# Patient Record
Sex: MALE | Race: BLACK OR AFRICAN AMERICAN | NOT HISPANIC OR LATINO | Employment: OTHER | URBAN - METROPOLITAN AREA
[De-identification: names, ages, dates, MRNs, and addresses within clinical notes are randomized per-mention and may not be internally consistent; named-entity substitution may affect disease eponyms.]

---

## 2017-01-03 ENCOUNTER — HOSPITAL ENCOUNTER (OUTPATIENT)
Dept: PHYSICAL THERAPY | Facility: HOSPITAL | Age: 62
Setting detail: THERAPIES SERIES
Discharge: HOME | End: 2017-01-03

## 2017-01-03 DIAGNOSIS — M25.511 RIGHT SHOULDER PAIN, UNSPECIFIED CHRONICITY: ICD-10-CM

## 2017-01-03 DIAGNOSIS — I10 ESSENTIAL HYPERTENSION: ICD-10-CM

## 2017-01-03 DIAGNOSIS — Z78.9 DIFFICULTY WITH ACTIVITIES OF DAILY LIVING: Primary | ICD-10-CM

## 2017-01-03 PROCEDURE — 97110 THERAPEUTIC EXERCISES: CPT | Performed by: PHYSICAL THERAPIST

## 2017-01-03 PROCEDURE — 97140 MANUAL THERAPY 1/> REGIONS: CPT | Performed by: PHYSICAL THERAPIST

## 2017-01-03 RX ORDER — CARVEDILOL 25 MG/1
TABLET ORAL
Qty: 180 TABLET | Refills: 0 | Status: SHIPPED | OUTPATIENT
Start: 2017-01-03 | End: 2017-06-06 | Stop reason: SDUPTHER

## 2017-01-03 NOTE — PROGRESS NOTES
Outpatient Physical Therapy Ortho Treatment Note  [facility]     Patient Name: Isaias Monaco  : 1955  MRN: [id number]  Today's Date: 1/3/2017      Visit Date: 2017    Visit Dx:    ICD-10-CM ICD-9-CM   1. Difficulty with activities of daily living R53.81 799.3   2. Right shoulder pain, unspecified chronicity M25.511 719.41       Patient Active Problem List   Diagnosis   • Microalbuminuria   • Vitamin D deficiency   • Angioedema   • Chronic coronary artery disease   • Anxiety   • Male erectile disorder   • Gastroesophageal reflux disease   • Essential hypertension   • Hyperlipidemia   • Type 2 diabetes mellitus   • Adiposity   • Routine health maintenance   • Uncontrolled type 2 diabetes mellitus   • Low testosterone   • Hypogonadism in male        Past Medical History   Diagnosis Date   • Adenomatous colon polyp      Colonoscopy last done    • Johnson's esophagus    • Cardiac disease    • Diabetes mellitus    • Hypertension    • Joint pain      or swelling   • Male erectile disorder    • Stiffness in joint    • Type 2 diabetes mellitus    • Vitamin D deficiency         Past Surgical History   Procedure Laterality Date   • Coronary angioplasty with stent placement       cardiac stents x3 occasions   • Shoulder surgery Right 2016             PT Assessment/Plan       17 1302 16 1321       PT Assessment    Assessment Comments Thony is progressing with his PREs and pain is remaining very low.    -MP Thony tolerated treatment well and is progressing with PREs without increasing pain.  -MP     PT Plan    PT Plan Comments Continue progressing physical therapy to maximize AROM, strength and function with his R UE.  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  -MP       User Key  (r) = Recorded By, (t) = Taken By, (c) = Cosigned By    Initials Name Provider Type    ASIF Bobby, PT Physical Therapist                Exercises        "01/03/17 1300          Subjective Comments    Subjective Comments \" I can feel my shoulder getting stronger. \"  -MP      Subjective Pain    Able to rate subjective pain? yes  -MP      Pre-Treatment Pain Level 1  -MP      Post-Treatment Pain Level 0  -MP      Exercise 1    Exercise Name 1 Refer to land flow sheet  -MP      Exercise 2    Exercise Name 2 Progressed standing lat pulldown 27.5 lbs. 10 x 3, standing rows 27.5 lbs. 10 x 3 and overhead press to 3 lbs. 10 x 3.  -MP        User Key  (r) = Recorded By, (t) = Taken By, (c) = Cosigned By    Initials Name Provider Type    ASIF Bobby, PT Physical Therapist             Manual Rx (last 36 hours)      Manual Treatments       01/03/17 1300          Manual Rx 1    Manual Rx 1 Location R UE  -MP      Manual Rx 1 Type Oscillations   -MP      Manual Rx 1 Duration 10 minutes  -MP      Manual Rx 2    Manual Rx 2 Location R glenohumeral joint  -MP      Manual Rx 2 Type Anterior, inferior and posterior glides  -MP      Manual Rx 2 Grade Grades 3-4  -MP      Manual Rx 2 Duration 5x3 each  -MP        User Key  (r) = Recorded By, (t) = Taken By, (c) = Cosigned By    Initials Name Provider Type    ASIF Bobby PT Physical Therapist                PT OP Goals       01/03/17 1300 12/29/16 1300 12/27/16 1200    PT Short Term Goals    STG Date to Achieve 11/04/16  -MP 11/04/16  -MP 11/04/16  -MP    STG 1 Sidelying passive R shoulder IR is instructed and performed without problem and is added to his HEP.  -MP Sidelying passive R shoulder IR is instructed and performed without problem and is added to his HEP.  -MP Sidelying passive R shoulder IR is instructed and performed without problem and is added to his HEP.  -MP    STG 1 Progress Met  -MP Met  -MP Met  -MP    STG 2 Thony is instructed in correct pendulum exercise technique and they are added to his HEP.  -MP Thony is instructed in correct pendulum exercise technique and they are added to his HEP.  -MP Thony is instructed " in correct pendulum exercise technique and they are added to his HEP.  -MP    STG 2 Progress Met  -MP Met  -MP Met  -MP    STG 3 Scapular stabilization exercises are begun.  -MP Scapular stabilization exercises are begun.  -MP Scapular stabilization exercises are begun.  -MP    STG 3 Progress Met  -MP Met  -MP Met  -MP    STG 4 SPADI score improves to a 40% functional disability.  -MP SPADI score improves to a 40% functional disability.  -MP SPADI score improves to a 40% functional disability.  -MP    STG 4 Progress Met  -MP Met  -MP Met  -MP    Long Term Goals    LTG Date to Achieve 01/15/17  -MP 01/15/17  -MP 01/15/17  -MP    LTG 1 Thony has zero pain with normal ADLs with the R UE.  -MP Thony has zero pain with normal ADLs with the R UE.  -MP Thony has zero pain with normal ADLs with the R UE.  -MP    LTG 1 Progress Ongoing  -MP Ongoing  -MP Ongoing  -MP    LTG 2 Functional AROM of the R shoulder equals the L.  -MP Functional AROM of the R shoulder equals the L.  -MP Functional AROM of the R shoulder equals the L.  -MP    LTG 2 Progress Ongoing  -MP Ongoing  -MP Ongoing  -MP    LTG 3 Strength R shoulder, cardinal motions, equals 4+/5.  -MP Strength R shoulder, cardinal motions, equals 4+/5.  -MP Strength R shoulder, cardinal motions, equals 4+/5.  -MP    LTG 3 Progress Ongoing  -MP Ongoing  -MP Ongoing  -MP    LTG 4 SPADI score displays less than 20% functional disability.  -MP SPADI score displays less than 20% functional disability.  -MP SPADI score displays less than 20% functional disability.  -MP    LTG 4 Progress Ongoing  -MP Ongoing  -MP Ongoing  -MP    LTG 5 Thony is independent with a complete HEP and self care education.  -MP Thony is independent with a complete HEP and self care education.  -MP Thony is independent with a complete HEP and self care education.  -MP    LTG 5 Progress Ongoing  -MP Ongoing  -MP Ongoing  -MP      12/23/16 1400          PT Short Term Goals    STG Date to Achieve 11/04/16   -MP      STG 1 Sidelying passive R shoulder IR is instructed and performed without problem and is added to his HEP.  -MP      STG 1 Progress Met  -MP      STG 2 Thony is instructed in correct pendulum exercise technique and they are added to his HEP.  -MP      STG 2 Progress Met  -MP      STG 3 Scapular stabilization exercises are begun.  -MP      STG 3 Progress Met  -MP      STG 4 SPADI score improves to a 40% functional disability.  -MP      STG 4 Progress Met  -MP      Long Term Goals    LTG Date to Achieve 01/15/17  -MP      LTG 1 Thony has zero pain with normal ADLs with the R UE.  -MP      LTG 1 Progress Ongoing  -MP      LTG 2 Functional AROM of the R shoulder equals the L.  -MP      LTG 2 Progress Ongoing  -MP      LTG 3 Strength R shoulder, cardinal motions, equals 4+/5.  -MP      LTG 3 Progress Ongoing  -MP      LTG 4 SPADI score displays less than 20% functional disability.  -MP      LTG 4 Progress Ongoing  -MP      LTG 5 Thony is independent with a complete HEP and self care education.  -MP      LTG 5 Progress Ongoing  -MP        User Key  (r) = Recorded By, (t) = Taken By, (c) = Cosigned By    Initials Name Provider Type    ASIF Bobby PT Physical Therapist                Therapy Education       01/03/17 1302    Therapy Education    Given HEP  -MP    Program Reinforced  -MP    How Provided Verbal  -MP    Provided to Patient  -MP    Level of Understanding Verbalized  -MP      12/29/16 1321    Therapy Education    Given HEP  -MP    Program Reinforced  -MP    How Provided Verbal  -MP    Provided to Patient  -MP    Level of Understanding Verbalized  -MP      User Key  (r) = Recorded By, (t) = Taken By, (c) = Cosigned By    Initials Name Provider Type    ASIF Bobby PT Physical Therapist          Time Calculation:   Start Time: 1145  Stop Time: 1240  Time Calculation (min): 55 min    Therapy Charges for Today     Code Description Service Date Service Provider Modifiers Qty    63381503476  PT THER  PROC EA 15 MIN 1/3/2017 Gwyn Bobby, PT GP 3    19214103693 HC PT MANUAL THERAPY EA 15 MIN 1/3/2017 Gwyn Bobby, PT GP 1          Gwyn Bobby, PT  1/3/2017

## 2017-01-06 ENCOUNTER — HOSPITAL ENCOUNTER (OUTPATIENT)
Dept: PHYSICAL THERAPY | Facility: HOSPITAL | Age: 62
Setting detail: THERAPIES SERIES
Discharge: HOME | End: 2017-01-06

## 2017-01-06 DIAGNOSIS — Z78.9 DIFFICULTY WITH ACTIVITIES OF DAILY LIVING: Primary | ICD-10-CM

## 2017-01-06 DIAGNOSIS — M25.511 RIGHT SHOULDER PAIN, UNSPECIFIED CHRONICITY: ICD-10-CM

## 2017-01-06 PROCEDURE — 97110 THERAPEUTIC EXERCISES: CPT | Performed by: PHYSICAL THERAPIST

## 2017-01-06 PROCEDURE — 97140 MANUAL THERAPY 1/> REGIONS: CPT | Performed by: PHYSICAL THERAPIST

## 2017-01-06 NOTE — PROGRESS NOTES
Outpatient Physical Therapy Ortho Treatment Note  [facility]     Patient Name: Isaias Monaco  : 1955  MRN: [id number]  Today's Date: 2017      Visit Date: 2017    Visit Dx:    ICD-10-CM ICD-9-CM   1. Difficulty with activities of daily living R53.81 799.3   2. Right shoulder pain, unspecified chronicity M25.511 719.41       Patient Active Problem List   Diagnosis   • Microalbuminuria   • Vitamin D deficiency   • Angioedema   • Chronic coronary artery disease   • Anxiety   • Male erectile disorder   • Gastroesophageal reflux disease   • Essential hypertension   • Hyperlipidemia   • Type 2 diabetes mellitus   • Adiposity   • Routine health maintenance   • Uncontrolled type 2 diabetes mellitus   • Low testosterone   • Hypogonadism in male        Past Medical History   Diagnosis Date   • Adenomatous colon polyp      Colonoscopy last done    • Johnson's esophagus    • Cardiac disease    • Diabetes mellitus    • Hypertension    • Joint pain      or swelling   • Male erectile disorder    • Stiffness in joint    • Type 2 diabetes mellitus    • Vitamin D deficiency         Past Surgical History   Procedure Laterality Date   • Coronary angioplasty with stent placement       cardiac stents x3 occasions   • Shoulder surgery Right 2016                   PT Assessment/Plan       17 1316 17 1302       PT Assessment    Assessment Comments Thony tolerated treatment well.    -MP Thony is progressing with his PREs and pain is remaining very low.    -MP     PT Plan    PT Plan Comments Progress PREs next visit and reassess next visit.  -MP Continue progressing physical therapy to maximize AROM, strength and function with his R UE.  -MP       User Key  (r) = Recorded By, (t) = Taken By, (c) = Cosigned By    Initials Name Provider Type    ASIF Bobby, PT Physical Therapist                Exercises       17 1300          Subjective Pain    Able to rate subjective pain? yes  -MP       Pre-Treatment Pain Level 0  -MP      Post-Treatment Pain Level 0  -MP      Exercise 1    Exercise Name 1 Refer to land flow sheet  -MP      Exercise 2    Exercise Name 2 Progressed shrugs to 20 lbs. 10 x 3 B and overhead press 4 lbs. x 10, 3 lbs. 10 x 2 B  -MP        User Key  (r) = Recorded By, (t) = Taken By, (c) = Cosigned By    Initials Name Provider Type    ASIF Bobby, PT Physical Therapist               Manual Rx (last 36 hours)      Manual Treatments       01/06/17 1300          Manual Rx 1    Manual Rx 1 Location R UE  -MP      Manual Rx 1 Type Oscillations   -MP      Manual Rx 1 Duration 10 minutes  -MP      Manual Rx 2    Manual Rx 2 Location R glenohumeral joint  -MP      Manual Rx 2 Type Anterior, inferior and posterior glides  -MP      Manual Rx 2 Grade Grades 3-4  -MP      Manual Rx 2 Duration 5x3 each  -MP        User Key  (r) = Recorded By, (t) = Taken By, (c) = Cosigned By    Initials Name Provider Type    ASIF Bobby PT Physical Therapist                PT OP Goals       01/06/17 1300 01/03/17 1300 12/29/16 1300    PT Short Term Goals    STG Date to Achieve 11/04/16  -MP 11/04/16  -MP 11/04/16  -MP    STG 1 Sidelying passive R shoulder IR is instructed and performed without problem and is added to his HEP.  -MP Sidelying passive R shoulder IR is instructed and performed without problem and is added to his HEP.  -MP Sidelying passive R shoulder IR is instructed and performed without problem and is added to his HEP.  -MP    STG 1 Progress Met  -MP Met  -MP Met  -MP    STG 2 Thony is instructed in correct pendulum exercise technique and they are added to his HEP.  -MP Thony is instructed in correct pendulum exercise technique and they are added to his HEP.  -MP Thony is instructed in correct pendulum exercise technique and they are added to his HEP.  -MP    STG 2 Progress Met  -MP Met  -MP Met  -MP    STG 3 Scapular stabilization exercises are begun.  -MP Scapular stabilization exercises are  begun.  -MP Scapular stabilization exercises are begun.  -MP    STG 3 Progress Met  -MP Met  -MP Met  -MP    STG 4 SPADI score improves to a 40% functional disability.  -MP SPADI score improves to a 40% functional disability.  -MP SPADI score improves to a 40% functional disability.  -MP    STG 4 Progress Met  -MP Met  -MP Met  -MP    Long Term Goals    LTG Date to Achieve 01/15/17  -MP 01/15/17  -MP 01/15/17  -MP    LTG 1 Thony has zero pain with normal ADLs with the R UE.  -MP Thony has zero pain with normal ADLs with the R UE.  -MP Thony has zero pain with normal ADLs with the R UE.  -MP    LTG 1 Progress Ongoing  -MP Ongoing  -MP Ongoing  -MP    LTG 2 Functional AROM of the R shoulder equals the L.  -MP Functional AROM of the R shoulder equals the L.  -MP Functional AROM of the R shoulder equals the L.  -MP    LTG 2 Progress Ongoing  -MP Ongoing  -MP Ongoing  -MP    LTG 3 Strength R shoulder, cardinal motions, equals 4+/5.  -MP Strength R shoulder, cardinal motions, equals 4+/5.  -MP Strength R shoulder, cardinal motions, equals 4+/5.  -MP    LTG 3 Progress Ongoing  -MP Ongoing  -MP Ongoing  -MP    LTG 4 SPADI score displays less than 20% functional disability.  -MP SPADI score displays less than 20% functional disability.  -MP SPADI score displays less than 20% functional disability.  -MP    LTG 4 Progress Ongoing  -MP Ongoing  -MP Ongoing  -MP    LTG 5 Thony is independent with a complete HEP and self care education.  -MP Thony is independent with a complete HEP and self care education.  -MP Thony is independent with a complete HEP and self care education.  -MP    LTG 5 Progress Ongoing  -MP Ongoing  -MP Ongoing  -MP      12/27/16 1200          PT Short Term Goals    STG Date to Achieve 11/04/16  -MP      STG 1 Sidelying passive R shoulder IR is instructed and performed without problem and is added to his HEP.  -MP      STG 1 Progress Met  -MP      STG 2 Thony is instructed in correct pendulum exercise  technique and they are added to his HEP.  -MP      STG 2 Progress Met  -MP      STG 3 Scapular stabilization exercises are begun.  -MP      STG 3 Progress Met  -MP      STG 4 SPADI score improves to a 40% functional disability.  -MP      STG 4 Progress Met  -MP      Long Term Goals    LTG Date to Achieve 01/15/17  -MP      LTG 1 Thony has zero pain with normal ADLs with the R UE.  -MP      LTG 1 Progress Ongoing  -MP      LTG 2 Functional AROM of the R shoulder equals the L.  -MP      LTG 2 Progress Ongoing  -MP      LTG 3 Strength R shoulder, cardinal motions, equals 4+/5.  -MP      LTG 3 Progress Ongoing  -MP      LTG 4 SPADI score displays less than 20% functional disability.  -MP      LTG 4 Progress Ongoing  -MP      LTG 5 Thony is independent with a complete HEP and self care education.  -MP      LTG 5 Progress Ongoing  -MP        User Key  (r) = Recorded By, (t) = Taken By, (c) = Cosigned By    Initials Name Provider Type    ASIF Bobby PT Physical Therapist                Therapy Education       01/06/17 1315    Therapy Education    Given HEP  -MP    Program Reinforced  -MP    How Provided Verbal  -MP    Provided to Patient  -MP    Level of Understanding Other (comment)  -MP      01/03/17 1302    Therapy Education    Given HEP  -MP    Program Reinforced  -MP    How Provided Verbal  -MP    Provided to Patient  -MP    Level of Understanding Verbalized  -MP      User Key  (r) = Recorded By, (t) = Taken By, (c) = Cosigned By    Initials Name Provider Type    ASIF Bobby PT Physical Therapist          Time Calculation:   Start Time: 1230  Stop Time: 1315  Time Calculation (min): 45 min    Therapy Charges for Today     Code Description Service Date Service Provider Modifiers Qty    73305559416 HC PT MANUAL THERAPY EA 15 MIN 1/6/2017 Gwyn Bobby PT GP 1    16540001233 HC PT THER PROC EA 15 MIN 1/6/2017 Gwyn Bobby PT GP 2          Gwyn Bobby PT  1/6/2017

## 2017-01-11 ENCOUNTER — HOSPITAL ENCOUNTER (OUTPATIENT)
Dept: PHYSICAL THERAPY | Facility: HOSPITAL | Age: 62
Setting detail: THERAPIES SERIES
Discharge: HOME | End: 2017-01-11

## 2017-01-11 DIAGNOSIS — M25.511 RIGHT SHOULDER PAIN, UNSPECIFIED CHRONICITY: ICD-10-CM

## 2017-01-11 DIAGNOSIS — Z78.9 DIFFICULTY WITH ACTIVITIES OF DAILY LIVING: Primary | ICD-10-CM

## 2017-01-11 PROCEDURE — 97110 THERAPEUTIC EXERCISES: CPT | Performed by: PHYSICAL THERAPIST

## 2017-01-11 PROCEDURE — 97140 MANUAL THERAPY 1/> REGIONS: CPT | Performed by: PHYSICAL THERAPIST

## 2017-01-11 NOTE — PROGRESS NOTES
Outpatient Physical Therapy Ortho Treatment Note  [facility]     Patient Name: Isaias Monaco  : 1955  MRN: [id number]  Today's Date: 2017      Visit Date: 2017    Visit Dx:    ICD-10-CM ICD-9-CM   1. Difficulty with activities of daily living R53.81 799.3   2. Right shoulder pain, unspecified chronicity M25.511 719.41       Patient Active Problem List   Diagnosis   • Microalbuminuria   • Vitamin D deficiency   • Angioedema   • Chronic coronary artery disease   • Anxiety   • Male erectile disorder   • Gastroesophageal reflux disease   • Essential hypertension   • Hyperlipidemia   • Type 2 diabetes mellitus   • Adiposity   • Routine health maintenance   • Uncontrolled type 2 diabetes mellitus   • Low testosterone   • Hypogonadism in male        Past Medical History   Diagnosis Date   • Adenomatous colon polyp      Colonoscopy last done    • Johnson's esophagus    • Cardiac disease    • Diabetes mellitus    • Hypertension    • Joint pain      or swelling   • Male erectile disorder    • Stiffness in joint    • Type 2 diabetes mellitus    • Vitamin D deficiency         Past Surgical History   Procedure Laterality Date   • Coronary angioplasty with stent placement       cardiac stents x3 occasions   • Shoulder surgery Right 2016             PT Ortho       17 1300    Subjective Comments    Subjective Comments Thony stated that he is never awakened by R shoulder pain anymore.  -MP    Subjective Pain    Able to rate subjective pain? yes  -MP    Pre-Treatment Pain Level 0  -MP    Post-Treatment Pain Level 0  -MP      User Key  (r) = Recorded By, (t) = Taken By, (c) = Cosigned By    Initials Name Provider Type    ASIF Bobby, PT Physical Therapist                PT Assessment/Plan       17 1321 17 1316       PT Assessment    Assessment Comments Thony progress resistance with multiple PREs today without problem.    -MP Thony tolerated treatment well.    -MP     PT Plan     PT Plan Comments Continue with current treatment and reassess next visit.  -MP Progress PREs next visit and reassess next visit.  -MP       User Key  (r) = Recorded By, (t) = Taken By, (c) = Cosigned By    Initials Name Provider Type    ASIF Bobby PT Physical Therapist                Exercises       01/11/17 1300          Subjective Comments    Subjective Comments Thony stated that he is never awakened by R shoulder pain anymore.  -MP      Subjective Pain    Able to rate subjective pain? yes  -MP      Pre-Treatment Pain Level 0  -MP      Post-Treatment Pain Level 0  -MP      Exercise 1    Exercise Name 1 Refer to land flow sheet  -MP      Exercise 2    Exercise Name 2 Progressed therapeutic exercises with overhead shoulder press 5 lbs. 5 x 3, standing lat pulls 30 lbs. 10 x 3, standing row 30 lbs. 10 x 3, sitting ER with humerus at 75 degrees of elevation 5 lbs. 10 x 3, sidelying ER 5 lbs. 10 x 3 and standing scapular protraction 10 lbs. 10 x 3.  -MP        User Key  (r) = Recorded By, (t) = Taken By, (c) = Cosigned By    Initials Name Provider Type    ASIF Bobby PT Physical Therapist               Manual Rx (last 36 hours)      Manual Treatments       01/11/17 1300          Manual Rx 1    Manual Rx 1 Location R UE  -MP      Manual Rx 1 Type Oscillations   -MP      Manual Rx 1 Duration 10 minutes  -MP      Manual Rx 2    Manual Rx 2 Location R glenohumeral joint  -MP      Manual Rx 2 Type Anterior, inferior and posterior glides  -MP      Manual Rx 2 Grade Grades 3-4  -MP      Manual Rx 2 Duration 5x3 each  -MP        User Key  (r) = Recorded By, (t) = Taken By, (c) = Cosigned By    Initials Name Provider Type    ASIF Bobby PT Physical Therapist                PT OP Goals       01/11/17 1300 01/06/17 1300 01/03/17 1300    PT Short Term Goals    STG Date to Achieve 11/04/16  -MP 11/04/16  -MP 11/04/16  -MP    STG 1 Sidelying passive R shoulder IR is instructed and performed without problem and is  added to his HEP.  -MP Sidelying passive R shoulder IR is instructed and performed without problem and is added to his HEP.  -MP Sidelying passive R shoulder IR is instructed and performed without problem and is added to his HEP.  -MP    STG 1 Progress Met  -MP Met  -MP Met  -MP    STG 2 Thony is instructed in correct pendulum exercise technique and they are added to his HEP.  -MP Thony is instructed in correct pendulum exercise technique and they are added to his HEP.  -MP Thony is instructed in correct pendulum exercise technique and they are added to his HEP.  -MP    STG 2 Progress Met  -MP Met  -MP Met  -MP    STG 3 Scapular stabilization exercises are begun.  -MP Scapular stabilization exercises are begun.  -MP Scapular stabilization exercises are begun.  -MP    STG 3 Progress Met  -MP Met  -MP Met  -MP    STG 4 SPADI score improves to a 40% functional disability.  -MP SPADI score improves to a 40% functional disability.  -MP SPADI score improves to a 40% functional disability.  -MP    STG 4 Progress Met  -MP Met  -MP Met  -MP    Long Term Goals    LTG Date to Achieve 01/15/17  -MP 01/15/17  -MP 01/15/17  -MP    LTG 1 Thony has zero pain with normal ADLs with the R UE.  -MP Thony has zero pain with normal ADLs with the R UE.  -MP Thony has zero pain with normal ADLs with the R UE.  -MP    LTG 1 Progress Ongoing  -MP Ongoing  -MP Ongoing  -MP    LTG 2 Functional AROM of the R shoulder equals the L.  -MP Functional AROM of the R shoulder equals the L.  -MP Functional AROM of the R shoulder equals the L.  -MP    LTG 2 Progress Ongoing  -MP Ongoing  -MP Ongoing  -MP    LTG 3 Strength R shoulder, cardinal motions, equals 4+/5.  -MP Strength R shoulder, cardinal motions, equals 4+/5.  -MP Strength R shoulder, cardinal motions, equals 4+/5.  -MP    LTG 3 Progress Ongoing  -MP Ongoing  -MP Ongoing  -MP    LTG 4 SPADI score displays less than 20% functional disability.  -MP SPADI score displays less than 20% functional  disability.  -MP SPADI score displays less than 20% functional disability.  -MP    LTG 4 Progress Ongoing  -MP Ongoing  -MP Ongoing  -MP    LTG 5 Thony is independent with a complete HEP and self care education.  -MP Thony is independent with a complete HEP and self care education.  -MP Thony is independent with a complete HEP and self care education.  -MP    LTG 5 Progress Ongoing  -MP Ongoing  -MP Ongoing  -MP      12/29/16 1300          PT Short Term Goals    STG Date to Achieve 11/04/16  -MP      STG 1 Sidelying passive R shoulder IR is instructed and performed without problem and is added to his HEP.  -MP      STG 1 Progress Met  -MP      STG 2 Thony is instructed in correct pendulum exercise technique and they are added to his HEP.  -MP      STG 2 Progress Met  -MP      STG 3 Scapular stabilization exercises are begun.  -MP      STG 3 Progress Met  -MP      STG 4 SPADI score improves to a 40% functional disability.  -MP      STG 4 Progress Met  -MP      Long Term Goals    LTG Date to Achieve 01/15/17  -MP      LTG 1 Thony has zero pain with normal ADLs with the R UE.  -MP      LTG 1 Progress Ongoing  -MP      LTG 2 Functional AROM of the R shoulder equals the L.  -MP      LTG 2 Progress Ongoing  -MP      LTG 3 Strength R shoulder, cardinal motions, equals 4+/5.  -MP      LTG 3 Progress Ongoing  -MP      LTG 4 SPADI score displays less than 20% functional disability.  -MP      LTG 4 Progress Ongoing  -MP      LTG 5 Thony is independent with a complete HEP and self care education.  -MP      LTG 5 Progress Ongoing  -MP        User Key  (r) = Recorded By, (t) = Taken By, (c) = Cosigned By    Initials Name Provider Type    ASIF Bobby, PT Physical Therapist                Therapy Education       01/11/17 1321    Therapy Education    Given HEP  -MP    Program Reinforced  -MP    How Provided Verbal  -MP    Provided to Patient  -MP    Level of Understanding Verbalized  -MP      01/06/17 1315    Therapy Education     Given HEP  -MP    Program Reinforced  -MP    How Provided Verbal  -MP    Provided to Patient  -MP    Level of Understanding Other (comment)  -MP      User Key  (r) = Recorded By, (t) = Taken By, (c) = Cosigned By    Initials Name Provider Type    MP Gwyn Bobby PT Physical Therapist        Time Calculation:   Start Time: 1155  Stop Time: 1250  Time Calculation (min): 55 min    Therapy Charges for Today     Code Description Service Date Service Provider Modifiers Qty    88079041675 HC PT THER PROC EA 15 MIN 1/11/2017 Gwyn Bobby PT GP 3    81993590696 HC PT MANUAL THERAPY EA 15 MIN 1/11/2017 Gwyn Bobby PT GP 1          Gwyn Bobby PT  1/11/2017

## 2017-01-13 ENCOUNTER — HOSPITAL ENCOUNTER (OUTPATIENT)
Dept: PHYSICAL THERAPY | Facility: HOSPITAL | Age: 62
Setting detail: THERAPIES SERIES
Discharge: HOME | End: 2017-01-13

## 2017-01-13 DIAGNOSIS — M25.511 RIGHT SHOULDER PAIN, UNSPECIFIED CHRONICITY: ICD-10-CM

## 2017-01-13 DIAGNOSIS — Z78.9 DIFFICULTY WITH ACTIVITIES OF DAILY LIVING: Primary | ICD-10-CM

## 2017-01-13 PROCEDURE — 97110 THERAPEUTIC EXERCISES: CPT | Performed by: PHYSICAL THERAPIST

## 2017-01-13 PROCEDURE — 97140 MANUAL THERAPY 1/> REGIONS: CPT | Performed by: PHYSICAL THERAPIST

## 2017-01-13 NOTE — PROGRESS NOTES
Outpatient Physical Therapy Ortho Re-Assessment/Treatment  [facility]     Patient Name: Isaias Monaco  : 1955  MRN: [id number]  Today's Date: 2017      Visit Date: 2017    Patient Active Problem List   Diagnosis   • Microalbuminuria   • Vitamin D deficiency   • Angioedema   • Chronic coronary artery disease   • Anxiety   • Male erectile disorder   • Gastroesophageal reflux disease   • Essential hypertension   • Hyperlipidemia   • Type 2 diabetes mellitus   • Adiposity   • Routine health maintenance   • Uncontrolled type 2 diabetes mellitus   • Low testosterone   • Hypogonadism in male        Past Medical History   Diagnosis Date   • Adenomatous colon polyp      Colonoscopy last done    • Johnson's esophagus    • Cardiac disease    • Diabetes mellitus    • Hypertension    • Joint pain      or swelling   • Male erectile disorder    • Stiffness in joint    • Type 2 diabetes mellitus    • Vitamin D deficiency         Past Surgical History   Procedure Laterality Date   • Coronary angioplasty with stent placement       cardiac stents x3 occasions   • Shoulder surgery Right 2016       Visit Dx:     ICD-10-CM ICD-9-CM   1. Difficulty with activities of daily living R53.81 799.3   2. Right shoulder pain, unspecified chronicity M25.511 719.41             PT Ortho       17 1300    ROM (Range of Motion)    General ROM Detail R Shoulder Functional AROM: Elevation in scapular plane 164 degrees, reach behind neck T2 spinous process and reach behind back T 10 spinous process.  PROM R shoulder supine, ER 87degrees with capsular end feel, flexion 178 degrees normal end feel, abduction 167 degrees capsular end feel, IR 80 degrees normal end feel and Extension 87 degrees with normal end feel  -MP    MMT (Manual Muscle Testing)    General MMT Assessment Detail R shoulder flexion 4+/5, abduction 4 to 4+/5, ER 4 to 4+/5, IR 5/5 and Extension 5/5.  -MP      17 1300    Subjective Comments     Subjective Comments Thony stated that he is never awakened by R shoulder pain anymore.  -MP    Subjective Pain    Able to rate subjective pain? yes  -MP    Pre-Treatment Pain Level 0  -MP    Post-Treatment Pain Level 0  -MP      User Key  (r) = Recorded By, (t) = Taken By, (c) = Cosigned By    Initials Name Provider Type    ASIF Bobby, PT Physical Therapist                Therapy Education       01/13/17 1409    Therapy Education    Given HEP  -MP    Program Reinforced  -MP    How Provided Verbal  -MP    Provided to Patient  -MP    Level of Understanding Verbalized  -MP      01/11/17 1321    Therapy Education    Given HEP  -MP    Program Reinforced  -MP    How Provided Verbal  -MP    Provided to Patient  -MP    Level of Understanding Verbalized  -MP      User Key  (r) = Recorded By, (t) = Taken By, (c) = Cosigned By    Initials Name Provider Type    ASIF Bobby, PT Physical Therapist                PT OP Goals       01/13/17 1400 01/11/17 1300 01/06/17 1300    PT Short Term Goals    STG Date to Achieve 11/04/16  -MP 11/04/16  -MP 11/04/16  -MP    STG 1 Sidelying passive R shoulder IR is instructed and performed without problem and is added to his HEP.  -MP Sidelying passive R shoulder IR is instructed and performed without problem and is added to his HEP.  -MP Sidelying passive R shoulder IR is instructed and performed without problem and is added to his HEP.  -MP    STG 1 Progress Met  -MP Met  -MP Met  -MP    STG 2 Thony is instructed in correct pendulum exercise technique and they are added to his HEP.  -MP Thony is instructed in correct pendulum exercise technique and they are added to his HEP.  -MP Thony is instructed in correct pendulum exercise technique and they are added to his HEP.  -MP    STG 2 Progress Met  -MP Met  -MP Met  -MP    STG 3 Scapular stabilization exercises are begun.  -MP Scapular stabilization exercises are begun.  -MP Scapular stabilization exercises are begun.  -MP    STG 3  Progress Met  -MP Met  -MP Met  -MP    STG 4 SPADI score improves to a 40% functional disability.  -MP SPADI score improves to a 40% functional disability.  -MP SPADI score improves to a 40% functional disability.  -MP    STG 4 Progress Met  -MP Met  -MP Met  -MP    Long Term Goals    LTG Date to Achieve 02/10/17  -MP 01/15/17  -MP 01/15/17  -MP    LTG 1 Thony has zero pain with normal ADLs with the R UE.  -MP Thony has zero pain with normal ADLs with the R UE.  -MP Thony has zero pain with normal ADLs with the R UE.  -MP    LTG 1 Progress Ongoing  -MP Ongoing  -MP Ongoing  -MP    LTG 2 Functional AROM of the R shoulder equals the L.  -MP Functional AROM of the R shoulder equals the L.  -MP Functional AROM of the R shoulder equals the L.  -MP    LTG 2 Progress Ongoing  -MP Ongoing  -MP Ongoing  -MP    LTG 3 Strength R shoulder, cardinal motions, equals 4+/5.  -MP Strength R shoulder, cardinal motions, equals 4+/5.  -MP Strength R shoulder, cardinal motions, equals 4+/5.  -MP    LTG 3 Progress Ongoing  -MP Ongoing  -MP Ongoing  -MP    LTG 4 SPADI score displays less than 20% functional disability.  -MP SPADI score displays less than 20% functional disability.  -MP SPADI score displays less than 20% functional disability.  -MP    LTG 4 Progress Ongoing  -MP Ongoing  -MP Ongoing  -MP    LTG 5 Thony is independent with a complete HEP and self care education.  -MP Thony is independent with a complete HEP and self care education.  -MP Thony is independent with a complete HEP and self care education.  -MP    LTG 5 Progress Ongoing  -MP Ongoing  -MP Ongoing  -MP      01/03/17 1300          PT Short Term Goals    STG Date to Achieve 11/04/16  -MP      STG 1 Sidelying passive R shoulder IR is instructed and performed without problem and is added to his HEP.  -MP      STG 1 Progress Met  -MP      STG 2 Thony is instructed in correct pendulum exercise technique and they are added to his HEP.  -MP      STG 2 Progress Met  -MP       STG 3 Scapular stabilization exercises are begun.  -MP      STG 3 Progress Met  -MP      STG 4 SPADI score improves to a 40% functional disability.  -MP      STG 4 Progress Met  -MP      Long Term Goals    LTG Date to Achieve 01/15/17  -MP      LTG 1 Thony has zero pain with normal ADLs with the R UE.  -MP      LTG 1 Progress Ongoing  -MP      LTG 2 Functional AROM of the R shoulder equals the L.  -MP      LTG 2 Progress Ongoing  -MP      LTG 3 Strength R shoulder, cardinal motions, equals 4+/5.  -MP      LTG 3 Progress Ongoing  -MP      LTG 4 SPADI score displays less than 20% functional disability.  -MP      LTG 4 Progress Ongoing  -MP      LTG 5 Thony is independent with a complete HEP and self care education.  -MP      LTG 5 Progress Ongoing  -MP        User Key  (r) = Recorded By, (t) = Taken By, (c) = Cosigned By    Initials Name Provider Type    MP Gwyn Bobby, PT Physical Therapist                PT Assessment/Plan       01/13/17 1410 01/11/17 1321       PT Assessment    Functional Limitations Decreased safety during functional activities;Limitations in functional capacity and performance;Limitations in community activities;Other (comment)   Limitations with work activities  -MP      Impairments Pain;Muscle strength;Impaired flexibility;Range of motion;Endurance  -MP      Assessment Comments Thony noticed improved strength with R shoulder flexion, extension and internal rotation.  End feels for R shoulder PROM are improved but ER and abduction continue to have a capsular end feel.    -MP Thony progress resistance with multiple PREs today without problem.    -MP     Rehab Potential Good  -MP      Patient/caregiver participated in establishment of treatment plan and goals Yes  -MP      Patient would benefit from skilled therapy intervention Yes  -MP      PT Plan    PT Frequency 2x/week  -MP      Predicted Duration of Therapy Intervention (days/wks) 4 weeks  -MP      Planned CPT's? PT EVAL: 27051;PT  RE-EVAL: 91020;PT THER PROC EA 15 MIN: 52457;PT MANUAL THERAPY EA 15 MIN: 85323;PT SELF CARE/HOME MGMT/TRAIN EA 15: 06085;PT ELECTRICAL STIM UNATTEND: ;PT ULTRASOUND EA 15 MIN: 09680  -MP      PT Plan Comments Thaddeus is going on vacation for one week and will resume physical therapy when he returns.  -MP Continue with current treatment and reassess next visit.  -MP       User Key  (r) = Recorded By, (t) = Taken By, (c) = Cosigned By    Initials Name Provider Type    ASIF Bobby PT Physical Therapist                  Exercises       01/13/17 1400          Subjective Comments    Subjective Comments Thony reports that he feels that he is slowly getting better.  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    -MP      Subjective Pain    Able to rate subjective pain? yes  -MP      Pre-Treatment Pain Level 0  -MP      Post-Treatment Pain Level 0  -MP      Exercise 1    Exercise Name 1 Refer to land flow sheet.  -MP        User Key  (r) = Recorded By, (t) = Taken By, (c) = Cosigned By    Initials Name Provider Type    ASIF Bobby PT Physical Therapist           Manual Rx (last 36 hours)      Manual Treatments       01/13/17 1300          Manual Rx 1    Manual Rx 1 Location R UE  -MP      Manual Rx 1 Type Oscillations   -MP      Manual Rx 1 Duration 10 minutes  -MP      Manual Rx 2    Manual Rx 2 Location R glenohumeral joint  -MP      Manual Rx 2 Type Anterior, inferior and posterior glides  -MP      Manual Rx 2 Grade Grades 3-4  -MP      Manual Rx 2 Duration 5x3 each  -MP        User Key  (r) = Recorded By, (t) = Taken By, (c) = Cosigned By    Initials Name Provider Type    ASIF Bobby PT Physical Therapist           Time Calculation:   Start Time: 1230  Stop Time: 1315  Time Calculation (min): 45 min     Therapy Charges for Today      Code Description Service Date Service Provider Modifiers Qty    72431938812 HC PT MANUAL THERAPY EA 15 MIN 1/13/2017 Gwyn Bobby, PT GP 1    59222948871 HC PT THER PROC EA 15 MIN 1/13/2017 Gwyn Bobby PT GP 2          Gwyn Bobby, PT  1/13/2017

## 2017-01-24 ENCOUNTER — HOSPITAL ENCOUNTER (OUTPATIENT)
Dept: PHYSICAL THERAPY | Facility: HOSPITAL | Age: 62
Setting detail: THERAPIES SERIES
Discharge: HOME | End: 2017-01-24

## 2017-01-24 DIAGNOSIS — M25.511 RIGHT SHOULDER PAIN, UNSPECIFIED CHRONICITY: ICD-10-CM

## 2017-01-24 DIAGNOSIS — Z78.9 DIFFICULTY WITH ACTIVITIES OF DAILY LIVING: Primary | ICD-10-CM

## 2017-01-24 PROCEDURE — 97140 MANUAL THERAPY 1/> REGIONS: CPT | Performed by: PHYSICAL THERAPIST

## 2017-01-24 PROCEDURE — 97110 THERAPEUTIC EXERCISES: CPT | Performed by: PHYSICAL THERAPIST

## 2017-01-24 NOTE — PROGRESS NOTES
Outpatient Physical Therapy Ortho Treatment Note  [facility]     Patient Name: Isaias Monaco  : 1955  MRN: [id number]  Today's Date: 2017      Visit Date: 2017    Visit Dx:    ICD-10-CM ICD-9-CM   1. Difficulty with activities of daily living R53.81 799.3   2. Right shoulder pain, unspecified chronicity M25.511 719.41       Patient Active Problem List   Diagnosis   • Microalbuminuria   • Vitamin D deficiency   • Angioedema   • Chronic coronary artery disease   • Anxiety   • Male erectile disorder   • Gastroesophageal reflux disease   • Essential hypertension   • Hyperlipidemia   • Type 2 diabetes mellitus   • Adiposity   • Routine health maintenance   • Uncontrolled type 2 diabetes mellitus   • Low testosterone   • Hypogonadism in male        Past Medical History   Diagnosis Date   • Adenomatous colon polyp      Colonoscopy last done    • Johnson's esophagus    • Cardiac disease    • Diabetes mellitus    • Hypertension    • Joint pain      or swelling   • Male erectile disorder    • Stiffness in joint    • Type 2 diabetes mellitus    • Vitamin D deficiency         Past Surgical History   Procedure Laterality Date   • Coronary angioplasty with stent placement       cardiac stents x3 occasions   • Shoulder surgery Right 2016             PT Assessment/Plan       17 1721          PT Assessment    Assessment Comments Thony tolerated treatment well.  He tolerated his previous resistive forces well despite being out for 11 days.  -MP      PT Plan    PT Plan Comments Progress shoulder shrugs resistance and add a third set of 10 repetitions of overhead press on next visit.  -MP        User Key  (r) = Recorded By, (t) = Taken By, (c) = Cosigned By    Initials Name Provider Type    ASIF Bobby, PT Physical Therapist                Exercises       17 1700          Subjective Comments    Subjective Comments Thony reports that he is beginning to not notice/protect the R shoulder as  much.  -MP      Subjective Pain    Able to rate subjective pain? yes  -MP      Pre-Treatment Pain Level 0  -MP      Exercise 1    Exercise Name 1 Refer to land flow sheet  -MP        User Key  (r) = Recorded By, (t) = Taken By, (c) = Cosigned By    Initials Name Provider Type    ASIF Bobby, PT Physical Therapist             Manual Rx (last 36 hours)      Manual Treatments       01/24/17 1700          Manual Rx 1    Manual Rx 1 Location R UE  -MP      Manual Rx 1 Type Oscillations   -MP      Manual Rx 1 Duration 10 minutes  -MP      Manual Rx 2    Manual Rx 2 Location R glenohumeral joint  -MP      Manual Rx 2 Type Anterior, inferior and posterior glides  -MP      Manual Rx 2 Grade Grades 3-4  -MP      Manual Rx 2 Duration 5x3 each  -MP        User Key  (r) = Recorded By, (t) = Taken By, (c) = Cosigned By    Initials Name Provider Type    ASIF Bobby, PT Physical Therapist                PT OP Goals       01/24/17 1700 01/13/17 1400 01/11/17 1300    PT Short Term Goals    STG Date to Achieve 11/04/16  -MP 11/04/16  -MP 11/04/16  -MP    STG 1 Sidelying passive R shoulder IR is instructed and performed without problem and is added to his HEP.  -MP Sidelying passive R shoulder IR is instructed and performed without problem and is added to his HEP.  -MP Sidelying passive R shoulder IR is instructed and performed without problem and is added to his HEP.  -MP    STG 1 Progress Met  -MP Met  -MP Met  -MP    STG 2 Thony is instructed in correct pendulum exercise technique and they are added to his HEP.  -MP Thony is instructed in correct pendulum exercise technique and they are added to his HEP.  -MP Thony is instructed in correct pendulum exercise technique and they are added to his HEP.  -MP    STG 2 Progress Met  -MP Met  -MP Met  -MP    STG 3 Scapular stabilization exercises are begun.  -MP Scapular stabilization exercises are begun.  -MP Scapular stabilization exercises are begun.  -MP    STG 3 Progress Met   -MP Met  -MP Met  -MP    STG 4 SPADI score improves to a 40% functional disability.  -MP SPADI score improves to a 40% functional disability.  -MP SPADI score improves to a 40% functional disability.  -MP    STG 4 Progress Met  -MP Met  -MP Met  -MP    Long Term Goals    LTG Date to Achieve 02/10/17  -MP 02/10/17  -MP 01/15/17  -MP    LTG 1 Thony has zero pain with normal ADLs with the R UE.  -MP Thony has zero pain with normal ADLs with the R UE.  -MP Thony has zero pain with normal ADLs with the R UE.  -MP    LTG 1 Progress Ongoing  -MP Ongoing  -MP Ongoing  -MP    LTG 2 Functional AROM of the R shoulder equals the L.  -MP Functional AROM of the R shoulder equals the L.  -MP Functional AROM of the R shoulder equals the L.  -MP    LTG 2 Progress Ongoing  -MP Ongoing  -MP Ongoing  -MP    LTG 3 Strength R shoulder, cardinal motions, equals 4+/5.  -MP Strength R shoulder, cardinal motions, equals 4+/5.  -MP Strength R shoulder, cardinal motions, equals 4+/5.  -MP    LTG 3 Progress Ongoing  -MP Ongoing  -MP Ongoing  -MP    LTG 4 SPADI score displays less than 20% functional disability.  -MP SPADI score displays less than 20% functional disability.  -MP SPADI score displays less than 20% functional disability.  -MP    LTG 4 Progress Ongoing  -MP Ongoing  -MP Ongoing  -MP    LTG 5 Thony is independent with a complete HEP and self care education.  -MP Thony is independent with a complete HEP and self care education.  -MP Thony is independent with a complete HEP and self care education.  -MP    LTG 5 Progress Ongoing  -MP Ongoing  -MP Ongoing  -MP      User Key  (r) = Recorded By, (t) = Taken By, (c) = Cosigned By    Initials Name Provider Type    ASIF Bobby PT Physical Therapist                Therapy Education       01/24/17 1720    Therapy Education    Given HEP  -MP    Program Reinforced  -MP    How Provided Verbal  -MP    Provided to Patient  -MP    Level of Understanding Verbalized  -MP      User Key  (r) =  Recorded By, (t) = Taken By, (c) = Cosigned By    Initials Name Provider Type    MP Gwyn Bobby PT Physical Therapist        Time Calculation:   Start Time: 1630  Stop Time: 1715  Time Calculation (min): 45 min    Therapy Charges for Today     Code Description Service Date Service Provider Modifiers Qty    30267210063 HC PT THER PROC EA 15 MIN 1/24/2017 Gwyn Bobby PT GP 2    41608446906 HC PT MANUAL THERAPY EA 15 MIN 1/24/2017 Gwyn Bobby PT GP 1          Gwyn Bobby PT  1/24/2017

## 2017-01-25 ENCOUNTER — OFFICE VISIT (OUTPATIENT)
Dept: CARDIOLOGY | Facility: CLINIC | Age: 62
End: 2017-01-25

## 2017-01-25 VITALS
WEIGHT: 266 LBS | DIASTOLIC BLOOD PRESSURE: 72 MMHG | SYSTOLIC BLOOD PRESSURE: 126 MMHG | HEIGHT: 77 IN | HEART RATE: 88 BPM | BODY MASS INDEX: 31.41 KG/M2

## 2017-01-25 DIAGNOSIS — I25.10 CORONARY ARTERY DISEASE INVOLVING NATIVE CORONARY ARTERY OF NATIVE HEART WITHOUT ANGINA PECTORIS: Primary | ICD-10-CM

## 2017-01-25 PROCEDURE — 93000 ELECTROCARDIOGRAM COMPLETE: CPT | Performed by: INTERNAL MEDICINE

## 2017-01-25 PROCEDURE — 99213 OFFICE O/P EST LOW 20 MIN: CPT | Performed by: INTERNAL MEDICINE

## 2017-01-26 ENCOUNTER — HOSPITAL ENCOUNTER (OUTPATIENT)
Dept: PHYSICAL THERAPY | Facility: HOSPITAL | Age: 62
Setting detail: THERAPIES SERIES
Discharge: HOME | End: 2017-01-26

## 2017-01-26 DIAGNOSIS — M25.511 ACUTE PAIN OF RIGHT SHOULDER: ICD-10-CM

## 2017-01-26 DIAGNOSIS — M25.511 RIGHT SHOULDER PAIN, UNSPECIFIED CHRONICITY: ICD-10-CM

## 2017-01-26 DIAGNOSIS — Z78.9 DIFFICULTY WITH ACTIVITIES OF DAILY LIVING: Primary | ICD-10-CM

## 2017-01-26 PROCEDURE — 97140 MANUAL THERAPY 1/> REGIONS: CPT | Performed by: PHYSICAL THERAPIST

## 2017-01-26 PROCEDURE — 97110 THERAPEUTIC EXERCISES: CPT | Performed by: PHYSICAL THERAPIST

## 2017-01-26 NOTE — PROGRESS NOTES
Outpatient Physical Therapy Ortho Treatment Note  [facility]     Patient Name: Isaias Monaco  : 1955  MRN: [id number]  Today's Date: 2017      Visit Date: 2017    Visit Dx:    ICD-10-CM ICD-9-CM   1. Difficulty with activities of daily living R53.81 799.3   2. Right shoulder pain, unspecified chronicity M25.511 719.41   3. Acute pain of right shoulder M25.511 719.41       Patient Active Problem List   Diagnosis   • Microalbuminuria   • Vitamin D deficiency   • Angioedema   • Chronic coronary artery disease   • Anxiety   • Male erectile disorder   • Gastroesophageal reflux disease   • Essential hypertension   • Hyperlipidemia   • Type 2 diabetes mellitus   • Adiposity   • Routine health maintenance   • Uncontrolled type 2 diabetes mellitus   • Low testosterone   • Hypogonadism in male        Past Medical History   Diagnosis Date   • Adenomatous colon polyp      Colonoscopy last done    • Johnson's esophagus    • Cardiac disease    • Diabetes mellitus    • Hypertension    • Joint pain      or swelling   • Male erectile disorder    • Stiffness in joint    • Type 2 diabetes mellitus    • Vitamin D deficiency         Past Surgical History   Procedure Laterality Date   • Coronary angioplasty with stent placement       cardiac stents x3 occasions   • Shoulder surgery Right 2016                             PT Assessment/Plan       17 1506 17 1721       PT Assessment    Assessment Comments Thony continues to demonstrate good form with his exercise program. His R shoulder AROM is grossly WNL and he has no c/o pain at end range. His R shoulder joint feels slightly hypomobilie during Sup-Inf Mobs but does not appear to limit his movements.  -MEI Thony tolerated treatment well.  He tolerated his previous resistive forces well despite being out for 11 days.  -MP     PT Plan    PT Plan Comments Continue to progress with resistance level for shoulder exercises  -KH Progress shoulder  shrugs resistance and add a third set of 10 repetitions of overhead press on next visit.  -       User Key  (r) = Recorded By, (t) = Taken By, (c) = Cosigned By    Initials Name Provider Type    MEI Mandel, LEIDY Physical Therapist    ASIF Bobby PT Physical Therapist                    Exercises       01/26/17 1500          Subjective Comments    Subjective Comments I feel like I'm getting pretty close to being ready to D/C  -KH      Subjective Pain    Able to rate subjective pain? yes  -KH      Pre-Treatment Pain Level 0  -KH      Post-Treatment Pain Level 0  -KH      Exercise 1    Exercise Name 1 Refer to land flow sheet  -        User Key  (r) = Recorded By, (t) = Taken By, (c) = Cosigned By    Initials Name Provider Type    MEI Mandel, LEIDY Physical Therapist                        Manual Rx (last 36 hours)      Manual Treatments       01/26/17 1500          Manual Rx 1    Manual Rx 1 Location R UE  -      Manual Rx 1 Type Oscillations   -KH      Manual Rx 1 Duration 10 minutes  -      Manual Rx 2    Manual Rx 2 Location R glenohumeral joint  -      Manual Rx 2 Type Anterior, inferior and posterior glides  -      Manual Rx 2 Grade Grades 3-4  -      Manual Rx 2 Duration 5x3 each  -        User Key  (r) = Recorded By, (t) = Taken By, (c) = Cosigned By    Initials Name Provider Type    MEI Mandel, LEIDY Physical Therapist                PT OP Goals       01/24/17 1700 01/13/17 1400       PT Short Term Goals    STG Date to Achieve 11/04/16  -MP 11/04/16  -MP     STG 1 Sidelying passive R shoulder IR is instructed and performed without problem and is added to his HEP.  -MP Sidelying passive R shoulder IR is instructed and performed without problem and is added to his HEP.  -MP     STG 1 Progress Met  -MP Met  -MP     STG 2 Thony is instructed in correct pendulum exercise technique and they are added to his HEP.  -MP Thony is instructed in correct pendulum exercise technique and they are  added to his HEP.  -MP     STG 2 Progress Met  -MP Met  -MP     STG 3 Scapular stabilization exercises are begun.  -MP Scapular stabilization exercises are begun.  -MP     STG 3 Progress Met  -MP Met  -MP     STG 4 SPADI score improves to a 40% functional disability.  -MP SPADI score improves to a 40% functional disability.  -MP     STG 4 Progress Met  -MP Met  -MP     Long Term Goals    LTG Date to Achieve 02/10/17  -MP 02/10/17  -MP     LTG 1 Thony has zero pain with normal ADLs with the R UE.  -MP Thony has zero pain with normal ADLs with the R UE.  -MP     LTG 1 Progress Ongoing  -MP Ongoing  -MP     LTG 2 Functional AROM of the R shoulder equals the L.  -MP Functional AROM of the R shoulder equals the L.  -MP     LTG 2 Progress Ongoing  -MP Ongoing  -MP     LTG 3 Strength R shoulder, cardinal motions, equals 4+/5.  -MP Strength R shoulder, cardinal motions, equals 4+/5.  -MP     LTG 3 Progress Ongoing  -MP Ongoing  -MP     LTG 4 SPADI score displays less than 20% functional disability.  -MP SPADI score displays less than 20% functional disability.  -MP     LTG 4 Progress Ongoing  -MP Ongoing  -MP     LTG 5 Thony is independent with a complete HEP and self care education.  -MP Thony is independent with a complete HEP and self care education.  -MP     LTG 5 Progress Ongoing  -MP Ongoing  -MP       User Key  (r) = Recorded By, (t) = Taken By, (c) = Cosigned By    Initials Name Provider Type    ASIF Bobby PT Physical Therapist                Therapy Education       01/24/17 1720    Therapy Education    Given HEP  -MP    Program Reinforced  -MP    How Provided Verbal  -MP    Provided to Patient  -MP    Level of Understanding Verbalized  -MP      User Key  (r) = Recorded By, (t) = Taken By, (c) = Cosigned By    Initials Name Provider Type    ASIF Bobby PT Physical Therapist                Time Calculation:   Start Time: 1430  Stop Time: 1515  Time Calculation (min): 45 min    Therapy Charges for Today      Code Description Service Date Service Provider Modifiers Qty    05457432320  PT THER PROC EA 15 MIN 1/26/2017 Elba Mandel, PT GP 2    [id number]  PT MANUAL THERAPY EA 15 MIN 1/26/2017 Elba Mandel, PT GP 1                    Elba Mandel, PT  1/26/2017

## 2017-01-27 ENCOUNTER — OFFICE VISIT (OUTPATIENT)
Dept: ORTHOPEDIC SURGERY | Facility: CLINIC | Age: 62
End: 2017-01-27

## 2017-01-27 VITALS — WEIGHT: 258 LBS | BODY MASS INDEX: 30.46 KG/M2 | HEIGHT: 77 IN

## 2017-01-27 DIAGNOSIS — Z09 SURGERY FOLLOW-UP: Primary | ICD-10-CM

## 2017-01-27 PROCEDURE — 99212 OFFICE O/P EST SF 10 MIN: CPT | Performed by: ORTHOPAEDIC SURGERY

## 2017-01-27 NOTE — MR AVS SNAPSHOT
"                        Isaias Monaco   1/27/2017 9:45 AM   Office Visit    Dept Phone:  [phone]   Encounter #:  [id number]    Provider:  Jorge Luis Kent MD   Department:  [facility]                Your Full Care Plan              Your Updated Medication List          This list is accurate as of: 1/27/17  9:34 AM.  Always use your most recent med list.                amLODIPine 10 MG tablet   Commonly known as:  NORVASC   TAKE ONE TABLET BY MOUTH DAILY       aspirin 81 MG EC tablet       carvedilol 25 MG tablet   Commonly known as:  COREG   TAKE ONE TABLET BY MOUTH TWO TIMES A DAY WITH MEALS       clopidogrel 75 MG tablet   Commonly known as:  PLAVIX   TAKE ONE TABLET BY MOUTH DAILY       dorzolamide-timolol 22.3-6.8 MG/ML ophthalmic solution   Commonly known as:  COSOPT       DRISDOL 05027 UNITS capsule   Generic drug:  ergocalciferol       escitalopram 20 MG tablet   Commonly known as:  LEXAPRO   TAKE ONE TABLET BY MOUTH DAILY       Insulin Glargine 300 UNIT/ML solution pen-injector   Commonly known as:  TOUJEO SOLOSTAR   Inject 60 Units under the skin Every Morning.       JARDIANCE 25 MG tablet   Generic drug:  Empagliflozin   Take 1 tablet by mouth daily.       [employer] BLOOD GLUCOSE kit       [employer] PREMIUM GLUCOSE TEST test strip   Generic drug:  glucose blood   TEST FOUR TIMES A DAY       latanoprost 0.005 % ophthalmic solution   Commonly known as:  XALATAN       metFORMIN 500 MG tablet   Commonly known as:  GLUCOPHAGE       omeprazole 20 MG capsule   Commonly known as:  priLOSEC   TAKE TWO CAPSULES BY MOUTH EVERY MORNING BEFORE BREAKFAST       Pen Needles 5/16\" 31G X 8 MM misc   USE AS DIRECTED ONCE DAILY WITH TOUJEO       pioglitazone 45 MG tablet   Commonly known as:  ACTOS   TAKE ONE TABLET BY MOUTH DAILY       rosuvastatin 40 MG tablet   Commonly known as:  CRESTOR   TAKE ONE TABLET BY MOUTH DAILY       SITagliptin 100 MG tablet   Commonly " known as:  JANUVIA   Take 1 tablet by mouth Daily.       tadalafil 10 MG tablet   Commonly known as:  CIALIS               You Were Diagnosed With        Codes Comments    Surgery follow-up    -  Primary ICD-10-CM: Z09  ICD-9-CM: V67.00       Instructions     None    Patient Instructions History      Upcoming Appointments     Visit Type Date Time Department    FOLLOW UP 1/27/2017  9:45 AM [facility]    TREATMENT 1/31/2017  1:45 PM [facility]    TREATMENT 2/2/2017  1:45 PM [facility]    TREATMENT 2/7/2017 11:45 AM [facility]    OFFICE VISIT 2/9/2017  1:40 PM [facility]    TREATMENT 2/10/2017  2:45 PM [facility]    TREATMENT 2/14/2017 11:45 AM [facility]    TREATMENT 2/17/2017 12:30 PM [facility]    FOLLOW UP 3/10/2017  1:40 PM [facility]    OFFICE VISIT 6/19/2017  9:40 AM [facility]      [employer] Signup     Our records indicate that you have an active [employer] account.    You can view your After Visit Summary by going to [employer] and logging in with your [employer] username and password.  If you don't have a [employer] username and password but a parent or guardian has access to your record, the parent or guardian should login with their own [employer] username and password and access your record to view the After Visit Summary.    If you have questions, you can email [email or web address] or call [phone] to talk to our [employer] staff.  Remember, Hub[employer] is NOT to be used for urgent needs.  For medical emergencies, dial 911.               Other Info from Your Visit           Your Appointments     Jan 27, 2017  9:45 AM EST   Follow Up with Jorge Luis Kent MD   [facility] [place] [facility] (--)    [address] [place] [address]   [phone]           Arrive 15 minutes prior to appointment.            Jan 31, 2017  1:45 PM EST   Therapy Treatment with Elba Mandel PT  "  [facility] ([place])    [address] [id number] [phone]            Feb 02, 2017  1:45 PM EST   Therapy Treatment with Elba Mandel, PT   [facility] ([place])    [address] [address]   [phone]            Feb 07, 2017 11:45 AM EST   Therapy Treatment with Gwyn Bobby, PT   [facility] ([facility]    [address] [address]   [phone]            Feb 09, 2017  1:40 PM EST   Office Visit with MICHELLE Pichardo   [facility] (--)    [address] [place] [id number] [phone]           Arrive 15 minutes prior to appointment.              Allergies     Ace Inhibitors        Reason for Visit     Right Shoulder - Follow-up           Vital Signs     Height Weight Body Mass Index Smoking Status          77\" (195.6 cm) 258 lb (117 kg) 30.59 kg/m2 Never Smoker        Problems and Diagnoses Noted     Encounter for examination following surgery    -  Primary        "

## 2017-01-27 NOTE — LETTER
January 27, 2017     Patient: Isaias Monaco   YOB: 1955   Date of Visit: 1/27/2017       To Whom It May Concern:    It is my medical opinion that Isaias Monaco may return to light duty immediately with the following restrictions: no overhead activity, lifting greater than 10 lbs., pushing or pulling with right arm.           Sincerely,        Jorge Luis Kent MD    CC: No Recipients

## 2017-01-27 NOTE — PROGRESS NOTES
Mr. Monaco comes in today for follow-up of the right shoulder.  He is now 4 months out from surgery.  He says that his pain is resolved.  He still has some weakness but his motion has pretty much returned to normal.    His incisions are healed.  Motion is nearly symmetric to the contralateral side.  He does still lack a few levels of internal rotation.  No pain with shoulder motion.  He actually has very good strength with resistive testing of his rotator cuff and minimal discomfort.    Assessment is 4 months status post right rotator cuff repair    Plan was discussed with the patient.  He looks great.  He still has some work to do with strengthening but he is steadily getting better.  I want to see him back in 6 weeks for what I expect to be a final recheck.  I have encouraged him to continue working on his therapy in the interim.    Jorge Luis Kent MD  01/27/2017

## 2017-01-31 ENCOUNTER — HOSPITAL ENCOUNTER (OUTPATIENT)
Dept: PHYSICAL THERAPY | Facility: HOSPITAL | Age: 62
Setting detail: THERAPIES SERIES
Discharge: HOME | End: 2017-01-31

## 2017-01-31 DIAGNOSIS — M25.511 RIGHT SHOULDER PAIN, UNSPECIFIED CHRONICITY: ICD-10-CM

## 2017-01-31 DIAGNOSIS — Z78.9 DIFFICULTY WITH ACTIVITIES OF DAILY LIVING: Primary | ICD-10-CM

## 2017-01-31 DIAGNOSIS — M25.511 ACUTE PAIN OF RIGHT SHOULDER: ICD-10-CM

## 2017-01-31 PROCEDURE — 97140 MANUAL THERAPY 1/> REGIONS: CPT | Performed by: PHYSICAL THERAPIST

## 2017-01-31 PROCEDURE — 97110 THERAPEUTIC EXERCISES: CPT | Performed by: PHYSICAL THERAPIST

## 2017-02-01 RX ORDER — CLOPIDOGREL BISULFATE 75 MG/1
TABLET ORAL
Qty: 90 TABLET | Refills: 1 | Status: SHIPPED | OUTPATIENT
Start: 2017-02-01 | End: 2017-06-09

## 2017-02-02 ENCOUNTER — APPOINTMENT (OUTPATIENT)
Dept: PHYSICAL THERAPY | Facility: HOSPITAL | Age: 62
End: 2017-02-02

## 2017-02-07 ENCOUNTER — HOSPITAL ENCOUNTER (OUTPATIENT)
Dept: PHYSICAL THERAPY | Facility: HOSPITAL | Age: 62
Setting detail: THERAPIES SERIES
Discharge: HOME | End: 2017-02-07

## 2017-02-07 DIAGNOSIS — Z78.9 DIFFICULTY WITH ACTIVITIES OF DAILY LIVING: Primary | ICD-10-CM

## 2017-02-07 DIAGNOSIS — M25.511 RIGHT SHOULDER PAIN, UNSPECIFIED CHRONICITY: ICD-10-CM

## 2017-02-07 PROCEDURE — 97110 THERAPEUTIC EXERCISES: CPT | Performed by: PHYSICAL THERAPIST

## 2017-02-07 NOTE — PROGRESS NOTES
Outpatient Physical Therapy Ortho Re-Assessment/Treatment  [facility]     Patient Name: Isaias Monaco  : 1955  MRN: [id number]  Today's Date: 2017      Visit Date: 2017    Patient Active Problem List   Diagnosis   • Microalbuminuria   • Vitamin D deficiency   • Angioedema   • Chronic coronary artery disease   • Anxiety   • Male erectile disorder   • Gastroesophageal reflux disease   • Essential hypertension   • Hyperlipidemia   • Type 2 diabetes mellitus   • Adiposity   • Routine health maintenance   • Uncontrolled type 2 diabetes mellitus   • Low testosterone   • Hypogonadism in male        Past Medical History   Diagnosis Date   • Adenomatous colon polyp      Colonoscopy last done    • Johnson's esophagus    • Cardiac disease    • Diabetes mellitus    • Hypertension    • Joint pain      or swelling   • Male erectile disorder    • Stiffness in joint    • Type 2 diabetes mellitus    • Vitamin D deficiency         Past Surgical History   Procedure Laterality Date   • Coronary angioplasty with stent placement       cardiac stents x3 occasions   • Shoulder surgery Right 2016       Visit Dx:     ICD-10-CM ICD-9-CM   1. Difficulty with activities of daily living R53.81 799.3   2. Right shoulder pain, unspecified chronicity M25.511 719.41                 PT Ortho       17 1200    ROM (Range of Motion)    General ROM Detail Functional AROM R shoulder, elevation in scapular plane 164 degrees, reach behind neck T1 spinous process and reach behind the back T11 spinous process.  PROM, supine, flexion 178 degrees with normal end feel, abduction 165 degrees with capsular end feel, ER 92 degree with empty end feel, IR 78 degrees with normal end feel and Extension 85 degrees with normal end feel  -MP    MMT (Manual Muscle Testing)    General MMT Assessment Detail Flexion 4-/5, Abduction 4-/5, ER 4/5, IR 5/5 and Extension 4+/5  -MP      User Key  (r) = Recorded By, (t) = Taken By, (c) =  Cosigned By    Initials Name Provider Type    ASIF Bobby PT Physical Therapist                Therapy Education       02/07/17 1302    Therapy Education    Given HEP  -MP    Program Reinforced  -MP    How Provided Verbal  -MP    Provided to Patient  -MP    Level of Understanding Verbalized  -MP      User Key  (r) = Recorded By, (t) = Taken By, (c) = Cosigned By    Initials Name Provider Type    ASIF Bobby PT Physical Therapist                PT OP Goals       02/07/17 1300 01/31/17 1500       PT Short Term Goals    STG Date to Achieve 11/04/16  -MP 11/04/16  -     STG 1 Sidelying passive R shoulder IR is instructed and performed without problem and is added to his HEP.  -MP Sidelying passive R shoulder IR is instructed and performed without problem and is added to his HEP.  -     STG 1 Progress Met  -MP Met  -     STG 2 Thony is instructed in correct pendulum exercise technique and they are added to his HEP.  -MP Thony is instructed in correct pendulum exercise technique and they are added to his HEP.  -     STG 2 Progress Met  -MP Met  -     STG 3 Scapular stabilization exercises are begun.  -MP Scapular stabilization exercises are begun.  -     STG 3 Progress Met  -MP Met  -     STG 4 SPADI score improves to a 40% functional disability.  -MP SPADI score improves to a 40% functional disability.  -     STG 4 Progress Met  -MP Met  -KH     Long Term Goals    LTG Date to Achieve 03/09/17  -MP 02/10/17  -     LTG 1 Thony has zero pain with normal ADLs with the R UE.  -MP Thony has zero pain with normal ADLs with the R UE.  -     LTG 1 Progress Ongoing  -MP Ongoing  -     LTG 1 Progress Comments  occassional pain  -     LTG 2 Functional AROM of the R shoulder equals the L.  -MP Functional AROM of the R shoulder equals the L.  -     LTG 2 Progress Ongoing  -MP Ongoing  -     LTG 3 Strength R shoulder, cardinal motions, equals 4+/5.  -MP Strength R shoulder, cardinal motions, equals  4+/5.  -KH     LTG 3 Progress Ongoing  -MP Ongoing  -     LTG 4 SPADI score displays less than 20% functional disability.  -MP SPADI score displays less than 20% functional disability.  -KH     LTG 4 Progress Met  -MP Ongoing  -     LTG 5 Thony is independent with a complete HEP and self care education.  -MP Thony is independent with a complete HEP and self care education.  -KH     LTG 5 Progress Ongoing  -MP Ongoing  -     LTG 6 SPADI score is below an 8% disability  -MP      LTG 6 Progress New  -MP        User Key  (r) = Recorded By, (t) = Taken By, (c) = Cosigned By    Initials Name Provider Type     Elba Mandel, PT Physical Therapist    ASIF Bobby, PT Physical Therapist                PT Assessment/Plan       02/07/17 1306 01/31/17 1514       PT Assessment    Functional Limitations Decreased safety during functional activities;Limitations in community activities;Limitations in functional capacity and performance;Performance in leisure activities  -MP      Impairments Pain;Posture;Range of motion;Muscle strength  -MP      Assessment Comments Thony continues to improve and 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  -MP Thony's shoulder is still a little tight with ER and flexion AROM and he requires vc's to correctly perform his stretches in order to maximize the range. Added towel stretch behind the back to further increase IR today   -     Please refer to paper survey for additional self-reported information Yes  -MP      Rehab Potential Good  -MP      Patient/caregiver participated in establishment of treatment plan and goals Yes  -MP      Patient would benefit from skilled therapy intervention Yes  -MP      PT Plan    PT Frequency 2x/week  -MP      Predicted Duration of Therapy Intervention (days/wks) 4 weeks  -MP      Planned CPT's? PT EVAL: 25096;PT RE-EVAL: 06880;PT MANUAL THERAPY EA 15 MIN: 04739;PT THER PROC EA 15 MIN: 32674;PT ELECTRICAL  STIM UNATTEND: ;PT ULTRASOUND EA 15 MIN: 02768;PT SELF CARE/HOME MGMT/TRAIN EA 15: 46231  -MP      PT Plan Comments Continue to progress therapeutic exercises and manual therapy to meet all goals.  -MP Continue to progress resistance for shoulder exercises and educate patient on importance of performing stretches 2x/day  -       User Key  (r) = Recorded By, (t) = Taken By, (c) = Cosigned By    Initials Name Provider Type    MEI Mandel, PT Physical Therapist    ASIF Bobby PT Physical Therapist                Exercises       02/07/17 1200          Subjective Comments    Subjective Comments Thony noticed a slight bit of stiffness just before treatment today.  -MP      Subjective Pain    Able to rate subjective pain? yes  -MP      Pre-Treatment Pain Level 1  -MP      Post-Treatment Pain Level 0  -MP      Exercise 1    Exercise Name 1 Refer to land flow sheet  -MP      Exercise 2    Exercise Name 2 Progressed therapeutic exercises, lat pulls 32.5 lbs. 10 x 3, standing rows 32.5 lbs. 10 x 3 and shrugs 25 lbs. 10 x 3.    -MP        User Key  (r) = Recorded By, (t) = Taken By, (c) = Cosigned By    Initials Name Provider Type    ASIF Bobby PT Physical Therapist                     Outcome Measures       02/07/17 1300          Other Outcome Measure Tool Used    Other Outcome Measure Tool Comments SPADI 16/130 or a 12% disability  -      Functional Assessment    Outcome Measure Options Other Outcome Measure  -MP        User Key  (r) = Recorded By, (t) = Taken By, (c) = Cosigned By    Initials Name Provider Type    ASIF Bobby PT Physical Therapist        Time Calculation:   Start Time: 1145  Stop Time: 1240  Time Calculation (min): 55 min     Therapy Charges for Today     Code Description Service Date Service Provider Modifiers Qty    [id number]  PT THER PROC EA 15 MIN 2/7/2017 Gwyn Bobby, PT GP 4          PT G-Codes  Outcome Measure Options: Other Outcome Measure         Gwyn Bobby  PT  2/7/2017

## 2017-02-09 ENCOUNTER — OFFICE VISIT (OUTPATIENT)
Dept: ENDOCRINOLOGY | Age: 62
End: 2017-02-09

## 2017-02-09 VITALS
DIASTOLIC BLOOD PRESSURE: 72 MMHG | HEIGHT: 77 IN | SYSTOLIC BLOOD PRESSURE: 130 MMHG | BODY MASS INDEX: 31.46 KG/M2 | WEIGHT: 266.4 LBS

## 2017-02-09 DIAGNOSIS — N52.9 MALE ERECTILE DISORDER: ICD-10-CM

## 2017-02-09 DIAGNOSIS — I10 BENIGN ESSENTIAL HYPERTENSION: ICD-10-CM

## 2017-02-09 DIAGNOSIS — R80.9 MICROALBUMINURIA: ICD-10-CM

## 2017-02-09 DIAGNOSIS — E29.1 HYPOGONADISM IN MALE: ICD-10-CM

## 2017-02-09 DIAGNOSIS — R79.89 LOW TESTOSTERONE: ICD-10-CM

## 2017-02-09 DIAGNOSIS — E78.5 HYPERLIPIDEMIA, UNSPECIFIED HYPERLIPIDEMIA TYPE: ICD-10-CM

## 2017-02-09 DIAGNOSIS — IMO0001 UNCONTROLLED DIABETES MELLITUS TYPE 2 WITHOUT COMPLICATIONS, UNSPECIFIED LONG TERM INSULIN USE STATUS: Primary | ICD-10-CM

## 2017-02-09 PROCEDURE — 99214 OFFICE O/P EST MOD 30 MIN: CPT | Performed by: NURSE PRACTITIONER

## 2017-02-09 RX ORDER — TESTOSTERONE 16.2 MG/G
GEL TRANSDERMAL
Qty: 150 G | Refills: 0
Start: 2017-02-09 | End: 2017-06-09

## 2017-02-09 NOTE — PROGRESS NOTES
"Subjective   Isaias Monaco is a 61 y.o. male is here today for follow-up.  Chief Complaint   Patient presents with   • Diabetes     no recent labs, did not bring meter, testing BG 1 time daily   • Hypertension   • Hyperlipidemia   • Vitamin D Deficiency     Visit Vitals   • /72   • Ht 77\" (195.6 cm)   • Wt 266 lb 6.4 oz (121 kg)   • BMI 31.59 kg/m2       Current Outpatient Prescriptions:   •  amLODIPine (NORVASC) 10 MG tablet, TAKE ONE TABLET BY MOUTH DAILY, Disp: 90 tablet, Rfl: 0  •  aspirin 81 MG EC tablet, Take 81 mg by mouth Daily., Disp: , Rfl:   •  Blood Glucose Monitoring Suppl ([employer] BLOOD GLUCOSE) kit, , Disp: , Rfl:   •  carvedilol (COREG) 25 MG tablet, TAKE ONE TABLET BY MOUTH TWO TIMES A DAY WITH MEALS, Disp: 180 tablet, Rfl: 0  •  clopidogrel (PLAVIX) 75 MG tablet, TAKE ONE TABLET BY MOUTH DAILY, Disp: 90 tablet, Rfl: 1  •  dorzolamide-timolol (COSOPT) 22.3-6.8 MG/ML ophthalmic solution, Apply  to eye 2 (two) times a day., Disp: , Rfl:   •  ergocalciferol (DRISDOL) 84531 UNITS capsule, Take 1 po q week, Disp: 12 capsule, Rfl: 1  •  escitalopram (LEXAPRO) 20 MG tablet, TAKE ONE TABLET BY MOUTH DAILY, Disp: 45 tablet, Rfl: 1  •  Insulin Glargine (TOUJEO SOLOSTAR) 300 UNIT/ML solution pen-injector, Inject 60 Units under the skin Every Morning., Disp: 9 mL, Rfl: 3  •  Insulin Pen Needle (PEN NEEDLES 5/16\") 31G X 8 MM misc, USE AS DIRECTED ONCE DAILY WITH TOUJEO, Disp: 100 each, Rfl: 2  •  JARDIANCE 25 MG tablet, Take 1 tablet by mouth daily., Disp: 30 tablet, Rfl: 5  •  [employer] PREMIUM GLUCOSE TEST test strip, TEST FOUR TIMES A DAY, Disp: 100 each, Rfl: 3  •  latanoprost (XALATAN) 0.005 % ophthalmic solution, Apply  to eye daily., Disp: , Rfl:   •  metFORMIN (GLUCOPHAGE) 500 MG tablet, Take 1 tablet by mouth 2 (Two) Times a Day With Meals., Disp: 120 tablet, Rfl: 0  •  omeprazole (PriLOSEC) 20 MG capsule, TAKE TWO CAPSULES BY MOUTH EVERY MORNING BEFORE BREAKFAST, Disp: 180 capsule, Rfl: 0  •  " pioglitazone (ACTOS) 45 MG tablet, TAKE ONE TABLET BY MOUTH DAILY, Disp: 90 tablet, Rfl: 2  •  rosuvastatin (CRESTOR) 40 MG tablet, TAKE ONE TABLET BY MOUTH DAILY, Disp: 90 tablet, Rfl: 0  •  SITagliptin (JANUVIA) 100 MG tablet, Take 1 tablet by mouth Daily., Disp: 30 tablet, Rfl: 3  •  tadalafil (CIALIS) 10 MG tablet, Take 10 mg by mouth daily as needed, Disp: , Rfl:   Family History   Problem Relation Age of Onset   • Lupus Sister    • Heart disease Other    • Hypertension Other      Social History   Substance Use Topics   • Smoking status: Never Smoker   • Smokeless tobacco: None   • Alcohol use Yes      Comment: Occasionally     Allergies   Allergen Reactions   • Ace Inhibitors          History of Present Illness  Encounter Diagnoses   Name Primary?   • Uncontrolled diabetes mellitus type 2 without complications, unspecified long term insulin use status Yes   • Low testosterone    • Hypogonadism in male    • Microalbuminuria    • Male erectile disorder    • Benign essential hypertension    • Hyperlipidemia, unspecified hyperlipidemia type      This is a 61-year-old male patient here today for the above-mentioned problems.  He has not had any recent labs done.  He has a history of hypogonadism however he has never treated it.  He states he has talked to his cardiologist due to the fact that he has cardiovascular history.  He states his cardiologist told him it was fine to treat his low testosterone.  We discussed his treatment options at today's visit.  A Smith was reviewed.  He states he is taking his medications as prescribed.  He is symptomatic with hypogonadism.  He denies needing any refills on his prescriptions.  His weight is fairly stable as is his blood pressure.  I discussed with him the different treatment options for low testosterone.  He is going to try the gels.  He was provided a coupon at today's visit  The following portions of the patient's history were reviewed and updated as appropriate:  allergies, current medications, past family history, past medical history, past social history, past surgical history and problem list.    Review of Systems   Constitutional: Negative for chills.   HENT: Negative for facial swelling.    Eyes: Negative for photophobia.   Respiratory: Negative for shortness of breath.    Cardiovascular: Negative for leg swelling.   Gastrointestinal: Negative for anal bleeding.   Endocrine: Negative for polyuria.   Genitourinary: Negative for hematuria.   Musculoskeletal: Negative for neck pain.   Skin: Negative for color change.   Allergic/Immunologic: Negative for food allergies.   Neurological: Negative for syncope.   Hematological: Negative for adenopathy.   Psychiatric/Behavioral: Negative for dysphoric mood.       Objective   Physical Exam   Constitutional: He is oriented to person, place, and time. He appears well-developed and well-nourished. No distress.   HENT:   Head: Normocephalic and atraumatic.   Right Ear: External ear normal.   Left Ear: External ear normal.   Nose: Nose normal.   Eyes: Pupils are equal, round, and reactive to light. Right eye exhibits no discharge. Left eye exhibits no discharge.   Neck: Normal range of motion. Neck supple. Carotid bruit is not present. No tracheal deviation, no edema and no erythema present. No thyromegaly present.   Cardiovascular: Normal rate, regular rhythm, normal heart sounds and intact distal pulses.  Exam reveals no gallop and no friction rub.    No murmur heard.  Pulmonary/Chest: Effort normal and breath sounds normal. No respiratory distress. He has no wheezes. He has no rales.   Abdominal: Soft. Bowel sounds are normal. He exhibits no distension. There is no tenderness.   Musculoskeletal: Normal range of motion. He exhibits no edema or deformity.   R arm- rotator cuff surgery on 9/21- sling   Lymphadenopathy:     He has no cervical adenopathy.   Neurological: He is alert and oriented to person, place, and time. Coordination  normal.   Skin: Skin is warm and dry. No rash noted. He is not diaphoretic. No erythema. No pallor.   Psychiatric: He has a normal mood and affect. His behavior is normal. Judgment and thought content normal.   Nursing note and vitals reviewed.        Assessment/Plan   Isaias was seen today for diabetes, hypertension, hyperlipidemia and vitamin d deficiency.    Diagnoses and all orders for this visit:    Uncontrolled diabetes mellitus type 2 without complications, unspecified long term insulin use status    Low testosterone    Hypogonadism in male    Microalbuminuria    Male erectile disorder    Benign essential hypertension    Hyperlipidemia, unspecified hyperlipidemia type          In Summary, patient was seen and examined.  He will continue all his current medications as prescribed.  I have added AndroGel 2 pumps to each shoulder once daily as directed.  Smith was reviewed and prescription was signed by Dr. Jovel.  He will follow-up his next visit in 4 months with Dr. Jovel or myself with labs prior.  I've encouraged him to contact the office should he have any questions or concerns.  He was instructed in potential side effects of testosterone therapy.  I've also instructed him to use caution with transference to limited children.

## 2017-02-10 ENCOUNTER — HOSPITAL ENCOUNTER (OUTPATIENT)
Dept: PHYSICAL THERAPY | Facility: HOSPITAL | Age: 62
Setting detail: THERAPIES SERIES
Discharge: HOME | End: 2017-02-10

## 2017-02-10 DIAGNOSIS — M25.511 RIGHT SHOULDER PAIN, UNSPECIFIED CHRONICITY: ICD-10-CM

## 2017-02-10 DIAGNOSIS — Z78.9 DIFFICULTY WITH ACTIVITIES OF DAILY LIVING: Primary | ICD-10-CM

## 2017-02-10 PROCEDURE — 97110 THERAPEUTIC EXERCISES: CPT | Performed by: PHYSICAL THERAPIST

## 2017-02-10 PROCEDURE — 97140 MANUAL THERAPY 1/> REGIONS: CPT | Performed by: PHYSICAL THERAPIST

## 2017-02-10 NOTE — PROGRESS NOTES
Outpatient Physical Therapy Ortho Treatment Note  [facility]     Patient Name: Isaias Monaco  : 1955  MRN: [id number]  Today's Date: 2/10/2017      Visit Date: 02/10/2017    Visit Dx:    ICD-10-CM ICD-9-CM   1. Difficulty with activities of daily living R53.81 799.3   2. Right shoulder pain, unspecified chronicity M25.511 719.41       Patient Active Problem List   Diagnosis   • Microalbuminuria   • Vitamin D deficiency   • Angioedema   • Chronic coronary artery disease   • Anxiety   • Male erectile disorder   • Gastroesophageal reflux disease   • Benign essential hypertension   • Hyperlipidemia   • Diabetes mellitus   • Adiposity   • Routine health maintenance   • Uncontrolled type 2 diabetes mellitus   • Low testosterone   • Hypogonadism in male   • Presence of coronary angioplasty implant and graft        Past Medical History   Diagnosis Date   • Adenomatous colon polyp      Colonoscopy last done    • Johnson's esophagus    • Cardiac disease    • Diabetes mellitus    • Hypertension    • Joint pain      or swelling   • Male erectile disorder    • Stiffness in joint    • Type 2 diabetes mellitus    • Vitamin D deficiency         Past Surgical History   Procedure Laterality Date   • Coronary angioplasty with stent placement       cardiac stents x3 occasions   • Shoulder surgery Right 2016                 PT Assessment/Plan       02/10/17 1601 17 1306       PT Assessment    Functional Limitations  Decreased safety during functional activities;Limitations in community activities;Limitations in functional capacity and performance;Performance in leisure activities  -MP     Impairments  Pain;Posture;Range of motion;Muscle strength  -MP     Assessment Comments Thony tolerated treatment well.  He progressed his PREs today without problem.  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  -MP     Please refer to paper  survey for additional self-reported information  Yes  -MP     Rehab Potential  Good  -MP     Patient/caregiver participated in establishment of treatment plan and goals  Yes  -MP     Patient would benefit from skilled therapy intervention  Yes  -MP     PT Plan    PT Frequency  2x/week  -MP     Predicted Duration of Therapy Intervention (days/wks)  4 weeks  -MP     Planned CPT's?  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  -MP Continue to progress therapeutic exercises and manual therapy to meet all goals.  -MP       User Key  (r) = Recorded By, (t) = Taken By, (c) = Cosigned By    Initials Name Provider Type    MP Gwyn Bobby, PT Physical Therapist                    Exercises       02/10/17 1500          Subjective Comments    Subjective Comments Thony reports that he noticed no problems with the overhead press increase of 1 lb.   -MP      Subjective Pain    Able to rate subjective pain? yes  -MP      Pre-Treatment Pain Level 1  -MP      Post-Treatment Pain Level 0  -MP      Exercise 1    Exercise Name 1 Refer to land flow sheet  -MP      Exercise 2    Exercise Name 2 Progressed overhead press to 6 lbs. 10 x 3 B  -MP        User Key  (r) = Recorded By, (t) = Taken By, (c) = Cosigned By    Initials Name Provider Type    ASIF Bobby, PT Physical Therapist               Manual Rx (last 36 hours)      Manual Treatments       02/10/17 1500          Manual Rx 1    Manual Rx 1 Location R UE  -MP      Manual Rx 1 Type Oscillations   -MP      Manual Rx 1 Duration 10 minutes  -MP      Manual Rx 2    Manual Rx 2 Location R glenohumeral joint  -MP      Manual Rx 2 Type Anterior, inferior and posterior glides  -MP      Manual Rx 2 Grade Grades  3-4  -MP      Manual Rx 2 Duration 5x3 each  -MP        User Key  (r) = Recorded By, (t) = Taken By, (c) = Cosigned By    Initials Name Provider Type    ASIF Bobby, PT Physical Therapist                PT OP Goals       02/10/17 1600 02/07/17 1300 01/31/17 1500    PT Short Term Goals    STG Date to Achieve 11/04/16  -MP 11/04/16  -MP 11/04/16  -    STG 1 Sidelying passive R shoulder IR is instructed and performed without problem and is added to his HEP.  -MP Sidelying passive R shoulder IR is instructed and performed without problem and is added to his HEP.  -MP Sidelying passive R shoulder IR is instructed and performed without problem and is added to his HEP.  -    STG 1 Progress Met  -MP Met  -MP Met  -    STG 2 Thony is instructed in correct pendulum exercise technique and they are added to his HEP.  -MP Thony is instructed in correct pendulum exercise technique and they are added to his HEP.  -MP Thony is instructed in correct pendulum exercise technique and they are added to his HEP.  -    STG 2 Progress Met  -MP Met  -MP Met  -    STG 3 Scapular stabilization exercises are begun.  -MP Scapular stabilization exercises are begun.  -MP Scapular stabilization exercises are begun.  -    STG 3 Progress Met  -MP Met  -MP Met  -    STG 4 SPADI score improves to a 40% functional disability.  -MP SPADI score improves to a 40% functional disability.  -MP SPADI score improves to a 40% functional disability.  -    STG 4 Progress Met  -MP Met  -MP Met  -KH    Long Term Goals    LTG Date to Achieve 03/09/17  -MP 03/09/17  -MP 02/10/17  -    LTG 1 Thony has zero pain with normal ADLs with the R UE.  -MP Thony has zero pain with normal ADLs with the R UE.  -MP Thony has zero pain with normal ADLs with the R UE.  -    LTG 1 Progress Ongoing  -MP Ongoing  -MP Ongoing  -    LTG 1 Progress Comments   occassional pain  -    LTG 2 Functional AROM of the R shoulder equals the L.  -MP Functional AROM of  the R shoulder equals the L.  -MP Functional AROM of the R shoulder equals the L.  -    LTG 2 Progress Ongoing  -MP Ongoing  -MP Ongoing  -    LTG 3 Strength R shoulder, cardinal motions, equals 4+/5.  -MP Strength R shoulder, cardinal motions, equals 4+/5.  -MP Strength R shoulder, cardinal motions, equals 4+/5.  -    LTG 3 Progress Ongoing  -MP Ongoing  -MP Ongoing  -    LTG 4 SPADI score displays less than 20% functional disability.  -MP SPADI score displays less than 20% functional disability.  -MP SPADI score displays less than 20% functional disability.  -    LTG 4 Progress Met  -MP Met  -MP Ongoing  -    LTG 5 Thony is independent with a complete HEP and self care education.  -MP Thony is independent with a complete HEP and self care education.  -MP Thony is independent with a complete HEP and self care education.  -    LTG 5 Progress Ongoing  -MP Ongoing  -MP Ongoing  -    LTG 6 SPADI score is below an 8% disability  -MP SPADI score is below an 8% disability  -MP     LTG 6 Progress New  -MP New  -MP       User Key  (r) = Recorded By, (t) = Taken By, (c) = Cosigned By    Initials Name Provider Type    MEI Mandel, PT Physical Therapist    ASIF Bobby PT Physical Therapist                Therapy Education       02/10/17 1601    Therapy Education    Given HEP  -MP    Program Reinforced  -MP    How Provided Verbal  -MP    Provided to Patient  -MP    Level of Understanding Verbalized  -MP      02/07/17 1302    Therapy Education    Given HEP  -MP    Program Reinforced  -MP    How Provided Verbal  -MP    Provided to Patient  -MP    Level of Understanding Verbalized  -MP      User Key  (r) = Recorded By, (t) = Taken By, (c) = Cosigned By    Initials Name Provider Type    ASIF Bobby PT Physical Therapist        Time Calculation:   Start Time: 1445  Stop Time: 1530  Time Calculation (min): 45 min    Therapy Charges for Today     Code Description Service Date Service Provider Modifiers Qty     [id number]  PT THER PROC EA 15 MIN 2/10/2017 Gwyn Bobby, PT GP 2    [id number] HC PT MANUAL THERAPY EA 15 MIN 2/10/2017 Gwyn Bobby, PT GP 1          Gwyn Bobby, PT  2/10/2017

## 2017-02-11 LAB
25(OH)D3+25(OH)D2 SERPL-MCNC: 32.8 NG/ML (ref 30–100)
ALBUMIN SERPL-MCNC: 4.8 G/DL (ref 3.5–5.2)
ALBUMIN/GLOB SERPL: 1.3 G/DL
ALP SERPL-CCNC: 83 U/L (ref 39–117)
ALT SERPL-CCNC: 27 U/L (ref 1–41)
AST SERPL-CCNC: 38 U/L (ref 1–40)
BILIRUB SERPL-MCNC: 0.4 MG/DL (ref 0.1–1.2)
BUN SERPL-MCNC: 18 MG/DL (ref 8–23)
BUN/CREAT SERPL: 15.7 (ref 7–25)
CALCIUM SERPL-MCNC: 10.3 MG/DL (ref 8.6–10.5)
CHLORIDE SERPL-SCNC: 97 MMOL/L (ref 98–107)
CHOLEST SERPL-MCNC: 211 MG/DL (ref 0–200)
CO2 SERPL-SCNC: 27.2 MMOL/L (ref 22–29)
CONV COMMENT: ABNORMAL
CREAT SERPL-MCNC: 1.15 MG/DL (ref 0.76–1.27)
GLOBULIN SER CALC-MCNC: 3.8 GM/DL
GLUCOSE SERPL-MCNC: 82 MG/DL (ref 65–99)
HCT VFR BLD AUTO: 48.5 % (ref 40.4–52.2)
HDLC SERPL-MCNC: 65 MG/DL (ref 40–60)
HGB BLD-MCNC: 15.6 G/DL (ref 13.7–17.6)
LDLC SERPL CALC-MCNC: 122 MG/DL (ref 0–100)
MICROALBUMIN UR-MCNC: 582.2 UG/ML
POTASSIUM SERPL-SCNC: 4.8 MMOL/L (ref 3.5–5.2)
PROT SERPL-MCNC: 8.6 G/DL (ref 6–8.5)
SHBG SERPL-SCNC: 26.3 NMOL/L (ref 19.3–76.4)
SODIUM SERPL-SCNC: 138 MMOL/L (ref 136–145)
TESTOST FREE SERPL-MCNC: 4.2 PG/ML (ref 6.6–18.1)
TESTOST SERPL-MCNC: 228 NG/DL (ref 348–1197)
TRIGL SERPL-MCNC: 119 MG/DL (ref 0–150)
TSH SERPL DL<=0.005 MIU/L-ACNC: 1.39 MIU/ML (ref 0.27–4.2)
VLDLC SERPL CALC-MCNC: 23.8 MG/DL (ref 5–40)

## 2017-02-13 ENCOUNTER — TELEPHONE (OUTPATIENT)
Dept: ENDOCRINOLOGY | Age: 62
End: 2017-02-13

## 2017-02-14 ENCOUNTER — HOSPITAL ENCOUNTER (OUTPATIENT)
Dept: PHYSICAL THERAPY | Facility: HOSPITAL | Age: 62
Setting detail: THERAPIES SERIES
Discharge: HOME | End: 2017-02-14

## 2017-02-14 DIAGNOSIS — Z78.9 DIFFICULTY WITH ACTIVITIES OF DAILY LIVING: Primary | ICD-10-CM

## 2017-02-14 DIAGNOSIS — M25.511 RIGHT SHOULDER PAIN, UNSPECIFIED CHRONICITY: ICD-10-CM

## 2017-02-14 PROCEDURE — 97110 THERAPEUTIC EXERCISES: CPT | Performed by: PHYSICAL THERAPIST

## 2017-02-14 NOTE — PROGRESS NOTES
Outpatient Physical Therapy Ortho Treatment Note  [facility]     Patient Name: Isaias Monaco  : 1955  MRN: [id number]  Today's Date: 2017      Visit Date: 2017    Visit Dx:    ICD-10-CM ICD-9-CM   1. Difficulty with activities of daily living R53.81 799.3   2. Right shoulder pain, unspecified chronicity M25.511 719.41       Patient Active Problem List   Diagnosis   • Microalbuminuria   • Vitamin D deficiency   • Angioedema   • Chronic coronary artery disease   • Anxiety   • Male erectile disorder   • Gastroesophageal reflux disease   • Benign essential hypertension   • Hyperlipidemia   • Diabetes mellitus   • Adiposity   • Routine health maintenance   • Uncontrolled type 2 diabetes mellitus   • Low testosterone   • Hypogonadism in male   • Presence of coronary angioplasty implant and graft        Past Medical History   Diagnosis Date   • Adenomatous colon polyp      Colonoscopy last done    • Johnson's esophagus    • Cardiac disease    • Diabetes mellitus    • Hypertension    • Joint pain      or swelling   • Male erectile disorder    • Stiffness in joint    • Type 2 diabetes mellitus    • Vitamin D deficiency         Past Surgical History   Procedure Laterality Date   • Coronary angioplasty with stent placement       cardiac stents x3 occasions   • Shoulder surgery Right 2016             PT Assessment/Plan       17 1244 02/10/17 1601 17 1306    PT Assessment    Functional Limitations   Decreased safety during functional activities;Limitations in community activities;Limitations in functional capacity and performance;Performance in leisure activities  -MP    Impairments   Pain;Posture;Range of motion;Muscle strength  -MP    Assessment Comments Noticeable loss of terminal shoulder extension in prone.  The R UE was not raised as high and was much more difficult than the L.  ANNE Bhandari tolerated treatment well.  He progressed his PREs today without problem.  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  -MP    Please refer to paper survey for additional self-reported information   Yes  -MP    Rehab Potential   Good  -MP    Patient/caregiver participated in establishment of treatment plan and goals   Yes  -MP    Patient would benefit from skilled therapy intervention   Yes  -MP    PT Plan    PT Frequency   2x/week  -MP    Predicted Duration of Therapy Intervention (days/wks)   4 weeks  -MP    Planned CPT's?   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  -MP Continue to progress therapeutic exercises and manual therapy to meet all goals.  -MP      User Key  (r) = Recorded By, (t) = Taken By, (c) = Cosigned By    Initials Name Provider Type    ASIF Bobby PT Physical Therapist                    Exercises       02/14/17 1200          Subjective Comments    Subjective Comments Thony reported noticing greater difficulty raising the R arm up while lying prone, arms extended in superman position.  -MP      Subjective Pain    Able to rate subjective pain? yes  -MP      Pre-Treatment Pain Level 0  -MP      Post-Treatment Pain Level 0  -MP      Exercise 1    Exercise Name 1 Refer to land flow sheet  -MP      Exercise 2    Exercise Name 2 Progressed therapeutic exercises with sitting ER PRE, 6 lbs. 10 x 3 and prone superman arm raise 10 x 2 B.  -MP        User Key  (r) = Recorded By, (t) = Taken By, (c) = Cosigned By    Initials Name Provider Type    ASIF Bobby PT Physical Therapist             Manual Rx (last 36 hours)       Manual Treatments       02/14/17 1100          Manual Rx 1    Manual Rx 1 Location R UE  -MP      Manual Rx 1 Type Oscillations   -MP      Manual Rx 1 Duration 3-4 minutes  -MP      Manual Rx 2    Manual Rx 2 Location R glenohumeral joint  -MP      Manual Rx 2 Type Anterior, inferior and posterior glides  -MP      Manual Rx 2 Grade Grades 3-4  -MP      Manual Rx 2 Duration 5x3 each  -MP        User Key  (r) = Recorded By, (t) = Taken By, (c) = Cosigned By    Initials Name Provider Type    ASIF Bobby, PT Physical Therapist                PT OP Goals       02/14/17 1200 02/10/17 1600 02/07/17 1300    PT Short Term Goals    STG Date to Achieve 11/04/16  -MP 11/04/16  -MP 11/04/16  -MP    STG 1 Sidelying passive R shoulder IR is instructed and performed without problem and is added to his HEP.  -MP Sidelying passive R shoulder IR is instructed and performed without problem and is added to his HEP.  -MP Sidelying passive R shoulder IR is instructed and performed without problem and is added to his HEP.  -MP    STG 1 Progress Met  -MP Met  -MP Met  -MP    STG 2 Thony is instructed in correct pendulum exercise technique and they are added to his HEP.  -MP Thony is instructed in correct pendulum exercise technique and they are added to his HEP.  -MP Thony is instructed in correct pendulum exercise technique and they are added to his HEP.  -MP    STG 2 Progress Met  -MP Met  -MP Met  -MP    STG 3 Scapular stabilization exercises are begun.  -MP Scapular stabilization exercises are begun.  -MP Scapular stabilization exercises are begun.  -MP    STG 3 Progress Met  -MP Met  -MP Met  -MP    STG 4 SPADI score improves to a 40% functional disability.  -MP SPADI score improves to a 40% functional disability.  -MP SPADI score improves to a 40% functional disability.  -MP    STG 4 Progress Met  -MP Met  -MP Met  -MP    Long Term Goals    LTG Date to Achieve 03/09/17  -MP 03/09/17  -MP 03/09/17  -MP    LTG 1 Thony has zero  pain with normal ADLs with the R UE.  -MP Thony has zero pain with normal ADLs with the R UE.  -MP Thony has zero pain with normal ADLs with the R UE.  -MP    LTG 1 Progress Ongoing  -MP Ongoing  -MP Ongoing  -MP    LTG 2 Functional AROM of the R shoulder equals the L.  -MP Functional AROM of the R shoulder equals the L.  -MP Functional AROM of the R shoulder equals the L.  -MP    LTG 2 Progress Ongoing  -MP Ongoing  -MP Ongoing  -MP    LTG 3 Strength R shoulder, cardinal motions, equals 4+/5.  -MP Strength R shoulder, cardinal motions, equals 4+/5.  -MP Strength R shoulder, cardinal motions, equals 4+/5.  -MP    LTG 3 Progress Ongoing  -MP Ongoing  -MP Ongoing  -MP    LTG 4 SPADI score displays less than 20% functional disability.  -MP SPADI score displays less than 20% functional disability.  -MP SPADI score displays less than 20% functional disability.  -MP    LTG 4 Progress Met  -MP Met  -MP Met  -MP    LTG 5 Thony is independent with a complete HEP and self care education.  -MP Thony is independent with a complete HEP and self care education.  -MP Thony is independent with a complete HEP and self care education.  -MP    LTG 5 Progress Ongoing  -MP Ongoing  -MP Ongoing  -MP    LTG 6 SPADI score is below an 8% disability  -MP SPADI score is below an 8% disability  -MP SPADI score is below an 8% disability  -MP    LTG 6 Progress New  -MP New  -MP New  -MP      User Key  (r) = Recorded By, (t) = Taken By, (c) = Cosigned By    Initials Name Provider Type    ASIF Bobby PT Physical Therapist                Therapy Education       02/14/17 1244    Therapy Education    Given HEP  -MP    Program Reinforced  -MP    How Provided Verbal  -MP    Provided to Patient  -MP    Level of Understanding Verbalized  -MP      02/14/17 1229    Therapy Education    Given HEP  -MP    Program Reinforced  -MP    How Provided Verbal  -MP    Provided to Patient  -MP    Level of Understanding Verbalized  -MP      02/10/17 1601     Therapy Education    Given HEP  -MP    Program Reinforced  -MP    How Provided Verbal  -MP    Provided to Patient  -MP    Level of Understanding Verbalized  -MP      02/07/17 1302    Therapy Education    Given HEP  -MP    Program Reinforced  -MP    How Provided Verbal  -MP    Provided to Patient  -MP    Level of Understanding Verbalized  -MP      User Key  (r) = Recorded By, (t) = Taken By, (c) = Cosigned By    Initials Name Provider Type    MP Gwyn Bobby, PT Physical Therapist          Time Calculation:   Start Time: 1145  Stop Time: 1235  Time Calculation (min): 50 min    Therapy Charges for Today     Code Description Service Date Service Provider Modifiers Qty    86235673952 HC PT THER PROC EA 15 MIN 2/14/2017 Gwyn oBbby PT GP 3          Gwyn Bobby PT  2/14/2017

## 2017-02-15 ENCOUNTER — TELEPHONE (OUTPATIENT)
Dept: ENDOCRINOLOGY | Age: 62
End: 2017-02-15

## 2017-02-15 LAB
Lab: NORMAL
Lab: NORMAL

## 2017-02-16 LAB
HBA1C MFR BLD: 6.5 % (ref 4.8–5.6)
WRITTEN AUTHORIZATION: NORMAL

## 2017-02-17 ENCOUNTER — APPOINTMENT (OUTPATIENT)
Dept: PHYSICAL THERAPY | Facility: HOSPITAL | Age: 62
End: 2017-02-17

## 2017-02-19 DIAGNOSIS — E11.65 TYPE 2 DIABETES MELLITUS WITH HYPERGLYCEMIA (HCC): ICD-10-CM

## 2017-02-19 DIAGNOSIS — E66.9 ADIPOSITY: ICD-10-CM

## 2017-02-19 DIAGNOSIS — E55.9 VITAMIN D DEFICIENCY: ICD-10-CM

## 2017-02-19 DIAGNOSIS — I10 ESSENTIAL HYPERTENSION: ICD-10-CM

## 2017-02-19 DIAGNOSIS — R80.9 MICROALBUMINURIA: ICD-10-CM

## 2017-02-19 DIAGNOSIS — N52.9 MALE ERECTILE DISORDER: ICD-10-CM

## 2017-02-19 DIAGNOSIS — E78.5 HYPERLIPIDEMIA: ICD-10-CM

## 2017-02-19 NOTE — PROGRESS NOTES
Subjective:     Encounter Date:01/25/2017      Patient ID: Isaias Monaco is a 61 y.o. male.    Chief Complaint: CAD    History of Present Illness     Dear Dr. Angel,      I had the pleasure of seeing the patient in cardiac followup today.   As you well know, he is a [place] 61-year-old man with a history of coronary artery disease and an ischemic cardiomyopathy.  He had a chronic total occlusion of his right coronary, which we ended up treating with  intervention.  After his procedure, he reported feeling significantly better with improvement in his angina.       He comes in for his yearly followup.  Since I have last seen him, he reports doing great.  He is exercising and feels better than before. He has lost 25 pounds.          Review of Systems   All other systems reviewed and are negative.        ECG 12 Lead  Date/Time: 2/19/2017 11:44 AM  Performed by: ELVIN SEBASTIAN  Authorized by: ELVIN SEBASTIAN   Comparison: compared with previous ECG   Similar to previous ECG  Rhythm: sinus rhythm  BPM: 88  QRS axis: left  Other findings: early transition               Objective:     Physical Exam   Constitutional: He is oriented to person, place, and time. He appears well-developed and well-nourished.   HENT:   Head: Normocephalic and atraumatic.   Neck: Normal range of motion. Neck supple.   Cardiovascular: Normal rate, regular rhythm and normal heart sounds.    Pulmonary/Chest: Effort normal and breath sounds normal.   Abdominal: Soft. Bowel sounds are normal.   Musculoskeletal: Normal range of motion.   Neurological: He is alert and oriented to person, place, and time.   Skin: Skin is warm and dry.   Psychiatric: He has a normal mood and affect. His behavior is normal. Thought content normal.   Vitals reviewed.      Lab Review:       Assessment:          Diagnosis Plan   1. Coronary artery disease involving native coronary artery of native heart without angina pectoris            Plan:        It was a pleasure to see  the patient in cardiac follow-up today.  He is doing well from the cardiac standpoint without any complaints of angina.  I am pleased that his physical fitness has improved and that he is doing better than ever before.  I have recommended no changes to his medical regimen.  He will see me again in one year or sooner if symptoms warrant.      Coronary Artery Disease  Assessment  • The patient has no angina    Plan  • Lifestyle modifications discussed include adhering to a heart healthy diet, avoidance of tobacco products, maintenance of a healthy weight, regular exercise and regular monitoring of cholesterol and blood pressure    Subjective - Objective  • There has been a previous stent procedure using DAVONTE  • Current antiplatelet therapy includes aspirin 81 mg and clopidogrel 75 mg

## 2017-02-20 RX ORDER — ROSUVASTATIN CALCIUM 40 MG/1
TABLET, COATED ORAL
Qty: 90 TABLET | Refills: 1 | Status: SHIPPED | OUTPATIENT
Start: 2017-02-20 | End: 2017-06-09

## 2017-03-09 DIAGNOSIS — F41.9 ANXIETY: ICD-10-CM

## 2017-03-09 RX ORDER — ESCITALOPRAM OXALATE 20 MG/1
TABLET ORAL
Qty: 45 TABLET | Refills: 2 | Status: SHIPPED | OUTPATIENT
Start: 2017-03-09 | End: 2017-06-09

## 2017-03-10 ENCOUNTER — OFFICE VISIT (OUTPATIENT)
Dept: ORTHOPEDIC SURGERY | Facility: CLINIC | Age: 62
End: 2017-03-10

## 2017-03-10 DIAGNOSIS — Z09 SURGERY FOLLOW-UP: Primary | ICD-10-CM

## 2017-03-10 PROCEDURE — 99212 OFFICE O/P EST SF 10 MIN: CPT | Performed by: ORTHOPAEDIC SURGERY

## 2017-03-12 NOTE — PROGRESS NOTES
"Isaias Monaco : 1955 MRN: [id number] DATE: 3/12/2017      CC: 6 months s/p right shoulder rotator cuff repair     HPI: Pt. returns to clinic today for follow up.  Reports that pain is resolved.  Has some occasional soreness after activity only.  Therapy progressing well.  Denies any concerns or issues.    There were no vitals filed for this visit.      Current Outpatient Prescriptions:   •  amLODIPine (NORVASC) 10 MG tablet, TAKE ONE TABLET BY MOUTH DAILY, Disp: 90 tablet, Rfl: 0  •  ANDROGEL PUMP 20.25 MG/ACT (1.62%) gel, 2 pumps each shoulder daily, Disp: 150 g, Rfl: 0  •  aspirin 81 MG EC tablet, Take 81 mg by mouth Daily., Disp: , Rfl:   •  Blood Glucose Monitoring Suppl (Purplle BLOOD GLUCOSE) kit, , Disp: , Rfl:   •  carvedilol (COREG) 25 MG tablet, TAKE ONE TABLET BY MOUTH TWO TIMES A DAY WITH MEALS, Disp: 180 tablet, Rfl: 0  •  clopidogrel (PLAVIX) 75 MG tablet, TAKE ONE TABLET BY MOUTH DAILY, Disp: 90 tablet, Rfl: 1  •  dorzolamide-timolol (COSOPT) 22.3-6.8 MG/ML ophthalmic solution, Apply  to eye 2 (two) times a day., Disp: , Rfl:   •  ergocalciferol (DRISDOL) 56823 UNITS capsule, Take 1 po q week, Disp: 12 capsule, Rfl: 1  •  escitalopram (LEXAPRO) 20 MG tablet, TAKE ONE TABLET BY MOUTH DAILY, Disp: 45 tablet, Rfl: 2  •  Insulin Glargine (TOUJEO SOLOSTAR) 300 UNIT/ML solution pen-injector, Inject 60 Units under the skin Every Morning., Disp: 9 mL, Rfl: 3  •  Insulin Pen Needle (PEN NEEDLES \") 31G X 8 MM misc, USE AS DIRECTED ONCE DAILY WITH TOUJEO, Disp: 100 each, Rfl: 2  •  JARDIANCE 25 MG tablet, Take 1 tablet by mouth daily., Disp: 30 tablet, Rfl: 5  •  [employer] PREMIUM GLUCOSE TEST test strip, TEST FOUR TIMES A DAY, Disp: 100 each, Rfl: 3  •  latanoprost (XALATAN) 0.005 % ophthalmic solution, Apply  to eye daily., Disp: , Rfl:   •  metFORMIN (GLUCOPHAGE) 500 MG tablet, Take 1 tablet by mouth 2 (Two) Times a Day With Meals., Disp: 120 tablet, Rfl: 0  •  omeprazole (PriLOSEC) 20 MG capsule, " TAKE TWO CAPSULES BY MOUTH EVERY MORNING BEFORE BREAKFAST, Disp: 180 capsule, Rfl: 0  •  pioglitazone (ACTOS) 45 MG tablet, TAKE ONE TABLET BY MOUTH DAILY, Disp: 90 tablet, Rfl: 2  •  rosuvastatin (CRESTOR) 40 MG tablet, TAKE ONE TABLET BY MOUTH DAILY, Disp: 90 tablet, Rfl: 1  •  SITagliptin (JANUVIA) 100 MG tablet, Take 1 tablet by mouth Daily., Disp: 30 tablet, Rfl: 3  •  tadalafil (CIALIS) 10 MG tablet, Take 10 mg by mouth daily as needed, Disp: , Rfl:     Past Medical History   Diagnosis Date   • Adenomatous colon polyp      Colonoscopy last done 2015   • Johnson's esophagus    • Cardiac disease    • Diabetes mellitus    • Hypertension    • Joint pain      or swelling   • Male erectile disorder    • Stiffness in joint    • Type 2 diabetes mellitus    • Vitamin D deficiency        Past Surgical History   Procedure Laterality Date   • Coronary angioplasty with stent placement       cardiac stents x3 occasions   • Shoulder surgery Right 2016       Family History   Problem Relation Age of Onset   • Lupus Sister    • Heart disease Other    • Hypertension Other        Social History     Social History   • Marital status:      Spouse name: N/A   • Number of children: N/A   • Years of education: N/A     Occupational History   • RN      Social History Main Topics   • Smoking status: Never Smoker   • Smokeless tobacco: Not on file   • Alcohol use Yes      Comment: Occasionally   • Drug use: Not on file   • Sexual activity: Not on file     Other Topics Concern   • Not on file     Social History Narrative    , 1 son, 2 stepsons     Exam:    General:  Awake and alert.  No acute distress.    Extremities:  Wounds are healed.  Arm and forearm soft.  Motion is full. Good strength with resistive testing of rotator cuff and no pain.  Negative Neer and Nunes maneuver.  Good motor and sensory function distally.  Palpable pulses with good cap refill.      Imaging   none    Impression:  6 months s/p right shoulder  rotator cuff repair     Plan:    1.  Patient is released to unrestricted activity.  2.  I explained that the patient can expect continued improvement in any residual soreness or strength deficits for up to 1 year.  3.  Follow up as needed.

## 2017-03-13 RX ORDER — ERGOCALCIFEROL 1.25 MG/1
CAPSULE ORAL
Qty: 12 CAPSULE | Refills: 0 | OUTPATIENT
Start: 2017-03-13

## 2017-03-13 RX ORDER — ERGOCALCIFEROL 1.25 MG/1
CAPSULE ORAL
Qty: 12 CAPSULE | Refills: 0 | Status: SHIPPED | OUTPATIENT
Start: 2017-03-13 | End: 2017-06-19 | Stop reason: SDUPTHER

## 2017-03-16 ENCOUNTER — DOCUMENTATION (OUTPATIENT)
Dept: PHYSICAL THERAPY | Facility: HOSPITAL | Age: 62
End: 2017-03-16

## 2017-03-16 NOTE — THERAPY DISCHARGE NOTE
Outpatient Physical Therapy Ortho Discharge Summary       Patient Name: Isaias Monaco  : 1955  MRN: [id number]  Today's Date: 3/16/2017      Visit Date: 2017    Visit Dx:  No diagnosis found.    Patient Active Problem List   Diagnosis   • Microalbuminuria   • Vitamin D deficiency   • Angioedema   • Chronic coronary artery disease   • Anxiety   • Male erectile disorder   • Gastroesophageal reflux disease   • Benign essential hypertension   • Hyperlipidemia   • Diabetes mellitus   • Adiposity   • Routine health maintenance   • Uncontrolled type 2 diabetes mellitus   • Low testosterone   • Hypogonadism in male   • Presence of coronary angioplasty implant and graft        Past Medical History   Diagnosis Date   • Adenomatous colon polyp      Colonoscopy last done    • Johnson's esophagus    • Cardiac disease    • Diabetes mellitus    • Hypertension    • Joint pain      or swelling   • Male erectile disorder    • Stiffness in joint    • Type 2 diabetes mellitus    • Vitamin D deficiency         Past Surgical History   Procedure Laterality Date   • Coronary angioplasty with stent placement       cardiac stents x3 occasions   • Shoulder surgery Right 2016                                                        PT OP Goals       17 0800       PT Short Term Goals    STG Date to Achieve 16  -MP     STG 1 Sidelying passive R shoulder IR is instructed and performed without problem and is added to his HEP.  -MP     STG 1 Progress Met  -MP     STG 2 Thony is instructed in correct pendulum exercise technique and they are added to his HEP.  -MP     STG 2 Progress Met  -MP     STG 3 Scapular stabilization exercises are begun.  -MP     STG 3 Progress Met  -MP     STG 4 SPADI score improves to a 40% functional disability.  -MP     STG 4 Progress Met  -MP     Long Term Goals    LTG Date to Achieve 17  -MP     LTG 1 Thony has zero pain with normal ADLs with the R UE.  -MP     LTG 1 Progress Not  Met  -MP     LTG 2 Functional AROM of the R shoulder equals the L.  -MP     LTG 2 Progress Not Met  -MP     LTG 3 Strength R shoulder, cardinal motions, equals 4+/5.  -MP     LTG 3 Progress Not Met  -MP     LTG 4 SPADI score displays less than 20% functional disability.  -MP     LTG 4 Progress Met  -MP     LTG 5 Thony is independent with a complete HEP and self care education.  -MP     LTG 5 Progress Met  -MP     LTG 6 SPADI score is below an 8% disability  -MP     LTG 6 Progress Not Met  -MP       User Key  (r) = Recorded By, (t) = Taken By, (c) = Cosigned By    Initials Name Provider Type    ASIF Bobby, PT Physical Therapist                    Time Calculation:                  OP PT Discharge Summary  Date of Discharge: 03/16/17  Reason for Discharge: Independent  Outcomes Achieved: Patient able to partially acheive established goals  Discharge Destination: Home with home program  Discharge Instructions: Thony achieved many goals and is independent with self care.        Gwyn Bobby, PT  3/16/2017

## 2017-03-27 RX ORDER — EMPAGLIFLOZIN 25 MG/1
TABLET, FILM COATED ORAL
Qty: 30 TABLET | Refills: 1 | Status: SHIPPED | OUTPATIENT
Start: 2017-03-27 | End: 2017-06-09

## 2017-03-28 DIAGNOSIS — E11.9 CONTROLLED TYPE 2 DIABETES MELLITUS WITHOUT COMPLICATION, WITH LONG-TERM CURRENT USE OF INSULIN (HCC): ICD-10-CM

## 2017-03-28 DIAGNOSIS — Z79.4 CONTROLLED TYPE 2 DIABETES MELLITUS WITHOUT COMPLICATION, WITH LONG-TERM CURRENT USE OF INSULIN (HCC): ICD-10-CM

## 2017-04-27 DIAGNOSIS — E55.9 VITAMIN D DEFICIENCY: ICD-10-CM

## 2017-04-27 DIAGNOSIS — R80.9 MICROALBUMINURIA: ICD-10-CM

## 2017-04-27 DIAGNOSIS — I10 ESSENTIAL HYPERTENSION: ICD-10-CM

## 2017-04-27 DIAGNOSIS — E11.65 TYPE 2 DIABETES MELLITUS WITH HYPERGLYCEMIA (HCC): ICD-10-CM

## 2017-04-27 DIAGNOSIS — E66.9 ADIPOSITY: ICD-10-CM

## 2017-04-27 DIAGNOSIS — N52.9 MALE ERECTILE DISORDER: ICD-10-CM

## 2017-04-27 DIAGNOSIS — E78.5 HYPERLIPIDEMIA: ICD-10-CM

## 2017-04-27 RX ORDER — AMLODIPINE BESYLATE 10 MG/1
TABLET ORAL
Qty: 90 TABLET | Refills: 0 | Status: SHIPPED | OUTPATIENT
Start: 2017-04-27 | End: 2017-06-09

## 2017-06-06 ENCOUNTER — TELEPHONE (OUTPATIENT)
Dept: CARDIOLOGY | Facility: CLINIC | Age: 62
End: 2017-06-06

## 2017-06-06 DIAGNOSIS — I10 ESSENTIAL HYPERTENSION: ICD-10-CM

## 2017-06-06 RX ORDER — CARVEDILOL 25 MG/1
25 TABLET ORAL 2 TIMES DAILY WITH MEALS
Qty: 180 TABLET | Refills: 0 | Status: SHIPPED | OUTPATIENT
Start: 2017-06-06 | End: 2017-12-29 | Stop reason: SDUPTHER

## 2017-06-06 NOTE — TELEPHONE ENCOUNTER
Per verbal from Dr. Khan, patient is clear for surgery and should hold ASA and Plavix 5 days prior.    S/C form faxed to Dr. Boyd Coyne's office at [phone].

## 2017-06-06 NOTE — TELEPHONE ENCOUNTER
Patient is scheduled for knee replacement on 6/15 with Dr. Coyne and is needing clearance.      He is currently taking ASA 81mg and Plavix 75mg.    Please advise.  Thanks!

## 2017-06-09 ENCOUNTER — HOSPITAL ENCOUNTER (OUTPATIENT)
Dept: GENERAL RADIOLOGY | Facility: HOSPITAL | Age: 62
Discharge: HOME | End: 2017-06-09
Admitting: ORTHOPAEDIC SURGERY

## 2017-06-09 ENCOUNTER — APPOINTMENT (OUTPATIENT)
Dept: PREADMISSION TESTING | Facility: HOSPITAL | Age: 62
End: 2017-06-09

## 2017-06-09 ENCOUNTER — HOSPITAL ENCOUNTER (OUTPATIENT)
Dept: GENERAL RADIOLOGY | Facility: HOSPITAL | Age: 62
Discharge: HOME | End: 2017-06-09

## 2017-06-09 VITALS
HEIGHT: 77 IN | RESPIRATION RATE: 20 BRPM | DIASTOLIC BLOOD PRESSURE: 83 MMHG | SYSTOLIC BLOOD PRESSURE: 126 MMHG | OXYGEN SATURATION: 97 % | WEIGHT: 268 LBS | TEMPERATURE: 98.8 F | BODY MASS INDEX: 31.64 KG/M2 | HEART RATE: 86 BPM

## 2017-06-09 LAB
ALBUMIN SERPL-MCNC: 4.3 G/DL (ref 3.5–5.2)
ALBUMIN/GLOB SERPL: 1 G/DL
ALP SERPL-CCNC: 79 U/L (ref 39–117)
ALT SERPL W P-5'-P-CCNC: 22 U/L (ref 1–41)
ANION GAP SERPL CALCULATED.3IONS-SCNC: 14.2 MMOL/L
APTT PPP: 23.4 SECONDS (ref 22.7–35.4)
AST SERPL-CCNC: 34 U/L (ref 1–40)
BACTERIA UR QL AUTO: NORMAL /HPF
BASOPHILS # BLD AUTO: 0.04 10*3/MM3 (ref 0–0.2)
BASOPHILS NFR BLD AUTO: 0.5 % (ref 0–1.5)
BILIRUB SERPL-MCNC: 0.4 MG/DL (ref 0.1–1.2)
BILIRUB UR QL STRIP: NEGATIVE
BUN BLD-MCNC: 17 MG/DL (ref 8–23)
BUN/CREAT SERPL: 18.3 (ref 7–25)
CALCIUM SPEC-SCNC: 9.3 MG/DL (ref 8.6–10.5)
CHLORIDE SERPL-SCNC: 97 MMOL/L (ref 98–107)
CLARITY UR: CLEAR
CO2 SERPL-SCNC: 21.8 MMOL/L (ref 22–29)
COLOR UR: YELLOW
CREAT BLD-MCNC: 0.93 MG/DL (ref 0.76–1.27)
DEPRECATED RDW RBC AUTO: 52.6 FL (ref 37–54)
EOSINOPHIL # BLD AUTO: 0.16 10*3/MM3 (ref 0–0.7)
EOSINOPHIL NFR BLD AUTO: 1.9 % (ref 0.3–6.2)
ERYTHROCYTE [DISTWIDTH] IN BLOOD BY AUTOMATED COUNT: 18.2 % (ref 11.5–14.5)
GFR SERPL CREATININE-BSD FRML MDRD: 100 ML/MIN/1.73
GLOBULIN UR ELPH-MCNC: 4.1 GM/DL
GLUCOSE BLD-MCNC: 100 MG/DL (ref 65–99)
GLUCOSE UR STRIP-MCNC: ABNORMAL MG/DL
HCT VFR BLD AUTO: 43.5 % (ref 40.4–52.2)
HGB BLD-MCNC: 14.4 G/DL (ref 13.7–17.6)
HGB UR QL STRIP.AUTO: NEGATIVE
HYALINE CASTS UR QL AUTO: NORMAL /LPF
IMM GRANULOCYTES # BLD: 0.02 10*3/MM3 (ref 0–0.03)
IMM GRANULOCYTES NFR BLD: 0.2 % (ref 0–0.5)
INR PPP: 0.95 (ref 0.9–1.1)
KETONES UR QL STRIP: NEGATIVE
LEUKOCYTE ESTERASE UR QL STRIP.AUTO: NEGATIVE
LYMPHOCYTES # BLD AUTO: 2.41 10*3/MM3 (ref 0.9–4.8)
LYMPHOCYTES NFR BLD AUTO: 28.4 % (ref 19.6–45.3)
MCH RBC QN AUTO: 26.3 PG (ref 27–32.7)
MCHC RBC AUTO-ENTMCNC: 33.1 G/DL (ref 32.6–36.4)
MCV RBC AUTO: 79.4 FL (ref 79.8–96.2)
MONOCYTES # BLD AUTO: 0.79 10*3/MM3 (ref 0.2–1.2)
MONOCYTES NFR BLD AUTO: 9.3 % (ref 5–12)
NEUTROPHILS # BLD AUTO: 5.08 10*3/MM3 (ref 1.9–8.1)
NEUTROPHILS NFR BLD AUTO: 59.7 % (ref 42.7–76)
NITRITE UR QL STRIP: NEGATIVE
NRBC BLD MANUAL-RTO: 0 /100 WBC (ref 0–0)
PH UR STRIP.AUTO: 6 [PH] (ref 5–8)
PLATELET # BLD AUTO: 195 10*3/MM3 (ref 140–500)
PMV BLD AUTO: 11.4 FL (ref 6–12)
POTASSIUM BLD-SCNC: 4 MMOL/L (ref 3.5–5.2)
PROT SERPL-MCNC: 8.4 G/DL (ref 6–8.5)
PROT UR QL STRIP: ABNORMAL
PROTHROMBIN TIME: 12.3 SECONDS (ref 11.7–14.2)
RBC # BLD AUTO: 5.48 10*6/MM3 (ref 4.6–6)
RBC # UR: NORMAL /HPF
REF LAB TEST METHOD: NORMAL
SODIUM BLD-SCNC: 133 MMOL/L (ref 136–145)
SP GR UR STRIP: 1.02 (ref 1–1.03)
SQUAMOUS #/AREA URNS HPF: NORMAL /HPF
UROBILINOGEN UR QL STRIP: ABNORMAL
WBC NRBC COR # BLD: 8.5 10*3/MM3 (ref 4.5–10.7)
WBC UR QL AUTO: NORMAL /HPF

## 2017-06-09 PROCEDURE — 85730 THROMBOPLASTIN TIME PARTIAL: CPT | Performed by: ORTHOPAEDIC SURGERY

## 2017-06-09 PROCEDURE — 81001 URINALYSIS AUTO W/SCOPE: CPT | Performed by: ORTHOPAEDIC SURGERY

## 2017-06-09 PROCEDURE — 71020 HC CHEST PA AND LATERAL: CPT

## 2017-06-09 PROCEDURE — 85025 COMPLETE CBC W/AUTO DIFF WBC: CPT | Performed by: ORTHOPAEDIC SURGERY

## 2017-06-09 PROCEDURE — 85610 PROTHROMBIN TIME: CPT | Performed by: ORTHOPAEDIC SURGERY

## 2017-06-09 PROCEDURE — 73560 X-RAY EXAM OF KNEE 1 OR 2: CPT

## 2017-06-09 PROCEDURE — 36415 COLL VENOUS BLD VENIPUNCTURE: CPT

## 2017-06-09 PROCEDURE — 80053 COMPREHEN METABOLIC PANEL: CPT | Performed by: ORTHOPAEDIC SURGERY

## 2017-06-09 RX ORDER — ESCITALOPRAM OXALATE 10 MG/1
20 TABLET ORAL EVERY MORNING
COMMUNITY
End: 2017-12-29 | Stop reason: DRUGHIGH

## 2017-06-09 RX ORDER — PIOGLITAZONEHYDROCHLORIDE 45 MG/1
45 TABLET ORAL EVERY MORNING
COMMUNITY
End: 2017-06-20 | Stop reason: SDUPTHER

## 2017-06-09 RX ORDER — ROSUVASTATIN CALCIUM 40 MG/1
40 TABLET, COATED ORAL NIGHTLY
COMMUNITY
End: 2017-10-25 | Stop reason: SDUPTHER

## 2017-06-09 RX ORDER — OMEPRAZOLE 40 MG/1
40 CAPSULE, DELAYED RELEASE ORAL EVERY MORNING
COMMUNITY
End: 2017-08-24 | Stop reason: ALTCHOICE

## 2017-06-09 RX ORDER — AMLODIPINE BESYLATE 5 MG/1
5 TABLET ORAL EVERY MORNING
COMMUNITY
End: 2017-12-29 | Stop reason: DRUGHIGH

## 2017-06-09 RX ORDER — CLOPIDOGREL BISULFATE 75 MG/1
75 TABLET ORAL EVERY MORNING
Status: ON HOLD | COMMUNITY
End: 2017-06-22

## 2017-06-09 NOTE — DISCHARGE INSTRUCTIONS
Take the following medications the morning of surgery with a small sip of water:  Omeprazole, coreg, amlodipine        General Instructions:  • Do not eat solid food after midnight the night before surgery.  • You may drink clear liquids day of surgery but must stop at least one hour before your hospital arrival time.  • It is beneficial for you to have a clear drink that contains carbohydrates the day of surgery.  We suggest a 20 ounce bottle of Gatorade or Powerade for non-diabetic patients or a 20 ounce bottle of G2 or Powerade Zero for diabetic patients. (Pediatric patients, are not advised to drink a 20 ounce carbohydrate drink)    Clear liquids are liquids you can see through.  Nothing red in color.     Plain water                               Sports drinks  Sodas                                   Gelatin (Jell-O)  Fruit juices without pulp such as white grape juice and apple juice  Popsicles that contain no fruit or yogurt  Tea or coffee (no cream or milk added)  Gatorade / Powerade  G2 / Powerade Zero    • Infants may have breast milk up to four hours before surgery.  • Infants drinking formula may drink formula up to six hours before surgery.   • Patients who avoid smoking, chewing tobacco and alcohol for 4 weeks prior to surgery have a reduced risk of post-operative complications.  Quit smoking as many days before surgery as you can.  • Do not smoke, use chewing tobacco or drink alcohol the day of surgery.   • If applicable bring your C-PAP/ BI-PAP machine.  • Bring any papers given to you in the doctor’s office.  • Wear clean comfortable clothes and socks.  • Do not wear contact lenses or make-up.  Bring a case for your glasses.   • Bring crutches or walker if applicable.  • Leave all other valuables and jewelry at home.  • The Pre-Admission Testing nurse will instruct you to bring medications if unable to obtain an accurate list in Pre-Admission Testing.        If you were given a blood bank ID arm band  remember to bring it with you the day of surgery.    Preventing a Surgical Site Infection:  • For 2 to 3 days before surgery, avoid shaving with a razor because the razor can irritate skin and make it easier to develop an infection.  • The night prior to surgery sleep in a clean bed with clean clothing.  Do not allow pets to sleep with you.  • Shower on the morning of surgery using a fresh bar of anti-bacterial soap (such as Dial) and clean washcloth.  Dry with a clean towel and dress in clean clothing.  • Ask your surgeon if you will be receiving antibiotics prior to surgery.  • Make sure you, your family, and all healthcare providers clean their hands with soap and water or an alcohol based hand  before caring for you or your wound.    Day of surgery:  Upon arrival, a Pre-op nurse and Anesthesiologist will review your health history, obtain vital signs, and answer questions you may have.  The only belongings needed at this time will be your home medications and if applicable your C-PAP/BI-PAP machine.  If you are staying overnight your family can leave the rest of your belongings in the car and bring them to your room later.  A Pre-op nurse will start an IV and you may receive medication in preparation for surgery, including something to help you relax.  Your family will be able to see you in the Pre-op area.  While you are in surgery your family should notify the waiting room  if they leave the waiting room area and provide a contact phone number.    Please be aware that surgery does come with discomfort.  We want to make every effort to control your discomfort so please discuss any uncontrolled symptoms with your nurse.   Your doctor will most likely have prescribed pain medications.      If you are going home after surgery you will receive individualized written care instructions before being discharged.  A responsible adult must drive you to and from the hospital on the day of your surgery  and stay with you for 24 hours.    If you are staying overnight following surgery, you will be transported to your hospital room following the recovery period.  [facility] has all private rooms.    If you have any questions please call Pre-Admission Testing at [phone].  Deductibles and co-payments are collected on the day of service. Please be prepared to pay the required co-pay, deductible or deposit on the day of service as defined by your plan.    2% CHLORAHEXIDINE GLUCONATE* CLOTH  Preparing or “prepping” skin before surgery can reduce the risk of infection at the surgical site. To make the process easier, [facility] has chosen disposable cloths moistened with a rinse-free, 2% Chlorhexidine Gluconate (CHG) antiseptic solution. The steps below outline the prepping process and should be carefully followed.        Use the prep cloth on the area that is circled in the diagram             Directions Night before Surgery  1) Shower using a fresh bar of anti-bacterial soap (such as Dial) and clean washcloth.  Use a clean towel to completely dry your skin.  2) Do not use any lotions, oils or creams on your skin.  3) Open the package and remove 1 cloth, wipe your skin for 30 seconds in a circular motion.  Allow to dry for 3 minutes.  4) Repeat #3 with second cloth.  5) Do not touch your eyes, ears, or mouth with the prep cloth.  6) Allow the wet prep solution to air dry.  7) Discard the prep cloth and wash your hands with soap and water.   8) Dress in clean bed clothes and sleep on fresh clean bed sheets.   9) You may experience some temporary itching after the prep.    Directions Day of Surgery  1) Repeat steps 1,2,3,4,5,6,7, and 9.   2) Dress in clean clothes before coming to the hospital.    BACTROBAN NASAL OINTMENT  There are many germs normally in your nose. Bactroban is an ointment that will help reduce these germs. Please follow these instructions for Bactroban use:    ____Two days  before surgery in the evening Date________    ____The day before surgery in the morning  Date________    ____The day before surgery in the evening              Date________    ____The day of surgery in the morning    Date________    **Squirt ½ package of Bactroban Ointment onto a cotton applicator and apply to inside of 1st nostril.  Squirt the remaining Bactroban and apply to the inside of the other nostril.    PERIDEX- ORAL:  Use only if your surgeon has ordered  Use the night before and morning of surgery - Swish, gargle, and spit - do not swallow.

## 2017-06-12 DIAGNOSIS — IMO0001 UNCONTROLLED DIABETES MELLITUS TYPE 2 WITHOUT COMPLICATIONS, UNSPECIFIED LONG TERM INSULIN USE STATUS: Primary | ICD-10-CM

## 2017-06-15 ENCOUNTER — ANESTHESIA (OUTPATIENT)
Dept: PERIOP | Facility: HOSPITAL | Age: 62
End: 2017-06-15

## 2017-06-15 ENCOUNTER — ANESTHESIA EVENT (OUTPATIENT)
Dept: PERIOP | Facility: HOSPITAL | Age: 62
End: 2017-06-15

## 2017-06-15 ENCOUNTER — APPOINTMENT (OUTPATIENT)
Dept: GENERAL RADIOLOGY | Facility: HOSPITAL | Age: 62
End: 2017-06-15

## 2017-06-15 ENCOUNTER — HOSPITAL ENCOUNTER (INPATIENT)
Facility: HOSPITAL | Age: 62
LOS: 2 days | Discharge: HOME | End: 2017-06-17
Attending: ORTHOPAEDIC SURGERY | Admitting: ORTHOPAEDIC SURGERY

## 2017-06-15 DIAGNOSIS — R26.89 IMPAIRED GAIT AND MOBILITY: Primary | ICD-10-CM

## 2017-06-15 PROBLEM — M17.9 OSTEOARTHRITIS, KNEE: Status: ACTIVE | Noted: 2017-06-15

## 2017-06-15 LAB
GLUCOSE BLDC GLUCOMTR-MCNC: 100 MG/DL (ref 70–130)
GLUCOSE BLDC GLUCOMTR-MCNC: 111 MG/DL (ref 70–130)
GLUCOSE BLDC GLUCOMTR-MCNC: 118 MG/DL (ref 70–130)
GLUCOSE BLDC GLUCOMTR-MCNC: 174 MG/DL (ref 70–130)

## 2017-06-15 PROCEDURE — 25010000002 VANCOMYCIN PER 500 MG: Performed by: ORTHOPAEDIC SURGERY

## 2017-06-15 PROCEDURE — 94799 UNLISTED PULMONARY SVC/PX: CPT

## 2017-06-15 PROCEDURE — 25010000002 PROPOFOL 10 MG/ML EMULSION: Performed by: NURSE ANESTHETIST, CERTIFIED REGISTERED

## 2017-06-15 PROCEDURE — 25010000002 MIDAZOLAM PER 1 MG: Performed by: ANESTHESIOLOGY

## 2017-06-15 PROCEDURE — 0SRC0J9 REPLACEMENT OF RIGHT KNEE JOINT WITH SYNTHETIC SUBSTITUTE, CEMENTED, OPEN APPROACH: ICD-10-PCS | Performed by: ORTHOPAEDIC SURGERY

## 2017-06-15 PROCEDURE — 25010000002 FENTANYL CITRATE (PF) 100 MCG/2ML SOLUTION: Performed by: NURSE ANESTHETIST, CERTIFIED REGISTERED

## 2017-06-15 PROCEDURE — 25010000002 HYDROMORPHONE PER 4 MG

## 2017-06-15 PROCEDURE — 25010000002 ONDANSETRON PER 1 MG: Performed by: ORTHOPAEDIC SURGERY

## 2017-06-15 PROCEDURE — 25010000002 FENTANYL CITRATE (PF) 100 MCG/2ML SOLUTION

## 2017-06-15 PROCEDURE — C1713 ANCHOR/SCREW BN/BN,TIS/BN: HCPCS | Performed by: ORTHOPAEDIC SURGERY

## 2017-06-15 PROCEDURE — 25010000003 CEFAZOLIN IN DEXTROSE 2-4 GM/100ML-% SOLUTION: Performed by: ORTHOPAEDIC SURGERY

## 2017-06-15 PROCEDURE — C1776 JOINT DEVICE (IMPLANTABLE): HCPCS | Performed by: ORTHOPAEDIC SURGERY

## 2017-06-15 PROCEDURE — 73560 X-RAY EXAM OF KNEE 1 OR 2: CPT

## 2017-06-15 PROCEDURE — 25010000002 KETOROLAC TROMETHAMINE PER 15 MG: Performed by: ORTHOPAEDIC SURGERY

## 2017-06-15 PROCEDURE — 25010000002 ONDANSETRON PER 1 MG: Performed by: NURSE ANESTHETIST, CERTIFIED REGISTERED

## 2017-06-15 PROCEDURE — 25010000002 HYDROMORPHONE PER 4 MG: Performed by: NURSE ANESTHETIST, CERTIFIED REGISTERED

## 2017-06-15 PROCEDURE — 25010000003 CEFAZOLIN IN DEXTROSE 2-4 GM/100ML-% SOLUTION: Performed by: NURSE ANESTHETIST, CERTIFIED REGISTERED

## 2017-06-15 PROCEDURE — 25010000002 KETOROLAC TROMETHAMINE PER 15 MG: Performed by: NURSE ANESTHETIST, CERTIFIED REGISTERED

## 2017-06-15 PROCEDURE — 82962 GLUCOSE BLOOD TEST: CPT

## 2017-06-15 PROCEDURE — 63710000001 INSULIN ASPART PER 5 UNITS: Performed by: HOSPITALIST

## 2017-06-15 PROCEDURE — 63710000001 INSULIN ASPART PER 5 UNITS: Performed by: ORTHOPAEDIC SURGERY

## 2017-06-15 DEVICE — CMT BONE PALACOS 120001: Type: IMPLANTABLE DEVICE | Site: KNEE | Status: FUNCTIONAL

## 2017-06-15 DEVICE — TOTL KN FM CEM VE ZIMMER: Type: IMPLANTABLE DEVICE | Site: KNEE | Status: FUNCTIONAL

## 2017-06-15 DEVICE — STEM TIB/KN PERSONA CMT 5D SZG RT: Type: IMPLANTABLE DEVICE | Site: KNEE | Status: FUNCTIONAL

## 2017-06-15 DEVICE — EXT STEM FEM/KN PERSONA TPR 14XPLS30MM: Type: IMPLANTABLE DEVICE | Site: KNEE | Status: FUNCTIONAL

## 2017-06-15 DEVICE — IMPLANTABLE DEVICE: Type: IMPLANTABLE DEVICE | Site: KNEE | Status: FUNCTIONAL

## 2017-06-15 RX ORDER — EPHEDRINE SULFATE 50 MG/ML
INJECTION, SOLUTION INTRAVENOUS AS NEEDED
Status: DISCONTINUED | OUTPATIENT
Start: 2017-06-15 | End: 2017-06-15 | Stop reason: SURG

## 2017-06-15 RX ORDER — EPHEDRINE SULFATE 50 MG/ML
5 INJECTION, SOLUTION INTRAVENOUS ONCE AS NEEDED
Status: DISCONTINUED | OUTPATIENT
Start: 2017-06-15 | End: 2017-06-15 | Stop reason: HOSPADM

## 2017-06-15 RX ORDER — FENTANYL CITRATE 50 UG/ML
INJECTION, SOLUTION INTRAMUSCULAR; INTRAVENOUS AS NEEDED
Status: DISCONTINUED | OUTPATIENT
Start: 2017-06-15 | End: 2017-06-15 | Stop reason: SURG

## 2017-06-15 RX ORDER — NICOTINE POLACRILEX 4 MG
15 LOZENGE BUCCAL
Status: DISCONTINUED | OUTPATIENT
Start: 2017-06-15 | End: 2017-06-15 | Stop reason: SDUPTHER

## 2017-06-15 RX ORDER — ONDANSETRON 2 MG/ML
4 INJECTION INTRAMUSCULAR; INTRAVENOUS ONCE AS NEEDED
Status: DISCONTINUED | OUTPATIENT
Start: 2017-06-15 | End: 2017-06-15 | Stop reason: HOSPADM

## 2017-06-15 RX ORDER — ONDANSETRON 4 MG/1
4 TABLET, FILM COATED ORAL EVERY 6 HOURS PRN
Status: DISCONTINUED | OUTPATIENT
Start: 2017-06-15 | End: 2017-06-17 | Stop reason: HOSPADM

## 2017-06-15 RX ORDER — NALOXONE HCL 0.4 MG/ML
0.1 VIAL (ML) INJECTION
Status: DISCONTINUED | OUTPATIENT
Start: 2017-06-15 | End: 2017-06-17 | Stop reason: HOSPADM

## 2017-06-15 RX ORDER — CEFAZOLIN SODIUM 2 G/100ML
2 INJECTION, SOLUTION INTRAVENOUS EVERY 8 HOURS
Status: COMPLETED | OUTPATIENT
Start: 2017-06-15 | End: 2017-06-16

## 2017-06-15 RX ORDER — LIDOCAINE HYDROCHLORIDE 20 MG/ML
INJECTION, SOLUTION INFILTRATION; PERINEURAL AS NEEDED
Status: DISCONTINUED | OUTPATIENT
Start: 2017-06-15 | End: 2017-06-15 | Stop reason: SURG

## 2017-06-15 RX ORDER — FERROUS SULFATE 325(65) MG
325 TABLET ORAL
Status: DISCONTINUED | OUTPATIENT
Start: 2017-06-16 | End: 2017-06-17 | Stop reason: HOSPADM

## 2017-06-15 RX ORDER — SODIUM CHLORIDE, SODIUM LACTATE, POTASSIUM CHLORIDE, CALCIUM CHLORIDE 600; 310; 30; 20 MG/100ML; MG/100ML; MG/100ML; MG/100ML
9 INJECTION, SOLUTION INTRAVENOUS CONTINUOUS
Status: DISCONTINUED | OUTPATIENT
Start: 2017-06-15 | End: 2017-06-16

## 2017-06-15 RX ORDER — KETOROLAC TROMETHAMINE 30 MG/ML
30 INJECTION, SOLUTION INTRAMUSCULAR; INTRAVENOUS EVERY 6 HOURS
Status: DISCONTINUED | OUTPATIENT
Start: 2017-06-15 | End: 2017-06-16

## 2017-06-15 RX ORDER — SODIUM CHLORIDE 0.9 % (FLUSH) 0.9 %
1-10 SYRINGE (ML) INJECTION AS NEEDED
Status: DISCONTINUED | OUTPATIENT
Start: 2017-06-15 | End: 2017-06-15 | Stop reason: HOSPADM

## 2017-06-15 RX ORDER — DIPHENHYDRAMINE HCL 25 MG
25 CAPSULE ORAL EVERY 6 HOURS PRN
Status: DISCONTINUED | OUTPATIENT
Start: 2017-06-15 | End: 2017-06-17 | Stop reason: HOSPADM

## 2017-06-15 RX ORDER — PROMETHAZINE HYDROCHLORIDE 25 MG/ML
12.5 INJECTION, SOLUTION INTRAMUSCULAR; INTRAVENOUS ONCE AS NEEDED
Status: DISCONTINUED | OUTPATIENT
Start: 2017-06-15 | End: 2017-06-15 | Stop reason: HOSPADM

## 2017-06-15 RX ORDER — KETOROLAC TROMETHAMINE 30 MG/ML
INJECTION, SOLUTION INTRAMUSCULAR; INTRAVENOUS
Status: DISPENSED
Start: 2017-06-15 | End: 2017-06-16

## 2017-06-15 RX ORDER — OXYCODONE HYDROCHLORIDE AND ACETAMINOPHEN 5; 325 MG/1; MG/1
2 TABLET ORAL EVERY 4 HOURS PRN
Status: DISCONTINUED | OUTPATIENT
Start: 2017-06-15 | End: 2017-06-17 | Stop reason: HOSPADM

## 2017-06-15 RX ORDER — FAMOTIDINE 10 MG/ML
20 INJECTION, SOLUTION INTRAVENOUS ONCE
Status: COMPLETED | OUTPATIENT
Start: 2017-06-15 | End: 2017-06-15

## 2017-06-15 RX ORDER — DOCUSATE SODIUM 100 MG/1
100 CAPSULE, LIQUID FILLED ORAL 2 TIMES DAILY PRN
Status: DISCONTINUED | OUTPATIENT
Start: 2017-06-15 | End: 2017-06-17 | Stop reason: HOSPADM

## 2017-06-15 RX ORDER — HYDRALAZINE HYDROCHLORIDE 20 MG/ML
5 INJECTION INTRAMUSCULAR; INTRAVENOUS
Status: DISCONTINUED | OUTPATIENT
Start: 2017-06-15 | End: 2017-06-15 | Stop reason: HOSPADM

## 2017-06-15 RX ORDER — LATANOPROST 50 UG/ML
1 SOLUTION/ DROPS OPHTHALMIC NIGHTLY
Status: DISCONTINUED | OUTPATIENT
Start: 2017-06-15 | End: 2017-06-17 | Stop reason: HOSPADM

## 2017-06-15 RX ORDER — FLUMAZENIL 0.1 MG/ML
0.2 INJECTION INTRAVENOUS AS NEEDED
Status: DISCONTINUED | OUTPATIENT
Start: 2017-06-15 | End: 2017-06-15 | Stop reason: HOSPADM

## 2017-06-15 RX ORDER — PROMETHAZINE HYDROCHLORIDE 25 MG/1
25 SUPPOSITORY RECTAL ONCE AS NEEDED
Status: DISCONTINUED | OUTPATIENT
Start: 2017-06-15 | End: 2017-06-15 | Stop reason: HOSPADM

## 2017-06-15 RX ORDER — CARVEDILOL 25 MG/1
25 TABLET ORAL 2 TIMES DAILY WITH MEALS
Status: DISCONTINUED | OUTPATIENT
Start: 2017-06-15 | End: 2017-06-17 | Stop reason: HOSPADM

## 2017-06-15 RX ORDER — ACETAMINOPHEN 500 MG
1000 TABLET ORAL ONCE
Status: COMPLETED | OUTPATIENT
Start: 2017-06-15 | End: 2017-06-15

## 2017-06-15 RX ORDER — PROMETHAZINE HYDROCHLORIDE 25 MG/1
25 TABLET ORAL ONCE AS NEEDED
Status: DISCONTINUED | OUTPATIENT
Start: 2017-06-15 | End: 2017-06-15 | Stop reason: HOSPADM

## 2017-06-15 RX ORDER — OXYCODONE AND ACETAMINOPHEN 7.5; 325 MG/1; MG/1
1 TABLET ORAL ONCE AS NEEDED
Status: DISCONTINUED | OUTPATIENT
Start: 2017-06-15 | End: 2017-06-15 | Stop reason: HOSPADM

## 2017-06-15 RX ORDER — AMLODIPINE BESYLATE 5 MG/1
5 TABLET ORAL EVERY MORNING
Status: DISCONTINUED | OUTPATIENT
Start: 2017-06-16 | End: 2017-06-17 | Stop reason: HOSPADM

## 2017-06-15 RX ORDER — HYDROCODONE BITARTRATE AND ACETAMINOPHEN 7.5; 325 MG/1; MG/1
1 TABLET ORAL ONCE AS NEEDED
Status: DISCONTINUED | OUTPATIENT
Start: 2017-06-15 | End: 2017-06-15 | Stop reason: HOSPADM

## 2017-06-15 RX ORDER — MAGNESIUM HYDROXIDE 1200 MG/15ML
LIQUID ORAL AS NEEDED
Status: DISCONTINUED | OUTPATIENT
Start: 2017-06-15 | End: 2017-06-15 | Stop reason: HOSPADM

## 2017-06-15 RX ORDER — TRANEXAMIC ACID 100 MG/ML
INJECTION, SOLUTION INTRAVENOUS AS NEEDED
Status: DISCONTINUED | OUTPATIENT
Start: 2017-06-15 | End: 2017-06-15 | Stop reason: SURG

## 2017-06-15 RX ORDER — DEXTROSE MONOHYDRATE 25 G/50ML
25 INJECTION, SOLUTION INTRAVENOUS
Status: DISCONTINUED | OUTPATIENT
Start: 2017-06-15 | End: 2017-06-15 | Stop reason: SDUPTHER

## 2017-06-15 RX ORDER — LABETALOL HYDROCHLORIDE 5 MG/ML
5 INJECTION, SOLUTION INTRAVENOUS
Status: DISCONTINUED | OUTPATIENT
Start: 2017-06-15 | End: 2017-06-15 | Stop reason: HOSPADM

## 2017-06-15 RX ORDER — PROPOFOL 10 MG/ML
VIAL (ML) INTRAVENOUS AS NEEDED
Status: DISCONTINUED | OUTPATIENT
Start: 2017-06-15 | End: 2017-06-15 | Stop reason: SURG

## 2017-06-15 RX ORDER — ESCITALOPRAM OXALATE 10 MG/1
10 TABLET ORAL EVERY MORNING
Status: DISCONTINUED | OUTPATIENT
Start: 2017-06-16 | End: 2017-06-17 | Stop reason: HOSPADM

## 2017-06-15 RX ORDER — FENTANYL CITRATE 50 UG/ML
INJECTION, SOLUTION INTRAMUSCULAR; INTRAVENOUS
Status: COMPLETED
Start: 2017-06-15 | End: 2017-06-15

## 2017-06-15 RX ORDER — LATANOPROST 50 UG/ML
1 SOLUTION/ DROPS OPHTHALMIC EVERY MORNING
Status: DISCONTINUED | OUTPATIENT
Start: 2017-06-16 | End: 2017-06-15

## 2017-06-15 RX ORDER — DORZOLAMIDE HYDROCHLORIDE AND TIMOLOL MALEATE 20; 5 MG/ML; MG/ML
1 SOLUTION/ DROPS OPHTHALMIC 2 TIMES DAILY
Status: DISCONTINUED | OUTPATIENT
Start: 2017-06-15 | End: 2017-06-17 | Stop reason: HOSPADM

## 2017-06-15 RX ORDER — SODIUM CHLORIDE, SODIUM LACTATE, POTASSIUM CHLORIDE, CALCIUM CHLORIDE 600; 310; 30; 20 MG/100ML; MG/100ML; MG/100ML; MG/100ML
100 INJECTION, SOLUTION INTRAVENOUS CONTINUOUS
Status: DISCONTINUED | OUTPATIENT
Start: 2017-06-15 | End: 2017-06-16

## 2017-06-15 RX ORDER — ROSUVASTATIN CALCIUM 40 MG/1
40 TABLET, COATED ORAL NIGHTLY
Status: DISCONTINUED | OUTPATIENT
Start: 2017-06-15 | End: 2017-06-17 | Stop reason: HOSPADM

## 2017-06-15 RX ORDER — PROMETHAZINE HYDROCHLORIDE 25 MG/1
12.5 TABLET ORAL ONCE AS NEEDED
Status: DISCONTINUED | OUTPATIENT
Start: 2017-06-15 | End: 2017-06-15 | Stop reason: HOSPADM

## 2017-06-15 RX ORDER — FENTANYL CITRATE 50 UG/ML
50 INJECTION, SOLUTION INTRAMUSCULAR; INTRAVENOUS
Status: DISCONTINUED | OUTPATIENT
Start: 2017-06-15 | End: 2017-06-15 | Stop reason: HOSPADM

## 2017-06-15 RX ORDER — HYDROMORPHONE HCL 110MG/55ML
PATIENT CONTROLLED ANALGESIA SYRINGE INTRAVENOUS AS NEEDED
Status: DISCONTINUED | OUTPATIENT
Start: 2017-06-15 | End: 2017-06-15 | Stop reason: SURG

## 2017-06-15 RX ORDER — ONDANSETRON 2 MG/ML
4 INJECTION INTRAMUSCULAR; INTRAVENOUS EVERY 6 HOURS PRN
Status: DISCONTINUED | OUTPATIENT
Start: 2017-06-15 | End: 2017-06-17 | Stop reason: HOSPADM

## 2017-06-15 RX ORDER — ACETAMINOPHEN 325 MG/1
650 TABLET ORAL EVERY 4 HOURS PRN
Status: DISCONTINUED | OUTPATIENT
Start: 2017-06-15 | End: 2017-06-17 | Stop reason: HOSPADM

## 2017-06-15 RX ORDER — CEFAZOLIN SODIUM 2 G/100ML
INJECTION, SOLUTION INTRAVENOUS AS NEEDED
Status: DISCONTINUED | OUTPATIENT
Start: 2017-06-15 | End: 2017-06-15 | Stop reason: SURG

## 2017-06-15 RX ORDER — ACETAMINOPHEN 325 MG/1
325 TABLET ORAL EVERY 4 HOURS PRN
Status: DISCONTINUED | OUTPATIENT
Start: 2017-06-15 | End: 2017-06-17 | Stop reason: HOSPADM

## 2017-06-15 RX ORDER — ONDANSETRON 4 MG/1
4 TABLET, ORALLY DISINTEGRATING ORAL EVERY 6 HOURS PRN
Status: DISCONTINUED | OUTPATIENT
Start: 2017-06-15 | End: 2017-06-17 | Stop reason: HOSPADM

## 2017-06-15 RX ORDER — MIDAZOLAM HYDROCHLORIDE 1 MG/ML
2 INJECTION INTRAMUSCULAR; INTRAVENOUS
Status: DISCONTINUED | OUTPATIENT
Start: 2017-06-15 | End: 2017-06-15 | Stop reason: HOSPADM

## 2017-06-15 RX ORDER — NALOXONE HCL 0.4 MG/ML
0.2 VIAL (ML) INJECTION AS NEEDED
Status: DISCONTINUED | OUTPATIENT
Start: 2017-06-15 | End: 2017-06-15 | Stop reason: HOSPADM

## 2017-06-15 RX ORDER — ONDANSETRON 2 MG/ML
INJECTION INTRAMUSCULAR; INTRAVENOUS AS NEEDED
Status: DISCONTINUED | OUTPATIENT
Start: 2017-06-15 | End: 2017-06-15 | Stop reason: SURG

## 2017-06-15 RX ORDER — HYDROMORPHONE HYDROCHLORIDE 1 MG/ML
0.5 INJECTION, SOLUTION INTRAMUSCULAR; INTRAVENOUS; SUBCUTANEOUS
Status: DISCONTINUED | OUTPATIENT
Start: 2017-06-15 | End: 2017-06-15 | Stop reason: HOSPADM

## 2017-06-15 RX ORDER — BISACODYL 10 MG
10 SUPPOSITORY, RECTAL RECTAL DAILY PRN
Status: DISCONTINUED | OUTPATIENT
Start: 2017-06-15 | End: 2017-06-17 | Stop reason: HOSPADM

## 2017-06-15 RX ORDER — DIPHENHYDRAMINE HYDROCHLORIDE 50 MG/ML
12.5 INJECTION INTRAMUSCULAR; INTRAVENOUS
Status: DISCONTINUED | OUTPATIENT
Start: 2017-06-15 | End: 2017-06-15 | Stop reason: HOSPADM

## 2017-06-15 RX ORDER — MIDAZOLAM HYDROCHLORIDE 1 MG/ML
1 INJECTION INTRAMUSCULAR; INTRAVENOUS
Status: DISCONTINUED | OUTPATIENT
Start: 2017-06-15 | End: 2017-06-15 | Stop reason: HOSPADM

## 2017-06-15 RX ORDER — PANTOPRAZOLE SODIUM 40 MG/1
40 TABLET, DELAYED RELEASE ORAL EVERY MORNING
Status: DISCONTINUED | OUTPATIENT
Start: 2017-06-16 | End: 2017-06-17 | Stop reason: HOSPADM

## 2017-06-15 RX ORDER — BISACODYL 5 MG/1
10 TABLET, DELAYED RELEASE ORAL DAILY PRN
Status: DISCONTINUED | OUTPATIENT
Start: 2017-06-15 | End: 2017-06-17 | Stop reason: HOSPADM

## 2017-06-15 RX ORDER — SODIUM CHLORIDE 0.9 % (FLUSH) 0.9 %
1-10 SYRINGE (ML) INJECTION AS NEEDED
Status: DISCONTINUED | OUTPATIENT
Start: 2017-06-15 | End: 2017-06-17 | Stop reason: HOSPADM

## 2017-06-15 RX ORDER — ASPIRIN 325 MG
325 TABLET, DELAYED RELEASE (ENTERIC COATED) ORAL DAILY
Status: DISCONTINUED | OUTPATIENT
Start: 2017-06-15 | End: 2017-06-17 | Stop reason: HOSPADM

## 2017-06-15 RX ORDER — OXYCODONE HYDROCHLORIDE AND ACETAMINOPHEN 5; 325 MG/1; MG/1
1 TABLET ORAL EVERY 4 HOURS PRN
Status: DISCONTINUED | OUTPATIENT
Start: 2017-06-15 | End: 2017-06-17 | Stop reason: HOSPADM

## 2017-06-15 RX ORDER — KETOROLAC TROMETHAMINE 30 MG/ML
INJECTION, SOLUTION INTRAMUSCULAR; INTRAVENOUS AS NEEDED
Status: DISCONTINUED | OUTPATIENT
Start: 2017-06-15 | End: 2017-06-15 | Stop reason: SURG

## 2017-06-15 RX ADMIN — FENTANYL CITRATE 50 MCG: 50 INJECTION, SOLUTION INTRAMUSCULAR; INTRAVENOUS at 14:29

## 2017-06-15 RX ADMIN — VANCOMYCIN HYDROCHLORIDE 2000 MG: 1 INJECTION, POWDER, LYOPHILIZED, FOR SOLUTION INTRAVENOUS at 11:41

## 2017-06-15 RX ADMIN — INSULIN ASPART 2 UNITS: 100 INJECTION, SOLUTION INTRAVENOUS; SUBCUTANEOUS at 22:25

## 2017-06-15 RX ADMIN — ONDANSETRON 4 MG: 2 INJECTION INTRAMUSCULAR; INTRAVENOUS at 18:50

## 2017-06-15 RX ADMIN — HYDROMORPHONE HYDROCHLORIDE 0.5 MG: 2 INJECTION, SOLUTION INTRAMUSCULAR; INTRAVENOUS; SUBCUTANEOUS at 14:22

## 2017-06-15 RX ADMIN — ONDANSETRON 4 MG: 2 INJECTION INTRAMUSCULAR; INTRAVENOUS at 13:59

## 2017-06-15 RX ADMIN — LINAGLIPTIN 5 MG: 5 TABLET, FILM COATED ORAL at 18:51

## 2017-06-15 RX ADMIN — PROPOFOL 200 MG: 10 INJECTION, EMULSION INTRAVENOUS at 12:31

## 2017-06-15 RX ADMIN — MIDAZOLAM 2 MG: 1 INJECTION INTRAMUSCULAR; INTRAVENOUS at 11:43

## 2017-06-15 RX ADMIN — CEFAZOLIN SODIUM 2 G: 2 INJECTION, SOLUTION INTRAVENOUS at 13:59

## 2017-06-15 RX ADMIN — ASPIRIN 325 MG: 325 TABLET, DELAYED RELEASE ORAL at 18:51

## 2017-06-15 RX ADMIN — HYDROMORPHONE HYDROCHLORIDE 0.5 MG: 2 INJECTION, SOLUTION INTRAMUSCULAR; INTRAVENOUS; SUBCUTANEOUS at 14:08

## 2017-06-15 RX ADMIN — HYDROMORPHONE HYDROCHLORIDE 0.5 MG: 1 INJECTION, SOLUTION INTRAMUSCULAR; INTRAVENOUS; SUBCUTANEOUS at 14:41

## 2017-06-15 RX ADMIN — CEFAZOLIN SODIUM 2 G: 2 INJECTION, SOLUTION INTRAVENOUS at 22:16

## 2017-06-15 RX ADMIN — HYDROMORPHONE HYDROCHLORIDE 0.5 MG: 2 INJECTION, SOLUTION INTRAMUSCULAR; INTRAVENOUS; SUBCUTANEOUS at 14:23

## 2017-06-15 RX ADMIN — FENTANYL CITRATE 100 MCG: 50 INJECTION, SOLUTION INTRAMUSCULAR; INTRAVENOUS at 12:31

## 2017-06-15 RX ADMIN — LIDOCAINE HYDROCHLORIDE 80 MG: 20 INJECTION, SOLUTION INFILTRATION; PERINEURAL at 12:31

## 2017-06-15 RX ADMIN — TRANEXAMIC ACID 1000 MG: 100 INJECTION, SOLUTION INTRAVENOUS at 13:44

## 2017-06-15 RX ADMIN — FENTANYL CITRATE 50 MCG: 50 INJECTION, SOLUTION INTRAMUSCULAR; INTRAVENOUS at 12:59

## 2017-06-15 RX ADMIN — DORZOLAMIDE HYDROCHLORIDE AND TIMOLOL MALEATE 1 DROP: 20; 5 SOLUTION/ DROPS OPHTHALMIC at 18:51

## 2017-06-15 RX ADMIN — ROSUVASTATIN CALCIUM 40 MG: 40 TABLET, FILM COATED ORAL at 22:16

## 2017-06-15 RX ADMIN — SODIUM CHLORIDE, POTASSIUM CHLORIDE, SODIUM LACTATE AND CALCIUM CHLORIDE 100 ML/HR: 600; 310; 30; 20 INJECTION, SOLUTION INTRAVENOUS at 18:51

## 2017-06-15 RX ADMIN — FAMOTIDINE 20 MG: 10 INJECTION, SOLUTION INTRAVENOUS at 11:43

## 2017-06-15 RX ADMIN — KETOROLAC TROMETHAMINE 30 MG: 30 INJECTION, SOLUTION INTRAMUSCULAR; INTRAVENOUS at 14:07

## 2017-06-15 RX ADMIN — FENTANYL CITRATE 50 MCG: 50 INJECTION INTRAMUSCULAR; INTRAVENOUS at 14:29

## 2017-06-15 RX ADMIN — KETOROLAC TROMETHAMINE 30 MG: 30 INJECTION, SOLUTION INTRAMUSCULAR at 20:24

## 2017-06-15 RX ADMIN — FENTANYL CITRATE 50 MCG: 50 INJECTION, SOLUTION INTRAMUSCULAR; INTRAVENOUS at 13:05

## 2017-06-15 RX ADMIN — SODIUM CHLORIDE, POTASSIUM CHLORIDE, SODIUM LACTATE AND CALCIUM CHLORIDE 9 ML/HR: 600; 310; 30; 20 INJECTION, SOLUTION INTRAVENOUS at 11:32

## 2017-06-15 RX ADMIN — SODIUM CHLORIDE, POTASSIUM CHLORIDE, SODIUM LACTATE AND CALCIUM CHLORIDE: 600; 310; 30; 20 INJECTION, SOLUTION INTRAVENOUS at 13:48

## 2017-06-15 RX ADMIN — LATANOPROST 1 DROP: 50 SOLUTION/ DROPS OPHTHALMIC at 22:17

## 2017-06-15 RX ADMIN — HYDROMORPHONE HYDROCHLORIDE 0.5 MG: 2 INJECTION, SOLUTION INTRAMUSCULAR; INTRAVENOUS; SUBCUTANEOUS at 14:11

## 2017-06-15 RX ADMIN — FENTANYL CITRATE 50 MCG: 50 INJECTION, SOLUTION INTRAMUSCULAR; INTRAVENOUS at 12:51

## 2017-06-15 RX ADMIN — EPHEDRINE SULFATE 10 MG: 50 INJECTION INTRAMUSCULAR; INTRAVENOUS; SUBCUTANEOUS at 12:42

## 2017-06-15 RX ADMIN — CARVEDILOL 25 MG: 25 TABLET, FILM COATED ORAL at 18:51

## 2017-06-15 RX ADMIN — ACETAMINOPHEN 1000 MG: 500 TABLET ORAL at 11:32

## 2017-06-15 RX ADMIN — INSULIN ASPART 2 UNITS: 100 INJECTION, SOLUTION INTRAVENOUS; SUBCUTANEOUS at 22:21

## 2017-06-15 NOTE — ANESTHESIA PROCEDURE NOTES
Airway  Urgency: elective    Airway not difficult    General Information and Staff    Patient location during procedure: OR  Anesthesiologist: WYATT BREEN  CRNA: ILDEFONSO HERNÁNDEZ    Indications and Patient Condition  Indications for airway management: airway protection    Preoxygenated: yes  MILS not maintained throughout  Mask difficulty assessment: 0 - not attempted    Final Airway Details  Final airway type: supraglottic airway      Successful airway: classic  Size 5    Number of attempts at approach: 1    Additional Comments  Inflated to seal.

## 2017-06-15 NOTE — PLAN OF CARE
Problem: Patient Care Overview (Adult)  Goal: Plan of Care Review  Outcome: Ongoing (interventions implemented as appropriate)    06/15/17 0335   Coping/Psychosocial Response Interventions   Plan Of Care Reviewed With patient   Patient Care Overview   Progress progress toward functional goals as expected   Outcome Evaluation   Outcome Summary/Follow up Plan Pt s/p right TKA. Denies pain, vitals stable. Will continue to monitor glucose and administer sliding scale insulin per protocol.       Goal: Adult Individualization and Mutuality  Outcome: Ongoing (interventions implemented as appropriate)  Goal: Discharge Needs Assessment  Outcome: Ongoing (interventions implemented as appropriate)    Problem: Perioperative Period (Adult)  Goal: Signs and Symptoms of Listed Potential Problems Will be Absent or Manageable (Perioperative Period)  Outcome: Ongoing (interventions implemented as appropriate)    Problem: Fall Risk (Adult)  Goal: Identify Related Risk Factors and Signs and Symptoms  Outcome: Ongoing (interventions implemented as appropriate)  Goal: Absence of Falls  Outcome: Ongoing (interventions implemented as appropriate)    Problem: Knee Replacement, Total (Adult)  Goal: Signs and Symptoms of Listed Potential Problems Will be Absent or Manageable (Knee Replacement, Total)  Outcome: Ongoing (interventions implemented as appropriate)

## 2017-06-15 NOTE — ANESTHESIA POSTPROCEDURE EVALUATION
Patient: Isaias Monaco    Procedure Summary     Date Anesthesia Start Anesthesia Stop Room / Location    06/15/17 1227 [id number]  [facility] /  [facility]       Procedure Diagnosis Surgeon Provider    RT TOTAL KNEE ARTHROPLASTY (Right Knee) No diagnosis on file. MD Patrick Oropeza MD          Anesthesia Type: general  Last vitals  /92 (06/15/17 1520)    Temp      Pulse 80 (06/15/17 1520)   Resp 16 (06/15/17 1520)    SpO2 97 % (06/15/17 1520)      Post Anesthesia Care and Evaluation    Patient location during evaluation: PACU  Patient participation: complete - patient participated  Level of consciousness: awake and alert  Pain management: adequate  Airway patency: patent  Anesthetic complications: No anesthetic complications    Cardiovascular status: acceptable  Respiratory status: acceptable  Hydration status: acceptable

## 2017-06-15 NOTE — OP NOTE
DATE OF PROCEDURE:   06/15/2017    PREOPERATIVE DIAGNOSIS:  End-stage primary osteoarthritis right knee.     POSTOPERATIVE DIAGNOSIS:  End-stage primary osteoarthritis right knee.     ANESTHESIA:  General, supplemented by a periarticular Exparel/bupivacaine injection.     SURGEON:  Boyd Coyne MD    ASSISTANT:  Bebeto Hernandez MD; note that as part of the surgical procedure, I utilized service of an assistant surgeon, specifically Dr. Bebeto Hernandez. Assistant surgeon participated in crucial portion of the operation. Use of the assistant surgeon greatly reduced overall operative time, thus significantly reducing overall morbidity for the patient.     PROCEDURE PERFORMED:  Right total knee replacement.    IMPLANTS:  Sandra Persona system utilized. I used a 38 patella, size 12 femur, size G tibia with a 14 x 30 mm stem extension, and a 10 mm medial congruent polyethylene tibial insert.     TOURNIQUET TIME:  Was 56 minutes.     MEDICATIONS:  Note that we gave 1 g IV tranexamic acid when the cement was mixed.     ESTIMATED BLOOD LOSS:  Minimal.     COMPLICATIONS:  None.     QUALITY MEASURES:  I have documented the patient's current medications including dosage, frequency, and route of administration in medical record today. Conservative alternatives were discussed with the patient as part of the decision-making process prior to the procedure. The patient was evaluated immediately preoperatively for cardiovascular and thromboembolic risk factors. This patient does have coronary artery disease with stents. Prophylactic IV antibiotics were administered before the tourniquet went up.     DESCRIPTION OF PROCEDURE:  After prep and drape, right knee was approached via midline longitudinal anterior incision. Medial parapatellar arthrotomy was extended to the vastus medialis obliquus which was split in line with the fibers near the medial border, in keeping with minimally invasive technique. Patella was osteotomized proper  depth for a Sandra Persona 38 mm implant. Pep holes are created. Patella was subluxed and protected. Intramedullary instrumentation was used on each side of the joint; careful and thorough canal content irrigation. Cut the femur 10 mm depth, 5° valgus controlling rotation. It was sized to a Sandra Persona 12 implant. Anterior, posterior, and chamfer cuts are made. Tibia is cut 10 mm depth, 5° of posterior slope. Meniscectomies are completed. Trial reduction is performed. Rotation is marked and keel slot was created.     Bony surface was thoroughly cleaned and dried. I injected the posterior capsule and medial lateral gutter with Exparel solution containing 20 mL Exparel, 25 mL 0.25% bupivacaine with epinephrine, 20 mL saline. Care was taken to protect popliteal neurovascular structures and the peroneal nerve. I cemented the Sandra Persona size G tibial baseplate, size 12 femur, and a 38 patella. Note that on the tibia, we used a 14 x 30 mm stem extension for enhanced fixation in this large patient with elevated body mass index. Trial reduction revealed a 10 mm medial congruent polyethylene insert best balanced stability and range of motion, and is locked in the tibial tray.     Tourniquet was released; hemostasis obtained. Wound was closed in layers with #1 Vicryl on the capsule, 2-0 Vicryl in subcutaneous tissue, and staples in the skin over a 1/8-inch drain. Note that I injected my capsule with my Exparel solution during closure.        Boyd Coyne M.D.  DULCE MARIA:ms  D:   06/15/2017 14:17:59  T:   06/15/2017 16:58:24  Job ID:   [id number]  Document ID:   [id number]  cc:

## 2017-06-15 NOTE — PLAN OF CARE
Problem: Patient Care Overview (Adult)  Goal: Plan of Care Review  Outcome: Ongoing (interventions implemented as appropriate)    06/15/17 1149   Coping/Psychosocial Response Interventions   Plan Of Care Reviewed With patient   Patient Care Overview   Progress no change       Goal: Adult Individualization and Mutuality  Outcome: Ongoing (interventions implemented as appropriate)    06/15/17 1149   Individualization   Patient Specific Preferences PT GOES BY KISHAN       Goal: Discharge Needs Assessment  Outcome: Ongoing (interventions implemented as appropriate)    06/15/17 1149   Self-Care   Equipment Currently Used at Home glucometer         Problem: Perioperative Period (Adult)  Goal: Signs and Symptoms of Listed Potential Problems Will be Absent or Manageable (Perioperative Period)  Outcome: Ongoing (interventions implemented as appropriate)    06/15/17 1149   Perioperative Period   Problems Assessed (Perioperative Period) pain;infection   Problems Present (Perioperative Period) pain

## 2017-06-15 NOTE — ANESTHESIA PREPROCEDURE EVALUATION
Anesthesia Evaluation     Patient summary reviewed and Nursing notes reviewed   no history of anesthetic complications:  NPO Solid Status: > 8 hours  NPO Liquid Status: > 8 hours     Airway   Mallampati: III  TM distance: >3 FB  Neck ROM: full  no difficulty expected  Dental - normal exam     Pulmonary - negative pulmonary ROS and normal exam   Cardiovascular - normal exam  Exercise tolerance: good (4-7 METS)    ECG reviewed  Rhythm: regular  Rate: normal    (+) hypertension well controlled, CAD, cardiac stents more than 12 months ago hyperlipidemia    ROS comment: Known CAD, has a total of 6 stents the last 2 placed in 2015.  Denies any current issues w/ CP/angina/SOB.    Neuro/Psych  (+) psychiatric history Anxiety,    GI/Hepatic/Renal/Endo    (+) obesity,  GERD well controlled, diabetes mellitus type 2 well controlled,     Musculoskeletal     Abdominal  - normal exam   Substance History - negative use     OB/GYN          Other   (+) arthritis                                   Anesthesia Plan    ASA 3     general     intravenous induction   Anesthetic plan and risks discussed with patient and spouse/significant other.    Plan discussed with CRNA and attending.

## 2017-06-16 PROBLEM — N17.9 AKI (ACUTE KIDNEY INJURY): Status: ACTIVE | Noted: 2017-06-16

## 2017-06-16 LAB
ANION GAP SERPL CALCULATED.3IONS-SCNC: 13.4 MMOL/L
BUN BLD-MCNC: 18 MG/DL (ref 8–23)
BUN/CREAT SERPL: 11.8 (ref 7–25)
CALCIUM SPEC-SCNC: 8.3 MG/DL (ref 8.6–10.5)
CHLORIDE SERPL-SCNC: 97 MMOL/L (ref 98–107)
CO2 SERPL-SCNC: 23.6 MMOL/L (ref 22–29)
CREAT BLD-MCNC: 1.52 MG/DL (ref 0.76–1.27)
GFR SERPL CREATININE-BSD FRML MDRD: 57 ML/MIN/1.73
GLUCOSE BLD-MCNC: 89 MG/DL (ref 65–99)
GLUCOSE BLDC GLUCOMTR-MCNC: 108 MG/DL (ref 70–130)
GLUCOSE BLDC GLUCOMTR-MCNC: 118 MG/DL (ref 70–130)
GLUCOSE BLDC GLUCOMTR-MCNC: 154 MG/DL (ref 70–130)
GLUCOSE BLDC GLUCOMTR-MCNC: 182 MG/DL (ref 70–130)
HBA1C MFR BLD: 5.93 % (ref 4.8–5.6)
HCT VFR BLD AUTO: 35.8 % (ref 40.4–52.2)
HGB BLD-MCNC: 11.5 G/DL (ref 13.7–17.6)
POTASSIUM BLD-SCNC: 3.9 MMOL/L (ref 3.5–5.2)
SODIUM BLD-SCNC: 134 MMOL/L (ref 136–145)

## 2017-06-16 PROCEDURE — 97110 THERAPEUTIC EXERCISES: CPT

## 2017-06-16 PROCEDURE — 63710000001 INSULIN ASPART PER 5 UNITS: Performed by: ORTHOPAEDIC SURGERY

## 2017-06-16 PROCEDURE — 85014 HEMATOCRIT: CPT | Performed by: ORTHOPAEDIC SURGERY

## 2017-06-16 PROCEDURE — 63710000001 DIPHENHYDRAMINE PER 50 MG: Performed by: ORTHOPAEDIC SURGERY

## 2017-06-16 PROCEDURE — 85018 HEMOGLOBIN: CPT | Performed by: ORTHOPAEDIC SURGERY

## 2017-06-16 PROCEDURE — 97150 GROUP THERAPEUTIC PROCEDURES: CPT

## 2017-06-16 PROCEDURE — 25010000003 CEFAZOLIN IN DEXTROSE 2-4 GM/100ML-% SOLUTION: Performed by: ORTHOPAEDIC SURGERY

## 2017-06-16 PROCEDURE — 25010000002 FONDAPARINUX PER 0.5 MG: Performed by: ORTHOPAEDIC SURGERY

## 2017-06-16 PROCEDURE — 83036 HEMOGLOBIN GLYCOSYLATED A1C: CPT | Performed by: HOSPITALIST

## 2017-06-16 PROCEDURE — 80048 BASIC METABOLIC PNL TOTAL CA: CPT | Performed by: ORTHOPAEDIC SURGERY

## 2017-06-16 PROCEDURE — 82962 GLUCOSE BLOOD TEST: CPT

## 2017-06-16 PROCEDURE — 97161 PT EVAL LOW COMPLEX 20 MIN: CPT

## 2017-06-16 PROCEDURE — 25010000002 KETOROLAC TROMETHAMINE PER 15 MG: Performed by: ORTHOPAEDIC SURGERY

## 2017-06-16 RX ORDER — FONDAPARINUX SODIUM 2.5 MG/.5ML
2.5 INJECTION SUBCUTANEOUS ONCE
Status: COMPLETED | OUTPATIENT
Start: 2017-06-16 | End: 2017-06-16

## 2017-06-16 RX ORDER — SODIUM CHLORIDE 9 MG/ML
100 INJECTION, SOLUTION INTRAVENOUS CONTINUOUS
Status: ACTIVE | OUTPATIENT
Start: 2017-06-16 | End: 2017-06-16

## 2017-06-16 RX ADMIN — INSULIN ASPART 2 UNITS: 100 INJECTION, SOLUTION INTRAVENOUS; SUBCUTANEOUS at 21:02

## 2017-06-16 RX ADMIN — CARVEDILOL 25 MG: 25 TABLET, FILM COATED ORAL at 08:34

## 2017-06-16 RX ADMIN — OXYCODONE HYDROCHLORIDE AND ACETAMINOPHEN 2 TABLET: 5; 325 TABLET ORAL at 20:47

## 2017-06-16 RX ADMIN — ROSUVASTATIN CALCIUM 40 MG: 40 TABLET, FILM COATED ORAL at 20:48

## 2017-06-16 RX ADMIN — LINAGLIPTIN 5 MG: 5 TABLET, FILM COATED ORAL at 08:34

## 2017-06-16 RX ADMIN — DIPHENHYDRAMINE HYDROCHLORIDE 25 MG: 25 CAPSULE ORAL at 20:48

## 2017-06-16 RX ADMIN — ESCITALOPRAM 10 MG: 10 TABLET, FILM COATED ORAL at 08:34

## 2017-06-16 RX ADMIN — LATANOPROST 1 DROP: 50 SOLUTION/ DROPS OPHTHALMIC at 20:49

## 2017-06-16 RX ADMIN — DORZOLAMIDE HYDROCHLORIDE AND TIMOLOL MALEATE 1 DROP: 20; 5 SOLUTION/ DROPS OPHTHALMIC at 08:37

## 2017-06-16 RX ADMIN — CARVEDILOL 25 MG: 25 TABLET, FILM COATED ORAL at 17:52

## 2017-06-16 RX ADMIN — DORZOLAMIDE HYDROCHLORIDE AND TIMOLOL MALEATE 1 DROP: 20; 5 SOLUTION/ DROPS OPHTHALMIC at 17:52

## 2017-06-16 RX ADMIN — FONDAPARINUX SODIUM 2.5 MG: 2.5 INJECTION, SOLUTION SUBCUTANEOUS at 08:36

## 2017-06-16 RX ADMIN — AMLODIPINE BESYLATE 5 MG: 5 TABLET ORAL at 08:34

## 2017-06-16 RX ADMIN — DIPHENHYDRAMINE HYDROCHLORIDE 25 MG: 25 CAPSULE ORAL at 11:15

## 2017-06-16 RX ADMIN — ASPIRIN 325 MG: 325 TABLET, DELAYED RELEASE ORAL at 08:34

## 2017-06-16 RX ADMIN — OXYCODONE HYDROCHLORIDE AND ACETAMINOPHEN 2 TABLET: 5; 325 TABLET ORAL at 08:35

## 2017-06-16 RX ADMIN — PANTOPRAZOLE SODIUM 40 MG: 40 TABLET, DELAYED RELEASE ORAL at 06:38

## 2017-06-16 RX ADMIN — FERROUS SULFATE TAB 325 MG (65 MG ELEMENTAL FE) 325 MG: 325 (65 FE) TAB at 08:34

## 2017-06-16 RX ADMIN — SODIUM CHLORIDE 100 ML/HR: 9 INJECTION, SOLUTION INTRAVENOUS at 11:15

## 2017-06-16 RX ADMIN — KETOROLAC TROMETHAMINE 30 MG: 30 INJECTION, SOLUTION INTRAMUSCULAR at 02:05

## 2017-06-16 RX ADMIN — CEFAZOLIN SODIUM 2 G: 2 INJECTION, SOLUTION INTRAVENOUS at 06:38

## 2017-06-16 NOTE — PROGRESS NOTES
"/82 (BP Location: Right arm, Patient Position: Sitting)  Pulse 90  Temp (!) 100.6 °F (38.1 °C) (Oral)   Resp 16  Ht 77\" (195.6 cm)  Wt 268 lb (122 kg)  SpO2 93%  BMI 31.78 kg/m2    Lab Results (last 24 hours)     Procedure Component Value Units Date/Time    POC Glucose Fingerstick [[id number]]  (Normal) Collected:  06/15/17 1001    Specimen:  Blood Updated:  06/15/17 1005     Glucose 100 mg/dL     Narrative:       Meter: [id number] : [id number] Michelle Cisneros    POC Glucose Fingerstick [[id number]]  (Normal) Collected:  06/15/17 1531    Specimen:  Blood Updated:  06/15/17 1534     Glucose 118 mg/dL     Narrative:       Meter: [id number] : [id number]    POC Glucose Fingerstick [[id number]]  (Normal) Collected:  06/15/17 1651    Specimen:  Blood Updated:  06/15/17 1710     Glucose 111 mg/dL     Narrative:       Meter: [id number] : [id number] Samy Aquino    POC Glucose Fingerstick [[id number]]  (Abnormal) Collected:  06/15/17 2217    Specimen:  Blood Updated:  06/15/17 2219     Glucose 174 (H) mg/dL     Narrative:       Meter: [id number] : [id number] Miller MONTOYA    Hemoglobin & Hematocrit, Blood [[id number]]  (Abnormal) Collected:  06/16/17 0518    Specimen:  Blood Updated:  06/16/17 0603     Hemoglobin 11.5 (L) g/dL      Hematocrit 35.8 (L) %     Hemoglobin A1c [[id number]]  (Abnormal) Collected:  06/16/17 0518    Specimen:  Blood Updated:  06/16/17 0606     Hemoglobin A1C 5.93 (H) %     Narrative:       Hemoglobin A1C Ranges:    Increased Risk for Diabetes  5.7% to 6.4%  Diabetes                     >= 6.5%  Diabetic Goal                < 7.0%    POC Glucose Fingerstick [[id number]]  (Normal) Collected:  06/16/17 0615    Specimen:  Blood Updated:  06/16/17 0617     Glucose 108 mg/dL     Narrative:       Meter: [id number] : [id number] Miller MONTOYA    Basic Metabolic Panel [[id number]]  (Abnormal) Collected:  06/16/17 0518    Specimen:  Blood Updated:  06/16/17 0656     " Glucose 89 mg/dL      BUN 18 mg/dL      Creatinine 1.52 (H) mg/dL      Sodium 134 (L) mmol/L      Potassium 3.9 mmol/L      Chloride 97 (L) mmol/L      CO2 23.6 mmol/L      Calcium 8.3 (L) mg/dL      eGFR  African Amer 57 (L) mL/min/1.73      BUN/Creatinine Ratio 11.8     Anion Gap 13.4 mmol/L     Narrative:       GFR Normal >60  Chronic Kidney Disease <60  Kidney Failure <15          Imaging Results (last 24 hours)     Procedure Component Value Units Date/Time    XR Knee 1 or 2 View Right [[id number]] Collected:  06/15/17 1506     Updated:  06/15/17 1510    Narrative:       XR KNEE 1 OR 2 VW RIGHT-     POSTOP PORTABLE 2 VIEWS RIGHT KNEE     CLINICAL INFORMATION: Post arthroplasty     FINDINGS: Prosthesis is satisfactory in position. A complicating process  is not identified.     This report was finalized on 6/15/2017 3:07 PM by Dr. Benito Loera MD.             Patient Care Team:  Bebeto Angel MD as PCP - General  Bebeto Angel MD as PCP - Family Medicine  Jorge Luis Kent MD as Consulting Physician (Orthopedic Surgery)    SUBJECTIVE    PHYSICAL EXAM     Principal Problem:    Osteoarthritis, knee  Active Problems:    Vitamin D deficiency    Chronic coronary artery disease    Benign essential hypertension    Hyperlipidemia    Diabetes mellitus  renal disease    PLAN / DISPOSITION:  Arixtra one time dose  HH discharge in am   Recheck renal function in am  Boyd Coyne MD  06/16/17  8:02 AM

## 2017-06-16 NOTE — THERAPY EVALUATION
Acute Care - Physical Therapy Initial Evaluation  [facility]     Patient Name: Isaias Monaco  : 1955  MRN: [id number]  Today's Date: 2017   Onset of Illness/Injury or Date of Surgery Date: 06/15/17  Date of Referral to PT: 17  Referring Physician: Doretha      Admit Date: 6/15/2017     Visit Dx:    ICD-10-CM ICD-9-CM   1. Impaired gait and mobility R26.89 781.2     Patient Active Problem List   Diagnosis   • Microalbuminuria   • Vitamin D deficiency   • Angioedema   • Chronic coronary artery disease   • Anxiety   • Male erectile disorder   • Gastroesophageal reflux disease   • Benign essential hypertension   • Hyperlipidemia   • Diabetes mellitus   • Adiposity   • Routine health maintenance   • Uncontrolled type 2 diabetes mellitus   • Low testosterone   • Hypogonadism in male   • Presence of coronary angioplasty implant and graft   • Osteoarthritis, knee     Past Medical History:   Diagnosis Date   • Adenomatous colon polyp     Colonoscopy last done    • Arthritis    • Cardiac disease    • Diabetes mellitus    • GERD (gastroesophageal reflux disease)    • Glaucoma    • High cholesterol    • Hypertension    • Joint pain     or swelling   • Male erectile disorder    • Stiffness in joint    • Type 2 diabetes mellitus    • Vitamin D deficiency      Past Surgical History:   Procedure Laterality Date   • CORONARY ANGIOPLASTY WITH STENT PLACEMENT      cardiac stents x3 occasions   • SHOULDER SURGERY Right 2016          PT ASSESSMENT (last 72 hours)      PT Evaluation       17 0834 17 0400    Rehab Evaluation    Document Type evaluation  -     Subjective Information agree to therapy  -LH     Patient Effort, Rehab Treatment good  -LH     Symptoms Noted During/After Treatment none  -LH     General Information    Patient Profile Review yes  -LH     Onset of Illness/Injury or Date of Surgery Date 06/15/17  -     Referring Physician Doretha  -     General Observations reclined in chair  no acute distress  -     Pertinent History Of Current Problem POD 1 TKR R  -LH     Precautions/Limitations fall precautions  -     Prior Level of Function independent:  -     Benefits Reviewed patient:  -     Clinical Impression    Date of Referral to PT 06/16/17  -     Criteria for Skilled Therapeutic Interventions Met yes  -     Pathology/Pathophysiology Noted (Describe Specifically for Each System) musculoskeletal  -     Impairments Found (describe specific impairments) gait, locomotion, and balance;ROM;joint integrity and mobility  -     Functional Limitations in Following Categories (Describe Specific Limitations) self-care;home management  -     Rehab Potential good, to achieve stated therapy goals  -     Pain Assessment    Pain Assessment No/denies pain  -     Cognitive Assessment/Intervention    Current Cognitive/Communication Assessment functional  -     Orientation Status oriented x 4  -     ROM (Range of Motion)    General ROM no range of motion deficits identified  -     General ROM Detail except R knee  -     MMT (Manual Muscle Testing)    General MMT Assessment no strength deficits identified  -     Muscle Tone Assessment    Muscle Tone Assessment  RLE  -TW    RLE Muscle Tone Assessment  mildly decreased tone  -TW    Bed Mobility, Assessment/Treatment    Bed Mobility, Comment in yordan  -     Transfer Assessment/Treatment    Transfers, Sit-Stand [place] contact guard assist  -     Transfers, Stand-Sit [place] contact guard assist  -     Transfers, Sit-Stand-Sit, Assist Device rolling walker  -     Gait Assessment/Treatment    Gait, [place] Level contact guard assist  -     Gait, Assistive Device rolling walker  -     Gait, Distance (Feet) 100  -     Therapy Exercises    Exercise Protocols total knee  -     Total Knee Exercises right:;10 reps;completed protocol  -     Positioning and Restraints    Pre-Treatment Position sitting in  chair/recliner  -LH     Post Treatment Position chair  -LH     In Chair reclined;call light within reach;encouraged to call for assist  -       06/16/17 0000 06/15/17 2024    Muscle Tone Assessment    Muscle Tone Assessment RLE  -TW RLE  -TW    RLE Muscle Tone Assessment mildly decreased tone  -TW mildly decreased tone  -TW      06/15/17 1149 06/15/17 1114    General Information    Equipment Currently Used at Home glucometer  -TG glucometer  -TG    Living Environment    Lives With  spouse  -TG    Living Arrangements  condominium  -TG    Home Accessibility  no concerns  -TG    Stair Railings at Home  none  -TG    Type of Financial/Environmental Concern  none  -TG    Transportation Available  car  -TG      User Key  (r) = Recorded By, (t) = Taken By, (c) = Cosigned By    Initials Name Provider Type     Eliana Massey, PT Physical Therapist    TG Polo Noguera, RN Registered Nurse    GARFIELD Schneider RN Registered Nurse          Physical Therapy Education     Title: PT OT SLP Therapies (Active)     Topic: Physical Therapy (Active)     Point: Mobility training (Active)    Learning Progress Summary    Learner Readiness Method Response Comment Documented by Status   Patient Acceptance E NR   06/16/17 0843 Active               Point: Home exercise program (Active)    Learning Progress Summary    Learner Readiness Method Response Comment Documented by Status   Patient Acceptance E NR   06/16/17 0843 Active                      User Key     Initials Effective Dates Name Provider Type Discipline     02/07/17 -  Eliana Massey, PT Physical Therapist PT                PT Recommendation and Plan  Anticipated Equipment Needs At Discharge: bedside commode, walker  Anticipated Discharge Disposition: home with home health  Planned Therapy Interventions: balance training, bed mobility training, gait training, home exercise program, strengthening, stair training, ROM (Range of Motion), stretching, transfer training  PT  Frequency: 2 times/day  Plan of Care Review  Plan Of Care Reviewed With: patient  Outcome Summary/Follow up Plan: pt presents w,generalized weakness post op R TKR. good tolerance to ambulation in hallway this AM w/PT. may benefit from skilled PT to addres functional deficits prior to returning home.           IP PT Goals       06/16/17 0844          Gait Training PT LTG    Gait Training Goal PT LTG, Date Established 06/16/17  -      Gait Training Goal PT LTG, Time to Achieve 3 days  -LH      Gait Training Goal PT LTG, [place] Level supervision required  -LH      Gait Training Goal PT LTG, Assist Device walker, rolling  -LH      Gait Training Goal PT LTG, Distance to Achieve 150  -LH      Stair Training PT LTG    Stair Training Goal PT LTG, Date Established 06/16/17  -      Stair Training Goal PT LTG, Time to Achieve 3 days  -LH      Stair Training Goal PT LTG, Number of Steps 4  -LH      Stair Training Goal PT LTG, [place] Level contact guard assist  -      Range of Motion PT LTG    Range of Motion Goal PT LTG, Date Established 06/16/17  -      Range of Motion Goal PT LTG, Time to Achieve 3 days  -LH      Range fo Motion Goal PT LTG, Joint R knee  -LH      Range of Motion Goal PT LTG, AROM Measure 5-90  -LH        User Key  (r) = Recorded By, (t) = Taken By, (c) = Cosigned By    Initials Name Provider Type     Eliana Massey, PT Physical Therapist                Outcome Measures       06/16/17 0800          How much help from another person do you currently need...    Turning from your back to your side while in flat bed without using bedrails? 3  -LH      Moving from lying on back to sitting on the side of a flat bed without bedrails? 3  -LH      Moving to and from a bed to a chair (including a wheelchair)? 3  -LH      Standing up from a chair using your arms (e.g., wheelchair, bedside chair)? 3  -LH      Climbing 3-5 steps with a railing? 3  -LH      To walk in hospital room? 3  -LH      AM-PAC 6  Clicks Score 18  -      Functional Assessment    Outcome Measure Options AM-PAC 6 Clicks Basic Mobility (PT)  -        User Key  (r) = Recorded By, (t) = Taken By, (c) = Cosigned By    Initials Name Provider Type     Eliana Massey PT Physical Therapist           Time Calculation:         PT Charges       06/16/17 0846          Time Calculation    Start Time 0830  -      Stop Time 0845  -      Time Calculation (min) 15 min  -      PT Received On 06/16/17  -      PT - Next Appointment 06/16/17  -      PT Goal Re-Cert Due Date 06/18/17  -        User Key  (r) = Recorded By, (t) = Taken By, (c) = Cosigned By    Initials Name Provider Type     Eliana Massey PT Physical Therapist          Therapy Charges for Today     Code Description Service Date Service Provider Modifiers Qty    [id number] HC PT EVAL LOW COMPLEXITY 2 6/16/2017 Eliana Massey, PT GP 1          PT G-Codes  Outcome Measure Options: AM-PAC 6 Clicks Basic Mobility (PT)      Eliana Massey PT  6/16/2017

## 2017-06-16 NOTE — PROGRESS NOTES
Name: Isaias Monaco ADMIT: 6/15/2017   : 1955  PCP: Bebeto Angel MD    MRN: [id number] LOS: 1 days   AGE/SEX: 62 y.o. male  ROOM: [facility]   Subjective        Subjective:  Symptoms:  No shortness of breath, chest pain, weakness or anorexia.    Diet:  Adequate intake.  No nausea or vomiting.    Activity level: Impaired due to pain.    Pain:  He complains of pain that is moderate.  He reports pain is improving.  Pain is partially controlled and requiring pain medication.          Renal function worse today, normal baseline  Objective   Vital Signs  Temp:  [97 °F (36.1 °C)-100.6 °F (38.1 °C)] 100.6 °F (38.1 °C)  Heart Rate:  [77-90] 90  Resp:  [14-16] 16  BP: (102-139)/(62-98) 124/82  SpO2:  [93 %-100 %] 93 %  on  Flow (L/min):  [2-4] 2;   O2 Device: room air  Body mass index is 31.78 kg/(m^2).    Physical Exam   Constitutional: He is oriented to person, place, and time. He appears well-developed and well-nourished.   Cardiovascular: Normal rate, regular rhythm and normal heart sounds.    No murmur heard.  Pulmonary/Chest: Effort normal and breath sounds normal. No respiratory distress. He has no wheezes. He has no rales.   Abdominal: Soft. He exhibits no distension. There is no tenderness.   Musculoskeletal: He exhibits tenderness (right knee).   Right knee with brace     Neurological: He is alert and oriented to person, place, and time.   Skin: Skin is warm and dry.       Results Review:       I reviewed the patient's new clinical results.    Results from last 7 days  Lab Units 17  0518 17  1544   WBC 10*3/mm3  --  8.50   HEMOGLOBIN g/dL 11.5* 14.4   PLATELETS 10*3/mm3  --  195     Results from last 7 days  Lab Units 17  0518 17  1544   SODIUM mmol/L 134* 133*   POTASSIUM mmol/L 3.9 4.0   CHLORIDE mmol/L 97* 97*   TOTAL CO2 mmol/L 23.6 21.8*   BUN mg/dL 18 17   CREATININE mg/dL 1.52* 0.93   GLUCOSE mg/dL 89 100*   Estimated Creatinine Clearance: 72.7 mL/min (by C-G formula  based on Cr of 1.52).  Results from last 7 days  Lab Units 06/16/17  0518 06/09/17  1544   CALCIUM mg/dL 8.3* 9.3   ALBUMIN g/dL  --  4.30         amLODIPine 5 mg Oral QAM   aspirin 325 mg Oral Daily   carvedilol 25 mg Oral BID With Meals   dorzolamide-timolol 1 drop Both Eyes BID   Empagliflozin 25 mg Oral Daily   escitalopram 10 mg Oral QAM   ferrous sulfate 325 mg Oral Daily With Breakfast   insulin aspart 0-9 Units Subcutaneous 4x Daily With Meals & Nightly   ketorolac 30 mg Intravenous Q6H   latanoprost 1 drop Both Eyes Nightly   linagliptin 5 mg Oral Daily   pantoprazole 40 mg Oral QAM   rosuvastatin 40 mg Oral Nightly       lactated ringers 9 mL/hr Last Rate: 9 mL/hr (06/15/17 1132)   lactated ringers 100 mL/hr Last Rate: 100 mL/hr (06/15/17 1851)   Diet Regular      Assessment/Plan   Assessment:     Active Hospital Problems (** Indicates Principal Problem)    Diagnosis Date Noted   • **Osteoarthritis, knee [M17.9] 06/15/2017   • Vitamin D deficiency [E55.9] 02/21/2016   • Hyperlipidemia [E78.5] 02/21/2016   • Diabetes mellitus [E11.9] 02/21/2016   • Chronic coronary artery disease [I25.10] 02/21/2016   • Benign essential hypertension [I10] 02/21/2016      Resolved Hospital Problems    Diagnosis Date Noted Date Resolved   No resolved problems to display.       Plan:     MARC: will stop toradol (2 doses given), give IV hydration, recheck in am.  Suspect he's a little dry and should come back down with hydration and stopping toradol.  Avoid nephrotoxic agents.    Hypertension: Continue with Norvasc and Coreg     Diabetes mellitus type 2:holding metformin and Actos and Toujeo but continue Jardance and Januvia with correctional dose insulin as needed.  BS controlled and A1c shows good control     Glaucoma: Continue current drops     Hyperlipidemia and coronary artery disease: Continue with Coreg and Crestor     GERD: PPI       Hyponatremia: ImprovedRecheck labs in a.m.      Disposition  TBD.      Mark Tejeda  MD Paresh  [facility]  06/16/17  10:46 AM

## 2017-06-16 NOTE — CONSULTS
[facility]  [phone]    CONSULT NOTE    INTERNAL MEDICINE   [facility]       Patient Identification:  Name: Isaias Monaco  Age: 62 y.o.  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  Earlier today he underwent a right total knee arthroplasty.  We were asked to evaluate the status of his chronic medical problems.  He has diabetes mellitus type 2 with a complications of coronary artery disease, proteinuria, hypertension, gastroesophageal reflux disease, hyperlipidemia and on his preop labs hyponatremia.  He is followed by endocrinology and is currently on 5 diabetic medications of Toujeo, Jardance, metformin, Januvia and Actos.  His most recent hemoglobin A1c was 6.1.  He denies any neuropathy, nephropathy, retinopathy.    There has been coronary artery disease requiring multiple stents.  His symptoms in the past were shortness of breath.  He does not have any documented history of myocardial infarction.  He denies any symptoms of palpitations, arrhythmia, valvular disease, or claudication.  He is unaware of any blockages to his carotid arteries or peripheral arteries or any known abdominal aneurysms.        Past Medical History:  Past Medical History:   Diagnosis Date   • Adenomatous colon polyp     Colonoscopy last done    • Arthritis    • Cardiac disease    • Diabetes mellitus    • GERD (gastroesophageal reflux disease)    • Glaucoma    • High cholesterol    • Hypertension    • Joint pain     or swelling   • Male erectile disorder    • Stiffness in joint    •  "Type 2 diabetes mellitus    • Vitamin D deficiency      Past Surgical History:  Past Surgical History:   Procedure Laterality Date   • CORONARY ANGIOPLASTY WITH STENT PLACEMENT      cardiac stents x3 occasions   • SHOULDER SURGERY Right 2016        Allergies:  Allergies   Allergen Reactions   • Ace Inhibitors    • Lisinopril Angioedema     Social History:   Social History   Substance Use Topics   • Smoking status: Never Smoker   • Smokeless tobacco: Never Used   • Alcohol use No      Comment: Occasionally      Family History:  Family History   Problem Relation Age of Onset   • Lupus Sister    • Heart disease Other    • Hypertension Other    • Malig Hyperthermia Neg Hx           Review of Systems:  CONSTITUTIONAL: Denies fever or weight loss or gain  H EENT: Denies visual or auditory changes, cataracts.  There is glaucoma with good control of his pressure  ENDOCRINE: Denies symptoms suggestive of thyroid disease but has low testosterone and vitamin D deficiency which is treated  PULMONARY: Denies cough, dyspnea, wheezing.  Denies asthma, COPD, recurrent infections  GI: Denies dysphasia, change in bowel pattern, abdominal pain, stricture symptoms, fatty food intolerance.  There is GERD without stricture symptoms  : Denies dysuria, frequency, urgency, hematuria.  Denies recurrent UTIs, prostatitis, nephrolithiasis, incontinence  NEURO: Denies paresthesias, muscle weakness, ataxia.  Denies seizures, TIA, CVA, chronic headaches  PSYCH: Denies depression, bipolar disorder, anxiety, panic attacks, chemical dependency  HEM/ONC: Denies anemia, bleeding or bone marrow disease, or cancer    Vitals:   /90 (BP Location: Right arm, Patient Position: Lying)  Pulse 80  Temp 97 °F (36.1 °C) (Oral)   Resp 14  Ht 77\" (195.6 cm)  Wt 268 lb (122 kg)  SpO2 95%  BMI 31.78 kg/m2    Exam:  GENERAL: The patient is a black male who appears his stated age. He appears to be a good historian  HEENT: GURPREET, pupils are 4 mm in " size, EOMI, oral mucosa and pharynx is normal  NECK: Thyroid is not enlarged, no JVD, no carotid bruits, no sternocleidomastoid hypertrophy  CHEST: Normal shape and expansion, no retractions or tenderness  LUNGS: Clear  HEART:Regular rate and rhythm, no murmur, gallops, or extrasystoles, PMI is normal  ABDOMEN: Soft, nondistended, and nontender with recent bowel sounds.  No masses or organomegaly, no unusual abdominal pulse, no abdominal or femoral bruits  EXTREMITIES: Right knee with ice, good range of motion of left hip and knee, no cyanosis or edema.  No skin breakdown on either foot  NEUROLOGIC: He is alert and  oriented ×3, muscle strength in upper and lower extremities is equal in all areas, RECTAL/SCROTAL: Not performed    Data Review:    Results from last 7 days  Lab Units 06/09/17  1544   WBC 10*3/mm3 8.50   HEMOGLOBIN g/dL 14.4   HEMATOCRIT % 43.5   PLATELETS 10*3/mm3 195       Results from last 7 days  Lab Units 06/09/17  1544   SODIUM mmol/L 133*   POTASSIUM mmol/L 4.0   CHLORIDE mmol/L 97*   TOTAL CO2 mmol/L 21.8*   BUN mg/dL 17   CREATININE mg/dL 0.93   GLUCOSE mg/dL 100*   CALCIUM mg/dL 9.3       Results from last 7 days  Lab Units 06/09/17  1544   ALK PHOS U/L 79   BILIRUBIN mg/dL 0.4   ALT (SGPT) U/L 22   AST (SGOT) U/L 34       Results from last 7 days  Lab Units 06/09/17  1544   INR  0.95   APTT seconds 23.4         Assessment:  Active Hospital Problems (** Indicates Principal Problem)    Diagnosis Date Noted   • **Osteoarthritis, knee [M17.9] 06/15/2017   • Vitamin D deficiency [E55.9] 02/21/2016   • Hyperlipidemia [E78.5] 02/21/2016   • Diabetes mellitus [E11.9] 02/21/2016   • Chronic coronary artery disease [I25.10] 02/21/2016   • Benign essential hypertension [I10] 02/21/2016      Resolved Hospital Problems    Diagnosis Date Noted Date Resolved   No resolved problems to display.       Plan:  Hypertension: Continue with Norvasc and Coreg    Diabetes mellitus type 2:hold metformin and Actos and  Toujeo but continue Jardance and Januvia with correctional dose insulin as needed    Glaucoma: Continue current drops    Hyperlipidemia and coronary artery disease: Continue with Coreg and Crestor    GERD: PPI not ordered initially we'll restart    Hyponatremia: Recheck labs in a.m.    Despite the patient having multiple medical issues he appears stable postoperatively.  We will follow status of his blood sugars and reintroduce other medications as he improves.    Emerald Cagle MD   6/15/2017  10:09 PM

## 2017-06-16 NOTE — PROGRESS NOTES
Discharge Planning Assessment  [facility]     Patient Name: Isaias Monaco  MRN: [id number]  Today's Date: 6/16/2017    Admit Date: 6/15/2017          Discharge Needs Assessment       06/16/17 1539    Living Environment    Living Arrangements condominium    Home Accessibility no concerns    Living Environment    Quality Of Family Relationships supportive    Able to Return to Prior Living Arrangements yes    Discharge Needs Assessment    Concerns To Be Addressed no discharge needs identified;denies needs/concerns at this time    Anticipated Changes Related to Illness inability to care for self    Equipment Currently Used at Home glucometer    Equipment Needed After Discharge walker, rolling;commode    Discharge Planning Comments Confirmed facesheet and pharmacy info with pt.             Discharge Plan       06/16/17 1541    Case Management/Social Work Plan    Plan Plans are home with [facility] to follow.  CCP will cont to monitor progress to assist with d.c     Additional Comments Spoke with pt @ bedside.  Pt states he lives with wife in [facility] and was IDAL's.  Plans are home.  Pt will use [facility].  Pt states PT has already ordered a walker and BSC. Pt would like info on recliner lift chair and shower chair.  Call to Karen went to bedside to speak with wife and pt.  CCP will cont to follow and assist as needed with dc planning.         Discharge Placement     Facility/Agency Request Status Selected? Address Phone Number Fax Number    Psychiatric Accepted    [address] [address] [phone]                Demographic Summary     None            Functional Status     None            Psychosocial     None            Abuse/Neglect     None            Legal     None            Substance Abuse     None            Patient Forms     None          Gloria Rae, RN

## 2017-06-16 NOTE — THERAPY TREATMENT NOTE
Acute Care - Physical Therapy Treatment Note  [facility]     Patient Name: Isaias Monaco  : 1955  MRN: [id number]  Today's Date: 2017  Onset of Illness/Injury or Date of Surgery Date: 06/15/17  Date of Referral to PT: 17  Referring Physician: Doretha    Admit Date: 6/15/2017    Visit Dx:    ICD-10-CM ICD-9-CM   1. Impaired gait and mobility R26.89 781.2     Patient Active Problem List   Diagnosis   • Microalbuminuria   • Vitamin D deficiency   • Angioedema   • Chronic coronary artery disease   • Anxiety   • Male erectile disorder   • Gastroesophageal reflux disease   • Benign essential hypertension   • Hyperlipidemia   • Diabetes mellitus   • Adiposity   • Routine health maintenance   • Uncontrolled type 2 diabetes mellitus   • Low testosterone   • Hypogonadism in male   • Presence of coronary angioplasty implant and graft   • Osteoarthritis, knee   • MARC (acute kidney injury)               Adult Rehabilitation Note       17 1328          Rehab Assessment/Intervention    Discipline physical therapy assistant  -      Document Type therapy note (daily note)  -      Subjective Information agree to therapy;complains of;fatigue;swelling  -      Precautions/Limitations fall precautions  -      Recorded by [] Kathe Suazo PTA      Pain Assessment    Pain Assessment 0-10  -      Pain Score 2  -      Pain Type Surgical pain  -      Pain Location Knee  -      Pain Orientation Right  -      Recorded by [] Kathe Suazo PTA      ROM (Range of Motion)    General ROM Detail -  -      Recorded by [] Kathe Suazo PTA      Bed Mobility, Assessment/Treatment    Bed Mobility, Comment in chair  -      Recorded by [] Kathe Suazo PTA      Transfer Assessment/Treatment    Transfers, Sit-Stand [place] contact guard assist;verbal cues required  -      Transfers, Stand-Sit [place] stand by assist;verbal cues required  -      Transfers,  Sit-Stand-Sit, Assist Device rolling walker  -EVERARDO      Recorded by [EVERARDO] Kathe Suazo PTA      Gait Assessment/Treatment    Gait, [place] Level contact guard assist;stand by assist  -      Gait, Assistive Device rolling walker  -      Gait, Distance (Feet) 120  -JM      Recorded by [EVERARDO] Kathe Suazo PTA      Therapy Exercises    Exercise Protocols total knee  -EVERARDO      Total Knee Exercises right:;15 reps;completed protocol  -      Recorded by [EVERARDO] Kathe Suazo PTA      Positioning and Restraints    Pre-Treatment Position sitting in chair/recliner  -      In Chair reclined;call light within reach;encouraged to call for assist  -      Recorded by [EVERARDO] Kathe Suazo PTA        User Key  (r) = Recorded By, (t) = Taken By, (c) = Cosigned By    Initials Name Effective Dates    EVERARDO Suazo PTA 02/18/16 -                 IP PT Goals       06/16/17 0844          Gait Training PT LTG    Gait Training Goal PT LTG, Date Established 06/16/17  -      Gait Training Goal PT LTG, Time to Achieve 3 days  -      Gait Training Goal PT LTG, [place] Level supervision required  -      Gait Training Goal PT LTG, Assist Device walker, rolling  -      Gait Training Goal PT LTG, Distance to Achieve 150  -LH      Stair Training PT LTG    Stair Training Goal PT LTG, Date Established 06/16/17  -      Stair Training Goal PT LTG, Time to Achieve 3 days  -LH      Stair Training Goal PT LTG, Number of Steps 4  -LH      Stair Training Goal PT LTG, [place] Level contact guard assist  -      Range of Motion PT LTG    Range of Motion Goal PT LTG, Date Established 06/16/17  -LH      Range of Motion Goal PT LTG, Time to Achieve 3 days  -LH      Range fo Motion Goal PT LTG, Joint R knee  -      Range of Motion Goal PT LTG, AROM Measure 5-90  -LH        User Key  (r) = Recorded By, (t) = Taken By, (c) = Cosigned By    Initials Name Provider Type     Eliana Massey, PT Physical Therapist           Physical Therapy Education     Title: PT OT SLP Therapies (Active)     Topic: Physical Therapy (Active)     Point: Mobility training (Active)    Learning Progress Summary    Learner Readiness Method Response Comment Documented by Status   Patient Acceptance E NR   06/16/17 0843 Active               Point: Home exercise program (Active)    Learning Progress Summary    Learner Readiness Method Response Comment Documented by Status   Patient Acceptance E NR   06/16/17 0843 Active                      User Key     Initials Effective Dates Name Provider Type [facility] 02/07/17 -  Eliana Massey PT Physical Therapist PT                    PT Recommendation and Plan  Anticipated Equipment Needs At Discharge: bedside commode, walker  Anticipated Discharge Disposition: home with home health  Planned Therapy Interventions: balance training, bed mobility training, gait training, home exercise program, strengthening, stair training, ROM (Range of Motion), stretching, transfer training  PT Frequency: 2 times/day             Outcome Measures       06/16/17 0800          How much help from another person do you currently need...    Turning from your back to your side while in flat bed without using bedrails? 3  -LH      Moving from lying on back to sitting on the side of a flat bed without bedrails? 3  -LH      Moving to and from a bed to a chair (including a wheelchair)? 3  -LH      Standing up from a chair using your arms (e.g., wheelchair, bedside chair)? 3  -LH      Climbing 3-5 steps with a railing? 3  -LH      To walk in hospital room? 3  -LH      AM-PAC 6 Clicks Score 18  -      Functional Assessment    Outcome Measure Options AM-PAC 6 Clicks Basic Mobility (PT)  -        User Key  (r) = Recorded By, (t) = Taken By, (c) = Cosigned By    Initials Name Provider Type     Eliana Massey PT Physical Therapist           Time Calculation:         PT Charges       06/16/17 1426 06/16/17 0846       Time  Calculation    Start Time 1325  - 0830  [facility]     Stop Time 1415  - 0845  [facility]     Time Calculation (min) 50 min  - 15 min  [facility]     PT Received On 06/16/17  - 06/16/17  -     PT - Next Appointment 06/17/17  - 06/16/17  -     PT Goal Re-Cert Due Date  06/18/17  -       User Key  (r) = Recorded By, (t) = Taken By, (c) = Cosigned By    Initials Name Provider Type     Eliana Massey, PT Physical Therapist    EVERARDO Suazo PTA Physical Therapy Assistant          Therapy Charges for Today     Code Description Service Date Service Provider Modifiers Qty    04329557255 HC PT THER PROC EA 15 MIN 6/16/2017 Kathe Suazo PTA GP 1    04182161733 HC PT THER PROC GROUP 6/16/2017 Kathe Suazo PTA GP 1          PT G-Codes  Outcome Measure Options: AM-PAC 6 Clicks Basic Mobility (PT)    Kathe Suazo PTA  6/16/2017

## 2017-06-16 NOTE — PLAN OF CARE
Problem: Patient Care Overview (Adult)  Goal: Plan of Care Review  Outcome: Ongoing (interventions implemented as appropriate)    06/16/17 0600   Coping/Psychosocial Response Interventions   Plan Of Care Reviewed With patient   Patient Care Overview   Progress improving   Outcome Evaluation   Outcome Summary/Follow up Plan This pm shift pain is controlled with scheduled Toradol. Pt up to chair with minimal assist. Low grade temp this am. Pt encouraged to use ISB more frequently. NVI. PT BID daily. Glucose monitored and sub-q insulin administered per sliding scale.       Goal: Adult Individualization and Mutuality  Outcome: Ongoing (interventions implemented as appropriate)    Problem: Fall Risk (Adult)  Goal: Identify Related Risk Factors and Signs and Symptoms  Outcome: Ongoing (interventions implemented as appropriate)  Goal: Absence of Falls  Outcome: Outcome(s) achieved Date Met:  06/16/17    Problem: Knee Replacement, Total (Adult)  Goal: Signs and Symptoms of Listed Potential Problems Will be Absent or Manageable (Knee Replacement, Total)  Outcome: Ongoing (interventions implemented as appropriate)

## 2017-06-16 NOTE — PLAN OF CARE
Problem: Patient Care Overview (Adult)  Goal: Plan of Care Review    06/16/17 0844   Coping/Psychosocial Response Interventions   Plan Of Care Reviewed With patient   Outcome Evaluation   Outcome Summary/Follow up Plan pt presents w,generalized weakness post op R TKR. good tolerance to ambulation in hallway this AM w/PT. may benefit from skilled PT to addres functional deficits prior to returning home.          Problem: Inpatient Physical Therapy  Goal: Gait Training Goal LTG- PT    06/16/17 0844   Gait Training PT LTG   Gait Training Goal PT LTG, Date Established 06/16/17   Gait Training Goal PT LTG, Time to Achieve 3 days   Gait Training Goal PT LTG, [place] Level supervision required   Gait Training Goal PT LTG, Assist Device walker, rolling   Gait Training Goal PT LTG, Distance to Achieve 150       Goal: Stair Training Goal LTG- PT    06/16/17 0844   Stair Training PT LTG   Stair Training Goal PT LTG, Date Established 06/16/17   Stair Training Goal PT LTG, Time to Achieve 3 days   Stair Training Goal PT LTG, Number of Steps 4   Stair Training Goal PT LTG, [place] Level contact guard assist       Goal: Range of Motion Goal LTG- PT    06/16/17 0844   Range of Motion PT LTG   Range of Motion Goal PT LTG, Date Established 06/16/17   Range of Motion Goal PT LTG, Time to Achieve 3 days   Range fo Motion Goal PT LTG, Joint R knee   Range of Motion Goal PT LTG, AROM Measure 5-90

## 2017-06-17 VITALS
HEIGHT: 77 IN | WEIGHT: 268 LBS | OXYGEN SATURATION: 95 % | DIASTOLIC BLOOD PRESSURE: 76 MMHG | BODY MASS INDEX: 31.64 KG/M2 | TEMPERATURE: 99.3 F | HEART RATE: 95 BPM | SYSTOLIC BLOOD PRESSURE: 127 MMHG | RESPIRATION RATE: 18 BRPM

## 2017-06-17 LAB
ANION GAP SERPL CALCULATED.3IONS-SCNC: 13.6 MMOL/L
BUN BLD-MCNC: 15 MG/DL (ref 8–23)
BUN/CREAT SERPL: 10.6 (ref 7–25)
CALCIUM SPEC-SCNC: 7.7 MG/DL (ref 8.6–10.5)
CHLORIDE SERPL-SCNC: 103 MMOL/L (ref 98–107)
CO2 SERPL-SCNC: 21.4 MMOL/L (ref 22–29)
CREAT BLD-MCNC: 1.41 MG/DL (ref 0.76–1.27)
DEPRECATED RDW RBC AUTO: 52.9 FL (ref 37–54)
ERYTHROCYTE [DISTWIDTH] IN BLOOD BY AUTOMATED COUNT: 18 % (ref 11.5–14.5)
GFR SERPL CREATININE-BSD FRML MDRD: 62 ML/MIN/1.73
GLUCOSE BLD-MCNC: 105 MG/DL (ref 65–99)
GLUCOSE BLDC GLUCOMTR-MCNC: 113 MG/DL (ref 70–130)
HCT VFR BLD AUTO: 32.2 % (ref 40.4–52.2)
HGB BLD-MCNC: 10.6 G/DL (ref 13.7–17.6)
MCH RBC QN AUTO: 26.4 PG (ref 27–32.7)
MCHC RBC AUTO-ENTMCNC: 32.9 G/DL (ref 32.6–36.4)
MCV RBC AUTO: 80.3 FL (ref 79.8–96.2)
PLATELET # BLD AUTO: 157 10*3/MM3 (ref 140–500)
PMV BLD AUTO: 11.3 FL (ref 6–12)
POTASSIUM BLD-SCNC: 3.7 MMOL/L (ref 3.5–5.2)
RBC # BLD AUTO: 4.01 10*6/MM3 (ref 4.6–6)
SODIUM BLD-SCNC: 138 MMOL/L (ref 136–145)
WBC NRBC COR # BLD: 14.99 10*3/MM3 (ref 4.5–10.7)

## 2017-06-17 PROCEDURE — 85027 COMPLETE CBC AUTOMATED: CPT | Performed by: ORTHOPAEDIC SURGERY

## 2017-06-17 PROCEDURE — 80048 BASIC METABOLIC PNL TOTAL CA: CPT | Performed by: ORTHOPAEDIC SURGERY

## 2017-06-17 PROCEDURE — 97110 THERAPEUTIC EXERCISES: CPT

## 2017-06-17 PROCEDURE — 82962 GLUCOSE BLOOD TEST: CPT

## 2017-06-17 PROCEDURE — 63710000001 DIPHENHYDRAMINE PER 50 MG: Performed by: ORTHOPAEDIC SURGERY

## 2017-06-17 PROCEDURE — 97150 GROUP THERAPEUTIC PROCEDURES: CPT

## 2017-06-17 RX ORDER — ERGOCALCIFEROL 1.25 MG/1
CAPSULE ORAL
Qty: 12 CAPSULE | Refills: 0 | Status: CANCELLED | OUTPATIENT
Start: 2017-06-17

## 2017-06-17 RX ORDER — OXYCODONE HYDROCHLORIDE AND ACETAMINOPHEN 5; 325 MG/1; MG/1
1-2 TABLET ORAL EVERY 4 HOURS PRN
Qty: 100 TABLET | Refills: 0 | Status: SHIPPED | OUTPATIENT
Start: 2017-06-17 | End: 2017-06-25

## 2017-06-17 RX ADMIN — AMLODIPINE BESYLATE 5 MG: 5 TABLET ORAL at 06:56

## 2017-06-17 RX ADMIN — FERROUS SULFATE TAB 325 MG (65 MG ELEMENTAL FE) 325 MG: 325 (65 FE) TAB at 08:07

## 2017-06-17 RX ADMIN — OXYCODONE HYDROCHLORIDE AND ACETAMINOPHEN 2 TABLET: 5; 325 TABLET ORAL at 08:08

## 2017-06-17 RX ADMIN — ACETAMINOPHEN 650 MG: 325 TABLET ORAL at 01:03

## 2017-06-17 RX ADMIN — CARVEDILOL 25 MG: 25 TABLET, FILM COATED ORAL at 08:07

## 2017-06-17 RX ADMIN — DIPHENHYDRAMINE HYDROCHLORIDE 25 MG: 25 CAPSULE ORAL at 08:08

## 2017-06-17 RX ADMIN — PANTOPRAZOLE SODIUM 40 MG: 40 TABLET, DELAYED RELEASE ORAL at 06:58

## 2017-06-17 RX ADMIN — DORZOLAMIDE HYDROCHLORIDE AND TIMOLOL MALEATE 1 DROP: 20; 5 SOLUTION/ DROPS OPHTHALMIC at 08:09

## 2017-06-17 RX ADMIN — ESCITALOPRAM 10 MG: 10 TABLET, FILM COATED ORAL at 06:58

## 2017-06-17 RX ADMIN — ASPIRIN 325 MG: 325 TABLET, DELAYED RELEASE ORAL at 08:08

## 2017-06-17 RX ADMIN — LINAGLIPTIN 5 MG: 5 TABLET, FILM COATED ORAL at 08:07

## 2017-06-17 NOTE — PLAN OF CARE
Problem: Fall Risk (Adult)  Goal: Identify Related Risk Factors and Signs and Symptoms  Outcome: Outcome(s) achieved Date Met:  06/17/17    Problem: Knee Replacement, Total (Adult)  Goal: Signs and Symptoms of Listed Potential Problems Will be Absent or Manageable (Knee Replacement, Total)  Outcome: Outcome(s) achieved Date Met:  06/17/17

## 2017-06-17 NOTE — PROGRESS NOTES
"/76 (BP Location: Left arm, Patient Position: Lying)  Pulse 95  Temp 99.3 °F (37.4 °C) (Oral)   Resp 18  Ht 77\" (195.6 cm)  Wt 268 lb (122 kg)  SpO2 95%  BMI 31.78 kg/m2    Lab Results (last 24 hours)     Procedure Component Value Units Date/Time    POC Glucose Fingerstick [[id number]]  (Abnormal) Collected:  06/16/17 1117    Specimen:  Blood Updated:  06/16/17 1121     Glucose 154 (H) mg/dL     Narrative:       Meter: [id number] : [id number] Miradia Kenia    POC Glucose Fingerstick [[id number]]  (Normal) Collected:  06/16/17 1651    Specimen:  Blood Updated:  06/16/17 1652     Glucose 118 mg/dL     Narrative:       Meter: [id number] : [id number] azEnecsysvili Kenia    POC Glucose Fingerstick [[id number]]  (Abnormal) Collected:  06/16/17 2037    Specimen:  Blood Updated:  06/16/17 2048     Glucose 182 (H) mg/dL     Narrative:       Meter: [id number] : [id number] Garland Arreola    CBC (No Diff) [[id number]]  (Abnormal) Collected:  06/17/17 0413    Specimen:  Blood Updated:  06/17/17 0459     WBC 14.99 (H) 10*3/mm3      RBC 4.01 (L) 10*6/mm3      Hemoglobin 10.6 (L) g/dL      Hematocrit 32.2 (L) %      MCV 80.3 fL      MCH 26.4 (L) pg      MCHC 32.9 g/dL      RDW 18.0 (H) %      RDW-SD 52.9 fl      MPV 11.3 fL      Platelets 157 10*3/mm3     Basic Metabolic Panel [[id number]]  (Abnormal) Collected:  06/17/17 0413    Specimen:  Blood Updated:  06/17/17 0522     Glucose 105 (H) mg/dL      BUN 15 mg/dL      Creatinine 1.41 (H) mg/dL      Sodium 138 mmol/L      Potassium 3.7 mmol/L      Chloride 103 mmol/L      CO2 21.4 (L) mmol/L      Calcium 7.7 (L) mg/dL      eGFR   Amer 62 mL/min/1.73      BUN/Creatinine Ratio 10.6     Anion Gap 13.6 mmol/L     Narrative:       GFR Normal >60  Chronic Kidney Disease <60  Kidney Failure <15    POC Glucose Fingerstick [[id number]]  (Normal) Collected:  06/17/17 0514    Specimen:  Blood Updated:  06/17/17 0525     Glucose 113 mg/dL     Narrative:       Meter: " [id number] : [id number] Garland Arreola          Imaging Results (last 24 hours)     ** No results found for the last 24 hours. **          Patient Care Team:  Bebeto Angel MD as PCP - General  Bebeto Angel MD as PCP - Family Medicine  Jorge Luis Kent MD as Consulting Physician (Orthopedic Surgery)    SUBJECTIVE  Doing well    No pulmonary or urinary symptoms  PHYSICAL EXAM   Wound fine  NV intact  Principal Problem:    Osteoarthritis, knee  Active Problems:    Vitamin D deficiency    Chronic coronary artery disease    Benign essential hypertension    Hyperlipidemia    Diabetes mellitus    MARC (acute kidney injury)      PLAN / DISPOSITION:   discharge today  Home temp monitoing  Boyd Coyne MD  06/17/17  8:07 AM

## 2017-06-17 NOTE — THERAPY DISCHARGE NOTE
Acute Care - Physical Therapy Treatment Note/Discharge  [facility]     Patient Name: Isaias Monaco  : 1955  MRN: [id number]  Today's Date: 2017  Onset of Illness/Injury or Date of Surgery Date: 06/15/17  Date of Referral to PT: 17  Referring Physician: Doretha    Admit Date: 6/15/2017    Visit Dx:    ICD-10-CM ICD-9-CM   1. Impaired gait and mobility R26.89 781.2     Patient Active Problem List   Diagnosis   • Microalbuminuria   • Vitamin D deficiency   • Angioedema   • Chronic coronary artery disease   • Anxiety   • Male erectile disorder   • Gastroesophageal reflux disease   • Benign essential hypertension   • Hyperlipidemia   • Diabetes mellitus   • Adiposity   • Routine health maintenance   • Uncontrolled type 2 diabetes mellitus   • Low testosterone   • Hypogonadism in male   • Presence of coronary angioplasty implant and graft   • Osteoarthritis, knee   • MARC (acute kidney injury)       Physical Therapy Education     Title: PT OT SLP Therapies (Resolved)     Topic: Physical Therapy (Resolved)     Point: Mobility training (Resolved)    Learning Progress Summary    Learner Readiness Method Response Comment Documented by Status   Patient LISET Mancuso D, H DU JM 17 1134 Done    Acceptance E NR   17 0843 Active               Point: Home exercise program (Resolved)    Learning Progress Summary    Learner Readiness Method Response Comment Documented by Status   Patient LISET Mancuso D, H DU JM 17 1134 Done    Acceptance E NR   17 0843 Active               Point: Body mechanics (Resolved)    Learning Progress Summary    Learner Readiness Method Response Comment Documented by Status   Patient LISET Mancuso D, H DU JM 17 1134 Done               Point: Precautions (Resolved)    Learning Progress Summary    Learner Readiness Method Response Comment Documented by Status   Patient LISET Mancuso D,SHARRI ESPARZA 17 1134 Done                      User Key     Initials Effective  Dates Name Provider Type Discipline     02/07/17 -  Eliana Massey, PT Physical Therapist PT     02/18/16 -  Kathe Suazo PTA Physical Therapy Assistant PT                    IP PT Goals       06/17/17 1134 06/16/17 0844       Gait Training PT LTG    Gait Training Goal PT LTG, Date Established  06/16/17  -     Gait Training Goal PT LTG, Time to Achieve  3 days  -     Gait Training Goal PT LTG, [place] Level  supervision required  -     Gait Training Goal PT LTG, Assist Device  walker, rolling  -     Gait Training Goal PT LTG, Distance to Achieve  150  -LH     Gait Training Goal PT LTG, Outcome goal met  -      Stair Training PT LTG    Stair Training Goal PT LTG, Date Established  06/16/17  -     Stair Training Goal PT LTG, Time to Achieve  3 days  -     Stair Training Goal PT LTG, Number of Steps  4  -     Stair Training Goal PT LTG, [place] Level  contact guard assist  -     Stair Training Goal PT LTG, Outcome goal met  -      Range of Motion PT LTG    Range of Motion Goal PT LTG, Date Established  06/16/17  -     Range of Motion Goal PT LTG, Time to Achieve  3 days  -LH     Range fo Motion Goal PT LTG, Joint  R knee  -     Range of Motion Goal PT LTG, AROM Measure  5-90  -LH     Range of Motion Goal PT LTG, Outcome goal partially met   -10-95  -JM      Reason Goal Not Met (ROM) PT LTG discharged from facility  -        User Key  (r) = Recorded By, (t) = Taken By, (c) = Cosigned By    Initials Name Provider Type     Eliana Massey, PT Physical Therapist     Kathe Suazo PTA Physical Therapy Assistant              Adult Rehabilitation Note       06/17/17 1129 06/16/17 1328       Rehab Assessment/Intervention    Discipline physical therapy assistant  - physical therapy assistant  -     Document Type discharge summary;therapy note (daily note)  -EVERARDO therapy note (daily note)  -EVERARDO     Subjective Information agree to therapy;complains of;fatigue  -EVERARDO agree to  "therapy;complains of;fatigue;swelling  -     Precautions/Limitations fall precautions  - fall precautions  -     Recorded by [] Kathe Suazo PTA [] Kathe Suazo PTA     Pain Assessment    Pain Assessment No/denies pain  - 0-10  -     Pain Score  2  -     Pain Type  Surgical pain  -     Pain Location  Knee  -     Pain Orientation  Right  -     Recorded by [] Kathe Suazo PTA [] Kathe Suazo PTA     ROM (Range of Motion)    General ROM Detail -10-95  -JM -14-92  -JM     Recorded by [JM] Kathe Suazo PTA [] Kathe Suazo PTA     Bed Mobility, Assessment/Treatment    Bed Mob, Supine to Sit, [place] conditional independence  -      Bed Mobility, Comment  in chair  -     Recorded by [] Kathe Suazo PTA [] Kathe Suazo PTA     Transfer Assessment/Treatment    Transfers, Sit-Stand [place] contact guard assist;verbal cues required   from low chair and pt is 6'5\"  - contact guard assist;verbal cues required  -     Transfers, Stand-Sit [place] stand by assist;verbal cues required  - stand by assist;verbal cues required  -     Transfers, Sit-Stand-Sit, Assist Device rolling walker  - rolling walker  -     Recorded by [] Kathe Suazo PTA [] Kathe Suazo PTA     Gait Assessment/Treatment    Gait, [place] Level supervision required;verbal cues required  - contact guard assist;stand by assist  -     Gait, Assistive Device rolling walker  - rolling walker  -     Gait, Distance (Feet) 200  - 120  -JM     Recorded by [] Kathe Suazo PTA [JM] Kathe Suazo PTA     Stairs Assessment/Treatment    Number of Stairs 8  -      Stairs, Handrail Location both sides  -      Stairs, [place] Level contact guard assist;verbal cues required  -      Stairs, Technique Used step to step (ascending);step to step (descending)  -      Stairs, Comment cues to avoid knee buckling descending stairs   limited knee ext " making more difficult to descend stairs  -JM      Recorded by [EVERARDO] Kathe Suazo PTA      Therapy Exercises    Exercise Protocols total knee  - total knee  -     Total Knee Exercises right:;completed protocol;20 reps  - right:;15 reps;completed protocol  -EVERARDO     Recorded by [EVERARDO] Kathe Suazo PTA [EVERARDO] Kathe Suazo PTA     Positioning and Restraints    Pre-Treatment Position sitting in chair/recliner  -JM sitting in chair/recliner  -JM     Post Treatment Position bed  -JM      In Bed sitting EOB;call light within reach;encouraged to call for assist  -JM      In Chair  reclined;call light within reach;encouraged to call for assist  -JM     Recorded by [EVERARDO] Kathe Suazo PTA [EVERARDO] Kathe Suazo PTA       User Key  (r) = Recorded By, (t) = Taken By, (c) = Cosigned By    Initials Name Effective Dates    EVERARDO Suazo PTA 02/18/16 -           PT Recommendation and Plan  Anticipated Equipment Needs At Discharge: bedside commode, walker  Anticipated Discharge Disposition: home with home health  Planned Therapy Interventions: balance training, bed mobility training, gait training, home exercise program, strengthening, stair training, ROM (Range of Motion), stretching, transfer training  PT Frequency: 2 times/day             Outcome Measures       06/17/17 1100 06/16/17 0800       How much help from another person do you currently need...    Turning from your back to your side while in flat bed without using bedrails? 4  - 3  -LH     Moving from lying on back to sitting on the side of a flat bed without bedrails? 4  - 3  -LH     Moving to and from a bed to a chair (including a wheelchair)? 4  - 3  -LH     Standing up from a chair using your arms (e.g., wheelchair, bedside chair)? 3  - 3  -LH     Climbing 3-5 steps with a railing? 3  - 3  -LH     To walk in hospital room? 4  - 3  -LH     AM-PAC 6 Clicks Score 22  - 18  -     Functional Assessment    Outcome Measure Options  AM-PAC 6  Clicks Basic Mobility (PT)  -       User Key  (r) = Recorded By, (t) = Taken By, (c) = Cosigned By    Initials Name Provider Type     Eliana Massey, PT Physical Therapist    EVERARDO Suazo PTA Physical Therapy Assistant           Time Calculation:         PT Charges       06/17/17 1136          Time Calculation    Start Time 0850  -      Stop Time 0950  -      Time Calculation (min) 60 min  -      PT Received On 06/17/17  -        User Key  (r) = Recorded By, (t) = Taken By, (c) = Cosigned By    Initials Name Provider Type    EVERARDO Suazo PTA Physical Therapy Assistant          Therapy Charges for Today     Code Description Service Date Service Provider Modifiers Qty    10564446097 HC PT THER PROC EA 15 MIN 6/16/2017 Kathe Suazo PTA GP 1    77174823331 HC PT THER PROC GROUP 6/16/2017 Kathe Suazo PTA GP 1    99839526195 HC PT THER PROC EA 15 MIN 6/17/2017 Kathe Suazo PTA GP 2    12134239480 HC PT THER PROC GROUP 6/17/2017 Kathe Suazo PTA GP 1          PT G-Codes  Outcome Measure Options: AM-PAC 6 Clicks Basic Mobility (PT)    PT Discharge Summary  Reason for Discharge: Discharge from facility  Outcomes Achieved: Refer to plan of care for updates on goals achieved  Discharge Destination: Home with assist, Home with home health    Kathe Suazo PTA  6/17/2017

## 2017-06-17 NOTE — PLAN OF CARE
Problem: Inpatient Physical Therapy  Goal: Gait Training Goal LTG- PT  Outcome: Outcome(s) achieved Date Met:  06/17/17 06/17/17 1134   Gait Training PT LTG   Gait Training Goal PT LTG, Outcome goal met       Goal: Stair Training Goal LTG- PT  Outcome: Outcome(s) achieved Date Met:  06/17/17 06/17/17 1134   Stair Training PT LTG   Stair Training Goal PT LTG, Outcome goal met       Goal: Range of Motion Goal LTG- PT  Outcome: Unable to achieve outcome(s) by discharge Date Met:  06/17/17 06/17/17 1134   Range of Motion PT LTG   Range of Motion Goal PT LTG, Outcome goal partially met  (-10-95)   Reason Goal Not Met (ROM) PT LTG discharged from facility

## 2017-06-17 NOTE — PROGRESS NOTES
Name: Isaias Monaco ADMIT: 6/15/2017   : 1955  PCP: Bebeto Angel MD    MRN: [id number] LOS: 2 days   AGE/SEX: 62 y.o. male  ROOM: [facility]   Subjective        Subjective:  Symptoms:  No shortness of breath, chest pain, weakness or anorexia.    Diet:  Adequate intake.  No nausea or vomiting.    Activity level: Returning to normal.    Pain:  He complains of pain that is moderate.  He reports pain is improving.  Pain is requiring pain medication and well controlled.      Feels fine and wants to go home.  See in the gym    Objective   Vital Signs  Temp:  [98.5 °F (36.9 °C)-101.5 °F (38.6 °C)] 99.3 °F (37.4 °C)  Heart Rate:  [] 95  Resp:  [14-18] 18  BP: (105-160)/(64-97) 127/76  SpO2:  [93 %-96 %] 95 %  on   ;   O2 Device: room air  Body mass index is 31.78 kg/(m^2).    Physical Exam   Constitutional: He is oriented to person, place, and time. He appears well-developed and well-nourished.   Cardiovascular: Normal rate, regular rhythm and normal heart sounds.    No murmur heard.  Pulmonary/Chest: Effort normal and breath sounds normal. No respiratory distress. He has no wheezes. He has no rales.   Abdominal: Soft. He exhibits no distension. There is no tenderness.   Musculoskeletal: He exhibits tenderness (right knee).   Right knee with brace     Neurological: He is alert and oriented to person, place, and time.   Skin: Skin is warm and dry.       Results Review:       I reviewed the patient's new clinical results.    Results from last 7 days  Lab Units 17   WBC 10*3/mm3 14.99*  --    HEMOGLOBIN g/dL 10.6* 11.5*   PLATELETS 10*3/mm3 157  --        Results from last 7 days  Lab Units 17   SODIUM mmol/L 138 134*   POTASSIUM mmol/L 3.7 3.9   CHLORIDE mmol/L 103 97*   TOTAL CO2 mmol/L 21.4* 23.6   BUN mg/dL 15 18   CREATININE mg/dL 1.41* 1.52*   GLUCOSE mg/dL 105* 89   Estimated Creatinine Clearance: 78.4 mL/min (by C-G formula based on Cr of  1.41).    Results from last 7 days  Lab Units 06/17/17  0413 06/16/17  0518   CALCIUM mg/dL 7.7* 8.3*         amLODIPine 5 mg Oral QAM   aspirin 325 mg Oral Daily   carvedilol 25 mg Oral BID With Meals   dorzolamide-timolol 1 drop Both Eyes BID   Empagliflozin 25 mg Oral Daily   escitalopram 10 mg Oral QAM   ferrous sulfate 325 mg Oral Daily With Breakfast   insulin aspart 0-9 Units Subcutaneous 4x Daily With Meals & Nightly   latanoprost 1 drop Both Eyes Nightly   linagliptin 5 mg Oral Daily   pantoprazole 40 mg Oral QAM   rosuvastatin 40 mg Oral Nightly      Diet Regular      Assessment/Plan   Assessment:     Active Hospital Problems (** Indicates Principal Problem)    Diagnosis Date Noted   • **Osteoarthritis, knee [M17.9] 06/15/2017   • MARC (acute kidney injury) [N17.9] 06/16/2017   • Vitamin D deficiency [E55.9] 02/21/2016   • Hyperlipidemia [E78.5] 02/21/2016   • Diabetes mellitus [E11.9] 02/21/2016   • Chronic coronary artery disease [I25.10] 02/21/2016   • Benign essential hypertension [I10] 02/21/2016      Resolved Hospital Problems    Diagnosis Date Noted Date Resolved   No resolved problems to display.       Plan:     MARC: improved slightly, stopped toradol (2 doses given), gave IV hydration, recheck in am.  Avoid nephrotoxic agents.    Fever and leukocytosis: likely atelectasis and reactive from surgery.  Rec pt to monitor temps at home and to come back if any signs of infection    Hypertension: Continue with Norvasc and Coreg     Diabetes mellitus type 2: holding metformin and Actos and Toujeo but continue Jardance and Januvia with correctional dose insulin as needed.  BS controlled and A1c shows good control     Glaucoma: Continue current drops     Hyperlipidemia and coronary artery disease: Continue with Coreg and Crestor     GERD: PPI       Hyponatremia: resolved      Disposition  OK to discharge today with close follow up with PCP within one week for labs.  He states he has appt on Monday and I  asked him to keep this appointment and he can have labs drawn then      Mark Yoder MD  [facility]  06/17/17  8:19 AM

## 2017-06-17 NOTE — DISCHARGE SUMMARY
DATE OF ADMISSION: 06/15/2017   DATE OF DISCHARGE: 06/17/2017     HISTORY: This is a 62-year-old male with end-stage primary osteoarthritis of his right knee. He was admitted through the operating room for right total knee replacement.     HOSPITAL COURSE: The day after surgery, vital signs were stable. Drain was removed. Dressing was changed. Therapy was begun. Postop x-ray looked good. Glucose was 154, hemoglobin 11.5. Hemoglobin A1c was 5.93. Creatinine 1.52, sodium 134, potassium 3.9. We gave him 2.5 mg of Arixtra subcutaneously. The next day, his hemoglobin was 10.6. His white count was 15,000. His wound looked good though. He is having no pulmonary or urinary symptoms. He had a 101° fever the night before. At this point we are assuming this is atelectasis. The creatinine was improved at 1.41. He is having no constitutional symptoms, and he feels like he is doing better and would like to be discharged today.     FINAL DIAGNOSES:  1. End-stage primary osteoarthritis, right knee. He has undergone right total knee replacement this admission.   2. He has a postop fever, felt to be due to atelectasis. He has no pulmonary or urinary symptoms, and the knee looks fine. He will be on a home temperature monitoring program and call us for a 101° fever.  3. Coronary artery disease with stents.  4. Diabetes mellitus.   5. Hypertension.   6. 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     PLAN:   1. He is on 325 mg of aspirin daily for DVT prophylaxis.   2. He is on Percocet 5/325 for pain.   3. Staples out at 14 days postop.   4. Shower day 7 if the wound is clean and dry.   5. Therapy, total knee replacement protocol, weight-bear as tolerated.   6. Return to office 3 weeks postop, sooner for problems.    Boyd Coyne M.D.  DULCE MARIA:dawson  D:   06/17/2017 08:13:25  T:   06/17/2017 08:41:51  Job ID:   [id number]  Document ID:   [id number]  cc:

## 2017-06-17 NOTE — PLAN OF CARE
Problem: Patient Care Overview (Adult)  Goal: Plan of Care Review  Outcome: Ongoing (interventions implemented as appropriate)    06/17/17 0327   Coping/Psychosocial Response Interventions   Plan Of Care Reviewed With patient   Patient Care Overview   Progress improving   Outcome Evaluation   Outcome Summary/Follow up Plan po pain medication controlling pain. ambulating well with 1 assist. voiding well per urinal. increase body temp. use of IS encouraged. possible d/c in am. education provided on the importance of blood sugar and blood pressure monitoring.       Goal: Adult Individualization and Mutuality  Outcome: Ongoing (interventions implemented as appropriate)  Goal: Discharge Needs Assessment  Outcome: Ongoing (interventions implemented as appropriate)    Problem: Perioperative Period (Adult)  Goal: Signs and Symptoms of Listed Potential Problems Will be Absent or Manageable (Perioperative Period)  Outcome: Ongoing (interventions implemented as appropriate)    Problem: Fall Risk (Adult)  Goal: Identify Related Risk Factors and Signs and Symptoms  Outcome: Ongoing (interventions implemented as appropriate)    Problem: Knee Replacement, Total (Adult)  Goal: Signs and Symptoms of Listed Potential Problems Will be Absent or Manageable (Knee Replacement, Total)  Outcome: Ongoing (interventions implemented as appropriate)

## 2017-06-19 ENCOUNTER — OFFICE VISIT (OUTPATIENT)
Dept: INTERNAL MEDICINE | Age: 62
End: 2017-06-19

## 2017-06-19 VITALS
WEIGHT: 273 LBS | DIASTOLIC BLOOD PRESSURE: 72 MMHG | TEMPERATURE: 98.2 F | OXYGEN SATURATION: 97 % | SYSTOLIC BLOOD PRESSURE: 106 MMHG | BODY MASS INDEX: 32.23 KG/M2 | HEIGHT: 77 IN | HEART RATE: 90 BPM

## 2017-06-19 DIAGNOSIS — Z48.89 POSTOPERATIVE VISIT: ICD-10-CM

## 2017-06-19 DIAGNOSIS — I10 BENIGN ESSENTIAL HYPERTENSION: Primary | ICD-10-CM

## 2017-06-19 LAB
BASOPHILS # BLD AUTO: 0.02 10*3/MM3 (ref 0–0.2)
BASOPHILS NFR BLD AUTO: 0.2 % (ref 0–1.5)
BUN SERPL-MCNC: 18 MG/DL (ref 8–23)
BUN/CREAT SERPL: 11.3 (ref 7–25)
CALCIUM SERPL-MCNC: 9.1 MG/DL (ref 8.6–10.5)
CHLORIDE SERPL-SCNC: 102 MMOL/L (ref 98–107)
CO2 SERPL-SCNC: 24.6 MMOL/L (ref 22–29)
CREAT SERPL-MCNC: 1.59 MG/DL (ref 0.76–1.27)
EOSINOPHIL # BLD AUTO: 0.38 10*3/MM3 (ref 0–0.7)
EOSINOPHIL NFR BLD AUTO: 2.9 % (ref 0.3–6.2)
ERYTHROCYTE [DISTWIDTH] IN BLOOD BY AUTOMATED COUNT: 18.9 % (ref 11.5–14.5)
GLUCOSE SERPL-MCNC: 137 MG/DL (ref 65–99)
HCT VFR BLD AUTO: 34.2 % (ref 40.4–52.2)
HGB BLD-MCNC: 10.9 G/DL (ref 13.7–17.6)
IMM GRANULOCYTES # BLD: 0.03 10*3/MM3 (ref 0–0.03)
IMM GRANULOCYTES NFR BLD: 0.2 % (ref 0–0.5)
LYMPHOCYTES # BLD AUTO: 1.31 10*3/MM3 (ref 0.9–4.8)
LYMPHOCYTES NFR BLD AUTO: 10 % (ref 19.6–45.3)
MCH RBC QN AUTO: 26 PG (ref 27–32.7)
MCHC RBC AUTO-ENTMCNC: 31.9 G/DL (ref 32.6–36.4)
MCV RBC AUTO: 81.4 FL (ref 79.8–96.2)
MONOCYTES # BLD AUTO: 1.78 10*3/MM3 (ref 0.2–1.2)
MONOCYTES NFR BLD AUTO: 13.6 % (ref 5–12)
NEUTROPHILS # BLD AUTO: 9.54 10*3/MM3 (ref 1.9–8.1)
NEUTROPHILS NFR BLD AUTO: 73.1 % (ref 42.7–76)
PLATELET # BLD AUTO: 198 10*3/MM3 (ref 140–500)
POTASSIUM SERPL-SCNC: 4.4 MMOL/L (ref 3.5–5.2)
RBC # BLD AUTO: 4.2 10*6/MM3 (ref 4.6–6)
SODIUM SERPL-SCNC: 142 MMOL/L (ref 136–145)
WBC # BLD AUTO: 13.06 10*3/MM3 (ref 4.5–10.7)

## 2017-06-19 PROCEDURE — 99213 OFFICE O/P EST LOW 20 MIN: CPT | Performed by: INTERNAL MEDICINE

## 2017-06-19 RX ORDER — ERGOCALCIFEROL 1.25 MG/1
CAPSULE ORAL
Qty: 4 CAPSULE | Refills: 0 | Status: SHIPPED | OUTPATIENT
Start: 2017-06-19 | End: 2017-06-19

## 2017-06-19 RX ORDER — ERGOCALCIFEROL 1.25 MG/1
CAPSULE ORAL
Qty: 4 CAPSULE | Refills: 0 | COMMUNITY
Start: 2017-06-19 | End: 2017-07-10 | Stop reason: SDUPTHER

## 2017-06-19 NOTE — PROGRESS NOTES
"Isaias Monaco / 62 y.o. / male  06/19/2017    ASSESSMENT & PLAN:    1. Benign essential hypertension    2. Postoperative visit      Orders Placed This Encounter   Procedures   • Basic Metabolic Panel       Summary/Discussion:     · #1 hypertension stable current medical regimen.  Plan: Same meds, blood pressure 6 months.    #2 postoperative visit, patient had episode low-grade temperature over the past day or 2.  This morning, fever has resolved, he has no symptoms, and there are no objective findings on physical exam to point a potential etiology  Laboratory will be done today to include CBC to look for any leukocytosis.    We'll also check BMP today because of elevated creatinine in the postoperative state.  Because of this, I would recommend against regular use of NSAID.elevated creatinine could also impact upon treatment with metformin.      Return in about 6 months (around 12/19/2017) for Blood pressure check.    Future Appointments  Date Time Provider Department Center   6/27/2017 2:20 PM MICHELLE Pichardo [facility] None   1/29/2018 1:00 PM Ross Khan MD [facility] None       ______________________________________________________    VITALS    /72  Pulse 90  Temp 98.2 °F (36.8 °C)  Ht 77\" (195.6 cm)  Wt 273 lb (124 kg)  SpO2 97%  BMI 32.37 kg/m2  BP Readings from Last 3 Encounters:   06/19/17 106/72   06/17/17 127/76   06/09/17 126/83     Wt Readings from Last 3 Encounters:   06/19/17 273 lb (124 kg)   06/15/17 268 lb (122 kg)   06/09/17 268 lb (122 kg)      Body mass index is 32.37 kg/(m^2).    CC:  Main reason(s) for today's visit: Hypertension and Diabetes      HPI:     Patient presents today for follow-up of surgery as below.  Since she's been home, he underwent a low-grade fever.  His wound appeared normal, there is no tenderness redness or discharge.  Patient has been in contact with orthopedics over the weekend, they recommended treating symptomatically with ibuprofen.  His fever " did break last evening, he is afebrile this morning and he is feeling well today.  They are to follow-up by phone with orthopedics later this morning.    Hypertension: He is compliant with medication, dietary salt restriction, and home blood pressure runs in the 110s to 120s over 60s to 70s.  Activity this point is obviously limited because of postoperative state.    Patient Care Team:  Bebeto Angel MD as PCP - General  Bebeto Angel MD as PCP - Family Medicine  Jorge Luis Kent MD as Consulting Physician (Orthopedic Surgery)  ______________________________________________________    REVIEW OF SYSTEMS    Review of Systems   HENT:        He denied any symptoms upper respiratory infection.   Respiratory: Negative for chest tightness and shortness of breath.    Cardiovascular: Negative for chest pain and palpitations.   Gastrointestinal: Negative for abdominal pain, diarrhea, nausea and vomiting.   Genitourinary:        Patient denied dysuria   Neurological: Negative for dizziness, syncope, speech difficulty, weakness, light-headedness and headaches.         PHYSICAL EXAMINATION    Physical Exam   Constitutional: He is oriented to person, place, and time. He appears well-developed and well-nourished. No distress.   HENT:   No sinus tenderness noted   Cardiovascular: Normal rate, regular rhythm, normal heart sounds and intact distal pulses.  Exam reveals no gallop and no friction rub.    No murmur heard.  Pulmonary/Chest: Effort normal and breath sounds normal. He has no wheezes. He has no rales.   Musculoskeletal: He exhibits no edema.   Neurological: He is alert and oriented to person, place, and time.   Skin: Skin is warm and dry. No rash noted.   Psychiatric: He has a normal mood and affect. His behavior is normal. Judgment and thought content normal.   Nursing note and vitals reviewed.      REVIEWED DATA:    Labs:   Lab Results   Component Value Date     06/17/2017    K 3.7 06/17/2017    AST 34 06/09/2017     ALT 22 06/09/2017    BUN 15 06/17/2017    CREATININE 1.41 (H) 06/17/2017    CREATININE 1.52 (H) 06/16/2017    CREATININE 0.93 06/09/2017    EGFRIFNONA 65 02/09/2017    EGFRIFAFRI 62 06/17/2017       Lab Results   Component Value Date    GLU 82 02/09/2017     (H) 05/10/2016     (H) 02/25/2016    HGBA1C 5.93 (H) 06/16/2017    HGBA1C 6.50 (H) 02/09/2017    HGBA1C 6.8 (H) 09/26/2016    MICROALBUR 582.2 02/09/2017       Lab Results   Component Value Date     (H) 02/09/2017    LDL 81 09/26/2016     (H) 05/10/2016    HDL 65 (H) 02/09/2017    TRIG 119 02/09/2017       Lab Results   Component Value Date    TSH 1.390 02/09/2017    FREET4 1.22 02/25/2016          Lab Results   Component Value Date    WBC 14.99 (H) 06/17/2017    HGB 10.6 (L) 06/17/2017    HGB 11.5 (L) 06/16/2017    HGB 14.4 06/09/2017     06/17/2017        Imaging:        Medical Tests:        Summary of old records / correspondence / consultant report: Patient presents today following right total knee replacement.  He was admitted from [facility] through the 17th.  Postoperatively x-ray was unremarkable, hemoglobin 11.5, creatinine 1.5 to.  Wound looked normal.  He had a temperature 101 before discharge.  The presumption at that time was that of atelectasis.  Creatinine improved to 1.41 before discharge.       Request outside records:        Allergies   Allergen Reactions   • Ace Inhibitors    • Lisinopril Angioedema       Current Outpatient Prescriptions   Medication Sig Dispense Refill   • amLODIPine (NORVASC) 5 MG tablet Take 5 mg by mouth Every Morning.     • aspirin  MG EC tablet Take 1 tablet by mouth Daily for 42 days. 42 tablet 0   • Blood Glucose Monitoring Suppl ([facility] BLOOD GLUCOSE) kit      • carvedilol (COREG) 25 MG tablet Take 1 tablet by mouth 2 (Two) Times a Day With Meals. 180 tablet 0   • clopidogrel (PLAVIX) 75 MG tablet Take 75 mg by mouth Every Morning. Stopped 6/10/17  Per dr beatty     •  "dorzolamide-timolol (COSOPT) 22.3-6.8 MG/ML ophthalmic solution Administer 1 drop to both eyes 2 (Two) Times a Day.     • Empagliflozin (JARDIANCE) 25 MG tablet Take 25 mg by mouth Every Morning. 30 tablet 1   • ergocalciferol (DRISDOL) 26381 UNITS capsule Take 1 po q week 4 capsule 0   • escitalopram (LEXAPRO) 10 MG tablet Take 10 mg by mouth Every Morning.     • Insulin Glargine (TOUJEO SOLOSTAR) 300 UNIT/ML solution pen-injector Inject 60 Units under the skin Every Morning. 9 mL 3   • Insulin Pen Needle (PEN NEEDLES 5/16\") 31G X 8 MM misc USE AS DIRECTED ONCE DAILY WITH TOUJEO 100 each 2   • [employer] PREMIUM GLUCOSE TEST test strip TEST FOUR TIMES A  each 3   • latanoprost (XALATAN) 0.005 % ophthalmic solution Administer 1 drop to both eyes Every Morning.     • metFORMIN (GLUCOPHAGE) 500 MG tablet Take 1 tablet by mouth 2 (Two) Times a Day With Meals. 120 tablet 0   • omeprazole (priLOSEC) 40 MG capsule Take 40 mg by mouth Every Morning.     • oxyCODONE-acetaminophen (PERCOCET) 5-325 MG per tablet Take 1-2 tablets by mouth Every 4 (Four) Hours As Needed for Moderate Pain (4-6) for up to 8 days. 100 tablet 0   • pioglitazone (ACTOS) 45 MG tablet Take 45 mg by mouth Every Morning.     • rosuvastatin (CRESTOR) 40 MG tablet Take 40 mg by mouth Every Night.     • SITagliptin (JANUVIA) 100 MG tablet Take 1 tablet by mouth Daily. (Patient taking differently: Take 100 mg by mouth Every Morning.) 30 tablet 3     No current facility-administered medications for this visit.        PFSH:     The following portions of the patient's history were reviewed and updated as appropriate: Allergies / Current Medications / Past Medical History / Surgical History / Social History / Family History    Patient Active Problem List   Diagnosis   • Microalbuminuria   • Vitamin D deficiency   • Angioedema   • Chronic coronary artery disease   • Anxiety   • Male erectile disorder   • Gastroesophageal reflux disease   • Benign essential " hypertension   • Hyperlipidemia   • Diabetes mellitus   • Adiposity   • Routine health maintenance   • Uncontrolled type 2 diabetes mellitus   • Low testosterone   • Hypogonadism in male   • Presence of coronary angioplasty implant and graft   • Osteoarthritis, knee   • MARC (acute kidney injury)   • Postoperative visit       Past Medical History:   Diagnosis Date   • Adenomatous colon polyp     Colonoscopy last done 2015   • Arthritis    • Cardiac disease    • Diabetes mellitus    • GERD (gastroesophageal reflux disease)    • Glaucoma    • High cholesterol    • Hypertension    • Joint pain     or swelling   • Male erectile disorder    • Stiffness in joint    • Type 2 diabetes mellitus    • Vitamin D deficiency        Past Surgical History:   Procedure Laterality Date   • CORONARY ANGIOPLASTY WITH STENT PLACEMENT      cardiac stents x3 occasions   • GA TOTAL KNEE ARTHROPLASTY Right 6/15/2017    Procedure: RT TOTAL KNEE ARTHROPLASTY;  Surgeon: Boyd Coyne MD;  Location: [facility];  Service: Orthopedics   • SHOULDER SURGERY Right 2016       Social History     Social History   • Marital status:      Spouse name: N/A   • Number of children: 1   • Years of education: N/A     Occupational History   • RN      Social History Main Topics   • Smoking status: Never Smoker   • Smokeless tobacco: Never Used   • Alcohol use No      Comment: Occasionally   • Drug use: No   • Sexual activity: Not Asked     Other Topics Concern   • None     Social History Narrative    , 1 son, 2 stepsons       Family History   Problem Relation Age of Onset   • Lupus Sister    • Heart disease Other    • Hypertension Other    • Malig Hyperthermia Neg Hx          **Dragon Disclaimer:   Much of this encounter note is an electronic transcription/translation of spoken language to printed text. The electronic translation of spoken language may permit erroneous, or at times, nonsensical words or phrases to be inadvertently  transcribed. Although I have reviewed the note for such errors, some may still exist.

## 2017-06-20 ENCOUNTER — HOSPITAL ENCOUNTER (INPATIENT)
Facility: HOSPITAL | Age: 62
LOS: 2 days | Discharge: HOME HEALTH | End: 2017-06-22
Attending: ORTHOPAEDIC SURGERY | Admitting: ORTHOPAEDIC SURGERY

## 2017-06-20 ENCOUNTER — ANESTHESIA (OUTPATIENT)
Dept: PERIOP | Facility: HOSPITAL | Age: 62
End: 2017-06-20

## 2017-06-20 ENCOUNTER — ANESTHESIA EVENT (OUTPATIENT)
Dept: PERIOP | Facility: HOSPITAL | Age: 62
End: 2017-06-20

## 2017-06-20 DIAGNOSIS — N17.9 AKI (ACUTE KIDNEY INJURY) (HCC): ICD-10-CM

## 2017-06-20 DIAGNOSIS — T14.8XXA HEMATOMA: ICD-10-CM

## 2017-06-20 DIAGNOSIS — R26.89 IMPAIRED GAIT AND MOBILITY: Primary | ICD-10-CM

## 2017-06-20 DIAGNOSIS — D62 ANEMIA, POSTHEMORRHAGIC, ACUTE: ICD-10-CM

## 2017-06-20 DIAGNOSIS — Z48.89 POSTOPERATIVE VISIT: ICD-10-CM

## 2017-06-20 LAB
ALBUMIN SERPL-MCNC: 3.5 G/DL (ref 3.5–5.2)
ALBUMIN/GLOB SERPL: 0.8 G/DL
ALP SERPL-CCNC: 79 U/L (ref 39–117)
ALT SERPL W P-5'-P-CCNC: 28 U/L (ref 1–41)
ANION GAP SERPL CALCULATED.3IONS-SCNC: 18.5 MMOL/L
AST SERPL-CCNC: 52 U/L (ref 1–40)
BASOPHILS # BLD AUTO: 0.02 10*3/MM3 (ref 0–0.2)
BASOPHILS NFR BLD AUTO: 0.2 % (ref 0–1.5)
BILIRUB SERPL-MCNC: 0.7 MG/DL (ref 0.1–1.2)
BUN BLD-MCNC: 18 MG/DL (ref 8–23)
BUN/CREAT SERPL: 13.4 (ref 7–25)
CALCIUM SPEC-SCNC: 9 MG/DL (ref 8.6–10.5)
CHLORIDE SERPL-SCNC: 99 MMOL/L (ref 98–107)
CO2 SERPL-SCNC: 20.5 MMOL/L (ref 22–29)
CREAT BLD-MCNC: 1.34 MG/DL (ref 0.76–1.27)
CRP SERPL-MCNC: 21.56 MG/DL (ref 0–0.5)
DEPRECATED RDW RBC AUTO: 52.6 FL (ref 37–54)
EOSINOPHIL # BLD AUTO: 0.21 10*3/MM3 (ref 0–0.7)
EOSINOPHIL NFR BLD AUTO: 1.6 % (ref 0.3–6.2)
ERYTHROCYTE [DISTWIDTH] IN BLOOD BY AUTOMATED COUNT: 18.4 % (ref 11.5–14.5)
ERYTHROCYTE [SEDIMENTATION RATE] IN BLOOD: 99 MM/HR (ref 0–20)
GFR SERPL CREATININE-BSD FRML MDRD: 65 ML/MIN/1.73
GLOBULIN UR ELPH-MCNC: 4.5 GM/DL
GLUCOSE BLD-MCNC: 74 MG/DL (ref 65–99)
GLUCOSE BLDC GLUCOMTR-MCNC: 111 MG/DL (ref 70–130)
GLUCOSE BLDC GLUCOMTR-MCNC: 117 MG/DL (ref 70–130)
GLUCOSE BLDC GLUCOMTR-MCNC: 65 MG/DL (ref 70–130)
GLUCOSE BLDC GLUCOMTR-MCNC: 65 MG/DL (ref 70–130)
GLUCOSE BLDC GLUCOMTR-MCNC: 67 MG/DL (ref 70–130)
GLUCOSE BLDC GLUCOMTR-MCNC: 73 MG/DL (ref 70–130)
HCT VFR BLD AUTO: 31.9 % (ref 40.4–52.2)
HGB BLD-MCNC: 10.7 G/DL (ref 13.7–17.6)
IMM GRANULOCYTES # BLD: 0.07 10*3/MM3 (ref 0–0.03)
IMM GRANULOCYTES NFR BLD: 0.5 % (ref 0–0.5)
LYMPHOCYTES # BLD AUTO: 1.93 10*3/MM3 (ref 0.9–4.8)
LYMPHOCYTES NFR BLD AUTO: 14.8 % (ref 19.6–45.3)
MCH RBC QN AUTO: 26 PG (ref 27–32.7)
MCHC RBC AUTO-ENTMCNC: 33.5 G/DL (ref 32.6–36.4)
MCV RBC AUTO: 77.4 FL (ref 79.8–96.2)
MONOCYTES # BLD AUTO: 1.77 10*3/MM3 (ref 0.2–1.2)
MONOCYTES NFR BLD AUTO: 13.5 % (ref 5–12)
NEUTROPHILS # BLD AUTO: 9.08 10*3/MM3 (ref 1.9–8.1)
NEUTROPHILS NFR BLD AUTO: 69.4 % (ref 42.7–76)
PLATELET # BLD AUTO: 226 10*3/MM3 (ref 140–500)
PMV BLD AUTO: 10.3 FL (ref 6–12)
POTASSIUM BLD-SCNC: 3.9 MMOL/L (ref 3.5–5.2)
PROT SERPL-MCNC: 8 G/DL (ref 6–8.5)
RBC # BLD AUTO: 4.12 10*6/MM3 (ref 4.6–6)
SODIUM BLD-SCNC: 138 MMOL/L (ref 136–145)
WBC NRBC COR # BLD: 13.08 10*3/MM3 (ref 4.5–10.7)

## 2017-06-20 PROCEDURE — 25010000002 HYDROMORPHONE PER 4 MG: Performed by: ORTHOPAEDIC SURGERY

## 2017-06-20 PROCEDURE — 85025 COMPLETE CBC W/AUTO DIFF WBC: CPT | Performed by: ORTHOPAEDIC SURGERY

## 2017-06-20 PROCEDURE — 85652 RBC SED RATE AUTOMATED: CPT | Performed by: ORTHOPAEDIC SURGERY

## 2017-06-20 PROCEDURE — 0S9C0ZZ DRAINAGE OF RIGHT KNEE JOINT, OPEN APPROACH: ICD-10-PCS | Performed by: ORTHOPAEDIC SURGERY

## 2017-06-20 PROCEDURE — 86140 C-REACTIVE PROTEIN: CPT | Performed by: ORTHOPAEDIC SURGERY

## 2017-06-20 PROCEDURE — 25010000002 FENTANYL CITRATE (PF) 100 MCG/2ML SOLUTION: Performed by: ANESTHESIOLOGY

## 2017-06-20 PROCEDURE — 25010000002 PROPOFOL 10 MG/ML EMULSION: Performed by: ANESTHESIOLOGY

## 2017-06-20 PROCEDURE — 63710000001 DIPHENHYDRAMINE PER 50 MG: Performed by: ORTHOPAEDIC SURGERY

## 2017-06-20 PROCEDURE — 25010000002 HYDROMORPHONE PER 4 MG: Performed by: ANESTHESIOLOGY

## 2017-06-20 PROCEDURE — 94799 UNLISTED PULMONARY SVC/PX: CPT

## 2017-06-20 PROCEDURE — 25010000002 CEFTRIAXONE PER 250 MG: Performed by: ORTHOPAEDIC SURGERY

## 2017-06-20 PROCEDURE — 25010000002 VANCOMYCIN PER 500 MG: Performed by: ANESTHESIOLOGY

## 2017-06-20 PROCEDURE — 80053 COMPREHEN METABOLIC PANEL: CPT | Performed by: ORTHOPAEDIC SURGERY

## 2017-06-20 PROCEDURE — 25010000002 MIDAZOLAM PER 1 MG: Performed by: ANESTHESIOLOGY

## 2017-06-20 PROCEDURE — 82962 GLUCOSE BLOOD TEST: CPT

## 2017-06-20 PROCEDURE — 25010000002 ONDANSETRON PER 1 MG: Performed by: ANESTHESIOLOGY

## 2017-06-20 PROCEDURE — 87070 CULTURE OTHR SPECIMN AEROBIC: CPT | Performed by: ORTHOPAEDIC SURGERY

## 2017-06-20 PROCEDURE — 87075 CULTR BACTERIA EXCEPT BLOOD: CPT | Performed by: ORTHOPAEDIC SURGERY

## 2017-06-20 PROCEDURE — 87205 SMEAR GRAM STAIN: CPT | Performed by: ORTHOPAEDIC SURGERY

## 2017-06-20 PROCEDURE — 25010000002 MEPERIDINE 25 MG/0.5ML SOLUTION

## 2017-06-20 RX ORDER — MIDAZOLAM HYDROCHLORIDE 1 MG/ML
1 INJECTION INTRAMUSCULAR; INTRAVENOUS
Status: DISCONTINUED | OUTPATIENT
Start: 2017-06-20 | End: 2017-06-20 | Stop reason: HOSPADM

## 2017-06-20 RX ORDER — OXYCODONE HYDROCHLORIDE AND ACETAMINOPHEN 5; 325 MG/1; MG/1
1 TABLET ORAL EVERY 4 HOURS PRN
Status: DISCONTINUED | OUTPATIENT
Start: 2017-06-20 | End: 2017-06-22 | Stop reason: HOSPADM

## 2017-06-20 RX ORDER — LIDOCAINE HYDROCHLORIDE 20 MG/ML
INJECTION, SOLUTION INFILTRATION; PERINEURAL AS NEEDED
Status: DISCONTINUED | OUTPATIENT
Start: 2017-06-20 | End: 2017-06-20 | Stop reason: SURG

## 2017-06-20 RX ORDER — HYDRALAZINE HYDROCHLORIDE 20 MG/ML
5 INJECTION INTRAMUSCULAR; INTRAVENOUS
Status: DISCONTINUED | OUTPATIENT
Start: 2017-06-20 | End: 2017-06-20 | Stop reason: HOSPADM

## 2017-06-20 RX ORDER — HYDROMORPHONE HYDROCHLORIDE 1 MG/ML
0.5 INJECTION, SOLUTION INTRAMUSCULAR; INTRAVENOUS; SUBCUTANEOUS
Status: DISCONTINUED | OUTPATIENT
Start: 2017-06-20 | End: 2017-06-20 | Stop reason: HOSPADM

## 2017-06-20 RX ORDER — TRANEXAMIC ACID 100 MG/ML
INJECTION, SOLUTION INTRAVENOUS AS NEEDED
Status: DISCONTINUED | OUTPATIENT
Start: 2017-06-20 | End: 2017-06-20 | Stop reason: HOSPADM

## 2017-06-20 RX ORDER — MIDAZOLAM HYDROCHLORIDE 1 MG/ML
2 INJECTION INTRAMUSCULAR; INTRAVENOUS
Status: DISCONTINUED | OUTPATIENT
Start: 2017-06-20 | End: 2017-06-20 | Stop reason: HOSPADM

## 2017-06-20 RX ORDER — PROMETHAZINE HYDROCHLORIDE 25 MG/1
25 SUPPOSITORY RECTAL ONCE AS NEEDED
Status: DISCONTINUED | OUTPATIENT
Start: 2017-06-20 | End: 2017-06-20 | Stop reason: HOSPADM

## 2017-06-20 RX ORDER — LATANOPROST 50 UG/ML
1 SOLUTION/ DROPS OPHTHALMIC EVERY MORNING
Status: DISCONTINUED | OUTPATIENT
Start: 2017-06-21 | End: 2017-06-22 | Stop reason: HOSPADM

## 2017-06-20 RX ORDER — TRANEXAMIC ACID 100 MG/ML
INJECTION, SOLUTION INTRAVENOUS AS NEEDED
Status: DISCONTINUED | OUTPATIENT
Start: 2017-06-20 | End: 2017-06-20 | Stop reason: SURG

## 2017-06-20 RX ORDER — DEXTROSE MONOHYDRATE 25 G/50ML
25 INJECTION, SOLUTION INTRAVENOUS ONCE
Status: COMPLETED | OUTPATIENT
Start: 2017-06-20 | End: 2017-06-20

## 2017-06-20 RX ORDER — ASPIRIN 325 MG
325 TABLET, DELAYED RELEASE (ENTERIC COATED) ORAL DAILY
Status: DISCONTINUED | OUTPATIENT
Start: 2017-06-21 | End: 2017-06-21

## 2017-06-20 RX ORDER — PROMETHAZINE HYDROCHLORIDE 25 MG/1
12.5 TABLET ORAL ONCE AS NEEDED
Status: DISCONTINUED | OUTPATIENT
Start: 2017-06-20 | End: 2017-06-20 | Stop reason: HOSPADM

## 2017-06-20 RX ORDER — FENTANYL CITRATE 50 UG/ML
50 INJECTION, SOLUTION INTRAMUSCULAR; INTRAVENOUS
Status: DISCONTINUED | OUTPATIENT
Start: 2017-06-20 | End: 2017-06-20 | Stop reason: HOSPADM

## 2017-06-20 RX ORDER — DIPHENHYDRAMINE HYDROCHLORIDE 50 MG/ML
12.5 INJECTION INTRAMUSCULAR; INTRAVENOUS
Status: DISCONTINUED | OUTPATIENT
Start: 2017-06-20 | End: 2017-06-20 | Stop reason: HOSPADM

## 2017-06-20 RX ORDER — AMLODIPINE BESYLATE 5 MG/1
5 TABLET ORAL EVERY MORNING
Status: DISCONTINUED | OUTPATIENT
Start: 2017-06-21 | End: 2017-06-22 | Stop reason: HOSPADM

## 2017-06-20 RX ORDER — BISACODYL 5 MG/1
10 TABLET, DELAYED RELEASE ORAL DAILY PRN
Status: DISCONTINUED | OUTPATIENT
Start: 2017-06-20 | End: 2017-06-22 | Stop reason: HOSPADM

## 2017-06-20 RX ORDER — NICOTINE POLACRILEX 4 MG
15 LOZENGE BUCCAL
Status: DISCONTINUED | OUTPATIENT
Start: 2017-06-20 | End: 2017-06-22 | Stop reason: HOSPADM

## 2017-06-20 RX ORDER — PANTOPRAZOLE SODIUM 40 MG/1
40 TABLET, DELAYED RELEASE ORAL EVERY MORNING
Status: DISCONTINUED | OUTPATIENT
Start: 2017-06-21 | End: 2017-06-22 | Stop reason: HOSPADM

## 2017-06-20 RX ORDER — DOCUSATE SODIUM 100 MG/1
100 CAPSULE, LIQUID FILLED ORAL 2 TIMES DAILY PRN
Status: DISCONTINUED | OUTPATIENT
Start: 2017-06-20 | End: 2017-06-22 | Stop reason: HOSPADM

## 2017-06-20 RX ORDER — ONDANSETRON 4 MG/1
4 TABLET, FILM COATED ORAL EVERY 6 HOURS PRN
Status: DISCONTINUED | OUTPATIENT
Start: 2017-06-20 | End: 2017-06-22 | Stop reason: HOSPADM

## 2017-06-20 RX ORDER — PIOGLITAZONEHYDROCHLORIDE 45 MG/1
TABLET ORAL
Qty: 90 TABLET | Refills: 1 | Status: SHIPPED | OUTPATIENT
Start: 2017-06-20 | End: 2017-12-27 | Stop reason: SDUPTHER

## 2017-06-20 RX ORDER — ONDANSETRON 2 MG/ML
INJECTION INTRAMUSCULAR; INTRAVENOUS AS NEEDED
Status: DISCONTINUED | OUTPATIENT
Start: 2017-06-20 | End: 2017-06-20 | Stop reason: SURG

## 2017-06-20 RX ORDER — ACETAMINOPHEN 325 MG/1
325 TABLET ORAL EVERY 4 HOURS PRN
Status: DISCONTINUED | OUTPATIENT
Start: 2017-06-20 | End: 2017-06-22 | Stop reason: HOSPADM

## 2017-06-20 RX ORDER — SODIUM CHLORIDE, SODIUM LACTATE, POTASSIUM CHLORIDE, CALCIUM CHLORIDE 600; 310; 30; 20 MG/100ML; MG/100ML; MG/100ML; MG/100ML
9 INJECTION, SOLUTION INTRAVENOUS CONTINUOUS
Status: DISCONTINUED | OUTPATIENT
Start: 2017-06-20 | End: 2017-06-20 | Stop reason: HOSPADM

## 2017-06-20 RX ORDER — PROMETHAZINE HYDROCHLORIDE 25 MG/ML
12.5 INJECTION, SOLUTION INTRAMUSCULAR; INTRAVENOUS ONCE AS NEEDED
Status: DISCONTINUED | OUTPATIENT
Start: 2017-06-20 | End: 2017-06-20 | Stop reason: HOSPADM

## 2017-06-20 RX ORDER — SODIUM CHLORIDE 0.9 % (FLUSH) 0.9 %
1-10 SYRINGE (ML) INJECTION AS NEEDED
Status: DISCONTINUED | OUTPATIENT
Start: 2017-06-20 | End: 2017-06-22 | Stop reason: HOSPADM

## 2017-06-20 RX ORDER — DIPHENHYDRAMINE HCL 25 MG
25 CAPSULE ORAL EVERY 6 HOURS PRN
Status: DISCONTINUED | OUTPATIENT
Start: 2017-06-20 | End: 2017-06-22 | Stop reason: HOSPADM

## 2017-06-20 RX ORDER — DORZOLAMIDE HYDROCHLORIDE AND TIMOLOL MALEATE 20; 5 MG/ML; MG/ML
1 SOLUTION/ DROPS OPHTHALMIC 2 TIMES DAILY
Status: DISCONTINUED | OUTPATIENT
Start: 2017-06-20 | End: 2017-06-22 | Stop reason: HOSPADM

## 2017-06-20 RX ORDER — HYDROMORPHONE HYDROCHLORIDE 1 MG/ML
0.5 INJECTION, SOLUTION INTRAMUSCULAR; INTRAVENOUS; SUBCUTANEOUS
Status: DISCONTINUED | OUTPATIENT
Start: 2017-06-20 | End: 2017-06-22 | Stop reason: HOSPADM

## 2017-06-20 RX ORDER — EPHEDRINE SULFATE 50 MG/ML
5 INJECTION, SOLUTION INTRAVENOUS ONCE AS NEEDED
Status: DISCONTINUED | OUTPATIENT
Start: 2017-06-20 | End: 2017-06-20 | Stop reason: HOSPADM

## 2017-06-20 RX ORDER — DEXTROSE MONOHYDRATE 25 G/50ML
25 INJECTION, SOLUTION INTRAVENOUS
Status: DISCONTINUED | OUTPATIENT
Start: 2017-06-20 | End: 2017-06-22 | Stop reason: HOSPADM

## 2017-06-20 RX ORDER — NALOXONE HCL 0.4 MG/ML
0.2 VIAL (ML) INJECTION AS NEEDED
Status: DISCONTINUED | OUTPATIENT
Start: 2017-06-20 | End: 2017-06-20 | Stop reason: HOSPADM

## 2017-06-20 RX ORDER — FERROUS SULFATE 325(65) MG
325 TABLET ORAL
Status: DISCONTINUED | OUTPATIENT
Start: 2017-06-21 | End: 2017-06-22 | Stop reason: HOSPADM

## 2017-06-20 RX ORDER — ESCITALOPRAM OXALATE 10 MG/1
10 TABLET ORAL EVERY MORNING
Status: DISCONTINUED | OUTPATIENT
Start: 2017-06-21 | End: 2017-06-22 | Stop reason: HOSPADM

## 2017-06-20 RX ORDER — LABETALOL HYDROCHLORIDE 5 MG/ML
5 INJECTION, SOLUTION INTRAVENOUS
Status: DISCONTINUED | OUTPATIENT
Start: 2017-06-20 | End: 2017-06-20 | Stop reason: HOSPADM

## 2017-06-20 RX ORDER — OXYCODONE AND ACETAMINOPHEN 7.5; 325 MG/1; MG/1
1 TABLET ORAL ONCE AS NEEDED
Status: DISCONTINUED | OUTPATIENT
Start: 2017-06-20 | End: 2017-06-20 | Stop reason: HOSPADM

## 2017-06-20 RX ORDER — KETOROLAC TROMETHAMINE 30 MG/ML
30 INJECTION, SOLUTION INTRAMUSCULAR; INTRAVENOUS EVERY 6 HOURS
Status: DISCONTINUED | OUTPATIENT
Start: 2017-06-20 | End: 2017-06-21

## 2017-06-20 RX ORDER — ONDANSETRON 2 MG/ML
4 INJECTION INTRAMUSCULAR; INTRAVENOUS ONCE AS NEEDED
Status: DISCONTINUED | OUTPATIENT
Start: 2017-06-20 | End: 2017-06-20 | Stop reason: HOSPADM

## 2017-06-20 RX ORDER — PROMETHAZINE HYDROCHLORIDE 25 MG/1
25 TABLET ORAL ONCE AS NEEDED
Status: DISCONTINUED | OUTPATIENT
Start: 2017-06-20 | End: 2017-06-20 | Stop reason: HOSPADM

## 2017-06-20 RX ORDER — BISACODYL 10 MG
10 SUPPOSITORY, RECTAL RECTAL DAILY PRN
Status: DISCONTINUED | OUTPATIENT
Start: 2017-06-20 | End: 2017-06-22 | Stop reason: HOSPADM

## 2017-06-20 RX ORDER — HYDROCODONE BITARTRATE AND ACETAMINOPHEN 7.5; 325 MG/1; MG/1
1 TABLET ORAL ONCE AS NEEDED
Status: DISCONTINUED | OUTPATIENT
Start: 2017-06-20 | End: 2017-06-20 | Stop reason: HOSPADM

## 2017-06-20 RX ORDER — FENTANYL CITRATE 50 UG/ML
INJECTION, SOLUTION INTRAMUSCULAR; INTRAVENOUS AS NEEDED
Status: DISCONTINUED | OUTPATIENT
Start: 2017-06-20 | End: 2017-06-20 | Stop reason: SURG

## 2017-06-20 RX ORDER — FAMOTIDINE 10 MG/ML
20 INJECTION, SOLUTION INTRAVENOUS ONCE
Status: COMPLETED | OUTPATIENT
Start: 2017-06-20 | End: 2017-06-20

## 2017-06-20 RX ORDER — SODIUM CHLORIDE 0.9 % (FLUSH) 0.9 %
1-10 SYRINGE (ML) INJECTION AS NEEDED
Status: DISCONTINUED | OUTPATIENT
Start: 2017-06-20 | End: 2017-06-20 | Stop reason: HOSPADM

## 2017-06-20 RX ORDER — ONDANSETRON 2 MG/ML
4 INJECTION INTRAMUSCULAR; INTRAVENOUS EVERY 6 HOURS PRN
Status: DISCONTINUED | OUTPATIENT
Start: 2017-06-20 | End: 2017-06-22 | Stop reason: HOSPADM

## 2017-06-20 RX ORDER — PROPOFOL 10 MG/ML
VIAL (ML) INTRAVENOUS AS NEEDED
Status: DISCONTINUED | OUTPATIENT
Start: 2017-06-20 | End: 2017-06-20 | Stop reason: SURG

## 2017-06-20 RX ORDER — OXYCODONE HYDROCHLORIDE AND ACETAMINOPHEN 5; 325 MG/1; MG/1
2 TABLET ORAL EVERY 4 HOURS PRN
Status: DISCONTINUED | OUTPATIENT
Start: 2017-06-20 | End: 2017-06-22 | Stop reason: HOSPADM

## 2017-06-20 RX ORDER — FLUMAZENIL 0.1 MG/ML
0.2 INJECTION INTRAVENOUS AS NEEDED
Status: DISCONTINUED | OUTPATIENT
Start: 2017-06-20 | End: 2017-06-20 | Stop reason: HOSPADM

## 2017-06-20 RX ORDER — MAGNESIUM HYDROXIDE 1200 MG/15ML
LIQUID ORAL AS NEEDED
Status: DISCONTINUED | OUTPATIENT
Start: 2017-06-20 | End: 2017-06-20 | Stop reason: HOSPADM

## 2017-06-20 RX ORDER — CARVEDILOL 25 MG/1
25 TABLET ORAL EVERY 12 HOURS SCHEDULED
Status: DISCONTINUED | OUTPATIENT
Start: 2017-06-20 | End: 2017-06-22 | Stop reason: HOSPADM

## 2017-06-20 RX ORDER — ONDANSETRON 4 MG/1
4 TABLET, ORALLY DISINTEGRATING ORAL EVERY 6 HOURS PRN
Status: DISCONTINUED | OUTPATIENT
Start: 2017-06-20 | End: 2017-06-22 | Stop reason: HOSPADM

## 2017-06-20 RX ORDER — SODIUM CHLORIDE, SODIUM LACTATE, POTASSIUM CHLORIDE, CALCIUM CHLORIDE 600; 310; 30; 20 MG/100ML; MG/100ML; MG/100ML; MG/100ML
100 INJECTION, SOLUTION INTRAVENOUS CONTINUOUS
Status: DISCONTINUED | OUTPATIENT
Start: 2017-06-20 | End: 2017-06-22 | Stop reason: HOSPADM

## 2017-06-20 RX ADMIN — FENTANYL CITRATE 50 MCG: 50 INJECTION INTRAMUSCULAR; INTRAVENOUS at 15:00

## 2017-06-20 RX ADMIN — HYDROMORPHONE HYDROCHLORIDE 0.5 MG: 1 INJECTION, SOLUTION INTRAMUSCULAR; INTRAVENOUS; SUBCUTANEOUS at 17:27

## 2017-06-20 RX ADMIN — VANCOMYCIN HYDROCHLORIDE 1.5 G: 1 INJECTION, POWDER, LYOPHILIZED, FOR SOLUTION INTRAVENOUS at 15:08

## 2017-06-20 RX ADMIN — METFORMIN HYDROCHLORIDE 500 MG: 500 TABLET ORAL at 21:03

## 2017-06-20 RX ADMIN — HYDROMORPHONE HYDROCHLORIDE 0.5 MG: 1 INJECTION, SOLUTION INTRAMUSCULAR; INTRAVENOUS; SUBCUTANEOUS at 22:42

## 2017-06-20 RX ADMIN — FAMOTIDINE 20 MG: 10 INJECTION, SOLUTION INTRAVENOUS at 13:47

## 2017-06-20 RX ADMIN — TRANEXAMIC ACID 1000 MG: 100 INJECTION, SOLUTION INTRAVENOUS at 15:09

## 2017-06-20 RX ADMIN — OXYCODONE HYDROCHLORIDE AND ACETAMINOPHEN 2 TABLET: 5; 325 TABLET ORAL at 21:01

## 2017-06-20 RX ADMIN — HYDROMORPHONE HYDROCHLORIDE 0.5 MG: 1 INJECTION, SOLUTION INTRAMUSCULAR; INTRAVENOUS; SUBCUTANEOUS at 16:54

## 2017-06-20 RX ADMIN — PROPOFOL 200 MG: 10 INJECTION, EMULSION INTRAVENOUS at 14:35

## 2017-06-20 RX ADMIN — DIPHENHYDRAMINE HYDROCHLORIDE 25 MG: 25 CAPSULE ORAL at 21:01

## 2017-06-20 RX ADMIN — CEFTRIAXONE SODIUM 2 G: 2 INJECTION, POWDER, FOR SOLUTION INTRAMUSCULAR; INTRAVENOUS at 21:01

## 2017-06-20 RX ADMIN — MEPERIDINE HYDROCHLORIDE 12.5 MG: 25 INJECTION, SOLUTION INTRAMUSCULAR; INTRAVENOUS; SUBCUTANEOUS at 16:14

## 2017-06-20 RX ADMIN — DEXTROSE MONOHYDRATE 25 ML: 25 INJECTION, SOLUTION INTRAVENOUS at 14:08

## 2017-06-20 RX ADMIN — CARVEDILOL 25 MG: 25 TABLET, FILM COATED ORAL at 22:59

## 2017-06-20 RX ADMIN — MIDAZOLAM 2 MG: 1 INJECTION INTRAMUSCULAR; INTRAVENOUS at 14:15

## 2017-06-20 RX ADMIN — FENTANYL CITRATE 100 MCG: 50 INJECTION INTRAMUSCULAR; INTRAVENOUS at 14:43

## 2017-06-20 RX ADMIN — FENTANYL CITRATE 50 MCG: 50 INJECTION INTRAMUSCULAR; INTRAVENOUS at 16:17

## 2017-06-20 RX ADMIN — DORZOLAMIDE HYDROCHLORIDE AND TIMOLOL MALEATE 1 DROP: 20; 5 SOLUTION OPHTHALMIC at 21:02

## 2017-06-20 RX ADMIN — ONDANSETRON 4 MG: 2 INJECTION INTRAMUSCULAR; INTRAVENOUS at 15:00

## 2017-06-20 RX ADMIN — LIDOCAINE HYDROCHLORIDE 100 MG: 20 INJECTION, SOLUTION INFILTRATION; PERINEURAL at 14:35

## 2017-06-20 RX ADMIN — HYDROMORPHONE HYDROCHLORIDE 0.5 MG: 1 INJECTION, SOLUTION INTRAMUSCULAR; INTRAVENOUS; SUBCUTANEOUS at 16:38

## 2017-06-20 RX ADMIN — SODIUM CHLORIDE, POTASSIUM CHLORIDE, SODIUM LACTATE AND CALCIUM CHLORIDE 9 ML/HR: 600; 310; 30; 20 INJECTION, SOLUTION INTRAVENOUS at 13:47

## 2017-06-20 RX ADMIN — HYDROMORPHONE HYDROCHLORIDE 0.5 MG: 1 INJECTION, SOLUTION INTRAMUSCULAR; INTRAVENOUS; SUBCUTANEOUS at 16:24

## 2017-06-20 RX ADMIN — FENTANYL CITRATE 50 MCG: 50 INJECTION INTRAMUSCULAR; INTRAVENOUS at 15:25

## 2017-06-20 RX ADMIN — FENTANYL CITRATE 50 MCG: 50 INJECTION INTRAMUSCULAR; INTRAVENOUS at 16:29

## 2017-06-20 NOTE — ANESTHESIA POSTPROCEDURE EVALUATION
Patient: Isaias Monaco    Procedure Summary     Date Anesthesia Start Anesthesia Stop Room / Location    06/20/17 [id number]  [facility] / [facility]       Procedure Diagnosis Surgeon Provider    RT. KNEE WASHOUT  (Right Knee) No diagnosis on file. MD Titus Oropeza MD          Anesthesia Type: general  Last vitals  BP      Temp      Pulse     Resp      SpO2        Post Anesthesia Care and Evaluation    Patient location during evaluation: PACU  Patient participation: complete - patient participated  Level of consciousness: awake and alert  Pain management: adequate  Airway patency: patent  Anesthetic complications: No anesthetic complications    Cardiovascular status: acceptable  Respiratory status: acceptable  Hydration status: acceptable

## 2017-06-20 NOTE — ANESTHESIA PROCEDURE NOTES
Airway  Urgency: elective    Date/Time: 6/20/2017 2:38 PM  End Time:6/20/2017 2:40 PM  Airway not difficult    General Information and Staff    Patient location during procedure: OR  Anesthesiologist: JCARLOS MONTES    Indications and Patient Condition  Indications for airway management: airway protection    Preoxygenated: yes  MILS not maintained throughout  Mask difficulty assessment: 1 - vent by mask    Final Airway Details  Final airway type: supraglottic airway      Successful airway: classic  Size 5    Number of attempts at approach: 1    Additional Comments  Inserted easily, Atraumatic  Teeth ok

## 2017-06-20 NOTE — ANESTHESIA PREPROCEDURE EVALUATION
Anesthesia Evaluation     Patient summary reviewed and Nursing notes reviewed   no history of anesthetic complications:  NPO Solid Status: > 8 hours  NPO Liquid Status: > 8 hours     Airway   Mallampati: II  TM distance: >3 FB  Neck ROM: full  no difficulty expected  Dental - normal exam     Pulmonary - negative pulmonary ROS and normal exam    breath sounds clear to auscultation  Cardiovascular - normal exam    Rhythm: regular  Rate: normal    (+) hypertension, CAD, cardiac stents more than 12 months ago hyperlipidemia      Neuro/Psych  (+) psychiatric history Anxiety,    GI/Hepatic/Renal/Endo    (+) obesity,  GERD, diabetes mellitus type 2 using insulin,   (-) morbid obesity    Musculoskeletal         ROS comment: S/p  R TKA Avis 15, 2017  Abdominal   (+) obese,    Substance History - negative use     OB/GYN negative ob/gyn ROS         Other   (+) arthritis     (-) history of cancer                                  Anesthesia Plan    ASA 3     general     intravenous induction   Anesthetic plan and risks discussed with patient.

## 2017-06-21 PROBLEM — D64.9 POSTOPERATIVE ANEMIA: Status: ACTIVE | Noted: 2017-06-21

## 2017-06-21 PROBLEM — I10 ESSENTIAL HYPERTENSION: Status: ACTIVE | Noted: 2017-06-21

## 2017-06-21 LAB
ANION GAP SERPL CALCULATED.3IONS-SCNC: 17.2 MMOL/L
BUN BLD-MCNC: 15 MG/DL (ref 8–23)
BUN/CREAT SERPL: 11 (ref 7–25)
CALCIUM SPEC-SCNC: 8.5 MG/DL (ref 8.6–10.5)
CHLORIDE SERPL-SCNC: 99 MMOL/L (ref 98–107)
CO2 SERPL-SCNC: 21.8 MMOL/L (ref 22–29)
CREAT BLD-MCNC: 1.36 MG/DL (ref 0.76–1.27)
GFR SERPL CREATININE-BSD FRML MDRD: 64 ML/MIN/1.73
GLUCOSE BLD-MCNC: 98 MG/DL (ref 65–99)
GLUCOSE BLDC GLUCOMTR-MCNC: 111 MG/DL (ref 70–130)
GLUCOSE BLDC GLUCOMTR-MCNC: 144 MG/DL (ref 70–130)
GLUCOSE BLDC GLUCOMTR-MCNC: 151 MG/DL (ref 70–130)
GLUCOSE BLDC GLUCOMTR-MCNC: 69 MG/DL (ref 70–130)
GLUCOSE BLDC GLUCOMTR-MCNC: 95 MG/DL (ref 70–130)
HCT VFR BLD AUTO: 30.8 % (ref 40.4–52.2)
HGB BLD-MCNC: 9.9 G/DL (ref 13.7–17.6)
POTASSIUM BLD-SCNC: 3.6 MMOL/L (ref 3.5–5.2)
SODIUM BLD-SCNC: 138 MMOL/L (ref 136–145)

## 2017-06-21 PROCEDURE — 85014 HEMATOCRIT: CPT | Performed by: ORTHOPAEDIC SURGERY

## 2017-06-21 PROCEDURE — 97110 THERAPEUTIC EXERCISES: CPT

## 2017-06-21 PROCEDURE — 80048 BASIC METABOLIC PNL TOTAL CA: CPT | Performed by: ORTHOPAEDIC SURGERY

## 2017-06-21 PROCEDURE — C1751 CATH, INF, PER/CENT/MIDLINE: HCPCS

## 2017-06-21 PROCEDURE — 25010000003 CEFTRIAXONE PER 250 MG: Performed by: ORTHOPAEDIC SURGERY

## 2017-06-21 PROCEDURE — 82962 GLUCOSE BLOOD TEST: CPT

## 2017-06-21 PROCEDURE — 25010000002 VANCOMYCIN: Performed by: ORTHOPAEDIC SURGERY

## 2017-06-21 PROCEDURE — 99254 IP/OBS CNSLTJ NEW/EST MOD 60: CPT | Performed by: INTERNAL MEDICINE

## 2017-06-21 PROCEDURE — 97161 PT EVAL LOW COMPLEX 20 MIN: CPT

## 2017-06-21 PROCEDURE — 94799 UNLISTED PULMONARY SVC/PX: CPT

## 2017-06-21 PROCEDURE — 85018 HEMOGLOBIN: CPT | Performed by: ORTHOPAEDIC SURGERY

## 2017-06-21 PROCEDURE — 63710000001 DIPHENHYDRAMINE PER 50 MG: Performed by: ORTHOPAEDIC SURGERY

## 2017-06-21 PROCEDURE — 25010000002 HYDROMORPHONE PER 4 MG: Performed by: ORTHOPAEDIC SURGERY

## 2017-06-21 RX ORDER — SODIUM CHLORIDE 0.9 % (FLUSH) 0.9 %
10 SYRINGE (ML) INJECTION AS NEEDED
Status: DISCONTINUED | OUTPATIENT
Start: 2017-06-21 | End: 2017-06-22 | Stop reason: HOSPADM

## 2017-06-21 RX ORDER — ASPIRIN 81 MG/1
81 TABLET ORAL DAILY
Status: DISCONTINUED | OUTPATIENT
Start: 2017-06-22 | End: 2017-06-22 | Stop reason: HOSPADM

## 2017-06-21 RX ORDER — SODIUM CHLORIDE 0.9 % (FLUSH) 0.9 %
10 SYRINGE (ML) INJECTION EVERY 12 HOURS SCHEDULED
Status: DISCONTINUED | OUTPATIENT
Start: 2017-06-21 | End: 2017-06-22 | Stop reason: HOSPADM

## 2017-06-21 RX ORDER — CEFTRIAXONE SODIUM 2 G/50ML
2 INJECTION, SOLUTION INTRAVENOUS EVERY 24 HOURS
Status: DISCONTINUED | OUTPATIENT
Start: 2017-06-21 | End: 2017-06-22 | Stop reason: HOSPADM

## 2017-06-21 RX ADMIN — Medication 10 ML: at 13:06

## 2017-06-21 RX ADMIN — Medication 10 ML: at 21:22

## 2017-06-21 RX ADMIN — CARVEDILOL 25 MG: 25 TABLET, FILM COATED ORAL at 08:29

## 2017-06-21 RX ADMIN — DORZOLAMIDE HYDROCHLORIDE AND TIMOLOL MALEATE 1 DROP: 20; 5 SOLUTION OPHTHALMIC at 21:21

## 2017-06-21 RX ADMIN — OXYCODONE HYDROCHLORIDE AND ACETAMINOPHEN 2 TABLET: 5; 325 TABLET ORAL at 21:34

## 2017-06-21 RX ADMIN — VANCOMYCIN HYDROCHLORIDE 1750 MG: 1 INJECTION, POWDER, LYOPHILIZED, FOR SOLUTION INTRAVENOUS at 21:21

## 2017-06-21 RX ADMIN — VANCOMYCIN HYDROCHLORIDE 1750 MG: 1 INJECTION, POWDER, LYOPHILIZED, FOR SOLUTION INTRAVENOUS at 10:34

## 2017-06-21 RX ADMIN — CARVEDILOL 25 MG: 25 TABLET, FILM COATED ORAL at 21:20

## 2017-06-21 RX ADMIN — LATANOPROST 1 DROP: 50 SOLUTION OPHTHALMIC at 08:30

## 2017-06-21 RX ADMIN — HYDROMORPHONE HYDROCHLORIDE 0.5 MG: 1 INJECTION, SOLUTION INTRAMUSCULAR; INTRAVENOUS; SUBCUTANEOUS at 10:35

## 2017-06-21 RX ADMIN — OXYCODONE HYDROCHLORIDE AND ACETAMINOPHEN 2 TABLET: 5; 325 TABLET ORAL at 09:14

## 2017-06-21 RX ADMIN — DIPHENHYDRAMINE HYDROCHLORIDE 25 MG: 25 CAPSULE ORAL at 05:23

## 2017-06-21 RX ADMIN — ASPIRIN 325 MG: 325 TABLET, DELAYED RELEASE ORAL at 08:29

## 2017-06-21 RX ADMIN — PANTOPRAZOLE SODIUM 40 MG: 40 TABLET, DELAYED RELEASE ORAL at 08:39

## 2017-06-21 RX ADMIN — HYDROMORPHONE HYDROCHLORIDE 0.5 MG: 1 INJECTION, SOLUTION INTRAMUSCULAR; INTRAVENOUS; SUBCUTANEOUS at 02:43

## 2017-06-21 RX ADMIN — DORZOLAMIDE HYDROCHLORIDE AND TIMOLOL MALEATE 1 DROP: 20; 5 SOLUTION OPHTHALMIC at 08:29

## 2017-06-21 RX ADMIN — FERROUS SULFATE TAB 325 MG (65 MG ELEMENTAL FE) 325 MG: 325 (65 FE) TAB at 08:29

## 2017-06-21 RX ADMIN — DOCUSATE SODIUM 100 MG: 100 CAPSULE, LIQUID FILLED ORAL at 08:30

## 2017-06-21 RX ADMIN — OXYCODONE HYDROCHLORIDE AND ACETAMINOPHEN 2 TABLET: 5; 325 TABLET ORAL at 13:04

## 2017-06-21 RX ADMIN — AMLODIPINE BESYLATE 5 MG: 5 TABLET ORAL at 13:04

## 2017-06-21 RX ADMIN — OXYCODONE HYDROCHLORIDE AND ACETAMINOPHEN 2 TABLET: 5; 325 TABLET ORAL at 01:21

## 2017-06-21 RX ADMIN — ESCITALOPRAM OXALATE 10 MG: 10 TABLET ORAL at 08:39

## 2017-06-21 RX ADMIN — CEFTRIAXONE SODIUM 2 G: 2 INJECTION, SOLUTION INTRAVENOUS at 16:59

## 2017-06-21 RX ADMIN — HYDROMORPHONE HYDROCHLORIDE 0.5 MG: 1 INJECTION, SOLUTION INTRAMUSCULAR; INTRAVENOUS; SUBCUTANEOUS at 00:42

## 2017-06-21 RX ADMIN — OXYCODONE HYDROCHLORIDE AND ACETAMINOPHEN 2 TABLET: 5; 325 TABLET ORAL at 05:23

## 2017-06-22 ENCOUNTER — HOSPITAL ENCOUNTER (INPATIENT)
Facility: HOSPITAL | Age: 62
LOS: 1 days | Discharge: HOME HEALTH | End: 2017-06-24
Attending: EMERGENCY MEDICINE | Admitting: ORTHOPAEDIC SURGERY

## 2017-06-22 VITALS
WEIGHT: 272.06 LBS | HEIGHT: 77 IN | HEART RATE: 87 BPM | DIASTOLIC BLOOD PRESSURE: 75 MMHG | RESPIRATION RATE: 16 BRPM | SYSTOLIC BLOOD PRESSURE: 130 MMHG | BODY MASS INDEX: 32.12 KG/M2 | OXYGEN SATURATION: 95 % | TEMPERATURE: 97 F

## 2017-06-22 DIAGNOSIS — R50.9 FEBRILE ILLNESS: Primary | ICD-10-CM

## 2017-06-22 DIAGNOSIS — IMO0001 WOUND INFECTION AFTER SURGERY, INITIAL ENCOUNTER: ICD-10-CM

## 2017-06-22 PROBLEM — D62 ANEMIA, POSTHEMORRHAGIC, ACUTE: Status: ACTIVE | Noted: 2017-06-22

## 2017-06-22 LAB
ALBUMIN SERPL-MCNC: 2.9 G/DL (ref 3.5–5.2)
ALBUMIN/GLOB SERPL: 0.7 G/DL
ALP SERPL-CCNC: 77 U/L (ref 39–117)
ALT SERPL W P-5'-P-CCNC: 33 U/L (ref 1–41)
ANION GAP SERPL CALCULATED.3IONS-SCNC: 17.6 MMOL/L
AST SERPL-CCNC: 53 U/L (ref 1–40)
BASOPHILS # BLD AUTO: 0.06 10*3/MM3 (ref 0–0.2)
BASOPHILS NFR BLD AUTO: 0.5 % (ref 0–1.5)
BILIRUB SERPL-MCNC: 0.6 MG/DL (ref 0.1–1.2)
BUN BLD-MCNC: 14 MG/DL (ref 8–23)
BUN/CREAT SERPL: 10.8 (ref 7–25)
CALCIUM SPEC-SCNC: 8.8 MG/DL (ref 8.6–10.5)
CHLORIDE SERPL-SCNC: 100 MMOL/L (ref 98–107)
CO2 SERPL-SCNC: 22.4 MMOL/L (ref 22–29)
CREAT BLD-MCNC: 1.3 MG/DL (ref 0.76–1.27)
CRP SERPL-MCNC: 20.28 MG/DL (ref 0–0.5)
DEPRECATED RDW RBC AUTO: 53.2 FL (ref 37–54)
EOSINOPHIL # BLD AUTO: 0.47 10*3/MM3 (ref 0–0.7)
EOSINOPHIL NFR BLD AUTO: 3.8 % (ref 0.3–6.2)
ERYTHROCYTE [DISTWIDTH] IN BLOOD BY AUTOMATED COUNT: 18.2 % (ref 11.5–14.5)
ERYTHROCYTE [SEDIMENTATION RATE] IN BLOOD: 102 MM/HR (ref 0–20)
GFR SERPL CREATININE-BSD FRML MDRD: 68 ML/MIN/1.73
GLOBULIN UR ELPH-MCNC: 4.2 GM/DL
GLUCOSE BLD-MCNC: 107 MG/DL (ref 65–99)
GLUCOSE BLDC GLUCOMTR-MCNC: 127 MG/DL (ref 70–130)
GLUCOSE BLDC GLUCOMTR-MCNC: 153 MG/DL (ref 70–130)
HCT VFR BLD AUTO: 29 % (ref 40.4–52.2)
HGB BLD-MCNC: 9.4 G/DL (ref 13.7–17.6)
IMM GRANULOCYTES # BLD: 0.08 10*3/MM3 (ref 0–0.03)
IMM GRANULOCYTES NFR BLD: 0.6 % (ref 0–0.5)
LYMPHOCYTES # BLD AUTO: 2.46 10*3/MM3 (ref 0.9–4.8)
LYMPHOCYTES NFR BLD AUTO: 19.9 % (ref 19.6–45.3)
MCH RBC QN AUTO: 25.7 PG (ref 27–32.7)
MCHC RBC AUTO-ENTMCNC: 32.4 G/DL (ref 32.6–36.4)
MCV RBC AUTO: 79.2 FL (ref 79.8–96.2)
MONOCYTES # BLD AUTO: 2.14 10*3/MM3 (ref 0.2–1.2)
MONOCYTES NFR BLD AUTO: 17.3 % (ref 5–12)
NEUTROPHILS # BLD AUTO: 7.17 10*3/MM3 (ref 1.9–8.1)
NEUTROPHILS NFR BLD AUTO: 57.9 % (ref 42.7–76)
NRBC BLD MANUAL-RTO: 0 /100 WBC (ref 0–0)
PLATELET # BLD AUTO: 259 10*3/MM3 (ref 140–500)
PMV BLD AUTO: 10.9 FL (ref 6–12)
POTASSIUM BLD-SCNC: 3.4 MMOL/L (ref 3.5–5.2)
PROT SERPL-MCNC: 7.1 G/DL (ref 6–8.5)
RBC # BLD AUTO: 3.66 10*6/MM3 (ref 4.6–6)
SODIUM BLD-SCNC: 140 MMOL/L (ref 136–145)
VANCOMYCIN TROUGH SERPL-MCNC: 11.7 MCG/ML (ref 5–20)
WBC NRBC COR # BLD: 12.38 10*3/MM3 (ref 4.5–10.7)

## 2017-06-22 PROCEDURE — 86140 C-REACTIVE PROTEIN: CPT | Performed by: ORTHOPAEDIC SURGERY

## 2017-06-22 PROCEDURE — 25010000002 VANCOMYCIN: Performed by: ORTHOPAEDIC SURGERY

## 2017-06-22 PROCEDURE — 99284 EMERGENCY DEPT VISIT MOD MDM: CPT

## 2017-06-22 PROCEDURE — 82962 GLUCOSE BLOOD TEST: CPT

## 2017-06-22 PROCEDURE — 80053 COMPREHEN METABOLIC PANEL: CPT | Performed by: HOSPITALIST

## 2017-06-22 PROCEDURE — 80202 ASSAY OF VANCOMYCIN: CPT | Performed by: ORTHOPAEDIC SURGERY

## 2017-06-22 PROCEDURE — 97150 GROUP THERAPEUTIC PROCEDURES: CPT

## 2017-06-22 PROCEDURE — 85652 RBC SED RATE AUTOMATED: CPT | Performed by: ORTHOPAEDIC SURGERY

## 2017-06-22 PROCEDURE — 97110 THERAPEUTIC EXERCISES: CPT

## 2017-06-22 PROCEDURE — 85025 COMPLETE CBC W/AUTO DIFF WBC: CPT | Performed by: ORTHOPAEDIC SURGERY

## 2017-06-22 PROCEDURE — 94799 UNLISTED PULMONARY SVC/PX: CPT

## 2017-06-22 RX ORDER — CLOPIDOGREL BISULFATE 75 MG/1
75 TABLET ORAL EVERY MORNING
Qty: 30 TABLET
Start: 2017-06-22 | End: 2017-07-25 | Stop reason: SDUPTHER

## 2017-06-22 RX ORDER — ASPIRIN 81 MG/1
81 TABLET ORAL DAILY
Qty: 30 TABLET | Refills: 3 | Status: ON HOLD
Start: 2017-06-22 | End: 2022-05-21

## 2017-06-22 RX ORDER — CEFTRIAXONE SODIUM 2 G/50ML
2 INJECTION, SOLUTION INTRAVENOUS EVERY 24 HOURS
Qty: 50 ML | Refills: 30 | Status: SHIPPED | OUTPATIENT
Start: 2017-06-22 | End: 2017-12-29

## 2017-06-22 RX ADMIN — ESCITALOPRAM OXALATE 10 MG: 10 TABLET ORAL at 06:20

## 2017-06-22 RX ADMIN — OXYCODONE HYDROCHLORIDE AND ACETAMINOPHEN 2 TABLET: 5; 325 TABLET ORAL at 08:26

## 2017-06-22 RX ADMIN — DORZOLAMIDE HYDROCHLORIDE AND TIMOLOL MALEATE 1 DROP: 20; 5 SOLUTION OPHTHALMIC at 08:30

## 2017-06-22 RX ADMIN — CARVEDILOL 25 MG: 25 TABLET, FILM COATED ORAL at 08:26

## 2017-06-22 RX ADMIN — LATANOPROST 1 DROP: 50 SOLUTION OPHTHALMIC at 06:22

## 2017-06-22 RX ADMIN — VANCOMYCIN HYDROCHLORIDE 1750 MG: 1 INJECTION, POWDER, LYOPHILIZED, FOR SOLUTION INTRAVENOUS at 10:19

## 2017-06-22 RX ADMIN — FERROUS SULFATE TAB 325 MG (65 MG ELEMENTAL FE) 325 MG: 325 (65 FE) TAB at 08:26

## 2017-06-22 RX ADMIN — ASPIRIN 81 MG: 81 TABLET ORAL at 08:26

## 2017-06-22 RX ADMIN — OXYCODONE HYDROCHLORIDE AND ACETAMINOPHEN 2 TABLET: 5; 325 TABLET ORAL at 13:18

## 2017-06-22 RX ADMIN — Medication 10 ML: at 10:19

## 2017-06-22 RX ADMIN — PANTOPRAZOLE SODIUM 40 MG: 40 TABLET, DELAYED RELEASE ORAL at 06:20

## 2017-06-22 RX ADMIN — OXYCODONE HYDROCHLORIDE AND ACETAMINOPHEN 2 TABLET: 5; 325 TABLET ORAL at 04:13

## 2017-06-23 ENCOUNTER — APPOINTMENT (OUTPATIENT)
Dept: GENERAL RADIOLOGY | Facility: HOSPITAL | Age: 62
End: 2017-06-23

## 2017-06-23 ENCOUNTER — APPOINTMENT (OUTPATIENT)
Dept: CARDIOLOGY | Facility: HOSPITAL | Age: 62
End: 2017-06-23
Attending: HOSPITALIST

## 2017-06-23 ENCOUNTER — DOCUMENTATION (OUTPATIENT)
Dept: ORTHOPEDIC SURGERY | Facility: HOSPITAL | Age: 62
End: 2017-06-23

## 2017-06-23 ENCOUNTER — APPOINTMENT (OUTPATIENT)
Dept: GENERAL RADIOLOGY | Facility: HOSPITAL | Age: 62
End: 2017-06-23
Attending: HOSPITALIST

## 2017-06-23 PROBLEM — T81.49XA WOUND INFECTION AFTER SURGERY: Status: ACTIVE | Noted: 2017-06-23

## 2017-06-23 PROBLEM — R50.9 FEBRILE ILLNESS: Status: ACTIVE | Noted: 2017-06-23

## 2017-06-23 LAB
ALBUMIN SERPL-MCNC: 3.1 G/DL (ref 3.5–5.2)
ALBUMIN/GLOB SERPL: 0.7 G/DL
ALP SERPL-CCNC: 80 U/L (ref 39–117)
ALT SERPL W P-5'-P-CCNC: 31 U/L (ref 1–41)
ANION GAP SERPL CALCULATED.3IONS-SCNC: 15.7 MMOL/L
ANISOCYTOSIS BLD QL: NORMAL
AST SERPL-CCNC: 49 U/L (ref 1–40)
BACTERIA SPEC AEROBE CULT: NORMAL
BACTERIA SPEC AEROBE CULT: NORMAL
BACTERIA UR QL AUTO: ABNORMAL /HPF
BASOPHILS # BLD AUTO: 0.06 10*3/MM3 (ref 0–0.2)
BASOPHILS NFR BLD AUTO: 0.4 % (ref 0–1.5)
BH CV LOWER VASCULAR LEFT COMMON FEMORAL AUGMENT: NORMAL
BH CV LOWER VASCULAR LEFT COMMON FEMORAL COMPETENT: NORMAL
BH CV LOWER VASCULAR LEFT COMMON FEMORAL COMPRESS: NORMAL
BH CV LOWER VASCULAR LEFT COMMON FEMORAL PHASIC: NORMAL
BH CV LOWER VASCULAR LEFT COMMON FEMORAL SPONT: NORMAL
BH CV LOWER VASCULAR LEFT DISTAL FEMORAL COMPRESS: NORMAL
BH CV LOWER VASCULAR LEFT GASTRONEMIUS COMPRESS: NORMAL
BH CV LOWER VASCULAR LEFT GREATER SAPH AK COMPRESS: NORMAL
BH CV LOWER VASCULAR LEFT GREATER SAPH BK COMPRESS: NORMAL
BH CV LOWER VASCULAR LEFT MID FEMORAL AUGMENT: NORMAL
BH CV LOWER VASCULAR LEFT MID FEMORAL COMPETENT: NORMAL
BH CV LOWER VASCULAR LEFT MID FEMORAL COMPRESS: NORMAL
BH CV LOWER VASCULAR LEFT MID FEMORAL PHASIC: NORMAL
BH CV LOWER VASCULAR LEFT MID FEMORAL SPONT: NORMAL
BH CV LOWER VASCULAR LEFT PERONEAL COMPRESS: NORMAL
BH CV LOWER VASCULAR LEFT POPLITEAL AUGMENT: NORMAL
BH CV LOWER VASCULAR LEFT POPLITEAL COMPETENT: NORMAL
BH CV LOWER VASCULAR LEFT POPLITEAL COMPRESS: NORMAL
BH CV LOWER VASCULAR LEFT POPLITEAL PHASIC: NORMAL
BH CV LOWER VASCULAR LEFT POPLITEAL SPONT: NORMAL
BH CV LOWER VASCULAR LEFT POSTERIOR TIBIAL COMPRESS: NORMAL
BH CV LOWER VASCULAR LEFT PROXIMAL FEMORAL COMPRESS: NORMAL
BH CV LOWER VASCULAR LEFT SAPHENOFEMORAL JUNCTION AUGMENT: NORMAL
BH CV LOWER VASCULAR LEFT SAPHENOFEMORAL JUNCTION COMPETENT: NORMAL
BH CV LOWER VASCULAR LEFT SAPHENOFEMORAL JUNCTION COMPRESS: NORMAL
BH CV LOWER VASCULAR LEFT SAPHENOFEMORAL JUNCTION PHASIC: NORMAL
BH CV LOWER VASCULAR LEFT SAPHENOFEMORAL JUNCTION SPONT: NORMAL
BH CV LOWER VASCULAR RIGHT COMMON FEMORAL AUGMENT: NORMAL
BH CV LOWER VASCULAR RIGHT COMMON FEMORAL COMPETENT: NORMAL
BH CV LOWER VASCULAR RIGHT COMMON FEMORAL COMPRESS: NORMAL
BH CV LOWER VASCULAR RIGHT COMMON FEMORAL PHASIC: NORMAL
BH CV LOWER VASCULAR RIGHT COMMON FEMORAL SPONT: NORMAL
BH CV LOWER VASCULAR RIGHT DISTAL FEMORAL COMPRESS: NORMAL
BH CV LOWER VASCULAR RIGHT GASTRONEMIUS COMPRESS: NORMAL
BH CV LOWER VASCULAR RIGHT GREATER SAPH AK COMPRESS: NORMAL
BH CV LOWER VASCULAR RIGHT GREATER SAPH BK COMPRESS: NORMAL
BH CV LOWER VASCULAR RIGHT MID FEMORAL AUGMENT: NORMAL
BH CV LOWER VASCULAR RIGHT MID FEMORAL COMPETENT: NORMAL
BH CV LOWER VASCULAR RIGHT MID FEMORAL COMPRESS: NORMAL
BH CV LOWER VASCULAR RIGHT MID FEMORAL PHASIC: NORMAL
BH CV LOWER VASCULAR RIGHT MID FEMORAL SPONT: NORMAL
BH CV LOWER VASCULAR RIGHT PERONEAL COMPRESS: NORMAL
BH CV LOWER VASCULAR RIGHT POPLITEAL AUGMENT: NORMAL
BH CV LOWER VASCULAR RIGHT POPLITEAL COMPETENT: NORMAL
BH CV LOWER VASCULAR RIGHT POPLITEAL COMPRESS: NORMAL
BH CV LOWER VASCULAR RIGHT POPLITEAL PHASIC: NORMAL
BH CV LOWER VASCULAR RIGHT POPLITEAL SPONT: NORMAL
BH CV LOWER VASCULAR RIGHT POSTERIOR TIBIAL COMPRESS: NORMAL
BH CV LOWER VASCULAR RIGHT PROXIMAL FEMORAL COMPRESS: NORMAL
BH CV LOWER VASCULAR RIGHT SAPHENOFEMORAL JUNCTION AUGMENT: NORMAL
BH CV LOWER VASCULAR RIGHT SAPHENOFEMORAL JUNCTION COMPETENT: NORMAL
BH CV LOWER VASCULAR RIGHT SAPHENOFEMORAL JUNCTION COMPRESS: NORMAL
BH CV LOWER VASCULAR RIGHT SAPHENOFEMORAL JUNCTION PHASIC: NORMAL
BH CV LOWER VASCULAR RIGHT SAPHENOFEMORAL JUNCTION SPONT: NORMAL
BILIRUB SERPL-MCNC: 0.5 MG/DL (ref 0.1–1.2)
BILIRUB UR QL STRIP: NEGATIVE
BUN BLD-MCNC: 14 MG/DL (ref 8–23)
BUN/CREAT SERPL: 10.7 (ref 7–25)
CALCIUM SPEC-SCNC: 8.8 MG/DL (ref 8.6–10.5)
CHLORIDE SERPL-SCNC: 100 MMOL/L (ref 98–107)
CLARITY UR: CLEAR
CO2 SERPL-SCNC: 22.3 MMOL/L (ref 22–29)
COLOR UR: YELLOW
CREAT BLD-MCNC: 1.31 MG/DL (ref 0.76–1.27)
CRP SERPL-MCNC: 19 MG/DL (ref 0–0.5)
DEPRECATED RDW RBC AUTO: 52.7 FL (ref 37–54)
EOSINOPHIL # BLD AUTO: 0.33 10*3/MM3 (ref 0–0.7)
EOSINOPHIL NFR BLD AUTO: 2.4 % (ref 0.3–6.2)
ERYTHROCYTE [DISTWIDTH] IN BLOOD BY AUTOMATED COUNT: 18.3 % (ref 11.5–14.5)
ERYTHROCYTE [SEDIMENTATION RATE] IN BLOOD: 102 MM/HR (ref 0–20)
GFR SERPL CREATININE-BSD FRML MDRD: 67 ML/MIN/1.73
GLOBULIN UR ELPH-MCNC: 4.2 GM/DL
GLUCOSE BLD-MCNC: 151 MG/DL (ref 65–99)
GLUCOSE BLDC GLUCOMTR-MCNC: 116 MG/DL (ref 70–130)
GLUCOSE BLDC GLUCOMTR-MCNC: 119 MG/DL (ref 70–130)
GLUCOSE BLDC GLUCOMTR-MCNC: 146 MG/DL (ref 70–130)
GLUCOSE BLDC GLUCOMTR-MCNC: 181 MG/DL (ref 70–130)
GLUCOSE UR STRIP-MCNC: ABNORMAL MG/DL
GRAM STN SPEC: NORMAL
HCT VFR BLD AUTO: 28.3 % (ref 40.4–52.2)
HGB BLD-MCNC: 9.3 G/DL (ref 13.7–17.6)
HGB UR QL STRIP.AUTO: ABNORMAL
HYALINE CASTS UR QL AUTO: ABNORMAL /LPF
HYPOCHROMIA BLD QL: NORMAL
IMM GRANULOCYTES # BLD: 0.11 10*3/MM3 (ref 0–0.03)
IMM GRANULOCYTES NFR BLD: 0.8 % (ref 0–0.5)
KETONES UR QL STRIP: ABNORMAL
LEUKOCYTE ESTERASE UR QL STRIP.AUTO: NEGATIVE
LYMPHOCYTES # BLD AUTO: 2.12 10*3/MM3 (ref 0.9–4.8)
LYMPHOCYTES NFR BLD AUTO: 15.6 % (ref 19.6–45.3)
MCH RBC QN AUTO: 25.5 PG (ref 27–32.7)
MCHC RBC AUTO-ENTMCNC: 32.9 G/DL (ref 32.6–36.4)
MCV RBC AUTO: 77.5 FL (ref 79.8–96.2)
MONOCYTES # BLD AUTO: 1.92 10*3/MM3 (ref 0.2–1.2)
MONOCYTES NFR BLD AUTO: 14.1 % (ref 5–12)
NEUTROPHILS # BLD AUTO: 9.03 10*3/MM3 (ref 1.9–8.1)
NEUTROPHILS NFR BLD AUTO: 66.7 % (ref 42.7–76)
NITRITE UR QL STRIP: NEGATIVE
NRBC BLD MANUAL-RTO: 0 /100 WBC (ref 0–0)
PH UR STRIP.AUTO: 5.5 [PH] (ref 5–8)
PLAT MORPH BLD: NORMAL
PLATELET # BLD AUTO: 308 10*3/MM3 (ref 140–500)
PMV BLD AUTO: 10.4 FL (ref 6–12)
POLYCHROMASIA BLD QL SMEAR: NORMAL
POTASSIUM BLD-SCNC: 3.5 MMOL/L (ref 3.5–5.2)
PROT SERPL-MCNC: 7.3 G/DL (ref 6–8.5)
PROT UR QL STRIP: ABNORMAL
RBC # BLD AUTO: 3.65 10*6/MM3 (ref 4.6–6)
RBC # UR: ABNORMAL /HPF
REF LAB TEST METHOD: ABNORMAL
SCHISTOCYTES BLD QL SMEAR: NORMAL
SODIUM BLD-SCNC: 138 MMOL/L (ref 136–145)
SP GR UR STRIP: 1.02 (ref 1–1.03)
SQUAMOUS #/AREA URNS HPF: ABNORMAL /HPF
UROBILINOGEN UR QL STRIP: ABNORMAL
WBC MORPH BLD: NORMAL
WBC NRBC COR # BLD: 13.57 10*3/MM3 (ref 4.5–10.7)
WBC UR QL AUTO: ABNORMAL /HPF

## 2017-06-23 PROCEDURE — 85652 RBC SED RATE AUTOMATED: CPT | Performed by: EMERGENCY MEDICINE

## 2017-06-23 PROCEDURE — 71010 HC CHEST PA OR AP: CPT

## 2017-06-23 PROCEDURE — 77001 FLUOROGUIDE FOR VEIN DEVICE: CPT

## 2017-06-23 PROCEDURE — 80053 COMPREHEN METABOLIC PANEL: CPT | Performed by: EMERGENCY MEDICINE

## 2017-06-23 PROCEDURE — 02HV33Z INSERTION OF INFUSION DEVICE INTO SUPERIOR VENA CAVA, PERCUTANEOUS APPROACH: ICD-10-PCS | Performed by: ORTHOPAEDIC SURGERY

## 2017-06-23 PROCEDURE — B5181ZA FLUOROSCOPY OF SUPERIOR VENA CAVA USING LOW OSMOLAR CONTRAST, GUIDANCE: ICD-10-PCS | Performed by: ORTHOPAEDIC SURGERY

## 2017-06-23 PROCEDURE — 85025 COMPLETE CBC W/AUTO DIFF WBC: CPT | Performed by: EMERGENCY MEDICINE

## 2017-06-23 PROCEDURE — 36597 REPOSITION VENOUS CATHETER: CPT

## 2017-06-23 PROCEDURE — 93970 EXTREMITY STUDY: CPT

## 2017-06-23 PROCEDURE — 85007 BL SMEAR W/DIFF WBC COUNT: CPT | Performed by: EMERGENCY MEDICINE

## 2017-06-23 PROCEDURE — 25010000002 MORPHINE PER 10 MG: Performed by: HOSPITALIST

## 2017-06-23 PROCEDURE — 86140 C-REACTIVE PROTEIN: CPT | Performed by: EMERGENCY MEDICINE

## 2017-06-23 PROCEDURE — 87205 SMEAR GRAM STAIN: CPT | Performed by: EMERGENCY MEDICINE

## 2017-06-23 PROCEDURE — 25010000003 CEFTRIAXONE PER 250 MG: Performed by: HOSPITALIST

## 2017-06-23 PROCEDURE — 87040 BLOOD CULTURE FOR BACTERIA: CPT | Performed by: HOSPITALIST

## 2017-06-23 PROCEDURE — 25810000003 SODIUM CHLORIDE 0.9 % WITH KCL 20 MEQ 20-0.9 MEQ/L-% SOLUTION: Performed by: HOSPITALIST

## 2017-06-23 PROCEDURE — 81001 URINALYSIS AUTO W/SCOPE: CPT | Performed by: EMERGENCY MEDICINE

## 2017-06-23 PROCEDURE — 63710000001 INSULIN ASPART PER 5 UNITS: Performed by: HOSPITALIST

## 2017-06-23 PROCEDURE — 87086 URINE CULTURE/COLONY COUNT: CPT | Performed by: HOSPITALIST

## 2017-06-23 PROCEDURE — 25010000002 VANCOMYCIN: Performed by: HOSPITALIST

## 2017-06-23 PROCEDURE — 87070 CULTURE OTHR SPECIMN AEROBIC: CPT | Performed by: EMERGENCY MEDICINE

## 2017-06-23 PROCEDURE — 82962 GLUCOSE BLOOD TEST: CPT

## 2017-06-23 RX ORDER — SODIUM CHLORIDE AND POTASSIUM CHLORIDE 150; 900 MG/100ML; MG/100ML
100 INJECTION, SOLUTION INTRAVENOUS CONTINUOUS
Status: DISCONTINUED | OUTPATIENT
Start: 2017-06-23 | End: 2017-06-24 | Stop reason: HOSPADM

## 2017-06-23 RX ORDER — DORZOLAMIDE HYDROCHLORIDE AND TIMOLOL MALEATE 20; 5 MG/ML; MG/ML
1 SOLUTION/ DROPS OPHTHALMIC EVERY 12 HOURS SCHEDULED
Status: DISCONTINUED | OUTPATIENT
Start: 2017-06-23 | End: 2017-06-24 | Stop reason: HOSPADM

## 2017-06-23 RX ORDER — HYDROCODONE BITARTRATE AND ACETAMINOPHEN 10; 325 MG/1; MG/1
1 TABLET ORAL EVERY 4 HOURS PRN
Status: DISCONTINUED | OUTPATIENT
Start: 2017-06-23 | End: 2017-06-24 | Stop reason: HOSPADM

## 2017-06-23 RX ORDER — ASPIRIN 81 MG/1
81 TABLET ORAL DAILY
Status: DISCONTINUED | OUTPATIENT
Start: 2017-06-24 | End: 2017-06-24 | Stop reason: HOSPADM

## 2017-06-23 RX ORDER — CEFTRIAXONE SODIUM 2 G/50ML
2 INJECTION, SOLUTION INTRAVENOUS EVERY 24 HOURS
Status: DISCONTINUED | OUTPATIENT
Start: 2017-06-23 | End: 2017-06-24 | Stop reason: HOSPADM

## 2017-06-23 RX ORDER — ONDANSETRON 2 MG/ML
4 INJECTION INTRAMUSCULAR; INTRAVENOUS EVERY 4 HOURS PRN
Status: DISCONTINUED | OUTPATIENT
Start: 2017-06-23 | End: 2017-06-24 | Stop reason: HOSPADM

## 2017-06-23 RX ORDER — CLOPIDOGREL BISULFATE 75 MG/1
75 TABLET ORAL EVERY MORNING
Status: DISCONTINUED | OUTPATIENT
Start: 2017-06-24 | End: 2017-06-23

## 2017-06-23 RX ORDER — PANTOPRAZOLE SODIUM 40 MG/1
40 TABLET, DELAYED RELEASE ORAL
Status: DISCONTINUED | OUTPATIENT
Start: 2017-06-24 | End: 2017-06-24 | Stop reason: HOSPADM

## 2017-06-23 RX ORDER — LIDOCAINE HYDROCHLORIDE 10 MG/ML
10 INJECTION, SOLUTION INFILTRATION; PERINEURAL ONCE
Status: DISCONTINUED | OUTPATIENT
Start: 2017-06-23 | End: 2017-06-23

## 2017-06-23 RX ORDER — ROSUVASTATIN CALCIUM 40 MG/1
40 TABLET, COATED ORAL NIGHTLY
Status: DISCONTINUED | OUTPATIENT
Start: 2017-06-23 | End: 2017-06-24 | Stop reason: HOSPADM

## 2017-06-23 RX ORDER — CARVEDILOL 25 MG/1
25 TABLET ORAL 2 TIMES DAILY WITH MEALS
Status: DISCONTINUED | OUTPATIENT
Start: 2017-06-23 | End: 2017-06-24 | Stop reason: HOSPADM

## 2017-06-23 RX ORDER — DEXTROSE MONOHYDRATE 25 G/50ML
25 INJECTION, SOLUTION INTRAVENOUS
Status: DISCONTINUED | OUTPATIENT
Start: 2017-06-23 | End: 2017-06-24 | Stop reason: HOSPADM

## 2017-06-23 RX ORDER — AMLODIPINE BESYLATE 5 MG/1
5 TABLET ORAL DAILY
Status: DISCONTINUED | OUTPATIENT
Start: 2017-06-24 | End: 2017-06-24 | Stop reason: HOSPADM

## 2017-06-23 RX ORDER — NICOTINE POLACRILEX 4 MG
15 LOZENGE BUCCAL
Status: DISCONTINUED | OUTPATIENT
Start: 2017-06-23 | End: 2017-06-24 | Stop reason: HOSPADM

## 2017-06-23 RX ORDER — LATANOPROST 50 UG/ML
1 SOLUTION/ DROPS OPHTHALMIC DAILY
Status: DISCONTINUED | OUTPATIENT
Start: 2017-06-24 | End: 2017-06-24 | Stop reason: HOSPADM

## 2017-06-23 RX ADMIN — ROSUVASTATIN CALCIUM 40 MG: 40 TABLET, FILM COATED ORAL at 20:15

## 2017-06-23 RX ADMIN — LINAGLIPTIN 5 MG: 5 TABLET, FILM COATED ORAL at 17:20

## 2017-06-23 RX ADMIN — VANCOMYCIN HYDROCHLORIDE 1750 MG: 1 INJECTION, POWDER, LYOPHILIZED, FOR SOLUTION INTRAVENOUS at 21:40

## 2017-06-23 RX ADMIN — CARVEDILOL 25 MG: 25 TABLET, FILM COATED ORAL at 17:21

## 2017-06-23 RX ADMIN — VANCOMYCIN HYDROCHLORIDE 1750 MG: 1 INJECTION, POWDER, LYOPHILIZED, FOR SOLUTION INTRAVENOUS at 09:39

## 2017-06-23 RX ADMIN — HYDROCODONE BITARTRATE AND ACETAMINOPHEN 1 TABLET: 10; 325 TABLET ORAL at 09:25

## 2017-06-23 RX ADMIN — POTASSIUM CHLORIDE AND SODIUM CHLORIDE 100 ML/HR: 900; 150 INJECTION, SOLUTION INTRAVENOUS at 17:21

## 2017-06-23 RX ADMIN — INSULIN ASPART 2 UNITS: 100 INJECTION, SOLUTION INTRAVENOUS; SUBCUTANEOUS at 12:32

## 2017-06-23 RX ADMIN — CEFTRIAXONE SODIUM 2 G: 2 INJECTION, SOLUTION INTRAVENOUS at 20:08

## 2017-06-23 RX ADMIN — MORPHINE SULFATE 4 MG: 4 INJECTION, SOLUTION INTRAMUSCULAR; INTRAVENOUS at 20:08

## 2017-06-23 RX ADMIN — HYDROCODONE BITARTRATE AND ACETAMINOPHEN 1 TABLET: 10; 325 TABLET ORAL at 15:04

## 2017-06-23 RX ADMIN — HYDROCODONE BITARTRATE AND ACETAMINOPHEN 1 TABLET: 10; 325 TABLET ORAL at 21:44

## 2017-06-23 RX ADMIN — DORZOLAMIDE HYDROCHLORIDE AND TIMOLOL MALEATE 1 DROP: 20; 5 SOLUTION OPHTHALMIC at 20:15

## 2017-06-24 VITALS
WEIGHT: 260 LBS | HEART RATE: 80 BPM | BODY MASS INDEX: 30.7 KG/M2 | DIASTOLIC BLOOD PRESSURE: 76 MMHG | RESPIRATION RATE: 16 BRPM | HEIGHT: 77 IN | SYSTOLIC BLOOD PRESSURE: 126 MMHG | OXYGEN SATURATION: 97 % | TEMPERATURE: 97.8 F

## 2017-06-24 PROBLEM — N17.9 AKI (ACUTE KIDNEY INJURY): Status: RESOLVED | Noted: 2017-06-16 | Resolved: 2017-06-24

## 2017-06-24 LAB
ANION GAP SERPL CALCULATED.3IONS-SCNC: 14.5 MMOL/L
BUN BLD-MCNC: 11 MG/DL (ref 8–23)
BUN/CREAT SERPL: 10.8 (ref 7–25)
CALCIUM SPEC-SCNC: 8.4 MG/DL (ref 8.6–10.5)
CHLORIDE SERPL-SCNC: 103 MMOL/L (ref 98–107)
CO2 SERPL-SCNC: 22.5 MMOL/L (ref 22–29)
CREAT BLD-MCNC: 1.02 MG/DL (ref 0.76–1.27)
DEPRECATED RDW RBC AUTO: 53.5 FL (ref 37–54)
ERYTHROCYTE [DISTWIDTH] IN BLOOD BY AUTOMATED COUNT: 18.3 % (ref 11.5–14.5)
GFR SERPL CREATININE-BSD FRML MDRD: 90 ML/MIN/1.73
GLUCOSE BLD-MCNC: 125 MG/DL (ref 65–99)
GLUCOSE BLDC GLUCOMTR-MCNC: 156 MG/DL (ref 70–130)
HCT VFR BLD AUTO: 26.2 % (ref 40.4–52.2)
HGB BLD-MCNC: 8.4 G/DL (ref 13.7–17.6)
MCH RBC QN AUTO: 25.8 PG (ref 27–32.7)
MCHC RBC AUTO-ENTMCNC: 32.1 G/DL (ref 32.6–36.4)
MCV RBC AUTO: 80.4 FL (ref 79.8–96.2)
PLATELET # BLD AUTO: 314 10*3/MM3 (ref 140–500)
PMV BLD AUTO: 10.2 FL (ref 6–12)
POTASSIUM BLD-SCNC: 3.3 MMOL/L (ref 3.5–5.2)
RBC # BLD AUTO: 3.26 10*6/MM3 (ref 4.6–6)
SODIUM BLD-SCNC: 140 MMOL/L (ref 136–145)
WBC NRBC COR # BLD: 11.99 10*3/MM3 (ref 4.5–10.7)

## 2017-06-24 PROCEDURE — 82962 GLUCOSE BLOOD TEST: CPT

## 2017-06-24 PROCEDURE — 63710000001 INSULIN ASPART PER 5 UNITS: Performed by: HOSPITALIST

## 2017-06-24 PROCEDURE — 85027 COMPLETE CBC AUTOMATED: CPT | Performed by: HOSPITALIST

## 2017-06-24 PROCEDURE — 25010000002 MORPHINE PER 10 MG: Performed by: HOSPITALIST

## 2017-06-24 PROCEDURE — 80048 BASIC METABOLIC PNL TOTAL CA: CPT | Performed by: HOSPITALIST

## 2017-06-24 RX ORDER — OXYCODONE HYDROCHLORIDE AND ACETAMINOPHEN 5; 325 MG/1; MG/1
1-2 TABLET ORAL EVERY 4 HOURS PRN
Qty: 80 TABLET | Refills: 0 | Status: SHIPPED | OUTPATIENT
Start: 2017-06-24 | End: 2017-06-24 | Stop reason: HOSPADM

## 2017-06-24 RX ORDER — POTASSIUM CHLORIDE 1.5 G/1.77G
40 POWDER, FOR SOLUTION ORAL AS NEEDED
Status: DISCONTINUED | OUTPATIENT
Start: 2017-06-24 | End: 2017-06-24 | Stop reason: CLARIF

## 2017-06-24 RX ORDER — POTASSIUM CHLORIDE 750 MG/1
40 CAPSULE, EXTENDED RELEASE ORAL AS NEEDED
Status: DISCONTINUED | OUTPATIENT
Start: 2017-06-24 | End: 2017-06-24 | Stop reason: HOSPADM

## 2017-06-24 RX ORDER — POTASSIUM CHLORIDE 7.45 MG/ML
10 INJECTION INTRAVENOUS
Status: DISCONTINUED | OUTPATIENT
Start: 2017-06-24 | End: 2017-06-24 | Stop reason: HOSPADM

## 2017-06-24 RX ORDER — ACETAMINOPHEN 325 MG/1
650 TABLET ORAL EVERY 4 HOURS PRN
Status: DISCONTINUED | OUTPATIENT
Start: 2017-06-24 | End: 2017-06-24 | Stop reason: HOSPADM

## 2017-06-24 RX ORDER — ACETAMINOPHEN 325 MG/1
325 TABLET ORAL EVERY 4 HOURS PRN
Status: DISCONTINUED | OUTPATIENT
Start: 2017-06-24 | End: 2017-06-24 | Stop reason: HOSPADM

## 2017-06-24 RX ADMIN — DORZOLAMIDE HYDROCHLORIDE AND TIMOLOL MALEATE 1 DROP: 20; 5 SOLUTION OPHTHALMIC at 08:01

## 2017-06-24 RX ADMIN — LINAGLIPTIN 5 MG: 5 TABLET, FILM COATED ORAL at 07:59

## 2017-06-24 RX ADMIN — AMLODIPINE BESYLATE 5 MG: 5 TABLET ORAL at 08:00

## 2017-06-24 RX ADMIN — PANTOPRAZOLE SODIUM 40 MG: 40 TABLET, DELAYED RELEASE ORAL at 06:18

## 2017-06-24 RX ADMIN — INSULIN ASPART 2 UNITS: 100 INJECTION, SOLUTION INTRAVENOUS; SUBCUTANEOUS at 08:01

## 2017-06-24 RX ADMIN — HYDROCODONE BITARTRATE AND ACETAMINOPHEN 1 TABLET: 10; 325 TABLET ORAL at 06:18

## 2017-06-24 RX ADMIN — ACETAMINOPHEN 325 MG: 325 TABLET ORAL at 06:18

## 2017-06-24 RX ADMIN — ACETAMINOPHEN 650 MG: 325 TABLET ORAL at 00:32

## 2017-06-24 RX ADMIN — LATANOPROST 1 DROP: 50 SOLUTION OPHTHALMIC at 08:00

## 2017-06-24 RX ADMIN — MORPHINE SULFATE 4 MG: 4 INJECTION, SOLUTION INTRAMUSCULAR; INTRAVENOUS at 00:32

## 2017-06-24 RX ADMIN — POTASSIUM CHLORIDE 40 MEQ: 750 CAPSULE, EXTENDED RELEASE ORAL at 09:31

## 2017-06-24 RX ADMIN — CARVEDILOL 25 MG: 25 TABLET, FILM COATED ORAL at 08:00

## 2017-06-24 RX ADMIN — ASPIRIN 81 MG: 81 TABLET ORAL at 08:00

## 2017-06-25 LAB
BACTERIA SPEC AEROBE CULT: NO GROWTH
BACTERIA SPEC ANAEROBE CULT: NORMAL
BACTERIA SPEC ANAEROBE CULT: NORMAL

## 2017-06-26 ENCOUNTER — LAB REQUISITION (OUTPATIENT)
Dept: LAB | Facility: HOSPITAL | Age: 62
End: 2017-06-26

## 2017-06-26 DIAGNOSIS — Z00.00 ENCOUNTER FOR GENERAL ADULT MEDICAL EXAMINATION WITHOUT ABNORMAL FINDINGS: ICD-10-CM

## 2017-06-26 LAB
ALBUMIN SERPL-MCNC: 3.1 G/DL (ref 3.5–5.2)
ALBUMIN/GLOB SERPL: 0.7 G/DL
ALP SERPL-CCNC: 88 U/L (ref 39–117)
ALT SERPL W P-5'-P-CCNC: 46 U/L (ref 1–41)
ANION GAP SERPL CALCULATED.3IONS-SCNC: 18.1 MMOL/L
ANISOCYTOSIS BLD QL: ABNORMAL
AST SERPL-CCNC: 60 U/L (ref 1–40)
BACTERIA SPEC AEROBE CULT: NORMAL
BILIRUB SERPL-MCNC: 0.6 MG/DL (ref 0.1–1.2)
BUN BLD-MCNC: 11 MG/DL (ref 8–23)
BUN/CREAT SERPL: 11 (ref 7–25)
CALCIUM SPEC-SCNC: 9.1 MG/DL (ref 8.6–10.5)
CHLORIDE SERPL-SCNC: 97 MMOL/L (ref 98–107)
CO2 SERPL-SCNC: 22.9 MMOL/L (ref 22–29)
CREAT BLD-MCNC: 1 MG/DL (ref 0.76–1.27)
CRP SERPL-MCNC: 12.75 MG/DL (ref 0–0.5)
DEPRECATED RDW RBC AUTO: 52.6 FL (ref 37–54)
EOSINOPHIL # BLD MANUAL: 0.36 10*3/MM3 (ref 0–0.7)
EOSINOPHIL NFR BLD MANUAL: 3 % (ref 0.3–6.2)
ERYTHROCYTE [DISTWIDTH] IN BLOOD BY AUTOMATED COUNT: 18.5 % (ref 11.5–14.5)
ERYTHROCYTE [SEDIMENTATION RATE] IN BLOOD: 91 MM/HR (ref 0–20)
GFR SERPL CREATININE-BSD FRML MDRD: 92 ML/MIN/1.73
GLOBULIN UR ELPH-MCNC: 4.6 GM/DL
GLUCOSE BLD-MCNC: 91 MG/DL (ref 65–99)
GRAM STN SPEC: NORMAL
HCT VFR BLD AUTO: 31.5 % (ref 40.4–52.2)
HGB BLD-MCNC: 10.3 G/DL (ref 13.7–17.6)
HYPOCHROMIA BLD QL: ABNORMAL
LYMPHOCYTES # BLD MANUAL: 0.96 10*3/MM3 (ref 0.9–4.8)
LYMPHOCYTES NFR BLD MANUAL: 7 % (ref 5–12)
LYMPHOCYTES NFR BLD MANUAL: 8 % (ref 19.6–45.3)
MCH RBC QN AUTO: 25.4 PG (ref 27–32.7)
MCHC RBC AUTO-ENTMCNC: 32.7 G/DL (ref 32.6–36.4)
MCV RBC AUTO: 77.6 FL (ref 79.8–96.2)
MONOCYTES # BLD AUTO: 0.84 10*3/MM3 (ref 0.2–1.2)
NEUTROPHILS # BLD AUTO: 9.8 10*3/MM3 (ref 1.9–8.1)
NEUTROPHILS NFR BLD MANUAL: 82 % (ref 42.7–76)
PLAT MORPH BLD: NORMAL
PLATELET # BLD AUTO: 455 10*3/MM3 (ref 140–500)
PMV BLD AUTO: 9.9 FL (ref 6–12)
POLYCHROMASIA BLD QL SMEAR: ABNORMAL
POTASSIUM BLD-SCNC: 3.4 MMOL/L (ref 3.5–5.2)
PROT SERPL-MCNC: 7.7 G/DL (ref 6–8.5)
RBC # BLD AUTO: 4.06 10*6/MM3 (ref 4.6–6)
SCAN SLIDE: NORMAL
SCHISTOCYTES BLD QL SMEAR: ABNORMAL
SODIUM BLD-SCNC: 138 MMOL/L (ref 136–145)
VANCOMYCIN TROUGH SERPL-MCNC: 13.2 MCG/ML (ref 5–20)
WBC MORPH BLD: NORMAL
WBC NRBC COR # BLD: 11.95 10*3/MM3 (ref 4.5–10.7)

## 2017-06-26 PROCEDURE — 80202 ASSAY OF VANCOMYCIN: CPT | Performed by: ORTHOPAEDIC SURGERY

## 2017-06-26 PROCEDURE — 85652 RBC SED RATE AUTOMATED: CPT | Performed by: ORTHOPAEDIC SURGERY

## 2017-06-26 PROCEDURE — 80053 COMPREHEN METABOLIC PANEL: CPT | Performed by: ORTHOPAEDIC SURGERY

## 2017-06-26 PROCEDURE — 85007 BL SMEAR W/DIFF WBC COUNT: CPT | Performed by: ORTHOPAEDIC SURGERY

## 2017-06-26 PROCEDURE — 85025 COMPLETE CBC W/AUTO DIFF WBC: CPT | Performed by: ORTHOPAEDIC SURGERY

## 2017-06-26 PROCEDURE — 86140 C-REACTIVE PROTEIN: CPT | Performed by: ORTHOPAEDIC SURGERY

## 2017-06-28 LAB
BACTERIA SPEC AEROBE CULT: NORMAL
BACTERIA SPEC AEROBE CULT: NORMAL

## 2017-07-03 ENCOUNTER — LAB REQUISITION (OUTPATIENT)
Dept: LAB | Facility: HOSPITAL | Age: 62
End: 2017-07-03

## 2017-07-03 DIAGNOSIS — Z51.81 ENCOUNTER FOR THERAPEUTIC DRUG LEVEL MONITORING: ICD-10-CM

## 2017-07-03 DIAGNOSIS — T84.53XA INFECTION AND INFLAMMATORY REACTION DUE TO INTERNAL RIGHT KNEE PROSTHESIS, INITIAL ENCOUNTER (HCC): ICD-10-CM

## 2017-07-03 DIAGNOSIS — Z45.2 ENCOUNTER FOR ADJUSTMENT OR MANAGEMENT OF VASCULAR ACCESS DEVICE: ICD-10-CM

## 2017-07-03 LAB
ALBUMIN SERPL-MCNC: 3.2 G/DL (ref 3.5–5.2)
ALBUMIN/GLOB SERPL: 0.7 G/DL
ALP SERPL-CCNC: 87 U/L (ref 39–117)
ALT SERPL W P-5'-P-CCNC: 25 U/L (ref 1–41)
ANION GAP SERPL CALCULATED.3IONS-SCNC: 17.5 MMOL/L
AST SERPL-CCNC: 40 U/L (ref 1–40)
BASOPHILS # BLD AUTO: 0.06 10*3/MM3 (ref 0–0.2)
BASOPHILS NFR BLD AUTO: 0.6 % (ref 0–1.5)
BILIRUB CONJ SERPL-MCNC: <0.2 MG/DL (ref 0–0.3)
BILIRUB SERPL-MCNC: 0.3 MG/DL (ref 0.1–1.2)
BUN BLD-MCNC: 10 MG/DL (ref 8–23)
BUN/CREAT SERPL: 11.1 (ref 7–25)
CALCIUM SPEC-SCNC: 9.2 MG/DL (ref 8.6–10.5)
CHLORIDE SERPL-SCNC: 99 MMOL/L (ref 98–107)
CO2 SERPL-SCNC: 21.5 MMOL/L (ref 22–29)
CREAT BLD-MCNC: 0.9 MG/DL (ref 0.76–1.27)
CRP SERPL-MCNC: 3.5 MG/DL (ref 0–0.5)
DEPRECATED RDW RBC AUTO: 52.8 FL (ref 37–54)
EOSINOPHIL # BLD AUTO: 0.31 10*3/MM3 (ref 0–0.7)
EOSINOPHIL NFR BLD AUTO: 3.3 % (ref 0.3–6.2)
ERYTHROCYTE [DISTWIDTH] IN BLOOD BY AUTOMATED COUNT: 18.6 % (ref 11.5–14.5)
ERYTHROCYTE [SEDIMENTATION RATE] IN BLOOD: 80 MM/HR (ref 0–20)
GFR SERPL CREATININE-BSD FRML MDRD: 104 ML/MIN/1.73
GLOBULIN UR ELPH-MCNC: 4.5 GM/DL
GLUCOSE BLD-MCNC: 91 MG/DL (ref 65–99)
HCT VFR BLD AUTO: 32.7 % (ref 40.4–52.2)
HGB BLD-MCNC: 10.3 G/DL (ref 13.7–17.6)
IMM GRANULOCYTES # BLD: 0.04 10*3/MM3 (ref 0–0.03)
IMM GRANULOCYTES NFR BLD: 0.4 % (ref 0–0.5)
LYMPHOCYTES # BLD AUTO: 0.99 10*3/MM3 (ref 0.9–4.8)
LYMPHOCYTES NFR BLD AUTO: 10.6 % (ref 19.6–45.3)
MCH RBC QN AUTO: 24.6 PG (ref 27–32.7)
MCHC RBC AUTO-ENTMCNC: 31.5 G/DL (ref 32.6–36.4)
MCV RBC AUTO: 78.2 FL (ref 79.8–96.2)
MONOCYTES # BLD AUTO: 1.34 10*3/MM3 (ref 0.2–1.2)
MONOCYTES NFR BLD AUTO: 14.3 % (ref 5–12)
NEUTROPHILS # BLD AUTO: 6.64 10*3/MM3 (ref 1.9–8.1)
NEUTROPHILS NFR BLD AUTO: 70.8 % (ref 42.7–76)
PLATELET # BLD AUTO: 540 10*3/MM3 (ref 140–500)
PMV BLD AUTO: 9.5 FL (ref 6–12)
POTASSIUM BLD-SCNC: 3.4 MMOL/L (ref 3.5–5.2)
PROT SERPL-MCNC: 7.7 G/DL (ref 6–8.5)
RBC # BLD AUTO: 4.18 10*6/MM3 (ref 4.6–6)
SODIUM BLD-SCNC: 138 MMOL/L (ref 136–145)
VANCOMYCIN TROUGH SERPL-MCNC: 15.8 MCG/ML (ref 5–20)
WBC NRBC COR # BLD: 9.38 10*3/MM3 (ref 4.5–10.7)

## 2017-07-03 PROCEDURE — 86140 C-REACTIVE PROTEIN: CPT | Performed by: ORTHOPAEDIC SURGERY

## 2017-07-03 PROCEDURE — 82248 BILIRUBIN DIRECT: CPT | Performed by: ORTHOPAEDIC SURGERY

## 2017-07-03 PROCEDURE — 85652 RBC SED RATE AUTOMATED: CPT | Performed by: ORTHOPAEDIC SURGERY

## 2017-07-03 PROCEDURE — 80202 ASSAY OF VANCOMYCIN: CPT | Performed by: ORTHOPAEDIC SURGERY

## 2017-07-03 PROCEDURE — 80053 COMPREHEN METABOLIC PANEL: CPT | Performed by: ORTHOPAEDIC SURGERY

## 2017-07-03 PROCEDURE — 85025 COMPLETE CBC W/AUTO DIFF WBC: CPT | Performed by: ORTHOPAEDIC SURGERY

## 2017-07-09 ENCOUNTER — APPOINTMENT (OUTPATIENT)
Dept: GENERAL RADIOLOGY | Facility: HOSPITAL | Age: 62
End: 2017-07-09

## 2017-07-09 ENCOUNTER — HOSPITAL ENCOUNTER (EMERGENCY)
Facility: HOSPITAL | Age: 62
Discharge: HOME | End: 2017-07-09
Attending: EMERGENCY MEDICINE | Admitting: EMERGENCY MEDICINE

## 2017-07-09 VITALS
OXYGEN SATURATION: 98 % | HEIGHT: 77 IN | RESPIRATION RATE: 18 BRPM | DIASTOLIC BLOOD PRESSURE: 88 MMHG | HEART RATE: 86 BPM | SYSTOLIC BLOOD PRESSURE: 130 MMHG | BODY MASS INDEX: 30.11 KG/M2 | WEIGHT: 255 LBS | TEMPERATURE: 97.8 F

## 2017-07-09 DIAGNOSIS — R07.89 ATYPICAL CHEST PAIN: Primary | ICD-10-CM

## 2017-07-09 LAB
ALBUMIN SERPL-MCNC: 3.6 G/DL (ref 3.5–5.2)
ALBUMIN/GLOB SERPL: 0.8 G/DL
ALP SERPL-CCNC: 103 U/L (ref 39–117)
ALT SERPL W P-5'-P-CCNC: 26 U/L (ref 1–41)
ANION GAP SERPL CALCULATED.3IONS-SCNC: 17.2 MMOL/L
AST SERPL-CCNC: 38 U/L (ref 1–40)
BASOPHILS # BLD AUTO: 0.04 10*3/MM3 (ref 0–0.2)
BASOPHILS NFR BLD AUTO: 0.4 % (ref 0–1.5)
BILIRUB SERPL-MCNC: 0.2 MG/DL (ref 0.1–1.2)
BUN BLD-MCNC: 11 MG/DL (ref 8–23)
BUN/CREAT SERPL: 10.6 (ref 7–25)
CALCIUM SPEC-SCNC: 9.5 MG/DL (ref 8.6–10.5)
CHLORIDE SERPL-SCNC: 100 MMOL/L (ref 98–107)
CO2 SERPL-SCNC: 20.8 MMOL/L (ref 22–29)
CREAT BLD-MCNC: 1.04 MG/DL (ref 0.76–1.27)
DEPRECATED RDW RBC AUTO: 53.4 FL (ref 37–54)
EOSINOPHIL # BLD AUTO: 0.42 10*3/MM3 (ref 0–0.7)
EOSINOPHIL NFR BLD AUTO: 3.9 % (ref 0.3–6.2)
ERYTHROCYTE [DISTWIDTH] IN BLOOD BY AUTOMATED COUNT: 18.4 % (ref 11.5–14.5)
GFR SERPL CREATININE-BSD FRML MDRD: 88 ML/MIN/1.73
GLOBULIN UR ELPH-MCNC: 4.7 GM/DL
GLUCOSE BLD-MCNC: 87 MG/DL (ref 65–99)
HCT VFR BLD AUTO: 35.7 % (ref 40.4–52.2)
HGB BLD-MCNC: 11.4 G/DL (ref 13.7–17.6)
HOLD SPECIMEN: NORMAL
HOLD SPECIMEN: NORMAL
IMM GRANULOCYTES # BLD: 0.04 10*3/MM3 (ref 0–0.03)
IMM GRANULOCYTES NFR BLD: 0.4 % (ref 0–0.5)
LYMPHOCYTES # BLD AUTO: 1.5 10*3/MM3 (ref 0.9–4.8)
LYMPHOCYTES NFR BLD AUTO: 14 % (ref 19.6–45.3)
MCH RBC QN AUTO: 25.1 PG (ref 27–32.7)
MCHC RBC AUTO-ENTMCNC: 31.9 G/DL (ref 32.6–36.4)
MCV RBC AUTO: 78.6 FL (ref 79.8–96.2)
MONOCYTES # BLD AUTO: 0.91 10*3/MM3 (ref 0.2–1.2)
MONOCYTES NFR BLD AUTO: 8.5 % (ref 5–12)
NEUTROPHILS # BLD AUTO: 7.83 10*3/MM3 (ref 1.9–8.1)
NEUTROPHILS NFR BLD AUTO: 72.8 % (ref 42.7–76)
NT-PROBNP SERPL-MCNC: 173.9 PG/ML (ref 0–900)
PLATELET # BLD AUTO: 498 10*3/MM3 (ref 140–500)
PMV BLD AUTO: 9.8 FL (ref 6–12)
POTASSIUM BLD-SCNC: 3.8 MMOL/L (ref 3.5–5.2)
PROT SERPL-MCNC: 8.3 G/DL (ref 6–8.5)
RBC # BLD AUTO: 4.54 10*6/MM3 (ref 4.6–6)
SODIUM BLD-SCNC: 138 MMOL/L (ref 136–145)
TROPONIN T SERPL-MCNC: <0.01 NG/ML (ref 0–0.03)
WBC NRBC COR # BLD: 10.74 10*3/MM3 (ref 4.5–10.7)
WHOLE BLOOD HOLD SPECIMEN: NORMAL
WHOLE BLOOD HOLD SPECIMEN: NORMAL

## 2017-07-09 PROCEDURE — 80053 COMPREHEN METABOLIC PANEL: CPT | Performed by: ORTHOPAEDIC SURGERY

## 2017-07-09 PROCEDURE — 80053 COMPREHEN METABOLIC PANEL: CPT | Performed by: EMERGENCY MEDICINE

## 2017-07-09 PROCEDURE — 83880 ASSAY OF NATRIURETIC PEPTIDE: CPT | Performed by: EMERGENCY MEDICINE

## 2017-07-09 PROCEDURE — 84484 ASSAY OF TROPONIN QUANT: CPT | Performed by: EMERGENCY MEDICINE

## 2017-07-09 PROCEDURE — 99284 EMERGENCY DEPT VISIT MOD MDM: CPT

## 2017-07-09 PROCEDURE — 86140 C-REACTIVE PROTEIN: CPT | Performed by: ORTHOPAEDIC SURGERY

## 2017-07-09 PROCEDURE — 36415 COLL VENOUS BLD VENIPUNCTURE: CPT | Performed by: EMERGENCY MEDICINE

## 2017-07-09 PROCEDURE — 85025 COMPLETE CBC W/AUTO DIFF WBC: CPT | Performed by: ORTHOPAEDIC SURGERY

## 2017-07-09 PROCEDURE — 80202 ASSAY OF VANCOMYCIN: CPT | Performed by: ORTHOPAEDIC SURGERY

## 2017-07-09 PROCEDURE — 85652 RBC SED RATE AUTOMATED: CPT | Performed by: ORTHOPAEDIC SURGERY

## 2017-07-09 PROCEDURE — 85025 COMPLETE CBC W/AUTO DIFF WBC: CPT | Performed by: EMERGENCY MEDICINE

## 2017-07-09 PROCEDURE — 93005 ELECTROCARDIOGRAM TRACING: CPT

## 2017-07-09 PROCEDURE — 93010 ELECTROCARDIOGRAM REPORT: CPT | Performed by: INTERNAL MEDICINE

## 2017-07-09 PROCEDURE — 71020 HC CHEST PA AND LATERAL: CPT

## 2017-07-09 RX ORDER — SODIUM CHLORIDE 0.9 % (FLUSH) 0.9 %
10 SYRINGE (ML) INJECTION AS NEEDED
Status: DISCONTINUED | OUTPATIENT
Start: 2017-07-09 | End: 2017-07-10 | Stop reason: HOSPADM

## 2017-07-09 NOTE — ED TRIAGE NOTES
"Pt c/o soa and chest \"cramping.\"    Pt currently has picc line due to post op infection after knee replacement.    Pt was off blood thinners prior to surgery. Pt back on them now.  "

## 2017-07-10 ENCOUNTER — LAB REQUISITION (OUTPATIENT)
Dept: LAB | Facility: HOSPITAL | Age: 62
End: 2017-07-10

## 2017-07-10 ENCOUNTER — TELEPHONE (OUTPATIENT)
Dept: CARDIOLOGY | Facility: HOSPITAL | Age: 62
End: 2017-07-10

## 2017-07-10 DIAGNOSIS — T84.53XA INFECTION AND INFLAMMATORY REACTION DUE TO INTERNAL RIGHT KNEE PROSTHESIS, INITIAL ENCOUNTER (HCC): ICD-10-CM

## 2017-07-10 LAB
ALBUMIN SERPL-MCNC: 3.4 G/DL (ref 3.5–5.2)
ALBUMIN/GLOB SERPL: 0.8 G/DL
ALP SERPL-CCNC: 96 U/L (ref 39–117)
ALT SERPL W P-5'-P-CCNC: 23 U/L (ref 1–41)
ANION GAP SERPL CALCULATED.3IONS-SCNC: 19.3 MMOL/L
AST SERPL-CCNC: 37 U/L (ref 1–40)
BASOPHILS # BLD AUTO: 0.03 10*3/MM3 (ref 0–0.2)
BASOPHILS NFR BLD AUTO: 0.3 % (ref 0–1.5)
BILIRUB SERPL-MCNC: 0.3 MG/DL (ref 0.1–1.2)
BUN BLD-MCNC: 9 MG/DL (ref 8–23)
BUN/CREAT SERPL: 11.1 (ref 7–25)
CALCIUM SPEC-SCNC: 9.4 MG/DL (ref 8.6–10.5)
CHLORIDE SERPL-SCNC: 99 MMOL/L (ref 98–107)
CO2 SERPL-SCNC: 20.7 MMOL/L (ref 22–29)
CREAT BLD-MCNC: 0.81 MG/DL (ref 0.76–1.27)
CRP SERPL-MCNC: 1.36 MG/DL (ref 0–0.5)
DEPRECATED RDW RBC AUTO: 53.1 FL (ref 37–54)
EOSINOPHIL # BLD AUTO: 0.35 10*3/MM3 (ref 0–0.7)
EOSINOPHIL NFR BLD AUTO: 4.1 % (ref 0.3–6.2)
ERYTHROCYTE [DISTWIDTH] IN BLOOD BY AUTOMATED COUNT: 18.6 % (ref 11.5–14.5)
ERYTHROCYTE [SEDIMENTATION RATE] IN BLOOD: 67 MM/HR (ref 0–20)
GFR SERPL CREATININE-BSD FRML MDRD: 117 ML/MIN/1.73
GLOBULIN UR ELPH-MCNC: 4.5 GM/DL
GLUCOSE BLD-MCNC: 51 MG/DL (ref 65–99)
HCT VFR BLD AUTO: 33.4 % (ref 40.4–52.2)
HGB BLD-MCNC: 10.8 G/DL (ref 13.7–17.6)
IMM GRANULOCYTES # BLD: 0.03 10*3/MM3 (ref 0–0.03)
IMM GRANULOCYTES NFR BLD: 0.3 % (ref 0–0.5)
LYMPHOCYTES # BLD AUTO: 1.44 10*3/MM3 (ref 0.9–4.8)
LYMPHOCYTES NFR BLD AUTO: 16.7 % (ref 19.6–45.3)
MCH RBC QN AUTO: 25.2 PG (ref 27–32.7)
MCHC RBC AUTO-ENTMCNC: 32.3 G/DL (ref 32.6–36.4)
MCV RBC AUTO: 77.9 FL (ref 79.8–96.2)
MONOCYTES # BLD AUTO: 0.73 10*3/MM3 (ref 0.2–1.2)
MONOCYTES NFR BLD AUTO: 8.5 % (ref 5–12)
NEUTROPHILS # BLD AUTO: 6.05 10*3/MM3 (ref 1.9–8.1)
NEUTROPHILS NFR BLD AUTO: 70.1 % (ref 42.7–76)
NRBC BLD MANUAL-RTO: 0 /100 WBC (ref 0–0)
PLATELET # BLD AUTO: 465 10*3/MM3 (ref 140–500)
PMV BLD AUTO: 9.9 FL (ref 6–12)
POTASSIUM BLD-SCNC: 3.3 MMOL/L (ref 3.5–5.2)
PROT SERPL-MCNC: 7.9 G/DL (ref 6–8.5)
RBC # BLD AUTO: 4.29 10*6/MM3 (ref 4.6–6)
SODIUM BLD-SCNC: 139 MMOL/L (ref 136–145)
VANCOMYCIN TROUGH SERPL-MCNC: 19.3 MCG/ML (ref 5–20)
WBC NRBC COR # BLD: 8.63 10*3/MM3 (ref 4.5–10.7)

## 2017-07-10 RX ORDER — ERGOCALCIFEROL 1.25 MG/1
CAPSULE ORAL
Qty: 4 CAPSULE | Refills: 0 | Status: SHIPPED | OUTPATIENT
Start: 2017-07-10 | End: 2017-07-13 | Stop reason: SDUPTHER

## 2017-07-10 RX ORDER — ERGOCALCIFEROL 1.25 MG/1
CAPSULE ORAL
Qty: 12 CAPSULE | Refills: 4 | OUTPATIENT
Start: 2017-07-10

## 2017-07-10 NOTE — ED PROVIDER NOTES
" EMERGENCY DEPARTMENT ENCOUNTER    CHIEF COMPLAINT  Chief Complaint: chest pain  History given by: pt  History limited by: nothing  Room Number: 24/24  PMD: Bebeto Angel MD      HPI:  Pt is a 62 y.o. male who presents complaining of chest pain he describes as \"cramping\" when moving his head and neck for several days. Pt also complains of constant SOA. Pt has had 6 cardiac stents placed. Pt has a PICC line in place.     Duration:  Several days  Onset: gradual  Timing: constant  Location: chest  Radiation: none  Quality: cramping  Intensity/Severity: moderate  Progression: unchanged  Associated Symptoms: SOA  Aggravating Factors: unknown  Alleviating Factors: unknown  Previous Episodes: no  Treatment before arrival: unknown    PAST MEDICAL HISTORY  Active Ambulatory Problems     Diagnosis Date Noted   • Microalbuminuria 02/21/2016   • Vitamin D deficiency 02/21/2016   • Angioedema 02/21/2016   • Chronic coronary artery disease 02/21/2016   • Anxiety 02/21/2016   • Male erectile disorder 02/21/2016   • Gastroesophageal reflux disease 02/21/2016   • Hyperlipidemia 02/21/2016   • Diabetes mellitus 02/21/2016   • Adiposity 02/21/2016   • Routine health maintenance 06/30/2016   • Uncontrolled type 2 diabetes mellitus 09/26/2016   • Low testosterone 09/26/2016   • Hypogonadism in male 09/28/2016   • Presence of coronary angioplasty implant and graft 05/28/2013   • Osteoarthritis, knee 06/15/2017   • Postoperative visit 06/19/2017   • Hematoma 06/20/2017   • Essential hypertension 06/21/2017   • Anemia, posthemorrhagic, acute 06/22/2017   • Febrile illness 06/23/2017   • Wound infection after surgery 06/23/2017     Resolved Ambulatory Problems     Diagnosis Date Noted   • MARC (acute kidney injury) 06/16/2017     Past Medical History:   Diagnosis Date   • Adenomatous colon polyp    • Angioedema 2/21/2016   • CAD (coronary artery disease)    • Diabetes mellitus, type 2    • GERD (gastroesophageal reflux disease)    • " Glaucoma    • High cholesterol    • Hypertension    • Hypogonadism in male 9/28/2016   • Male erectile disorder    • Obesity (BMI 30-39.9)    • Osteoarthritis    • Vitamin D deficiency        PAST SURGICAL HISTORY  Past Surgical History:   Procedure Laterality Date   • CORONARY ANGIOPLASTY WITH STENT PLACEMENT      cardiac stents x3 occasions   • KNEE INCISION AND DRAINAGE Right 6/20/2017    Procedure: RT. KNEE WASHOUT ;  Surgeon: Boyd Coyne MD;  Location: [facility];  Service:    • [place] TOTAL KNEE ARTHROPLASTY Right 6/15/2017    Procedure: RT TOTAL KNEE ARTHROPLASTY;  Surgeon: Boyd Coyne MD;  Location: [facility] OR;  Service: Orthopedics   • SHOULDER SURGERY Right 2016       FAMILY HISTORY  Family History   Problem Relation Age of Onset   • Lupus Sister    • Heart disease Other    • Hypertension Other    • Malig Hyperthermia Neg Hx        SOCIAL HISTORY  Social History     Social History   • Marital status:      Spouse name: N/A   • Number of children: 1   • Years of education: N/A     Occupational History   • RN      Social History Main Topics   • Smoking status: Never Smoker   • Smokeless tobacco: Never Used   • Alcohol use No      Comment: Occasionally   • Drug use: No   • Sexual activity: Defer     Other Topics Concern   • Not on file     Social History Narrative    , 1 son, 2 stepsons       ALLERGIES  Ace inhibitors and Lisinopril    REVIEW OF SYSTEMS  Review of Systems   Constitutional: Negative for activity change, appetite change and fever.   HENT: Negative for congestion and sore throat.    Eyes: Negative.    Respiratory: Positive for shortness of breath. Negative for cough.    Cardiovascular: Positive for chest pain (cramping). Negative for leg swelling.   Gastrointestinal: Negative for abdominal pain, diarrhea and vomiting.   Endocrine: Negative.    Genitourinary: Negative for decreased urine volume and dysuria.   Musculoskeletal: Negative for neck pain.   Skin: Negative for  rash and wound.   Allergic/Immunologic: Negative.    Neurological: Negative for weakness, numbness and headaches.   Hematological: Negative.    Psychiatric/Behavioral: Negative.    All other systems reviewed and are negative.      PHYSICAL EXAM  ED Triage Vitals   Temp Heart Rate Resp BP SpO2   07/09/17 1947 07/09/17 1946 07/09/17 1946 07/09/17 1946 07/09/17 1946   97.6 °F (36.4 °C) 115 18 124/83 96 %      Temp src Heart Rate Source Patient Position BP Location FiO2 (%)   07/09/17 1947 07/09/17 1946 -- -- --   Tympanic Monitor          Physical Exam   Constitutional: He is oriented to person, place, and time and well-developed, well-nourished, and in no distress.   HENT:   Head: Normocephalic and atraumatic.   Eyes: EOM are normal. Pupils are equal, round, and reactive to light.   Neck: Normal range of motion. Neck supple.   Cardiovascular: Normal rate, regular rhythm and normal heart sounds.    Pulmonary/Chest: Effort normal and breath sounds normal. No respiratory distress.   Abdominal: Soft. There is no tenderness. There is no rebound and no guarding.   Musculoskeletal: Normal range of motion. He exhibits no edema.   Neurological: He is alert and oriented to person, place, and time. He has normal sensation and normal strength.   Skin: Skin is warm and dry.   Incision on right knee from surgery is clean, dry, and intact   Psychiatric: Mood and affect normal.   Nursing note and vitals reviewed.      LAB RESULTS  Lab Results (last 24 hours)     Procedure Component Value Units Date/Time    CBC & Differential [[id number]] Collected:  07/09/17 1958    Specimen:  Blood Updated:  07/09/17 2018    Narrative:       The following orders were created for panel order CBC & Differential.  Procedure                               Abnormality         Status                     ---------                               -----------         ------                     CBC Auto Differential[[id number]]        Abnormal            Final  result                 Please view results for these tests on the individual orders.    Comprehensive Metabolic Panel [[id number]]  (Abnormal) Collected:  07/09/17 1958    Specimen:  Blood Updated:  07/09/17 2033     Glucose 87 mg/dL      BUN 11 mg/dL      Creatinine 1.04 mg/dL      Sodium 138 mmol/L      Potassium 3.8 mmol/L      Chloride 100 mmol/L      CO2 20.8 (L) mmol/L      Calcium 9.5 mg/dL      Total Protein 8.3 g/dL      Albumin 3.60 g/dL      ALT (SGPT) 26 U/L      AST (SGOT) 38 U/L      Alkaline Phosphatase 103 U/L      Total Bilirubin 0.2 mg/dL      eGFR  African Amer 88 mL/min/1.73      Globulin 4.7 gm/dL      A/G Ratio 0.8 g/dL      BUN/Creatinine Ratio 10.6     Anion Gap 17.2 mmol/L     BNP [[id number]]  (Normal) Collected:  07/09/17 1958    Specimen:  Blood Updated:  07/09/17 2032     proBNP 173.9 pg/mL     Narrative:       Among patients with dyspnea, NT-proBNP is highly sensitive for the detection of acute congestive heart failure. In addition NT-proBNP of <300 pg/ml effectively rules out acute congestive heart failure with 99% negative predictive value.    Troponin [[id number]]  (Normal) Collected:  07/09/17 1958    Specimen:  Blood Updated:  07/09/17 2033     Troponin T <0.010 ng/mL     Narrative:       Troponin T Reference Ranges:  Less than 0.03 ng/mL:    Negative for AMI  0.03 to 0.09 ng/mL:      Indeterminant for AMI  Greater than 0.09 ng/mL: Positive for AMI    CBC Auto Differential [[id number]]  (Abnormal) Collected:  07/09/17 1958    Specimen:  Blood Updated:  07/09/17 2018     WBC 10.74 (H) 10*3/mm3      RBC 4.54 (L) 10*6/mm3      Hemoglobin 11.4 (L) g/dL      Hematocrit 35.7 (L) %      MCV 78.6 (L) fL      MCH 25.1 (L) pg      MCHC 31.9 (L) g/dL      RDW 18.4 (H) %      RDW-SD 53.4 fl      MPV 9.8 fL      Platelets 498 10*3/mm3      Neutrophil % 72.8 %      Lymphocyte % 14.0 (L) %      Monocyte % 8.5 %      Eosinophil % 3.9 %      Basophil % 0.4 %      Immature Grans % 0.4 %       Neutrophils, Absolute 7.83 10*3/mm3      Lymphocytes, Absolute 1.50 10*3/mm3      Monocytes, Absolute 0.91 10*3/mm3      Eosinophils, Absolute 0.42 10*3/mm3      Basophils, Absolute 0.04 10*3/mm3      Immature Grans, Absolute 0.04 (H) 10*3/mm3           I ordered the above labs and reviewed the results    RADIOLOGY  XR Chest 2 View   Final Result   1. No active disease.       This report was finalized on 7/9/2017 8:19 PM by Matt Eastman MD.               I ordered the above noted radiological studies. Interpreted by radiologist. Reviewed by me in PACS.       PROCEDURES  Procedures    EKG           EKG time: 0950  Rhythm/Rate: SR 88  P waves and VA: normal  QRS, axis: normal   ST and T waves: normal     Interpreted Contemporaneously by me, independently viewed  unchanged compared to prior 9/13/206    PROGRESS AND CONSULTS  ED Course     1947 - Ordered labs, EKG, and CXR for further evaluation.    2127 - Placed consult to [facility].    2131 - Discussed pt's case with Dr. Jaramillo ([facility]), who advised for the pt to follow up in the office tomorrow.    2135 - Pt rechecked. Pt is resting comfortably. Discussed imaging which was negative and labs which showed improving hemoglobin levels. Discussed plan to discharge the pt and for the pt to follow up within the next two days. Pt and family agree with plan. All questions addressed.    MEDICAL DECISION MAKING  Results were reviewed/discussed with the patient and they were also made aware of online access. Pt also made aware that some labs, such as cultures, will not be resulted during ER visit and follow up with PMD is necessary.     MDM  Number of Diagnoses or Management Options  Atypical chest pain:      Amount and/or Complexity of Data Reviewed  Clinical lab tests: reviewed and ordered (WBC - 10.74. Hemoglobin - 11.4)  Tests in the radiology section of CPT®: ordered and reviewed (CXR - no active disease)  Tests in the medicine section of CPT®: reviewed and ordered (See EKG  procedure note)  Decide to obtain previous medical records or to obtain history from someone other than the patient: yes  Review and summarize past medical records: yes (Pt had normal bilateral lower extremity dopplers on 6-23-17. November 2015, stent placed in right coronary artery by Dr. Khan)  Discuss the patient with other providers: yes (Dr. Jaramillo ([facility]           DIAGNOSIS  Final diagnoses:   Atypical chest pain       DISPOSITION  DISCHARGE    Patient discharged in stable condition.    Reviewed implications of results, diagnosis, meds, responsibility to follow up, warning signs and symptoms of possible worsening, potential complications and reasons to return to ER.    Patient/Family voiced understanding of above instructions.    Discussed plan for discharge, as there is no emergent indication for admission.  Pt/family is agreeable and understands need for follow up and repeat testing.  Pt is aware that discharge does not mean that nothing is wrong but it indicates no emergency is present that requires admission and they must continue care with follow-up as given below or physician of their choice.     FOLLOW-UP  Ross Khan MD  [address] [address]  [phone]               Medication List      Changed          SITagliptin 100 MG tablet   Commonly known as:  JANUVIA   Take 1 tablet by mouth Daily.   What changed:  when to take this               Latest Documented Vital Signs:  As of 9:37 PM  BP- 125/79 HR- 84 Temp- 97.6 °F (36.4 °C) (Tympanic) O2 sat- 99%    --  Documentation assistance provided by amor Mirza for Dr. Moses.  Information recorded by the scribe was done at my direction and has been verified and validated by me.       Lupillo Mirza  07/09/17 3884       Woody Moses MD  07/09/17 1504

## 2017-07-11 ENCOUNTER — TELEPHONE (OUTPATIENT)
Dept: SOCIAL WORK | Facility: HOSPITAL | Age: 62
End: 2017-07-11

## 2017-07-11 ENCOUNTER — OFFICE VISIT (OUTPATIENT)
Dept: CARDIOLOGY | Facility: CLINIC | Age: 62
End: 2017-07-11

## 2017-07-11 VITALS
WEIGHT: 252.5 LBS | HEART RATE: 81 BPM | DIASTOLIC BLOOD PRESSURE: 82 MMHG | BODY MASS INDEX: 29.81 KG/M2 | SYSTOLIC BLOOD PRESSURE: 110 MMHG | HEIGHT: 77 IN

## 2017-07-11 DIAGNOSIS — I25.10 CHRONIC CORONARY ARTERY DISEASE: Primary | ICD-10-CM

## 2017-07-11 DIAGNOSIS — Z79.4 TYPE 2 DIABETES MELLITUS WITH HYPERGLYCEMIA, WITH LONG-TERM CURRENT USE OF INSULIN (HCC): ICD-10-CM

## 2017-07-11 DIAGNOSIS — E11.65 TYPE 2 DIABETES MELLITUS WITH HYPERGLYCEMIA, WITH LONG-TERM CURRENT USE OF INSULIN (HCC): ICD-10-CM

## 2017-07-11 DIAGNOSIS — R06.02 SHORTNESS OF BREATH: ICD-10-CM

## 2017-07-11 PROCEDURE — 93000 ELECTROCARDIOGRAM COMPLETE: CPT | Performed by: NURSE PRACTITIONER

## 2017-07-11 PROCEDURE — 99214 OFFICE O/P EST MOD 30 MIN: CPT | Performed by: NURSE PRACTITIONER

## 2017-07-11 NOTE — PROGRESS NOTES
Date of Office Visit: 2017  Encounter Provider: MICHELLE Chow  Place of Service: [facility]  Patient Name: Isaias Monaco  :1955    Chief Complaint   Patient presents with   • Shortness of Breath   :     HPI: Isaias Monaco is a 62 y.o. male comes in today for Shortness of breath.  He is a patient of Dr. Khan.  I'm seeing him for the first time today and have reviewed his records.  He has a history of diabetes, coronary disease, hyperlipidemia and hypertension.  He has multiple stents placed.  The latest were placed in  when he had a  of the RCA.  His last cardiac catheter in 2015 showed an EF of 50%, two-vessel coronary disease.  The left main was normal.  Proximal LAD had 30% to 40% diffuse disease, there was a stent in the proximal to mid LAD that was patent.  The mid to distal LAD has 30-40% diffuse disease.  The first diagonal branch had a 60% stenosis.  The circumflex had 50% proximal stenosis.  The RCA was totally occluded at the time and opened with stenting of the proximal distal RCA.    Recently he had a knee replacement and was readmitted to the hospital 3 times in 5 days due to issues with his knee.  He is now on vancomycin and Rocephin IV.  There has been discussion about removing his hardware of his knee.  This was replaced due to osteoarthritis.    Today, he comes in with complaints of shortness of breath.  On , he went to the emergency room with shortness of breath.  This is typically how he feels prior to his angioplasties.  In the emergency room, he ruled out for MI.  His EKG was unchanged.  He was asked to follow up in the clinic.  Today, he reports that he is just had an increase in shortness of breath.  He denies any edema.  He denies orthopnea.  He can have the shortness of breath at rest or with exertion.  He denies any palpitations or tachycardia.  He denies feeling lightheaded.  He did have some upper chest  pain with movement of his head.  He feels fatigued.  He also explains that he's had anxiety and reflux in the past and it's very hard to distinguish between these symptoms.    Past Medical History:   Diagnosis Date   • Adenomatous colon polyp     Colonoscopy last done 2015   • Adiposity    • Anemia     post hemorrhagic   • Angioedema 2/21/2016    Secondary to ACE inhibitor   • Anxiety    • CAD (coronary artery disease)    • Chest pain    • Diabetes mellitus, type 2    • ED (erectile dysfunction)    • Febrile illness    • GERD (gastroesophageal reflux disease)    • Glaucoma    • Hematoma    • High cholesterol    • Hyperlipidemia    • Hypertension    • Hypogonadism in male 9/28/2016   • Male erectile disorder    • Microalbuminuria    • Obesity (BMI 30-39.9)    • Osteoarthritis     knee   • Vitamin D deficiency    • Wound infection after surgery        Past Surgical History:   Procedure Laterality Date   • CORONARY ANGIOPLASTY WITH STENT PLACEMENT      cardiac stents x3 occasions   • KNEE INCISION AND DRAINAGE Right 6/20/2017    Procedure: RT. KNEE WASHOUT ;  Surgeon: Boyd Coyne MD;  Location: [facility];  Service:    • [place] TOTAL KNEE ARTHROPLASTY Right 6/15/2017    Procedure: RT TOTAL KNEE ARTHROPLASTY;  Surgeon: Boyd Coyne MD;  Location: [facility];  Service: Orthopedics   • SHOULDER SURGERY Right 2016           Review of Systems   Constitution: Positive for malaise/fatigue. Negative for fever.   HENT: Negative for ear pain, hearing loss, nosebleeds and sore throat.    Eyes: Negative for double vision, pain, vision loss in left eye, vision loss in right eye and visual disturbance.   Cardiovascular: Positive for dyspnea on exertion. Negative for claudication and leg swelling.   Respiratory: Negative for cough, snoring and wheezing.    Endocrine: Negative for cold intolerance, heat intolerance and polyuria.   Skin: Negative for color change, itching and rash.   Musculoskeletal: Negative for joint  "pain, joint swelling and muscle cramps.   Gastrointestinal: Negative for abdominal pain, diarrhea, melena, nausea and vomiting.   Genitourinary: Negative for bladder incontinence and hematuria.   Neurological: Negative for excessive daytime sleepiness, dizziness, light-headedness, paresthesias and seizures.   Psychiatric/Behavioral: Negative for depression. The patient is not nervous/anxious.    All other systems reviewed and are negative.    All other systems reviewed and are negative    Allergies   Allergen Reactions   • Ace Inhibitors      angioedema   • Lisinopril Angioedema       All aspects of family and social history reviewed.          Objective:     Vitals:    07/11/17 1512   BP: 110/82   BP Location: Right arm   Pulse: 81   Weight: 252 lb 8 oz (115 kg)   Height: 77\" (195.6 cm)     Body mass index is 29.94 kg/(m^2).    PHYSICAL EXAM:  Physical Exam   Constitutional: He is oriented to person, place, and time. He appears well-developed and well-nourished.   HENT:   Head: Normocephalic and atraumatic.   Neck: Normal range of motion. Neck supple. No JVD present.   Cardiovascular: Normal rate, regular rhythm, normal heart sounds and intact distal pulses.    Pulses:       Carotid pulses are 2+ on the right side, and 2+ on the left side.       Radial pulses are 2+ on the right side, and 2+ on the left side.        Dorsalis pedis pulses are 2+ on the right side, and 2+ on the left side.   Pulmonary/Chest: Effort normal and breath sounds normal. No accessory muscle usage. No respiratory distress. He has no rales.   Abdominal: Soft. Normal appearance and bowel sounds are normal. There is no tenderness.   Musculoskeletal: Normal range of motion. He exhibits no edema.        Arms:  Right knee with incision   Neurological: He is alert and oriented to person, place, and time.   Skin: Skin is warm, dry and intact. He is not diaphoretic.   Psychiatric: He has a normal mood and affect. His speech is normal and behavior is " normal. Judgment and thought content normal. Cognition and memory are normal.   Vitals reviewed.        ECG 12 Lead  Date/Time: 7/11/2017 4:31 PM  Performed by: MAULIK MOTA  Authorized by: MAULIK MOTA   Comparison: compared with previous ECG from 7/9/2017  Similar to previous ECG  Rhythm: sinus rhythm  Rate: normal  BPM: 81  Conduction: conduction normal  ST Segments: ST segments normal  T Waves: T waves normal  QRS axis: normal  Other: no other findings  Clinical impression: normal ECG  Comments: Indication: CAD                  Assessment:       Diagnosis Plan   1. Chronic coronary artery disease  Stress Test With Pet Myocardial Perfusion (Multi-Study)   2. Type 2 diabetes mellitus with hyperglycemia, with long-term current use of insulin  Stress Test With Pet Myocardial Perfusion (Multi-Study)   3. Shortness of breath  Stress Test With Pet Myocardial Perfusion (Multi-Study)        Orders Placed This Encounter   Procedures   • Stress Test With Pet Myocardial Perfusion (Multi-Study)     Standing Status:   Future     Standing Expiration Date:   7/11/2018     Order Specific Question:   What stress agent will be used?     Answer:   Regadenoson (Lexiscan)     Order Specific Question:   Difficulty walking criteria?     Answer:   Musculoskeletal (hips, knees, feet, back, amputee)     Order Specific Question:   Reason for exam?     Answer:   Chest Pain     Order Specific Question:   Reason for exam?     Answer:   Known Coronary Artery Disease     Order Specific Question:   Known CAD specification?     Answer:   Ischemic Equivalent     Order Specific Question:   Chest pain specification?     Answer:   Ischemic Equivalent     Order Specific Question:   Chest pain specification?     Answer:   Suspected CAD   • ECG 12 Lead     This order was created via procedure documentation       Current Outpatient Prescriptions   Medication Sig Dispense Refill   • amLODIPine (NORVASC) 5 MG tablet Take 5 mg by mouth Every  "Morning.     • aspirin 81 MG EC tablet Take 1 tablet by mouth Daily. get over the counter 30 tablet 3   • Blood Glucose Monitoring Suppl ([facility] BLOOD GLUCOSE) kit      • carvedilol (COREG) 25 MG tablet Take 1 tablet by mouth 2 (Two) Times a Day With Meals. 180 tablet 0   • cefTRIAXone (ROCEPHIN) 40 MG/ML IVPB Infuse 50 mL into a venous catheter Daily. Indications: Bone and/or Joint Infection 50 mL 30   • clopidogrel (PLAVIX) 75 MG tablet Take 1 tablet by mouth Every Morning. Hold for 7 days per cardiology 30 tablet    • dorzolamide-timolol (COSOPT) 22.3-6.8 MG/ML ophthalmic solution Administer 1 drop to both eyes 2 (Two) Times a Day.     • Empagliflozin (JARDIANCE) 25 MG tablet Take 25 mg by mouth Every Morning. 30 tablet 1   • ergocalciferol (DRISDOL) 27709 UNITS capsule Take 1 po q week 4 capsule 0   • escitalopram (LEXAPRO) 10 MG tablet Take 20 mg by mouth Every Morning.     • Insulin Glargine (TOUJEO SOLOSTAR) 300 UNIT/ML solution pen-injector Inject 60 Units under the skin Every Morning. 9 mL 3   • Insulin Pen Needle (PEN NEEDLES 5/16\") 31G X 8 MM misc USE AS DIRECTED ONCE DAILY WITH TOUJEO 100 each 2   • [employer] PREMIUM GLUCOSE TEST test strip TEST FOUR TIMES A  each 3   • latanoprost (XALATAN) 0.005 % ophthalmic solution Administer 1 drop to both eyes Every Morning.     • metFORMIN (GLUCOPHAGE) 500 MG tablet Take 1 tablet by mouth 2 (Two) Times a Day With Meals. 120 tablet 0   • omeprazole (priLOSEC) 40 MG capsule Take 40 mg by mouth Every Morning.     • pioglitazone (ACTOS) 45 MG tablet TAKE ONE TABLET BY MOUTH DAILY 90 tablet 1   • rosuvastatin (CRESTOR) 40 MG tablet Take 40 mg by mouth Every Night.     • SITagliptin (JANUVIA) 100 MG tablet Take 1 tablet by mouth Daily. (Patient taking differently: Take 100 mg by mouth Every Morning.) 30 tablet 3   • vancomycin 1750 mg/500 mL 0.9% NS IVPB ([facility]) Infuse 500 mL into a venous catheter Every 12 (Twelve) Hours. Indications: Bone and/or Joint Infection 500 " mL 30     No current facility-administered medications for this visit.             Plan:       1. CAD    Coronary Artery Disease  Assessment  • The patient has no angina    Plan  • Lifestyle modifications discussed include adhering to a heart healthy diet, avoidance of tobacco products, maintenance of a healthy weight, regular exercise and regular monitoring of cholesterol and blood pressure    Subjective - Objective  • There has been a previous stent procedure using DAVONTE  • Current antiplatelet therapy includes aspirin 81 mg and clopidogrel 75 mg      Patient presents today with complaints of shortness of breath which are his anginal equivalent from previous.  He's also had reflux and anxiety and is very hard to distinguish between the 3.  Due to the patient's knee injury and diabetes, I will set him up for a PET Lexiscan stress test.  I would be cautious about going to the Cath Lab with him due to his knee infection and indwelling PICC line.  No signs of heart failure at this time.  ProBNP normal.  Lungs are clear.  His shortness of breath could also be related to his anemia by his shortness of breath was acute distress over the weekend and not since his time of surgery.  He did not have a d-dimer drawn at the time of his ER visit.  Pulmonary embolism cannot be ruled out as he did have a recent knee surgery and is only anticoagulated with Plavix.  He did have lower extremity Dopplers performed on June 25 are negative for DVT.  I would be cautious in and the patient for CTA at this time due to exposure to diet as he is also on vancomycin IV.      Follow up in office after testing complete    As always, it has been a pleasure to participate in this patient's care.      Sincerely,      MICHELLE Chow

## 2017-07-11 NOTE — TELEPHONE ENCOUNTER
Attempted f/u call today with no answer. Noted in epic pt has f/u with [facility] APRN today. Julia VIDES

## 2017-07-13 RX ORDER — ERGOCALCIFEROL 1.25 MG/1
CAPSULE ORAL
Qty: 4 CAPSULE | Refills: 0 | Status: SHIPPED | OUTPATIENT
Start: 2017-07-13 | End: 2017-08-14 | Stop reason: SDUPTHER

## 2017-07-17 ENCOUNTER — LAB REQUISITION (OUTPATIENT)
Dept: LAB | Facility: HOSPITAL | Age: 62
End: 2017-07-17

## 2017-07-17 DIAGNOSIS — Z45.2 ENCOUNTER FOR ADJUSTMENT OR MANAGEMENT OF VASCULAR ACCESS DEVICE: ICD-10-CM

## 2017-07-17 DIAGNOSIS — T84.53XA INFECTION AND INFLAMMATORY REACTION DUE TO INTERNAL RIGHT KNEE PROSTHESIS, INITIAL ENCOUNTER (HCC): ICD-10-CM

## 2017-07-17 LAB
ALBUMIN SERPL-MCNC: 3.6 G/DL (ref 3.5–5.2)
ALBUMIN/GLOB SERPL: 0.8 G/DL
ALP SERPL-CCNC: 97 U/L (ref 39–117)
ALT SERPL W P-5'-P-CCNC: 21 U/L (ref 1–41)
ANION GAP SERPL CALCULATED.3IONS-SCNC: 14.4 MMOL/L
AST SERPL-CCNC: 31 U/L (ref 1–40)
BASOPHILS # BLD AUTO: 0.06 10*3/MM3 (ref 0–0.2)
BASOPHILS NFR BLD AUTO: 0.8 % (ref 0–1.5)
BILIRUB SERPL-MCNC: 0.3 MG/DL (ref 0.1–1.2)
BUN BLD-MCNC: 10 MG/DL (ref 8–23)
BUN/CREAT SERPL: 10.8 (ref 7–25)
CALCIUM SPEC-SCNC: 9.5 MG/DL (ref 8.6–10.5)
CHLORIDE SERPL-SCNC: 103 MMOL/L (ref 98–107)
CO2 SERPL-SCNC: 22.6 MMOL/L (ref 22–29)
CREAT BLD-MCNC: 0.93 MG/DL (ref 0.76–1.27)
CRP SERPL-MCNC: 0.79 MG/DL (ref 0–0.5)
DEPRECATED RDW RBC AUTO: 52.5 FL (ref 37–54)
EOSINOPHIL # BLD AUTO: 0.29 10*3/MM3 (ref 0–0.7)
EOSINOPHIL NFR BLD AUTO: 3.7 % (ref 0.3–6.2)
ERYTHROCYTE [DISTWIDTH] IN BLOOD BY AUTOMATED COUNT: 18.1 % (ref 11.5–14.5)
ERYTHROCYTE [SEDIMENTATION RATE] IN BLOOD: 68 MM/HR (ref 0–20)
GFR SERPL CREATININE-BSD FRML MDRD: 100 ML/MIN/1.73
GLOBULIN UR ELPH-MCNC: 4.3 GM/DL
GLUCOSE BLD-MCNC: 78 MG/DL (ref 65–99)
HCT VFR BLD AUTO: 35.6 % (ref 40.4–52.2)
HGB BLD-MCNC: 11.3 G/DL (ref 13.7–17.6)
IMM GRANULOCYTES # BLD: 0.03 10*3/MM3 (ref 0–0.03)
IMM GRANULOCYTES NFR BLD: 0.4 % (ref 0–0.5)
LYMPHOCYTES # BLD AUTO: 1.73 10*3/MM3 (ref 0.9–4.8)
LYMPHOCYTES NFR BLD AUTO: 22.3 % (ref 19.6–45.3)
MCH RBC QN AUTO: 25.1 PG (ref 27–32.7)
MCHC RBC AUTO-ENTMCNC: 31.7 G/DL (ref 32.6–36.4)
MCV RBC AUTO: 79.1 FL (ref 79.8–96.2)
MONOCYTES # BLD AUTO: 0.92 10*3/MM3 (ref 0.2–1.2)
MONOCYTES NFR BLD AUTO: 11.9 % (ref 5–12)
NEUTROPHILS # BLD AUTO: 4.73 10*3/MM3 (ref 1.9–8.1)
NEUTROPHILS NFR BLD AUTO: 60.9 % (ref 42.7–76)
NRBC BLD MANUAL-RTO: 0 /100 WBC (ref 0–0)
PLATELET # BLD AUTO: 300 10*3/MM3 (ref 140–500)
PMV BLD AUTO: 10.3 FL (ref 6–12)
POTASSIUM BLD-SCNC: 3.5 MMOL/L (ref 3.5–5.2)
PROT SERPL-MCNC: 7.9 G/DL (ref 6–8.5)
RBC # BLD AUTO: 4.5 10*6/MM3 (ref 4.6–6)
SODIUM BLD-SCNC: 140 MMOL/L (ref 136–145)
VANCOMYCIN TROUGH SERPL-MCNC: 16.4 MCG/ML (ref 5–20)
WBC NRBC COR # BLD: 7.76 10*3/MM3 (ref 4.5–10.7)

## 2017-07-17 PROCEDURE — 80202 ASSAY OF VANCOMYCIN: CPT | Performed by: ORTHOPAEDIC SURGERY

## 2017-07-17 PROCEDURE — 80053 COMPREHEN METABOLIC PANEL: CPT | Performed by: ORTHOPAEDIC SURGERY

## 2017-07-17 PROCEDURE — 85025 COMPLETE CBC W/AUTO DIFF WBC: CPT | Performed by: ORTHOPAEDIC SURGERY

## 2017-07-17 PROCEDURE — 85652 RBC SED RATE AUTOMATED: CPT | Performed by: ORTHOPAEDIC SURGERY

## 2017-07-17 PROCEDURE — 86140 C-REACTIVE PROTEIN: CPT | Performed by: ORTHOPAEDIC SURGERY

## 2017-07-18 ENCOUNTER — TELEPHONE (OUTPATIENT)
Dept: CARDIOLOGY | Facility: CLINIC | Age: 62
End: 2017-07-18

## 2017-07-18 ENCOUNTER — HOSPITAL ENCOUNTER (OUTPATIENT)
Dept: CARDIOLOGY | Facility: HOSPITAL | Age: 62
Discharge: HOME | End: 2017-07-18
Admitting: NURSE PRACTITIONER

## 2017-07-18 DIAGNOSIS — R06.02 SHORTNESS OF BREATH: ICD-10-CM

## 2017-07-18 DIAGNOSIS — Z79.4 TYPE 2 DIABETES MELLITUS WITH HYPERGLYCEMIA, WITH LONG-TERM CURRENT USE OF INSULIN (HCC): ICD-10-CM

## 2017-07-18 DIAGNOSIS — E11.65 TYPE 2 DIABETES MELLITUS WITH HYPERGLYCEMIA, WITH LONG-TERM CURRENT USE OF INSULIN (HCC): ICD-10-CM

## 2017-07-18 DIAGNOSIS — I25.10 CHRONIC CORONARY ARTERY DISEASE: ICD-10-CM

## 2017-07-18 LAB
BH CV NUCLEAR PRIOR STUDY: 3
BH CV STRESS BP STAGE 1: NORMAL
BH CV STRESS COMMENTS STAGE 1: NORMAL
BH CV STRESS DOSE REGADENOSON STAGE 1: 0.4
BH CV STRESS DURATION MIN STAGE 1: 0
BH CV STRESS DURATION SEC STAGE 1: 15
BH CV STRESS HR STAGE 1: 103
BH CV STRESS PROTOCOL 1: NORMAL
BH CV STRESS RECOVERY BP: NORMAL MMHG
BH CV STRESS RECOVERY HR: 98 BPM
BH CV STRESS STAGE 1: 1
LV EF NUC BP: 61 %
MAXIMAL PREDICTED HEART RATE: 158 BPM
PERCENT MAX PREDICTED HR: 65.19 %
STRESS BASELINE BP: NORMAL MMHG
STRESS BASELINE HR: 90 BPM
STRESS PERCENT HR: 77 %
STRESS POST EXERCISE DUR SEC: 15 SEC
STRESS POST PEAK BP: NORMAL MMHG
STRESS POST PEAK HR: 103 BPM
STRESS TARGET HR: 134 BPM

## 2017-07-18 PROCEDURE — 0 RUBIDIUM CHLORIDE: Performed by: NURSE PRACTITIONER

## 2017-07-18 PROCEDURE — 93017 CV STRESS TEST TRACING ONLY: CPT

## 2017-07-18 PROCEDURE — 78492 MYOCRD IMG PET MLT RST&STRS: CPT

## 2017-07-18 PROCEDURE — 93018 CV STRESS TEST I&R ONLY: CPT | Performed by: INTERNAL MEDICINE

## 2017-07-18 PROCEDURE — 93016 CV STRESS TEST SUPVJ ONLY: CPT | Performed by: INTERNAL MEDICINE

## 2017-07-18 PROCEDURE — 78492 MYOCRD IMG PET MLT RST&STRS: CPT | Performed by: INTERNAL MEDICINE

## 2017-07-18 PROCEDURE — 25010000002 REGADENOSON 0.4 MG/5ML SOLUTION: Performed by: NURSE PRACTITIONER

## 2017-07-18 PROCEDURE — A9555 RB82 RUBIDIUM: HCPCS | Performed by: NURSE PRACTITIONER

## 2017-07-18 RX ADMIN — RUBIDIUM CHLORIDE RB-82 1 DOSE: 150 INJECTION, SOLUTION INTRAVENOUS at 13:20

## 2017-07-18 RX ADMIN — REGADENOSON 0.4 MG: 0.08 INJECTION, SOLUTION INTRAVENOUS at 13:20

## 2017-07-18 RX ADMIN — RUBIDIUM CHLORIDE RB-82 1 DOSE: 150 INJECTION, SOLUTION INTRAVENOUS at 13:05

## 2017-07-18 NOTE — TELEPHONE ENCOUNTER
Stress test reviewed. Normal perfusion with no evidence of ischemia. EF 61%. Advised pt to f/u with PCP regarding GI issues. All if pain is not resolved.

## 2017-07-20 ENCOUNTER — OFFICE VISIT (OUTPATIENT)
Dept: ENDOCRINOLOGY | Age: 62
End: 2017-07-20

## 2017-07-20 VITALS
BODY MASS INDEX: 30.27 KG/M2 | HEIGHT: 77 IN | DIASTOLIC BLOOD PRESSURE: 70 MMHG | WEIGHT: 256.4 LBS | SYSTOLIC BLOOD PRESSURE: 130 MMHG

## 2017-07-20 DIAGNOSIS — I10 ESSENTIAL HYPERTENSION: ICD-10-CM

## 2017-07-20 DIAGNOSIS — R80.9 MICROALBUMINURIA: ICD-10-CM

## 2017-07-20 DIAGNOSIS — E29.1 HYPOGONADISM IN MALE: ICD-10-CM

## 2017-07-20 DIAGNOSIS — E55.9 VITAMIN D DEFICIENCY: ICD-10-CM

## 2017-07-20 DIAGNOSIS — E78.5 HYPERLIPIDEMIA, UNSPECIFIED HYPERLIPIDEMIA TYPE: ICD-10-CM

## 2017-07-20 DIAGNOSIS — E11.8 UNCONTROLLED TYPE 2 DIABETES MELLITUS WITH COMPLICATION, UNSPECIFIED LONG TERM INSULIN USE STATUS: Primary | ICD-10-CM

## 2017-07-20 DIAGNOSIS — E66.9 ADIPOSITY: ICD-10-CM

## 2017-07-20 DIAGNOSIS — E11.65 UNCONTROLLED TYPE 2 DIABETES MELLITUS WITH COMPLICATION, UNSPECIFIED LONG TERM INSULIN USE STATUS: Primary | ICD-10-CM

## 2017-07-20 PROCEDURE — 99214 OFFICE O/P EST MOD 30 MIN: CPT | Performed by: NURSE PRACTITIONER

## 2017-07-20 NOTE — PROGRESS NOTES
"Marie Monaco is a 62 y.o. male is here today for follow-up.  Chief Complaint   Patient presents with   • Diabetes     labs from hospital, testing BG 1 time daily, pt did not bring meter.   • Hypogonadism   • Hyperlipidemia   • Hypertension     /70  Ht 77\" (195.6 cm)  Wt 256 lb 6.4 oz (116 kg)  BMI 30.4 kg/m2  Current Outpatient Prescriptions on File Prior to Visit   Medication Sig   • amLODIPine (NORVASC) 5 MG tablet Take 5 mg by mouth Every Morning.   • aspirin 81 MG EC tablet Take 1 tablet by mouth Daily. get over the counter   • Blood Glucose Monitoring Suppl (Avidia BLOOD GLUCOSE) kit    • carvedilol (COREG) 25 MG tablet Take 1 tablet by mouth 2 (Two) Times a Day With Meals.   • cefTRIAXone (ROCEPHIN) 40 MG/ML IVPB Infuse 50 mL into a venous catheter Daily. Indications: Bone and/or Joint Infection   • clopidogrel (PLAVIX) 75 MG tablet Take 1 tablet by mouth Every Morning. Hold for 7 days per cardiology   • dorzolamide-timolol (COSOPT) 22.3-6.8 MG/ML ophthalmic solution Administer 1 drop to both eyes 2 (Two) Times a Day.   • Empagliflozin (JARDIANCE) 25 MG tablet Take 25 mg by mouth Every Morning.   • ergocalciferol (DRISDOL) 74746 UNITS capsule Take 1 po q week. PATIENT NEEDS TO KEEP APPT TO OBTAIN FURTHER REFILLS   • escitalopram (LEXAPRO) 10 MG tablet Take 20 mg by mouth Every Morning.   • Insulin Glargine (TOUJEO SOLOSTAR) 300 UNIT/ML solution pen-injector Inject 60 Units under the skin Every Morning.   • Insulin Pen Needle (PEN NEEDLES 5/16\") 31G X 8 MM misc USE AS DIRECTED ONCE DAILY WITH TOUJEO   • [employer] PREMIUM GLUCOSE TEST test strip TEST FOUR TIMES A DAY   • latanoprost (XALATAN) 0.005 % ophthalmic solution Administer 1 drop to both eyes Every Morning.   • metFORMIN (GLUCOPHAGE) 500 MG tablet Take 1 tablet by mouth 2 (Two) Times a Day With Meals.   • omeprazole (priLOSEC) 40 MG capsule Take 40 mg by mouth Every Morning.   • pioglitazone (ACTOS) 45 MG tablet TAKE ONE TABLET BY " MOUTH DAILY   • rosuvastatin (CRESTOR) 40 MG tablet Take 40 mg by mouth Every Night.   • SITagliptin (JANUVIA) 100 MG tablet Take 1 tablet by mouth Daily. (Patient taking differently: Take 100 mg by mouth Every Morning.)   • vancomycin 1750 mg/500 mL 0.9% NS IVPB ([facility]) Infuse 500 mL into a venous catheter Every 12 (Twelve) Hours. Indications: Bone and/or Joint Infection     No current facility-administered medications on file prior to visit.      Family History   Problem Relation Age of Onset   • Lupus Sister    • Heart disease Other    • Hypertension Other    • Malig Hyperthermia Neg Hx          History of Present Illness  Encounter Diagnoses   Name Primary?   • Uncontrolled type 2 diabetes mellitus with complication, unspecified long term insulin use status Yes   • Hyperlipidemia, unspecified hyperlipidemia type    • Essential hypertension    • Vitamin D deficiency    • Adiposity    • Hypogonadism in male    • Microalbuminuria    This is a 62-year-old male patient here today for routine follow-up visit.  He had recent labs done which were reviewed and he was provided a copy.  He has had some low blood sugars in the 50 mg/dL range.  He states this was result of decreased appetite following right knee replacement surgery.  He did have some postop complications and now has a PICC line.  He did not bring his blood glucose meter in today's visit.  His hemoglobin A1c reflects his diabetes is in satisfactory range.  His infection is improving on IV antibiotics and he is improving with his range of motion.  He is in physical therapy.    The following portions of the patient's history were reviewed and updated as appropriate: allergies, current medications, past family history, past medical history, past social history, past surgical history and problem list.    Review of Systems   Constitutional: Negative for fatigue.   HENT: Negative for trouble swallowing.    Eyes: Negative for visual disturbance.   Respiratory:  Negative for shortness of breath.    Cardiovascular: Negative for leg swelling.   Endocrine: Negative for polyphagia.   Skin: Negative for wound.   Neurological: Negative for numbness.       Objective   Physical Exam   Constitutional: He is oriented to person, place, and time. He appears well-developed and well-nourished. No distress.   HENT:   Head: Normocephalic and atraumatic.   Right Ear: External ear normal.   Left Ear: External ear normal.   Nose: Nose normal.   Eyes: Pupils are equal, round, and reactive to light. Right eye exhibits no discharge. Left eye exhibits no discharge.   Neck: Normal range of motion. Neck supple. Carotid bruit is not present. No tracheal deviation, no edema and no erythema present. No thyromegaly present.   Cardiovascular: Normal rate, regular rhythm, normal heart sounds and intact distal pulses.  Exam reveals no gallop and no friction rub.    No murmur heard.  Pulmonary/Chest: Effort normal and breath sounds normal. No respiratory distress. He has no wheezes. He has no rales.   Abdominal: Soft. Bowel sounds are normal. He exhibits no distension. There is no tenderness.   Musculoskeletal: Normal range of motion. He exhibits no edema or deformity.   R arm- rotator cuff surgery on 9/21- sling   Lymphadenopathy:     He has no cervical adenopathy.   Neurological: He is alert and oriented to person, place, and time. Coordination normal.   Skin: Skin is warm and dry. No rash noted. He is not diaphoretic. No erythema. No pallor.   Psychiatric: He has a normal mood and affect. His behavior is normal. Judgment and thought content normal.   Nursing note and vitals reviewed.    Lab Results   Component Value Date    HGBA1C 5.93 (H) 06/16/2017     Lab Results   Component Value Date    GLUCOSE 78 07/17/2017    BUN 10 07/17/2017    CREATININE 0.93 07/17/2017    EGFRIFNONA 44 (L) 06/19/2017    EGFRIFAFRI 100 07/17/2017    BCR 10.8 07/17/2017    K 3.5 07/17/2017    CO2 22.6 07/17/2017    CALCIUM 9.5  07/17/2017    PROTENTOTREF 8.6 (H) 02/09/2017    ALBUMIN 3.60 07/17/2017    LABIL2 0.8 07/17/2017    AST 31 07/17/2017    ALT 21 07/17/2017     No results found for: CHOL  Lab Results   Component Value Date    TRIG 119 02/09/2017    TRIG 158 (H) 09/26/2016    TRIG 108 05/10/2016     Lab Results   Component Value Date    HDL 65 (H) 02/09/2017    HDL 47 09/26/2016    HDL 53 05/10/2016     No results found for: LDLCALC  Lab Results   Component Value Date     (H) 02/09/2017    LDL 81 09/26/2016     (H) 05/10/2016     No results found for: HDLLDLRATIO  No components found for: CHOLHDL      Assessment/Plan   Potter was seen today for diabetes, hypogonadism, hyperlipidemia and hypertension.    Diagnoses and all orders for this visit:    Uncontrolled type 2 diabetes mellitus with complication, unspecified long term insulin use status    Hyperlipidemia, unspecified hyperlipidemia type    Essential hypertension    Vitamin D deficiency    Adiposity    Hypogonadism in male    Microalbuminuria      In summary, patient was seen and examined.  He will continue on his current medications as prescribed.  No medications were changed at today's visit.  He will follow-up with Dr. Jovel or myself in 6 months with labs prior.  I've asked that he contact the office should he have any questions or concerns prior to then.  I've also asked that if he continues to have any hypoglycemic events that he contact the office that we can decrease his insulin.  He states his appetite is since improved and he does not feel this is going to be an issue.  About that he is stable.

## 2017-07-24 ENCOUNTER — LAB (OUTPATIENT)
Dept: LAB | Facility: HOSPITAL | Age: 62
End: 2017-07-24
Attending: ORTHOPAEDIC SURGERY

## 2017-07-24 ENCOUNTER — TRANSCRIBE ORDERS (OUTPATIENT)
Dept: ADMINISTRATIVE | Facility: HOSPITAL | Age: 62
End: 2017-07-24

## 2017-07-24 DIAGNOSIS — M25.561 RIGHT KNEE PAIN, UNSPECIFIED CHRONICITY: ICD-10-CM

## 2017-07-24 DIAGNOSIS — M25.561 RIGHT KNEE PAIN, UNSPECIFIED CHRONICITY: Primary | ICD-10-CM

## 2017-07-24 LAB
ALBUMIN SERPL-MCNC: 4 G/DL (ref 3.5–5.2)
ALBUMIN/GLOB SERPL: 1 G/DL
ALP SERPL-CCNC: 92 U/L (ref 39–117)
ALT SERPL W P-5'-P-CCNC: 20 U/L (ref 1–41)
ANION GAP SERPL CALCULATED.3IONS-SCNC: 16.7 MMOL/L
AST SERPL-CCNC: 32 U/L (ref 1–40)
BASOPHILS # BLD AUTO: 0.05 10*3/MM3 (ref 0–0.2)
BASOPHILS NFR BLD AUTO: 0.6 % (ref 0–1.5)
BILIRUB SERPL-MCNC: 0.3 MG/DL (ref 0.1–1.2)
BUN BLD-MCNC: 9 MG/DL (ref 8–23)
BUN/CREAT SERPL: 10.1 (ref 7–25)
C3 FRG RBC-MCNC: NORMAL
CALCIUM SPEC-SCNC: 9.4 MG/DL (ref 8.6–10.5)
CHLORIDE SERPL-SCNC: 99 MMOL/L (ref 98–107)
CO2 SERPL-SCNC: 23.3 MMOL/L (ref 22–29)
CREAT BLD-MCNC: 0.89 MG/DL (ref 0.76–1.27)
CRP SERPL-MCNC: 0.23 MG/DL (ref 0–0.5)
DEPRECATED RDW RBC AUTO: 52.4 FL (ref 37–54)
EOSINOPHIL # BLD AUTO: 0.29 10*3/MM3 (ref 0–0.7)
EOSINOPHIL NFR BLD AUTO: 3.3 % (ref 0.3–6.2)
ERYTHROCYTE [DISTWIDTH] IN BLOOD BY AUTOMATED COUNT: 17.7 % (ref 11.5–14.5)
ERYTHROCYTE [SEDIMENTATION RATE] IN BLOOD: 48 MM/HR (ref 0–20)
GFR SERPL CREATININE-BSD FRML MDRD: 105 ML/MIN/1.73
GLOBULIN UR ELPH-MCNC: 4 GM/DL
GLUCOSE BLD-MCNC: 80 MG/DL (ref 65–99)
HCT VFR BLD AUTO: 37.3 % (ref 40.4–52.2)
HGB BLD-MCNC: 11.8 G/DL (ref 13.7–17.6)
IMM GRANULOCYTES # BLD: 0 10*3/MM3 (ref 0–0.03)
IMM GRANULOCYTES NFR BLD: 0 % (ref 0–0.5)
LYMPHOCYTES # BLD AUTO: 1.94 10*3/MM3 (ref 0.9–4.8)
LYMPHOCYTES NFR BLD AUTO: 21.7 % (ref 19.6–45.3)
MCH RBC QN AUTO: 25.5 PG (ref 27–32.7)
MCHC RBC AUTO-ENTMCNC: 31.6 G/DL (ref 32.6–36.4)
MCV RBC AUTO: 80.6 FL (ref 79.8–96.2)
MONOCYTES # BLD AUTO: 0.65 10*3/MM3 (ref 0.2–1.2)
MONOCYTES NFR BLD AUTO: 7.3 % (ref 5–12)
NEUTROPHILS # BLD AUTO: 5.99 10*3/MM3 (ref 1.9–8.1)
NEUTROPHILS NFR BLD AUTO: 67.1 % (ref 42.7–76)
NRBC BLD MANUAL-RTO: 0 /100 WBC (ref 0–0)
OVALOCYTES BLD QL SMEAR: NORMAL
PLAT MORPH BLD: NORMAL
PLATELET # BLD AUTO: 277 10*3/MM3 (ref 140–500)
PMV BLD AUTO: 10 FL (ref 6–12)
POTASSIUM BLD-SCNC: 3.8 MMOL/L (ref 3.5–5.2)
PROT SERPL-MCNC: 8 G/DL (ref 6–8.5)
RBC # BLD AUTO: 4.63 10*6/MM3 (ref 4.6–6)
SODIUM BLD-SCNC: 139 MMOL/L (ref 136–145)
WBC MORPH BLD: NORMAL
WBC NRBC COR # BLD: 8.92 10*3/MM3 (ref 4.5–10.7)

## 2017-07-24 PROCEDURE — 86140 C-REACTIVE PROTEIN: CPT

## 2017-07-24 PROCEDURE — 85007 BL SMEAR W/DIFF WBC COUNT: CPT

## 2017-07-24 PROCEDURE — 36415 COLL VENOUS BLD VENIPUNCTURE: CPT

## 2017-07-24 PROCEDURE — 85652 RBC SED RATE AUTOMATED: CPT

## 2017-07-24 PROCEDURE — 85025 COMPLETE CBC W/AUTO DIFF WBC: CPT

## 2017-07-24 PROCEDURE — 80053 COMPREHEN METABOLIC PANEL: CPT

## 2017-07-25 DIAGNOSIS — E55.9 VITAMIN D DEFICIENCY: ICD-10-CM

## 2017-07-25 DIAGNOSIS — N52.9 MALE ERECTILE DISORDER: ICD-10-CM

## 2017-07-25 DIAGNOSIS — R80.9 MICROALBUMINURIA: ICD-10-CM

## 2017-07-25 DIAGNOSIS — E11.65 TYPE 2 DIABETES MELLITUS WITH HYPERGLYCEMIA (HCC): ICD-10-CM

## 2017-07-25 DIAGNOSIS — E78.5 HYPERLIPIDEMIA: ICD-10-CM

## 2017-07-25 DIAGNOSIS — I10 ESSENTIAL HYPERTENSION: ICD-10-CM

## 2017-07-25 DIAGNOSIS — E66.9 ADIPOSITY: ICD-10-CM

## 2017-07-25 RX ORDER — CLOPIDOGREL BISULFATE 75 MG/1
TABLET ORAL
Qty: 90 TABLET | Refills: 0 | Status: SHIPPED | OUTPATIENT
Start: 2017-07-25 | End: 2017-08-05 | Stop reason: SDUPTHER

## 2017-07-25 RX ORDER — INSULIN GLARGINE 300 U/ML
INJECTION, SOLUTION SUBCUTANEOUS
Refills: 2 | Status: CANCELLED | OUTPATIENT
Start: 2017-07-25

## 2017-07-25 RX ORDER — CARVEDILOL 25 MG/1
TABLET ORAL
Qty: 60 TABLET | Refills: 2 | Status: SHIPPED | OUTPATIENT
Start: 2017-07-25 | End: 2018-03-20 | Stop reason: SDUPTHER

## 2017-07-25 RX ORDER — OMEPRAZOLE 40 MG/1
CAPSULE, DELAYED RELEASE ORAL
Qty: 90 CAPSULE | Refills: 1 | Status: SHIPPED | OUTPATIENT
Start: 2017-07-25 | End: 2017-08-24 | Stop reason: ALTCHOICE

## 2017-08-03 ENCOUNTER — OFFICE VISIT (OUTPATIENT)
Dept: SURGERY | Facility: CLINIC | Age: 62
End: 2017-08-03

## 2017-08-03 VITALS — OXYGEN SATURATION: 98 % | HEIGHT: 77 IN | HEART RATE: 92 BPM | BODY MASS INDEX: 29.64 KG/M2 | WEIGHT: 251 LBS

## 2017-08-03 DIAGNOSIS — R06.09 OTHER FORM OF DYSPNEA: ICD-10-CM

## 2017-08-03 DIAGNOSIS — K21.9 GASTROESOPHAGEAL REFLUX DISEASE, ESOPHAGITIS PRESENCE NOT SPECIFIED: Primary | ICD-10-CM

## 2017-08-03 PROCEDURE — 99244 OFF/OP CNSLTJ NEW/EST MOD 40: CPT | Performed by: SURGERY

## 2017-08-03 RX ORDER — SULFAMETHOXAZOLE AND TRIMETHOPRIM 800; 160 MG/1; MG/1
1 TABLET ORAL 2 TIMES DAILY
COMMUNITY
End: 2017-12-29

## 2017-08-06 NOTE — PROGRESS NOTES
CLINICAL SUMMARY (A/P):    62-year-old gentleman with worsening symptoms of gastroesophageal reflux disease associated with mild to moderate shortness of air.  He has been told based on previous upper endoscopy at some point demonstrating retained gastric content that he has gastroparesis but no formal testing has ever been done.    I have recommended and scheduled upper GI as well as gastric emptying scan.  Additionally I prescribed Nexium 40 mg by mouth daily to see if this improves his symptom control.  Further recommendations will follow acquisition of study results and response to Nexium use.      CC:  Gastroesophageal reflux    HPI:  62-year-old gentleman presents with epigastric fullness/mild discomfort associated with burning sensation in the esophagus and reflux of food.  Also associated with mild to moderate shortness of air and a suffocating feeling which seems to correlate with the severity of his reflux.  He takes omeprazole 40 mg by mouth daily and has been taking this for years.  Does feel that it helped when he initially started taking it but he is not stopped and therefore does not know the degree to which it is helping now.  He believes that he tried Nexium years ago but does not recall if it worked any differently than the Prilosec.    ROS:  No chest pain or shortness of air.  Weight loss of 40 pounds in the last year, partially intentional.  All other systems reviewed and negative other than presenting complaints.    PSH:    Coronary stent  Right total knee arthroplasty complicated by postop infection  Colonoscopy 2014 Dr. Zhao (did have polyp)    PMH:    Coronary artery disease  Diabetes  Hypertension  Gastroesophageal reflux disease    MEDICATIONS: reviewed, in Epic, of note on Plavix    ALLERGIES: reviewed, in [facility]    FAMILY HISTORY:    Negative for colorectal cancer    SOCIAL HISTORY:   Denies tobacco use  Occasional alcohol use    PHYSICAL EXAM:   Constitutional: Well-developed  well-nourished, no acute distress   Heart rate 92   Weight (pounds) 251   BMI 29.8   Height (inches) 77  Eyes: Conjunctiva normal, sclera nonicteric  ENMT: Hearing grossly normal, oral mucosa moist  Neck: Supple, no palpable mass, normal thyroid, trachea midline  Respiratory: Clear to auscultation, normal inspiratory effort  Cardiovascular: Regular rate, no murmur, no carotid bruit, no peripheral edema, no jugular venous distention  Gastrointestinal: Soft, nontender, no palpable mass, no hepatosplenomegaly, negative for hernia, bowel sounds normal  Lymphatics (palpable nodes):  cervical-negative, axillary-negative  Skin:  Warm, dry, no rash on visualized skin surfaces  Musculoskeletal: Symmetric strength, normal gait  Psychiatric: Alert and oriented ×3, normal affect     RADIOLOGY/ENDOSCOPY:    -Stress test 1 month ago normal per his report with 60% ejection fraction  -EGD 10/13/2015 demonstrated irregular Z line with normal stomach and normal duodenum, biopsies of the esophagus showed mild inflammation but no evidence of Johnson's.  Colonoscopy demonstrating tubular adenoma.  Chest x-ray 7/9/2017 demonstrated no active disease, I reviewed the images and also do not note any evidence of hiatal hernia.    CHANEL RODRIGUEZ M.D.

## 2017-08-07 RX ORDER — CLOPIDOGREL BISULFATE 75 MG/1
TABLET ORAL
Qty: 90 TABLET | Refills: 0 | Status: SHIPPED | OUTPATIENT
Start: 2017-08-07 | End: 2017-10-11 | Stop reason: SDUPTHER

## 2017-08-09 DIAGNOSIS — IMO0001 UNCONTROLLED DIABETES MELLITUS TYPE 2 WITHOUT COMPLICATIONS, UNSPECIFIED LONG TERM INSULIN USE STATUS: ICD-10-CM

## 2017-08-10 ENCOUNTER — APPOINTMENT (OUTPATIENT)
Dept: NUCLEAR MEDICINE | Facility: HOSPITAL | Age: 62
End: 2017-08-10
Attending: SURGERY

## 2017-08-10 RX ORDER — EMPAGLIFLOZIN 25 MG/1
TABLET, FILM COATED ORAL
Qty: 30 TABLET | Refills: 1 | OUTPATIENT
Start: 2017-08-10

## 2017-08-11 DIAGNOSIS — IMO0001 UNCONTROLLED DIABETES MELLITUS TYPE 2 WITHOUT COMPLICATIONS, UNSPECIFIED LONG TERM INSULIN USE STATUS: ICD-10-CM

## 2017-08-11 RX ORDER — EMPAGLIFLOZIN 25 MG/1
TABLET, FILM COATED ORAL
Qty: 30 TABLET | Refills: 1 | OUTPATIENT
Start: 2017-08-11

## 2017-08-14 RX ORDER — ERGOCALCIFEROL 1.25 MG/1
CAPSULE ORAL
Qty: 12 CAPSULE | Refills: 0 | OUTPATIENT
Start: 2017-08-14

## 2017-08-14 RX ORDER — ERGOCALCIFEROL 1.25 MG/1
CAPSULE ORAL
Qty: 12 CAPSULE | Refills: 0 | Status: SHIPPED | OUTPATIENT
Start: 2017-08-14 | End: 2017-10-25 | Stop reason: SDUPTHER

## 2017-08-15 ENCOUNTER — HOSPITAL ENCOUNTER (OUTPATIENT)
Dept: NUCLEAR MEDICINE | Facility: HOSPITAL | Age: 62
Discharge: HOME | End: 2017-08-15
Attending: SURGERY

## 2017-08-15 ENCOUNTER — APPOINTMENT (OUTPATIENT)
Dept: GENERAL RADIOLOGY | Facility: HOSPITAL | Age: 62
End: 2017-08-15
Attending: SURGERY

## 2017-08-15 DIAGNOSIS — R06.09 OTHER FORM OF DYSPNEA: ICD-10-CM

## 2017-08-15 DIAGNOSIS — K21.9 GASTROESOPHAGEAL REFLUX DISEASE, ESOPHAGITIS PRESENCE NOT SPECIFIED: ICD-10-CM

## 2017-08-15 PROCEDURE — 78264 GASTRIC EMPTYING IMG STUDY: CPT

## 2017-08-15 PROCEDURE — 0 TECHNETIUM SULFUR COLLOID: Performed by: SURGERY

## 2017-08-15 PROCEDURE — A9541 TC99M SULFUR COLLOID: HCPCS | Performed by: SURGERY

## 2017-08-15 RX ADMIN — Medication 1 DOSE: at 09:00

## 2017-08-16 ENCOUNTER — APPOINTMENT (OUTPATIENT)
Dept: NUCLEAR MEDICINE | Facility: HOSPITAL | Age: 62
End: 2017-08-16
Attending: SURGERY

## 2017-08-16 DIAGNOSIS — E11.9 CONTROLLED TYPE 2 DIABETES MELLITUS WITHOUT COMPLICATION, WITH LONG-TERM CURRENT USE OF INSULIN (HCC): ICD-10-CM

## 2017-08-16 DIAGNOSIS — Z79.4 CONTROLLED TYPE 2 DIABETES MELLITUS WITHOUT COMPLICATION, WITH LONG-TERM CURRENT USE OF INSULIN (HCC): ICD-10-CM

## 2017-08-16 DIAGNOSIS — IMO0001 UNCONTROLLED DIABETES MELLITUS TYPE 2 WITHOUT COMPLICATIONS, UNSPECIFIED LONG TERM INSULIN USE STATUS: ICD-10-CM

## 2017-08-16 RX ORDER — EMPAGLIFLOZIN 25 MG/1
TABLET, FILM COATED ORAL
Qty: 30 TABLET | Refills: 5 | Status: SHIPPED | OUTPATIENT
Start: 2017-08-16 | End: 2018-02-23 | Stop reason: SDUPTHER

## 2017-08-17 ENCOUNTER — TELEPHONE (OUTPATIENT)
Dept: SURGERY | Facility: CLINIC | Age: 62
End: 2017-08-17

## 2017-08-17 NOTE — TELEPHONE ENCOUNTER
----- Message from Boyd Guidry MD sent at 8/17/2017  7:34 AM EDT -----  Please let him know his gastric emptying scan was normal, NO gastroparesis

## 2017-08-22 ENCOUNTER — HOSPITAL ENCOUNTER (OUTPATIENT)
Dept: GENERAL RADIOLOGY | Facility: HOSPITAL | Age: 62
Discharge: HOME | End: 2017-08-22
Attending: SURGERY | Admitting: SURGERY

## 2017-08-22 DIAGNOSIS — K21.9 GASTROESOPHAGEAL REFLUX DISEASE, ESOPHAGITIS PRESENCE NOT SPECIFIED: ICD-10-CM

## 2017-08-22 DIAGNOSIS — R06.09 OTHER FORM OF DYSPNEA: ICD-10-CM

## 2017-08-22 PROCEDURE — 74247: CPT

## 2017-08-24 RX ORDER — ESOMEPRAZOLE MAGNESIUM 40 MG/1
CAPSULE, DELAYED RELEASE ORAL
Qty: 30 CAPSULE | Refills: 5 | Status: SHIPPED | OUTPATIENT
Start: 2017-08-24 | End: 2018-03-20 | Stop reason: SDUPTHER

## 2017-08-25 ENCOUNTER — PREP FOR SURGERY (OUTPATIENT)
Dept: OTHER | Facility: HOSPITAL | Age: 62
End: 2017-08-25

## 2017-08-25 ENCOUNTER — DOCUMENTATION (OUTPATIENT)
Dept: SURGERY | Facility: CLINIC | Age: 62
End: 2017-08-25

## 2017-08-25 DIAGNOSIS — K21.00 GASTROESOPHAGEAL REFLUX DISEASE WITH ESOPHAGITIS: Primary | ICD-10-CM

## 2017-08-25 NOTE — PROGRESS NOTES
Spoke with him today regarding results from his upper GI and gastric emptying.  As previously noted his gastric emptying scan was normal.  His upper GI demonstrated small sliding hiatal herniation of the upper stomach with florid gastroesophageal reflux and thickening of the esophageal mucosal folds.  I reviewed the images and concur.    He indicated that his symptom control is much better now with daily Nexium use.  I recommended that he continue taking Nexium but I also recommended that we proceed with EGD even the findings on upper GI.  He is not due for a colonoscopy until 2019.  He requested that I perform the EGD and I will schedule accordingly.

## 2017-08-28 PROBLEM — K21.00 GASTROESOPHAGEAL REFLUX DISEASE WITH ESOPHAGITIS: Status: ACTIVE | Noted: 2017-08-28

## 2017-09-12 ENCOUNTER — LAB (OUTPATIENT)
Dept: LAB | Facility: HOSPITAL | Age: 62
End: 2017-09-12
Attending: ORTHOPAEDIC SURGERY

## 2017-09-12 ENCOUNTER — TRANSCRIBE ORDERS (OUTPATIENT)
Dept: ADMINISTRATIVE | Facility: HOSPITAL | Age: 62
End: 2017-09-12

## 2017-09-12 DIAGNOSIS — M86.9 BONE INFECTION, KNEE (HCC): Primary | ICD-10-CM

## 2017-09-12 DIAGNOSIS — M86.9 BONE INFECTION, KNEE (HCC): ICD-10-CM

## 2017-09-12 LAB
BASOPHILS # BLD AUTO: 0.02 10*3/MM3 (ref 0–0.2)
BASOPHILS NFR BLD AUTO: 0.2 % (ref 0–1.5)
CRP SERPL-MCNC: 0.21 MG/DL (ref 0–0.5)
DEPRECATED RDW RBC AUTO: 50.1 FL (ref 37–54)
EOSINOPHIL # BLD AUTO: 0.15 10*3/MM3 (ref 0–0.7)
EOSINOPHIL NFR BLD AUTO: 1.7 % (ref 0.3–6.2)
ERYTHROCYTE [DISTWIDTH] IN BLOOD BY AUTOMATED COUNT: 17.5 % (ref 11.5–14.5)
ERYTHROCYTE [SEDIMENTATION RATE] IN BLOOD: 39 MM/HR (ref 0–20)
HCT VFR BLD AUTO: 39 % (ref 40.4–52.2)
HGB BLD-MCNC: 12.2 G/DL (ref 13.7–17.6)
IMM GRANULOCYTES # BLD: 0.02 10*3/MM3 (ref 0–0.03)
IMM GRANULOCYTES NFR BLD: 0.2 % (ref 0–0.5)
LYMPHOCYTES # BLD AUTO: 2.21 10*3/MM3 (ref 0.9–4.8)
LYMPHOCYTES NFR BLD AUTO: 25 % (ref 19.6–45.3)
MCH RBC QN AUTO: 24.7 PG (ref 27–32.7)
MCHC RBC AUTO-ENTMCNC: 31.3 G/DL (ref 32.6–36.4)
MCV RBC AUTO: 79.1 FL (ref 79.8–96.2)
MONOCYTES # BLD AUTO: 0.77 10*3/MM3 (ref 0.2–1.2)
MONOCYTES NFR BLD AUTO: 8.7 % (ref 5–12)
NEUTROPHILS # BLD AUTO: 5.68 10*3/MM3 (ref 1.9–8.1)
NEUTROPHILS NFR BLD AUTO: 64.2 % (ref 42.7–76)
PLATELET # BLD AUTO: 287 10*3/MM3 (ref 140–500)
PMV BLD AUTO: 10.6 FL (ref 6–12)
RBC # BLD AUTO: 4.93 10*6/MM3 (ref 4.6–6)
WBC NRBC COR # BLD: 8.85 10*3/MM3 (ref 4.5–10.7)

## 2017-09-12 PROCEDURE — 85025 COMPLETE CBC W/AUTO DIFF WBC: CPT

## 2017-09-12 PROCEDURE — 86140 C-REACTIVE PROTEIN: CPT

## 2017-09-12 PROCEDURE — 36415 COLL VENOUS BLD VENIPUNCTURE: CPT

## 2017-09-12 PROCEDURE — 85652 RBC SED RATE AUTOMATED: CPT

## 2017-09-24 DIAGNOSIS — I10 ESSENTIAL HYPERTENSION: ICD-10-CM

## 2017-09-24 DIAGNOSIS — F41.9 ANXIETY: Primary | ICD-10-CM

## 2017-09-25 RX ORDER — AMLODIPINE BESYLATE 10 MG/1
TABLET ORAL
Qty: 90 TABLET | Refills: 2 | Status: SHIPPED | OUTPATIENT
Start: 2017-09-25 | End: 2018-05-14

## 2017-09-25 RX ORDER — ESCITALOPRAM OXALATE 20 MG/1
TABLET ORAL
Qty: 30 TABLET | Refills: 2 | Status: SHIPPED | OUTPATIENT
Start: 2017-09-25 | End: 2017-12-25 | Stop reason: SDUPTHER

## 2017-09-29 ENCOUNTER — TELEPHONE (OUTPATIENT)
Dept: CARDIOLOGY | Facility: CLINIC | Age: 62
End: 2017-09-29

## 2017-10-02 NOTE — TELEPHONE ENCOUNTER
Per verbal from Dr. Khan, patient is cleared for procedure and ok to hold ASA and plavix 5 days prior.    Patient informed.

## 2017-10-04 ENCOUNTER — ANESTHESIA (OUTPATIENT)
Dept: GASTROENTEROLOGY | Facility: HOSPITAL | Age: 62
End: 2017-10-04

## 2017-10-04 ENCOUNTER — HOSPITAL ENCOUNTER (OUTPATIENT)
Facility: HOSPITAL | Age: 62
Setting detail: HOSPITAL OUTPATIENT SURGERY
Discharge: HOME | End: 2017-10-04
Attending: SURGERY | Admitting: SURGERY

## 2017-10-04 ENCOUNTER — ANESTHESIA EVENT (OUTPATIENT)
Dept: GASTROENTEROLOGY | Facility: HOSPITAL | Age: 62
End: 2017-10-04

## 2017-10-04 VITALS
DIASTOLIC BLOOD PRESSURE: 93 MMHG | RESPIRATION RATE: 16 BRPM | HEIGHT: 77 IN | BODY MASS INDEX: 30.77 KG/M2 | WEIGHT: 260.56 LBS | HEART RATE: 62 BPM | SYSTOLIC BLOOD PRESSURE: 133 MMHG | TEMPERATURE: 97.9 F | OXYGEN SATURATION: 98 %

## 2017-10-04 LAB — GLUCOSE BLDC GLUCOMTR-MCNC: 70 MG/DL (ref 70–130)

## 2017-10-04 PROCEDURE — 25010000002 PROPOFOL 10 MG/ML EMULSION: Performed by: ANESTHESIOLOGY

## 2017-10-04 PROCEDURE — 43235 EGD DIAGNOSTIC BRUSH WASH: CPT | Performed by: SURGERY

## 2017-10-04 PROCEDURE — 82962 GLUCOSE BLOOD TEST: CPT

## 2017-10-04 RX ORDER — PROPOFOL 10 MG/ML
VIAL (ML) INTRAVENOUS AS NEEDED
Status: DISCONTINUED | OUTPATIENT
Start: 2017-10-04 | End: 2017-10-04 | Stop reason: SURG

## 2017-10-04 RX ORDER — SODIUM CHLORIDE, SODIUM LACTATE, POTASSIUM CHLORIDE, CALCIUM CHLORIDE 600; 310; 30; 20 MG/100ML; MG/100ML; MG/100ML; MG/100ML
1000 INJECTION, SOLUTION INTRAVENOUS CONTINUOUS PRN
Status: DISCONTINUED | OUTPATIENT
Start: 2017-10-04 | End: 2017-10-04 | Stop reason: HOSPADM

## 2017-10-04 RX ORDER — LIDOCAINE HYDROCHLORIDE 20 MG/ML
INJECTION, SOLUTION INFILTRATION; PERINEURAL AS NEEDED
Status: DISCONTINUED | OUTPATIENT
Start: 2017-10-04 | End: 2017-10-04 | Stop reason: SURG

## 2017-10-04 RX ADMIN — LIDOCAINE HYDROCHLORIDE 100 MG: 20 INJECTION, SOLUTION INFILTRATION; PERINEURAL at 12:06

## 2017-10-04 RX ADMIN — SODIUM CHLORIDE, POTASSIUM CHLORIDE, SODIUM LACTATE AND CALCIUM CHLORIDE 1000 ML: 600; 310; 30; 20 INJECTION, SOLUTION INTRAVENOUS at 12:00

## 2017-10-04 RX ADMIN — PROPOFOL 200 MG: 10 INJECTION, EMULSION INTRAVENOUS at 12:06

## 2017-10-04 NOTE — ANESTHESIA POSTPROCEDURE EVALUATION
"Patient: Isaias Monaco    Procedure Summary     Date Anesthesia Start Anesthesia Stop Room / Location    10/04/17 1206 1221  [facility]       Procedure Diagnosis Surgeon Provider    ESOPHAGOGASTRODUODENOSCOPY (N/A Esophagus) Gastroesophageal reflux disease with esophagitis  (Gastroesophageal reflux disease with esophagitis [K21.0]) MD Balbina Powell MD          Anesthesia Type: MAC  Last vitals  BP   133/93 (10/04/17 1239)    Temp   36.6 °C (97.9 °F) (10/04/17 1229)    Pulse   62 (10/04/17 1239)   Resp   16 (10/04/17 1239)    SpO2   98 % (10/04/17 1239)      Post Anesthesia Care and Evaluation    Patient location during evaluation: PACU  Patient participation: complete - patient participated  Level of consciousness: awake  Pain score: 0  Pain management: adequate  Airway patency: patent  Anesthetic complications: No anesthetic complications    Cardiovascular status: acceptable  Respiratory status: acceptable  Hydration status: acceptable    Comments: Blood pressure 133/93, pulse 62, temperature 36.6 °C (97.9 °F), temperature source Oral, resp. rate 16, height 77\" (195.6 cm), weight 260 lb 9 oz (118 kg), SpO2 98 %.    No anesthesia care post op    "

## 2017-10-04 NOTE — DISCHARGE INSTRUCTIONS
For the next 24 hours patient needs to be with a responsible adult.    For 24 hours DO NOT drive, operate machinery, appliances, drink alcohol, make important decisions or sign legal documents.    Start with a light or bland diet and advance to regular diet as tolerated.    Follow recommendations provided by your doctor.    Call Dr. Guidry for problems [phone]    Problems may include but not limited to: large amounts of bleeding, trouble breathing, repeated vomiting, severe unrelieved pain, fever or chills.

## 2017-10-04 NOTE — H&P
CLINICAL SUMMARY (A/P):    62-year-old gentleman with worsening symptoms of gastroesophageal reflux disease associated with mild to moderate shortness of air.  He has been told based on previous upper endoscopy at some point demonstrating retained gastric content that he has gastroparesis but no formal testing has ever been done.     Gastric emptying scan was normal.  His upper GI demonstrated small sliding hiatal herniation of the upper stomach with florid gastroesophageal reflux and thickening of the esophageal mucosal folds.  I reviewed the images and concur.     He indicated that his symptom control is much better now with daily Nexium use.  I recommended that he continue taking Nexium but I also recommended that we proceed with EGD given the findings on upper GI.  He is not due for a colonoscopy until 2019.         CC:  Gastroesophageal reflux     HPI:  62-year-old gentleman presents with epigastric fullness/mild discomfort associated with burning sensation in the esophagus and reflux of food.  Also associated with mild to moderate shortness of air and a suffocating feeling which seems to correlate with the severity of his reflux.  He takes omeprazole 40 mg by mouth daily and has been taking this for years.  Does feel that it helped when he initially started taking it but he is not stopped and therefore does not know the degree to which it is helping now.  He believes that he tried Nexium years ago but does not recall if it worked any differently than the Prilosec.     ROS:  No chest pain or shortness of air.  Weight loss of 40 pounds in the last year, partially intentional.  All other systems reviewed and negative other than presenting complaints.     PSH:    Coronary stent  Right total knee arthroplasty complicated by postop infection  Colonoscopy 2014 Dr. Zhao (did have polyp)     PMH:    Coronary artery disease  Diabetes  Hypertension  Gastroesophageal reflux disease     MEDICATIONS: reviewed, in Epic, of  note on Plavix     ALLERGIES: reviewed, in Epic     FAMILY HISTORY:    Negative for colorectal cancer     SOCIAL HISTORY:   Denies tobacco use  Occasional alcohol use     PHYSICAL EXAM:   Constitutional: Well-developed well-nourished, no acute distress  Respiratory: Clear to auscultation, normal inspiratory effort  Cardiovascular: Regular rate, no murmur, no carotid bruit, no peripheral edema, no jugular venous distention  Gastrointestinal: Soft, nontender  Psychiatric: Alert and oriented ×3, normal affect      RADIOLOGY/ENDOSCOPY:    -Stress test 1 month ago normal per his report with 60% ejection fraction  -EGD 10/13/2015 demonstrated irregular Z line with normal stomach and normal duodenum, biopsies of the esophagus showed mild inflammation but no evidence of Johnson's.  Colonoscopy demonstrating tubular adenoma.  Chest x-ray 7/9/2017 demonstrated no active disease, I reviewed the images and also do not note any evidence of hiatal hernia.     CHANEL RODRIGUEZ M.D.

## 2017-10-04 NOTE — PLAN OF CARE
Problem: Patient Care Overview (Adult)  Goal: Plan of Care Review  Outcome: Ongoing (interventions implemented as appropriate)    10/04/17 1143   Coping/Psychosocial Response Interventions   Plan Of Care Reviewed With patient       Goal: Adult Individualization and Mutuality  Outcome: Ongoing (interventions implemented as appropriate)    10/04/17 1143   Individualization   Patient Specific Preferences NONE       Goal: Discharge Needs Assessment  Outcome: Ongoing (interventions implemented as appropriate)    10/04/17 1143   Discharge Needs Assessment   Concerns To Be Addressed no discharge needs identified   Living Environment   Transportation Available family or friend will provide         Problem: GI Endoscopy (Adult)  Goal: Signs and Symptoms of Listed Potential Problems Will be Absent or Manageable (GI Endoscopy)  Outcome: Ongoing (interventions implemented as appropriate)    10/04/17 1143   GI Endoscopy   Problems Assessed (GI Endoscopy) pain   Problems Present (GI Endoscopy) none

## 2017-10-04 NOTE — ANESTHESIA PREPROCEDURE EVALUATION
Anesthesia Evaluation     Patient summary reviewed and Nursing notes reviewed          Airway   Mallampati: I  TM distance: >3 FB  Neck ROM: full  possible difficult intubation  Dental - normal exam     Pulmonary - normal exam   (+) shortness of breath,   Cardiovascular - normal exam    (+) hypertension, CAD,       Neuro/Psych  GI/Hepatic/Renal/Endo    (+) obesity,  GERD, diabetes mellitus type 2 well controlled,   (-) morbid obesity    Musculoskeletal     Abdominal  - normal exam    Bowel sounds: normal.   Substance History      OB/GYN          Other                                        Anesthesia Plan    ASA 3     MAC     Anesthetic plan and risks discussed with patient.

## 2017-10-04 NOTE — OP NOTE
PREOPERATIVE DIAGNOSIS:  Abnormal UGI    POSTOPERATIVE DIAGNOSIS AND FINDINGS:  Small sliding hiatal hernia    PROCEDURE:  EGD    SURGEON:  Boyd Guidry MD    ANESTHESIA:  MAC    SPECIMEN:  none    DESCRIPTION:  In decubitus position endoscope was inserted into the esophagus, stomach and duodenum. Antegrade and retroflex view of stomach performed.    There were no gross abnormalities of the first, second or third portions of the duodenum.    Gastric antrum, body and retroflexed view of stomach were normal.    GE junction and esophagus were normal. Very small sliding hiatal hernia present.    Tolerated well.    Boyd Guidry M.D.

## 2017-10-11 DIAGNOSIS — I25.10 CHRONIC CORONARY ARTERY DISEASE: Primary | ICD-10-CM

## 2017-10-11 RX ORDER — CLOPIDOGREL BISULFATE 75 MG/1
75 TABLET ORAL DAILY
Qty: 90 TABLET | Refills: 0 | Status: SHIPPED | OUTPATIENT
Start: 2017-10-11 | End: 2017-10-21 | Stop reason: SDUPTHER

## 2017-10-11 RX ORDER — CLOPIDOGREL BISULFATE 75 MG/1
TABLET ORAL
Qty: 90 TABLET | Refills: 0 | Status: CANCELLED | OUTPATIENT
Start: 2017-10-11

## 2017-10-21 DIAGNOSIS — I25.10 CHRONIC CORONARY ARTERY DISEASE: ICD-10-CM

## 2017-10-23 RX ORDER — CLOPIDOGREL BISULFATE 75 MG/1
TABLET ORAL
Qty: 90 TABLET | Refills: 0 | Status: SHIPPED | OUTPATIENT
Start: 2017-10-23 | End: 2018-03-25 | Stop reason: SDUPTHER

## 2017-10-25 DIAGNOSIS — Z79.4 TYPE 2 DIABETES MELLITUS WITH HYPERGLYCEMIA, WITH LONG-TERM CURRENT USE OF INSULIN (HCC): ICD-10-CM

## 2017-10-25 DIAGNOSIS — I25.10 CHRONIC CORONARY ARTERY DISEASE: Primary | ICD-10-CM

## 2017-10-25 DIAGNOSIS — E11.65 TYPE 2 DIABETES MELLITUS WITH HYPERGLYCEMIA, WITH LONG-TERM CURRENT USE OF INSULIN (HCC): ICD-10-CM

## 2017-10-25 DIAGNOSIS — E78.5 HYPERLIPIDEMIA, UNSPECIFIED HYPERLIPIDEMIA TYPE: Primary | ICD-10-CM

## 2017-10-25 RX ORDER — CARVEDILOL 25 MG/1
TABLET ORAL
Qty: 60 TABLET | Refills: 2 | Status: SHIPPED | OUTPATIENT
Start: 2017-10-25 | End: 2017-12-29 | Stop reason: SDUPTHER

## 2017-10-25 RX ORDER — ERGOCALCIFEROL 1.25 MG/1
CAPSULE ORAL
Qty: 12 CAPSULE | Refills: 0 | Status: SHIPPED | OUTPATIENT
Start: 2017-10-25 | End: 2017-12-29 | Stop reason: SDUPTHER

## 2017-10-25 RX ORDER — ROSUVASTATIN CALCIUM 40 MG/1
TABLET, COATED ORAL
Qty: 90 TABLET | Refills: 0 | Status: SHIPPED | OUTPATIENT
Start: 2017-10-25 | End: 2018-01-24 | Stop reason: SDUPTHER

## 2017-10-31 ENCOUNTER — ANESTHESIA (OUTPATIENT)
Dept: GASTROENTEROLOGY | Facility: HOSPITAL | Age: 62
End: 2017-10-31

## 2017-10-31 ENCOUNTER — ON CAMPUS - OUTPATIENT (OUTPATIENT)
Dept: URBAN - METROPOLITAN AREA HOSPITAL 114 | Facility: HOSPITAL | Age: 62
End: 2017-10-31

## 2017-10-31 ENCOUNTER — HOSPITAL ENCOUNTER (OUTPATIENT)
Facility: HOSPITAL | Age: 62
Setting detail: HOSPITAL OUTPATIENT SURGERY
Discharge: HOME | End: 2017-10-31
Attending: INTERNAL MEDICINE | Admitting: INTERNAL MEDICINE

## 2017-10-31 ENCOUNTER — ANESTHESIA EVENT (OUTPATIENT)
Dept: GASTROENTEROLOGY | Facility: HOSPITAL | Age: 62
End: 2017-10-31

## 2017-10-31 VITALS
HEIGHT: 77 IN | BODY MASS INDEX: 30.7 KG/M2 | WEIGHT: 260 LBS | DIASTOLIC BLOOD PRESSURE: 81 MMHG | OXYGEN SATURATION: 99 % | RESPIRATION RATE: 16 BRPM | TEMPERATURE: 98.2 F | HEART RATE: 67 BPM | SYSTOLIC BLOOD PRESSURE: 109 MMHG

## 2017-10-31 DIAGNOSIS — Z86.010 PERSONAL HISTORY OF COLONIC POLYPS: ICD-10-CM

## 2017-10-31 DIAGNOSIS — Z86.010 HX OF COLONIC POLYPS: ICD-10-CM

## 2017-10-31 DIAGNOSIS — D12.2 BENIGN NEOPLASM OF ASCENDING COLON: ICD-10-CM

## 2017-10-31 LAB — GLUCOSE BLDC GLUCOMTR-MCNC: 70 MG/DL (ref 70–130)

## 2017-10-31 PROCEDURE — 45380 COLONOSCOPY AND BIOPSY: CPT | Mod: 33 | Performed by: INTERNAL MEDICINE

## 2017-10-31 PROCEDURE — 82962 GLUCOSE BLOOD TEST: CPT

## 2017-10-31 PROCEDURE — 25010000002 PROPOFOL 10 MG/ML EMULSION: Performed by: NURSE ANESTHETIST, CERTIFIED REGISTERED

## 2017-10-31 PROCEDURE — 88305 TISSUE EXAM BY PATHOLOGIST: CPT | Performed by: INTERNAL MEDICINE

## 2017-10-31 RX ORDER — PROPOFOL 10 MG/ML
VIAL (ML) INTRAVENOUS CONTINUOUS PRN
Status: DISCONTINUED | OUTPATIENT
Start: 2017-10-31 | End: 2017-10-31 | Stop reason: SURG

## 2017-10-31 RX ORDER — LIDOCAINE HYDROCHLORIDE 20 MG/ML
INJECTION, SOLUTION INFILTRATION; PERINEURAL AS NEEDED
Status: DISCONTINUED | OUTPATIENT
Start: 2017-10-31 | End: 2017-10-31 | Stop reason: SURG

## 2017-10-31 RX ORDER — PROPOFOL 10 MG/ML
VIAL (ML) INTRAVENOUS AS NEEDED
Status: DISCONTINUED | OUTPATIENT
Start: 2017-10-31 | End: 2017-10-31 | Stop reason: SURG

## 2017-10-31 RX ORDER — SODIUM CHLORIDE, SODIUM LACTATE, POTASSIUM CHLORIDE, CALCIUM CHLORIDE 600; 310; 30; 20 MG/100ML; MG/100ML; MG/100ML; MG/100ML
1000 INJECTION, SOLUTION INTRAVENOUS CONTINUOUS PRN
Status: DISCONTINUED | OUTPATIENT
Start: 2017-10-31 | End: 2017-10-31 | Stop reason: HOSPADM

## 2017-10-31 RX ORDER — SODIUM CHLORIDE 0.9 % (FLUSH) 0.9 %
3 SYRINGE (ML) INJECTION AS NEEDED
Status: DISCONTINUED | OUTPATIENT
Start: 2017-10-31 | End: 2017-10-31 | Stop reason: HOSPADM

## 2017-10-31 RX ORDER — LIDOCAINE HYDROCHLORIDE 10 MG/ML
0.5 INJECTION, SOLUTION INFILTRATION; PERINEURAL ONCE AS NEEDED
Status: DISCONTINUED | OUTPATIENT
Start: 2017-10-31 | End: 2017-10-31 | Stop reason: HOSPADM

## 2017-10-31 RX ADMIN — SODIUM CHLORIDE, POTASSIUM CHLORIDE, SODIUM LACTATE AND CALCIUM CHLORIDE: 600; 310; 30; 20 INJECTION, SOLUTION INTRAVENOUS at 11:30

## 2017-10-31 RX ADMIN — PROPOFOL 100 MCG/KG/MIN: 10 INJECTION, EMULSION INTRAVENOUS at 11:32

## 2017-10-31 RX ADMIN — LIDOCAINE HYDROCHLORIDE 50 MG: 20 INJECTION, SOLUTION INFILTRATION; PERINEURAL at 11:32

## 2017-10-31 RX ADMIN — SODIUM CHLORIDE, POTASSIUM CHLORIDE, SODIUM LACTATE AND CALCIUM CHLORIDE 1000 ML: 600; 310; 30; 20 INJECTION, SOLUTION INTRAVENOUS at 10:55

## 2017-10-31 RX ADMIN — PROPOFOL 100 MG: 10 INJECTION, EMULSION INTRAVENOUS at 11:32

## 2017-10-31 NOTE — ANESTHESIA POSTPROCEDURE EVALUATION
"Patient: Isaias Monaco    Procedure Summary     Date Anesthesia Start Anesthesia Stop Room / Location    10/31/17 1130 1157  [facility]       Procedure Diagnosis Surgeon Provider    COLONOSCOPY WITH POLYPECTOMY (COLD BIOPSY) (N/A ) No diagnosis on file. MD Balbina Cuevas MD          Anesthesia Type: MAC  Last vitals  BP   94/69 (10/31/17 1208)   Temp   36.8 °C (98.2 °F) (10/31/17 1046)   Pulse   71 (10/31/17 1208)   Resp   16 (10/31/17 1208)     SpO2   92 % (10/31/17 1208)     Post Anesthesia Care and Evaluation    Patient location during evaluation: PACU  Patient participation: complete - patient participated  Level of consciousness: awake  Pain score: 0  Pain management: adequate  Airway patency: patent  Anesthetic complications: No anesthetic complications  PONV Status: none  Cardiovascular status: acceptable  Respiratory status: acceptable  Hydration status: acceptable    Comments: BP 94/69 (BP Location: Left arm)  Pulse 71  Temp 36.8 °C (98.2 °F) (Oral)   Resp 16  Ht 77\" (195.6 cm)  Wt 260 lb (118 kg)  SpO2 92%  BMI 30.83 kg/m2      "

## 2017-10-31 NOTE — ANESTHESIA PREPROCEDURE EVALUATION
Anesthesia Evaluation     Patient summary reviewed and Nursing notes reviewed   NPO Solid Status: > 8 hours  NPO Liquid Status: > 8 hours     Airway   Mallampati: II  TM distance: >3 FB  Neck ROM: full  no difficulty expected  Dental - normal exam     Pulmonary - normal exam   Cardiovascular - normal exam    (+) hypertension well controlled, CAD, cardiac stents unknown timeframe hyperlipidemia      Neuro/Psych  (+) psychiatric history,    GI/Hepatic/Renal/Endo    (+) obesity,  diabetes mellitus type 2 well controlled,   (-) morbid obesity    Musculoskeletal     Abdominal  - normal exam    Bowel sounds: normal.   Substance History      OB/GYN          Other                                        Anesthesia Plan    ASA 3     MAC     Anesthetic plan and risks discussed with patient.

## 2017-11-01 PROBLEM — D12.6 TUBULAR ADENOMA OF COLON: Status: ACTIVE | Noted: 2017-11-01

## 2017-11-01 LAB
LAB AP CASE REPORT: NORMAL
Lab: NORMAL
PATH REPORT.FINAL DX SPEC: NORMAL
PATH REPORT.GROSS SPEC: NORMAL

## 2017-11-12 DIAGNOSIS — E55.9 VITAMIN D DEFICIENCY: ICD-10-CM

## 2017-11-12 DIAGNOSIS — E11.65 TYPE 2 DIABETES MELLITUS WITH HYPERGLYCEMIA (HCC): ICD-10-CM

## 2017-11-12 DIAGNOSIS — R80.9 MICROALBUMINURIA: ICD-10-CM

## 2017-11-12 DIAGNOSIS — N52.9 MALE ERECTILE DISORDER: ICD-10-CM

## 2017-11-12 DIAGNOSIS — E78.5 HYPERLIPIDEMIA: ICD-10-CM

## 2017-11-12 DIAGNOSIS — I10 ESSENTIAL HYPERTENSION: ICD-10-CM

## 2017-11-12 DIAGNOSIS — E66.9 ADIPOSITY: ICD-10-CM

## 2017-12-25 DIAGNOSIS — F41.9 ANXIETY: ICD-10-CM

## 2017-12-26 RX ORDER — ESCITALOPRAM OXALATE 20 MG/1
TABLET ORAL
Qty: 30 TABLET | Refills: 3 | Status: SHIPPED | OUTPATIENT
Start: 2017-12-26 | End: 2018-04-24 | Stop reason: SDUPTHER

## 2017-12-27 RX ORDER — PIOGLITAZONEHYDROCHLORIDE 45 MG/1
TABLET ORAL
Qty: 90 TABLET | Refills: 0 | Status: SHIPPED | OUTPATIENT
Start: 2017-12-27 | End: 2018-01-22 | Stop reason: SDUPTHER

## 2017-12-29 ENCOUNTER — OFFICE VISIT (OUTPATIENT)
Dept: INTERNAL MEDICINE | Age: 62
End: 2017-12-29

## 2017-12-29 VITALS
HEIGHT: 77 IN | OXYGEN SATURATION: 98 % | HEART RATE: 84 BPM | DIASTOLIC BLOOD PRESSURE: 78 MMHG | BODY MASS INDEX: 31.6 KG/M2 | WEIGHT: 267.6 LBS | SYSTOLIC BLOOD PRESSURE: 136 MMHG | TEMPERATURE: 98.2 F

## 2017-12-29 DIAGNOSIS — I10 ESSENTIAL HYPERTENSION: Primary | ICD-10-CM

## 2017-12-29 DIAGNOSIS — R05.9 COUGH: ICD-10-CM

## 2017-12-29 PROCEDURE — 99213 OFFICE O/P EST LOW 20 MIN: CPT | Performed by: INTERNAL MEDICINE

## 2017-12-29 RX ORDER — BENZONATATE 200 MG/1
200 CAPSULE ORAL 3 TIMES DAILY
Qty: 30 CAPSULE | Refills: 0 | Status: SHIPPED | OUTPATIENT
Start: 2017-12-29 | End: 2018-04-27

## 2017-12-29 RX ORDER — ERGOCALCIFEROL 1.25 MG/1
50000 CAPSULE ORAL
Qty: 12 CAPSULE | Refills: 0
Start: 2017-12-29 | End: 2018-04-27

## 2017-12-29 NOTE — PROGRESS NOTES
"Isaias Monaco / 62 y.o. / male  12/29/2017  VITALS    Visit Vitals   • /78   • Pulse 84   • Temp 98.2 °F (36.8 °C)   • Ht 195.6 cm (77\")   • Wt 121 kg (267 lb 9.6 oz)   • SpO2 98%   • BMI 31.73 kg/m2       BP Readings from Last 3 Encounters:   12/29/17 136/78   10/31/17 109/81   10/04/17 133/93     Wt Readings from Last 3 Encounters:   12/29/17 121 kg (267 lb 9.6 oz)   10/31/17 118 kg (260 lb)   10/04/17 118 kg (260 lb 9 oz)      Body mass index is 31.73 kg/(m^2).    CC:  Main reason(s) for today's visit: Hypertension      HPI:     Patient presents today for follow-up of hypertension.  When last seen by us, blood pressure is well-controlled.    Hypertension: He is compliant with medication, dietary salt restriction, home blood pressure typically runs in the 130s over 70s range.  Activity has been restricted due to recent surgery of his right knee.    Interval history: Patient had knee replacement in June of this year, unfortunately this is complicated by postoperative infection and hemorrhage.  At this point, he has returned to his baseline level of function.    Cough: Patient has had a cough for the past 3 weeks.  Cough is dry, he denied chest pain, shortness of breath, wheezing, fever, chills, sweats, anorexia, heartburn.  His wife also has a similar cough.  Home treatment over-the-counter cough suppressant.    Laboratory review July 24, 2017, CMP normal, CBC done September 12, 2017, white count 8.5, hemoglobin 12.2, platelet count 287,000.    Patient Care Team:  Bebeto Angel MD as PCP - General  Bebeto Angel MD as PCP - Family Medicine  Jorge Luis Kent MD as Consulting Physician (Orthopedic Surgery)  ____________________________________________________________________    ASSESSMENT & PLAN:    Problem List Items Addressed This Visit        Unprioritized    Essential hypertension - Primary    Relevant Medications    dorzolamide-timolol (COSOPT) 22.3-6.8 MG/ML ophthalmic solution    Blood Glucose " Monitoring Suppl ([facility] BLOOD GLUCOSE) kit    latanoprost (XALATAN) 0.005 % ophthalmic solution    [employer] PREMIUM GLUCOSE TEST test strip    carvedilol (COREG) 25 MG tablet    amLODIPine (NORVASC) 10 MG tablet    B-D ULTRAFINE III SHORT PEN 31G X 8 MM misc      Other Visit Diagnoses     Cough        Relevant Medications    benzonatate (TESSALON) 200 MG capsule        No orders of the defined types were placed in this encounter.      Summary/Discussion:  · Number-one hypertension stable on current medication.  Plan: Same meds, blood pressure check 6 months.  ·   · #2 cough postinfectious, patient is no evidence of reversible airway disease, postnasal drainage, or reflux.  I hesitate to use steroids because the presence of diabetes.  Will treat with Tessalon 200 mg 3 times a day for 10 days.  If this is not helpful, would follow this up with Tussionex.  She'll contact me by phone if needed for this particular medication.    Return in about 6 months (around 6/29/2018) for Blood pressure check.    Future Appointments  Date Time Provider Department Center   1/8/2018 8:20 AM [facility] None   1/22/2018 1:40 PM MICHELLE Pichardo [facility] None   1/29/2018 1:00 PM Ross Khan MD [facility] None       ______________________________________________________    REVIEW OF SYSTEMS    Review of Systems   Respiratory: Negative for chest tightness and shortness of breath.    Cardiovascular: Negative for chest pain and palpitations.   Neurological: Negative for dizziness, syncope, speech difficulty, weakness, light-headedness and headaches.         PHYSICAL EXAMINATION    Physical Exam   Constitutional: He is oriented to person, place, and time. He appears well-developed and well-nourished. No distress.   HENT:   Mouth/Throat: No posterior oropharyngeal edema or posterior oropharyngeal erythema.   Cardiovascular: Normal rate, regular rhythm, normal heart sounds and intact distal pulses.  Exam reveals no  gallop and no friction rub.    No murmur heard.  Pulmonary/Chest: Effort normal and breath sounds normal. He has no wheezes. He has no rales.   Musculoskeletal: He exhibits no edema.   Neurological: He is alert and oriented to person, place, and time.   Skin: Skin is warm and dry. No rash noted.   Psychiatric: He has a normal mood and affect. His behavior is normal. Judgment and thought content normal.   Nursing note and vitals reviewed.      REVIEWED DATA:    Labs:     Lab Results   Component Value Date     07/24/2017    K 3.8 07/24/2017    AST 32 07/24/2017    ALT 20 07/24/2017    BUN 9 07/24/2017    CREATININE 0.89 07/24/2017    CREATININE 0.93 07/17/2017    CREATININE 0.81 07/09/2017    EGFRIFNONA 44 (L) 06/19/2017    EGFRIFAFRI 105 07/24/2017       Lab Results   Component Value Date    HGBA1C 5.93 (H) 06/16/2017    HGBA1C 6.50 (H) 02/09/2017    HGBA1C 6.8 (H) 09/26/2016     (H) 06/19/2017    GLU 82 02/09/2017     (H) 05/10/2016    MICROALBUR 582.2 02/09/2017       Lab Results   Component Value Date     (H) 02/09/2017    LDL 81 09/26/2016     (H) 05/10/2016    HDL 65 (H) 02/09/2017    TRIG 119 02/09/2017       Lab Results   Component Value Date    TSH 1.390 02/09/2017    FREET4 1.22 02/25/2016       Lab Results   Component Value Date    WBC 8.85 09/12/2017    HGB 12.2 (L) 09/12/2017    HGB 11.8 (L) 07/24/2017    HGB 11.3 (L) 07/17/2017     09/12/2017       Imaging:         Medical Tests:         Summary of old records / correspondence / consultant report:         Request outside records:         ALLERGIES  Allergies   Allergen Reactions   • Ace Inhibitors      angioedema   • Lisinopril Angioedema   • Testosterone      Muscle pain        MEDICATIONS  Current Outpatient Prescriptions   Medication Sig Dispense Refill   • amLODIPine (NORVASC) 10 MG tablet TAKE ONE TABLET BY MOUTH DAILY 90 tablet 2   • aspirin 81 MG EC tablet Take 1 tablet by mouth Daily. get over the counter  30 tablet 3   • B-D ULTRAFINE III SHORT PEN 31G X 8 MM misc USE AS DIRECTED DAILY WITH TOUJEO 100 each 12   • Blood Glucose Monitoring Suppl ([facility] BLOOD GLUCOSE) kit      • clopidogrel (PLAVIX) 75 MG tablet TAKE ONE TABLET BY MOUTH DAILY 90 tablet 0   • dorzolamide-timolol (COSOPT) 22.3-6.8 MG/ML ophthalmic solution Administer 1 drop to both eyes 2 (Two) Times a Day.     • escitalopram (LEXAPRO) 20 MG tablet TAKE ONE TABLET BY MOUTH DAILY 30 tablet 3   • esomeprazole (NEXIUM) 40 MG capsule Take 1 capsule by mouth daily 30 capsule 5   • Insulin Glargine (TOUJEO SOLOSTAR) 300 UNIT/ML solution pen-injector Inject 60 Units under the skin Every Morning. 9 mL 3   • JARDIANCE 25 MG tablet take 1 tablet by mouth every morning 30 tablet 5   • latanoprost (XALATAN) 0.005 % ophthalmic solution Administer 1 drop to both eyes Every Morning.     • metFORMIN (GLUCOPHAGE) 500 MG tablet Take 1 tablet by mouth 2 (Two) Times a Day With Meals. 120 tablet 0   • rosuvastatin (CRESTOR) 40 MG tablet TAKE ONE TABLET BY MOUTH DAILY 90 tablet 0   • SITagliptin (JANUVIA) 100 MG tablet Take 1 tablet by mouth Daily. 30 tablet 5   • vitamin D (ERGOCALCIFEROL) 88344 units capsule capsule Take 1 capsule by mouth Every 7 (Seven) Days. 12 capsule 0   • benzonatate (TESSALON) 200 MG capsule Take 1 capsule by mouth 3 (Three) Times a Day. 30 capsule 0   • carvedilol (COREG) 25 MG tablet TAKE ONE TABLET BY MOUTH TWICE A DAY WITH MEALS 60 tablet 2   • [employer] PREMIUM GLUCOSE TEST test strip TEST FOUR TIMES A  each 3   • pioglitazone (ACTOS) 45 MG tablet TAKE ONE TABLET BY MOUTH DAILY 90 tablet 0     No current facility-administered medications for this visit.        PFSH:     The following portions of the patient's history were reviewed and updated as appropriate: Allergies / Current Medications / Past Medical History / Surgical History / Social History / Family History    PROBLEM LIST   Patient Active Problem List   Diagnosis   • Microalbuminuria    • Vitamin D deficiency   • Angioedema   • Chronic coronary artery disease   • Anxiety   • Male erectile disorder   • Gastroesophageal reflux disease   • Hyperlipidemia   • Diabetes mellitus   • Adiposity   • Routine health maintenance   • Uncontrolled type 2 diabetes mellitus   • Low testosterone   • Hypogonadism in male   • Presence of coronary angioplasty implant and graft   • Osteoarthritis, knee   • Postoperative visit   • Hematoma   • Essential hypertension   • Anemia, posthemorrhagic, acute   • Febrile illness   • Wound infection after surgery   • Shortness of breath   • Gastroesophageal reflux disease with esophagitis   • Tubular adenoma of colon       PAST MEDICAL HISTORY  Past Medical History:   Diagnosis Date   • Adiposity    • Anemia     post hemorrhagic   • Angioedema 2/21/2016    Secondary to ACE inhibitor   • Anxiety    • Arthritis    • CAD (coronary artery disease)    • Chest pain    • Colon polyps    • Diabetes mellitus, type 2    • ED (erectile dysfunction)    • Febrile illness    • GERD (gastroesophageal reflux disease)    • Glaucoma    • Hematoma    • High cholesterol    • Hyperlipidemia    • Hypertension    • Hypogonadism in male 9/28/2016   • Male erectile disorder    • Microalbuminuria    • Obesity (BMI 30-39.9)    • Osteoarthritis     knee   • Vitamin D deficiency    • Wound infection after surgery        SURGICAL HISTORY  Past Surgical History:   Procedure Laterality Date   • CARDIAC CATHETERIZATION N/A 05/15/2006    Dr. Mini Camarillo   • COLONOSCOPY N/A 02/22/2006    Bilobed polyp at 30 cm, hemorrhoids-Dr. Ilya Zhao   • COLONOSCOPY N/A 02/28/2014    Normal ileum, one 6 mm polyp in the mid transverse colon-Dr. Ilya Zhao   • COLONOSCOPY N/A 11/19/2008    Ela ileum, two 3 to 4 mm polyps, non-bleeding internal hemorrhoids, repeat in 5 years-Dr. Ilya Zhao   • COLONOSCOPY N/A 10/31/2017    Procedure: COLONOSCOPY WITH POLYPECTOMY (COLD BIOPSY);  Surgeon: Ilya Zhao MD;   Location: [facility] ENDOSCOPY;  Service:    • CORONARY ANGIOPLASTY WITH STENT PLACEMENT      cardiac stents x3 occasions   • ENDOSCOPY N/A 10/4/2017    Procedure: ESOPHAGOGASTRODUODENOSCOPY;  Surgeon: Boyd Guidry MD;  Location: [facility] ENDOSCOPY;  Service:    • KNEE INCISION AND DRAINAGE Right 6/20/2017    Procedure: RT. KNEE WASHOUT ;  Surgeon: Boyd Coyne MD;  Location: [facility] MAIN OR;  Service:    • ID TOTAL KNEE ARTHROPLASTY Right 6/15/2017    Procedure: RT TOTAL KNEE ARTHROPLASTY;  Surgeon: Boyd Coyne MD;  Location: [facility] MAIN OR;  Service: Orthopedics   • SHOULDER SURGERY Right 2016   • UPPER GASTROINTESTINAL ENDOSCOPY N/A 10/13/2015    Z-line irregular, normal stomach, normal duodenum-Dr. Ilya Zhao   • UPPER GASTROINTESTINAL ENDOSCOPY N/A 02/28/2014    Z-line irregular, normal stomach, normal duodenum-Dr. Ilya Zhao   • UPPER GASTROINTESTINAL ENDOSCOPY N/A 11/19/2008    Z-line irregular, chronic gastritis withotu hemorrhage, normal duodenum-Dr. Ilya Zhao   • UPPER GASTROINTESTINAL ENDOSCOPY N/A 06/22/2006    LA Grade A reflux esophagitis, non-bleeding erythematous gastropathy, normal duodenum-Dr. Ilya Zhao       SOCIAL HISTORY  Social History     Social History   • Marital status:      Spouse name: N/A   • Number of children: 1   • Years of education: N/A     Occupational History   • RN      Social History Main Topics   • Smoking status: Never Smoker   • Smokeless tobacco: Never Used   • Alcohol use No      Comment: Occasionally   • Drug use: No   • Sexual activity: Defer     Other Topics Concern   • None     Social History Narrative    , 1 son, 2 stepsons       FAMILY HISTORY  Family History   Problem Relation Age of Onset   • Lupus Sister    • Heart disease Other    • Hypertension Other    • Malig Hyperthermia Neg Hx          **Dragon Disclaimer:   Much of this encounter note is an electronic transcription/translation of spoken language to printed text. The electronic  translation of spoken language may permit erroneous, or at times, nonsensical words or phrases to be inadvertently transcribed. Although I have reviewed the note for such errors, some may still exist.

## 2018-01-05 DIAGNOSIS — I25.10 CHRONIC CORONARY ARTERY DISEASE: ICD-10-CM

## 2018-01-05 RX ORDER — CLOPIDOGREL BISULFATE 75 MG/1
TABLET ORAL
Qty: 90 TABLET | Refills: 0 | Status: SHIPPED | OUTPATIENT
Start: 2018-01-05 | End: 2018-01-22 | Stop reason: SDUPTHER

## 2018-01-08 ENCOUNTER — LAB (OUTPATIENT)
Dept: ENDOCRINOLOGY | Age: 63
End: 2018-01-08

## 2018-01-08 DIAGNOSIS — E11.65 UNCONTROLLED TYPE 2 DIABETES MELLITUS WITH COMPLICATION, UNSPECIFIED LONG TERM INSULIN USE STATUS: ICD-10-CM

## 2018-01-08 DIAGNOSIS — E11.65 UNCONTROLLED TYPE 2 DIABETES MELLITUS WITH COMPLICATION, UNSPECIFIED LONG TERM INSULIN USE STATUS: Primary | ICD-10-CM

## 2018-01-08 DIAGNOSIS — E29.1 HYPOGONADISM IN MALE: ICD-10-CM

## 2018-01-08 DIAGNOSIS — E55.9 VITAMIN D DEFICIENCY: ICD-10-CM

## 2018-01-08 DIAGNOSIS — E11.8 UNCONTROLLED TYPE 2 DIABETES MELLITUS WITH COMPLICATION, UNSPECIFIED LONG TERM INSULIN USE STATUS: Primary | ICD-10-CM

## 2018-01-08 DIAGNOSIS — E11.8 UNCONTROLLED TYPE 2 DIABETES MELLITUS WITH COMPLICATION, UNSPECIFIED LONG TERM INSULIN USE STATUS: ICD-10-CM

## 2018-01-10 ENCOUNTER — TELEPHONE (OUTPATIENT)
Dept: INTERNAL MEDICINE | Age: 63
End: 2018-01-10

## 2018-01-10 LAB
25(OH)D3+25(OH)D2 SERPL-MCNC: 38.8 NG/ML (ref 30–100)
ALBUMIN SERPL-MCNC: 4.5 G/DL (ref 3.5–5.2)
ALBUMIN/GLOB SERPL: 1 G/DL
ALP SERPL-CCNC: 98 U/L (ref 39–117)
ALT SERPL-CCNC: 30 U/L (ref 1–41)
AST SERPL-CCNC: 41 U/L (ref 1–40)
BILIRUB SERPL-MCNC: 0.3 MG/DL (ref 0.1–1.2)
BUN SERPL-MCNC: 19 MG/DL (ref 8–23)
BUN/CREAT SERPL: 16.7 (ref 7–25)
C PEPTIDE SERPL-MCNC: 2.6 NG/ML (ref 1.1–4.4)
CALCIUM SERPL-MCNC: 9.7 MG/DL (ref 8.6–10.5)
CHLORIDE SERPL-SCNC: 98 MMOL/L (ref 98–107)
CHOLEST SERPL-MCNC: 196 MG/DL (ref 0–200)
CO2 SERPL-SCNC: 27.5 MMOL/L (ref 22–29)
CREAT SERPL-MCNC: 1.14 MG/DL (ref 0.76–1.27)
GLOBULIN SER CALC-MCNC: 4.3 GM/DL
GLUCOSE SERPL-MCNC: 102 MG/DL (ref 65–99)
HBA1C MFR BLD: 5.9 % (ref 4.8–5.6)
HCT VFR BLD AUTO: 40.1 % (ref 40.4–52.2)
HDLC SERPL-MCNC: 45 MG/DL (ref 40–60)
HGB BLD-MCNC: 12.3 G/DL (ref 13.7–17.6)
INTERPRETATION: NORMAL
LDLC SERPL CALC-MCNC: 125 MG/DL (ref 0–100)
Lab: NORMAL
MICROALBUMIN UR-MCNC: 194.2 UG/ML
POTASSIUM SERPL-SCNC: 4.4 MMOL/L (ref 3.5–5.2)
PROT SERPL-MCNC: 8.8 G/DL (ref 6–8.5)
PSA SERPL-MCNC: 0.2 NG/ML (ref 0–4)
SHBG SERPL-SCNC: 29.2 NMOL/L (ref 19.3–76.4)
SODIUM SERPL-SCNC: 137 MMOL/L (ref 136–145)
TESTOST FREE SERPL-MCNC: 6.1 PG/ML (ref 6.6–18.1)
TESTOST SERPL-MCNC: 358 NG/DL (ref 264–916)
TRIGL SERPL-MCNC: 128 MG/DL (ref 0–150)
VLDLC SERPL CALC-MCNC: 25.6 MG/DL (ref 5–40)

## 2018-01-10 NOTE — TELEPHONE ENCOUNTER
Pt stated Dr Angel told him if he needed a cough suppressant to call the office back. Noted in pt's office visit 12-29-17.  Pt asking for a cough suppressant.  Pt is aware Dr Angel would not be in the office until Thursday morning.  [facility] pharmacy  Pt's # [phone]  Thanks SP

## 2018-01-19 DIAGNOSIS — E11.9 TYPE 2 DIABETES MELLITUS WITHOUT COMPLICATION, WITH LONG-TERM CURRENT USE OF INSULIN (HCC): ICD-10-CM

## 2018-01-19 DIAGNOSIS — Z79.4 TYPE 2 DIABETES MELLITUS WITHOUT COMPLICATION, WITH LONG-TERM CURRENT USE OF INSULIN (HCC): ICD-10-CM

## 2018-01-19 RX ORDER — INSULIN GLARGINE 300 U/ML
INJECTION, SOLUTION SUBCUTANEOUS
Qty: 6 ML | Refills: 0 | Status: SHIPPED | OUTPATIENT
Start: 2018-01-19 | End: 2018-04-27

## 2018-01-19 RX ORDER — ERGOCALCIFEROL 1.25 MG/1
CAPSULE ORAL
Qty: 12 CAPSULE | Refills: 0 | Status: SHIPPED | OUTPATIENT
Start: 2018-01-19 | End: 2018-01-22 | Stop reason: SDUPTHER

## 2018-01-22 ENCOUNTER — OFFICE VISIT (OUTPATIENT)
Dept: ENDOCRINOLOGY | Age: 63
End: 2018-01-22

## 2018-01-22 VITALS
HEIGHT: 77 IN | WEIGHT: 266.8 LBS | DIASTOLIC BLOOD PRESSURE: 82 MMHG | SYSTOLIC BLOOD PRESSURE: 118 MMHG | BODY MASS INDEX: 31.5 KG/M2

## 2018-01-22 DIAGNOSIS — I10 ESSENTIAL HYPERTENSION: ICD-10-CM

## 2018-01-22 DIAGNOSIS — E11.65 TYPE 2 DIABETES MELLITUS WITH HYPERGLYCEMIA, WITH LONG-TERM CURRENT USE OF INSULIN (HCC): Primary | ICD-10-CM

## 2018-01-22 DIAGNOSIS — E55.9 VITAMIN D DEFICIENCY: ICD-10-CM

## 2018-01-22 DIAGNOSIS — E78.5 HYPERLIPIDEMIA, UNSPECIFIED HYPERLIPIDEMIA TYPE: ICD-10-CM

## 2018-01-22 DIAGNOSIS — Z79.4 TYPE 2 DIABETES MELLITUS WITH HYPERGLYCEMIA, WITH LONG-TERM CURRENT USE OF INSULIN (HCC): Primary | ICD-10-CM

## 2018-01-22 DIAGNOSIS — E29.1 HYPOGONADISM IN MALE: ICD-10-CM

## 2018-01-22 PROCEDURE — 99214 OFFICE O/P EST MOD 30 MIN: CPT | Performed by: NURSE PRACTITIONER

## 2018-01-22 RX ORDER — PIOGLITAZONEHYDROCHLORIDE 15 MG/1
15 TABLET ORAL DAILY
Qty: 90 TABLET | Refills: 1 | Status: SHIPPED | OUTPATIENT
Start: 2018-01-22 | End: 2018-03-20 | Stop reason: DRUGHIGH

## 2018-01-22 NOTE — PROGRESS NOTES
"Marie Monaco is a 62 y.o. male is here today for follow-up.  Chief Complaint   Patient presents with   • Diabetes     recent labs, pt tests BG occasionally, pt did not bring meter   • Hypogonadism   • Hypertension   • Hyperlipidemia   • Vitamin D Deficiency   • microalbuminuria     /82  Ht 195.6 cm (77\")  Wt 121 kg (266 lb 12.8 oz)  BMI 31.64 kg/m2  Current Outpatient Prescriptions on File Prior to Visit   Medication Sig   • amLODIPine (NORVASC) 10 MG tablet TAKE ONE TABLET BY MOUTH DAILY   • aspirin 81 MG EC tablet Take 1 tablet by mouth Daily. get over the counter   • B-D ULTRAFINE III SHORT PEN 31G X 8 MM misc USE AS DIRECTED DAILY WITH TOUJEO   • benzonatate (TESSALON) 200 MG capsule Take 1 capsule by mouth 3 (Three) Times a Day.   • Blood Glucose Monitoring Suppl ([facility] BLOOD GLUCOSE) kit    • carvedilol (COREG) 25 MG tablet TAKE ONE TABLET BY MOUTH TWICE A DAY WITH MEALS   • clopidogrel (PLAVIX) 75 MG tablet TAKE ONE TABLET BY MOUTH DAILY   • dorzolamide-timolol (COSOPT) 22.3-6.8 MG/ML ophthalmic solution Administer 1 drop to both eyes 2 (Two) Times a Day.   • escitalopram (LEXAPRO) 20 MG tablet TAKE ONE TABLET BY MOUTH DAILY   • esomeprazole (NEXIUM) 40 MG capsule Take 1 capsule by mouth daily   • HYDROcod Polst-CPM Polst ER (TUSSIONEX PENNKINETIC) 10-8 MG/5ML ER suspension Take 5 mL by mouth Every 12 (Twelve) Hours As Needed for Cough.   • JARDIANCE 25 MG tablet take 1 tablet by mouth every morning   • [employer] PREMIUM GLUCOSE TEST test strip TEST FOUR TIMES A DAY   • latanoprost (XALATAN) 0.005 % ophthalmic solution Administer 1 drop to both eyes Every Morning.   • metFORMIN (GLUCOPHAGE) 500 MG tablet TAKE TWO TABLETS BY MOUTH TWICE A DAY WITH MEALS   • pioglitazone (ACTOS) 45 MG tablet TAKE ONE TABLET BY MOUTH DAILY   • rosuvastatin (CRESTOR) 40 MG tablet TAKE ONE TABLET BY MOUTH DAILY   • SITagliptin (JANUVIA) 100 MG tablet Take 1 tablet by mouth Daily.   • TOUJEO SOLOSTAR 300 " UNIT/ML solution pen-injector INJECT 60 UNITS UNDER THE SKIN EVERY MORNING   • vitamin D (ERGOCALCIFEROL) 99277 units capsule capsule Take 1 capsule by mouth Every 7 (Seven) Days.   • [DISCONTINUED] clopidogrel (PLAVIX) 75 MG tablet TAKE ONE TABLET BY MOUTH DAILY   • [DISCONTINUED] vitamin D (ERGOCALCIFEROL) 26350 units capsule capsule TAKE ONE CAPSULE BY MOUTH ONCE WEEKLY     No current facility-administered medications on file prior to visit.      Family History   Problem Relation Age of Onset   • Lupus Sister    • Heart disease Other    • Hypertension Other    • Malig Hyperthermia Neg Hx      Social History   Substance Use Topics   • Smoking status: Never Smoker   • Smokeless tobacco: Never Used   • Alcohol use No      Comment: Occasionally     Allergies   Allergen Reactions   • Ace Inhibitors      angioedema   • Lisinopril Angioedema   • Testosterone      Muscle pain         History of Present Illness  Encounter Diagnoses   Name Primary?   • Hyperlipidemia, unspecified hyperlipidemia type    • Essential hypertension    • Vitamin D deficiency    • Type 2 diabetes mellitus with hyperglycemia, with long-term current use of insulin Yes   • Hypogonadism in male      This is a 62-year-old male patient here today for routine follow-up visit.  He has been seen for the above-mentioned problems.  He had recent labs which were reviewed and he was provided a copy.  He is postop right knee surgery and did have some postop complications with infection.  He states recently he has gotten over Respiratory cough as well as a GI bug.  He has no complaints at today's visit  The following portions of the patient's history were reviewed and updated as appropriate: allergies, current medications, past family history, past medical history, past social history, past surgical history and problem list.    Review of Systems   Constitutional: Negative for fatigue.   HENT: Negative for trouble swallowing.    Eyes: Negative for visual  disturbance.   Respiratory: Negative for shortness of breath.    Cardiovascular: Negative for leg swelling.   Endocrine: Negative for polyuria.   Skin: Negative for wound.   Neurological: Negative for numbness.       Objective   Physical Exam   Constitutional: He is oriented to person, place, and time. He appears well-developed and well-nourished. No distress.   HENT:   Head: Normocephalic and atraumatic.   Right Ear: External ear normal.   Left Ear: External ear normal.   Nose: Nose normal.   Eyes: Pupils are equal, round, and reactive to light. Right eye exhibits no discharge. Left eye exhibits no discharge.   Neck: Normal range of motion. Neck supple. Carotid bruit is not present. No tracheal deviation, no edema and no erythema present. No thyromegaly present.   Cardiovascular: Normal rate, regular rhythm, normal heart sounds and intact distal pulses.  Exam reveals no gallop and no friction rub.    No murmur heard.  Pulmonary/Chest: Effort normal and breath sounds normal. No respiratory distress. He has no wheezes. He has no rales.   Abdominal: Soft. Bowel sounds are normal. He exhibits no distension. There is no tenderness.   Musculoskeletal: Normal range of motion. He exhibits no edema or deformity.   R arm- rotator cuff surgery on 9/21- sling   Lymphadenopathy:     He has no cervical adenopathy.   Neurological: He is alert and oriented to person, place, and time. Coordination normal.   Skin: Skin is warm and dry. No rash noted. He is not diaphoretic. No erythema. No pallor.   Psychiatric: He has a normal mood and affect. His behavior is normal. Judgment and thought content normal.   Nursing note and vitals reviewed.    Results for orders placed or performed in visit on 01/08/18   Comprehensive Metabolic Panel   Result Value Ref Range    Glucose 102 (H) 65 - 99 mg/dL    BUN 19 8 - 23 mg/dL    Creatinine 1.14 0.76 - 1.27 mg/dL    eGFR Non African Am 65 >60 mL/min/1.73    eGFR African Am 79 >60 mL/min/1.73     BUN/Creatinine Ratio 16.7 7.0 - 25.0    Sodium 137 136 - 145 mmol/L    Potassium 4.4 3.5 - 5.2 mmol/L    Chloride 98 98 - 107 mmol/L    Total CO2 27.5 22.0 - 29.0 mmol/L    Calcium 9.7 8.6 - 10.5 mg/dL    Total Protein 8.8 (H) 6.0 - 8.5 g/dL    Albumin 4.50 3.50 - 5.20 g/dL    Globulin 4.3 gm/dL    A/G Ratio 1.0 g/dL    Total Bilirubin 0.3 0.1 - 1.2 mg/dL    Alkaline Phosphatase 98 39 - 117 U/L    AST (SGOT) 41 (H) 1 - 40 U/L    ALT (SGPT) 30 1 - 41 U/L   C-Peptide   Result Value Ref Range    C-Peptide 2.6 1.1 - 4.4 ng/mL   Hemoglobin & Hematocrit, Blood   Result Value Ref Range    Hemoglobin 12.3 (L) 13.7 - 17.6 g/dL    Hematocrit 40.1 (L) 40.4 - 52.2 %   Hemoglobin A1c   Result Value Ref Range    Hemoglobin A1C 5.90 (H) 4.80 - 5.60 %   Lipid Panel   Result Value Ref Range    Total Cholesterol 196 0 - 200 mg/dL    Triglycerides 128 0 - 150 mg/dL    HDL Cholesterol 45 40 - 60 mg/dL    VLDL Cholesterol 25.6 5 - 40 mg/dL    LDL Cholesterol  125 (H) 0 - 100 mg/dL   PSA DIAGNOSTIC   Result Value Ref Range    PSA 0.197 0.000 - 4.000 ng/mL   TestT+TestF+SHBG   Result Value Ref Range    Testosterone, Total 358 264 - 916 ng/dL    Testosterone, Free 6.1 (L) 6.6 - 18.1 pg/mL    Sex Hormone Binding Globulin 29.2 19.3 - 76.4 nmol/L   Vitamin D 25 Hydroxy   Result Value Ref Range    25 Hydroxy, Vitamin D 38.8 30.0 - 100.0 ng/mL   MicroAlbumin, Urine, Random   Result Value Ref Range    Microalbumin, Urine 194.2 Not Estab. ug/mL   Cardiovascular Risk Assessment   Result Value Ref Range    Interpretation Note    Diabetes Patient Education   Result Value Ref Range    PDF Image Not applicable          Assessment/Plan   Problems Addressed this Visit        Cardiovascular and Mediastinum    Hyperlipidemia    Essential hypertension       Digestive    Vitamin D deficiency       Endocrine    Diabetes mellitus - Primary    Hypogonadism in male          In summary, patient was seen and examined.  His recent labs reflected his diabetes is  well-controlled.  He is concerned about an interaction that his pharmacy cautioned him about with his pioGlitazone and Plavix.  We decreased his Actos down to 15 mg once daily.  He will continue all his other medications as prescribed.  Metabolically he is stable.  His blood pressure is an satisfactory range.  He does not take testosterone therapy due to it causing muscle aches according to patient.  He is to follow-up in 6 months with labs prior.  I've encouraged him to contact the office should he have any questions or concerns prior to then.

## 2018-01-24 DIAGNOSIS — E78.5 HYPERLIPIDEMIA, UNSPECIFIED HYPERLIPIDEMIA TYPE: ICD-10-CM

## 2018-01-24 RX ORDER — ROSUVASTATIN CALCIUM 40 MG/1
TABLET, COATED ORAL
Qty: 90 TABLET | Refills: 0 | Status: SHIPPED | OUTPATIENT
Start: 2018-01-24 | End: 2018-04-20 | Stop reason: SDUPTHER

## 2018-02-09 DIAGNOSIS — E11.9 CONTROLLED TYPE 2 DIABETES MELLITUS WITHOUT COMPLICATION, WITH LONG-TERM CURRENT USE OF INSULIN (HCC): ICD-10-CM

## 2018-02-09 DIAGNOSIS — Z79.4 CONTROLLED TYPE 2 DIABETES MELLITUS WITHOUT COMPLICATION, WITH LONG-TERM CURRENT USE OF INSULIN (HCC): ICD-10-CM

## 2018-02-09 RX ORDER — SITAGLIPTIN 100 MG/1
TABLET, FILM COATED ORAL
Qty: 30 TABLET | Refills: 5 | Status: SHIPPED | OUTPATIENT
Start: 2018-02-09 | End: 2018-08-07 | Stop reason: SDUPTHER

## 2018-02-23 DIAGNOSIS — IMO0001 UNCONTROLLED DIABETES MELLITUS TYPE 2 WITHOUT COMPLICATIONS, UNSPECIFIED LONG TERM INSULIN USE STATUS: ICD-10-CM

## 2018-02-23 RX ORDER — EMPAGLIFLOZIN 25 MG/1
TABLET, FILM COATED ORAL
Qty: 30 TABLET | Refills: 5 | Status: SHIPPED | OUTPATIENT
Start: 2018-02-23 | End: 2018-08-07 | Stop reason: SDUPTHER

## 2018-03-20 DIAGNOSIS — R80.9 MICROALBUMINURIA: ICD-10-CM

## 2018-03-20 DIAGNOSIS — E78.5 HYPERLIPIDEMIA: ICD-10-CM

## 2018-03-20 DIAGNOSIS — N52.9 MALE ERECTILE DISORDER: ICD-10-CM

## 2018-03-20 DIAGNOSIS — E11.65 TYPE 2 DIABETES MELLITUS WITH HYPERGLYCEMIA (HCC): ICD-10-CM

## 2018-03-20 DIAGNOSIS — E55.9 VITAMIN D DEFICIENCY: ICD-10-CM

## 2018-03-20 DIAGNOSIS — E66.9 ADIPOSITY: ICD-10-CM

## 2018-03-20 DIAGNOSIS — I10 ESSENTIAL HYPERTENSION: ICD-10-CM

## 2018-03-20 RX ORDER — INSULIN GLARGINE 300 U/ML
60 INJECTION, SOLUTION SUBCUTANEOUS EVERY MORNING
Qty: 6 ML | Refills: 0 | Status: SHIPPED | OUTPATIENT
Start: 2018-03-20 | End: 2018-04-20 | Stop reason: SDUPTHER

## 2018-03-20 RX ORDER — CARVEDILOL 25 MG/1
TABLET ORAL
Qty: 60 TABLET | Refills: 1 | Status: SHIPPED | OUTPATIENT
Start: 2018-03-20 | End: 2018-06-05 | Stop reason: SDUPTHER

## 2018-03-20 RX ORDER — PIOGLITAZONEHYDROCHLORIDE 45 MG/1
TABLET ORAL
Qty: 90 TABLET | Refills: 1 | Status: SHIPPED | OUTPATIENT
Start: 2018-03-20 | End: 2018-06-20 | Stop reason: SDUPTHER

## 2018-03-20 RX ORDER — ESOMEPRAZOLE MAGNESIUM 40 MG/1
CAPSULE, DELAYED RELEASE ORAL
Qty: 30 CAPSULE | Refills: 4 | Status: SHIPPED | OUTPATIENT
Start: 2018-03-20 | End: 2018-11-07 | Stop reason: SDUPTHER

## 2018-03-25 DIAGNOSIS — I25.10 CHRONIC CORONARY ARTERY DISEASE: ICD-10-CM

## 2018-03-26 RX ORDER — CLOPIDOGREL BISULFATE 75 MG/1
TABLET ORAL
Qty: 90 TABLET | Refills: 1 | Status: SHIPPED | OUTPATIENT
Start: 2018-03-26 | End: 2018-06-20 | Stop reason: SDUPTHER

## 2018-04-13 ENCOUNTER — TELEPHONE (OUTPATIENT)
Dept: INTERNAL MEDICINE | Age: 63
End: 2018-04-13

## 2018-04-13 NOTE — TELEPHONE ENCOUNTER
Pt is requesting samples of Cialis for ED, pt is unsure of dosage but thinks it is 10mg.    Pls advise.    Pt can be reached #[phone].

## 2018-04-16 NOTE — TELEPHONE ENCOUNTER
Oscar,  Do you guys have this down on your end? If so can we give us a sample?   Thank you,  Evonne

## 2018-04-16 NOTE — TELEPHONE ENCOUNTER
No we do not have viagra or Cialis in Back here Like you said earlier the rep has not been her in quite while and we are out. DALILAJ

## 2018-04-16 NOTE — TELEPHONE ENCOUNTER
The office does not have Cialis at this time. Rep hasn't been here in months.  Mr Jacinda then asked if we have Viagra.   Would this be ok sir?

## 2018-04-20 DIAGNOSIS — E78.5 HYPERLIPIDEMIA, UNSPECIFIED HYPERLIPIDEMIA TYPE: ICD-10-CM

## 2018-04-20 DIAGNOSIS — Z79.4 TYPE 2 DIABETES MELLITUS WITHOUT COMPLICATION, WITH LONG-TERM CURRENT USE OF INSULIN (HCC): ICD-10-CM

## 2018-04-20 DIAGNOSIS — E11.9 TYPE 2 DIABETES MELLITUS WITHOUT COMPLICATION, WITH LONG-TERM CURRENT USE OF INSULIN (HCC): ICD-10-CM

## 2018-04-20 RX ORDER — OMEPRAZOLE 40 MG/1
CAPSULE, DELAYED RELEASE ORAL
Qty: 90 CAPSULE | Refills: 0 | Status: SHIPPED | OUTPATIENT
Start: 2018-04-20 | End: 2018-04-27

## 2018-04-20 RX ORDER — ROSUVASTATIN CALCIUM 40 MG/1
TABLET, COATED ORAL
Qty: 90 TABLET | Refills: 0 | Status: SHIPPED | OUTPATIENT
Start: 2018-04-20 | End: 2018-05-14

## 2018-04-20 NOTE — TELEPHONE ENCOUNTER
Pt is requesting a 90-day supply of Cialis 5mg.    [facility] #[phone]    Pt states once the rx is escribed to [facility], [facility] will send us a PA to complete.    Pt can be reached #[phone].

## 2018-04-23 RX ORDER — ERGOCALCIFEROL 1.25 MG/1
CAPSULE ORAL
Qty: 12 CAPSULE | Refills: 0 | Status: SHIPPED | OUTPATIENT
Start: 2018-04-23 | End: 2018-05-14

## 2018-04-23 RX ORDER — INSULIN GLARGINE 300 U/ML
60 INJECTION, SOLUTION SUBCUTANEOUS EVERY MORNING
Qty: 9 ML | Refills: 0 | Status: SHIPPED | OUTPATIENT
Start: 2018-04-23 | End: 2018-06-05 | Stop reason: SDUPTHER

## 2018-04-23 NOTE — TELEPHONE ENCOUNTER
PT REQUESTING RX FOR CIALAS. PT LAST APPT WAS JAN 2018, I DO NOT SEE WHERE WE HAVE PRESCRIBED CIALAS FOR PT BEFORE. PT HAS APPT IN June 2018.     PLEASE ADVISE

## 2018-04-23 NOTE — TELEPHONE ENCOUNTER
Did we  not take care of this last week?  Does he have a urologist?  When was the last time he had his PSA checked and rectal exam?

## 2018-04-23 NOTE — TELEPHONE ENCOUNTER
We did take care of this. Pt wanted samples and we did not have either Cialis or Viagra.  Pt was advised to call insurance to see what they would pay for and call us back with an update. SHANAE

## 2018-04-24 DIAGNOSIS — F41.9 ANXIETY: ICD-10-CM

## 2018-04-24 RX ORDER — TADALAFIL 5 MG/1
5 TABLET ORAL DAILY PRN
Qty: 30 TABLET | Refills: 0 | Status: SHIPPED | OUTPATIENT
Start: 2018-04-24 | End: 2018-04-27 | Stop reason: SDUPTHER

## 2018-04-24 RX ORDER — ESCITALOPRAM OXALATE 20 MG/1
TABLET ORAL
Qty: 30 TABLET | Refills: 2 | Status: SHIPPED | OUTPATIENT
Start: 2018-04-24 | End: 2018-07-14 | Stop reason: SDUPTHER

## 2018-04-24 NOTE — TELEPHONE ENCOUNTER
PT SCHEDULED APPT FOR MAY 3, 2018 FOR [facility]. PT IS ASKING IF AN RX FOR CIALIS CAN BE SENT TO HIS PHARMACY NOW.     PLEASE ADVISE?

## 2018-04-27 ENCOUNTER — OFFICE VISIT (OUTPATIENT)
Dept: INTERNAL MEDICINE | Age: 63
End: 2018-04-27

## 2018-04-27 VITALS
DIASTOLIC BLOOD PRESSURE: 88 MMHG | HEIGHT: 77 IN | BODY MASS INDEX: 31.97 KG/M2 | WEIGHT: 270.8 LBS | HEART RATE: 83 BPM | TEMPERATURE: 97.9 F | OXYGEN SATURATION: 95 % | SYSTOLIC BLOOD PRESSURE: 136 MMHG

## 2018-04-27 DIAGNOSIS — R35.1 BENIGN PROSTATIC HYPERPLASIA WITH NOCTURIA: ICD-10-CM

## 2018-04-27 DIAGNOSIS — N40.1 BENIGN PROSTATIC HYPERPLASIA WITH NOCTURIA: ICD-10-CM

## 2018-04-27 DIAGNOSIS — N52.9 VASCULOGENIC ERECTILE DYSFUNCTION, UNSPECIFIED VASCULOGENIC ERECTILE DYSFUNCTION TYPE: Primary | ICD-10-CM

## 2018-04-27 PROCEDURE — 99213 OFFICE O/P EST LOW 20 MIN: CPT | Performed by: INTERNAL MEDICINE

## 2018-04-27 RX ORDER — TADALAFIL 5 MG/1
5 TABLET ORAL DAILY PRN
Qty: 90 TABLET | Refills: 3 | Status: SHIPPED | OUTPATIENT
Start: 2018-04-27 | End: 2018-05-02 | Stop reason: ALTCHOICE

## 2018-04-27 NOTE — PROGRESS NOTES
"[facility] [place] [facility]        Isaias Monaco / 63 y.o. / male  04/27/2018      ASSESSMENT & PLAN:    Problem List Items Addressed This Visit     None      Visit Diagnoses    None.       No orders of the defined types were placed in this encounter.      Summary/Discussion:    Number-one BPH with lower urinary tract symptoms, along with EGD.  Plan: Recommend Cialis 5 mg per day, dispense 90 pills with 3 refill.  Contraindication to concurrent use with nitroglycerin reviewed with patient.  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  We will see him again at his physical scheduled for June of this year.      No Follow-up on file.    ____________________________________________________________________    VITALS    Visit Vitals  /88   Pulse 83   Temp 97.9 °F (36.6 °C)   Ht 195.6 cm (77.01\")   Wt 123 kg (270 lb 12.8 oz)   SpO2 95%   BMI 32.11 kg/m²       BP Readings from Last 3 Encounters:   04/27/18 136/88   01/22/18 118/82   12/29/17 136/78     Wt Readings from Last 3 Encounters:   04/27/18 123 kg (270 lb 12.8 oz)   01/22/18 121 kg (266 lb 12.8 oz)   12/29/17 121 kg (267 lb 9.6 oz)      Body mass index is 32.11 kg/m².    CC:  Main reason(s) for today's visit: Elevated PSA (New draw and ANITA)      HPI:     Patient presents today because of issues with lower urinary tract symptoms and EGD.  Patient is up at least 4-5 times at night to urinate thus disrupting his sleep.  He also has difficulty with obtaining and maintaining an erection adequate for intercourse.  He has used Cialis in the past at 5 mg per day and his help with both problems.  The last time medication was used was in 2017.  His last PSA was done in January of this year and was normal at 0.197.  His last digital rectal exam was December 2016.  Patient does have coronary disease, but has not had myocardial infarction.  He is not having angina, nor is he using nitroglycerin or alpha blocker at this time.  Patient is well aware that he cannot mix usage of " Cialis with nitroglycerin.    Patient Care Team:  Bebeto Angel MD as PCP - General  Bebeto Angel MD as PCP - Family Medicine  Jorge Luis Kent MD as Consulting Physician (Orthopedic Surgery)  ____________________________________________________________________    REVIEW OF SYSTEMS    Review of Systems   Cardiovascular: Negative for chest pain.   Genitourinary: Positive for frequency. Negative for dysuria and hematuria.           PHYSICAL EXAMINATION    Physical Exam   Constitutional: He is oriented to person, place, and time. He appears well-developed. No distress.   obese   Genitourinary: Rectum normal and prostate normal.   Genitourinary Comments: No prostate nodules noted.   Neurological: He is alert and oriented to person, place, and time.   Skin: Skin is warm and dry.   Psychiatric: He has a normal mood and affect. His behavior is normal. Judgment and thought content normal.   Nursing note and vitals reviewed.    REVIEWED DATA:    Labs:     Lab Results   Component Value Date     01/08/2018    K 4.4 01/08/2018    AST 41 (H) 01/08/2018    ALT 30 01/08/2018    BUN 19 01/08/2018    CREATININE 1.14 01/08/2018    CREATININE 0.89 07/24/2017    CREATININE 0.93 07/17/2017    EGFRIFNONA 65 01/08/2018    EGFRIFAFRI 79 01/08/2018       Lab Results   Component Value Date    HGBA1C 5.90 (H) 01/08/2018    HGBA1C 5.93 (H) 06/16/2017    HGBA1C 6.50 (H) 02/09/2017    GLUCOSE 80 07/24/2017    GLUCOSE 78 07/17/2017    GLUCOSE 51 (L) 07/09/2017    MICROALBUR 194.2 01/08/2018       Lab Results   Component Value Date     (H) 01/08/2018     (H) 02/09/2017    LDL 81 09/26/2016    HDL 45 01/08/2018    TRIG 128 01/08/2018       Lab Results   Component Value Date    TSH 1.390 02/09/2017    FREET4 1.22 02/25/2016       Lab Results   Component Value Date    WBC 8.85 09/12/2017    HGB 12.3 (L) 01/08/2018    HGB 12.2 (L) 09/12/2017    HGB 11.8 (L) 07/24/2017     09/12/2017       Imaging:         Medical Tests:          Summary of old records / correspondence / consultant report:         Request outside records:         ALLERGIES  Allergies   Allergen Reactions   • Ace Inhibitors      angioedema   • Lisinopril Angioedema   • Testosterone      Muscle pain        MEDICATIONS  Current Outpatient Prescriptions   Medication Sig Dispense Refill   • amLODIPine (NORVASC) 10 MG tablet TAKE ONE TABLET BY MOUTH DAILY 90 tablet 2   • aspirin 81 MG EC tablet Take 1 tablet by mouth Daily. get over the counter 30 tablet 3   • B-D ULTRAFINE III SHORT PEN 31G X 8 MM misc USE AS DIRECTED DAILY WITH TOUJEO 100 each 12   • Blood Glucose Monitoring Suppl ([facility] BLOOD GLUCOSE) kit      • carvedilol (COREG) 25 MG tablet TAKE ONE TABLET BY MOUTH TWICE A DAY WITH MEALS 60 tablet 1   • clopidogrel (PLAVIX) 75 MG tablet TAKE ONE TABLET BY MOUTH DAILY 90 tablet 0   • dorzolamide-timolol (COSOPT) 22.3-6.8 MG/ML ophthalmic solution Administer 1 drop to both eyes 2 (Two) Times a Day.     • escitalopram (LEXAPRO) 20 MG tablet TAKE ONE TABLET BY MOUTH DAILY 30 tablet 2   • esomeprazole (nexIUM) 40 MG capsule TAKE ONE CAPSULE BY MOUTH DAILY 30 capsule 4   • JANUVIA 100 MG tablet take 1 tablet by mouth once daily 30 tablet 5   • JARDIANCE 25 MG tablet take 1 tablet by mouth every morning 30 tablet 5   • [employer] PREMIUM GLUCOSE TEST test strip TEST FOUR TIMES A  each 3   • latanoprost (XALATAN) 0.005 % ophthalmic solution Administer 1 drop to both eyes Every Morning.     • metFORMIN (GLUCOPHAGE) 500 MG tablet TAKE TWO TABLETS BY MOUTH TWICE A DAY WITH MEALS 360 tablet 0   • pioglitazone (ACTOS) 45 MG tablet TAKE ONE TABLET BY MOUTH DAILY 90 tablet 0   • rosuvastatin (CRESTOR) 40 MG tablet TAKE ONE TABLET BY MOUTH DAILY 90 tablet 0   • tadalafil (CIALIS) 5 MG tablet Take 1 tablet by mouth Daily As Needed for erectile dysfunction. 30 tablet 0   • TOUJEO SOLOSTAR 300 UNIT/ML solution pen-injector INJECT 60 UNITS UNDER THE SKIN EVERY MORNING 9 mL 0   •  vitamin D (ERGOCALCIFEROL) 84152 units capsule capsule TAKE ONE CAPSULE BY MOUTH ONCE WEEKLY 12 capsule 0     No current facility-administered medications for this visit.        PFSH:     The following portions of the patient's history were reviewed and updated as appropriate: Allergies / Current Medications / Past Medical History / Surgical History / Social History / Family History    PROBLEM LIST   Patient Active Problem List   Diagnosis   • Microalbuminuria   • Vitamin D deficiency   • Angioedema   • Chronic coronary artery disease   • Anxiety   • Male erectile disorder   • Gastroesophageal reflux disease   • Hyperlipidemia   • Diabetes mellitus   • Adiposity   • Routine health maintenance   • Uncontrolled type 2 diabetes mellitus   • Low testosterone   • Hypogonadism in male   • Presence of coronary angioplasty implant and graft   • Osteoarthritis, knee   • Postoperative visit   • Hematoma   • Essential hypertension   • Anemia, posthemorrhagic, acute   • Febrile illness   • Wound infection after surgery   • Shortness of breath   • Gastroesophageal reflux disease with esophagitis   • Tubular adenoma of colon       PAST MEDICAL HISTORY  Past Medical History:   Diagnosis Date   • Adiposity    • Anemia     post hemorrhagic   • Angioedema 2/21/2016    Secondary to ACE inhibitor   • Anxiety    • Arthritis    • CAD (coronary artery disease)    • Chest pain    • Colon polyps    • Diabetes mellitus, type 2    • ED (erectile dysfunction)    • Febrile illness    • GERD (gastroesophageal reflux disease)    • Glaucoma    • Hematoma    • High cholesterol    • Hyperlipidemia    • Hypertension    • Hypogonadism in male 9/28/2016   • Male erectile disorder    • Microalbuminuria    • Obesity (BMI 30-39.9)    • Osteoarthritis     knee   • Vitamin D deficiency    • Wound infection after surgery        SURGICAL HISTORY  Past Surgical History:   Procedure Laterality Date   • CARDIAC CATHETERIZATION N/A 05/15/2006    Dr. Morse  Mar[place]   • COLONOSCOPY N/A 02/22/2006    Bilobed polyp at 30 cm, hemorrhoids-Dr. Ilya Zhao   • COLONOSCOPY N/A 02/28/2014    Normal ileum, one 6 mm polyp in the mid transverse colon-Dr. Ilya Zhao   • COLONOSCOPY N/A 11/19/2008    Ela ileum, two 3 to 4 mm polyps, non-bleeding internal hemorrhoids, repeat in 5 years-Dr. Ilya Zhao   • COLONOSCOPY N/A 10/31/2017    Procedure: COLONOSCOPY WITH POLYPECTOMY (COLD BIOPSY);  Surgeon: Ilya Zhao MD;  Location: [facility] ENDOSCOPY;  Service:    • CORONARY ANGIOPLASTY WITH STENT PLACEMENT      cardiac stents x3 occasions   • ENDOSCOPY N/A 10/4/2017    Procedure: ESOPHAGOGASTRODUODENOSCOPY;  Surgeon: Boyd Guidry MD;  Location: [facility] ENDOSCOPY;  Service:    • KNEE INCISION AND DRAINAGE Right 6/20/2017    Procedure: RT. KNEE WASHOUT ;  Surgeon: Boyd Coyne MD;  Location: [facility] MAIN OR;  Service:    • HI TOTAL KNEE ARTHROPLASTY Right 6/15/2017    Procedure: RT TOTAL KNEE ARTHROPLASTY;  Surgeon: Boyd Coyne MD;  Location: [facility] OR;  Service: Orthopedics   • SHOULDER SURGERY Right 2016   • UPPER GASTROINTESTINAL ENDOSCOPY N/A 10/13/2015    Z-line irregular, normal stomach, normal duodenum-Dr. Ilya Zhao   • UPPER GASTROINTESTINAL ENDOSCOPY N/A 02/28/2014    Z-line irregular, normal stomach, normal duodenum-Dr. Ilya Zhao   • UPPER GASTROINTESTINAL ENDOSCOPY N/A 11/19/2008    Z-line irregular, chronic gastritis withotu hemorrhage, normal duodenum-Dr. Ilya Zhao   • UPPER GASTROINTESTINAL ENDOSCOPY N/A 06/22/2006    LA Grade A reflux esophagitis, non-bleeding erythematous gastropathy, normal duodenum-Dr. Ilya Zhao       SOCIAL HISTORY  Social History     Social History   • Marital status:    • Number of children: 1     Occupational History   • RN      Social History Main Topics   • Smoking status: Never Smoker   • Smokeless tobacco: Never Used   • Alcohol use No      Comment: Occasionally   • Drug use: No   • Sexual activity: Defer      Other Topics Concern   • Not on file     Social History Narrative    , 1 son, 2 stepsons       FAMILY HISTORY  Family History   Problem Relation Age of Onset   • Lupus Sister    • Heart disease Other    • Hypertension Other    • Malig Hyperthermia Neg Hx          **Dragon Disclaimer:   Much of this encounter note is an electronic transcription/translation of spoken language to printed text. The electronic translation of spoken language may permit erroneous, or at times, nonsensical words or phrases to be inadvertently transcribed. Although I have reviewed the note for such errors, some may still exist.

## 2018-05-02 ENCOUNTER — TELEPHONE (OUTPATIENT)
Dept: INTERNAL MEDICINE | Age: 63
End: 2018-05-02

## 2018-05-02 DIAGNOSIS — N52.9 VASCULOGENIC ERECTILE DYSFUNCTION, UNSPECIFIED VASCULOGENIC ERECTILE DYSFUNCTION TYPE: ICD-10-CM

## 2018-05-02 DIAGNOSIS — R35.1 BENIGN PROSTATIC HYPERPLASIA WITH NOCTURIA: Primary | ICD-10-CM

## 2018-05-02 DIAGNOSIS — N40.1 BENIGN PROSTATIC HYPERPLASIA WITH NOCTURIA: Primary | ICD-10-CM

## 2018-05-02 RX ORDER — FINASTERIDE 5 MG/1
5 TABLET, FILM COATED ORAL DAILY
Qty: 30 TABLET | Refills: 2 | Status: SHIPPED | OUTPATIENT
Start: 2018-05-02 | End: 2018-07-14 | Stop reason: SDUPTHER

## 2018-05-02 NOTE — TELEPHONE ENCOUNTER
PA for Cialis 5mg Tablet denied via [employer].    Rx is not approved for Dx's of ED and BPH.     Pt must have trial of Viagra 25mg, 50mg, or 100mg #6/30 days for Dx of ED.   Pt must have trial of two formulary alternatives for Dx of BPH: including finasteride, dutasteride, doxazosin, terazosin, tamsulosin, or alfuzosin.     For this reason, appeal will not be sent. Per Dr. Angel, pt will be Rx'd Finsteride 5mg tablet QD for BPH; Viagra 50mg PRN for ED.    Pt has been notified of change.    KD

## 2018-05-03 RX ORDER — SILDENAFIL 50 MG/1
50 TABLET, FILM COATED ORAL DAILY PRN
Qty: 6 TABLET | Refills: 5 | Status: SHIPPED | OUTPATIENT
Start: 2018-05-03 | End: 2018-12-01 | Stop reason: SDUPTHER

## 2018-05-14 DIAGNOSIS — I10 ESSENTIAL HYPERTENSION: ICD-10-CM

## 2018-05-14 DIAGNOSIS — E11.9 TYPE 2 DIABETES MELLITUS WITHOUT COMPLICATION, WITH LONG-TERM CURRENT USE OF INSULIN (HCC): ICD-10-CM

## 2018-05-14 DIAGNOSIS — Z79.4 TYPE 2 DIABETES MELLITUS WITHOUT COMPLICATION, WITH LONG-TERM CURRENT USE OF INSULIN (HCC): ICD-10-CM

## 2018-05-14 DIAGNOSIS — E78.5 HYPERLIPIDEMIA, UNSPECIFIED HYPERLIPIDEMIA TYPE: ICD-10-CM

## 2018-05-15 RX ORDER — AMLODIPINE BESYLATE 10 MG/1
10 TABLET ORAL DAILY
Qty: 90 TABLET | Refills: 2 | Status: SHIPPED | OUTPATIENT
Start: 2018-05-15 | End: 2018-06-23 | Stop reason: SDUPTHER

## 2018-05-15 RX ORDER — ERGOCALCIFEROL 1.25 MG/1
50000 CAPSULE ORAL WEEKLY
Qty: 12 CAPSULE | Refills: 0 | Status: SHIPPED | OUTPATIENT
Start: 2018-05-15 | End: 2018-07-14 | Stop reason: SDUPTHER

## 2018-05-15 RX ORDER — ROSUVASTATIN CALCIUM 40 MG/1
40 TABLET, COATED ORAL DAILY
Qty: 90 TABLET | Refills: 0 | Status: SHIPPED | OUTPATIENT
Start: 2018-05-15 | End: 2018-07-14 | Stop reason: SDUPTHER

## 2018-05-15 RX ORDER — OMEPRAZOLE 40 MG/1
40 CAPSULE, DELAYED RELEASE ORAL DAILY
Qty: 90 CAPSULE | Refills: 0 | Status: SHIPPED | OUTPATIENT
Start: 2018-05-15 | End: 2018-06-29 | Stop reason: SDUPTHER

## 2018-05-30 RX ORDER — LATANOPROST 50 UG/ML
SOLUTION/ DROPS OPHTHALMIC
Qty: 7.5 ML | Refills: 1 | Status: CANCELLED | OUTPATIENT
Start: 2018-05-30

## 2018-06-05 DIAGNOSIS — R80.9 MICROALBUMINURIA: ICD-10-CM

## 2018-06-05 DIAGNOSIS — E11.65 TYPE 2 DIABETES MELLITUS WITH HYPERGLYCEMIA (HCC): ICD-10-CM

## 2018-06-05 DIAGNOSIS — I10 ESSENTIAL HYPERTENSION: ICD-10-CM

## 2018-06-05 DIAGNOSIS — N52.9 MALE ERECTILE DISORDER: ICD-10-CM

## 2018-06-05 DIAGNOSIS — E78.5 HYPERLIPIDEMIA: ICD-10-CM

## 2018-06-05 DIAGNOSIS — E66.9 ADIPOSITY: ICD-10-CM

## 2018-06-05 DIAGNOSIS — E55.9 VITAMIN D DEFICIENCY: ICD-10-CM

## 2018-06-05 RX ORDER — INSULIN GLARGINE 300 U/ML
60 INJECTION, SOLUTION SUBCUTANEOUS EVERY MORNING
Qty: 4.5 ML | Refills: 0 | Status: SHIPPED | OUTPATIENT
Start: 2018-06-05 | End: 2018-06-22 | Stop reason: SDUPTHER

## 2018-06-05 RX ORDER — CARVEDILOL 25 MG/1
TABLET ORAL
Qty: 60 TABLET | Refills: 1 | Status: SHIPPED | OUTPATIENT
Start: 2018-06-05 | End: 2018-07-22 | Stop reason: SDUPTHER

## 2018-06-11 DIAGNOSIS — E78.5 HYPERLIPIDEMIA: ICD-10-CM

## 2018-06-11 DIAGNOSIS — R80.9 MICROALBUMINURIA: ICD-10-CM

## 2018-06-11 DIAGNOSIS — E66.9 ADIPOSITY: ICD-10-CM

## 2018-06-11 DIAGNOSIS — E55.9 VITAMIN D DEFICIENCY: ICD-10-CM

## 2018-06-11 DIAGNOSIS — N52.9 MALE ERECTILE DISORDER: ICD-10-CM

## 2018-06-11 DIAGNOSIS — I10 ESSENTIAL HYPERTENSION: ICD-10-CM

## 2018-06-11 DIAGNOSIS — E11.65 TYPE 2 DIABETES MELLITUS WITH HYPERGLYCEMIA (HCC): ICD-10-CM

## 2018-06-12 RX ORDER — PEN NEEDLE, DIABETIC 31 GX5/16"
NEEDLE, DISPOSABLE MISCELLANEOUS
Qty: 100 EACH | Refills: 5 | Status: SHIPPED | OUTPATIENT
Start: 2018-06-12 | End: 2019-07-29 | Stop reason: SDUPTHER

## 2018-06-20 DIAGNOSIS — I25.10 CHRONIC CORONARY ARTERY DISEASE: ICD-10-CM

## 2018-06-21 RX ORDER — CLOPIDOGREL BISULFATE 75 MG/1
TABLET ORAL
Qty: 90 TABLET | Refills: 1 | Status: SHIPPED | OUTPATIENT
Start: 2018-06-21 | End: 2018-06-23 | Stop reason: SDUPTHER

## 2018-06-21 RX ORDER — PIOGLITAZONEHYDROCHLORIDE 45 MG/1
TABLET ORAL
Qty: 90 TABLET | Refills: 1 | Status: SHIPPED | OUTPATIENT
Start: 2018-06-21 | End: 2019-12-27 | Stop reason: SDUPTHER

## 2018-06-23 DIAGNOSIS — I10 ESSENTIAL HYPERTENSION: ICD-10-CM

## 2018-06-23 DIAGNOSIS — I25.10 CHRONIC CORONARY ARTERY DISEASE: ICD-10-CM

## 2018-06-25 RX ORDER — AMLODIPINE BESYLATE 10 MG/1
10 TABLET ORAL DAILY
Qty: 90 TABLET | Refills: 1 | Status: SHIPPED | OUTPATIENT
Start: 2018-06-25 | End: 2018-06-29 | Stop reason: SDUPTHER

## 2018-06-25 RX ORDER — INSULIN GLARGINE 300 U/ML
60 INJECTION, SOLUTION SUBCUTANEOUS EVERY MORNING
Qty: 6 ML | Refills: 0 | Status: SHIPPED | OUTPATIENT
Start: 2018-06-25 | End: 2018-07-15 | Stop reason: SDUPTHER

## 2018-06-25 RX ORDER — CLOPIDOGREL BISULFATE 75 MG/1
TABLET ORAL
Qty: 90 TABLET | Refills: 0 | Status: SHIPPED | OUTPATIENT
Start: 2018-06-25 | End: 2018-10-23 | Stop reason: SDUPTHER

## 2018-06-29 ENCOUNTER — OFFICE VISIT (OUTPATIENT)
Dept: INTERNAL MEDICINE | Age: 63
End: 2018-06-29

## 2018-06-29 VITALS
BODY MASS INDEX: 31.64 KG/M2 | HEIGHT: 77 IN | OXYGEN SATURATION: 97 % | WEIGHT: 268 LBS | HEART RATE: 83 BPM | SYSTOLIC BLOOD PRESSURE: 100 MMHG | DIASTOLIC BLOOD PRESSURE: 70 MMHG | TEMPERATURE: 97.7 F

## 2018-06-29 DIAGNOSIS — E29.1 HYPOGONADISM IN MALE: ICD-10-CM

## 2018-06-29 DIAGNOSIS — I10 ESSENTIAL HYPERTENSION: Primary | ICD-10-CM

## 2018-06-29 DIAGNOSIS — E66.09 CLASS 1 OBESITY DUE TO EXCESS CALORIES WITHOUT SERIOUS COMORBIDITY WITH BODY MASS INDEX (BMI) OF 31.0 TO 31.9 IN ADULT: ICD-10-CM

## 2018-06-29 DIAGNOSIS — E55.9 VITAMIN D DEFICIENCY: Primary | ICD-10-CM

## 2018-06-29 DIAGNOSIS — E11.65 UNCONTROLLED TYPE 2 DIABETES MELLITUS WITH COMPLICATION, UNSPECIFIED LONG TERM INSULIN USE STATUS: ICD-10-CM

## 2018-06-29 DIAGNOSIS — E11.8 UNCONTROLLED TYPE 2 DIABETES MELLITUS WITH COMPLICATION, UNSPECIFIED LONG TERM INSULIN USE STATUS: ICD-10-CM

## 2018-06-29 PROCEDURE — 99213 OFFICE O/P EST LOW 20 MIN: CPT | Performed by: INTERNAL MEDICINE

## 2018-06-29 RX ORDER — AMLODIPINE BESYLATE 10 MG/1
5 TABLET ORAL DAILY
Qty: 90 TABLET | Refills: 1
Start: 2018-06-29 | End: 2018-12-26 | Stop reason: SDUPTHER

## 2018-06-29 NOTE — PROGRESS NOTES
"[facility] INTERNAL MEDICINE  MD Isaias CASTILLO Monaco / 63 y.o. / male  06/29/2018      ASSESSMENT & PLAN:    Problem List Items Addressed This Visit        Unprioritized    Adiposity    Relevant Medications    dorzolamide-timolol (COSOPT) 22.3-6.8 MG/ML ophthalmic solution    Blood Glucose Monitoring Suppl ([employer] BLOOD GLUCOSE) kit    [employer] PREMIUM GLUCOSE TEST test strip    carvedilol (COREG) 25 MG tablet    B-D ULTRAFINE III SHORT PEN 31G X 8 MM misc    Essential hypertension - Primary    Relevant Medications    dorzolamide-timolol (COSOPT) 22.3-6.8 MG/ML ophthalmic solution    Blood Glucose Monitoring Suppl ([facility] BLOOD GLUCOSE) kit    [employer] PREMIUM GLUCOSE TEST test strip    carvedilol (COREG) 25 MG tablet    B-D ULTRAFINE III SHORT PEN 31G X 8 MM misc    amLODIPine (NORVASC) 10 MG tablet        No orders of the defined types were placed in this encounter.      Summary/Discussion:    Number-one hypertension with overly strict control of blood pressure.  Recommend patient decrease amlodipine from 10 mg daily to 5 mg.  Recommend follow-up with me in 6 weeks.    #2 weight control, patient is aware of the need to lose weight.  Recommend increasing exercise, caloric restriction, in effort to begin attending now more realistic weight for his size.    Return in about 6 weeks (around 8/10/2018) for Blood pressure check.    ____________________________________________________________________    VITALS    Visit Vitals  /70   Pulse 83   Temp 97.7 °F (36.5 °C)   Ht 195.6 cm (77.01\")   Wt 122 kg (268 lb)   SpO2 97%   BMI 31.77 kg/m²       BP Readings from Last 3 Encounters:   06/29/18 100/70   04/27/18 136/88   01/22/18 118/82     Wt Readings from Last 3 Encounters:   06/29/18 122 kg (268 lb)   04/27/18 123 kg (270 lb 12.8 oz)   01/22/18 121 kg (266 lb 12.8 oz)      Body mass index is 31.77 kg/m².    CC:  Main reason(s) for today's visit: Hypertension      HPI:     Resents this afternoon for follow-up of " hypertension.  He is compliant with medication, dietary salt restriction, and home blood pressure monitoring.  Blood pressure typically runs in the 120s to 130s over 70s to 80s.  There are no medication side effects noted size is limited.  He is aware that the importance of exercise with respect to the next noted problem.    Weight control: He has not lost any weight over the past year or so.  Patient is about 50 pounds above his ideal weight at this time.    Laboratory review last done January 8, 2018, CMP normal A1c 5.9, lipids normal, vitamin D normal, hemoglobin 12.3, hematocrit 40.1, PSA 0.197,.    Patient Care Team:  Bebeto Angel MD as PCP - General  Bebeto Angel MD as PCP - Family Medicine  Jorge Luis Kent MD as Consulting Physician (Orthopedic Surgery)  ____________________________________________________________________    REVIEW OF SYSTEMS    Review of Systems   Respiratory: Negative for chest tightness and shortness of breath.    Cardiovascular: Negative for chest pain and palpitations.   Neurological: Negative for dizziness, syncope, speech difficulty, weakness, light-headedness and headaches.         PHYSICAL EXAMINATION    Physical Exam      REVIEWED DATA:    Labs:     Lab Results   Component Value Date     01/08/2018    K 4.4 01/08/2018    AST 41 (H) 01/08/2018    ALT 30 01/08/2018    BUN 19 01/08/2018    CREATININE 1.14 01/08/2018    CREATININE 0.89 07/24/2017    CREATININE 0.93 07/17/2017    EGFRIFNONA 65 01/08/2018    EGFRIFAFRI 79 01/08/2018       Lab Results   Component Value Date    HGBA1C 5.90 (H) 01/08/2018    HGBA1C 5.93 (H) 06/16/2017    HGBA1C 6.50 (H) 02/09/2017    GLUCOSE 80 07/24/2017    GLUCOSE 78 07/17/2017    GLUCOSE 51 (L) 07/09/2017    MICROALBUR 194.2 01/08/2018       Lab Results   Component Value Date     (H) 01/08/2018     (H) 02/09/2017    LDL 81 09/26/2016    HDL 45 01/08/2018    TRIG 128 01/08/2018       Lab Results   Component Value Date    TSH 1.390  02/09/2017    FREET4 1.22 02/25/2016       Lab Results   Component Value Date    WBC 8.85 09/12/2017    HGB 12.3 (L) 01/08/2018    HGB 12.2 (L) 09/12/2017    HGB 11.8 (L) 07/24/2017     09/12/2017       Lab Results   Component Value Date    PSA 0.197 01/08/2018         Imaging:         Medical Tests:         Summary of old records / correspondence / consultant report:         Request outside records:         ALLERGIES  Allergies   Allergen Reactions   • Ace Inhibitors      angioedema   • Lisinopril Angioedema   • Testosterone      Muscle pain        MEDICATIONS  Current Outpatient Prescriptions   Medication Sig Dispense Refill   • amLODIPine (NORVASC) 10 MG tablet Take 0.5 tablets by mouth Daily. 90 tablet 1   • aspirin 81 MG EC tablet Take 1 tablet by mouth Daily. get over the counter 30 tablet 3   • B-D ULTRAFINE III SHORT PEN 31G X 8 MM misc USE AS DIRECTED DAILY WITH TOUJEO 100 each 5   • Blood Glucose Monitoring Suppl ([employer] BLOOD GLUCOSE) kit      • carvedilol (COREG) 25 MG tablet TAKE ONE TABLET BY MOUTH TWICE A DAY WITH MEALS 60 tablet 1   • clopidogrel (PLAVIX) 75 MG tablet TAKE ONE TABLET BY MOUTH DAILY 90 tablet 0   • dorzolamide-timolol (COSOPT) 22.3-6.8 MG/ML ophthalmic solution Administer 1 drop to both eyes 2 (Two) Times a Day.     • escitalopram (LEXAPRO) 20 MG tablet TAKE ONE TABLET BY MOUTH DAILY 30 tablet 2   • esomeprazole (nexIUM) 40 MG capsule TAKE ONE CAPSULE BY MOUTH DAILY 30 capsule 4   • finasteride (PROSCAR) 5 MG tablet Take 1 tablet by mouth Daily. 30 tablet 2   • JANUVIA 100 MG tablet take 1 tablet by mouth once daily 30 tablet 5   • JARDIANCE 25 MG tablet take 1 tablet by mouth every morning 30 tablet 5   • [employer] PREMIUM GLUCOSE TEST test strip TEST FOUR TIMES A  each 3   • latanoprost (XALATAN) 0.005 % ophthalmic solution Apply 1 drop to  each eye Daily. 7.5 mL 3   • metFORMIN (GLUCOPHAGE) 500 MG tablet TAKE TWO TABLETS BY MOUTH TWICE A DAY WITH MEALS 360 tablet 0   •  pioglitazone (ACTOS) 45 MG tablet TAKE ONE TABLET BY MOUTH DAILY 90 tablet 1   • rosuvastatin (CRESTOR) 40 MG tablet TAKE ONE TABLET BY MOUTH DAILY 90 tablet 0   • sildenafil (VIAGRA) 50 MG tablet Take 1 tablet by mouth Daily As Needed for erectile dysfunction. 6 tablet 5   • TOUJEO SOLOSTAR 300 UNIT/ML solution pen-injector INJECT 60 UNITS UNDER THE SKIN EVERY MORNING 6 mL 0   • vitamin D (ERGOCALCIFEROL) 43246 units capsule capsule TAKE ONE CAPSULE BY MOUTH ONCE WEEKLY 12 capsule 0     No current facility-administered medications for this visit.        PFSH:     The following portions of the patient's history were reviewed and updated as appropriate: Allergies / Current Medications / Past Medical History / Surgical History / Social History / Family History    PROBLEM LIST   Patient Active Problem List   Diagnosis   • Microalbuminuria   • Vitamin D deficiency   • Angioedema   • Chronic coronary artery disease   • Anxiety   • ED (erectile dysfunction)   • Gastroesophageal reflux disease   • Hyperlipidemia   • Diabetes mellitus   • Adiposity   • Routine health maintenance   • Uncontrolled type 2 diabetes mellitus   • Low testosterone   • Hypogonadism in male   • Presence of coronary angioplasty implant and graft   • Osteoarthritis, knee   • Postoperative visit   • Hematoma   • Essential hypertension   • Anemia, posthemorrhagic, acute   • Febrile illness   • Wound infection after surgery   • Shortness of breath   • Gastroesophageal reflux disease with esophagitis   • Tubular adenoma of colon   • Benign prostatic hyperplasia with nocturia       PAST MEDICAL HISTORY  Past Medical History:   Diagnosis Date   • Adiposity    • Anemia     post hemorrhagic   • Angioedema 2/21/2016    Secondary to ACE inhibitor   • Anxiety    • Arthritis    • CAD (coronary artery disease)    • Chest pain    • Colon polyps    • Diabetes mellitus, type 2    • ED (erectile dysfunction)    • Febrile illness    • GERD (gastroesophageal reflux  disease)    • Glaucoma    • Hematoma    • High cholesterol    • Hyperlipidemia    • Hypertension    • Hypogonadism in male 9/28/2016   • Male erectile disorder    • Microalbuminuria    • Obesity (BMI 30-39.9)    • Osteoarthritis     knee   • Vitamin D deficiency    • Wound infection after surgery        SURGICAL HISTORY  Past Surgical History:   Procedure Laterality Date   • CARDIAC CATHETERIZATION N/A 05/15/2006    Dr. Mini Camarillo   • COLONOSCOPY N/A 02/22/2006    Bilobed polyp at 30 cm, hemorrhoids-Dr. Ilya Zhao   • COLONOSCOPY N/A 02/28/2014    Normal ileum, one 6 mm polyp in the mid transverse colon-Dr. Ilya Zhao   • COLONOSCOPY N/A 11/19/2008    Ela ileum, two 3 to 4 mm polyps, non-bleeding internal hemorrhoids, repeat in 5 years-Dr. Ilya Zhao   • COLONOSCOPY N/A 10/31/2017    Procedure: COLONOSCOPY WITH POLYPECTOMY (COLD BIOPSY);  Surgeon: Ilya Zhao MD;  Location: [facility] ENDOSCOPY;  Service:    • CORONARY ANGIOPLASTY WITH STENT PLACEMENT      cardiac stents x3 occasions   • ENDOSCOPY N/A 10/4/2017    Procedure: ESOPHAGOGASTRODUODENOSCOPY;  Surgeon: Boyd Guidry MD;  Location: [facility] ENDOSCOPY;  Service:    • KNEE INCISION AND DRAINAGE Right 6/20/2017    Procedure: RT. KNEE WASHOUT ;  Surgeon: Boyd Coyne MD;  Location: [facility] OR;  Service:    • AK TOTAL KNEE ARTHROPLASTY Right 6/15/2017    Procedure: RT TOTAL KNEE ARTHROPLASTY;  Surgeon: Boyd Coyne MD;  Location: [facility] MAIN OR;  Service: Orthopedics   • SHOULDER SURGERY Right 2016   • UPPER GASTROINTESTINAL ENDOSCOPY N/A 10/13/2015    Z-line irregular, normal stomach, normal duodenum-Dr. Ilya Zhao   • UPPER GASTROINTESTINAL ENDOSCOPY N/A 02/28/2014    Z-line irregular, normal stomach, normal duodenum-Dr. Ilya Zhao   • UPPER GASTROINTESTINAL ENDOSCOPY N/A 11/19/2008    Z-line irregular, chronic gastritis withotu hemorrhage, normal duodenum-Dr. Ilya Zhao   • UPPER GASTROINTESTINAL ENDOSCOPY N/A 06/22/2006    [place]  Grade A reflux esophagitis, non-bleeding erythematous gastropathy, normal duodenum-Dr. Ilya Zhao       SOCIAL HISTORY  Social History     Social History   • Marital status:    • Number of children: 1     Occupational History   • RN      Social History Main Topics   • Smoking status: Never Smoker   • Smokeless tobacco: Never Used   • Alcohol use No      Comment: Occasionally   • Drug use: No   • Sexual activity: Defer     Other Topics Concern   • Not on file     Social History Narrative    , 1 son, 2 stepsons       FAMILY HISTORY  Family History   Problem Relation Age of Onset   • Lupus Sister    • Heart disease Other    • Hypertension Other    • Malig Hyperthermia Neg Hx        Health Maintenance Due   Topic   • DIABETIC FOOT EXAM    • ZOSTER VACCINE (2 of 2)   • ANNUAL PHYSICAL        Overdue health maintenance interventions  reviewed with patient.    Dictated utilizing Dragon voice recognition software

## 2018-07-13 ENCOUNTER — LAB (OUTPATIENT)
Dept: LAB | Facility: HOSPITAL | Age: 63
End: 2018-07-13

## 2018-07-13 DIAGNOSIS — E29.1 HYPOGONADISM IN MALE: ICD-10-CM

## 2018-07-13 DIAGNOSIS — E29.1 HYPOGONADISM MALE: Primary | ICD-10-CM

## 2018-07-13 DIAGNOSIS — E11.8 UNCONTROLLED TYPE 2 DIABETES MELLITUS WITH COMPLICATION, UNSPECIFIED LONG TERM INSULIN USE STATUS: ICD-10-CM

## 2018-07-13 DIAGNOSIS — E55.9 VITAMIN D DEFICIENCY: ICD-10-CM

## 2018-07-13 DIAGNOSIS — E11.65 UNCONTROLLED TYPE 2 DIABETES MELLITUS WITH COMPLICATION, UNSPECIFIED LONG TERM INSULIN USE STATUS: ICD-10-CM

## 2018-07-13 LAB
25(OH)D3 SERPL-MCNC: 36.7 NG/ML (ref 30–100)
ALBUMIN SERPL-MCNC: 4.4 G/DL (ref 3.5–5.2)
ALBUMIN UR-MCNC: 36.5 MG/L
ALBUMIN/GLOB SERPL: 1.2 G/DL
ALP SERPL-CCNC: 75 U/L (ref 39–117)
ALT SERPL W P-5'-P-CCNC: 29 U/L (ref 1–41)
ANION GAP SERPL CALCULATED.3IONS-SCNC: 15.4 MMOL/L
AST SERPL-CCNC: 47 U/L (ref 1–40)
BILIRUB SERPL-MCNC: 0.4 MG/DL (ref 0.1–1.2)
BUN BLD-MCNC: 12 MG/DL (ref 8–23)
BUN/CREAT SERPL: 12.6 (ref 7–25)
CALCIUM SPEC-SCNC: 9.7 MG/DL (ref 8.6–10.5)
CHLORIDE SERPL-SCNC: 103 MMOL/L (ref 98–107)
CHOLEST SERPL-MCNC: 183 MG/DL (ref 0–200)
CO2 SERPL-SCNC: 21.6 MMOL/L (ref 22–29)
CREAT BLD-MCNC: 0.95 MG/DL (ref 0.76–1.27)
GFR SERPL CREATININE-BSD FRML MDRD: 97 ML/MIN/1.73
GLOBULIN UR ELPH-MCNC: 3.8 GM/DL
GLUCOSE BLD-MCNC: 76 MG/DL (ref 65–99)
HBA1C MFR BLD: 6.18 % (ref 4.8–5.6)
HCT VFR BLD AUTO: 39.8 % (ref 40.4–52.2)
HDLC SERPL-MCNC: 49 MG/DL (ref 40–60)
HGB BLD-MCNC: 12.6 G/DL (ref 13.7–17.6)
LDLC SERPL CALC-MCNC: 110 MG/DL (ref 0–100)
LDLC/HDLC SERPL: 2.25 {RATIO}
POTASSIUM BLD-SCNC: 4.3 MMOL/L (ref 3.5–5.2)
PROT SERPL-MCNC: 8.2 G/DL (ref 6–8.5)
PSA SERPL-MCNC: 0.18 NG/ML (ref 0–4)
SODIUM BLD-SCNC: 140 MMOL/L (ref 136–145)
TESTOST SERPL-MCNC: 393.4 NG/DL (ref 193–740)
TRIGL SERPL-MCNC: 118 MG/DL (ref 0–150)
VLDLC SERPL-MCNC: 23.6 MG/DL (ref 5–40)

## 2018-07-13 PROCEDURE — 84403 ASSAY OF TOTAL TESTOSTERONE: CPT

## 2018-07-13 PROCEDURE — 80061 LIPID PANEL: CPT

## 2018-07-13 PROCEDURE — 84681 ASSAY OF C-PEPTIDE: CPT

## 2018-07-13 PROCEDURE — 84153 ASSAY OF PSA TOTAL: CPT

## 2018-07-13 PROCEDURE — 84402 ASSAY OF FREE TESTOSTERONE: CPT

## 2018-07-13 PROCEDURE — 84270 ASSAY OF SEX HORMONE GLOBUL: CPT

## 2018-07-13 PROCEDURE — 82306 VITAMIN D 25 HYDROXY: CPT

## 2018-07-13 PROCEDURE — 80053 COMPREHEN METABOLIC PANEL: CPT

## 2018-07-13 PROCEDURE — 83036 HEMOGLOBIN GLYCOSYLATED A1C: CPT

## 2018-07-13 PROCEDURE — 85014 HEMATOCRIT: CPT

## 2018-07-13 PROCEDURE — 36415 COLL VENOUS BLD VENIPUNCTURE: CPT

## 2018-07-13 PROCEDURE — 82043 UR ALBUMIN QUANTITATIVE: CPT

## 2018-07-13 PROCEDURE — 85018 HEMOGLOBIN: CPT

## 2018-07-14 DIAGNOSIS — F41.9 ANXIETY: ICD-10-CM

## 2018-07-14 DIAGNOSIS — N40.1 BENIGN PROSTATIC HYPERPLASIA WITH NOCTURIA: ICD-10-CM

## 2018-07-14 DIAGNOSIS — E11.9 TYPE 2 DIABETES MELLITUS WITHOUT COMPLICATION, WITH LONG-TERM CURRENT USE OF INSULIN (HCC): ICD-10-CM

## 2018-07-14 DIAGNOSIS — N52.9 VASCULOGENIC ERECTILE DYSFUNCTION, UNSPECIFIED VASCULOGENIC ERECTILE DYSFUNCTION TYPE: ICD-10-CM

## 2018-07-14 DIAGNOSIS — R35.1 BENIGN PROSTATIC HYPERPLASIA WITH NOCTURIA: ICD-10-CM

## 2018-07-14 DIAGNOSIS — E78.5 HYPERLIPIDEMIA, UNSPECIFIED HYPERLIPIDEMIA TYPE: ICD-10-CM

## 2018-07-14 DIAGNOSIS — Z79.4 TYPE 2 DIABETES MELLITUS WITHOUT COMPLICATION, WITH LONG-TERM CURRENT USE OF INSULIN (HCC): ICD-10-CM

## 2018-07-14 LAB
C PEPTIDE SERPL-MCNC: 1.2 NG/ML (ref 1.1–4.4)
SHBG SERPL-SCNC: 25.7 NMOL/L (ref 19.3–76.4)

## 2018-07-15 LAB — TESTOST FREE SERPL-MCNC: 8.4 PG/ML (ref 6.6–18.1)

## 2018-07-16 RX ORDER — ESCITALOPRAM OXALATE 20 MG/1
TABLET ORAL
Qty: 30 TABLET | Refills: 2 | Status: SHIPPED | OUTPATIENT
Start: 2018-07-16 | End: 2018-10-28 | Stop reason: SDUPTHER

## 2018-07-16 RX ORDER — INSULIN GLARGINE 300 U/ML
60 INJECTION, SOLUTION SUBCUTANEOUS EVERY MORNING
Qty: 6 ML | Refills: 0 | Status: SHIPPED | OUTPATIENT
Start: 2018-07-16 | End: 2018-08-17 | Stop reason: SDUPTHER

## 2018-07-16 RX ORDER — ERGOCALCIFEROL 1.25 MG/1
50000 CAPSULE ORAL WEEKLY
Qty: 12 CAPSULE | Refills: 0 | Status: SHIPPED | OUTPATIENT
Start: 2018-07-16 | End: 2018-10-30 | Stop reason: SDUPTHER

## 2018-07-16 RX ORDER — ROSUVASTATIN CALCIUM 40 MG/1
40 TABLET, COATED ORAL DAILY
Qty: 90 TABLET | Refills: 0 | Status: SHIPPED | OUTPATIENT
Start: 2018-07-16 | End: 2018-07-17 | Stop reason: SDUPTHER

## 2018-07-16 RX ORDER — FINASTERIDE 5 MG/1
5 TABLET, FILM COATED ORAL DAILY
Qty: 30 TABLET | Refills: 2 | Status: SHIPPED | OUTPATIENT
Start: 2018-07-16 | End: 2018-09-25 | Stop reason: SDUPTHER

## 2018-07-17 DIAGNOSIS — E78.5 HYPERLIPIDEMIA, UNSPECIFIED HYPERLIPIDEMIA TYPE: ICD-10-CM

## 2018-07-17 DIAGNOSIS — Z79.4 TYPE 2 DIABETES MELLITUS WITHOUT COMPLICATION, WITH LONG-TERM CURRENT USE OF INSULIN (HCC): ICD-10-CM

## 2018-07-17 DIAGNOSIS — E11.9 TYPE 2 DIABETES MELLITUS WITHOUT COMPLICATION, WITH LONG-TERM CURRENT USE OF INSULIN (HCC): ICD-10-CM

## 2018-07-17 RX ORDER — ROSUVASTATIN CALCIUM 40 MG/1
40 TABLET, COATED ORAL DAILY
Qty: 90 TABLET | Refills: 0 | Status: SHIPPED | OUTPATIENT
Start: 2018-07-17 | End: 2018-11-07 | Stop reason: SDUPTHER

## 2018-07-22 DIAGNOSIS — E11.65 TYPE 2 DIABETES MELLITUS WITH HYPERGLYCEMIA (HCC): ICD-10-CM

## 2018-07-22 DIAGNOSIS — N52.9 MALE ERECTILE DISORDER: ICD-10-CM

## 2018-07-22 DIAGNOSIS — R80.9 MICROALBUMINURIA: ICD-10-CM

## 2018-07-22 DIAGNOSIS — E55.9 VITAMIN D DEFICIENCY: ICD-10-CM

## 2018-07-22 DIAGNOSIS — E78.5 HYPERLIPIDEMIA: ICD-10-CM

## 2018-07-22 DIAGNOSIS — I10 ESSENTIAL HYPERTENSION: ICD-10-CM

## 2018-07-22 DIAGNOSIS — E66.9 ADIPOSITY: ICD-10-CM

## 2018-07-23 RX ORDER — CARVEDILOL 25 MG/1
TABLET ORAL
Qty: 180 TABLET | Refills: 1 | Status: SHIPPED | OUTPATIENT
Start: 2018-07-23 | End: 2018-12-01 | Stop reason: SDUPTHER

## 2018-07-24 ENCOUNTER — OFFICE VISIT (OUTPATIENT)
Dept: ENDOCRINOLOGY | Age: 63
End: 2018-07-24

## 2018-07-24 VITALS
DIASTOLIC BLOOD PRESSURE: 88 MMHG | BODY MASS INDEX: 31.76 KG/M2 | WEIGHT: 269 LBS | SYSTOLIC BLOOD PRESSURE: 116 MMHG | HEIGHT: 77 IN

## 2018-07-24 DIAGNOSIS — Z79.4 TYPE 2 DIABETES MELLITUS WITH HYPERGLYCEMIA, WITH LONG-TERM CURRENT USE OF INSULIN (HCC): Primary | ICD-10-CM

## 2018-07-24 DIAGNOSIS — E11.65 TYPE 2 DIABETES MELLITUS WITH HYPERGLYCEMIA, WITH LONG-TERM CURRENT USE OF INSULIN (HCC): Primary | ICD-10-CM

## 2018-07-24 DIAGNOSIS — E78.5 HYPERLIPIDEMIA, UNSPECIFIED HYPERLIPIDEMIA TYPE: ICD-10-CM

## 2018-07-24 DIAGNOSIS — D64.9 ANEMIA, UNSPECIFIED TYPE: ICD-10-CM

## 2018-07-24 DIAGNOSIS — I10 ESSENTIAL HYPERTENSION: ICD-10-CM

## 2018-07-24 DIAGNOSIS — E66.09 CLASS 1 OBESITY DUE TO EXCESS CALORIES WITHOUT SERIOUS COMORBIDITY WITH BODY MASS INDEX (BMI) OF 31.0 TO 31.9 IN ADULT: ICD-10-CM

## 2018-07-24 DIAGNOSIS — E29.1 HYPOGONADISM IN MALE: ICD-10-CM

## 2018-07-24 DIAGNOSIS — R80.9 MICROALBUMINURIA: ICD-10-CM

## 2018-07-24 DIAGNOSIS — E55.9 VITAMIN D DEFICIENCY: ICD-10-CM

## 2018-07-24 PROCEDURE — 99214 OFFICE O/P EST MOD 30 MIN: CPT | Performed by: NURSE PRACTITIONER

## 2018-07-24 NOTE — PROGRESS NOTES
"Marie Monaco is a 63 y.o. male is here today for follow-up.  Chief Complaint   Patient presents with   • Diabetes     recent labs, pt tests BG occasionally   • Hypogonadism   • Hyperlipidemia   • Hypertension   • Vitamin D Deficiency     /88   Ht 195.6 cm (77\")   Wt 122 kg (269 lb)   BMI 31.90 kg/m²   Current Outpatient Prescriptions on File Prior to Visit   Medication Sig   • amLODIPine (NORVASC) 10 MG tablet Take 0.5 tablets by mouth Daily.   • aspirin 81 MG EC tablet Take 1 tablet by mouth Daily. get over the counter   • B-D ULTRAFINE III SHORT PEN 31G X 8 MM misc USE AS DIRECTED DAILY WITH TOUJEO   • Blood Glucose Monitoring Suppl ([employer] BLOOD GLUCOSE) kit    • carvedilol (COREG) 25 MG tablet TAKE ONE TABLET BY MOUTH TWICE A DAY WITH MEALS   • clopidogrel (PLAVIX) 75 MG tablet TAKE ONE TABLET BY MOUTH DAILY   • dorzolamide-timolol (COSOPT) 22.3-6.8 MG/ML ophthalmic solution Administer 1 drop to both eyes 2 (Two) Times a Day.   • escitalopram (LEXAPRO) 20 MG tablet TAKE ONE TABLET BY MOUTH DAILY   • esomeprazole (nexIUM) 40 MG capsule TAKE ONE CAPSULE BY MOUTH DAILY   • finasteride (PROSCAR) 5 MG tablet Take 1 tablet by mouth Daily.   • JANUVIA 100 MG tablet take 1 tablet by mouth once daily   • JARDIANCE 25 MG tablet take 1 tablet by mouth every morning   • KROGER PREMIUM GLUCOSE TEST test strip TEST FOUR TIMES A DAY   • latanoprost (XALATAN) 0.005 % ophthalmic solution Apply 1 drop to  each eye Daily.   • metFORMIN (GLUCOPHAGE) 500 MG tablet TAKE TWO TABLETS BY MOUTH TWICE A DAY WITH MEALS   • pioglitazone (ACTOS) 45 MG tablet TAKE ONE TABLET BY MOUTH DAILY   • rosuvastatin (CRESTOR) 40 MG tablet TAKE ONE TABLET BY MOUTH DAILY   • sildenafil (VIAGRA) 50 MG tablet Take 1 tablet by mouth Daily As Needed for erectile dysfunction.   • TOUJEO SOLOSTAR 300 UNIT/ML solution pen-injector INJECT 60 UNITS UNDER THE SKIN EVERY MORNING   • vitamin D (ERGOCALCIFEROL) 12578 units capsule capsule " TAKE ONE CAPSULE BY MOUTH ONCE WEEKLY   • [DISCONTINUED] hepatitis A (HAVRIX) 1440 EL U/ML vaccine Inject  into the shoulder, thigh, or buttocks.     No current facility-administered medications on file prior to visit.      Family History   Problem Relation Age of Onset   • Lupus Sister    • Heart disease Other    • Hypertension Other    • Malig Hyperthermia Neg Hx      Social History   Substance Use Topics   • Smoking status: Never Smoker   • Smokeless tobacco: Never Used   • Alcohol use No      Comment: Occasionally     Allergies   Allergen Reactions   • Ace Inhibitors      angioedema   • Lisinopril Angioedema   • Testosterone      Muscle pain         History of Present Illness  Encounter Diagnoses   Name Primary?   • Essential hypertension    • Hyperlipidemia, unspecified hyperlipidemia type    • Class 1 obesity due to excess calories without serious comorbidity with body mass index (BMI) of 31.0 to 31.9 in adult    • Vitamin D deficiency    • Type 2 diabetes mellitus with hyperglycemia, with long-term current use of insulin (CMS/[facility]) Yes   • Hypogonadism in male    • Microalbuminuria    • Anemia, unspecified type    63-year-old gentleman here today for routine follow-up visit.  He is being seen for the above-mentioned problems.  He is taking his medications as prescribed.  He has no complaints at today's visit.  He denies needing any medication refills.  He recent labs which were reviewed and he was provided a copy.  He states he is okay energy but feels it could be better.  He is noted to be anemic based on his current hemoglobin and hematocrit.  His testosterone level is in satisfactory range.  He is also complaining of craving ice.  He will be checked today for iron deficiency anemia.    The following portions of the patient's history were reviewed and updated as appropriate: allergies, current medications, past family history, past medical history, past social history, past surgical history and problem  list.    Review of Systems   Constitutional: Negative for fatigue.   HENT: Negative for trouble swallowing.    Eyes: Negative for visual disturbance.   Respiratory: Negative for shortness of breath.    Cardiovascular: Negative for leg swelling.   Endocrine: Negative for polyuria.   Skin: Negative for wound.   Neurological: Negative for numbness.       Objective   Physical Exam   Constitutional: He is oriented to person, place, and time. He appears well-developed and well-nourished. No distress.   HENT:   Head: Normocephalic and atraumatic.   Right Ear: External ear normal.   Left Ear: External ear normal.   Nose: Nose normal.   Eyes: Pupils are equal, round, and reactive to light. Right eye exhibits no discharge. Left eye exhibits no discharge.   Neck: Normal range of motion. Neck supple. Carotid bruit is not present. No tracheal deviation, no edema and no erythema present. No thyromegaly present.   Cardiovascular: Normal rate, regular rhythm, normal heart sounds and intact distal pulses.  Exam reveals no gallop and no friction rub.    No murmur heard.  Pulmonary/Chest: Effort normal and breath sounds normal. No respiratory distress. He has no wheezes. He has no rales.   Abdominal: Soft. Bowel sounds are normal. He exhibits no distension. There is no tenderness.   Musculoskeletal: Normal range of motion. He exhibits no edema or deformity.   R arm- rotator cuff surgery on 9/21- sling   Lymphadenopathy:     He has no cervical adenopathy.   Neurological: He is alert and oriented to person, place, and time. Coordination normal.   Skin: Skin is warm and dry. No rash noted. He is not diaphoretic. No erythema. No pallor.   Psychiatric: He has a normal mood and affect. His behavior is normal. Judgment and thought content normal.   Nursing note and vitals reviewed.    Results for orders placed or performed in visit on 07/13/18   Testosterone Free Direct   Result Value Ref Range    Testosterone, Free 8.4 6.6 - 18.1 pg/mL    Testosterone   Result Value Ref Range    Testosterone, Total 393.40 193.00 - 740.00 ng/dL   Sex Horm Binding Globulin   Result Value Ref Range    Sex Hormone Binding Globulin 25.7 19.3 - 76.4 nmol/L   Comprehensive Metabolic Panel   Result Value Ref Range    Glucose 76 65 - 99 mg/dL    BUN 12 8 - 23 mg/dL    Creatinine 0.95 0.76 - 1.27 mg/dL    Sodium 140 136 - 145 mmol/L    Potassium 4.3 3.5 - 5.2 mmol/L    Chloride 103 98 - 107 mmol/L    CO2 21.6 (L) 22.0 - 29.0 mmol/L    Calcium 9.7 8.6 - 10.5 mg/dL    Total Protein 8.2 6.0 - 8.5 g/dL    Albumin 4.40 3.50 - 5.20 g/dL    ALT (SGPT) 29 1 - 41 U/L    AST (SGOT) 47 (H) 1 - 40 U/L    Alkaline Phosphatase 75 39 - 117 U/L    Total Bilirubin 0.4 0.1 - 1.2 mg/dL    eGFR  African Amer 97 >60 mL/min/1.73    Globulin 3.8 gm/dL    A/G Ratio 1.2 g/dL    BUN/Creatinine Ratio 12.6 7.0 - 25.0    Anion Gap 15.4 mmol/L   C-Peptide   Result Value Ref Range    C-Peptide 1.2 1.1 - 4.4 ng/mL   Hemoglobin & Hematocrit, Blood   Result Value Ref Range    Hemoglobin 12.6 (L) 13.7 - 17.6 g/dL    Hematocrit 39.8 (L) 40.4 - 52.2 %   Hemoglobin A1c   Result Value Ref Range    Hemoglobin A1C 6.18 (H) 4.80 - 5.60 %   Lipid Panel   Result Value Ref Range    Total Cholesterol 183 0 - 200 mg/dL    Triglycerides 118 0 - 150 mg/dL    HDL Cholesterol 49 40 - 60 mg/dL    LDL Cholesterol  110 (H) 0 - 100 mg/dL    VLDL Cholesterol 23.6 5 - 40 mg/dL    LDL/HDL Ratio 2.25    PSA DIAGNOSTIC   Result Value Ref Range    PSA 0.176 0.000 - 4.000 ng/mL   Vitamin D 25 Hydroxy   Result Value Ref Range    25 Hydroxy, Vitamin D 36.7 30.0 - 100.0 ng/ml   MicroAlbumin, Urine, Random - Urine, Clean Catch   Result Value Ref Range    Microalbumin, Urine 36.5 mg/L         Assessment/Plan   Problems Addressed this Visit        Cardiovascular and Mediastinum    Hyperlipidemia    Essential hypertension       Digestive    Vitamin D deficiency    Adiposity       Endocrine    Diabetes mellitus (CMS/HCC) - Primary     Hypogonadism in male       Genitourinary    Microalbuminuria      Other Visit Diagnoses     Anemia, unspecified type        Relevant Orders    Iron and TIBC    Vitamin B12 and Folate          In summary, patient was seen and examined.  He will continue all his current medications as prescribed.  Metabolically he is stable.  He will have additional labs today to check his iron and B12.  He does crave ice and is anemic according to his hemoglobin and hematocrit.  He will follow-up with myself or Dr. Jovel in 6 months with labs prior.  I've encouraged him to contact the office should you have any questions or concerns.  Based on his results of his lab tests he may need to follow-up with his primary care provider for further evaluation and treatment.

## 2018-07-25 ENCOUNTER — TELEPHONE (OUTPATIENT)
Dept: INTERNAL MEDICINE | Age: 63
End: 2018-07-25

## 2018-07-25 ENCOUNTER — TELEPHONE (OUTPATIENT)
Dept: ENDOCRINOLOGY | Age: 63
End: 2018-07-25

## 2018-07-25 LAB
FOLATE SERPL-MCNC: 16.87 NG/ML (ref 4.78–24.2)
IRON SATN MFR SERPL: 4 % (ref 20–50)
IRON SERPL-MCNC: 27 MCG/DL (ref 59–158)
TIBC SERPL-MCNC: 605 MCG/DL
UIBC SERPL-MCNC: 578 MCG/DL
VIT B12 SERPL-MCNC: 642 PG/ML (ref 211–946)

## 2018-07-25 NOTE — TELEPHONE ENCOUNTER
----- Message from Gloria Hahn sent at 7/25/2018  2:12 PM EDT -----  Contact: PATIENT   PATIENT CALLED BACK FOR LAB RESULTS:  MESSAGE: PATIENT HAS CALLED IN REGARDS TO GETTING LAB RESULTS FOR HIS IRON. PATIENT IS STATING HE NEVER GOT THE ACTUALLY RESULTS OF WHAT HIS IRON LEVELS ARE     PATIENT IS REQUESTING A CALL BACK   CALL BACK NUMBER:   [phone]      SPOKE WITH PATIENT AND INFORMED HIM OF IRON AND VITAMIN B12. PT EXPRESSED UNDERSTANDING.

## 2018-07-25 NOTE — TELEPHONE ENCOUNTER
Left patient message informing him his iron is low and he should contact his PCP for further eval and treatment. Told patient to call the office if he has any questions.

## 2018-07-25 NOTE — TELEPHONE ENCOUNTER
----- Message from Bebeto Angel MD sent at 7/25/2018 10:29 AM EDT -----  Please be sure  the patient has an appointment to follow-up his low iron level. js

## 2018-08-03 ENCOUNTER — TELEPHONE (OUTPATIENT)
Dept: INTERNAL MEDICINE | Age: 63
End: 2018-08-03

## 2018-08-03 ENCOUNTER — LAB (OUTPATIENT)
Dept: LAB | Facility: HOSPITAL | Age: 63
End: 2018-08-03

## 2018-08-03 ENCOUNTER — OFFICE VISIT (OUTPATIENT)
Dept: INTERNAL MEDICINE | Age: 63
End: 2018-08-03

## 2018-08-03 VITALS
HEART RATE: 85 BPM | WEIGHT: 268 LBS | SYSTOLIC BLOOD PRESSURE: 114 MMHG | HEIGHT: 77 IN | OXYGEN SATURATION: 97 % | BODY MASS INDEX: 31.64 KG/M2 | DIASTOLIC BLOOD PRESSURE: 82 MMHG | TEMPERATURE: 97.7 F

## 2018-08-03 DIAGNOSIS — D64.9 POSTOPERATIVE ANEMIA: Primary | ICD-10-CM

## 2018-08-03 DIAGNOSIS — R06.02 SHORTNESS OF BREATH: ICD-10-CM

## 2018-08-03 DIAGNOSIS — K21.00 GASTROESOPHAGEAL REFLUX DISEASE WITH ESOPHAGITIS: ICD-10-CM

## 2018-08-03 LAB
FERRITIN SERPL-MCNC: 10.12 NG/ML (ref 30–400)
NT-PROBNP SERPL-MCNC: 64 PG/ML (ref 0–900)
TSH SERPL DL<=0.05 MIU/L-ACNC: 0.7 MIU/ML (ref 0.27–4.2)

## 2018-08-03 PROCEDURE — 84443 ASSAY THYROID STIM HORMONE: CPT | Performed by: INTERNAL MEDICINE

## 2018-08-03 PROCEDURE — 99214 OFFICE O/P EST MOD 30 MIN: CPT | Performed by: INTERNAL MEDICINE

## 2018-08-03 PROCEDURE — 36415 COLL VENOUS BLD VENIPUNCTURE: CPT | Performed by: INTERNAL MEDICINE

## 2018-08-03 PROCEDURE — 82728 ASSAY OF FERRITIN: CPT | Performed by: INTERNAL MEDICINE

## 2018-08-03 PROCEDURE — 83880 ASSAY OF NATRIURETIC PEPTIDE: CPT

## 2018-08-03 NOTE — TELEPHONE ENCOUNTER
Pt stated he was seen today by Dr Angel and was told he would prescribe something for an iron supplement. Nothing sent to his pharmacy.  [facility] pharmacy  Pt's # [phone]  Thanks SP

## 2018-08-03 NOTE — PROGRESS NOTES
"[facility] [place] [facility]  MD Isaias CASTILLO Jacinda / 63 y.o. / male  08/03/2018      ASSESSMENT & PLAN:    Problem List Items Addressed This Visit     None        No orders of the defined types were placed in this encounter.      Summary/Discussion:    Number-one postoperative anemia which is not resolved over the past 14 months.  Pulmonary laboratory data is consistent with iron deficiency.  I will check a ferritin level today to define bone marrow stores of iron, and begin patient on ferrous gluconate.  We will follow-up the stool specimen from today, if that is positive we'll refer the patient on to GI for consideration of video capsule endoscopy.  B12 and folate levels are normal, we'll also check TSH since renal function is normal to complete metabolic evaluation.  At the stool specimens negative for blood, will give iron supplementation at [facility] for the next 6-8 weeks.  Patient be asked to see me in 6 weeks.  However if that is positive, I will refer him on to GI.    #2 dyspnea believe that hemoglobin at this level is enough to cause shortness of air.  There is no evidence of congestive failure by physical exam, the patient does have history of cardiovascular disease, will check BMP today.  Patient is never smoked but was in a home with secondhand smoke exposure we'll schedule PFTs .  Asked x-ray will not be repeated since this is been a long-standing problem and his chest x-ray July of last year was normal.    #3 reflux symptomatic I don't think that to PPIs are in his best interest at this point, I'll suggested discontinuing 1 using that in the morning, but adding a Zantac as an H2 RA agent in the evening at bedtime at least 7550 mg.      No Follow-up on file.    ____________________________________________________________________    VITALS    Visit Vitals  /82   Pulse 85   Temp 97.7 °F (36.5 °C)   Ht 195.6 cm (77.01\")   Wt 122 kg (268 lb)   SpO2 97%   BMI 31.77 kg/m²       BP Readings from " Last 3 Encounters:   08/03/18 114/82   07/24/18 116/88   06/29/18 100/70     Wt Readings from Last 3 Encounters:   08/03/18 122 kg (268 lb)   07/24/18 122 kg (269 lb)   06/29/18 122 kg (268 lb)      Body mass index is 31.77 kg/m².    CC:  Main reason(s) for today's visit: Fatigue and Shortness of Breath      HPI:     Patient was referred here by endocrinology because of non-resolving postoperative anemia.  Typically in the preop stay, his hemoglobin running and 1314 g range, and over the past 14 months he has not gotten above the mid 12 6.  Most recent hemoglobin no July 13 12.6, indices were microcytic hyperchromic with an elevated RDW, iron was low at 27 saturation low at 4 patient's had an upper endoscopy done several months which was unremarkable, he has had a colonoscopy within the past year year which is also normal.  He has never been on iron for completion.  He has been on a regular diet.  He does consume beef.  Patient has been using to PPI agents because of symptomatic reflux.  I suggested that we use only 1 and perhaps an H2 RA agent in the evening.    he also has some shortness of air, this is not noted with exertion no PND nor orthopnea noted he also denied chest pain is been no pedal edema nor palpitations noted.    Patient Care Team:  Bebeto Angel MD as PCP - General  Bebeto Angel MD as PCP - Family Medicine  Jorge Luis Kent MD as Consulting Physician (Orthopedic Surgery)  ____________________________________________________________________    REVIEW OF SYSTEMS    Review of Systems   Constitutional: Negative for appetite change and unexpected weight change.   Cardiovascular: Negative for chest pain and palpitations.   Gastrointestinal: Negative for blood in stool, nausea and vomiting.         PHYSICAL EXAMINATION    Physical Exam   Constitutional: He is oriented to person, place, and time. He appears well-developed and well-nourished. No distress.   Neck: No JVD present.   Cardiovascular:  Normal rate, regular rhythm, normal heart sounds and intact distal pulses.  Exam reveals no gallop and no friction rub.    No murmur heard.  Pulmonary/Chest: Effort normal and breath sounds normal. He has no wheezes. He has no rales.   Abdominal: There is no hepatosplenomegaly.   Genitourinary: Rectum normal. Prostate is enlarged.   Musculoskeletal: He exhibits no edema.   Neurological: He is alert and oriented to person, place, and time.   Skin: Skin is warm and dry. No rash noted.   Psychiatric: He has a normal mood and affect. His behavior is normal. Judgment and thought content normal.   Nursing note and vitals reviewed.        REVIEWED DATA:    Labs:     Lab Results   Component Value Date     07/13/2018    K 4.3 07/13/2018    AST 47 (H) 07/13/2018    ALT 29 07/13/2018    BUN 12 07/13/2018    CREATININE 0.95 07/13/2018    CREATININE 1.14 01/08/2018    CREATININE 0.89 07/24/2017    EGFRIFNONA 65 01/08/2018    EGFRIFAFRI 97 07/13/2018       Lab Results   Component Value Date    HGBA1C 6.18 (H) 07/13/2018    HGBA1C 5.90 (H) 01/08/2018    HGBA1C 5.93 (H) 06/16/2017    GLUCOSE 76 07/13/2018    GLUCOSE 80 07/24/2017    GLUCOSE 78 07/17/2017    MICROALBUR 36.5 07/13/2018       Lab Results   Component Value Date     (H) 07/13/2018     (H) 01/08/2018     (H) 02/09/2017    HDL 49 07/13/2018    TRIG 118 07/13/2018       Lab Results   Component Value Date    TSH 1.390 02/09/2017    FREET4 1.22 02/25/2016       Lab Results   Component Value Date    WBC 8.85 09/12/2017    HGB 12.6 (L) 07/13/2018    HGB 12.3 (L) 01/08/2018    HGB 12.2 (L) 09/12/2017     09/12/2017       Lab Results   Component Value Date    PSA 0.176 07/13/2018         Imaging:         Medical Tests:         Summary of old records / correspondence / consultant report:         Request outside records:         ALLERGIES  Allergies   Allergen Reactions   • Ace Inhibitors      angioedema   • Lisinopril Angioedema   • Testosterone       Muscle pain        MEDICATIONS  Current Outpatient Prescriptions   Medication Sig Dispense Refill   • amLODIPine (NORVASC) 10 MG tablet Take 0.5 tablets by mouth Daily. 90 tablet 1   • aspirin 81 MG EC tablet Take 1 tablet by mouth Daily. get over the counter 30 tablet 3   • B-D ULTRAFINE III SHORT PEN 31G X 8 MM misc USE AS DIRECTED DAILY WITH TOUJEO 100 each 5   • Blood Glucose Monitoring Suppl ([facility] BLOOD GLUCOSE) kit      • carvedilol (COREG) 25 MG tablet TAKE ONE TABLET BY MOUTH TWICE A DAY WITH MEALS 180 tablet 1   • clopidogrel (PLAVIX) 75 MG tablet TAKE ONE TABLET BY MOUTH DAILY 90 tablet 0   • dorzolamide-timolol (COSOPT) 22.3-6.8 MG/ML ophthalmic solution Administer 1 drop to both eyes 2 (Two) Times a Day.     • escitalopram (LEXAPRO) 20 MG tablet TAKE ONE TABLET BY MOUTH DAILY 30 tablet 2   • esomeprazole (nexIUM) 40 MG capsule TAKE ONE CAPSULE BY MOUTH DAILY 30 capsule 4   • finasteride (PROSCAR) 5 MG tablet Take 1 tablet by mouth Daily. 30 tablet 2   • JANUVIA 100 MG tablet take 1 tablet by mouth once daily 30 tablet 5   • JARDIANCE 25 MG tablet take 1 tablet by mouth every morning 30 tablet 5   • [employer] PREMIUM GLUCOSE TEST test strip TEST FOUR TIMES A  each 3   • latanoprost (XALATAN) 0.005 % ophthalmic solution Apply 1 drop to  each eye Daily. 7.5 mL 3   • metFORMIN (GLUCOPHAGE) 500 MG tablet TAKE TWO TABLETS BY MOUTH TWICE A DAY WITH MEALS 360 tablet 0   • pioglitazone (ACTOS) 45 MG tablet TAKE ONE TABLET BY MOUTH DAILY 90 tablet 1   • rosuvastatin (CRESTOR) 40 MG tablet TAKE ONE TABLET BY MOUTH DAILY 90 tablet 0   • sildenafil (VIAGRA) 50 MG tablet Take 1 tablet by mouth Daily As Needed for erectile dysfunction. 6 tablet 5   • TOUJEO SOLOSTAR 300 UNIT/ML solution pen-injector INJECT 60 UNITS UNDER THE SKIN EVERY MORNING 6 mL 0   • vitamin D (ERGOCALCIFEROL) 26464 units capsule capsule TAKE ONE CAPSULE BY MOUTH ONCE WEEKLY 12 capsule 0     No current facility-administered medications  for this visit.        PFS:     The following portions of the patient's history were reviewed and updated as appropriate: Allergies / Current Medications / Past Medical History / Surgical History / Social History / Family History    PROBLEM LIST   Patient Active Problem List   Diagnosis   • Microalbuminuria   • Vitamin D deficiency   • Angioedema   • Chronic coronary artery disease   • Anxiety   • ED (erectile dysfunction)   • Gastroesophageal reflux disease   • Hyperlipidemia   • Diabetes mellitus (CMS/HCC)   • Adiposity   • Routine health maintenance   • Uncontrolled type 2 diabetes mellitus (CMS/HCC)   • Low testosterone   • Hypogonadism in male   • Presence of coronary angioplasty implant and graft   • Osteoarthritis, knee   • Postoperative visit   • Hematoma   • Essential hypertension   • Anemia, posthemorrhagic, acute   • Febrile illness   • Wound infection after surgery   • Shortness of breath   • Gastroesophageal reflux disease with esophagitis   • Tubular adenoma of colon   • Benign prostatic hyperplasia with nocturia       PAST MEDICAL HISTORY  Past Medical History:   Diagnosis Date   • Adiposity    • Anemia     post hemorrhagic   • Angioedema 2/21/2016    Secondary to ACE inhibitor   • Anxiety    • Arthritis    • CAD (coronary artery disease)    • Chest pain    • Colon polyps    • Diabetes mellitus, type 2 (CMS/HCC)    • ED (erectile dysfunction)    • Febrile illness    • GERD (gastroesophageal reflux disease)    • Glaucoma    • Hematoma    • High cholesterol    • Hyperlipidemia    • Hypertension    • Hypogonadism in male 9/28/2016   • Male erectile disorder    • Microalbuminuria    • Obesity (BMI 30-39.9)    • Osteoarthritis     knee   • Vitamin D deficiency    • Wound infection after surgery        SURGICAL HISTORY  Past Surgical History:   Procedure Laterality Date   • CARDIAC CATHETERIZATION N/A 05/15/2006    Dr. Mini Camarillo   • COLONOSCOPY N/A 02/22/2006    Bilobed polyp at 30 cm,  hemorrhoids-Dr. Ilya Zhao   • COLONOSCOPY N/A 02/28/2014    Normal ileum, one 6 mm polyp in the mid transverse colon-Dr. Ilya Zhao   • COLONOSCOPY N/A 11/19/2008    Ela ileum, two 3 to 4 mm polyps, non-bleeding internal hemorrhoids, repeat in 5 years-Dr. Ilya Zhao   • COLONOSCOPY N/A 10/31/2017    Procedure: COLONOSCOPY WITH POLYPECTOMY (COLD BIOPSY);  Surgeon: Ilya Zhao MD;  Location: [facility] ENDOSCOPY;  Service:    • CORONARY ANGIOPLASTY WITH STENT PLACEMENT      cardiac stents x3 occasions   • ENDOSCOPY N/A 10/4/2017    Procedure: ESOPHAGOGASTRODUODENOSCOPY;  Surgeon: Boyd Guidry MD;  [facility];  Service:    • KNEE INCISION AND DRAINAGE Right 6/20/2017    Procedure: RT. KNEE WASHOUT ;  Surgeon: Boyd Coyne MD;  Location: [facility] MAIN OR;  Service:    • MO TOTAL KNEE ARTHROPLASTY Right 6/15/2017    Procedure: RT TOTAL KNEE ARTHROPLASTY;  Surgeon: Boyd Coyne MD;  Location: [facility] MAIN OR;  Service: Orthopedics   • SHOULDER SURGERY Right 2016   • UPPER GASTROINTESTINAL ENDOSCOPY N/A 10/13/2015    Z-line irregular, normal stomach, normal duodenum-Dr. Ilya Zhao   • UPPER GASTROINTESTINAL ENDOSCOPY N/A 02/28/2014    Z-line irregular, normal stomach, normal duodenum-Dr. Ilya Zhao   • UPPER GASTROINTESTINAL ENDOSCOPY N/A 11/19/2008    Z-line irregular, chronic gastritis withotu hemorrhage, normal duodenum-Dr. Ilya Zhao   • UPPER GASTROINTESTINAL ENDOSCOPY N/A 06/22/2006    LA Grade A reflux esophagitis, non-bleeding erythematous gastropathy, normal duodenum-Dr. Ilya Zhao       SOCIAL HISTORY  Social History     Social History   • Marital status:    • Number of children: 1     Occupational History   • RN      Social History Main Topics   • Smoking status: Never Smoker   • Smokeless tobacco: Never Used   • Alcohol use No      Comment: Occasionally   • Drug use: No   • Sexual activity: Defer     Other Topics Concern   • Not on file     Social History Narrative     , 1 son, 2 stepsons       FAMILY HISTORY  Family History   Problem Relation Age of Onset   • Lupus Sister    • Heart disease Other    • Hypertension Other    • Malig Hyperthermia Neg Hx        Health Maintenance Due   Topic   • DIABETIC FOOT EXAM    • ZOSTER VACCINE (2 of 2)   • ANNUAL PHYSICAL    • INFLUENZA VACCINE        Overdue health maintenance interventions  reviewed with patient.    Dictated utilizing Dragon voice recognition software

## 2018-08-05 NOTE — PROGRESS NOTES
Thyroid function is normal, ferritin shows decreased bone marrow iron.  Please use the iron supplement (over-the-counter) as discussed this afternoon and we will reconvene in about 6-8 weeks.

## 2018-08-06 RX ORDER — ERGOCALCIFEROL 1.25 MG/1
CAPSULE ORAL
Qty: 12 CAPSULE | Refills: 1 | Status: SHIPPED | OUTPATIENT
Start: 2018-08-06 | End: 2018-10-30 | Stop reason: SDUPTHER

## 2018-08-06 RX ORDER — OMEPRAZOLE 40 MG/1
CAPSULE, DELAYED RELEASE ORAL
Qty: 90 CAPSULE | Refills: 0 | Status: SHIPPED | OUTPATIENT
Start: 2018-08-06 | End: 2018-10-30

## 2018-08-07 DIAGNOSIS — E11.9 CONTROLLED TYPE 2 DIABETES MELLITUS WITHOUT COMPLICATION, WITH LONG-TERM CURRENT USE OF INSULIN (HCC): ICD-10-CM

## 2018-08-07 DIAGNOSIS — Z79.4 CONTROLLED TYPE 2 DIABETES MELLITUS WITHOUT COMPLICATION, WITH LONG-TERM CURRENT USE OF INSULIN (HCC): ICD-10-CM

## 2018-08-07 DIAGNOSIS — IMO0001 UNCONTROLLED DIABETES MELLITUS TYPE 2 WITHOUT COMPLICATIONS, UNSPECIFIED LONG TERM INSULIN USE STATUS: ICD-10-CM

## 2018-08-07 RX ORDER — EMPAGLIFLOZIN 25 MG/1
TABLET, FILM COATED ORAL
Qty: 30 TABLET | Refills: 0 | Status: SHIPPED | OUTPATIENT
Start: 2018-08-07 | End: 2018-08-30 | Stop reason: SDUPTHER

## 2018-08-07 RX ORDER — SITAGLIPTIN 100 MG/1
TABLET, FILM COATED ORAL
Qty: 30 TABLET | Refills: 0 | Status: SHIPPED | OUTPATIENT
Start: 2018-08-07 | End: 2018-08-30 | Stop reason: SDUPTHER

## 2018-08-10 ENCOUNTER — APPOINTMENT (OUTPATIENT)
Dept: RESPIRATORY THERAPY | Facility: HOSPITAL | Age: 63
End: 2018-08-10

## 2018-08-17 RX ORDER — INSULIN GLARGINE 300 U/ML
60 INJECTION, SOLUTION SUBCUTANEOUS EVERY MORNING
Qty: 9 ML | Refills: 2 | Status: SHIPPED | OUTPATIENT
Start: 2018-08-17 | End: 2018-12-26 | Stop reason: SDUPTHER

## 2018-08-30 DIAGNOSIS — Z79.4 CONTROLLED TYPE 2 DIABETES MELLITUS WITHOUT COMPLICATION, WITH LONG-TERM CURRENT USE OF INSULIN (HCC): ICD-10-CM

## 2018-08-30 DIAGNOSIS — IMO0001 UNCONTROLLED DIABETES MELLITUS TYPE 2 WITHOUT COMPLICATIONS, UNSPECIFIED LONG TERM INSULIN USE STATUS: ICD-10-CM

## 2018-08-30 DIAGNOSIS — E11.9 CONTROLLED TYPE 2 DIABETES MELLITUS WITHOUT COMPLICATION, WITH LONG-TERM CURRENT USE OF INSULIN (HCC): ICD-10-CM

## 2018-08-30 RX ORDER — EMPAGLIFLOZIN 25 MG/1
TABLET, FILM COATED ORAL
Qty: 30 TABLET | Refills: 2 | Status: SHIPPED | OUTPATIENT
Start: 2018-08-30 | End: 2018-11-23 | Stop reason: SDUPTHER

## 2018-08-30 RX ORDER — SITAGLIPTIN 100 MG/1
TABLET, FILM COATED ORAL
Qty: 30 TABLET | Refills: 2 | Status: SHIPPED | OUTPATIENT
Start: 2018-08-30 | End: 2018-11-23 | Stop reason: SDUPTHER

## 2018-09-25 DIAGNOSIS — R35.1 BENIGN PROSTATIC HYPERPLASIA WITH NOCTURIA: ICD-10-CM

## 2018-09-25 DIAGNOSIS — N40.1 BENIGN PROSTATIC HYPERPLASIA WITH NOCTURIA: ICD-10-CM

## 2018-09-25 DIAGNOSIS — N52.9 VASCULOGENIC ERECTILE DYSFUNCTION, UNSPECIFIED VASCULOGENIC ERECTILE DYSFUNCTION TYPE: ICD-10-CM

## 2018-09-26 RX ORDER — FINASTERIDE 5 MG/1
5 TABLET, FILM COATED ORAL DAILY
Qty: 30 TABLET | Refills: 2 | Status: SHIPPED | OUTPATIENT
Start: 2018-09-26 | End: 2019-02-11 | Stop reason: SDUPTHER

## 2018-10-01 ENCOUNTER — TELEPHONE (OUTPATIENT)
Dept: INTERNAL MEDICINE | Age: 63
End: 2018-10-01

## 2018-10-01 NOTE — TELEPHONE ENCOUNTER
----- Message from Brody Edwards MA sent at 10/1/2018  6:46 AM EDT -----  Regarding: FW: Visit Follow-Up Question  Contact: [phone]      ----- Message -----  From: Isaias Monaco  Sent: 9/30/2018   7:22 PM  To: Karen Aleman [id number] Clinical Pool  Subject: Visit Follow-Up Question                         I have an appointment on Tuesday, 10/02/2018 to recheck my iron and blood count levels. Would Dr Angel enter an order for labs so that I may have my blood drawn on Monday at [facility] before my visit?  Thank you.

## 2018-10-02 ENCOUNTER — HOSPITAL ENCOUNTER (OUTPATIENT)
Dept: GENERAL RADIOLOGY | Facility: HOSPITAL | Age: 63
Discharge: HOME | End: 2018-10-02
Admitting: INTERNAL MEDICINE

## 2018-10-02 ENCOUNTER — OFFICE VISIT (OUTPATIENT)
Dept: INTERNAL MEDICINE | Age: 63
End: 2018-10-02

## 2018-10-02 VITALS
HEART RATE: 78 BPM | HEIGHT: 77 IN | BODY MASS INDEX: 32.14 KG/M2 | DIASTOLIC BLOOD PRESSURE: 90 MMHG | SYSTOLIC BLOOD PRESSURE: 138 MMHG | WEIGHT: 272.2 LBS | OXYGEN SATURATION: 96 % | TEMPERATURE: 97.8 F

## 2018-10-02 DIAGNOSIS — Z23 ENCOUNTER FOR IMMUNIZATION: ICD-10-CM

## 2018-10-02 DIAGNOSIS — Z00.00 ROUTINE HEALTH MAINTENANCE: ICD-10-CM

## 2018-10-02 DIAGNOSIS — D62 ANEMIA, POSTHEMORRHAGIC, ACUTE: Primary | ICD-10-CM

## 2018-10-02 DIAGNOSIS — M79.89 SWELLING OF RIGHT FOOT: ICD-10-CM

## 2018-10-02 PROCEDURE — 90471 IMMUNIZATION ADMIN: CPT | Performed by: INTERNAL MEDICINE

## 2018-10-02 PROCEDURE — 82270 OCCULT BLOOD FECES: CPT | Performed by: INTERNAL MEDICINE

## 2018-10-02 PROCEDURE — 90674 CCIIV4 VAC NO PRSV 0.5 ML IM: CPT | Performed by: INTERNAL MEDICINE

## 2018-10-02 PROCEDURE — 99213 OFFICE O/P EST LOW 20 MIN: CPT | Performed by: INTERNAL MEDICINE

## 2018-10-02 PROCEDURE — 73630 X-RAY EXAM OF FOOT: CPT

## 2018-10-02 NOTE — PROGRESS NOTES
"[facility] [place] INTERNAL MEDICINE  MD Jn CASTILLOjuan m [place] / 63 y.o. / male  10/02/2018      ASSESSMENT & PLAN:    Problem List Items Addressed This Visit        Unprioritized    Routine health maintenance    Anemia, posthemorrhagic, acute - Primary    Relevant Orders    CBC & Differential    Iron and TIBC    Ferritin    POC Occult Blood Stool      Other Visit Diagnoses     Swelling of right foot        Relevant Orders    XR Foot 3+ View Right    Encounter for immunization        Relevant Orders    Flucelvax Quad=>4Years (PFS)        Orders Placed This Encounter   Procedures   • XR Foot 3+ View Right   • Flucelvax Quad=>4Years (PFS)   • Iron and TIBC   • Ferritin   • POC Occult Blood Stool   • CBC & Differential       Summary/Discussion:    Number-one symptomatic anemia improved while on iron.  Will check laboratory as above to verify status of iron repletion.  Unfortunately I do not have the result of the stool specimen for blood.  That was repeated today and I will report the results to the patient.    #2 swelling of the dorsal surface of the right foot, will rule out any bony pathology.  X-ray today.  Meantime recommend elevation and heat 4/20/30 minutes before times a day.    #3 immunization update, flu shot today.    #4 routine health maintenance, patient is aware that I am leaving the area and he has been provided with a copy of the [facility] later listing the practices that are accepting my patients.      Return in about 4 months (around 2/2/2019).    ____________________________________________________________________    VITALS    Visit Vitals  /90   Pulse 78   Temp 97.8 °F (36.6 °C)   Ht 195.6 cm (77.01\")   Wt 123 kg (272 lb 3.2 oz)   SpO2 96%   BMI 32.27 kg/m²       BP Readings from Last 3 Encounters:   10/02/18 138/90   08/03/18 114/82   07/24/18 116/88     Wt Readings from Last 3 Encounters:   10/02/18 123 kg (272 lb 3.2 oz)   08/03/18 122 kg (268 lb)   07/24/18 122 kg (269 lb)      Body mass " index is 32.27 kg/m².    CC:  Main reason(s) for today's visit: Shortness of Breath (6 wk F/U post surgery ) and Anemia      HPI:     Patient presents this afternoon in follow-up of her last office visit of August 3 of this year.  That time he noted postoperative anemia which had not resolved after 14 months.  This is beginning to cause problems with dyspnea.  BNP was normal at 64, thyroid function was normal, ferritin was low at 10.12.  He has been compliant with iron, but we do not have results of the stool specimen.    Patient had some mild trauma to the dorsal surface of his right foot several weeks ago.  This resulted in edema rather acutely, that resolved on its own.  It has now since recurred.  He denied any pain in the foot.  This is been no evidence of acute inflammation as well.        Patient Care Team:  Bebeto Angel MD as PCP - General  Bebeto Angel MD as PCP - Family Medicine  Jorge Luis Kent MD as Consulting Physician (Orthopedic Surgery)  ____________________________________________________________________    REVIEW OF SYSTEMS    Review of Systems   Respiratory: Negative for shortness of breath.    Cardiovascular: Negative for chest pain and palpitations.        No PND nor orthopnea noted.         PHYSICAL EXAMINATION    Physical Exam   Constitutional: He is oriented to person, place, and time. He appears well-developed and well-nourished. No distress.   Cardiovascular: Normal rate, regular rhythm, normal heart sounds and intact distal pulses.  Exam reveals no gallop and no friction rub.    No murmur heard.  Pulmonary/Chest: Effort normal and breath sounds normal. He has no wheezes. He has no rales.   Genitourinary: Rectum normal.   Musculoskeletal: He exhibits edema.   Edema is very localized in the dorsum of the right foot.  There is no erythema, application of vibrating tuning fork does not accentuate or produce any pain.   Neurological: He is alert and oriented to person, place, and time.    Skin: Skin is warm and dry. No rash noted.   Psychiatric: He has a normal mood and affect. His behavior is normal. Judgment and thought content normal.   Nursing note and vitals reviewed.        REVIEWED DATA:    Labs:     Lab Results   Component Value Date     07/13/2018    K 4.3 07/13/2018    AST 47 (H) 07/13/2018    ALT 29 07/13/2018    BUN 12 07/13/2018    CREATININE 0.95 07/13/2018    CREATININE 1.14 01/08/2018    CREATININE 0.89 07/24/2017    EGFRIFNONA 65 01/08/2018    EGFRIFAFRI 97 07/13/2018       Lab Results   Component Value Date    HGBA1C 6.18 (H) 07/13/2018    HGBA1C 5.90 (H) 01/08/2018    HGBA1C 5.93 (H) 06/16/2017    GLUCOSE 76 07/13/2018    GLUCOSE 80 07/24/2017    GLUCOSE 78 07/17/2017    MICROALBUR 36.5 07/13/2018       Lab Results   Component Value Date     (H) 07/13/2018     (H) 01/08/2018     (H) 02/09/2017    HDL 49 07/13/2018    TRIG 118 07/13/2018       Lab Results   Component Value Date    TSH 0.705 08/03/2018    FREET4 1.22 02/25/2016       Lab Results   Component Value Date    WBC 8.85 09/12/2017    HGB 12.6 (L) 07/13/2018    HGB 12.3 (L) 01/08/2018    HGB 12.2 (L) 09/12/2017     09/12/2017       Lab Results   Component Value Date    PSA 0.176 07/13/2018         Imaging:         Medical Tests:         Summary of old records / correspondence / consultant report:         Request outside records:         ALLERGIES  Allergies   Allergen Reactions   • Ace Inhibitors      angioedema   • Lisinopril Angioedema   • Testosterone      Muscle pain        MEDICATIONS  Current Outpatient Prescriptions   Medication Sig Dispense Refill   • amLODIPine (NORVASC) 10 MG tablet Take 0.5 tablets by mouth Daily. 90 tablet 1   • aspirin 81 MG EC tablet Take 1 tablet by mouth Daily. get over the counter 30 tablet 3   • B-D ULTRAFINE III SHORT PEN 31G X 8 MM misc USE AS DIRECTED DAILY WITH TOUJEO 100 each 5   • Blood Glucose Monitoring Suppl (OGER BLOOD GLUCOSE) kit      •  carvedilol (COREG) 25 MG tablet TAKE ONE TABLET BY MOUTH TWICE A DAY WITH MEALS 180 tablet 1   • clopidogrel (PLAVIX) 75 MG tablet TAKE ONE TABLET BY MOUTH DAILY 90 tablet 0   • dorzolamide-timolol (COSOPT) 22.3-6.8 MG/ML ophthalmic solution Administer 1 drop to both eyes 2 (Two) Times a Day.     • escitalopram (LEXAPRO) 20 MG tablet TAKE ONE TABLET BY MOUTH DAILY 30 tablet 2   • esomeprazole (nexIUM) 40 MG capsule TAKE ONE CAPSULE BY MOUTH DAILY 30 capsule 4   • finasteride (PROSCAR) 5 MG tablet Take 1 tablet by mouth Daily. 30 tablet 2   • JANUVIA 100 MG tablet TAKE 1 TABLET BY MOUTH ONCE DAILY 30 tablet 2   • JARDIANCE 25 MG tablet TAKE 1 TABLET BY MOUTH EVERY MORNING 30 tablet 2   • KR[employer]ER PREMIUM GLUCOSE TEST test strip TEST FOUR TIMES A  each 3   • latanoprost (XALATAN) 0.005 % ophthalmic solution Apply 1 drop to  each eye Daily. 7.5 mL 3   • metFORMIN (GLUCOPHAGE) 500 MG tablet TAKE TWO TABLETS BY MOUTH TWICE A DAY WITH MEALS 360 tablet 0   • omeprazole (priLOSEC) 40 MG capsule TAKE ONE CAPSULE BY MOUTH DAILY 90 capsule 0   • pioglitazone (ACTOS) 45 MG tablet TAKE ONE TABLET BY MOUTH DAILY 90 tablet 1   • rosuvastatin (CRESTOR) 40 MG tablet TAKE ONE TABLET BY MOUTH DAILY 90 tablet 0   • sildenafil (VIAGRA) 50 MG tablet Take 1 tablet by mouth Daily As Needed for erectile dysfunction. 6 tablet 5   • TOUJEO SOLOSTAR 300 UNIT/ML solution pen-injector INJECT 60 UNITS UNDER THE SKIN EVERY MORNING 9 mL 2   • vitamin D (ERGOCALCIFEROL) 87296 units capsule capsule TAKE ONE CAPSULE BY MOUTH ONCE WEEKLY 12 capsule 0   • vitamin D (ERGOCALCIFEROL) 31747 units capsule capsule TAKE ONE CAPSULE BY MOUTH ONCE WEEKLY 12 capsule 1     No current facility-administered medications for this visit.        PFSH:     The following portions of the patient's history were reviewed and updated as appropriate: Allergies / Current Medications / Past Medical History / Surgical History / Social History / Family History    PROBLEM  LIST   Patient Active Problem List   Diagnosis   • Microalbuminuria   • Vitamin D deficiency   • Angioedema   • Chronic coronary artery disease   • Anxiety   • ED (erectile dysfunction)   • Gastroesophageal reflux disease   • Hyperlipidemia   • Diabetes mellitus (CMS/HCC)   • Adiposity   • Routine health maintenance   • Uncontrolled type 2 diabetes mellitus (CMS/HCC)   • Low testosterone   • Hypogonadism in male   • Presence of coronary angioplasty implant and graft   • Osteoarthritis, knee   • Postoperative visit   • Hematoma   • Essential hypertension   • Anemia, posthemorrhagic, acute   • Febrile illness   • Wound infection after surgery   • Shortness of breath   • Gastroesophageal reflux disease with esophagitis   • Tubular adenoma of colon   • Benign prostatic hyperplasia with nocturia       PAST MEDICAL HISTORY  Past Medical History:   Diagnosis Date   • Adiposity    • Anemia     post hemorrhagic   • Angioedema 2/21/2016    Secondary to ACE inhibitor   • Anxiety    • Arthritis    • CAD (coronary artery disease)    • Chest pain    • Colon polyps    • Diabetes mellitus, type 2 (CMS/HCC)    • ED (erectile dysfunction)    • Febrile illness    • GERD (gastroesophageal reflux disease)    • Glaucoma    • Hematoma    • High cholesterol    • Hyperlipidemia    • Hypertension    • Hypogonadism in male 9/28/2016   • Male erectile disorder    • Microalbuminuria    • Obesity (BMI 30-39.9)    • Osteoarthritis     knee   • Vitamin D deficiency    • Wound infection after surgery        SURGICAL HISTORY  Past Surgical History:   Procedure Laterality Date   • CARDIAC CATHETERIZATION N/A 05/15/2006    Dr. Mini Camarillo   • COLONOSCOPY N/A 02/22/2006    Bilobed polyp at 30 cm, hemorrhoids-Dr. Ilya Zhao   • COLONOSCOPY N/A 02/28/2014    Normal ileum, one 6 mm polyp in the mid transverse colon-Dr. Ilya Zhao   • COLONOSCOPY N/A 11/19/2008    Ela ileum, two 3 to 4 mm polyps, non-bleeding internal hemorrhoids, repeat in 5  years-Dr. Ilya Zhao   • COLONOSCOPY N/A 10/31/2017    Procedure: COLONOSCOPY WITH POLYPECTOMY (COLD BIOPSY);  Surgeon: Ilya Zhao MD;  [facility];  Service:    • CORONARY ANGIOPLASTY WITH STENT PLACEMENT      cardiac stents x3 occasions   • ENDOSCOPY N/A 10/4/2017    Procedure: ESOPHAGOGASTRODUODENOSCOPY;  Surgeon: Boyd Guidry MD;  [facility];  Service:    • KNEE INCISION AND DRAINAGE Right 6/20/2017    Procedure: RT. KNEE WASHOUT ;  Surgeon: Boyd Coyne MD;  Location: [facility] MAIN OR;  Service:    • VA TOTAL KNEE ARTHROPLASTY Right 6/15/2017    Procedure: RT TOTAL KNEE ARTHROPLASTY;  Surgeon: Boyd Coyne MD;  Location: [facility] MAIN OR;  Service: Orthopedics   • SHOULDER SURGERY Right 2016   • UPPER GASTROINTESTINAL ENDOSCOPY N/A 10/13/2015    Z-line irregular, normal stomach, normal duodenum-Dr. Ilya Zhao   • UPPER GASTROINTESTINAL ENDOSCOPY N/A 02/28/2014    Z-line irregular, normal stomach, normal duodenum-Dr. Ilya Zhao   • UPPER GASTROINTESTINAL ENDOSCOPY N/A 11/19/2008    Z-line irregular, chronic gastritis withotu hemorrhage, normal duodenum-Dr. Ilya Zhao   • UPPER GASTROINTESTINAL ENDOSCOPY N/A 06/22/2006    LA Grade A reflux esophagitis, non-bleeding erythematous gastropathy, normal duodenum-Dr. Ilya Zhao       SOCIAL HISTORY  Social History     Social History   • Marital status:    • Number of children: 1     Occupational History   • RN      Social History Main Topics   • Smoking status: Never Smoker   • Smokeless tobacco: Never Used   • Alcohol use No      Comment: Occasionally   • Drug use: No   • Sexual activity: Defer     Other Topics Concern   • Not on file     Social History Narrative    , 1 son, 2 stepsons       FAMILY HISTORY  Family History   Problem Relation Age of Onset   • Lupus Sister    • Heart disease Other    • Hypertension Other    • Malig Hyperthermia Neg Hx        Health Maintenance Due   Topic   • DIABETIC FOOT  EXAM    • ZOSTER VACCINE (2 of 2)   • ANNUAL PHYSICAL    • INFLUENZA VACCINE        Overdue health maintenance interventions  reviewed with patient.    Dictated utilizing Dragon voice recognition software

## 2018-10-03 ENCOUNTER — TELEPHONE (OUTPATIENT)
Dept: ONCOLOGY | Facility: CLINIC | Age: 63
End: 2018-10-03

## 2018-10-03 ENCOUNTER — TELEPHONE (OUTPATIENT)
Dept: INTERNAL MEDICINE | Age: 63
End: 2018-10-03

## 2018-10-03 DIAGNOSIS — D64.9 ANEMIA, UNSPECIFIED TYPE: ICD-10-CM

## 2018-10-03 DIAGNOSIS — D50.9 IRON DEFICIENCY ANEMIA, UNSPECIFIED IRON DEFICIENCY ANEMIA TYPE: Primary | ICD-10-CM

## 2018-10-03 LAB
BASOPHILS # BLD AUTO: 0.03 10*3/MM3 (ref 0–0.2)
BASOPHILS NFR BLD AUTO: 0.4 % (ref 0–1.5)
DEVELOPER EXPIRATION DATE: NORMAL
DEVELOPER LOT NUMBER: NORMAL
EOSINOPHIL # BLD AUTO: 0.18 10*3/MM3 (ref 0–0.7)
EOSINOPHIL NFR BLD AUTO: 2.2 % (ref 0.3–6.2)
ERYTHROCYTE [DISTWIDTH] IN BLOOD BY AUTOMATED COUNT: 22.4 % (ref 11.5–14.5)
EXPIRATION DATE: NORMAL
FECAL OCCULT BLOOD SCREEN, POC: NEGATIVE
FERRITIN SERPL-MCNC: 13.41 NG/ML (ref 30–400)
HCT VFR BLD AUTO: 45.4 % (ref 40.4–52.2)
HGB BLD-MCNC: 14.4 G/DL (ref 13.7–17.6)
IMM GRANULOCYTES # BLD: 0.02 10*3/MM3 (ref 0–0.03)
IMM GRANULOCYTES NFR BLD: 0.2 % (ref 0–0.5)
IRON SATN MFR SERPL: 6 % (ref 20–50)
IRON SERPL-MCNC: 32 MCG/DL (ref 59–158)
LYMPHOCYTES # BLD AUTO: 2.25 10*3/MM3 (ref 0.9–4.8)
LYMPHOCYTES NFR BLD AUTO: 27.3 % (ref 19.6–45.3)
Lab: NORMAL
MCH RBC QN AUTO: 24.3 PG (ref 27–32.7)
MCHC RBC AUTO-ENTMCNC: 31.7 G/DL (ref 32.6–36.4)
MCV RBC AUTO: 76.6 FL (ref 79.8–96.2)
MONOCYTES # BLD AUTO: 0.76 10*3/MM3 (ref 0.2–1.2)
MONOCYTES NFR BLD AUTO: 9.2 % (ref 5–12)
NEGATIVE CONTROL: NEGATIVE
NEUTROPHILS # BLD AUTO: 5.02 10*3/MM3 (ref 1.9–8.1)
NEUTROPHILS NFR BLD AUTO: 60.9 % (ref 42.7–76)
PLATELET # BLD AUTO: 229 10*3/MM3 (ref 140–500)
POSITIVE CONTROL: POSITIVE
RBC # BLD AUTO: 5.93 10*6/MM3 (ref 4.6–6)
TIBC SERPL-MCNC: 576 MCG/DL
UIBC SERPL-MCNC: 544 MCG/DL
WBC # BLD AUTO: 8.24 10*3/MM3 (ref 4.5–10.7)

## 2018-10-03 RX ORDER — FERROUS GLUCONATE 324(37.5)
324 TABLET ORAL
COMMUNITY
End: 2019-12-04

## 2018-10-03 NOTE — TELEPHONE ENCOUNTER
Micaela is requesting a referral for pt to see /CBC for iron def & anemia as well as other underlining issues.    Pls advise.    Micaela(wife) can be reached #[phone].

## 2018-10-03 NOTE — PROGRESS NOTES
X-rays negative.  Please use the heat and elevation as we discussed yesterday, and let me know if symptoms have not resolved in one week.  At that time, if still symptomatic I will refer you on to orthopedics.

## 2018-10-03 NOTE — TELEPHONE ENCOUNTER
----- Message from Bea Reynolds MA sent at 10/3/2018  1:49 PM EDT -----  Regarding: ma      ----- Message -----  From: Destini Durbin  Sent: 10/3/2018   1:37 PM  To: [facility] Referral Coordinator Pool  Subject: New Patient Appt 11/7/2018 Daisy

## 2018-10-03 NOTE — PROGRESS NOTES
Anemia has resolved, but the iron stores in the bone marrow are not yet back to normal.  I would suggest we add  Ferrous  gluconate at least once daily and repeat lab in 2 months.  Since there was no blood present in the stool, I do not believe that GI evaluation is indicated at this time.  Dr. Angel

## 2018-10-07 ENCOUNTER — RESULTS ENCOUNTER (OUTPATIENT)
Dept: INTERNAL MEDICINE | Age: 63
End: 2018-10-07

## 2018-10-07 DIAGNOSIS — D62 ANEMIA, POSTHEMORRHAGIC, ACUTE: ICD-10-CM

## 2018-10-23 DIAGNOSIS — I25.10 CHRONIC CORONARY ARTERY DISEASE: ICD-10-CM

## 2018-10-23 RX ORDER — CLOPIDOGREL BISULFATE 75 MG/1
TABLET ORAL
Qty: 90 TABLET | Refills: 0 | Status: SHIPPED | OUTPATIENT
Start: 2018-10-23 | End: 2018-12-26 | Stop reason: SDUPTHER

## 2018-10-28 DIAGNOSIS — F41.9 ANXIETY: ICD-10-CM

## 2018-10-29 RX ORDER — ESCITALOPRAM OXALATE 20 MG/1
TABLET ORAL
Qty: 30 TABLET | Refills: 2 | Status: SHIPPED | OUTPATIENT
Start: 2018-10-29 | End: 2019-03-12

## 2018-10-30 ENCOUNTER — APPOINTMENT (OUTPATIENT)
Dept: OTHER | Facility: HOSPITAL | Age: 63
End: 2018-10-30

## 2018-10-30 ENCOUNTER — LAB (OUTPATIENT)
Dept: OTHER | Facility: HOSPITAL | Age: 63
End: 2018-10-30

## 2018-10-30 ENCOUNTER — APPOINTMENT (OUTPATIENT)
Dept: ONCOLOGY | Facility: CLINIC | Age: 63
End: 2018-10-30

## 2018-10-30 ENCOUNTER — CONSULT (OUTPATIENT)
Dept: ONCOLOGY | Facility: CLINIC | Age: 63
End: 2018-10-30

## 2018-10-30 VITALS
HEART RATE: 75 BPM | RESPIRATION RATE: 12 BRPM | HEIGHT: 77 IN | TEMPERATURE: 97.7 F | DIASTOLIC BLOOD PRESSURE: 92 MMHG | BODY MASS INDEX: 32.94 KG/M2 | OXYGEN SATURATION: 98 % | WEIGHT: 279 LBS | SYSTOLIC BLOOD PRESSURE: 153 MMHG

## 2018-10-30 DIAGNOSIS — D64.9 ANEMIA, UNSPECIFIED TYPE: Primary | ICD-10-CM

## 2018-10-30 DIAGNOSIS — D50.9 IRON DEFICIENCY ANEMIA, UNSPECIFIED IRON DEFICIENCY ANEMIA TYPE: Primary | ICD-10-CM

## 2018-10-30 LAB
BASOPHILS # BLD AUTO: 0.06 10*3/MM3 (ref 0–0.2)
BASOPHILS NFR BLD AUTO: 0.6 % (ref 0–1.5)
DEPRECATED RDW RBC AUTO: 64.2 FL (ref 37–54)
EOSINOPHIL # BLD AUTO: 0.19 10*3/MM3 (ref 0–0.7)
EOSINOPHIL NFR BLD AUTO: 2 % (ref 0.3–6.2)
ERYTHROCYTE [DISTWIDTH] IN BLOOD BY AUTOMATED COUNT: 23.1 % (ref 11.5–14.5)
HCT VFR BLD AUTO: 46.7 % (ref 40.4–52.2)
HGB BLD-MCNC: 14.8 G/DL (ref 13.7–17.6)
IMM GRANULOCYTES # BLD: 0.05 10*3/MM3 (ref 0–0.03)
IMM GRANULOCYTES NFR BLD: 0.5 % (ref 0–0.5)
LYMPHOCYTES # BLD AUTO: 2.38 10*3/MM3 (ref 0.9–4.8)
LYMPHOCYTES NFR BLD AUTO: 25.5 % (ref 19.6–45.3)
MCH RBC QN AUTO: 25 PG (ref 27–32.7)
MCHC RBC AUTO-ENTMCNC: 31.7 G/DL (ref 32.6–36.4)
MCV RBC AUTO: 78.8 FL (ref 79.8–96.2)
MONOCYTES # BLD AUTO: 0.96 10*3/MM3 (ref 0.2–1.2)
MONOCYTES NFR BLD AUTO: 10.3 % (ref 5–12)
NEUTROPHILS # BLD AUTO: 5.71 10*3/MM3 (ref 1.9–8.1)
NEUTROPHILS NFR BLD AUTO: 61.1 % (ref 42.7–76)
NRBC BLD MANUAL-RTO: 0 /100 WBC (ref 0–0)
PLAT MORPH BLD: NORMAL
PLATELET # BLD AUTO: 176 10*3/MM3 (ref 140–500)
RBC # BLD AUTO: 5.93 10*6/MM3 (ref 4.6–6)
RBC MORPH BLD: NORMAL
WBC MORPH BLD: NORMAL
WBC NRBC COR # BLD: 9.35 10*3/MM3 (ref 4.5–10.7)

## 2018-10-30 PROCEDURE — 99245 OFF/OP CONSLTJ NEW/EST HI 55: CPT | Performed by: INTERNAL MEDICINE

## 2018-10-30 PROCEDURE — 85025 COMPLETE CBC W/AUTO DIFF WBC: CPT | Performed by: INTERNAL MEDICINE

## 2018-10-30 PROCEDURE — 85007 BL SMEAR W/DIFF WBC COUNT: CPT | Performed by: INTERNAL MEDICINE

## 2018-10-30 PROCEDURE — 36415 COLL VENOUS BLD VENIPUNCTURE: CPT

## 2018-10-30 RX ORDER — FERROUS SULFATE 325(65) MG
325 TABLET ORAL DAILY
COMMUNITY
End: 2019-12-04

## 2018-10-30 NOTE — PROGRESS NOTES
.     REASON FOR CONSULTATION:   Anemia  Provide an opinion on any further workup or treatment                             REQUESTING PHYSICIAN: Bebeto Angel MD  RECORDS OBTAINED:  Records of the patients history including those obtained from the referring provider were reviewed and summarized in detail.    HISTORY OF PRESENT ILLNESS:  The patient is a 63 y.o. year old male  who is here for follow-up with the above-mentioned history.    Knee replacements June 2017 with postoperative bleed lead to anemia (Hb previously normal).  PCP started ferrous sulfate daily July 2018.  Added ferrous gluconate daily to the ferrous sulfate daily on 10/9/18.  No GI side effects from oral iron.  Hb has corrected but iron labs remain low.      MCV has been around 80.    WBC 7-15.  Differential unremarkable.    On 10/2/18, ferritin 13, 6% saturation.    On 7/24/18, serum folate normal at 16.9 and B12 normal at 642.    Denies bleeding.      Past Medical History:   Diagnosis Date   • Adiposity    • Anemia     post hemorrhagic   • Angioedema 2/21/2016    Secondary to ACE inhibitor   • Anxiety    • Arthritis    • CAD (coronary artery disease)    • Chest pain    • Colon polyps    • Diabetes mellitus, type 2 (CMS/HCC)    • ED (erectile dysfunction)    • Febrile illness    • GERD (gastroesophageal reflux disease)    • Glaucoma    • Hematoma    • High cholesterol    • Hyperlipidemia    • Hypertension    • Hypogonadism in male 9/28/2016   • Male erectile disorder    • Microalbuminuria    • Obesity (BMI 30-39.9)    • Osteoarthritis     knee   • Vitamin D deficiency    • Wound infection after surgery      Past Surgical History:   Procedure Laterality Date   • CARDIAC CATHETERIZATION N/A 05/15/2006    Dr. Mini Camarillo   • COLONOSCOPY N/A 02/22/2006    Bilobed polyp at 30 cm, hemorrhoids-Dr. Ilya Zhao   • COLONOSCOPY N/A 02/28/2014    Normal ileum, one 6 mm polyp in the mid transverse colon-Dr. Ilya Zhao   • COLONOSCOPY N/A  11/19/2008    Ela ileum, two 3 to 4 mm polyps, non-bleeding internal hemorrhoids, repeat in 5 years-Dr. Ilya Zhao   • COLONOSCOPY N/A 10/31/2017    Procedure: COLONOSCOPY WITH POLYPECTOMY (COLD BIOPSY);  Surgeon: Ilya Zhao MD;  [facility];  Service:    • CORONARY ANGIOPLASTY WITH STENT PLACEMENT  2007, 2012, 2015    cardiac stents x3 occasions   • ENDOSCOPY N/A 10/4/2017    Procedure: ESOPHAGOGASTRODUODENOSCOPY;  Surgeon: Boyd Guidry MD;  Location:  [facility];  Service:    • KNEE INCISION AND DRAINAGE Right 6/20/2017    Procedure: RT. KNEE WASHOUT ;  Surgeon: Boyd Coyne MD;  Location: [facility] MAIN OR;  Service:    • CO TOTAL KNEE ARTHROPLASTY Right 6/15/2017    Procedure: RT TOTAL KNEE ARTHROPLASTY;  Surgeon: Boyd Coyne MD;  Location: [facility] MAIN OR;  Service: Orthopedics   • SHOULDER SURGERY Right 2016   • UPPER GASTROINTESTINAL ENDOSCOPY N/A 10/13/2015    Z-line irregular, normal stomach, normal duodenum-Dr. Ilya Zhao   • UPPER GASTROINTESTINAL ENDOSCOPY N/A 02/28/2014    Z-line irregular, normal stomach, normal duodenum-Dr. Ilya Zhao   • UPPER GASTROINTESTINAL ENDOSCOPY N/A 11/19/2008    Z-line irregular, chronic gastritis withotu hemorrhage, normal duodenum-Dr. Ilya Zhao   • UPPER GASTROINTESTINAL ENDOSCOPY N/A 06/22/2006    LA Grade A reflux esophagitis, non-bleeding erythematous gastropathy, normal duodenum-Dr. Ilya Zhao       HEMATOLOGIC/ONCOLOGIC HISTORY:  (History from previous dates can be found in the separate document.)    MEDICATIONS    Current Outpatient Prescriptions:   •  amLODIPine (NORVASC) 10 MG tablet, Take 0.5 tablets by mouth Daily., Disp: 90 tablet, Rfl: 1  •  aspirin 81 MG EC tablet, Take 1 tablet by mouth Daily. get over the counter, Disp: 30 tablet, Rfl: 3  •  B-D ULTRAFINE III SHORT PEN 31G X 8 MM misc, USE AS DIRECTED DAILY WITH TOUJEO, Disp: 100 each, Rfl: 5  •  Blood Glucose Monitoring Suppl ([employer] BLOOD GLUCOSE) kit, , Disp: ,  Rfl:   •  carvedilol (COREG) 25 MG tablet, TAKE ONE TABLET BY MOUTH TWICE A DAY WITH MEALS, Disp: 180 tablet, Rfl: 1  •  clopidogrel (PLAVIX) 75 MG tablet, TAKE ONE TABLET BY MOUTH DAILY, Disp: 90 tablet, Rfl: 0  •  dorzolamide-timolol (COSOPT) 22.3-6.8 MG/ML ophthalmic solution, Administer 1 drop to both eyes 2 (Two) Times a Day., Disp: , Rfl:   •  escitalopram (LEXAPRO) 20 MG tablet, TAKE ONE TABLET BY MOUTH DAILY, Disp: 30 tablet, Rfl: 2  •  esomeprazole (nexIUM) 40 MG capsule, TAKE ONE CAPSULE BY MOUTH DAILY, Disp: 30 capsule, Rfl: 4  •  ferrous gluconate 324 (37.5 Fe) MG tablet tablet, Take 324 mg by mouth Daily With Breakfast., Disp: , Rfl:   •  ferrous sulfate 325 (65 FE) MG tablet, Take 325 mg by mouth Daily., Disp: , Rfl:   •  finasteride (PROSCAR) 5 MG tablet, Take 1 tablet by mouth Daily., Disp: 30 tablet, Rfl: 2  •  JANUVIA 100 MG tablet, TAKE 1 TABLET BY MOUTH ONCE DAILY, Disp: 30 tablet, Rfl: 2  •  JARDIANCE 25 MG tablet, TAKE 1 TABLET BY MOUTH EVERY MORNING, Disp: 30 tablet, Rfl: 2  •  [employer] PREMIUM GLUCOSE TEST test strip, TEST FOUR TIMES A DAY, Disp: 100 each, Rfl: 3  •  latanoprost (XALATAN) 0.005 % ophthalmic solution, Apply 1 drop to  each eye Daily., Disp: 7.5 mL, Rfl: 3  •  metFORMIN (GLUCOPHAGE) 500 MG tablet, TAKE TWO TABLETS BY MOUTH TWICE A DAY WITH MEALS, Disp: 360 tablet, Rfl: 0  •  pioglitazone (ACTOS) 45 MG tablet, TAKE ONE TABLET BY MOUTH DAILY, Disp: 90 tablet, Rfl: 1  •  rosuvastatin (CRESTOR) 40 MG tablet, TAKE ONE TABLET BY MOUTH DAILY, Disp: 90 tablet, Rfl: 0  •  sildenafil (VIAGRA) 50 MG tablet, Take 1 tablet by mouth Daily As Needed for erectile dysfunction., Disp: 6 tablet, Rfl: 5  •  TOUJEO SOLOSTAR 300 UNIT/ML solution pen-injector, INJECT 60 UNITS UNDER THE SKIN EVERY MORNING, Disp: 9 mL, Rfl: 2  •  vitamin D (ERGOCALCIFEROL) 90561 units capsule capsule, TAKE ONE CAPSULE BY MOUTH ONCE WEEKLY, Disp: 12 capsule, Rfl: 0    ALLERGIES:     Allergies   Allergen Reactions   • Ace  Inhibitors      angioedema   • Lisinopril Angioedema   • Testosterone      Muscle pain       SOCIAL HISTORY:       Social History     Social History   • Marital status:      Spouse name: Micaela   • Number of children: 1   • Years of education: College     Occupational History   • RN [employer]     Social History Main Topics   • Smoking status: Never Smoker   • Smokeless tobacco: Never Used   • Alcohol use Yes      Comment: 2 DRINKS 2X WEEK   • Drug use: No   • Sexual activity: Defer     Other Topics Concern   • Not on file     Social History Narrative    , 1 son, 2 stepsons         FAMILY HISTORY:  Family History   Problem Relation Age of Onset   • Lupus Sister    • Thyroid disease Sister    • Heart disease Other    • Hypertension Other    • Arthritis Mother    • Hyperlipidemia Mother    • Hypertension Mother    • Thyroid disease Mother    • Lupus Mother    • Heart disease Father    • Arthritis Father    • Other Father         Vascular disease   • Lupus Father    • Depression Father    • Alcohol abuse Father    • Heart disease Brother    • Thyroid disease Sister    • Arthritis Brother    • Malig Hyperthermia Neg Hx        REVIEW OF SYSTEMS:  Review of Systems   Constitutional: Negative for activity change.   HENT: Negative for nosebleeds and trouble swallowing.    Respiratory: Negative for shortness of breath and wheezing.    Cardiovascular: Negative for chest pain and palpitations.   Gastrointestinal: Negative for constipation, diarrhea and nausea.   Genitourinary: Negative for dysuria and hematuria.   Musculoskeletal: Negative for arthralgias and myalgias.   Neurological: Negative for seizures and syncope.   Hematological: Negative for adenopathy. Does not bruise/bleed easily.   Psychiatric/Behavioral: Negative for confusion.              Vitals:    10/30/18 1421   BP: 153/92  Comment: RIGHT ARM LARGE CUFF   Pulse: 75   Resp: 12   Temp: 97.7 °F (36.5 °C)   TempSrc: Oral   SpO2: 98%  "  Weight: 127 kg (279 lb)   Height: 195 cm (76.77\")  Comment: new pt   PainSc: 0-No pain     Current Status 10/30/2018   ECOG score 0      PHYSICAL EXAM:      CONSTITUTIONAL:  Vital signs reviewed.  No distress, looks comfortable.  EYES:  Conjunctiva and lids unremarkable.  PERRLA  EARS,NOSE,MOUTH,THROAT:  Ears and nose appear unremarkable.  Lips, teeth, gums appear unremarkable.  RESPIRATORY:  Normal respiratory effort.  Lungs clear to auscultation bilaterally.  CARDIOVASCULAR:  Normal S1, S2.  No murmurs rubs or gallops.  No significant lower extremity edema.  GASTROINTESTINAL: Abdomen appears unremarkable.  Nontender.  No hepatomegaly.  No splenomegaly.  LYMPHATIC:  No cervical, supraclavicular, axillary lymphadenopathy.  MUSCULOSKELETAL:  Unremarkable gait and station.  Unremarkable digits/nails.  No cyanosis or clubbing.  SKIN:  Warm.  No rashes.  PSYCHIATRIC:  Normal judgment and insight.  Normal mood and affect.      RECENT LABS:        WBC   Date Value Ref Range Status   10/30/2018 9.35 4.50 - 10.70 10*3/mm3 Final   09/12/2017 8.85 4.50 - 10.70 10*3/mm3 Final   07/24/2017 8.92 4.50 - 10.70 10*3/mm3 Final   07/17/2017 7.76 4.50 - 10.70 10*3/mm3 Final   07/09/2017 8.63 4.50 - 10.70 10*3/mm3 Final   07/09/2017 10.74 (H) 4.50 - 10.70 10*3/mm3 Final   07/03/2017 9.38 4.50 - 10.70 10*3/mm3 Final   06/26/2017 11.95 (H) 4.50 - 10.70 10*3/mm3 Final   06/24/2017 11.99 (H) 4.50 - 10.70 10*3/mm3 Final   06/23/2017 13.57 (H) 4.50 - 10.70 10*3/mm3 Final   06/22/2017 12.38 (H) 4.50 - 10.70 10*3/mm3 Final   06/20/2017 13.08 (H) 4.50 - 10.70 10*3/mm3 Final   06/19/2017 13.06 (H) 4.50 - 10.70 10*3/mm3 Final   06/17/2017 14.99 (H) 4.50 - 10.70 10*3/mm3 Final   06/09/2017 8.50 4.50 - 10.70 10*3/mm3 Final   09/13/2016 8.83 4.50 - 10.70 10*3/mm3 Final   11/04/2015 10.07 4.50 - 10.70 K/Cumm Final   10/27/2015 10.75 (H) 4.50 - 10.70 K/Cumm Final   08/25/2015 7.71 4.50 - 10.70 K/Cumm Final   04/24/2015 8.08 4.50 - 10.70 K/Cumm Final "     Hemoglobin   Date Value Ref Range Status   10/30/2018 14.8 13.7 - 17.6 g/dL Final   10/02/2018 14.4 13.7 - 17.6 g/dL Final   07/13/2018 12.6 (L) 13.7 - 17.6 g/dL Final   01/08/2018 12.3 (L) 13.7 - 17.6 g/dL Final   09/12/2017 12.2 (L) 13.7 - 17.6 g/dL Final   07/24/2017 11.8 (L) 13.7 - 17.6 g/dL Final   07/17/2017 11.3 (L) 13.7 - 17.6 g/dL Final   07/09/2017 10.8 (L) 13.7 - 17.6 g/dL Final   07/09/2017 11.4 (L) 13.7 - 17.6 g/dL Final   07/03/2017 10.3 (L) 13.7 - 17.6 g/dL Final   06/26/2017 10.3 (L) 13.7 - 17.6 g/dL Final   06/24/2017 8.4 (L) 13.7 - 17.6 g/dL Final   06/23/2017 9.3 (L) 13.7 - 17.6 g/dL Final   06/22/2017 9.4 (L) 13.7 - 17.6 g/dL Final   06/21/2017 9.9 (L) 13.7 - 17.6 g/dL Final   06/20/2017 10.7 (L) 13.7 - 17.6 g/dL Final   06/19/2017 10.9 (L) 13.7 - 17.6 g/dL Final   06/17/2017 10.6 (L) 13.7 - 17.6 g/dL Final   06/16/2017 11.5 (L) 13.7 - 17.6 g/dL Final   06/09/2017 14.4 13.7 - 17.6 g/dL Final   02/09/2017 15.6 13.7 - 17.6 g/dL Final   09/26/2016 13.7 12.6 - 17.7 g/dL Final   09/13/2016 14.0 13.7 - 17.6 g/dL Final   02/25/2016 13.7 13.7 - 17.6 g/dL Final   11/04/2015 12.7 (L) 13.7 - 17.6 g/dL Final   10/27/2015 14.9 13.7 - 17.6 g/dL Final   08/25/2015 13.8 13.7 - 17.6 g/dL Final   04/24/2015 14.6 13.7 - 17.6 g/dL Final     Platelets   Date Value Ref Range Status   10/30/2018 176 140 - 500 10*3/mm3 Final   10/02/2018 229 140 - 500 10*3/mm3 Final   09/12/2017 287 140 - 500 10*3/mm3 Final   07/24/2017 277 140 - 500 10*3/mm3 Final   07/17/2017 300 140 - 500 10*3/mm3 Final   07/09/2017 465 140 - 500 10*3/mm3 Final   07/09/2017 498 140 - 500 10*3/mm3 Final   07/03/2017 540 (H) 140 - 500 10*3/mm3 Final   06/26/2017 455 140 - 500 10*3/mm3 Final   06/24/2017 314 140 - 500 10*3/mm3 Final   06/23/2017 308 140 - 500 10*3/mm3 Final   06/22/2017 259 140 - 500 10*3/mm3 Final   06/20/2017 226 140 - 500 10*3/mm3 Final   06/19/2017 198 140 - 500 10*3/mm3 Final   06/17/2017 157 140 - 500 10*3/mm3 Final    06/09/2017 195 140 - 500 10*3/mm3 Final   09/13/2016 222 140 - 500 10*3/mm3 Final   11/04/2015 211 140 - 500 K/Cumm Final   10/27/2015 213 140 - 500 K/Cumm Final   08/25/2015 193 140 - 500 K/Cumm Final   04/24/2015 204 140 - 500 K/Cumm Final       Assessment/Plan   Iron deficiency anemia, unspecified iron deficiency anemia type  - Ferritin  - Iron Profile  - Retic With IRF & RET-He  - CBC & Differential    *Iron deficiency anemia.  Hb previously normal.  Anemia developed with knee replacement surgery June 2017 and subsequent postoperative bleed.  PCP started ferrous sulfate daily July 2018.  Added ferrous gluconate daily to the ferrous sulfate daily on 10/9/18.  No GI side effects from oral iron.  Hb has corrected but iron labs remain low.  I told patient and wife he likely need several more months of oral iron to replace iron stores.    *Microcytosis.  Suspect this is due to iron deficiency.    *Source of iron deficiency.  Colonoscopy through Dr. Juju Zhao 10/31/17.  1, 5 mm polyp removed.  He recommended repeat colonoscopy 5 years later.  EGD unremarkable by Dr. Guidry 10/4/17.  Dr. Bebeto Angel reported negative Hemoccult.  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  Therefore, I suspect the iron deficiency is due to blood loss from the knee surgery and postoperative bleed and it has taken him a long time to recover.  I told him if iron deficiency recurs or if he would like further evaluation at this time, he should see Dr. Juju Zhao to get an opinion if the small bowel needs to be evaluated for source of bleeding.    *Leukocytosis.  Resolved.  This was likely related to the knee surgery summer 2017.    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    Plan  · Stop ferrous gluconate in order to increase ferrous sulfate.  · Gradually increase ferrous sulfate up to 1 tablet 3 times per day if tolerated.  · (I explained I have no problems with taking both ferrous sulfate and ferrous gluconate, but  to simplify things it may be best to stick with one and maximize the dose)  · M.D. 4 months.  Iron labs 1 week prior    Peripheral smear was personally reviewed by me and looks unremarkable.

## 2018-10-31 RX ORDER — ERGOCALCIFEROL 1.25 MG/1
50000 CAPSULE ORAL WEEKLY
Qty: 12 CAPSULE | Refills: 0 | Status: SHIPPED | OUTPATIENT
Start: 2018-10-31 | End: 2019-04-10 | Stop reason: SDUPTHER

## 2018-11-07 ENCOUNTER — APPOINTMENT (OUTPATIENT)
Dept: LAB | Facility: HOSPITAL | Age: 63
End: 2018-11-07

## 2018-11-07 DIAGNOSIS — K44.9 HIATAL HERNIA: Primary | ICD-10-CM

## 2018-11-07 DIAGNOSIS — K21.9 GASTROESOPHAGEAL REFLUX DISEASE WITHOUT ESOPHAGITIS: ICD-10-CM

## 2018-11-07 DIAGNOSIS — E11.9 TYPE 2 DIABETES MELLITUS WITHOUT COMPLICATION, WITH LONG-TERM CURRENT USE OF INSULIN (HCC): ICD-10-CM

## 2018-11-07 DIAGNOSIS — E78.5 HYPERLIPIDEMIA, UNSPECIFIED HYPERLIPIDEMIA TYPE: ICD-10-CM

## 2018-11-07 DIAGNOSIS — Z79.4 TYPE 2 DIABETES MELLITUS WITHOUT COMPLICATION, WITH LONG-TERM CURRENT USE OF INSULIN (HCC): ICD-10-CM

## 2018-11-08 RX ORDER — ESOMEPRAZOLE MAGNESIUM 40 MG/1
CAPSULE, DELAYED RELEASE ORAL
Qty: 30 CAPSULE | Refills: 5 | Status: SHIPPED | OUTPATIENT
Start: 2018-11-08 | End: 2018-12-06 | Stop reason: SDUPTHER

## 2018-11-08 RX ORDER — ROSUVASTATIN CALCIUM 40 MG/1
40 TABLET, COATED ORAL DAILY
Qty: 90 TABLET | Refills: 0 | Status: SHIPPED | OUTPATIENT
Start: 2018-11-08 | End: 2018-12-26 | Stop reason: SDUPTHER

## 2018-11-23 DIAGNOSIS — IMO0001 UNCONTROLLED DIABETES MELLITUS TYPE 2 WITHOUT COMPLICATIONS: ICD-10-CM

## 2018-11-23 DIAGNOSIS — Z79.4 CONTROLLED TYPE 2 DIABETES MELLITUS WITHOUT COMPLICATION, WITH LONG-TERM CURRENT USE OF INSULIN (HCC): ICD-10-CM

## 2018-11-23 DIAGNOSIS — E11.9 CONTROLLED TYPE 2 DIABETES MELLITUS WITHOUT COMPLICATION, WITH LONG-TERM CURRENT USE OF INSULIN (HCC): ICD-10-CM

## 2018-11-26 RX ORDER — EMPAGLIFLOZIN 25 MG/1
TABLET, FILM COATED ORAL
Qty: 30 TABLET | Refills: 2 | Status: SHIPPED | OUTPATIENT
Start: 2018-11-26 | End: 2019-02-12 | Stop reason: SDUPTHER

## 2018-11-26 RX ORDER — SITAGLIPTIN 100 MG/1
TABLET, FILM COATED ORAL
Qty: 30 TABLET | Refills: 2 | Status: SHIPPED | OUTPATIENT
Start: 2018-11-26 | End: 2019-02-12 | Stop reason: SDUPTHER

## 2018-11-27 ENCOUNTER — DOCUMENTATION (OUTPATIENT)
Dept: ONCOLOGY | Facility: CLINIC | Age: 63
End: 2018-11-27

## 2018-11-27 PROBLEM — IMO0001 ADVERSE EFFECT OF IRON OR ITS COMPOUND, SUBSEQUENT ENCOUNTER: Status: ACTIVE | Noted: 2018-11-27

## 2018-11-27 RX ORDER — DIPHENHYDRAMINE HCL 25 MG
25 CAPSULE ORAL ONCE
Status: CANCELLED | OUTPATIENT
Start: 2018-11-30

## 2018-11-27 RX ORDER — SODIUM CHLORIDE 9 MG/ML
250 INJECTION, SOLUTION INTRAVENOUS ONCE
Status: CANCELLED | OUTPATIENT
Start: 2018-11-30

## 2018-11-27 RX ORDER — SODIUM CHLORIDE 9 MG/ML
250 INJECTION, SOLUTION INTRAVENOUS ONCE
Status: CANCELLED | OUTPATIENT
Start: 2018-12-04

## 2018-11-27 RX ORDER — DIPHENHYDRAMINE HCL 25 MG
25 CAPSULE ORAL ONCE
Status: CANCELLED | OUTPATIENT
Start: 2018-12-04

## 2018-11-27 RX ORDER — FAMOTIDINE 10 MG/ML
20 INJECTION, SOLUTION INTRAVENOUS ONCE
Status: CANCELLED | OUTPATIENT
Start: 2018-11-30

## 2018-11-27 NOTE — PROGRESS NOTES
Arrange 2 doses Feraheme.          From  Josette Brice RN To  Bebeto Monson II, MD Sent  11/26/2018 11:08 AM   Let us know if you want us to get him set up for IV iron?    Previous Messages      Encounter Messages     Read Composed From To Subject   Y 11/26/2018 10:35 AM Bebeto Dominguez II, MD Non-Urgent Medical Question      FW: Non-Urgent Medical Question   Received: Yesterday   Message Contents   Josette Brice RN Code, Bebeto CASTANEDA II, MD   Phone Number: [phone]             Let us know if you want us to get him set up for IV iron?    Previous Messages            Non-Urgent Medical Question     Josette Brice RN   You Yesterday (11:08 AM)      Let us know if you want us to get him set up for IV iron? (Routing comment)       Isaias Monaco   You Yesterday (10:35 AM)         Good morning.  I've been getting queasy and having g.i. problems with the p.o. iron. I am inquiring as to the possibility of receiving i.v. iron and the process of seeking insurance approval.  Thank you.

## 2018-11-29 ENCOUNTER — LAB (OUTPATIENT)
Dept: OTHER | Facility: HOSPITAL | Age: 63
End: 2018-11-29

## 2018-11-29 ENCOUNTER — TELEPHONE (OUTPATIENT)
Dept: ONCOLOGY | Facility: HOSPITAL | Age: 63
End: 2018-11-29

## 2018-11-29 ENCOUNTER — TELEPHONE (OUTPATIENT)
Dept: GENERAL RADIOLOGY | Facility: HOSPITAL | Age: 63
End: 2018-11-29

## 2018-11-29 DIAGNOSIS — D50.9 IRON DEFICIENCY ANEMIA, UNSPECIFIED IRON DEFICIENCY ANEMIA TYPE: ICD-10-CM

## 2018-11-29 DIAGNOSIS — D50.9 IRON DEFICIENCY ANEMIA, UNSPECIFIED IRON DEFICIENCY ANEMIA TYPE: Primary | ICD-10-CM

## 2018-11-29 LAB
FERRITIN SERPL-MCNC: 23.3 NG/ML (ref 30–400)
IRON 24H UR-MRATE: 69 MCG/DL (ref 59–158)
IRON SATN MFR SERPL: 13 % (ref 20–50)
TIBC SERPL-MCNC: 530 MCG/DL (ref 298–536)
TRANSFERRIN SERPL-MCNC: 356 MG/DL (ref 200–360)

## 2018-11-29 PROCEDURE — 82728 ASSAY OF FERRITIN: CPT | Performed by: INTERNAL MEDICINE

## 2018-11-29 PROCEDURE — 83540 ASSAY OF IRON: CPT | Performed by: INTERNAL MEDICINE

## 2018-11-29 PROCEDURE — 84466 ASSAY OF TRANSFERRIN: CPT | Performed by: INTERNAL MEDICINE

## 2018-11-29 NOTE — TELEPHONE ENCOUNTER
----- Message from Virgne Chandler RN sent at 11/29/2018  9:47 AM EST -----  Regarding: PT NEEDS LABS AND POSS FERAHEME   PLEASE SCHEDULE PT TODAY AT EP LOCATION AT 3PM (IF POSSIBLE) FOR STAT FERRITIN AND IRON PANEL. PT WILL CALL FOR RESULTS Friday AM. THEN PLEASE SCHEDULE PT FOR THIS Friday AT EP FOR POSSIBLE IV FERAHEME. AND THEN AGAIN AT EP DEC 4TH FOR THE 2ND DOSE. THANK YOU!

## 2018-11-29 NOTE — TELEPHONE ENCOUNTER
WIFE CALLING [facility] PT IS NOT TOLERATING PO IRON. PT HAS BEEN TAKING 2 DAILY AND THEN TRIED GOING UP TO 3 DAILY. PT CUT BACK DOSE AND IS STILL HAVING NAUSEA AND DIARRHEA. WIFE IS WANTING TO GET PT SET UP FOR IV IRON BEFORE THEY LEAVE FOR [facility] DEC 7TH. PER DR. KOHLER PT TO GET 2 DOSES OF FERAHEME. CAN'T SEE THAT PT HAS HAD RECENT LABS. PT PREFERS EP LOCATION. S/W MOJGAN RN REGARDING THIS. PT TO COME IN TODAY AT EP FOR STAT IRON PROFILE AND FERRITIN. WIFE WILL CALL [facility] AM FOR RESULTS. PT THEN TO REC 1ST DOSE OF FERAHEME IF QUALIFIES FRI AND THEN AGAIN NEXT TUES DEC 4TH (PER KAVIN 3-8 DAYS BETWEEN DOSES IS OK). APPT DESK MESSAGED TO SET ALL OF THIS UP. WIFE AWARE AND SHE WILL CALL HERE TOMORROW TO BE SURE LABS ARE OK FOR FERAHEME. TOLD HER TO HAVE PT STOP TAKING PO IRON. SHE V/U.

## 2018-11-29 NOTE — TELEPHONE ENCOUNTER
----- Message from Carlyn Rogers sent at 11/29/2018  9:24 AM EST -----  Contact: [phone]  Pt having a lot of diarrhea and nausea.Can pt cut back on iron pill ?

## 2018-11-30 ENCOUNTER — TELEPHONE (OUTPATIENT)
Dept: ONCOLOGY | Facility: HOSPITAL | Age: 63
End: 2018-11-30

## 2018-11-30 ENCOUNTER — INFUSION (OUTPATIENT)
Dept: ONCOLOGY | Facility: HOSPITAL | Age: 63
End: 2018-11-30

## 2018-11-30 VITALS
BODY MASS INDEX: 32.66 KG/M2 | SYSTOLIC BLOOD PRESSURE: 132 MMHG | RESPIRATION RATE: 16 BRPM | OXYGEN SATURATION: 95 % | WEIGHT: 276.6 LBS | HEIGHT: 77 IN | TEMPERATURE: 98 F | HEART RATE: 75 BPM | DIASTOLIC BLOOD PRESSURE: 84 MMHG

## 2018-11-30 DIAGNOSIS — D50.9 IRON DEFICIENCY ANEMIA, UNSPECIFIED IRON DEFICIENCY ANEMIA TYPE: ICD-10-CM

## 2018-11-30 DIAGNOSIS — D62 ANEMIA, POSTHEMORRHAGIC, ACUTE: ICD-10-CM

## 2018-11-30 DIAGNOSIS — IMO0001 ADVERSE EFFECT OF IRON OR ITS COMPOUND, SUBSEQUENT ENCOUNTER: Primary | ICD-10-CM

## 2018-11-30 PROCEDURE — 96374 THER/PROPH/DIAG INJ IV PUSH: CPT

## 2018-11-30 PROCEDURE — 63710000001 DIPHENHYDRAMINE PER 50 MG: Performed by: INTERNAL MEDICINE

## 2018-11-30 PROCEDURE — 96375 TX/PRO/DX INJ NEW DRUG ADDON: CPT

## 2018-11-30 PROCEDURE — 25010000002 FERUMOXYTOL 510 MG/17ML SOLUTION 510 MG VIAL: Performed by: INTERNAL MEDICINE

## 2018-11-30 RX ORDER — SODIUM CHLORIDE 9 MG/ML
250 INJECTION, SOLUTION INTRAVENOUS ONCE
Status: COMPLETED | OUTPATIENT
Start: 2018-11-30 | End: 2018-11-30

## 2018-11-30 RX ORDER — DIPHENHYDRAMINE HCL 25 MG
25 CAPSULE ORAL ONCE
Status: COMPLETED | OUTPATIENT
Start: 2018-11-30 | End: 2018-11-30

## 2018-11-30 RX ADMIN — SODIUM CHLORIDE 250 ML: 900 INJECTION, SOLUTION INTRAVENOUS at 13:15

## 2018-11-30 RX ADMIN — DIPHENHYDRAMINE HYDROCHLORIDE 25 MG: 25 CAPSULE ORAL at 13:15

## 2018-11-30 RX ADMIN — FAMOTIDINE 20 MG: 10 INJECTION, SOLUTION INTRAVENOUS at 13:15

## 2018-11-30 RX ADMIN — FERUMOXYTOL 510 MG: 510 INJECTION INTRAVENOUS at 13:45

## 2018-11-30 NOTE — TELEPHONE ENCOUNTER
Called patient w/ results of iron labs. Pt is iron deficient and is already scheduled for Feraheme x 2. Reminded him of his appt this afternoon at , he v/u.    ----- Message from Radha Lewis sent at 11/30/2018  9:34 AM EST -----  [phone]  Needs results  From iron labs

## 2018-12-01 DIAGNOSIS — E11.65 TYPE 2 DIABETES MELLITUS WITH HYPERGLYCEMIA (HCC): ICD-10-CM

## 2018-12-01 DIAGNOSIS — N52.9 MALE ERECTILE DISORDER: ICD-10-CM

## 2018-12-01 DIAGNOSIS — I10 ESSENTIAL HYPERTENSION: ICD-10-CM

## 2018-12-01 DIAGNOSIS — E55.9 VITAMIN D DEFICIENCY: ICD-10-CM

## 2018-12-01 DIAGNOSIS — R80.9 MICROALBUMINURIA: ICD-10-CM

## 2018-12-01 DIAGNOSIS — N52.9 VASCULOGENIC ERECTILE DYSFUNCTION, UNSPECIFIED VASCULOGENIC ERECTILE DYSFUNCTION TYPE: ICD-10-CM

## 2018-12-01 DIAGNOSIS — E66.9 ADIPOSITY: ICD-10-CM

## 2018-12-01 DIAGNOSIS — E78.5 HYPERLIPIDEMIA: ICD-10-CM

## 2018-12-03 DIAGNOSIS — D50.9 IRON DEFICIENCY ANEMIA, UNSPECIFIED IRON DEFICIENCY ANEMIA TYPE: Primary | ICD-10-CM

## 2018-12-03 RX ORDER — CARVEDILOL 25 MG/1
TABLET ORAL
Qty: 180 TABLET | Refills: 1 | Status: SHIPPED | OUTPATIENT
Start: 2018-12-03 | End: 2019-06-18

## 2018-12-03 RX ORDER — SILDENAFIL 50 MG/1
50 TABLET, FILM COATED ORAL DAILY PRN
Qty: 6 TABLET | Refills: 5 | Status: SHIPPED | OUTPATIENT
Start: 2018-12-03 | End: 2019-08-27 | Stop reason: SDUPTHER

## 2018-12-04 ENCOUNTER — TELEPHONE (OUTPATIENT)
Dept: INTERNAL MEDICINE | Age: 63
End: 2018-12-04

## 2018-12-04 ENCOUNTER — INFUSION (OUTPATIENT)
Dept: ONCOLOGY | Facility: HOSPITAL | Age: 63
End: 2018-12-04

## 2018-12-04 VITALS
DIASTOLIC BLOOD PRESSURE: 90 MMHG | WEIGHT: 275.6 LBS | TEMPERATURE: 98 F | BODY MASS INDEX: 32.54 KG/M2 | HEIGHT: 77 IN | OXYGEN SATURATION: 97 % | SYSTOLIC BLOOD PRESSURE: 137 MMHG | RESPIRATION RATE: 16 BRPM | HEART RATE: 73 BPM

## 2018-12-04 DIAGNOSIS — D62 ANEMIA, POSTHEMORRHAGIC, ACUTE: ICD-10-CM

## 2018-12-04 DIAGNOSIS — D50.9 IRON DEFICIENCY ANEMIA, UNSPECIFIED IRON DEFICIENCY ANEMIA TYPE: ICD-10-CM

## 2018-12-04 DIAGNOSIS — IMO0001 ADVERSE EFFECT OF IRON OR ITS COMPOUND, SUBSEQUENT ENCOUNTER: Primary | ICD-10-CM

## 2018-12-04 DIAGNOSIS — G51.4 FACIAL TWITCHING: Primary | ICD-10-CM

## 2018-12-04 PROCEDURE — 63710000001 DIPHENHYDRAMINE PER 50 MG: Performed by: INTERNAL MEDICINE

## 2018-12-04 PROCEDURE — 96374 THER/PROPH/DIAG INJ IV PUSH: CPT

## 2018-12-04 PROCEDURE — 25010000002 FERUMOXYTOL 510 MG/17ML SOLUTION 510 MG VIAL: Performed by: INTERNAL MEDICINE

## 2018-12-04 RX ORDER — SODIUM CHLORIDE 9 MG/ML
250 INJECTION, SOLUTION INTRAVENOUS ONCE
Status: COMPLETED | OUTPATIENT
Start: 2018-12-04 | End: 2018-12-04

## 2018-12-04 RX ORDER — CLONAZEPAM 0.5 MG/1
0.5 TABLET ORAL 3 TIMES DAILY PRN
Qty: 63 TABLET | Refills: 0 | Status: SHIPPED | OUTPATIENT
Start: 2018-12-04 | End: 2020-04-24 | Stop reason: SDUPTHER

## 2018-12-04 RX ORDER — DIPHENHYDRAMINE HCL 25 MG
25 CAPSULE ORAL ONCE
Status: COMPLETED | OUTPATIENT
Start: 2018-12-04 | End: 2018-12-04

## 2018-12-04 RX ADMIN — SODIUM CHLORIDE 250 ML: 900 INJECTION, SOLUTION INTRAVENOUS at 13:46

## 2018-12-04 RX ADMIN — DIPHENHYDRAMINE HYDROCHLORIDE 25 MG: 25 CAPSULE ORAL at 13:46

## 2018-12-04 RX ADMIN — FERUMOXYTOL 510 MG: 510 INJECTION INTRAVENOUS at 14:16

## 2018-12-04 NOTE — TELEPHONE ENCOUNTER
Regarding: FW: RE: Prescription Question  Contact: [phone]      ----- Message -----  From: Isaias Monaco  Sent: 12/3/2018   5:30 PM  To: Karen Aleman [address] [facility]  Subject: RE: RE: Prescription Question                    ----- Message from Mychart, Generic sent at 12/3/2018  5:30 PM EST -----    For the 3 weeks I'm flying and cruising out of the country.     ----- Message -----  From: Bebeto Angel MD  Sent: 12/3/18 7:11 AM  To: Isaias Jacinda  Subject: RE: Prescription Question    Please define a limited period.  Dr Angel    ----- Message -----     From: Isaias Jacinda     Sent: 12/2/2018 12:59 PM EST       To: Bebeto Angel MD  Subject: Prescription Question    Hello Dr Angel,    I have previously been on Clonazepam for chronic facial twitching ,anxiety and stress headaches. After years of using this medication, I managed to wean myself off it several years ago. Recently there has been a more frequent reoccurrence of these symptoms and it has adversely impacted my daily living. Furthermore, I will be traveling on a long flight out of the country this coming Friday and I fear my condition will only exacerbate. I am asking if you co  uld prescribe me Clonazepam on a PRN basis. It has been my experience when used in this manner, it has been very effective and only for a limited period.  Thank you very much for any assistance you can give me.

## 2018-12-04 NOTE — TELEPHONE ENCOUNTER
Patient previously used clonazepam for chronic facial twitching.  He used the medication 0.5 milligrams 3 times a day.  He will be out of the country for 3 weeks cruising and flying.  These symptoms seem to worsen with stress they have recently returned.  He requests a refill for short-term usage for 3 weeks while he is out of the country.  [facility] report #[id number] shows no significant entries over the last year.  No contract will be obtained since is a short-term 1 time prescription.  We'll prescribe clonazepam 0.5 mg 1 by mouth 3 times a day as needed, dispense 63 tablets no refill.

## 2018-12-06 DIAGNOSIS — K21.9 GASTROESOPHAGEAL REFLUX DISEASE WITHOUT ESOPHAGITIS: ICD-10-CM

## 2018-12-06 DIAGNOSIS — K44.9 HIATAL HERNIA: ICD-10-CM

## 2018-12-07 RX ORDER — ESOMEPRAZOLE MAGNESIUM 40 MG/1
40 CAPSULE, DELAYED RELEASE ORAL DAILY
Qty: 30 CAPSULE | Refills: 5 | Status: SHIPPED | OUTPATIENT
Start: 2018-12-07 | End: 2018-12-26 | Stop reason: SDUPTHER

## 2018-12-26 DIAGNOSIS — K44.9 HIATAL HERNIA: ICD-10-CM

## 2018-12-26 DIAGNOSIS — K21.9 GASTROESOPHAGEAL REFLUX DISEASE WITHOUT ESOPHAGITIS: ICD-10-CM

## 2018-12-26 DIAGNOSIS — I10 ESSENTIAL HYPERTENSION: ICD-10-CM

## 2018-12-26 DIAGNOSIS — E78.5 HYPERLIPIDEMIA, UNSPECIFIED HYPERLIPIDEMIA TYPE: ICD-10-CM

## 2018-12-26 DIAGNOSIS — I25.10 CHRONIC CORONARY ARTERY DISEASE: ICD-10-CM

## 2018-12-26 RX ORDER — INSULIN GLARGINE 300 U/ML
60 INJECTION, SOLUTION SUBCUTANEOUS EVERY MORNING
Qty: 4.5 ML | Refills: 0 | Status: SHIPPED | OUTPATIENT
Start: 2018-12-26 | End: 2019-01-22 | Stop reason: SDUPTHER

## 2018-12-26 RX ORDER — ESOMEPRAZOLE MAGNESIUM 40 MG/1
40 CAPSULE, DELAYED RELEASE ORAL DAILY
Qty: 30 CAPSULE | Refills: 5 | Status: SHIPPED | OUTPATIENT
Start: 2018-12-26 | End: 2020-08-21

## 2018-12-27 RX ORDER — ROSUVASTATIN CALCIUM 40 MG/1
40 TABLET, COATED ORAL DAILY
Qty: 90 TABLET | Refills: 0 | Status: SHIPPED | OUTPATIENT
Start: 2018-12-27 | End: 2019-02-12 | Stop reason: SDUPTHER

## 2018-12-27 RX ORDER — AMLODIPINE BESYLATE 10 MG/1
5 TABLET ORAL DAILY
Qty: 90 TABLET | Refills: 0
Start: 2018-12-27 | End: 2019-01-30 | Stop reason: SDUPTHER

## 2018-12-27 RX ORDER — CLOPIDOGREL BISULFATE 75 MG/1
75 TABLET ORAL DAILY
Qty: 90 TABLET | Refills: 0 | Status: SHIPPED | OUTPATIENT
Start: 2018-12-27 | End: 2019-01-23 | Stop reason: SDUPTHER

## 2019-01-07 DIAGNOSIS — E55.9 VITAMIN D DEFICIENCY: ICD-10-CM

## 2019-01-07 DIAGNOSIS — E11.65 UNCONTROLLED TYPE 2 DIABETES MELLITUS WITH HYPERGLYCEMIA (HCC): ICD-10-CM

## 2019-01-07 DIAGNOSIS — E29.1 HYPOGONADISM IN MALE: ICD-10-CM

## 2019-01-07 DIAGNOSIS — E78.5 HYPERLIPIDEMIA, UNSPECIFIED HYPERLIPIDEMIA TYPE: Primary | ICD-10-CM

## 2019-01-08 ENCOUNTER — LAB (OUTPATIENT)
Dept: LAB | Facility: HOSPITAL | Age: 64
End: 2019-01-08

## 2019-01-08 DIAGNOSIS — E55.9 VITAMIN D DEFICIENCY: ICD-10-CM

## 2019-01-08 DIAGNOSIS — E11.65 UNCONTROLLED TYPE 2 DIABETES MELLITUS WITH HYPERGLYCEMIA (HCC): ICD-10-CM

## 2019-01-08 DIAGNOSIS — E29.1 HYPOGONADISM IN MALE: ICD-10-CM

## 2019-01-08 DIAGNOSIS — E78.5 HYPERLIPIDEMIA, UNSPECIFIED HYPERLIPIDEMIA TYPE: ICD-10-CM

## 2019-01-08 LAB
25(OH)D3 SERPL-MCNC: 34.1 NG/ML (ref 30–100)
ALBUMIN SERPL-MCNC: 4 G/DL (ref 3.5–5.2)
ALBUMIN UR-MCNC: 58.7 MG/L
ALBUMIN/GLOB SERPL: 0.9 G/DL
ALP SERPL-CCNC: 82 U/L (ref 39–117)
ALT SERPL W P-5'-P-CCNC: 35 U/L (ref 1–41)
ANION GAP SERPL CALCULATED.3IONS-SCNC: 11.1 MMOL/L
AST SERPL-CCNC: 39 U/L (ref 1–40)
BILIRUB SERPL-MCNC: 0.3 MG/DL (ref 0.1–1.2)
BUN BLD-MCNC: 12 MG/DL (ref 8–23)
BUN/CREAT SERPL: 11.2 (ref 7–25)
CALCIUM SPEC-SCNC: 9.3 MG/DL (ref 8.6–10.5)
CHLORIDE SERPL-SCNC: 104 MMOL/L (ref 98–107)
CHOLEST SERPL-MCNC: 154 MG/DL (ref 0–200)
CO2 SERPL-SCNC: 23.9 MMOL/L (ref 22–29)
CREAT BLD-MCNC: 1.07 MG/DL (ref 0.76–1.27)
GFR SERPL CREATININE-BSD FRML MDRD: 85 ML/MIN/1.73
GLOBULIN UR ELPH-MCNC: 4.3 GM/DL
GLUCOSE BLD-MCNC: 83 MG/DL (ref 65–99)
HBA1C MFR BLD: 5.9 % (ref 4.8–5.6)
HCT VFR BLD AUTO: 49.3 % (ref 40.4–52.2)
HDLC SERPL-MCNC: 48 MG/DL (ref 40–60)
HGB BLD-MCNC: 16 G/DL (ref 13.7–17.6)
LDLC SERPL CALC-MCNC: 85 MG/DL (ref 0–100)
LDLC/HDLC SERPL: 1.76 {RATIO}
POTASSIUM BLD-SCNC: 4 MMOL/L (ref 3.5–5.2)
PROT SERPL-MCNC: 8.3 G/DL (ref 6–8.5)
PSA SERPL-MCNC: 0.14 NG/ML (ref 0–4)
SODIUM BLD-SCNC: 139 MMOL/L (ref 136–145)
TRIGL SERPL-MCNC: 107 MG/DL (ref 0–150)
VLDLC SERPL-MCNC: 21.4 MG/DL (ref 5–40)

## 2019-01-08 PROCEDURE — 84270 ASSAY OF SEX HORMONE GLOBUL: CPT

## 2019-01-08 PROCEDURE — 80053 COMPREHEN METABOLIC PANEL: CPT

## 2019-01-08 PROCEDURE — 36415 COLL VENOUS BLD VENIPUNCTURE: CPT

## 2019-01-08 PROCEDURE — 84402 ASSAY OF FREE TESTOSTERONE: CPT

## 2019-01-08 PROCEDURE — 84403 ASSAY OF TOTAL TESTOSTERONE: CPT

## 2019-01-08 PROCEDURE — 84153 ASSAY OF PSA TOTAL: CPT

## 2019-01-08 PROCEDURE — 82306 VITAMIN D 25 HYDROXY: CPT

## 2019-01-08 PROCEDURE — 83036 HEMOGLOBIN GLYCOSYLATED A1C: CPT

## 2019-01-08 PROCEDURE — 80061 LIPID PANEL: CPT

## 2019-01-08 PROCEDURE — 84681 ASSAY OF C-PEPTIDE: CPT

## 2019-01-08 PROCEDURE — 82043 UR ALBUMIN QUANTITATIVE: CPT

## 2019-01-08 PROCEDURE — 85018 HEMOGLOBIN: CPT

## 2019-01-08 PROCEDURE — 85014 HEMATOCRIT: CPT

## 2019-01-10 LAB — C PEPTIDE SERPL-MCNC: 2.1 NG/ML (ref 1.1–4.4)

## 2019-01-11 LAB
SHBG SERPL-SCNC: 26.9 NMOL/L (ref 19.3–76.4)
TESTOST FREE SERPL-MCNC: 4.4 PG/ML (ref 6.6–18.1)
TESTOST SERPL-MCNC: 245 NG/DL (ref 264–916)

## 2019-01-21 RX ORDER — INSULIN GLARGINE 300 U/ML
60 INJECTION, SOLUTION SUBCUTANEOUS EVERY MORNING
Refills: 0 | Status: CANCELLED | OUTPATIENT
Start: 2019-01-21

## 2019-01-22 ENCOUNTER — OFFICE VISIT (OUTPATIENT)
Dept: ENDOCRINOLOGY | Age: 64
End: 2019-01-22

## 2019-01-22 VITALS
BODY MASS INDEX: 32.35 KG/M2 | HEIGHT: 77 IN | SYSTOLIC BLOOD PRESSURE: 132 MMHG | DIASTOLIC BLOOD PRESSURE: 82 MMHG | WEIGHT: 274 LBS

## 2019-01-22 DIAGNOSIS — E11.65 UNCONTROLLED TYPE 2 DIABETES MELLITUS WITH HYPERGLYCEMIA (HCC): Primary | ICD-10-CM

## 2019-01-22 DIAGNOSIS — R79.89 LOW TESTOSTERONE: ICD-10-CM

## 2019-01-22 DIAGNOSIS — E29.1 HYPOGONADISM IN MALE: ICD-10-CM

## 2019-01-22 DIAGNOSIS — I10 ESSENTIAL HYPERTENSION: ICD-10-CM

## 2019-01-22 DIAGNOSIS — E78.5 HYPERLIPIDEMIA, UNSPECIFIED HYPERLIPIDEMIA TYPE: ICD-10-CM

## 2019-01-22 DIAGNOSIS — E66.09 CLASS 1 OBESITY DUE TO EXCESS CALORIES WITHOUT SERIOUS COMORBIDITY WITH BODY MASS INDEX (BMI) OF 31.0 TO 31.9 IN ADULT: ICD-10-CM

## 2019-01-22 DIAGNOSIS — E55.9 VITAMIN D DEFICIENCY: ICD-10-CM

## 2019-01-22 PROCEDURE — 99214 OFFICE O/P EST MOD 30 MIN: CPT | Performed by: NURSE PRACTITIONER

## 2019-01-22 NOTE — PROGRESS NOTES
"Marie Monaco is a 63 y.o. male is here today for follow-up.  Chief Complaint   Patient presents with   • Diabetes     recent labs, testing BG 2 times daily, pt did not bring meter   • Hyperlipidemia   • Hypertension   • Hypogonadism   • Vitamin D Deficiency     /82   Ht 195 cm (76.77\")   Wt 124 kg (274 lb)   BMI 32.69 kg/m²   Current Outpatient Medications on File Prior to Visit   Medication Sig   • amLODIPine (NORVASC) 10 MG tablet Take 0.5 tablets by mouth Daily.   • aspirin 81 MG EC tablet Take 1 tablet by mouth Daily. get over the counter   • B-D ULTRAFINE III SHORT PEN 31G X 8 MM misc USE AS DIRECTED DAILY WITH TOUJEO   • Blood Glucose Monitoring Suppl ([employer] BLOOD GLUCOSE) kit    • carvedilol (COREG) 25 MG tablet TAKE ONE TABLET BY MOUTH TWICE A DAY WITH MEALS   • clonazePAM (KlonoPIN) 0.5 MG tablet Take 1 tablet by mouth 3 (Three) Times a Day As Needed (Facial twitching).   • clopidogrel (PLAVIX) 75 MG tablet Take 1 tablet by mouth Daily.   • dorzolamide-timolol (COSOPT) 22.3-6.8 MG/ML ophthalmic solution Administer 1 drop to both eyes 2 (Two) Times a Day.   • escitalopram (LEXAPRO) 20 MG tablet TAKE ONE TABLET BY MOUTH DAILY   • esomeprazole (nexIUM) 40 MG capsule Take 1 capsule by mouth Daily.   • finasteride (PROSCAR) 5 MG tablet Take 1 tablet by mouth Daily.   • JANUVIA 100 MG tablet TAKE 1 TABLET BY MOUTH ONCE DAILY   • JARDIANCE 25 MG tablet TAKE 1 TABLET BY MOUTH EVERY MORNING   • [employer] PREMIUM GLUCOSE TEST test strip TEST FOUR TIMES A DAY   • latanoprost (XALATAN) 0.005 % ophthalmic solution Apply 1 drop to  each eye Daily.   • metFORMIN (GLUCOPHAGE) 500 MG tablet TAKE TWO TABLETS BY MOUTH TWICE A DAY WITH MEALS   • pioglitazone (ACTOS) 45 MG tablet TAKE ONE TABLET BY MOUTH DAILY   • rosuvastatin (CRESTOR) 40 MG tablet Take 1 tablet by mouth Daily.   • sildenafil (VIAGRA) 50 MG tablet Take 1 tablet by mouth Daily As Needed for erectile dysfunction.   • TOUJEO SOLOSTAR 300 " UNIT/ML solution pen-injector INJECT 60 UNITS UNDER THE SKIN EVERY MORNING   • vitamin D (ERGOCALCIFEROL) 67338 units capsule capsule TAKE ONE CAPSULE BY MOUTH ONCE WEEKLY   • ferrous gluconate 324 (37.5 Fe) MG tablet tablet Take 324 mg by mouth Daily With Breakfast.   • ferrous sulfate 325 (65 FE) MG tablet Take 325 mg by mouth Daily.     No current facility-administered medications on file prior to visit.      Family History   Problem Relation Age of Onset   • Lupus Sister    • Thyroid disease Sister    • Heart disease Other    • Hypertension Other    • Arthritis Mother    • Hyperlipidemia Mother    • Hypertension Mother    • Thyroid disease Mother    • Lupus Mother    • Heart disease Father    • Arthritis Father    • Other Father         Vascular disease   • Lupus Father    • Depression Father    • Alcohol abuse Father    • Dementia Father    • Hypertension Father    • Heart disease Brother    • Heart attack Brother 40   • Thyroid disease Sister    • Arthritis Brother    • Malig Hyperthermia Neg Hx      Social History     Tobacco Use   • Smoking status: Never Smoker   • Smokeless tobacco: Never Used   Substance Use Topics   • Alcohol use: Yes     Comment: 2 DRINKS 2X WEEK   • Drug use: No     Allergies   Allergen Reactions   • Ace Inhibitors      angioedema   • Lisinopril Angioedema   • Testosterone      Muscle pain         History of Present Illness   Encounter Diagnoses   Name Primary?   • Hyperlipidemia, unspecified hyperlipidemia type    • Class 1 obesity due to excess calories without serious comorbidity with body mass index (BMI) of 31.0 to 31.9 in adult    • Vitamin D deficiency    • Hypogonadism in male    • Uncontrolled type 2 diabetes mellitus with hyperglycemia (CMS/HCC) Yes   • Low testosterone    • Essential hypertension    63-year-old male patient here today for routine follow-up visit.  He is being seen for the above mentioned problems.  He is taking his medications daily and consistently as  prescribed.  He did not bring his blood glucose meter to today's visit.  He is currently checking blood sugars twice daily.  He had recent labs which were reviewed and he was provided a copy.  He denies any hypoglycemic events.  He is getting [place] travel to [place] and he was provided a letter to allow him to carry his insulin supplies on board the plane.      The following portions of the patient's history were reviewed and updated as appropriate: allergies, current medications, past family history, past medical history, past social history, past surgical history and problem list.    Review of Systems   Constitutional: Negative for fatigue.   HENT: Negative for trouble swallowing.    Eyes: Negative for visual disturbance.   Cardiovascular: Negative for leg swelling.   Endocrine: Negative for polyuria.   Skin: Negative for wound.   Neurological: Negative for numbness.       Objective   Physical Exam   Constitutional: He is oriented to person, place, and time. He appears well-developed and well-nourished. No distress.   HENT:   Head: Normocephalic and atraumatic.   Right Ear: External ear normal.   Left Ear: External ear normal.   Nose: Nose normal.   Eyes: Pupils are equal, round, and reactive to light. Right eye exhibits no discharge. Left eye exhibits no discharge.   Neck: Normal range of motion. Neck supple. Carotid bruit is not present. No tracheal deviation, no edema and no erythema present. No thyromegaly present.   Cardiovascular: Normal rate, regular rhythm, normal heart sounds and intact distal pulses. Exam reveals no gallop and no friction rub.   No murmur heard.  Pulmonary/Chest: Effort normal and breath sounds normal. No respiratory distress. He has no wheezes. He has no rales.   Abdominal: Soft. Bowel sounds are normal. He exhibits no distension. There is no tenderness.   Musculoskeletal: Normal range of motion. He exhibits no edema or deformity.   Lymphadenopathy:     He has no cervical adenopathy.    Neurological: He is alert and oriented to person, place, and time. Coordination normal.   Skin: Skin is warm and dry. No rash noted. He is not diaphoretic. No erythema. No pallor.   Psychiatric: He has a normal mood and affect. His behavior is normal. Judgment and thought content normal.   Nursing note and vitals reviewed.    Results for orders placed or performed in visit on 01/08/19   Comprehensive Metabolic Panel   Result Value Ref Range    Glucose 83 65 - 99 mg/dL    BUN 12 8 - 23 mg/dL    Creatinine 1.07 0.76 - 1.27 mg/dL    Sodium 139 136 - 145 mmol/L    Potassium 4.0 3.5 - 5.2 mmol/L    Chloride 104 98 - 107 mmol/L    CO2 23.9 22.0 - 29.0 mmol/L    Calcium 9.3 8.6 - 10.5 mg/dL    Total Protein 8.3 6.0 - 8.5 g/dL    Albumin 4.00 3.50 - 5.20 g/dL    ALT (SGPT) 35 1 - 41 U/L    AST (SGOT) 39 1 - 40 U/L    Alkaline Phosphatase 82 39 - 117 U/L    Total Bilirubin 0.3 0.1 - 1.2 mg/dL    eGFR  African Amer 85 >60 mL/min/1.73    Globulin 4.3 gm/dL    A/G Ratio 0.9 g/dL    BUN/Creatinine Ratio 11.2 7.0 - 25.0    Anion Gap 11.1 mmol/L   Lipid Panel   Result Value Ref Range    Total Cholesterol 154 0 - 200 mg/dL    Triglycerides 107 0 - 150 mg/dL    HDL Cholesterol 48 40 - 60 mg/dL    LDL Cholesterol  85 0 - 100 mg/dL    VLDL Cholesterol 21.4 5 - 40 mg/dL    LDL/HDL Ratio 1.76    Vitamin D 25 Hydroxy   Result Value Ref Range    25 Hydroxy, Vitamin D 34.1 30.0 - 100.0 ng/ml   MicroAlbumin, Urine, Random - Urine, Clean Catch   Result Value Ref Range    Microalbumin, Urine 58.7 mg/L   Hemoglobin A1c   Result Value Ref Range    Hemoglobin A1C 5.90 (H) 4.80 - 5.60 %   C-Peptide   Result Value Ref Range    C-Peptide 2.1 1.1 - 4.4 ng/mL   Hemoglobin & Hematocrit, Blood   Result Value Ref Range    Hemoglobin 16.0 13.7 - 17.6 g/dL    Hematocrit 49.3 40.4 - 52.2 %   PSA DIAGNOSTIC   Result Value Ref Range    PSA 0.143 0.000 - 4.000 ng/mL   TestT+TestF+SHBG   Result Value Ref Range    Testosterone, Total 245 (L) 264 - 916 ng/dL     Testosterone, Free 4.4 (L) 6.6 - 18.1 pg/mL    Sex Hormone Binding Globulin 26.9 19.3 - 76.4 nmol/L         Assessment/Plan   Problems Addressed this Visit        Cardiovascular and Mediastinum    Hyperlipidemia       Digestive    Vitamin D deficiency    Adiposity       Endocrine    Uncontrolled type 2 diabetes mellitus (CMS/[facility]) - Primary    Relevant Medications    Insulin Glargine (TOUJEO SOLOSTAR) 300 UNIT/ML solution pen-injector    Hypogonadism in male       Other    Low testosterone        In summary, patient was seen and examined.  Metabolically he is stable.  He is taking all his medications daily and consistently as prescribed.  His A1c has improved a 5.9.  He is not hypoglycemic and has had no events.  His blood pressure is an satisfactory range as is his cholesterol.  He will follow-up in 6 months with labs prior.  I've encouraged him to call the office should any questions or concerns prior to then

## 2019-01-23 DIAGNOSIS — I25.10 CHRONIC CORONARY ARTERY DISEASE: ICD-10-CM

## 2019-01-23 RX ORDER — CLOPIDOGREL BISULFATE 75 MG/1
75 TABLET ORAL DAILY
Qty: 90 TABLET | Refills: 1 | Status: SHIPPED | OUTPATIENT
Start: 2019-01-23 | End: 2019-11-26 | Stop reason: SDUPTHER

## 2019-01-30 DIAGNOSIS — I10 ESSENTIAL HYPERTENSION: ICD-10-CM

## 2019-01-30 RX ORDER — AMLODIPINE BESYLATE 10 MG/1
10 TABLET ORAL DAILY
Qty: 90 TABLET | Refills: 1 | Status: SHIPPED | OUTPATIENT
Start: 2019-01-30 | End: 2019-07-29 | Stop reason: SDUPTHER

## 2019-02-11 DIAGNOSIS — N40.1 BENIGN PROSTATIC HYPERPLASIA WITH NOCTURIA: ICD-10-CM

## 2019-02-11 DIAGNOSIS — N52.9 VASCULOGENIC ERECTILE DYSFUNCTION, UNSPECIFIED VASCULOGENIC ERECTILE DYSFUNCTION TYPE: ICD-10-CM

## 2019-02-11 DIAGNOSIS — R35.1 BENIGN PROSTATIC HYPERPLASIA WITH NOCTURIA: ICD-10-CM

## 2019-02-11 RX ORDER — FINASTERIDE 5 MG/1
5 TABLET, FILM COATED ORAL DAILY
Qty: 30 TABLET | Refills: 2 | Status: SHIPPED | OUTPATIENT
Start: 2019-02-11 | End: 2019-05-01 | Stop reason: SDUPTHER

## 2019-02-11 RX ORDER — PIOGLITAZONEHYDROCHLORIDE 15 MG/1
15 TABLET ORAL DAILY
Qty: 90 TABLET | Refills: 1 | Status: SHIPPED | OUTPATIENT
Start: 2019-02-11 | End: 2019-03-11 | Stop reason: SDUPTHER

## 2019-02-12 ENCOUNTER — LAB (OUTPATIENT)
Dept: OTHER | Facility: HOSPITAL | Age: 64
End: 2019-02-12

## 2019-02-12 DIAGNOSIS — IMO0001 UNCONTROLLED DIABETES MELLITUS TYPE 2 WITHOUT COMPLICATIONS: ICD-10-CM

## 2019-02-12 DIAGNOSIS — Z79.4 TYPE 2 DIABETES MELLITUS WITHOUT COMPLICATION, WITH LONG-TERM CURRENT USE OF INSULIN (HCC): ICD-10-CM

## 2019-02-12 DIAGNOSIS — E78.5 HYPERLIPIDEMIA, UNSPECIFIED HYPERLIPIDEMIA TYPE: ICD-10-CM

## 2019-02-12 DIAGNOSIS — Z79.4 CONTROLLED TYPE 2 DIABETES MELLITUS WITHOUT COMPLICATION, WITH LONG-TERM CURRENT USE OF INSULIN (HCC): ICD-10-CM

## 2019-02-12 DIAGNOSIS — E11.9 TYPE 2 DIABETES MELLITUS WITHOUT COMPLICATION, WITH LONG-TERM CURRENT USE OF INSULIN (HCC): ICD-10-CM

## 2019-02-12 DIAGNOSIS — D50.9 IRON DEFICIENCY ANEMIA, UNSPECIFIED IRON DEFICIENCY ANEMIA TYPE: ICD-10-CM

## 2019-02-12 DIAGNOSIS — E11.9 CONTROLLED TYPE 2 DIABETES MELLITUS WITHOUT COMPLICATION, WITH LONG-TERM CURRENT USE OF INSULIN (HCC): ICD-10-CM

## 2019-02-12 LAB
BASOPHILS # BLD AUTO: 0.04 10*3/MM3 (ref 0–0.2)
BASOPHILS NFR BLD AUTO: 0.5 % (ref 0–1.5)
DEPRECATED RDW RBC AUTO: 58.4 FL (ref 37–54)
EOSINOPHIL # BLD AUTO: 0.18 10*3/MM3 (ref 0–0.4)
EOSINOPHIL NFR BLD AUTO: 2 % (ref 0.3–6.2)
ERYTHROCYTE [DISTWIDTH] IN BLOOD BY AUTOMATED COUNT: 18 % (ref 12.3–15.4)
FERRITIN SERPL-MCNC: 186.1 NG/ML (ref 30–400)
HCT VFR BLD AUTO: 49.1 % (ref 37.5–51)
HGB BLD-MCNC: 16.6 G/DL (ref 13–17.7)
HGB RETIC QN AUTO: 34.6 PG (ref 29.8–36.1)
IMM GRANULOCYTES # BLD AUTO: 0.03 10*3/MM3 (ref 0–0.05)
IMM GRANULOCYTES NFR BLD AUTO: 0.3 % (ref 0–0.5)
IMM RETICS NFR: 22 % (ref 3–15.8)
IRON 24H UR-MRATE: 75 MCG/DL (ref 59–158)
IRON SATN MFR SERPL: 17 % (ref 20–50)
LYMPHOCYTES # BLD AUTO: 2.21 10*3/MM3 (ref 0.7–3.1)
LYMPHOCYTES NFR BLD AUTO: 25 % (ref 19.6–45.3)
MCH RBC QN AUTO: 29.5 PG (ref 26.6–33)
MCHC RBC AUTO-ENTMCNC: 33.8 G/DL (ref 31.5–35.7)
MCV RBC AUTO: 87.4 FL (ref 79–97)
MONOCYTES # BLD AUTO: 0.87 10*3/MM3 (ref 0.1–0.9)
MONOCYTES NFR BLD AUTO: 9.8 % (ref 5–12)
NEUTROPHILS # BLD AUTO: 5.51 10*3/MM3 (ref 1.4–7)
NEUTROPHILS NFR BLD AUTO: 62.4 % (ref 42.7–76)
NRBC BLD AUTO-RTO: 0 /100 WBC (ref 0–0)
PLATELET # BLD AUTO: 163 10*3/MM3 (ref 140–450)
PMV BLD AUTO: 11.2 FL (ref 6–12)
RBC # BLD AUTO: 5.62 10*6/MM3 (ref 4.14–5.8)
RETICS/RBC NFR AUTO: 1.71 % (ref 0.5–1.5)
TIBC SERPL-MCNC: 440 MCG/DL (ref 298–536)
TRANSFERRIN SERPL-MCNC: 295 MG/DL (ref 200–360)
WBC NRBC COR # BLD: 8.84 10*3/MM3 (ref 3.4–10.8)

## 2019-02-12 PROCEDURE — 36415 COLL VENOUS BLD VENIPUNCTURE: CPT

## 2019-02-12 PROCEDURE — 84466 ASSAY OF TRANSFERRIN: CPT | Performed by: INTERNAL MEDICINE

## 2019-02-12 PROCEDURE — 82728 ASSAY OF FERRITIN: CPT | Performed by: INTERNAL MEDICINE

## 2019-02-12 PROCEDURE — 83540 ASSAY OF IRON: CPT | Performed by: INTERNAL MEDICINE

## 2019-02-12 PROCEDURE — 85046 RETICYTE/HGB CONCENTRATE: CPT | Performed by: INTERNAL MEDICINE

## 2019-02-12 PROCEDURE — 85025 COMPLETE CBC W/AUTO DIFF WBC: CPT | Performed by: INTERNAL MEDICINE

## 2019-02-12 RX ORDER — ROSUVASTATIN CALCIUM 40 MG/1
40 TABLET, COATED ORAL DAILY
Qty: 30 TABLET | Refills: 5 | Status: SHIPPED | OUTPATIENT
Start: 2019-02-12 | End: 2019-06-16 | Stop reason: SDUPTHER

## 2019-02-19 ENCOUNTER — APPOINTMENT (OUTPATIENT)
Dept: OTHER | Facility: HOSPITAL | Age: 64
End: 2019-02-19

## 2019-02-19 ENCOUNTER — OFFICE VISIT (OUTPATIENT)
Dept: ONCOLOGY | Facility: CLINIC | Age: 64
End: 2019-02-19

## 2019-02-19 VITALS
OXYGEN SATURATION: 96 % | HEART RATE: 80 BPM | RESPIRATION RATE: 16 BRPM | SYSTOLIC BLOOD PRESSURE: 122 MMHG | HEIGHT: 77 IN | WEIGHT: 275 LBS | BODY MASS INDEX: 32.47 KG/M2 | TEMPERATURE: 97.7 F | DIASTOLIC BLOOD PRESSURE: 79 MMHG

## 2019-02-19 DIAGNOSIS — D50.9 IRON DEFICIENCY ANEMIA, UNSPECIFIED IRON DEFICIENCY ANEMIA TYPE: Primary | ICD-10-CM

## 2019-02-19 PROCEDURE — 99213 OFFICE O/P EST LOW 20 MIN: CPT | Performed by: INTERNAL MEDICINE

## 2019-02-19 PROCEDURE — G0463 HOSPITAL OUTPT CLINIC VISIT: HCPCS | Performed by: INTERNAL MEDICINE

## 2019-02-19 NOTE — PROGRESS NOTES
.     REASON FOR CONSULTATION:   Anemia  Provide an opinion on any further workup or treatment                             REQUESTING PHYSICIAN: Bebeto Angel MD  RECORDS OBTAINED:  Records of the patients history including those obtained from the referring provider were reviewed and summarized in detail.    HISTORY OF PRESENT ILLNESS:  The patient is a 63 y.o. year old male  who is here for follow-up with the above-mentioned history.    No new problems since last visit.  Energy level improved with IV iron.  Remains improved.  Ice cravings resolved after IV iron.    Denies bleeding, chest pain, shortness of breath, dizziness.    Past Medical History:   Diagnosis Date   • Adiposity    • Anemia     post hemorrhagic   • Angioedema 2/21/2016    Secondary to ACE inhibitor   • Anxiety    • Arthritis    • CAD (coronary artery disease)    • Chest pain    • Colon polyps    • Diabetes mellitus, type 2 (CMS/HCC)    • ED (erectile dysfunction)    • Febrile illness    • GERD (gastroesophageal reflux disease)    • Glaucoma    • Hematoma    • High cholesterol    • History of foreign travel 12/2017; 08/2018    [place], [place], [place], [place], [place] and [place]; [place]   • Hyperlipidemia    • Hypertension    • Hypogonadism in male 9/28/2016   • Male erectile disorder    • Microalbuminuria    • Obesity (BMI 30-39.9)    • Osteoarthritis     knee   • Vitamin D deficiency    • Wound infection after surgery      Past Surgical History:   Procedure Laterality Date   • CARDIAC CATHETERIZATION N/A 05/15/2006    Dr. Mini Camarillo   • COLONOSCOPY N/A 02/22/2006    Bilobed polyp at 30 cm, hemorrhoids-Dr. Ilya Zhao   • COLONOSCOPY N/A 02/28/2014    Normal ileum, one 6 mm polyp in the mid transverse colon-Dr. Ilya Zhao   • COLONOSCOPY N/A 11/19/2008    Ela ileum, two 3 to 4 mm polyps, non-bleeding internal hemorrhoids, repeat in 5 years-Dr. Ilya Zhao   • COLONOSCOPY N/A 10/31/2017    Procedure: COLONOSCOPY WITH  POLYPECTOMY (COLD BIOPSY);  Surgeon: Ilya Zhao MD;  Location:  [facility];  Service:    • CORONARY ANGIOPLASTY WITH STENT PLACEMENT  2007, 2012, 2015    cardiac stents x3 occasions   • ENDOSCOPY N/A 10/4/2017    Procedure: ESOPHAGOGASTRODUODENOSCOPY;  Surgeon: Boyd Guidry MD;  Location:  [facility];  Service:    • KNEE INCISION AND DRAINAGE Right 6/20/2017    Procedure: RT. KNEE WASHOUT ;  Surgeon: Boyd Coyne MD;  Location[facility] MA[facility];  Service:    • ME TOTAL KNEE ARTHROPLASTY Right 6/15/2017    Procedure: RT TOTAL KNEE ARTHROPLASTY;  Surgeon: Boyd Coyne MD;  Location: [facility] MAIN OR;  Service: Orthopedics   • SHOULDER SURGERY Right 2016   • UPPER GASTROINTESTINAL ENDOSCOPY N/A 10/13/2015    Z-line irregular, normal stomach, normal duodenum-Dr. Ilya Zhao   • UPPER GASTROINTESTINAL ENDOSCOPY N/A 02/28/2014    Z-line irregular, normal stomach, normal duodenum-Dr. Ilya Zhao   • UPPER GASTROINTESTINAL ENDOSCOPY N/A 11/19/2008    Z-line irregular, chronic gastritis withotu hemorrhage, normal duodenum-Dr. Ilya Zhao   • UPPER GASTROINTESTINAL ENDOSCOPY N/A 06/22/2006    LA Grade A reflux esophagitis, non-bleeding erythematous gastropathy, normal duodenum-Dr. Ilya Zhao       HEMATOLOGIC/ONCOLOGIC HISTORY:  (History from previous dates can be found in the separate document.)    MEDICATIONS    Current Outpatient Medications:   •  amLODIPine (NORVASC) 10 MG tablet, Take 1 tablet by mouth Daily., Disp: 90 tablet, Rfl: 1  •  aspirin 81 MG EC tablet, Take 1 tablet by mouth Daily. get over the counter, Disp: 30 tablet, Rfl: 3  •  B-D ULTRAFINE III SHORT PEN 31G X 8 MM misc, USE AS DIRECTED DAILY WITH TOUJEO, Disp: 100 each, Rfl: 5  •  Blood Glucose Monitoring Suppl ([employer] BLOOD GLUCOSE) kit, , Disp: , Rfl:   •  carvedilol (COREG) 25 MG tablet, TAKE ONE TABLET BY MOUTH TWICE A DAY WITH MEALS, Disp: 180 tablet, Rfl: 1  •  clonazePAM (KlonoPIN) 0.5 MG tablet, Take 1 tablet by mouth 3  (Three) Times a Day As Needed (Facial twitching)., Disp: 63 tablet, Rfl: 0  •  clopidogrel (PLAVIX) 75 MG tablet, Take 1 tablet by mouth Daily., Disp: 90 tablet, Rfl: 1  •  dorzolamide-timolol (COSOPT) 22.3-6.8 MG/ML ophthalmic solution, Administer 1 drop to both eyes 2 (Two) Times a Day., Disp: , Rfl:   •  dorzolamide-timolol (COSOPT) 22.3-6.8 MG/ML ophthalmic solution, Apply 1 drop to eye(s) to both eyes 2 (Two) Times a Day., Disp: 20 mL, Rfl: 3  •  Empagliflozin (JARDIANCE) 25 MG tablet, Take 25 mg by mouth Every Morning., Disp: 30 tablet, Rfl: 4  •  escitalopram (LEXAPRO) 20 MG tablet, TAKE ONE TABLET BY MOUTH DAILY, Disp: 30 tablet, Rfl: 2  •  esomeprazole (nexIUM) 40 MG capsule, Take 1 capsule by mouth Daily., Disp: 30 capsule, Rfl: 5  •  ferrous gluconate 324 (37.5 Fe) MG tablet tablet, Take 324 mg by mouth Daily With Breakfast., Disp: , Rfl:   •  ferrous sulfate 325 (65 FE) MG tablet, Take 325 mg by mouth Daily., Disp: , Rfl:   •  finasteride (PROSCAR) 5 MG tablet, Take 1 tablet by mouth Daily., Disp: 30 tablet, Rfl: 2  •  Insulin Glargine (TOUJEO SOLOSTAR) 300 UNIT/ML solution pen-injector, Inject 60 Units under the skin into the appropriate area as directed Every Morning., Disp: 27 mL, Rfl: 1  •  [employer] PREMIUM GLUCOSE TEST test strip, TEST FOUR TIMES A DAY, Disp: 100 each, Rfl: 3  •  latanoprost (XALATAN) 0.005 % ophthalmic solution, Apply 1 drop to  each eye Daily., Disp: 7.5 mL, Rfl: 3  •  metFORMIN (GLUCOPHAGE) 500 MG tablet, Take 2 tablets by mouth 2 (Two) Times a Day With Meals., Disp: 120 tablet, Rfl: 5  •  pioglitazone (ACTOS) 15 MG tablet, Take 1 tablet by mouth Daily., Disp: 90 tablet, Rfl: 1  •  pioglitazone (ACTOS) 45 MG tablet, TAKE ONE TABLET BY MOUTH DAILY, Disp: 90 tablet, Rfl: 1  •  rosuvastatin (CRESTOR) 40 MG tablet, Take 1 tablet by mouth Daily., Disp: 30 tablet, Rfl: 5  •  sildenafil (VIAGRA) 50 MG tablet, Take 1 tablet by mouth Daily As Needed for erectile dysfunction., Disp: 6  tablet, Rfl: 5  •  SITagliptin (JANUVIA) 100 MG tablet, Take 1 tablet by mouth Daily., Disp: 30 tablet, Rfl: 4  •  vitamin D (ERGOCALCIFEROL) 97849 units capsule capsule, TAKE ONE CAPSULE BY MOUTH ONCE WEEKLY, Disp: 12 capsule, Rfl: 0    ALLERGIES:     Allergies   Allergen Reactions   • Ace Inhibitors      angioedema   • Lisinopril Angioedema   • Testosterone      Muscle pain       SOCIAL HISTORY:       Social History     Socioeconomic History   • Marital status:      Spouse name: Micaela   • Number of children: 1   • Years of education: College   • Highest education level: Not on file   Social Needs   • Financial resource strain: Not on file   • Food insecurity - worry: Not on file   • Food insecurity - inability: Not on file   • Transportation needs - medical: Not on file   • Transportation needs - non-medical: Not on file   Occupational History   • Occupation: RN in Critical Care     Employer: [employer]   Tobacco Use   • Smoking status: Never Smoker   • Smokeless tobacco: Never Used   Substance and Sexual Activity   • Alcohol use: Yes     Comment: 2 DRINKS 2X WEEK   • Drug use: No   • Sexual activity: Defer   Other Topics Concern   • Not on file   Social History Narrative    , 1 son, 2 stepsons       FAMILY HISTORY:  Family History   Problem Relation Age of Onset   • Lupus Sister    • Thyroid disease Sister    • Heart disease Other    • Hypertension Other    • Arthritis Mother    • Hyperlipidemia Mother    • Hypertension Mother    • Thyroid disease Mother    • Lupus Mother    • Heart disease Father    • Arthritis Father    • Other Father         Vascular disease   • Lupus Father    • Depression Father    • Alcohol abuse Father    • Dementia Father    • Hypertension Father    • Heart disease Brother    • Heart attack Brother 40   • Thyroid disease Sister    • Arthritis Brother    • Malig Hyperthermia Neg Hx        REVIEW OF SYSTEMS:  Review of Systems   Constitutional: Negative for  "activity change.   HENT: Negative for nosebleeds and trouble swallowing.    Respiratory: Negative for shortness of breath and wheezing.    Cardiovascular: Negative for chest pain and palpitations.   Gastrointestinal: Negative for constipation, diarrhea and nausea.   Genitourinary: Negative for dysuria and hematuria.   Musculoskeletal: Negative for arthralgias and myalgias.   Neurological: Negative for seizures and syncope.   Hematological: Negative for adenopathy. Does not bruise/bleed easily.   Psychiatric/Behavioral: Negative for confusion.              Vitals:    02/19/19 1343   BP: 122/79   Pulse: 80   Resp: 16   Temp: 97.7 °F (36.5 °C)   TempSrc: Oral   SpO2: 96%   Weight: 125 kg (275 lb)   Height: 195 cm (76.77\")   PainSc: 0-No pain     Current Status 2/19/2019   ECOG score 0      PHYSICAL EXAM:    CONSTITUTIONAL:  Vital signs reviewed.  No distress, looks comfortable.  Overweight  EYES:  Conjunctiva and lids unremarkable.  PERRLA  EARS,NOSE,MOUTH,THROAT:  Ears and nose appear unremarkable.  Lips, teeth, gums appear unremarkable.  RESPIRATORY:  Normal respiratory effort.  Lungs clear to auscultation bilaterally.  CARDIOVASCULAR:  Normal S1, S2.  No murmurs rubs or gallops.  No significant lower extremity edema.  GASTROINTESTINAL: Abdomen appears unremarkable.  Nontender.  No hepatomegaly.  No splenomegaly.  LYMPHATIC:  No cervical, supraclavicular, axillary lymphadenopathy.  SKIN:  Warm.  No rashes.  PSYCHIATRIC:  Normal judgment and insight.  Normal mood and affect.    RECENT LABS:        WBC   Date Value Ref Range Status   02/12/2019 8.84 3.40 - 10.80 10*3/mm3 Final   10/30/2018 9.35 4.50 - 10.70 10*3/mm3 Final   10/02/2018 8.24 4.50 - 10.70 10*3/mm3 Final   09/12/2017 8.85 4.50 - 10.70 10*3/mm3 Final   07/24/2017 8.92 4.50 - 10.70 10*3/mm3 Final   07/17/2017 7.76 4.50 - 10.70 10*3/mm3 Final   07/09/2017 8.63 4.50 - 10.70 10*3/mm3 Final   07/09/2017 10.74 (H) 4.50 - 10.70 10*3/mm3 Final   07/03/2017 9.38 " 4.50 - 10.70 10*3/mm3 Final   06/26/2017 11.95 (H) 4.50 - 10.70 10*3/mm3 Final   06/24/2017 11.99 (H) 4.50 - 10.70 10*3/mm3 Final   06/23/2017 13.57 (H) 4.50 - 10.70 10*3/mm3 Final   06/22/2017 12.38 (H) 4.50 - 10.70 10*3/mm3 Final   06/20/2017 13.08 (H) 4.50 - 10.70 10*3/mm3 Final   06/19/2017 13.06 (H) 4.50 - 10.70 10*3/mm3 Final   06/17/2017 14.99 (H) 4.50 - 10.70 10*3/mm3 Final   06/09/2017 8.50 4.50 - 10.70 10*3/mm3 Final   09/13/2016 8.83 4.50 - 10.70 10*3/mm3 Final   11/04/2015 10.07 4.50 - 10.70 K/Cumm Final   10/27/2015 10.75 (H) 4.50 - 10.70 K/Cumm Final   08/25/2015 7.71 4.50 - 10.70 K/Cumm Final   04/24/2015 8.08 4.50 - 10.70 K/Cumm Final     Hemoglobin   Date Value Ref Range Status   02/12/2019 16.6 13.0 - 17.7 g/dL Final   01/08/2019 16.0 13.7 - 17.6 g/dL Final   10/30/2018 14.8 13.7 - 17.6 g/dL Final   10/02/2018 14.4 13.7 - 17.6 g/dL Final   07/13/2018 12.6 (L) 13.7 - 17.6 g/dL Final   01/08/2018 12.3 (L) 13.7 - 17.6 g/dL Final   09/12/2017 12.2 (L) 13.7 - 17.6 g/dL Final   07/24/2017 11.8 (L) 13.7 - 17.6 g/dL Final   07/17/2017 11.3 (L) 13.7 - 17.6 g/dL Final   07/09/2017 10.8 (L) 13.7 - 17.6 g/dL Final   07/09/2017 11.4 (L) 13.7 - 17.6 g/dL Final   07/03/2017 10.3 (L) 13.7 - 17.6 g/dL Final   06/26/2017 10.3 (L) 13.7 - 17.6 g/dL Final   06/24/2017 8.4 (L) 13.7 - 17.6 g/dL Final   06/23/2017 9.3 (L) 13.7 - 17.6 g/dL Final   06/22/2017 9.4 (L) 13.7 - 17.6 g/dL Final   06/21/2017 9.9 (L) 13.7 - 17.6 g/dL Final   06/20/2017 10.7 (L) 13.7 - 17.6 g/dL Final   06/19/2017 10.9 (L) 13.7 - 17.6 g/dL Final   06/17/2017 10.6 (L) 13.7 - 17.6 g/dL Final   06/16/2017 11.5 (L) 13.7 - 17.6 g/dL Final   06/09/2017 14.4 13.7 - 17.6 g/dL Final   02/09/2017 15.6 13.7 - 17.6 g/dL Final   09/26/2016 13.7 12.6 - 17.7 g/dL Final   09/13/2016 14.0 13.7 - 17.6 g/dL Final   02/25/2016 13.7 13.7 - 17.6 g/dL Final   11/04/2015 12.7 (L) 13.7 - 17.6 g/dL Final   10/27/2015 14.9 13.7 - 17.6 g/dL Final   08/25/2015 13.8 13.7 - 17.6  g/dL Final   04/24/2015 14.6 13.7 - 17.6 g/dL Final     Platelets   Date Value Ref Range Status   02/12/2019 163 140 - 450 10*3/mm3 Final   10/30/2018 176 140 - 500 10*3/mm3 Final   10/02/2018 229 140 - 500 10*3/mm3 Final   09/12/2017 287 140 - 500 10*3/mm3 Final   07/24/2017 277 140 - 500 10*3/mm3 Final   07/17/2017 300 140 - 500 10*3/mm3 Final   07/09/2017 465 140 - 500 10*3/mm3 Final   07/09/2017 498 140 - 500 10*3/mm3 Final   07/03/2017 540 (H) 140 - 500 10*3/mm3 Final   06/26/2017 455 140 - 500 10*3/mm3 Final   06/24/2017 314 140 - 500 10*3/mm3 Final   06/23/2017 308 140 - 500 10*3/mm3 Final   06/22/2017 259 140 - 500 10*3/mm3 Final   06/20/2017 226 140 - 500 10*3/mm3 Final   06/19/2017 198 140 - 500 10*3/mm3 Final   06/17/2017 157 140 - 500 10*3/mm3 Final   06/09/2017 195 140 - 500 10*3/mm3 Final   09/13/2016 222 140 - 500 10*3/mm3 Final   11/04/2015 211 140 - 500 K/Cumm Final   10/27/2015 213 140 - 500 K/Cumm Final   08/25/2015 193 140 - 500 K/Cumm Final   04/24/2015 204 140 - 500 K/Cumm Final       Assessment/Plan   Iron deficiency anemia, unspecified iron deficiency anemia type  - Ferritin  - Iron Profile  - Retic With IRF & RET-He  - CBC & Differential    *Iron deficiency anemia.  Hb previously normal.  Anemia developed with knee replacement surgery June 2017 and subsequent postoperative bleed.  Does not tolerate oral iron due to nausea.  2 doses Feraheme resulted in normal iron labs, improved energy, and resolution of ice cravings.    *Microcytosis.  Suspect this is due to iron deficiency.  Resolved with correction of iron deficiency.    *Source of iron deficiency.  Colonoscopy through Dr. Juju Zhao 10/31/17.  1, 5 mm polyp removed.  He recommended repeat colonoscopy 5 years later.  EGD unremarkable by Dr. Guidry 10/4/17.  Dr. Bebeto Angel reported negative Hemoccult.  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  Therefore, I suspect the iron deficiency is due to blood loss from the  knee surgery and postoperative bleed and it has taken him a long time to recover.  I told him if iron deficiency recurs or if he would like further evaluation at this time, he should see Dr. Juju Zhao to get an opinion if the small bowel needs to be evaluated for source of bleeding.    *Leukocytosis.  Resolved.  This was likely related to the knee surgery summer 2017.  Remains resolved.    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    Plan  · MD 6 months.  Iron labs 1 week prior  · Patient knows he can call in if he develops ice cravings or fatigue and wants to get checked earlier.  In this case, we can move his appointments up earlier.  · If iron deficiency recurs, he will need referral back to Dr. Latanya Zhao

## 2019-03-12 DIAGNOSIS — F41.9 ANXIETY: ICD-10-CM

## 2019-03-12 RX ORDER — PIOGLITAZONEHYDROCHLORIDE 15 MG/1
15 TABLET ORAL DAILY
Qty: 90 TABLET | Refills: 1 | Status: SHIPPED | OUTPATIENT
Start: 2019-03-12 | End: 2019-07-23 | Stop reason: SDUPTHER

## 2019-03-12 RX ORDER — ESCITALOPRAM OXALATE 20 MG/1
TABLET ORAL
Qty: 90 TABLET | Refills: 0 | Status: SHIPPED | OUTPATIENT
Start: 2019-03-12 | End: 2019-04-10 | Stop reason: SDUPTHER

## 2019-04-10 DIAGNOSIS — F41.9 ANXIETY: ICD-10-CM

## 2019-04-10 RX ORDER — ESCITALOPRAM OXALATE 20 MG/1
TABLET ORAL
Qty: 90 TABLET | Refills: 0 | Status: SHIPPED | OUTPATIENT
Start: 2019-04-10 | End: 2019-07-23 | Stop reason: SDUPTHER

## 2019-04-10 RX ORDER — ERGOCALCIFEROL 1.25 MG/1
50000 CAPSULE ORAL WEEKLY
Qty: 12 CAPSULE | Refills: 0 | Status: SHIPPED | OUTPATIENT
Start: 2019-04-10 | End: 2019-06-16 | Stop reason: SDUPTHER

## 2019-05-01 DIAGNOSIS — R35.1 BENIGN PROSTATIC HYPERPLASIA WITH NOCTURIA: ICD-10-CM

## 2019-05-01 DIAGNOSIS — N40.1 BENIGN PROSTATIC HYPERPLASIA WITH NOCTURIA: ICD-10-CM

## 2019-05-01 DIAGNOSIS — N52.9 VASCULOGENIC ERECTILE DYSFUNCTION, UNSPECIFIED VASCULOGENIC ERECTILE DYSFUNCTION TYPE: ICD-10-CM

## 2019-05-01 RX ORDER — FINASTERIDE 5 MG/1
5 TABLET, FILM COATED ORAL DAILY
Qty: 30 TABLET | Refills: 2 | Status: SHIPPED | OUTPATIENT
Start: 2019-05-01 | End: 2019-06-16 | Stop reason: SDUPTHER

## 2019-06-16 DIAGNOSIS — R35.1 BENIGN PROSTATIC HYPERPLASIA WITH NOCTURIA: ICD-10-CM

## 2019-06-16 DIAGNOSIS — N40.1 BENIGN PROSTATIC HYPERPLASIA WITH NOCTURIA: ICD-10-CM

## 2019-06-16 DIAGNOSIS — N52.9 VASCULOGENIC ERECTILE DYSFUNCTION, UNSPECIFIED VASCULOGENIC ERECTILE DYSFUNCTION TYPE: ICD-10-CM

## 2019-06-17 RX ORDER — FINASTERIDE 5 MG/1
5 TABLET, FILM COATED ORAL DAILY
Qty: 30 TABLET | Refills: 2 | Status: SHIPPED | OUTPATIENT
Start: 2019-06-17 | End: 2019-11-07 | Stop reason: SDUPTHER

## 2019-06-17 RX ORDER — ERGOCALCIFEROL 1.25 MG/1
50000 CAPSULE ORAL WEEKLY
Qty: 12 CAPSULE | Refills: 0 | Status: SHIPPED | OUTPATIENT
Start: 2019-06-17 | End: 2019-07-23 | Stop reason: SDUPTHER

## 2019-06-18 DIAGNOSIS — E11.65 TYPE 2 DIABETES MELLITUS WITH HYPERGLYCEMIA (HCC): ICD-10-CM

## 2019-06-18 DIAGNOSIS — N52.9 MALE ERECTILE DISORDER: ICD-10-CM

## 2019-06-18 DIAGNOSIS — E66.9 ADIPOSITY: ICD-10-CM

## 2019-06-18 DIAGNOSIS — I10 ESSENTIAL HYPERTENSION: ICD-10-CM

## 2019-06-18 DIAGNOSIS — R80.9 MICROALBUMINURIA: ICD-10-CM

## 2019-06-18 DIAGNOSIS — E78.5 HYPERLIPIDEMIA: ICD-10-CM

## 2019-06-18 DIAGNOSIS — E55.9 VITAMIN D DEFICIENCY: ICD-10-CM

## 2019-06-18 RX ORDER — CARVEDILOL 25 MG/1
TABLET ORAL
Qty: 180 TABLET | Refills: 1 | Status: SHIPPED | OUTPATIENT
Start: 2019-06-18 | End: 2019-11-26 | Stop reason: SDUPTHER

## 2019-06-25 ENCOUNTER — OFFICE VISIT (OUTPATIENT)
Dept: INTERNAL MEDICINE | Age: 64
End: 2019-06-25

## 2019-06-25 VITALS
BODY MASS INDEX: 31.95 KG/M2 | OXYGEN SATURATION: 94 % | SYSTOLIC BLOOD PRESSURE: 124 MMHG | DIASTOLIC BLOOD PRESSURE: 72 MMHG | HEIGHT: 77 IN | HEART RATE: 85 BPM | WEIGHT: 270.6 LBS | TEMPERATURE: 98 F

## 2019-06-25 DIAGNOSIS — N40.1 BENIGN PROSTATIC HYPERPLASIA WITH NOCTURIA: ICD-10-CM

## 2019-06-25 DIAGNOSIS — R35.1 BENIGN PROSTATIC HYPERPLASIA WITH NOCTURIA: ICD-10-CM

## 2019-06-25 DIAGNOSIS — I10 ESSENTIAL HYPERTENSION: Primary | ICD-10-CM

## 2019-06-25 DIAGNOSIS — E11.65 TYPE 2 DIABETES MELLITUS WITH HYPERGLYCEMIA, WITH LONG-TERM CURRENT USE OF INSULIN (HCC): ICD-10-CM

## 2019-06-25 DIAGNOSIS — D50.9 IRON DEFICIENCY ANEMIA, UNSPECIFIED IRON DEFICIENCY ANEMIA TYPE: ICD-10-CM

## 2019-06-25 DIAGNOSIS — Z79.4 TYPE 2 DIABETES MELLITUS WITH HYPERGLYCEMIA, WITH LONG-TERM CURRENT USE OF INSULIN (HCC): ICD-10-CM

## 2019-06-25 DIAGNOSIS — E78.5 HYPERLIPIDEMIA, UNSPECIFIED HYPERLIPIDEMIA TYPE: ICD-10-CM

## 2019-06-25 PROBLEM — G24.5 BLEPHAROSPASM: Status: ACTIVE | Noted: 2019-06-25

## 2019-06-25 PROCEDURE — 99214 OFFICE O/P EST MOD 30 MIN: CPT | Performed by: NURSE PRACTITIONER

## 2019-06-25 NOTE — PROGRESS NOTES
[facility] [place]  Manuela Monaco / 64 y.o. / male  06/25/2019      ASSESSMENT & PLAN:    Problem List Items Addressed This Visit        Cardiovascular and Mediastinum    Hyperlipidemia    Relevant Medications    dorzolamide-timolol (COSOPT) 22.3-6.8 MG/ML ophthalmic solution    Blood Glucose Monitoring Suppl ([employer] BLOOD GLUCOSE) kit    [employer] PREMIUM GLUCOSE TEST test strip    B-D ULTRAFINE III SHORT PEN 31G X 8 MM misc    rosuvastatin (CRESTOR) 40 MG tablet    carvedilol (COREG) 25 MG tablet    Essential hypertension - Primary    Relevant Medications    dorzolamide-timolol (COSOPT) 22.3-6.8 MG/ML ophthalmic solution    Blood Glucose Monitoring Suppl ([employer] BLOOD GLUCOSE) kit    [employer] PREMIUM GLUCOSE TEST test strip    B-D ULTRAFINE III SHORT PEN 31G X 8 MM misc    amLODIPine (NORVASC) 10 MG tablet    carvedilol (COREG) 25 MG tablet       Endocrine    Diabetes mellitus (CMS/HCC)    Overview     Followed by endocrinologyMicaela         Relevant Medications    dorzolamide-timolol (COSOPT) 22.3-6.8 MG/ML ophthalmic solution    Blood Glucose Monitoring Suppl ([employer] BLOOD GLUCOSE) kit    [employer] PREMIUM GLUCOSE TEST test strip    B-D ULTRAFINE III SHORT PEN 31G X 8 MM misc    pioglitazone (ACTOS) 45 MG tablet    SITagliptin (JANUVIA) 100 MG tablet    Empagliflozin (JARDIANCE) 25 MG tablet    metFORMIN (GLUCOPHAGE) 500 MG tablet    pioglitazone (ACTOS) 15 MG tablet    Insulin Glargine (TOUJEO SOLOSTAR) 300 UNIT/ML solution pen-injector    carvedilol (COREG) 25 MG tablet       Genitourinary    Benign prostatic hyperplasia with nocturia    Relevant Medications    sildenafil (VIAGRA) 50 MG tablet    finasteride (PROSCAR) 5 MG tablet       Hematopoietic and Hemostatic    Iron deficiency anemia    Relevant Medications    ferrous gluconate 324 (37.5 Fe) MG tablet tablet    ferrous sulfate 325 (65 FE) MG tablet    sodium chloride 0.9 % infusion 250 mL (Completed)    famotidine  (PEPCID) injection 20 mg (Completed)    diphenhydrAMINE (BENADRYL) capsule 25 mg (Completed)    ferumoxytol (FERAHEME) 510 mg in sodium chloride 0.9 % 117 mL IVPB (Completed)    sodium chloride 0.9 % infusion 250 mL (Completed)    diphenhydrAMINE (BENADRYL) capsule 25 mg (Completed)    ferumoxytol (FERAHEME) 510 mg in sodium chloride 0.9 % 117 mL IVPB (Completed)        No orders of the defined types were placed in this encounter.    No orders of the defined types were placed in this encounter.      Summary/Discussion:    1. Essential hypertension  Reviewed most recent labs as ordered by endocrinology in January of this year.  Stable on current therapy.  Continue same.  Follow-up 6 months.  Continue to work on weight loss and increase in activity.     2. Hyperlipidemia, unspecified hyperlipidemia type  Stable on current therapy.  Continue same.  Follow-up 6 months.    3. Type 2 diabetes mellitus with hyperglycemia, with long-term current use of insulin (CMS/[facility])  Most recent hemoglobin A1c 5.9.  Eye exam is up-to-date.  Continue management under endocrinology, follow-up as scheduled.    4. Benign prostatic hyperplasia with nocturia  Stable on current finasteride, continue same.    5. Iron deficiency anemia, unspecified iron deficiency anemia type  Monitored and managed by hematology.         Return in about 6 months (around 12/25/2019) for Next scheduled follow up.    ____________________________________________________________________    MEDICATIONS  Current Outpatient Medications   Medication Sig Dispense Refill   • amLODIPine (NORVASC) 10 MG tablet Take 1 tablet by mouth Daily. 90 tablet 1   • aspirin 81 MG EC tablet Take 1 tablet by mouth Daily. get over the counter 30 tablet 3   • B-D ULTRAFINE III SHORT PEN 31G X 8 MM misc USE AS DIRECTED DAILY WITH TOUJEO 100 each 5   • Blood Glucose Monitoring Suppl ([facility] BLOOD GLUCOSE) kit      • carvedilol (COREG) 25 MG tablet TAKE ONE TABLET BY MOUTH TWICE A DAY WITH  MEALS 180 tablet 1   • clonazePAM (KlonoPIN) 0.5 MG tablet Take 1 tablet by mouth 3 (Three) Times a Day As Needed (Facial twitching). 63 tablet 0   • clopidogrel (PLAVIX) 75 MG tablet Take 1 tablet by mouth Daily. 90 tablet 1   • dorzolamide-timolol (COSOPT) 22.3-6.8 MG/ML ophthalmic solution Administer 1 drop to both eyes 2 (Two) Times a Day.     • dorzolamide-timolol (COSOPT) 22.3-6.8 MG/ML ophthalmic solution Apply 1 drop to eye(s) to both eyes 2 (Two) Times a Day. 20 mL 3   • Empagliflozin (JARDIANCE) 25 MG tablet Take 25 mg by mouth Every Morning. 30 tablet 4   • escitalopram (LEXAPRO) 20 MG tablet TAKE ONE TABLET BY MOUTH DAILY 90 tablet 0   • esomeprazole (nexIUM) 40 MG capsule Take 1 capsule by mouth Daily. 30 capsule 5   • finasteride (PROSCAR) 5 MG tablet Take 1 tablet by mouth Daily. 30 tablet 2   • Insulin Glargine (TOUJEO SOLOSTAR) 300 UNIT/ML solution pen-injector Inject 60 Units under the skin into the appropriate area as directed Every Morning. 27 mL 1   • [employer] PREMIUM GLUCOSE TEST test strip TEST FOUR TIMES A  each 3   • latanoprost (XALATAN) 0.005 % ophthalmic solution Apply 1 drop to  each eye Daily. 7.5 mL 3   • metFORMIN (GLUCOPHAGE) 500 MG tablet Take 2 tablets by mouth 2 (Two) Times a Day With Meals. 120 tablet 5   • pioglitazone (ACTOS) 15 MG tablet Take 1 tablet by mouth Daily. 90 tablet 1   • pioglitazone (ACTOS) 45 MG tablet TAKE ONE TABLET BY MOUTH DAILY 90 tablet 1   • rosuvastatin (CRESTOR) 40 MG tablet Take 1 tablet by mouth Daily. 30 tablet 5   • sildenafil (VIAGRA) 50 MG tablet Take 1 tablet by mouth Daily As Needed for erectile dysfunction. 6 tablet 5   • SITagliptin (JANUVIA) 100 MG tablet Take 1 tablet by mouth Daily. 30 tablet 4   • vitamin D (ERGOCALCIFEROL) 82553 units capsule capsule TAKE ONE CAPSULE BY MOUTH ONCE WEEKLY 12 capsule 0   • ferrous gluconate 324 (37.5 Fe) MG tablet tablet Take 324 mg by mouth Daily With Breakfast.     • ferrous sulfate 325 (65 FE) MG  "tablet Take 325 mg by mouth Daily.       No current facility-administered medications for this visit.           VITALS:    Visit Vitals  /72   Pulse 85   Temp 98 °F (36.7 °C)   Ht 195 cm (76.77\")   Wt 123 kg (270 lb 9.6 oz)   SpO2 94%   BMI 32.28 kg/m²       BP Readings from Last 3 Encounters:   06/25/19 124/72   02/19/19 122/79   01/22/19 132/82     Wt Readings from Last 3 Encounters:   06/25/19 123 kg (270 lb 9.6 oz)   02/19/19 125 kg (275 lb)   01/22/19 124 kg (274 lb)      Body mass index is 32.28 kg/m².    CC:  Main reason(s) for today's visit: Hypertension (previous pt of Dr. Angel) and Anemia      HPI:  Patient here for 6-month chronic medical follow-up and establish care.  He was a previous patient of Dr. Angel.    Diabetes Mellitus Type II, Follow-up: Patient here for follow-up of Type 2 diabetes mellitus.  He currently sees Micaela MARTINEZ with endocrinology on a biyearly basis.   Known diabetic complications: impotence and cardiovascular disease  Cardiovascular risk factors: advanced age (older than 55 for men, 65 for women), diabetes mellitus, dyslipidemia, hypertension, male gender, obesity (BMI >= 30 kg/m2) and sedentary lifestyle  Current diabetic medications include SGLT-2 inhibitor, insulin glargine, metformin, sulfonylurea, DPP4- inhibitor.     Eye exam current (within one year): yes  Weight trend: stable  Prior visit with dietician: no  Current diet: diabetic  Current exercise: walking    Is He on ACE inhibitor or angiotensin II receptor blocker?   No, significant ACEI allergy     Hypertension: Patient here for follow-up of elevated blood pressure. He is exercising and is adherent to low salt diet.  Cardiac symptoms none. Patient denies chest pain, chest pressure/discomfort, dyspnea, exertional chest pressure/discomfort, fatigue and lower extremity edema.  Cardiovascular risk factors: advanced age (older than 55 for men, 65 for women), diabetes mellitus, dyslipidemia, hypertension, male " gender and obesity (BMI >= 30 kg/m2). Use of agents associated with hypertension: none. History of target organ damage: angina/ prior myocardial infarction.    Iron deficiency anemia: Currently under management by hematology.  Patient recently received some iron transfusions, not currently on any oral iron replacement therapy.     Patient Care Team:  Maunela Agustin APRN as PCP - General (Internal Medicine)  Bebeto Angel MD (Inactive) as PCP - Family Medicine  Bebeto Monson II, MD as Consulting Physician (Hematology and Oncology)  Micaela Barfield APRN as Nurse Practitioner (Endocrinology)    ____________________________________________________________________    REVIEW OF SYSTEMS    Review of Systems   Constitutional: Negative for activity change, appetite change and unexpected weight change.   HENT: Negative for tinnitus.    Eyes: Negative for visual disturbance.   Respiratory: Negative for cough, chest tightness and shortness of breath.    Cardiovascular: Negative for chest pain, palpitations and leg swelling.   Endocrine: Negative for polydipsia, polyphagia and polyuria.   Neurological: Negative for dizziness, light-headedness and headaches.         PHYSICAL EXAMINATION    Physical Exam   Constitutional: He is oriented to person, place, and time. Vital signs are normal. He appears well-developed and well-nourished. He is cooperative. He does not appear ill. No distress.   Cardiovascular: Normal rate, regular rhythm, S1 normal, S2 normal and normal heart sounds.   No murmur heard.  Pulmonary/Chest: Effort normal and breath sounds normal. He has no decreased breath sounds. He has no wheezes. He has no rhonchi. He has no rales.   Neurological: He is alert and oriented to person, place, and time.   Skin: Skin is warm, dry and intact.   Psychiatric: He has a normal mood and affect. His speech is normal and behavior is normal. Judgment and thought content normal. Cognition and memory are normal.   Nursing note  and vitals reviewed.      REVIEWED DATA:    Labs:     Lab Results   Component Value Date     01/08/2019    K 4.0 01/08/2019    AST 39 01/08/2019    ALT 35 01/08/2019    BUN 12 01/08/2019    CREATININE 1.07 01/08/2019    CREATININE 0.95 07/13/2018    CREATININE 1.14 01/08/2018    EGFRIFNONA 65 01/08/2018    EGFRIFAFRI 85 01/08/2019       Lab Results   Component Value Date    HGBA1C 5.90 (H) 01/08/2019    HGBA1C 6.18 (H) 07/13/2018    HGBA1C 5.90 (H) 01/08/2018    GLUCOSE 83 01/08/2019    GLUCOSE 76 07/13/2018    GLUCOSE 80 07/24/2017    MICROALBUR 58.7 01/08/2019       Lab Results   Component Value Date    LDL 85 01/08/2019     (H) 07/13/2018     (H) 01/08/2018    HDL 48 01/08/2019    HDL 49 07/13/2018    HDL 45 01/08/2018    TRIG 107 01/08/2019    TRIG 118 07/13/2018    TRIG 128 01/08/2018       Lab Results   Component Value Date    TSH 0.705 08/03/2018    FREET4 1.22 02/25/2016       Lab Results   Component Value Date    WBC 8.84 02/12/2019    HGB 16.6 02/12/2019    HGB 16.0 01/08/2019    HGB 14.8 10/30/2018     02/12/2019       Lab Results   Component Value Date    PROTEIN 2+ (A) 04/24/2015    GLUCOSEU >=1000 mg/dL (3+) (A) 06/23/2017    BLOODU Moderate (2+) (A) 06/23/2017    NITRITEU Negative 06/23/2017    LEUKOCYTESUR Negative 06/23/2017       Imaging:         Medical Tests:         Summary of old records / correspondence / consultant report:         Request outside records:         ALLERGIES  Allergies   Allergen Reactions   • Ace Inhibitors      angioedema   • Lisinopril Angioedema   • Testosterone      Muscle pain        PFSH:     The following portions of the patient's history were reviewed and updated as appropriate: Allergies / Current Medications / Past Medical History / Surgical History / Social History / Family History    PROBLEM LIST   Patient Active Problem List   Diagnosis   • Microalbuminuria   • Vitamin D deficiency   • Angioedema   • Chronic coronary artery disease   •  Anxiety   • ED (erectile dysfunction)   • Gastroesophageal reflux disease   • Hyperlipidemia   • Diabetes mellitus (CMS/HCC)   • Adiposity   • Routine health maintenance   • Uncontrolled type 2 diabetes mellitus (CMS/HCC)   • Low testosterone   • Hypogonadism in male   • Presence of coronary angioplasty implant and graft   • Osteoarthritis, knee   • Postoperative visit   • Hematoma   • Essential hypertension   • Anemia, posthemorrhagic, acute   • Febrile illness   • Wound infection after surgery   • Shortness of breath   • Gastroesophageal reflux disease with esophagitis   • Tubular adenoma of colon   • Benign prostatic hyperplasia with nocturia   • Iron deficiency anemia   • Adverse effect of iron or its compound, subsequent encounter   • Facial twitching   • Blepharospasm       PAST MEDICAL HISTORY  Past Medical History:   Diagnosis Date   • Adiposity    • Anemia     post hemorrhagic   • Angioedema 2/21/2016    Secondary to ACE inhibitor   • Anxiety    • Arthritis    • CAD (coronary artery disease)    • Chest pain    • Colon polyps    • Diabetes mellitus, type 2 (CMS/HCC)    • ED (erectile dysfunction)    • Febrile illness    • GERD (gastroesophageal reflux disease)    • Glaucoma    • Hematoma    • High cholesterol    • History of foreign travel 12/2017; 08/2018    [place], [place], [place], [place], [place] and [place]; [place]   • Hyperlipidemia    • Hypertension    • Hypogonadism in male 9/28/2016   • Male erectile disorder    • Microalbuminuria    • Obesity (BMI 30-39.9)    • Osteoarthritis     knee   • Vitamin D deficiency    • Wound infection after surgery        SURGICAL HISTORY  Past Surgical History:   Procedure Laterality Date   • CARDIAC CATHETERIZATION N/A 05/15/2006    Dr. Mini Camarillo   • COLONOSCOPY N/A 02/22/2006    Bilobed polyp at 30 cm, hemorrhoids-Dr. Ilya Zhao   • COLONOSCOPY N/A 02/28/2014    Normal ileum, one 6 mm polyp in the mid transverse colon-Dr. Ilya Zhao   •  COLONOSCOPY N/A 11/19/2008    Ela ileum, two 3 to 4 mm polyps, non-bleeding internal hemorrhoids, repeat in 5 years-Dr. Ilya Zhao   • COLONOSCOPY N/A 10/31/2017    Procedure: COLONOSCOPY WITH POLYPECTOMY (COLD BIOPSY);  Surgeon: Ilya Zhao MD;  Location: [facility] ENDOSCOPY;  Service:    • CORONARY ANGIOPLASTY WITH STENT PLACEMENT  2007, 2012, 2015    cardiac stents x3 occasions   • ENDOSCOPY N/A 10/4/2017    Procedure: ESOPHAGOGASTRODUODENOSCOPY;  Surgeon: Boyd Guidry MD;  [facility];  Service:    • KNEE INCISION AND DRAINAGE Right 6/20/2017    Procedure: RT. KNEE WASHOUT ;  Surgeon: Boyd Coyne MD;  Location: [facility] MAIN OR;  Service:    • OK TOTAL KNEE ARTHROPLASTY Right 6/15/2017    Procedure: RT TOTAL KNEE ARTHROPLASTY;  Surgeon: Boyd Coyne MD;  Location: [facility] MAIN OR;  Service: Orthopedics   • SHOULDER SURGERY Right 2016   • UPPER GASTROINTESTINAL ENDOSCOPY N/A 10/13/2015    Z-line irregular, normal stomach, normal duodenum-Dr. Ilya Zhao   • UPPER GASTROINTESTINAL ENDOSCOPY N/A 02/28/2014    Z-line irregular, normal stomach, normal duodenum-Dr. Ilya Zhao   • UPPER GASTROINTESTINAL ENDOSCOPY N/A 11/19/2008    Z-line irregular, chronic gastritis withotu hemorrhage, normal duodenum-Dr. Ilya Zhao   • UPPER GASTROINTESTINAL ENDOSCOPY N/A 06/22/2006    LA Grade A reflux esophagitis, non-bleeding erythematous gastropathy, normal duodenum-Dr. Ilya Zhao       SOCIAL HISTORY  Social History     Socioeconomic History   • Marital status:      Spouse name: Micaela   • Number of children: 1   • Years of education: College   • Highest education level: Not on file   Occupational History   • Occupation: RN in Critical Care     Employer: [employer]   Tobacco Use   • Smoking status: Never Smoker   • Smokeless tobacco: Never Used   Substance and Sexual Activity   • Alcohol use: Yes     Comment: 2 DRINKS 2X WEEK   • Drug use: No   • Sexual activity: Defer   Social  History Narrative    , 1 son, 2 stepsons       FAMILY HISTORY  Family History   Problem Relation Age of Onset   • Lupus Sister    • Thyroid disease Sister    • Heart disease Other    • Hypertension Other    • Arthritis Mother    • Hyperlipidemia Mother    • Hypertension Mother    • Thyroid disease Mother    • Lupus Mother    • Heart disease Father    • Arthritis Father    • Other Father         Vascular disease   • Lupus Father    • Depression Father    • Alcohol abuse Father    • Dementia Father    • Hypertension Father    • Heart disease Brother    • Heart attack Brother 40   • Thyroid disease Sister    • Arthritis Brother    • Malig Hyperthermia Neg Hx          **Dragon Disclaimer:   Much of this encounter note is an electronic transcription/translation of spoken language to printed text. The electronic translation of spoken language may permit erroneous, or at times, nonsensical words or phrases to be inadvertently transcribed. Although I have reviewed the note for such errors, some may still exist.

## 2019-06-26 DIAGNOSIS — E78.5 HYPERLIPIDEMIA, UNSPECIFIED HYPERLIPIDEMIA TYPE: ICD-10-CM

## 2019-06-27 RX ORDER — ROSUVASTATIN CALCIUM 40 MG/1
40 TABLET, COATED ORAL DAILY
Qty: 30 TABLET | Refills: 5 | Status: SHIPPED | OUTPATIENT
Start: 2019-06-27 | End: 2020-02-03 | Stop reason: SDUPTHER

## 2019-07-23 RX ORDER — ERGOCALCIFEROL 1.25 MG/1
50000 CAPSULE ORAL WEEKLY
Qty: 12 CAPSULE | Refills: 0 | Status: CANCELLED | OUTPATIENT
Start: 2019-07-23

## 2019-07-23 RX ORDER — ERGOCALCIFEROL 1.25 MG/1
50000 CAPSULE ORAL WEEKLY
Qty: 12 CAPSULE | Refills: 0 | Status: SHIPPED | OUTPATIENT
Start: 2019-07-23 | End: 2019-12-27 | Stop reason: SDUPTHER

## 2019-07-23 RX ORDER — PIOGLITAZONEHYDROCHLORIDE 15 MG/1
15 TABLET ORAL DAILY
Qty: 90 TABLET | Refills: 0 | Status: SHIPPED | OUTPATIENT
Start: 2019-07-23 | End: 2019-12-27 | Stop reason: SDUPTHER

## 2019-07-23 RX ORDER — PIOGLITAZONEHYDROCHLORIDE 15 MG/1
15 TABLET ORAL DAILY
Qty: 90 TABLET | Refills: 1 | Status: CANCELLED | OUTPATIENT
Start: 2019-07-23

## 2019-07-24 DIAGNOSIS — F41.9 ANXIETY: ICD-10-CM

## 2019-07-24 RX ORDER — ESCITALOPRAM OXALATE 20 MG/1
20 TABLET ORAL DAILY
Qty: 90 TABLET | Refills: 0 | Status: SHIPPED | OUTPATIENT
Start: 2019-07-24 | End: 2019-11-26 | Stop reason: SDUPTHER

## 2019-07-29 DIAGNOSIS — E66.9 ADIPOSITY: ICD-10-CM

## 2019-07-29 DIAGNOSIS — E11.65 TYPE 2 DIABETES MELLITUS WITH HYPERGLYCEMIA (HCC): ICD-10-CM

## 2019-07-29 DIAGNOSIS — N52.9 MALE ERECTILE DISORDER: ICD-10-CM

## 2019-07-29 DIAGNOSIS — R80.9 MICROALBUMINURIA: ICD-10-CM

## 2019-07-29 DIAGNOSIS — E55.9 VITAMIN D DEFICIENCY: ICD-10-CM

## 2019-07-29 DIAGNOSIS — I10 ESSENTIAL HYPERTENSION: ICD-10-CM

## 2019-07-29 DIAGNOSIS — E78.5 HYPERLIPIDEMIA: ICD-10-CM

## 2019-08-06 ENCOUNTER — APPOINTMENT (OUTPATIENT)
Dept: OTHER | Facility: HOSPITAL | Age: 64
End: 2019-08-06

## 2019-08-06 DIAGNOSIS — I10 ESSENTIAL HYPERTENSION: ICD-10-CM

## 2019-08-06 DIAGNOSIS — E11.9 TYPE 2 DIABETES MELLITUS WITHOUT COMPLICATION, WITH LONG-TERM CURRENT USE OF INSULIN (HCC): ICD-10-CM

## 2019-08-06 DIAGNOSIS — Z79.4 TYPE 2 DIABETES MELLITUS WITHOUT COMPLICATION, WITH LONG-TERM CURRENT USE OF INSULIN (HCC): ICD-10-CM

## 2019-08-06 RX ORDER — AMLODIPINE BESYLATE 10 MG/1
10 TABLET ORAL DAILY
Qty: 90 TABLET | Refills: 1 | Status: SHIPPED | OUTPATIENT
Start: 2019-08-06 | End: 2020-02-03 | Stop reason: SDUPTHER

## 2019-08-12 DIAGNOSIS — IMO0001 UNCONTROLLED DIABETES MELLITUS TYPE 2 WITHOUT COMPLICATIONS: ICD-10-CM

## 2019-08-12 DIAGNOSIS — Z79.4 CONTROLLED TYPE 2 DIABETES MELLITUS WITHOUT COMPLICATION, WITH LONG-TERM CURRENT USE OF INSULIN (HCC): ICD-10-CM

## 2019-08-12 DIAGNOSIS — E11.9 CONTROLLED TYPE 2 DIABETES MELLITUS WITHOUT COMPLICATION, WITH LONG-TERM CURRENT USE OF INSULIN (HCC): ICD-10-CM

## 2019-08-13 ENCOUNTER — APPOINTMENT (OUTPATIENT)
Dept: ONCOLOGY | Facility: CLINIC | Age: 64
End: 2019-08-13

## 2019-08-13 ENCOUNTER — APPOINTMENT (OUTPATIENT)
Dept: OTHER | Facility: HOSPITAL | Age: 64
End: 2019-08-13

## 2019-08-13 RX ORDER — SITAGLIPTIN 100 MG/1
TABLET, FILM COATED ORAL
Qty: 30 TABLET | Refills: 0 | Status: SHIPPED | OUTPATIENT
Start: 2019-08-13 | End: 2019-09-09 | Stop reason: SDUPTHER

## 2019-08-13 RX ORDER — EMPAGLIFLOZIN 25 MG/1
TABLET, FILM COATED ORAL
Qty: 30 TABLET | Refills: 0 | Status: SHIPPED | OUTPATIENT
Start: 2019-08-13 | End: 2019-09-09 | Stop reason: SDUPTHER

## 2019-08-16 ENCOUNTER — TELEPHONE (OUTPATIENT)
Dept: ENDOCRINOLOGY | Age: 64
End: 2019-08-16

## 2019-08-16 DIAGNOSIS — I10 ESSENTIAL HYPERTENSION: ICD-10-CM

## 2019-08-16 DIAGNOSIS — E78.5 HYPERLIPIDEMIA: ICD-10-CM

## 2019-08-16 DIAGNOSIS — E55.9 VITAMIN D DEFICIENCY: ICD-10-CM

## 2019-08-16 DIAGNOSIS — E66.9 ADIPOSITY: ICD-10-CM

## 2019-08-16 DIAGNOSIS — E11.65 TYPE 2 DIABETES MELLITUS WITH HYPERGLYCEMIA (HCC): ICD-10-CM

## 2019-08-16 DIAGNOSIS — R80.9 MICROALBUMINURIA: ICD-10-CM

## 2019-08-16 DIAGNOSIS — N52.9 MALE ERECTILE DISORDER: ICD-10-CM

## 2019-08-16 RX ORDER — BLOOD-GLUCOSE METER
1 KIT MISCELLANEOUS CONTINUOUS
Qty: 1 EACH | Refills: 0 | Status: SHIPPED | OUTPATIENT
Start: 2019-08-16 | End: 2020-02-10

## 2019-08-16 NOTE — TELEPHONE ENCOUNTER
rx has been sent    ----- Message from Nola Sim sent at 8/16/2019  9:27 AM EDT -----  Contact: Micaela - wife  Wife Micaela said patient has not been checking his blood sugars for about a month. She said he goes by how he feels but wants to start testing again.  She asked for script for a new meter that [employer] has their own brand, test strips and lancets. She said she thinks he is supposed to be testing 3 xday, requested  90 day supply, [phone]    [place] [phone]

## 2019-08-20 RX ORDER — LATANOPROST 50 UG/ML
SOLUTION/ DROPS OPHTHALMIC
Qty: 7.5 ML | Refills: 3 | Status: ON HOLD | OUTPATIENT
Start: 2019-08-20 | End: 2020-03-10 | Stop reason: SDUPTHER

## 2019-08-21 ENCOUNTER — OFFICE VISIT (OUTPATIENT)
Dept: CARDIOLOGY | Facility: CLINIC | Age: 64
End: 2019-08-21

## 2019-08-21 VITALS
DIASTOLIC BLOOD PRESSURE: 82 MMHG | SYSTOLIC BLOOD PRESSURE: 118 MMHG | BODY MASS INDEX: 32.12 KG/M2 | WEIGHT: 272 LBS | HEART RATE: 72 BPM | HEIGHT: 77 IN

## 2019-08-21 DIAGNOSIS — I10 ESSENTIAL HYPERTENSION: ICD-10-CM

## 2019-08-21 DIAGNOSIS — Z79.4 TYPE 2 DIABETES MELLITUS WITH HYPERGLYCEMIA, WITH LONG-TERM CURRENT USE OF INSULIN (HCC): ICD-10-CM

## 2019-08-21 DIAGNOSIS — E11.65 TYPE 2 DIABETES MELLITUS WITH HYPERGLYCEMIA, WITH LONG-TERM CURRENT USE OF INSULIN (HCC): ICD-10-CM

## 2019-08-21 DIAGNOSIS — I25.10 CHRONIC CORONARY ARTERY DISEASE: Primary | ICD-10-CM

## 2019-08-21 DIAGNOSIS — E78.5 HYPERLIPIDEMIA, UNSPECIFIED HYPERLIPIDEMIA TYPE: ICD-10-CM

## 2019-08-21 PROCEDURE — 99214 OFFICE O/P EST MOD 30 MIN: CPT | Performed by: INTERNAL MEDICINE

## 2019-08-21 PROCEDURE — 93000 ELECTROCARDIOGRAM COMPLETE: CPT | Performed by: INTERNAL MEDICINE

## 2019-08-21 NOTE — PROGRESS NOTES
Isaias Monaco  1955  Date of Office Visit: 08/21/2019  Encounter Provider: Miguel Ángel Karimi MD  Place of Service: [facility] CARDIOLOGY      CHIEF COMPLAINT:   Coronary artery disease      HISTORY OF PRESENT ILLNESS:A very pleasant, 64-year-old male, prior patient of Dr. Jorge Khan, presents to me to establish care.  He has a medical history of coronary artery disease and prior PCI to the circumflex, but also PCI to an RCA chronic total occlusion.  He has been doing very well since his intervention which was in 2015.  He denies any chest pain or dyspnea on exertion.  He has no orthopnea or PND.  His ejection fraction has previously been preserved based on echo and ventriculogram in 2015.               Review of Systems   Constitution: Negative for fever, weakness and malaise/fatigue.   HENT: Negative for nosebleeds and sore throat.    Eyes: Negative for blurred vision and double vision.   Cardiovascular: Negative for chest pain, claudication, palpitations and syncope.   Respiratory: Negative for cough, shortness of breath and snoring.    Endocrine: Negative for cold intolerance, heat intolerance and polydipsia.   Skin: Negative for itching, poor wound healing and rash.   Musculoskeletal: Negative for joint pain, joint swelling, muscle weakness and myalgias.   Gastrointestinal: Negative for abdominal pain, melena, nausea and vomiting.   Neurological: Negative for light-headedness, loss of balance, seizures and vertigo.   Psychiatric/Behavioral: Negative for altered mental status and depression.       Past Medical History:   Diagnosis Date   • Adiposity    • Anemia     post hemorrhagic   • Angioedema 2/21/2016    Secondary to ACE inhibitor   • Anxiety    • Arthritis    • CAD (coronary artery disease)    • Chest pain    • Colon polyps    • Diabetes mellitus, type 2 (CMS/HCC)    • ED (erectile dysfunction)    • Febrile illness    • GERD (gastroesophageal reflux disease)    •  Glaucoma    • Hematoma    • High cholesterol    • History of foreign travel 12/2017; 08/2018    [place], [place], [place], [place], [place] and [place]; [place]   • Hyperlipidemia    • Hypertension    • Hypogonadism in male 9/28/2016   • Male erectile disorder    • Microalbuminuria    • Obesity (BMI 30-39.9)    • Osteoarthritis     knee   • Vitamin D deficiency    • Wound infection after surgery        The following portions of the patient's history were reviewed and updated as appropriate: Social history , Family history and Surgical history     Current Outpatient Medications on File Prior to Visit   Medication Sig Dispense Refill   • amLODIPine (NORVASC) 10 MG tablet Take 1 tablet by mouth Daily. 90 tablet 1   • aspirin 81 MG EC tablet Take 1 tablet by mouth Daily. get over the counter 30 tablet 3   • Blood Glucose Monitoring Suppl ([employer] BLOOD GLUCOSE) kit 1 kit Continuous. 1 each 0   • carvedilol (COREG) 25 MG tablet TAKE ONE TABLET BY MOUTH TWICE A DAY WITH MEALS 180 tablet 1   • clonazePAM (KlonoPIN) 0.5 MG tablet Take 1 tablet by mouth 3 (Three) Times a Day As Needed (Facial twitching). 63 tablet 0   • clopidogrel (PLAVIX) 75 MG tablet Take 1 tablet by mouth Daily. 90 tablet 1   • dorzolamide-timolol (COSOPT) 22.3-6.8 MG/ML ophthalmic solution Administer 1 drop to both eyes 2 (Two) Times a Day.     • dorzolamide-timolol (COSOPT) 22.3-6.8 MG/ML ophthalmic solution Apply 1 drop to eye(s) to both eyes 2 (Two) Times a Day. 20 mL 3   • escitalopram (LEXAPRO) 20 MG tablet Take 1 tablet by mouth Daily. 90 tablet 0   • esomeprazole (nexIUM) 40 MG capsule Take 1 capsule by mouth Daily. 30 capsule 5   • ferrous gluconate 324 (37.5 Fe) MG tablet tablet Take 324 mg by mouth Daily With Breakfast.     • ferrous sulfate 325 (65 FE) MG tablet Take 325 mg by mouth Daily.     • finasteride (PROSCAR) 5 MG tablet Take 1 tablet by mouth Daily. 30 tablet 2   • glucose blood ([employer] PREMIUM GLUCOSE TEST) test strip Use to  "test 4 times daily 100 each 3   • hepatitis A (HAVRIX) 1440 EL U/ML vaccine Inject  1 ml into the appropriate muscle as directed by prescriber. 1 mL 0   • Insulin Glargine (TOUJEO SOLOSTAR) 300 UNIT/ML solution pen-injector Inject 60 Units under the skin into the appropriate area as directed Every Morning. 27 mL 1   • Insulin Pen Needle (B-D ULTRAFINE III SHORT PEN) 31G X 8 MM misc USE AS DIRECTED DAILY WITH TOUJEO 100 each 5   • JANUVIA 100 MG tablet TAKE 1 TABLET BY MOUTH DAILY 30 tablet 0   • JARDIANCE 25 MG tablet TAKE 1 TABLET BY MOUTH EVERY MORNING 30 tablet 0   • latanoprost (XALATAN) 0.005 % ophthalmic solution Apply 1 drop to  each eye Daily. 7.5 mL 3   • latanoprost (XALATAN) 0.005 % ophthalmic solution APPLY ONE DROP TO EACH EYE DAILY 7.5 mL 3   • metFORMIN (GLUCOPHAGE) 500 MG tablet Take 2 tablets by mouth 2 (Two) Times a Day With Meals. 120 tablet 5   • pioglitazone (ACTOS) 15 MG tablet Take 1 tablet by mouth Daily. 90 tablet 0   • pioglitazone (ACTOS) 45 MG tablet TAKE ONE TABLET BY MOUTH DAILY 90 tablet 1   • rosuvastatin (CRESTOR) 40 MG tablet Take 1 tablet by mouth Daily. 30 tablet 5   • sildenafil (VIAGRA) 50 MG tablet Take 1 tablet by mouth Daily As Needed for erectile dysfunction. 6 tablet 5   • vitamin D (ERGOCALCIFEROL) 41081 units capsule capsule TAKE ONE CAPSULE BY MOUTH ONCE WEEKLY 12 capsule 0     No current facility-administered medications on file prior to visit.        Allergies   Allergen Reactions   • Ace Inhibitors      angioedema   • Lisinopril Angioedema   • Testosterone      Muscle pain       Vitals:    08/21/19 1621   BP: 118/82   Pulse: 72   Weight: 123 kg (272 lb)   Height: 195 cm (76.77\")     Physical Exam   Constitutional: He is oriented to person, place, and time. He appears well-developed and well-nourished.   HENT:   Head: Normocephalic and atraumatic.   Eyes: Conjunctivae and EOM are normal. No scleral icterus.   Neck: Normal range of motion. Neck supple. Normal carotid " pulses, no hepatojugular reflux and no JVD present. Carotid bruit is not present. No tracheal deviation present. No thyromegaly present.   Cardiovascular: Normal rate and regular rhythm. Exam reveals no gallop and no friction rub.   No murmur heard.  Pulses:       Carotid pulses are 2+ on the right side, and 2+ on the left side.       Radial pulses are 2+ on the right side, and 2+ on the left side.        Femoral pulses are 2+ on the right side, and 2+ on the left side.       Dorsalis pedis pulses are 2+ on the right side, and 2+ on the left side.        Posterior tibial pulses are 2+ on the right side, and 2+ on the left side.   Pulmonary/Chest: Breath sounds normal. No respiratory distress. He has no decreased breath sounds. He has no wheezes. He has no rhonchi. He has no rales. He exhibits no tenderness.   Abdominal: Soft. Bowel sounds are normal. He exhibits no distension. There is no tenderness. There is no rebound.   Musculoskeletal: He exhibits no edema or deformity.   Neurological: He is alert and oriented to person, place, and time. He has normal strength. No sensory deficit.   Skin: No rash noted. No erythema.   Psychiatric: He has a normal mood and affect. His behavior is normal.       No results found for: CBC  No results found for: CMP  No components found for: LIPID  No results found for: BMP      ECG 12 Lead  Date/Time: 8/21/2019 5:43 PM  Performed by: Miguel Ángel Karimi MD  Authorized by: Miguel Ángel Karimi MD   Comparison: compared with previous ECG from 7/11/2017  Rhythm: sinus rhythm  Rate: normal  QRS axis: normal    Clinical impression: non-specific ECG  Comments: Nonspecific T wave abnormalities inferior leads.               DISCUSSION/SUMMARYA very pleasant, 64-year-old male with a medical history of coronary artery disease and prior PCI to the circumflex and chronic total occlusion intervention to the RCA, essential hypertension, hyperlipidemia and diabetes mellitus, who presents to  me as a transfer of care.         He has been doing very well and denies any chest pain or dyspnea on exertion.  No orthopnea or PND.  His blood pressure and heart rate are well-controlled.       1.  Coronary disease with prior PCI:    - Continue aspirin life long.  I would also recommend continuing Plavix life long with his multivessel PCI and with multiple stents in his RCA.    - Continue rosuvastatin at his current dose.  Last lipid panel that I have was earlier this year and his LDL was still a little bit elevated at 85.  I do not think we need to titrate his therapy at this point in time.    2.  Essential hypertension, well-controlled:  Continue current therapy.    3.  Hyperlipidemia:  As above.  Hold off on addition of adjunct therapy.     I will plan on seeing him back in 1 year or earlier.            Coronary Artery Disease  Assessment  • The patient has no angina    Plan  • Lifestyle modifications discussed include adhering to a heart healthy diet, avoidance of tobacco products, maintenance of a healthy weight and medication compliance    Subjective - Objective  • There has been a previous stent procedure using DAVONTE  • Current antiplatelet therapy includes aspirin 81 mg and clopidogrel 75 mg

## 2019-08-22 RX ORDER — LANCETS 28 GAUGE
EACH MISCELLANEOUS
Qty: 400 EACH | Refills: 1 | Status: SHIPPED | OUTPATIENT
Start: 2019-08-22

## 2019-08-28 DIAGNOSIS — N52.9 VASCULOGENIC ERECTILE DYSFUNCTION, UNSPECIFIED VASCULOGENIC ERECTILE DYSFUNCTION TYPE: ICD-10-CM

## 2019-08-28 RX ORDER — SILDENAFIL 50 MG/1
50 TABLET, FILM COATED ORAL DAILY PRN
Qty: 6 TABLET | Refills: 5 | Status: SHIPPED | OUTPATIENT
Start: 2019-08-28

## 2019-09-09 DIAGNOSIS — Z79.4 CONTROLLED TYPE 2 DIABETES MELLITUS WITHOUT COMPLICATION, WITH LONG-TERM CURRENT USE OF INSULIN (HCC): ICD-10-CM

## 2019-09-09 DIAGNOSIS — E11.9 CONTROLLED TYPE 2 DIABETES MELLITUS WITHOUT COMPLICATION, WITH LONG-TERM CURRENT USE OF INSULIN (HCC): ICD-10-CM

## 2019-09-09 DIAGNOSIS — IMO0001 UNCONTROLLED DIABETES MELLITUS TYPE 2 WITHOUT COMPLICATIONS: ICD-10-CM

## 2019-09-10 RX ORDER — SITAGLIPTIN 100 MG/1
TABLET, FILM COATED ORAL
Qty: 30 TABLET | Refills: 5 | Status: SHIPPED | OUTPATIENT
Start: 2019-09-10 | End: 2020-03-16 | Stop reason: SDUPTHER

## 2019-09-10 RX ORDER — EMPAGLIFLOZIN 25 MG/1
TABLET, FILM COATED ORAL
Qty: 30 TABLET | Refills: 5 | Status: SHIPPED | OUTPATIENT
Start: 2019-09-10 | End: 2020-03-16 | Stop reason: SDUPTHER

## 2019-11-07 DIAGNOSIS — N52.9 VASCULOGENIC ERECTILE DYSFUNCTION, UNSPECIFIED VASCULOGENIC ERECTILE DYSFUNCTION TYPE: ICD-10-CM

## 2019-11-07 DIAGNOSIS — N40.1 BENIGN PROSTATIC HYPERPLASIA WITH NOCTURIA: ICD-10-CM

## 2019-11-07 DIAGNOSIS — R35.1 BENIGN PROSTATIC HYPERPLASIA WITH NOCTURIA: ICD-10-CM

## 2019-11-07 RX ORDER — FINASTERIDE 5 MG/1
5 TABLET, FILM COATED ORAL DAILY
Qty: 30 TABLET | Refills: 2 | Status: SHIPPED | OUTPATIENT
Start: 2019-11-07 | End: 2020-01-14

## 2019-12-02 ENCOUNTER — LAB (OUTPATIENT)
Dept: LAB | Facility: HOSPITAL | Age: 64
End: 2019-12-02

## 2019-12-02 DIAGNOSIS — E29.1 HYPOGONADISM IN MALE: ICD-10-CM

## 2019-12-02 DIAGNOSIS — R79.89 LOW TESTOSTERONE: ICD-10-CM

## 2019-12-02 DIAGNOSIS — E11.65 TYPE 2 DIABETES MELLITUS WITH HYPERGLYCEMIA, UNSPECIFIED WHETHER LONG TERM INSULIN USE (HCC): ICD-10-CM

## 2019-12-02 DIAGNOSIS — E55.9 VITAMIN D DEFICIENCY: ICD-10-CM

## 2019-12-02 DIAGNOSIS — E55.9 VITAMIN D DEFICIENCY: Primary | ICD-10-CM

## 2019-12-02 DIAGNOSIS — E78.49 OTHER HYPERLIPIDEMIA: ICD-10-CM

## 2019-12-02 LAB
25(OH)D3 SERPL-MCNC: 40.5 NG/ML (ref 30–100)
ALBUMIN SERPL-MCNC: 4.5 G/DL (ref 3.5–5.2)
ALBUMIN UR-MCNC: 53.1 MG/DL
ALBUMIN/GLOB SERPL: 1.1 G/DL
ALP SERPL-CCNC: 83 U/L (ref 39–117)
ALT SERPL W P-5'-P-CCNC: 30 U/L (ref 1–41)
ANION GAP SERPL CALCULATED.3IONS-SCNC: 13.2 MMOL/L (ref 5–15)
AST SERPL-CCNC: 32 U/L (ref 1–40)
BILIRUB SERPL-MCNC: 0.3 MG/DL (ref 0.2–1.2)
BUN BLD-MCNC: 17 MG/DL (ref 8–23)
BUN/CREAT SERPL: 15 (ref 7–25)
CALCIUM SPEC-SCNC: 9.4 MG/DL (ref 8.6–10.5)
CHLORIDE SERPL-SCNC: 96 MMOL/L (ref 98–107)
CO2 SERPL-SCNC: 24.8 MMOL/L (ref 22–29)
CREAT BLD-MCNC: 1.13 MG/DL (ref 0.76–1.27)
GFR SERPL CREATININE-BSD FRML MDRD: 79 ML/MIN/1.73
GLOBULIN UR ELPH-MCNC: 4 GM/DL
GLUCOSE BLD-MCNC: 125 MG/DL (ref 65–99)
HBA1C MFR BLD: 6.1 % (ref 4.8–5.6)
HCT VFR BLD AUTO: 51.1 % (ref 37.5–51)
HGB BLD-MCNC: 17.1 G/DL (ref 13–17.7)
POTASSIUM BLD-SCNC: 4.2 MMOL/L (ref 3.5–5.2)
PROT SERPL-MCNC: 8.5 G/DL (ref 6–8.5)
PSA SERPL-MCNC: 0.13 NG/ML (ref 0–4)
SODIUM BLD-SCNC: 134 MMOL/L (ref 136–145)

## 2019-12-02 PROCEDURE — 85014 HEMATOCRIT: CPT

## 2019-12-02 PROCEDURE — 80053 COMPREHEN METABOLIC PANEL: CPT

## 2019-12-02 PROCEDURE — 36415 COLL VENOUS BLD VENIPUNCTURE: CPT

## 2019-12-02 PROCEDURE — 85018 HEMOGLOBIN: CPT

## 2019-12-02 PROCEDURE — 82043 UR ALBUMIN QUANTITATIVE: CPT

## 2019-12-02 PROCEDURE — 84403 ASSAY OF TOTAL TESTOSTERONE: CPT

## 2019-12-02 PROCEDURE — 84402 ASSAY OF FREE TESTOSTERONE: CPT

## 2019-12-02 PROCEDURE — 82306 VITAMIN D 25 HYDROXY: CPT

## 2019-12-02 PROCEDURE — 83036 HEMOGLOBIN GLYCOSYLATED A1C: CPT

## 2019-12-02 PROCEDURE — 84681 ASSAY OF C-PEPTIDE: CPT

## 2019-12-02 PROCEDURE — 84153 ASSAY OF PSA TOTAL: CPT

## 2019-12-03 LAB
C PEPTIDE SERPL-MCNC: 3.2 NG/ML (ref 1.1–4.4)
TESTOST FREE SERPL-MCNC: 8.2 PG/ML (ref 6.6–18.1)
TESTOST SERPL-MCNC: 326 NG/DL (ref 264–916)

## 2019-12-04 ENCOUNTER — OFFICE VISIT (OUTPATIENT)
Dept: ENDOCRINOLOGY | Age: 64
End: 2019-12-04

## 2019-12-04 VITALS
WEIGHT: 271 LBS | BODY MASS INDEX: 32 KG/M2 | DIASTOLIC BLOOD PRESSURE: 58 MMHG | HEIGHT: 77 IN | SYSTOLIC BLOOD PRESSURE: 112 MMHG

## 2019-12-04 DIAGNOSIS — I10 ESSENTIAL HYPERTENSION: ICD-10-CM

## 2019-12-04 DIAGNOSIS — Z79.4 CONTROLLED TYPE 2 DIABETES MELLITUS WITH COMPLICATION, WITH LONG-TERM CURRENT USE OF INSULIN (HCC): Primary | ICD-10-CM

## 2019-12-04 DIAGNOSIS — E55.9 VITAMIN D DEFICIENCY: ICD-10-CM

## 2019-12-04 DIAGNOSIS — E29.1 HYPOGONADISM IN MALE: ICD-10-CM

## 2019-12-04 DIAGNOSIS — E11.8 CONTROLLED TYPE 2 DIABETES MELLITUS WITH COMPLICATION, WITH LONG-TERM CURRENT USE OF INSULIN (HCC): Primary | ICD-10-CM

## 2019-12-04 DIAGNOSIS — E78.5 HYPERLIPIDEMIA, UNSPECIFIED HYPERLIPIDEMIA TYPE: ICD-10-CM

## 2019-12-04 DIAGNOSIS — R79.89 LOW TESTOSTERONE: ICD-10-CM

## 2019-12-04 PROCEDURE — 99214 OFFICE O/P EST MOD 30 MIN: CPT | Performed by: NURSE PRACTITIONER

## 2019-12-04 NOTE — PROGRESS NOTES
"Marie Monaco is a 64 y.o. male is here today for follow-up.  Chief Complaint   Patient presents with   • Diabetes     followup, labs, no BG   • Hyperlipidemia   • Hypertension   • Hypogonadism   • Vitamin D Deficiency     /58   Ht 195 cm (76.77\")   Wt 123 kg (271 lb)   BMI 32.33 kg/m²   Current Outpatient Medications on File Prior to Visit   Medication Sig   • amLODIPine (NORVASC) 10 MG tablet Take 1 tablet by mouth Daily.   • aspirin 81 MG EC tablet Take 1 tablet by mouth Daily. get over the counter   • Blood Glucose Monitoring Suppl ([facility] BLOOD [facility]) kit 1 kit Continuous.   • carvedilol (COREG) 25 MG tablet TAKE ONE TABLET BY MOUTH TWICE A DAY WITH MEALS   • clonazePAM (KlonoPIN) 0.5 MG tablet Take 1 tablet by mouth 3 (Three) Times a Day As Needed (Facial twitching).   • clopidogrel (PLAVIX) 75 MG tablet Take 1 tablet by mouth Daily.   • dorzolamide-timolol (COSOPT) 22.3-6.8 MG/ML ophthalmic solution Apply 1 drop to eye(s) to both eyes 2 (Two) Times a Day.   • escitalopram (LEXAPRO) 20 MG tablet Take 1 tablet by mouth Daily.   • esomeprazole (nexIUM) 40 MG capsule Take 1 capsule by mouth Daily.   • finasteride (PROSCAR) 5 MG tablet Take 1 tablet by mouth Daily.   • glucose blood ([employer] PREMIUM GLUCOSE TEST) test strip Use to test 4 times daily   • Insulin Glargine (TOUJEO SOLOSTAR) 300 UNIT/ML solution pen-injector Inject 60 Units under the skin into the appropriate area as directed Every Morning.   • Insulin Pen Needle (B-D ULTRAFINE III SHORT PEN) 31G X 8 MM misc USE AS DIRECTED DAILY WITH TOUJEO   • JANUVIA 100 MG tablet TAKE 1 TABLET BY MOUTH DAILY   • JARDIANCE 25 MG tablet TAKE 1 TABLET BY MOUTH EVERY MORNING   • Lancets (FREESTYLE) lancets Use to test BG 4 times daily   • latanoprost (XALATAN) 0.005 % ophthalmic solution Apply 1 drop to  each eye Daily.   • latanoprost (XALATAN) 0.005 % ophthalmic solution APPLY ONE DROP TO EACH EYE DAILY   • metFORMIN (GLUCOPHAGE) 500 MG " tablet Take 2 tablets by mouth 2 (Two) Times a Day With Meals.   • pioglitazone (ACTOS) 45 MG tablet TAKE ONE TABLET BY MOUTH DAILY   • rosuvastatin (CRESTOR) 40 MG tablet Take 1 tablet by mouth Daily.   • sildenafil (VIAGRA) 50 MG tablet Take 1 tablet by mouth Daily As Needed for erectile dysfunction.   • vitamin D (ERGOCALCIFEROL) 40143 units capsule capsule TAKE ONE CAPSULE BY MOUTH ONCE WEEKLY   • [DISCONTINUED] dorzolamide-timolol (COSOPT) 22.3-6.8 MG/ML ophthalmic solution Administer 1 drop to both eyes 2 (Two) Times a Day.   • hepatitis A (HAVRIX) 1440 EL U/ML vaccine Inject  1 ml into the appropriate muscle as directed by prescriber.   • pioglitazone (ACTOS) 15 MG tablet Take 1 tablet by mouth Daily.   • Zoster Vac Recomb Adjuvanted 50 MCG/0.5ML reconstituted suspension Inject 0.5 mL into the appropriate muscle as directed by prescriber.   • [DISCONTINUED] ferrous gluconate 324 (37.5 Fe) MG tablet tablet Take 324 mg by mouth Daily With Breakfast.   • [DISCONTINUED] ferrous sulfate 325 (65 FE) MG tablet Take 325 mg by mouth Daily.   • [DISCONTINUED] Insulin Glargine, 1 Unit Dial, (TOUJEO SOLOSTAR) 300 UNIT/ML solution pen-injector injection Inject 60 Units under the skin into the appropriate area as directed Every Morning.     No current facility-administered medications on file prior to visit.      Family History   Problem Relation Age of Onset   • Lupus Sister    • Thyroid disease Sister    • Heart disease Other    • Hypertension Other    • Arthritis Mother    • Hyperlipidemia Mother    • Hypertension Mother    • Thyroid disease Mother    • Lupus Mother    • Heart disease Father    • Arthritis Father    • Other Father         Vascular disease   • Lupus Father    • Depression Father    • Alcohol abuse Father    • Dementia Father    • Hypertension Father    • Heart disease Brother    • Heart attack Brother 40   • Thyroid disease Sister    • Arthritis Brother    • Malig Hyperthermia Neg Hx      Social History      Tobacco Use   • Smoking status: Never Smoker   • Smokeless tobacco: Never Used   Substance Use Topics   • Alcohol use: Yes     Comment: 2 DRINKS 2X WEEK   • Drug use: No     Allergies   Allergen Reactions   • Ace Inhibitors      angioedema   • Lisinopril Angioedema   • Testosterone      Muscle pain         History of Present Illness   Encounter Diagnoses   Name Primary?   • Essential hypertension    • Low testosterone    • Hypogonadism in male    • Vitamin D deficiency    • Controlled type 2 diabetes mellitus with complication, with long-term current use of insulin (CMS/[facility]) Yes   • Hyperlipidemia, unspecified hyperlipidemia type        64-year-old male patient here today for follow-up visit.  He is being seen for the above-mentioned problems.  He had recent labs which were reviewed he was provided a copy.  His hemoglobin A1c reflects good glycemic control.  He denies any hypoglycemic events.  He is currently not checking his blood sugars.  They did this getting a lipid panel on his recent labs and so he will have that done at today's visit.  He has no complaints and states he has good energy.  He has since quit taking iron supplement after having a iron infusion.  He denies any peripheral neuropathy  The following portions of the patient's history were reviewed and updated as appropriate: allergies, current medications, past family history, past medical history, past social history, past surgical history and problem list.    Review of Systems   Constitutional: Negative.    HENT: Negative.    Eyes: Negative.    Respiratory: Negative.    Cardiovascular: Negative.    Gastrointestinal: Negative.    Endocrine: Negative.    Genitourinary: Negative.    Musculoskeletal: Negative.    Skin: Negative.    Allergic/Immunologic: Negative.    Neurological: Negative.    Hematological: Negative.    Psychiatric/Behavioral: Negative.        Objective   Physical Exam   Constitutional: He is oriented to person, place, and time. He  appears well-developed and well-nourished. No distress.   HENT:   Head: Normocephalic and atraumatic.   Right Ear: External ear normal.   Left Ear: External ear normal.   Nose: Nose normal.   Eyes: Pupils are equal, round, and reactive to light. Right eye exhibits no discharge. Left eye exhibits no discharge.   Neck: Normal range of motion. Neck supple. Carotid bruit is not present. No tracheal deviation, no edema and no erythema present. No thyromegaly present.   Cardiovascular: Normal rate, regular rhythm, normal heart sounds and intact distal pulses. Exam reveals no gallop and no friction rub.   No murmur heard.  Pulmonary/Chest: Effort normal and breath sounds normal. No respiratory distress. He has no wheezes. He has no rales.   Abdominal: Soft. Bowel sounds are normal. He exhibits no distension. There is no tenderness.   Musculoskeletal: Normal range of motion. He exhibits no edema or deformity.   Lymphadenopathy:     He has no cervical adenopathy.   Neurological: He is alert and oriented to person, place, and time. Coordination normal.   Skin: Skin is warm and dry. No rash noted. He is not diaphoretic. No erythema. No pallor.   Psychiatric: He has a normal mood and affect. His behavior is normal. Judgment and thought content normal.   Nursing note and vitals reviewed.    Results for orders placed or performed in visit on 12/02/19   Testosterone, Free, Total   Result Value Ref Range    Testosterone, Total 326 264 - 916 ng/dL    Testosterone, Free 8.2 6.6 - 18.1 pg/mL   PSA DIAGNOSTIC   Result Value Ref Range    PSA 0.130 0.000 - 4.000 ng/mL   Hematocrit   Result Value Ref Range    Hematocrit 51.1 (H) 37.5 - 51.0 %   Hemoglobin   Result Value Ref Range    Hemoglobin 17.1 13.0 - 17.7 g/dL   C-Peptide   Result Value Ref Range    C-Peptide 3.2 1.1 - 4.4 ng/mL   Hemoglobin A1c   Result Value Ref Range    Hemoglobin A1C 6.10 (H) 4.80 - 5.60 %   Vitamin D 25 Hydroxy   Result Value Ref Range    25 Hydroxy, Vitamin D  40.5 30.0 - 100.0 ng/ml   Comprehensive Metabolic Panel   Result Value Ref Range    Glucose 125 (H) 65 - 99 mg/dL    BUN 17 8 - 23 mg/dL    Creatinine 1.13 0.76 - 1.27 mg/dL    Sodium 134 (L) 136 - 145 mmol/L    Potassium 4.2 3.5 - 5.2 mmol/L    Chloride 96 (L) 98 - 107 mmol/L    CO2 24.8 22.0 - 29.0 mmol/L    Calcium 9.4 8.6 - 10.5 mg/dL    Total Protein 8.5 6.0 - 8.5 g/dL    Albumin 4.50 3.50 - 5.20 g/dL    ALT (SGPT) 30 1 - 41 U/L    AST (SGOT) 32 1 - 40 U/L    Alkaline Phosphatase 83 39 - 117 U/L    Total Bilirubin 0.3 0.2 - 1.2 mg/dL    eGFR  African Amer 79 >60 mL/min/1.73    Globulin 4.0 gm/dL    A/G Ratio 1.1 g/dL    BUN/Creatinine Ratio 15.0 7.0 - 25.0    Anion Gap 13.2 5.0 - 15.0 mmol/L   MicroAlbumin, Urine, Random - Urine, Clean Catch   Result Value Ref Range    Microalbumin, Urine 53.1 mg/dL         Assessment/Plan   Problems Addressed this Visit        Cardiovascular and Mediastinum    Hyperlipidemia    Essential hypertension    Relevant Orders    Lipid Panel       Digestive    Vitamin D deficiency    Relevant Orders    Lipid Panel       Endocrine    Hypogonadism in male    Relevant Orders    Lipid Panel    Controlled type 2 diabetes mellitus with complication, with long-term current use of insulin (CMS/[facility]) - Primary    Relevant Orders    Lipid Panel       Other    Low testosterone    Relevant Orders    Lipid Panel        Patient was seen and examined.  He is not currently on any testosterone his testosterone level is in normal range.  Blood pressure is in good range.  He will be checked today for dyslipidemia.  His previous lipid panel a year ago was in satisfactory range.  His vitamin D is stable.  He will follow-up in 6 months with dr dash or myself with labs prior.  He is been encouraged to recheck should he have any questions or concerns.  His hemoglobin A1c reflects good glycemic control.  No medications were added or changed.  Currently on statin therapy.

## 2019-12-05 LAB
CHOLEST SERPL-MCNC: 201 MG/DL (ref 0–200)
HDLC SERPL-MCNC: 47 MG/DL (ref 40–60)
INTERPRETATION: NORMAL
LDLC SERPL CALC-MCNC: 115 MG/DL (ref 0–100)
TRIGL SERPL-MCNC: 195 MG/DL (ref 0–150)
VLDLC SERPL CALC-MCNC: 39 MG/DL

## 2019-12-07 DIAGNOSIS — E78.5 HYPERLIPIDEMIA: ICD-10-CM

## 2019-12-07 DIAGNOSIS — E55.9 VITAMIN D DEFICIENCY: ICD-10-CM

## 2019-12-07 DIAGNOSIS — R80.9 MICROALBUMINURIA: ICD-10-CM

## 2019-12-07 DIAGNOSIS — N52.9 MALE ERECTILE DISORDER: ICD-10-CM

## 2019-12-07 DIAGNOSIS — E66.9 ADIPOSITY: ICD-10-CM

## 2019-12-07 DIAGNOSIS — I10 ESSENTIAL HYPERTENSION: ICD-10-CM

## 2019-12-07 DIAGNOSIS — E11.65 TYPE 2 DIABETES MELLITUS WITH HYPERGLYCEMIA (HCC): ICD-10-CM

## 2019-12-16 DIAGNOSIS — K21.9 GASTROESOPHAGEAL REFLUX DISEASE WITHOUT ESOPHAGITIS: ICD-10-CM

## 2019-12-16 DIAGNOSIS — E55.9 VITAMIN D DEFICIENCY: ICD-10-CM

## 2019-12-16 DIAGNOSIS — I10 ESSENTIAL HYPERTENSION: ICD-10-CM

## 2019-12-16 DIAGNOSIS — I25.10 CHRONIC CORONARY ARTERY DISEASE: ICD-10-CM

## 2019-12-16 DIAGNOSIS — K44.9 HIATAL HERNIA: ICD-10-CM

## 2019-12-16 DIAGNOSIS — R80.9 MICROALBUMINURIA: ICD-10-CM

## 2019-12-16 DIAGNOSIS — E66.9 ADIPOSITY: ICD-10-CM

## 2019-12-16 DIAGNOSIS — E11.65 TYPE 2 DIABETES MELLITUS WITH HYPERGLYCEMIA (HCC): ICD-10-CM

## 2019-12-16 DIAGNOSIS — N52.9 MALE ERECTILE DISORDER: ICD-10-CM

## 2019-12-16 DIAGNOSIS — F41.9 ANXIETY: ICD-10-CM

## 2019-12-16 DIAGNOSIS — E78.5 HYPERLIPIDEMIA: ICD-10-CM

## 2019-12-16 RX ORDER — ESOMEPRAZOLE MAGNESIUM 40 MG/1
CAPSULE, DELAYED RELEASE ORAL
Qty: 30 CAPSULE | Refills: 4 | Status: SHIPPED | OUTPATIENT
Start: 2019-12-16 | End: 2020-01-14 | Stop reason: SDUPTHER

## 2019-12-17 RX ORDER — ESCITALOPRAM OXALATE 20 MG/1
20 TABLET ORAL DAILY
Qty: 90 TABLET | Refills: 1 | Status: SHIPPED | OUTPATIENT
Start: 2019-12-17 | End: 2020-03-23

## 2019-12-17 RX ORDER — CLOPIDOGREL BISULFATE 75 MG/1
75 TABLET ORAL DAILY
Qty: 90 TABLET | Refills: 1 | Status: ON HOLD | OUTPATIENT
Start: 2019-12-17 | End: 2020-03-11 | Stop reason: SDUPTHER

## 2019-12-17 RX ORDER — CARVEDILOL 25 MG/1
TABLET ORAL
Qty: 180 TABLET | Refills: 1 | Status: SHIPPED | OUTPATIENT
Start: 2019-12-17 | End: 2020-06-18 | Stop reason: SDUPTHER

## 2019-12-27 DIAGNOSIS — N52.9 VASCULOGENIC ERECTILE DYSFUNCTION, UNSPECIFIED VASCULOGENIC ERECTILE DYSFUNCTION TYPE: ICD-10-CM

## 2019-12-27 DIAGNOSIS — R35.1 BENIGN PROSTATIC HYPERPLASIA WITH NOCTURIA: ICD-10-CM

## 2019-12-27 DIAGNOSIS — N40.1 BENIGN PROSTATIC HYPERPLASIA WITH NOCTURIA: ICD-10-CM

## 2019-12-27 RX ORDER — ERGOCALCIFEROL 1.25 MG/1
50000 CAPSULE ORAL WEEKLY
Qty: 12 CAPSULE | Refills: 0 | Status: SHIPPED | OUTPATIENT
Start: 2019-12-27 | End: 2020-02-03 | Stop reason: SDUPTHER

## 2019-12-27 RX ORDER — FINASTERIDE 5 MG/1
5 TABLET, FILM COATED ORAL DAILY
Qty: 30 TABLET | Refills: 2 | OUTPATIENT
Start: 2019-12-27

## 2019-12-27 RX ORDER — PIOGLITAZONEHYDROCHLORIDE 15 MG/1
15 TABLET ORAL DAILY
Qty: 90 TABLET | Refills: 0 | Status: SHIPPED | OUTPATIENT
Start: 2019-12-27 | End: 2020-05-18 | Stop reason: SDUPTHER

## 2020-01-14 ENCOUNTER — OFFICE VISIT (OUTPATIENT)
Dept: FAMILY MEDICINE CLINIC | Facility: CLINIC | Age: 65
End: 2020-01-14

## 2020-01-14 VITALS
SYSTOLIC BLOOD PRESSURE: 134 MMHG | WEIGHT: 268 LBS | HEART RATE: 61 BPM | TEMPERATURE: 97.7 F | DIASTOLIC BLOOD PRESSURE: 72 MMHG | BODY MASS INDEX: 31.64 KG/M2 | RESPIRATION RATE: 16 BRPM | OXYGEN SATURATION: 97 % | HEIGHT: 77 IN

## 2020-01-14 DIAGNOSIS — I25.10 CHRONIC CORONARY ARTERY DISEASE: Primary | ICD-10-CM

## 2020-01-14 DIAGNOSIS — Z79.4 CONTROLLED TYPE 2 DIABETES MELLITUS WITH COMPLICATION, WITH LONG-TERM CURRENT USE OF INSULIN (HCC): ICD-10-CM

## 2020-01-14 DIAGNOSIS — E78.5 HYPERLIPIDEMIA, UNSPECIFIED HYPERLIPIDEMIA TYPE: ICD-10-CM

## 2020-01-14 DIAGNOSIS — K21.9 GASTROESOPHAGEAL REFLUX DISEASE, ESOPHAGITIS PRESENCE NOT SPECIFIED: ICD-10-CM

## 2020-01-14 DIAGNOSIS — E11.8 CONTROLLED TYPE 2 DIABETES MELLITUS WITH COMPLICATION, WITH LONG-TERM CURRENT USE OF INSULIN (HCC): ICD-10-CM

## 2020-01-14 DIAGNOSIS — K21.00 GASTROESOPHAGEAL REFLUX DISEASE WITH ESOPHAGITIS: ICD-10-CM

## 2020-01-14 DIAGNOSIS — I10 ESSENTIAL HYPERTENSION: ICD-10-CM

## 2020-01-14 PROCEDURE — 99204 OFFICE O/P NEW MOD 45 MIN: CPT | Performed by: FAMILY MEDICINE

## 2020-01-14 NOTE — PROGRESS NOTES
Isaias Monaco is a 64 y.o. male.     Chief Complaint   Patient presents with   • Establish Care     patient is establishing a care   • Hypertension     patient needs to monitor b/p       HPI     Patient presents the office today for the first time to discuss issues that are all new to me.  Patient has a history of chronic coronary artery disease.  Has had multiple stents placed along with angioplasty.  Does follow with cardiology regularly.  Also has hyperlipidemia and hypertension.  Taking amlodipine, carvedilol, Plavix is and Crestor.  Tolerating his medications well.  Also has diabetes for which he takes 60 units of Toujeo, Januvia, Jardiance, metformin, and Actos.  Does see endocrinology.  Also with history of reflux.  Does follow with gastroenterology.  Currently taking Nexium 40 mg daily.  Also has a history of some eye twitching and anxiety.  Uses clonazepam on a strictly as needed basis.  Also takes Lexapro 20 mg daily.  Tries to maintain healthy diet and exercise.  No tobacco use.  Family history significant for extensive cardiovascular disease and diabetes.    The following portions of the patient's history were reviewed and updated as appropriate: allergies, current medications, past family history, past medical history, past social history, past surgical history and problem list.    Review of Systems   Constitutional: Negative for activity change.   All other systems reviewed and are negative.    I have reviewed the ROS as documented by the MA. Thony Reynolds MD    Objective  Vitals:    01/14/20 0833   BP: 134/72   Pulse: 61   Resp: 16   Temp: 97.7 °F (36.5 °C)   SpO2: 97%     Body mass index is 32.06 kg/m².    Physical Exam   Constitutional: He is oriented to person, place, and time. He appears well-developed and well-nourished. No distress.   HENT:   Head: Normocephalic and atraumatic.   Right Ear: External ear normal.   Left Ear: External ear normal.   Nose: Nose normal.   Mouth/Throat:  Oropharynx is clear and moist.   Eyes: Pupils are equal, round, and reactive to light. Conjunctivae and EOM are normal. Right eye exhibits no discharge. Left eye exhibits no discharge. No scleral icterus.   Cardiovascular: Normal rate, regular rhythm and normal heart sounds.   Pulmonary/Chest: Effort normal and breath sounds normal. No respiratory distress. He has no wheezes. He has no rales.   Abdominal: Soft. Bowel sounds are normal. He exhibits no distension. There is no tenderness.   Neurological: He is alert and oriented to person, place, and time.   Skin: Skin is warm and dry. He is not diaphoretic.   Nursing note and vitals reviewed.        Current Outpatient Medications:   •  amLODIPine (NORVASC) 10 MG tablet, Take 1 tablet by mouth Daily., Disp: 90 tablet, Rfl: 1  •  aspirin 81 MG EC tablet, Take 1 tablet by mouth Daily. get over the counter, Disp: 30 tablet, Rfl: 3  •  Blood Glucose Monitoring Suppl ([employer] BLOOD GLUCOSE) kit, 1 kit Continuous., Disp: 1 each, Rfl: 0  •  carvedilol (COREG) 25 MG tablet, TAKE ONE TABLET BY MOUTH TWICE A DAY WITH MEALS, Disp: 180 tablet, Rfl: 1  •  clonazePAM (KlonoPIN) 0.5 MG tablet, Take 1 tablet by mouth 3 (Three) Times a Day As Needed (Facial twitching)., Disp: 63 tablet, Rfl: 0  •  clopidogrel (PLAVIX) 75 MG tablet, Take 1 tablet by mouth Daily., Disp: 90 tablet, Rfl: 1  •  dorzolamide-timolol (COSOPT) 22.3-6.8 MG/ML ophthalmic solution, Apply 1 drop to eye(s) to both eyes 2 (Two) Times a Day., Disp: 20 mL, Rfl: 3  •  escitalopram (LEXAPRO) 20 MG tablet, Take 1 tablet by mouth Daily., Disp: 90 tablet, Rfl: 1  •  esomeprazole (nexIUM) 40 MG capsule, Take 1 capsule by mouth Daily., Disp: 30 capsule, Rfl: 5  •  glucose blood (FREESTYLE LITE) test strip, TEST FOUR TIMES A DAY, Disp: 100 each, Rfl: 0  •  Insulin Glargine (TOUJEO SOLOSTAR) 300 UNIT/ML solution pen-injector, Inject 60 Units under the skin into the appropriate area as directed Every Morning., Disp: 27 mL, Rfl:  1  •  Insulin Pen Needle (B-D ULTRAFINE III SHORT PEN) 31G X 8 MM misc, USE AS DIRECTED DAILY WITH TOUJEO, Disp: 100 each, Rfl: 5  •  JANUVIA 100 MG tablet, TAKE 1 TABLET BY MOUTH DAILY, Disp: 30 tablet, Rfl: 5  •  JARDIANCE 25 MG tablet, TAKE 1 TABLET BY MOUTH EVERY MORNING, Disp: 30 tablet, Rfl: 5  •  Lancets (FREESTYLE) lancets, Use to test BG 4 times daily, Disp: 400 each, Rfl: 1  •  latanoprost (XALATAN) 0.005 % ophthalmic solution, Apply 1 drop to  each eye Daily., Disp: 7.5 mL, Rfl: 3  •  latanoprost (XALATAN) 0.005 % ophthalmic solution, APPLY ONE DROP TO EACH EYE DAILY, Disp: 7.5 mL, Rfl: 3  •  metFORMIN (GLUCOPHAGE) 500 MG tablet, Take 2 tablets by mouth 2 (Two) Times a Day With Meals., Disp: 120 tablet, Rfl: 5  •  pioglitazone (ACTOS) 15 MG tablet, Take 1 tablet by mouth Daily., Disp: 90 tablet, Rfl: 0  •  rosuvastatin (CRESTOR) 40 MG tablet, Take 1 tablet by mouth Daily., Disp: 30 tablet, Rfl: 5  •  sildenafil (VIAGRA) 50 MG tablet, Take 1 tablet by mouth Daily As Needed for erectile dysfunction., Disp: 6 tablet, Rfl: 5  •  vitamin D (ERGOCALCIFEROL) 1.25 MG (93290 UT) capsule capsule, TAKE ONE CAPSULE BY MOUTH ONCE WEEKLY, Disp: 12 capsule, Rfl: 0  •  Zoster Vac Recomb Adjuvanted 50 MCG/0.5ML reconstituted suspension, Inject 0.5 mL into the appropriate muscle as directed by prescriber., Disp: 1 each, Rfl: 0  Current outpatient and discharge medications have been reconciled for the patient.  Reviewed by: Thony Reynolds MD      Procedures    Lab Results (most recent)     None                  Isaias was seen today for establish care and hypertension.    Diagnoses and all orders for this visit:    Chronic coronary artery disease    Hyperlipidemia, unspecified hyperlipidemia type    Essential hypertension    Gastroesophageal reflux disease, esophagitis presence not specified    Gastroesophageal reflux disease with esophagitis    Controlled type 2 diabetes mellitus with complication, with long-term current  use of insulin (CMS/[facility])    Extensive conversation had with the patient regarding his above chronic medical issues.  Recent blood work done shows great improvement.  Hemoglobin A1c of 6.1.  Advised continued use of medications as prescribed.  Continue following up with specialist as indicated.      Return for As needed.      Thony Reynolds MD

## 2020-01-28 ENCOUNTER — TELEPHONE (OUTPATIENT)
Dept: ENDOCRINOLOGY | Age: 65
End: 2020-01-28

## 2020-01-28 DIAGNOSIS — I10 ESSENTIAL HYPERTENSION: ICD-10-CM

## 2020-01-28 DIAGNOSIS — E11.65 TYPE 2 DIABETES MELLITUS WITH HYPERGLYCEMIA (HCC): ICD-10-CM

## 2020-01-28 DIAGNOSIS — E55.9 VITAMIN D DEFICIENCY: ICD-10-CM

## 2020-01-28 DIAGNOSIS — E66.9 ADIPOSITY: ICD-10-CM

## 2020-01-28 DIAGNOSIS — R80.9 MICROALBUMINURIA: ICD-10-CM

## 2020-01-28 DIAGNOSIS — E78.5 HYPERLIPIDEMIA: ICD-10-CM

## 2020-01-28 DIAGNOSIS — N52.9 MALE ERECTILE DISORDER: ICD-10-CM

## 2020-01-28 NOTE — TELEPHONE ENCOUNTER
Patient has been trying to get his toujeo  kroger sent over two request per patient      [facility] [address]     90 day supply    Patient is out

## 2020-02-04 RX ORDER — ERGOCALCIFEROL 1.25 MG/1
50000 CAPSULE ORAL WEEKLY
Qty: 12 CAPSULE | Refills: 0 | Status: SHIPPED | OUTPATIENT
Start: 2020-02-04 | End: 2020-11-24 | Stop reason: SDUPTHER

## 2020-02-05 DIAGNOSIS — E78.5 HYPERLIPIDEMIA, UNSPECIFIED HYPERLIPIDEMIA TYPE: ICD-10-CM

## 2020-02-05 DIAGNOSIS — Z79.4 TYPE 2 DIABETES MELLITUS WITHOUT COMPLICATION, WITH LONG-TERM CURRENT USE OF INSULIN (HCC): ICD-10-CM

## 2020-02-05 DIAGNOSIS — E11.9 TYPE 2 DIABETES MELLITUS WITHOUT COMPLICATION, WITH LONG-TERM CURRENT USE OF INSULIN (HCC): ICD-10-CM

## 2020-02-05 DIAGNOSIS — I10 ESSENTIAL HYPERTENSION: ICD-10-CM

## 2020-02-05 RX ORDER — ROSUVASTATIN CALCIUM 40 MG/1
40 TABLET, COATED ORAL DAILY
Qty: 30 TABLET | Refills: 5 | Status: SHIPPED | OUTPATIENT
Start: 2020-02-05 | End: 2020-05-11 | Stop reason: SDUPTHER

## 2020-02-05 RX ORDER — AMLODIPINE BESYLATE 10 MG/1
10 TABLET ORAL DAILY
Qty: 90 TABLET | Refills: 1 | Status: SHIPPED | OUTPATIENT
Start: 2020-02-05 | End: 2020-09-18 | Stop reason: SDUPTHER

## 2020-02-09 DIAGNOSIS — N52.9 VASCULOGENIC ERECTILE DYSFUNCTION, UNSPECIFIED VASCULOGENIC ERECTILE DYSFUNCTION TYPE: ICD-10-CM

## 2020-02-10 DIAGNOSIS — E66.9 ADIPOSITY: ICD-10-CM

## 2020-02-10 DIAGNOSIS — N52.9 MALE ERECTILE DISORDER: ICD-10-CM

## 2020-02-10 DIAGNOSIS — I10 ESSENTIAL HYPERTENSION: ICD-10-CM

## 2020-02-10 DIAGNOSIS — R80.9 MICROALBUMINURIA: ICD-10-CM

## 2020-02-10 DIAGNOSIS — E78.5 HYPERLIPIDEMIA: ICD-10-CM

## 2020-02-10 DIAGNOSIS — E55.9 VITAMIN D DEFICIENCY: ICD-10-CM

## 2020-02-10 DIAGNOSIS — E11.65 TYPE 2 DIABETES MELLITUS WITH HYPERGLYCEMIA (HCC): ICD-10-CM

## 2020-02-10 RX ORDER — SILDENAFIL 50 MG/1
50 TABLET, FILM COATED ORAL DAILY PRN
Qty: 6 TABLET | Refills: 5 | OUTPATIENT
Start: 2020-02-10

## 2020-02-10 RX ORDER — BLOOD-GLUCOSE METER
KIT MISCELLANEOUS
Qty: 1 EACH | Refills: 0 | Status: SHIPPED | OUTPATIENT
Start: 2020-02-10

## 2020-03-02 ENCOUNTER — OFFICE VISIT (OUTPATIENT)
Dept: SURGERY | Facility: CLINIC | Age: 65
End: 2020-03-02

## 2020-03-02 VITALS — HEIGHT: 77 IN | HEART RATE: 88 BPM | OXYGEN SATURATION: 98 % | BODY MASS INDEX: 31.88 KG/M2 | WEIGHT: 270 LBS

## 2020-03-02 DIAGNOSIS — K21.9 GASTROESOPHAGEAL REFLUX DISEASE, ESOPHAGITIS PRESENCE NOT SPECIFIED: Primary | ICD-10-CM

## 2020-03-02 PROCEDURE — 99213 OFFICE O/P EST LOW 20 MIN: CPT | Performed by: SURGERY

## 2020-03-02 RX ORDER — FAMOTIDINE 20 MG/1
20 TABLET, FILM COATED ORAL
Qty: 30 TABLET | Refills: 1 | Status: SHIPPED | OUTPATIENT
Start: 2020-03-02 | End: 2020-05-05

## 2020-03-03 NOTE — H&P (VIEW-ONLY)
Date of Office Visit: 2020  Encounter Provider: MICHELLE Haq  Place of Service: [facility] CARDIOLOGY  Patient Name: Isaias Monaco  :1955    Chief Complaint   Patient presents with   • Coronary Artery Disease   :     HPI: Annie Monaco is a 64-year-old -American male who has seen Dr. Karimi in the past.  He was a previous patient of Dr. Khan.  He is new to me today.  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 He presents for follow-up of his coronary artery disease.  He had a history of cardiac cath in  in which he received multiple stents to his right coronary that was  and PCI to the circumflex and LAD prior to this..  It was recommended continuing aspirin and Plavix lifelong.  He has risk factors such as hypertension, and hyperlipidemia.  Based on his echo in  he had preserved LV function.    He presents today with recurrent symptoms of his angina.  He never gets chest pain but has shortness of breath and a tightening sensation or fullness in his throat.  This happens at rest.  He does not take nitrates because he uses Viagra on a regular basis.  He is on good medical therapy with aspirin, Coreg, Plavix statin and Norvasc.  He has risk factors such as hypertension, type 2 diabetes mellitus and hyperlipidemia.      Past Medical History:   Diagnosis Date   • Adiposity    • Anemia     post hemorrhagic   • Angioedema 2016    Secondary to ACE inhibitor   • Anxiety    • Arthritis    • Arthritis    • CAD (coronary artery disease)    • Chest pain    • Colon polyps    • Diabetes mellitus, type 2 (CMS/HCC)    • ED (erectile dysfunction)    • Febrile illness    • GERD (gastroesophageal reflux disease)    • Glaucoma    • Hematoma    • High cholesterol    • History of foreign travel 2017; 2018    [place], [place], [place], [place], [place] and [place]; [place]   • Hyperlipidemia    • Hypertension    •  Hypogonadism in male 9/28/2016   • Male erectile disorder    • Microalbuminuria    • Obesity (BMI 30-39.9)    • Osteoarthritis     knee   • Vitamin D deficiency    • Wound infection after surgery        Past Surgical History:   Procedure Laterality Date   • CARDIAC CATHETERIZATION N/A 05/15/2006    Dr. Mini Camarillo   • COLONOSCOPY N/A 02/22/2006    Bilobed polyp at 30 cm, hemorrhoids-Dr. Ilya Zhao   • COLONOSCOPY N/A 02/28/2014    Normal ileum, one 6 mm polyp in the mid transverse colon-Dr. Ilya Zhao   • COLONOSCOPY N/A 11/19/2008    Ela ileum, two 3 to 4 mm polyps, non-bleeding internal hemorrhoids, repeat in 5 years-Dr. Ilya Zhao   • COLONOSCOPY N/A 10/31/2017    Procedure: COLONOSCOPY WITH POLYPECTOMY (COLD BIOPSY);  Surgeon: Ilya Zhao MD;  Location: [facility] ENDOSCOPY;  Service:    • CORONARY ANGIOPLASTY WITH STENT PLACEMENT  2007, 2012, 2015    cardiac stents x3 occasions   • ENDOSCOPY N/A 10/4/2017    Procedure: ESOPHAGOGASTRODUODENOSCOPY;  Surgeon: Boyd Guidry MD;  Location: [facility] ENDOSCOPY;  Service:    • KNEE INCISION AND DRAINAGE Right 6/20/2017    Procedure: RT. KNEE WASHOUT ;  Surgeon: Boyd Coyne MD;  Location: [facility] OR;  Service:    • VT TOTAL KNEE ARTHROPLASTY Right 6/15/2017    Procedure: RT TOTAL KNEE ARTHROPLASTY;  Surgeon: Boyd Coyne MD;  Location: [facility] OR;  Service: Orthopedics   • SHOULDER SURGERY Right 2016   • UPPER GASTROINTESTINAL ENDOSCOPY N/A 10/13/2015    Z-line irregular, normal stomach, normal duodenum-Dr. Ilya Zhao   • UPPER GASTROINTESTINAL ENDOSCOPY N/A 02/28/2014    Z-line irregular, normal stomach, normal duodenum-Dr. Ilya Zhao   • UPPER GASTROINTESTINAL ENDOSCOPY N/A 11/19/2008    Z-line irregular, chronic gastritis withotu hemorrhage, normal duodenum-Dr. Ilya Zhao   • UPPER GASTROINTESTINAL ENDOSCOPY N/A 06/22/2006    LA Grade A reflux esophagitis, non-bleeding erythematous gastropathy, normal duodenum-Dr. Ilya Zhao              Social History     Socioeconomic History   • Marital status:      Spouse name: Micaela   • Number of children: 1   • Years of education: College   • Highest education level: Not on file   Occupational History   • Occupation: RN in Critical Care     Employer: [employer]   Tobacco Use   • Smoking status: Never Smoker   • Smokeless tobacco: Never Used   Substance and Sexual Activity   • Alcohol use: Yes     Alcohol/week: 3.0 - 4.0 standard drinks     Types: 3 - 4 Standard drinks or equivalent per week   • Drug use: No   • Sexual activity: Defer   Social History Narrative    , 1 son, 2 stepsons       Family History   Problem Relation Age of Onset   • Lupus Sister    • Thyroid disease Sister    • Heart disease Other    • Hypertension Other    • Arthritis Mother    • Hyperlipidemia Mother    • Hypertension Mother    • Thyroid disease Mother    • Lupus Mother    • Heart disease Father    • Arthritis Father    • Other Father         Vascular disease   • Lupus Father    • Depression Father    • Alcohol abuse Father    • Dementia Father    • Hypertension Father    • Heart disease Brother    • Heart attack Brother 40   • Thyroid disease Sister    • Arthritis Brother    • Malig Hyperthermia Neg Hx        Review of Systems   Constitution: Positive for malaise/fatigue. Negative for diaphoresis.   Cardiovascular: Positive for dyspnea on exertion. Negative for chest pain, claudication, irregular heartbeat, leg swelling, near-syncope, orthopnea, palpitations, paroxysmal nocturnal dyspnea and syncope.   Respiratory: Negative for cough, shortness of breath and sleep disturbances due to breathing.    Musculoskeletal: Negative for falls.   Neurological: Negative for dizziness and weakness.   Psychiatric/Behavioral: Negative for altered mental status and substance abuse.       Allergies   Allergen Reactions   • Ace Inhibitors Angioedema   • Lisinopril Angioedema   • Testosterone Myalgia         Current  Outpatient Medications:   •  amLODIPine (NORVASC) 10 MG tablet, Take 1 tablet by mouth Daily., Disp: 90 tablet, Rfl: 1  •  aspirin 81 MG EC tablet, Take 1 tablet by mouth Daily. get over the counter, Disp: 30 tablet, Rfl: 3  •  Blood Glucose Monitoring Suppl ([employer] BLOOD GLUCOSE) kit, TEST AS DIRECTED, Disp: 1 each, Rfl: 0  •  carvedilol (COREG) 25 MG tablet, TAKE ONE TABLET BY MOUTH TWICE A DAY WITH MEALS, Disp: 180 tablet, Rfl: 1  •  clonazePAM (KlonoPIN) 0.5 MG tablet, Take 1 tablet by mouth 3 (Three) Times a Day As Needed (Facial twitching)., Disp: 63 tablet, Rfl: 0  •  clopidogrel (PLAVIX) 75 MG tablet, Take 1 tablet by mouth Daily., Disp: 90 tablet, Rfl: 1  •  dorzolamide-timolol (COSOPT) 22.3-6.8 MG/ML ophthalmic solution, Apply 1 drop to eye(s) to both eyes 2 (Two) Times a Day., Disp: 20 mL, Rfl: 3  •  escitalopram (LEXAPRO) 20 MG tablet, Take 1 tablet by mouth Daily., Disp: 90 tablet, Rfl: 1  •  esomeprazole (nexIUM) 40 MG capsule, Take 1 capsule by mouth Daily., Disp: 30 capsule, Rfl: 5  •  famotidine (PEPCID) 20 MG tablet, Take 1 tablet by mouth Daily With Dinner., Disp: 30 tablet, Rfl: 1  •  glucose blood test strip, Use 4 times a day, Disp: 400 each, Rfl: 0  •  Insulin Glargine, 2 Unit Dial, (TOUJEO) 300 UNIT/ML solution pen-injector injection, Inject 60 Units under the skin into the appropriate area as directed Every Morning for 90 days., Disp: 18 mL, Rfl: 1  •  Insulin Pen Needle (B-D ULTRAFINE III SHORT PEN) 31G X 8 MM misc, USE AS DIRECTED DAILY WITH TOUJEO, Disp: 100 each, Rfl: 5  •  JANUVIA 100 MG tablet, TAKE 1 TABLET BY MOUTH DAILY, Disp: 30 tablet, Rfl: 5  •  JARDIANCE 25 MG tablet, TAKE 1 TABLET BY MOUTH EVERY MORNING, Disp: 30 tablet, Rfl: 5  •  Lancets (FREESTYLE) lancets, Use to test BG 4 times daily, Disp: 400 each, Rfl: 1  •  latanoprost (XALATAN) 0.005 % ophthalmic solution, Apply 1 drop to  each eye Daily., Disp: 7.5 mL, Rfl: 3  •  latanoprost (XALATAN) 0.005 % ophthalmic solution,  "APPLY ONE DROP TO EACH EYE DAILY, Disp: 7.5 mL, Rfl: 3  •  metFORMIN (GLUCOPHAGE) 500 MG tablet, Take 2 tablets by mouth 2 (Two) Times a Day With Meals., Disp: 120 tablet, Rfl: 5  •  pioglitazone (ACTOS) 15 MG tablet, Take 1 tablet by mouth Daily., Disp: 90 tablet, Rfl: 0  •  rosuvastatin (CRESTOR) 40 MG tablet, Take 1 tablet by mouth Daily., Disp: 30 tablet, Rfl: 5  •  sildenafil (VIAGRA) 50 MG tablet, Take 1 tablet by mouth Daily As Needed for erectile dysfunction., Disp: 6 tablet, Rfl: 5  •  vitamin D (ERGOCALCIFEROL) 1.25 MG (21051 UT) capsule capsule, TAKE ONE CAPSULE BY MOUTH ONCE WEEKLY, Disp: 12 capsule, Rfl: 0      Objective:     Vitals:    03/04/20 1006   BP: 128/80   BP Location: Right arm   Patient Position: Sitting   Pulse: 78   Resp: 16   SpO2: 95%   Weight: 123 kg (270 lb 12.8 oz)   Height: 195.6 cm (77\")     Body mass index is 32.11 kg/m².    PHYSICAL EXAM:    Physical Exam   Constitutional: He is oriented to person, place, and time. He appears well-developed and well-nourished. No distress.   HENT:   Head: Normocephalic.   Eyes: Pupils are equal, round, and reactive to light. EOM are normal.   Neck: Normal range of motion. Neck supple. No JVD present. Carotid bruit is not present. No edema present.   Cardiovascular: Normal rate, regular rhythm, S1 normal, S2 normal, normal heart sounds and intact distal pulses. Exam reveals no gallop.   No murmur heard.  Pulmonary/Chest: Effort normal and breath sounds normal. No tachypnea. He has no wheezes. He has no rales.   Abdominal: Soft. Normal appearance and bowel sounds are normal. He exhibits no ascites. There is no tenderness.   Musculoskeletal: Normal range of motion. He exhibits no edema.   Neurological: He is alert and oriented to person, place, and time.   Skin: Skin is warm and dry.   Psychiatric: He has a normal mood and affect.         ECG 12 Lead  Date/Time: 3/4/2020 11:26 AM  Performed by: Jacqueline Grigsby APRN  Authorized by: Jacqueline Grigsby " MICHELLE Fernandez   Comparison: compared with previous ECG   Similar to previous ECG  Rhythm: sinus rhythm  Rate: normal  BPM: 72  T inversion: II, III and aVF  QRS axis: left    Clinical impression: abnormal EKG              Assessment:       Diagnosis Plan   1. Angina at rest (CMS/[facility])  Case Request Cath Lab: Left Heart Cath    ECG 12 Lead   2. Chronic coronary artery disease  ECG 12 Lead   3. Hyperlipidemia, unspecified hyperlipidemia type  ECG 12 Lead   4. Presence of coronary angioplasty implant and graft  ECG 12 Lead   5. Essential hypertension  ECG 12 Lead   6. Type 2 diabetes mellitus without complication, with long-term current use of insulin (CMS/[facility])  ECG 12 Lead   7. 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  We can optimize his medicines any more than they are.  He cannot take nitrates because of his frequent use of Viagra.  Discussed with him the risks and benefits of procedure he understands and agrees.  Will proceed with left heart cath with Dr. Kariim.       Your medication list           Accurate as of March 4, 2020 11:59 PM. If you have any questions, ask your nurse or doctor.               CONTINUE taking these medications      Instructions Last Dose Given Next Dose Due   amLODIPine 10 MG tablet  Commonly known as:  NORVASC      Take 1 tablet by mouth Daily.       aspirin 81 MG EC tablet      Take 1 tablet by mouth Daily. get over the counter       carvedilol 25 MG tablet  Commonly known as:  COREG      TAKE ONE TABLET BY MOUTH TWICE A DAY WITH MEALS       clonazePAM 0.5 MG tablet  Commonly known as:  KlonoPIN      Take 1 tablet by mouth 3  (Three) Times a Day As Needed (Facial twitching).       clopidogrel 75 MG tablet  Commonly known as:  PLAVIX      Take 1 tablet by mouth Daily.       dorzolamide-timolol 22.3-6.8 MG/ML ophthalmic solution  Commonly known as:  COSOPT      Apply 1 drop to eye(s) to both eyes 2 (Two) Times a Day.       escitalopram 20 MG tablet  Commonly known as:  LEXAPRO      Take 1 tablet by mouth Daily.       esomeprazole 40 MG capsule  Commonly known as:  nexIUM      Take 1 capsule by mouth Daily.       famotidine 20 MG tablet  Commonly known as:  PEPCID      Take 1 tablet by mouth Daily With Dinner.       freestyle lancets      Use to test BG 4 times daily       glucose blood test strip      Use 4 times a day       Insulin Glargine (2 Unit Dial) 300 UNIT/ML solution pen-injector injection  Commonly known as:  TOUJEO      Inject 60 Units under the skin into the appropriate area as directed Every Morning for 90 days.       Insulin Pen Needle 31G X 8 MM misc  Commonly known as:  B-D ULTRAFINE III SHORT PEN      USE AS DIRECTED DAILY WITH TOUJEO       JANUVIA 100 MG tablet  Generic drug:  SITagliptin      TAKE 1 TABLET BY MOUTH DAILY       JARDIANCE 25 MG tablet  Generic drug:  Empagliflozin      TAKE 1 TABLET BY MOUTH EVERY MORNING       [employer] BLOOD GLUCOSE kit      TEST AS DIRECTED       latanoprost 0.005 % ophthalmic solution  Commonly known as:  XALATAN      Apply 1 drop to  each eye Daily.       latanoprost 0.005 % ophthalmic solution  Commonly known as:  XALATAN      APPLY ONE DROP TO EACH EYE DAILY       metFORMIN 500 MG tablet  Commonly known as:  GLUCOPHAGE      Take 2 tablets by mouth 2 (Two) Times a Day With Meals.       pioglitazone 15 MG tablet  Commonly known as:  ACTOS      Take 1 tablet by mouth Daily.       rosuvastatin 40 MG tablet  Commonly known as:  CRESTOR      Take 1 tablet by mouth Daily.       sildenafil 50 MG tablet  Commonly known as:  VIAGRA      Take 1 tablet by mouth Daily As Needed for erectile  dysfunction.       vitamin D 1.25 MG (22788 UT) capsule capsule  Commonly known as:  ERGOCALCIFEROL      TAKE ONE CAPSULE BY MOUTH ONCE WEEKLY                As always, it has been a pleasure to participate in your patient's care.      Sincerely,     Jacqueline MARTINEZ

## 2020-03-03 NOTE — PROGRESS NOTES
SUMMARY (A/P):    64-year-old gentleman with longstanding history of gastroesophageal reflux disease symptoms.  His recent episode of bloating and distention was short-lived and his symptoms are back to baseline.  I do not feel further work-up is warranted at this time.  I did recommend and call in prescription for Pepcid 20 mg p.o. nightly in addition to the Nexium 40 mg p.o. every morning that he is already taking to see if this will help ameliorate some of his evening/nighttime symptoms.      CC:    Gastroesophageal reflux disease    HPI:    64-year-old gentleman 2 weeks ago noted generalized crampy abdominal pain and significant bloating/distention which lasted just over a day.  He also had some increased gastroesophageal reflux symptoms associated with this.  In general, he is now back to baseline level of symptoms.  He has taken Nexium for quite some time for reflux and while symptoms are reasonably well controlled they are not as well-controlled as they used to be.  He is taken Prilosec, Protonix, and Zantac in the past as well.    PSH:    • EGD 10/4/2017-small sliding hiatal hernia  • Right total knee replacement 2017  • Colonoscopy 10/31/2017: Small ascending colon polyp  • Coronary stent    PMH:    • Coronary artery disease  • Gastroesophageal reflux disease  • Hypertension  • Diabetes    FAMILY HISTORY:    • Negative for colorectal cancer    SOCIAL HISTORY:   • Denies tobacco use  • Occasional alcohol use    ALLERGIES:  · ACE inhibitors-angioedema  · Lisinopril-angioedema  · Testosterone-myalgia    MEDICATIONS: reviewed, in Epic.  On:  · Norvasc  · Aspirin 81 mg  · Coreg  · Klonopin  · Plavix  · Cosopt eyedrops  · Lexapro  · Nexium  · Insulin  · Januvia  · Jardiance  · Xalatan  · Glucophage  · Actos  · Crestor  · Viagra  · Vitamin D    ROS:  No chest pain or shortness of air.  Mild weight loss of 20 pounds in the last 6 months and slight decreased appetite.  All other systems reviewed and negative other  than presenting complaints.    RADIOLOGY/ENDOSCOPY:    • Upper GI 8/22/2017: Small sliding hiatal hernia with florid gastroesophageal reflux    PHYSICAL EXAM:   • Constitutional: Well-developed well-nourished, no acute distress  • Vital signs: HR 88, weight 270 pounds, height 77 inches, BMI 32  • Eyes: Conjunctiva normal, sclera nonicteric  • ENMT: Hearing grossly normal, oral mucosa moist  • Neck: Supple, no palpable mass  • Respiratory: Clear to auscultation, normal inspiratory effort  • Cardiovascular: Regular rate, no murmur, no carotid bruit  • Gastrointestinal: Soft, nontender, no palpable mass  • Lymphatics (palpable nodes):  cervical-negative  • Skin:  Warm, dry, no rash on visualized skin surfaces  • Musculoskeletal: Symmetric strength, normal gait  • Psychiatric: Alert and oriented ×3, normal affect     CHANEL RODRIGUEZ M.D.

## 2020-03-03 NOTE — PROGRESS NOTES
Date of Office Visit: 2020  Encounter Provider: MICHELLE Haq  Place of Service: [facility]  Patient Name: Isaias Monaco  :1955    Chief Complaint   Patient presents with   • Coronary Artery Disease   :     HPI: Annie Monaco is a 64-year-old -American male who has seen Dr. Karimi in the past.  He was a previous patient of Dr. Khan.  He is new to me today.  He works as a registered nurse in the ICU at [employer].. He presents for follow-up of his coronary artery disease.  He had a history of cardiac cath in  in which he received multiple stents to his right coronary that was  and PCI to the circumflex and LAD prior to this..  It was recommended continuing aspirin and Plavix lifelong.  He has risk factors such as hypertension, and hyperlipidemia.  Based on his echo in  he had preserved LV function.    He presents today with recurrent symptoms of his angina.  He never gets chest pain but has shortness of breath and a tightening sensation or fullness in his throat.  This happens at rest.  He does not take nitrates because he uses Viagra on a regular basis.  He is on good medical therapy with aspirin, Coreg, Plavix statin and Norvasc.  He has risk factors such as hypertension, type 2 diabetes mellitus and hyperlipidemia.      Past Medical History:   Diagnosis Date   • Adiposity    • Anemia     post hemorrhagic   • Angioedema 2016    Secondary to ACE inhibitor   • Anxiety    • Arthritis    • Arthritis    • CAD (coronary artery disease)    • Chest pain    • Colon polyps    • Diabetes mellitus, type 2 (CMS/HCC)    • ED (erectile dysfunction)    • Febrile illness    • GERD (gastroesophageal reflux disease)    • Glaucoma    • Hematoma    • High cholesterol    • History of foreign travel 2017; 2018    [place], [place], [place], [place], [place] and [place]; [place]   • Hyperlipidemia    • Hypertension    •  Hypogonadism in male 9/28/2016   • Male erectile disorder    • Microalbuminuria    • Obesity (BMI 30-39.9)    • Osteoarthritis     knee   • Vitamin D deficiency    • Wound infection after surgery        Past Surgical History:   Procedure Laterality Date   • CARDIAC CATHETERIZATION N/A 05/15/2006    Dr. Mini Camarillo   • COLONOSCOPY N/A 02/22/2006    Bilobed polyp at 30 cm, hemorrhoids-Dr. Ilya Zhao   • COLONOSCOPY N/A 02/28/2014    Normal ileum, one 6 mm polyp in the mid transverse colon-Dr. Ilya Zhao   • COLONOSCOPY N/A 11/19/2008    Ela ileum, two 3 to 4 mm polyps, non-bleeding internal hemorrhoids, repeat in 5 years-Dr. Ilya Zhao   • COLONOSCOPY N/A 10/31/2017    Procedure: COLONOSCOPY WITH POLYPECTOMY (COLD BIOPSY);  Surgeon: Ilya Zhao MD;  Location: [facility] ENDOSCOPY;  Service:    • CORONARY ANGIOPLASTY WITH STENT PLACEMENT  2007, 2012, 2015    cardiac stents x3 occasions   • ENDOSCOPY N/A 10/4/2017    Procedure: ESOPHAGOGASTRODUODENOSCOPY;  Surgeon: Boyd Guidry MD;  Location: [facility] ENDOSCOPY;  Service:    • KNEE INCISION AND DRAINAGE Right 6/20/2017    Procedure: RT. KNEE WASHOUT ;  Surgeon: Boyd Coyne MD;  Location: [facility] OR;  Service:    • MD TOTAL KNEE ARTHROPLASTY Right 6/15/2017    Procedure: RT TOTAL KNEE ARTHROPLASTY;  Surgeon: Boyd Coyne MD;  Location: [facility] OR;  Service: Orthopedics   • SHOULDER SURGERY Right 2016   • UPPER GASTROINTESTINAL ENDOSCOPY N/A 10/13/2015    Z-line irregular, normal stomach, normal duodenum-Dr. Ilya Zhao   • UPPER GASTROINTESTINAL ENDOSCOPY N/A 02/28/2014    Z-line irregular, normal stomach, normal duodenum-Dr. Ilya Zhao   • UPPER GASTROINTESTINAL ENDOSCOPY N/A 11/19/2008    Z-line irregular, chronic gastritis withotu hemorrhage, normal duodenum-Dr. Ilya Zhao   • UPPER GASTROINTESTINAL ENDOSCOPY N/A 06/22/2006    LA Grade A reflux esophagitis, non-bleeding erythematous gastropathy, normal duodenum-Dr. Ilya Zhao        Social History     Socioeconomic History   • Marital status:      Spouse name: Micaela   • Number of children: 1   • Years of education: College   • Highest education level: Not on file   Occupational History   • Occupation: RN in Critical Care     Employer: [employer]   Tobacco Use   • Smoking status: Never Smoker   • Smokeless tobacco: Never Used   Substance and Sexual Activity   • Alcohol use: Yes     Alcohol/week: 3.0 - 4.0 standard drinks     Types: 3 - 4 Standard drinks or equivalent per week   • Drug use: No   • Sexual activity: Defer   Social History Narrative    , 1 son, 2 stepsons       Family History   Problem Relation Age of Onset   • Lupus Sister    • Thyroid disease Sister    • Heart disease Other    • Hypertension Other    • Arthritis Mother    • Hyperlipidemia Mother    • Hypertension Mother    • Thyroid disease Mother    • Lupus Mother    • Heart disease Father    • Arthritis Father    • Other Father         Vascular disease   • Lupus Father    • Depression Father    • Alcohol abuse Father    • Dementia Father    • Hypertension Father    • Heart disease Brother    • Heart attack Brother 40   • Thyroid disease Sister    • Arthritis Brother    • Malig Hyperthermia Neg Hx        Review of Systems   Constitution: Positive for malaise/fatigue. Negative for diaphoresis.   Cardiovascular: Positive for dyspnea on exertion. Negative for chest pain, claudication, irregular heartbeat, leg swelling, near-syncope, orthopnea, palpitations, paroxysmal nocturnal dyspnea and syncope.   Respiratory: Negative for cough, shortness of breath and sleep disturbances due to breathing.    Musculoskeletal: Negative for falls.   Neurological: Negative for dizziness and weakness.   Psychiatric/Behavioral: Negative for altered mental status and substance abuse.       Allergies   Allergen Reactions   • Ace Inhibitors Angioedema   • Lisinopril Angioedema   • Testosterone Myalgia         Current  Outpatient Medications:   •  amLODIPine (NORVASC) 10 MG tablet, Take 1 tablet by mouth Daily., Disp: 90 tablet, Rfl: 1  •  aspirin 81 MG EC tablet, Take 1 tablet by mouth Daily. get over the counter, Disp: 30 tablet, Rfl: 3  •  Blood Glucose Monitoring Suppl (InspireM[employer] BLOOD GLUCOSE) kit, TEST AS DIRECTED, Disp: 1 each, Rfl: 0  •  carvedilol (COREG) 25 MG tablet, TAKE ONE TABLET BY MOUTH TWICE A DAY WITH MEALS, Disp: 180 tablet, Rfl: 1  •  clonazePAM (KlonoPIN) 0.5 MG tablet, Take 1 tablet by mouth 3 (Three) Times a Day As Needed (Facial twitching)., Disp: 63 tablet, Rfl: 0  •  clopidogrel (PLAVIX) 75 MG tablet, Take 1 tablet by mouth Daily., Disp: 90 tablet, Rfl: 1  •  dorzolamide-timolol (COSOPT) 22.3-6.8 MG/ML ophthalmic solution, Apply 1 drop to eye(s) to both eyes 2 (Two) Times a Day., Disp: 20 mL, Rfl: 3  •  escitalopram (LEXAPRO) 20 MG tablet, Take 1 tablet by mouth Daily., Disp: 90 tablet, Rfl: 1  •  esomeprazole (nexIUM) 40 MG capsule, Take 1 capsule by mouth Daily., Disp: 30 capsule, Rfl: 5  •  famotidine (PEPCID) 20 MG tablet, Take 1 tablet by mouth Daily With Dinner., Disp: 30 tablet, Rfl: 1  •  glucose blood test strip, Use 4 times a day, Disp: 400 each, Rfl: 0  •  Insulin Glargine, 2 Unit Dial, (TOUJEO) 300 UNIT/ML solution pen-injector injection, Inject 60 Units under the skin into the appropriate area as directed Every Morning for 90 days., Disp: 18 mL, Rfl: 1  •  Insulin Pen Needle (B-D ULTRAFINE III SHORT PEN) 31G X 8 MM misc, USE AS DIRECTED DAILY WITH TOUJEO, Disp: 100 each, Rfl: 5  •  JANUVIA 100 MG tablet, TAKE 1 TABLET BY MOUTH DAILY, Disp: 30 tablet, Rfl: 5  •  JARDIANCE 25 MG tablet, TAKE 1 TABLET BY MOUTH EVERY MORNING, Disp: 30 tablet, Rfl: 5  •  Lancets (FREESTYLE) lancets, Use to test BG 4 times daily, Disp: 400 each, Rfl: 1  •  latanoprost (XALATAN) 0.005 % ophthalmic solution, Apply 1 drop to  each eye Daily., Disp: 7.5 mL, Rfl: 3  •  latanoprost (XALATAN) 0.005 % ophthalmic solution,  "APPLY ONE DROP TO EACH EYE DAILY, Disp: 7.5 mL, Rfl: 3  •  metFORMIN (GLUCOPHAGE) 500 MG tablet, Take 2 tablets by mouth 2 (Two) Times a Day With Meals., Disp: 120 tablet, Rfl: 5  •  pioglitazone (ACTOS) 15 MG tablet, Take 1 tablet by mouth Daily., Disp: 90 tablet, Rfl: 0  •  rosuvastatin (CRESTOR) 40 MG tablet, Take 1 tablet by mouth Daily., Disp: 30 tablet, Rfl: 5  •  sildenafil (VIAGRA) 50 MG tablet, Take 1 tablet by mouth Daily As Needed for erectile dysfunction., Disp: 6 tablet, Rfl: 5  •  vitamin D (ERGOCALCIFEROL) 1.25 MG (99898 UT) capsule capsule, TAKE ONE CAPSULE BY MOUTH ONCE WEEKLY, Disp: 12 capsule, Rfl: 0      Objective:     Vitals:    03/04/20 1006   BP: 128/80   BP Location: Right arm   Patient Position: Sitting   Pulse: 78   Resp: 16   SpO2: 95%   Weight: 123 kg (270 lb 12.8 oz)   Height: 195.6 cm (77\")     Body mass index is 32.11 kg/m².    PHYSICAL EXAM:    Physical Exam   Constitutional: He is oriented to person, place, and time. He appears well-developed and well-nourished. No distress.   HENT:   Head: Normocephalic.   Eyes: Pupils are equal, round, and reactive to light. EOM are normal.   Neck: Normal range of motion. Neck supple. No JVD present. Carotid bruit is not present. No edema present.   Cardiovascular: Normal rate, regular rhythm, S1 normal, S2 normal, normal heart sounds and intact distal pulses. Exam reveals no gallop.   No murmur heard.  Pulmonary/Chest: Effort normal and breath sounds normal. No tachypnea. He has no wheezes. He has no rales.   Abdominal: Soft. Normal appearance and bowel sounds are normal. He exhibits no ascites. There is no tenderness.   Musculoskeletal: Normal range of motion. He exhibits no edema.   Neurological: He is alert and oriented to person, place, and time.   Skin: Skin is warm and dry.   Psychiatric: He has a normal mood and affect.         ECG 12 Lead  Date/Time: 3/4/2020 11:26 AM  Performed by: Jacqueline Grigsby APRN  Authorized by: Jacqueline Grigsby " MICHELLE Fernandez   Comparison: compared with previous ECG   Similar to previous ECG  Rhythm: sinus rhythm  Rate: normal  BPM: 72  T inversion: II, III and aVF  QRS axis: left    Clinical impression: abnormal EKG              Assessment:       Diagnosis Plan   1. Angina at rest (CMS/[facility])  Case Request Cath Lab: Left Heart Cath    ECG 12 Lead   2. Chronic coronary artery disease  ECG 12 Lead   3. Hyperlipidemia, unspecified hyperlipidemia type  ECG 12 Lead   4. Presence of coronary angioplasty implant and graft  ECG 12 Lead   5. Essential hypertension  ECG 12 Lead   6. Type 2 diabetes mellitus without complication, with long-term current use of insulin (CMS/[facility])  ECG 12 Lead   7. 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  We can optimize his medicines any more than they are.  He cannot take nitrates because of his frequent use of Viagra.  Discussed with him the risks and benefits of procedure he understands and agrees.  Will proceed with left heart cath with Dr. Karimi.       Your medication list           Accurate as of March 4, 2020 11:59 PM. If you have any questions, ask your nurse or doctor.               CONTINUE taking these medications      Instructions Last Dose Given Next Dose Due   amLODIPine 10 MG tablet  Commonly known as:  NORVASC      Take 1 tablet by mouth Daily.       aspirin 81 MG EC tablet      Take 1 tablet by mouth Daily. get over the counter       carvedilol 25 MG tablet  Commonly known as:  COREG      TAKE ONE TABLET BY MOUTH TWICE A DAY WITH MEALS       clonazePAM 0.5 MG tablet  Commonly known as:  KlonoPIN      Take 1 tablet by mouth 3  (Three) Times a Day As Needed (Facial twitching).       clopidogrel 75 MG tablet  Commonly known as:  PLAVIX      Take 1 tablet by mouth Daily.       dorzolamide-timolol 22.3-6.8 MG/ML ophthalmic solution  Commonly known as:  COSOPT      Apply 1 drop to eye(s) to both eyes 2 (Two) Times a Day.       escitalopram 20 MG tablet  Commonly known as:  LEXAPRO      Take 1 tablet by mouth Daily.       esomeprazole 40 MG capsule  Commonly known as:  nexIUM      Take 1 capsule by mouth Daily.       famotidine 20 MG tablet  Commonly known as:  PEPCID      Take 1 tablet by mouth Daily With Dinner.       freestyle lancets      Use to test BG 4 times daily       glucose blood test strip      Use 4 times a day       Insulin Glargine (2 Unit Dial) 300 UNIT/ML solution pen-injector injection  Commonly known as:  TOUJEO      Inject 60 Units under the skin into the appropriate area as directed Every Morning for 90 days.       Insulin Pen Needle 31G X 8 MM misc  Commonly known as:  B-D ULTRAFINE III SHORT PEN      USE AS DIRECTED DAILY WITH TOUJEO       JANUVIA 100 MG tablet  Generic drug:  SITagliptin      TAKE 1 TABLET BY MOUTH DAILY       JARDIANCE 25 MG tablet  Generic drug:  Empagliflozin      TAKE 1 TABLET BY MOUTH EVERY MORNING       [employer] BLOOD GLUCOSE kit      TEST AS DIRECTED       latanoprost 0.005 % ophthalmic solution  Commonly known as:  XALATAN      Apply 1 drop to  each eye Daily.       latanoprost 0.005 % ophthalmic solution  Commonly known as:  XALATAN      APPLY ONE DROP TO EACH EYE DAILY       metFORMIN 500 MG tablet  Commonly known as:  GLUCOPHAGE      Take 2 tablets by mouth 2 (Two) Times a Day With Meals.       pioglitazone 15 MG tablet  Commonly known as:  ACTOS      Take 1 tablet by mouth Daily.       rosuvastatin 40 MG tablet  Commonly known as:  CRESTOR      Take 1 tablet by mouth Daily.       sildenafil 50 MG tablet  Commonly known as:  VIAGRA      Take 1 tablet by mouth Daily As Needed for erectile  dysfunction.       vitamin D 1.25 MG (60050 UT) capsule capsule  Commonly known as:  ERGOCALCIFEROL      TAKE ONE CAPSULE BY MOUTH ONCE WEEKLY                As always, it has been a pleasure to participate in your patient's care.      Sincerely,     Jacqueline MARTINEZ

## 2020-03-04 ENCOUNTER — LAB (OUTPATIENT)
Dept: LAB | Facility: HOSPITAL | Age: 65
End: 2020-03-04

## 2020-03-04 ENCOUNTER — TRANSCRIBE ORDERS (OUTPATIENT)
Dept: CARDIOLOGY | Facility: CLINIC | Age: 65
End: 2020-03-04

## 2020-03-04 ENCOUNTER — OFFICE VISIT (OUTPATIENT)
Dept: CARDIOLOGY | Facility: CLINIC | Age: 65
End: 2020-03-04

## 2020-03-04 VITALS
HEIGHT: 77 IN | HEART RATE: 78 BPM | SYSTOLIC BLOOD PRESSURE: 128 MMHG | BODY MASS INDEX: 31.97 KG/M2 | RESPIRATION RATE: 16 BRPM | WEIGHT: 270.8 LBS | DIASTOLIC BLOOD PRESSURE: 80 MMHG | OXYGEN SATURATION: 95 %

## 2020-03-04 DIAGNOSIS — Z13.6 SCREENING FOR CARDIOVASCULAR CONDITION: Primary | ICD-10-CM

## 2020-03-04 DIAGNOSIS — I25.10 CHRONIC CORONARY ARTERY DISEASE: ICD-10-CM

## 2020-03-04 DIAGNOSIS — Z01.810 PREPROCEDURAL CARDIOVASCULAR EXAMINATION: ICD-10-CM

## 2020-03-04 DIAGNOSIS — Z79.4 CONTROLLED TYPE 2 DIABETES MELLITUS WITH COMPLICATION, WITH LONG-TERM CURRENT USE OF INSULIN (HCC): ICD-10-CM

## 2020-03-04 DIAGNOSIS — I20.8 ANGINA AT REST (HCC): Primary | ICD-10-CM

## 2020-03-04 DIAGNOSIS — Z79.4 TYPE 2 DIABETES MELLITUS WITHOUT COMPLICATION, WITH LONG-TERM CURRENT USE OF INSULIN (HCC): ICD-10-CM

## 2020-03-04 DIAGNOSIS — E11.9 TYPE 2 DIABETES MELLITUS WITHOUT COMPLICATION, WITH LONG-TERM CURRENT USE OF INSULIN (HCC): ICD-10-CM

## 2020-03-04 DIAGNOSIS — I10 ESSENTIAL HYPERTENSION: ICD-10-CM

## 2020-03-04 DIAGNOSIS — E11.8 CONTROLLED TYPE 2 DIABETES MELLITUS WITH COMPLICATION, WITH LONG-TERM CURRENT USE OF INSULIN (HCC): ICD-10-CM

## 2020-03-04 DIAGNOSIS — E78.5 HYPERLIPIDEMIA, UNSPECIFIED HYPERLIPIDEMIA TYPE: ICD-10-CM

## 2020-03-04 DIAGNOSIS — Z13.6 SCREENING FOR CARDIOVASCULAR CONDITION: ICD-10-CM

## 2020-03-04 DIAGNOSIS — Z95.5 PRESENCE OF CORONARY ANGIOPLASTY IMPLANT AND GRAFT: ICD-10-CM

## 2020-03-04 PROBLEM — I20.89 ANGINA AT REST: Status: ACTIVE | Noted: 2020-03-04

## 2020-03-04 LAB
ANION GAP SERPL CALCULATED.3IONS-SCNC: 13.3 MMOL/L (ref 5–15)
BASOPHILS # BLD AUTO: 0.06 10*3/MM3 (ref 0–0.2)
BASOPHILS NFR BLD AUTO: 0.6 % (ref 0–1.5)
BUN BLD-MCNC: 14 MG/DL (ref 8–23)
BUN/CREAT SERPL: 12.8 (ref 7–25)
CALCIUM SPEC-SCNC: 9.6 MG/DL (ref 8.6–10.5)
CHLORIDE SERPL-SCNC: 99 MMOL/L (ref 98–107)
CO2 SERPL-SCNC: 21.7 MMOL/L (ref 22–29)
CREAT BLD-MCNC: 1.09 MG/DL (ref 0.76–1.27)
DEPRECATED RDW RBC AUTO: 49 FL (ref 37–54)
EOSINOPHIL # BLD AUTO: 0.26 10*3/MM3 (ref 0–0.4)
EOSINOPHIL NFR BLD AUTO: 2.6 % (ref 0.3–6.2)
ERYTHROCYTE [DISTWIDTH] IN BLOOD BY AUTOMATED COUNT: 14.9 % (ref 12.3–15.4)
GFR SERPL CREATININE-BSD FRML MDRD: 83 ML/MIN/1.73
GLUCOSE BLD-MCNC: 101 MG/DL (ref 65–99)
HCT VFR BLD AUTO: 48.9 % (ref 37.5–51)
HGB BLD-MCNC: 16.4 G/DL (ref 13–17.7)
IMM GRANULOCYTES # BLD AUTO: 0.05 10*3/MM3 (ref 0–0.05)
IMM GRANULOCYTES NFR BLD AUTO: 0.5 % (ref 0–0.5)
LYMPHOCYTES # BLD AUTO: 2.27 10*3/MM3 (ref 0.7–3.1)
LYMPHOCYTES NFR BLD AUTO: 23.1 % (ref 19.6–45.3)
MCH RBC QN AUTO: 30.1 PG (ref 26.6–33)
MCHC RBC AUTO-ENTMCNC: 33.5 G/DL (ref 31.5–35.7)
MCV RBC AUTO: 89.7 FL (ref 79–97)
MONOCYTES # BLD AUTO: 0.85 10*3/MM3 (ref 0.1–0.9)
MONOCYTES NFR BLD AUTO: 8.6 % (ref 5–12)
NEUTROPHILS # BLD AUTO: 6.35 10*3/MM3 (ref 1.7–7)
NEUTROPHILS NFR BLD AUTO: 64.6 % (ref 42.7–76)
NRBC BLD AUTO-RTO: 0 /100 WBC (ref 0–0.2)
PLATELET # BLD AUTO: 181 10*3/MM3 (ref 140–450)
PMV BLD AUTO: 10.6 FL (ref 6–12)
POTASSIUM BLD-SCNC: 4.4 MMOL/L (ref 3.5–5.2)
RBC # BLD AUTO: 5.45 10*6/MM3 (ref 4.14–5.8)
SODIUM BLD-SCNC: 134 MMOL/L (ref 136–145)
WBC NRBC COR # BLD: 9.84 10*3/MM3 (ref 3.4–10.8)

## 2020-03-04 PROCEDURE — 85025 COMPLETE CBC W/AUTO DIFF WBC: CPT

## 2020-03-04 PROCEDURE — 93000 ELECTROCARDIOGRAM COMPLETE: CPT | Performed by: NURSE PRACTITIONER

## 2020-03-04 PROCEDURE — 99214 OFFICE O/P EST MOD 30 MIN: CPT | Performed by: NURSE PRACTITIONER

## 2020-03-04 PROCEDURE — 36415 COLL VENOUS BLD VENIPUNCTURE: CPT

## 2020-03-04 PROCEDURE — 80048 BASIC METABOLIC PNL TOTAL CA: CPT

## 2020-03-10 ENCOUNTER — HOSPITAL ENCOUNTER (OUTPATIENT)
Facility: HOSPITAL | Age: 65
Discharge: HOME | End: 2020-03-11
Attending: INTERNAL MEDICINE | Admitting: INTERNAL MEDICINE

## 2020-03-10 DIAGNOSIS — I25.10 CHRONIC CORONARY ARTERY DISEASE: ICD-10-CM

## 2020-03-10 DIAGNOSIS — I20.8 ANGINA AT REST (HCC): ICD-10-CM

## 2020-03-10 LAB
ACT BLD: 351 SECONDS (ref 82–152)
GLUCOSE BLDC GLUCOMTR-MCNC: 108 MG/DL (ref 70–130)
GLUCOSE BLDC GLUCOMTR-MCNC: 112 MG/DL (ref 70–130)

## 2020-03-10 PROCEDURE — 25010000002 FENTANYL CITRATE (PF) 100 MCG/2ML SOLUTION: Performed by: INTERNAL MEDICINE

## 2020-03-10 PROCEDURE — 25010000002 HEPARIN (PORCINE) PER 1000 UNITS: Performed by: INTERNAL MEDICINE

## 2020-03-10 PROCEDURE — C1725 CATH, TRANSLUMIN NON-LASER: HCPCS | Performed by: INTERNAL MEDICINE

## 2020-03-10 PROCEDURE — 25010000002 MIDAZOLAM PER 1 MG: Performed by: INTERNAL MEDICINE

## 2020-03-10 PROCEDURE — 92928 PRQ TCAT PLMT NTRAC ST 1 LES: CPT | Performed by: INTERNAL MEDICINE

## 2020-03-10 PROCEDURE — C1887 CATHETER, GUIDING: HCPCS | Performed by: INTERNAL MEDICINE

## 2020-03-10 PROCEDURE — G0378 HOSPITAL OBSERVATION PER HR: HCPCS

## 2020-03-10 PROCEDURE — 93458 L HRT ARTERY/VENTRICLE ANGIO: CPT | Performed by: INTERNAL MEDICINE

## 2020-03-10 PROCEDURE — 0 IOPAMIDOL PER 1 ML: Performed by: INTERNAL MEDICINE

## 2020-03-10 PROCEDURE — C1769 GUIDE WIRE: HCPCS | Performed by: INTERNAL MEDICINE

## 2020-03-10 PROCEDURE — C1894 INTRO/SHEATH, NON-LASER: HCPCS | Performed by: INTERNAL MEDICINE

## 2020-03-10 PROCEDURE — 99152 MOD SED SAME PHYS/QHP 5/>YRS: CPT | Performed by: INTERNAL MEDICINE

## 2020-03-10 PROCEDURE — 82962 GLUCOSE BLOOD TEST: CPT

## 2020-03-10 PROCEDURE — C1874 STENT, COATED/COV W/DEL SYS: HCPCS | Performed by: INTERNAL MEDICINE

## 2020-03-10 PROCEDURE — 99153 MOD SED SAME PHYS/QHP EA: CPT | Performed by: INTERNAL MEDICINE

## 2020-03-10 PROCEDURE — 93010 ELECTROCARDIOGRAM REPORT: CPT | Performed by: INTERNAL MEDICINE

## 2020-03-10 PROCEDURE — C9600 PERC DRUG-EL COR STENT SING: HCPCS | Performed by: INTERNAL MEDICINE

## 2020-03-10 PROCEDURE — 93005 ELECTROCARDIOGRAM TRACING: CPT | Performed by: INTERNAL MEDICINE

## 2020-03-10 PROCEDURE — 85347 COAGULATION TIME ACTIVATED: CPT

## 2020-03-10 DEVICE — XIENCE SIERRA™ EVEROLIMUS ELUTING CORONARY STENT SYSTEM 3.00 MM X 15 MM / RAPID-EXCHANGE
Type: IMPLANTABLE DEVICE | Status: FUNCTIONAL
Brand: XIENCE SIERRA™

## 2020-03-10 RX ORDER — HEPARIN SODIUM 1000 [USP'U]/ML
INJECTION, SOLUTION INTRAVENOUS; SUBCUTANEOUS AS NEEDED
Status: DISCONTINUED | OUTPATIENT
Start: 2020-03-10 | End: 2020-03-10 | Stop reason: HOSPADM

## 2020-03-10 RX ORDER — FENTANYL CITRATE 50 UG/ML
INJECTION, SOLUTION INTRAMUSCULAR; INTRAVENOUS AS NEEDED
Status: DISCONTINUED | OUTPATIENT
Start: 2020-03-10 | End: 2020-03-10 | Stop reason: HOSPADM

## 2020-03-10 RX ORDER — FAMOTIDINE 20 MG/1
20 TABLET, FILM COATED ORAL
Status: DISCONTINUED | OUTPATIENT
Start: 2020-03-10 | End: 2020-03-11 | Stop reason: HOSPADM

## 2020-03-10 RX ORDER — ONDANSETRON 4 MG/1
4 TABLET, FILM COATED ORAL EVERY 6 HOURS PRN
Status: DISCONTINUED | OUTPATIENT
Start: 2020-03-10 | End: 2020-03-11 | Stop reason: HOSPADM

## 2020-03-10 RX ORDER — PIOGLITAZONEHYDROCHLORIDE 15 MG/1
15 TABLET ORAL DAILY
Status: DISCONTINUED | OUTPATIENT
Start: 2020-03-10 | End: 2020-03-11 | Stop reason: HOSPADM

## 2020-03-10 RX ORDER — LIDOCAINE HYDROCHLORIDE 10 MG/ML
0.1 INJECTION, SOLUTION EPIDURAL; INFILTRATION; INTRACAUDAL; PERINEURAL ONCE AS NEEDED
Status: DISCONTINUED | OUTPATIENT
Start: 2020-03-10 | End: 2020-03-10 | Stop reason: HOSPADM

## 2020-03-10 RX ORDER — NALOXONE HCL 0.4 MG/ML
0.4 VIAL (ML) INJECTION
Status: DISCONTINUED | OUTPATIENT
Start: 2020-03-10 | End: 2020-03-11 | Stop reason: HOSPADM

## 2020-03-10 RX ORDER — CARVEDILOL 25 MG/1
25 TABLET ORAL 2 TIMES DAILY WITH MEALS
Status: DISCONTINUED | OUTPATIENT
Start: 2020-03-10 | End: 2020-03-11 | Stop reason: HOSPADM

## 2020-03-10 RX ORDER — ASPIRIN 81 MG/1
81 TABLET ORAL DAILY
Status: DISCONTINUED | OUTPATIENT
Start: 2020-03-10 | End: 2020-03-11 | Stop reason: HOSPADM

## 2020-03-10 RX ORDER — SODIUM CHLORIDE 0.9 % (FLUSH) 0.9 %
3 SYRINGE (ML) INJECTION EVERY 12 HOURS SCHEDULED
Status: DISCONTINUED | OUTPATIENT
Start: 2020-03-10 | End: 2020-03-10 | Stop reason: HOSPADM

## 2020-03-10 RX ORDER — CLOPIDOGREL BISULFATE 75 MG/1
TABLET ORAL AS NEEDED
Status: DISCONTINUED | OUTPATIENT
Start: 2020-03-10 | End: 2020-03-10 | Stop reason: HOSPADM

## 2020-03-10 RX ORDER — MORPHINE SULFATE 2 MG/ML
1 INJECTION, SOLUTION INTRAMUSCULAR; INTRAVENOUS EVERY 4 HOURS PRN
Status: DISCONTINUED | OUTPATIENT
Start: 2020-03-10 | End: 2020-03-11 | Stop reason: HOSPADM

## 2020-03-10 RX ORDER — SODIUM CHLORIDE 0.9 % (FLUSH) 0.9 %
10 SYRINGE (ML) INJECTION AS NEEDED
Status: DISCONTINUED | OUTPATIENT
Start: 2020-03-10 | End: 2020-03-10 | Stop reason: HOSPADM

## 2020-03-10 RX ORDER — TEMAZEPAM 15 MG/1
15 CAPSULE ORAL NIGHTLY PRN
Status: DISCONTINUED | OUTPATIENT
Start: 2020-03-10 | End: 2020-03-11 | Stop reason: HOSPADM

## 2020-03-10 RX ORDER — ERGOCALCIFEROL 1.25 MG/1
50000 CAPSULE ORAL WEEKLY
Status: DISCONTINUED | OUTPATIENT
Start: 2020-03-16 | End: 2020-03-11 | Stop reason: HOSPADM

## 2020-03-10 RX ORDER — ROSUVASTATIN CALCIUM 40 MG/1
40 TABLET, COATED ORAL DAILY
Status: DISCONTINUED | OUTPATIENT
Start: 2020-03-10 | End: 2020-03-11 | Stop reason: HOSPADM

## 2020-03-10 RX ORDER — CLONAZEPAM 0.5 MG/1
0.5 TABLET ORAL 3 TIMES DAILY PRN
Status: DISCONTINUED | OUTPATIENT
Start: 2020-03-10 | End: 2020-03-11 | Stop reason: HOSPADM

## 2020-03-10 RX ORDER — MIDAZOLAM HYDROCHLORIDE 1 MG/ML
INJECTION INTRAMUSCULAR; INTRAVENOUS AS NEEDED
Status: DISCONTINUED | OUTPATIENT
Start: 2020-03-10 | End: 2020-03-10 | Stop reason: HOSPADM

## 2020-03-10 RX ORDER — ESCITALOPRAM OXALATE 20 MG/1
20 TABLET ORAL DAILY
Status: DISCONTINUED | OUTPATIENT
Start: 2020-03-11 | End: 2020-03-11 | Stop reason: HOSPADM

## 2020-03-10 RX ORDER — ONDANSETRON 2 MG/ML
4 INJECTION INTRAMUSCULAR; INTRAVENOUS EVERY 6 HOURS PRN
Status: DISCONTINUED | OUTPATIENT
Start: 2020-03-10 | End: 2020-03-11 | Stop reason: HOSPADM

## 2020-03-10 RX ORDER — HYDROCODONE BITARTRATE AND ACETAMINOPHEN 5; 325 MG/1; MG/1
1 TABLET ORAL EVERY 4 HOURS PRN
Status: DISCONTINUED | OUTPATIENT
Start: 2020-03-10 | End: 2020-03-11 | Stop reason: HOSPADM

## 2020-03-10 RX ORDER — ACETAMINOPHEN 325 MG/1
650 TABLET ORAL EVERY 4 HOURS PRN
Status: DISCONTINUED | OUTPATIENT
Start: 2020-03-10 | End: 2020-03-11 | Stop reason: HOSPADM

## 2020-03-10 RX ORDER — ASPIRIN 325 MG
TABLET ORAL AS NEEDED
Status: DISCONTINUED | OUTPATIENT
Start: 2020-03-10 | End: 2020-03-10 | Stop reason: HOSPADM

## 2020-03-10 RX ORDER — PANTOPRAZOLE SODIUM 40 MG/1
40 TABLET, DELAYED RELEASE ORAL EVERY MORNING
Status: DISCONTINUED | OUTPATIENT
Start: 2020-03-11 | End: 2020-03-11 | Stop reason: HOSPADM

## 2020-03-10 RX ORDER — LIDOCAINE HYDROCHLORIDE 20 MG/ML
INJECTION, SOLUTION INFILTRATION; PERINEURAL AS NEEDED
Status: DISCONTINUED | OUTPATIENT
Start: 2020-03-10 | End: 2020-03-10 | Stop reason: HOSPADM

## 2020-03-10 RX ORDER — CLOPIDOGREL BISULFATE 75 MG/1
75 TABLET ORAL DAILY
Status: DISCONTINUED | OUTPATIENT
Start: 2020-03-11 | End: 2020-03-11 | Stop reason: HOSPADM

## 2020-03-10 RX ORDER — SODIUM CHLORIDE 9 MG/ML
50 INJECTION, SOLUTION INTRAVENOUS CONTINUOUS
Status: ACTIVE | OUTPATIENT
Start: 2020-03-10 | End: 2020-03-10

## 2020-03-10 RX ORDER — AMLODIPINE BESYLATE 10 MG/1
10 TABLET ORAL DAILY
Status: DISCONTINUED | OUTPATIENT
Start: 2020-03-10 | End: 2020-03-11 | Stop reason: HOSPADM

## 2020-03-10 RX ORDER — SODIUM CHLORIDE 9 MG/ML
75 INJECTION, SOLUTION INTRAVENOUS CONTINUOUS
Status: DISCONTINUED | OUTPATIENT
Start: 2020-03-10 | End: 2020-03-11 | Stop reason: HOSPADM

## 2020-03-10 RX ADMIN — ROSUVASTATIN CALCIUM 40 MG: 40 TABLET, FILM COATED ORAL at 15:26

## 2020-03-10 RX ADMIN — AMLODIPINE BESYLATE 10 MG: 10 TABLET ORAL at 15:24

## 2020-03-10 RX ADMIN — SODIUM CHLORIDE 75 ML/HR: 9 INJECTION, SOLUTION INTRAVENOUS at 08:34

## 2020-03-10 RX ADMIN — CARVEDILOL 25 MG: 25 TABLET, FILM COATED ORAL at 18:11

## 2020-03-10 RX ADMIN — PIOGLITAZONE 15 MG: 15 TABLET ORAL at 15:25

## 2020-03-10 RX ADMIN — FAMOTIDINE 20 MG: 20 TABLET, FILM COATED ORAL at 18:11

## 2020-03-10 RX ADMIN — SODIUM CHLORIDE 50 ML/HR: 9 INJECTION, SOLUTION INTRAVENOUS at 12:32

## 2020-03-10 RX ADMIN — LINAGLIPTIN 5 MG: 5 TABLET, FILM COATED ORAL at 15:24

## 2020-03-10 NOTE — CONSULTS
Met with patient, discussed benefits of cardiac rehab. Provided phase II information along with  the contact information for cardiac rehab here at [facility].

## 2020-03-11 VITALS
RESPIRATION RATE: 18 BRPM | HEART RATE: 63 BPM | TEMPERATURE: 97.7 F | WEIGHT: 265 LBS | OXYGEN SATURATION: 96 % | BODY MASS INDEX: 31.29 KG/M2 | DIASTOLIC BLOOD PRESSURE: 83 MMHG | HEIGHT: 77 IN | SYSTOLIC BLOOD PRESSURE: 125 MMHG

## 2020-03-11 LAB
ANION GAP SERPL CALCULATED.3IONS-SCNC: 14.7 MMOL/L (ref 5–15)
BUN BLD-MCNC: 11 MG/DL (ref 8–23)
BUN/CREAT SERPL: 11.2 (ref 7–25)
CALCIUM SPEC-SCNC: 8.5 MG/DL (ref 8.6–10.5)
CHLORIDE SERPL-SCNC: 102 MMOL/L (ref 98–107)
CHOLEST SERPL-MCNC: 150 MG/DL (ref 0–200)
CO2 SERPL-SCNC: 20.3 MMOL/L (ref 22–29)
CREAT BLD-MCNC: 0.98 MG/DL (ref 0.76–1.27)
DEPRECATED RDW RBC AUTO: 46.1 FL (ref 37–54)
ERYTHROCYTE [DISTWIDTH] IN BLOOD BY AUTOMATED COUNT: 14.5 % (ref 12.3–15.4)
GFR SERPL CREATININE-BSD FRML MDRD: 93 ML/MIN/1.73
GLUCOSE BLD-MCNC: 82 MG/DL (ref 65–99)
GLUCOSE BLDC GLUCOMTR-MCNC: 99 MG/DL (ref 70–130)
HBA1C MFR BLD: 6.3 % (ref 4.8–5.6)
HCT VFR BLD AUTO: 45.5 % (ref 37.5–51)
HDLC SERPL-MCNC: 37 MG/DL (ref 40–60)
HGB BLD-MCNC: 15.4 G/DL (ref 13–17.7)
LDLC SERPL CALC-MCNC: 92 MG/DL (ref 0–100)
LDLC/HDLC SERPL: 2.48 {RATIO}
MCH RBC QN AUTO: 29.7 PG (ref 26.6–33)
MCHC RBC AUTO-ENTMCNC: 33.8 G/DL (ref 31.5–35.7)
MCV RBC AUTO: 87.7 FL (ref 79–97)
PLATELET # BLD AUTO: 172 10*3/MM3 (ref 140–450)
PMV BLD AUTO: 11.5 FL (ref 6–12)
POTASSIUM BLD-SCNC: 3.9 MMOL/L (ref 3.5–5.2)
RBC # BLD AUTO: 5.19 10*6/MM3 (ref 4.14–5.8)
SODIUM BLD-SCNC: 137 MMOL/L (ref 136–145)
TRIGL SERPL-MCNC: 107 MG/DL (ref 0–150)
VLDLC SERPL-MCNC: 21.4 MG/DL (ref 5–40)
WBC NRBC COR # BLD: 9.85 10*3/MM3 (ref 3.4–10.8)

## 2020-03-11 PROCEDURE — 93005 ELECTROCARDIOGRAM TRACING: CPT | Performed by: INTERNAL MEDICINE

## 2020-03-11 PROCEDURE — 93010 ELECTROCARDIOGRAM REPORT: CPT | Performed by: INTERNAL MEDICINE

## 2020-03-11 PROCEDURE — 99217 PR OBSERVATION CARE DISCHARGE MANAGEMENT: CPT | Performed by: NURSE PRACTITIONER

## 2020-03-11 PROCEDURE — 85027 COMPLETE CBC AUTOMATED: CPT | Performed by: INTERNAL MEDICINE

## 2020-03-11 PROCEDURE — G0378 HOSPITAL OBSERVATION PER HR: HCPCS

## 2020-03-11 PROCEDURE — 82962 GLUCOSE BLOOD TEST: CPT

## 2020-03-11 PROCEDURE — 80061 LIPID PANEL: CPT | Performed by: INTERNAL MEDICINE

## 2020-03-11 PROCEDURE — 83036 HEMOGLOBIN GLYCOSYLATED A1C: CPT | Performed by: INTERNAL MEDICINE

## 2020-03-11 PROCEDURE — 80048 BASIC METABOLIC PNL TOTAL CA: CPT | Performed by: INTERNAL MEDICINE

## 2020-03-11 RX ORDER — CLOPIDOGREL BISULFATE 75 MG/1
75 TABLET ORAL DAILY
Qty: 90 TABLET | Refills: 3 | Status: SHIPPED | OUTPATIENT
Start: 2020-03-11 | End: 2020-10-12 | Stop reason: SDUPTHER

## 2020-03-11 RX ADMIN — ROSUVASTATIN CALCIUM 40 MG: 40 TABLET, FILM COATED ORAL at 08:29

## 2020-03-11 RX ADMIN — PIOGLITAZONE 15 MG: 15 TABLET ORAL at 08:28

## 2020-03-11 RX ADMIN — ASPIRIN 81 MG: 81 TABLET, COATED ORAL at 08:28

## 2020-03-11 RX ADMIN — ESCITALOPRAM 20 MG: 20 TABLET, FILM COATED ORAL at 08:28

## 2020-03-11 RX ADMIN — CLOPIDOGREL 75 MG: 75 TABLET, FILM COATED ORAL at 08:29

## 2020-03-11 RX ADMIN — CARVEDILOL 25 MG: 25 TABLET, FILM COATED ORAL at 08:32

## 2020-03-11 RX ADMIN — PANTOPRAZOLE SODIUM 40 MG: 40 TABLET, DELAYED RELEASE ORAL at 06:41

## 2020-03-11 RX ADMIN — AMLODIPINE BESYLATE 10 MG: 10 TABLET ORAL at 08:28

## 2020-03-11 RX ADMIN — LINAGLIPTIN 5 MG: 5 TABLET, FILM COATED ORAL at 08:29

## 2020-03-11 NOTE — DISCHARGE SUMMARY
Patient Name: Isaias Monaco  :1955  64 y.o.    Date of Admit: 3/10/2020  Date of Discharge:  3/11/2020    Discharge Diagnosis:  1.  Coronary artery disease status post drug-eluting stent to OM1  2.  History of previous CAD  3.  Hypertension  4.  Diabetes mellitus    Hospital Course:   Patient is a 64-year-old male who I saw in the office that complained of his typical heart related symptoms.  He was recommended to have elective coronary angiography coronary anatomy revealed the following.  Left main: Large caliber vessel that trifurcates to an LAD, ramus and circumflex.  Normal  LAD: Medium caliber vessel that gives rise to 2 small caliber diagonal branches.  20 to 30% ostial stenosis.  Diffuse 40% mid vessel stenosis  LCX: Medium caliber vessel and gives rise to a medium caliber OM branch.  Discrete 99% ostial stenosis with GRACIE II flow distally.  Diffuse 40-50% mid vessel stenosis.  Fills antegrade and via right to left collaterals.  RCA: Medium caliber, dominant vessel gives rise to a small caliber PDA and 2 RPL branches..  Stents in the proximal to distal RCA.  Luminal irregularities.     Left ventricular angiography: Normal left ventricular size and systolic function.  Ejection fraction 50%.     Percutaneous intervention report:  Unfractionated heparin was used for anticoagulation from therapeutic ACT.  A 6 Greenlandic XB 3.0 guide was used to engage the left main.  A run-through wire was used to cross the ostial stenosis and seated distally.  A 3.0 x 12 mm trek balloon was used to predilate the lesion to 16 rozina.  A 3.25 x 33 mm Xience Kaylynn drug-eluting stent would not cross the stenosis.  A jayna wire was placed but the stent was still not cross.  A 3.0 x 15 mm Xience Kaylynn drug-eluting stent was then deployed across the ostial stenosis at 14 rozina.  GRACIE-3 flow was documented at case completion.  Residual 50% mid vessel stenosis.  Not covered.  No complications.  Wire and balloon removed.  Procedures  Performed  Procedure(s):  Left Heart Cath  Stent DAVONTE coronary     He had no postprocedure complications.  He has been doing well.  His right radial cath site is stable without hematoma.  Consults     No orders found for last 30 day(s).          Pertinent Test Results:   Results from last 7 days   Lab Units 03/11/20  0420   SODIUM mmol/L 137   POTASSIUM mmol/L 3.9   CHLORIDE mmol/L 102   CO2 mmol/L 20.3*   BUN mg/dL 11   CREATININE mg/dL 0.98   CALCIUM mg/dL 8.5*   GLUCOSE mg/dL 82         @LABRCNT(bnp)@  Results from last 7 days   Lab Units 03/11/20  0420   WBC 10*3/mm3 9.85   HEMOGLOBIN g/dL 15.4   HEMATOCRIT % 45.5   PLATELETS 10*3/mm3 172             Results from last 7 days   Lab Units 03/11/20  0420   CHOLESTEROL mg/dL 150   TRIGLYCERIDES mg/dL 107   HDL CHOL mg/dL 37*   LDL CHOL mg/dL 92       Condition on Discharge: stable    Discharge Medications     Discharge Medications      Changes to Medications      Instructions Start Date   carvedilol 25 MG tablet  Commonly known as:  COREG  What changed:    · how much to take  · how to take this  · when to take this   TAKE ONE TABLET BY MOUTH TWICE A DAY WITH MEALS      JANUVIA 100 MG tablet  Generic drug:  SITagliptin  What changed:  how much to take   TAKE 1 TABLET BY MOUTH DAILY      JARDIANCE 25 MG tablet  Generic drug:  Empagliflozin  What changed:    · how much to take  · when to take this   TAKE 1 TABLET BY MOUTH EVERY MORNING      latanoprost 0.005 % ophthalmic solution  Commonly known as:  XALATAN  What changed:  when to take this   1 drop, Ophthalmic, Daily         Continue These Medications      Instructions Start Date   amLODIPine 10 MG tablet  Commonly known as:  NORVASC   10 mg, Oral, Daily      aspirin 81 MG EC tablet   81 mg, Oral, Daily, get over the counter      clonazePAM 0.5 MG tablet  Commonly known as:  KlonoPIN   0.5 mg, Oral, 3 Times Daily PRN      clopidogrel 75 MG tablet  Commonly known as:  PLAVIX   75 mg, Oral, Daily       dorzolamide-timolol 22.3-6.8 MG/ML ophthalmic solution  Commonly known as:  COSOPT   1 drop, Ophthalmic, 2 Times Daily      escitalopram 20 MG tablet  Commonly known as:  LEXAPRO   20 mg, Oral, Daily      esomeprazole 40 MG capsule  Commonly known as:  nexIUM   40 mg, Oral, Daily      famotidine 20 MG tablet  Commonly known as:  PEPCID   20 mg, Oral, Daily With Dinner      freestyle lancets   Use to test BG 4 times daily      glucose blood test strip   Use 4 times a day      Insulin Glargine (2 Unit Dial) 300 UNIT/ML solution pen-injector injection  Commonly known as:  TOUJEO   60 Units, Subcutaneous, Every Morning      Insulin Pen Needle 31G X 8 MM misc  Commonly known as:  B-D ULTRAFINE III SHORT PEN   USE AS DIRECTED DAILY WITH TOUJEO      [employer] BLOOD GLUCOSE kit   TEST AS DIRECTED      metFORMIN 500 MG tablet  Commonly known as:  GLUCOPHAGE   1,000 mg, Oral, 2 Times Daily With Meals      pioglitazone 15 MG tablet  Commonly known as:  ACTOS   15 mg, Oral, Daily      rosuvastatin 40 MG tablet  Commonly known as:  CRESTOR   40 mg, Oral, Daily      sildenafil 50 MG tablet  Commonly known as:  VIAGRA   50 mg, Oral, Daily PRN      vitamin D 1.25 MG (51656 UT) capsule capsule  Commonly known as:  ERGOCALCIFEROL   50,000 Units, Oral, Weekly             Discharge Diet:     Activity at Discharge:     Discharge disposition: home    Follow-up Appointments  Future Appointments   Date Time Provider Department Center   6/2/2020 10:00 AM [facility] None   6/9/2020  9:00 AM Micaela Barfield APRN [facility] None   8/27/2020  3:20 PM Miguel Ángel Karimi MD [facility] None   1/19/2021  8:30 AM Thony Reynolds MD [facility] None     Additional Instructions for the Follow-ups that You Need to Schedule     Ambulatory Referral to Cardiac Rehab   As directed            Test Results Pending at Discharge       MICHELLE Haq, [facility]  03/11/20  09:25    Time: Discharge 30  min  Electronically signed by Jacqueline Grigsby, APRN, 03/11/20, 9:25 AM.  .

## 2020-03-11 NOTE — PROGRESS NOTES
Pharmacy Services: Post-PCI Medication Review    Isaias Monaco is s/p PCI with drug-eluting stent placement. Pharmacy to review discharge medications to make sure appropriate medications have been prescribed.    Patient has been discharged on the following:  · P2Y12 Inhibitor: Clopidogrel 75 mg daily  · Aspirin EC 81 mg daily  · High dose Statin: Rosuvastatin 40 mg qHS  · Beta-blocker: Carvedilol 25 mg BID    These medications meet the requirements for NCDR discharge medication for chest pain and MI.    Masha Rendon Pharm.D., [facility]  Clinical Staff Pharmacist

## 2020-03-12 ENCOUNTER — TRANSITIONAL CARE MANAGEMENT TELEPHONE ENCOUNTER (OUTPATIENT)
Dept: FAMILY MEDICINE CLINIC | Facility: CLINIC | Age: 65
End: 2020-03-12

## 2020-03-13 ENCOUNTER — TELEPHONE (OUTPATIENT)
Dept: FAMILY MEDICINE CLINIC | Facility: CLINIC | Age: 65
End: 2020-03-13

## 2020-03-13 NOTE — OUTREACH NOTE
Spoke with pt, he is feeling very well s/p planned cardiac stent placement. Appetite is good, no SOB, chest pain, no post op pain. Denies fever, chills. Confirms receipt and understanding of d/c orders. No changes were made to pt current medications. Pt does decline to sched TCM [facility] at this time due to multiple appts with surgeon, cardiologist and cardiac rehab.

## 2020-03-13 NOTE — TELEPHONE ENCOUNTER
Pt's wife Micaela called, she said if possible Dr giordano order routine vascular screening for her  to check carotid arteries, abdomen aneurysm and legs. Pt recent had angioplasty  With his cardiologist. She mentioned those were not recommended by cardiologist by they like to have it done she feels this is necessary . Please advise pt??

## 2020-03-16 DIAGNOSIS — IMO0001 UNCONTROLLED DIABETES MELLITUS TYPE 2 WITHOUT COMPLICATIONS: ICD-10-CM

## 2020-03-16 DIAGNOSIS — Z79.4 CONTROLLED TYPE 2 DIABETES MELLITUS WITHOUT COMPLICATION, WITH LONG-TERM CURRENT USE OF INSULIN (HCC): ICD-10-CM

## 2020-03-16 DIAGNOSIS — E11.9 CONTROLLED TYPE 2 DIABETES MELLITUS WITHOUT COMPLICATION, WITH LONG-TERM CURRENT USE OF INSULIN (HCC): ICD-10-CM

## 2020-03-17 ENCOUNTER — TELEPHONE (OUTPATIENT)
Dept: ENDOCRINOLOGY | Age: 65
End: 2020-03-17

## 2020-03-17 ENCOUNTER — TELEPHONE (OUTPATIENT)
Dept: CARDIOLOGY | Facility: CLINIC | Age: 65
End: 2020-03-17

## 2020-03-17 NOTE — TELEPHONE ENCOUNTER
Called Mr. Monaco.  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  His cath site is stable he is not having any chest pain he can keep his appointment with Dr. Karimi in May and call us in the meantime if he is having any issues.

## 2020-03-17 NOTE — TELEPHONE ENCOUNTER
----- Message from MICHELLE Pichardo sent at 3/16/2020  5:18 PM EDT -----  Let pt know his prescriptions have been sent to his pharmacy  ----- Message -----  From: Margi Suazo MA  Sent: 3/13/2020   3:55 PM EDT  To: Pineda Mackay MA    Patient called in asking for a refill of jardiance and januvia. I can see both medications in the chart but it looks like they were last presribed by michelle funes. Patient states that he does not see that provider.

## 2020-03-18 NOTE — TELEPHONE ENCOUNTER
Spoke with Micaela almanza her she voiced understanding she will follow up in a month or so she said .

## 2020-03-18 NOTE — TELEPHONE ENCOUNTER
Please advise patient that they are not doing these kind of studies right now.  When they resume I am happy to order them.

## 2020-03-20 ENCOUNTER — TELEPHONE (OUTPATIENT)
Dept: FAMILY MEDICINE CLINIC | Facility: CLINIC | Age: 65
End: 2020-03-20

## 2020-03-20 NOTE — TELEPHONE ENCOUNTER
Patient is calling because he has been having shortness of breath for a couple years and he had an angioplasty on March 10th and that helped a little bit but he is still having it. He doesn't know if it has been related to stress or not but they are wondering if you think it would be ok for him to come in right now or if you could talk to him over the phone. Please advise.    SCM

## 2020-03-23 ENCOUNTER — OFFICE VISIT (OUTPATIENT)
Dept: FAMILY MEDICINE CLINIC | Facility: CLINIC | Age: 65
End: 2020-03-23

## 2020-03-23 VITALS
DIASTOLIC BLOOD PRESSURE: 70 MMHG | HEIGHT: 77 IN | SYSTOLIC BLOOD PRESSURE: 132 MMHG | HEART RATE: 68 BPM | WEIGHT: 268 LBS | OXYGEN SATURATION: 97 % | RESPIRATION RATE: 16 BRPM | BODY MASS INDEX: 31.64 KG/M2 | TEMPERATURE: 97.8 F

## 2020-03-23 DIAGNOSIS — R07.9 CHEST PAIN, UNSPECIFIED TYPE: Primary | ICD-10-CM

## 2020-03-23 DIAGNOSIS — R06.00 DYSPNEA, UNSPECIFIED: ICD-10-CM

## 2020-03-23 DIAGNOSIS — I25.118 CORONARY ARTERY DISEASE INVOLVING NATIVE HEART WITH OTHER FORM OF ANGINA PECTORIS, UNSPECIFIED VESSEL OR LESION TYPE (HCC): ICD-10-CM

## 2020-03-23 DIAGNOSIS — I73.9 PERIPHERAL ARTERIAL DISEASE (HCC): ICD-10-CM

## 2020-03-23 DIAGNOSIS — F41.9 ANXIETY: ICD-10-CM

## 2020-03-23 DIAGNOSIS — R06.02 SOB (SHORTNESS OF BREATH): ICD-10-CM

## 2020-03-23 PROCEDURE — 99214 OFFICE O/P EST MOD 30 MIN: CPT | Performed by: FAMILY MEDICINE

## 2020-03-23 RX ORDER — BUPROPION HYDROCHLORIDE 150 MG/1
150 TABLET ORAL DAILY
Qty: 30 TABLET | Refills: 1 | OUTPATIENT
Start: 2020-03-23 | End: 2020-04-22

## 2020-03-23 NOTE — PROGRESS NOTES
Transitional Care Follow Up Visit  Subjective     Isaias Monaco is a 65 y.o. male who presents for a transitional care management visit.    Within 48 business hours after discharge our office contacted him via telephone to coordinate his care and needs.      I reviewed and discussed the details of that call along with the discharge summary, hospital problems, inpatient lab results, inpatient diagnostic studies, and consultation reports with Isaias.     Current outpatient and discharge medications have been reconciled for the patient.  Reviewed by: Haily Rodriguez MA      Date of TCM Phone Call 3/13/2020   [facility]   Date of Admission 3/10/2020   Date of Discharge 3/11/2020   Discharge Disposition Home or Self Care     Risk for Readmission (LACE) Score: 4 (3/11/2020  6:00 AM)      History of Present Illness   Course During Hospital Stay:  ***     {Common H&P Review Areas:76928}    Review of Systems   Respiratory: Positive for shortness of breath.    Psychiatric/Behavioral: The patient is nervous/anxious.    All other systems reviewed and are negative.      Objective   Physical Exam    Assessment/Plan   {Assess/PlanSmartLinks:73007}

## 2020-03-23 NOTE — PROGRESS NOTES
Isaias Monaco is a 65 y.o. male.     Chief Complaint   Patient presents with   • angioplasty     patient is following up on hospital visit. pt needs to have vascular screening    • Shortness of Breath     patient is having sob due to anxiety       HPI     Patient presents the office today to discuss a number of issues.  Patient was recently discharged from the hospital after undergoing a stent placement.  Since then he has not had follow-up with cardiology.  Continues to have episodes of chest pain with shortness of breath.  Does suffer from anxiety.  Taking daily Lexapro with PRN clonazepam.  States that he has been on these medications for quite some time.  Feels like they may not be working as well anymore.  Has had frequent scopes in the recent past to evaluate for possible reflux disease.  He does take a combination of PPI therapy along with Pepcid.  Patient does have extensive coronary artery disease along with dyspnea and would like to be evaluated for peripheral artery disease.    The following portions of the patient's history were reviewed and updated as appropriate: allergies, current medications, past family history, past medical history, past social history, past surgical history and problem list.    Review of Systems   Constitutional: Positive for fatigue. Negative for fever.   All other systems reviewed and are negative.    I have reviewed the ROS as documented by the MA. Thony Reynolds MD    Objective  Vitals:    03/23/20 1312   BP: 132/70   Pulse: 68   Resp: 16   Temp: 97.8 °F (36.6 °C)   SpO2: 97%     Body mass index is 31.78 kg/m².    Physical Exam   Constitutional: He is oriented to person, place, and time. He appears well-developed and well-nourished. No distress.   HENT:   Head: Normocephalic and atraumatic.   Right Ear: External ear normal.   Left Ear: External ear normal.   Nose: Nose normal.   Mouth/Throat: Oropharynx is clear and moist.   Eyes: Pupils are equal, round, and reactive  to light. Conjunctivae and EOM are normal. Right eye exhibits no discharge. Left eye exhibits no discharge. No scleral icterus.   Cardiovascular: Normal rate, regular rhythm and normal heart sounds.   Pulmonary/Chest: Effort normal and breath sounds normal. No respiratory distress. He has no wheezes. He has no rales.   Abdominal: Soft. Bowel sounds are normal. He exhibits no distension. There is no tenderness.   Neurological: He is alert and oriented to person, place, and time.   Skin: Skin is warm and dry. He is not diaphoretic.   Nursing note and vitals reviewed.        Current Outpatient Medications:   •  amLODIPine (NORVASC) 10 MG tablet, Take 1 tablet by mouth Daily., Disp: 90 tablet, Rfl: 1  •  aspirin 81 MG EC tablet, Take 1 tablet by mouth Daily. get over the counter, Disp: 30 tablet, Rfl: 3  •  carvedilol (COREG) 25 MG tablet, TAKE ONE TABLET BY MOUTH TWICE A DAY WITH MEALS (Patient taking differently: Take 25 mg by mouth 2 (Two) Times a Day With Meals.), Disp: 180 tablet, Rfl: 1  •  clopidogrel (PLAVIX) 75 MG tablet, Take 1 tablet by mouth Daily., Disp: 90 tablet, Rfl: 3  •  Empagliflozin (JARDIANCE) 25 MG tablet, Take 25 mg by mouth Daily., Disp: 90 tablet, Rfl: 1  •  esomeprazole (nexIUM) 40 MG capsule, Take 1 capsule by mouth Daily., Disp: 30 capsule, Rfl: 5  •  famotidine (PEPCID) 20 MG tablet, Take 1 tablet by mouth Daily With Dinner., Disp: 30 tablet, Rfl: 1  •  Insulin Glargine, 2 Unit Dial, (TOUJEO) 300 UNIT/ML solution pen-injector injection, Inject 60 Units under the skin into the appropriate area as directed Every Morning for 90 days., Disp: 18 mL, Rfl: 1  •  metFORMIN (GLUCOPHAGE) 500 MG tablet, Take 2 tablets by mouth 2 (Two) Times a Day With Meals., Disp: 120 tablet, Rfl: 5  •  pioglitazone (ACTOS) 15 MG tablet, Take 1 tablet by mouth Daily., Disp: 90 tablet, Rfl: 0  •  rosuvastatin (CRESTOR) 40 MG tablet, Take 1 tablet by mouth Daily., Disp: 30 tablet, Rfl: 5  •  sildenafil (VIAGRA) 50 MG  tablet, Take 1 tablet by mouth Daily As Needed for erectile dysfunction., Disp: 6 tablet, Rfl: 5  •  SITagliptin (JANUVIA) 100 MG tablet, Take 1 tablet by mouth Daily., Disp: 90 tablet, Rfl: 1  •  vitamin D (ERGOCALCIFEROL) 1.25 MG (41192 UT) capsule capsule, TAKE ONE CAPSULE BY MOUTH ONCE WEEKLY, Disp: 12 capsule, Rfl: 0  •  Blood Glucose Monitoring Suppl ([employer] BLOOD GLUCOSE) kit, TEST AS DIRECTED, Disp: 1 each, Rfl: 0  •  buPROPion XL (Wellbutrin XL) 150 MG 24 hr tablet, Take 1 tablet by mouth Daily., Disp: 30 tablet, Rfl: 1  •  clonazePAM (KlonoPIN) 0.5 MG tablet, Take 1 tablet by mouth 3 (Three) Times a Day As Needed (Facial twitching)., Disp: 63 tablet, Rfl: 0  •  dorzolamide-timolol (COSOPT) 22.3-6.8 MG/ML ophthalmic solution, Apply 1 drop to eye(s) to both eyes 2 (Two) Times a Day., Disp: 20 mL, Rfl: 3  •  glucose blood test strip, Use 4 times a day, Disp: 400 each, Rfl: 0  •  Insulin Pen Needle (B-D ULTRAFINE III SHORT PEN) 31G X 8 MM misc, USE AS DIRECTED DAILY WITH TOUJEO, Disp: 100 each, Rfl: 5  •  Lancets (FREESTYLE) lancets, Use to test BG 4 times daily, Disp: 400 each, Rfl: 1  •  latanoprost (XALATAN) 0.005 % ophthalmic solution, Apply 1 drop to  each eye Daily. (Patient taking differently: Apply 1 drop to eye(s) as directed by provider Every Night.), Disp: 7.5 mL, Rfl: 3  Current outpatient and discharge medications have been reconciled for the patient.  Reviewed by: Thony Reynolds MD      Procedures    Lab Results (most recent)     None                  [place] was seen today for angioplasty and shortness of breath.    Diagnoses and all orders for this visit:    Chest pain, unspecified type  -     Vascular Screening (Bundle) CAR; Future    Anxiety  -     buPROPion XL (Wellbutrin XL) 150 MG 24 hr tablet; Take 1 tablet by mouth Daily.    SOB (shortness of breath)  -     Vascular Screening (Bundle) CAR; Future    Coronary artery disease involving native heart with other form of angina pectoris,  unspecified vessel or lesion type (CMS/HCC)  -     Vascular Screening (Bundle) CAR; Future    Peripheral arterial disease (CMS/HCC)  -     Vascular Screening (Bundle) CAR; Future    Dyspnea, unspecified   -     Vascular Screening (Bundle) CAR; Future    Extensive conversation had with the patient regarding his above issues.  Will refer for vascular screening.  Discussed treatment options for anxiety.  Will discontinue the Lexapro.  Patient will take 1/2 tablet a day for 2 weeks then 1/2 tablet every other day for 2 weeks.  Will start Wellbutrin today.  Discussed adverse potential side effects.  Discussed the need to contact cardiology ASAP for advice.      Return in about 4 weeks (around 4/20/2020) for Recheck.      Thony Reynolds MD

## 2020-03-25 ENCOUNTER — APPOINTMENT (OUTPATIENT)
Dept: GENERAL RADIOLOGY | Facility: HOSPITAL | Age: 65
End: 2020-03-25

## 2020-03-25 PROCEDURE — 71101 X-RAY EXAM UNILAT RIBS/CHEST: CPT | Performed by: NURSE PRACTITIONER

## 2020-04-07 ENCOUNTER — TELEPHONE (OUTPATIENT)
Dept: FAMILY MEDICINE CLINIC | Facility: CLINIC | Age: 65
End: 2020-04-07

## 2020-04-07 ENCOUNTER — TELEMEDICINE (OUTPATIENT)
Dept: CARDIOLOGY | Facility: CLINIC | Age: 65
End: 2020-04-07

## 2020-04-07 VITALS — BODY MASS INDEX: 30.46 KG/M2 | HEIGHT: 77 IN | WEIGHT: 258 LBS

## 2020-04-07 DIAGNOSIS — E11.65 UNCONTROLLED TYPE 2 DIABETES MELLITUS WITH HYPERGLYCEMIA (HCC): ICD-10-CM

## 2020-04-07 DIAGNOSIS — G24.4 MEIGE SYNDROME (BLEPHAROSPASM WITH OROMANDIBULAR DYSTONIA): Primary | ICD-10-CM

## 2020-04-07 DIAGNOSIS — I25.10 CHRONIC CORONARY ARTERY DISEASE: Primary | ICD-10-CM

## 2020-04-07 DIAGNOSIS — I10 ESSENTIAL HYPERTENSION: ICD-10-CM

## 2020-04-07 DIAGNOSIS — R06.02 SHORTNESS OF BREATH: ICD-10-CM

## 2020-04-07 DIAGNOSIS — E78.5 HYPERLIPIDEMIA, UNSPECIFIED HYPERLIPIDEMIA TYPE: ICD-10-CM

## 2020-04-07 PROCEDURE — 99214 OFFICE O/P EST MOD 30 MIN: CPT | Performed by: INTERNAL MEDICINE

## 2020-04-07 NOTE — PROGRESS NOTES
Isaias Monaco  1955  Date of Office Visit: 04/07/20  Encounter Provider: Miguel Ángel Karimi MD  Place of Service: [facility] CARDIOLOGY    TELEHEALTH VISIT  VIDEO    CHIEF COMPLAINT:   Coronary artery disease  Dyspnea    HISTORY OF PRESENT ILLNESS:  This patient has consented to a telehealth visit via video. The visit was scheduled as a video visit to comply with patient safety concerns in accordance with CDC recommendations.  All vitals recorded within this visit are reported by the patient.  I spent 25 minutes in total including but not limited to the 15 minutes spent in direct conversation with this patient.   A very pleasant, 65-year-old male, prior patient of Dr. Jorge Khan, presents to me as a telehealth f/u.  He has a medical history of coronary artery disease and prior PCI to the circumflex, but also PCI to an RCA chronic total occlusion.      Since our last visit the patient did undergo evaluation by Jacqueline Grigsby and was subsequently set up for catheterization, which I performed on 03/10/2020. He was documented at that time to have a 30% to 40% ostial LAD stenosis and 99% ostial circumflex stenosis that I stented with a 3.0 x 15 mm Xience Kaylynn drug-eluting stent post dilated to high pressure. He tolerated this well. He states that he has noticed no significant improvement in his shortness of breath and actually has occasionally had some chest pressure that is moderate in intensity at rest. He does not report chest discomfort with activity. He has no increase in his baseline tightness of shortness of breath with activity, either. No orthopnea or PND.           Review of Systems   Constitution: Negative for fever and malaise/fatigue.   HENT: Negative for nosebleeds and sore throat.    Eyes: Negative for blurred vision and double vision.   Cardiovascular: Positive for dyspnea on exertion. Negative for chest pain, claudication, palpitations and syncope.   Respiratory:  Negative for cough, shortness of breath and snoring.    Endocrine: Negative for cold intolerance, heat intolerance and polydipsia.   Skin: Negative for itching, poor wound healing and rash.   Musculoskeletal: Negative for joint pain, joint swelling, muscle weakness and myalgias.   Gastrointestinal: Negative for abdominal pain, melena, nausea and vomiting.   Neurological: Negative for light-headedness, loss of balance, seizures, vertigo and weakness.   Psychiatric/Behavioral: Negative for altered mental status and depression.       Past Medical History:   Diagnosis Date   • Adiposity    • Anemia     post hemorrhagic   • Angioedema 2/21/2016    Secondary to ACE inhibitor   • Anxiety    • Arthritis    • CAD (coronary artery disease)    • Chest pain    • Colon polyps    • Diabetes mellitus, type 2 (CMS/HCC)    • ED (erectile dysfunction)    • Febrile illness    • GERD (gastroesophageal reflux disease)    • Glaucoma    • Hematoma    • High cholesterol    • History of foreign travel 12/2017; 08/2018    [place], [place], [place], [place], [place] and [place]; [place]   • Hyperlipidemia    • Hypertension    • Hypogonadism in male 9/28/2016   • Male erectile disorder    • Microalbuminuria    • Obesity (BMI 30-39.9)    • Osteoarthritis     knee   • Vitamin D deficiency    • Wound infection after surgery        The following portions of the patient's history were reviewed and updated as appropriate: Social history , Family history and Surgical history     Current Outpatient Medications on File Prior to Visit   Medication Sig Dispense Refill   • amLODIPine (NORVASC) 10 MG tablet Take 1 tablet by mouth Daily. 90 tablet 1   • aspirin 81 MG EC tablet Take 1 tablet by mouth Daily. get over the counter 30 tablet 3   • Blood Glucose Monitoring Suppl ([facility] BLOOD GLUCOSE) kit TEST AS DIRECTED 1 each 0   • buPROPion XL (Wellbutrin XL) 150 MG 24 hr tablet Take 1 tablet by mouth Daily. 30 tablet 1   • carvedilol (COREG) 25 MG  tablet TAKE ONE TABLET BY MOUTH TWICE A DAY WITH MEALS (Patient taking differently: Take 25 mg by mouth 2 (Two) Times a Day With Meals.) 180 tablet 1   • clonazePAM (KlonoPIN) 0.5 MG tablet Take 1 tablet by mouth 3 (Three) Times a Day As Needed (Facial twitching). 63 tablet 0   • clopidogrel (PLAVIX) 75 MG tablet Take 1 tablet by mouth Daily. 90 tablet 3   • dorzolamide-timolol (COSOPT) 22.3-6.8 MG/ML ophthalmic solution Apply 1 drop to eye(s) to both eyes 2 (Two) Times a Day. 20 mL 3   • Empagliflozin (JARDIANCE) 25 MG tablet Take 25 mg by mouth Daily. 90 tablet 1   • esomeprazole (nexIUM) 40 MG capsule Take 1 capsule by mouth Daily. 30 capsule 5   • famotidine (PEPCID) 20 MG tablet Take 1 tablet by mouth Daily With Dinner. 30 tablet 1   • glucose blood test strip Use 4 times a day 400 each 0   • Insulin Glargine, 2 Unit Dial, (TOUJEO) 300 UNIT/ML solution pen-injector injection Inject 60 Units under the skin into the appropriate area as directed Every Morning for 90 days. 18 mL 1   • Insulin Pen Needle (B-D ULTRAFINE III SHORT PEN) 31G X 8 MM misc USE AS DIRECTED DAILY WITH TOUJEO 100 each 5   • Lancets (FREESTYLE) lancets Use to test BG 4 times daily 400 each 1   • latanoprost (XALATAN) 0.005 % ophthalmic solution Apply 1 drop to  each eye Daily. (Patient taking differently: Apply 1 drop to eye(s) as directed by provider Every Night.) 7.5 mL 3   • metFORMIN (GLUCOPHAGE) 500 MG tablet Take 2 tablets by mouth 2 (Two) Times a Day With Meals. 120 tablet 5   • pioglitazone (ACTOS) 15 MG tablet Take 1 tablet by mouth Daily. 90 tablet 0   • rosuvastatin (CRESTOR) 40 MG tablet Take 1 tablet by mouth Daily. 30 tablet 5   • sildenafil (VIAGRA) 50 MG tablet Take 1 tablet by mouth Daily As Needed for erectile dysfunction. 6 tablet 5   • SITagliptin (JANUVIA) 100 MG tablet Take 1 tablet by mouth Daily. 90 tablet 1   • vitamin D (ERGOCALCIFEROL) 1.25 MG (49380 UT) capsule capsule TAKE ONE CAPSULE BY MOUTH ONCE WEEKLY 12  capsule 0     No current facility-administered medications on file prior to visit.        Allergies   Allergen Reactions   • Ace Inhibitors Angioedema   • Lisinopril Angioedema   • Testosterone Myalgia       There were no vitals filed for this visit.  Appears well  No additional exam.   Telehealth visit secondary to COVID-19 outbreak    No results found for: CBC  No results found for: CMP  No components found for: LIPID  No results found for: BMP    Procedures      3/10/2020  Conclusions:   1. Left main: Normal  2. LAD: 20 to 30% ostial stenosis.  40% mid vessel stenosis.  3. LCX: 99% ostial stenosis with GRACIE II flow.  40 to 50% mid vessel stenosis.  4. RCA: Previously placed stents from the proximal to distal RCA with luminal irregularities.  5.  Normal left ventricular size and systolic function  6.  PCI of the ostial circumflex with a 3.0 x 15 mm Xience Kaylynn drug-eluting stent.    DISCUSSION/SUMMARYA very pleasant, 65-year-old male with a medical history of coronary artery disease and prior PCI to the circumflex and chronic total occlusion intervention to the RCA, essential hypertension, hyperlipidemia and diabetes mellitus, who presents to me as a telehealth f/u.        Since his cardiac catheterization and PCI to his circumflex he has really not noticed much of a difference in his symptoms. I do not think that these are coronary in etiology. He has occasional chest tightness and shortness of breath that does not appear to be exertional.             1.  Coronary disease with prior PCI:    - Continue aspirin life long.  I would also recommend continuing Plavix life long with his multivessel PCI and with multiple stents in his RCA.    - Continue rosuvastatin at his current dose.  Last lipid panel 3/11/2020 with reasonable control  2.  Essential hypertension, well-controlled:  Continue current therapy.    3.  Hyperlipidemia:  As above.  Hold off on addition of adjunct therapy.   4.  Dyspnea: I have recommended  that the patient undergo evaluation with Pulmonary and I will place the referral for that. It has also been multiple years since he had an echocardiogram and I have recommended that we reorder that when things calm down from the coronavirus standpoint.

## 2020-04-07 NOTE — TELEPHONE ENCOUNTER
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 They state pt discussed this with you last ov but has had no relief and is now wanting to go to Dr. Alek Kang at [facility]. They called them and they said for pt to get referral sent over from us. Please advise.

## 2020-04-14 ENCOUNTER — TELEPHONE (OUTPATIENT)
Dept: CARDIOLOGY | Facility: CLINIC | Age: 65
End: 2020-04-14

## 2020-04-14 ENCOUNTER — HOSPITAL ENCOUNTER (OUTPATIENT)
Dept: CARDIOLOGY | Facility: HOSPITAL | Age: 65
Discharge: HOME | End: 2020-04-14
Admitting: INTERNAL MEDICINE

## 2020-04-14 VITALS
HEART RATE: 68 BPM | HEIGHT: 77 IN | SYSTOLIC BLOOD PRESSURE: 132 MMHG | DIASTOLIC BLOOD PRESSURE: 70 MMHG | BODY MASS INDEX: 30.46 KG/M2 | WEIGHT: 258 LBS

## 2020-04-14 DIAGNOSIS — I25.10 CHRONIC CORONARY ARTERY DISEASE: ICD-10-CM

## 2020-04-14 DIAGNOSIS — R06.02 SHORTNESS OF BREATH: ICD-10-CM

## 2020-04-14 LAB
AORTIC ROOT ANNULUS: 2.2 CM
BH CV ECHO MEAS - ACS: 1.9 CM
BH CV ECHO MEAS - AO MAX PG (FULL): 8.3 MMHG
BH CV ECHO MEAS - AO MAX PG: 10.8 MMHG
BH CV ECHO MEAS - AO MEAN PG (FULL): 5.2 MMHG
BH CV ECHO MEAS - AO MEAN PG: 6.5 MMHG
BH CV ECHO MEAS - AO V2 MAX: 164.1 CM/SEC
BH CV ECHO MEAS - AO V2 MEAN: 118 CM/SEC
BH CV ECHO MEAS - AO V2 VTI: 37 CM
BH CV ECHO MEAS - AVA(I,A): 1.3 CM^2
BH CV ECHO MEAS - AVA(I,D): 1.3 CM^2
BH CV ECHO MEAS - AVA(V,A): 1.3 CM^2
BH CV ECHO MEAS - AVA(V,D): 1.3 CM^2
BH CV ECHO MEAS - BSA(HAYCOCK): 2.5 M^2
BH CV ECHO MEAS - BSA: 2.5 M^2
BH CV ECHO MEAS - BZI_BMI: 30.6 KILOGRAMS/M^2
BH CV ECHO MEAS - BZI_METRIC_HEIGHT: 195.6 CM
BH CV ECHO MEAS - BZI_METRIC_WEIGHT: 117 KG
BH CV ECHO MEAS - EDV(MOD-SP2): 113 ML
BH CV ECHO MEAS - EDV(MOD-SP4): 120 ML
BH CV ECHO MEAS - EDV(TEICH): 108.2 ML
BH CV ECHO MEAS - EF(CUBED): 60.7 %
BH CV ECHO MEAS - EF(MOD-BP): 56 %
BH CV ECHO MEAS - EF(MOD-SP2): 58.4 %
BH CV ECHO MEAS - EF(MOD-SP4): 53.3 %
BH CV ECHO MEAS - EF(TEICH): 52.1 %
BH CV ECHO MEAS - ESV(MOD-SP2): 47 ML
BH CV ECHO MEAS - ESV(MOD-SP4): 56 ML
BH CV ECHO MEAS - ESV(TEICH): 51.8 ML
BH CV ECHO MEAS - FS: 26.7 %
BH CV ECHO MEAS - IVS/LVPW: 1.1
BH CV ECHO MEAS - IVSD: 1.3 CM
BH CV ECHO MEAS - LAT PEAK E' VEL: 12 CM/SEC
BH CV ECHO MEAS - LV DIASTOLIC VOL/BSA (35-75): 48.1 ML/M^2
BH CV ECHO MEAS - LV MASS(C)D: 241.1 GRAMS
BH CV ECHO MEAS - LV MASS(C)DI: 96.7 GRAMS/M^2
BH CV ECHO MEAS - LV MAX PG: 2.4 MMHG
BH CV ECHO MEAS - LV MEAN PG: 1.3 MMHG
BH CV ECHO MEAS - LV SYSTOLIC VOL/BSA (12-30): 22.5 ML/M^2
BH CV ECHO MEAS - LV V1 MAX: 78 CM/SEC
BH CV ECHO MEAS - LV V1 MEAN: 50.4 CM/SEC
BH CV ECHO MEAS - LV V1 VTI: 16.5 CM
BH CV ECHO MEAS - LVIDD: 4.8 CM
BH CV ECHO MEAS - LVIDS: 3.5 CM
BH CV ECHO MEAS - LVLD AP2: 8.3 CM
BH CV ECHO MEAS - LVLD AP4: 8.7 CM
BH CV ECHO MEAS - LVLS AP2: 7.8 CM
BH CV ECHO MEAS - LVLS AP4: 7.6 CM
BH CV ECHO MEAS - LVOT AREA (M): 2.8 CM^2
BH CV ECHO MEAS - LVOT AREA: 2.8 CM^2
BH CV ECHO MEAS - LVOT DIAM: 1.9 CM
BH CV ECHO MEAS - LVPWD: 1.2 CM
BH CV ECHO MEAS - MED PEAK E' VEL: 6 CM/SEC
BH CV ECHO MEAS - MR MAX PG: 44 MMHG
BH CV ECHO MEAS - MR MAX VEL: 331.8 CM/SEC
BH CV ECHO MEAS - MV A DUR: 0.11 SEC
BH CV ECHO MEAS - MV A MAX VEL: 98.5 CM/SEC
BH CV ECHO MEAS - MV DEC SLOPE: 411 CM/SEC^2
BH CV ECHO MEAS - MV DEC TIME: 0.17 SEC
BH CV ECHO MEAS - MV E MAX VEL: 76.7 CM/SEC
BH CV ECHO MEAS - MV E/A: 0.78
BH CV ECHO MEAS - MV MAX PG: 6.2 MMHG
BH CV ECHO MEAS - MV MEAN PG: 3 MMHG
BH CV ECHO MEAS - MV P1/2T MAX VEL: 76.3 CM/SEC
BH CV ECHO MEAS - MV P1/2T: 54.4 MSEC
BH CV ECHO MEAS - MV V2 MAX: 124.3 CM/SEC
BH CV ECHO MEAS - MV V2 MEAN: 83.7 CM/SEC
BH CV ECHO MEAS - MV V2 VTI: 28 CM
BH CV ECHO MEAS - MVA P1/2T LCG: 2.9 CM^2
BH CV ECHO MEAS - MVA(P1/2T): 4 CM^2
BH CV ECHO MEAS - MVA(VTI): 1.7 CM^2
BH CV ECHO MEAS - PA ACC TIME: 0.14 SEC
BH CV ECHO MEAS - PA MAX PG (FULL): 2.6 MMHG
BH CV ECHO MEAS - PA MAX PG: 4.4 MMHG
BH CV ECHO MEAS - PA PR(ACCEL): 14 MMHG
BH CV ECHO MEAS - PA V2 MAX: 105 CM/SEC
BH CV ECHO MEAS - PULM A REVS DUR: 0.12 SEC
BH CV ECHO MEAS - PULM A REVS VEL: 26.6 CM/SEC
BH CV ECHO MEAS - PULM DIAS VEL: 26.9 CM/SEC
BH CV ECHO MEAS - PULM S/D: 1.3
BH CV ECHO MEAS - PULM SYS VEL: 35.5 CM/SEC
BH CV ECHO MEAS - PVA(V,A): 2.8 CM^2
BH CV ECHO MEAS - PVA(V,D): 2.8 CM^2
BH CV ECHO MEAS - QP/QS: 1.5
BH CV ECHO MEAS - RV MAX PG: 1.8 MMHG
BH CV ECHO MEAS - RV MEAN PG: 1.1 MMHG
BH CV ECHO MEAS - RV V1 MAX: 66.8 CM/SEC
BH CV ECHO MEAS - RV V1 MEAN: 48.4 CM/SEC
BH CV ECHO MEAS - RV V1 VTI: 15.5 CM
BH CV ECHO MEAS - RVOT AREA: 4.4 CM^2
BH CV ECHO MEAS - RVOT DIAM: 2.4 CM
BH CV ECHO MEAS - SI(CUBED): 27.1 ML/M^2
BH CV ECHO MEAS - SI(LVOT): 18.6 ML/M^2
BH CV ECHO MEAS - SI(MOD-SP2): 26.5 ML/M^2
BH CV ECHO MEAS - SI(MOD-SP4): 25.7 ML/M^2
BH CV ECHO MEAS - SI(TEICH): 22.6 ML/M^2
BH CV ECHO MEAS - SV(CUBED): 67.6 ML
BH CV ECHO MEAS - SV(LVOT): 46.2 ML
BH CV ECHO MEAS - SV(MOD-SP2): 66 ML
BH CV ECHO MEAS - SV(MOD-SP4): 64 ML
BH CV ECHO MEAS - SV(RVOT): 68.7 ML
BH CV ECHO MEAS - SV(TEICH): 56.4 ML
BH CV ECHO MEAS - TAPSE (>1.6): 1.9 CM2
BH CV ECHO MEASUREMENTS AVERAGE E/E' RATIO: 8.52
BH CV XLRA - RV BASE: 2.8 CM
BH CV XLRA - RV LENGTH: 7.4 CM
BH CV XLRA - RV MID: 2.1 CM
BH CV XLRA - TDI S': 10 CM/SEC
LEFT ATRIUM VOLUME INDEX: 20 ML/M2
MAXIMAL PREDICTED HEART RATE: 155 BPM
SINUS: 3.2 CM
STJ: 2.6 CM
STRESS TARGET HR: 132 BPM

## 2020-04-14 PROCEDURE — 93306 TTE W/DOPPLER COMPLETE: CPT | Performed by: INTERNAL MEDICINE

## 2020-04-14 PROCEDURE — 93306 TTE W/DOPPLER COMPLETE: CPT

## 2020-04-14 NOTE — TELEPHONE ENCOUNTER
----- Message from Miguel Ángel Karimi MD sent at 4/14/2020  2:07 PM EDT -----  Echo is normal  Please let him know

## 2020-04-14 NOTE — TELEPHONE ENCOUNTER
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 I left message letting pt know this and to see if he had someone else he would like to see.

## 2020-04-15 NOTE — TELEPHONE ENCOUNTER
Pt wife called in and stated she would like us to find a dr for pt.  I contacted  [facility] to see if they are seeing pt. The practice is closed right now but they advised us to still send the referral for review. Pt wife is aware. See referral details.

## 2020-04-17 ENCOUNTER — HOSPITAL ENCOUNTER (OUTPATIENT)
Dept: CT IMAGING | Facility: HOSPITAL | Age: 65
Discharge: HOME | End: 2020-04-17
Admitting: INTERNAL MEDICINE

## 2020-04-17 ENCOUNTER — TRANSCRIBE ORDERS (OUTPATIENT)
Dept: ADMINISTRATIVE | Facility: HOSPITAL | Age: 65
End: 2020-04-17

## 2020-04-17 DIAGNOSIS — R06.09 DOE (DYSPNEA ON EXERTION): Primary | ICD-10-CM

## 2020-04-17 DIAGNOSIS — R06.09 DOE (DYSPNEA ON EXERTION): ICD-10-CM

## 2020-04-17 PROCEDURE — 82565 ASSAY OF CREATININE: CPT

## 2020-04-17 PROCEDURE — 71275 CT ANGIOGRAPHY CHEST: CPT

## 2020-04-17 PROCEDURE — 25010000002 IOPAMIDOL 61 % SOLUTION: Performed by: INTERNAL MEDICINE

## 2020-04-17 RX ADMIN — IOPAMIDOL 95 ML: 612 INJECTION, SOLUTION INTRAVENOUS at 10:51

## 2020-04-22 ENCOUNTER — APPOINTMENT (OUTPATIENT)
Dept: GENERAL RADIOLOGY | Facility: HOSPITAL | Age: 65
End: 2020-04-22

## 2020-04-22 PROCEDURE — 71046 X-RAY EXAM CHEST 2 VIEWS: CPT | Performed by: GENERAL PRACTICE

## 2020-04-24 ENCOUNTER — TELEMEDICINE (OUTPATIENT)
Dept: FAMILY MEDICINE CLINIC | Facility: CLINIC | Age: 65
End: 2020-04-24

## 2020-04-24 VITALS — BODY MASS INDEX: 30.11 KG/M2 | HEIGHT: 77 IN | WEIGHT: 255 LBS

## 2020-04-24 DIAGNOSIS — G24.4 MEIGE SYNDROME (BLEPHAROSPASM WITH OROMANDIBULAR DYSTONIA): Primary | ICD-10-CM

## 2020-04-24 PROCEDURE — 99213 OFFICE O/P EST LOW 20 MIN: CPT | Performed by: FAMILY MEDICINE

## 2020-04-24 RX ORDER — ESCITALOPRAM OXALATE 20 MG/1
20 TABLET ORAL DAILY
Qty: 90 TABLET | Refills: 3 | Status: SHIPPED | OUTPATIENT
Start: 2020-04-24 | End: 2021-04-21 | Stop reason: SDUPTHER

## 2020-04-24 RX ORDER — CLONAZEPAM 0.5 MG/1
0.5 TABLET ORAL 3 TIMES DAILY PRN
Qty: 60 TABLET | Refills: 0 | Status: SHIPPED | OUTPATIENT
Start: 2020-04-24 | End: 2020-04-26 | Stop reason: SDUPTHER

## 2020-04-24 NOTE — PROGRESS NOTES
Isaias Monaco is a 65 y.o. male.     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  Patient needs to be referred to a neurologist.  Gets flares from time to time which caused tics.  Takes clonazepam as well as Lexapro.  In need of refills.    The following portions of the patient's history were reviewed and updated as appropriate: allergies, current medications, past family history, past medical history, past social history, past surgical history and problem list.    Review of Systems   Constitutional: Negative.  Negative for activity change and appetite change.   HENT: Negative.    Eyes: Negative.  Negative for discharge and itching.   Respiratory: Negative.    Cardiovascular: Negative.    Gastrointestinal: Negative.    Endocrine: Negative.    Genitourinary: Negative.    Musculoskeletal: Negative.    Skin: Negative.    Allergic/Immunologic: Negative.    Neurological: Negative.    Hematological: Negative.    Psychiatric/Behavioral: Negative.    All other systems reviewed and are negative.    I have reviewed the ROS as documented by the MA. Thony Reynolds MD    Objective  There were no vitals filed for this visit.  Body mass index is 30.24 kg/m².    Physical Exam   Constitutional: He is oriented to person, place, and time. He appears well-developed and well-nourished. No distress.   Neurological: He is alert and oriented to person, place, and time.   Skin: He is not diaphoretic.   Psychiatric: He has a normal mood and affect. His behavior is normal.   Nursing note and vitals reviewed.        Current Outpatient Medications:   •  amLODIPine (NORVASC) 10 MG tablet, Take 1 tablet by mouth Daily., Disp: 90 tablet, Rfl: 1  •  aspirin 81 MG EC tablet, Take 1 tablet by mouth Daily. get over the counter, Disp: 30 tablet, Rfl: 3  •  Blood Glucose Monitoring Suppl ([employer] BLOOD GLUCOSE) kit, TEST AS DIRECTED, Disp: 1  each, Rfl: 0  •  carvedilol (COREG) 25 MG tablet, TAKE ONE TABLET BY MOUTH TWICE A DAY WITH MEALS (Patient taking differently: Take 25 mg by mouth 2 (Two) Times a Day With Meals.), Disp: 180 tablet, Rfl: 1  •  clonazePAM (KlonoPIN) 0.5 MG tablet, Take 1 tablet by mouth 3 (Three) Times a Day As Needed (Facial twitching)., Disp: 60 tablet, Rfl: 0  •  clopidogrel (PLAVIX) 75 MG tablet, Take 1 tablet by mouth Daily., Disp: 90 tablet, Rfl: 3  •  dorzolamide-timolol (COSOPT) 22.3-6.8 MG/ML ophthalmic solution, Apply 1 drop to eye(s) to both eyes 2 (Two) Times a Day., Disp: 20 mL, Rfl: 3  •  Empagliflozin (JARDIANCE) 25 MG tablet, Take 25 mg by mouth Daily., Disp: 90 tablet, Rfl: 1  •  escitalopram (LEXAPRO) 20 MG tablet, Take 1 tablet by mouth Daily., Disp: 90 tablet, Rfl: 3  •  esomeprazole (nexIUM) 40 MG capsule, Take 1 capsule by mouth Daily., Disp: 30 capsule, Rfl: 5  •  famotidine (PEPCID) 20 MG tablet, Take 1 tablet by mouth Daily With Dinner., Disp: 30 tablet, Rfl: 1  •  Insulin Glargine, 2 Unit Dial, (TOUJEO) 300 UNIT/ML solution pen-injector injection, Inject 60 Units under the skin into the appropriate area as directed Every Morning for 90 days., Disp: 18 mL, Rfl: 1  •  Insulin Pen Needle (B-D ULTRAFINE III SHORT PEN) 31G X 8 MM misc, USE AS DIRECTED DAILY WITH TOUJEO, Disp: 100 each, Rfl: 5  •  Lancets (FREESTYLE) lancets, Use to test BG 4 times daily, Disp: 400 each, Rfl: 1  •  latanoprost (XALATAN) 0.005 % ophthalmic solution, Apply 1 drop to  each eye Daily. (Patient taking differently: Apply 1 drop to eye(s) as directed by provider Every Night.), Disp: 7.5 mL, Rfl: 3  •  metFORMIN (GLUCOPHAGE) 500 MG tablet, Take 2 tablets by mouth 2 (Two) Times a Day With Meals., Disp: 120 tablet, Rfl: 5  •  pioglitazone (ACTOS) 15 MG tablet, Take 1 tablet by mouth Daily., Disp: 90 tablet, Rfl: 0  •  rosuvastatin (CRESTOR) 40 MG tablet, Take 1 tablet by mouth Daily., Disp: 30 tablet, Rfl: 5  •  sildenafil (VIAGRA) 50 MG  tablet, Take 1 tablet by mouth Daily As Needed for erectile dysfunction., Disp: 6 tablet, Rfl: 5  •  SITagliptin (JANUVIA) 100 MG tablet, Take 1 tablet by mouth Daily., Disp: 90 tablet, Rfl: 1  •  vitamin D (ERGOCALCIFEROL) 1.25 MG (42618 UT) capsule capsule, TAKE ONE CAPSULE BY MOUTH ONCE WEEKLY, Disp: 12 capsule, Rfl: 0  •  glucose blood test strip, Use 4 times a day, Disp: 400 each, Rfl: 0  Current outpatient and discharge medications have been reconciled for the patient.  Reviewed by: Thony Reynolds MD      Procedures    Lab Results (most recent)     None                  Isaias was seen today for chest pain.    Diagnoses and all orders for this visit:    Meige syndrome (blepharospasm with oromandibular dystonia)  -     Ambulatory Referral to Neurology  -     clonazePAM (KlonoPIN) 0.5 MG tablet; Take 1 tablet by mouth 3 (Three) Times a Day As Needed (Facial twitching).  -     escitalopram (LEXAPRO) 20 MG tablet; Take 1 tablet by mouth Daily.      Refills and referrals as above.    Return for As needed.      Thony Reynolds MD

## 2020-04-26 DIAGNOSIS — G24.4 MEIGE SYNDROME (BLEPHAROSPASM WITH OROMANDIBULAR DYSTONIA): ICD-10-CM

## 2020-04-27 RX ORDER — CLONAZEPAM 0.5 MG/1
0.5 TABLET ORAL 3 TIMES DAILY PRN
Qty: 60 TABLET | Refills: 0 | Status: SHIPPED | OUTPATIENT
Start: 2020-04-27 | End: 2021-01-12 | Stop reason: SDUPTHER

## 2020-05-05 RX ORDER — FAMOTIDINE 20 MG/1
TABLET, FILM COATED ORAL
Qty: 30 TABLET | Refills: 5 | Status: SHIPPED | OUTPATIENT
Start: 2020-05-05 | End: 2020-08-21 | Stop reason: ALTCHOICE

## 2020-05-06 LAB — CREAT BLDA-MCNC: 1.3 MG/DL (ref 0.6–1.3)

## 2020-05-11 ENCOUNTER — TELEPHONE (OUTPATIENT)
Dept: FAMILY MEDICINE CLINIC | Facility: CLINIC | Age: 65
End: 2020-05-11

## 2020-05-11 DIAGNOSIS — E78.5 HYPERLIPIDEMIA, UNSPECIFIED HYPERLIPIDEMIA TYPE: ICD-10-CM

## 2020-05-11 RX ORDER — ROSUVASTATIN CALCIUM 40 MG/1
40 TABLET, COATED ORAL DAILY
Qty: 90 TABLET | Refills: 3 | Status: SHIPPED | OUTPATIENT
Start: 2020-05-11 | End: 2021-04-21 | Stop reason: SDUPTHER

## 2020-05-11 NOTE — TELEPHONE ENCOUNTER
Spoke with pt informed him that script sent to [facility] pharmacy as he requested

## 2020-05-18 RX ORDER — PIOGLITAZONEHYDROCHLORIDE 15 MG/1
15 TABLET ORAL DAILY
Qty: 90 TABLET | Refills: 0 | Status: SHIPPED | OUTPATIENT
Start: 2020-05-18 | End: 2021-02-13 | Stop reason: SDUPTHER

## 2020-06-02 ENCOUNTER — LAB (OUTPATIENT)
Dept: ENDOCRINOLOGY | Age: 65
End: 2020-06-02

## 2020-06-02 DIAGNOSIS — E78.5 HYPERLIPIDEMIA, UNSPECIFIED HYPERLIPIDEMIA TYPE: ICD-10-CM

## 2020-06-02 DIAGNOSIS — E11.8 CONTROLLED TYPE 2 DIABETES MELLITUS WITH COMPLICATION, WITH LONG-TERM CURRENT USE OF INSULIN (HCC): ICD-10-CM

## 2020-06-02 DIAGNOSIS — R79.89 LOW TESTOSTERONE: Primary | ICD-10-CM

## 2020-06-02 DIAGNOSIS — R80.9 MICROALBUMINURIA: ICD-10-CM

## 2020-06-02 DIAGNOSIS — E55.9 VITAMIN D DEFICIENCY: ICD-10-CM

## 2020-06-02 DIAGNOSIS — N52.9 VASCULOGENIC ERECTILE DYSFUNCTION, UNSPECIFIED VASCULOGENIC ERECTILE DYSFUNCTION TYPE: ICD-10-CM

## 2020-06-02 DIAGNOSIS — Z79.4 CONTROLLED TYPE 2 DIABETES MELLITUS WITH COMPLICATION, WITH LONG-TERM CURRENT USE OF INSULIN (HCC): ICD-10-CM

## 2020-06-02 DIAGNOSIS — E29.1 HYPOGONADISM IN MALE: ICD-10-CM

## 2020-06-02 DIAGNOSIS — E11.65 UNCONTROLLED TYPE 2 DIABETES MELLITUS WITH HYPERGLYCEMIA (HCC): ICD-10-CM

## 2020-06-02 DIAGNOSIS — R79.89 LOW TESTOSTERONE: ICD-10-CM

## 2020-06-04 LAB
25(OH)D3+25(OH)D2 SERPL-MCNC: 41.2 NG/ML (ref 30–100)
ALBUMIN SERPL-MCNC: 4.7 G/DL (ref 3.5–5.2)
ALBUMIN/GLOB SERPL: 1.3 G/DL
ALP SERPL-CCNC: 88 U/L (ref 39–117)
ALT SERPL-CCNC: 40 U/L (ref 1–41)
AST SERPL-CCNC: 40 U/L (ref 1–40)
BILIRUB SERPL-MCNC: 0.4 MG/DL (ref 0.2–1.2)
BUN SERPL-MCNC: 12 MG/DL (ref 8–23)
BUN/CREAT SERPL: 10.5 (ref 7–25)
C PEPTIDE SERPL-MCNC: 2.5 NG/ML (ref 1.1–4.4)
CALCIUM SERPL-MCNC: 9.9 MG/DL (ref 8.6–10.5)
CHLORIDE SERPL-SCNC: 104 MMOL/L (ref 98–107)
CHOLEST SERPL-MCNC: 176 MG/DL (ref 0–200)
CO2 SERPL-SCNC: 22.7 MMOL/L (ref 22–29)
CREAT SERPL-MCNC: 1.14 MG/DL (ref 0.76–1.27)
GLOBULIN SER CALC-MCNC: 3.5 GM/DL
GLUCOSE SERPL-MCNC: 114 MG/DL (ref 65–99)
HBA1C MFR BLD: 6.2 % (ref 4.8–5.6)
HCT VFR BLD AUTO: 48.3 % (ref 37.5–51)
HDLC SERPL-MCNC: 42 MG/DL (ref 40–60)
HGB BLD-MCNC: 16.6 G/DL (ref 13–17.7)
INTERPRETATION: NORMAL
LDLC SERPL CALC-MCNC: 108 MG/DL (ref 0–100)
Lab: NORMAL
MICROALBUMIN UR-MCNC: 510.9 UG/ML
POTASSIUM SERPL-SCNC: 4.3 MMOL/L (ref 3.5–5.2)
PROT SERPL-MCNC: 8.2 G/DL (ref 6–8.5)
SHBG SERPL-SCNC: 19.6 NMOL/L (ref 19.3–76.4)
SODIUM SERPL-SCNC: 139 MMOL/L (ref 136–145)
TESTOST FREE SERPL-MCNC: 4.4 PG/ML (ref 6.6–18.1)
TESTOST SERPL-MCNC: 216 NG/DL (ref 264–916)
TRIGL SERPL-MCNC: 132 MG/DL (ref 0–150)
URATE SERPL-MCNC: 6.9 MG/DL (ref 3.4–7)
VLDLC SERPL CALC-MCNC: 26.4 MG/DL

## 2020-06-09 ENCOUNTER — OFFICE VISIT (OUTPATIENT)
Dept: ENDOCRINOLOGY | Age: 65
End: 2020-06-09

## 2020-06-09 ENCOUNTER — HOSPITAL ENCOUNTER (OUTPATIENT)
Dept: CARDIOLOGY | Facility: HOSPITAL | Age: 65
Discharge: HOME | End: 2020-06-09
Admitting: FAMILY MEDICINE

## 2020-06-09 VITALS
WEIGHT: 266 LBS | HEIGHT: 77 IN | BODY MASS INDEX: 31.41 KG/M2 | SYSTOLIC BLOOD PRESSURE: 130 MMHG | DIASTOLIC BLOOD PRESSURE: 82 MMHG | RESPIRATION RATE: 16 BRPM

## 2020-06-09 VITALS
WEIGHT: 264 LBS | SYSTOLIC BLOOD PRESSURE: 140 MMHG | BODY MASS INDEX: 32.15 KG/M2 | HEART RATE: 78 BPM | DIASTOLIC BLOOD PRESSURE: 82 MMHG | HEIGHT: 76 IN

## 2020-06-09 DIAGNOSIS — R06.00 DYSPNEA, UNSPECIFIED: ICD-10-CM

## 2020-06-09 DIAGNOSIS — I25.118 CORONARY ARTERY DISEASE INVOLVING NATIVE HEART WITH OTHER FORM OF ANGINA PECTORIS, UNSPECIFIED VESSEL OR LESION TYPE (HCC): ICD-10-CM

## 2020-06-09 DIAGNOSIS — Z79.4 CONTROLLED TYPE 2 DIABETES MELLITUS WITH COMPLICATION, WITH LONG-TERM CURRENT USE OF INSULIN (HCC): Primary | ICD-10-CM

## 2020-06-09 DIAGNOSIS — R06.02 SOB (SHORTNESS OF BREATH): ICD-10-CM

## 2020-06-09 DIAGNOSIS — R07.9 CHEST PAIN, UNSPECIFIED TYPE: ICD-10-CM

## 2020-06-09 DIAGNOSIS — E29.1 HYPOGONADISM IN MALE: ICD-10-CM

## 2020-06-09 DIAGNOSIS — E55.9 VITAMIN D DEFICIENCY: ICD-10-CM

## 2020-06-09 DIAGNOSIS — I10 ESSENTIAL HYPERTENSION: ICD-10-CM

## 2020-06-09 DIAGNOSIS — I73.9 PERIPHERAL ARTERIAL DISEASE (HCC): ICD-10-CM

## 2020-06-09 DIAGNOSIS — E11.8 CONTROLLED TYPE 2 DIABETES MELLITUS WITH COMPLICATION, WITH LONG-TERM CURRENT USE OF INSULIN (HCC): Primary | ICD-10-CM

## 2020-06-09 DIAGNOSIS — R79.89 LOW TESTOSTERONE: ICD-10-CM

## 2020-06-09 LAB
BH CV ECHO MEAS - DIST AO DIAM: 1.24 CM
BH CV XLRA MEAS - MID AO DIAM: 1.55 CM
BH CV XLRA MEAS - PAD LEFT ABI DP: 1.14
BH CV XLRA MEAS - PAD LEFT ABI PT: 1.14
BH CV XLRA MEAS - PAD LEFT ARM: 140 MMHG
BH CV XLRA MEAS - PAD LEFT LEG DP: 160 MMHG
BH CV XLRA MEAS - PAD LEFT LEG PT: 160 MMHG
BH CV XLRA MEAS - PAD RIGHT ABI DP: 1.14
BH CV XLRA MEAS - PAD RIGHT ABI PT: 1.21
BH CV XLRA MEAS - PAD RIGHT ARM: 135 MMHG
BH CV XLRA MEAS - PAD RIGHT LEG DP: 160 MMHG
BH CV XLRA MEAS - PAD RIGHT LEG PT: 170 MMHG
BH CV XLRA MEAS - PROX AO DIAM: 1.72 CM
BH CV XLRA MEAS LEFT ICA/CCA RATIO: 1.7
BH CV XLRA MEAS LEFT MID CCA PSV: NORMAL CM/SEC
BH CV XLRA MEAS LEFT MID ICA PSV: NORMAL CM/SEC
BH CV XLRA MEAS LEFT PROX ECA PSV: NORMAL CM/SEC
BH CV XLRA MEAS RIGHT ICA/CCA RATIO: 1.23
BH CV XLRA MEAS RIGHT MID CCA PSV: NORMAL CM/SEC
BH CV XLRA MEAS RIGHT MID ICA PSV: NORMAL CM/SEC
BH CV XLRA MEAS RIGHT PROX ECA PSV: NORMAL CM/SEC

## 2020-06-09 PROCEDURE — 93799 UNLISTED CV SVC/PROCEDURE: CPT

## 2020-06-09 PROCEDURE — 99214 OFFICE O/P EST MOD 30 MIN: CPT | Performed by: NURSE PRACTITIONER

## 2020-06-09 NOTE — PROGRESS NOTES
"Marie Monaco is a 65 y.o. male is here today for follow-up.  Chief Complaint   Patient presents with   • Diabetes     lab review; checking BG 2 times a day; denies any problems or concerns; no BG readings    • Hypertension   • Vitamin D Deficiency     /82   Resp 16   Ht 195.6 cm (77\")   Wt 121 kg (266 lb)   BMI 31.54 kg/m²   Current Outpatient Medications on File Prior to Visit   Medication Sig   • amLODIPine (NORVASC) 10 MG tablet Take 1 tablet by mouth Daily.   • aspirin 81 MG EC tablet Take 1 tablet by mouth Daily. get over the counter   • Blood Glucose Monitoring Suppl ([facility]) kit TEST AS DIRECTED   • carvedilol (COREG) 25 MG tablet TAKE ONE TABLET BY MOUTH TWICE A DAY WITH MEALS (Patient taking differently: Take 25 mg by mouth 2 (Two) Times a Day With Meals.)   • clonazePAM (KlonoPIN) 0.5 MG tablet Take 1 tablet by mouth 3 (Three) Times a Day As Needed (Facial twitching).   • clopidogrel (PLAVIX) 75 MG tablet Take 1 tablet by mouth Daily.   • dorzolamide-timolol (COSOPT) 22.3-6.8 MG/ML ophthalmic solution Apply 1 drop to eye(s) to both eyes 2 (Two) Times a Day.   • Empagliflozin (JARDIANCE) 25 MG tablet Take 25 mg by mouth Daily.   • escitalopram (LEXAPRO) 20 MG tablet Take 1 tablet by mouth Daily.   • esomeprazole (nexIUM) 40 MG capsule Take 1 capsule by mouth Daily.   • famotidine (PEPCID) 20 MG tablet TAKE ONE TABLET BY MOUTH DAILY WITH DINNER   • glucose blood test strip Use 4 times a day   • Insulin Pen Needle (B-D ULTRAFINE III SHORT PEN) 31G X 8 MM misc USE AS DIRECTED DAILY WITH TOUJEO   • Lancets (FREESTYLE) lancets Use to test BG 4 times daily   • latanoprost (XALATAN) 0.005 % ophthalmic solution Apply 1 drop to  each eye Daily. (Patient taking differently: Apply 1 drop to eye(s) as directed by provider Every Night.)   • metFORMIN (GLUCOPHAGE) 500 MG tablet Take 2 tablets by mouth 2 (Two) Times a Day With Meals.   • pioglitazone (ACTOS) 15 MG tablet Take 1 " tablet by mouth Daily.   • rosuvastatin (CRESTOR) 40 MG tablet Take 1 tablet by mouth Daily.   • sildenafil (VIAGRA) 50 MG tablet Take 1 tablet by mouth Daily As Needed for erectile dysfunction.   • SITagliptin (JANUVIA) 100 MG tablet Take 1 tablet by mouth Daily.   • vitamin D (ERGOCALCIFEROL) 1.25 MG (22845 UT) capsule capsule TAKE ONE CAPSULE BY MOUTH ONCE WEEKLY     No current facility-administered medications on file prior to visit.      Family History   Problem Relation Age of Onset   • Lupus Sister    • Thyroid disease Sister    • Heart disease Other    • Hypertension Other    • Arthritis Mother    • Hyperlipidemia Mother    • Hypertension Mother    • Thyroid disease Mother    • Lupus Mother    • Heart disease Father    • Arthritis Father    • Other Father         Vascular disease   • Lupus Father    • Depression Father    • Alcohol abuse Father    • Dementia Father    • Hypertension Father    • Heart disease Brother    • Heart attack Brother 40   • Thyroid disease Sister    • Arthritis Brother    • Malig Hyperthermia Neg Hx      Social History     Tobacco Use   • Smoking status: Never Smoker   • Smokeless tobacco: Never Used   Substance Use Topics   • Alcohol use: Yes     Alcohol/week: 3.0 - 4.0 standard drinks     Types: 3 - 4 Standard drinks or equivalent per week     Comment: occasional   • Drug use: No     Allergies   Allergen Reactions   • Ace Inhibitors Angioedema   • Lisinopril Angioedema   • Testosterone Myalgia         History of Present Illness   Encounter Diagnoses   Name Primary?   • Controlled type 2 diabetes mellitus with complication, with long-term current use of insulin (CMS/[facility]) Yes   • Essential hypertension    • Hypogonadism in male    • Low testosterone    • Vitamin D deficiency      65-year-old male patient here today for follow-up visit.  He has been seen for the above-mentioned problems.  He had recent labs which were reviewed.  They reflect good glycemic control.  He is on maximum  dose rosuvastatin for his cholesterol.  He is intolerant of testosterone and he does have low testosterone.  He has no complaints.  His last eye exam was a week ago and states he is being treated for glaucoma.  He has no complaints or concerns.  He denies neuropathy  The following portions of the patient's history were reviewed and updated as appropriate: allergies, current medications, past family history, past medical history, past social history, past surgical history and problem list.    Review of Systems   Constitutional: Negative for fatigue and fever.   Respiratory: Negative for cough and shortness of breath.    Cardiovascular: Negative for chest pain.   Gastrointestinal: Negative for constipation and diarrhea.   Endocrine: Negative for cold intolerance and heat intolerance.   Neurological: Negative for dizziness, light-headedness and headaches.   Psychiatric/Behavioral: Negative for sleep disturbance.       Objective   Physical Exam   Constitutional: He is oriented to person, place, and time. He appears well-developed and well-nourished. No distress.   HENT:   Head: Normocephalic and atraumatic.   Right Ear: External ear normal.   Left Ear: External ear normal.   Nose: Nose normal.   Eyes: Pupils are equal, round, and reactive to light. Right eye exhibits no discharge. Left eye exhibits no discharge.   History of glaucoma   Neck: Normal range of motion. Neck supple. Carotid bruit is not present. No tracheal deviation, no edema and no erythema present. No thyromegaly present.   Cardiovascular: Normal rate, regular rhythm, normal heart sounds and intact distal pulses. Exam reveals no gallop and no friction rub.   No murmur heard.  Pulmonary/Chest: Effort normal and breath sounds normal. No respiratory distress. He has no wheezes. He has no rales.   Abdominal: Soft. Bowel sounds are normal. He exhibits no distension. There is no tenderness.   Musculoskeletal: Normal range of motion. He exhibits no edema or  deformity.   Lymphadenopathy:     He has no cervical adenopathy.   Neurological: He is alert and oriented to person, place, and time. Coordination normal.   Skin: Skin is warm and dry. No rash noted. He is not diaphoretic. No erythema. No pallor.   Psychiatric: He has a normal mood and affect. His behavior is normal. Judgment and thought content normal.   Nursing note and vitals reviewed.    Results for orders placed or performed in visit on 06/02/20   Comprehensive Metabolic Panel   Result Value Ref Range    Glucose 114 (H) 65 - 99 mg/dL    BUN 12 8 - 23 mg/dL    Creatinine 1.14 0.76 - 1.27 mg/dL    eGFR Non African Am 64 >60 mL/min/1.73    eGFR African Am 78 >60 mL/min/1.73    BUN/Creatinine Ratio 10.5 7.0 - 25.0    Sodium 139 136 - 145 mmol/L    Potassium 4.3 3.5 - 5.2 mmol/L    Chloride 104 98 - 107 mmol/L    Total CO2 22.7 22.0 - 29.0 mmol/L    Calcium 9.9 8.6 - 10.5 mg/dL    Total Protein 8.2 6.0 - 8.5 g/dL    Albumin 4.70 3.50 - 5.20 g/dL    Globulin 3.5 gm/dL    A/G Ratio 1.3 g/dL    Total Bilirubin 0.4 0.2 - 1.2 mg/dL    Alkaline Phosphatase 88 39 - 117 U/L    AST (SGOT) 40 1 - 40 U/L    ALT (SGPT) 40 1 - 41 U/L   C-Peptide   Result Value Ref Range    C-Peptide 2.5 1.1 - 4.4 ng/mL   Hemoglobin A1c   Result Value Ref Range    Hemoglobin A1C 6.20 (H) 4.80 - 5.60 %   Lipid Panel   Result Value Ref Range    Total Cholesterol 176 0 - 200 mg/dL    Triglycerides 132 0 - 150 mg/dL    HDL Cholesterol 42 40 - 60 mg/dL    VLDL Cholesterol 26.4 mg/dL    LDL Cholesterol  108 (H) 0 - 100 mg/dL   MicroAlbumin, Urine, Random - Urine, Clean Catch   Result Value Ref Range    Microalbumin, Urine 510.9 Not Estab. ug/mL   Uric Acid   Result Value Ref Range    Uric Acid 6.9 3.4 - 7.0 mg/dL   Vitamin D 25 Hydroxy   Result Value Ref Range    25 Hydroxy, Vitamin D 41.2 30.0 - 100.0 ng/ml   TestT+TestF+SHBG   Result Value Ref Range    Testosterone, Total 216 (L) 264 - 916 ng/dL    Testosterone, Free 4.4 (L) 6.6 - 18.1 pg/mL    Sex  Hormone Binding Globulin 19.6 19.3 - 76.4 nmol/L   Hemoglobin & Hematocrit, Blood   Result Value Ref Range    Hemoglobin 16.6 13.0 - 17.7 g/dL    Hematocrit 48.3 37.5 - 51.0 %   Cardiovascular Risk Assessment   Result Value Ref Range    Interpretation Note    Diabetes Patient Education   Result Value Ref Range    PDF Image Not applicable          Assessment/Plan   Problems Addressed this Visit        Cardiovascular and Mediastinum    Essential hypertension       Digestive    Vitamin D deficiency       Endocrine    Hypogonadism in male    Controlled type 2 diabetes mellitus with complication, with long-term current use of insulin (CMS/[facility]) - Primary       Other    Low testosterone        In summary patient was seen and examined.  He is up-to-date on his eye exam.  Metabolically and clinically he is stable.  His A1c reflects good glycemic control.  He is not at risk for hypoglycemia.  His blood pressures in satisfactory range.  He has had some weight gain his been advised to monitor his weight and I encouraged him to diet exercise for weight loss.  He is up-to-date on eye exam having it done a week ago.  We do not have any report and his medical record.  He will follow-up in 6 to 8 months with Dr. Jovel or myself with labs prior.  Vitamin D is stable blood pressures in satisfactory range.  He has been encouraged to contact the office should he have any questions or concerns.

## 2020-06-23 DIAGNOSIS — N52.9 MALE ERECTILE DISORDER: ICD-10-CM

## 2020-06-23 DIAGNOSIS — E66.9 ADIPOSITY: ICD-10-CM

## 2020-06-23 DIAGNOSIS — R80.9 MICROALBUMINURIA: ICD-10-CM

## 2020-06-23 DIAGNOSIS — I10 ESSENTIAL HYPERTENSION: ICD-10-CM

## 2020-06-23 DIAGNOSIS — E11.65 TYPE 2 DIABETES MELLITUS WITH HYPERGLYCEMIA (HCC): ICD-10-CM

## 2020-06-23 DIAGNOSIS — E78.5 HYPERLIPIDEMIA: ICD-10-CM

## 2020-06-23 DIAGNOSIS — E55.9 VITAMIN D DEFICIENCY: ICD-10-CM

## 2020-06-25 RX ORDER — CARVEDILOL 25 MG/1
TABLET ORAL
Qty: 180 TABLET | Refills: 1 | Status: SHIPPED | OUTPATIENT
Start: 2020-06-25 | End: 2020-11-24 | Stop reason: SDUPTHER

## 2020-08-20 DIAGNOSIS — K21.9 GASTROESOPHAGEAL REFLUX DISEASE WITHOUT ESOPHAGITIS: ICD-10-CM

## 2020-08-20 DIAGNOSIS — K44.9 HIATAL HERNIA: ICD-10-CM

## 2020-08-21 RX ORDER — ESOMEPRAZOLE MAGNESIUM 40 MG/1
CAPSULE, DELAYED RELEASE ORAL
Qty: 30 CAPSULE | Refills: 3 | Status: SHIPPED | OUTPATIENT
Start: 2020-08-21 | End: 2020-12-28

## 2020-08-27 ENCOUNTER — OFFICE VISIT (OUTPATIENT)
Dept: CARDIOLOGY | Facility: CLINIC | Age: 65
End: 2020-08-27

## 2020-08-27 VITALS
DIASTOLIC BLOOD PRESSURE: 82 MMHG | SYSTOLIC BLOOD PRESSURE: 130 MMHG | HEART RATE: 76 BPM | WEIGHT: 263 LBS | HEIGHT: 76 IN | BODY MASS INDEX: 32.03 KG/M2

## 2020-08-27 DIAGNOSIS — I10 ESSENTIAL HYPERTENSION: Primary | ICD-10-CM

## 2020-08-27 DIAGNOSIS — E78.5 HYPERLIPIDEMIA, UNSPECIFIED HYPERLIPIDEMIA TYPE: ICD-10-CM

## 2020-08-27 DIAGNOSIS — Z79.4 CONTROLLED TYPE 2 DIABETES MELLITUS WITH COMPLICATION, WITH LONG-TERM CURRENT USE OF INSULIN (HCC): ICD-10-CM

## 2020-08-27 DIAGNOSIS — I25.10 CHRONIC CORONARY ARTERY DISEASE: ICD-10-CM

## 2020-08-27 DIAGNOSIS — E11.8 CONTROLLED TYPE 2 DIABETES MELLITUS WITH COMPLICATION, WITH LONG-TERM CURRENT USE OF INSULIN (HCC): ICD-10-CM

## 2020-08-27 PROCEDURE — 99214 OFFICE O/P EST MOD 30 MIN: CPT | Performed by: INTERNAL MEDICINE

## 2020-08-27 PROCEDURE — 93000 ELECTROCARDIOGRAM COMPLETE: CPT | Performed by: INTERNAL MEDICINE

## 2020-08-27 NOTE — PROGRESS NOTES
Isaias Monaco  1955  Date of Office Visit: 08/27/20  Encounter Provider: Miguel Ángel Karimi MD  Place of Service: [facility] CARDIOLOGY      CHIEF COMPLAINT:   Coronary artery disease  Dyspnea    HISTORY OF PRESENT ILLNESS:  I had the pleasure of seeing Mr. Monaco back in follow-up today.  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  In March 2020 he was evaluated by Jacqueline Grigsby and was subsequently set up for catheterization, which I performed on 03/10/2020. He was documented at that time to have a 30% to 40% ostial LAD stenosis and 99% ostial circumflex stenosis that I stented with a 3.0 x 15 mm Xience Kaylynn drug-eluting stent post dilated to high pressure. He tolerated this well.     Since her last visit he is been doing well.  He denies any chest pain or dyspnea on exertion.  He has no orthopnea or PND.  Is tolerating his current medical regimen well.  He had a lipid panel checked by his endocrinology team in June and his LDL was still little bit elevated.  He has not been active and has not been watching his diet as of late.        Review of Systems   Constitution: Negative for fever and malaise/fatigue.   HENT: Negative for nosebleeds and sore throat.    Eyes: Negative for blurred vision and double vision.   Cardiovascular: Positive for dyspnea on exertion. Negative for chest pain, claudication, palpitations and syncope.   Respiratory: Negative for cough, shortness of breath and snoring.    Endocrine: Negative for cold intolerance, heat intolerance and polydipsia.   Skin: Negative for itching, poor wound healing and rash.   Musculoskeletal: Negative for joint pain, joint swelling, muscle weakness and myalgias.   Gastrointestinal: Negative for abdominal pain, melena, nausea and vomiting.   Neurological: Negative for  light-headedness, loss of balance, seizures, vertigo and weakness.   Psychiatric/Behavioral: Negative for altered mental status and depression.       Past Medical History:   Diagnosis Date   • Adiposity    • Anemia     post hemorrhagic   • Angioedema 2/21/2016    Secondary to ACE inhibitor   • Anxiety    • Arthritis    • CAD (coronary artery disease)    • Chest pain    • Colon polyps    • Diabetes mellitus, type 2 (CMS/HCC)    • ED (erectile dysfunction)    • Febrile illness    • GERD (gastroesophageal reflux disease)    • Glaucoma    • Hematoma    • High cholesterol    • History of foreign travel 12/2017; 08/2018    [place], [place], [place], [place], [place] and [place]; [place]   • Hyperlipidemia    • Hypertension    • Hypogonadism in male 9/28/2016   • Male erectile disorder    • Microalbuminuria    • Obesity (BMI 30-39.9)    • Osteoarthritis     knee   • Vitamin D deficiency    • Wound infection after surgery        The following portions of the patient's history were reviewed and updated as appropriate: Social history , Family history and Surgical history     Current Outpatient Medications on File Prior to Visit   Medication Sig Dispense Refill   • amLODIPine (NORVASC) 10 MG tablet Take 1 tablet by mouth Daily. 90 tablet 1   • aspirin 81 MG EC tablet Take 1 tablet by mouth Daily. get over the counter 30 tablet 3   • Blood Glucose Monitoring Suppl ("[employer]) kit TEST AS DIRECTED 1 each 0   • carvedilol (COREG) 25 MG tablet TAKE ONE TABLET BY MOUTH TWICE A DAY WITH MEALS 180 tablet 1   • clonazePAM (KlonoPIN) 0.5 MG tablet Take 1 tablet by mouth 3 (Three) Times a Day As Needed (Facial twitching). 60 tablet 0   • clopidogrel (PLAVIX) 75 MG tablet Take 1 tablet by mouth Daily. 90 tablet 3   • dorzolamide-timolol (COSOPT) 22.3-6.8 MG/ML ophthalmic solution Apply 1 drop to eye(s) to both eyes 2 (Two) Times a Day. 20 mL 3   • Empagliflozin (JARDIANCE) 25 MG tablet Take 25 mg by mouth Daily. 90  "tablet 1   • escitalopram (LEXAPRO) 20 MG tablet Take 1 tablet by mouth Daily. 90 tablet 3   • esomeprazole (nexIUM) 40 MG capsule TAKE ONE CAPSULE BY MOUTH DAILY 30 capsule 3   • glucose blood test strip Use 4 times a day 400 each 0   • Insulin Pen Needle (B-D ULTRAFINE III SHORT PEN) 31G X 8 MM misc USE AS DIRECTED DAILY WITH TOUJEO 100 each 5   • Lancets (FREESTYLE) lancets Use to test BG 4 times daily 400 each 1   • latanoprost (XALATAN) 0.005 % ophthalmic solution Apply 1 drop to  each eye Daily. (Patient taking differently: Apply 1 drop to eye(s) as directed by provider Every Night.) 7.5 mL 3   • metFORMIN (GLUCOPHAGE) 500 MG tablet Take 2 tablets by mouth 2 (Two) Times a Day With Meals. 120 tablet 5   • pioglitazone (ACTOS) 15 MG tablet Take 1 tablet by mouth Daily. 90 tablet 0   • rosuvastatin (CRESTOR) 40 MG tablet Take 1 tablet by mouth Daily. 90 tablet 3   • sildenafil (VIAGRA) 50 MG tablet Take 1 tablet by mouth Daily As Needed for erectile dysfunction. 6 tablet 5   • SITagliptin (JANUVIA) 100 MG tablet Take 1 tablet by mouth Daily. 90 tablet 1   • vitamin D (ERGOCALCIFEROL) 1.25 MG (05296 UT) capsule capsule TAKE ONE CAPSULE BY MOUTH ONCE WEEKLY 12 capsule 0     No current facility-administered medications on file prior to visit.        Allergies   Allergen Reactions   • Ace Inhibitors Angioedema   • Lisinopril Angioedema   • Testosterone Myalgia       Vitals:    08/27/20 1523   BP: 130/82   Pulse: 76   Weight: 119 kg (263 lb)   Height: 191.8 cm (75.5\")     Physical Exam   Constitutional: He is oriented to person, place, and time. He appears well-developed and well-nourished.   HENT:   Head: Normocephalic and atraumatic.   Eyes: Conjunctivae and EOM are normal. No scleral icterus.   Neck: Normal range of motion. Neck supple. Normal carotid pulses, no hepatojugular reflux and no JVD present. Carotid bruit is not present. No tracheal deviation present. No thyromegaly present.   Cardiovascular: Normal rate " and regular rhythm. Exam reveals no gallop and no friction rub.   No murmur heard.  Pulses:       Carotid pulses are 2+ on the right side, and 2+ on the left side.       Radial pulses are 2+ on the right side, and 2+ on the left side.        Femoral pulses are 2+ on the right side, and 2+ on the left side.       Dorsalis pedis pulses are 2+ on the right side, and 2+ on the left side.        Posterior tibial pulses are 2+ on the right side, and 2+ on the left side.   Pulmonary/Chest: Breath sounds normal. No respiratory distress. He has no decreased breath sounds. He has no wheezes. He has no rhonchi. He has no rales. He exhibits no tenderness.   Abdominal: Soft. Bowel sounds are normal. He exhibits no distension. There is no tenderness. There is no rebound.   Musculoskeletal: He exhibits no edema or deformity.   Neurological: He is alert and oriented to person, place, and time. He has normal strength. No sensory deficit.   Skin: No rash noted. No erythema.   Psychiatric: He has a normal mood and affect. His behavior is normal.       No results found for: CBC  No results found for: CMP  No components found for: LIPID  No results found for: BMP      ECG 12 Lead  Date/Time: 8/27/2020 3:45 PM  Performed by: Miguel Ángel Karimi MD  Authorized by: Miguel Ángel Karimi MD   Comparison: compared with previous ECG from 3/11/2020  Similar to previous ECG  Rhythm: sinus rhythm  Rate: normal  QRS axis: left    Clinical impression: abnormal EKG              3/10/2020  Conclusions:   1. Left main: Normal  2. LAD: 20 to 30% ostial stenosis.  40% mid vessel stenosis.  3. LCX: 99% ostial stenosis with GRACIE II flow.  40 to 50% mid vessel stenosis.  4. RCA: Previously placed stents from the proximal to distal RCA with luminal irregularities.  5.  Normal left ventricular size and systolic function  6.  PCI of the ostial circumflex with a 3.0 x 15 mm Xience Kaylynn drug-eluting stent.    DISCUSSION/SUMMARYA very pleasant,  65-year-old male with a medical history of coronary artery disease and prior PCI to the circumflex and chronic total occlusion intervention to the RCA, essential hypertension, hyperlipidemia and diabetes mellitus, who presents to me in follow-up today.  He has been doing well since her last visit.  He denies any current chest pain or dyspnea on exertion.  He has not been doing a very good job with activity and diet as of late and is up about 10 pounds since earlier this year.      1.  Coronary disease with prior PCI:    - Continue aspirin life long.  I would also recommend continuing Plavix life long with his multivessel PCI and with multiple stents in his RCA.    - Continue rosuvastatin at his current dose.  2.  Essential hypertension, well-controlled:  Continue current therapy.    3.  Hyperlipidemia:  As above.  Hold off on addition of adjunct therapy.  I will defer management to the endocrinology team but I do think that if his lipid panel does not improve with activity and dietary modification that we need to consider injectable therapy as well.

## 2020-09-18 DIAGNOSIS — I10 ESSENTIAL HYPERTENSION: ICD-10-CM

## 2020-09-18 RX ORDER — AMLODIPINE BESYLATE 10 MG/1
10 TABLET ORAL DAILY
Qty: 90 TABLET | Refills: 1 | Status: SHIPPED | OUTPATIENT
Start: 2020-09-18 | End: 2020-10-12 | Stop reason: SDUPTHER

## 2020-10-12 DIAGNOSIS — I25.10 CHRONIC CORONARY ARTERY DISEASE: ICD-10-CM

## 2020-10-12 DIAGNOSIS — E11.9 TYPE 2 DIABETES MELLITUS WITHOUT COMPLICATION, WITH LONG-TERM CURRENT USE OF INSULIN (HCC): ICD-10-CM

## 2020-10-12 DIAGNOSIS — Z79.4 TYPE 2 DIABETES MELLITUS WITHOUT COMPLICATION, WITH LONG-TERM CURRENT USE OF INSULIN (HCC): ICD-10-CM

## 2020-10-12 DIAGNOSIS — I10 ESSENTIAL HYPERTENSION: ICD-10-CM

## 2020-10-13 RX ORDER — AMLODIPINE BESYLATE 10 MG/1
10 TABLET ORAL DAILY
Qty: 90 TABLET | Refills: 1 | Status: SHIPPED | OUTPATIENT
Start: 2020-10-13 | End: 2021-03-08

## 2020-10-13 RX ORDER — CLOPIDOGREL BISULFATE 75 MG/1
75 TABLET ORAL DAILY
Qty: 90 TABLET | Refills: 3 | Status: SHIPPED | OUTPATIENT
Start: 2020-10-13 | End: 2021-03-08

## 2020-10-29 ENCOUNTER — OFFICE VISIT (OUTPATIENT)
Dept: FAMILY MEDICINE CLINIC | Facility: CLINIC | Age: 65
End: 2020-10-29

## 2020-10-29 VITALS
BODY MASS INDEX: 32.27 KG/M2 | DIASTOLIC BLOOD PRESSURE: 88 MMHG | SYSTOLIC BLOOD PRESSURE: 132 MMHG | HEIGHT: 76 IN | HEART RATE: 77 BPM | OXYGEN SATURATION: 98 % | WEIGHT: 265 LBS | TEMPERATURE: 96.9 F

## 2020-10-29 DIAGNOSIS — E11.8 CONTROLLED TYPE 2 DIABETES MELLITUS WITH COMPLICATION, WITH LONG-TERM CURRENT USE OF INSULIN (HCC): ICD-10-CM

## 2020-10-29 DIAGNOSIS — I25.10 CHRONIC CORONARY ARTERY DISEASE: Primary | ICD-10-CM

## 2020-10-29 DIAGNOSIS — G51.4 FACIAL TWITCHING: ICD-10-CM

## 2020-10-29 DIAGNOSIS — Z79.4 CONTROLLED TYPE 2 DIABETES MELLITUS WITH COMPLICATION, WITH LONG-TERM CURRENT USE OF INSULIN (HCC): ICD-10-CM

## 2020-10-29 DIAGNOSIS — K21.00 GASTROESOPHAGEAL REFLUX DISEASE WITH ESOPHAGITIS WITHOUT HEMORRHAGE: ICD-10-CM

## 2020-10-29 DIAGNOSIS — Z23 NEED FOR VACCINATION: ICD-10-CM

## 2020-10-29 DIAGNOSIS — I10 ESSENTIAL HYPERTENSION: ICD-10-CM

## 2020-10-29 DIAGNOSIS — F95.9 TIC DISORDER, UNSPECIFIED: ICD-10-CM

## 2020-10-29 DIAGNOSIS — F41.0 PANIC DISORDER: ICD-10-CM

## 2020-10-29 DIAGNOSIS — E78.5 HYPERLIPIDEMIA, UNSPECIFIED HYPERLIPIDEMIA TYPE: ICD-10-CM

## 2020-10-29 PROBLEM — T81.49XA WOUND INFECTION AFTER SURGERY: Status: RESOLVED | Noted: 2017-06-23 | Resolved: 2020-10-29

## 2020-10-29 PROBLEM — T14.8XXA HEMATOMA: Status: RESOLVED | Noted: 2017-06-20 | Resolved: 2020-10-29

## 2020-10-29 PROBLEM — R50.9 FEBRILE ILLNESS: Status: RESOLVED | Noted: 2017-06-23 | Resolved: 2020-10-29

## 2020-10-29 PROBLEM — Z48.89 POSTOPERATIVE VISIT: Status: RESOLVED | Noted: 2017-06-19 | Resolved: 2020-10-29

## 2020-10-29 PROBLEM — I20.8 ANGINA AT REST: Status: RESOLVED | Noted: 2020-03-04 | Resolved: 2020-10-29

## 2020-10-29 PROBLEM — I20.89 ANGINA AT REST: Status: RESOLVED | Noted: 2020-03-04 | Resolved: 2020-10-29

## 2020-10-29 PROCEDURE — 99214 OFFICE O/P EST MOD 30 MIN: CPT | Performed by: FAMILY MEDICINE

## 2020-10-29 RX ORDER — ARIPIPRAZOLE 2 MG/1
3 TABLET ORAL DAILY
COMMUNITY

## 2020-10-29 RX ORDER — FAMOTIDINE 20 MG/1
20 TABLET, FILM COATED ORAL DAILY
COMMUNITY
End: 2020-11-20

## 2020-10-29 NOTE — PROGRESS NOTES
"    Isaias Monaco is a 65 y.o. male.     Chief Complaint   Patient presents with   • Establish Care     pt is here to [facility]   • Coronary Artery Disease     Masks/face shield/appropriate PPE were worn for the entirety of the visit by the patient, MA, and provider.     HPI     Pt is a pleasant 65 y.o. YO male here for coronary artery disease.  Patient is new to me and is here today to establish care.  He is transferring from a former [facility] physician.  His other chronic medical problems include hypertension, hyperlipidemia, GERD, type 2 diabetes, glaucoma, as well as symptoms of facial movement and tics.    Coronary Artery Disease -chronic, stable.  Follows with Dr. Groves, cardiology.  No history of known MI per patient but has had to have stent placement multiple times.  Currently medically optimized on combination of aspirin, carvedilol, clopidogrel, and rosuvastatin daily.  Denies any symptoms of chest pain.    Hypertension -chronic, well-controlled on combination of amlodipine and carvedilol.  History of angioedema to ACE inhibitors.    Hyperlipidemia -chronic, well-controlled on rosuvastatin 40 mg daily.  Lipid levels last checked 4 months ago in June.  LDL was mildly elevated at 108 but all other levels were within ideal range.    GERD -chronic, takes esomeprazole 40 mg and famotidine 20 mg daily.    Type Two Diabetes -chronic, follows with endocrinology  - Micaela Barfield NP.  Last A1c demonstrated good control at 6.2.  He takes a combination of for oral medications (empagliflozin,sitagliptin, pioglitazone, and metformin) as well as insulin glargine (Toujeo) daily.  He has not had a diabetic foot exam in the last year.    Patient does suffer from glaucoma of the right eye.  Follows with ophthalmology and is on medicated drops for this.    Patient with history of what he describes as a \"smothering\" feeling in his upper central chest and neck.  Has been attributed to possible panic attacks and " patient was started on treatment with escitalopram.  The addition of aripiprazole was recently made by his neurologist and since starting this patient does feel like his symptoms occur somewhat less often.  He also has facial dystonia/facial tics which occur intermittently.  He used to be on clonazepam daily for these but he is weaned himself to only using it on a as needed basis.  Follows with neurology for these problems, Dr. Raymundo Howard.    The following portions of the patient's history were reviewed by me and updated as appropriate: allergies, current medications, past family history, past medical history, past social history, past surgical history, and problem list.     Review of Systems   Constitutional: Negative.    HENT: Negative.    Eyes: Negative.    Respiratory: Negative.    Cardiovascular: Negative.  Negative for chest pain, palpitations and leg swelling.   Gastrointestinal: Negative.    Endocrine: Negative.    Genitourinary: Negative.    Musculoskeletal: Negative.    Skin: Negative.    Allergic/Immunologic: Negative.    Neurological: Negative.    Hematological: Negative.    Psychiatric/Behavioral: Negative.    I have reviewed and confirmed the accuracy of the ROS as documented by the MA/LPN/RN Kathy Rivas MD      Objective  Vitals:    10/29/20 1411   BP: 132/88   Pulse: 77   Temp: 96.9 °F (36.1 °C)   SpO2: 98%     Body mass index is 32.69 kg/m².       Physical Exam  Vitals signs reviewed.   Constitutional:       General: He is not in acute distress.     Appearance: He is well-developed.   HENT:      Head: Normocephalic and atraumatic.   Eyes:      General: No scleral icterus.        Right eye: No discharge.         Left eye: No discharge.      Conjunctiva/sclera: Conjunctivae normal.   Cardiovascular:      Pulses:           Dorsalis pedis pulses are 2+ on the right side and 2+ on the left side.   Pulmonary:      Effort: Pulmonary effort is normal. No respiratory distress.   Musculoskeletal:       Right foot: No deformity or bunion.      Left foot: No deformity or bunion.   Feet:      Right foot:      Protective Sensation: 7 sites tested. 6 sites sensed.      Skin integrity: Skin integrity normal.      Left foot:      Protective Sensation: 7 sites tested. 7 sites sensed.      Skin integrity: Skin integrity normal.   Skin:     Coloration: Skin is not pale.      Findings: No erythema.   Neurological:      Mental Status: He is alert and oriented to person, place, and time.   Psychiatric:         Behavior: Behavior normal.         Thought Content: Thought content normal.         Judgment: Judgment normal.           Current Outpatient Medications:   •  amLODIPine (NORVASC) 10 MG tablet, Take 1 tablet by mouth Daily., Disp: 90 tablet, Rfl: 1  •  ARIPiprazole (ABILIFY) 2 MG tablet, Take 2 mg by mouth Daily., Disp: , Rfl:   •  aspirin 81 MG EC tablet, Take 1 tablet by mouth Daily. get over the counter, Disp: 30 tablet, Rfl: 3  •  Blood Glucose Monitoring Suppl (Desi HitsR BLOOD GLUCOSE) kit, TEST AS DIRECTED, Disp: 1 each, Rfl: 0  •  carvedilol (COREG) 25 MG tablet, TAKE ONE TABLET BY MOUTH TWICE A DAY WITH MEALS, Disp: 180 tablet, Rfl: 1  •  clonazePAM (KlonoPIN) 0.5 MG tablet, Take 1 tablet by mouth 3 (Three) Times a Day As Needed (Facial twitching)., Disp: 60 tablet, Rfl: 0  •  clopidogrel (PLAVIX) 75 MG tablet, Take 1 tablet by mouth Daily., Disp: 90 tablet, Rfl: 3  •  dorzolamide-timolol (COSOPT) 22.3-6.8 MG/ML ophthalmic solution, Apply 1 drop to eye(s) to both eyes 2 (Two) Times a Day., Disp: 20 mL, Rfl: 3  •  Empagliflozin (JARDIANCE) 25 MG tablet, Take 25 mg by mouth Daily., Disp: 90 tablet, Rfl: 1  •  escitalopram (LEXAPRO) 20 MG tablet, Take 1 tablet by mouth Daily., Disp: 90 tablet, Rfl: 3  •  esomeprazole (nexIUM) 40 MG capsule, TAKE ONE CAPSULE BY MOUTH DAILY, Disp: 30 capsule, Rfl: 3  •  glucose blood test strip, Use 4 times a day, Disp: 400 each, Rfl: 0  •  Insulin Glargine, 1 Unit Dial, (TOUJEO) 300  UNIT/ML solution pen-injector injection, Inject 60 Units under the skin into the appropriate area as directed Every Morning., Disp: , Rfl:   •  Insulin Pen Needle (B-D ULTRAFINE III SHORT PEN) 31G X 8 MM misc, USE AS DIRECTED DAILY WITH TOUJEO, Disp: 100 each, Rfl: 5  •  Lancets (FREESTYLE) lancets, Use to test BG 4 times daily, Disp: 400 each, Rfl: 1  •  latanoprost (XALATAN) 0.005 % ophthalmic solution, Apply 1 drop to  each eye Daily. (Patient taking differently: Apply 1 drop to eye(s) as directed by provider Every Night.), Disp: 7.5 mL, Rfl: 3  •  metFORMIN (GLUCOPHAGE) 500 MG tablet, Take 2 tablets by mouth 2 (Two) Times a Day With Meals., Disp: 120 tablet, Rfl: 5  •  pioglitazone (ACTOS) 15 MG tablet, Take 1 tablet by mouth Daily., Disp: 90 tablet, Rfl: 0  •  rosuvastatin (CRESTOR) 40 MG tablet, Take 1 tablet by mouth Daily., Disp: 90 tablet, Rfl: 3  •  sildenafil (VIAGRA) 50 MG tablet, Take 1 tablet by mouth Daily As Needed for erectile dysfunction., Disp: 6 tablet, Rfl: 5  •  SITagliptin (JANUVIA) 100 MG tablet, Take 1 tablet by mouth Daily., Disp: 90 tablet, Rfl: 1  •  vitamin D (ERGOCALCIFEROL) 1.25 MG (09746 UT) capsule capsule, TAKE ONE CAPSULE BY MOUTH ONCE WEEKLY, Disp: 12 capsule, Rfl: 0  •  famotidine (PEPCID) 20 MG tablet, Take 20 mg by mouth Daily., Disp: , Rfl:   No current facility-administered medications for this visit.     Procedures    Lab Results (most recent)     None              Diagnoses and all orders for this visit:    1. Chronic coronary artery disease (Primary)  Chronic, stable, continue current medical management with combination of aspirin, carvedilol, rosuvastatin, and clopidogrel.  We will continue to follow along with cardiology, Dr. Karimi's, recommendations.    2. Hyperlipidemia, unspecified hyperlipidemia type  Chronic, decently controlled on rosuvastatin 40 mg daily.  Continue same.  Will recheck lipid panel in a couple months we will do a wellness visit.    3. Essential  "hypertension  Chronic, well-controlled on combination of amlodipine and carvedilol, continue same.    4. Controlled type 2 diabetes mellitus with complication, with long-term current use of insulin (CMS/[facility])  Chronic, managed by endocrinology.  Well-controlled on current regimen including Toujeo as well as oral metformin, sitagliptin, pioglitazone, and empagliflozin.  Foot exam performed today.  Patient did have some dry skin but no other concerning findings.    5. Gastroesophageal reflux disease with esophagitis without hemorrhage  Chronic, stable on treatment with famotidine and esomeprazole daily.    6. Panic disorder    7. Facial twitching    8. Tic disorder, unspecified    Patient with vague sensation of feeling \"smothered\" intermittently as well as facial dystonia/tics.  Have thought to be possibly related to anxiety particularly the smothered feeling which could represent panic attack.  Started on aripiprazole recently for this with possibly some improvement.  For the facial tics/muscle spasm will continue to prescribe clonazepam for patient to use on a as needed basis, did encourage his sparing use. Smith reviewed and appropriate. Controlled substance agreement filled out and signed      9. Need for vaccination  -     pneumococcal conj. 13-valent (PREVNAR-13) vaccine 0.5 mL          Return in about 3 months (around 1/29/2021) for Medicare Wellness + fasting labs.      Kathy Rivas MD  "

## 2020-11-20 DIAGNOSIS — K21.9 GASTROESOPHAGEAL REFLUX DISEASE WITHOUT ESOPHAGITIS: Primary | ICD-10-CM

## 2020-11-20 DIAGNOSIS — E11.9 CONTROLLED TYPE 2 DIABETES MELLITUS WITHOUT COMPLICATION, WITH LONG-TERM CURRENT USE OF INSULIN (HCC): ICD-10-CM

## 2020-11-20 DIAGNOSIS — Z79.4 CONTROLLED TYPE 2 DIABETES MELLITUS WITHOUT COMPLICATION, WITH LONG-TERM CURRENT USE OF INSULIN (HCC): ICD-10-CM

## 2020-11-20 RX ORDER — SITAGLIPTIN 100 MG/1
TABLET, FILM COATED ORAL
Qty: 30 TABLET | Refills: 0 | Status: SHIPPED | OUTPATIENT
Start: 2020-11-20 | End: 2021-02-15

## 2020-11-20 RX ORDER — FAMOTIDINE 20 MG/1
TABLET, FILM COATED ORAL
Qty: 30 TABLET | Refills: 5 | Status: SHIPPED | OUTPATIENT
Start: 2020-11-20 | End: 2021-06-04 | Stop reason: SDUPTHER

## 2020-11-20 RX ORDER — EMPAGLIFLOZIN 25 MG/1
TABLET, FILM COATED ORAL
Qty: 30 TABLET | Refills: 0 | Status: SHIPPED | OUTPATIENT
Start: 2020-11-20 | End: 2021-02-15

## 2020-11-24 RX ORDER — ERGOCALCIFEROL 1.25 MG/1
50000 CAPSULE ORAL WEEKLY
Qty: 12 CAPSULE | Refills: 0 | Status: SHIPPED | OUTPATIENT
Start: 2020-11-24 | End: 2021-02-13 | Stop reason: SDUPTHER

## 2020-11-30 DIAGNOSIS — E66.9 ADIPOSITY: ICD-10-CM

## 2020-11-30 DIAGNOSIS — R80.9 MICROALBUMINURIA: ICD-10-CM

## 2020-11-30 DIAGNOSIS — E78.5 HYPERLIPIDEMIA: ICD-10-CM

## 2020-11-30 DIAGNOSIS — E11.65 TYPE 2 DIABETES MELLITUS WITH HYPERGLYCEMIA (HCC): ICD-10-CM

## 2020-11-30 DIAGNOSIS — I10 ESSENTIAL HYPERTENSION: ICD-10-CM

## 2020-11-30 DIAGNOSIS — E55.9 VITAMIN D DEFICIENCY: ICD-10-CM

## 2020-11-30 DIAGNOSIS — N52.9 MALE ERECTILE DISORDER: ICD-10-CM

## 2020-12-01 ENCOUNTER — LAB (OUTPATIENT)
Dept: ENDOCRINOLOGY | Age: 65
End: 2020-12-01

## 2020-12-01 ENCOUNTER — PATIENT MESSAGE (OUTPATIENT)
Dept: FAMILY MEDICINE CLINIC | Facility: CLINIC | Age: 65
End: 2020-12-01

## 2020-12-01 DIAGNOSIS — R80.9 MICROALBUMINURIA: ICD-10-CM

## 2020-12-01 DIAGNOSIS — Z79.4 CONTROLLED TYPE 2 DIABETES MELLITUS WITH COMPLICATION, WITH LONG-TERM CURRENT USE OF INSULIN (HCC): ICD-10-CM

## 2020-12-01 DIAGNOSIS — E11.8 CONTROLLED TYPE 2 DIABETES MELLITUS WITH COMPLICATION, WITH LONG-TERM CURRENT USE OF INSULIN (HCC): ICD-10-CM

## 2020-12-01 DIAGNOSIS — E78.5 HYPERLIPIDEMIA, UNSPECIFIED HYPERLIPIDEMIA TYPE: ICD-10-CM

## 2020-12-01 DIAGNOSIS — N52.9 MALE ERECTILE DISORDER: ICD-10-CM

## 2020-12-01 DIAGNOSIS — N52.9 VASCULOGENIC ERECTILE DYSFUNCTION, UNSPECIFIED VASCULOGENIC ERECTILE DYSFUNCTION TYPE: ICD-10-CM

## 2020-12-01 DIAGNOSIS — I10 ESSENTIAL HYPERTENSION: ICD-10-CM

## 2020-12-01 DIAGNOSIS — E55.9 VITAMIN D DEFICIENCY: ICD-10-CM

## 2020-12-01 DIAGNOSIS — E78.5 HYPERLIPIDEMIA: ICD-10-CM

## 2020-12-01 DIAGNOSIS — E66.9 ADIPOSITY: ICD-10-CM

## 2020-12-01 DIAGNOSIS — E29.1 HYPOGONADISM IN MALE: ICD-10-CM

## 2020-12-01 DIAGNOSIS — E78.5 HYPERLIPIDEMIA, UNSPECIFIED HYPERLIPIDEMIA TYPE: Primary | ICD-10-CM

## 2020-12-01 DIAGNOSIS — E11.65 TYPE 2 DIABETES MELLITUS WITH HYPERGLYCEMIA (HCC): ICD-10-CM

## 2020-12-01 RX ORDER — CARVEDILOL 25 MG/1
TABLET ORAL
Qty: 180 TABLET | Refills: 1 | Status: SHIPPED | OUTPATIENT
Start: 2020-12-01 | End: 2020-12-01 | Stop reason: SDUPTHER

## 2020-12-01 RX ORDER — CARVEDILOL 25 MG/1
25 TABLET ORAL 2 TIMES DAILY WITH MEALS
Qty: 180 TABLET | Refills: 1 | Status: SHIPPED | OUTPATIENT
Start: 2020-12-01 | End: 2021-04-21 | Stop reason: SDUPTHER

## 2020-12-01 NOTE — TELEPHONE ENCOUNTER
From: Isaias Monaco  To: Kathy Rivas MD  Sent: 12/1/2020 11:56 AM EST  Subject: Prescription Question    I am out of cardivelol and I will be out of town for the next 3 weeks. Could you please send a refill to [facility] outpatient pharmacy? Thank you for your kindness.

## 2020-12-03 LAB
25(OH)D3+25(OH)D2 SERPL-MCNC: 41.5 NG/ML (ref 30–100)
ALBUMIN SERPL-MCNC: 4.4 G/DL (ref 3.5–5.2)
ALBUMIN/GLOB SERPL: 1.3 G/DL
ALP SERPL-CCNC: 89 U/L (ref 39–117)
ALT SERPL-CCNC: 34 U/L (ref 1–41)
AST SERPL-CCNC: 31 U/L (ref 1–40)
BILIRUB SERPL-MCNC: 0.4 MG/DL (ref 0–1.2)
BUN SERPL-MCNC: 15 MG/DL (ref 8–23)
BUN/CREAT SERPL: 13.2 (ref 7–25)
C PEPTIDE SERPL-MCNC: 3.2 NG/ML (ref 1.1–4.4)
CALCIUM SERPL-MCNC: 9.2 MG/DL (ref 8.6–10.5)
CHLORIDE SERPL-SCNC: 101 MMOL/L (ref 98–107)
CHOLEST SERPL-MCNC: 181 MG/DL (ref 0–200)
CO2 SERPL-SCNC: 26.3 MMOL/L (ref 22–29)
CREAT SERPL-MCNC: 1.14 MG/DL (ref 0.76–1.27)
GLOBULIN SER CALC-MCNC: 3.4 GM/DL
GLUCOSE SERPL-MCNC: 134 MG/DL (ref 65–99)
HBA1C MFR BLD: 6.9 % (ref 4.8–5.6)
HCT VFR BLD AUTO: 49.8 % (ref 37.5–51)
HDLC SERPL-MCNC: 47 MG/DL (ref 40–60)
HGB BLD-MCNC: 16.6 G/DL (ref 13–17.7)
INTERPRETATION: NORMAL
LDLC SERPL CALC-MCNC: 107 MG/DL (ref 0–100)
Lab: NORMAL
POTASSIUM SERPL-SCNC: 4.1 MMOL/L (ref 3.5–5.2)
PROT SERPL-MCNC: 7.8 G/DL (ref 6–8.5)
SODIUM SERPL-SCNC: 139 MMOL/L (ref 136–145)
TESTOST FREE SERPL-MCNC: 7.6 PG/ML (ref 6.6–18.1)
TESTOST SERPL-MCNC: 298 NG/DL (ref 264–916)
TRIGL SERPL-MCNC: 153 MG/DL (ref 0–150)
VLDLC SERPL CALC-MCNC: 27 MG/DL (ref 5–40)

## 2020-12-15 ENCOUNTER — OFFICE VISIT (OUTPATIENT)
Dept: ENDOCRINOLOGY | Age: 65
End: 2020-12-15

## 2020-12-15 VITALS — BODY MASS INDEX: 32.07 KG/M2 | WEIGHT: 260 LBS

## 2020-12-15 DIAGNOSIS — E29.1 HYPOGONADISM IN MALE: ICD-10-CM

## 2020-12-15 DIAGNOSIS — E55.9 VITAMIN D DEFICIENCY: ICD-10-CM

## 2020-12-15 DIAGNOSIS — E78.5 HYPERLIPIDEMIA, UNSPECIFIED HYPERLIPIDEMIA TYPE: ICD-10-CM

## 2020-12-15 DIAGNOSIS — Z79.4 CONTROLLED TYPE 2 DIABETES MELLITUS WITH COMPLICATION, WITH LONG-TERM CURRENT USE OF INSULIN (HCC): Primary | ICD-10-CM

## 2020-12-15 DIAGNOSIS — E11.8 CONTROLLED TYPE 2 DIABETES MELLITUS WITH COMPLICATION, WITH LONG-TERM CURRENT USE OF INSULIN (HCC): Primary | ICD-10-CM

## 2020-12-15 PROCEDURE — 99442 PR PHYS/QHP TELEPHONE EVALUATION 11-20 MIN: CPT | Performed by: NURSE PRACTITIONER

## 2020-12-15 RX ORDER — EZETIMIBE 10 MG/1
10 TABLET ORAL DAILY
Qty: 90 TABLET | Refills: 1 | Status: SHIPPED | OUTPATIENT
Start: 2020-12-15 | End: 2021-02-13 | Stop reason: SDUPTHER

## 2020-12-15 NOTE — PATIENT INSTRUCTIONS
Start Zetia 10 mg once daily to lower LDL cholesterol take this in addition to your rosuvastatin 40 mg daily

## 2020-12-15 NOTE — PROGRESS NOTES
Subjective    Isaias Monaco is a 65 y.o. male. he is here today for follow-up.  Chief Complaint   Patient presents with   • Diabetes     TYPE 2 DIABETES, RECENT LABS, CHECKS bs ONCE DAILY, EYE EXAM 2020     There were no vitals taken for this visit.  You have chosen to receive care through a telephone visit. Do you consent to use a telephone visit for your medical care today? Yes  Total time 20 min    History of Present Illness   Encounter Diagnoses   Name Primary?   • Controlled type 2 diabetes mellitus with complication, with long-term current use of insulin (CMS/[facility]) Yes   • Hypogonadism in male    • Vitamin D deficiency    • Hyperlipidemia, unspecified hyperlipidemia type      65-year-old male patient evaluated today via telemedicine for the above diagnoses.  He has had recent labs which were reviewed.  His most recent A1c is stable at 6.9.  He denies any hypoglycemic events.  He is at risk for hypoglycemia on daily insulin.  He is taking it daily consistently.  He denies needing any refills on his medications.  He had a recent eye exam in November which is present and his medical record.  He denies neuropathy.  He has established with a new primary care provider and recently had a physical exam.  He does have elevated LDL cholesterol and is currently on rosuvastatin 40 mg once daily.  He does have a significant family history of heart disease with his father and his brother.  He personally has had stents placed.  We discussed starting Zetia 10 mg once daily in addition to his rosuvastatin.  His weight is stable at 260 pounds that he reports weighing at home.  He has no complaints or concerns.    The following portions of the patient's history were reviewed and updated as appropriate:   Past Medical History:   Diagnosis Date   • Adiposity    • Anemia     post hemorrhagic   • Angioedema 2/21/2016    Secondary to ACE inhibitor   • Anxiety    • Arthritis    • CAD (coronary artery disease)    • Chest pain    •  Colon polyps    • Diabetes mellitus, type 2 (CMS/HCC)    • ED (erectile dysfunction)    • Febrile illness    • GERD (gastroesophageal reflux disease)    • Glaucoma    • Hematoma    • High cholesterol    • History of foreign travel 12/2017; 08/2018    [place], [place], [place], [place], [place] and [place]; [place]   • Hyperlipidemia    • Hypertension    • Hypogonadism in male 9/28/2016   • Male erectile disorder    • Microalbuminuria    • Obesity (BMI 30-39.9)    • Osteoarthritis     knee   • Vitamin D deficiency    • Wound infection after surgery      Past Surgical History:   Procedure Laterality Date   • CARDIAC CATHETERIZATION N/A 05/15/2006    Dr. Mini Camarillo   • CARDIAC CATHETERIZATION N/A 3/10/2020    Procedure: Left Heart Cath;  Surgeon: Miguel Ángel Karimi MD;  [facility];  Service: Cardiology;  Laterality: N/A;   • CARDIAC CATHETERIZATION N/A 3/10/2020    Procedure: Stent DAVONTE coronary;  Surgeon: Miguel Ángel Karimi MD;  Location:  [facility] CATH INVASIVE LOCATION;  Service: Cardiology;  Laterality: N/A;   • CARDIAC CATHETERIZATION N/A 3/10/2020    Procedure: Coronary angiography;  Surgeon: Miguel Ángel Karimi MD;  [facility];  Service: Cardiology;  Laterality: N/A;   • CARDIAC CATHETERIZATION N/A 3/10/2020    Procedure: Left ventriculography;  Surgeon: Miguel Ángel Karimi MD;  [facility];  Service: Cardiology;  Laterality: N/A;   • COLONOSCOPY N/A 02/22/2006    Bilobed polyp at 30 cm, hemorrhoids-Dr. Ilya Zhao   • COLONOSCOPY N/A 02/28/2014    Normal ileum, one 6 mm polyp in the mid transverse colon-Dr. Ilya Zhao   • COLONOSCOPY N/A 11/19/2008    Ela ileum, two 3 to 4 mm polyps, non-bleeding internal hemorrhoids, repeat in 5 years-Dr. Ilya Zhao   • COLONOSCOPY N/A 10/31/2017    Procedure: COLONOSCOPY WITH POLYPECTOMY (COLD BIOPSY);  Surgeon: Ilya Zhao MD;  Location: [facility]  ENDOSCOPY;  Service:    • CORONARY ANGIOPLASTY WITH STENT PLACEMENT  2007, 2012, 2015    cardiac stents x3 occasions   • ENDOSCOPY N/A 10/4/2017    Procedure: ESOPHAGOGASTRODUODENOSCOPY;  Surgeon: Boyd Guidry MD;  Location: [facility] ENDOSCOPY;  Service:    • KNEE INCISION AND DRAINAGE Right 6/20/2017    Procedure: RT. KNEE WASHOUT ;  Surgeon: Boyd Coyne MD;  Location: [facility] MAIN OR;  Service:    • IA TOTAL KNEE ARTHROPLASTY Right 6/15/2017    Procedure: RT TOTAL KNEE ARTHROPLASTY;  Surgeon: Boyd Coyne MD;  Location: [facility] MAIN OR;  Service: Orthopedics   • SHOULDER SURGERY Right 2016   • UPPER GASTROINTESTINAL ENDOSCOPY N/A 10/13/2015    Z-line irregular, normal stomach, normal duodenum-Dr. Ilya Zhao   • UPPER GASTROINTESTINAL ENDOSCOPY N/A 02/28/2014    Z-line irregular, normal stomach, normal duodenum-Dr. Ilya Zhao   • UPPER GASTROINTESTINAL ENDOSCOPY N/A 11/19/2008    Z-line irregular, chronic gastritis withotu hemorrhage, normal duodenum-Dr. Ilya Zhao   • UPPER GASTROINTESTINAL ENDOSCOPY N/A 06/22/2006    LA Grade A reflux esophagitis, non-bleeding erythematous gastropathy, normal duodenum-Dr. Ilya Zhao     Family History   Problem Relation Age of Onset   • Lupus Sister    • Thyroid disease Sister    • Heart disease Other    • Hypertension Other    • Arthritis Mother    • Hyperlipidemia Mother    • Hypertension Mother    • Thyroid disease Mother    • Lupus Mother    • Heart disease Father    • Arthritis Father    • Other Father         Vascular disease   • Lupus Father    • Depression Father    • Alcohol abuse Father    • Dementia Father    • Hypertension Father    • Heart disease Brother    • Heart attack Brother 40   • Thyroid disease Sister    • Arthritis Brother    • Malig Hyperthermia Neg Hx        Current Outpatient Medications   Medication Sig Dispense Refill   • amLODIPine (NORVASC) 10 MG tablet Take 1 tablet by mouth Daily. 90 tablet 1   • ARIPiprazole (ABILIFY) 2 MG tablet Take  2 mg by mouth Daily.     • aspirin 81 MG EC tablet Take 1 tablet by mouth Daily. get over the counter 30 tablet 3   • Blood Glucose Monitoring Suppl ([facility] BLOOD GLUCOSE) kit TEST AS DIRECTED 1 each 0   • carvedilol (COREG) 25 MG tablet Take 1 tablet by mouth 2 (Two) Times a Day With Meals. 180 tablet 1   • clonazePAM (KlonoPIN) 0.5 MG tablet Take 1 tablet by mouth 3 (Three) Times a Day As Needed (Facial twitching). 60 tablet 0   • clopidogrel (PLAVIX) 75 MG tablet Take 1 tablet by mouth Daily. 90 tablet 3   • dorzolamide-timolol (COSOPT) 22.3-6.8 MG/ML ophthalmic solution Apply 1 drop to eye(s) to both eyes 2 (Two) Times a Day. 20 mL 3   • escitalopram (LEXAPRO) 20 MG tablet Take 1 tablet by mouth Daily. 90 tablet 3   • esomeprazole (nexIUM) 40 MG capsule TAKE ONE CAPSULE BY MOUTH DAILY 30 capsule 3   • famotidine (PEPCID) 20 MG tablet TAKE ONE TABLET BY MOUTH DAILY WITH DINNER 30 tablet 5   • glucose blood test strip Use 4 times a day 400 each 0   • Insulin Glargine, 1 Unit Dial, (TOUJEO) 300 UNIT/ML solution pen-injector injection Inject 60 Units under the skin into the appropriate area as directed Every Morning.     • Insulin Pen Needle (B-D ULTRAFINE III SHORT PEN) 31G X 8 MM misc USE AS DIRECTED DAILY WITH TOUJEO 100 each 5   • Januvia 100 MG tablet TAKE ONE TABLET BY MOUTH DAILY 30 tablet 0   • Jardiance 25 MG tablet TAKE ONE TABLET BY MOUTH DAILY 30 tablet 0   • Lancets (FREESTYLE) lancets Use to test BG 4 times daily 400 each 1   • latanoprost (XALATAN) 0.005 % ophthalmic solution Apply 1 drop to  each eye Daily. (Patient taking differently: Apply 1 drop to eye(s) as directed by provider Every Night.) 7.5 mL 3   • metFORMIN (GLUCOPHAGE) 500 MG tablet Take 2 tablets by mouth 2 (Two) Times a Day With Meals. 120 tablet 5   • pioglitazone (ACTOS) 15 MG tablet Take 1 tablet by mouth Daily. 90 tablet 0   • rosuvastatin (CRESTOR) 40 MG tablet Take 1 tablet by mouth Daily. 90 tablet 3   • sildenafil (VIAGRA) 50 MG  tablet Take 1 tablet by mouth Daily As Needed for erectile dysfunction. 6 tablet 5   • vitamin D (ERGOCALCIFEROL) 1.25 MG (87526 UT) capsule capsule TAKE ONE CAPSULE BY MOUTH ONCE WEEKLY 12 capsule 0     No current facility-administered medications for this visit.      Allergies   Allergen Reactions   • Ace Inhibitors Angioedema   • Lisinopril Angioedema   • Testosterone Myalgia     Social History     Socioeconomic History   • Marital status:      Spouse name: Micaela   • Number of children: 1   • Years of education: College   • Highest education level: Not on file   Occupational History   • Occupation: RN in Critical Care     Employer: [employer]   Tobacco Use   • Smoking status: Never Smoker   • Smokeless tobacco: Never Used   Substance and Sexual Activity   • Alcohol use: Yes     Alcohol/week: 3.0 - 4.0 standard drinks     Types: 3 - 4 Standard drinks or equivalent per week     Comment: occasional   • Drug use: No   • Sexual activity: Defer   Social History Narrative    , 1 son, 2 stepsons       Review of Systems  Review of Systems   Constitutional: Negative for appetite change and fatigue.   Eyes: Negative for visual disturbance.   Respiratory: Negative for cough.    Cardiovascular: Negative for leg swelling.   Gastrointestinal: Negative for constipation and diarrhea.   Endocrine: Negative for cold intolerance, heat intolerance, polydipsia, polyphagia and polyuria.   Genitourinary: Negative for frequency.   Neurological: Negative for numbness.        Objective    There were no vitals taken for this visit.    Physical Exam  Constitutional:       General: He is not in acute distress.     Appearance: He is well-developed.      Comments:  pleasant, no distress   Pulmonary:      Effort: Pulmonary effort is normal. No respiratory distress.   Neurological:      Mental Status: He is alert and oriented to person, place, and time.   Psychiatric:         Thought Content: Thought content normal.          Lab Review  Glucose (mg/dL)   Date Value   03/11/2020 82   03/04/2020 101 (H)   12/02/2019 125 (H)     Sodium (mmol/L)   Date Value   12/01/2020 139   06/02/2020 139   03/11/2020 137   03/04/2020 134 (L)   12/02/2019 134 (L)     Potassium (mmol/L)   Date Value   12/01/2020 4.1   06/02/2020 4.3   03/11/2020 3.9   03/04/2020 4.4   12/02/2019 4.2     Chloride (mmol/L)   Date Value   12/01/2020 101   06/02/2020 104   03/11/2020 102   03/04/2020 99   12/02/2019 96 (L)     CO2 (mmol/L)   Date Value   03/11/2020 20.3 (L)   03/04/2020 21.7 (L)   12/02/2019 24.8     Total CO2 (mmol/L)   Date Value   12/01/2020 26.3   06/02/2020 22.7     BUN (mg/dL)   Date Value   12/01/2020 15   06/02/2020 12   03/11/2020 11   03/04/2020 14   12/02/2019 17     Creatinine (mg/dL)   Date Value   12/01/2020 1.14   06/02/2020 1.14   04/17/2020 1.30   03/11/2020 0.98   03/04/2020 1.09   12/02/2019 1.13     Hemoglobin A1C (%)   Date Value   12/01/2020 6.90 (H)   06/02/2020 6.20 (H)   03/11/2020 6.30 (H)   12/02/2019 6.10 (H)   01/08/2019 5.90 (H)     Triglycerides (mg/dL)   Date Value   12/01/2020 153 (H)   06/02/2020 132   03/11/2020 107   12/04/2019 195 (H)   01/08/2019 107   07/13/2018 118     LDL Cholesterol  (mg/dL)   Date Value   06/02/2020 108 (H)   03/11/2020 92   12/04/2019 115 (H)   01/08/2019 85   07/13/2018 110 (H)     LDL Chol Calc (NIH) (mg/dL)   Date Value   12/01/2020 107 (H)       Assessment/Plan      1. Controlled type 2 diabetes mellitus with complication, with long-term current use of insulin (CMS/[facility])    2. Hypogonadism in male    3. Vitamin D deficiency    4. Hyperlipidemia, unspecified hyperlipidemia type    .    Medications prescribed:  Outpatient Encounter Medications as of 12/15/2020   Medication Sig Dispense Refill   • amLODIPine (NORVASC) 10 MG tablet Take 1 tablet by mouth Daily. 90 tablet 1   • ARIPiprazole (ABILIFY) 2 MG tablet Take 2 mg by mouth Daily.     • aspirin 81 MG EC tablet Take 1 tablet by mouth  Daily. get over the counter 30 tablet 3   • Blood Glucose Monitoring Suppl ([facility] BLOOD GLUCOSE) kit TEST AS DIRECTED 1 each 0   • carvedilol (COREG) 25 MG tablet Take 1 tablet by mouth 2 (Two) Times a Day With Meals. 180 tablet 1   • clonazePAM (KlonoPIN) 0.5 MG tablet Take 1 tablet by mouth 3 (Three) Times a Day As Needed (Facial twitching). 60 tablet 0   • clopidogrel (PLAVIX) 75 MG tablet Take 1 tablet by mouth Daily. 90 tablet 3   • dorzolamide-timolol (COSOPT) 22.3-6.8 MG/ML ophthalmic solution Apply 1 drop to eye(s) to both eyes 2 (Two) Times a Day. 20 mL 3   • escitalopram (LEXAPRO) 20 MG tablet Take 1 tablet by mouth Daily. 90 tablet 3   • esomeprazole (nexIUM) 40 MG capsule TAKE ONE CAPSULE BY MOUTH DAILY 30 capsule 3   • famotidine (PEPCID) 20 MG tablet TAKE ONE TABLET BY MOUTH DAILY WITH DINNER 30 tablet 5   • glucose blood test strip Use 4 times a day 400 each 0   • Insulin Glargine, 1 Unit Dial, (TOUJEO) 300 UNIT/ML solution pen-injector injection Inject 60 Units under the skin into the appropriate area as directed Every Morning.     • Insulin Pen Needle (B-D ULTRAFINE III SHORT PEN) 31G X 8 MM misc USE AS DIRECTED DAILY WITH TOUJEO 100 each 5   • Januvia 100 MG tablet TAKE ONE TABLET BY MOUTH DAILY 30 tablet 0   • Jardiance 25 MG tablet TAKE ONE TABLET BY MOUTH DAILY 30 tablet 0   • Lancets (FREESTYLE) lancets Use to test BG 4 times daily 400 each 1   • latanoprost (XALATAN) 0.005 % ophthalmic solution Apply 1 drop to  each eye Daily. (Patient taking differently: Apply 1 drop to eye(s) as directed by provider Every Night.) 7.5 mL 3   • metFORMIN (GLUCOPHAGE) 500 MG tablet Take 2 tablets by mouth 2 (Two) Times a Day With Meals. 120 tablet 5   • pioglitazone (ACTOS) 15 MG tablet Take 1 tablet by mouth Daily. 90 tablet 0   • rosuvastatin (CRESTOR) 40 MG tablet Take 1 tablet by mouth Daily. 90 tablet 3   • sildenafil (VIAGRA) 50 MG tablet Take 1 tablet by mouth Daily As Needed for erectile dysfunction. 6  tablet 5   • vitamin D (ERGOCALCIFEROL) 1.25 MG (39402 UT) capsule capsule TAKE ONE CAPSULE BY MOUTH ONCE WEEKLY 12 capsule 0     No facility-administered encounter medications on file as of 12/15/2020.        Orders placed during this encounter include:  No orders of the defined types were placed in this encounter.      Patient was evaluated via telephone encounter.  His labs were reviewed.  He will continue his current medications.  I have added Zetia 10 mg once daily.  A prescription has been sent to his pharmacy.  He is at risk for cardiovascular event with elevated LDL cholesterol and given his family history and personal history of heart disease.  His vitamin D is stable.  Kidney function and liver function are fine A1c is at goal of less than 7.  His current weight is 260 pounds.  Metabolically and clinically he is stable.  He is up-to-date on his eye exam he will follow-up in the office in 6 months.  I will request staff give him a call to schedule his next appointment

## 2020-12-27 DIAGNOSIS — K44.9 HIATAL HERNIA: ICD-10-CM

## 2020-12-27 DIAGNOSIS — K21.9 GASTROESOPHAGEAL REFLUX DISEASE WITHOUT ESOPHAGITIS: ICD-10-CM

## 2020-12-28 RX ORDER — ESOMEPRAZOLE MAGNESIUM 40 MG/1
CAPSULE, DELAYED RELEASE ORAL
Qty: 30 CAPSULE | Refills: 2 | Status: SHIPPED | OUTPATIENT
Start: 2020-12-28 | End: 2021-04-06

## 2021-01-12 ENCOUNTER — OFFICE VISIT (OUTPATIENT)
Dept: FAMILY MEDICINE CLINIC | Facility: CLINIC | Age: 66
End: 2021-01-12

## 2021-01-12 DIAGNOSIS — G51.4 FACIAL TWITCHING: Primary | ICD-10-CM

## 2021-01-12 PROCEDURE — 99441 PR PHYS/QHP TELEPHONE EVALUATION 5-10 MIN: CPT | Performed by: FAMILY MEDICINE

## 2021-01-12 RX ORDER — CLONAZEPAM 0.5 MG/1
0.5 TABLET ORAL 3 TIMES DAILY PRN
Qty: 60 TABLET | Refills: 0 | Status: SHIPPED | OUTPATIENT
Start: 2021-01-12 | End: 2022-02-09

## 2021-01-12 NOTE — PROGRESS NOTES
Isaias Monaco is a 65 y.o. male.     Chief Complaint   Patient presents with   • Med Refill     Needs a refill of clonazepam       HPI     This visit was completed as a telephone visit secondary to the COVID-19 pandemic following the CDC recommendation  for social distancing and to limit interpersonal contact including in the office setting.    Pt is a pleasant 65 y.o. YO male here to discuss medication refill of clonazepam. He has been on this medication on a prn basis for facial tic/ facial twitching. His last refill was over 6 months ago. He tries to use on it a sparing basis. Currently follows with neurology who had started him on a low dose of abilify, initially seemed to help things but twitching seems to be occurring more often now. He has a follow up with neurology in another month or two.     The following portions of the patient's history were reviewed by me and updated as appropriate: allergies, current medications, past family history, past medical history, past social history, past surgical history, and problem list.       Review of Systems   Neurological:        Facial twitching     Objective  There were no vitals filed for this visit.  There is no height or weight on file to calculate BMI.       Physical Exam   Unable to perform - telephone visit.       Current Outpatient Medications:   •  amLODIPine (NORVASC) 10 MG tablet, Take 1 tablet by mouth Daily., Disp: 90 tablet, Rfl: 1  •  ARIPiprazole (ABILIFY) 2 MG tablet, Take 2 mg by mouth Daily., Disp: , Rfl:   •  aspirin 81 MG EC tablet, Take 1 tablet by mouth Daily. get over the counter, Disp: 30 tablet, Rfl: 3  •  Blood Glucose Monitoring Suppl ([facility] BLOOD GLUCOSE) kit, TEST AS DIRECTED, Disp: 1 each, Rfl: 0  •  carvedilol (COREG) 25 MG tablet, Take 1 tablet by mouth 2 (Two) Times a Day With Meals., Disp: 180 tablet, Rfl: 1  •  clonazePAM (KlonoPIN) 0.5 MG tablet, Take 1 tablet by mouth 3 (Three) Times a Day As Needed (Facial twitching).,  Disp: 60 tablet, Rfl: 0  •  clopidogrel (PLAVIX) 75 MG tablet, Take 1 tablet by mouth Daily., Disp: 90 tablet, Rfl: 3  •  dorzolamide-timolol (COSOPT) 22.3-6.8 MG/ML ophthalmic solution, Apply 1 drop to eye(s) to both eyes 2 (Two) Times a Day., Disp: 20 mL, Rfl: 3  •  escitalopram (LEXAPRO) 20 MG tablet, Take 1 tablet by mouth Daily., Disp: 90 tablet, Rfl: 3  •  esomeprazole (nexIUM) 40 MG capsule, TAKE ONE CAPSULE BY MOUTH DAILY, Disp: 30 capsule, Rfl: 2  •  ezetimibe (Zetia) 10 MG tablet, Take 1 tablet by mouth Daily., Disp: 90 tablet, Rfl: 1  •  famotidine (PEPCID) 20 MG tablet, TAKE ONE TABLET BY MOUTH DAILY WITH DINNER, Disp: 30 tablet, Rfl: 5  •  glucose blood test strip, Use 4 times a day, Disp: 400 each, Rfl: 0  •  Insulin Glargine, 1 Unit Dial, (TOUJEO) 300 UNIT/ML solution pen-injector injection, Inject 60 Units under the skin into the appropriate area as directed Every Morning., Disp: , Rfl:   •  Insulin Pen Needle (B-D ULTRAFINE III SHORT PEN) 31G X 8 MM misc, USE AS DIRECTED DAILY WITH TOUJEO, Disp: 100 each, Rfl: 5  •  Januvia 100 MG tablet, TAKE ONE TABLET BY MOUTH DAILY, Disp: 30 tablet, Rfl: 0  •  Jardiance 25 MG tablet, TAKE ONE TABLET BY MOUTH DAILY, Disp: 30 tablet, Rfl: 0  •  Lancets (FREESTYLE) lancets, Use to test BG 4 times daily, Disp: 400 each, Rfl: 1  •  latanoprost (XALATAN) 0.005 % ophthalmic solution, Apply 1 drop to  each eye Daily. (Patient taking differently: Apply 1 drop to eye(s) as directed by provider Every Night.), Disp: 7.5 mL, Rfl: 3  •  metFORMIN (GLUCOPHAGE) 500 MG tablet, Take 2 tablets by mouth 2 (Two) Times a Day With Meals., Disp: 120 tablet, Rfl: 5  •  pioglitazone (ACTOS) 15 MG tablet, Take 1 tablet by mouth Daily., Disp: 90 tablet, Rfl: 0  •  rosuvastatin (CRESTOR) 40 MG tablet, Take 1 tablet by mouth Daily., Disp: 90 tablet, Rfl: 3  •  sildenafil (VIAGRA) 50 MG tablet, Take 1 tablet by mouth Daily As Needed for erectile dysfunction., Disp: 6 tablet, Rfl: 5  •   vitamin D (ERGOCALCIFEROL) 1.25 MG (67160 UT) capsule capsule, TAKE ONE CAPSULE BY MOUTH ONCE WEEKLY, Disp: 12 capsule, Rfl: 0    Procedures    Lab Results (most recent)     None              Diagnoses and all orders for this visit:    1. Facial twitching (Primary)  -     clonazePAM (KlonoPIN) 0.5 MG tablet; Take 1 tablet by mouth 3 (Three) Times a Day As Needed (Facial twitching).  Dispense: 60 tablet; Refill: 0    Patient in need of refill of clonazepam for facial twitching/tic, does seem to worsen with stress but sometimes seems to worsen out of the blue, clonazepam helps, has been successful in using it sparingly. Will send in refill, Smith reviewed and appropriate. Encouraged patient to follow up with his neurologist as movements have worsened lately.     Total Time Spent- 5 minutes  Return if symptoms worsen or fail to improve.      Kathy Rivas MD

## 2021-02-02 RX ORDER — INSULIN GLARGINE 300 U/ML
INJECTION, SOLUTION SUBCUTANEOUS
Qty: 4.5 ML | Refills: 0 | Status: SHIPPED | OUTPATIENT
Start: 2021-02-02 | End: 2021-03-08

## 2021-02-05 ENCOUNTER — OFFICE VISIT (OUTPATIENT)
Dept: FAMILY MEDICINE CLINIC | Facility: CLINIC | Age: 66
End: 2021-02-05

## 2021-02-05 VITALS
TEMPERATURE: 96.5 F | DIASTOLIC BLOOD PRESSURE: 84 MMHG | HEIGHT: 76 IN | SYSTOLIC BLOOD PRESSURE: 124 MMHG | HEART RATE: 80 BPM | WEIGHT: 265 LBS | OXYGEN SATURATION: 98 % | BODY MASS INDEX: 32.27 KG/M2

## 2021-02-05 DIAGNOSIS — E53.8 B12 DEFICIENCY: ICD-10-CM

## 2021-02-05 DIAGNOSIS — G89.29 CHRONIC BILATERAL LOW BACK PAIN WITHOUT SCIATICA: ICD-10-CM

## 2021-02-05 DIAGNOSIS — Z12.11 SCREENING FOR MALIGNANT NEOPLASM OF COLON: ICD-10-CM

## 2021-02-05 DIAGNOSIS — Z00.00 MEDICARE WELCOME VISIT: Primary | ICD-10-CM

## 2021-02-05 DIAGNOSIS — E11.8 CONTROLLED TYPE 2 DIABETES MELLITUS WITH COMPLICATION, WITH LONG-TERM CURRENT USE OF INSULIN (HCC): ICD-10-CM

## 2021-02-05 DIAGNOSIS — Z79.4 CONTROLLED TYPE 2 DIABETES MELLITUS WITH COMPLICATION, WITH LONG-TERM CURRENT USE OF INSULIN (HCC): ICD-10-CM

## 2021-02-05 DIAGNOSIS — M54.50 CHRONIC BILATERAL LOW BACK PAIN WITHOUT SCIATICA: ICD-10-CM

## 2021-02-05 DIAGNOSIS — Z12.5 SCREENING FOR MALIGNANT NEOPLASM OF PROSTATE: ICD-10-CM

## 2021-02-05 PROCEDURE — 72100 X-RAY EXAM L-S SPINE 2/3 VWS: CPT | Performed by: FAMILY MEDICINE

## 2021-02-05 PROCEDURE — G0402 INITIAL PREVENTIVE EXAM: HCPCS | Performed by: FAMILY MEDICINE

## 2021-02-05 NOTE — PROGRESS NOTES
The ABCs of the Annual Wellness Visit  Welcome to Medicare Visit    Chief Complaint   Patient presents with   • Welcome To Medicare     pt is here for WMW - fasting     Masks/face shield/appropriate PPE were worn for the entirety of the visit by the patient, MA, and provider.     Subjective   History of Present Illness:  Isaias Monaco is a 65 y.o. male who presents for a  Welcome to Medicare Visit.  His chronic medical problems include CAD, HLD, hypertension, type 2 diabetes, GERD, and anxiety with facial twitch/tic.    Low back Pain - chronic, has been intermittent since an injury about 10 years ago but current episode has been ongoing for six months, he has never had significant pain for this long before.  Pain worsens with walking and standing.  It is causing him to have to significantly limit his physical activity because after walking for couple minutes he has what he describes quite severe spasming pain in his lower back.  He has tried ibuprofen without relief.  He is interested in seeing a spine specialist, Dr. Davis, but needs a referral for this.    HEALTH RISK ASSESSMENT    Recent Hospitalizations:  No hospitalization(s) within the last year.    Current Medical Providers:  Patient Care Team:  Kathy Phillips MD as PCP - General (Family Medicine)  Bebeto Monson II, MD as Consulting Physician (Hematology and Oncology)  Micaela Barfield APRN as Nurse Practitioner (Endocrinology)  Manuela Agustin APRN as Referring Physician (Internal Medicine)  Richardson Moon MD as Consulting Physician (Pulmonary Disease)    Smoking Status:  Social History     Tobacco Use   Smoking Status Never Smoker   Smokeless Tobacco Never Used       Alcohol Consumption:  Social History     Substance and Sexual Activity   Alcohol Use Yes   • Alcohol/week: 3.0 - 4.0 standard drinks   • Types: 3 - 4 Standard drinks or equivalent per week    Comment: occasional       Depression Screen:   PHQ-2/PHQ-9 Depression  Screening 2/5/2021   Little interest or pleasure in doing things 0   Feeling down, depressed, or hopeless 0   Total Score 0       Fall Risk Screen:  CARLO Fall Risk Assessment has not been completed.    Health Habits and Functional and Cognitive Screening:  Functional & Cognitive Status 2/5/2021   Do you have difficulty preparing food and eating? No   Do you have difficulty bathing yourself, getting dressed or grooming yourself? No   Do you have difficulty using the toilet? No   Do you have difficulty moving around from place to place? No   Do you have trouble with steps or getting out of a bed or a chair? No   Current Diet Well Balanced Diet   Dental Exam Up to date   Eye Exam Up to date   Exercise (times per week) 5 times per week   Current Exercise Activities Include Cardiovasular Workout on Exercise Equipment   Do you need help using the phone?  No   Are you deaf or do you have serious difficulty hearing?  No   Do you need help with transportation? No   Do you need help shopping? No   Do you need help preparing meals?  No   Do you need help with housework?  No   Do you need help with laundry? No   Do you need help taking your medications? No   Do you need help managing money? No   Do you ever drive or ride in a car without wearing a seat belt? Yes   Have you felt unusual stress, anger or loneliness in the last month? No   Who do you live with? Spouse   If you need help, do you have trouble finding someone available to you? No   Have you been bothered in the last four weeks by sexual problems? No   Do you have difficulty concentrating, remembering or making decisions? No         Does the patient have evidence of cognitive impairment? No    Asprin use counseling:Taking ASA appropriately as indicated    Visual Acuity:    No exam data present    Age-appropriate Screening Schedule:  Refer to the list below for future screening recommendations based on patient's age, sex and/or medical conditions. Orders for these  recommended tests are listed in the plan section. The patient has been provided with a written plan.    Health Maintenance   Topic Date Due   • COLONOSCOPY  10/31/2020   • HEMOGLOBIN A1C  06/01/2021   • DIABETIC EYE EXAM  06/02/2021   • URINE MICROALBUMIN  06/02/2021   • DIABETIC FOOT EXAM  10/29/2021   • LIPID PANEL  12/01/2021   • TDAP/TD VACCINES (3 - Td) 02/26/2026   • INFLUENZA VACCINE  Completed   • ZOSTER VACCINE  Completed          The following portions of the patient's history were reviewed and updated as appropriate: allergies, current medications, past family history, past medical history, past social history, past surgical history and problem list.    Outpatient Medications Prior to Visit   Medication Sig Dispense Refill   • amLODIPine (NORVASC) 10 MG tablet Take 1 tablet by mouth Daily. 90 tablet 1   • ARIPiprazole (ABILIFY) 2 MG tablet Take 3 mg by mouth Daily.     • aspirin 81 MG EC tablet Take 1 tablet by mouth Daily. get over the counter 30 tablet 3   • Blood Glucose Monitoring Suppl ([facility] BLOOD GLUCOSE) kit TEST AS DIRECTED 1 each 0   • carvedilol (COREG) 25 MG tablet Take 1 tablet by mouth 2 (Two) Times a Day With Meals. 180 tablet 1   • clonazePAM (KlonoPIN) 0.5 MG tablet Take 1 tablet by mouth 3 (Three) Times a Day As Needed (Facial twitching). 60 tablet 0   • clopidogrel (PLAVIX) 75 MG tablet Take 1 tablet by mouth Daily. 90 tablet 3   • dorzolamide-timolol (COSOPT) 22.3-6.8 MG/ML ophthalmic solution Apply 1 drop to eye(s) to both eyes 2 (Two) Times a Day. 20 mL 3   • escitalopram (LEXAPRO) 20 MG tablet Take 1 tablet by mouth Daily. 90 tablet 3   • esomeprazole (nexIUM) 40 MG capsule TAKE ONE CAPSULE BY MOUTH DAILY 30 capsule 2   • ezetimibe (Zetia) 10 MG tablet Take 1 tablet by mouth Daily. 90 tablet 1   • famotidine (PEPCID) 20 MG tablet TAKE ONE TABLET BY MOUTH DAILY WITH DINNER 30 tablet 5   • glucose blood test strip Use 4 times a day 400 each 0   • Insulin Pen Needle (B-D ULTRAFINE III  SHORT PEN) 31G X 8 MM misc USE AS DIRECTED DAILY WITH TOUJEO 100 each 5   • Januvia 100 MG tablet TAKE ONE TABLET BY MOUTH DAILY 30 tablet 0   • Jardiance 25 MG tablet TAKE ONE TABLET BY MOUTH DAILY 30 tablet 0   • Lancets (FREESTYLE) lancets Use to test BG 4 times daily 400 each 1   • latanoprost (XALATAN) 0.005 % ophthalmic solution Apply 1 drop to  each eye Daily. (Patient taking differently: Apply 1 drop to eye(s) as directed by provider Every Night.) 7.5 mL 3   • metFORMIN (GLUCOPHAGE) 500 MG tablet Take 2 tablets by mouth 2 (Two) Times a Day With Meals. 120 tablet 5   • pioglitazone (ACTOS) 15 MG tablet Take 1 tablet by mouth Daily. 90 tablet 0   • rosuvastatin (CRESTOR) 40 MG tablet Take 1 tablet by mouth Daily. 90 tablet 3   • sildenafil (VIAGRA) 50 MG tablet Take 1 tablet by mouth Daily As Needed for erectile dysfunction. 6 tablet 5   • Toujeo SoloStar 300 UNIT/ML solution pen-injector injection INJECT 60 UNITS UNDER THE SKIN INTO THE APPROPRIATE AREA AS DIRECTED EVERY MORNING 4.5 mL 0   • vitamin D (ERGOCALCIFEROL) 1.25 MG (37484 UT) capsule capsule TAKE ONE CAPSULE BY MOUTH ONCE WEEKLY 12 capsule 0     No facility-administered medications prior to visit.        Patient Active Problem List   Diagnosis   • Microalbuminuria   • Vitamin D deficiency   • Angioedema   • Chronic coronary artery disease   • Anxiety   • ED (erectile dysfunction)   • Hyperlipidemia   • Hypogonadism in male   • Presence of coronary angioplasty implant and graft   • Osteoarthritis, knee   • Essential hypertension   • Anemia, posthemorrhagic, acute   • Shortness of breath   • Gastroesophageal reflux disease with esophagitis   • Tubular adenoma of colon   • Benign prostatic hyperplasia with nocturia   • Iron deficiency anemia   • Adverse effect of iron or its compound, subsequent encounter   • Facial twitching   • Blepharospasm   • Controlled type 2 diabetes mellitus with complication, with long-term current use of insulin (CMS/[facility])  "  • Panic disorder   • Tic disorder, unspecified       Advanced Care Planning:  ACP discussion was held with the patient during this visit. Patient has an advance directive (not in EMR), copy requested.    Review of Systems    Compared to one year ago, the patient feels his physical health is the same.  Compared to one year ago, the patient feels his mental health is the same. Feels the same but eye twitching symptoms have been worse.     Reviewed chart for potential of high risk medication in the elderly: yes  Reviewed chart for potential of harmful drug interactions in the elderly:yes    Objective         Vitals:    02/05/21 0930   BP: 124/84   BP Location: Right arm   Patient Position: Sitting   Pulse: 80   Temp: 96.5 °F (35.8 °C)   SpO2: 98%   Weight: 120 kg (265 lb)   Height: 191.8 cm (75.51\")       Body mass index is 32.68 kg/m².  Discussed the patient's BMI with him. The BMI is above average; BMI management plan is completed.Discussed weight, desires weight loss. Has tried to be more active but has been having worsening lower back pain.     Physical Exam    Lab Results   Component Value Date     (H) 12/01/2020    CHLPL 181 12/01/2020    TRIG 153 (H) 12/01/2020    HDL 47 12/01/2020     (H) 12/01/2020    VLDL 27 12/01/2020    HGBA1C 6.90 (H) 12/01/2020        Procedures    Assessment/Plan   Medicare Risks and Personalized Health Plan  CMS Preventative Services Quick Reference  Advance Directive Discussion  Colon Cancer Screening  Immunizations Discussed/Encouraged (specific immunizations; Reviewed, all up to date including COVID Vaccine )  Obesity/Overweight   Prostate Cancer Screening     The above risks/problems have been discussed with the patient.  Pertinent information has been shared with the patient in the After Visit Summary.  Follow up plans and orders are seen below in the Assessment/Plan Section.    Diagnoses and all orders for this visit:    1. Medicare welcome visit " (Primary)  Pleasant 65-year-old man here today for Medicare wellness visit.  He is generally doing well except for significant lower back pain that has been bothering him for the last 6 months.  He is up-to-date on all recommended immunizations including the COVID-19 vaccine.  He is due for PSA screening, we will have this performed when he next comes in for routine blood work.  He is also due for screening colonoscopy, history of polyps.  His last colonoscopy was over 3 years ago.  Request that he bring a copy of his living will.  Discussed weight loss for elevated BMI.    2. Chronic bilateral low back pain without sciatica  Chronic bilateral lower back pain that worsens with standing and exertion.  Positive straight leg raise test on both sides.  No sciatica.  Lumbar x-rays were performed today.  A 3 view lumbar spine X-Ray was ordered and performed in the office. My reading of this film is narrowed joint space particularly between L4-L5, no acute signs of fracture or dislocation.  Presence of spondylosis throughout the lumbar and lower thoracic spine. No comparison films available: pending review by Radiologist.  Radiology findings consistent with degenerative disc disease.  -Orders placed for lumbar MRI and referral to neurosurgery, Dr. Davis.  -     XR Spine Lumbar 2 or 3 View (In Office)    3. Screening for malignant neoplasm of prostate  -     PSA SCREENING; Future    4. B12 deficiency  -     Vitamin B12; Future    5. Controlled type 2 diabetes mellitus with complication, with long-term current use of insulin (CMS/[facility])  -     Hemoglobin A1c; Future    6. Screening for malignant neoplasm of colon  -     Ambulatory Referral For Screening Colonoscopy      Follow Up:  Return in about 2 months (around 4/5/2021) for Labs only - nonfasting (orders placed) Follow up in office 6 months.     An After Visit Summary and PPPS were given to the patient.

## 2021-02-09 ENCOUNTER — HOSPITAL ENCOUNTER (OUTPATIENT)
Dept: MRI IMAGING | Facility: HOSPITAL | Age: 66
Discharge: HOME | End: 2021-02-09
Admitting: FAMILY MEDICINE

## 2021-02-09 DIAGNOSIS — G89.29 CHRONIC BILATERAL LOW BACK PAIN WITHOUT SCIATICA: ICD-10-CM

## 2021-02-09 DIAGNOSIS — M54.50 CHRONIC BILATERAL LOW BACK PAIN WITHOUT SCIATICA: ICD-10-CM

## 2021-02-09 PROCEDURE — 72148 MRI LUMBAR SPINE W/O DYE: CPT

## 2021-02-13 DIAGNOSIS — Z79.4 CONTROLLED TYPE 2 DIABETES MELLITUS WITHOUT COMPLICATION, WITH LONG-TERM CURRENT USE OF INSULIN (HCC): ICD-10-CM

## 2021-02-13 DIAGNOSIS — E11.9 CONTROLLED TYPE 2 DIABETES MELLITUS WITHOUT COMPLICATION, WITH LONG-TERM CURRENT USE OF INSULIN (HCC): ICD-10-CM

## 2021-02-13 RX ORDER — INSULIN GLARGINE 300 U/ML
INJECTION, SOLUTION SUBCUTANEOUS
Qty: 4.5 ML | Refills: 0 | Status: CANCELLED | OUTPATIENT
Start: 2021-02-13

## 2021-02-15 ENCOUNTER — APPOINTMENT (OUTPATIENT)
Dept: GENERAL RADIOLOGY | Facility: HOSPITAL | Age: 66
End: 2021-02-15

## 2021-02-15 ENCOUNTER — HOSPITAL ENCOUNTER (EMERGENCY)
Facility: HOSPITAL | Age: 66
Discharge: HOME | End: 2021-02-15
Attending: EMERGENCY MEDICINE | Admitting: EMERGENCY MEDICINE

## 2021-02-15 VITALS
WEIGHT: 260 LBS | TEMPERATURE: 97.9 F | SYSTOLIC BLOOD PRESSURE: 164 MMHG | RESPIRATION RATE: 16 BRPM | DIASTOLIC BLOOD PRESSURE: 98 MMHG | OXYGEN SATURATION: 94 % | HEIGHT: 77 IN | HEART RATE: 92 BPM | BODY MASS INDEX: 30.7 KG/M2

## 2021-02-15 DIAGNOSIS — S22.31XA CLOSED FRACTURE OF ONE RIB OF RIGHT SIDE, INITIAL ENCOUNTER: Primary | ICD-10-CM

## 2021-02-15 PROCEDURE — 71101 X-RAY EXAM UNILAT RIBS/CHEST: CPT

## 2021-02-15 PROCEDURE — 99283 EMERGENCY DEPT VISIT LOW MDM: CPT

## 2021-02-15 RX ORDER — HYDROCODONE BITARTRATE AND ACETAMINOPHEN 7.5; 325 MG/1; MG/1
1 TABLET ORAL ONCE
Status: COMPLETED | OUTPATIENT
Start: 2021-02-15 | End: 2021-02-15

## 2021-02-15 RX ORDER — PIOGLITAZONEHYDROCHLORIDE 15 MG/1
15 TABLET ORAL DAILY
Qty: 90 TABLET | Refills: 0 | Status: SHIPPED | OUTPATIENT
Start: 2021-02-15 | End: 2021-06-17 | Stop reason: SDUPTHER

## 2021-02-15 RX ORDER — EZETIMIBE 10 MG/1
10 TABLET ORAL DAILY
Qty: 90 TABLET | Refills: 0 | Status: SHIPPED | OUTPATIENT
Start: 2021-02-15 | End: 2021-04-21 | Stop reason: SDUPTHER

## 2021-02-15 RX ORDER — EMPAGLIFLOZIN 25 MG/1
TABLET, FILM COATED ORAL
Qty: 30 TABLET | Refills: 2 | Status: SHIPPED | OUTPATIENT
Start: 2021-02-15 | End: 2021-06-17 | Stop reason: SDUPTHER

## 2021-02-15 RX ORDER — ERGOCALCIFEROL 1.25 MG/1
50000 CAPSULE ORAL WEEKLY
Qty: 12 CAPSULE | Refills: 0 | Status: SHIPPED | OUTPATIENT
Start: 2021-02-15 | End: 2021-04-21 | Stop reason: SDUPTHER

## 2021-02-15 RX ORDER — SITAGLIPTIN 100 MG/1
TABLET, FILM COATED ORAL
Qty: 30 TABLET | Refills: 2 | Status: SHIPPED | OUTPATIENT
Start: 2021-02-15 | End: 2021-06-17 | Stop reason: SDUPTHER

## 2021-02-15 RX ORDER — NAPROXEN 500 MG/1
500 TABLET ORAL 2 TIMES DAILY WITH MEALS
Qty: 30 TABLET | Refills: 0 | Status: SHIPPED | OUTPATIENT
Start: 2021-02-15 | End: 2022-02-09

## 2021-02-15 RX ORDER — OXYCODONE AND ACETAMINOPHEN 7.5; 325 MG/1; MG/1
1-2 TABLET ORAL EVERY 6 HOURS PRN
Qty: 15 TABLET | Refills: 0 | Status: SHIPPED | OUTPATIENT
Start: 2021-02-15 | End: 2021-06-02

## 2021-02-15 RX ADMIN — HYDROCODONE BITARTRATE AND ACETAMINOPHEN 1 TABLET: 7.5; 325 TABLET ORAL at 18:51

## 2021-02-15 NOTE — ED PROVIDER NOTES
EMERGENCY DEPARTMENT ENCOUNTER    Room Number:  35/35  Date of encounter:  2/15/2021  PCP: Kathy Phillips MD  Historian: Patient     I used full protective equipment while examining this patient.  This includes face mask, gloves and protective eyewear.  I washed my hands before entering the room and immediately upon leaving the room      HPI:  Chief Complaint: Fall  A complete HPI/ROS/PMH/PSH/SH/FH are unobtainable due to: None    Context: Isaias Monaco is a 65 y.o. male who presents to the ED c/o fall.  Patient has had right-sided lower rib pain since that time.  Pain is moderate at rest but becomes severe with movement or with cough.  Pain is in the right posterior mid back area.  Patient denies loss of consciousness or neck or back injury with the fall.  Patient has been taking 800 mg ibuprofen roughly 3 times daily as well as occasional oxycodone left over from other illness.  Patient states he did have rib fracture on the right in the past but did not require surgery and healed up fully.      PAST MEDICAL HISTORY  Active Ambulatory Problems     Diagnosis Date Noted   • Microalbuminuria 02/21/2016   • Vitamin D deficiency 02/21/2016   • Angioedema 02/21/2016   • Chronic coronary artery disease 02/21/2016   • Anxiety 02/21/2016   • ED (erectile dysfunction) 02/21/2016   • Hyperlipidemia 02/21/2016   • Hypogonadism in male 09/28/2016   • Presence of coronary angioplasty implant and graft 05/28/2013   • Osteoarthritis, knee 06/15/2017   • Essential hypertension 06/21/2017   • Anemia, posthemorrhagic, acute 06/22/2017   • Shortness of breath 07/11/2017   • Gastroesophageal reflux disease with esophagitis 08/28/2017   • Tubular adenoma of colon 11/01/2017   • Benign prostatic hyperplasia with nocturia 05/02/2018   • Iron deficiency anemia 10/30/2018   • Adverse effect of iron or its compound, subsequent encounter 11/27/2018   • Facial twitching 12/04/2018   • Blepharospasm 06/25/2019   • Controlled type 2  RT Protocol Note  Keiry Aviles 80 y o  male MRN: [id number]  Unit/Bed#: [facility] 633-01 Encounter: [id number]    Assessment    Principal Problem:    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      Home Pulmonary Medications:    Home Devices/Therapy:  (none per patient)    Past Medical History:   Diagnosis Date    Diabetes mellitus ([facility] [address] )     Type II    Weight loss      Social History     Social History    Marital status: /Civil Union     Spouse name: N/A    Number of children: N/A    Years of education: N/A     Social History Main Topics    Smoking status: Former Smoker     Years: 25 00    Smokeless tobacco: Never Used      Comment: quit about 20 years ago as of 9/21/2018    Alcohol use Yes      Comment: 1 beer a day    Drug use: No    Sexual activity: Not Asked     Other Topics Concern    None     Social History Narrative    None       Subjective         Objective    Physical Exam:   Assessment Type: Assess only  General Appearance: Alert, Awake  Respiratory Pattern: Normal  Chest Assessment: Chest expansion symmetrical  Bilateral Breath Sounds: Diminished  O2 Device: NC    Vitals:  Blood pressure 103/55, pulse 83, temperature 99 14 °F (37 3 °C), temperature source Oral, resp  rate (!) 28, height 5' 7" (1 702 m), weight 56 2 kg (124 lb), SpO2 93 %  Imaging and other studies: I have personally reviewed pertinent reports  O2 Device: NC     Plan    Respiratory Plan: Discontinue Protocol        Resp Comments: Pt assessed per respiratory protocol  He is post op and desatted on RA to [de-identified]  Pt found on 4L NC sating 93%  He has no pulmonary history and takes no respiratory meds at home  Pt c/o no sob or distress, just shoulder pain  Pt is refusing to cough or take a deep breaths because of pain  Pt achieving barely 250 on IS at this time  Rn aware  No indication for bronchodilators at this time  Will DC protocol  diabetes mellitus with complication, with long-term current use of insulin (CMS/HCC) 12/04/2019   • Panic disorder 10/16/2020   • Tic disorder, unspecified 10/16/2020     Resolved Ambulatory Problems     Diagnosis Date Noted   • Gastroesophageal reflux disease 02/21/2016   • Diabetes mellitus (CMS/HCC) 02/21/2016   • Adiposity 02/21/2016   • Routine health maintenance 06/30/2016   • Uncontrolled type 2 diabetes mellitus (CMS/HCC) 09/26/2016   • Low testosterone 09/26/2016   • MARC (acute kidney injury) (CMS/HCC) 06/16/2017   • Postoperative visit 06/19/2017   • Hematoma 06/20/2017   • Febrile illness 06/23/2017   • Wound infection after surgery 06/23/2017   • Angina at rest (CMS/HCC) 03/04/2020     Past Medical History:   Diagnosis Date   • Anemia    • Arthritis    • CAD (coronary artery disease)    • Chest pain    • Colon polyps    • Diabetes mellitus, type 2 (CMS/HCC)    • GERD (gastroesophageal reflux disease)    • Glaucoma    • High cholesterol    • History of foreign travel 12/2017; 08/2018   • Hypertension    • Male erectile disorder    • Obesity (BMI 30-39.9)    • Osteoarthritis          PAST SURGICAL HISTORY  Past Surgical History:   Procedure Laterality Date   • CARDIAC CATHETERIZATION N/A 05/15/2006    Dr. Mini Camarillo   • CARDIAC CATHETERIZATION N/A 3/10/2020    Procedure: Left Heart Cath;  Surgeon: Miguel Ángel Karimi MD;  Location: [facility] CATH INVASIVE LOCATION;  Service: Cardiology;  Laterality: N/A;   • CARDIAC CATHETERIZATION N/A 3/10/2020    Procedure: Stent DAVONTE coronary;  Surgeon: Miguel Ángel Karimi MD;  Location:  [facility] CATH INVASIVE LOCATION;  Service: Cardiology;  Laterality: N/A;   • CARDIAC CATHETERIZATION N/A 3/10/2020    Procedure: Coronary angiography;  Surgeon: Miguel Ángel Karimi MD;  Location:  [facility] CATH INVASIVE LOCATION;  Service: Cardiology;  Laterality: N/A;   • CARDIAC CATHETERIZATION N/A 3/10/2020    Procedure: Left ventriculography;  Surgeon: Miguel Ángel Karimi  MD DIANN;  Location: [facility] CATH INVASIVE LOCATION;  Service: Cardiology;  Laterality: N/A;   • COLONOSCOPY N/A 02/22/2006    Bilobed polyp at 30 cm, hemorrhoids-Dr. Ilya Zhao   • COLONOSCOPY N/A 02/28/2014    Normal ileum, one 6 mm polyp in the mid transverse colon-Dr. Ilya Zhao   • COLONOSCOPY N/A 11/19/2008    Ela ileum, two 3 to 4 mm polyps, non-bleeding internal hemorrhoids, repeat in 5 years-Dr. Ilya Zhao   • COLONOSCOPY N/A 10/31/2017    Procedure: COLONOSCOPY WITH POLYPECTOMY (COLD BIOPSY);  Surgeon: Ilya Zhao MD;  Location: [facility] ENDOSCOPY;  Service:    • CORONARY ANGIOPLASTY WITH STENT PLACEMENT  2007, 2012, 2015    cardiac stents x3 occasions   • ENDOSCOPY N/A 10/4/2017    Procedure: ESOPHAGOGASTRODUODENOSCOPY;  Surgeon: Boyd Guidry MD;  Location: [facility] ENDOSCOPY;  Service:    • KNEE INCISION AND DRAINAGE Right 6/20/2017    Procedure: RT. KNEE WASHOUT ;  Surgeon: Boyd Coyne MD;  Location: [facility] MAIN OR;  Service:    • MA TOTAL KNEE ARTHROPLASTY Right 6/15/2017    Procedure: RT TOTAL KNEE ARTHROPLASTY;  Surgeon: Boyd Coyne MD;  [facility];  Service: Orthopedics   • SHOULDER SURGERY Right 2016   • UPPER GASTROINTESTINAL ENDOSCOPY N/A 10/13/2015    Z-line irregular, normal stomach, normal duodenum-Dr. Ilya Zhao   • UPPER GASTROINTESTINAL ENDOSCOPY N/A 02/28/2014    Z-line irregular, normal stomach, normal duodenum-Dr. Ilya Zhao   • UPPER GASTROINTESTINAL ENDOSCOPY N/A 11/19/2008    Z-line irregular, chronic gastritis withotu hemorrhage, normal duodenum-Dr. Ilya Zhao   • UPPER GASTROINTESTINAL ENDOSCOPY N/A 06/22/2006    LA Grade A reflux esophagitis, non-bleeding erythematous gastropathy, normal duodenum-Dr. Ilya Zhao         FAMILY HISTORY  Family History   Problem Relation Age of Onset   • Lupus Sister    • Thyroid disease Sister    • Heart disease Other    • Hypertension Other    • Arthritis Mother    • Hyperlipidemia Mother    • Hypertension Mother     • Thyroid disease Mother    • Lupus Mother    • Heart disease Father    • Arthritis Father    • Other Father         Vascular disease   • Lupus Father    • Depression Father    • Alcohol abuse Father    • Dementia Father    • Hypertension Father    • Heart disease Brother    • Heart attack Brother 40   • Thyroid disease Sister    • Arthritis Brother    • Malig Hyperthermia Neg Hx          SOCIAL HISTORY  Social History     Socioeconomic History   • Marital status:      Spouse name: Micaela   • Number of children: 1   • Years of education: College   • Highest education level: Not on file   Occupational History   • Occupation: RN in Critical Care     Employer: [employer]   Tobacco Use   • Smoking status: Never Smoker   • Smokeless tobacco: Never Used   Substance and Sexual Activity   • Alcohol use: Yes     Alcohol/week: 3.0 - 4.0 standard drinks     Types: 3 - 4 Standard drinks or equivalent per week     Comment: occasional   • Drug use: No   • Sexual activity: Defer   Social History Narrative    , 1 son, 2 stepsons         ALLERGIES  Ace inhibitors, Lisinopril, and Testosterone       REVIEW OF SYSTEMS  Review of Systems   Constitutional: Negative for fever.   Respiratory: Negative for cough and shortness of breath.    Cardiovascular: Negative for chest pain.   Musculoskeletal: Positive for back pain.   All other systems reviewed and are negative.          PHYSICAL EXAM    I have reviewed the triage vital signs and nursing notes.    ED Triage Vitals   Temp Heart Rate Resp BP SpO2   02/15/21 1824 02/15/21 1825 02/15/21 1824 -- 02/15/21 1825   97.9 °F (36.6 °C) 92 17  93 %      Temp src Heart Rate Source Patient Position BP Location FiO2 (%)   02/15/21 1824 02/15/21 1825 -- -- --   Tympanic Monitor          Physical Exam  GENERAL: Alert male in no obvious distress.  Vitals reviewed and are fairly unremarkable with exception of mildly elevated blood pressure.  HENT: nares patent  EYES: no  scleral icterus  CV: regular rhythm, regular rate  RESPIRATORY: Right-sided splinting secondary to pain.  There are diminished breath sounds in the right base with slight rales.  O2 saturations 98% on room air  ABDOMEN: soft, nontender to palpation  MUSCULOSKELETAL: Examination of the spine and bilateral upper and lower extremities does not yield any evidence of acute significant traumatic injury.  NEURO: Strength, sensation, and coordination are grossly intact.  Speech and mentation are unremarkable  SKIN: warm, dry      RADIOLOGY  Xr Ribs Right With Pa Chest    Result Date: 2/15/2021  PA CHEST AND RIGHT RIB SERIES  HISTORY: Fall, rib pain.  COMPARISON: Chest x-ray 04/22/2020.  TECHNIQUE: Initially, a PA view of the chest was obtained.  FINDINGS: The heart is within normal limits in size. There is no evidence of infiltrate, effusion, congestive failure or of pneumothorax. AP and oblique views of the right hemithorax demonstrates a fracture involving the lateral aspect of the right 8th rib with approximately 1 mm of displacement.      Minimally displaced fracture involving the right 8th rib laterally. There is no evidence of infiltrate, effusion, congestive failure or of pneumothorax.        I ordered the above noted radiological studies. Reviewed by me and discussed with radiologist.  See dictation for official radiology interpretation.      PROCEDURES  Procedures      MEDICATIONS GIVEN IN ER    Medications   HYDROcodone-acetaminophen (NORCO) 7.5-325 MG per tablet 1 tablet (1 tablet Oral Given 2/15/21 1851)         PROGRESS, DATA ANALYSIS, CONSULTS, AND MEDICAL DECISION MAKING    All labs have been independently reviewed by me.  All radiology studies have been reviewed by me and discussed with radiologist dictating the report.   EKG's independently viewed and interpreted by me.  Discussion below represents my analysis of pertinent findings related to patient's condition, differential diagnosis, treatment plan and  final disposition.      ED Course as of Feb 15 1939   Mon Feb 15, 2021   1849 [id number] male who is greater than 48 hours out from fall in the parking lot on Saturday night.  He has sustained injury to the right posterior ribs and I am very suspicious for right-sided rib fractures.  Would also consider hemothorax or pneumothorax although O2 saturations are good.  He has no significant flank tenderness or right upper abdomen tenderness and I do not see evidence that would suggest renal or hepatic injury.    [DB]   1850 We will give Lortab p.o. x1 as patient did have a Percocet several hours ago.    [DB]      ED Course User Index  [DB] Mark Floyd MD       AS OF 19:39 EST VITALS:    BP - 164/98  HR - 92  TEMP - 97.9 °F (36.6 °C) (Tympanic)  O2 SATS - 94%      DIAGNOSIS  Final diagnoses:   Closed fracture of one rib of right side, initial encounter         DISPOSITION  DISCHARGE    Patient discharged in stable condition.    Reviewed implications of results, diagnosis, meds, responsibility to follow up, warning signs and symptoms of possible worsening, potential complications and reasons to return to ER, including increased chest pain, shortness of breath or as needed.    Patient/Family voiced understanding of above instructions.    Discussed plan for discharge, as there is no emergent indication for admission. Patient referred to primary care provider for BP management due to today's BP. Pt/family is agreeable and understands need for follow up and repeat testing.  Pt is aware that discharge does not mean that nothing is wrong but it indicates no emergency is present that requires admission and they must continue care with follow-up as given below or physician of their choice.     FOLLOW-UP  Kathy Phillips MD  [address] [id number] [phone]    In 1 week  If Not Better         Medication List      New Prescriptions    naproxen 500 MG tablet  Commonly known as: Naprosyn  Take 1  tablet by mouth 2 (Two) Times a Day With Meals.     oxyCODONE-acetaminophen 7.5-325 MG per tablet  Commonly known as: Percocet  Take 1-2 tablets by mouth Every 6 (Six) Hours As Needed for Severe Pain .        Changed    latanoprost 0.005 % ophthalmic solution  Commonly known as: XALATAN  Apply 1 drop to  each eye Daily.  What changed: when to take this           Where to Get Your Medications      These medications were sent to [facility] [address] [place] [address] - [address] AT [facility] [phone] [facility] [phone]   [address], [facility] [address]    Phone: [phone]   · naproxen 500 MG tablet  · oxyCODONE-acetaminophen 7.5-325 MG per tablet                Mark Floyd MD  02/15/21 7923

## 2021-02-15 NOTE — ED TRIAGE NOTES
Slipped on ice sat gage and has right rib pain.  It hurts to take a deep breath    Patient was placed in face mask during first look triage.  Patient was wearing a face mask throughout encounter.  I wore personal protective equipment throughout the encounter.  Hand hygiene was performed before and after patient encounter.

## 2021-03-07 DIAGNOSIS — I25.10 CHRONIC CORONARY ARTERY DISEASE: ICD-10-CM

## 2021-03-07 DIAGNOSIS — I10 ESSENTIAL HYPERTENSION: ICD-10-CM

## 2021-03-08 RX ORDER — INSULIN GLARGINE 300 U/ML
INJECTION, SOLUTION SUBCUTANEOUS
Qty: 4.5 ML | Refills: 1 | Status: SHIPPED | OUTPATIENT
Start: 2021-03-08 | End: 2021-04-12 | Stop reason: SDUPTHER

## 2021-03-08 RX ORDER — AMLODIPINE BESYLATE 10 MG/1
TABLET ORAL
Qty: 90 TABLET | Refills: 1 | Status: SHIPPED | OUTPATIENT
Start: 2021-03-08 | End: 2021-04-21 | Stop reason: SDUPTHER

## 2021-03-08 RX ORDER — CLOPIDOGREL BISULFATE 75 MG/1
TABLET ORAL
Qty: 90 TABLET | Refills: 2 | Status: SHIPPED | OUTPATIENT
Start: 2021-03-08 | End: 2021-04-21 | Stop reason: SDUPTHER

## 2021-04-03 DIAGNOSIS — E66.9 ADIPOSITY: ICD-10-CM

## 2021-04-03 DIAGNOSIS — E55.9 VITAMIN D DEFICIENCY: ICD-10-CM

## 2021-04-03 DIAGNOSIS — R80.9 MICROALBUMINURIA: ICD-10-CM

## 2021-04-03 DIAGNOSIS — E11.65 TYPE 2 DIABETES MELLITUS WITH HYPERGLYCEMIA (HCC): ICD-10-CM

## 2021-04-03 DIAGNOSIS — K44.9 HIATAL HERNIA: ICD-10-CM

## 2021-04-03 DIAGNOSIS — I10 ESSENTIAL HYPERTENSION: ICD-10-CM

## 2021-04-03 DIAGNOSIS — N52.9 MALE ERECTILE DISORDER: ICD-10-CM

## 2021-04-03 DIAGNOSIS — E78.5 HYPERLIPIDEMIA: ICD-10-CM

## 2021-04-03 DIAGNOSIS — K21.9 GASTROESOPHAGEAL REFLUX DISEASE WITHOUT ESOPHAGITIS: ICD-10-CM

## 2021-04-06 RX ORDER — ESOMEPRAZOLE MAGNESIUM 40 MG/1
CAPSULE, DELAYED RELEASE ORAL
Qty: 30 CAPSULE | Refills: 1 | Status: SHIPPED | OUTPATIENT
Start: 2021-04-06 | End: 2021-06-04 | Stop reason: SDUPTHER

## 2021-04-06 RX ORDER — PEN NEEDLE, DIABETIC 31 GX5/16"
NEEDLE, DISPOSABLE MISCELLANEOUS
Qty: 100 EACH | Refills: 0 | Status: SHIPPED | OUTPATIENT
Start: 2021-04-06 | End: 2021-07-20 | Stop reason: SDUPTHER

## 2021-04-12 ENCOUNTER — PATIENT MESSAGE (OUTPATIENT)
Dept: FAMILY MEDICINE CLINIC | Facility: CLINIC | Age: 66
End: 2021-04-12

## 2021-04-12 DIAGNOSIS — Z79.4 CONTROLLED TYPE 2 DIABETES MELLITUS WITH COMPLICATION, WITH LONG-TERM CURRENT USE OF INSULIN (HCC): Primary | ICD-10-CM

## 2021-04-12 DIAGNOSIS — E11.8 CONTROLLED TYPE 2 DIABETES MELLITUS WITH COMPLICATION, WITH LONG-TERM CURRENT USE OF INSULIN (HCC): Primary | ICD-10-CM

## 2021-04-12 RX ORDER — INSULIN GLARGINE 300 U/ML
60 INJECTION, SOLUTION SUBCUTANEOUS EVERY MORNING
Qty: 18 ML | Refills: 1 | Status: SHIPPED | OUTPATIENT
Start: 2021-04-12 | End: 2021-10-15 | Stop reason: SDUPTHER

## 2021-04-12 NOTE — TELEPHONE ENCOUNTER
From: Isaias Mnoaco  To: Kathy Rivas MD  Sent: 4/12/2021 11:18 AM EDT  Subject: Prescription Question    Hello Dr Rivas,    My endocrinologist left the practice and no one is responding to my request for a Toujeo refill. If you would, please, write a prescription for a 90 day supply of Toujeo 60 units every day to [facility] mail order. Thank you very much.

## 2021-04-15 ENCOUNTER — LAB (OUTPATIENT)
Dept: LAB | Facility: HOSPITAL | Age: 66
End: 2021-04-15

## 2021-04-15 ENCOUNTER — OFFICE VISIT (OUTPATIENT)
Dept: CARDIOLOGY | Facility: CLINIC | Age: 66
End: 2021-04-15

## 2021-04-15 VITALS
SYSTOLIC BLOOD PRESSURE: 136 MMHG | HEART RATE: 78 BPM | BODY MASS INDEX: 31.76 KG/M2 | WEIGHT: 269 LBS | OXYGEN SATURATION: 93 % | RESPIRATION RATE: 18 BRPM | HEIGHT: 77 IN | DIASTOLIC BLOOD PRESSURE: 90 MMHG

## 2021-04-15 DIAGNOSIS — I10 ESSENTIAL HYPERTENSION: ICD-10-CM

## 2021-04-15 DIAGNOSIS — I25.10 CHRONIC CORONARY ARTERY DISEASE: Primary | ICD-10-CM

## 2021-04-15 DIAGNOSIS — E78.5 HYPERLIPIDEMIA, UNSPECIFIED HYPERLIPIDEMIA TYPE: ICD-10-CM

## 2021-04-15 LAB
ALBUMIN SERPL-MCNC: 4.3 G/DL (ref 3.5–5.2)
ALP SERPL-CCNC: 91 U/L (ref 39–117)
ALT SERPL W P-5'-P-CCNC: 30 U/L (ref 1–41)
AST SERPL-CCNC: 37 U/L (ref 1–40)
BILIRUB CONJ SERPL-MCNC: 0.2 MG/DL (ref 0–0.3)
BILIRUB INDIRECT SERPL-MCNC: 0.2 MG/DL
BILIRUB SERPL-MCNC: 0.4 MG/DL (ref 0–1.2)
CHOLEST SERPL-MCNC: 114 MG/DL (ref 0–200)
HDLC SERPL-MCNC: 42 MG/DL (ref 40–60)
LDLC SERPL CALC-MCNC: 50 MG/DL (ref 0–100)
LDLC/HDLC SERPL: 1.12 {RATIO}
PROT SERPL-MCNC: 7.5 G/DL (ref 6–8.5)
TRIGL SERPL-MCNC: 124 MG/DL (ref 0–150)
VLDLC SERPL-MCNC: 22 MG/DL (ref 5–40)

## 2021-04-15 PROCEDURE — 80076 HEPATIC FUNCTION PANEL: CPT

## 2021-04-15 PROCEDURE — 36415 COLL VENOUS BLD VENIPUNCTURE: CPT

## 2021-04-15 PROCEDURE — 93000 ELECTROCARDIOGRAM COMPLETE: CPT | Performed by: NURSE PRACTITIONER

## 2021-04-15 PROCEDURE — 99214 OFFICE O/P EST MOD 30 MIN: CPT | Performed by: NURSE PRACTITIONER

## 2021-04-15 PROCEDURE — 80061 LIPID PANEL: CPT

## 2021-04-15 NOTE — PROGRESS NOTES
Date of Office Visit: 04/15/2021  Encounter Provider: MICHELLE Calderon  Place of Service: [facility] CARDIOLOGY  Patient Name: Isaias Monaco  :1955    Chief Complaint   Patient presents with   • Coronary Artery Disease     6 MTH FOLLOW UP   • Hypertension   :     HPI: Isaias Monaco is a 66 y.o. male who presents today for follow-up.  Old records have been obtained and reviewed by me.  He is a patient of Dr. Karimi's with a past cardiac history significant for coronary artery disease.  In , he presented with unstable angina and was noted to have a chronic total occlusion of the proximal to mid RCA for which he underwent  intervention.  In 2020, he presented with angina and was referred for cardiac catheterization where he was noted to have a 99% ostial circumflex lesion for which he underwent drug-eluting stent placement.   He was last seen in the office by Dr. Karimi in 2020 at which time he was doing well with no complaints of angina or heart failure.  Due to his multivessel PCI and multiple stents in the RCA, Dr. Karimi has recommended lifelong dual antiplatelet therapy.  He was advised to follow-up in 6 months.   He has been feeling well.  He denies any chest pain, shortness of breath, palpitations, edema, dizziness, or syncope.  He denies any bleeding difficulties or melena.  His blood pressures at home runs in the 120s to 130s systolic and typically in the 80s diastolic.  They are always more elevated at the doctor's office.  He also tells me that he was placed on the Zetia following his lab work in December.  He is a retired nurse.    Past Medical History:   Diagnosis Date   • Adiposity    • Anemia     post hemorrhagic   • Angioedema 2016    Secondary to ACE inhibitor   • Anxiety    • Arthritis    • CAD (coronary artery disease)    • Chest pain    • Colon polyps    • Diabetes mellitus, type 2 (CMS/HCC)    • ED (erectile dysfunction)     • Febrile illness    • GERD (gastroesophageal reflux disease)    • Glaucoma    • Hematoma    • High cholesterol    • History of foreign travel 12/2017; 08/2018    [place], [place], [place], [place], [place] and [place]; [place]   • Hyperlipidemia    • Hypertension    • Hypogonadism in male 9/28/2016   • Male erectile disorder    • Microalbuminuria    • Obesity (BMI 30-39.9)    • Osteoarthritis     knee   • Vitamin D deficiency    • Wound infection after surgery        Past Surgical History:   Procedure Laterality Date   • CARDIAC CATHETERIZATION N/A 05/15/2006    Dr. Mini Camarillo   • CARDIAC CATHETERIZATION N/A 3/10/2020    Procedure: Left Heart Cath;  Surgeon: Miguel Ángel Karimi MD;  Location:  [facility];  Service: Cardiology;  Laterality: N/A;   • CARDIAC CATHETERIZATION N/A 3/10/2020    Procedure: Stent DAVONTE coronary;  Surgeon: Miguel Ángel Karimi MD;  Location:  [facility];  Service: Cardiology;  Laterality: N/A;   • CARDIAC CATHETERIZATION N/A 3/10/2020    Procedure: Coronary angiography;  Surgeon: Miguel Ángel Karimi MD;  Location:  [facility];  Service: Cardiology;  Laterality: N/A;   • CARDIAC CATHETERIZATION N/A 3/10/2020    Procedure: Left ventriculography;  Surgeon: Miguel Ángel Karimi MD;  Location:  [facility];  Service: Cardiology;  Laterality: N/A;   • COLONOSCOPY N/A 02/22/2006    Bilobed polyp at 30 cm, hemorrhoids-Dr. Ilya Zhao   • COLONOSCOPY N/A 02/28/2014    Normal ileum, one 6 mm polyp in the mid transverse colon-Dr. Ilya Zhao   • COLONOSCOPY N/A 11/19/2008    Ela ileum, two 3 to 4 mm polyps, non-bleeding internal hemorrhoids, repeat in 5 years-Dr. Ilya Zhao   • COLONOSCOPY N/A 10/31/2017    Procedure: COLONOSCOPY WITH POLYPECTOMY (COLD BIOPSY);  Surgeon: Ilya Zhao MD;  Location: [facility] ENDOSCOPY;  Service:    • CORONARY ANGIOPLASTY WITH STENT PLACEMENT  2007, 2012, 2015     cardiac stents x3 occasions   • ENDOSCOPY N/A 10/4/2017    Procedure: ESOPHAGOGASTRODUODENOSCOPY;  Surgeon: Boyd Guidry MD;  Location: [facility] ENDOSCOPY;  Service:    • KNEE INCISION AND DRAINAGE Right 6/20/2017    Procedure: RT. KNEE WASHOUT ;  Surgeon: Boyd Coyne MD;  Location: [facility] MAIN OR;  Service:    • OK TOTAL KNEE ARTHROPLASTY Right 6/15/2017    Procedure: RT TOTAL KNEE ARTHROPLASTY;  Surgeon: Boyd Coyne MD;  Location: [facility] MAIN OR;  Service: Orthopedics   • SHOULDER SURGERY Right 2016   • UPPER GASTROINTESTINAL ENDOSCOPY N/A 10/13/2015    Z-line irregular, normal stomach, normal duodenum-Dr. Ilya Zhao   • UPPER GASTROINTESTINAL ENDOSCOPY N/A 02/28/2014    Z-line irregular, normal stomach, normal duodenum-Dr. Ilya Zhao   • UPPER GASTROINTESTINAL ENDOSCOPY N/A 11/19/2008    Z-line irregular, chronic gastritis withotu hemorrhage, normal duodenum-Dr. Ilya Zhao   • UPPER GASTROINTESTINAL ENDOSCOPY N/A 06/22/2006    LA Grade A reflux esophagitis, non-bleeding erythematous gastropathy, normal duodenum-Dr. Ilya Zhao       Social History     Socioeconomic History   • Marital status:      Spouse name: Micaela   • Number of children: 1   • Years of education: College   • Highest education level: Not on file   Tobacco Use   • Smoking status: Never Smoker   • Smokeless tobacco: Never Used   • Tobacco comment: CAFFEINE USE: 2 CUPS COFFEE DAILY   Vaping Use   • Vaping Use: Never used   Substance and Sexual Activity   • Alcohol use: Yes     Alcohol/week: 3.0 - 4.0 standard drinks     Types: 3 - 4 Standard drinks or equivalent per week     Comment: occasional   • Drug use: No   • Sexual activity: Defer       Family History   Problem Relation Age of Onset   • Lupus Sister    • Thyroid disease Sister    • Heart disease Other    • Hypertension Other    • Arthritis Mother    • Hyperlipidemia Mother    • Hypertension Mother    • Thyroid disease Mother    • Lupus Mother    • Heart disease Father     • Arthritis Father    • Other Father         Vascular disease   • Lupus Father    • Depression Father    • Alcohol abuse Father    • Dementia Father    • Hypertension Father    • Heart disease Brother    • Heart attack Brother 40   • Thyroid disease Sister    • Arthritis Brother    • Malig Hyperthermia Neg Hx        Review of Systems   Constitutional: Negative.   Cardiovascular: Negative.  Negative for chest pain, dyspnea on exertion, leg swelling, orthopnea, paroxysmal nocturnal dyspnea and syncope.   Respiratory: Negative.    Hematologic/Lymphatic: Negative for bleeding problem.   Musculoskeletal: Negative for falls.   Gastrointestinal: Negative for melena.   Neurological: Negative for dizziness and light-headedness.       Allergies   Allergen Reactions   • Ace Inhibitors Angioedema   • Lisinopril Angioedema   • Testosterone Myalgia         Current Outpatient Medications:   •  amLODIPine (NORVASC) 10 MG tablet, TAKE ONE TABLET BY MOUTH DAILY, Disp: 90 tablet, Rfl: 1  •  ARIPiprazole (ABILIFY) 2 MG tablet, Take 3 mg by mouth Daily., Disp: , Rfl:   •  aspirin 81 MG EC tablet, Take 1 tablet by mouth Daily. get over the counter, Disp: 30 tablet, Rfl: 3  •  Blood Glucose Monitoring Suppl (Modafirma BLOOD GLUCOSE) kit, TEST AS DIRECTED, Disp: 1 each, Rfl: 0  •  carvedilol (COREG) 25 MG tablet, Take 1 tablet by mouth 2 (Two) Times a Day With Meals., Disp: 180 tablet, Rfl: 1  •  clonazePAM (KlonoPIN) 0.5 MG tablet, Take 1 tablet by mouth 3 (Three) Times a Day As Needed (Facial twitching)., Disp: 60 tablet, Rfl: 0  •  clopidogrel (PLAVIX) 75 MG tablet, TAKE ONE TABLET BY MOUTH DAILY, Disp: 90 tablet, Rfl: 2  •  dorzolamide-timolol (COSOPT) 22.3-6.8 MG/ML ophthalmic solution, Apply 1 drop to eye(s) to both eyes 2 (Two) Times a Day., Disp: 20 mL, Rfl: 3  •  escitalopram (LEXAPRO) 20 MG tablet, Take 1 tablet by mouth Daily., Disp: 90 tablet, Rfl: 3  •  esomeprazole (nexIUM) 40 MG capsule, TAKE ONE CAPSULE BY MOUTH DAILY, Disp:  30 capsule, Rfl: 1  •  ezetimibe (Zetia) 10 MG tablet, Take 1 tablet by mouth Daily., Disp: 90 tablet, Rfl: 0  •  famotidine (PEPCID) 20 MG tablet, TAKE ONE TABLET BY MOUTH DAILY WITH DINNER, Disp: 30 tablet, Rfl: 5  •  glucose blood test strip, Use 4 times a day, Disp: 400 each, Rfl: 0  •  Insulin Pen Needle (B-D ULTRAFINE III SHORT PEN) 31G X 8 MM misc, USE AS DIRECTED TO INJECT TOUJEO, Disp: 100 each, Rfl: 0  •  Januvia 100 MG tablet, TAKE ONE TABLET BY MOUTH DAILY, Disp: 30 tablet, Rfl: 2  •  Jardiance 25 MG tablet, TAKE ONE TABLET BY MOUTH DAILY, Disp: 30 tablet, Rfl: 2  •  Lancets (FREESTYLE) lancets, Use to test BG 4 times daily, Disp: 400 each, Rfl: 1  •  latanoprost (XALATAN) 0.005 % ophthalmic solution, Apply 1 drop to  each eye Daily. (Patient taking differently: Apply 1 drop to eye(s) as directed by provider Every Night.), Disp: 7.5 mL, Rfl: 3  •  metFORMIN (GLUCOPHAGE) 500 MG tablet, Take 2 tablets by mouth 2 (Two) Times a Day With Meals., Disp: 120 tablet, Rfl: 5  •  naproxen (Naprosyn) 500 MG tablet, Take 1 tablet by mouth 2 (Two) Times a Day With Meals., Disp: 30 tablet, Rfl: 0  •  oxyCODONE-acetaminophen (Percocet) 7.5-325 MG per tablet, Take 1-2 tablets by mouth Every 6 (Six) Hours As Needed for Severe Pain ., Disp: 15 tablet, Rfl: 0  •  pioglitazone (ACTOS) 15 MG tablet, Take 1 tablet by mouth Daily., Disp: 90 tablet, Rfl: 0  •  rosuvastatin (CRESTOR) 40 MG tablet, Take 1 tablet by mouth Daily., Disp: 90 tablet, Rfl: 3  •  sildenafil (VIAGRA) 50 MG tablet, Take 1 tablet by mouth Daily As Needed for erectile dysfunction., Disp: 6 tablet, Rfl: 5  •  Toujeo SoloStar 300 UNIT/ML solution pen-injector injection, Inject 60 Units under the skin into the appropriate area as directed Every Morning., Disp: 18 mL, Rfl: 1  •  vitamin D (ERGOCALCIFEROL) 1.25 MG (70568 UT) capsule capsule, Take 1 capsule by mouth 1 (One) Time Per Week., Disp: 12 capsule, Rfl: 0      Objective:     Vitals:    04/15/21 1419   BP:  "136/90   BP Location: Right arm   Patient Position: Sitting   Pulse: 86   Resp: 18   SpO2: 93%   Weight: 122 kg (269 lb)   Height: 195.6 cm (77\")     Body mass index is 31.9 kg/m².    PHYSICAL EXAM:    Neck:      Vascular: No JVD.   Pulmonary:      Effort: Pulmonary effort is normal.      Breath sounds: Normal breath sounds.   Cardiovascular:      Normal rate. Regular rhythm.      Murmurs: There is no murmur.      No gallop. No click. No rub.   Pulses:     Intact distal pulses.           ECG 12 Lead    Date/Time: 4/15/2021 2:40 PM  Performed by: Nilam Falcon APRN  Authorized by: Nilam Falcon APRN   Comparison: compared with previous ECG from 8/27/2020  Similar to previous ECG  Rhythm: sinus rhythm  Rate: normal  BPM: 78  T inversion: III  QRS axis: left    Clinical impression: abnormal EKG  Comments: Indication: CAD              Assessment:       Diagnosis Plan   1. Chronic coronary artery disease     2. Essential hypertension     3. Hyperlipidemia, unspecified hyperlipidemia type       No orders of the defined types were placed in this encounter.         Plan:       1.  Coronary artery disease.  He denies any symptoms of angina.  His EKG is stable.  Dual antiplatelet therapy has been recommended lifelong.      2.  Hypertension.  He is on max doses of amlodipine and carvedilol.  We cannot use ACEs or ARB is due to angioedema.  Fortunately, his blood pressure seems well controlled.      3.  Hyperlipidemia.  Lipid panel from December 2020 revealed an LDL of 107 and HDL of 47.  He was subsequently placed on Zetia.  I would recommend repeating a lipid panel and initiating a PCSK9 inhibitor if his LDL remains greater than 70.        I think he stable and doing well.  He denies any symptoms of angina or heart failure.  Further recommendations will be made pending the results of his labs.  Otherwise, he will follow-up with Dr. Karimi in 6 months.      As always, it has been a pleasure to participate in your " patient's care.      Sincerely,         MICHELLE Michaels

## 2021-04-16 ENCOUNTER — TELEPHONE (OUTPATIENT)
Dept: ENDOCRINOLOGY | Age: 66
End: 2021-04-16

## 2021-04-19 ENCOUNTER — TELEPHONE (OUTPATIENT)
Dept: CARDIOLOGY | Facility: CLINIC | Age: 66
End: 2021-04-19

## 2021-04-21 DIAGNOSIS — N52.9 MALE ERECTILE DISORDER: ICD-10-CM

## 2021-04-21 DIAGNOSIS — E11.9 TYPE 2 DIABETES MELLITUS WITHOUT COMPLICATION, WITH LONG-TERM CURRENT USE OF INSULIN (HCC): ICD-10-CM

## 2021-04-21 DIAGNOSIS — Z79.4 TYPE 2 DIABETES MELLITUS WITHOUT COMPLICATION, WITH LONG-TERM CURRENT USE OF INSULIN (HCC): ICD-10-CM

## 2021-04-21 DIAGNOSIS — I10 ESSENTIAL HYPERTENSION: ICD-10-CM

## 2021-04-21 DIAGNOSIS — G24.4 MEIGE SYNDROME (BLEPHAROSPASM WITH OROMANDIBULAR DYSTONIA): ICD-10-CM

## 2021-04-21 DIAGNOSIS — E11.8 CONTROLLED TYPE 2 DIABETES MELLITUS WITH COMPLICATION, WITH LONG-TERM CURRENT USE OF INSULIN (HCC): ICD-10-CM

## 2021-04-21 DIAGNOSIS — I25.10 CHRONIC CORONARY ARTERY DISEASE: ICD-10-CM

## 2021-04-21 DIAGNOSIS — E78.5 HYPERLIPIDEMIA, UNSPECIFIED HYPERLIPIDEMIA TYPE: ICD-10-CM

## 2021-04-21 DIAGNOSIS — Z79.4 CONTROLLED TYPE 2 DIABETES MELLITUS WITH COMPLICATION, WITH LONG-TERM CURRENT USE OF INSULIN (HCC): ICD-10-CM

## 2021-04-21 DIAGNOSIS — E11.65 TYPE 2 DIABETES MELLITUS WITH HYPERGLYCEMIA (HCC): ICD-10-CM

## 2021-04-21 DIAGNOSIS — E66.9 ADIPOSITY: ICD-10-CM

## 2021-04-21 DIAGNOSIS — E55.9 VITAMIN D DEFICIENCY: ICD-10-CM

## 2021-04-21 DIAGNOSIS — E78.5 HYPERLIPIDEMIA: ICD-10-CM

## 2021-04-21 DIAGNOSIS — R80.9 MICROALBUMINURIA: ICD-10-CM

## 2021-04-21 RX ORDER — ERGOCALCIFEROL 1.25 MG/1
50000 CAPSULE ORAL WEEKLY
Qty: 12 CAPSULE | Refills: 3 | Status: SHIPPED | OUTPATIENT
Start: 2021-04-21 | End: 2022-03-23 | Stop reason: SDUPTHER

## 2021-04-21 RX ORDER — ESCITALOPRAM OXALATE 20 MG/1
20 TABLET ORAL DAILY
Qty: 90 TABLET | Refills: 3 | Status: SHIPPED | OUTPATIENT
Start: 2021-04-21 | End: 2021-05-06 | Stop reason: SDUPTHER

## 2021-04-21 RX ORDER — EZETIMIBE 10 MG/1
10 TABLET ORAL DAILY
Qty: 90 TABLET | Refills: 3 | Status: SHIPPED | OUTPATIENT
Start: 2021-04-21 | End: 2021-07-08

## 2021-04-21 RX ORDER — AMLODIPINE BESYLATE 10 MG/1
10 TABLET ORAL DAILY
Qty: 90 TABLET | Refills: 3 | Status: SHIPPED | OUTPATIENT
Start: 2021-04-21 | End: 2021-09-02 | Stop reason: SDUPTHER

## 2021-04-21 RX ORDER — ROSUVASTATIN CALCIUM 40 MG/1
40 TABLET, COATED ORAL DAILY
Qty: 90 TABLET | Refills: 3 | Status: SHIPPED | OUTPATIENT
Start: 2021-04-21 | End: 2021-05-06 | Stop reason: SDUPTHER

## 2021-04-21 RX ORDER — CLOPIDOGREL BISULFATE 75 MG/1
75 TABLET ORAL DAILY
Qty: 90 TABLET | Refills: 3 | Status: SHIPPED | OUTPATIENT
Start: 2021-04-21 | End: 2022-01-04

## 2021-04-21 RX ORDER — CARVEDILOL 25 MG/1
25 TABLET ORAL 2 TIMES DAILY WITH MEALS
Qty: 180 TABLET | Refills: 3 | Status: SHIPPED | OUTPATIENT
Start: 2021-04-21 | End: 2021-08-12 | Stop reason: SDUPTHER

## 2021-04-30 ENCOUNTER — TRANSCRIBE ORDERS (OUTPATIENT)
Dept: SLEEP MEDICINE | Facility: HOSPITAL | Age: 66
End: 2021-04-30

## 2021-04-30 DIAGNOSIS — Z01.818 OTHER SPECIFIED PRE-OPERATIVE EXAMINATION: Primary | ICD-10-CM

## 2021-05-06 DIAGNOSIS — E78.5 HYPERLIPIDEMIA, UNSPECIFIED HYPERLIPIDEMIA TYPE: ICD-10-CM

## 2021-05-06 DIAGNOSIS — G24.4 MEIGE SYNDROME (BLEPHAROSPASM WITH OROMANDIBULAR DYSTONIA): ICD-10-CM

## 2021-05-06 RX ORDER — ESCITALOPRAM OXALATE 20 MG/1
20 TABLET ORAL DAILY
Qty: 90 TABLET | Refills: 3 | Status: SHIPPED | OUTPATIENT
Start: 2021-05-06 | End: 2021-05-11 | Stop reason: SDUPTHER

## 2021-05-06 RX ORDER — ROSUVASTATIN CALCIUM 40 MG/1
40 TABLET, COATED ORAL DAILY
Qty: 90 TABLET | Refills: 3 | Status: SHIPPED | OUTPATIENT
Start: 2021-05-06 | End: 2022-06-09 | Stop reason: SDUPTHER

## 2021-05-10 ENCOUNTER — TELEPHONE (OUTPATIENT)
Dept: NEUROSURGERY | Facility: CLINIC | Age: 66
End: 2021-05-10

## 2021-05-10 NOTE — TELEPHONE ENCOUNTER
Provider: EMANUEL  Caller: JOSE  Relationship to Patient: WIFE    Phone Number: [phone]  Reason for Call: PT WIFE CALLED TO SEE IF SHE IS ALLOWED TO COME WITH HER  TO HIS APPT ON Friday MAY 14TH.     PLEASE CALL PT WIFE AND ADVISE     THANK YOU

## 2021-05-11 ENCOUNTER — PATIENT MESSAGE (OUTPATIENT)
Dept: FAMILY MEDICINE CLINIC | Facility: CLINIC | Age: 66
End: 2021-05-11

## 2021-05-11 DIAGNOSIS — G24.4 MEIGE SYNDROME (BLEPHAROSPASM WITH OROMANDIBULAR DYSTONIA): ICD-10-CM

## 2021-05-11 RX ORDER — ESCITALOPRAM OXALATE 20 MG/1
20 TABLET ORAL DAILY
Qty: 90 TABLET | Refills: 3 | Status: SHIPPED | OUTPATIENT
Start: 2021-05-11 | End: 2022-04-27 | Stop reason: SDUPTHER

## 2021-05-11 NOTE — TELEPHONE ENCOUNTER
From: Isaias Monaco  To: Kathy Rivas MD  Sent: 5/11/2021 12:51 PM EDT  Subject: Prescription Question    Good afternoon. I need a prescription for Escitalopram 20MG, one tab daily sent to the [facility]. Thank you.

## 2021-05-13 NOTE — PROGRESS NOTES
Subjective   History of Present Illness: Isaias Monaco is a 66 y.o. male is being seen for consultation today for LBP. He had MRI L-Spine 2/9/21 @ [facility]. Today Mr. Monaco reports he has had mid lower back pain for approximately 8 months.  He reports that the pain is slightly eccentric to the right and sometimes on the left.  He denies any weakness or numbness in his lower extremities.  He denies any pain radiating down his lower extremities.  He has not yet tried physical therapy or been seen by pain management.  He reports that the pain has become more severe and he is having difficulty walking more than 1 block without severe pain in his lower back.     History of Present Illness    The following portions of the patient's history were reviewed and updated as appropriate: allergies, current medications, past family history, past social history, past surgical history and problem list.    Past Medical History:   Diagnosis Date   • Adiposity    • Anemia     post hemorrhagic   • Angioedema 2/21/2016    Secondary to ACE inhibitor   • Anxiety    • Arthritis    • CAD (coronary artery disease)    • Chest pain    • Colon polyps    • Diabetes mellitus, type 2 (CMS/HCC)    • ED (erectile dysfunction)    • Febrile illness    • GERD (gastroesophageal reflux disease)    • Glaucoma    • Hematoma    • High cholesterol    • History of foreign travel 12/2017; 08/2018    [place], [place], [place], [place], [place] and [place]; [place]   • Hyperlipidemia    • Hypertension    • Hypogonadism in male 9/28/2016   • Male erectile disorder    • Microalbuminuria    • Obesity (BMI 30-39.9)    • Osteoarthritis     knee   • Vitamin D deficiency    • Wound infection after surgery         Past Surgical History:   Procedure Laterality Date   • CARDIAC CATHETERIZATION N/A 05/15/2006    Dr. Mini Camarillo   • CARDIAC CATHETERIZATION N/A 3/10/2020    Procedure: Left Heart Cath;  Surgeon: Miguel Ángel Karimi MD;   Location: [facility] CATH INVASIVE LOCATION;  Service: Cardiology;  Laterality: N/A;   • CARDIAC CATHETERIZATION N/A 3/10/2020    Procedure: Stent DAVONTE coronary;  Surgeon: Miguel Ángel Karimi MD;  [facility] C[facility]VASIVE LOCATION;  Service: Cardiology;  Laterality: N/A;   • CARDIAC CATHETERIZATION N/A 3/10/2020    Procedure: Coronary angiography;  Surgeon: Miguel Ángel Karimi MD;  Location[facility] CATH INVASIVE LOCATION;  Service: Cardiology;  Laterality: N/A;   • CARDIAC CATHETERIZATION N/A 3/10/2020    Procedure: Left ventriculography;  Surgeon: Miguel Ángel Karimi MD;  Location: [facility] CATH INVASIVE LOCATION;  Service: Cardiology;  Laterality: N/A;   • COLONOSCOPY N/A 02/22/2006    Bilobed polyp at 30 cm, hemorrhoids-Dr. Ilya Zhao   • COLONOSCOPY N/A 02/28/2014    Normal ileum, one 6 mm polyp in the mid transverse colon-Dr. Ilya Zhao   • COLONOSCOPY N/A 11/19/2008    Ela ileum, two 3 to 4 mm polyps, non-bleeding internal hemorrhoids, repeat in 5 years-Dr. Ilya Zhao   • COLONOSCOPY N/A 10/31/2017    Procedure: COLONOSCOPY WITH POLYPECTOMY (COLD BIOPSY);  Surgeon: Ilya Zhao MD;  Location: [facility] ENDOSCOPY;  Service:    • CORONARY ANGIOPLASTY WITH STENT PLACEMENT  2007, 2012, 2015    cardiac stents x3 occasions   • ENDOSCOPY N/A 10/4/2017    Procedure: ESOPHAGOGASTRODUODENOSCOPY;  Surgeon: Boyd Guidry MD;  Location: [facility] ENDOSCOPY;  Service:    • KNEE INCISION AND DRAINAGE Right 6/20/2017    Procedure: RT. KNEE WASHOUT ;  Surgeon: Boyd Coyne MD;  Location: [facility] OR;  Service:    • KS TOTAL KNEE ARTHROPLASTY Right 6/15/2017    Procedure: RT TOTAL KNEE ARTHROPLASTY;  Surgeon: Boyd Coyne MD;  Location: [facility] MAIN OR;  Service: Orthopedics   • SHOULDER SURGERY Right 2016   • UPPER GASTROINTESTINAL ENDOSCOPY N/A 10/13/2015    Z-line irregular, normal stomach, normal duodenum-Dr. Ilya Zhao   • UPPER GASTROINTESTINAL ENDOSCOPY N/A 02/28/2014    Z-line irregular, normal  stomach, normal duodenum-Dr. Ilya Zhao   • UPPER GASTROINTESTINAL ENDOSCOPY N/A 11/19/2008    Z-line irregular, chronic gastritis withotu hemorrhage, normal duodenum-Dr. Ilya Zhao   • UPPER GASTROINTESTINAL ENDOSCOPY N/A 06/22/2006    LA Grade A reflux esophagitis, non-bleeding erythematous gastropathy, normal duodenum-Dr. Ilya Zhao          Current Outpatient Medications:   •  amLODIPine (NORVASC) 10 MG tablet, Take 1 tablet by mouth Daily., Disp: 90 tablet, Rfl: 3  •  ARIPiprazole (ABILIFY) 2 MG tablet, Take 3 mg by mouth Daily., Disp: , Rfl:   •  aspirin 81 MG EC tablet, Take 1 tablet by mouth Daily. get over the counter, Disp: 30 tablet, Rfl: 3  •  Blood Glucose Monitoring Suppl ([employer] BLOOD GLUCOSE) kit, TEST AS DIRECTED, Disp: 1 each, Rfl: 0  •  carvedilol (COREG) 25 MG tablet, Take 1 tablet by mouth 2 (Two) Times a Day With Meals., Disp: 180 tablet, Rfl: 3  •  clonazePAM (KlonoPIN) 0.5 MG tablet, Take 1 tablet by mouth 3 (Three) Times a Day As Needed (Facial twitching)., Disp: 60 tablet, Rfl: 0  •  clopidogrel (PLAVIX) 75 MG tablet, Take 1 tablet by mouth Daily., Disp: 90 tablet, Rfl: 3  •  dorzolamide-timolol (COSOPT) 22.3-6.8 MG/ML ophthalmic solution, Apply 1 drop to eye(s) to both eyes 2 (Two) Times a Day., Disp: 20 mL, Rfl: 3  •  escitalopram (LEXAPRO) 20 MG tablet, Take 1 tablet by mouth Daily., Disp: 90 tablet, Rfl: 3  •  esomeprazole (nexIUM) 40 MG capsule, TAKE ONE CAPSULE BY MOUTH DAILY, Disp: 30 capsule, Rfl: 1  •  ezetimibe (Zetia) 10 MG tablet, Take 1 tablet by mouth Daily., Disp: 90 tablet, Rfl: 3  •  famotidine (PEPCID) 20 MG tablet, TAKE ONE TABLET BY MOUTH DAILY WITH DINNER, Disp: 30 tablet, Rfl: 5  •  glucose blood test strip, Use 4 times a day, Disp: 400 each, Rfl: 0  •  Insulin Pen Needle (B-D ULTRAFINE III SHORT PEN) 31G X 8 MM misc, USE AS DIRECTED TO INJECT TOUJEO, Disp: 100 each, Rfl: 0  •  Januvia 100 MG tablet, TAKE ONE TABLET BY MOUTH DAILY, Disp: 30 tablet, Rfl: 2  •   Jardiance 25 MG tablet, TAKE ONE TABLET BY MOUTH DAILY, Disp: 30 tablet, Rfl: 2  •  Lancets (FREESTYLE) lancets, Use to test BG 4 times daily, Disp: 400 each, Rfl: 1  •  latanoprost (XALATAN) 0.005 % ophthalmic solution, Apply 1 drop to  each eye Daily. (Patient taking differently: Apply 1 drop to eye(s) as directed by provider Every Night.), Disp: 7.5 mL, Rfl: 3  •  metFORMIN (GLUCOPHAGE) 500 MG tablet, Take 2 tablets by mouth 2 (Two) Times a Day With Meals., Disp: 360 tablet, Rfl: 3  •  naproxen (Naprosyn) 500 MG tablet, Take 1 tablet by mouth 2 (Two) Times a Day With Meals. (Patient taking differently: Take 500 mg by mouth As Needed for Mild Pain .), Disp: 30 tablet, Rfl: 0  •  oxyCODONE-acetaminophen (Percocet) 7.5-325 MG per tablet, Take 1-2 tablets by mouth Every 6 (Six) Hours As Needed for Severe Pain . (Patient taking differently: Take 1 tablet by mouth Every 6 (Six) Hours As Needed for Severe Pain  (Not having to take often).), Disp: 15 tablet, Rfl: 0  •  pioglitazone (ACTOS) 15 MG tablet, Take 1 tablet by mouth Daily., Disp: 90 tablet, Rfl: 0  •  rosuvastatin (CRESTOR) 40 MG tablet, Take 1 tablet by mouth Daily., Disp: 90 tablet, Rfl: 3  •  sildenafil (VIAGRA) 50 MG tablet, Take 1 tablet by mouth Daily As Needed for erectile dysfunction., Disp: 6 tablet, Rfl: 5  •  Toujeo SoloStar 300 UNIT/ML solution pen-injector injection, Inject 60 Units under the skin into the appropriate area as directed Every Morning., Disp: 18 mL, Rfl: 1  •  vitamin D (ERGOCALCIFEROL) 1.25 MG (03817 UT) capsule capsule, Take 1 capsule by mouth 1 (One) Time Per Week., Disp: 12 capsule, Rfl: 3     Social History     Socioeconomic History   • Marital status:      Spouse name: Micaela   • Number of children: 1   • Years of education: College   • Highest education level: Not on file   Tobacco Use   • Smoking status: Never Smoker   • Smokeless tobacco: Never Used   • Tobacco comment: CAFFEINE USE: 2 CUPS COFFEE DAILY   Vaping Use   •  "Vaping Use: Never used   Substance and Sexual Activity   • Alcohol use: Yes     Alcohol/week: 3.0 - 4.0 standard drinks     Types: 3 - 4 Standard drinks or equivalent per week     Comment: occasional   • Drug use: No   • Sexual activity: Defer        Family History   Problem Relation Age of Onset   • Lupus Sister    • Thyroid disease Sister    • Heart disease Other    • Hypertension Other    • Arthritis Mother    • Hyperlipidemia Mother    • Hypertension Mother    • Thyroid disease Mother    • Lupus Mother    • Heart disease Father    • Arthritis Father    • Other Father         Vascular disease   • Lupus Father    • Depression Father    • Alcohol abuse Father    • Dementia Father    • Hypertension Father    • Heart disease Brother    • Heart attack Brother 40   • Thyroid disease Sister    • Arthritis Brother    • Malig Hyperthermia Neg Hx       Allergies   Allergen Reactions   • Ace Inhibitors Angioedema   • Lisinopril Angioedema   • Testosterone Myalgia       Review of Systems   Constitutional: Negative for chills and fever.   Respiratory: Negative for cough and shortness of breath.    Genitourinary: Negative for difficulty urinating and enuresis.   Musculoskeletal: Positive for back pain (intermittent LBP that does not radiate and occurs mainly with activity) and gait problem (mild instability).   Neurological: Negative for weakness and numbness.       Objective     Vitals:    05/14/21 1231   BP: 130/84   Pulse: 74   SpO2: 94%   Weight: 122 kg (268 lb 15.4 oz)   Height: 195.6 cm (77\")     Body mass index is 31.89 kg/m².      Physical Exam  Neurologic Exam  Awake, alert, oriented x3  Pupils equal round reactive to light  Extraocular muscles intact  Face symmetric  Speech is fluent and clear  No pronator drift  Motor exam  Bilateral deltoids 5/5, bilateral biceps 5/5, bilateral triceps 5/5, bilateral wrist extension 5/5 bilateral hand  5/5  Bilateral hip flexion 5/5, bilateral knee extension 5/5, bilateral " DF/PF 5/5  No clonus  No Allyssa's reflex  2+ bilateral patellar reflexes  2+ bilateral biceps reflex  Steady normal gait  Able to walk heel-to-toe with moderate difficulty  Able to detect  light touch in all 4 extremities        Assessment/Plan   Independent Review of Radiographic Studies:      I personally reviewed the images from the following studies.  MRI of the lumbar spine from 2021  The MRI images show diffuse degenerative changes along with a congenitally narrowed spinal canal throughout his lumbar spine.  The most significant changes are at L3-4 where there is severe canal stenosis at L2-3 where there is moderate canal stenosis.     Medical Decision Makin-year-old male with 8-month history of progressive lower back pain and lumbar stenosis  -Reviewed his MRI images and he has severe canal stenosis at L3-4 and moderate stenosis at L2-3.  I reviewed the signs and symptoms of neurogenic claudication which can be caused by severe stenosis.  He however denies any current symptoms of leg weakness or numbness.  Denies any significant pain radiating down his legs.  He does report that he has had progressive difficulty walking longer distances.  He reports he used to be able to walk 2 to 3 miles without a problem however over the past 8 months can only walk about a block without having to stop.  I have recommended that he follow-up with physical therapy for treatment of his back pain.  I have offered him a LSO brace to help reduce and restrict motion and bending.  I recommended that he continue light activity without lifting any more than 20 pounds for the next month.  He wants to hold off on the brace for now but will be seen by PT. I have also referred him to physical therapy for evaluation for a possible steroid epidural injection.  He has significant thickening of the ligamentum flavum and possibly steroid injection could help reduce this inflammation.     -I will plan to see him back in 4  to 6 weeks after PT and pain management evaluations to evaluate for any changes in his symptoms.  At his next follow-up visit we will consider a flexion-extension x-ray to evaluate for any abnormal motion.  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       Diagnoses and all orders for this visit:    1. Spinal stenosis, lumbar region, without neurogenic claudication (Primary)  -     Ambulatory Referral to Physical Therapy Evaluate and treat  -     Ambulatory Referral to Pain Management    2. Bilateral low back pain, unspecified chronicity, unspecified whether sciatica present  -     Ambulatory Referral to Physical Therapy Evaluate and treat  -     Ambulatory Referral to Pain Management      Return in about 4 weeks (around 6/11/2021).

## 2021-05-14 ENCOUNTER — OFFICE VISIT (OUTPATIENT)
Dept: NEUROSURGERY | Facility: CLINIC | Age: 66
End: 2021-05-14

## 2021-05-14 VITALS
WEIGHT: 268.96 LBS | BODY MASS INDEX: 31.76 KG/M2 | SYSTOLIC BLOOD PRESSURE: 130 MMHG | OXYGEN SATURATION: 94 % | DIASTOLIC BLOOD PRESSURE: 84 MMHG | HEIGHT: 77 IN | HEART RATE: 74 BPM

## 2021-05-14 DIAGNOSIS — M48.061 SPINAL STENOSIS, LUMBAR REGION, WITHOUT NEUROGENIC CLAUDICATION: Primary | ICD-10-CM

## 2021-05-14 DIAGNOSIS — M54.50 BILATERAL LOW BACK PAIN, UNSPECIFIED CHRONICITY, UNSPECIFIED WHETHER SCIATICA PRESENT: ICD-10-CM

## 2021-05-14 PROCEDURE — 99204 OFFICE O/P NEW MOD 45 MIN: CPT | Performed by: NEUROLOGICAL SURGERY

## 2021-05-18 ENCOUNTER — TELEPHONE (OUTPATIENT)
Dept: NEUROSURGERY | Facility: CLINIC | Age: 66
End: 2021-05-18

## 2021-05-18 NOTE — TELEPHONE ENCOUNTER
Patient's wife called and I spoke with her.     She was calling in regards to her 's BRAD order with pain management and wanted him to go to [facility] and not [place].     She would like a call back when the order is changed to [facility] and any other regards.     [phone]

## 2021-05-19 ENCOUNTER — LAB (OUTPATIENT)
Dept: LAB | Facility: HOSPITAL | Age: 66
End: 2021-05-19

## 2021-05-19 DIAGNOSIS — Z01.818 OTHER SPECIFIED PRE-OPERATIVE EXAMINATION: ICD-10-CM

## 2021-05-19 PROCEDURE — C9803 HOPD COVID-19 SPEC COLLECT: HCPCS

## 2021-05-19 PROCEDURE — U0004 COV-19 TEST NON-CDC HGH THRU: HCPCS

## 2021-05-19 PROCEDURE — U0005 INFEC AGEN DETEC AMPLI PROBE: HCPCS

## 2021-05-19 NOTE — TELEPHONE ENCOUNTER
LM advising that Pain Management is offered at [place] location. If patient is wanting to get an injection only and not see a provider then that would be setup here at [facility]. Dr. Miguel would like for patient to actually be evaluated by a Pain Management provider.

## 2021-05-20 LAB — SARS-COV-2 RNA RESP QL NAA+PROBE: NOT DETECTED

## 2021-05-20 NOTE — TELEPHONE ENCOUNTER
I s/w patient spouse and she reports that they just want to do BRAD, nothing else for Pain Management workup. She reports that she knows the anesthesiologist here @ [facility] that does the injections.

## 2021-05-21 ENCOUNTER — HOSPITAL ENCOUNTER (OUTPATIENT)
Facility: HOSPITAL | Age: 66
Setting detail: HOSPITAL OUTPATIENT SURGERY
Discharge: HOME | End: 2021-05-21
Attending: INTERNAL MEDICINE | Admitting: INTERNAL MEDICINE

## 2021-05-21 ENCOUNTER — ON CAMPUS - OUTPATIENT (OUTPATIENT)
Dept: URBAN - METROPOLITAN AREA HOSPITAL 114 | Facility: HOSPITAL | Age: 66
End: 2021-05-21

## 2021-05-21 ENCOUNTER — ANESTHESIA EVENT (OUTPATIENT)
Dept: GASTROENTEROLOGY | Facility: HOSPITAL | Age: 66
End: 2021-05-21

## 2021-05-21 ENCOUNTER — ANESTHESIA (OUTPATIENT)
Dept: GASTROENTEROLOGY | Facility: HOSPITAL | Age: 66
End: 2021-05-21

## 2021-05-21 VITALS
OXYGEN SATURATION: 95 % | HEART RATE: 61 BPM | DIASTOLIC BLOOD PRESSURE: 70 MMHG | BODY MASS INDEX: 32.2 KG/M2 | SYSTOLIC BLOOD PRESSURE: 107 MMHG | TEMPERATURE: 98.2 F | HEIGHT: 75 IN | RESPIRATION RATE: 16 BRPM | WEIGHT: 259 LBS

## 2021-05-21 DIAGNOSIS — K44.9 DIAPHRAGMATIC HERNIA WITHOUT OBSTRUCTION OR GANGRENE: ICD-10-CM

## 2021-05-21 DIAGNOSIS — Z86.010 PERSONAL HISTORY OF COLONIC POLYPS: ICD-10-CM

## 2021-05-21 DIAGNOSIS — K21.9 GERD (GASTROESOPHAGEAL REFLUX DISEASE): ICD-10-CM

## 2021-05-21 DIAGNOSIS — K29.50 UNSPECIFIED CHRONIC GASTRITIS WITHOUT BLEEDING: ICD-10-CM

## 2021-05-21 DIAGNOSIS — K21.9 GASTRO-ESOPHAGEAL REFLUX DISEASE WITHOUT ESOPHAGITIS: ICD-10-CM

## 2021-05-21 DIAGNOSIS — K63.5 POLYP OF COLON: ICD-10-CM

## 2021-05-21 DIAGNOSIS — Z86.010 HISTORY OF COLON POLYPS: ICD-10-CM

## 2021-05-21 DIAGNOSIS — K22.8 OTHER SPECIFIED DISEASES OF ESOPHAGUS: ICD-10-CM

## 2021-05-21 LAB
GLUCOSE BLDC GLUCOMTR-MCNC: 108 MG/DL (ref 70–130)
GLUCOSE BLDC GLUCOMTR-MCNC: 63 MG/DL (ref 70–130)
GLUCOSE BLDC GLUCOMTR-MCNC: 65 MG/DL (ref 70–130)
GLUCOSE BLDC GLUCOMTR-MCNC: 67 MG/DL (ref 70–130)
GLUCOSE BLDC GLUCOMTR-MCNC: 75 MG/DL (ref 70–130)

## 2021-05-21 PROCEDURE — 45380 COLONOSCOPY AND BIOPSY: CPT | Mod: PT | Performed by: INTERNAL MEDICINE

## 2021-05-21 PROCEDURE — 43239 EGD BIOPSY SINGLE/MULTIPLE: CPT | Performed by: INTERNAL MEDICINE

## 2021-05-21 PROCEDURE — 25010000002 PROPOFOL 10 MG/ML EMULSION: Performed by: ANESTHESIOLOGY

## 2021-05-21 PROCEDURE — 82962 GLUCOSE BLOOD TEST: CPT

## 2021-05-21 PROCEDURE — 88305 TISSUE EXAM BY PATHOLOGIST: CPT | Performed by: INTERNAL MEDICINE

## 2021-05-21 RX ORDER — PROPOFOL 10 MG/ML
VIAL (ML) INTRAVENOUS CONTINUOUS PRN
Status: DISCONTINUED | OUTPATIENT
Start: 2021-05-21 | End: 2021-05-21 | Stop reason: SURG

## 2021-05-21 RX ORDER — PROPOFOL 10 MG/ML
VIAL (ML) INTRAVENOUS AS NEEDED
Status: DISCONTINUED | OUTPATIENT
Start: 2021-05-21 | End: 2021-05-21 | Stop reason: SURG

## 2021-05-21 RX ORDER — SODIUM CHLORIDE, SODIUM LACTATE, POTASSIUM CHLORIDE, CALCIUM CHLORIDE 600; 310; 30; 20 MG/100ML; MG/100ML; MG/100ML; MG/100ML
30 INJECTION, SOLUTION INTRAVENOUS CONTINUOUS PRN
Status: DISCONTINUED | OUTPATIENT
Start: 2021-05-21 | End: 2021-05-21 | Stop reason: HOSPADM

## 2021-05-21 RX ORDER — LIDOCAINE HYDROCHLORIDE 20 MG/ML
INJECTION, SOLUTION INFILTRATION; PERINEURAL AS NEEDED
Status: DISCONTINUED | OUTPATIENT
Start: 2021-05-21 | End: 2021-05-21 | Stop reason: SURG

## 2021-05-21 RX ORDER — DEXTROSE MONOHYDRATE 25 G/50ML
25 INJECTION, SOLUTION INTRAVENOUS ONCE
Status: COMPLETED | OUTPATIENT
Start: 2021-05-21 | End: 2021-05-21

## 2021-05-21 RX ADMIN — PROPOFOL 160 MG: 10 INJECTION, EMULSION INTRAVENOUS at 09:49

## 2021-05-21 RX ADMIN — DEXTROSE MONOHYDRATE 25 ML: 25 INJECTION, SOLUTION INTRAVENOUS at 09:24

## 2021-05-21 RX ADMIN — LIDOCAINE HYDROCHLORIDE 60 MG: 20 INJECTION, SOLUTION INFILTRATION; PERINEURAL at 09:49

## 2021-05-21 RX ADMIN — PROPOFOL 140 MCG/KG/MIN: 10 INJECTION, EMULSION INTRAVENOUS at 09:51

## 2021-05-21 RX ADMIN — SODIUM CHLORIDE, POTASSIUM CHLORIDE, SODIUM LACTATE AND CALCIUM CHLORIDE 30 ML/HR: 600; 310; 30; 20 INJECTION, SOLUTION INTRAVENOUS at 09:14

## 2021-05-21 NOTE — ANESTHESIA PREPROCEDURE EVALUATION
Anesthesia Evaluation     Patient summary reviewed and Nursing notes reviewed   NPO Solid Status: > 8 hours  NPO Liquid Status: > 8 hours           Airway   Mallampati: II  TM distance: >3 FB  Neck ROM: full  Dental - normal exam     Pulmonary - normal exam   (+) shortness of breath,   Cardiovascular - normal exam    (+) hypertension well controlled, CAD, cardiac stents unknown timeframe hyperlipidemia,       Neuro/Psych  (+) psychiatric history,     GI/Hepatic/Renal/Endo    (+) obesity,  GERD,  diabetes mellitus type 2 well controlled,   (-) morbid obesity    Musculoskeletal     Abdominal    Substance History      OB/GYN          Other                        Anesthesia Plan    ASA 3     MAC       Anesthetic plan, all risks, benefits, and alternatives have been provided, discussed and informed consent has been obtained with: patient.

## 2021-05-21 NOTE — ANESTHESIA POSTPROCEDURE EVALUATION
"Patient: Isaias Monaco    Procedure Summary     Date: 05/21/21 Room / Location: [facility] ENDOSCOPY 4 /  [facility]    Anesthesia Start: 0944 Anesthesia Stop: 1024    Procedures:       Colonoscopy into cecum and terminal ileum with cold biopsy polypectomy (N/A )      ESOPHAGOGASTRODUODENOSCOPY with biopsies (N/A Esophagus) Diagnosis:     Surgeons: Ilya Zhao MD Provider: Woody Hart MD    Anesthesia Type: MAC ASA Status: 3          Anesthesia Type: MAC    Vitals  Vitals Value Taken Time   /70 05/21/21 1042   Temp     Pulse 61 05/21/21 1042   Resp 16 05/21/21 1042   SpO2 95 % 05/21/21 1042           Post Anesthesia Care and Evaluation    Level of consciousness: awake and alert  Pain management: adequate  Anesthetic complications: No anesthetic complications    Cardiovascular status: acceptable  Respiratory status: acceptable  Hydration status: acceptable    Comments: /70 (BP Location: Left arm, Patient Position: Lying)   Pulse 61   Temp 36.8 °C (98.2 °F) (Oral)   Resp 16   Ht 190.5 cm (75\")   Wt 117 kg (259 lb)   SpO2 95%   BMI 32.37 kg/m²         "

## 2021-05-24 LAB
CYTO UR: NORMAL
LAB AP CASE REPORT: NORMAL
PATH REPORT.FINAL DX SPEC: NORMAL
PATH REPORT.GROSS SPEC: NORMAL

## 2021-06-02 ENCOUNTER — HOSPITAL ENCOUNTER (OUTPATIENT)
Dept: GENERAL RADIOLOGY | Facility: HOSPITAL | Age: 66
Discharge: HOME | End: 2021-06-02

## 2021-06-02 ENCOUNTER — ANESTHESIA (OUTPATIENT)
Dept: PAIN MEDICINE | Facility: HOSPITAL | Age: 66
End: 2021-06-02

## 2021-06-02 ENCOUNTER — HOSPITAL ENCOUNTER (OUTPATIENT)
Dept: PAIN MEDICINE | Facility: HOSPITAL | Age: 66
Discharge: HOME | End: 2021-06-02

## 2021-06-02 ENCOUNTER — ANESTHESIA EVENT (OUTPATIENT)
Dept: PAIN MEDICINE | Facility: HOSPITAL | Age: 66
End: 2021-06-02

## 2021-06-02 VITALS
WEIGHT: 260 LBS | TEMPERATURE: 97.3 F | HEART RATE: 74 BPM | HEIGHT: 77 IN | RESPIRATION RATE: 16 BRPM | BODY MASS INDEX: 30.7 KG/M2 | DIASTOLIC BLOOD PRESSURE: 87 MMHG | SYSTOLIC BLOOD PRESSURE: 138 MMHG | OXYGEN SATURATION: 97 %

## 2021-06-02 DIAGNOSIS — K44.9 HIATAL HERNIA: ICD-10-CM

## 2021-06-02 DIAGNOSIS — R52 PAIN: ICD-10-CM

## 2021-06-02 DIAGNOSIS — M48.061 SPINAL STENOSIS OF LUMBAR REGION WITHOUT NEUROGENIC CLAUDICATION: Primary | ICD-10-CM

## 2021-06-02 DIAGNOSIS — K21.9 GASTROESOPHAGEAL REFLUX DISEASE WITHOUT ESOPHAGITIS: ICD-10-CM

## 2021-06-02 LAB — GLUCOSE BLDC GLUCOMTR-MCNC: 100 MG/DL (ref 70–130)

## 2021-06-02 PROCEDURE — 0 IOPAMIDOL 41 % SOLUTION: Performed by: ANESTHESIOLOGY

## 2021-06-02 PROCEDURE — 77003 FLUOROGUIDE FOR SPINE INJECT: CPT

## 2021-06-02 PROCEDURE — 82962 GLUCOSE BLOOD TEST: CPT

## 2021-06-02 PROCEDURE — 25010000002 METHYLPREDNISOLONE PER 80 MG: Performed by: ANESTHESIOLOGY

## 2021-06-02 RX ORDER — SODIUM CHLORIDE 0.9 % (FLUSH) 0.9 %
3-10 SYRINGE (ML) INJECTION AS NEEDED
Status: DISCONTINUED | OUTPATIENT
Start: 2021-06-02 | End: 2021-06-03 | Stop reason: HOSPADM

## 2021-06-02 RX ORDER — LIDOCAINE HYDROCHLORIDE 10 MG/ML
1 INJECTION, SOLUTION INFILTRATION; PERINEURAL ONCE
Status: DISCONTINUED | OUTPATIENT
Start: 2021-06-02 | End: 2021-06-03 | Stop reason: HOSPADM

## 2021-06-02 RX ORDER — FENTANYL CITRATE 50 UG/ML
50 INJECTION, SOLUTION INTRAMUSCULAR; INTRAVENOUS AS NEEDED
Status: DISCONTINUED | OUTPATIENT
Start: 2021-06-02 | End: 2021-06-03 | Stop reason: HOSPADM

## 2021-06-02 RX ORDER — SODIUM CHLORIDE 0.9 % (FLUSH) 0.9 %
3 SYRINGE (ML) INJECTION EVERY 12 HOURS SCHEDULED
Status: DISCONTINUED | OUTPATIENT
Start: 2021-06-02 | End: 2021-06-03 | Stop reason: HOSPADM

## 2021-06-02 RX ORDER — MIDAZOLAM HYDROCHLORIDE 1 MG/ML
1 INJECTION INTRAMUSCULAR; INTRAVENOUS AS NEEDED
Status: DISCONTINUED | OUTPATIENT
Start: 2021-06-02 | End: 2021-06-03 | Stop reason: HOSPADM

## 2021-06-02 RX ORDER — METHYLPREDNISOLONE ACETATE 80 MG/ML
80 INJECTION, SUSPENSION INTRA-ARTICULAR; INTRALESIONAL; INTRAMUSCULAR; SOFT TISSUE ONCE
Status: COMPLETED | OUTPATIENT
Start: 2021-06-02 | End: 2021-06-02

## 2021-06-02 RX ADMIN — METHYLPREDNISOLONE ACETATE 80 MG: 80 INJECTION, SUSPENSION INTRA-ARTICULAR; INTRALESIONAL; INTRAMUSCULAR; SOFT TISSUE at 09:58

## 2021-06-02 RX ADMIN — IOPAMIDOL 10 ML: 408 INJECTION, SOLUTION INTRATHECAL at 09:58

## 2021-06-02 NOTE — H&P
CHIEF COMPLAINT:  Low back pain        HISTORY OF PRESENT ILLNESS:  This patient is complaining of low back pain which does not radiate.  It ranges between a 2 and 10 out of 10 on the pain scale.  The pain is described as aching, deep, dull, pressure in nature. The pain is exacerbated by activity, exercise, lifting, standing, and walking.  He reports that the worst pain is with standing and walking in an upright position for short period of time.  The pain is relieved by relaxation, lying down, and rest as well as bending forward on a shopping cart when he is walking in the grocery.  The patient has tried conservative therapy for greater than 6 weeks including: Rest, and over-the-counter medications.  The pain is significantly decreasing the patient's Activities of Daily Living.      Diagnostic imaging: MRI of his lumbar spine without contrast from 2/9/2021 shows multilevel degenerative disease and stenosis.  The L3-L4 level seems to be the worst although there is significant disease at L4-5 and L5-S1 as well.    This patient was referred to our clinic by Dr. Marshal Miguel for lumbar epidural steroid injections.         PAST MEDICAL HISTORY:  Current Outpatient Medications on File Prior to Encounter   Medication Sig Dispense Refill   • amLODIPine (NORVASC) 10 MG tablet Take 1 tablet by mouth Daily. 90 tablet 3   • ARIPiprazole (ABILIFY) 2 MG tablet Take 3 mg by mouth Daily.     • aspirin 81 MG EC tablet Take 1 tablet by mouth Daily. get over the counter 30 tablet 3   • Blood Glucose Monitoring Suppl ([facility] BLOOD GLUCOSE) kit TEST AS DIRECTED 1 each 0   • carvedilol (COREG) 25 MG tablet Take 1 tablet by mouth 2 (Two) Times a Day With Meals. 180 tablet 3   • clonazePAM (KlonoPIN) 0.5 MG tablet Take 1 tablet by mouth 3 (Three) Times a Day As Needed (Facial twitching). 60 tablet 0   • clopidogrel (PLAVIX) 75 MG tablet Take 1 tablet by mouth Daily. 90 tablet 3   • dorzolamide-timolol (COSOPT) 22.3-6.8 MG/ML  ophthalmic solution Apply 1 drop to eye(s) to both eyes 2 (Two) Times a Day. 20 mL 3   • escitalopram (LEXAPRO) 20 MG tablet Take 1 tablet by mouth Daily. 90 tablet 3   • esomeprazole (nexIUM) 40 MG capsule TAKE ONE CAPSULE BY MOUTH DAILY 30 capsule 1   • ezetimibe (Zetia) 10 MG tablet Take 1 tablet by mouth Daily. 90 tablet 3   • famotidine (PEPCID) 20 MG tablet TAKE ONE TABLET BY MOUTH DAILY WITH DINNER 30 tablet 5   • glucose blood test strip Use 4 times a day 400 each 0   • Insulin Pen Needle (B-D ULTRAFINE III SHORT PEN) 31G X 8 MM misc USE AS DIRECTED TO INJECT TOUJEO 100 each 0   • Januvia 100 MG tablet TAKE ONE TABLET BY MOUTH DAILY 30 tablet 2   • Jardiance 25 MG tablet TAKE ONE TABLET BY MOUTH DAILY 30 tablet 2   • Lancets (FREESTYLE) lancets Use to test BG 4 times daily 400 each 1   • latanoprost (XALATAN) 0.005 % ophthalmic solution Apply 1 drop to  each eye Daily. (Patient taking differently: Apply 1 drop to eye(s) as directed by provider Every Night.) 7.5 mL 3   • metFORMIN (GLUCOPHAGE) 500 MG tablet Take 2 tablets by mouth 2 (Two) Times a Day With Meals. 360 tablet 3   • naproxen (Naprosyn) 500 MG tablet Take 1 tablet by mouth 2 (Two) Times a Day With Meals. (Patient taking differently: Take 500 mg by mouth As Needed for Mild Pain .) 30 tablet 0   • oxyCODONE-acetaminophen (Percocet) 7.5-325 MG per tablet Take 1-2 tablets by mouth Every 6 (Six) Hours As Needed for Severe Pain . (Patient taking differently: Take 1 tablet by mouth Every 6 (Six) Hours As Needed for Severe Pain  (Not having to take often).) 15 tablet 0   • pioglitazone (ACTOS) 15 MG tablet Take 1 tablet by mouth Daily. 90 tablet 0   • rosuvastatin (CRESTOR) 40 MG tablet Take 1 tablet by mouth Daily. 90 tablet 3   • sildenafil (VIAGRA) 50 MG tablet Take 1 tablet by mouth Daily As Needed for erectile dysfunction. 6 tablet 5   • Toujeo SoloStar 300 UNIT/ML solution pen-injector injection Inject 60 Units under the skin into the appropriate  "area as directed Every Morning. 18 mL 1   • vitamin D (ERGOCALCIFEROL) 1.25 MG (39849 UT) capsule capsule Take 1 capsule by mouth 1 (One) Time Per Week. 12 capsule 3     No current facility-administered medications on file prior to encounter.       Past Medical History:   Diagnosis Date   • Adiposity    • Anemia     post hemorrhagic   • Angioedema 2/21/2016    Secondary to ACE inhibitor   • Anxiety    • Arthritis    • CAD (coronary artery disease)    • Chest pain    • Colon polyps    • Diabetes mellitus, type 2 (CMS/HCC)    • ED (erectile dysfunction)    • Febrile illness    • GERD (gastroesophageal reflux disease)    • Glaucoma    • Hematoma    • High cholesterol    • History of foreign travel 12/2017; 08/2018    [place], [place], [place], [place], [place] and [place]; [place]   • Hyperlipidemia    • Hypertension    • Hypogonadism in male 9/28/2016   • Male erectile disorder    • Microalbuminuria    • Obesity (BMI 30-39.9)    • Osteoarthritis     knee   • Vitamin D deficiency    • Wound infection after surgery        SOCIAL HISTORY:  Counseled to avoid tobacco     REVIEW OF SYSTEMS:  No pertinent hematologic, infectious, or constitutional symptoms.  Other review of systems non-contributory     PHYSICAL EXAM:  /94 (BP Location: Left arm, Patient Position: Sitting)   Pulse 74   Temp 36.3 °C (97.3 °F) (Infrared)   Resp 16   Ht 195.6 cm (77\")   Wt 118 kg (260 lb)   SpO2 97%   BMI 30.83 kg/m²   Constitutional: Well-developed well-nourished no acute distress  General: Alert and oriented  Neuromuscular: He has decreased range of motion of his lumbar spine and pain with standing or walking in an upright position for short period of time.       DIAGNOSIS:  Post-Op Diagnosis Codes:  Post-Op Diagnosis Codes:     * Lumbar stenosis without neurogenic claudication [M48.061]    PLAN:  -  Lumbar epidural steroid injections at the planned level of L3-L4 spaced approximately 2-4 weeks apart.  If pain control is " acceptable after this injection, it was discussed with the patient that they may return for the subsequent injections if and when their pain returns.    - Risks were discussed with the patient, including but not limited to: failure of relief, worsening pain, tissue, nerve, blood vessel or spinal cord damage, post-dural-puncture headache, bleeding, epidural hematoma, infection, and epidural abscess. Benefits and alternatives were also discussed. No promises were made. Risks/benefits/alternatives of procedural medications were also discussed, including but not limited to hyperglycemia, fluid retention, decreased immune system, osteoporosis, and anaphylactoid reactions. The patient voiced understanding and agreed to proceed.   -  This patient states that he will be starting physical therapy soon.  I think this is a good option to try.  -  It is recommended that the patient use the least amount of opioid medication possible.     -  Heat and ice to the affected area as tolerated for pain control.  It was discussed that heating pads can cause burns.  -  Daily low impact exercise such as walking or water exercise was recommended to maintain overall health and aid in weight control.   -  Patient was counseled to abstain from tobacco products.  -  Follow up as needed for subsequent injections.  -My understanding is that this patient is possibly surgical candidate should interventions fail.  Of course I will leave that between the patient and his spine surgeon, Dr. Marshal Miguel.    Torey Olvera M.D.  Partner, [facility], [facility]  Board Certified in Pain Medicine by the American Board of Anesthesiology  Board Certified in Anesthesiology by the American Board of Anesthesiology     Much of this encounter note is an electronic transcription/translation of spoken language to printed text. The electronic translation of spoken language may permit erroneous, or at times, nonsensical words or  phrases to be inadvertently transcribed. Although I have reviewed the note for such errors, some may still exist.

## 2021-06-02 NOTE — ANESTHESIA PROCEDURE NOTES
Lumbar epidural steroid injection    Pre-sedation assessment completed: 6/2/2021 9:46 AM    Patient reassessed immediately prior to procedure    Patient location during procedure: pain clinic  Start Time: 6/2/2021 9:52 AM  Stop Time: 6/2/2021 10:00 AM  Indication:procedure for pain  Performed By  Anesthesiologist: Torey Olvera MD  Preanesthetic Checklist  Completed: patient identified, IV checked, site marked, risks and benefits discussed, surgical consent, monitors and equipment checked, pre-op evaluation and timeout performed  Additional Notes  Post-Op Diagnosis Codes:     * Lumbar stenosis without neurogenic claudication (M48.061)    The patient was observed in recovery with no evidence of neurological deficits or other problems. All questions were answered. The patient was discharged with appropriate instructions.  Prep:  Pt Position:prone  Sterile Tech:cap, gloves, mask and sterile barrier  Prep:chlorhexidine gluconate and isopropyl alcohol  Monitoring:blood pressure monitoring, continuous pulse oximetry and EKG  Procedure:Sedation: no     Approach: Slightly right of midline.  Guidance: fluoroscopy  Location:lumbar  Level:3-4  Needle Type:Tuohy  Needle Gauge:20 G  Aspiration:negative  Medications:  Depomedrol:80 mg  Preservative Free Saline:3mL  Isovue:1mL  Comments:Appropriate epidural spread of contrast.   Post Assessment:  Post-procedure: Dressing per nursing.  Pt Tolerance:patient tolerated the procedure well with no apparent complications  Complications:no

## 2021-06-03 ENCOUNTER — PATIENT MESSAGE (OUTPATIENT)
Dept: FAMILY MEDICINE CLINIC | Facility: CLINIC | Age: 66
End: 2021-06-03

## 2021-06-03 DIAGNOSIS — K44.9 HIATAL HERNIA: ICD-10-CM

## 2021-06-03 DIAGNOSIS — K21.9 GASTROESOPHAGEAL REFLUX DISEASE WITHOUT ESOPHAGITIS: ICD-10-CM

## 2021-06-03 RX ORDER — ESOMEPRAZOLE MAGNESIUM 40 MG/1
CAPSULE, DELAYED RELEASE ORAL
Qty: 30 CAPSULE | Refills: 0 | OUTPATIENT
Start: 2021-06-03

## 2021-06-03 RX ORDER — FAMOTIDINE 20 MG/1
TABLET, FILM COATED ORAL
Qty: 30 TABLET | Refills: 4 | OUTPATIENT
Start: 2021-06-03

## 2021-06-03 NOTE — TELEPHONE ENCOUNTER
Per Dr. Guidry refill medications refused since he has not seen patient since 2017 when he did an EGD. Patient will need to have Dr. Zhao or his PCP begin prescribing.    Patient had EGD/C-scope on 5/21/21 by Dr. Zhao    Findings:  - Z-line irregular, at the gastroesophageal junction. Biopsied.  - Small hiatal hernia.  - Chronic gastritis. Biopsied.  - Normal examined duodenum.

## 2021-06-04 RX ORDER — FAMOTIDINE 20 MG/1
20 TABLET, FILM COATED ORAL
Qty: 90 TABLET | Refills: 3 | Status: SHIPPED | OUTPATIENT
Start: 2021-06-04 | End: 2022-04-27 | Stop reason: SDUPTHER

## 2021-06-04 RX ORDER — ESOMEPRAZOLE MAGNESIUM 40 MG/1
40 CAPSULE, DELAYED RELEASE ORAL DAILY
Qty: 90 CAPSULE | Refills: 3 | Status: SHIPPED | OUTPATIENT
Start: 2021-06-04 | End: 2022-04-27 | Stop reason: SDUPTHER

## 2021-06-04 NOTE — TELEPHONE ENCOUNTER
From: Isaias Monaco  To: Kathy Rivas MD  Sent: 6/3/2021 8:16 PM EDT  Subject: Prescription Question    Hello Dr Rivas. Would you please send a refill of the following to the [facility]: Esomeprazole 40 mg daily, Famotidine 20 mg daily. I appreciate your kindness.

## 2021-06-15 ENCOUNTER — TELEPHONE (OUTPATIENT)
Dept: NEUROSURGERY | Facility: CLINIC | Age: 66
End: 2021-06-15

## 2021-06-15 NOTE — TELEPHONE ENCOUNTER
QAMAR for patient that we had to move his appt with Dr. Miguel to July 26 at 2:00 due to his call schedule

## 2021-06-17 ENCOUNTER — PATIENT MESSAGE (OUTPATIENT)
Dept: FAMILY MEDICINE CLINIC | Facility: CLINIC | Age: 66
End: 2021-06-17

## 2021-06-17 DIAGNOSIS — E78.5 HYPERLIPIDEMIA, UNSPECIFIED HYPERLIPIDEMIA TYPE: ICD-10-CM

## 2021-06-17 DIAGNOSIS — Z79.4 CONTROLLED TYPE 2 DIABETES MELLITUS WITHOUT COMPLICATION, WITH LONG-TERM CURRENT USE OF INSULIN (HCC): ICD-10-CM

## 2021-06-17 DIAGNOSIS — E11.9 CONTROLLED TYPE 2 DIABETES MELLITUS WITHOUT COMPLICATION, WITH LONG-TERM CURRENT USE OF INSULIN (HCC): ICD-10-CM

## 2021-06-17 RX ORDER — EZETIMIBE 10 MG/1
10 TABLET ORAL DAILY
Qty: 90 TABLET | Refills: 3 | Status: SHIPPED | OUTPATIENT
Start: 2021-06-17 | End: 2021-07-02 | Stop reason: SDUPTHER

## 2021-06-17 RX ORDER — EMPAGLIFLOZIN 25 MG/1
1 TABLET, FILM COATED ORAL DAILY
Qty: 90 TABLET | Refills: 3 | Status: SHIPPED | OUTPATIENT
Start: 2021-06-17 | End: 2021-07-02 | Stop reason: SDUPTHER

## 2021-06-17 RX ORDER — PIOGLITAZONEHYDROCHLORIDE 15 MG/1
15 TABLET ORAL DAILY
Qty: 90 TABLET | Refills: 3 | Status: SHIPPED | OUTPATIENT
Start: 2021-06-17 | End: 2021-07-02 | Stop reason: SDUPTHER

## 2021-06-17 NOTE — TELEPHONE ENCOUNTER
From: Isaias Monaco  To: Kathy Rivas MD  Sent: 6/17/2021 10:04 AM EDT  Subject: Prescription Question    Good morning Dr Rivas,     Would you please refill the following for me: pioglitazone 15 mg every day, ezetimibe 10 mg every day, januvia 100 mg every day and jardiance 25 mg every day. I truly appreciate it.

## 2021-06-22 ENCOUNTER — TELEPHONE (OUTPATIENT)
Dept: FAMILY MEDICINE CLINIC | Facility: CLINIC | Age: 66
End: 2021-06-22

## 2021-06-22 NOTE — TELEPHONE ENCOUNTER
ISHA WITH [facility] CALLED IN REQUESTING A RE-FILL FOR    ACTOS 15 MG    [facility] [address]    BEST CALL BACK # [phone]

## 2021-07-02 DIAGNOSIS — E78.5 HYPERLIPIDEMIA, UNSPECIFIED HYPERLIPIDEMIA TYPE: ICD-10-CM

## 2021-07-02 DIAGNOSIS — Z79.4 CONTROLLED TYPE 2 DIABETES MELLITUS WITHOUT COMPLICATION, WITH LONG-TERM CURRENT USE OF INSULIN (HCC): ICD-10-CM

## 2021-07-02 DIAGNOSIS — E11.9 CONTROLLED TYPE 2 DIABETES MELLITUS WITHOUT COMPLICATION, WITH LONG-TERM CURRENT USE OF INSULIN (HCC): ICD-10-CM

## 2021-07-02 RX ORDER — PIOGLITAZONEHYDROCHLORIDE 15 MG/1
15 TABLET ORAL DAILY
Qty: 90 TABLET | Refills: 3 | Status: SHIPPED | OUTPATIENT
Start: 2021-07-02 | End: 2022-08-03 | Stop reason: SDUPTHER

## 2021-07-02 RX ORDER — EZETIMIBE 10 MG/1
10 TABLET ORAL DAILY
Qty: 90 TABLET | Refills: 3 | Status: SHIPPED | OUTPATIENT
Start: 2021-07-02 | End: 2022-09-16 | Stop reason: SDUPTHER

## 2021-07-08 ENCOUNTER — HOSPITAL ENCOUNTER (OUTPATIENT)
Dept: GENERAL RADIOLOGY | Facility: HOSPITAL | Age: 66
Discharge: HOME | End: 2021-07-08

## 2021-07-08 ENCOUNTER — ANESTHESIA (OUTPATIENT)
Dept: PAIN MEDICINE | Facility: HOSPITAL | Age: 66
End: 2021-07-08

## 2021-07-08 ENCOUNTER — HOSPITAL ENCOUNTER (OUTPATIENT)
Dept: PAIN MEDICINE | Facility: HOSPITAL | Age: 66
Discharge: HOME | End: 2021-07-08

## 2021-07-08 ENCOUNTER — ANESTHESIA EVENT (OUTPATIENT)
Dept: PAIN MEDICINE | Facility: HOSPITAL | Age: 66
End: 2021-07-08

## 2021-07-08 VITALS
RESPIRATION RATE: 16 BRPM | HEART RATE: 73 BPM | TEMPERATURE: 97.3 F | SYSTOLIC BLOOD PRESSURE: 141 MMHG | DIASTOLIC BLOOD PRESSURE: 96 MMHG | OXYGEN SATURATION: 96 %

## 2021-07-08 DIAGNOSIS — M48.061 SPINAL STENOSIS OF LUMBAR REGION WITHOUT NEUROGENIC CLAUDICATION: ICD-10-CM

## 2021-07-08 DIAGNOSIS — M54.50 LUMBAR PAIN: ICD-10-CM

## 2021-07-08 LAB — GLUCOSE BLDC GLUCOMTR-MCNC: 85 MG/DL (ref 70–130)

## 2021-07-08 PROCEDURE — 82962 GLUCOSE BLOOD TEST: CPT

## 2021-07-08 PROCEDURE — 0 IOPAMIDOL 41 % SOLUTION: Performed by: ANESTHESIOLOGY

## 2021-07-08 PROCEDURE — 77003 FLUOROGUIDE FOR SPINE INJECT: CPT

## 2021-07-08 PROCEDURE — 25010000002 METHYLPREDNISOLONE PER 80 MG: Performed by: ANESTHESIOLOGY

## 2021-07-08 RX ORDER — SODIUM CHLORIDE 0.9 % (FLUSH) 0.9 %
3 SYRINGE (ML) INJECTION EVERY 12 HOURS SCHEDULED
Status: DISCONTINUED | OUTPATIENT
Start: 2021-07-08 | End: 2021-07-09 | Stop reason: HOSPADM

## 2021-07-08 RX ORDER — METHYLPREDNISOLONE ACETATE 80 MG/ML
80 INJECTION, SUSPENSION INTRA-ARTICULAR; INTRALESIONAL; INTRAMUSCULAR; SOFT TISSUE ONCE
Status: COMPLETED | OUTPATIENT
Start: 2021-07-08 | End: 2021-07-08

## 2021-07-08 RX ORDER — MIDAZOLAM HYDROCHLORIDE 1 MG/ML
1 INJECTION INTRAMUSCULAR; INTRAVENOUS AS NEEDED
Status: DISCONTINUED | OUTPATIENT
Start: 2021-07-08 | End: 2021-07-09 | Stop reason: HOSPADM

## 2021-07-08 RX ORDER — SODIUM CHLORIDE 0.9 % (FLUSH) 0.9 %
3-10 SYRINGE (ML) INJECTION AS NEEDED
Status: DISCONTINUED | OUTPATIENT
Start: 2021-07-08 | End: 2021-07-09 | Stop reason: HOSPADM

## 2021-07-08 RX ORDER — LIDOCAINE HYDROCHLORIDE 10 MG/ML
1 INJECTION, SOLUTION INFILTRATION; PERINEURAL ONCE
Status: DISCONTINUED | OUTPATIENT
Start: 2021-07-08 | End: 2021-07-09 | Stop reason: HOSPADM

## 2021-07-08 RX ORDER — FENTANYL CITRATE 50 UG/ML
50 INJECTION, SOLUTION INTRAMUSCULAR; INTRAVENOUS AS NEEDED
Status: DISCONTINUED | OUTPATIENT
Start: 2021-07-08 | End: 2021-07-09 | Stop reason: HOSPADM

## 2021-07-08 RX ADMIN — METHYLPREDNISOLONE ACETATE 80 MG: 80 INJECTION, SUSPENSION INTRA-ARTICULAR; INTRALESIONAL; INTRAMUSCULAR; SOFT TISSUE at 15:12

## 2021-07-08 RX ADMIN — IOPAMIDOL 10 ML: 408 INJECTION, SOLUTION INTRATHECAL at 15:12

## 2021-07-08 NOTE — H&P
CHIEF COMPLAINT:  Low back pain        HISTORY OF PRESENT ILLNESS:  This patient is complaining of low back pain which does not radiate.  It ranges between a 2 and 7 out of 10 on the pain scale.  The pain is significantly decreasing the patient's Activities of Daily Living.       Diagnostic imaging: MRI of his lumbar spine without contrast from 2/9/2021 shows multilevel degenerative disease and stenosis.  The L3-L4 level seems to be the worst although there is significant disease at L4-5 and L5-S1 as well.     This patient was referred to our clinic by Dr. Marshal Miguel for lumbar epidural steroid injections.    Prior injections afforded 50% relief of pain and significantly increased activities of daily living specifically allowing him to walk and stand for longer periods of time without significant pain.      PAST MEDICAL HISTORY:  Current Outpatient Medications on File Prior to Encounter   Medication Sig Dispense Refill   • amLODIPine (NORVASC) 10 MG tablet Take 1 tablet by mouth Daily. 90 tablet 3   • ARIPiprazole (ABILIFY) 2 MG tablet Take 3 mg by mouth Daily.     • carvedilol (COREG) 25 MG tablet Take 1 tablet by mouth 2 (Two) Times a Day With Meals. 180 tablet 3   • dorzolamide-timolol (COSOPT) 22.3-6.8 MG/ML ophthalmic solution Apply 1 drop to eye(s) to both eyes 2 (Two) Times a Day. 20 mL 3   • empagliflozin (Jardiance) 25 MG tablet tablet Take 1 tablet by mouth Daily. 90 tablet 3   • escitalopram (LEXAPRO) 20 MG tablet Take 1 tablet by mouth Daily. 90 tablet 3   • esomeprazole (nexIUM) 40 MG capsule Take 1 capsule by mouth Daily. 90 capsule 3   • ezetimibe (Zetia) 10 MG tablet Take 1 tablet by mouth Daily. 90 tablet 3   • famotidine (PEPCID) 20 MG tablet Take 1 tablet by mouth Daily With Dinner. 90 tablet 3   • latanoprost (XALATAN) 0.005 % ophthalmic solution Apply 1 drop to  each eye Daily. (Patient taking differently: Apply 1 drop to eye(s) as directed by provider Every Night.) 7.5 mL 3   • metFORMIN  (GLUCOPHAGE) 500 MG tablet Take 2 tablets by mouth 2 (Two) Times a Day With Meals. 360 tablet 3   • naproxen (Naprosyn) 500 MG tablet Take 1 tablet by mouth 2 (Two) Times a Day With Meals. (Patient taking differently: Take 500 mg by mouth As Needed for Mild Pain .) 30 tablet 0   • pioglitazone (ACTOS) 15 MG tablet Take 1 tablet by mouth Daily. 90 tablet 3   • rosuvastatin (CRESTOR) 40 MG tablet Take 1 tablet by mouth Daily. 90 tablet 3   • sildenafil (VIAGRA) 50 MG tablet Take 1 tablet by mouth Daily As Needed for erectile dysfunction. 6 tablet 5   • SITagliptin (Januvia) 100 MG tablet Take 1 tablet by mouth Daily. 90 tablet 3   • Toujeo SoloStar 300 UNIT/ML solution pen-injector injection Inject 60 Units under the skin into the appropriate area as directed Every Morning. 18 mL 1   • vitamin D (ERGOCALCIFEROL) 1.25 MG (23963 UT) capsule capsule Take 1 capsule by mouth 1 (One) Time Per Week. 12 capsule 3   • [DISCONTINUED] ezetimibe (Zetia) 10 MG tablet Take 1 tablet by mouth Daily. 90 tablet 3   • aspirin 81 MG EC tablet Take 1 tablet by mouth Daily. get over the counter 30 tablet 3   • Blood Glucose Monitoring Suppl ([facility] BLOOD GLUCOSE) kit TEST AS DIRECTED 1 each 0   • clonazePAM (KlonoPIN) 0.5 MG tablet Take 1 tablet by mouth 3 (Three) Times a Day As Needed (Facial twitching). 60 tablet 0   • clopidogrel (PLAVIX) 75 MG tablet Take 1 tablet by mouth Daily. 90 tablet 3   • glucose blood test strip Use 4 times a day 400 each 0   • Insulin Pen Needle (B-D ULTRAFINE III SHORT PEN) 31G X 8 MM misc USE AS DIRECTED TO INJECT TOUJEO 100 each 0   • Lancets (FREESTYLE) lancets Use to test BG 4 times daily 400 each 1     No current facility-administered medications on file prior to encounter.       Past Medical History:   Diagnosis Date   • Adiposity    • Anemia     post hemorrhagic   • Angioedema 02/21/2016    Secondary to ACE inhibitor   • Anxiety    • Arthritis    • CAD (coronary artery disease)    • Chest pain    •  Colon polyps    • Diabetes mellitus, type 2 (CMS/HCC)    • ED (erectile dysfunction)    • Febrile illness    • GERD (gastroesophageal reflux disease)    • Glaucoma    • Hematoma    • High cholesterol    • History of foreign travel 12/2017; 08/2018    [place], [place], [place], [place], [place] and [place]; [place]   • Hyperlipidemia    • Hypertension    • Hypogonadism in male 09/28/2016   • Low back pain    • Male erectile disorder    • Microalbuminuria    • Obesity (BMI 30-39.9)    • Osteoarthritis     knee   • Spinal stenosis of lumbar region without neurogenic claudication 06/02/2021   • Vitamin D deficiency    • Wound infection after surgery        SOCIAL HISTORY:  Counseled to avoid tobacco     REVIEW OF SYSTEMS:  No pertinent hematologic, infectious, or constitutional symptoms.  Other review of systems non-contributory     PHYSICAL EXAM:  /89 (BP Location: Left arm, Patient Position: Sitting)   Pulse 74   Temp 36.3 °C (97.3 °F) (Oral)   Resp 16   SpO2 98%   Constitutional: Well-developed well-nourished no acute distress  General: Alert and oriented  Neuromuscular: Decreased range of motion of the lumbar spine and pain with standing or walking in upright position for a while.       DIAGNOSIS:  Post-Op Diagnosis Codes:  Post-Op Diagnosis Codes:     * Lumbar stenosis without neurogenic claudication [M48.061]    PLAN:  -  Lumbar epidural steroid injection #2 in this series at the planned level of L3-L4.  If pain control is acceptable after this injection, it was discussed with the patient that they may return for the subsequent injections if and when their pain returns.    - Risks were discussed with the patient, including but not limited to: failure of relief, worsening pain, tissue, nerve, blood vessel or spinal cord damage, post-dural-puncture headache, bleeding, epidural hematoma, infection, and epidural abscess. Benefits and alternatives were also discussed. No promises were made.  Risks/benefits/alternatives of procedural medications were also discussed, including but not limited to hyperglycemia, fluid retention, decreased immune system, osteoporosis, and anaphylactoid reactions. The patient voiced understanding and agreed to proceed.   -  Physical therapy exercises at home should be considered, as tolerated.  -  It is recommended that the patient use the least amount of opioid medication possible.     -  Heat and ice to the affected area as tolerated for pain control.  It was discussed that heating pads can cause burns.  -  Daily low impact exercise such as walking or water exercise was recommended to maintain overall health and aid in weight control.   -  Patient was counseled to abstain from tobacco products.  -  Follow up as needed for subsequent injections.  -My understanding is that this patient is possibly surgical candidate should interventions fail.  Of course I will leave that between the patient and his spine surgeon, Dr. Marshal Miguel.    Torey Olvera M.D.  Partner, [facility] and [facility], [facility]  Board Certified in Pain Medicine by the American Board of Anesthesiology  Board Certified in Anesthesiology by the American Board of Anesthesiology     Much of this encounter note is an electronic transcription/translation of spoken language to printed text. The electronic translation of spoken language may permit erroneous, or at times, nonsensical words or phrases to be inadvertently transcribed. Although I have reviewed the note for such errors, some may still exist.

## 2021-07-08 NOTE — ANESTHESIA PROCEDURE NOTES
Lumbar epidural steroid injection    Pre-sedation assessment completed: 7/8/2021 2:56 PM    Patient reassessed immediately prior to procedure    Patient location during procedure: pain clinic  Start Time: 7/8/2021 3:02 PM  Stop Time: 7/8/2021 3:12 PM  Indication:procedure for pain  Performed By  Anesthesiologist: Torey Olvera MD  Preanesthetic Checklist  Completed: patient identified, IV checked, site marked, risks and benefits discussed, surgical consent, monitors and equipment checked, pre-op evaluation and timeout performed  Additional Notes  Post-Op Diagnosis Codes:     * Lumbar stenosis without neurogenic claudication (M48.061)    The patient was observed in recovery with no evidence of neurological deficits or other problems. All questions were answered. The patient was discharged with appropriate instructions.  Prep:  Pt Position:prone  Sterile Tech:cap, gloves, mask and sterile barrier  Prep:chlorhexidine gluconate and isopropyl alcohol  Monitoring:blood pressure monitoring, continuous pulse oximetry and EKG  Procedure:  Approach:midline  Guidance: fluoroscopy  Location:lumbar  Level:3-4  Needle Type:Tuohy  Needle Gauge:20 G  Aspiration:negative  Medications:  Depomedrol:80 mg  Preservative Free Saline:3mL  Isovue:1mL  Comments:Appropriate epidural spread of contrast.   Post Assessment:  Post-procedure: Dressing per nursing.  Pt Tolerance:patient tolerated the procedure well with no apparent complications  Complications:no

## 2021-07-20 ENCOUNTER — TELEPHONE (OUTPATIENT)
Dept: FAMILY MEDICINE CLINIC | Facility: CLINIC | Age: 66
End: 2021-07-20

## 2021-07-20 DIAGNOSIS — E78.5 HYPERLIPIDEMIA: ICD-10-CM

## 2021-07-20 DIAGNOSIS — N52.9 MALE ERECTILE DISORDER: ICD-10-CM

## 2021-07-20 DIAGNOSIS — E11.65 TYPE 2 DIABETES MELLITUS WITH HYPERGLYCEMIA (HCC): ICD-10-CM

## 2021-07-20 DIAGNOSIS — R80.9 MICROALBUMINURIA: ICD-10-CM

## 2021-07-20 DIAGNOSIS — E55.9 VITAMIN D DEFICIENCY: ICD-10-CM

## 2021-07-20 DIAGNOSIS — I10 ESSENTIAL HYPERTENSION: ICD-10-CM

## 2021-07-20 DIAGNOSIS — E66.9 ADIPOSITY: ICD-10-CM

## 2021-07-20 RX ORDER — PEN NEEDLE, DIABETIC 31 GX5/16"
NEEDLE, DISPOSABLE MISCELLANEOUS
Qty: 100 EACH | Refills: 0 | Status: SHIPPED | OUTPATIENT
Start: 2021-07-20 | End: 2021-10-05

## 2021-07-20 NOTE — TELEPHONE ENCOUNTER
Caller: Isaias Monaco    Relationship: Self    Best call back number: [phone]    What was the call regarding:  PATIENT IS HAVING ISSUES GETTING HIS NEEDLES FOR INSULIN -- HE USES 8MM 31 GAUGE. HE HAS 12 LEFT AND HE USES ONE A DAY. HE WAS GETTING THEM FROM HIS ENDOCRINOLOGIST AND THEY HAVE LEFT AND DR. PARMAR HAS ALSO LEFT. HE IS SCHEDULED TO SEE DR. FELICIANO BUT NOT UNTIL October.  PLEASE CALL AND ADVISE IF THIS CAN BE DONE OR NOT.     CONFIRMED PHARMACY:  [facility] [address] [place] [address] [address] AT [facility] - [phone] [facility] [phone]   [phone]

## 2021-08-12 DIAGNOSIS — E66.9 ADIPOSITY: ICD-10-CM

## 2021-08-12 DIAGNOSIS — E78.5 HYPERLIPIDEMIA: ICD-10-CM

## 2021-08-12 DIAGNOSIS — E11.65 TYPE 2 DIABETES MELLITUS WITH HYPERGLYCEMIA (HCC): ICD-10-CM

## 2021-08-12 DIAGNOSIS — E55.9 VITAMIN D DEFICIENCY: ICD-10-CM

## 2021-08-12 DIAGNOSIS — N52.9 MALE ERECTILE DISORDER: ICD-10-CM

## 2021-08-12 DIAGNOSIS — I10 ESSENTIAL HYPERTENSION: ICD-10-CM

## 2021-08-12 DIAGNOSIS — R80.9 MICROALBUMINURIA: ICD-10-CM

## 2021-08-12 RX ORDER — CARVEDILOL 25 MG/1
25 TABLET ORAL 2 TIMES DAILY WITH MEALS
Qty: 120 TABLET | Refills: 0 | Status: SHIPPED | OUTPATIENT
Start: 2021-08-12 | End: 2021-10-04

## 2021-08-12 NOTE — TELEPHONE ENCOUNTER
Rx Refill Note  Requested Prescriptions      No prescriptions requested or ordered in this encounter      Last office visit with prescribing clinician: 2/5/2021      Next office visit with prescribing clinician: 10/4/21 basil Metz MA/SALOME  08/12/21, 11:06 EDT

## 2021-08-13 DIAGNOSIS — E11.9 CONTROLLED TYPE 2 DIABETES MELLITUS WITHOUT COMPLICATION, WITH LONG-TERM CURRENT USE OF INSULIN (HCC): ICD-10-CM

## 2021-08-13 DIAGNOSIS — Z79.4 CONTROLLED TYPE 2 DIABETES MELLITUS WITHOUT COMPLICATION, WITH LONG-TERM CURRENT USE OF INSULIN (HCC): ICD-10-CM

## 2021-08-13 NOTE — TELEPHONE ENCOUNTER
Rx Refill Note  Requested Prescriptions      No prescriptions requested or ordered in this encounter      Last office visit with prescribing clinician: 2/5/21 w Dr. Phillips    Next office visit with prescribing clinician: [facility] apt 10/4/21 w Dr.Gunn Gwen Metz MA/LMR  08/13/21, 16:56 EDT

## 2021-08-24 NOTE — PROGRESS NOTES
Subjective   History of Present Illness: Isaias Monaco is a 66 y.o. male is here today for follow-up for back pain after pain management. He completed 3rd lumbar BRAD with only mild improvement. He has also completed PT. he reports that he still has severe lower back pain.  Pain is occasionally diffuse throughout his lumbar spine however often has localized back pain to his thoracolumbar junction.  He appears to have a kyphosis when he walks and takes significant effort to stand up straight.  He denies any numbness or weakness in his lower extremities.  Denies any pain radiating down his lower extremities.    History of Present Illness    The following portions of the patient's history were reviewed and updated as appropriate: allergies, past family history, past medical history, past social history, past surgical history and problem list.    Past Medical History:   Diagnosis Date   • Adiposity    • Anemia     post hemorrhagic   • Angioedema 02/21/2016    Secondary to ACE inhibitor   • Anxiety    • Arthritis    • CAD (coronary artery disease)    • Chest pain    • Colon polyps    • Diabetes mellitus, type 2 (CMS/HCC)    • ED (erectile dysfunction)    • Febrile illness    • GERD (gastroesophageal reflux disease)    • Glaucoma    • Hematoma    • High cholesterol    • History of foreign travel 12/2017; 08/2018    [place], [place], [place], [place], [place] and [place]; [place]   • Hyperlipidemia    • Hypertension    • Hypogonadism in male 09/28/2016   • Low back pain    • Male erectile disorder    • Microalbuminuria    • Obesity (BMI 30-39.9)    • Osteoarthritis     knee   • Spinal stenosis of lumbar region without neurogenic claudication 06/02/2021   • Vitamin D deficiency    • Wound infection after surgery         Past Surgical History:   Procedure Laterality Date   • CARDIAC CATHETERIZATION N/A 05/15/2006    Dr. Mini Camarillo   • CARDIAC CATHETERIZATION N/A 3/10/2020    Procedure: Left Heart Cath;   Surgeon: Miguel Ángel Karimi MD;  Location:  [facility];  Service: Cardiology;  Laterality: N/A;   • CARDIAC CATHETERIZATION N/A 3/10/2020    Procedure: Stent DAVONTE coronary;  Surgeon: Miguel Ángel Karimi MD;  Location:  [facility];  Service: Cardiology;  Laterality: N/A;   • CARDIAC CATHETERIZATION N/A 3/10/2020    Procedure: Coronary angiography;  Surgeon: Miguel Ángel Karimi MD;  Location:  [facility];  Service: Cardiology;  Laterality: N/A;   • CARDIAC CATHETERIZATION N/A 3/10/2020    Procedure: Left ventriculography;  Surgeon: Miguel Ángel Karimi MD;  Location:  [facility];  Service: Cardiology;  Laterality: N/A;   • COLONOSCOPY N/A 02/22/2006    Bilobed polyp at 30 cm, hemorrhoids-Dr. Ilya Zhao   • COLONOSCOPY N/A 02/28/2014    Normal ileum, one 6 mm polyp in the mid transverse colon-Dr. Ilya Zhao   • COLONOSCOPY N/A 11/19/2008    Ela ileum, two 3 to 4 mm polyps, non-bleeding internal hemorrhoids, repeat in 5 years-Dr. Ilya Zhao   • COLONOSCOPY N/A 10/31/2017    Procedure: COLONOSCOPY WITH POLYPECTOMY (COLD BIOPSY);  Surgeon: Ilya Zhao MD;  Location: [facility] ENDOSCOPY;  Service:    • COLONOSCOPY N/A 5/21/2021    Procedure: Colonoscopy into cecum and terminal ileum with cold biopsy polypectomy;  Surgeon: Ilya Zhao MD;  Location: [facility] ENDOSCOPY;  Service: Gastroenterology;  Laterality: N/A;  Pre op: History of Polyps  Post op: Polyp   • CORONARY ANGIOPLASTY WITH STENT PLACEMENT  2007, 2012, 2015    cardiac stents x3 occasions   • ENDOSCOPY N/A 10/4/2017    Procedure: ESOPHAGOGASTRODUODENOSCOPY;  Surgeon: Boyd Guidry MD;  [facility];  Service:    • ENDOSCOPY N/A 5/21/2021    Procedure: ESOPHAGOGASTRODUODENOSCOPY with biopsies;  Surgeon: Ilya Zhao MD;  Location: [facility] ENDOSCOPY;  Service: Gastroenterology;  Laterality: N/A;  Pre op: GERD  Post op: Irregular  Z-Line, Hiatal Hernia,  Gastritis   • EPIDURAL BLOCK     • KNEE INCISION AND DRAINAGE Right 6/20/2017    Procedure: RT. KNEE WASHOUT ;  Surgeon: Boyd Coyne MD;  Location: [facility] MAIN OR;  Service:    • RI TOTAL KNEE ARTHROPLASTY Right 6/15/2017    Procedure: RT TOTAL KNEE ARTHROPLASTY;  Surgeon: Boyd Coyne MD;  Location: [facility] MAIN OR;  Service: Orthopedics   • SHOULDER SURGERY Right 2016    rotator cuff repair   • UPPER GASTROINTESTINAL ENDOSCOPY N/A 10/13/2015    Z-line irregular, normal stomach, normal duodenum-Dr. Ilya Zhao   • UPPER GASTROINTESTINAL ENDOSCOPY N/A 02/28/2014    Z-line irregular, normal stomach, normal duodenum-Dr. Ilya Zhao   • UPPER GASTROINTESTINAL ENDOSCOPY N/A 11/19/2008    Z-line irregular, chronic gastritis withotu hemorrhage, normal duodenum-Dr. Ilya Zhao   • UPPER GASTROINTESTINAL ENDOSCOPY N/A 06/22/2006    LA Grade A reflux esophagitis, non-bleeding erythematous gastropathy, normal duodenum-Dr. Ilya Zhao          Current Outpatient Medications:   •  amLODIPine (NORVASC) 10 MG tablet, Take 1 tablet by mouth Daily., Disp: 90 tablet, Rfl: 3  •  ARIPiprazole (ABILIFY) 2 MG tablet, Take 3 mg by mouth Daily., Disp: , Rfl:   •  aspirin 81 MG EC tablet, Take 1 tablet by mouth Daily. get over the counter, Disp: 30 tablet, Rfl: 3  •  Blood Glucose Monitoring Suppl ([facility]) kit, TEST AS DIRECTED, Disp: 1 each, Rfl: 0  •  carvedilol (COREG) 25 MG tablet, Take 1 tablet by mouth 2 (Two) Times a Day With Meals., Disp: 120 tablet, Rfl: 0  •  clonazePAM (KlonoPIN) 0.5 MG tablet, Take 1 tablet by mouth 3 (Three) Times a Day As Needed (Facial twitching)., Disp: 60 tablet, Rfl: 0  •  clopidogrel (PLAVIX) 75 MG tablet, Take 1 tablet by mouth Daily., Disp: 90 tablet, Rfl: 3  •  dorzolamide-timolol (COSOPT) 22.3-6.8 MG/ML ophthalmic solution, Apply 1 drop to eye(s) to both eyes 2 (Two) Times a Day., Disp: 20 mL, Rfl: 3  •  empagliflozin (Jardiance) 25 MG tablet tablet, Take 1 tablet by mouth  Daily., Disp: 90 tablet, Rfl: 0  •  escitalopram (LEXAPRO) 20 MG tablet, Take 1 tablet by mouth Daily., Disp: 90 tablet, Rfl: 3  •  esomeprazole (nexIUM) 40 MG capsule, Take 1 capsule by mouth Daily., Disp: 90 capsule, Rfl: 3  •  ezetimibe (Zetia) 10 MG tablet, Take 1 tablet by mouth Daily., Disp: 90 tablet, Rfl: 3  •  famotidine (PEPCID) 20 MG tablet, Take 1 tablet by mouth Daily With Dinner., Disp: 90 tablet, Rfl: 3  •  glucose blood test strip, Use 4 times a day, Disp: 400 each, Rfl: 0  •  Insulin Pen Needle (B-D ULTRAFINE III SHORT PEN) 31G X 8 MM misc, USE AS DIRECTED TO INJECT TOUJEO, Disp: 100 each, Rfl: 0  •  Lancets (FREESTYLE) lancets, Use to test BG 4 times daily, Disp: 400 each, Rfl: 1  •  latanoprost (XALATAN) 0.005 % ophthalmic solution, Apply 1 drop to  each eye Daily. (Patient taking differently: Apply 1 drop to eye(s) as directed by provider Every Night.), Disp: 7.5 mL, Rfl: 3  •  metFORMIN (GLUCOPHAGE) 500 MG tablet, Take 2 tablets by mouth 2 (Two) Times a Day With Meals., Disp: 360 tablet, Rfl: 3  •  naproxen (Naprosyn) 500 MG tablet, Take 1 tablet by mouth 2 (Two) Times a Day With Meals. (Patient taking differently: Take 500 mg by mouth As Needed for Mild Pain .), Disp: 30 tablet, Rfl: 0  •  pioglitazone (ACTOS) 15 MG tablet, Take 1 tablet by mouth Daily., Disp: 90 tablet, Rfl: 3  •  rosuvastatin (CRESTOR) 40 MG tablet, Take 1 tablet by mouth Daily., Disp: 90 tablet, Rfl: 3  •  sildenafil (VIAGRA) 50 MG tablet, Take 1 tablet by mouth Daily As Needed for erectile dysfunction., Disp: 6 tablet, Rfl: 5  •  SITagliptin (Januvia) 100 MG tablet, Take 1 tablet by mouth Daily., Disp: 90 tablet, Rfl: 0  •  Toujeo SoloStar 300 UNIT/ML solution pen-injector injection, Inject 60 Units under the skin into the appropriate area as directed Every Morning., Disp: 18 mL, Rfl: 1  •  vitamin D (ERGOCALCIFEROL) 1.25 MG (54329 UT) capsule capsule, Take 1 capsule by mouth 1 (One) Time Per Week., Disp: 12 capsule, Rfl:  "3  •  Aspirin Buf,CaCarb-MgCarb-MgO, 81 MG tablet, Take 81 mg by mouth Daily., Disp: , Rfl:   •  methocarbamol (Robaxin) 500 MG tablet, Take 1 tablet by mouth 3 (Three) Times a Day As Needed for Muscle Spasms., Disp: 45 tablet, Rfl: 0     Allergies   Allergen Reactions   • Ace Inhibitors Angioedema   • Lisinopril Angioedema   • Testosterone Myalgia        Social History     Socioeconomic History   • Marital status:      Spouse name: Micaela   • Number of children: 1   • Years of education: College   • Highest education level: Not on file   Tobacco Use   • Smoking status: Never Smoker   • Smokeless tobacco: Never Used   • Tobacco comment: CAFFEINE USE: 2 CUPS COFFEE DAILY   Vaping Use   • Vaping Use: Never used   Substance and Sexual Activity   • Alcohol use: Yes     Alcohol/week: 3.0 - 4.0 standard drinks     Types: 3 - 4 Standard drinks or equivalent per week     Comment: occasional   • Drug use: No   • Sexual activity: Defer        Family History   Problem Relation Age of Onset   • Lupus Sister    • Thyroid disease Sister    • Heart disease Other    • Hypertension Other    • Arthritis Mother    • Hyperlipidemia Mother    • Hypertension Mother    • Thyroid disease Mother    • Lupus Mother    • Heart disease Father    • Arthritis Father    • Other Father         Vascular disease   • Lupus Father    • Depression Father    • Alcohol abuse Father    • Dementia Father    • Hypertension Father    • Heart disease Brother    • Heart attack Brother 40   • Thyroid disease Sister    • Arthritis Brother    • Malig Hyperthermia Neg Hx         Review of Systems   Musculoskeletal: Positive for back pain. Negative for gait problem.   Neurological: Negative for weakness and numbness.       Objective     Vitals:    08/27/21 1526   BP: 128/82   Pulse: 85   Temp: 97.3 °F (36.3 °C)   Weight: 119 kg (263 lb 6.4 oz)   Height: 195.6 cm (77\")     Body mass index is 31.23 kg/m².      Physical Exam  Neurologic Exam  Awake, alert, " oriented x3  Pupils equal round reactive to light  Extraocular muscles intact  Face symmetric  Speech is fluent and clear  No pronator drift  Motor exam  Bilateral deltoids 5/5, bilateral biceps 5/5, bilateral triceps 5/5, bilateral wrist extension 5/5 bilateral hand  5/5  Bilateral hip flexion 5/5, bilateral knee extension 5/5, bilateral DF/PF 5/5  No clonus  No Allyssa's reflex  2+ bilateral patellar reflexes  2+ bilateral biceps reflex  Steady normal gait  Walks with kyphotic posture  Able to detect  light touch in all 4 extremities        Assessment/Plan   Independent Review of Radiographic Studies:   No new imaging studies    Medical Decision Makin-year-old male with a 1 year history of severe progressive lower back pain and lumbar stenosis.  -His MRI demonstrates moderate to severe degenerative disease throughout his lumbar spine with severe stenosis at L3-4.  He denies any symptoms associated with neurogenic claudication.  Denies any numbness or weakness in his lower extremities.  He does report difficulty walking however reports that it is difficult to walk due to his severe back pain.  He has been seen by pain management and had 3 steroid epidural injections without any significant relief.  He was seen by physical therapy and had some relief while in therapy but the pain returned later each night.  -He seems to have a significant amount of muscle spasms and severe lower back pain.  I have given him a prescription for Robaxin to help with muscle spasms the next 2 weeks.  I have also ordered a CT myelogram of the thoracic and lumbar spine as well as x-rays with flexion-extension views.  -I have explained to him that he does have severe stenosis of the lumbar spine but since he does not have any significant leg pain or weakness I am less likely to offer him surgery.  We will plan to follow-up after his CT myelogram and x-rays with flexion-extension views are available if there is significant  subluxation to explain some of his mechanical back pain would consider a possible surgical decompression and fusion.  -On his next follow-up we will also discuss possible referral to pain management for facet joint blocks versus ablations  Diagnoses and all orders for this visit:    1. Spinal stenosis, lumbar region, without neurogenic claudication (Primary)  -     CT Lumbar Spine With Intrathecal Contrast; Future  -     IR Myelogram 2 or More Areas; Future  -     XR Spine Lumbar Complete With Flex & Ext; Future  -     XR Scoliosis Complete Including Supine & Erect; Future    2. Bilateral low back pain, unspecified chronicity, unspecified whether sciatica present  -     CT Lumbar Spine With Intrathecal Contrast; Future  -     IR Myelogram 2 or More Areas; Future  -     XR Spine Lumbar Complete With Flex & Ext; Future  -     XR Scoliosis Complete Including Supine & Erect; Future    Other orders  -     methocarbamol (Robaxin) 500 MG tablet; Take 1 tablet by mouth 3 (Three) Times a Day As Needed for Muscle Spasms.  Dispense: 45 tablet; Refill: 0      Return in about 4 weeks (around 9/24/2021).       I spent approximately 30 minutes reviewing the medical records, reviewing his MRI images, discussing management strategies of his lumbar stenosis and severe back pain

## 2021-08-25 ENCOUNTER — HOSPITAL ENCOUNTER (OUTPATIENT)
Dept: PAIN MEDICINE | Facility: HOSPITAL | Age: 66
Discharge: HOME | End: 2021-08-25

## 2021-08-25 ENCOUNTER — HOSPITAL ENCOUNTER (OUTPATIENT)
Dept: GENERAL RADIOLOGY | Facility: HOSPITAL | Age: 66
Discharge: HOME | End: 2021-08-25

## 2021-08-25 ENCOUNTER — ANESTHESIA (OUTPATIENT)
Dept: PAIN MEDICINE | Facility: HOSPITAL | Age: 66
End: 2021-08-25

## 2021-08-25 ENCOUNTER — ANESTHESIA EVENT (OUTPATIENT)
Dept: PAIN MEDICINE | Facility: HOSPITAL | Age: 66
End: 2021-08-25

## 2021-08-25 VITALS
HEART RATE: 76 BPM | DIASTOLIC BLOOD PRESSURE: 85 MMHG | TEMPERATURE: 97.3 F | RESPIRATION RATE: 16 BRPM | OXYGEN SATURATION: 96 % | SYSTOLIC BLOOD PRESSURE: 126 MMHG

## 2021-08-25 DIAGNOSIS — M48.061 SPINAL STENOSIS OF LUMBAR REGION WITHOUT NEUROGENIC CLAUDICATION: Primary | ICD-10-CM

## 2021-08-25 DIAGNOSIS — R52 PAIN: ICD-10-CM

## 2021-08-25 LAB — GLUCOSE BLDC GLUCOMTR-MCNC: 75 MG/DL (ref 70–130)

## 2021-08-25 PROCEDURE — 77003 FLUOROGUIDE FOR SPINE INJECT: CPT

## 2021-08-25 PROCEDURE — 25010000002 METHYLPREDNISOLONE PER 80 MG: Performed by: ANESTHESIOLOGY

## 2021-08-25 PROCEDURE — 0 IOPAMIDOL 41 % SOLUTION: Performed by: ANESTHESIOLOGY

## 2021-08-25 PROCEDURE — 82962 GLUCOSE BLOOD TEST: CPT

## 2021-08-25 RX ORDER — MIDAZOLAM HYDROCHLORIDE 1 MG/ML
1 INJECTION INTRAMUSCULAR; INTRAVENOUS
Status: DISCONTINUED | OUTPATIENT
Start: 2021-08-25 | End: 2021-08-26 | Stop reason: HOSPADM

## 2021-08-25 RX ORDER — LIDOCAINE HYDROCHLORIDE 10 MG/ML
1 INJECTION, SOLUTION INFILTRATION; PERINEURAL ONCE
Status: DISCONTINUED | OUTPATIENT
Start: 2021-08-25 | End: 2021-08-26 | Stop reason: HOSPADM

## 2021-08-25 RX ORDER — FENTANYL CITRATE 50 UG/ML
50 INJECTION, SOLUTION INTRAMUSCULAR; INTRAVENOUS AS NEEDED
Status: DISCONTINUED | OUTPATIENT
Start: 2021-08-25 | End: 2021-08-26 | Stop reason: HOSPADM

## 2021-08-25 RX ORDER — METHYLPREDNISOLONE ACETATE 80 MG/ML
80 INJECTION, SUSPENSION INTRA-ARTICULAR; INTRALESIONAL; INTRAMUSCULAR; SOFT TISSUE ONCE
Status: COMPLETED | OUTPATIENT
Start: 2021-08-25 | End: 2021-08-25

## 2021-08-25 RX ADMIN — METHYLPREDNISOLONE ACETATE 80 MG: 80 INJECTION, SUSPENSION INTRA-ARTICULAR; INTRALESIONAL; INTRAMUSCULAR; SOFT TISSUE at 09:46

## 2021-08-25 RX ADMIN — IOPAMIDOL 10 ML: 408 INJECTION, SOLUTION INTRATHECAL at 09:46

## 2021-08-25 NOTE — ANESTHESIA PROCEDURE NOTES
PAIN Epidural block      Patient reassessed immediately prior to procedure    Patient location during procedure: pain clinic  Indication:procedure for pain  Performed By  Anesthesiologist: Kwadwo Soto MD  Preanesthetic Checklist  Completed: patient identified, site marked, risks and benefits discussed, surgical consent, monitors and equipment checked, pre-op evaluation and timeout performed  Additional Notes  Depomedrol - 80mg    Needle position confirmed by fluoroscopy and epidurogram using 2cc of [id number].    Diagnosis  Post-Op Diagnosis Codes:     * Lumbar degenerative disc disease (M51.36)     * Lumbar radiculopathy (M54.16)     * Lumbar spinal stenosis (M48.061)    Prep:  Pt Position:prone  Sterile Tech:cap, gloves, mask and sterile barrier  Prep:chlorhexidine gluconate and isopropyl alcohol  Monitoring:blood pressure monitoring, continuous pulse oximetry and EKG  Procedure:Sedation: no     Approach:right paramedian  Guidance: fluoroscopy  Location:lumbar  Level:3-4  Needle Type:Tuohy  Needle Gauge:20  Aspiration:negative  Medications:  Depomedrol:80 mg  Preservative Free Saline:2mL  Isovue:2mL    Post Assessment:  Post-procedure: bandaide.  Pt Tolerance:patient tolerated the procedure well with no apparent complications  Complications:no

## 2021-08-25 NOTE — H&P
[facility]    History and Physical    Patient Name: Isaias Monaoc  :  1955  MRN:  [id number]  Date of Admission: 2021    Subjective     Patient is a 66 y.o. male presents with chief complaint of chronic, intermitent, moderate low back and leg: bilateral pain.  Onset of symptoms was gradual starting several months ago.  Symptoms are associated/aggravated by activity. Symptoms improve with rest    The following portions of the patients history were reviewed and updated as appropriate: current medications, allergies, past medical history, past surgical history, past family history, past social history and problem list                Objective     Past Medical History:   Past Medical History:   Diagnosis Date   • Adiposity    • Anemia     post hemorrhagic   • Angioedema 2016    Secondary to ACE inhibitor   • Anxiety    • Arthritis    • CAD (coronary artery disease)    • Chest pain    • Colon polyps    • Diabetes mellitus, type 2 (CMS/HCC)    • ED (erectile dysfunction)    • Febrile illness    • GERD (gastroesophageal reflux disease)    • Glaucoma    • Hematoma    • High cholesterol    • History of foreign travel 2017; 2018    [place], [place], [place], [place], [place] and [place]; [place]   • Hyperlipidemia    • Hypertension    • Hypogonadism in male 2016   • Low back pain    • Male erectile disorder    • Microalbuminuria    • Obesity (BMI 30-39.9)    • Osteoarthritis     knee   • Spinal stenosis of lumbar region without neurogenic claudication 2021   • Vitamin D deficiency    • Wound infection after surgery      Past Surgical History:   Past Surgical History:   Procedure Laterality Date   • CARDIAC CATHETERIZATION N/A 05/15/2006    Dr. Mini Camarillo   • CARDIAC CATHETERIZATION N/A 3/10/2020    Procedure: Left Heart Cath;  Surgeon: Miguel Ángel Karimi MD;  Location: [facility] INVASIVE LOCATION;  Service: Cardiology;  Laterality: N/A;   • CARDIAC  CATHETERIZATION N/A 3/10/2020    Procedure: Stent DAVONTE coronary;  Surgeon: Miguel Ángel Karimi MD;  Location: [facility] CATH INVASIVE LOCATION;  Service: Cardiology;  Laterality: N/A;   • CARDIAC CATHETERIZATION N/A 3/10/2020    Procedure: Coronary angiography;  Surgeon: Miguel Ángel Karimi MD;  Location: [facility] CATH INVASIVE LOCATION;  Service: Cardiology;  Laterality: N/A;   • CARDIAC CATHETERIZATION N/A 3/10/2020    Procedure: Left ventriculography;  Surgeon: Miguel Ángel Karimi MD;  Location: [facility] CATH INVASIVE LOCATION;  Service: Cardiology;  Laterality: N/A;   • COLONOSCOPY N/A 02/22/2006    Bilobed polyp at 30 cm, hemorrhoids-Dr. Ilya Zhao   • COLONOSCOPY N/A 02/28/2014    Normal ileum, one 6 mm polyp in the mid transverse colon-Dr. Ilya Zhao   • COLONOSCOPY N/A 11/19/2008    Ela ileum, two 3 to 4 mm polyps, non-bleeding internal hemorrhoids, repeat in 5 years-Dr. Ilya Zhao   • COLONOSCOPY N/A 10/31/2017    Procedure: COLONOSCOPY WITH POLYPECTOMY (COLD BIOPSY);  Surgeon: Ilya Zhao MD;  Location: [facility] ENDOSCOPY;  Service:    • COLONOSCOPY N/A 5/21/2021    Procedure: Colonoscopy into cecum and terminal ileum with cold biopsy polypectomy;  Surgeon: Ilya Zhao MD;  Location: [facility] ENDOSCOPY;  Service: Gastroenterology;  Laterality: N/A;  Pre op: History of Polyps  Post op: Polyp   • CORONARY ANGIOPLASTY WITH STENT PLACEMENT  2007, 2012, 2015    cardiac stents x3 occasions   • ENDOSCOPY N/A 10/4/2017    Procedure: ESOPHAGOGASTRODUODENOSCOPY;  Surgeon: Boyd Guidry MD;  Location: [facility] ENDOSCOPY;  Service:    • ENDOSCOPY N/A 5/21/2021    Procedure: ESOPHAGOGASTRODUODENOSCOPY with biopsies;  Surgeon: Ilya Zhao MD;  Location: [facility] ENDOSCOPY;  Service: Gastroenterology;  Laterality: N/A;  Pre op: GERD  Post op: Irregular  Z-Line, Hiatal Hernia, Gastritis   • KNEE INCISION AND DRAINAGE Right 6/20/2017    Procedure: RT. KNEE WASHOUT ;  Surgeon: Boyd Coyne MD;  Location:  [facility] MAIN OR;  Service:    • NV TOTAL KNEE ARTHROPLASTY Right 6/15/2017    Procedure: RT TOTAL KNEE ARTHROPLASTY;  Surgeon: Boyd Coyne MD;  Location: [facility] MAIN OR;  Service: Orthopedics   • SHOULDER SURGERY Right 2016    rotator cuff repair   • UPPER GASTROINTESTINAL ENDOSCOPY N/A 10/13/2015    Z-line irregular, normal stomach, normal duodenum-Dr. Ilya Zhao   • UPPER GASTROINTESTINAL ENDOSCOPY N/A 02/28/2014    Z-line irregular, normal stomach, normal duodenum-Dr. Ilya Zhao   • UPPER GASTROINTESTINAL ENDOSCOPY N/A 11/19/2008    Z-line irregular, chronic gastritis withotu hemorrhage, normal duodenum-Dr. Ilya Zhao   • UPPER GASTROINTESTINAL ENDOSCOPY N/A 06/22/2006    LA Grade A reflux esophagitis, non-bleeding erythematous gastropathy, normal duodenum-Dr. Ilya Zhao     Family History:   Family History   Problem Relation Age of Onset   • Lupus Sister    • Thyroid disease Sister    • Heart disease Other    • Hypertension Other    • Arthritis Mother    • Hyperlipidemia Mother    • Hypertension Mother    • Thyroid disease Mother    • Lupus Mother    • Heart disease Father    • Arthritis Father    • Other Father         Vascular disease   • Lupus Father    • Depression Father    • Alcohol abuse Father    • Dementia Father    • Hypertension Father    • Heart disease Brother    • Heart attack Brother 40   • Thyroid disease Sister    • Arthritis Brother    • Malig Hyperthermia Neg Hx      Social History:   Social History     Socioeconomic History   • Marital status:      Spouse name: Micaela   • Number of children: 1   • Years of education: College   • Highest education level: Not on file   Tobacco Use   • Smoking status: Never Smoker   • Smokeless tobacco: Never Used   • Tobacco comment: CAFFEINE USE: 2 CUPS COFFEE DAILY   Vaping Use   • Vaping Use: Never used   Substance and Sexual Activity   • Alcohol use: Yes     Alcohol/week: 3.0 - 4.0 standard drinks     Types: 3 - 4 Standard drinks or equivalent  per week     Comment: occasional   • Drug use: No   • Sexual activity: Defer       Vital Signs Range for the last 24 hours  Temperature: Temp:  [36.3 °C (97.3 °F)] 36.3 °C (97.3 °F)   Temp Source: Temp src: Infrared   BP: BP: (128)/(93) 128/93   Pulse: Heart Rate:  [78] 78   Respirations: Resp:  [16] 16   SPO2: SpO2:  [95 %] 95 %   O2 Amount (l/min):     O2 Devices Device (Oxygen Therapy): room air   Weight:           --------------------------------------------------------------------------------    Current Outpatient Medications   Medication Sig Dispense Refill   • amLODIPine (NORVASC) 10 MG tablet Take 1 tablet by mouth Daily. 90 tablet 3   • ARIPiprazole (ABILIFY) 2 MG tablet Take 3 mg by mouth Daily.     • aspirin 81 MG EC tablet Take 1 tablet by mouth Daily. get over the counter 30 tablet 3   • Aspirin Buf,CaCarb-MgCarb-MgO, 81 MG tablet Take 81 mg by mouth Daily.     • Blood Glucose Monitoring Suppl ([facility] BLOOD GLUCOSE) kit TEST AS DIRECTED 1 each 0   • carvedilol (COREG) 25 MG tablet Take 1 tablet by mouth 2 (Two) Times a Day With Meals. 120 tablet 0   • clonazePAM (KlonoPIN) 0.5 MG tablet Take 1 tablet by mouth 3 (Three) Times a Day As Needed (Facial twitching). 60 tablet 0   • clopidogrel (PLAVIX) 75 MG tablet Take 1 tablet by mouth Daily. 90 tablet 3   • dorzolamide-timolol (COSOPT) 22.3-6.8 MG/ML ophthalmic solution Apply 1 drop to eye(s) to both eyes 2 (Two) Times a Day. 20 mL 3   • empagliflozin (Jardiance) 25 MG tablet tablet Take 1 tablet by mouth Daily. 90 tablet 0   • escitalopram (LEXAPRO) 20 MG tablet Take 1 tablet by mouth Daily. 90 tablet 3   • esomeprazole (nexIUM) 40 MG capsule Take 1 capsule by mouth Daily. 90 capsule 3   • ezetimibe (Zetia) 10 MG tablet Take 1 tablet by mouth Daily. 90 tablet 3   • famotidine (PEPCID) 20 MG tablet Take 1 tablet by mouth Daily With Dinner. 90 tablet 3   • glucose blood test strip Use 4 times a day 400 each 0   • Insulin Pen Needle (B-D ULTRAFINE III SHORT  PEN) 31G X 8 MM misc USE AS DIRECTED TO INJECT TOUJEO 100 each 0   • Lancets (FREESTYLE) lancets Use to test BG 4 times daily 400 each 1   • latanoprost (XALATAN) 0.005 % ophthalmic solution Apply 1 drop to  each eye Daily. (Patient taking differently: Apply 1 drop to eye(s) as directed by provider Every Night.) 7.5 mL 3   • metFORMIN (GLUCOPHAGE) 500 MG tablet Take 2 tablets by mouth 2 (Two) Times a Day With Meals. 360 tablet 3   • naproxen (Naprosyn) 500 MG tablet Take 1 tablet by mouth 2 (Two) Times a Day With Meals. (Patient taking differently: Take 500 mg by mouth As Needed for Mild Pain .) 30 tablet 0   • pioglitazone (ACTOS) 15 MG tablet Take 1 tablet by mouth Daily. 90 tablet 3   • rosuvastatin (CRESTOR) 40 MG tablet Take 1 tablet by mouth Daily. 90 tablet 3   • sildenafil (VIAGRA) 50 MG tablet Take 1 tablet by mouth Daily As Needed for erectile dysfunction. 6 tablet 5   • SITagliptin (Januvia) 100 MG tablet Take 1 tablet by mouth Daily. 90 tablet 0   • Toujeo SoloStar 300 UNIT/ML solution pen-injector injection Inject 60 Units under the skin into the appropriate area as directed Every Morning. 18 mL 1   • vitamin D (ERGOCALCIFEROL) 1.25 MG (19697 UT) capsule capsule Take 1 capsule by mouth 1 (One) Time Per Week. 12 capsule 3     No current facility-administered medications for this encounter.       --------------------------------------------------------------------------------  Assessment/Plan      Anesthesia Evaluation     Patient summary reviewed and Nursing notes reviewed   NPO Solid Status: > 8 hours  NPO Liquid Status: > 2 hours    Pain impairs ability to perform ADLs: Sleeping  Modalities previously tried to control pain with limited effectiveness within the last 4-6 weeks: Rest     Airway   Mallampati: II  TM distance: >3 FB  Neck ROM: full  Dental - normal exam     Pulmonary - normal exam   (+) shortness of breath,   Cardiovascular - normal exam    (+) hypertension, CAD, hyperlipidemia,        Neuro/Psych- neuro exam normal  (+) psychiatric history Anxiety,     GI/Hepatic/Renal/Endo    (+) obesity,  GERD,  diabetes mellitus type 2,     Musculoskeletal (-) normal exam    (+) back pain, radiculopathy  Abdominal  - normal exam   Substance History - negative use     OB/GYN negative ob/gyn ROS         Other   arthritis,                 Diagnosis and Plan    Treatment Plan  ASA 3   Patient has had previous injection/procedure with 50-75% improvement.   Procedures: Lumbar Epidural Steroid Injection(LESI), With fluoroscopy,       Anesthetic plan and risks discussed with patient.      Target L3-4    Diagnosis     * Lumbar degenerative disc disease [M51.36]     * Lumbar radiculopathy [M54.16]     * Lumbar spinal stenosis [M48.061]

## 2021-08-27 ENCOUNTER — OFFICE VISIT (OUTPATIENT)
Dept: NEUROSURGERY | Facility: CLINIC | Age: 66
End: 2021-08-27

## 2021-08-27 VITALS
DIASTOLIC BLOOD PRESSURE: 82 MMHG | BODY MASS INDEX: 31.1 KG/M2 | TEMPERATURE: 97.3 F | SYSTOLIC BLOOD PRESSURE: 128 MMHG | WEIGHT: 263.4 LBS | HEIGHT: 77 IN | HEART RATE: 85 BPM

## 2021-08-27 DIAGNOSIS — M48.061 SPINAL STENOSIS, LUMBAR REGION, WITHOUT NEUROGENIC CLAUDICATION: Primary | ICD-10-CM

## 2021-08-27 DIAGNOSIS — M54.50 BILATERAL LOW BACK PAIN, UNSPECIFIED CHRONICITY, UNSPECIFIED WHETHER SCIATICA PRESENT: ICD-10-CM

## 2021-08-27 PROCEDURE — 99214 OFFICE O/P EST MOD 30 MIN: CPT | Performed by: NEUROLOGICAL SURGERY

## 2021-08-27 RX ORDER — METHOCARBAMOL 500 MG/1
500 TABLET, FILM COATED ORAL 3 TIMES DAILY PRN
Qty: 45 TABLET | Refills: 0 | Status: SHIPPED | OUTPATIENT
Start: 2021-08-27 | End: 2022-02-09

## 2021-08-30 DIAGNOSIS — M48.061 SPINAL STENOSIS OF LUMBAR REGION WITHOUT NEUROGENIC CLAUDICATION: Primary | ICD-10-CM

## 2021-09-01 NOTE — PROGRESS NOTES
09/08/21 0000   Pre-Procedure Phone Call   Procedure Time Verified Yes  (Spoke with his spouse, Micaela.)   Arrival Time 0745  (9/8/2021)   Procedure Location Verified Yes   Medical History Reviewed No   NPO Status Reinforced Yes   Ride and Caregiver Arranged Yes   Phone Number for Ride/Caregiver Micaela, spouse will come with pt.   Patient Knows to Bring Current Medications Yes  (Pt will not take his Metformin that day.)   Bring Outside Films Requested No  (EPIC: MRI Lspine - 2/9/2021.)

## 2021-09-02 DIAGNOSIS — I10 ESSENTIAL HYPERTENSION: ICD-10-CM

## 2021-09-02 RX ORDER — AMLODIPINE BESYLATE 10 MG/1
10 TABLET ORAL DAILY
Qty: 30 TABLET | Refills: 0 | Status: SHIPPED | OUTPATIENT
Start: 2021-09-02 | End: 2021-11-15

## 2021-09-02 NOTE — TELEPHONE ENCOUNTER
Pt last evaluated by fer Coyle MD on 2/5/2021. New pt appt scheduled with Antonia Huerta MD on 10/4. Pt requesting three month supply of amlodipine. Please advise.

## 2021-09-03 ENCOUNTER — TELEPHONE (OUTPATIENT)
Dept: NEUROSURGERY | Facility: CLINIC | Age: 66
End: 2021-09-03

## 2021-09-03 NOTE — TELEPHONE ENCOUNTER
Radiology requested order for CT thoracic spine with intrathecal contrast. Will have Dr. Miguel order that upon return to the office.

## 2021-09-07 DIAGNOSIS — M54.50 BILATERAL LOW BACK PAIN, UNSPECIFIED CHRONICITY, UNSPECIFIED WHETHER SCIATICA PRESENT: ICD-10-CM

## 2021-09-07 DIAGNOSIS — M48.061 SPINAL STENOSIS, LUMBAR REGION, WITHOUT NEUROGENIC CLAUDICATION: ICD-10-CM

## 2021-09-07 DIAGNOSIS — M48.061 SPINAL STENOSIS OF LUMBAR REGION WITHOUT NEUROGENIC CLAUDICATION: Primary | ICD-10-CM

## 2021-09-08 ENCOUNTER — HOSPITAL ENCOUNTER (OUTPATIENT)
Dept: GENERAL RADIOLOGY | Facility: HOSPITAL | Age: 66
Discharge: HOME | End: 2021-09-08

## 2021-09-08 ENCOUNTER — HOSPITAL ENCOUNTER (OUTPATIENT)
Dept: CT IMAGING | Facility: HOSPITAL | Age: 66
Discharge: HOME | End: 2021-09-08

## 2021-09-08 VITALS
HEIGHT: 77 IN | HEART RATE: 70 BPM | WEIGHT: 253 LBS | OXYGEN SATURATION: 97 % | DIASTOLIC BLOOD PRESSURE: 78 MMHG | SYSTOLIC BLOOD PRESSURE: 121 MMHG | TEMPERATURE: 98 F | BODY MASS INDEX: 29.87 KG/M2 | RESPIRATION RATE: 18 BRPM

## 2021-09-08 DIAGNOSIS — M48.061 SPINAL STENOSIS OF LUMBAR REGION WITHOUT NEUROGENIC CLAUDICATION: ICD-10-CM

## 2021-09-08 DIAGNOSIS — M48.061 SPINAL STENOSIS, LUMBAR REGION, WITHOUT NEUROGENIC CLAUDICATION: ICD-10-CM

## 2021-09-08 DIAGNOSIS — M54.50 BILATERAL LOW BACK PAIN, UNSPECIFIED CHRONICITY, UNSPECIFIED WHETHER SCIATICA PRESENT: ICD-10-CM

## 2021-09-08 PROCEDURE — 72114 X-RAY EXAM L-S SPINE BENDING: CPT

## 2021-09-08 PROCEDURE — 25010000003 LIDOCAINE 1 % SOLUTION: Performed by: RADIOLOGY

## 2021-09-08 PROCEDURE — 25010000002 IOPAMIDOL 61 % SOLUTION: Performed by: RADIOLOGY

## 2021-09-08 PROCEDURE — 72240 MYELOGRAPHY NECK SPINE: CPT

## 2021-09-08 PROCEDURE — 62305 MYELOGRAPHY LUMBAR INJECTION: CPT

## 2021-09-08 PROCEDURE — 72129 CT CHEST SPINE W/DYE: CPT

## 2021-09-08 PROCEDURE — 72082 X-RAY EXAM ENTIRE SPI 2/3 VW: CPT

## 2021-09-08 PROCEDURE — 72132 CT LUMBAR SPINE W/DYE: CPT

## 2021-09-08 RX ORDER — SODIUM CHLORIDE 0.9 % (FLUSH) 0.9 %
10 SYRINGE (ML) INJECTION AS NEEDED
Status: CANCELLED | OUTPATIENT
Start: 2021-09-08

## 2021-09-08 RX ORDER — LIDOCAINE HYDROCHLORIDE 10 MG/ML
10 INJECTION, SOLUTION INFILTRATION; PERINEURAL ONCE
Status: COMPLETED | OUTPATIENT
Start: 2021-09-08 | End: 2021-09-08

## 2021-09-08 RX ORDER — SODIUM CHLORIDE 0.9 % (FLUSH) 0.9 %
3 SYRINGE (ML) INJECTION EVERY 12 HOURS SCHEDULED
Status: CANCELLED | OUTPATIENT
Start: 2021-09-08

## 2021-09-08 RX ADMIN — IOPAMIDOL 13 ML: 612 INJECTION, SOLUTION INTRATHECAL at 09:42

## 2021-09-08 RX ADMIN — LIDOCAINE HYDROCHLORIDE 4 ML: 10 INJECTION, SOLUTION INFILTRATION; PERINEURAL at 09:39

## 2021-09-08 NOTE — NURSING NOTE
Pt arrived to Radiology triage [place] [facility] for Thoracic and Lumbar Myelogram.   Pt wearing a mask as well as this RN and goggles for any bedside care.

## 2021-09-08 NOTE — DISCHARGE INSTRUCTIONS
EDUCATION /DISCHARGE INSTRUCTIONS:  A myelogram is a special radiology procedure of the spinal cord, spinal nerves and other related structures.  You will be awake during the examination.  An area of your lower back will be cleansed with an antiseptic solution.  The physician will inject a numbing medication in your lower back.  While your back is numb, a needle will be placed in the lower back area.  A small amount of spinal fluid may be withdrawn and sent to the lab if ordered by your physician. While the needle is in the back, an injection of a contrast material (xray dye) will be given through the needle.  The contrast material will allow the physician to see the spinal cord and spinal nerves.  Once injected, the needle will be removed and a band aid will be placed over the injection site.  The table will be tilted during the process to allow the contrast material to flow to particular areas in the spine.  Following the injection and xrays, you will be taken to the CT scan where more pictures will be taken. After the procedure is finished, the contrast material will be absorbed by your body and eliminated through your kidneys.  The radiologist will study and interpret your myelogram and send the results to your physician.  Procedure risks of a myelogram include, but are not limited to:  *  Bleeding   *  Seizure  *  Infection   *  Headache, possibly severe requiring a blood patch  *  Contrast reaction  *  Nerve or cord injury  *  Paralysis and death    Benefits of the procedure:    Best examination for delineating pathology related to spinal cord compression from a disc and/or nerve root compression  Alternatives to the procedure:MRI - a non invasive procedure requiring intravenous contrast injection. Cannot be done on patients with certain pacemakers or metal in the body.  MRI risks include possible reaction to the contrast material, movement of metal located in the body.   Benefit to MRI:  Non-invasive and  usually painless procedure.  THIS EDUCATION INFORMATION WAS REVIEWED PRIOR TO THE PROCEDURE AND CONSENT. Patient initials __________________Time_________________      Important information following your myelogram:    *  When you get home, lie down with no more than 2 pillows under your head.  *  Sit up in the car going home. At home, sit up to eat and use the restroom only, then lie back down.   *  24 HOURS COMPLETE AT tomorrow, September 9th after noon.  *  Tomorrow, after 24 hours completed, take it easy and rest the next 2-3 days.  *  Do not drive for 24 hours following a myelogram  *  You may remove the bandage and shower in the morning  *  Increase your fluids for the next 24 hours.  Caffeinated drinks are encouraged.Increase your intake of fluids the next 2-3 days    Resume taking your blood thinner (Plavix) or Aspirin on tomorrow, September 9th, 2021.    Resume taking your (Glucophage/Metformin) in 48 hrs. Your next dose will be on: Friday, September 10th.       CALL YOUR PHYSICIAN FOR THE FOLLOWING:  * Pain at the injection site  * Redness, swelling, bruising or drainage at the injection site  * A fever by mouth of 101.0  * Any new symptoms  If you have problems with a headache that is not relieved with rest and medication, please call the Radiology Triage Nurse desk [phone]

## 2021-09-09 ENCOUNTER — TELEPHONE (OUTPATIENT)
Dept: INTERVENTIONAL RADIOLOGY/VASCULAR | Facility: HOSPITAL | Age: 66
End: 2021-09-09

## 2021-10-01 PROBLEM — T45.4X5S ADVERSE EFFECT OF IRON AND ITS COMPOUNDS, SEQUELA: Status: ACTIVE | Noted: 2018-11-27

## 2021-10-04 ENCOUNTER — OFFICE VISIT (OUTPATIENT)
Dept: FAMILY MEDICINE CLINIC | Facility: CLINIC | Age: 66
End: 2021-10-04

## 2021-10-04 VITALS
BODY MASS INDEX: 30.84 KG/M2 | HEIGHT: 77 IN | TEMPERATURE: 97.1 F | HEART RATE: 82 BPM | SYSTOLIC BLOOD PRESSURE: 134 MMHG | DIASTOLIC BLOOD PRESSURE: 82 MMHG | OXYGEN SATURATION: 96 % | WEIGHT: 261.2 LBS | RESPIRATION RATE: 17 BRPM

## 2021-10-04 DIAGNOSIS — E11.65 TYPE 2 DIABETES MELLITUS WITH HYPERGLYCEMIA (HCC): ICD-10-CM

## 2021-10-04 DIAGNOSIS — E66.9 ADIPOSITY: ICD-10-CM

## 2021-10-04 DIAGNOSIS — E78.5 HYPERLIPIDEMIA: ICD-10-CM

## 2021-10-04 DIAGNOSIS — N52.9 MALE ERECTILE DISORDER: ICD-10-CM

## 2021-10-04 DIAGNOSIS — R80.9 MICROALBUMINURIA: ICD-10-CM

## 2021-10-04 DIAGNOSIS — E55.9 VITAMIN D DEFICIENCY: ICD-10-CM

## 2021-10-04 DIAGNOSIS — Z79.4 CONTROLLED TYPE 2 DIABETES MELLITUS WITH COMPLICATION, WITH LONG-TERM CURRENT USE OF INSULIN (HCC): Primary | ICD-10-CM

## 2021-10-04 DIAGNOSIS — I10 ESSENTIAL HYPERTENSION: ICD-10-CM

## 2021-10-04 DIAGNOSIS — E11.8 CONTROLLED TYPE 2 DIABETES MELLITUS WITH COMPLICATION, WITH LONG-TERM CURRENT USE OF INSULIN (HCC): Primary | ICD-10-CM

## 2021-10-04 PROBLEM — H52.13 BILATERAL MYOPIA: Status: ACTIVE | Noted: 2021-06-23

## 2021-10-04 PROBLEM — H40.1133 PRIMARY OPEN-ANGLE GLAUCOMA, BILATERAL, SEVERE STAGE: Status: ACTIVE | Noted: 2021-06-23

## 2021-10-04 PROBLEM — H25.813 COMBINED FORMS OF AGE-RELATED CATARACT, BILATERAL: Status: ACTIVE | Noted: 2021-06-23

## 2021-10-04 PROBLEM — H52.4 PRESBYOPIA: Status: ACTIVE | Noted: 2021-06-23

## 2021-10-04 PROCEDURE — 99214 OFFICE O/P EST MOD 30 MIN: CPT | Performed by: FAMILY MEDICINE

## 2021-10-04 PROCEDURE — 90662 IIV NO PRSV INCREASED AG IM: CPT | Performed by: FAMILY MEDICINE

## 2021-10-04 PROCEDURE — G0008 ADMIN INFLUENZA VIRUS VAC: HCPCS | Performed by: FAMILY MEDICINE

## 2021-10-04 RX ORDER — CARVEDILOL 25 MG/1
TABLET ORAL
Qty: 180 TABLET | Refills: 1 | Status: SHIPPED | OUTPATIENT
Start: 2021-10-04 | End: 2022-01-18 | Stop reason: SDUPTHER

## 2021-10-04 NOTE — PROGRESS NOTES
"Chief Complaint  Establish Care and Diabetes  Previously seen by Dr. Phillips. Pt is new to me, problems are new to me.    Subjective          Isaias Monaco presents to [facility] PRIMARY [facility]  History of Present Illness  DM  He was seen by endo but NP there left.  His BG this am was 58.  He does not check it routinely.   More typically it is 70 in AM and then 2 hours post prandial is about 110s.   He had been off his januvia, jardiance and toujeo.   Got back on these medications, back on for at least 4 months.   Through medicare they were expensive but now out of the donut hole and he is back on.   He would like to see endocrine for his diabetes.     Has back problems, following with MADELIN, had recent myelogram. He also has robaxin on hand   Takes OTCs periodically on bad days.   He has most problem with walking and walking longer distances. Not as much trouble with standing or sitting.     Follows with neuro for his facial twitching. Follows with neuro. He was on clonazepam TID and weaned himself off years ago.   He started on abilify and this seemed to really help.   He has motor tic disorder and anxiety.     Objective   Vital Signs:   /82 (BP Location: Right arm, Patient Position: Sitting, Cuff Size: Adult)   Pulse 82   Temp 97.1 °F (36.2 °C) (Infrared)   Resp 17   Ht 195.6 cm (77\")   Wt 118 kg (261 lb 3.2 oz)   SpO2 96%   BMI 30.97 kg/m²     Physical Exam  Vitals reviewed.   Constitutional:       General: He is not in acute distress.  Eyes:      General: No scleral icterus.        Right eye: No discharge.         Left eye: No discharge.      Conjunctiva/sclera: Conjunctivae normal.   Cardiovascular:      Rate and Rhythm: Normal rate and regular rhythm.      Heart sounds: Normal heart sounds. No murmur heard.      Pulmonary:      Effort: Pulmonary effort is normal. No respiratory distress.      Breath sounds: Normal breath sounds. No wheezing.   Musculoskeletal:      Cervical " back: Neck supple. No muscular tenderness.      Right lower leg: No edema.      Left lower leg: No edema.   Lymphadenopathy:      Cervical: No cervical adenopathy.   Neurological:      Mental Status: He is oriented to person, place, and time.      Motor: No abnormal muscle tone.   Psychiatric:         Behavior: Behavior normal.        Result Review :                 Assessment and Plan    Diagnoses and all orders for this visit:    1. Controlled type 2 diabetes mellitus with complication, with long-term current use of insulin (HCC) (Primary)  -     Hemoglobin A1c  -     CBC & Differential  -     Comprehensive Metabolic Panel  -     Ambulatory Referral to Endocrinology    2. Essential hypertension  -     CBC & Differential  -     Comprehensive Metabolic Panel    Other orders  -     Fluzone High-Dose 65+yrs ([id number])        Follow Up   No follow-ups on file.  Patient was given instructions and counseling regarding his condition or for health maintenance advice. Please see specific information pulled into the AVS if appropriate.     He would like to return to endocrinology. He was seen by Micaela Barfield for years and she is no longer with the practice.   He has had some lower sugars recently. Down to 58. We are going to check his A1c and depending on level we may be able to eliminate a medication. We talked about keeping the metformin and keeping the jardiance. The jardiance and januvia are a big expense so we may be able to eliminate the januvia. The jardiance is good for cardioprotection.     BP is fairly well controlled.   Flu shot today.   Reviewed chart. Pt is seen by MADELIN, cardiology, neurology, pain management, GI.

## 2021-10-04 NOTE — TELEPHONE ENCOUNTER
Rx Refill Note  Requested Prescriptions     Pending Prescriptions Disp Refills   • carvedilol (COREG) 25 MG tablet [Pharmacy Med Name: CARVEDILOL 25 MG TABLET] 120 tablet 0     Sig: TAKE ONE TABLET BY MOUTH TWICE A DAY WITH MEALS      Last office visit with prescribing clinician: Visit date not found      Next office visit with prescribing clinician: Visit date not found            Ankit Brooke MA  10/04/21, 16:58 EDT

## 2021-10-05 ENCOUNTER — OFFICE VISIT (OUTPATIENT)
Dept: NEUROSURGERY | Facility: CLINIC | Age: 66
End: 2021-10-05

## 2021-10-05 VITALS
BODY MASS INDEX: 31.38 KG/M2 | SYSTOLIC BLOOD PRESSURE: 117 MMHG | HEART RATE: 80 BPM | HEIGHT: 77 IN | WEIGHT: 265.8 LBS | TEMPERATURE: 97.1 F | DIASTOLIC BLOOD PRESSURE: 84 MMHG

## 2021-10-05 DIAGNOSIS — M48.061 SPINAL STENOSIS OF LUMBAR REGION WITHOUT NEUROGENIC CLAUDICATION: Primary | ICD-10-CM

## 2021-10-05 DIAGNOSIS — M54.9 BACK PAIN WITHOUT RADICULOPATHY: ICD-10-CM

## 2021-10-05 DIAGNOSIS — M48.20 BAASTRUP'S SYNDROME: ICD-10-CM

## 2021-10-05 LAB
ALBUMIN SERPL-MCNC: 4.7 G/DL (ref 3.5–5.2)
ALBUMIN/GLOB SERPL: 1.6 G/DL
ALP SERPL-CCNC: 63 U/L (ref 39–117)
ALT SERPL-CCNC: 38 U/L (ref 1–41)
AST SERPL-CCNC: 43 U/L (ref 1–40)
BASOPHILS # BLD AUTO: 0.05 10*3/MM3 (ref 0–0.2)
BASOPHILS NFR BLD AUTO: 0.5 % (ref 0–1.5)
BILIRUB SERPL-MCNC: 0.6 MG/DL (ref 0–1.2)
BUN SERPL-MCNC: 14 MG/DL (ref 8–23)
BUN/CREAT SERPL: 15.2 (ref 7–25)
CALCIUM SERPL-MCNC: 9.9 MG/DL (ref 8.6–10.5)
CHLORIDE SERPL-SCNC: 102 MMOL/L (ref 98–107)
CO2 SERPL-SCNC: 25.7 MMOL/L (ref 22–29)
CREAT SERPL-MCNC: 0.92 MG/DL (ref 0.76–1.27)
EOSINOPHIL # BLD AUTO: 0.15 10*3/MM3 (ref 0–0.4)
EOSINOPHIL NFR BLD AUTO: 1.4 % (ref 0.3–6.2)
ERYTHROCYTE [DISTWIDTH] IN BLOOD BY AUTOMATED COUNT: 15.7 % (ref 12.3–15.4)
GLOBULIN SER CALC-MCNC: 2.9 GM/DL
GLUCOSE SERPL-MCNC: 69 MG/DL (ref 65–99)
HBA1C MFR BLD: 6.5 % (ref 4.8–5.6)
HCT VFR BLD AUTO: 50.5 % (ref 37.5–51)
HGB BLD-MCNC: 16.7 G/DL (ref 13–17.7)
IMM GRANULOCYTES # BLD AUTO: 0.05 10*3/MM3 (ref 0–0.05)
IMM GRANULOCYTES NFR BLD AUTO: 0.5 % (ref 0–0.5)
LYMPHOCYTES # BLD AUTO: 2.32 10*3/MM3 (ref 0.7–3.1)
LYMPHOCYTES NFR BLD AUTO: 21.7 % (ref 19.6–45.3)
MCH RBC QN AUTO: 28.9 PG (ref 26.6–33)
MCHC RBC AUTO-ENTMCNC: 33.1 G/DL (ref 31.5–35.7)
MCV RBC AUTO: 87.4 FL (ref 79–97)
MONOCYTES # BLD AUTO: 1 10*3/MM3 (ref 0.1–0.9)
MONOCYTES NFR BLD AUTO: 9.3 % (ref 5–12)
NEUTROPHILS # BLD AUTO: 7.13 10*3/MM3 (ref 1.7–7)
NEUTROPHILS NFR BLD AUTO: 66.6 % (ref 42.7–76)
NRBC BLD AUTO-RTO: 0 /100 WBC (ref 0–0.2)
PLATELET # BLD AUTO: 207 10*3/MM3 (ref 140–450)
POTASSIUM SERPL-SCNC: 4.3 MMOL/L (ref 3.5–5.2)
PROT SERPL-MCNC: 7.6 G/DL (ref 6–8.5)
RBC # BLD AUTO: 5.78 10*6/MM3 (ref 4.14–5.8)
SODIUM SERPL-SCNC: 139 MMOL/L (ref 136–145)
WBC # BLD AUTO: 10.7 10*3/MM3 (ref 3.4–10.8)

## 2021-10-05 PROCEDURE — 99214 OFFICE O/P EST MOD 30 MIN: CPT | Performed by: NEUROLOGICAL SURGERY

## 2021-10-05 RX ORDER — PEN NEEDLE, DIABETIC 31 GX5/16"
NEEDLE, DISPOSABLE MISCELLANEOUS
Qty: 100 EACH | Refills: 3 | Status: SHIPPED | OUTPATIENT
Start: 2021-10-05 | End: 2022-08-13

## 2021-10-05 NOTE — TELEPHONE ENCOUNTER
Rx Refill Note  Requested Prescriptions     Pending Prescriptions Disp Refills   • Insulin Pen Needle ([employer] Pen Needles 31G) 31G X 8 MM misc [Pharmacy Med Name: [employer] PEN NEEDLE 8MM X 31G] 100 each 0     Sig: USE AS DIRECTED TO INJECT TOUJEO      Last office visit with prescribing clinician: Visit date not found      Next office visit with prescribing clinician: 10/4/2021            Carey Ignacio LPN  10/05/21, 08:20 EDT

## 2021-10-13 ENCOUNTER — TELEPHONE (OUTPATIENT)
Dept: NEUROSURGERY | Facility: CLINIC | Age: 66
End: 2021-10-13

## 2021-10-15 DIAGNOSIS — Z79.4 CONTROLLED TYPE 2 DIABETES MELLITUS WITH COMPLICATION, WITH LONG-TERM CURRENT USE OF INSULIN (HCC): ICD-10-CM

## 2021-10-15 DIAGNOSIS — E11.8 CONTROLLED TYPE 2 DIABETES MELLITUS WITH COMPLICATION, WITH LONG-TERM CURRENT USE OF INSULIN (HCC): ICD-10-CM

## 2021-10-15 RX ORDER — INSULIN GLARGINE 300 U/ML
60 INJECTION, SOLUTION SUBCUTANEOUS EVERY MORNING
Qty: 18 ML | Refills: 1 | Status: SHIPPED | OUTPATIENT
Start: 2021-10-15 | End: 2021-11-09 | Stop reason: SDUPTHER

## 2021-10-15 NOTE — TELEPHONE ENCOUNTER
Rx Refill Note  Requested Prescriptions     Pending Prescriptions Disp Refills   • Toujeo SoloStar 300 UNIT/ML solution pen-injector injection 18 mL 1     Sig: Inject 60 Units under the skin into the appropriate area as directed Every Morning.      Last office visit with prescribing clinician: 10/4/2021      Next office visit with prescribing clinician: 4/25/2022            Carey Ignacio LPN  10/15/21, 16:50 EDT

## 2021-10-15 NOTE — TELEPHONE ENCOUNTER
Spoke with Dr Villaseñor's office and informed them the pt wants to switch to the [place] location for pain mgt.  Called pt to make him aware of what is happening.

## 2021-10-15 NOTE — TELEPHONE ENCOUNTER
"PATIENT CALLED [facility] THEY SAID THEY ARE WAITING ON INFO FROM DR. COLBERT OFFICE. PER [facility] REFERRAL \"PATIENT IS CURRENTLY ESTABLISHED WITH THE HOSPITAL GROUP, DO THEY NOT PERFORM MEDIAL BRANCH BLOCKS OR IS DR. VICKERS WANTING PATIENT TO SWITCH TO [facility]?\" PLEASE REVIEW AND RESPOND IN THE PATIENTS REFERRAL.   "

## 2021-10-15 NOTE — TELEPHONE ENCOUNTER
Caller: Isaias Monaco    Relationship: Self      Medication requested (name and dosage): Toujeo SoloStar 300 UNIT/ML solution pen-injector injection    Pharmacy where request should be sent: [facility] - [address] [phone] [facility] [phone]         Additional details provided by patient: PATIENT IS ALMOST OUT OF MEDICATION.  [facility] WAS SUPPOSED TO REACH OUT TO PHYSICIAN HOWEVER TODAY HE WAS TOLD THEY HAD NOT.    PLEASE ADVISE PATIENT    Best call back number:  [phone]     Does the patient have less than a 3 day supply:  [] Yes  [x] No    Dannielle Hay Rep   10/15/21 13:55 EDT

## 2021-11-09 ENCOUNTER — OFFICE VISIT (OUTPATIENT)
Dept: ENDOCRINOLOGY | Age: 66
End: 2021-11-09

## 2021-11-09 VITALS
OXYGEN SATURATION: 99 % | HEIGHT: 77 IN | BODY MASS INDEX: 31.05 KG/M2 | HEART RATE: 80 BPM | SYSTOLIC BLOOD PRESSURE: 142 MMHG | RESPIRATION RATE: 20 BRPM | WEIGHT: 263 LBS | DIASTOLIC BLOOD PRESSURE: 80 MMHG

## 2021-11-09 DIAGNOSIS — E11.8 CONTROLLED TYPE 2 DIABETES MELLITUS WITH COMPLICATION, WITH LONG-TERM CURRENT USE OF INSULIN (HCC): Primary | ICD-10-CM

## 2021-11-09 DIAGNOSIS — E78.5 HYPERLIPIDEMIA, UNSPECIFIED HYPERLIPIDEMIA TYPE: ICD-10-CM

## 2021-11-09 DIAGNOSIS — E55.9 VITAMIN D DEFICIENCY: ICD-10-CM

## 2021-11-09 DIAGNOSIS — R80.9 MICROALBUMINURIA: ICD-10-CM

## 2021-11-09 DIAGNOSIS — Z79.4 CONTROLLED TYPE 2 DIABETES MELLITUS WITH COMPLICATION, WITH LONG-TERM CURRENT USE OF INSULIN (HCC): Primary | ICD-10-CM

## 2021-11-09 PROCEDURE — 99214 OFFICE O/P EST MOD 30 MIN: CPT | Performed by: INTERNAL MEDICINE

## 2021-11-09 RX ORDER — INSULIN GLARGINE 300 U/ML
54 INJECTION, SOLUTION SUBCUTANEOUS EVERY MORNING
Qty: 18 ML | Refills: 1
Start: 2021-11-09 | End: 2022-03-22

## 2021-11-09 NOTE — PATIENT INSTRUCTIONS
Diabetes medication  Take Toujeo U300 54 units every morning.  Note: If morning sugar is less than 80 please decrease total Toujeo dose by 5 units.  Continue decreasing by 5 units every day until morning glucose is at goal of .  Take Jardiance 25 units daily with food  Take pioglitazone 15 mg daily  Discontinue Januvia.  Take Metformin 500 mg 2tablets twice a day

## 2021-11-09 NOTE — ASSESSMENT & PLAN NOTE
Lipid abnormalities are improving with treatment.  Nutritional counseling was provided.  Lipids will be reassessed in 6 months   Labs to be drawn today.

## 2021-11-09 NOTE — PROGRESS NOTES
Chief Complaint  Chief Complaint   Patient presents with   • Diabetes     fup dm2 checks bs 2-3 times weekly last eye exam 08/21  pcp michelle kearns last seen 09/21 avg bs      1. Controlled type 2 diabetes mellitus with complication, with long-term current use of insulin (CMS/[facility])    2. Hypogonadism in male    3. Vitamin D deficiency    4. Hyperlipidemia, unspecified hyperlipidemia type        Subjective          History of Present Illness    Isaias Monaco 66 y.o. presents with Type 2 dm as a new patient to me and an established patient at this endocrine clinic.  Last office visit 12/15/2024 type 2 diabetes, hypogonadal male, vitamin D, hyperlipidemia, CAD      #Type 2 diabetes- Diagnosed about 10 years ago.   Comorbidities include: BMI 30-39, osteoarthritis causing sedentary lifestyle, hypertension, hyperlipidemia, CAD, ED, hypogonadal male 9/20/2016, microalbuminuria, vitamin D deficiency  Today in clinic pt reports being on     Metformin 500 mg 2 tablets  Pioglitazone 15 mg  Toujeo U 300  60 units every morning  Jardiance 25 mg  FBG -48-2 30  -  Checks BG -   2-3 x weekSensor -   Dm retinopathy -none,Last eye exam -8/21/2021  Dm nephropathy -   Dm neuropathy - ,Dm neuropathy meds -none  CAD -history of stents placed  CVA -none  Episodes of hypoglycemia - glucose of 48 mg/dl at colonoscopy . He did not feel poorly. Treated with iv glucose. Still felt the same.   Pt is physically active.  He walks 2 to 3 miles daily.  Weight has been stable within 5 to 10 pounds.  Encourage patient to decrease portions of carbohydrates at least 1 meal a day.   Pt tries to follow DM diet for most part.   #Hypertension: No headaches no CVA no lightheadedness on current therapy.  Patient takes Norvasc 10 mg daily.  No distal edema.  He also takes Coreg, carvedilol 25 mg  #Prevention  : Contraindications: Allergies/side effects: ACE inhibitor's/lisinopril cause angioedema.  Testosterone associate with myalgias.    #Hyperlipidemia:  "Elevated LDL cholesterol on rosuvastatin 40 mg daily and on Zetia 10 mg daily.  Zetia added at last endocrine visit.  He is also on aspirin 81 mg daily.  Will check labs today.  #Vitamin D deficiency: Currently on no supplements.  No kidney stones.  No hypercalcemia    I have reviewed the patient's allergies, medicines, past medical hx, family hx and social hx in detail.    Objective   Vital Signs:   /80 (BP Location: Left arm, Patient Position: Sitting, Cuff Size: Large Adult)   Pulse 80   Resp 20   Ht 195.6 cm (77\")   Wt 119 kg (263 lb)   SpO2 99%   BMI 31.19 kg/m²   Physical Exam   Physical Exam  Vitals and nursing note reviewed.   HENT:      Head: Normocephalic.      Mouth/Throat:      Mouth: Mucous membranes are moist.   Cardiovascular:      Rate and Rhythm: Normal rate.      Pulses: Normal pulses.           Dorsalis pedis pulses are 2+ on the right side and 2+ on the left side.        Posterior tibial pulses are 2+ on the right side and 2+ on the left side.      Heart sounds: Normal heart sounds.   Pulmonary:      Effort: Pulmonary effort is normal.      Breath sounds: Normal breath sounds. No wheezing or rhonchi.   Abdominal:      General: Bowel sounds are normal.      Palpations: Abdomen is soft.   Musculoskeletal:         General: No swelling. Normal range of motion.      Cervical back: Normal range of motion and neck supple.   Feet:      Right foot:      Skin integrity: Skin integrity normal. No callus or dry skin.      Toenail Condition: Right toenails are normal.      Left foot:      Skin integrity: Skin integrity normal. No callus or dry skin.      Toenail Condition: Left toenails are normal.   Skin:     General: Skin is warm and dry.   Neurological:      Mental Status: He is alert and oriented to person, place, and time. Mental status is at baseline.   Psychiatric:         Mood and Affect: Mood normal.         Behavior: Behavior normal.         Judgment: Judgment normal.       Result Review " :       Component   Ref Range & Units 1 mo ago   (10/4/21) 7 mo ago   (4/5/21) 11 mo ago   (12/1/20) 1 yr ago   (6/2/20) 1 yr ago   (3/11/20) 1 yr ago   (12/2/19) 2 yr ago   (1/8/19)   Hemoglobin A1C   4.80 - 5.60 % 6.50 High   7.8 High  R, CM  6.90 High  CM  6.20 High  CM           Office Visit on 10/04/2021   Component Date Value Ref Range Status   • Hemoglobin A1C 10/04/2021 6.50* 4.80 - 5.60 % Final    Comment: Hemoglobin A1C Ranges:  Increased Risk for Diabetes  5.7% to 6.4%  Diabetes                     >= 6.5%  Diabetic Goal                < 7.0%     • WBC 10/04/2021 10.70  3.40 - 10.80 10*3/mm3 Final   • RBC 10/04/2021 5.78  4.14 - 5.80 10*6/mm3 Final   • Hemoglobin 10/04/2021 16.7  13.0 - 17.7 g/dL Final   • Hematocrit 10/04/2021 50.5  37.5 - 51.0 % Final   • MCV 10/04/2021 87.4  79.0 - 97.0 fL Final   • MCH 10/04/2021 28.9  26.6 - 33.0 pg Final   • MCHC 10/04/2021 33.1  31.5 - 35.7 g/dL Final   • RDW 10/04/2021 15.7* 12.3 - 15.4 % Final   • Platelets 10/04/2021 207  140 - 450 10*3/mm3 Final   • Neutrophil Rel % 10/04/2021 66.6  42.7 - 76.0 % Final   • Lymphocyte Rel % 10/04/2021 21.7  19.6 - 45.3 % Final   • Monocyte Rel % 10/04/2021 9.3  5.0 - 12.0 % Final   • Eosinophil Rel % 10/04/2021 1.4  0.3 - 6.2 % Final   • Basophil Rel % 10/04/2021 0.5  0.0 - 1.5 % Final   • Neutrophils Absolute 10/04/2021 7.13* 1.70 - 7.00 10*3/mm3 Final   • Lymphocytes Absolute 10/04/2021 2.32  0.70 - 3.10 10*3/mm3 Final   • Monocytes Absolute 10/04/2021 1.00* 0.10 - 0.90 10*3/mm3 Final   • Eosinophils Absolute 10/04/2021 0.15  0.00 - 0.40 10*3/mm3 Final   • Basophils Absolute 10/04/2021 0.05  0.00 - 0.20 10*3/mm3 Final   • Immature Granulocyte Rel % 10/04/2021 0.5  0.0 - 0.5 % Final   • Immature Grans Absolute 10/04/2021 0.05  0.00 - 0.05 10*3/mm3 Final   • nRBC 10/04/2021 0.0  0.0 - 0.2 /100 WBC Final   • Glucose 10/04/2021 69  65 - 99 mg/dL Final   • BUN 10/04/2021 14  8 - 23 mg/dL Final   • Creatinine 10/04/2021 0.92  0.76 -  1.27 mg/dL Final   • eGFR Non  Am 10/04/2021 82  >60 mL/min/1.73 Final    Comment: GFR Normal >60  Chronic Kidney Disease <60  Kidney Failure <15     • eGFR  Am 10/04/2021 100  >60 mL/min/1.73 Final   • BUN/Creatinine Ratio 10/04/2021 15.2  7.0 - 25.0 Final   • Sodium 10/04/2021 139  136 - 145 mmol/L Final   • Potassium 10/04/2021 4.3  3.5 - 5.2 mmol/L Final   • Chloride 10/04/2021 102  98 - 107 mmol/L Final   • Total CO2 10/04/2021 25.7  22.0 - 29.0 mmol/L Final   • Calcium 10/04/2021 9.9  8.6 - 10.5 mg/dL Final   • Total Protein 10/04/2021 7.6  6.0 - 8.5 g/dL Final   • Albumin 10/04/2021 4.70  3.50 - 5.20 g/dL Final   • Globulin 10/04/2021 2.9  gm/dL Final   • A/G Ratio 10/04/2021 1.6  g/dL Final   • Total Bilirubin 10/04/2021 0.6  0.0 - 1.2 mg/dL Final   • Alkaline Phosphatase 10/04/2021 63  39 - 117 U/L Final   • AST (SGOT) 10/04/2021 43* 1 - 40 U/L Final   • ALT (SGPT) 10/04/2021 38  1 - 41 U/L Final          Assessment and Plan    Problem List Items Addressed This Visit        Other    Microalbuminuria    Overview     Description: Last A1c done July 2014, showing 30% improvement from prior test done in June 2014.         Current Assessment & Plan     Glucose well controlled with HGA1C 6.5%. Evaluate with microalbumine urine test today. No renal failure.         Relevant Orders    Microalbumin / Creatinine Urine Ratio - Urine, Clean Catch    Vitamin D deficiency    Current Assessment & Plan     Monitor with lab draw.          Relevant Orders    Vitamin D 25 Hydroxy    Hyperlipidemia    Current Assessment & Plan     Lipid abnormalities are improving with treatment.  Nutritional counseling was provided.  Lipids will be reassessed in 6 months   Labs to be drawn today.         Relevant Orders    Comprehensive Metabolic Panel    Lipid Panel    TSH    Controlled type 2 diabetes mellitus with complication, with long-term current use of insulin (HCC) - Primary    Current Assessment & Plan     Type 2  "diabetes well-controlled hemoglobin A1c 6.5% without complications of neuropathy nephropathy or retinopathy.  There is coronary artery disease.  Continue current therapy  Return visit 6 months  PLAN   Diabetes medication  Take Toujeo U300 54 units every morning.  Note: If morning sugar is less than 80 please decrease total Toujeo dose by 5 units.  Continue decreasing by 5 units every day until morning glucose is at goal of .  Take Jardiance 25 units daily with food  Discontinue Januvia.  Take Metformin 500 mg 2tablets twice a day         Relevant Medications    Toujeo SoloStar 300 UNIT/ML solution pen-injector injection    Other Relevant Orders    Microalbumin / Creatinine Urine Ratio - Urine, Clean Catch          Interpreted the blood work-up/imaging results performed by the primary care/consulting physician -    Refills sent to pharmacy    Follow Up     Patient was given instructions and counseling regarding her condition or for health maintenance advice. Please see specific information pulled into the AVS if appropriate.       Thank you for asking me to see your patient, Isaias Monaco in consultation.         Jamilah Vela MD  11/09/21      [facility] Dragon / transcription disclaimer:     \"Dictated utilizing Dragon dictation\".         "

## 2021-11-09 NOTE — ASSESSMENT & PLAN NOTE
Type 2 diabetes well-controlled hemoglobin A1c 6.5% without complications of neuropathy nephropathy or retinopathy.  There is coronary artery disease.  Continue current therapy  Return visit 6 months  PLAN   Diabetes medication  Take Toujeo U300 54 units every morning.  Note: If morning sugar is less than 80 please decrease total Toujeo dose by 5 units.  Continue decreasing by 5 units every day until morning glucose is at goal of .  Take Jardiance 25 units daily with food  Discontinue Januvia.  Take Metformin 500 mg 2tablets twice a day

## 2021-11-10 LAB
25(OH)D3+25(OH)D2 SERPL-MCNC: 61.7 NG/ML (ref 30–100)
CHOLEST SERPL-MCNC: 108 MG/DL (ref 100–199)
HDLC SERPL-MCNC: 36 MG/DL
IMP & REVIEW OF LAB RESULTS: NORMAL
LDLC SERPL CALC-MCNC: 46 MG/DL (ref 0–99)
TRIGL SERPL-MCNC: 150 MG/DL (ref 0–149)
TSH SERPL DL<=0.005 MIU/L-ACNC: 1.36 UIU/ML (ref 0.45–4.5)
VLDLC SERPL CALC-MCNC: 26 MG/DL (ref 5–40)

## 2021-11-13 DIAGNOSIS — I10 ESSENTIAL HYPERTENSION: ICD-10-CM

## 2021-11-15 RX ORDER — AMLODIPINE BESYLATE 10 MG/1
TABLET ORAL
Qty: 90 TABLET | Refills: 1 | Status: SHIPPED | OUTPATIENT
Start: 2021-11-15 | End: 2022-01-18 | Stop reason: SDUPTHER

## 2021-11-15 NOTE — TELEPHONE ENCOUNTER
HUB SENT TO INCORRECT OFFICE     Rx Refill Note  Requested Prescriptions     Pending Prescriptions Disp Refills   • amLODIPine (NORVASC) 10 MG tablet [Pharmacy Med Name: amLODIPine BESYLATE 10 MG TAB] 30 tablet 0     Sig: TAKE ONE TABLET BY MOUTH DAILY      Last office visit with prescribing clinician: Visit date not found      Next office visit with prescribing clinician: Visit date not found            Gwen Metz MA/LMR  11/15/21, 08:34 EST

## 2021-12-10 NOTE — PROGRESS NOTES
The patient has a pain history of the following:  Lumbar stenosis   Chronic low back pain    Previous interventions that the patient has received include:   L3-4 Epidural 8/25/21, 7/8/21, 6/2/21 - no benefit     Pain medications include:  Methocarbamol - some relief   Ibuprofen - some relief     Other conservative modalities which the patient reports using include:  Physical Therapy: yes - helped some   Chiropractor: no  Massage Therapy: no  TENS: no  Neck or back surgery: no  Past pain management: yes    Past Significant Surgical History:  Right knee replacement     HPI:       CHIEF COMPLAINT: Back Pain    Isaias Monaco is a 66 y.o. male referred here by Marshal Miguel MD. Isaias Monaco presents to the office for evaluation and treatment of Back Pain      Onset:  Over a year  Inciting Event:  None  Location:  Low back  Pain: Pain described as sharp and pressure. Located across the low back and does not radiate into the lower extremities.  Severity:  Pain rated as a 0 /10.  Apportions pain as 100%  back pain and 0% extremity pain.  Symptoms have been episodic.  Exacerbation:  Walking and standing for prolonged periods of time.   Alleviation:  Resting, sleeping, sitting.  Associated Symptoms:   He denies any new onset of bowel or bladder weakness, significant leg weakness or saddle anesthesia. Denies balance problems or lower extremity incoordination.  Ambulates: Without assistive device  Limitations: This pain limits the patient from walking and traveling like he wants.        PEG Assessment   What number best describes your pain on average in the past week?6  What number best describes how, during the past week, pain has interfered with your enjoyment of life?9  What number best describes how, during the past week, pain has interfered with your general activity?  9        Current Outpatient Medications:   •  amLODIPine (NORVASC) 10 MG tablet, TAKE ONE TABLET BY MOUTH DAILY, Disp: 90 tablet, Rfl: 1  •   ARIPiprazole (ABILIFY) 2 MG tablet, Take 3 mg by mouth Daily., Disp: , Rfl:   •  aspirin 81 MG EC tablet, Take 1 tablet by mouth Daily. get over the counter, Disp: 30 tablet, Rfl: 3  •  Blood Glucose Monitoring Suppl ([employer] BLOOD GLUCOSE) kit, TEST AS DIRECTED, Disp: 1 each, Rfl: 0  •  carvedilol (COREG) 25 MG tablet, TAKE ONE TABLET BY MOUTH TWICE A DAY WITH MEALS, Disp: 180 tablet, Rfl: 1  •  clonazePAM (KlonoPIN) 0.5 MG tablet, Take 1 tablet by mouth 3 (Three) Times a Day As Needed (Facial twitching)., Disp: 60 tablet, Rfl: 0  •  clopidogrel (PLAVIX) 75 MG tablet, Take 1 tablet by mouth Daily., Disp: 90 tablet, Rfl: 3  •  dorzolamide-timolol (COSOPT) 22.3-6.8 MG/ML ophthalmic solution, Apply 1 drop to eye(s) to both eyes 2 (Two) Times a Day., Disp: 20 mL, Rfl: 3  •  empagliflozin (Jardiance) 25 MG tablet tablet, Take 1 tablet by mouth Daily., Disp: 90 tablet, Rfl: 0  •  escitalopram (LEXAPRO) 20 MG tablet, Take 1 tablet by mouth Daily., Disp: 90 tablet, Rfl: 3  •  esomeprazole (nexIUM) 40 MG capsule, Take 1 capsule by mouth Daily., Disp: 90 capsule, Rfl: 3  •  ezetimibe (Zetia) 10 MG tablet, Take 1 tablet by mouth Daily., Disp: 90 tablet, Rfl: 3  •  famotidine (PEPCID) 20 MG tablet, Take 1 tablet by mouth Daily With Dinner., Disp: 90 tablet, Rfl: 3  •  glucose blood test strip, Use 4 times a day, Disp: 400 each, Rfl: 0  •  Insulin Pen Needle ([employer] Pen Needles 31G) 31G X 8 MM misc, USE AS DIRECTED TO INJECT TOUJEO, Disp: 100 each, Rfl: 3  •  Lancets (FREESTYLE) lancets, Use to test BG 4 times daily, Disp: 400 each, Rfl: 1  •  latanoprost (XALATAN) 0.005 % ophthalmic solution, Apply 1 drop to  each eye Daily. (Patient taking differently: Apply 1 drop to eye(s) as directed by provider Every Night.), Disp: 7.5 mL, Rfl: 3  •  metFORMIN (GLUCOPHAGE) 500 MG tablet, Take 2 tablets by mouth 2 (Two) Times a Day With Meals., Disp: 360 tablet, Rfl: 3  •  methocarbamol (Robaxin) 500 MG tablet, Take 1 tablet by mouth 3  (Three) Times a Day As Needed for Muscle Spasms., Disp: 45 tablet, Rfl: 0  •  naproxen (Naprosyn) 500 MG tablet, Take 1 tablet by mouth 2 (Two) Times a Day With Meals. (Patient taking differently: Take 500 mg by mouth As Needed for Mild Pain .), Disp: 30 tablet, Rfl: 0  •  pioglitazone (ACTOS) 15 MG tablet, Take 1 tablet by mouth Daily., Disp: 90 tablet, Rfl: 3  •  rosuvastatin (CRESTOR) 40 MG tablet, Take 1 tablet by mouth Daily., Disp: 90 tablet, Rfl: 3  •  sildenafil (VIAGRA) 50 MG tablet, Take 1 tablet by mouth Daily As Needed for erectile dysfunction., Disp: 6 tablet, Rfl: 5  •  Toujeo SoloStar 300 UNIT/ML solution pen-injector injection, Inject 54 Units under the skin into the appropriate area as directed Every Morning., Disp: 18 mL, Rfl: 1  •  vitamin D (ERGOCALCIFEROL) 1.25 MG (64244 UT) capsule capsule, Take 1 capsule by mouth 1 (One) Time Per Week., Disp: 12 capsule, Rfl: 3    The following portions of the patient's history were reviewed and updated as appropriate: allergies, current medications, past family history, past medical history, past social history, past surgical history and problem list.      REVIEW OF PERTINENT MEDICAL DATA    9/8/21 CT MYELOGRAM OF THORACIC AND LUMBAR SPINE      HISTORY: Evaluate stenosis     COMPARISON: IR myelogram and radiographs same day. MRI lumbar  02/09/2021. CT chest 04/17/2020.     TECHNIQUE:  2 mm axial images were acquired through the thoracic and  lumbar spine (with partially included cervical spine) after intrathecal  contrast instillation, reformatted axial, coronal, and sagittal  reconstructed images were also reviewed. Radiation dose reduction  techniques were utilized, including automated exposure control and  exposure modulation based on body size.     FINDINGS:     Preserved lumbar lordosis thoracic kyphosis. Disc height loss L3-S1,  most pronounced at L5-S1 with prominent osteophyte formation. Large  Schmorl's node in inferior endplate of L3. Grade 1  anterolisthesis L4 on  L5. 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 Conus medullaris terminates at  L1-2. Redemonstrated enlarged left thyroid lobe.     C2-3: Left sided neuroforaminal narrowing secondary to facet arthropathy  may be significant.     C4-5: Central disc protrusion with osteophytes (versus partially  ossified hypertrophic posterior longitudinal ligament) compressing  spinal cord; minimum AP thecal sac diameter 7 mm.     C5-6: Central disc protrusion abutting possibly slightly deforming the  spinal cord; retained posterior thecal space. Right neuroforaminal  narrowing may be significant.      C6-7: Centrally prominent disc bulging.     T2-3: Partially calcified central disc protrusion abutting and  apparently slightly deflecting/flattening spinal cord with ample  posterior thecal space remaining.     T3-4: Right dominant disc bulging partially effacing anterior thecal  sac.     T10-11: Relative thecal sac narrowing due to mild disc bulging and  prominent epidural fat.     T11-12: Mild disc bulging. Right intraforaminal disc protrusion of  uncertain significance.     T12-L1: Bulging disc osteophyte complex effacing anterior thecal sac  with adequate posterior thecal space remaining. Right facet arthropathy.  Bilateral neuroforaminal narrowing of uncertain significance.     L1-2: Mild disc bulging. Moderate appearing neuroforaminal narrowing.     L2-3: Thecal sac narrowing secondary to bulky facet joints and prominent  ligamenta flava and epidural fat as well as lateral disc bulging on both  sides (partially calcified on right) with minimum thecal sac diameters  of 10 mm AP and 13 mm transverse. Moderate bilateral neuroforaminal  narrowing, worse appearing right.     L3-4: Thecal sac compression secondary to prominent broad-based  calcified disc protrusion in combination with facet arthropathy and  ligamenta flava hypertrophy. There is crowding of cauda equina nerve  roots with obliteration of thecal space;  compressive effect is also  suggested by nerve root redundancy below this level. Minimum thecal sac  diameter approximately 8 mm AP x 12 mm transverse. Moderate bilateral  neuroforaminal narrowing; proximity of exited left nerve to disc  material.     L4-5: Anterolisthesis with bulging uncovered disc, prominent anterior  epidural fat, as well as facet arthropathy cause thecal sac narrowing  without nerve root compression; minimum diameter approximately 10 mm AP  x 11 mm transverse just above the disc space. A calcified right  protrusion component is in proximity to the traversing right S1 nerve  root. Severe appearing right neuroforaminal narrowing with possible  nerve root compression. Moderate to severe left neuroforaminal narrowing  with proximity of exiting nerve root to disc material.     L5-S1: Prominently bulging disc osteophyte complex. Left worse than  right facet arthropathy. Ample thecal sac diameter. Moderate to severe  right neuroforaminal narrowing. Severe left neuroforaminal narrowing  with suspected nerve root compression.        IMPRESSION:     1. Central disc osteophyte complex compressing spinal cord at C4-5.      2. Central disc protrusion abutting spinal cord at T2-3.      3. Thecal sac compression at L3-4 secondary to broad-based calcified  disc protrusion.     4. Multilevel neuroforaminal narrowing which is most severe at L5-S1 on  the left.     5. See above for details and all findings.     This report was finalized on 9/14/2021 10:14 AM by Dr. Chirag Diallo M.D.    9/8/21 PROCEDURE:  XR SCOLIOSIS COMPLETE INCLUDING SUPINE AND ERECT-, XR SPINE  LUMBAR COMPLETE W FLEX EXT-     HISTORY: 66 years-old Male with lumbar stenosis, bilateral low back  pain, evaluate for scoliosis or kyphosis.     COMPARISON: CT thoracic and lumbar spine myelogram 9/8/2021. MRI lumbar  spine without contrast 2/9/2021.     FINDINGS: 8 views of the lumbar spine and a single frontal view of the  thoracic spine were  obtained. A lateral view of the thoracic spine was  not obtained. Therefore, thoracic kyphosis cannot be assessed. There is  no significant scoliotic curvature of the thoracolumbar spine. There is  a normal lumbar lordosis. There is no significant change in lumbar  lateral alignment with flexion or extension. There is trace  anterolisthesis of L4 on L5. Lumbar vertebral body heights are  maintained. There is multilevel degenerative disc disease, consisting of  mild disc height loss, small degenerative endplate osteophytes, and  facet osteoarthropathy at multiple levels. There is calcific  atherosclerosis.        IMPRESSION:     Multilevel degenerative disc disease. No significant scoliotic curvature  of the thoracolumbar spine. Please refer to the concurrent CT myelogram  report for additional details.        This report was finalized on 9/8/2021 4:13 PM by Dr. Roslyn Lopez M.D.    2/9/21 MRI LUMBAR SPINE WITHOUT CONTRAST     HISTORY: Back pain.     COMPARISON: None.     FINDINGS: Moderate disc desiccation is present from L3 to S1. There is  moderate loss of disc height at L4-L5 and L5-S1. Grade 1 anterolisthesis  of L4 upon L5 is appreciated estimated to be approximately 4 mm. The  conus is at L1-L2, and caudal aspect of the spinal cord appears  unremarkable.     L1-L2: There is a mild broad-based disc osteophyte complex with no  evidence of herniation.     L2-L3: A broad-based disc osteophyte complex is present which results in  mild flattening of the ventral surface of the thecal sac. Mild facet  degenerative disease is present bilaterally. Small hemangioma is noted  involving the superior aspect of the L3 vertebral body posterolaterally  to the left.      L3-L4: Mild facet degenerative disease is present bilaterally. A  broad-based central disc osteophyte complex is present which, when  combined with posterior element degenerative disease and somewhat  congenitally shortened pedicles, results in moderate canal  stenosis.  Mild neural foraminal compromise is present bilaterally secondary to  extension of disc osteophyte complex into the neural foramen.     L4-L5: Moderate facet degenerative disease is present bilaterally, more  prominent on the right. A central disc osteophyte complex is present  which results in mild-to-moderate canal stenosis. Moderate neural  foraminal compromise is present on the right and there is mild neural  foraminal compromise on the left secondary to extension of a disc  osteophyte complex into the neural foramen and to facet hypertrophy.     L5-S1: Mild-to-moderate facet degenerative disease is present  bilaterally. Central disc osteophyte complex is present which results in  mild flattening of ventral surface of the thecal sac. Mild neural  foraminal compromise is present on the right. Neural foraminal  compromise is more prominent on the left where it is moderate, if not  moderate-to-severe secondary to loss of disc height and extension of  disc osteophyte complex into the neural foramen. Anterior bridging  osteophyte is noted.     IMPRESSION:  1. No evidence of focal herniation.   2. Multilevel degenerative disease involving the lumbar spine is noted  as described in detail above including anterior bridging osteophyte at  L5-S1, loss of disc height and disc desiccation at multiple levels,  congenitally shortened pedicles, neural foraminal compromise, spinal  stenosis. Spinal stenosis is most prominent at L3-L4 where it is  moderate. Moderate neural foraminal compromise is present to the left at  L5-S1 and to the right at L4-L5. See above.     This report was finalized on 2/10/2021 9:14 AM by Dr. Anton Perkins M.D.         10/4/21 Creatinine 0.92, Platelets 207 (10*3)    Review of Systems   Constitutional: Positive for activity change (decreased) and fatigue. Negative for chills and fever.   HENT: Negative for congestion.    Eyes: Negative for visual disturbance.   Respiratory: Negative for  "shortness of breath.    Cardiovascular: Negative for chest pain.   Gastrointestinal: Positive for diarrhea. Negative for abdominal pain and constipation.   Genitourinary: Negative for difficulty urinating and dysuria.   Musculoskeletal: Positive for back pain.   Neurological: Negative for dizziness, weakness, light-headedness, numbness and headaches.   Psychiatric/Behavioral: Negative for agitation, self-injury, sleep disturbance and suicidal ideas. The patient is not nervous/anxious.      I have reviewed and confirmed the accuracy of the ROS as documented by the MA/LPN/RN Christina Villaseñor MD      Vitals:    12/14/21 1252   BP: 133/83   Pulse: 81   SpO2: 93%   Weight: 120 kg (264 lb 3.2 oz)   Height: 195.6 cm (77\")   PainSc: 0-No pain         Objective   Physical Exam  Vitals reviewed.   Constitutional:       General: He is not in acute distress.  Pulmonary:      Effort: Pulmonary effort is normal. No respiratory distress.   Musculoskeletal:      Comments: Ambulation: Without assistive device  Lumbar Exam:  Appearance: Scoliotic curve absent and scarring absent  Palpated over lumbosacral paravertebral regions and transverse processes with negative tenderness appreciated, Bilateral.   Sacroiliac joints are not tender, Bilateral.  Trochanteric bursa are not tender, Bilateral.  Straight leg raise is negative radiculopathy, Bilateral.  Slump test is negative  radiculopathy, Bilateral.  Facet loading is positive for pain, Bilateral.  Paraspinal/adjacent lumbar musculature are not tender to palpation, Bilateral.  Jayson Cecilia's test is negative sacroiliac pain, Bilateral.   Skin:     General: Skin is warm and dry.   Neurological:      General: No focal deficit present.      Mental Status: He is alert.      Sensory: No sensory deficit.      Deep Tendon Reflexes:      Reflex Scores:       Patellar reflexes are 2+ on the right side and 2+ on the left side.  Psychiatric:         Mood and Affect: Mood normal.         Thought " Content: Thought content normal.         Assessment/Plan   Diagnoses and all orders for this visit:    1. Spondylosis of lumbar region without myelopathy or radiculopathy (Primary)  -     Case Request    2. Lumbar facet arthropathy  -     Case Request        - Pertinent labs reviewed.   - Pertinent imaging reviewed.   - May consider trying Voltaren gel otc for pain.   - He asked about celebrex today.  Explained risk of interaction with Plavix as well as increased risk of CAD, which he currently has.  Recommend avoiding Celebrex.   - Will schedule for bilateral ~L4-S1 Medial branch blocks diagnostic x2.  If good results, plan for Radiofrequency ablation.  Risks discussed including but not limited to bleeding, bruising, infection, damage to surrounding structures, headache, and rare things such as being paralyzed, seizure, stroke, heart attack and death.     --- Follow-up for bilateral ~L4-S1 Medial branch blocks diagnostic x2 and office visit after.          While examining this patient, I wore protective equipment including a mask, eye shield and gloves.  I washed my hands before and after this patient encounter.  The patient wore a mask throughout the visit as well.     Christina Villaseñor MD  Pain Management

## 2021-12-14 ENCOUNTER — PREP FOR SURGERY (OUTPATIENT)
Dept: SURGERY | Facility: SURGERY CENTER | Age: 66
End: 2021-12-14

## 2021-12-14 ENCOUNTER — TRANSCRIBE ORDERS (OUTPATIENT)
Dept: SURGERY | Facility: SURGERY CENTER | Age: 66
End: 2021-12-14

## 2021-12-14 ENCOUNTER — OFFICE VISIT (OUTPATIENT)
Dept: PAIN MEDICINE | Facility: CLINIC | Age: 66
End: 2021-12-14

## 2021-12-14 VITALS
DIASTOLIC BLOOD PRESSURE: 83 MMHG | SYSTOLIC BLOOD PRESSURE: 133 MMHG | WEIGHT: 264.2 LBS | BODY MASS INDEX: 31.2 KG/M2 | HEIGHT: 77 IN | OXYGEN SATURATION: 93 % | HEART RATE: 81 BPM

## 2021-12-14 DIAGNOSIS — M47.816 LUMBAR FACET ARTHROPATHY: ICD-10-CM

## 2021-12-14 DIAGNOSIS — Z41.9 SURGERY, ELECTIVE: Primary | ICD-10-CM

## 2021-12-14 DIAGNOSIS — M47.816 SPONDYLOSIS OF LUMBAR REGION WITHOUT MYELOPATHY OR RADICULOPATHY: Primary | ICD-10-CM

## 2021-12-14 PROCEDURE — 99204 OFFICE O/P NEW MOD 45 MIN: CPT | Performed by: ANESTHESIOLOGY

## 2021-12-14 RX ORDER — SODIUM CHLORIDE 0.9 % (FLUSH) 0.9 %
10 SYRINGE (ML) INJECTION EVERY 12 HOURS SCHEDULED
Status: CANCELLED | OUTPATIENT
Start: 2021-12-14

## 2021-12-14 RX ORDER — SODIUM CHLORIDE 0.9 % (FLUSH) 0.9 %
10 SYRINGE (ML) INJECTION AS NEEDED
Status: CANCELLED | OUTPATIENT
Start: 2021-12-14

## 2021-12-14 NOTE — PATIENT INSTRUCTIONS
"Try Voltaren gel for your back     What to expect if we're setting up an injection/procedure    - I have placed the order today, we'll start speaking to your insurance for authorization (this can sometimes take a few weeks).   - You should be scheduled for your procedure before you leave the office.  If you were not, please call our office to schedule.   - A COVID test may be required for your procedure.  We will let you know if this needs to be scheduled.  - LIGHT Intravenous (IV) sedation is offered for some procedures: you will NOT be put to sleep.  If you plan to have sedation, do not eat or drink anything on the day of your injection.   - Most procedures require having someone drive you.  Please make sure you arrange a  unless told otherwise.   - If you take a blood thinner and you were not instructed whether to continue or hold it, please contact us with any questions.       Radiofrequency ablation (RFA):     - Radiofrequency ablation is a term used to describe cauterization or \"burning.\"   - In pain management, we can use this technique with a special needle to target and destroy areas that are causing your pain.   - In most cases, you must have TWO successful \"test injections\" before you are a candidate for RFA.    After your RFA:   - Because heat is used in this technique, it is common to have soreness after the procedure.  Sometimes \"neuritis\" occurs, which feels like tingling, prickly, or sunburn under the skin sensations.   - Ice packs are helpful in decreasing this soreness and preventing post-procedure \"neuritis\" pain.  Use an ice pack for 20 minutes at a time at least 3 times the day of and the day after your procedure.   - It is common to have arm/leg numbness or weakness the day of your procedure, but this should wear off by the following day.   - It may take up to 6 weeks to gain full benefit from this procedure.    "

## 2021-12-17 ENCOUNTER — HOSPITAL ENCOUNTER (OUTPATIENT)
Dept: GENERAL RADIOLOGY | Facility: SURGERY CENTER | Age: 66
Setting detail: HOSPITAL OUTPATIENT SURGERY
End: 2021-12-17

## 2021-12-17 ENCOUNTER — HOSPITAL ENCOUNTER (OUTPATIENT)
Facility: SURGERY CENTER | Age: 66
Setting detail: HOSPITAL OUTPATIENT SURGERY
Discharge: HOME | End: 2021-12-17
Attending: ANESTHESIOLOGY | Admitting: ANESTHESIOLOGY

## 2021-12-17 VITALS
DIASTOLIC BLOOD PRESSURE: 84 MMHG | TEMPERATURE: 96.3 F | BODY MASS INDEX: 31.17 KG/M2 | SYSTOLIC BLOOD PRESSURE: 123 MMHG | HEART RATE: 73 BPM | OXYGEN SATURATION: 96 % | WEIGHT: 264 LBS | HEIGHT: 77 IN | RESPIRATION RATE: 16 BRPM

## 2021-12-17 DIAGNOSIS — Z41.9 SURGERY, ELECTIVE: ICD-10-CM

## 2021-12-17 PROCEDURE — 77002 NEEDLE LOCALIZATION BY XRAY: CPT

## 2021-12-17 PROCEDURE — 3E0T33Z INTRODUCTION OF ANTI-INFLAMMATORY INTO PERIPHERAL NERVES AND PLEXI, PERCUTANEOUS APPROACH: ICD-10-PCS | Performed by: ANESTHESIOLOGY

## 2021-12-17 PROCEDURE — 64494 INJ PARAVERT F JNT L/S 2 LEV: CPT | Performed by: ANESTHESIOLOGY

## 2021-12-17 PROCEDURE — 64493 INJ PARAVERT F JNT L/S 1 LEV: CPT | Performed by: ANESTHESIOLOGY

## 2021-12-17 PROCEDURE — 3E0T3BZ INTRODUCTION OF ANESTHETIC AGENT INTO PERIPHERAL NERVES AND PLEXI, PERCUTANEOUS APPROACH: ICD-10-PCS | Performed by: ANESTHESIOLOGY

## 2021-12-17 RX ORDER — BUPIVACAINE HYDROCHLORIDE 7.5 MG/ML
INJECTION, SOLUTION EPIDURAL; RETROBULBAR AS NEEDED
Status: DISCONTINUED | OUTPATIENT
Start: 2021-12-17 | End: 2021-12-17 | Stop reason: HOSPADM

## 2021-12-21 ENCOUNTER — TELEPHONE (OUTPATIENT)
Dept: PAIN MEDICINE | Facility: CLINIC | Age: 66
End: 2021-12-21

## 2021-12-21 NOTE — TELEPHONE ENCOUNTER
Caller: JOSE KOTHARI    Relationship to patient: WIFE    Best call back number: [phone]    Type of visit: ABLATION    Requested date: BEFORE 1/18/21    Additional notes:PATIENT TRYING TO SCHEDULE A TEST, CONSULTATION, AND ABLATION BEFORE A TRIP THEY LEAVE FOR ON JAN 18. PLEASE ADVISE

## 2021-12-21 NOTE — TELEPHONE ENCOUNTER
You can double book him with me after his 2nd diagnostic injection the week of Ad 3 if you can find a slot.  Once I see him, we'll schedule his RFA.

## 2021-12-22 NOTE — TELEPHONE ENCOUNTER
Caller: JOSE KOTHARI     Relationship to patient: WIFE    Best call back number: [phone]    Chief complaint: BRANCH BLOCK TEST AND ABLATION    Type of visit: BRANCH BLOCK TEST AND ABLATION    Requested date: 12/29-01/14 PATIENT WILL BE AVAILABLE     If rescheduling, when is the original appointment: NA     Additional notes:NA

## 2021-12-28 ENCOUNTER — TELEPHONE (OUTPATIENT)
Dept: NEUROSURGERY | Facility: CLINIC | Age: 66
End: 2021-12-28

## 2021-12-28 NOTE — TELEPHONE ENCOUNTER
Caller: Micaela Monaco    Relationship to patient: Emergency Contact    Best call back number:[phone]    Chief complaint:CHANGE APPT.     Type of visit: FOLLOW UP    Requested date:A FEW WEEKS AFTER 01/10/21     If rescheduling, when is the original appointment: 01/10/21    Additional notes:PTS WIFE CALLED AND STATES THAT HER  IS SUPPOSED TO HAVE A NERVE ABLATION COMPLETED ON 01/10/21-THEY WOULD LIKE TO RESCHEDULE THE APPT. WITH  FOR A COUPLE WEEKS OUT TO SEE HOW THE PT IS FEELING-HUB CAN NOT SCHEDULE FOR -PLEASE ADVISE THANK YOU!

## 2021-12-29 ENCOUNTER — PREP FOR SURGERY (OUTPATIENT)
Dept: SURGERY | Facility: SURGERY CENTER | Age: 66
End: 2021-12-29

## 2021-12-29 DIAGNOSIS — M47.816 LUMBAR FACET ARTHROPATHY: Primary | ICD-10-CM

## 2021-12-29 DIAGNOSIS — M47.816 SPONDYLOSIS OF LUMBAR REGION WITHOUT MYELOPATHY OR RADICULOPATHY: ICD-10-CM

## 2021-12-29 RX ORDER — SODIUM CHLORIDE 0.9 % (FLUSH) 0.9 %
10 SYRINGE (ML) INJECTION EVERY 12 HOURS SCHEDULED
Status: CANCELLED | OUTPATIENT
Start: 2021-12-29

## 2021-12-29 RX ORDER — SODIUM CHLORIDE 0.9 % (FLUSH) 0.9 %
10 SYRINGE (ML) INJECTION AS NEEDED
Status: CANCELLED | OUTPATIENT
Start: 2021-12-29

## 2022-01-03 ENCOUNTER — HOSPITAL ENCOUNTER (OUTPATIENT)
Facility: SURGERY CENTER | Age: 67
Setting detail: HOSPITAL OUTPATIENT SURGERY
Discharge: HOME | End: 2022-01-03
Attending: ANESTHESIOLOGY | Admitting: ANESTHESIOLOGY

## 2022-01-03 ENCOUNTER — HOSPITAL ENCOUNTER (OUTPATIENT)
Dept: GENERAL RADIOLOGY | Facility: SURGERY CENTER | Age: 67
Setting detail: HOSPITAL OUTPATIENT SURGERY
End: 2022-01-03

## 2022-01-03 VITALS
HEIGHT: 77 IN | WEIGHT: 255 LBS | BODY MASS INDEX: 30.11 KG/M2 | HEART RATE: 69 BPM | TEMPERATURE: 97.5 F | SYSTOLIC BLOOD PRESSURE: 127 MMHG | RESPIRATION RATE: 17 BRPM | DIASTOLIC BLOOD PRESSURE: 70 MMHG | OXYGEN SATURATION: 98 %

## 2022-01-03 DIAGNOSIS — Z41.9 SURGERY, ELECTIVE: ICD-10-CM

## 2022-01-03 PROCEDURE — 3E0T3BZ INTRODUCTION OF ANESTHETIC AGENT INTO PERIPHERAL NERVES AND PLEXI, PERCUTANEOUS APPROACH: ICD-10-PCS | Performed by: ANESTHESIOLOGY

## 2022-01-03 PROCEDURE — 64493 INJ PARAVERT F JNT L/S 1 LEV: CPT | Performed by: ANESTHESIOLOGY

## 2022-01-03 PROCEDURE — 76000 FLUOROSCOPY <1 HR PHYS/QHP: CPT

## 2022-01-03 PROCEDURE — 64494 INJ PARAVERT F JNT L/S 2 LEV: CPT | Performed by: ANESTHESIOLOGY

## 2022-01-03 PROCEDURE — 77002 NEEDLE LOCALIZATION BY XRAY: CPT

## 2022-01-03 RX ORDER — SODIUM CHLORIDE 0.9 % (FLUSH) 0.9 %
10 SYRINGE (ML) INJECTION EVERY 12 HOURS SCHEDULED
Status: CANCELLED | OUTPATIENT
Start: 2022-01-03

## 2022-01-03 RX ORDER — SODIUM CHLORIDE 0.9 % (FLUSH) 0.9 %
10 SYRINGE (ML) INJECTION AS NEEDED
Status: CANCELLED | OUTPATIENT
Start: 2022-01-03

## 2022-01-03 NOTE — OP NOTE
Bilateral L4-S1 Lumbar Medial Branch Blockade  [facility]      PREOPERATIVE DIAGNOSIS:  Lumbar spondylosis without myelopathy    POSTOPERATIVE DIAGNOSIS:  Lumbar spondylosis without myelopathy    PROCEDURE:   Diagnostic Lumbar Medial Branch Nerve Blockades, with fluoroscopy:  at the L4, L5 transverse processes and the sacral alar groove)   1. 43250-94 -- Lumbar Facet blocks, 1st Level  2. 48410-33 -- Lumbar Facet blocks, 2nd  Level     PRE-PROCEDURE DISCUSSION WITH PATIENT:    Risks and complications were discussed with the patient prior to starting the procedure and informed consent was obtained.      SURGEON:  Christina Villaseñor MD    REASON FOR PROCEDURE:    The patient complains of pain that seems to have a significant axial component and Previous diagnostic positivity of a Lumbar Medial Branch Blockade at the same levels    SEDATION:  Patient declined administration of moderate sedation    ANESTHETIC:  0.25% bupivacaine (0.5% bupivacaine diluted with normal saline)  STEROID:  None  TOTAL VOLUME OF SOLUTION:  6ml    DESCRIPTON OF PROCEDURE:  After obtaining informed consent, IV access was not obtained in the preoperative area.   The patient was taken to the operating room.  The patient was placed in the prone position with a pillow under the abdomen. All pressure points were well padded.  EKG, blood pressure, and pulse oximeter were monitored.  The patient was monitored and sedated by the RN under my direction. The lumbosacral area was prepped with Chloraprep and draped in a sterile fashion.     AP fluoroscopic image was used to visualize the junction of the right S1 superior articular process with the sacral ala.  The skin and subcutaneous tissue over the area was anesthetized with 1% lidocaine.  A 22-gauge spinal needle was then advanced percutaneously through the anesthetized skin tract under fluoroscopic guidance in a coaxial view to contact periosteum.  After negative aspiration, a volume  of 1 mL of the local anesthetic was injected without resistance.  The image was then optimized to maximize visualization of the junctions of the L4, L5 superior articular processes with the transverse processes.  The skin and subcutaneous tissue over the areas was anesthetized with 1% lidocaine.  A 22-gauge spinal needle was then advanced percutaneously through the anesthetized skin tracts under fluoroscopic guidance in a coaxial view to contact periosteum at the target sites.  After negative aspiration, a volume of 1 mL of the local anesthetic  was injected without resistance at each of the target sites.      The same procedure was then performed on the contralateral side in the exact same fashion.        Onset of analgesia was noted.  Vital signs remained stable throughout.      ESTIMATED BLOOD LOSS:  <5 mL  SPECIMENS:  none    COMPLICATIONS:   No complications were noted.    TOLERANCE & DISCHARGE CONDITION:    The patient tolerated the procedure well.  The patient was transported to the recovery area without difficulties.  The patient was discharged to home under the care of family in stable and satisfactory condition.    Pre-procedure pain score: 8/10  Post-procedure pain score: 0/10    PLAN OF CARE:  1. The patient was given our standard instruction sheet.  2. We discussed that Lumbar Medial Branch Blockade is a diagnostic procedure in consideration for radiofrequency ablation if two diagnostic procedures prove to be positive for significant benefit.  An alternative plan could be therapeutic lumbar branch blockades.  3. The patient is asked to keep an account of pain relief after the procedure today.  4. The patient will  Return to clinic 1 wk.  5. The patient will resume all medications as per the medication reconciliation sheet.

## 2022-01-03 NOTE — PROGRESS NOTES
The patient has a pain history of the following:  Lumbar stenosis   Lumbar spondylosis  Lumbar facet arthropathy   Chronic low back pain     Previous interventions that the patient has received include:   Bilateral L4-S1 Medial branch blocks 1/3/22, 12/17/21  L3-4 Epidural 8/25/21, 7/8/21, 6/2/21 - no benefit      Pain medications include:  Methocarbamol - some relief   Ibuprofen - some relief      Other conservative modalities which the patient reports using include:  Physical Therapy: yes - helped some   Chiropractor: no  Massage Therapy: no  TENS: no  Neck or back surgery: no  Past pain management: yes     Past Significant Surgical History:  Right knee replacement     HPI:     CHIEF COMPLAINT Back Pain  F/U back pain-LUMBAR MEDIAL BRANCH BLOCK bilateral ~L4-S1 x2-  Patient states that his pain was improved 80% for 3 hours with both procedures.   Marie Monaco is a 66 y.o. male  who presents to the office for follow-up of procedure.  He completed a Bilateral L4-S1 Medial branch blocks 1/3/22, 12/17/21 for management of chronic low back pain. Patient reports 80% relief from the procedure for 3 hours.     He continues to have pain across the low back that is aching and pressure in description.  It does not radiate into his legs.         PEG Assessment   What number best describes your pain on average in the past week?6  What number best describes how, during the past week, pain has interfered with your enjoyment of life?9  What number best describes how, during the past week, pain has interfered with your general activity?  9    REVIEW OF PERTINENT MEDICAL DATA  No new     The following portions of the patient's history were reviewed and updated as appropriate: allergies, current medications, past family history, past medical history, past social history, past surgical history and problem list.    Review of Systems   Constitutional: Positive for activity change (decreased). Negative for chills,  "fatigue and fever.   HENT: Negative for congestion.    Eyes: Negative for visual disturbance.   Respiratory: Negative for chest tightness and shortness of breath.    Cardiovascular: Negative for chest pain.   Gastrointestinal: Negative for abdominal pain, constipation and diarrhea.   Genitourinary: Negative for difficulty urinating.   Musculoskeletal: Positive for back pain.   Neurological: Negative for dizziness, weakness, light-headedness, numbness and headaches.   Psychiatric/Behavioral: Negative for agitation, self-injury, sleep disturbance and suicidal ideas. The patient is not nervous/anxious.        Vitals:    01/06/22 0752   BP: 112/76   Pulse: 78   Temp: 96.4 °F (35.8 °C)   SpO2: 93%   Weight: 119 kg (263 lb 3.2 oz)   Height: 195.6 cm (77\")   PainSc: 0-No pain         Objective   Physical Exam  Vitals reviewed.   Constitutional:       General: He is not in acute distress.  Pulmonary:      Effort: Pulmonary effort is normal. No respiratory distress.   Musculoskeletal:      Comments: Ambulation: Without assistive device   Lumbar Exam:  Appearance: Scoliotic curve absent and scarring absent  Palpated over lumbosacral paravertebral regions and transverse processes with negative tenderness appreciated, Bilateral.   Sacroiliac joints are not tender, Bilateral. Facet loading is negative for pain, Bilateral.  Paraspinal/adjacent lumbar musculature are not tender to palpation, Bilateral.   Skin:     General: Skin is warm and dry.   Neurological:      General: No focal deficit present.      Mental Status: He is alert.   Psychiatric:         Mood and Affect: Mood normal.         Thought Content: Thought content normal.             Assessment/Plan   Diagnoses and all orders for this visit:    1. Lumbar facet arthropathy (Primary)    2. Spondylosis of lumbar region without myelopathy or radiculopathy    3. Spinal stenosis of lumbar region without neurogenic claudication        - Will move forward with bilateral L4-S1 " Radiofrequency ablation.  Risks discussed including but not limited to bleeding, bruising, infection, damage to surrounding structures, headache, and rare things such as being paralyzed, seizure, stroke, heart attack and death.    - He will continue his blood thinners for this procedure.  He understands increased risk of bleeding/brusing.     --- Follow-up for bilateral L4-S1 Radiofrequency ablation and office visit 6-8 weeks after.          While examining this patient, I wore protective equipment including a mask, eye sheild and gloves.  I washed my hands before and after this patient encounter.  The patient wore a mask throughout the visit as well.     Christina Villaseñor MD  Pain Management

## 2022-01-04 DIAGNOSIS — Z79.4 CONTROLLED TYPE 2 DIABETES MELLITUS WITHOUT COMPLICATION, WITH LONG-TERM CURRENT USE OF INSULIN: ICD-10-CM

## 2022-01-04 DIAGNOSIS — I25.10 CHRONIC CORONARY ARTERY DISEASE: ICD-10-CM

## 2022-01-04 DIAGNOSIS — E11.9 CONTROLLED TYPE 2 DIABETES MELLITUS WITHOUT COMPLICATION, WITH LONG-TERM CURRENT USE OF INSULIN: ICD-10-CM

## 2022-01-04 RX ORDER — CLOPIDOGREL BISULFATE 75 MG/1
TABLET ORAL
Qty: 90 TABLET | Refills: 3 | Status: SHIPPED | OUTPATIENT
Start: 2022-01-04 | End: 2022-01-13 | Stop reason: SDUPTHER

## 2022-01-04 RX ORDER — SITAGLIPTIN 100 MG/1
TABLET, FILM COATED ORAL
Qty: 90 TABLET | Refills: 0 | OUTPATIENT
Start: 2022-01-04

## 2022-01-04 RX ORDER — EMPAGLIFLOZIN 25 MG/1
TABLET, FILM COATED ORAL
Qty: 90 TABLET | Refills: 0 | OUTPATIENT
Start: 2022-01-04

## 2022-01-06 ENCOUNTER — TRANSCRIBE ORDERS (OUTPATIENT)
Dept: SURGERY | Facility: SURGERY CENTER | Age: 67
End: 2022-01-06

## 2022-01-06 ENCOUNTER — OFFICE VISIT (OUTPATIENT)
Dept: PAIN MEDICINE | Facility: CLINIC | Age: 67
End: 2022-01-06

## 2022-01-06 VITALS
OXYGEN SATURATION: 93 % | DIASTOLIC BLOOD PRESSURE: 76 MMHG | WEIGHT: 263.2 LBS | SYSTOLIC BLOOD PRESSURE: 112 MMHG | HEART RATE: 78 BPM | HEIGHT: 77 IN | TEMPERATURE: 96.4 F | BODY MASS INDEX: 31.08 KG/M2

## 2022-01-06 DIAGNOSIS — M47.816 LUMBAR FACET ARTHROPATHY: Primary | ICD-10-CM

## 2022-01-06 DIAGNOSIS — M48.061 SPINAL STENOSIS OF LUMBAR REGION WITHOUT NEUROGENIC CLAUDICATION: ICD-10-CM

## 2022-01-06 DIAGNOSIS — M47.816 SPONDYLOSIS OF LUMBAR REGION WITHOUT MYELOPATHY OR RADICULOPATHY: ICD-10-CM

## 2022-01-06 PROCEDURE — 99213 OFFICE O/P EST LOW 20 MIN: CPT | Performed by: ANESTHESIOLOGY

## 2022-01-06 NOTE — PATIENT INSTRUCTIONS
"Radiofrequency ablation (RFA):     - Radiofrequency ablation is a term used to describe cauterization or \"burning.\"   - In pain management, we can use this technique with a special needle to target and destroy areas that are causing your pain.   - In most cases, you must have TWO successful \"test injections\" before you are a candidate for RFA.    After your RFA:   - Because heat is used in this technique, it is common to have soreness after the procedure.  Sometimes \"neuritis\" occurs, which feels like tingling, prickly, or sunburn under the skin sensations.   - Ice packs are helpful in decreasing this soreness and preventing post-procedure \"neuritis\" pain.  Use an ice pack for 20 minutes at a time at least 3 times the day of and the day after your procedure.   - It is common to have arm/leg numbness or weakness the day of your procedure, but this should wear off by the following day.   - It may take up to 6 weeks to gain full benefit from this procedure.    "

## 2022-01-07 NOTE — SIGNIFICANT NOTE
Patient educated on the following :    - If you are receiving Sedation for your procedure Nothing to Eat 6 hours and only clear liquids for 2 hours prior to your procedure.     -You will need to have someone drive you home after your PROCEDURE and remain with you for 24 hours after the PROCEDURE  - The date of your procedure, your are welcome to have one visitor at bedside or remain within 10-15 minutes of [facility]  -You will need to arrive at 1015 on 1/10 PROCEDURE  -Please contact [facility] at: [phone] with any questions and/or concerns

## 2022-01-10 ENCOUNTER — HOSPITAL ENCOUNTER (OUTPATIENT)
Facility: SURGERY CENTER | Age: 67
Setting detail: HOSPITAL OUTPATIENT SURGERY
Discharge: HOME | End: 2022-01-10
Attending: ANESTHESIOLOGY | Admitting: ANESTHESIOLOGY

## 2022-01-10 ENCOUNTER — HOSPITAL ENCOUNTER (OUTPATIENT)
Dept: GENERAL RADIOLOGY | Facility: SURGERY CENTER | Age: 67
Setting detail: HOSPITAL OUTPATIENT SURGERY
End: 2022-01-10

## 2022-01-10 VITALS
WEIGHT: 255 LBS | HEIGHT: 77 IN | TEMPERATURE: 98.2 F | BODY MASS INDEX: 30.11 KG/M2 | DIASTOLIC BLOOD PRESSURE: 77 MMHG | HEART RATE: 72 BPM | OXYGEN SATURATION: 95 % | SYSTOLIC BLOOD PRESSURE: 113 MMHG | RESPIRATION RATE: 16 BRPM

## 2022-01-10 DIAGNOSIS — M47.816 LUMBAR FACET ARTHROPATHY: ICD-10-CM

## 2022-01-10 DIAGNOSIS — M47.816 SPONDYLOSIS OF LUMBAR REGION WITHOUT MYELOPATHY OR RADICULOPATHY: ICD-10-CM

## 2022-01-10 PROCEDURE — 64636 DESTROY L/S FACET JNT ADDL: CPT | Performed by: ANESTHESIOLOGY

## 2022-01-10 PROCEDURE — 64635 DESTROY LUMB/SAC FACET JNT: CPT | Performed by: ANESTHESIOLOGY

## 2022-01-10 PROCEDURE — 3E0T3TZ INTRODUCTION OF DESTRUCTIVE AGENT INTO PERIPHERAL NERVES AND PLEXI, PERCUTANEOUS APPROACH: ICD-10-PCS | Performed by: ANESTHESIOLOGY

## 2022-01-10 PROCEDURE — 76000 FLUOROSCOPY <1 HR PHYS/QHP: CPT

## 2022-01-10 PROCEDURE — 77002 NEEDLE LOCALIZATION BY XRAY: CPT

## 2022-01-10 RX ORDER — SODIUM CHLORIDE 0.9 % (FLUSH) 0.9 %
10 SYRINGE (ML) INJECTION AS NEEDED
Status: DISCONTINUED | OUTPATIENT
Start: 2022-01-10 | End: 2022-01-10 | Stop reason: HOSPADM

## 2022-01-10 RX ORDER — SODIUM CHLORIDE 0.9 % (FLUSH) 0.9 %
10 SYRINGE (ML) INJECTION EVERY 12 HOURS SCHEDULED
Status: DISCONTINUED | OUTPATIENT
Start: 2022-01-10 | End: 2022-01-10 | Stop reason: HOSPADM

## 2022-01-10 NOTE — OP NOTE
Radiofrequency ablation of bilateral L4-S1  [facility]    PREOPERATIVE DIAGNOSIS:  Lumbar spondylosis without myelopathy   POSTOPERATIVE DIAGNOSIS:  Lumbar spondylosis without myelopathy     PROCEDURES PERFORMED:   Bilateral  lumbar radiofrequency ablation of the medial branches at the transverse processes of L4, L5, and the sacral alar groove to thermally treat these facet joints.  1.  10028 --50 Lumbar radiofrequency ablation 1st level.  2.  93413 -50 Lumbar radiofrequency ablation 2nd level.    INFORMED CONSENT:  In preprocedure discussion with the patient, the risks and complications were discussed prior to starting the procedure and informed consent was obtained.     SURGEON:   Christina Villaseñor MD    INDICATIONS:  The patient presents with chronic lower back pain. The patient underwent 2 lumbar medial branch blocks with diagnostically positive relief. Given the patient’s significant pain relief, it is diagnostic that we have likely found the source of the patient’s pain; therefore, lumbar radiofrequency ablation has been indicated.     SEDATION:  Patient declined administration of moderate sedation  .    TIME OF PROCEDURE:  The Interoperative procedure time, after administration of the IV sedative, was N/A minutes.    ANESTHETIC:  Lidocaine 1% for skin infiltration, 2% lidocaine and 0.25% bupivacaine for injection.    STEROID:  None.    DESCRIPTION OF PROCEDURE:  After obtaining informed consent an IV was not  started in the preoperative area. The patient was taken to the operating room. The patient was placed in prone position with a pillow under the abdomen. All pressure points were padded. EKG, blood pressure, and pulse oximetry were monitored. The patient was not  sedated. The lumbosacral area was prepped with ChloraPrep and draped in a sterile fashion.     The junction of the right S1 superior articular process and sacral ala was identified in a AP fluoroscopic view. The skin and  subcutaneous tissue inferior to the junction was anesthetized with 1% lidocaine. A 20-gauge 150mm RF Abbott needle was then advanced percutaneously through the anesthetized skin tract under fluoroscopic guidance until the non-insulated portion of the needle lie at the junction.  The image was then obliqued towards the right side to maximize visualization of the junctions of the L4, L5 superior articular processes with the transverse processes.  Needle placement was performed as described above until the non-insulated portion of the needles lie at the targeted junctions.  All needle tips were confirmed to be posterior to the neural foramen in the lateral fluoroscopic view. Sensory stimulation was then performed with good stimulation of the back at 0.5V or less at each level. Motor stimulation was performed up to 1.5V at each level producing stimulation of the multifidus muscles of the back and no stimulation of the lower extremity at any level. Each level was then anesthetized with 2% lidocaine prior to treatment with pulsed radiofrequency thermocoagulation at 42 degrees Celsius. Each level was then treated with thermal radiofrequency thermocoagulation at 80 degrees Celsius for 60 seconds in two separate cycles.  Prior to the removal of each needle, a volume of 1 mL of injectate was administered at each site.  The total volume consisted of 1mL of 2% lidocaine and 1mL of 0.25% bupivacaine.    The same procedure was then performed on the contralateral side in the exact same fashion.      ESTIMATED BLOOD LOSS:  Minimal.    SPECIMENS:  None.    COMPLICATIONS:  None.    TOLERANCE AND DISCHARGE:  The patient tolerated the procedure well. The patient was transported to the recovery area without difficulties. The patient was discharged home under the care of family in stable and satisfactory condition.    PLAN:  1.  The patient was given our standard instruction sheet.  2.  The patient will resume all medications per the  medication reconciliation sheet.  3.  The patient will return to the [facility] for reevaluation in approximately 6 weeks.

## 2022-01-10 NOTE — DISCHARGE INSTRUCTIONS
[facility] - Post-procedure Instructions          --  While there are no absolute restrictions, it is recommended that you do not perform strenuous activity today. In the morning, you may resume your level of activity as before your block.    --  If you have a band-aid at your injection site, please remove it later today. Observe the area for any redness, swelling, pus-like drainage, or a temperature over 101°. If any of these symptoms occur, please call your doctor at [phone]. If after office hours, leave a message and the on-call provider will return your call.    --  Ice may be applied to your injection site. It is recommended you avoid direct heat (heating pad; hot tub) for 1-2 days.    --  Call [facility] at [phone] if you experience persistent headache, persistent bleeding from the injection site, or severe pain not relieved by heat or oral medication.    --  Do not make important decisions today.    --  Due to the effects of the block and/or the I.V. Sedation, DO NOT drive or operate hazardous machinery for 12 hours.  Local anesthetics may cause numbness after procedure and precautions must be taken with regards to operating equipment as well as with walking, even if ambulating with assistance of another person or with an assistive device.    --  Do not drink alcohol for 12 hours.    -- You may return to work tomorrow, or as directed by your referring doctor.    --  Occasionally you may notice a slight increase in your pain after the procedure. This should start to improve within the next 24-48 hours. Radiofrequency ablation procedure pain may last 3-4 weeks.    --  It may take as long as 3-4 days before you notice a gradual improvement in your pain and/or other symptoms.    -- You may continue to take your prescribed pain medication as needed.    --  Some normal possible side effects of steroid use could include fluid retention, increased blood sugar, dull headache,  "increased sweating, increased appetite, mood swings and flushing.    --  Diabetics are recommended to watch their blood glucose level closely for 24-48 hours after the injection.    --  Must stay in PACU for 20 min upon arrival and prove no leg weakness before being discharged.    --  IN THE EVENT OF A LIFE THREATENING EMERGENCY, (CHEST PAIN, BREATHING DIFFICULTIES, PARALYSIS…) YOU SHOULD GO TO YOUR NEAREST EMERGENCY ROOM.    --  You should be contacted by our office within 2-3 days to schedule follow up or next appointment date.  If not contacted within 7 days, please call the office at [phone]    Radiofrequency ablation (RFA):     - Radiofrequency ablation is a term used to describe cauterization or \"burning.\"   - In pain management, we can use this technique with a special needle to target and destroy areas that are causing your pain.   - In most cases, you must have TWO successful \"test injections\" before you are a candidate for RFA.    After your RFA:   - Because heat is used in this technique, it is common to have soreness after the procedure.  Sometimes \"neuritis\" occurs, which feels like tingling, prickly, or sunburn under the skin sensations.   - Ice packs are helpful in decreasing this soreness and preventing post-procedure \"neuritis\" pain.  Use an ice pack for 20 minutes at a time at least 3 times the day of and the day after your procedure.   - It is common to have arm/leg numbness or weakness the day of your procedure, but this should wear off by the following day.   - It may take up to 6 weeks to gain full benefit from this procedure.    "

## 2022-01-13 DIAGNOSIS — I25.10 CHRONIC CORONARY ARTERY DISEASE: ICD-10-CM

## 2022-01-13 RX ORDER — CLOPIDOGREL BISULFATE 75 MG/1
75 TABLET ORAL DAILY
Qty: 90 TABLET | Refills: 3 | Status: ON HOLD | OUTPATIENT
Start: 2022-01-13 | End: 2022-05-21

## 2022-01-13 NOTE — TELEPHONE ENCOUNTER
[place]queline [place] [facility] [place]  [place] Mr. Monaco would like this to go to his mail order pharmacy, [facility].    Please advise    LOV   -   4/15/21 Nilam    Next   -   Nothing scheduled.  He was scheduled with Jacqueline today, 1/13/22, but it was cancelled due to her being out today.    Last Labs   -   10/4/21 CMP, CBC

## 2022-01-14 NOTE — TELEPHONE ENCOUNTER
Can you please make sure this gentleman gets rescheduled?  He was supposed to see Jacqueline but she called out that day.    Thanks,    [email or web address]

## 2022-01-18 DIAGNOSIS — E78.5 HYPERLIPIDEMIA: ICD-10-CM

## 2022-01-18 DIAGNOSIS — E55.9 VITAMIN D DEFICIENCY: ICD-10-CM

## 2022-01-18 DIAGNOSIS — N52.9 MALE ERECTILE DISORDER: ICD-10-CM

## 2022-01-18 DIAGNOSIS — R80.9 MICROALBUMINURIA: ICD-10-CM

## 2022-01-18 DIAGNOSIS — E66.9 ADIPOSITY: ICD-10-CM

## 2022-01-18 DIAGNOSIS — E11.65 TYPE 2 DIABETES MELLITUS WITH HYPERGLYCEMIA: ICD-10-CM

## 2022-01-18 DIAGNOSIS — I10 ESSENTIAL HYPERTENSION: ICD-10-CM

## 2022-01-18 RX ORDER — CARVEDILOL 25 MG/1
25 TABLET ORAL 2 TIMES DAILY WITH MEALS
Qty: 180 TABLET | Refills: 1 | Status: SHIPPED | OUTPATIENT
Start: 2022-01-18 | End: 2022-03-29 | Stop reason: SDUPTHER

## 2022-01-18 RX ORDER — AMLODIPINE BESYLATE 10 MG/1
10 TABLET ORAL DAILY
Qty: 90 TABLET | Refills: 1 | Status: ON HOLD | OUTPATIENT
Start: 2022-01-18 | End: 2022-05-21

## 2022-01-18 NOTE — TELEPHONE ENCOUNTER
Rx Refill Note  Requested Prescriptions      No prescriptions requested or ordered in this encounter      Last office visit with prescribing clinician: 10/4/2021      Next office visit with prescribing clinician: 4/25/2022            Carey Ignacio LPN  01/18/22, 18:15 EST

## 2022-02-04 ENCOUNTER — TELEPHONE (OUTPATIENT)
Dept: NEUROSURGERY | Facility: CLINIC | Age: 67
End: 2022-02-04

## 2022-02-04 NOTE — TELEPHONE ENCOUNTER
Caller: Micaela Monaco    Relationship to patient: Emergency Contact    Best call back number: [phone] (MICAELA, ON [facility])    Chief complaint: 3 month follow up after pain mgmt and PT     Type of visit: FOLLOW UP     Requested date: SEE BELOW    If rescheduling, when is the original appointment: 2/25/2022    Additional notes: MICAELA STATES THEY WILL BE OUT OF TOWN ON 2/25/2022 AND NEEDS TO CHANGE APPT.  ANYTIME IS FINE.     FEB 28 TO MARCH 4 MARCH 18 TO April 1

## 2022-02-07 ENCOUNTER — TELEPHONE (OUTPATIENT)
Dept: CARDIOLOGY | Facility: CLINIC | Age: 67
End: 2022-02-07

## 2022-02-07 NOTE — TELEPHONE ENCOUNTER
Pt has 6 month fu 2/9 and tested positive for covid wants to know if it can be changed to my chart video visit.    Thanks  Ester

## 2022-02-09 ENCOUNTER — TELEMEDICINE (OUTPATIENT)
Dept: CARDIOLOGY | Facility: CLINIC | Age: 67
End: 2022-02-09

## 2022-02-09 ENCOUNTER — TELEPHONE (OUTPATIENT)
Dept: NEUROSURGERY | Facility: CLINIC | Age: 67
End: 2022-02-09

## 2022-02-09 VITALS
HEART RATE: 78 BPM | HEIGHT: 77 IN | WEIGHT: 255 LBS | BODY MASS INDEX: 30.11 KG/M2 | SYSTOLIC BLOOD PRESSURE: 122 MMHG | DIASTOLIC BLOOD PRESSURE: 78 MMHG

## 2022-02-09 DIAGNOSIS — E78.5 HYPERLIPIDEMIA, UNSPECIFIED HYPERLIPIDEMIA TYPE: ICD-10-CM

## 2022-02-09 DIAGNOSIS — I25.10 CHRONIC CORONARY ARTERY DISEASE: Primary | ICD-10-CM

## 2022-02-09 DIAGNOSIS — I10 ESSENTIAL HYPERTENSION: ICD-10-CM

## 2022-02-09 PROCEDURE — 99214 OFFICE O/P EST MOD 30 MIN: CPT | Performed by: NURSE PRACTITIONER

## 2022-02-09 NOTE — TELEPHONE ENCOUNTER
Caller: Isaias Monaco    Relationship to patient: Self    Best call back number:[phone]    Chief complaint:PT WILL BE OUT OF TOWN    Type of visit:FOLLOW UP    Requested date:ASAP    If rescheduling, when is the original appointment:03/07/22    Additional notes:PT CALLED AND STATES THAT HE WILL BE OUT OF TOWN-PT STATES HE WILL BE HERE MARCH 21ST THROUGH THE 31ST -HE WILL ALSO BE AVAILABLE April 4TH THROUGH THE 8TH-PLEASE CALL PT AND PT STATES THAT WE CAN LEAVE VM IF HE DOES NOT ANSWER FOR NEW DATE AND TIME-THANK YOU

## 2022-02-09 NOTE — PROGRESS NOTES
Date of Office Visit: 2022  Encounter Provider: MICHELLE Pardo  Place of Service: [facility] CARDIOLOGY  Patient Name: Isaias Monaco  :1955    Chief Complaint   Patient presents with   • Coronary Artery Disease   :     HPI: Isaias Monaco is a 66 y.o. male.  He is a patient of Dr. Karimi's whom we follow for the management of hypertension, hyperlipidemia, and coronary artery disease.   In , he presented with unstable angina and was noted to have a chronic total occlusion of the proximal to mid RCA for which he underwent  intervention.  In 2020, he presented with angina and was referred for cardiac catheterization where he was noted to have a 99% ostial circumflex lesion for which he underwent drug-eluting stent placement.   I last saw him in the office in 2021 at which time he was doing well with no complaints of angina or heart failure.  I recommended repeating a lipid panel since being placed on Zetia.  Otherwise, he was advised to follow-up in 6 months.   Due to a positive Covid test, [facility]  He has been feeling really well.  He denies any chest pain, shortness of breath, palpitations, edema, dizziness, or syncope.  He denies any bleeding difficulties or melena.  Fortunately, he is rather asymptomatic from a Covid standpoint.  He does have arthritis and suffers from a lot of back pain.    Past Medical History:   Diagnosis Date   • Adiposity    • Anemia     post hemorrhagic   • Angioedema 2016    Secondary to ACE inhibitor   • Anxiety    • Arthritis    • CAD (coronary artery disease)    • Chest pain    • Colon polyps    • Diabetes mellitus, type 2 (HCC)    • ED (erectile dysfunction)    • Febrile illness    • GERD (gastroesophageal reflux disease)    • Glaucoma    • Hematoma    • High cholesterol    • History of foreign travel 2017; 2018    [place], [place], [place],  [place], [place] and [place]; [place]   • Hyperlipidemia    • Hypertension    • Hypogonadism in male 09/28/2016   • Low back pain    • Male erectile disorder    • Microalbuminuria    • Obesity (BMI 30-39.9)    • Osteoarthritis     knee   • Spinal stenosis of lumbar region without neurogenic claudication 06/02/2021   • Vitamin D deficiency    • Wound infection after surgery        Past Surgical History:   Procedure Laterality Date   • CARDIAC CATHETERIZATION N/A 05/15/2006    Dr. Mini Camarillo   • CARDIAC CATHETERIZATION N/A 3/10/2020    Procedure: Left Heart Cath;  Surgeon: Miguel Ángel Karimi MD;  Location: [facility] CATH INVASIVE LOCATION;  Service: Cardiology;  Laterality: N/A;   • CARDIAC CATHETERIZATION N/A 3/10/2020    Procedure: Stent DAVONTE coronary;  Surgeon: Miguel Ángel Karimi MD;  Location: [facility] CATH INVASIVE LOCATION;  Service: Cardiology;  Laterality: N/A;   • CARDIAC CATHETERIZATION N/A 3/10/2020    Procedure: Coronary angiography;  Surgeon: Miguel Ángel Karimi MD;  Location: [facility] CATH INVASIVE LOCATION;  Service: Cardiology;  Laterality: N/A;   • CARDIAC CATHETERIZATION N/A 3/10/2020    Procedure: Left ventriculography;  Surgeon: Miguel Ángel Karimi MD;  Location: [facility] CATH INVASIVE LOCATION;  Service: Cardiology;  Laterality: N/A;   • COLONOSCOPY N/A 02/22/2006    Bilobed polyp at 30 cm, hemorrhoids-Dr. Ilya Zhao   • COLONOSCOPY N/A 02/28/2014    Normal ileum, one 6 mm polyp in the mid transverse colon-Dr. Ilya Zhao   • COLONOSCOPY N/A 11/19/2008    Ela ileum, two 3 to 4 mm polyps, non-bleeding internal hemorrhoids, repeat in 5 years-Dr. Ilya Zhao   • COLONOSCOPY N/A 10/31/2017    Procedure: COLONOSCOPY WITH POLYPECTOMY (COLD BIOPSY);  Surgeon: Ilya Zhao MD;  Location: [facility] ENDOSCOPY;  Service:    • COLONOSCOPY N/A 5/21/2021    Procedure: Colonoscopy into cecum and terminal ileum with cold biopsy polypectomy;  Surgeon: Ilya Zhao MD;  Location: [facility]  ENDOSCOPY;  Service: Gastroenterology;  Laterality: N/A;  Pre op: History of Polyps  Post op: Polyp   • CORONARY ANGIOPLASTY WITH STENT PLACEMENT  2007, 2012, 2015    cardiac stents x3 occasions   • ENDOSCOPY N/A 10/4/2017    Procedure: ESOPHAGOGASTRODUODENOSCOPY;  Surgeon: Boyd Guidry MD;  Location: [facility] ENDOSCOPY;  Service:    • ENDOSCOPY N/A 5/21/2021    Procedure: ESOPHAGOGASTRODUODENOSCOPY with biopsies;  Surgeon: Ilya Zhao MD;  Location: [facility] ENDOSCOPY;  Service: Gastroenterology;  Laterality: N/A;  Pre op: GERD  Post op: Irregular  Z-Line, Hiatal Hernia, Gastritis   • EPIDURAL BLOCK     • KNEE INCISION AND DRAINAGE Right 6/20/2017    Procedure: RT. KNEE WASHOUT ;  Surgeon: Boyd Coyne MD;  Location: [facility] OR;  Service:    • MEDIAL BRANCH BLOCK Bilateral 12/17/2021    Procedure: LUMBAR MEDIAL BRANCH BLOCK bilateral ~L4-S1 x2 (~2 weeks apart);  Surgeon: Christina Villaseñor MD;  [place] [facility] [place];  Service: Pain Management;  Laterality: Bilateral;   • MEDIAL BRANCH BLOCK Bilateral 1/3/2022    Procedure: LUMBAR MEDIAL BRANCH BLOCK bilateral ~L4-S1 x2 (~2 weeks apart);  Surgeon: Christina Villaseñor MD;  [place] [facility] [place];  Service: Pain Management;  Laterality: Bilateral;   • PA TOTAL KNEE ARTHROPLASTY Right 6/15/2017    Procedure: RT TOTAL KNEE ARTHROPLASTY;  Surgeon: Boyd Coyne MD;  Location: [facility] OR;  Service: Orthopedics   • RADIOFREQUENCY ABLATION Bilateral 1/10/2022    Procedure: RADIOFREQUENCY ABLATION NERVES Bilateral L4-S1;  Surgeon: Christina Villaseñor MD;  [place] [facility] [place];  Service: Pain Management;  Laterality: Bilateral;   • SHOULDER SURGERY Right 2016    rotator cuff repair   • UPPER GASTROINTESTINAL ENDOSCOPY N/A 10/13/2015    Z-line irregular, normal stomach, normal duodenum-Dr. Ilya Zhao   • UPPER GASTROINTESTINAL ENDOSCOPY N/A 02/28/2014    Z-line irregular, normal stomach, normal duodenum-Dr. Ilya Zhao   • UPPER GASTROINTESTINAL ENDOSCOPY  N/A 11/19/2008    Z-line irregular, chronic gastritis withotu hemorrhage, normal duodenum-Dr. Ilya Zhao   • UPPER GASTROINTESTINAL ENDOSCOPY N/A 06/22/2006    LA Grade A reflux esophagitis, non-bleeding erythematous gastropathy, normal duodenum-Dr. Ilya Zhao       Social History     Socioeconomic History   • Marital status:      Spouse name: Micaela   • Number of children: 1   • Years of education: College   Tobacco Use   • Smoking status: Never Smoker   • Smokeless tobacco: Never Used   • Tobacco comment: CAFFEINE USE: 2 CUPS COFFEE DAILY   Vaping Use   • Vaping Use: Never used   Substance and Sexual Activity   • Alcohol use: Yes     Alcohol/week: 6.0 standard drinks     Types: 2 Glasses of wine, 2 Cans of beer, 1 Shots of liquor, 1 Standard drinks or equivalent per week     Comment: occasional 2-4 DRINKS PER WEEK   • Drug use: No   • Sexual activity: Yes     Partners: Female       Family History   Problem Relation Age of Onset   • Lupus Sister    • Thyroid disease Sister    • Heart disease Other    • Hypertension Other    • Arthritis Mother    • Hyperlipidemia Mother    • Hypertension Mother    • Thyroid disease Mother    • Lupus Mother    • Vision loss Mother    • Heart disease Father    • Arthritis Father    • Other Father         Vascular disease   • Lupus Father    • Depression Father    • Alcohol abuse Father    • Dementia Father    • Hypertension Father    • Heart disease Brother    • Heart attack Brother 40   • Thyroid disease Sister    • Arthritis Brother    • Malig Hyperthermia Neg Hx        Review of Systems   Constitutional: Negative.   Cardiovascular: Negative.  Negative for chest pain, dyspnea on exertion, leg swelling, orthopnea, paroxysmal nocturnal dyspnea and syncope.   Respiratory: Negative.    Hematologic/Lymphatic: Negative for bleeding problem.   Musculoskeletal: Positive for arthritis. Negative for falls.   Gastrointestinal: Negative for melena.   Neurological: Negative for dizziness  and light-headedness.       Allergies   Allergen Reactions   • Ace Inhibitors Angioedema   • Lisinopril Angioedema   • Testosterone Myalgia         Current Outpatient Medications:   •  amLODIPine (NORVASC) 10 MG tablet, Take 1 tablet by mouth Daily., Disp: 90 tablet, Rfl: 1  •  ARIPiprazole (ABILIFY) 2 MG tablet, Take 3 mg by mouth Daily., Disp: , Rfl:   •  aspirin 81 MG EC tablet, Take 1 tablet by mouth Daily. get over the counter, Disp: 30 tablet, Rfl: 3  •  Blood Glucose Monitoring Suppl ([employer] BLOOD GLUCOSE) kit, TEST AS DIRECTED, Disp: 1 each, Rfl: 0  •  carvedilol (COREG) 25 MG tablet, Take 1 tablet by mouth 2 (Two) Times a Day With Meals., Disp: 180 tablet, Rfl: 1  •  clopidogrel (PLAVIX) 75 MG tablet, Take 1 tablet by mouth Daily., Disp: 90 tablet, Rfl: 3  •  dorzolamide-timolol (COSOPT) 22.3-6.8 MG/ML ophthalmic solution, Apply 1 drop to eye(s) to both eyes 2 (Two) Times a Day., Disp: 20 mL, Rfl: 3  •  empagliflozin (Jardiance) 25 MG tablet tablet, Take 1 tablet by mouth Daily., Disp: 90 tablet, Rfl: 0  •  escitalopram (LEXAPRO) 20 MG tablet, Take 1 tablet by mouth Daily., Disp: 90 tablet, Rfl: 3  •  esomeprazole (nexIUM) 40 MG capsule, Take 1 capsule by mouth Daily., Disp: 90 capsule, Rfl: 3  •  ezetimibe (Zetia) 10 MG tablet, Take 1 tablet by mouth Daily., Disp: 90 tablet, Rfl: 3  •  famotidine (PEPCID) 20 MG tablet, Take 1 tablet by mouth Daily With Dinner., Disp: 90 tablet, Rfl: 3  •  glucose blood test strip, Use 4 times a day, Disp: 400 each, Rfl: 0  •  Insulin Pen Needle (PolyThericsoger Pen Needles 31G) 31G X 8 MM misc, USE AS DIRECTED TO INJECT TOUJEO, Disp: 100 each, Rfl: 3  •  Lancets (FREESTYLE) lancets, Use to test BG 4 times daily, Disp: 400 each, Rfl: 1  •  latanoprost (XALATAN) 0.005 % ophthalmic solution, Apply 1 drop to  each eye Daily. (Patient taking differently: Apply 1 drop to eye(s) as directed by provider Every Night.), Disp: 7.5 mL, Rfl: 3  •  metFORMIN (GLUCOPHAGE) 500 MG tablet, Take 2  "tablets by mouth 2 (Two) Times a Day With Meals., Disp: 360 tablet, Rfl: 3  •  pioglitazone (ACTOS) 15 MG tablet, Take 1 tablet by mouth Daily., Disp: 90 tablet, Rfl: 3  •  rosuvastatin (CRESTOR) 40 MG tablet, Take 1 tablet by mouth Daily., Disp: 90 tablet, Rfl: 3  •  sildenafil (VIAGRA) 50 MG tablet, Take 1 tablet by mouth Daily As Needed for erectile dysfunction., Disp: 6 tablet, Rfl: 5  •  Toujeo SoloStar 300 UNIT/ML solution pen-injector injection, Inject 54 Units under the skin into the appropriate area as directed Every Morning., Disp: 18 mL, Rfl: 1  •  vitamin D (ERGOCALCIFEROL) 1.25 MG (28229 UT) capsule capsule, Take 1 capsule by mouth 1 (One) Time Per Week., Disp: 12 capsule, Rfl: 3      Objective:     Vitals:    02/09/22 0836   BP: 122/78   Pulse: 78   Weight: 116 kg (255 lb)   Height: 195.6 cm (77\")     Body mass index is 30.24 kg/m².    PHYSICAL EXAM:    Constitutional:       Appearance: Healthy appearance. Not in distress.   Pulmonary:      Effort: Pulmonary effort is normal.   Musculoskeletal:      Cervical back: Normal range of motion. Neurological:      Mental Status: Alert and oriented to person, place, and time.   Psychiatric:         Attention and Perception: Attention normal.         Mood and Affect: Mood normal.         Speech: Speech normal.           Assessment:       Diagnosis Plan   1. Chronic coronary artery disease     2. Essential hypertension     3. Hyperlipidemia, unspecified hyperlipidemia type       No orders of the defined types were placed in this encounter.         Plan:       1.  Coronary artery disease.    He denies any symptoms of angina.  Dual antiplatelet therapy has been recommended lifelong.        2.  Hypertension.    His blood pressure looks great.  Continue current regimen.          3.  Hyperlipidemia.    Lipid panel from November 2021 demonstrated an LDL of 46 and an HDL of 36.  Continue rosuvastatin and Zetia.      This patient has consented to a telehealth visit via " video. The visit was scheduled as a video visit to comply with patient safety concerns in accordance with CDC recommendations.  All vitals recorded within this visit are reported by the patient.  I spent 15 minutes in total including but not limited to the 6 minutes spent in direct conversation with this patient.      I think he is doing well.  I am not going to make any changes.  He will follow-up with Dr. Karimi in 6 months.      As always, it has been a pleasure to participate in your patient's care.      Sincerely,         MICHELLE Michaels

## 2022-03-02 ENCOUNTER — LAB (OUTPATIENT)
Dept: LAB | Facility: HOSPITAL | Age: 67
End: 2022-03-02

## 2022-03-02 ENCOUNTER — OFFICE VISIT (OUTPATIENT)
Dept: PAIN MEDICINE | Facility: CLINIC | Age: 67
End: 2022-03-02

## 2022-03-02 VITALS
HEIGHT: 77 IN | RESPIRATION RATE: 12 BRPM | TEMPERATURE: 96.9 F | OXYGEN SATURATION: 98 % | WEIGHT: 265.6 LBS | HEART RATE: 78 BPM | BODY MASS INDEX: 31.36 KG/M2 | DIASTOLIC BLOOD PRESSURE: 77 MMHG | SYSTOLIC BLOOD PRESSURE: 118 MMHG

## 2022-03-02 DIAGNOSIS — M48.061 SPINAL STENOSIS OF LUMBAR REGION WITHOUT NEUROGENIC CLAUDICATION: ICD-10-CM

## 2022-03-02 DIAGNOSIS — M25.50 ARTHRALGIA, UNSPECIFIED JOINT: ICD-10-CM

## 2022-03-02 DIAGNOSIS — M47.816 LUMBAR FACET ARTHROPATHY: Primary | ICD-10-CM

## 2022-03-02 DIAGNOSIS — M47.816 LUMBAR FACET ARTHROPATHY: ICD-10-CM

## 2022-03-02 LAB
CHROMATIN AB SERPL-ACNC: <10 IU/ML (ref 0–14)
CRP SERPL-MCNC: <0.3 MG/DL (ref 0–0.5)
ERYTHROCYTE [SEDIMENTATION RATE] IN BLOOD: 32 MM/HR (ref 0–20)

## 2022-03-02 PROCEDURE — 86235 NUCLEAR ANTIGEN ANTIBODY: CPT

## 2022-03-02 PROCEDURE — 86431 RHEUMATOID FACTOR QUANT: CPT

## 2022-03-02 PROCEDURE — 86140 C-REACTIVE PROTEIN: CPT

## 2022-03-02 PROCEDURE — 86038 ANTINUCLEAR ANTIBODIES: CPT

## 2022-03-02 PROCEDURE — 86225 DNA ANTIBODY NATIVE: CPT

## 2022-03-02 PROCEDURE — 85652 RBC SED RATE AUTOMATED: CPT

## 2022-03-02 PROCEDURE — 36415 COLL VENOUS BLD VENIPUNCTURE: CPT

## 2022-03-02 PROCEDURE — 99212 OFFICE O/P EST SF 10 MIN: CPT | Performed by: NURSE PRACTITIONER

## 2022-03-02 NOTE — PROGRESS NOTES
Initial testing is not pointing towards rheumatoid arthritis. I am waiting on one more test result to come back. If it is positive, I will refer patient to rheumatology. If it is negative, will hold off on rheumatology referral. Thanks. SOY

## 2022-03-02 NOTE — PROGRESS NOTES
CHIEF COMPLAINT  PROCEDURE FOLLOW-UP RADIOFREQUENCY ABLATION NERVES Bilateral L4-S1.  Pt reports to office after RFA states it was not successful .     Subjective   Isaias Monaco is a 66 y.o. male  who presents to the office for follow-up of procedure.  He completed a bilateral L4-S1 RFL   on  1-10-22 performed by Dr. MINOR for management of LOW BACK PAIN. Patient reports no relief from the procedure.     Complains of pain in his low back and left buttock and left thigh. Today his pain is 2/10VAS. He has started to noticed pain in his left buttock and left mid-posterior thigh. Also has widespread complaints of joint pain and aching. Pain increases with walking, standing, bending/lifting/twisting, household chores; pain decrease with sitting, rest. Cannot have NSAIDS due to Plavix.  He later states he rarely takes Ibuprofen. Takes Tylenol for headaches.  Has previously failed PT. ADL's by self.  Denies any bowel or bladder incontinence.      Patient remained masked during entire encounter. No cough present. I donned a mask and eye protection throughout entire visit. Prior to donning mask and eye protection, hand hygiene was performed, as well as when it was doffed.  I was closer than 6 feet, but not for an extended period of time. No obvious exposure to any bodily fluids.    Back Pain  Pertinent negatives include no abdominal pain, chest pain, dysuria, fever, headaches, numbness or weakness.      The patient has a pain history of the following:  Lumbar stenosis   Lumbar spondylosis  Lumbar facet arthropathy   Chronic low back pain     Previous interventions that the patient has received include:   Bilateral L4-S1 RFL- 1-10-22  Bilateral L4-S1 Medial branch blocks 1/3/22, 12/17/21  L3-4 Epidural 8/25/21, 7/8/21, 6/2/21 - no benefit      Pain medications include:  Methocarbamol - some relief   Ibuprofen - some relief      Other conservative modalities which the patient reports using include:  Physical Therapy: yes -  helped some   Chiropractor: no  Massage Therapy: no  TENS: no  Neck or back surgery: no  Past pain management: yes     Past Significant Surgical History:  Right knee replacement      Narrative & Impression   CT MYELOGRAM OF THORACIC AND LUMBAR SPINE      HISTORY: Evaluate stenosis     COMPARISON: IR myelogram and radiographs same day. MRI lumbar  02/09/2021. CT chest 04/17/2020.     TECHNIQUE:  2 mm axial images were acquired through the thoracic and  lumbar spine (with partially included cervical spine) after intrathecal  contrast instillation, reformatted axial, coronal, and sagittal  reconstructed images were also reviewed. Radiation dose reduction  techniques were utilized, including automated exposure control and  exposure modulation based on body size.     FINDINGS:     Preserved lumbar lordosis thoracic kyphosis. Disc height loss L3-S1,  most pronounced at L5-S1 with prominent osteophyte formation. Large  Schmorl's node in inferior endplate of L3. Grade 1 anterolisthesis L4 on  L5. 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 Conus medullaris terminates at  L1-2. Redemonstrated enlarged left thyroid lobe.     C2-3: Left sided neuroforaminal narrowing secondary to facet arthropathy  may be significant.     C4-5: Central disc protrusion with osteophytes (versus partially  ossified hypertrophic posterior longitudinal ligament) compressing  spinal cord; minimum AP thecal sac diameter 7 mm.     C5-6: Central disc protrusion abutting possibly slightly deforming the  spinal cord; retained posterior thecal space. Right neuroforaminal  narrowing may be significant.      C6-7: Centrally prominent disc bulging.     T2-3: Partially calcified central disc protrusion abutting and  apparently slightly deflecting/flattening spinal cord with ample  posterior thecal space remaining.     T3-4: Right dominant disc bulging partially effacing anterior thecal  sac.     T10-11: Relative thecal sac narrowing due to mild disc bulging  and  prominent epidural fat.     T11-12: Mild disc bulging. Right intraforaminal disc protrusion of  uncertain significance.     T12-L1: Bulging disc osteophyte complex effacing anterior thecal sac  with adequate posterior thecal space remaining. Right facet arthropathy.  Bilateral neuroforaminal narrowing of uncertain significance.     L1-2: Mild disc bulging. Moderate appearing neuroforaminal narrowing.     L2-3: Thecal sac narrowing secondary to bulky facet joints and prominent  ligamenta flava and epidural fat as well as lateral disc bulging on both  sides (partially calcified on right) with minimum thecal sac diameters  of 10 mm AP and 13 mm transverse. Moderate bilateral neuroforaminal  narrowing, worse appearing right.     L3-4: Thecal sac compression secondary to prominent broad-based  calcified disc protrusion in combination with facet arthropathy and  ligamenta flava hypertrophy. There is crowding of cauda equina nerve  roots with obliteration of thecal space; compressive effect is also  suggested by nerve root redundancy below this level. Minimum thecal sac  diameter approximately 8 mm AP x 12 mm transverse. Moderate bilateral  neuroforaminal narrowing; proximity of exited left nerve to disc  material.     L4-5: Anterolisthesis with bulging uncovered disc, prominent anterior  epidural fat, as well as facet arthropathy cause thecal sac narrowing  without nerve root compression; minimum diameter approximately 10 mm AP  x 11 mm transverse just above the disc space. A calcified right  protrusion component is in proximity to the traversing right S1 nerve  root. Severe appearing right neuroforaminal narrowing with possible  nerve root compression. Moderate to severe left neuroforaminal narrowing  with proximity of exiting nerve root to disc material.     L5-S1: Prominently bulging disc osteophyte complex. Left worse than  right facet arthropathy. Ample thecal sac diameter. Moderate to severe  right  neuroforaminal narrowing. Severe left neuroforaminal narrowing  with suspected nerve root compression.        IMPRESSION:     1. Central disc osteophyte complex compressing spinal cord at C4-5.      2. Central disc protrusion abutting spinal cord at T2-3.      3. Thecal sac compression at L3-4 secondary to broad-based calcified  disc protrusion.     4. Multilevel neuroforaminal narrowing which is most severe at L5-S1 on  the left.     5. See above for details and all findings.     This report was finalized on 9/14/2021 10:14 AM by Dr. Chirag Diallo M.D.        PEG Assessment   What number best describes your pain on average in the past week?8  What number best describes how, during the past week, pain has interfered with your enjoyment of life?8  What number best describes how, during the past week, pain has interfered with your general activity?  8    The following portions of the patient's history were reviewed and updated as appropriate: allergies, current medications, past family history, past medical history, past social history, past surgical history and problem list.    Review of Systems   Constitutional: Negative for activity change, fatigue and fever.   HENT: Negative for congestion.    Eyes: Negative for visual disturbance.   Respiratory: Negative for cough and chest tightness.    Cardiovascular: Negative for chest pain.   Gastrointestinal: Negative for abdominal pain, constipation and diarrhea.   Genitourinary: Negative for difficulty urinating and dysuria.   Musculoskeletal: Positive for back pain.   Neurological: Negative for dizziness, weakness, light-headedness, numbness and headaches.   Psychiatric/Behavioral: Negative for agitation, sleep disturbance and suicidal ideas. The patient is not nervous/anxious.      I have reviewed and confirmed the accuracy of the ROS as documented by the MA/LPN/RN Mildred Qiu, MICHELLE       Vitals:    03/02/22 1039   BP: 118/77   Pulse: 78   Resp: 12   Temp: 96.9  "°F (36.1 °C)   SpO2: 98%   Weight: 120 kg (265 lb 9.6 oz)   Height: 195.6 cm (77\")   PainSc:   2   PainLoc: Back       Objective   Physical Exam  Vitals and nursing note reviewed.   Constitutional:       Appearance: He is well-developed.   HENT:      Head: Normocephalic and atraumatic.   Musculoskeletal:      Lumbar back: Tenderness present. No bony tenderness. Decreased range of motion.   Neurological:      Mental Status: He is alert.      Gait: Gait abnormal.      Deep Tendon Reflexes:      Reflex Scores:       Patellar reflexes are 1+ on the right side and 1+ on the left side.       Achilles reflexes are 1+ on the right side and 1+ on the left side.  Psychiatric:         Speech: Speech normal.         Behavior: Behavior normal.         Thought Content: Thought content normal.         Judgment: Judgment normal.             Assessment/Plan   Diagnoses and all orders for this visit:    1. Lumbar facet arthropathy (Primary)  -     Sedimentation Rate; Future  -     C-reactive Protein; Future  -     Rheumatoid Factor, Quant; Future  -     OLMAN With / DsDNA, RNP, Sjogrens A / B, Hart; Future    2. Spinal stenosis of lumbar region without neurogenic claudication    3. Arthralgia, unspecified joint  -     Sedimentation Rate; Future  -     C-reactive Protein; Future  -     Rheumatoid Factor, Quant; Future  -     OLMAN With / DsDNA, RNP, Sjogrens A / B, Hart; Future      --- rheumatology panel. If positive, will refer to rheumatology.  --- discussed pre-treating activities with Tylenol.  --- Hold on lumbar interventions at this time.  --- Follow up with Dr Miguel.  --- Follow-up PRTONYA DUONG REPORT    As the clinician, I personally reviewed the RHODA from 3-2-22 while the patient was in the office today.           Dictated utilizing Dragon dictation.      This document is intended for medical expert use only. Reading of this document by patients and/or patient's family without participating medical staff guidance may " result in misinterpretation and unintended morbidity.   Any interpretation of such data is the responsibility of the patient and/or family member responsible for the patient in concert with their primary or specialist providers, not to be left for sources of online searches such as [employer], [employer] or similar queries. Relying on these approaches to knowledge may result in misinterpretation, misguided goals of care and even death should patients or family members try recommendations outside of the realm of professional medical care in a supervised way.

## 2022-03-03 DIAGNOSIS — R76.8 POSITIVE ANA (ANTINUCLEAR ANTIBODY): Primary | ICD-10-CM

## 2022-03-03 DIAGNOSIS — R76.8 POSITIVE SM/RNP ANTIBODY: ICD-10-CM

## 2022-03-03 LAB
ANA SER QL: POSITIVE
CENTROMERE B AB SER-ACNC: <0.2 AI (ref 0–0.9)
CHROMATIN AB SERPL-ACNC: <0.2 AI (ref 0–0.9)
DSDNA AB SER-ACNC: <1 IU/ML (ref 0–9)
ENA JO1 AB SER-ACNC: <0.2 AI (ref 0–0.9)
ENA RNP AB SER-ACNC: 1.4 AI (ref 0–0.9)
ENA SCL70 AB SER-ACNC: 0.2 AI (ref 0–0.9)
ENA SM AB SER-ACNC: <0.2 AI (ref 0–0.9)
ENA SS-A AB SER-ACNC: <0.2 AI (ref 0–0.9)
ENA SS-B AB SER-ACNC: <0.2 AI (ref 0–0.9)
Lab: ABNORMAL

## 2022-03-03 NOTE — PROGRESS NOTES
The final test came back positive. Since it came back positive, I will be referring patient to rheumatology for evaluation. Please let patient know about all results. Thanks. SOY

## 2022-03-14 NOTE — ASSESSMENT & PLAN NOTE
Glucose well controlled with HGA1C 6.5%. Evaluate with microalbumine urine test today. No renal failure.   1

## 2022-03-19 DIAGNOSIS — E11.8 CONTROLLED TYPE 2 DIABETES MELLITUS WITH COMPLICATION, WITH LONG-TERM CURRENT USE OF INSULIN: ICD-10-CM

## 2022-03-19 DIAGNOSIS — Z79.4 CONTROLLED TYPE 2 DIABETES MELLITUS WITH COMPLICATION, WITH LONG-TERM CURRENT USE OF INSULIN: ICD-10-CM

## 2022-03-22 RX ORDER — INSULIN GLARGINE 300 U/ML
INJECTION, SOLUTION SUBCUTANEOUS
Qty: 18 ML | Refills: 3 | Status: SHIPPED | OUTPATIENT
Start: 2022-03-22

## 2022-03-23 NOTE — TELEPHONE ENCOUNTER
Caller: [facility] [address] [place] [address] [address] [place] [phone] [phone] FX    Relationship: Pharmacy    Best call back number: [phone]    Requested Prescriptions:   Requested Prescriptions     Pending Prescriptions Disp Refills   • vitamin D (ERGOCALCIFEROL) 1.25 MG (46624 UT) capsule capsule 12 capsule 3     Sig: Take 1 capsule by mouth 1 (One) Time Per Week.        Pharmacy where request should be sent: [facility] [address] [place] [address] [address] AT [place] [phone] [place] [phone] FX     Additional details provided by patient: PATIENT IS COMPLETELY OUT OF THIS MEDICATION   Does the patient have less than a 3 day supply:  [x] Yes  [] No    Dannielle SINGLETARY Rep   03/23/22 14:33 EDT

## 2022-03-24 RX ORDER — ERGOCALCIFEROL 1.25 MG/1
50000 CAPSULE ORAL WEEKLY
Qty: 12 CAPSULE | Refills: 0 | Status: SHIPPED | OUTPATIENT
Start: 2022-03-24 | End: 2022-06-09

## 2022-03-28 ENCOUNTER — OFFICE VISIT (OUTPATIENT)
Dept: NEUROSURGERY | Facility: CLINIC | Age: 67
End: 2022-03-28

## 2022-03-28 DIAGNOSIS — M54.50 BILATERAL LOW BACK PAIN, UNSPECIFIED CHRONICITY, UNSPECIFIED WHETHER SCIATICA PRESENT: ICD-10-CM

## 2022-03-28 DIAGNOSIS — M48.062 SPINAL STENOSIS, LUMBAR REGION, WITH NEUROGENIC CLAUDICATION: ICD-10-CM

## 2022-03-28 DIAGNOSIS — M48.20 BAASTRUP'S SYNDROME: Primary | ICD-10-CM

## 2022-03-28 PROCEDURE — 99214 OFFICE O/P EST MOD 30 MIN: CPT | Performed by: NEUROLOGICAL SURGERY

## 2022-03-28 NOTE — PROGRESS NOTES
Subjective   History of Present Illness: Isaias Monaco is a 67 y.o. male is here today for follow-up on back pain after seeing pain management.  He was seen by pain management and received a medial branch block and radiofrequency ablation.  He reports this did not provide any significant relief from his back pain.  He reports that he has developed left lower extremity pain that radiates down the back of his left leg.  Denies any right lower extremity pain.  Denies any weakness.  He is still having difficulty walking 2-3 blocks due to severe pain.    You have chosen to receive care through a telephone visit. Do you consent to use a telephone visit for your medical care today? Yes      History of Present Illness    The following portions of the patient's history were reviewed and updated as appropriate: allergies, current medications, past medical history, past social history, past surgical history and problem list.    Past Medical History:   Diagnosis Date   • Adiposity    • Anemia     post hemorrhagic   • Angioedema 02/21/2016    Secondary to ACE inhibitor   • Anxiety    • Arthritis    • CAD (coronary artery disease)    • Chest pain    • Colon polyps    • Diabetes mellitus, type 2 (HCC)    • ED (erectile dysfunction)    • Febrile illness    • GERD (gastroesophageal reflux disease)    • Glaucoma    • Hematoma    • High cholesterol    • History of foreign travel 12/2017; 08/2018    [place], [place], [place], [place], [place] and [place]; [place]   • Hyperlipidemia    • Hypertension    • Hypogonadism in male 09/28/2016   • Low back pain    • Male erectile disorder    • Microalbuminuria    • Obesity (BMI 30-39.9)    • Osteoarthritis     knee   • Spinal stenosis of lumbar region without neurogenic claudication 06/02/2021   • Vitamin D deficiency    • Wound infection after surgery         Past Surgical History:   Procedure Laterality Date   • CARDIAC CATHETERIZATION N/A 05/15/2006    Dr. Mini Camarillo    • CARDIAC CATHETERIZATION N/A 3/10/2020    Procedure: Left Heart Cath;  Surgeon: Miguel Ángel Karimi MD;  Location: [facility] CATH INVASIVE LOCATION;  Service: Cardiology;  Laterality: N/A;   • CARDIAC CATHETERIZATION N/A 3/10/2020    Procedure: Stent DAVONTE coronary;  Surgeon: Miguel Ángel Karimi MD;  [facility] C[facility]AS[facility] LOC[facility];  Service: Cardiology;  Laterality: N/A;   • CARDIAC CATHETERIZATION N/A 3/10/2020    Procedure: Coronary angiography;  Surgeon: Miguel Ángel Karimi MD;  [facility];  Service: Cardiology;  Laterality: N/A;   • CARDIAC CATHETERIZATION N/A 3/10/2020    Procedure: Left ventriculography;  Surgeon: Miguel Ángel Karimi MD;  [facility];  Service: Cardiology;  Laterality: N/A;   • COLONOSCOPY N/A 02/22/2006    Bilobed polyp at 30 cm, hemorrhoids-Dr. Ilya Zhao   • COLONOSCOPY N/A 02/28/2014    Normal ileum, one 6 mm polyp in the mid transverse colon-Dr. Ilya Zhao   • COLONOSCOPY N/A 11/19/2008    Ela ileum, two 3 to 4 mm polyps, non-bleeding internal hemorrhoids, repeat in 5 years-Dr. Ilya Zhao   • COLONOSCOPY N/A 10/31/2017    Procedure: COLONOSCOPY WITH POLYPECTOMY (COLD BIOPSY);  Surgeon: Ilya Zhao MD;  Location: [facility] ENDOSCOPY;  Service:    • COLONOSCOPY N/A 5/21/2021    Procedure: Colonoscopy into cecum and terminal ileum with cold biopsy polypectomy;  Surgeon: Ilya Zhao MD;  Location: [facility] ENDOSCOPY;  Service: Gastroenterology;  Laterality: N/A;  Pre op: History of Polyps  Post op: Polyp   • CORONARY ANGIOPLASTY WITH STENT PLACEMENT  2007, 2012, 2015    cardiac stents x3 occasions   • ENDOSCOPY N/A 10/4/2017    Procedure: ESOPHAGOGASTRODUODENOSCOPY;  Surgeon: Boyd Guidry MD;  Location: [facility] ENDOSCOPY;  Service:    • ENDOSCOPY N/A 5/21/2021    Procedure: ESOPHAGOGASTRODUODENOSCOPY with biopsies;  Surgeon: Ilya Zhao MD;  Location: [facility] ENDOSCOPY;  Service: Gastroenterology;   Laterality: N/A;  Pre op: GERD  Post op: Irregular  Z-Line, Hiatal Hernia, Gastritis   • EPIDURAL BLOCK     • KNEE INCISION AND DRAINAGE Right 6/20/2017    Procedure: RT. KNEE WASHOUT ;  Surgeon: Boyd Coyne MD;  Location: [facility] MAIN OR;  Service:    • MEDIAL BRANCH BLOCK Bilateral 12/17/2021    Procedure: LUMBAR MEDIAL BRANCH BLOCK bilateral ~L4-S1 x2 (~2 weeks apart);  Surgeon: Christina Villaseñor MD;  Location: [facility] MAIN OR;  Service: Pain Management;  Laterality: Bilateral;   • MEDIAL BRANCH BLOCK Bilateral 1/3/2022    Procedure: LUMBAR MEDIAL BRANCH BLOCK bilateral ~L4-S1 x2 (~2 weeks apart);  Surgeon: Christina Villaseñor MD;  Location: [facility] MAIN OR;  Service: Pain Management;  Laterality: Bilateral;   • HI TOTAL KNEE ARTHROPLASTY Right 6/15/2017    Procedure: RT TOTAL KNEE ARTHROPLASTY;  Surgeon: Boyd Coyne MD;  Location: [facility] MAIN OR;  Service: Orthopedics   • RADIOFREQUENCY ABLATION Bilateral 1/10/2022    Procedure: RADIOFREQUENCY ABLATION NERVES Bilateral L4-S1;  Surgeon: Christina Villaseñor MD;  Location: [facility] MAIN OR;  Service: Pain Management;  Laterality: Bilateral;   • SHOULDER SURGERY Right 2016    rotator cuff repair   • UPPER GASTROINTESTINAL ENDOSCOPY N/A 10/13/2015    Z-line irregular, normal stomach, normal duodenum-Dr. Ilya Zhao   • UPPER GASTROINTESTINAL ENDOSCOPY N/A 02/28/2014    Z-line irregular, normal stomach, normal duodenum-Dr. Ilya Zhao   • UPPER GASTROINTESTINAL ENDOSCOPY N/A 11/19/2008    Z-line irregular, chronic gastritis withotu hemorrhage, normal duodenum-Dr. Ilya Zhao   • UPPER GASTROINTESTINAL ENDOSCOPY N/A 06/22/2006    LA Grade A reflux esophagitis, non-bleeding erythematous gastropathy, normal duodenum-Dr. Ilya Zhao          Current Outpatient Medications:   •  amLODIPine (NORVASC) 10 MG tablet, Take 1 tablet by mouth Daily., Disp: 90 tablet, Rfl: 1  •  ARIPiprazole (ABILIFY) 2 MG tablet, Take 3 mg by mouth Daily., Disp: , Rfl:   •  aspirin 81 MG EC tablet,  Take 1 tablet by mouth Daily. get over the counter, Disp: 30 tablet, Rfl: 3  •  Blood Glucose Monitoring Suppl (Govenlock [employer] BLOOD GLUCOSE) kit, TEST AS DIRECTED, Disp: 1 each, Rfl: 0  •  carvedilol (COREG) 25 MG tablet, Take 1 tablet by mouth 2 (Two) Times a Day With Meals., Disp: 180 tablet, Rfl: 1  •  clopidogrel (PLAVIX) 75 MG tablet, Take 1 tablet by mouth Daily., Disp: 90 tablet, Rfl: 3  •  dorzolamide-timolol (COSOPT) 22.3-6.8 MG/ML ophthalmic solution, Apply 1 drop to eye(s) to both eyes 2 (Two) Times a Day., Disp: 20 mL, Rfl: 3  •  empagliflozin (Jardiance) 25 MG tablet tablet, Take 1 tablet by mouth Daily., Disp: 90 tablet, Rfl: 0  •  escitalopram (LEXAPRO) 20 MG tablet, Take 1 tablet by mouth Daily., Disp: 90 tablet, Rfl: 3  •  esomeprazole (nexIUM) 40 MG capsule, Take 1 capsule by mouth Daily., Disp: 90 capsule, Rfl: 3  •  ezetimibe (Zetia) 10 MG tablet, Take 1 tablet by mouth Daily., Disp: 90 tablet, Rfl: 3  •  famotidine (PEPCID) 20 MG tablet, Take 1 tablet by mouth Daily With Dinner., Disp: 90 tablet, Rfl: 3  •  glucose blood test strip, Use 4 times a day, Disp: 400 each, Rfl: 0  •  Insulin Pen Needle ([employer] Pen Needles 31G) 31G X 8 MM misc, USE AS DIRECTED TO INJECT TOUJEO, Disp: 100 each, Rfl: 3  •  Lancets (FREESTYLE) lancets, Use to test BG 4 times daily, Disp: 400 each, Rfl: 1  •  latanoprost (XALATAN) 0.005 % ophthalmic solution, Apply 1 drop to  each eye Daily. (Patient taking differently: Apply 1 drop to eye(s) as directed by provider Every Night.), Disp: 7.5 mL, Rfl: 3  •  metFORMIN (GLUCOPHAGE) 500 MG tablet, Take 2 tablets by mouth 2 (Two) Times a Day With Meals., Disp: 360 tablet, Rfl: 3  •  pioglitazone (ACTOS) 15 MG tablet, Take 1 tablet by mouth Daily., Disp: 90 tablet, Rfl: 3  •  rosuvastatin (CRESTOR) 40 MG tablet, Take 1 tablet by mouth Daily., Disp: 90 tablet, Rfl: 3  •  sildenafil (VIAGRA) 50 MG tablet, Take 1 tablet by mouth Daily As Needed for erectile dysfunction., Disp: 6 tablet,  Rfl: 5  •  Toujeo SoloStar 300 UNIT/ML solution pen-injector injection, INJECT 60 UNITS UNDER THE SKIN INTO THE APPROPRIATE AREA AS DIRECTED EVERY MORNING, Disp: 18 mL, Rfl: 3  •  vitamin D (ERGOCALCIFEROL) 1.25 MG (08926 UT) capsule capsule, Take 1 capsule by mouth 1 (One) Time Per Week., Disp: 12 capsule, Rfl: 0     Allergies   Allergen Reactions   • Ace Inhibitors Angioedema   • Lisinopril Angioedema   • Testosterone Myalgia        Social History     Socioeconomic History   • Marital status:      Spouse name: Micaela   • Number of children: 1   • Years of education: College   Tobacco Use   • Smoking status: Never Smoker   • Smokeless tobacco: Never Used   • Tobacco comment: CAFFEINE USE: 2 CUPS COFFEE DAILY   Vaping Use   • Vaping Use: Never used   Substance and Sexual Activity   • Alcohol use: Yes     Alcohol/week: 6.0 standard drinks     Types: 2 Glasses of wine, 2 Cans of beer, 1 Shots of liquor, 1 Standard drinks or equivalent per week     Comment: occasional 2-4 DRINKS PER WEEK   • Drug use: No   • Sexual activity: Yes     Partners: Female        Family History   Problem Relation Age of Onset   • Lupus Sister    • Thyroid disease Sister    • Heart disease Other    • Hypertension Other    • Arthritis Mother    • Hyperlipidemia Mother    • Hypertension Mother    • Thyroid disease Mother    • Lupus Mother    • Vision loss Mother    • Heart disease Father    • Arthritis Father    • Other Father         Vascular disease   • Lupus Father    • Depression Father    • Alcohol abuse Father    • Dementia Father    • Hypertension Father    • Heart disease Brother    • Heart attack Brother 40   • Thyroid disease Sister    • Arthritis Brother    • Malig Hyperthermia Neg Hx         Review of Systems   Musculoskeletal: Positive for back pain (L leg ).   Neurological: Negative for weakness and numbness.       Objective     There were no vitals filed for this visit.  There is no height or weight on file to calculate  BMI.      Physical Exam  Neurologic Exam  No physical exam was performed because this was a telephone visit      Assessment/Plan       Medical Decision Makin-year-old male with a greater than 1 year history of severe progressive lower back pain.  -The CT and MRI of his lumbar spine shows severe degenerative changes with severe stenosis at L3-4 and L4-5.  There is also moderate changes at L2-3 and L5-S1.  He reports that he cannot walk more than 2-3 blocks without severe back pain.  Previously his greatest complaint was the back pain and he had no leg pain or weakness.  He reports now he has developed some left lower extremity pain.  We talked about the risks and benefits of an L2-S1 decompression and fusion.  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  He is scheduled to see a rheumatologist on .  I have asked him to call back if his lower extremity pain persists or worsens to consider proceeding with a surgical intervention.  -This was a telephone visit we spoke for 15 minutes.    Diagnoses and all orders for this visit:    1. Baastrup's syndrome (Primary)    2. Bilateral low back pain, unspecified chronicity, unspecified whether sciatica present    3. Spinal stenosis, lumbar region, with neurogenic claudication      Return if symptoms worsen or fail to improve.

## 2022-03-29 DIAGNOSIS — E55.9 VITAMIN D DEFICIENCY: ICD-10-CM

## 2022-03-29 DIAGNOSIS — R80.9 MICROALBUMINURIA: ICD-10-CM

## 2022-03-29 DIAGNOSIS — I10 ESSENTIAL HYPERTENSION: ICD-10-CM

## 2022-03-29 DIAGNOSIS — E11.65 TYPE 2 DIABETES MELLITUS WITH HYPERGLYCEMIA: ICD-10-CM

## 2022-03-29 DIAGNOSIS — E66.9 ADIPOSITY: ICD-10-CM

## 2022-03-29 DIAGNOSIS — E78.5 HYPERLIPIDEMIA: ICD-10-CM

## 2022-03-29 DIAGNOSIS — N52.9 MALE ERECTILE DISORDER: ICD-10-CM

## 2022-03-29 RX ORDER — CARVEDILOL 25 MG/1
25 TABLET ORAL 2 TIMES DAILY WITH MEALS
Qty: 180 TABLET | Refills: 1 | Status: ON HOLD | OUTPATIENT
Start: 2022-03-29 | End: 2022-05-21

## 2022-04-22 DIAGNOSIS — I10 ESSENTIAL HYPERTENSION: Primary | ICD-10-CM

## 2022-04-22 DIAGNOSIS — E11.8 CONTROLLED TYPE 2 DIABETES MELLITUS WITH COMPLICATION, WITH LONG-TERM CURRENT USE OF INSULIN: ICD-10-CM

## 2022-04-22 DIAGNOSIS — Z79.4 CONTROLLED TYPE 2 DIABETES MELLITUS WITH COMPLICATION, WITH LONG-TERM CURRENT USE OF INSULIN: ICD-10-CM

## 2022-04-25 ENCOUNTER — OFFICE VISIT (OUTPATIENT)
Dept: FAMILY MEDICINE CLINIC | Facility: CLINIC | Age: 67
End: 2022-04-25

## 2022-04-25 VITALS
TEMPERATURE: 96.8 F | DIASTOLIC BLOOD PRESSURE: 72 MMHG | HEART RATE: 79 BPM | HEIGHT: 77 IN | SYSTOLIC BLOOD PRESSURE: 124 MMHG | WEIGHT: 259.4 LBS | RESPIRATION RATE: 19 BRPM | BODY MASS INDEX: 30.63 KG/M2 | OXYGEN SATURATION: 96 %

## 2022-04-25 DIAGNOSIS — Z00.00 MEDICARE ANNUAL WELLNESS VISIT, SUBSEQUENT: Primary | ICD-10-CM

## 2022-04-25 DIAGNOSIS — Z12.5 SCREENING FOR PROSTATE CANCER: ICD-10-CM

## 2022-04-25 DIAGNOSIS — E78.5 HYPERLIPIDEMIA, UNSPECIFIED HYPERLIPIDEMIA TYPE: ICD-10-CM

## 2022-04-25 DIAGNOSIS — I10 ESSENTIAL HYPERTENSION: ICD-10-CM

## 2022-04-25 DIAGNOSIS — Z79.4 CONTROLLED TYPE 2 DIABETES MELLITUS WITH COMPLICATION, WITH LONG-TERM CURRENT USE OF INSULIN: ICD-10-CM

## 2022-04-25 DIAGNOSIS — E11.8 CONTROLLED TYPE 2 DIABETES MELLITUS WITH COMPLICATION, WITH LONG-TERM CURRENT USE OF INSULIN: ICD-10-CM

## 2022-04-25 PROCEDURE — 90732 PPSV23 VACC 2 YRS+ SUBQ/IM: CPT | Performed by: FAMILY MEDICINE

## 2022-04-25 PROCEDURE — 1126F AMNT PAIN NOTED NONE PRSNT: CPT | Performed by: FAMILY MEDICINE

## 2022-04-25 PROCEDURE — G0439 PPPS, SUBSEQ VISIT: HCPCS | Performed by: FAMILY MEDICINE

## 2022-04-25 PROCEDURE — 1159F MED LIST DOCD IN RCRD: CPT | Performed by: FAMILY MEDICINE

## 2022-04-25 PROCEDURE — G0009 ADMIN PNEUMOCOCCAL VACCINE: HCPCS | Performed by: FAMILY MEDICINE

## 2022-04-25 PROCEDURE — 1170F FXNL STATUS ASSESSED: CPT | Performed by: FAMILY MEDICINE

## 2022-04-25 PROCEDURE — 99213 OFFICE O/P EST LOW 20 MIN: CPT | Performed by: FAMILY MEDICINE

## 2022-04-25 NOTE — PROGRESS NOTES
The ABCs of the Annual Wellness Visit  Subsequent Medicare Wellness Visit    Chief Complaint   Patient presents with   • Medicare Wellness-subsequent   • Med Management     Dm foot screen       Subjective    History of Present Illness:  Isaias Monaco is a 67 y.o. male who presents for a Subsequent Medicare Wellness Visit.    The following portions of the patient's history were reviewed and   updated as appropriate: allergies, current medications, past family history, past medical history, past social history, past surgical history and problem list.    Compared to one year ago, the patient feels his physical   health is the same.    Compared to one year ago, the patient feels his mental   health is the same.    Recent Hospitalizations:  He was not admitted to the hospital during the last year.       Current Medical Providers:  Patient Care Team:  Antonia Huerta MD as PCP - General (Family Medicine)  Bebeto Monson II, MD as Consulting Physician (Hematology and Oncology)  Micaela Barfield APRN as Nurse Practitioner (Endocrinology)  Manuela Agustin APRN as Referring Physician (Internal Medicine)  Richardson Moon MD as Consulting Physician (Pulmonary Disease)    Outpatient Medications Prior to Visit   Medication Sig Dispense Refill   • amLODIPine (NORVASC) 10 MG tablet Take 1 tablet by mouth Daily. 90 tablet 1   • ARIPiprazole (ABILIFY) 2 MG tablet Take 3 mg by mouth Daily.     • aspirin 81 MG EC tablet Take 1 tablet by mouth Daily. get over the counter 30 tablet 3   • Blood Glucose Monitoring Suppl ([facility]) kit TEST AS DIRECTED 1 each 0   • carvedilol (COREG) 25 MG tablet Take 1 tablet by mouth 2 (Two) Times a Day With Meals. 180 tablet 1   • clopidogrel (PLAVIX) 75 MG tablet Take 1 tablet by mouth Daily. 90 tablet 3   • dorzolamide-timolol (COSOPT) 22.3-6.8 MG/ML ophthalmic solution Apply 1 drop to eye(s) to both eyes 2 (Two) Times a Day. 20 mL 3   • empagliflozin (Jardiance) 25 MG tablet  tablet Take 1 tablet by mouth Daily. 90 tablet 0   • escitalopram (LEXAPRO) 20 MG tablet Take 1 tablet by mouth Daily. 90 tablet 3   • esomeprazole (nexIUM) 40 MG capsule Take 1 capsule by mouth Daily. 90 capsule 3   • ezetimibe (Zetia) 10 MG tablet Take 1 tablet by mouth Daily. 90 tablet 3   • famotidine (PEPCID) 20 MG tablet Take 1 tablet by mouth Daily With Dinner. 90 tablet 3   • glucose blood test strip Use 4 times a day 400 each 0   • Insulin Pen Needle ([employer] Pen Needles 31G) 31G X 8 MM misc USE AS DIRECTED TO INJECT TOUJEO 100 each 3   • Lancets (FREESTYLE) lancets Use to test BG 4 times daily 400 each 1   • latanoprost (XALATAN) 0.005 % ophthalmic solution Apply 1 drop to  each eye Daily. (Patient taking differently: Apply 1 drop to eye(s) as directed by provider Every Night.) 7.5 mL 3   • metFORMIN (GLUCOPHAGE) 500 MG tablet Take 2 tablets by mouth 2 (Two) Times a Day With Meals. 360 tablet 3   • pioglitazone (ACTOS) 15 MG tablet Take 1 tablet by mouth Daily. 90 tablet 3   • rosuvastatin (CRESTOR) 40 MG tablet Take 1 tablet by mouth Daily. 90 tablet 3   • sildenafil (VIAGRA) 50 MG tablet Take 1 tablet by mouth Daily As Needed for erectile dysfunction. 6 tablet 5   • Toujeo SoloStar 300 UNIT/ML solution pen-injector injection INJECT 60 UNITS UNDER THE SKIN INTO THE APPROPRIATE AREA AS DIRECTED EVERY MORNING 18 mL 3   • vitamin D (ERGOCALCIFEROL) 1.25 MG (99949 UT) capsule capsule Take 1 capsule by mouth 1 (One) Time Per Week. 12 capsule 0     No facility-administered medications prior to visit.       No opioid medication identified on active medication list. I have reviewed chart for other potential  high risk medication/s and harmful drug interactions in the elderly.          Aspirin is on active medication list. Aspirin use is indicated based on review of current medical condition/s. Pros and cons of this therapy have been discussed today. Benefits of this medication outweigh potential harm.  Patient has  "been encouraged to continue taking this medication.  .      Patient Active Problem List   Diagnosis   • Microalbuminuria   • Vitamin D deficiency   • Angioedema   • Chronic coronary artery disease   • Anxiety   • ED (erectile dysfunction)   • Hyperlipidemia   • Hypogonadism in male   • Presence of coronary angioplasty implant and graft   • Osteoarthritis, knee   • Essential hypertension   • Anemia, posthemorrhagic, acute   • Shortness of breath   • Gastroesophageal reflux disease with esophagitis   • Tubular adenoma of colon   • Benign prostatic hyperplasia with nocturia   • Iron deficiency anemia   • Adverse effect of iron and its compounds, sequela   • Facial twitching   • Blepharospasm   • Controlled type 2 diabetes mellitus with complication, with long-term current use of insulin ([facility])   • Panic disorder   • Tic disorder, unspecified   • Spinal stenosis of lumbar region without neurogenic claudication   • Bilateral myopia   • Combined forms of age-related cataract, bilateral   • Presbyopia   • Primary open-angle glaucoma, bilateral, severe stage   • Lumbar facet arthropathy   • Spondylosis of lumbar region without myelopathy or radiculopathy     Advance Care Planning  Advance Directive is not on file.  ACP discussion was held with the patient during this visit. Patient has an advance directive (not in EMR), copy requested.          Objective    Vitals:    04/25/22 1004 04/25/22 1102   BP: 144/72 124/72   BP Location: Right arm Right arm   Patient Position: Sitting Sitting   Cuff Size: Adult Adult   Pulse: 79    Resp: 19    Temp: 96.8 °F (36 °C)    TempSrc: Temporal    SpO2: 96%    Weight: 118 kg (259 lb 6.4 oz)    Height: 195.6 cm (77\")    PainSc: 0-No pain      BMI Readings from Last 1 Encounters:   04/25/22 30.76 kg/m²   BMI is above normal parameters. Recommendations include: exercise counseling and nutrition counseling    Does the patient have evidence of cognitive impairment? No    Physical Exam  Vitals " reviewed.   Constitutional:       General: He is not in acute distress.     Appearance: Normal appearance. He is not ill-appearing or toxic-appearing.   HENT:      Head: Normocephalic and atraumatic.      Right Ear: Tympanic membrane, ear canal and external ear normal. There is no impacted cerumen.      Left Ear: Tympanic membrane, ear canal and external ear normal. There is no impacted cerumen.      Nose: Nose normal.      Mouth/Throat:      Mouth: Mucous membranes are moist.      Pharynx: Oropharynx is clear. No oropharyngeal exudate.   Eyes:      General: No scleral icterus.        Right eye: No discharge.         Left eye: No discharge.      Conjunctiva/sclera: Conjunctivae normal.   Neck:      Thyroid: No thyromegaly.      Vascular: No carotid bruit.   Cardiovascular:      Rate and Rhythm: Normal rate and regular rhythm.      Pulses:           Dorsalis pedis pulses are 1+ on the right side and 2+ on the left side.      Heart sounds: Normal heart sounds. No murmur heard.    No friction rub. No gallop.   Pulmonary:      Effort: Pulmonary effort is normal. No respiratory distress.      Breath sounds: Normal breath sounds. No wheezing or rales.   Chest:      Chest wall: No tenderness.   Abdominal:      General: Bowel sounds are normal. There is no distension.      Palpations: Abdomen is soft. There is no mass.      Tenderness: There is no abdominal tenderness. There is no guarding.   Genitourinary:     Comments: Prostate in mildly enlarged but no irregularity or nodule palpated. No blood in the rectal vault.   Musculoskeletal:         General: No swelling or deformity.      Cervical back: Neck supple.      Right lower leg: No edema.      Left lower leg: No edema.      Right foot: Normal range of motion. No deformity, Charcot foot or foot drop.      Left foot: Normal range of motion. No deformity, Charcot foot or foot drop.   Feet:      Right foot:      Skin integrity: Dry skin present. No ulcer, blister, skin  breakdown, erythema, warmth or callus.      Toenail Condition: Right toenails are normal. ingrown     Left foot:      Skin integrity: Dry skin present. No ulcer, blister, skin breakdown, erythema, warmth or callus.      Toenail Condition: ingrownFungal disease present.     Comments: Very dry  Lymphadenopathy:      Cervical: No cervical adenopathy.   Skin:     Coloration: Skin is not jaundiced or pale.   Neurological:      Mental Status: He is alert and oriented to person, place, and time.      Motor: No abnormal muscle tone.      Coordination: Coordination normal.   Psychiatric:         Mood and Affect: Mood normal.         Behavior: Behavior normal.                 HEALTH RISK ASSESSMENT    Smoking Status:  Social History     Tobacco Use   Smoking Status Never Smoker   Smokeless Tobacco Never Used   Tobacco Comment    CAFFEINE USE: 2 CUPS COFFEE DAILY     Alcohol Consumption:  Social History     Substance and Sexual Activity   Alcohol Use Yes   • Alcohol/week: 6.0 standard drinks   • Types: 2 Glasses of wine, 2 Cans of beer, 1 Shots of liquor, 1 Standard drinks or equivalent per week    Comment: occasional 2-4 DRINKS PER WEEK     Fall Risk Screen:    [facility] Fall Risk Assessment was completed, and patient is at LOW risk for falls.Assessment completed on:4/25/2022    Depression Screening:  PHQ-2/PHQ-9 Depression Screening 4/25/2022   Retired PHQ-9 Total Score -   Retired Total Score -   Little Interest or Pleasure in Doing Things 0-->not at all   Feeling Down, Depressed or Hopeless 0-->not at all   PHQ-9: Brief Depression Severity Measure Score 0       Health Habits and Functional and Cognitive Screening:  Functional & Cognitive Status 4/25/2022   Do you have difficulty preparing food and eating? No   Do you have difficulty bathing yourself, getting dressed or grooming yourself? No   Do you have difficulty using the toilet? No   Do you have difficulty moving around from place to place? No   Do you have trouble with  steps or getting out of a bed or a chair? No   Current Diet Well Balanced Diet   Dental Exam Up to date   Eye Exam Up to date   Exercise (times per week) 2 times per week   Current Exercises Include Walking   Current Exercise Activities Include -   Do you need help using the phone?  No   Are you deaf or do you have serious difficulty hearing?  No   Do you need help with transportation? No   Do you need help shopping? No   Do you need help preparing meals?  No   Do you need help with housework?  No   Do you need help with laundry? No   Do you need help taking your medications? No   Do you need help managing money? No   Do you ever drive or ride in a car without wearing a seat belt? No   Have you felt unusual stress, anger or loneliness in the last month? No   Who do you live with? Spouse   If you need help, do you have trouble finding someone available to you? No   Have you been bothered in the last four weeks by sexual problems? No   Do you have difficulty concentrating, remembering or making decisions? No       Age-appropriate Screening Schedule:  Refer to the list below for future screening recommendations based on patient's age, sex and/or medical conditions. Orders for these recommended tests are listed in the plan section. The patient has been provided with a written plan.    Health Maintenance   Topic Date Due   • HEMOGLOBIN A1C  04/04/2022   • INFLUENZA VACCINE  08/01/2022   • LIPID PANEL  11/09/2022   • DIABETIC EYE EXAM  12/21/2022   • URINE MICROALBUMIN  04/22/2023   • DIABETIC FOOT EXAM  04/25/2023   • TDAP/TD VACCINES (3 - Td or Tdap) 02/26/2026   • ZOSTER VACCINE  Completed              Assessment/Plan   CMS Preventative Services Quick Reference  Risk Factors Identified During Encounter  Cardiovascular Disease  Chronic Pain   The above risks/problems have been discussed with the patient.  Follow up actions/plans if indicated are seen below in the Assessment/Plan Section.  Pertinent information has been  shared with the patient in the After Visit Summary.    Diagnoses and all orders for this visit:    1. Medicare annual wellness visit, subsequent (Primary)    2. Hyperlipidemia, unspecified hyperlipidemia type  -     Lipid Panel    3. Essential hypertension  -     CBC & Differential    4. Controlled type 2 diabetes mellitus with complication, with long-term current use of insulin (HCC)  -     Hemoglobin A1c    5. Screening for prostate cancer  -     PSA Screen    Other orders  -     Pneumococcal Polysaccharide Vaccine 23-Valent Greater Than or Equal To 3yo Subcutaneous / IM        Follow Up:   No follow-ups on file.     An After Visit Summary and PPPS were made available to the patient.             HTN  His BP was elevated when first back today to 144/72.   Much better on repeat.   He is on amlodipine and carvedilol.   He had angioedema with lisinopril in 2015. He did not require intervention. He stopped the lisinopril and was seen by PCP for 48 hours later and no clinical evidence of swelling.   He had microalbumin last week and was elevated. He does have a hx of microalbuminuria according to his chart but he is not on any renal protective medication. His DM has been very well controlled. Now he is exercising and has lost some weight with diet changes as well so would be no reason to think this is worsening.   His BP repeated much better but there is certainly an argument to get him on an ARB for kidneys and I think would be ok with his mild angioedema.

## 2022-04-26 LAB
ALBUMIN SERPL-MCNC: 4.5 G/DL (ref 3.8–4.8)
ALBUMIN/GLOB SERPL: 1.3 {RATIO} (ref 1.2–2.2)
ALP SERPL-CCNC: 79 IU/L (ref 44–121)
ALT SERPL-CCNC: 33 IU/L (ref 0–44)
AST SERPL-CCNC: 38 IU/L (ref 0–40)
BASOPHILS # BLD AUTO: 0.1 X10E3/UL (ref 0–0.2)
BASOPHILS NFR BLD AUTO: 1 %
BILIRUB SERPL-MCNC: 0.5 MG/DL (ref 0–1.2)
BUN SERPL-MCNC: 13 MG/DL (ref 8–27)
BUN/CREAT SERPL: 12 (ref 10–24)
CALCIUM SERPL-MCNC: 9.7 MG/DL (ref 8.6–10.2)
CHLORIDE SERPL-SCNC: 102 MMOL/L (ref 96–106)
CHOLEST SERPL-MCNC: 113 MG/DL (ref 100–199)
CO2 SERPL-SCNC: 21 MMOL/L (ref 20–29)
CREAT SERPL-MCNC: 1.09 MG/DL (ref 0.76–1.27)
EGFRCR SERPLBLD CKD-EPI 2021: 74 ML/MIN/1.73
EOSINOPHIL # BLD AUTO: 0.2 X10E3/UL (ref 0–0.4)
EOSINOPHIL NFR BLD AUTO: 2 %
ERYTHROCYTE [DISTWIDTH] IN BLOOD BY AUTOMATED COUNT: 14.6 % (ref 11.6–15.4)
GLOBULIN SER CALC-MCNC: 3.5 G/DL (ref 1.5–4.5)
GLUCOSE SERPL-MCNC: 98 MG/DL (ref 65–99)
HBA1C MFR BLD: 7 % (ref 4.8–5.6)
HCT VFR BLD AUTO: 52 % (ref 37.5–51)
HDLC SERPL-MCNC: 41 MG/DL
HGB BLD-MCNC: 16.6 G/DL (ref 13–17.7)
IMM GRANULOCYTES # BLD AUTO: 0 X10E3/UL (ref 0–0.1)
IMM GRANULOCYTES NFR BLD AUTO: 0 %
LDLC SERPL CALC-MCNC: 45 MG/DL (ref 0–99)
LYMPHOCYTES # BLD AUTO: 2.5 X10E3/UL (ref 0.7–3.1)
LYMPHOCYTES NFR BLD AUTO: 30 %
MCH RBC QN AUTO: 28.1 PG (ref 26.6–33)
MCHC RBC AUTO-ENTMCNC: 31.9 G/DL (ref 31.5–35.7)
MCV RBC AUTO: 88 FL (ref 79–97)
MONOCYTES # BLD AUTO: 0.7 X10E3/UL (ref 0.1–0.9)
MONOCYTES NFR BLD AUTO: 8 %
NEUTROPHILS # BLD AUTO: 4.9 X10E3/UL (ref 1.4–7)
NEUTROPHILS NFR BLD AUTO: 59 %
PLATELET # BLD AUTO: 193 X10E3/UL (ref 150–450)
POTASSIUM SERPL-SCNC: 4.3 MMOL/L (ref 3.5–5.2)
PROT SERPL-MCNC: 8 G/DL (ref 6–8.5)
PSA SERPL-MCNC: 0.2 NG/ML (ref 0–4)
RBC # BLD AUTO: 5.9 X10E6/UL (ref 4.14–5.8)
SODIUM SERPL-SCNC: 140 MMOL/L (ref 134–144)
TRIGL SERPL-MCNC: 157 MG/DL (ref 0–149)
VLDLC SERPL CALC-MCNC: 27 MG/DL (ref 5–40)
WBC # BLD AUTO: 8.2 X10E3/UL (ref 3.4–10.8)

## 2022-04-27 DIAGNOSIS — G24.4 MEIGE SYNDROME (BLEPHAROSPASM WITH OROMANDIBULAR DYSTONIA): ICD-10-CM

## 2022-04-27 DIAGNOSIS — K21.9 GASTROESOPHAGEAL REFLUX DISEASE WITHOUT ESOPHAGITIS: ICD-10-CM

## 2022-04-27 DIAGNOSIS — K44.9 HIATAL HERNIA: ICD-10-CM

## 2022-04-27 RX ORDER — ESCITALOPRAM OXALATE 20 MG/1
20 TABLET ORAL DAILY
Qty: 90 TABLET | Refills: 3 | Status: SHIPPED | OUTPATIENT
Start: 2022-04-27

## 2022-04-27 RX ORDER — FAMOTIDINE 20 MG/1
20 TABLET, FILM COATED ORAL
Qty: 90 TABLET | Refills: 3 | Status: SHIPPED | OUTPATIENT
Start: 2022-04-27

## 2022-04-27 RX ORDER — ESOMEPRAZOLE MAGNESIUM 40 MG/1
40 CAPSULE, DELAYED RELEASE ORAL DAILY
Qty: 90 CAPSULE | Refills: 3 | Status: SHIPPED | OUTPATIENT
Start: 2022-04-27 | End: 2023-02-10 | Stop reason: SDUPTHER

## 2022-04-27 NOTE — TELEPHONE ENCOUNTER
Caller: Isaias Monaco    Relationship: Self    Best call back number: [phone]    Requested Prescriptions:   Requested Prescriptions     Pending Prescriptions Disp Refills   • escitalopram (LEXAPRO) 20 MG tablet 90 tablet 3     Sig: Take 1 tablet by mouth Daily.   • famotidine (PEPCID) 20 MG tablet 90 tablet 3     Sig: Take 1 tablet by mouth Daily With Dinner.   • esomeprazole (nexIUM) 40 MG capsule 90 capsule 3     Sig: Take 1 capsule by mouth Daily.        Pharmacy where request should be sent: [facility] [address] [place] [address] [address] [place] [address] RD AT [facility] - [phone] [facility] [phone]      Additional details provided by patient: PATIENT IS OUT OF THESE MEDS    Does the patient have less than a 3 day supply:  [x] Yes  [] No    Dannielle Hui Rep   04/27/22 08:49 EDT

## 2022-05-03 ENCOUNTER — TELEPHONE (OUTPATIENT)
Dept: FAMILY MEDICINE CLINIC | Facility: CLINIC | Age: 67
End: 2022-05-03

## 2022-05-03 NOTE — TELEPHONE ENCOUNTER
Caller: Isaias Monaco    Relationship to patient: Self    Best call back number: [phone]    Patient is needing: PATIENT WAS RETURNING PHONE CALL FROM [facility] BUT [facility] WAS BUSY.    PLEASE CALL PATIENT BACK WHEN POSSIBLE.

## 2022-05-05 ENCOUNTER — CLINICAL SUPPORT (OUTPATIENT)
Dept: FAMILY MEDICINE CLINIC | Facility: CLINIC | Age: 67
End: 2022-05-05

## 2022-05-05 DIAGNOSIS — R35.0 URINE FREQUENCY: Primary | ICD-10-CM

## 2022-05-05 DIAGNOSIS — R80.9 PROTEINURIA, UNSPECIFIED TYPE: ICD-10-CM

## 2022-05-05 LAB
BILIRUB BLD-MCNC: NEGATIVE MG/DL
CLARITY, POC: CLEAR
COLOR UR: YELLOW
EXPIRATION DATE: ABNORMAL
GLUCOSE UR STRIP-MCNC: ABNORMAL MG/DL
KETONES UR QL: NEGATIVE
LEUKOCYTE EST, POC: NEGATIVE
Lab: ABNORMAL
NITRITE UR-MCNC: NEGATIVE MG/ML
PH UR: 5.5 [PH] (ref 5–8)
PROT UR STRIP-MCNC: ABNORMAL MG/DL
RBC # UR STRIP: ABNORMAL /UL
SP GR UR: 1.01 (ref 1–1.03)
UROBILINOGEN UR QL: NORMAL

## 2022-05-05 PROCEDURE — 81003 URINALYSIS AUTO W/O SCOPE: CPT | Performed by: FAMILY MEDICINE

## 2022-05-06 ENCOUNTER — OFFICE VISIT (OUTPATIENT)
Dept: ENDOCRINOLOGY | Age: 67
End: 2022-05-06

## 2022-05-06 VITALS
BODY MASS INDEX: 31.29 KG/M2 | HEIGHT: 77 IN | TEMPERATURE: 98.4 F | OXYGEN SATURATION: 100 % | SYSTOLIC BLOOD PRESSURE: 122 MMHG | WEIGHT: 265 LBS | DIASTOLIC BLOOD PRESSURE: 82 MMHG | HEART RATE: 76 BPM

## 2022-05-06 DIAGNOSIS — Z79.4 CONTROLLED TYPE 2 DIABETES MELLITUS WITH COMPLICATION, WITH LONG-TERM CURRENT USE OF INSULIN: Primary | ICD-10-CM

## 2022-05-06 DIAGNOSIS — G24.4 MEIGE SYNDROME (BLEPHAROSPASM WITH OROMANDIBULAR DYSTONIA): ICD-10-CM

## 2022-05-06 DIAGNOSIS — E11.8 CONTROLLED TYPE 2 DIABETES MELLITUS WITH COMPLICATION, WITH LONG-TERM CURRENT USE OF INSULIN: Primary | ICD-10-CM

## 2022-05-06 DIAGNOSIS — R60.0 LOCALIZED EDEMA: ICD-10-CM

## 2022-05-06 PROCEDURE — 99215 OFFICE O/P EST HI 40 MIN: CPT | Performed by: INTERNAL MEDICINE

## 2022-05-06 RX ORDER — ESCITALOPRAM OXALATE 20 MG/1
20 TABLET ORAL DAILY
Qty: 90 TABLET | Refills: 3 | OUTPATIENT
Start: 2022-05-06

## 2022-05-06 RX ORDER — ORAL SEMAGLUTIDE 3 MG/1
3 TABLET ORAL DAILY
Qty: 14 TABLET | Refills: 0 | Status: ON HOLD | OUTPATIENT
Start: 2022-05-06 | End: 2022-05-20

## 2022-05-06 RX ORDER — ORAL SEMAGLUTIDE 7 MG/1
7 TABLET ORAL DAILY
Qty: 90 TABLET | Refills: 2 | Status: ON HOLD | OUTPATIENT
Start: 2022-05-06 | End: 2022-05-20

## 2022-05-06 NOTE — PROGRESS NOTES
Chief Complaint  Diabetes (Follow up on labs, checking levels 2-3 times a week. Does not remember the name of the meter. Last check yesterday morning it was 116. )    Subjective          Isaias Monaco presents to [facility]  History of Present Illness   LOV 11/9/2021, 6 months ago.  67 year old -American male with type 2 diabetes.  Comorbidities include BMI 31.  Hypertension controlled, no retinopathy, no nephropathy, no neuropathy.  No history of ulcers.  He now has diagnosis of degenerative disc disease of the lumbar back and he has been symptomatic.  Exercise has been limited.      Since last visit : See if the concern as followsThe elevated RBC is new.   He is not on testosterone therapy through this office and I do not see a testosterone in this  system.  Patient notes he is not on testosterone.  Diabetes HGA1C is stable.Goal 6-7%  Lipids stable. Mild elevated triglycerides which only in diabetes is associated with coronary artery disease.  Would encourage patient to limit saturated fats.   Patient is welcome to make appointment to review data.   For CBC please consider reordering . Recommend patient drink 2 glasses of water 1/2 hr prior to blood draw.     Patient returns now to review his diabetes.  I explained my concerns and recommendations as noted above to his provider.    Type 2 diabetes: Checks fingersticks 0-2 times per day.  He has noticed glucose rising so increase Toujeo up to 60 units a day.  He has not noticed hypoglycemia.    Weight loss Patient reports weight loss due to increase exercise. However movement has been curtailed due to low back pain . Work up has been positive for DDD of low back. I recommend he consider physical therapy.     : Weight was 263 lbs on 11/09/21 OV. Now weight is 265 lbs.     Last eye exam 2 months ago. He is monitored every 6 months for glaucoma and an annual dilated exam due in 6 months. No retinopathy    Toujeou 60 units  "recently increased., I reduced his insulin to 54 units at last appointment because of concern for hypoglycemia.   Over the last 6 months patient indicates his appetite and meal intake has increased in size.. Now eatin more meals.  Lost weight . He has backpain when walking so interferes with exercise. Sees pain management and will see the neurosurgeon  Concern last 2 weeks  Of edema pitting ankles and lower leg  Current diabetes medications.   Pioglitazone 15 mg daily  Jardiance 25 mg daily  Metformin 1000 mg bid    Microalbuminurea  present for last 7 years and stable  Without renal failure.     Objective   Vital Signs:  /82   Pulse 76   Temp 98.4 °F (36.9 °C) (Temporal)   Ht 195.6 cm (77\")   Wt 120 kg (265 lb)   SpO2 100%   BMI 31.42 kg/m²       Physical Exam  Vitals and nursing note reviewed.   HENT:      Head: Normocephalic.      Mouth/Throat:      Mouth: Mucous membranes are moist.   Cardiovascular:      Rate and Rhythm: Normal rate.      Pulses: Normal pulses.           Dorsalis pedis pulses are 2+ on the right side and 2+ on the left side.        Posterior tibial pulses are 2+ on the right side and 2+ on the left side.      Heart sounds: Normal heart sounds.   Pulmonary:      Effort: Pulmonary effort is normal.      Breath sounds: Normal breath sounds. No wheezing or rhonchi.   Abdominal:      General: Bowel sounds are normal.      Palpations: Abdomen is soft.   Musculoskeletal:         General: No swelling. Normal range of motion.      Cervical back: Normal range of motion and neck supple.   Feet:      Right foot:      Protective Sensation: 10 sites tested. 10 sites sensed.      Skin integrity: Skin integrity normal.      Toenail Condition: Right toenails are abnormally thick.      Left foot:      Protective Sensation: 10 sites tested. 10 sites sensed.      Skin integrity: Skin integrity normal.      Toenail Condition: Left toenails are abnormally thick.   Skin:     General: Skin is warm and dry. "   Neurological:      Mental Status: He is alert and oriented to person, place, and time. Mental status is at baseline.   Psychiatric:         Mood and Affect: Mood normal.         Behavior: Behavior normal.         Judgment: Judgment normal.        Result Review :   The following data was reviewed by: Jamilah Vela MD on 05/06/2022:  Component      Latest Ref Rng & Units 4/22/2022          10:55 AM   Creatinine, Urine      Not Estab. mg/dL 119.1   Microalbumin, Urine      Not Estab. ug/mL 655.7   Microalbumin/Creatinine Ratio      0 - 29 mg/g creat 551 (H)       Component      Latest Ref Rng & Units 4/25/2022 4/25/2022 4/25/2022          11:23 AM 11:23 AM 11:23 AM   Total Cholesterol      100 - 199 mg/dL   113   Triglycerides      0 - 149 mg/dL   157 (H)   HDL Cholesterol      >39 mg/dL   41   VLDL Cholesterol Haresh      5 - 40 mg/dL   27   LDL Cholesterol       0 - 99 mg/dL   45   Hemoglobin A1C      4.8 - 5.6 % 7.0 (H)     PSA      0.0 - 4.0 ng/mL  0.2         Component      Latest Ref Rng & Units 11/9/2021 4/25/2022          10:04 AM 11:23 AM   Glucose      65 - 99 mg/dL  98   BUN      8 - 27 mg/dL  13   Creatinine      0.76 - 1.27 mg/dL  1.09   EGFR Result      >59 mL/min/1.73  74   BUN/Creatinine Ratio      10 - 24  12   Sodium      134 - 144 mmol/L  140   Potassium      3.5 - 5.2 mmol/L  4.3   Chloride      96 - 106 mmol/L  102   CO2      20 - 29 mmol/L  21   Calcium      8.6 - 10.2 mg/dL  9.7   Total Protein      6.0 - 8.5 g/dL  8.0   Albumin      3.8 - 4.8 g/dL  4.5   Globulin      1.5 - 4.5 g/dL  3.5   A/G Ratio      1.2 - 2.2  1.3   Total Bilirubin      0.0 - 1.2 mg/dL  0.5   Alkaline Phosphatase      44 - 121 IU/L  79   AST (SGOT)      0 - 40 IU/L  38   ALT (SGPT)      0 - 44 IU/L  33   TSH Baseline      0.450 - 4.500 uIU/mL 1.360             Assessment and Plan   67-year-old  male returns 6 months after his last visit.     Diagnoses and all orders for this visit:    1. Controlled type 2  diabetes mellitus with complication, with long-term current use of insulin (HCC) (Primary)  -     Semaglutide (Rybelsus) 3 MG tablet; Take 1 tablet by mouth Daily. After these 14 pills will start new 7 mg dose.  Dispense: 14 tablet; Refill: 0  -     Semaglutide (Rybelsus) 7 MG tablet; Take 7 mg by mouth Daily.  Dispense: 90 tablet; Refill: 2  -     TSH  -     T4, Free  -     T3, Free    2. Localized edema  -     TSH  -     T4, Free  -     T3, Free           I spent 50 minutes caring for Derby on this date of service. This time includes time spent by me in the following activities:reviewing tests, obtaining and/or reviewing a separately obtained history, performing a medically appropriate examination and/or evaluation , counseling and educating the patient/family/caregiver and ordering medications, tests, or procedures insulin: Reviewed with patient the importance of meal planning and glucose control.  He has had chronic microalbuminuria for 7 years along with hypertension and diabetes.  These are all risk factors for kidney failure.  Thus far he has not had kidney failure.  I would like to continue that.  He reports difficulty with meal planning.  We discussed options that he could take to improve limiting carbohydrates at meals.  He declined a nutrition consult at this time.    So new medications being added Rybelsus a GLP-1 agent..  Risk and benefits reviewed with patient in detail.  He denies any history of pancreatitis or triglycerides greater than 500.  He has not had bariatric surgery.  He has not had gastroparesis diagnosed.  He denies early satiety.  He appears to be a good candidate for a GLP-1 agent.  He is agreeable to trying the GLP-1 pills at this time.    Follow Up   Return in about 3 months (around 8/6/2022).  Patient was given instructions and counseling regarding his condition or for health maintenance advice. Please see specific information pulled into the AVS if appropriate.

## 2022-05-06 NOTE — TELEPHONE ENCOUNTER
Caller: [facility] [address] [place] [address] - [address] AT [place] [phone] [facility] [phone] FX    Relationship: Pharmacy    Best call back number: [phone]     Requested Prescriptions:   Requested Prescriptions     Pending Prescriptions Disp Refills   • escitalopram (LEXAPRO) 20 MG tablet 90 tablet 3     Sig: Take 1 tablet by mouth Daily.        Pharmacy where request should be sent: [facility] [address] [place] [address] - [address] AT [place] [phone] [facility] [phone] FX     Additional details provided by patient: OUT OF MEDICATION     Does the patient have less than a 3 day supply:  [x] Yes  [] No    Dannielle Hathaway Rep   05/06/22 12:26 EDT

## 2022-05-07 LAB
BACTERIA UR CULT: NO GROWTH
BACTERIA UR CULT: NORMAL
T3FREE SERPL-MCNC: 2.9 PG/ML (ref 2–4.4)
T4 FREE SERPL-MCNC: 1.13 NG/DL (ref 0.82–1.77)
TSH SERPL DL<=0.005 MIU/L-ACNC: 1.21 UIU/ML (ref 0.45–4.5)

## 2022-05-09 ENCOUNTER — PATIENT MESSAGE (OUTPATIENT)
Dept: ENDOCRINOLOGY | Age: 67
End: 2022-05-09

## 2022-05-10 NOTE — TELEPHONE ENCOUNTER
From: Isaias Monaco  To: Jamilah Vela MD  Sent: 5/9/2022 11:32 AM EDT  Subject: Jack Han. Just want you to know because of the exorbitant cost of Rybelsus, I won't be starting on the prescription you wrote me. It cost $116 for 14 days of 3 mg, and $746 for 90 days of 7 mg! This is the definition of pure insanity!

## 2022-05-13 ENCOUNTER — TELEPHONE (OUTPATIENT)
Dept: CARDIOLOGY | Facility: CLINIC | Age: 67
End: 2022-05-13

## 2022-05-13 NOTE — TELEPHONE ENCOUNTER
Patient's wife Micaela states the patient has had swelling in his lower extremities for 1 month, and mild shortness of air for one week.     Micaela reports that dyspnea is a symptom the patient has had each time he needed angioplasty. They are currently in [place] and declined to go to the ER. She states he is not in distress.They requested an appointment when he returns next week and I scheduled him with Nay on 5/19/2022.    I instructed them to go to the ER for any new or worsening symptoms. I also encouraged him to keep his feet elevated when sitting down if possible. Micaela reports that keeping his feet elevated does not help.     Please let me know if you have any recommendations.     Thanks,   Shelbie Balderrama RN  [facility] T[facility]

## 2022-05-19 ENCOUNTER — TRANSCRIBE ORDERS (OUTPATIENT)
Dept: CARDIOLOGY | Facility: CLINIC | Age: 67
End: 2022-05-19

## 2022-05-19 ENCOUNTER — LAB (OUTPATIENT)
Dept: LAB | Facility: HOSPITAL | Age: 67
End: 2022-05-19

## 2022-05-19 ENCOUNTER — OFFICE VISIT (OUTPATIENT)
Dept: CARDIOLOGY | Facility: CLINIC | Age: 67
End: 2022-05-19

## 2022-05-19 VITALS
OXYGEN SATURATION: 98 % | BODY MASS INDEX: 30.58 KG/M2 | DIASTOLIC BLOOD PRESSURE: 90 MMHG | HEART RATE: 75 BPM | HEIGHT: 77 IN | WEIGHT: 259 LBS | SYSTOLIC BLOOD PRESSURE: 138 MMHG

## 2022-05-19 DIAGNOSIS — E78.5 HYPERLIPIDEMIA, UNSPECIFIED HYPERLIPIDEMIA TYPE: ICD-10-CM

## 2022-05-19 DIAGNOSIS — I25.10 CHRONIC CORONARY ARTERY DISEASE: ICD-10-CM

## 2022-05-19 DIAGNOSIS — Z95.5 PRESENCE OF CORONARY ANGIOPLASTY IMPLANT AND GRAFT: ICD-10-CM

## 2022-05-19 DIAGNOSIS — Z01.818 OTHER SPECIFIED PRE-OPERATIVE EXAMINATION: Primary | ICD-10-CM

## 2022-05-19 DIAGNOSIS — Z01.818 OTHER SPECIFIED PRE-OPERATIVE EXAMINATION: ICD-10-CM

## 2022-05-19 DIAGNOSIS — R06.09 DYSPNEA ON EXERTION: ICD-10-CM

## 2022-05-19 DIAGNOSIS — R06.09 DYSPNEA ON EXERTION: Primary | ICD-10-CM

## 2022-05-19 DIAGNOSIS — I10 ESSENTIAL HYPERTENSION: ICD-10-CM

## 2022-05-19 DIAGNOSIS — R60.0 BILATERAL LOWER EXTREMITY EDEMA: ICD-10-CM

## 2022-05-19 LAB
ALBUMIN SERPL-MCNC: 4.5 G/DL (ref 3.5–5.2)
ALBUMIN/GLOB SERPL: 1.3 G/DL
ALP SERPL-CCNC: 79 U/L (ref 39–117)
ALT SERPL W P-5'-P-CCNC: 33 U/L (ref 1–41)
ANION GAP SERPL CALCULATED.3IONS-SCNC: 12 MMOL/L (ref 5–15)
AST SERPL-CCNC: 39 U/L (ref 1–40)
BILIRUB SERPL-MCNC: 0.7 MG/DL (ref 0–1.2)
BUN SERPL-MCNC: 13 MG/DL (ref 8–23)
BUN/CREAT SERPL: 11.8 (ref 7–25)
CALCIUM SPEC-SCNC: 9.7 MG/DL (ref 8.6–10.5)
CHLORIDE SERPL-SCNC: 100 MMOL/L (ref 98–107)
CO2 SERPL-SCNC: 24 MMOL/L (ref 22–29)
CREAT SERPL-MCNC: 1.1 MG/DL (ref 0.76–1.27)
DEPRECATED RDW RBC AUTO: 46 FL (ref 37–54)
EGFRCR SERPLBLD CKD-EPI 2021: 73.6 ML/MIN/1.73
ERYTHROCYTE [DISTWIDTH] IN BLOOD BY AUTOMATED COUNT: 14.8 % (ref 12.3–15.4)
GLOBULIN UR ELPH-MCNC: 3.4 GM/DL
GLUCOSE SERPL-MCNC: 113 MG/DL (ref 65–99)
HCT VFR BLD AUTO: 47.9 % (ref 37.5–51)
HGB BLD-MCNC: 16.5 G/DL (ref 13–17.7)
MCH RBC QN AUTO: 29.7 PG (ref 26.6–33)
MCHC RBC AUTO-ENTMCNC: 34.4 G/DL (ref 31.5–35.7)
MCV RBC AUTO: 86.3 FL (ref 79–97)
NT-PROBNP SERPL-MCNC: 36 PG/ML (ref 0–900)
PLATELET # BLD AUTO: 196 10*3/MM3 (ref 140–450)
PMV BLD AUTO: 10.5 FL (ref 6–12)
POTASSIUM SERPL-SCNC: 4.6 MMOL/L (ref 3.5–5.2)
PROT SERPL-MCNC: 7.9 G/DL (ref 6–8.5)
RBC # BLD AUTO: 5.55 10*6/MM3 (ref 4.14–5.8)
SARS-COV-2 ORF1AB RESP QL NAA+PROBE: NOT DETECTED
SODIUM SERPL-SCNC: 136 MMOL/L (ref 136–145)
WBC NRBC COR # BLD: 7.71 10*3/MM3 (ref 3.4–10.8)

## 2022-05-19 PROCEDURE — 36415 COLL VENOUS BLD VENIPUNCTURE: CPT

## 2022-05-19 PROCEDURE — 02703ZZ DILATION OF CORONARY ARTERY, ONE ARTERY, PERCUTANEOUS APPROACH: ICD-10-PCS | Performed by: INTERNAL MEDICINE

## 2022-05-19 PROCEDURE — U0005 INFEC AGEN DETEC AMPLI PROBE: HCPCS

## 2022-05-19 PROCEDURE — 99214 OFFICE O/P EST MOD 30 MIN: CPT | Performed by: NURSE PRACTITIONER

## 2022-05-19 PROCEDURE — U0004 COV-19 TEST NON-CDC HGH THRU: HCPCS

## 2022-05-19 PROCEDURE — 80053 COMPREHEN METABOLIC PANEL: CPT

## 2022-05-19 PROCEDURE — 83880 ASSAY OF NATRIURETIC PEPTIDE: CPT

## 2022-05-19 PROCEDURE — 85027 COMPLETE CBC AUTOMATED: CPT

## 2022-05-19 PROCEDURE — 93000 ELECTROCARDIOGRAM COMPLETE: CPT | Performed by: NURSE PRACTITIONER

## 2022-05-19 RX ORDER — ISOSORBIDE MONONITRATE 30 MG/1
30 TABLET, EXTENDED RELEASE ORAL DAILY
Qty: 30 TABLET | Refills: 11 | Status: SHIPPED | OUTPATIENT
Start: 2022-05-19 | End: 2022-05-31

## 2022-05-19 NOTE — PROGRESS NOTES
"[facility] Follow Up Office Note     Encounter Date:22  Patient:Isaias Monaco  :1955  MRN:[id number]      Chief Complaint:   Chief Complaint   Patient presents with   • Follow-up         History of Presenting Illness:        Isaias Monaco is a 67 y.o. male who is here for complaint of dyspnea on exertion. He is a patient of Dr. Karimi.    Patient has PMH of CAD, hypertension and hyperlipidemia.  He presented in  with unstable angina and underwent DAVONTE of proximal to mid RCA .  In 2020 he presented with angina and was referred for cardiac catheterization with DAVONTE to 99% ostial LCx lesion.    He was last seen via Telemedicine Visit on 2022 by Nilam MARTINEZ due to COVID positive status.  He was doing well from a cardiac perspective and did not have complaints of chest pain, shortness of breath, palpitations, edema, dizziness or syncope.  He was not symptomatic from COVID.     Patient's wife Micaela called the office 2022 with report patient had swelling in bilateral lower extremities for a month and shortness of breath for a week.  They were in [place] at this time and declined to go to ED.  Appointment scheduled to follow-up with me today based on their schedule availability.    Patient reports that for 2 weeks he has had fullness in his throat and \"vague\" shortness of breath. These symptoms occur at rest, he does not feel symptomatic with walking or increased activity.  He denies chest pain, orthopnea, dizziness, syncope.  He and his wife report this has been his anginal equivalent for the past 2 times he required coronary intervention.  He is also having lower extremity edema which has been persistent for past 6 weeks, but seems somewhat improved. They recently traveled to [place] and just flew back yesterday.  His anginal symptoms began just prior to traveling.  EKG today with new ST depressions in III, AVF with TWIs.      Review of Systems:  Review of Systems "   Cardiovascular: Positive for leg swelling. Negative for chest pain, dyspnea on exertion, orthopnea and palpitations.   Respiratory: Positive for shortness of breath.                                    Current Outpatient Medications on File Prior to Visit   Medication Sig Dispense Refill   • amLODIPine (NORVASC) 10 MG tablet Take 1 tablet by mouth Daily. 90 tablet 1   • ARIPiprazole (ABILIFY) 2 MG tablet Take 3 mg by mouth Daily.     • aspirin 81 MG EC tablet Take 1 tablet by mouth Daily. get over the counter 30 tablet 3   • Blood Glucose Monitoring Suppl (dscoveredList of hospitals in the [facility] StatesR BLOOD GLUCOSE) kit TEST AS DIRECTED 1 each 0   • carvedilol (COREG) 25 MG tablet Take 1 tablet by mouth 2 (Two) Times a Day With Meals. 180 tablet 1   • clopidogrel (PLAVIX) 75 MG tablet Take 1 tablet by mouth Daily. 90 tablet 3   • dorzolamide-timolol (COSOPT) 22.3-6.8 MG/ML ophthalmic solution Apply 1 drop to eye(s) to both eyes 2 (Two) Times a Day. 20 mL 3   • empagliflozin (Jardiance) 25 MG tablet tablet Take 1 tablet by mouth Daily. 90 tablet 0   • escitalopram (LEXAPRO) 20 MG tablet Take 1 tablet by mouth Daily. 90 tablet 3   • esomeprazole (nexIUM) 40 MG capsule Take 1 capsule by mouth Daily. 90 capsule 3   • ezetimibe (Zetia) 10 MG tablet Take 1 tablet by mouth Daily. 90 tablet 3   • famotidine (PEPCID) 20 MG tablet Take 1 tablet by mouth Daily With Dinner. 90 tablet 3   • glucose blood test strip Use 4 times a day 400 each 0   • Insulin Pen Needle (Raise5oger Pen Needles 31G) 31G X 8 MM misc USE AS DIRECTED TO INJECT TOUJEO 100 each 3   • Lancets (FREESTYLE) lancets Use to test BG 4 times daily 400 each 1   • latanoprost (XALATAN) 0.005 % ophthalmic solution Apply 1 drop to  each eye Daily. (Patient taking differently: Apply 1 drop to eye(s) as directed by provider Every Night.) 7.5 mL 3   • metFORMIN (GLUCOPHAGE) 500 MG tablet Take 2 tablets by mouth 2 (Two) Times a Day With Meals. 360 tablet 3   • pioglitazone (ACTOS) 15 MG tablet Take 1 tablet by  mouth Daily. 90 tablet 3   • rosuvastatin (CRESTOR) 40 MG tablet Take 1 tablet by mouth Daily. 90 tablet 3   • Semaglutide (Rybelsus) 3 MG tablet Take 1 tablet by mouth Daily. After these 14 pills will start new 7 mg dose. 14 tablet 0   • Semaglutide (Rybelsus) 7 MG tablet Take 7 mg by mouth Daily. 90 tablet 2   • sildenafil (VIAGRA) 50 MG tablet Take 1 tablet by mouth Daily As Needed for erectile dysfunction. (Patient taking differently: Take 50 mg by mouth Daily As Needed for Erectile Dysfunction. PRN) 6 tablet 5   • Toujeo SoloStar 300 UNIT/ML solution pen-injector injection INJECT 60 UNITS UNDER THE SKIN INTO THE APPROPRIATE AREA AS DIRECTED EVERY MORNING 18 mL 3   • vitamin D (ERGOCALCIFEROL) 1.25 MG (87858 UT) capsule capsule Take 1 capsule by mouth 1 (One) Time Per Week. 12 capsule 0     No current facility-administered medications on file prior to visit.       Allergies   Allergen Reactions   • Ace Inhibitors Angioedema   • Lisinopril Angioedema   • Testosterone Myalgia       Past Medical History:   Diagnosis Date   • Adiposity    • Anemia     post hemorrhagic   • Angioedema 02/21/2016    Secondary to ACE inhibitor   • Anxiety    • Arthritis    • CAD (coronary artery disease)    • Chest pain    • Colon polyps    • Diabetes mellitus, type 2 (HCC)    • ED (erectile dysfunction)    • Febrile illness    • GERD (gastroesophageal reflux disease)    • Glaucoma    • Hematoma    • High cholesterol    • History of foreign travel 12/2017; 08/2018    [place], [place], [place], [place], [place] and [place]; [place]   • Hyperlipidemia    • Hypertension    • Hypogonadism in male 09/28/2016   • Low back pain    • Male erectile disorder    • Microalbuminuria    • Obesity (BMI 30-39.9)    • Osteoarthritis     knee   • Spinal stenosis of lumbar region without neurogenic claudication 06/02/2021   • Vitamin D deficiency    • Wound infection after surgery        Past Surgical History:   Procedure Laterality Date   •  CARDIAC CATHETERIZATION N/A 05/15/2006    Dr. Mini Camarillo   • CARDIAC CATHETERIZATION N/A 3/10/2020    Procedure: Left Heart Cath;  Surgeon: Miguel Ángel Karimi MD;  Location:  [facility];  Service: Cardiology;  Laterality: N/A;   • CARDIAC CATHETERIZATION N/A 3/10/2020    Procedure: Stent DAVONTE coronary;  Surgeon: Miguel Ángel Karimi MD;  Location:  [facility];  Service: Cardiology;  Laterality: N/A;   • CARDIAC CATHETERIZATION N/A 3/10/2020    Procedure: Coronary angiography;  Surgeon: Miguel Ángel Karimi MD;  Location:  [facility];  Service: Cardiology;  Laterality: N/A;   • CARDIAC CATHETERIZATION N/A 3/10/2020    Procedure: Left ventriculography;  Surgeon: Miguel Ángel Karimi MD;  Location:  [facility];  Service: Cardiology;  Laterality: N/A;   • COLONOSCOPY N/A 02/22/2006    Bilobed polyp at 30 cm, hemorrhoids-Dr. Ilya Zhao   • COLONOSCOPY N/A 02/28/2014    Normal ileum, one 6 mm polyp in the mid transverse colon-Dr. Ilya Zhao   • COLONOSCOPY N/A 11/19/2008    Ela ileum, two 3 to 4 mm polyps, non-bleeding internal hemorrhoids, repeat in 5 years-Dr. Ilya Zhao   • COLONOSCOPY N/A 10/31/2017    Procedure: COLONOSCOPY WITH POLYPECTOMY (COLD BIOPSY);  Surgeon: Ilya Zhao MD;  Location: [facility] ENDOSCOPY;  Service:    • COLONOSCOPY N/A 5/21/2021    Procedure: Colonoscopy into cecum and terminal ileum with cold biopsy polypectomy;  Surgeon: Ilya Zhao MD;  Location: [facility] ENDOSCOPY;  Service: Gastroenterology;  Laterality: N/A;  Pre op: History of Polyps  Post op: Polyp   • CORONARY ANGIOPLASTY WITH STENT PLACEMENT  2007, 2012, 2015    cardiac stents x3 occasions   • ENDOSCOPY N/A 10/4/2017    Procedure: ESOPHAGOGASTRODUODENOSCOPY;  Surgeon: Boyd Guidry MD;  Location: [facility] ENDOSCOPY;  Service:    • ENDOSCOPY N/A 5/21/2021    Procedure: ESOPHAGOGASTRODUODENOSCOPY with biopsies;  Surgeon: Ilya Zhao  MD TEAGAN;  Location: [facility] ENDOSCOPY;  Service: Gastroenterology;  Laterality: N/A;  Pre op: GERD  Post op: Irregular  Z-Line, Hiatal Hernia, Gastritis   • EPIDURAL BLOCK     • KNEE INCISION AND DRAINAGE Right 6/20/2017    Procedure: RT. KNEE WASHOUT ;  Surgeon: Boyd Coyne MD;  Location: [facility] MAIN OR;  Service:    • MEDIAL BRANCH BLOCK Bilateral 12/17/2021    Procedure: LUMBAR MEDIAL BRANCH BLOCK bilateral ~L4-S1 x2 (~2 weeks apart);  Surgeon: Christina Villaseñor MD;  Location: [facility] MAIN OR;  Service: Pain Management;  Laterality: Bilateral;   • MEDIAL BRANCH BLOCK Bilateral 1/3/2022    Procedure: LUMBAR MEDIAL BRANCH BLOCK bilateral ~L4-S1 x2 (~2 weeks apart);  Surgeon: Christina Villaseñor MD;  Location: [facility] MAIN OR;  Service: Pain Management;  Laterality: Bilateral;   • NM TOTAL KNEE ARTHROPLASTY Right 6/15/2017    Procedure: RT TOTAL KNEE ARTHROPLASTY;  Surgeon: Boyd Coyne MD;  Location: [facility] MAIN OR;  Service: Orthopedics   • RADIOFREQUENCY ABLATION Bilateral 1/10/2022    Procedure: RADIOFREQUENCY ABLATION NERVES Bilateral L4-S1;  Surgeon: Christina Villaseñor MD;  Location: [facility] MAIN OR;  Service: Pain Management;  Laterality: Bilateral;   • SHOULDER SURGERY Right 2016    rotator cuff repair   • UPPER GASTROINTESTINAL ENDOSCOPY N/A 10/13/2015    Z-line irregular, normal stomach, normal duodenum-Dr. Ilya Zhao   • UPPER GASTROINTESTINAL ENDOSCOPY N/A 02/28/2014    Z-line irregular, normal stomach, normal duodenum-Dr. Ilya Zhao   • UPPER GASTROINTESTINAL ENDOSCOPY N/A 11/19/2008    Z-line irregular, chronic gastritis withotu hemorrhage, normal duodenum-Dr. Ilya Zhao   • UPPER GASTROINTESTINAL ENDOSCOPY N/A 06/22/2006    LA Grade A reflux esophagitis, non-bleeding erythematous gastropathy, normal duodenum-Dr. Ilya Zhao       Social History     Socioeconomic History   • Marital status:      Spouse name: Micaela   • Number of children: 1   • Years of education: College   Tobacco Use   • Smoking  "status: Never Smoker   • Smokeless tobacco: Never Used   • Tobacco comment: CAFFEINE USE: 2 CUPS COFFEE DAILY   Vaping Use   • Vaping Use: Never used   Substance and Sexual Activity   • Alcohol use: Yes     Alcohol/week: 6.0 standard drinks     Types: 2 Glasses of wine, 2 Cans of beer, 1 Shots of liquor, 1 Standard drinks or equivalent per week     Comment: occasional 2-4 DRINKS PER WEEK   • Drug use: No   • Sexual activity: Yes     Partners: Female       Family History   Problem Relation Age of Onset   • Lupus Sister    • Thyroid disease Sister    • Heart disease Other    • Hypertension Other    • Arthritis Mother    • Hyperlipidemia Mother    • Hypertension Mother    • Thyroid disease Mother    • Lupus Mother    • Vision loss Mother    • Heart disease Father    • Arthritis Father    • Other Father         Vascular disease   • Lupus Father    • Depression Father    • Alcohol abuse Father    • Dementia Father    • Hypertension Father    • Heart disease Brother    • Heart attack Brother 40   • Thyroid disease Sister    • Arthritis Brother    • Malig Hyperthermia Neg Hx        The following portions of the patient's history were reviewed and updated as appropriate: allergies, current medications, past family history, past medical history, past social history, past surgical history and problem list.       Objective:       Vitals:    05/19/22 1110   BP: 138/90   BP Location: Left arm   Patient Position: Sitting   Cuff Size: Adult   Pulse: 75   SpO2: 98%   Weight: 117 kg (259 lb)   Height: 195.6 cm (77\")         Physical Exam:  Constitutional: Well appearing, well developed, no acute distress   HENT: Oropharynx clear and membrane moist  Eyes: Normal conjunctiva, no sclera icterus  Neck: Supple, no carotid bruit bilaterally  Cardiovascular: Regular rate and rhythm, No Murmur, 1+ bilateral lower extremity edema  Pulmonary: Normal respiratory effort, normal lung sounds, no wheezing  Neurological: Alert and orient x 3  Skin: " Warm, dry, no ecchymosis, no rash  Psych: Appropriate mood and affect. Normal judgment and insight         Lab Results   Component Value Date     04/25/2022     04/22/2022    K 4.3 04/25/2022    K 4.1 04/22/2022     04/25/2022     04/22/2022    CO2 21 04/25/2022    CO2 23 04/22/2022    BUN 13 04/25/2022    BUN 12 04/22/2022    CREATININE 1.09 04/25/2022    CREATININE 1.18 04/22/2022    EGFRIFNONA 82 10/04/2021    EGFRIFNONA 64 12/01/2020    EGFRIFAFRI 100 10/04/2021    EGFRIFAFRI 78 12/01/2020    GLUCOSE 98 04/25/2022    GLUCOSE 76 04/22/2022    CALCIUM 9.7 04/25/2022    CALCIUM 9.5 04/22/2022    PROTENTOTREF 8.0 04/25/2022    PROTENTOTREF 7.5 04/22/2022    ALBUMIN 4.5 04/25/2022    ALBUMIN 4.3 04/22/2022    BILITOT 0.5 04/25/2022    BILITOT 0.6 04/22/2022    AST 38 04/25/2022    AST 35 04/22/2022    ALT 33 04/25/2022    ALT 34 04/22/2022     Lab Results   Component Value Date    WBC 7.71 05/19/2022    WBC 8.2 04/25/2022    HGB 16.5 05/19/2022    HGB 16.6 04/25/2022    HCT 47.9 05/19/2022    HCT 52.0 (H) 04/25/2022    MCV 86.3 05/19/2022    MCV 88 04/25/2022     05/19/2022     04/25/2022     Lab Results   Component Value Date    CHOL 114 04/15/2021    CHOL 150 03/11/2020    TRIG 157 (H) 04/25/2022    TRIG 150 (H) 11/09/2021    HDL 41 04/25/2022    HDL 36 (L) 11/09/2021    LDL 45 04/25/2022    LDL 46 11/09/2021     Lab Results   Component Value Date    PROBNP 64.0 08/03/2018    PROBNP 173.9 07/09/2017     Lab Results   Component Value Date    TROPONINT <0.010 07/09/2017     Lab Results   Component Value Date    TSH 1.210 05/06/2022    TSH 1.360 11/09/2021           ECG 12 Lead    Date/Time: 5/19/2022 12:03 PM  Performed by: Nay Merlos APRN  Authorized by: Nay Merlos APRN   Comparison: compared with previous ECG from 4/15/2021  Rhythm: sinus rhythm  Rate: normal  ST Depression: III and aVF  T inversion: III and aVF  Comments: New inferior changes concerning for  ischemia          Echocardiogram 4/14/2020:  · Left ventricular systolic function is normal. Calculated EF = 56%. Estimated EF was in agreement with the calculated EF. Normal left ventricular cavity size noted. All left ventricular wall segments contract normally. Left ventricular wall thickness is consistent with mild concentric hypertrophy. Left ventricular diastolic dysfunction is noted (grade I) consistent with impaired relaxation.    Bilateral Carotid Dopplers 3/23/2020:  Plaque without stenosis bilateral carotid arteries on screening.  Normal aorta screening.  Normal peripheral artery screening.     Left Heart Catheterization 3/10/2020:  Procedure findings:  Left main: Large caliber vessel that trifurcates to an LAD, ramus and circumflex.  Normal  LAD: Medium caliber vessel that gives rise to 2 small caliber diagonal branches.  20 to 30% ostial stenosis.  Diffuse 40% mid vessel stenosis  LCX: Medium caliber vessel and gives rise to a medium caliber OM branch.  Discrete 99% ostial stenosis with GRACIE II flow distally.  Diffuse 40-50% mid vessel stenosis.  Fills antegrade and via right to left collaterals.  RCA: Medium caliber, dominant vessel gives rise to a small caliber PDA and 2 RPL branches..  Stents in the proximal to distal RCA.  Luminal irregularities.     Left ventricular angiography: Normal left ventricular size and systolic function.  Ejection fraction 50%.     Percutaneous intervention report:  Unfractionated heparin was used for anticoagulation from therapeutic ACT.  A 6 Belgian XB 3.0 guide was used to engage the left main.  A run-through wire was used to cross the ostial stenosis and seated distally.  A 3.0 x 12 mm trek balloon was used to predilate the lesion to 16 rozina.  A 3.25 x 33 mm Xience Kaylynn drug-eluting stent would not cross the stenosis.  A jayna wire was placed but the stent was still not cross.  A 3.0 x 15 mm Xience Kaylynn drug-eluting stent was then deployed across the ostial stenosis  at 14 rozina.  GRACIE-3 flow was documented at case completion.  Residual 50% mid vessel stenosis.  Not covered.  No complications.  Wire and balloon removed.     Hemodynamics:   LV: 88/1  AO: 88/52        PCI CORONARY SEGMENT: Ostial circumflex  PRE-STENOSIS: 99  POST-STENOSIS: 0%  LESION TYPE: C  GRACIE FLOW PRE/POST: 2/3  CULPRIT LESION: Yes     Estimated blood loss: Minimal  Complications: None     Conclusions:   1. Left main: Normal  2. LAD: 20 to 30% ostial stenosis.  40% mid vessel stenosis.  3. LCX: 99% ostial stenosis with GRACIE II flow.  40 to 50% mid vessel stenosis.  4. RCA: Previously placed stents from the proximal to distal RCA with luminal irregularities.  5.  Normal left ventricular size and systolic function  6.  PCI of the ostial circumflex with a 3.0 x 15 mm Xience Kaylynn drug-eluting stent.     Left Heart Catheterization 11/3/2015:  SUMMARY:   1. Successful stenting of the [id number] right coronary artery.   2. Angio-Seal of both femoral arteries.      RECOMMENDATIONS: Continue dual antiplatelet therapy indefinitely.     Left Heart Catheterization 8/28/2015:  FINDINGS:  1.  Hemodynamics: /13. /82/98.   2.  Left ventriculography: EF 50%, apical hypokinesis.   3.  Coronary angiography: Right dominant system. Two-vessel coronary disease.  The left main is normal. The proximal LAD has 30% to 40% diffuse disease. There is a stent in the proximal to mid LAD that is patent. The mid to distal LAD has 30% to 40% diffuse disease. The first diagonal branch has a 60% stenosis. The circumflex has 50% proximal stenosis. The RCA is totally occluded in the proximal to midvessel. It reconstitutes via robust left-sided collaterals.      SUMMARY: Moderate left-sided disease with chronic total occlusion of the  proximal to mid RCA.      RECOMMENDATIONS:  intervention of the RCA.         Assessment:          Diagnosis Plan   1. Dyspnea on exertion  Case Request Cath Lab: Left Heart Cath    Adult  Transthoracic Echo Complete w/ Color, Spectral and Contrast if Necessary Per Protocol    Comprehensive Metabolic Panel    CBC (No Diff)    proBNP    ECG 12 Lead   2. Chronic coronary artery disease     3. Presence of coronary angioplasty implant and graft     4. Essential hypertension     5. Hyperlipidemia, unspecified hyperlipidemia type     6. Bilateral lower extremity edema  Duplex Venous Lower Extremity - Bilateral CAR          Plan:       Stable angina // CAD:  · History prior PCI to RCA () 2018 and ostial LCx 2020  · Patient is reporting throat fullness and mild shortness of breath, occurring intermittently and at rest.  This has previously been his anginal equivalent, he is diabetic  · EKG with new inferior ST depressions/ T wave inversions  · Discussed with Dr. Rodas, given history and EKG changes will proceed with left heart catheterization.  It will be scheduled tomorrow, labwork and COVID test today  · Continue DAPT, BB, statin. ACE intolerant. Add Imdur, caution with sildenafil  · Further recommendations based on cath findings    Bilateral lower extremity edema:  · New to patient for past 6 weeks  · Echocardiogram ordered.  Does not have increased dyspnea or orthopnea  · LE dopplers given recent travel although bilateral would be unusual  · Patient is on amlodipine, can consider changing if no other etiology found but will not make changes with ischemic concerns    Hypertension:  · Per patient typically well-controlled  · BP mildly elevated today, start Imdur. Educated regarding interaction with prn sildenafil    Hyperlipidemia:  · Well-controlled on recent labwork    Patient presents today with throat fullness and mild shortness of breath which are intermittent while at rest but are previous anginal equivalent.  EKG has new ischemic changes with ST depression/ TWI in inferior leads.  Scheduled for [facility] tomorrow. Add Imdur.  Will check labs and COVID today.  Also with new bilateral lower extremity  edema.  Echocardiogram as well as bilateral LE dopplers ordered.  Will follow-up with patient after cath tomorrow and have him follow-up in one week.  I will call him with results of echo and dopplers.    Orders Placed This Encounter   Procedures   • Comprehensive Metabolic Panel     Standing Status:   Future     Number of Occurrences:   1     Standing Expiration Date:   5/19/2023     Order Specific Question:   Release to patient     Answer:   Immediate   • CBC (No Diff)     Standing Status:   Future     Number of Occurrences:   1     Standing Expiration Date:   5/19/2023     Order Specific Question:   Release to patient     Answer:   Immediate   • proBNP     Standing Status:   Future     Number of Occurrences:   1     Standing Expiration Date:   5/19/2023     Order Specific Question:   Release to patient     Answer:   Immediate   • ECG 12 Lead     This order was created via procedure documentation     Order Specific Question:   Release to patient     Answer:   Immediate   • Adult Transthoracic Echo Complete w/ Color, Spectral and Contrast if Necessary Per Protocol     Standing Status:   Future     Standing Expiration Date:   5/19/2023     Order Specific Question:   Reason for exam?     Answer:   Heart Failure, Cardiomyopathy, or Sytemic or Pulmonary Hypertension     Order Specific Question:   Release to patient     Answer:   Immediate            MICHELLE Atkinson  [facility]  05/19/22  13:07 EDT

## 2022-05-20 ENCOUNTER — HOSPITAL ENCOUNTER (INPATIENT)
Facility: HOSPITAL | Age: 67
LOS: 5 days | Discharge: HOME | End: 2022-05-25
Attending: INTERNAL MEDICINE | Admitting: INTERNAL MEDICINE

## 2022-05-20 DIAGNOSIS — I25.84 CORONARY ATHEROSCLEROSIS DUE TO CALCIFIED CORONARY LESION (CODE): ICD-10-CM

## 2022-05-20 DIAGNOSIS — R06.09 DYSPNEA ON EXERTION: ICD-10-CM

## 2022-05-20 DIAGNOSIS — I25.118 CORONARY ARTERY DISEASE OF NATIVE HEART WITH STABLE ANGINA PECTORIS, UNSPECIFIED VESSEL OR LESION TYPE: ICD-10-CM

## 2022-05-20 DIAGNOSIS — I21.4 NSTEMI, INITIAL EPISODE OF CARE: Primary | ICD-10-CM

## 2022-05-20 LAB — GLUCOSE BLDC GLUCOMTR-MCNC: 73 MG/DL (ref 70–130)

## 2022-05-20 PROCEDURE — 93454 CORONARY ARTERY ANGIO S&I: CPT | Performed by: INTERNAL MEDICINE

## 2022-05-20 PROCEDURE — C1725 CATH, TRANSLUMIN NON-LASER: HCPCS | Performed by: INTERNAL MEDICINE

## 2022-05-20 PROCEDURE — C1874 STENT, COATED/COV W/DEL SYS: HCPCS | Performed by: INTERNAL MEDICINE

## 2022-05-20 PROCEDURE — C1769 GUIDE WIRE: HCPCS | Performed by: INTERNAL MEDICINE

## 2022-05-20 PROCEDURE — C1887 CATHETER, GUIDING: HCPCS | Performed by: INTERNAL MEDICINE

## 2022-05-20 PROCEDURE — 25010000002 MIDAZOLAM PER 1 MG: Performed by: INTERNAL MEDICINE

## 2022-05-20 PROCEDURE — 0 IOPAMIDOL PER 1 ML: Performed by: INTERNAL MEDICINE

## 2022-05-20 PROCEDURE — 99152 MOD SED SAME PHYS/QHP 5/>YRS: CPT | Performed by: INTERNAL MEDICINE

## 2022-05-20 PROCEDURE — 25010000002 EPTIFIBATIDE PER 5 MG: Performed by: INTERNAL MEDICINE

## 2022-05-20 PROCEDURE — C1760 CLOSURE DEV, VASC: HCPCS | Performed by: INTERNAL MEDICINE

## 2022-05-20 PROCEDURE — 82962 GLUCOSE BLOOD TEST: CPT

## 2022-05-20 PROCEDURE — 25010000002 FENTANYL CITRATE (PF) 50 MCG/ML SOLUTION: Performed by: INTERNAL MEDICINE

## 2022-05-20 PROCEDURE — C1894 INTRO/SHEATH, NON-LASER: HCPCS | Performed by: INTERNAL MEDICINE

## 2022-05-20 PROCEDURE — 99153 MOD SED SAME PHYS/QHP EA: CPT | Performed by: INTERNAL MEDICINE

## 2022-05-20 PROCEDURE — C1753 CATH, INTRAVAS ULTRASOUND: HCPCS | Performed by: INTERNAL MEDICINE

## 2022-05-20 PROCEDURE — 92920 PRQ TRLUML C ANGIOP 1ART&/BR: CPT | Performed by: INTERNAL MEDICINE

## 2022-05-20 PROCEDURE — 25010000002 HEPARIN (PORCINE) PER 1000 UNITS: Performed by: INTERNAL MEDICINE

## 2022-05-20 PROCEDURE — 85347 COAGULATION TIME ACTIVATED: CPT

## 2022-05-20 RX ORDER — SODIUM CHLORIDE 9 MG/ML
75 INJECTION, SOLUTION INTRAVENOUS CONTINUOUS
Status: DISCONTINUED | OUTPATIENT
Start: 2022-05-20 | End: 2022-05-20

## 2022-05-20 RX ORDER — ROSUVASTATIN CALCIUM 40 MG/1
40 TABLET, COATED ORAL NIGHTLY
Status: DISCONTINUED | OUTPATIENT
Start: 2022-05-20 | End: 2022-05-25 | Stop reason: HOSPADM

## 2022-05-20 RX ORDER — EPTIFIBATIDE 0.75 MG/ML
2 INJECTION, SOLUTION INTRAVENOUS CONTINUOUS
Status: DISPENSED | OUTPATIENT
Start: 2022-05-20 | End: 2022-05-21

## 2022-05-20 RX ORDER — SODIUM CHLORIDE 9 MG/ML
1 INJECTION, SOLUTION INTRAVENOUS CONTINUOUS
Status: ACTIVE | OUTPATIENT
Start: 2022-05-20 | End: 2022-05-20

## 2022-05-20 RX ORDER — SODIUM CHLORIDE 0.9 % (FLUSH) 0.9 %
10 SYRINGE (ML) INJECTION EVERY 12 HOURS SCHEDULED
Status: DISCONTINUED | OUTPATIENT
Start: 2022-05-20 | End: 2022-05-20 | Stop reason: HOSPADM

## 2022-05-20 RX ORDER — FAMOTIDINE 20 MG/1
20 TABLET, FILM COATED ORAL
Status: DISCONTINUED | OUTPATIENT
Start: 2022-05-20 | End: 2022-05-21

## 2022-05-20 RX ORDER — LIDOCAINE HYDROCHLORIDE 20 MG/ML
INJECTION, SOLUTION INFILTRATION; PERINEURAL AS NEEDED
Status: DISCONTINUED | OUTPATIENT
Start: 2022-05-20 | End: 2022-05-20 | Stop reason: HOSPADM

## 2022-05-20 RX ORDER — MIDAZOLAM HYDROCHLORIDE 1 MG/ML
INJECTION INTRAMUSCULAR; INTRAVENOUS AS NEEDED
Status: DISCONTINUED | OUTPATIENT
Start: 2022-05-20 | End: 2022-05-20 | Stop reason: HOSPADM

## 2022-05-20 RX ORDER — SODIUM CHLORIDE 0.9 % (FLUSH) 0.9 %
10 SYRINGE (ML) INJECTION AS NEEDED
Status: DISCONTINUED | OUTPATIENT
Start: 2022-05-20 | End: 2022-05-20 | Stop reason: HOSPADM

## 2022-05-20 RX ORDER — FENTANYL CITRATE 50 UG/ML
INJECTION, SOLUTION INTRAMUSCULAR; INTRAVENOUS AS NEEDED
Status: DISCONTINUED | OUTPATIENT
Start: 2022-05-20 | End: 2022-05-20 | Stop reason: HOSPADM

## 2022-05-20 RX ORDER — CARVEDILOL 25 MG/1
25 TABLET ORAL EVERY 12 HOURS SCHEDULED
Status: DISCONTINUED | OUTPATIENT
Start: 2022-05-20 | End: 2022-05-25 | Stop reason: HOSPADM

## 2022-05-20 RX ORDER — CHOLECALCIFEROL (VITAMIN D3) 125 MCG
5 CAPSULE ORAL NIGHTLY PRN
Status: DISCONTINUED | OUTPATIENT
Start: 2022-05-20 | End: 2022-05-25 | Stop reason: HOSPADM

## 2022-05-20 RX ORDER — CLONAZEPAM 1 MG/1
1 TABLET ORAL 3 TIMES DAILY PRN
Status: DISCONTINUED | OUTPATIENT
Start: 2022-05-20 | End: 2022-05-25 | Stop reason: HOSPADM

## 2022-05-20 RX ORDER — ONDANSETRON 2 MG/ML
4 INJECTION INTRAMUSCULAR; INTRAVENOUS EVERY 6 HOURS PRN
Status: DISCONTINUED | OUTPATIENT
Start: 2022-05-20 | End: 2022-05-24 | Stop reason: SDUPTHER

## 2022-05-20 RX ORDER — ASPIRIN 81 MG/1
81 TABLET ORAL DAILY
Status: DISCONTINUED | OUTPATIENT
Start: 2022-05-21 | End: 2022-05-25 | Stop reason: HOSPADM

## 2022-05-20 RX ORDER — ACETAMINOPHEN 325 MG/1
650 TABLET ORAL EVERY 6 HOURS PRN
Status: DISCONTINUED | OUTPATIENT
Start: 2022-05-20 | End: 2022-05-24 | Stop reason: SDUPTHER

## 2022-05-20 RX ORDER — HEPARIN SODIUM 1000 [USP'U]/ML
INJECTION, SOLUTION INTRAVENOUS; SUBCUTANEOUS AS NEEDED
Status: DISCONTINUED | OUTPATIENT
Start: 2022-05-20 | End: 2022-05-20 | Stop reason: HOSPADM

## 2022-05-20 RX ORDER — ACETAMINOPHEN 325 MG/1
650 TABLET ORAL EVERY 4 HOURS PRN
Status: DISCONTINUED | OUTPATIENT
Start: 2022-05-20 | End: 2022-05-24 | Stop reason: SDUPTHER

## 2022-05-20 RX ADMIN — SODIUM CHLORIDE 75 ML/HR: 9 INJECTION, SOLUTION INTRAVENOUS at 13:25

## 2022-05-20 RX ADMIN — CLONAZEPAM 1 MG: 1 TABLET ORAL at 21:58

## 2022-05-20 RX ADMIN — CARVEDILOL 25 MG: 25 TABLET, FILM COATED ORAL at 21:58

## 2022-05-20 RX ADMIN — Medication 5 MG: at 21:58

## 2022-05-20 RX ADMIN — FAMOTIDINE 20 MG: 20 TABLET ORAL at 21:58

## 2022-05-20 RX ADMIN — ROSUVASTATIN CALCIUM 40 MG: 40 TABLET, FILM COATED ORAL at 21:58

## 2022-05-20 RX ADMIN — EPTIFIBATIDE 2 MCG/KG/MIN: 0.75 INJECTION INTRAVENOUS at 21:18

## 2022-05-20 RX ADMIN — SODIUM CHLORIDE 1 ML/KG/HR: 9 INJECTION, SOLUTION INTRAVENOUS at 17:20

## 2022-05-21 ENCOUNTER — APPOINTMENT (OUTPATIENT)
Dept: CARDIOLOGY | Facility: HOSPITAL | Age: 67
End: 2022-05-21

## 2022-05-21 LAB
ANION GAP SERPL CALCULATED.3IONS-SCNC: 13 MMOL/L (ref 5–15)
ANION GAP SERPL CALCULATED.3IONS-SCNC: 8.8 MMOL/L (ref 5–15)
AORTIC DIMENSIONLESS INDEX: 0.5 (DI)
BH CV ECHO MEAS - AO MAX PG: 12.6 MMHG
BH CV ECHO MEAS - AO MEAN PG: 8.1 MMHG
BH CV ECHO MEAS - AO ROOT DIAM: 2.9 CM
BH CV ECHO MEAS - AO V2 MAX: 177.4 CM/SEC
BH CV ECHO MEAS - AO V2 VTI: 38.4 CM
BH CV ECHO MEAS - AVA(I,D): 1.36 CM2
BH CV ECHO MEAS - EDV(CUBED): 98.1 ML
BH CV ECHO MEAS - EDV(MOD-SP2): 170 ML
BH CV ECHO MEAS - EDV(MOD-SP4): 193 ML
BH CV ECHO MEAS - EF(MOD-BP): 55.6 %
BH CV ECHO MEAS - EF(MOD-SP2): 57.1 %
BH CV ECHO MEAS - EF(MOD-SP4): 54.9 %
BH CV ECHO MEAS - ESV(CUBED): 30.5 ML
BH CV ECHO MEAS - ESV(MOD-SP2): 73 ML
BH CV ECHO MEAS - ESV(MOD-SP4): 87 ML
BH CV ECHO MEAS - FS: 32.2 %
BH CV ECHO MEAS - IVS/LVPW: 1.11 CM
BH CV ECHO MEAS - IVSD: 1.1 CM
BH CV ECHO MEAS - LAT PEAK E' VEL: 13.4 CM/SEC
BH CV ECHO MEAS - LV DIASTOLIC VOL/BSA (35-75): 77.8 CM2
BH CV ECHO MEAS - LV MASS(C)D: 168.9 GRAMS
BH CV ECHO MEAS - LV MAX PG: 3.2 MMHG
BH CV ECHO MEAS - LV MEAN PG: 1.83 MMHG
BH CV ECHO MEAS - LV SYSTOLIC VOL/BSA (12-30): 35.1 CM2
BH CV ECHO MEAS - LV V1 MAX: 89.5 CM/SEC
BH CV ECHO MEAS - LV V1 VTI: 18.6 CM
BH CV ECHO MEAS - LVIDD: 4.6 CM
BH CV ECHO MEAS - LVIDS: 3.1 CM
BH CV ECHO MEAS - LVOT AREA: 2.8 CM2
BH CV ECHO MEAS - LVOT DIAM: 1.89 CM
BH CV ECHO MEAS - LVPWD: 0.99 CM
BH CV ECHO MEAS - MED PEAK E' VEL: 7 CM/SEC
BH CV ECHO MEAS - MV A MAX VEL: 117.4 CM/SEC
BH CV ECHO MEAS - MV DEC SLOPE: 470.5 CM/SEC2
BH CV ECHO MEAS - MV DEC TIME: 0.17 MSEC
BH CV ECHO MEAS - MV E MAX VEL: 80.6 CM/SEC
BH CV ECHO MEAS - MV E/A: 0.69
BH CV ECHO MEAS - MV MAX PG: 4.2 MMHG
BH CV ECHO MEAS - MV MEAN PG: 2.25 MMHG
BH CV ECHO MEAS - MV P1/2T: 53.3 MSEC
BH CV ECHO MEAS - MV V2 VTI: 23.5 CM
BH CV ECHO MEAS - MVA(P1/2T): 4.1 CM2
BH CV ECHO MEAS - MVA(VTI): 2.23 CM2
BH CV ECHO MEAS - RAP SYSTOLE: 3 MMHG
BH CV ECHO MEAS - SI(MOD-SP2): 39.1 ML/M2
BH CV ECHO MEAS - SI(MOD-SP4): 42.7 ML/M2
BH CV ECHO MEAS - SV(LVOT): 52.4 ML
BH CV ECHO MEAS - SV(MOD-SP2): 97 ML
BH CV ECHO MEAS - SV(MOD-SP4): 106 ML
BH CV ECHO MEAS - TAPSE (>1.6): 2.2 CM
BH CV ECHO MEASUREMENTS AVERAGE E/E' RATIO: 7.9
BH CV VAS BP RIGHT ARM: NORMAL MMHG
BH CV XLRA - RV BASE: 3.3 CM
BH CV XLRA - TDI S': 13.2 CM/SEC
BH CV XLRA MEAS - DIST GSV CALF DIST LEFT: 0.26 CM
BH CV XLRA MEAS - DIST GSV CALF DIST RIGHT: 0.21 CM
BH CV XLRA MEAS - DIST GSV THIGH DIST LEFT: 0.32 CM
BH CV XLRA MEAS - DIST GSV THIGH DIST RIGHT: 0.35 CM
BH CV XLRA MEAS - GSV ANKLE DIST LEFT: 0.27 CM
BH CV XLRA MEAS - GSV ANKLE DIST RIGHT: 0.33 CM
BH CV XLRA MEAS - GSV KNEE DIST LEFT: 0.41 CM
BH CV XLRA MEAS - GSV KNEE DIST RIGHT: 0.37 CM
BH CV XLRA MEAS - GSV ORIGIN DIST LEFT: 0.73 CM
BH CV XLRA MEAS - GSV ORIGIN DIST RIGHT: 0.5 CM
BH CV XLRA MEAS - MID GSV CALF LEFT: 0.22 CM
BH CV XLRA MEAS - MID GSV CALF RIGHT: 0.2 CM
BH CV XLRA MEAS - MID GSV THIGH  LEFT: 0.37 CM
BH CV XLRA MEAS - MID GSV THIGH  RIGHT: 0.36 CM
BH CV XLRA MEAS - PROX GSV CALF DIST LEFT: 0.31 CM
BH CV XLRA MEAS - PROX GSV CALF DIST RIGHT: 0.26 CM
BH CV XLRA MEAS - PROX GSV THIGH  LEFT: 0.5 CM
BH CV XLRA MEAS - PROX GSV THIGH  RIGHT: 0.37 CM
BH CV XLRA MEAS LEFT DIST CCA EDV: -17 CM/SEC
BH CV XLRA MEAS LEFT DIST CCA PSV: -85 CM/SEC
BH CV XLRA MEAS LEFT DIST ICA EDV: -24 CM/SEC
BH CV XLRA MEAS LEFT DIST ICA PSV: -60.5 CM/SEC
BH CV XLRA MEAS LEFT ICA/CCA RATIO: 1.71
BH CV XLRA MEAS LEFT MID ICA EDV: -16.9 CM/SEC
BH CV XLRA MEAS LEFT MID ICA PSV: -58.5 CM/SEC
BH CV XLRA MEAS LEFT PROX CCA EDV: -24.4 CM/SEC
BH CV XLRA MEAS LEFT PROX CCA PSV: -114 CM/SEC
BH CV XLRA MEAS LEFT PROX ECA EDV: -14.2 CM/SEC
BH CV XLRA MEAS LEFT PROX ECA PSV: -120 CM/SEC
BH CV XLRA MEAS LEFT PROX ICA EDV: -26.7 CM/SEC
BH CV XLRA MEAS LEFT PROX ICA PSV: -145 CM/SEC
BH CV XLRA MEAS LEFT PROX SCLA PSV: 128 CM/SEC
BH CV XLRA MEAS LEFT VERTEBRAL A EDV: -8.8 CM/SEC
BH CV XLRA MEAS LEFT VERTEBRAL A PSV: -39.8 CM/SEC
BH CV XLRA MEAS RIGHT DIST CCA EDV: -19.3 CM/SEC
BH CV XLRA MEAS RIGHT DIST CCA PSV: -79.7 CM/SEC
BH CV XLRA MEAS RIGHT DIST ICA EDV: -22.4 CM/SEC
BH CV XLRA MEAS RIGHT DIST ICA PSV: -69.5 CM/SEC
BH CV XLRA MEAS RIGHT ICA/CCA RATIO: 1.02
BH CV XLRA MEAS RIGHT MID ICA EDV: -20.8 CM/SEC
BH CV XLRA MEAS RIGHT MID ICA PSV: -75.4 CM/SEC
BH CV XLRA MEAS RIGHT PROX CCA EDV: -15.8 CM/SEC
BH CV XLRA MEAS RIGHT PROX CCA PSV: -90.9 CM/SEC
BH CV XLRA MEAS RIGHT PROX ECA EDV: -11.1 CM/SEC
BH CV XLRA MEAS RIGHT PROX ECA PSV: -106 CM/SEC
BH CV XLRA MEAS RIGHT PROX ICA EDV: -16.9 CM/SEC
BH CV XLRA MEAS RIGHT PROX ICA PSV: -80.9 CM/SEC
BH CV XLRA MEAS RIGHT PROX SCLA PSV: 123 CM/SEC
BH CV XLRA MEAS RIGHT VERTEBRAL A EDV: 13 CM/SEC
BH CV XLRA MEAS RIGHT VERTEBRAL A PSV: 58.1 CM/SEC
BUN SERPL-MCNC: 11 MG/DL (ref 8–23)
BUN SERPL-MCNC: 12 MG/DL (ref 8–23)
BUN/CREAT SERPL: 10.7 (ref 7–25)
BUN/CREAT SERPL: 12.5 (ref 7–25)
CALCIUM SPEC-SCNC: 8.7 MG/DL (ref 8.6–10.5)
CALCIUM SPEC-SCNC: 9 MG/DL (ref 8.6–10.5)
CHLORIDE SERPL-SCNC: 100 MMOL/L (ref 98–107)
CHLORIDE SERPL-SCNC: 98 MMOL/L (ref 98–107)
CHOLEST SERPL-MCNC: 93 MG/DL (ref 0–200)
CLOSE TME COLL+ADP + EPINEP PNL BLD: 9 % (ref 86–100)
CO2 SERPL-SCNC: 22 MMOL/L (ref 22–29)
CO2 SERPL-SCNC: 22.2 MMOL/L (ref 22–29)
CREAT SERPL-MCNC: 0.96 MG/DL (ref 0.76–1.27)
CREAT SERPL-MCNC: 1.03 MG/DL (ref 0.76–1.27)
DEPRECATED RDW RBC AUTO: 49.1 FL (ref 37–54)
EGFRCR SERPLBLD CKD-EPI 2021: 79.6 ML/MIN/1.73
EGFRCR SERPLBLD CKD-EPI 2021: 86.6 ML/MIN/1.73
ERYTHROCYTE [DISTWIDTH] IN BLOOD BY AUTOMATED COUNT: 14.9 % (ref 12.3–15.4)
GLUCOSE BLDC GLUCOMTR-MCNC: 121 MG/DL (ref 70–130)
GLUCOSE BLDC GLUCOMTR-MCNC: 126 MG/DL (ref 70–130)
GLUCOSE BLDC GLUCOMTR-MCNC: 147 MG/DL (ref 70–130)
GLUCOSE SERPL-MCNC: 100 MG/DL (ref 65–99)
GLUCOSE SERPL-MCNC: 128 MG/DL (ref 65–99)
HBA1C MFR BLD: 6.8 % (ref 4.8–5.6)
HCT VFR BLD AUTO: 43.9 % (ref 37.5–51)
HDLC SERPL-MCNC: 32 MG/DL (ref 40–60)
HGB BLD-MCNC: 14.4 G/DL (ref 13–17.7)
LDL/HDL RATIO NULL: ABNORMAL
LDLC SERPL CALC-MCNC: 10 MG/DL (ref 0–100)
LEFT ATRIUM VOLUME INDEX: 18.8 ML/M2
MAXIMAL PREDICTED HEART RATE: 153 BPM
MCH RBC QN AUTO: 29.4 PG (ref 26.6–33)
MCHC RBC AUTO-ENTMCNC: 32.8 G/DL (ref 31.5–35.7)
MCV RBC AUTO: 89.8 FL (ref 79–97)
PLATELET # BLD AUTO: 146 10*3/MM3 (ref 140–450)
PMV BLD AUTO: 10.7 FL (ref 6–12)
POTASSIUM SERPL-SCNC: 3.7 MMOL/L (ref 3.5–5.2)
POTASSIUM SERPL-SCNC: 3.8 MMOL/L (ref 3.5–5.2)
RBC # BLD AUTO: 4.89 10*6/MM3 (ref 4.14–5.8)
SODIUM SERPL-SCNC: 129 MMOL/L (ref 136–145)
SODIUM SERPL-SCNC: 135 MMOL/L (ref 136–145)
STRESS TARGET HR: 130 BPM
TRIGL SERPL-MCNC: 375 MG/DL (ref 0–150)
VLDLC SERPL-MCNC: 51 MG/DL (ref 5–40)
WBC NRBC COR # BLD: 8.03 10*3/MM3 (ref 3.4–10.8)

## 2022-05-21 PROCEDURE — 93880 EXTRACRANIAL BILAT STUDY: CPT

## 2022-05-21 PROCEDURE — 80048 BASIC METABOLIC PNL TOTAL CA: CPT | Performed by: INTERNAL MEDICINE

## 2022-05-21 PROCEDURE — 99232 SBSQ HOSP IP/OBS MODERATE 35: CPT | Performed by: NURSE PRACTITIONER

## 2022-05-21 PROCEDURE — 93970 EXTREMITY STUDY: CPT

## 2022-05-21 PROCEDURE — 93306 TTE W/DOPPLER COMPLETE: CPT | Performed by: INTERNAL MEDICINE

## 2022-05-21 PROCEDURE — 25010000002 PERFLUTREN (DEFINITY) 8.476 MG IN SODIUM CHLORIDE (PF) 0.9 % 10 ML INJECTION: Performed by: INTERNAL MEDICINE

## 2022-05-21 PROCEDURE — 85027 COMPLETE CBC AUTOMATED: CPT | Performed by: INTERNAL MEDICINE

## 2022-05-21 PROCEDURE — 82962 GLUCOSE BLOOD TEST: CPT

## 2022-05-21 PROCEDURE — 80048 BASIC METABOLIC PNL TOTAL CA: CPT | Performed by: NURSE PRACTITIONER

## 2022-05-21 PROCEDURE — 93005 ELECTROCARDIOGRAM TRACING: CPT | Performed by: INTERNAL MEDICINE

## 2022-05-21 PROCEDURE — 85576 BLOOD PLATELET AGGREGATION: CPT | Performed by: THORACIC SURGERY (CARDIOTHORACIC VASCULAR SURGERY)

## 2022-05-21 PROCEDURE — 93306 TTE W/DOPPLER COMPLETE: CPT

## 2022-05-21 PROCEDURE — 83036 HEMOGLOBIN GLYCOSYLATED A1C: CPT | Performed by: INTERNAL MEDICINE

## 2022-05-21 PROCEDURE — 25010000002 EPTIFIBATIDE PER 5 MG: Performed by: INTERNAL MEDICINE

## 2022-05-21 PROCEDURE — 80061 LIPID PANEL: CPT | Performed by: INTERNAL MEDICINE

## 2022-05-21 PROCEDURE — 93010 ELECTROCARDIOGRAM REPORT: CPT | Performed by: INTERNAL MEDICINE

## 2022-05-21 RX ORDER — ESCITALOPRAM OXALATE 20 MG/1
20 TABLET ORAL DAILY
Status: DISCONTINUED | OUTPATIENT
Start: 2022-05-21 | End: 2022-05-25 | Stop reason: HOSPADM

## 2022-05-21 RX ORDER — ISOSORBIDE MONONITRATE 30 MG/1
30 TABLET, EXTENDED RELEASE ORAL DAILY
Status: DISCONTINUED | OUTPATIENT
Start: 2022-05-21 | End: 2022-05-25 | Stop reason: HOSPADM

## 2022-05-21 RX ORDER — PANTOPRAZOLE SODIUM 40 MG/1
40 TABLET, DELAYED RELEASE ORAL EVERY MORNING
Refills: 3 | Status: DISCONTINUED | OUTPATIENT
Start: 2022-05-21 | End: 2022-05-25 | Stop reason: HOSPADM

## 2022-05-21 RX ORDER — ARIPIPRAZOLE 2 MG/1
3 TABLET ORAL DAILY
Status: DISCONTINUED | OUTPATIENT
Start: 2022-05-21 | End: 2022-05-25 | Stop reason: HOSPADM

## 2022-05-21 RX ORDER — DEXTROSE MONOHYDRATE 25 G/50ML
25 INJECTION, SOLUTION INTRAVENOUS
Status: DISCONTINUED | OUTPATIENT
Start: 2022-05-21 | End: 2022-05-25 | Stop reason: HOSPADM

## 2022-05-21 RX ORDER — INSULIN LISPRO 100 [IU]/ML
0-7 INJECTION, SOLUTION INTRAVENOUS; SUBCUTANEOUS
Status: DISCONTINUED | OUTPATIENT
Start: 2022-05-21 | End: 2022-05-25 | Stop reason: HOSPADM

## 2022-05-21 RX ORDER — ERGOCALCIFEROL 1.25 MG/1
50000 CAPSULE ORAL WEEKLY
Status: DISCONTINUED | OUTPATIENT
Start: 2022-05-21 | End: 2022-05-25 | Stop reason: HOSPADM

## 2022-05-21 RX ORDER — LATANOPROST 50 UG/ML
1 SOLUTION/ DROPS OPHTHALMIC DAILY
Status: DISCONTINUED | OUTPATIENT
Start: 2022-05-21 | End: 2022-05-25 | Stop reason: HOSPADM

## 2022-05-21 RX ORDER — DORZOLAMIDE HYDROCHLORIDE AND TIMOLOL MALEATE 20; 5 MG/ML; MG/ML
SOLUTION/ DROPS OPHTHALMIC 2 TIMES DAILY
Refills: 3 | Status: DISCONTINUED | OUTPATIENT
Start: 2022-05-21 | End: 2022-05-25 | Stop reason: HOSPADM

## 2022-05-21 RX ORDER — EPTIFIBATIDE 0.75 MG/ML
2 INJECTION, SOLUTION INTRAVENOUS CONTINUOUS
Status: DISCONTINUED | OUTPATIENT
Start: 2022-05-21 | End: 2022-05-23

## 2022-05-21 RX ORDER — NICOTINE POLACRILEX 4 MG
15 LOZENGE BUCCAL
Status: DISCONTINUED | OUTPATIENT
Start: 2022-05-21 | End: 2022-05-25 | Stop reason: HOSPADM

## 2022-05-21 RX ORDER — PIOGLITAZONEHYDROCHLORIDE 15 MG/1
15 TABLET ORAL DAILY
Status: DISCONTINUED | OUTPATIENT
Start: 2022-05-21 | End: 2022-05-22

## 2022-05-21 RX ADMIN — ROSUVASTATIN CALCIUM 40 MG: 40 TABLET, FILM COATED ORAL at 20:33

## 2022-05-21 RX ADMIN — ASPIRIN 81 MG: 81 TABLET, COATED ORAL at 08:00

## 2022-05-21 RX ADMIN — EPTIFIBATIDE 2 MCG/KG/MIN: 0.75 INJECTION INTRAVENOUS at 08:00

## 2022-05-21 RX ADMIN — ERGOCALCIFEROL 50000 UNITS: 1.25 CAPSULE ORAL at 13:18

## 2022-05-21 RX ADMIN — PIOGLITAZONE 15 MG: 15 TABLET ORAL at 13:18

## 2022-05-21 RX ADMIN — PERFLUTREN 2 ML: 6.52 INJECTION, SUSPENSION INTRAVENOUS at 07:42

## 2022-05-21 RX ADMIN — TIMOLOL MALEATE: 5 SOLUTION/ DROPS OPHTHALMIC at 13:18

## 2022-05-21 RX ADMIN — TIMOLOL MALEATE: 5 SOLUTION/ DROPS OPHTHALMIC at 20:35

## 2022-05-21 RX ADMIN — ESCITALOPRAM 20 MG: 20 TABLET, FILM COATED ORAL at 13:19

## 2022-05-21 RX ADMIN — CARVEDILOL 25 MG: 25 TABLET, FILM COATED ORAL at 08:00

## 2022-05-21 RX ADMIN — EPTIFIBATIDE 2 MCG/KG/MIN: 0.75 INJECTION INTRAVENOUS at 01:23

## 2022-05-21 RX ADMIN — ISOSORBIDE MONONITRATE 30 MG: 30 TABLET ORAL at 13:18

## 2022-05-21 RX ADMIN — EPTIFIBATIDE 2 MCG/KG/MIN: 0.75 INJECTION INTRAVENOUS at 18:52

## 2022-05-21 RX ADMIN — CARVEDILOL 25 MG: 25 TABLET, FILM COATED ORAL at 20:33

## 2022-05-21 RX ADMIN — EMPAGLIFLOZIN 25 MG: 25 TABLET, FILM COATED ORAL at 13:19

## 2022-05-21 RX ADMIN — ARIPIPRAZOLE 3 MG: 2 TABLET ORAL at 13:19

## 2022-05-21 RX ADMIN — PANTOPRAZOLE SODIUM 40 MG: 40 TABLET, DELAYED RELEASE ORAL at 13:18

## 2022-05-21 RX ADMIN — EPTIFIBATIDE 2 MCG/KG/MIN: 0.75 INJECTION INTRAVENOUS at 13:19

## 2022-05-21 RX ADMIN — INSULIN GLARGINE-YFGN 60 UNITS: 100 INJECTION, SOLUTION SUBCUTANEOUS at 13:17

## 2022-05-22 LAB
ALBUMIN SERPL-MCNC: 4 G/DL (ref 3.5–5.2)
ALBUMIN/GLOB SERPL: 1.5 G/DL
ALP SERPL-CCNC: 64 U/L (ref 39–117)
ALT SERPL W P-5'-P-CCNC: 25 U/L (ref 1–41)
ANION GAP SERPL CALCULATED.3IONS-SCNC: 11.1 MMOL/L (ref 5–15)
AST SERPL-CCNC: 29 U/L (ref 1–40)
BILIRUB SERPL-MCNC: 0.5 MG/DL (ref 0–1.2)
BUN SERPL-MCNC: 11 MG/DL (ref 8–23)
BUN/CREAT SERPL: 12.1 (ref 7–25)
CALCIUM SPEC-SCNC: 8.7 MG/DL (ref 8.6–10.5)
CHLORIDE SERPL-SCNC: 102 MMOL/L (ref 98–107)
CO2 SERPL-SCNC: 22.9 MMOL/L (ref 22–29)
CREAT SERPL-MCNC: 0.91 MG/DL (ref 0.76–1.27)
DEPRECATED RDW RBC AUTO: 50.5 FL (ref 37–54)
EGFRCR SERPLBLD CKD-EPI 2021: 92.4 ML/MIN/1.73
ERYTHROCYTE [DISTWIDTH] IN BLOOD BY AUTOMATED COUNT: 15.2 % (ref 12.3–15.4)
GLOBULIN UR ELPH-MCNC: 2.6 GM/DL
GLUCOSE BLDC GLUCOMTR-MCNC: 108 MG/DL (ref 70–130)
GLUCOSE BLDC GLUCOMTR-MCNC: 120 MG/DL (ref 70–130)
GLUCOSE BLDC GLUCOMTR-MCNC: 132 MG/DL (ref 70–130)
GLUCOSE BLDC GLUCOMTR-MCNC: 167 MG/DL (ref 70–130)
GLUCOSE SERPL-MCNC: 64 MG/DL (ref 65–99)
HCT VFR BLD AUTO: 42.7 % (ref 37.5–51)
HGB BLD-MCNC: 14 G/DL (ref 13–17.7)
MCH RBC QN AUTO: 29.7 PG (ref 26.6–33)
MCHC RBC AUTO-ENTMCNC: 32.8 G/DL (ref 31.5–35.7)
MCV RBC AUTO: 90.7 FL (ref 79–97)
PLATELET # BLD AUTO: 142 10*3/MM3 (ref 140–450)
PMV BLD AUTO: 11 FL (ref 6–12)
POTASSIUM SERPL-SCNC: 3.6 MMOL/L (ref 3.5–5.2)
PROT SERPL-MCNC: 6.6 G/DL (ref 6–8.5)
RBC # BLD AUTO: 4.71 10*6/MM3 (ref 4.14–5.8)
SODIUM SERPL-SCNC: 136 MMOL/L (ref 136–145)
WBC NRBC COR # BLD: 8.9 10*3/MM3 (ref 3.4–10.8)

## 2022-05-22 PROCEDURE — 25010000002 EPTIFIBATIDE PER 5 MG: Performed by: NURSE PRACTITIONER

## 2022-05-22 PROCEDURE — 82962 GLUCOSE BLOOD TEST: CPT

## 2022-05-22 PROCEDURE — 85027 COMPLETE CBC AUTOMATED: CPT | Performed by: INTERNAL MEDICINE

## 2022-05-22 PROCEDURE — 80053 COMPREHEN METABOLIC PANEL: CPT | Performed by: INTERNAL MEDICINE

## 2022-05-22 PROCEDURE — 99231 SBSQ HOSP IP/OBS SF/LOW 25: CPT | Performed by: THORACIC SURGERY (CARDIOTHORACIC VASCULAR SURGERY)

## 2022-05-22 PROCEDURE — 99232 SBSQ HOSP IP/OBS MODERATE 35: CPT | Performed by: NURSE PRACTITIONER

## 2022-05-22 RX ADMIN — CARVEDILOL 25 MG: 25 TABLET, FILM COATED ORAL at 08:50

## 2022-05-22 RX ADMIN — EPTIFIBATIDE 2 MCG/KG/MIN: 0.75 INJECTION INTRAVENOUS at 12:02

## 2022-05-22 RX ADMIN — ROSUVASTATIN CALCIUM 40 MG: 40 TABLET, FILM COATED ORAL at 20:51

## 2022-05-22 RX ADMIN — TIMOLOL MALEATE: 5 SOLUTION/ DROPS OPHTHALMIC at 08:50

## 2022-05-22 RX ADMIN — ARIPIPRAZOLE 3 MG: 2 TABLET ORAL at 08:50

## 2022-05-22 RX ADMIN — ASPIRIN 81 MG: 81 TABLET, COATED ORAL at 08:50

## 2022-05-22 RX ADMIN — EMPAGLIFLOZIN 25 MG: 25 TABLET, FILM COATED ORAL at 08:50

## 2022-05-22 RX ADMIN — EPTIFIBATIDE 2 MCG/KG/MIN: 0.75 INJECTION INTRAVENOUS at 22:54

## 2022-05-22 RX ADMIN — CARVEDILOL 25 MG: 25 TABLET, FILM COATED ORAL at 20:51

## 2022-05-22 RX ADMIN — EPTIFIBATIDE 2 MCG/KG/MIN: 0.75 INJECTION INTRAVENOUS at 05:35

## 2022-05-22 RX ADMIN — ESCITALOPRAM 20 MG: 20 TABLET, FILM COATED ORAL at 08:50

## 2022-05-22 RX ADMIN — TIMOLOL MALEATE: 5 SOLUTION/ DROPS OPHTHALMIC at 20:51

## 2022-05-22 RX ADMIN — LATANOPROST 1 DROP: 50 SOLUTION OPHTHALMIC at 23:17

## 2022-05-22 RX ADMIN — ISOSORBIDE MONONITRATE 30 MG: 30 TABLET ORAL at 08:50

## 2022-05-22 RX ADMIN — PANTOPRAZOLE SODIUM 40 MG: 40 TABLET, DELAYED RELEASE ORAL at 06:06

## 2022-05-22 RX ADMIN — EPTIFIBATIDE 2 MCG/KG/MIN: 0.75 INJECTION INTRAVENOUS at 00:04

## 2022-05-22 RX ADMIN — EPTIFIBATIDE 2 MCG/KG/MIN: 0.75 INJECTION INTRAVENOUS at 18:30

## 2022-05-22 RX ADMIN — PIOGLITAZONE 15 MG: 15 TABLET ORAL at 08:50

## 2022-05-23 ENCOUNTER — APPOINTMENT (OUTPATIENT)
Dept: GENERAL RADIOLOGY | Facility: HOSPITAL | Age: 67
End: 2022-05-23

## 2022-05-23 PROBLEM — I25.84 CORONARY ATHEROSCLEROSIS DUE TO CALCIFIED CORONARY LESION (CODE): Status: ACTIVE | Noted: 2022-05-19

## 2022-05-23 LAB
ACT BLD: 297 SECONDS (ref 82–152)
ACT BLD: 315 SECONDS (ref 82–152)
ALBUMIN SERPL-MCNC: 3.8 G/DL (ref 3.5–5.2)
ALBUMIN/GLOB SERPL: 1.4 G/DL
ALP SERPL-CCNC: 65 U/L (ref 39–117)
ALT SERPL W P-5'-P-CCNC: 25 U/L (ref 1–41)
ANION GAP SERPL CALCULATED.3IONS-SCNC: 10 MMOL/L (ref 5–15)
AST SERPL-CCNC: 29 U/L (ref 1–40)
BILIRUB SERPL-MCNC: 0.5 MG/DL (ref 0–1.2)
BUN SERPL-MCNC: 11 MG/DL (ref 8–23)
BUN/CREAT SERPL: 13.6 (ref 7–25)
CALCIUM SPEC-SCNC: 8.7 MG/DL (ref 8.6–10.5)
CHLORIDE SERPL-SCNC: 103 MMOL/L (ref 98–107)
CHOLEST SERPL-MCNC: 97 MG/DL (ref 0–200)
CO2 SERPL-SCNC: 23 MMOL/L (ref 22–29)
CREAT SERPL-MCNC: 0.81 MG/DL (ref 0.76–1.27)
DEPRECATED RDW RBC AUTO: 51.5 FL (ref 37–54)
EGFRCR SERPLBLD CKD-EPI 2021: 96.6 ML/MIN/1.73
ERYTHROCYTE [DISTWIDTH] IN BLOOD BY AUTOMATED COUNT: 15.2 % (ref 12.3–15.4)
GLOBULIN UR ELPH-MCNC: 2.8 GM/DL
GLUCOSE BLDC GLUCOMTR-MCNC: 100 MG/DL (ref 70–130)
GLUCOSE BLDC GLUCOMTR-MCNC: 121 MG/DL (ref 70–130)
GLUCOSE BLDC GLUCOMTR-MCNC: 142 MG/DL (ref 70–130)
GLUCOSE BLDC GLUCOMTR-MCNC: 80 MG/DL (ref 70–130)
GLUCOSE SERPL-MCNC: 97 MG/DL (ref 65–99)
HCT VFR BLD AUTO: 42.7 % (ref 37.5–51)
HDLC SERPL-MCNC: 39 MG/DL (ref 40–60)
HGB BLD-MCNC: 13.8 G/DL (ref 13–17.7)
LDLC SERPL CALC-MCNC: 39 MG/DL (ref 0–100)
LDLC/HDLC SERPL: 0.99 {RATIO}
MCH RBC QN AUTO: 29.3 PG (ref 26.6–33)
MCHC RBC AUTO-ENTMCNC: 32.3 G/DL (ref 31.5–35.7)
MCV RBC AUTO: 90.7 FL (ref 79–97)
PLATELET # BLD AUTO: 137 10*3/MM3 (ref 140–450)
PMV BLD AUTO: 10.9 FL (ref 6–12)
POTASSIUM SERPL-SCNC: 3.7 MMOL/L (ref 3.5–5.2)
PROT SERPL-MCNC: 6.6 G/DL (ref 6–8.5)
QT INTERVAL: 394 MS
RBC # BLD AUTO: 4.71 10*6/MM3 (ref 4.14–5.8)
SODIUM SERPL-SCNC: 136 MMOL/L (ref 136–145)
TRIGL SERPL-MCNC: 96 MG/DL (ref 0–150)
TSH SERPL DL<=0.05 MIU/L-ACNC: 0.63 UIU/ML (ref 0.27–4.2)
VLDLC SERPL-MCNC: 19 MG/DL (ref 5–40)
WBC NRBC COR # BLD: 8.67 10*3/MM3 (ref 3.4–10.8)

## 2022-05-23 PROCEDURE — 85027 COMPLETE CBC AUTOMATED: CPT | Performed by: INTERNAL MEDICINE

## 2022-05-23 PROCEDURE — B2111ZZ FLUOROSCOPY OF MULTIPLE CORONARY ARTERIES USING LOW OSMOLAR CONTRAST: ICD-10-PCS | Performed by: INTERNAL MEDICINE

## 2022-05-23 PROCEDURE — 71045 X-RAY EXAM CHEST 1 VIEW: CPT

## 2022-05-23 PROCEDURE — 25010000002 EPTIFIBATIDE PER 5 MG: Performed by: NURSE PRACTITIONER

## 2022-05-23 PROCEDURE — 4A023N7 MEASUREMENT OF CARDIAC SAMPLING AND PRESSURE, LEFT HEART, PERCUTANEOUS APPROACH: ICD-10-PCS | Performed by: INTERNAL MEDICINE

## 2022-05-23 PROCEDURE — 82962 GLUCOSE BLOOD TEST: CPT

## 2022-05-23 PROCEDURE — 99232 SBSQ HOSP IP/OBS MODERATE 35: CPT | Performed by: NURSE PRACTITIONER

## 2022-05-23 PROCEDURE — 80061 LIPID PANEL: CPT | Performed by: HOSPITALIST

## 2022-05-23 PROCEDURE — 80053 COMPREHEN METABOLIC PANEL: CPT | Performed by: INTERNAL MEDICINE

## 2022-05-23 PROCEDURE — 0271366 DILATION OF CORONARY ARTERY, TWO ARTERIES, BIFURCATION, WITH THREE DRUG-ELUTING INTRALUMINAL DEVICES, PERCUTANEOUS APPROACH: ICD-10-PCS | Performed by: INTERNAL MEDICINE

## 2022-05-23 PROCEDURE — 99232 SBSQ HOSP IP/OBS MODERATE 35: CPT | Performed by: INTERNAL MEDICINE

## 2022-05-23 PROCEDURE — 84443 ASSAY THYROID STIM HORMONE: CPT | Performed by: HOSPITALIST

## 2022-05-23 RX ORDER — CLOPIDOGREL BISULFATE 75 MG/1
75 TABLET ORAL DAILY
Status: DISCONTINUED | OUTPATIENT
Start: 2022-05-24 | End: 2022-05-25 | Stop reason: HOSPADM

## 2022-05-23 RX ORDER — CLOPIDOGREL BISULFATE 75 MG/1
300 TABLET ORAL ONCE
Status: COMPLETED | OUTPATIENT
Start: 2022-05-23 | End: 2022-05-23

## 2022-05-23 RX ORDER — NITROGLYCERIN 0.4 MG/1
0.4 TABLET SUBLINGUAL
Status: DISCONTINUED | OUTPATIENT
Start: 2022-05-23 | End: 2022-05-25 | Stop reason: HOSPADM

## 2022-05-23 RX ADMIN — ARIPIPRAZOLE 3 MG: 2 TABLET ORAL at 08:05

## 2022-05-23 RX ADMIN — ROSUVASTATIN CALCIUM 40 MG: 40 TABLET, FILM COATED ORAL at 21:47

## 2022-05-23 RX ADMIN — LATANOPROST 1 DROP: 50 SOLUTION OPHTHALMIC at 20:50

## 2022-05-23 RX ADMIN — ESCITALOPRAM 20 MG: 20 TABLET, FILM COATED ORAL at 08:05

## 2022-05-23 RX ADMIN — CARVEDILOL 25 MG: 25 TABLET, FILM COATED ORAL at 08:05

## 2022-05-23 RX ADMIN — PANTOPRAZOLE SODIUM 40 MG: 40 TABLET, DELAYED RELEASE ORAL at 06:21

## 2022-05-23 RX ADMIN — TIMOLOL MALEATE: 5 SOLUTION/ DROPS OPHTHALMIC at 20:51

## 2022-05-23 RX ADMIN — ASPIRIN 81 MG: 81 TABLET, COATED ORAL at 08:05

## 2022-05-23 RX ADMIN — TIMOLOL MALEATE: 5 SOLUTION/ DROPS OPHTHALMIC at 08:04

## 2022-05-23 RX ADMIN — CLOPIDOGREL 300 MG: 75 TABLET, FILM COATED ORAL at 11:23

## 2022-05-23 RX ADMIN — EMPAGLIFLOZIN 25 MG: 25 TABLET, FILM COATED ORAL at 08:05

## 2022-05-23 RX ADMIN — EPTIFIBATIDE 2 MCG/KG/MIN: 0.75 INJECTION INTRAVENOUS at 04:09

## 2022-05-23 RX ADMIN — ISOSORBIDE MONONITRATE 30 MG: 30 TABLET ORAL at 08:05

## 2022-05-23 RX ADMIN — CARVEDILOL 25 MG: 25 TABLET, FILM COATED ORAL at 20:49

## 2022-05-24 LAB
ALBUMIN SERPL-MCNC: 3.9 G/DL (ref 3.5–5.2)
ALBUMIN/GLOB SERPL: 1.3 G/DL
ALP SERPL-CCNC: 68 U/L (ref 39–117)
ALT SERPL W P-5'-P-CCNC: 34 U/L (ref 1–41)
ANION GAP SERPL CALCULATED.3IONS-SCNC: 10 MMOL/L (ref 5–15)
AST SERPL-CCNC: 39 U/L (ref 1–40)
BILIRUB SERPL-MCNC: 0.5 MG/DL (ref 0–1.2)
BUN SERPL-MCNC: 11 MG/DL (ref 8–23)
BUN/CREAT SERPL: 12.8 (ref 7–25)
CALCIUM SPEC-SCNC: 9.1 MG/DL (ref 8.6–10.5)
CHLORIDE SERPL-SCNC: 104 MMOL/L (ref 98–107)
CO2 SERPL-SCNC: 22 MMOL/L (ref 22–29)
CREAT SERPL-MCNC: 0.86 MG/DL (ref 0.76–1.27)
DEPRECATED RDW RBC AUTO: 47.6 FL (ref 37–54)
EGFRCR SERPLBLD CKD-EPI 2021: 94.9 ML/MIN/1.73
ERYTHROCYTE [DISTWIDTH] IN BLOOD BY AUTOMATED COUNT: 14.8 % (ref 12.3–15.4)
GLOBULIN UR ELPH-MCNC: 2.9 GM/DL
GLUCOSE BLDC GLUCOMTR-MCNC: 163 MG/DL (ref 70–130)
GLUCOSE BLDC GLUCOMTR-MCNC: 98 MG/DL (ref 70–130)
GLUCOSE SERPL-MCNC: 99 MG/DL (ref 65–99)
HCT VFR BLD AUTO: 42.1 % (ref 37.5–51)
HGB BLD-MCNC: 14.2 G/DL (ref 13–17.7)
MCH RBC QN AUTO: 29.3 PG (ref 26.6–33)
MCHC RBC AUTO-ENTMCNC: 33.7 G/DL (ref 31.5–35.7)
MCV RBC AUTO: 87 FL (ref 79–97)
PLATELET # BLD AUTO: 135 10*3/MM3 (ref 140–450)
PMV BLD AUTO: 11.1 FL (ref 6–12)
POTASSIUM SERPL-SCNC: 3.7 MMOL/L (ref 3.5–5.2)
PROT SERPL-MCNC: 6.8 G/DL (ref 6–8.5)
QT INTERVAL: 424 MS
RBC # BLD AUTO: 4.84 10*6/MM3 (ref 4.14–5.8)
SODIUM SERPL-SCNC: 136 MMOL/L (ref 136–145)
WBC NRBC COR # BLD: 8.33 10*3/MM3 (ref 3.4–10.8)

## 2022-05-24 PROCEDURE — 93571 IV DOP VEL&/PRESS C FLO 1ST: CPT | Performed by: INTERNAL MEDICINE

## 2022-05-24 PROCEDURE — 25010000002 HEPARIN (PORCINE) PER 1000 UNITS: Performed by: INTERNAL MEDICINE

## 2022-05-24 PROCEDURE — C1874 STENT, COATED/COV W/DEL SYS: HCPCS | Performed by: INTERNAL MEDICINE

## 2022-05-24 PROCEDURE — 25010000002 FENTANYL CITRATE (PF) 50 MCG/ML SOLUTION: Performed by: INTERNAL MEDICINE

## 2022-05-24 PROCEDURE — C1725 CATH, TRANSLUMIN NON-LASER: HCPCS | Performed by: INTERNAL MEDICINE

## 2022-05-24 PROCEDURE — 25010000002 PHENYLEPHRINE 10 MG/ML SOLUTION: Performed by: INTERNAL MEDICINE

## 2022-05-24 PROCEDURE — 99152 MOD SED SAME PHYS/QHP 5/>YRS: CPT | Performed by: INTERNAL MEDICINE

## 2022-05-24 PROCEDURE — C1887 CATHETER, GUIDING: HCPCS | Performed by: INTERNAL MEDICINE

## 2022-05-24 PROCEDURE — C1760 CLOSURE DEV, VASC: HCPCS | Performed by: INTERNAL MEDICINE

## 2022-05-24 PROCEDURE — C1769 GUIDE WIRE: HCPCS | Performed by: INTERNAL MEDICINE

## 2022-05-24 PROCEDURE — 85347 COAGULATION TIME ACTIVATED: CPT

## 2022-05-24 PROCEDURE — 82962 GLUCOSE BLOOD TEST: CPT

## 2022-05-24 PROCEDURE — 85027 COMPLETE CBC AUTOMATED: CPT | Performed by: INTERNAL MEDICINE

## 2022-05-24 PROCEDURE — C9600 PERC DRUG-EL COR STENT SING: HCPCS | Performed by: INTERNAL MEDICINE

## 2022-05-24 PROCEDURE — 0 IOPAMIDOL PER 1 ML: Performed by: INTERNAL MEDICINE

## 2022-05-24 PROCEDURE — 25010000002 MIDAZOLAM PER 1 MG: Performed by: INTERNAL MEDICINE

## 2022-05-24 PROCEDURE — 92928 PRQ TCAT PLMT NTRAC ST 1 LES: CPT | Performed by: INTERNAL MEDICINE

## 2022-05-24 PROCEDURE — C1894 INTRO/SHEATH, NON-LASER: HCPCS | Performed by: INTERNAL MEDICINE

## 2022-05-24 PROCEDURE — 93005 ELECTROCARDIOGRAM TRACING: CPT | Performed by: INTERNAL MEDICINE

## 2022-05-24 PROCEDURE — 80053 COMPREHEN METABOLIC PANEL: CPT | Performed by: INTERNAL MEDICINE

## 2022-05-24 PROCEDURE — 99153 MOD SED SAME PHYS/QHP EA: CPT | Performed by: INTERNAL MEDICINE

## 2022-05-24 DEVICE — XIENCE SKYPOINT™ EVEROLIMUS ELUTING CORONARY STENT SYSTEM 4.00 MM X 15 MM / RAPID-EXCHANGE
Type: IMPLANTABLE DEVICE | Status: FUNCTIONAL
Brand: XIENCE SKYPOINT™

## 2022-05-24 DEVICE — XIENCE SKYPOINT™ EVEROLIMUS ELUTING CORONARY STENT SYSTEM 3.25 MM X 18 MM / RAPID-EXCHANGE
Type: IMPLANTABLE DEVICE | Status: FUNCTIONAL
Brand: XIENCE SKYPOINT™

## 2022-05-24 DEVICE — XIENCE SKYPOINT™ EVEROLIMUS ELUTING CORONARY STENT SYSTEM 3.00 MM X 23 MM / RAPID-EXCHANGE
Type: IMPLANTABLE DEVICE | Status: FUNCTIONAL
Brand: XIENCE SKYPOINT™

## 2022-05-24 RX ORDER — PHENYLEPHRINE HYDROCHLORIDE 10 MG/ML
INJECTION INTRAVENOUS AS NEEDED
Status: DISCONTINUED | OUTPATIENT
Start: 2022-05-24 | End: 2022-05-24 | Stop reason: HOSPADM

## 2022-05-24 RX ORDER — SODIUM CHLORIDE 9 MG/ML
INJECTION, SOLUTION INTRAVENOUS CONTINUOUS PRN
Status: COMPLETED | OUTPATIENT
Start: 2022-05-24 | End: 2022-05-24

## 2022-05-24 RX ORDER — SODIUM CHLORIDE 9 MG/ML
50 INJECTION, SOLUTION INTRAVENOUS CONTINUOUS
Status: ACTIVE | OUTPATIENT
Start: 2022-05-24 | End: 2022-05-25

## 2022-05-24 RX ORDER — NALOXONE HCL 0.4 MG/ML
0.4 VIAL (ML) INJECTION
Status: DISCONTINUED | OUTPATIENT
Start: 2022-05-24 | End: 2022-05-25 | Stop reason: HOSPADM

## 2022-05-24 RX ORDER — TEMAZEPAM 15 MG/1
15 CAPSULE ORAL NIGHTLY PRN
Status: DISCONTINUED | OUTPATIENT
Start: 2022-05-24 | End: 2022-05-25 | Stop reason: HOSPADM

## 2022-05-24 RX ORDER — MORPHINE SULFATE 2 MG/ML
1 INJECTION, SOLUTION INTRAMUSCULAR; INTRAVENOUS EVERY 4 HOURS PRN
Status: DISCONTINUED | OUTPATIENT
Start: 2022-05-24 | End: 2022-05-25 | Stop reason: HOSPADM

## 2022-05-24 RX ORDER — ONDANSETRON 4 MG/1
4 TABLET, FILM COATED ORAL EVERY 6 HOURS PRN
Status: DISCONTINUED | OUTPATIENT
Start: 2022-05-24 | End: 2022-05-25 | Stop reason: HOSPADM

## 2022-05-24 RX ORDER — ACETAMINOPHEN 325 MG/1
650 TABLET ORAL EVERY 4 HOURS PRN
Status: DISCONTINUED | OUTPATIENT
Start: 2022-05-24 | End: 2022-05-25 | Stop reason: HOSPADM

## 2022-05-24 RX ORDER — HEPARIN SODIUM 1000 [USP'U]/ML
INJECTION, SOLUTION INTRAVENOUS; SUBCUTANEOUS AS NEEDED
Status: DISCONTINUED | OUTPATIENT
Start: 2022-05-24 | End: 2022-05-24 | Stop reason: HOSPADM

## 2022-05-24 RX ORDER — ONDANSETRON 2 MG/ML
4 INJECTION INTRAMUSCULAR; INTRAVENOUS EVERY 6 HOURS PRN
Status: DISCONTINUED | OUTPATIENT
Start: 2022-05-24 | End: 2022-05-25 | Stop reason: HOSPADM

## 2022-05-24 RX ORDER — FENTANYL CITRATE 50 UG/ML
INJECTION, SOLUTION INTRAMUSCULAR; INTRAVENOUS AS NEEDED
Status: DISCONTINUED | OUTPATIENT
Start: 2022-05-24 | End: 2022-05-24 | Stop reason: HOSPADM

## 2022-05-24 RX ORDER — CLOPIDOGREL BISULFATE 75 MG/1
TABLET ORAL AS NEEDED
Status: DISCONTINUED | OUTPATIENT
Start: 2022-05-24 | End: 2022-05-24 | Stop reason: HOSPADM

## 2022-05-24 RX ORDER — LIDOCAINE HYDROCHLORIDE 20 MG/ML
INJECTION, SOLUTION INFILTRATION; PERINEURAL AS NEEDED
Status: DISCONTINUED | OUTPATIENT
Start: 2022-05-24 | End: 2022-05-24 | Stop reason: HOSPADM

## 2022-05-24 RX ORDER — HYDROCODONE BITARTRATE AND ACETAMINOPHEN 5; 325 MG/1; MG/1
1 TABLET ORAL EVERY 4 HOURS PRN
Status: DISCONTINUED | OUTPATIENT
Start: 2022-05-24 | End: 2022-05-25 | Stop reason: HOSPADM

## 2022-05-24 RX ORDER — MIDAZOLAM HYDROCHLORIDE 1 MG/ML
INJECTION INTRAMUSCULAR; INTRAVENOUS AS NEEDED
Status: DISCONTINUED | OUTPATIENT
Start: 2022-05-24 | End: 2022-05-24 | Stop reason: HOSPADM

## 2022-05-24 RX ADMIN — TIMOLOL MALEATE: 5 SOLUTION/ DROPS OPHTHALMIC at 20:25

## 2022-05-24 RX ADMIN — CLOPIDOGREL 75 MG: 75 TABLET, FILM COATED ORAL at 08:43

## 2022-05-24 RX ADMIN — ISOSORBIDE MONONITRATE 30 MG: 30 TABLET ORAL at 08:43

## 2022-05-24 RX ADMIN — ARIPIPRAZOLE 3 MG: 2 TABLET ORAL at 08:43

## 2022-05-24 RX ADMIN — PANTOPRAZOLE SODIUM 40 MG: 40 TABLET, DELAYED RELEASE ORAL at 06:53

## 2022-05-24 RX ADMIN — ASPIRIN 81 MG: 81 TABLET, COATED ORAL at 08:43

## 2022-05-24 RX ADMIN — EMPAGLIFLOZIN 25 MG: 25 TABLET, FILM COATED ORAL at 08:43

## 2022-05-24 RX ADMIN — LATANOPROST 1 DROP: 50 SOLUTION OPHTHALMIC at 20:25

## 2022-05-24 RX ADMIN — ESCITALOPRAM 20 MG: 20 TABLET, FILM COATED ORAL at 08:43

## 2022-05-24 RX ADMIN — ROSUVASTATIN CALCIUM 40 MG: 40 TABLET, FILM COATED ORAL at 20:24

## 2022-05-24 RX ADMIN — CARVEDILOL 25 MG: 25 TABLET, FILM COATED ORAL at 08:43

## 2022-05-24 RX ADMIN — SODIUM CHLORIDE 50 ML/HR: 9 INJECTION, SOLUTION INTRAVENOUS at 17:22

## 2022-05-24 RX ADMIN — CARVEDILOL 25 MG: 25 TABLET, FILM COATED ORAL at 20:24

## 2022-05-25 ENCOUNTER — READMISSION MANAGEMENT (OUTPATIENT)
Dept: CALL CENTER | Facility: HOSPITAL | Age: 67
End: 2022-05-25

## 2022-05-25 VITALS
HEIGHT: 77 IN | SYSTOLIC BLOOD PRESSURE: 136 MMHG | TEMPERATURE: 97.6 F | DIASTOLIC BLOOD PRESSURE: 82 MMHG | RESPIRATION RATE: 16 BRPM | HEART RATE: 65 BPM | BODY MASS INDEX: 30.6 KG/M2 | WEIGHT: 259.2 LBS | OXYGEN SATURATION: 95 %

## 2022-05-25 LAB
ACT BLD: 248 SECONDS (ref 82–152)
ALBUMIN SERPL-MCNC: 4.1 G/DL (ref 3.5–5.2)
ALBUMIN/GLOB SERPL: 1.3 G/DL
ALP SERPL-CCNC: 77 U/L (ref 39–117)
ALT SERPL W P-5'-P-CCNC: 35 U/L (ref 1–41)
ANION GAP SERPL CALCULATED.3IONS-SCNC: 11 MMOL/L (ref 5–15)
AST SERPL-CCNC: 44 U/L (ref 1–40)
BILIRUB SERPL-MCNC: 0.6 MG/DL (ref 0–1.2)
BUN SERPL-MCNC: 16 MG/DL (ref 8–23)
BUN/CREAT SERPL: 15.7 (ref 7–25)
CALCIUM SPEC-SCNC: 9 MG/DL (ref 8.6–10.5)
CHLORIDE SERPL-SCNC: 104 MMOL/L (ref 98–107)
CHOLEST SERPL-MCNC: 98 MG/DL (ref 0–200)
CO2 SERPL-SCNC: 23 MMOL/L (ref 22–29)
CREAT SERPL-MCNC: 1.02 MG/DL (ref 0.76–1.27)
DEPRECATED RDW RBC AUTO: 47.6 FL (ref 37–54)
EGFRCR SERPLBLD CKD-EPI 2021: 80.6 ML/MIN/1.73
ERYTHROCYTE [DISTWIDTH] IN BLOOD BY AUTOMATED COUNT: 15 % (ref 12.3–15.4)
GLOBULIN UR ELPH-MCNC: 3.1 GM/DL
GLUCOSE BLDC GLUCOMTR-MCNC: 83 MG/DL (ref 70–130)
GLUCOSE SERPL-MCNC: 120 MG/DL (ref 65–99)
HCT VFR BLD AUTO: 41.9 % (ref 37.5–51)
HDLC SERPL-MCNC: 37 MG/DL (ref 40–60)
HGB BLD-MCNC: 14 G/DL (ref 13–17.7)
LDLC SERPL CALC-MCNC: 34 MG/DL (ref 0–100)
LDLC/HDLC SERPL: 0.79 {RATIO}
MCH RBC QN AUTO: 29.2 PG (ref 26.6–33)
MCHC RBC AUTO-ENTMCNC: 33.4 G/DL (ref 31.5–35.7)
MCV RBC AUTO: 87.5 FL (ref 79–97)
PLATELET # BLD AUTO: 165 10*3/MM3 (ref 140–450)
PMV BLD AUTO: 11.4 FL (ref 6–12)
POTASSIUM SERPL-SCNC: 3.8 MMOL/L (ref 3.5–5.2)
PROT SERPL-MCNC: 7.2 G/DL (ref 6–8.5)
QT INTERVAL: 409 MS
RBC # BLD AUTO: 4.79 10*6/MM3 (ref 4.14–5.8)
SODIUM SERPL-SCNC: 138 MMOL/L (ref 136–145)
TRIGL SERPL-MCNC: 158 MG/DL (ref 0–150)
VLDLC SERPL-MCNC: 27 MG/DL (ref 5–40)
WBC NRBC COR # BLD: 8.62 10*3/MM3 (ref 3.4–10.8)

## 2022-05-25 PROCEDURE — 80061 LIPID PANEL: CPT | Performed by: INTERNAL MEDICINE

## 2022-05-25 PROCEDURE — 82962 GLUCOSE BLOOD TEST: CPT

## 2022-05-25 PROCEDURE — 80053 COMPREHEN METABOLIC PANEL: CPT | Performed by: INTERNAL MEDICINE

## 2022-05-25 PROCEDURE — 93005 ELECTROCARDIOGRAM TRACING: CPT | Performed by: INTERNAL MEDICINE

## 2022-05-25 PROCEDURE — 99239 HOSP IP/OBS DSCHRG MGMT >30: CPT | Performed by: INTERNAL MEDICINE

## 2022-05-25 PROCEDURE — 93010 ELECTROCARDIOGRAM REPORT: CPT | Performed by: INTERNAL MEDICINE

## 2022-05-25 PROCEDURE — 85027 COMPLETE CBC AUTOMATED: CPT | Performed by: INTERNAL MEDICINE

## 2022-05-25 RX ORDER — CLOPIDOGREL BISULFATE 75 MG/1
75 TABLET ORAL DAILY
Qty: 30 TABLET | Refills: 11 | Status: SHIPPED | OUTPATIENT
Start: 2022-05-25

## 2022-05-25 RX ORDER — NITROGLYCERIN 0.4 MG/1
0.4 TABLET SUBLINGUAL
Qty: 30 TABLET | Refills: 12 | Status: SHIPPED | OUTPATIENT
Start: 2022-05-25 | End: 2023-03-20

## 2022-05-25 RX ORDER — ASPIRIN 81 MG/1
81 TABLET ORAL DAILY
Qty: 30 TABLET | Refills: 6 | Status: SHIPPED | OUTPATIENT
Start: 2022-05-25

## 2022-05-25 RX ORDER — CARVEDILOL 25 MG/1
25 TABLET ORAL EVERY 12 HOURS SCHEDULED
Qty: 180 TABLET | Refills: 3 | Status: SHIPPED | OUTPATIENT
Start: 2022-05-25 | End: 2023-03-16 | Stop reason: SDUPTHER

## 2022-05-25 RX ADMIN — EMPAGLIFLOZIN 25 MG: 25 TABLET, FILM COATED ORAL at 09:30

## 2022-05-25 RX ADMIN — TIMOLOL MALEATE: 5 SOLUTION/ DROPS OPHTHALMIC at 09:33

## 2022-05-25 RX ADMIN — PANTOPRAZOLE SODIUM 40 MG: 40 TABLET, DELAYED RELEASE ORAL at 06:21

## 2022-05-25 RX ADMIN — CARVEDILOL 25 MG: 25 TABLET, FILM COATED ORAL at 09:30

## 2022-05-25 RX ADMIN — CLOPIDOGREL 75 MG: 75 TABLET, FILM COATED ORAL at 09:30

## 2022-05-25 RX ADMIN — ESCITALOPRAM 20 MG: 20 TABLET, FILM COATED ORAL at 09:30

## 2022-05-25 RX ADMIN — ASPIRIN 81 MG: 81 TABLET, COATED ORAL at 09:29

## 2022-05-25 RX ADMIN — ISOSORBIDE MONONITRATE 30 MG: 30 TABLET ORAL at 09:29

## 2022-05-25 RX ADMIN — ARIPIPRAZOLE 3 MG: 2 TABLET ORAL at 09:29

## 2022-05-25 NOTE — OUTREACH NOTE
Prep Survey    Flowsheet Row Responses   [facility] patient discharged from? [facility]   Is LACE score < 7 ? No   Emergency Room discharge w/ pulse ox? No   Eligibility [facility]   Date of Admission 05/20/22   Date of Discharge 05/25/22   Discharge Disposition Home or Self Care   Discharge diagnosis Coronary disease with PCI as documented below  Dypsnea on exertion   Does the patient have one of the following disease processes/diagnoses(primary or secondary)? Other   Does the patient have Home health ordered? No   Is there a DME ordered? No   Prep survey completed? Yes          ULISES Waite Registered Nurse

## 2022-05-26 ENCOUNTER — TRANSITIONAL CARE MANAGEMENT TELEPHONE ENCOUNTER (OUTPATIENT)
Dept: CALL CENTER | Facility: HOSPITAL | Age: 67
End: 2022-05-26

## 2022-05-26 NOTE — OUTREACH NOTE
Call Center TCM Note    Flowsheet Row Responses   [facility] patient discharged from? [facility]   Does the patient have one of the following disease processes/diagnoses(primary or secondary)? Other   TCM attempt successful? Yes   Call start time 0917   Call end time 0919   Discharge diagnosis Coronary disease with PCI as documented below  Dypsnea on exertion   Person spoke with today (if not patient) and relationship Wife   Meds reviewed with patient/caregiver? Yes   Is the patient having any side effects they believe may be caused by any medication additions or changes? No   Does the patient have all medications ordered at discharge? Yes   Is the patient taking all medications as directed (includes completed medication regime)? Yes   Does the patient have a primary care provider?  Yes   Does the patient have an appointment with their PCP within 7 days of discharge? No   What is preventing the patient from scheduling follow up appointments within 7 days of discharge? --  [Pt doesnt want to see PCP and will f/u with cardiology. ]   Nursing Interventions Advised patient to make appointment   Has the patient kept scheduled appointments due by today? N/A   Has home health visited the patient within 72 hours of discharge? N/A   Psychosocial issues? No   Did the patient receive a copy of their discharge instructions? Yes   Nursing interventions Reviewed instructions with patient   What is the patient's perception of their health status since discharge? Improving   TCM call completed? Yes   Wrap up additional comments Brief call with wife. Pts wife states doesnt need to see PCP that cardiology will handle it all.           Kandi Huggins RN    5/26/2022, 09:21 EDT

## 2022-05-27 NOTE — PROGRESS NOTES
"[facility] Follow Up Office Note     Encounter Date:22  Patient:Isaias Monaco  :1955  MRN:[id number]      Chief Complaint:   Chief Complaint   Patient presents with   • Follow-up         History of Presenting Illness:        Isaias Monaco is a 67 y.o. male who is here for complaint of dyspnea on exertion. He is a patient of Dr. Karimi.    Patient has PMH of CAD, hypertension and hyperlipidemia.  He presented in  with unstable angina and underwent DAVONTE of proximal to mid RCA .  In 2020 he presented with angina and was referred for cardiac catheterization with DAVONTE to 99% ostial LCx lesion.    Patient was seen by myself on 2022 with 2 weeks fullness in his throat and \"vague\" shortness of breath. These symptoms occured at rest and this had previously been his anginal equivalent. EKG with new ST depressions in III, AVF with TWIs. He was scheduled for [facility] for unstable angina which revealed new LCD and LCx stenosis.  He was referred for cardiac surgery but he was felt to be high risk as [employer] and not able to receive blood products.  S/p PCI on  by Dr. Karimi with DAVONTE to proximal LCx and left main bifurcation x2 with DK Crush technique.  Echo prior to stenting demonstrated normal LV function. Discharged 2022.    Patient reports he is doing well since hospital discharge.  He denies chest pain or anginal equivalent of throat fullness.  He denies shortness of breath, palpitations, orthopnea.  He does have lower extremity edema.  His recent echocardiogram demonstrates normal LV function.  He is concerned this is from amlodipine although he has been on this medication for a while.  He denies bleeding from cath sites.  He is compliant with aspirin and clopidogrel.    He and his wife are planning to get fourth COVID shot today.  They are leaving for vacation in approximately 2 weeks and will be gone to [place] and a [place] cruise.    Review of Systems:  Review of " Systems   Cardiovascular: Positive for leg swelling. Negative for chest pain, dyspnea on exertion, orthopnea and palpitations.   Respiratory: Negative for shortness of breath.                                    Current Outpatient Medications on File Prior to Visit   Medication Sig Dispense Refill   • ARIPiprazole (ABILIFY) 2 MG tablet Take 3 mg by mouth Daily.     • aspirin 81 MG EC tablet Take 1 tablet by mouth Daily. 30 tablet 6   • Blood Glucose Monitoring Suppl (Fin QuiverOGER BLOOD GLUCOSE) kit TEST AS DIRECTED 1 each 0   • carvedilol (COREG) 25 MG tablet Take 1 tablet by mouth Every 12 (Twelve) Hours. 180 tablet 3   • clopidogrel (PLAVIX) 75 MG tablet Take 1 tablet by mouth Daily. 30 tablet 11   • dorzolamide-timolol (COSOPT) 22.3-6.8 MG/ML ophthalmic solution Apply 1 drop to eye(s) to both eyes 2 (Two) Times a Day. 20 mL 3   • empagliflozin (Jardiance) 25 MG tablet tablet Take 1 tablet by mouth Daily. 90 tablet 0   • escitalopram (LEXAPRO) 20 MG tablet Take 1 tablet by mouth Daily. 90 tablet 3   • esomeprazole (nexIUM) 40 MG capsule Take 1 capsule by mouth Daily. 90 capsule 3   • ezetimibe (Zetia) 10 MG tablet Take 1 tablet by mouth Daily. 90 tablet 3   • famotidine (PEPCID) 20 MG tablet Take 1 tablet by mouth Daily With Dinner. 90 tablet 3   • glucose blood test strip Use 4 times a day 400 each 0   • Insulin Pen Needle ([employer] Pen Needles 31G) 31G X 8 MM misc USE AS DIRECTED TO INJECT TOUJEO 100 each 3   • Lancets (FREESTYLE) lancets Use to test BG 4 times daily 400 each 1   • latanoprost (XALATAN) 0.005 % ophthalmic solution Apply 1 drop to  each eye Daily. (Patient taking differently: Apply 1 drop to eye(s) as directed by provider Every Night.) 7.5 mL 3   • nitroglycerin (NITROSTAT) 0.4 MG SL tablet Place 1 tablet under the tongue Every 5 (Five) Minutes As Needed for Chest Pain (Only if SBP Greater Than 100). Take no more than 3 doses in 15 minutes. 30 tablet 12   • pioglitazone (ACTOS) 15 MG tablet Take 1 tablet  by mouth Daily. 90 tablet 3   • rosuvastatin (CRESTOR) 40 MG tablet Take 1 tablet by mouth Daily. 90 tablet 3   • sildenafil (VIAGRA) 50 MG tablet Take 1 tablet by mouth Daily As Needed for erectile dysfunction. (Patient taking differently: Take 50 mg by mouth Daily As Needed for Erectile Dysfunction. PRN) 6 tablet 5   • Toujeo SoloStar 300 UNIT/ML solution pen-injector injection INJECT 60 UNITS UNDER THE SKIN INTO THE APPROPRIATE AREA AS DIRECTED EVERY MORNING 18 mL 3   • vitamin D (ERGOCALCIFEROL) 1.25 MG (09419 UT) capsule capsule Take 1 capsule by mouth 1 (One) Time Per Week. 12 capsule 0   • [DISCONTINUED] isosorbide mononitrate (IMDUR) 30 MG 24 hr tablet Take 1 tablet by mouth Daily. 30 tablet 11   • [DISCONTINUED] metFORMIN (GLUCOPHAGE) 500 MG tablet Take 2 tablets by mouth 2 (Two) Times a Day With Meals. 360 tablet 3     No current facility-administered medications on file prior to visit.       Allergies   Allergen Reactions   • Ace Inhibitors Angioedema   • Lisinopril Angioedema   • Testosterone Myalgia       Past Medical History:   Diagnosis Date   • Adiposity    • Anemia     post hemorrhagic   • Angioedema 02/21/2016    Secondary to ACE inhibitor   • Anxiety    • Arthritis    • CAD (coronary artery disease)    • Chest pain    • Colon polyps    • Diabetes mellitus, type 2 (HCC)    • ED (erectile dysfunction)    • Febrile illness    • GERD (gastroesophageal reflux disease)    • Glaucoma    • Hematoma    • High cholesterol    • History of foreign travel 12/2017; 08/2018    [place], [place], [place], [place], [place] and [place]; [place]   • Hyperlipidemia    • Hypertension    • Hypogonadism in male 09/28/2016   • Low back pain    • Male erectile disorder    • Microalbuminuria    • Obesity (BMI 30-39.9)    • Osteoarthritis     knee   • Spinal stenosis of lumbar region without neurogenic claudication 06/02/2021   • Vitamin D deficiency    • Wound infection after surgery        Past Surgical History:    Procedure Laterality Date   • CARDIAC CATHETERIZATION N/A 05/15/2006    Dr. Mini Camarillo   • CARDIAC CATHETERIZATION N/A 3/10/2020    Procedure: Left Heart Cath;  Surgeon: Miguel Ángel Karimi MD;  Location:  [facility];  Service: Cardiology;  Laterality: N/A;   • CARDIAC CATHETERIZATION N/A 3/10/2020    Procedure: Stent DAVONTE coronary;  Surgeon: Miguel Ángel Karimi MD;  Location:  [facility];  Service: Cardiology;  Laterality: N/A;   • CARDIAC CATHETERIZATION N/A 3/10/2020    Procedure: Coronary angiography;  Surgeon: Miguel Ángel Karimi MD;  Location:  [facility];  Service: Cardiology;  Laterality: N/A;   • CARDIAC CATHETERIZATION N/A 3/10/2020    Procedure: Left ventriculography;  Surgeon: Miguel Ángel Karimi MD;  Location:  [facility];  Service: Cardiology;  Laterality: N/A;   • CARDIAC CATHETERIZATION N/A 5/20/2022    Procedure: Left Heart Cath;  Surgeon: Mackenzie Morales MD;  [facility];  Service: Cardiovascular;  Laterality: N/A;   • CARDIAC CATHETERIZATION N/A 5/20/2022    Procedure: Coronary angiography;  Surgeon: Mackenzie Morales MD;  Location:  [facility];  Service: Cardiovascular;  Laterality: N/A;   • CARDIAC CATHETERIZATION N/A 5/20/2022    Procedure: Percutaneous Coronary Intervention;  Surgeon: Mackenzie Morales MD;  [facility];  Service: Cardiovascular;  Laterality: N/A;   • CARDIAC CATHETERIZATION N/A 5/24/2022    Procedure: Percutaneous Coronary Intervention;  Surgeon: Miguel Ángel Karimi MD;  Location:  [facility];  Service: Cardiovascular;  Laterality: N/A;  LAD and Cx   • CARDIAC CATHETERIZATION N/A 5/24/2022    Procedure: Stent DAVONTE coronary;  Surgeon: Miguel Ángel Karimi MD;  [facility];  Service: Cardiovascular;  Laterality: N/A;   • CARDIAC CATHETERIZATION N/A 5/24/2022     Procedure: Resting Full Cycle Ratio;  Surgeon: Miguel Ángel Karimi MD;  Location: [facility] CATH INVASIVE LOCATION;  Service: Cardiovascular;  Laterality: N/A;   • COLONOSCOPY N/A 02/22/2006    Bilobed polyp at 30 cm, hemorrhoids-Dr. Ilya Zhao   • COLONOSCOPY N/A 02/28/2014    Normal ileum, one 6 mm polyp in the mid transverse colon-Dr. Ilya Zhao   • COLONOSCOPY N/A 11/19/2008    Ela ileum, two 3 to 4 mm polyps, non-bleeding internal hemorrhoids, repeat in 5 years-Dr. Ilya Zhao   • COLONOSCOPY N/A 10/31/2017    Procedure: COLONOSCOPY WITH POLYPECTOMY (COLD BIOPSY);  Surgeon: Ilya Zhao MD;  Location: [facility] ENDOSCOPY;  Service:    • COLONOSCOPY N/A 5/21/2021    Procedure: Colonoscopy into cecum and terminal ileum with cold biopsy polypectomy;  Surgeon: Ilya Zhao MD;  [facility];  Service: Gastroenterology;  Laterality: N/A;  Pre op: History of Polyps  Post op: Polyp   • CORONARY ANGIOPLASTY WITH STENT PLACEMENT  2007, 2012, 2015    cardiac stents x3 occasions   • ENDOSCOPY N/A 10/4/2017    Procedure: ESOPHAGOGASTRODUODENOSCOPY;  Surgeon: Boyd Guidry MD;  Location: [facility] ENDOSCOPY;  Service:    • ENDOSCOPY N/A 5/21/2021    Procedure: ESOPHAGOGASTRODUODENOSCOPY with biopsies;  Surgeon: Ilya Zhao MD;  Location: [facility] ENDOSCOPY;  Service: Gastroenterology;  Laterality: N/A;  Pre op: GERD  Post op: Irregular  Z-Line, Hiatal Hernia, Gastritis   • EPIDURAL BLOCK     • INTERVENTIONAL RADIOLOGY PROCEDURE N/A 5/20/2022    Procedure: Intravascular Ultrasound;  Surgeon: Mackenzie Morales MD;  Location: [facility] CATH INVASIVE LOCATION;  Service: Cardiovascular;  Laterality: N/A;   • KNEE INCISION AND DRAINAGE Right 6/20/2017    Procedure: RT. KNEE WASHOUT ;  Surgeon: Boyd Coyne MD;  Location: [facility] MAIN OR;  Service:    • MEDIAL BRANCH BLOCK Bilateral 12/17/2021    Procedure: LUMBAR MEDIAL BRANCH BLOCK bilateral ~L4-S1 x2 (~2 weeks apart);  Surgeon: Christina Villaseñor MD;   Location: [facility] MAIN [facility];  Service: Pain Management;  Laterality: Bilateral;   • MEDIAL BRANCH BLOCK Bilateral 1/3/2022    Procedure: LUMBAR MEDIAL BRANCH BLOCK bilateral ~L4-S1 x2 (~2 weeks apart);  Surgeon: Christina Villaseñor MD;  Location: [facility];  Service: Pain Management;  Laterality: Bilateral;   • W[place] TOTAL KNEE ARTHROPLASTY Right 6/15/2017    Procedure: RT TOTAL KNEE ARTHROPLASTY;  Surgeon: Boyd Coyne MD;  Location: [facility] MAIN OR;  Service: Orthopedics   • RADIOFREQUENCY ABLATION Bilateral 1/10/2022    Procedure: RADIOFREQUENCY ABLATION NERVES Bilateral L4-S1;  Surgeon: Christina Villaseñor MD;  Location: [facility];  Service: Pain Management;  Laterality: Bilateral;   • SHOULDER SURGERY Right 2016    rotator cuff repair   • UPPER GASTROINTESTINAL ENDOSCOPY N/A 10/13/2015    Z-line irregular, normal stomach, normal duodenum-Dr. Ilya Zhao   • UPPER GASTROINTESTINAL ENDOSCOPY N/A 02/28/2014    Z-line irregular, normal stomach, normal duodenum-Dr. Ilya Zhao   • UPPER GASTROINTESTINAL ENDOSCOPY N/A 11/19/2008    Z-line irregular, chronic gastritis withotu hemorrhage, normal duodenum-Dr. Ilya Zhao   • UPPER GASTROINTESTINAL ENDOSCOPY N/A 06/22/2006    LA Grade A reflux esophagitis, non-bleeding erythematous gastropathy, normal duodenum-Dr. Ilya Zhao       Social History     Socioeconomic History   • Marital status:      Spouse name: Micaela   • Number of children: 1   • Years of education: College   Tobacco Use   • Smoking status: Never Smoker   • Smokeless tobacco: Never Used   • Tobacco comment: CAFFEINE USE: 2 CUPS COFFEE DAILY   Vaping Use   • Vaping Use: Never used   Substance and Sexual Activity   • Alcohol use: Yes     Alcohol/week: 6.0 standard drinks     Types: 2 Glasses of wine, 2 Cans of beer, 1 Shots of liquor, 1 Standard drinks or equivalent per week     Comment: occasional 2-4 DRINKS PER WEEK   • Drug use: No   • Sexual activity: Yes     Partners: Female       Family History  "  Problem Relation Age of Onset   • Lupus Sister    • Thyroid disease Sister    • Heart disease Other    • Hypertension Other    • Arthritis Mother    • Hyperlipidemia Mother    • Hypertension Mother    • Thyroid disease Mother    • Lupus Mother    • Vision loss Mother    • Heart disease Father    • Arthritis Father    • Other Father         Vascular disease   • Lupus Father    • Depression Father    • Alcohol abuse Father    • Dementia Father    • Hypertension Father    • Heart disease Brother    • Heart attack Brother 40   • Thyroid disease Sister    • Arthritis Brother    • Malig Hyperthermia Neg Hx        The following portions of the patient's history were reviewed and updated as appropriate: allergies, current medications, past family history, past medical history, past social history, past surgical history and problem list.       Objective:       Vitals:    05/31/22 1012   BP: 126/84   BP Location: Right arm   Patient Position: Sitting   Cuff Size: Adult   Pulse: 73   SpO2: 98%   Weight: 117 kg (258 lb 6.4 oz)   Height: 195.6 cm (77\")         Physical Exam:  Constitutional: Well appearing, conversant  HENT: Oropharynx clear and membrane moist  Eyes: Normal conjunctiva, no sclera icterus  Neck: Supple, no carotid bruit bilaterally  Cardiovascular: Regular rate and rhythm, No Murmur, nonpitting bilateral lower extremity edema  Pulmonary: Normal respiratory effort, normal lung sounds  Neurological: Alert and orient x 3  Skin: Warm, dry, bilateral groin stick sites without hematoma, right radial stick site without hematoma  Psych: Appropriate mood and affect. Normal judgment and insight         Lab Results   Component Value Date     05/25/2022     05/24/2022    K 3.8 05/25/2022    K 3.7 05/24/2022     05/25/2022     05/24/2022    CO2 23.0 05/25/2022    CO2 22.0 05/24/2022    BUN 16 05/25/2022    BUN 11 05/24/2022    CREATININE 1.02 05/25/2022    CREATININE 0.86 05/24/2022    EGFRIFNONA 82 " 10/04/2021    EGFRIFNONA 64 12/01/2020    EGFRIFAFRI 100 10/04/2021    EGFRIFAFRI 78 12/01/2020    GLUCOSE 120 (H) 05/25/2022    GLUCOSE 99 05/24/2022    CALCIUM 9.0 05/25/2022    CALCIUM 9.1 05/24/2022    PROTENTOTREF 8.0 04/25/2022    PROTENTOTREF 7.5 04/22/2022    ALBUMIN 4.10 05/25/2022    ALBUMIN 3.90 05/24/2022    BILITOT 0.6 05/25/2022    BILITOT 0.5 05/24/2022    AST 44 (H) 05/25/2022    AST 39 05/24/2022    ALT 35 05/25/2022    ALT 34 05/24/2022     Lab Results   Component Value Date    WBC 8.62 05/25/2022    WBC 8.33 05/24/2022    HGB 14.0 05/25/2022    HGB 14.2 05/24/2022    HCT 41.9 05/25/2022    HCT 42.1 05/24/2022    MCV 87.5 05/25/2022    MCV 87.0 05/24/2022     05/25/2022     (L) 05/24/2022     Lab Results   Component Value Date    CHOL 98 05/25/2022    CHOL 97 05/23/2022    TRIG 158 (H) 05/25/2022    TRIG 96 05/23/2022    HDL 37 (L) 05/25/2022    HDL 39 (L) 05/23/2022    LDL 34 05/25/2022    LDL 39 05/23/2022     Lab Results   Component Value Date    PROBNP 36.0 05/19/2022    PROBNP 64.0 08/03/2018     Lab Results   Component Value Date    TROPONINT <0.010 07/09/2017     Lab Results   Component Value Date    TSH 0.626 05/23/2022    TSH 1.210 05/06/2022           ECG 12 Lead    Date/Time: 5/31/2022 10:22 AM  Performed by: Nay Merlos APRN  Authorized by: Nay Merlos APRN   Comparison: compared with previous ECG from 5/25/2022  Similar to previous ECG  Rhythm: sinus rhythm  Rate: normal  T inversion: III and aVF          LHC w/ PCI 5/24/2022:  Procedure findings:  Left main: Discrete 30% distal stenosis  LAD: Discrete 60% ostial stenois  LCX: Diffuse 70-80% in-stent restenosis  Ramus: Small caliber ramus with 99% ostial stenosis     Hemodynamics:   AO: 109/73        PCI CORONARY SEGMENT: proximal LCX  PRE-STENOSIS: 80  POST-STENOSIS: 0%  LESION TYPE: C  GRACIE FLOW PRE/POST: 2/3   CULPRIT LESION: Yes     PCI CORONARY SEGMENT:ostial LAD  PRE-STENOSIS: 80  POST-STENOSIS:  0%  LESION TYPE: C  GRACIE FLOW PRE/POST: 3/3   CULPRIT LESION: Yes     Estimated blood loss: Minimal  Complications: None     Conclusions:   1. Successful left main bifurcation stenting with a 4.0x15mm and 3.57v44hl Xience Skypoint drug eluting stents with DK Crush technique  2. Successful PCI to the mid LCX with a 3.0x23 mm Xience Skypoint DAVONTE     Echocardiogram 5/21/2022:  · Calculated left ventricular EF = 55.6% Estimated left ventricular EF was in agreement with the calculated left ventricular EF. Left ventricular systolic function is normal.  · Left ventricular wall thickness is consistent with borderline concentric hypertrophy.  · Left ventricular diastolic function is consistent with (grade I) impaired relaxation.  · There is calcification of the aortic valve. Valvular structure is poorly visualized.  · Aortic valve area is 1.36 cm2.  · Peak velocity of the flow distal to the aortic valve is 177.4 cm/s. Aortic valve maximum pressure gradient is 12.6 mmHg. Aortic valve mean pressure gradient is 8.1 mmHg. Aortic valve dimensionless index is 0.5 .    Left Heart Catheterization 3/10/2020:  Procedure findings:  Left main: Large caliber vessel that trifurcates to an LAD, ramus and circumflex.  Normal  LAD: Medium caliber vessel that gives rise to 2 small caliber diagonal branches.  20 to 30% ostial stenosis.  Diffuse 40% mid vessel stenosis  LCX: Medium caliber vessel and gives rise to a medium caliber OM branch.  Discrete 99% ostial stenosis with GRACIE II flow distally.  Diffuse 40-50% mid vessel stenosis.  Fills antegrade and via right to left collaterals.  RCA: Medium caliber, dominant vessel gives rise to a small caliber PDA and 2 RPL branches..  Stents in the proximal to distal RCA.  Luminal irregularities.     Left ventricular angiography: Normal left ventricular size and systolic function.  Ejection fraction 50%.     Percutaneous intervention report:  Unfractionated heparin was used for anticoagulation from  therapeutic ACT.  A 6 [place] XB 3.0 guide was used to engage the left main.  A run-through wire was used to cross the ostial stenosis and seated distally.  A 3.0 x 12 mm trek balloon was used to predilate the lesion to 16 rozina.  A 3.25 x 33 mm Xience Kaylynn drug-eluting stent would not cross the stenosis.  A jayna wire was placed but the stent was still not cross.  A 3.0 x 15 mm Xience Kaylynn drug-eluting stent was then deployed across the ostial stenosis at 14 rozina.  GRACIE-3 flow was documented at case completion.  Residual 50% mid vessel stenosis.  Not covered.  No complications.  Wire and balloon removed.     Hemodynamics:   LV: 88/1  AO: 88/52     PCI CORONARY SEGMENT: Ostial circumflex  PRE-STENOSIS: 99  POST-STENOSIS: 0%  LESION TYPE: C  GRACIE FLOW PRE/POST: 2/3  CULPRIT LESION: Yes     Estimated blood loss: Minimal  Complications: None     Conclusions:   1. Left main: Normal  2. LAD: 20 to 30% ostial stenosis.  40% mid vessel stenosis.  3. LCX: 99% ostial stenosis with GRACIE II flow.  40 to 50% mid vessel stenosis.  4. RCA: Previously placed stents from the proximal to distal RCA with luminal irregularities.  5.  Normal left ventricular size and systolic function  6.  PCI of the ostial circumflex with a 3.0 x 15 mm Xience Kaylynn drug-eluting stent.       Assessment:          Diagnosis Plan   1. Coronary artery disease involving native coronary artery of native heart without angina pectoris  ECG 12 Lead    Basic Metabolic Panel          Plan:       Stable angina // CAD:  · History prior PCI to RCA () 2018 and ostial LCx 2020  · Left heart cath for unstable angina demonstrated LCx occlusion and LAD stenosis.  Patient was referred for cardiac surgery but ultimately deemed high risk due to [employer] status and inability to receive blood products  · Status post PCI to LCx and ostial LAD bifurcation 5/24/2022 by Dr. Karimi  · Continue aspirin, clopidogrel, carvedilol, statin  · Cath sites healing well.   Denies anginal symptoms    Bilateral lower extremity edema:  · Ongoing  · LVEF normal on echocardiogram  · Trial off amlodipine.  We will switch to chlorthalidone with lab work in 1 to 2 weeks.  Patient will keep blood pressure log and I will touch base with them after lab work    Hypertension:  · Controlled at visit today.  We will try off amlodipine due to new lower extremity edema  · Chlorthalidone 25 mg daily ordered.  Lab work in 1 to 2 weeks.  I will follow-up with him at this time to check on blood pressure and make adjustments as needed    Hyperlipidemia:  · Well-controlled on recent labwork    Mr. Monaco is doing well today.  He is status post recent PCI.  He is not having any anginal symptoms.  He is compliant with aspirin and clopidogrel with no bleeding issues.  His cath sites are healing well.  We are changing him off of amlodipine due to lower extremity edema.  He will try 25 mg chlorthalidone daily with lab work in 1 to 2 weeks.  Previously he has it angioedema with ACE and will avoid ARB for this reason.  We will make adjustments as needed based on lab work and blood pressure log.  He has scheduled follow-up with Dr. Karimi in August.    Orders Placed This Encounter   Procedures   • Basic Metabolic Panel     Standing Status:   Future     Standing Expiration Date:   5/31/2023     Order Specific Question:   Release to patient     Answer:   Immediate   • ECG 12 Lead     This order was created via procedure documentation     Order Specific Question:   Release to patient     Answer:   Immediate        MICHELLE Atkinson  [facility]  05/31/22  10:50 EDT

## 2022-05-31 ENCOUNTER — OFFICE VISIT (OUTPATIENT)
Dept: CARDIOLOGY | Facility: CLINIC | Age: 67
End: 2022-05-31

## 2022-05-31 ENCOUNTER — IMMUNIZATION (OUTPATIENT)
Dept: VACCINE CLINIC | Facility: HOSPITAL | Age: 67
End: 2022-05-31

## 2022-05-31 VITALS
DIASTOLIC BLOOD PRESSURE: 84 MMHG | WEIGHT: 258.4 LBS | HEART RATE: 73 BPM | OXYGEN SATURATION: 98 % | HEIGHT: 77 IN | SYSTOLIC BLOOD PRESSURE: 126 MMHG | BODY MASS INDEX: 30.51 KG/M2

## 2022-05-31 DIAGNOSIS — Z23 NEED FOR VACCINATION: Primary | ICD-10-CM

## 2022-05-31 DIAGNOSIS — I25.10 CORONARY ARTERY DISEASE INVOLVING NATIVE CORONARY ARTERY OF NATIVE HEART WITHOUT ANGINA PECTORIS: Primary | ICD-10-CM

## 2022-05-31 PROCEDURE — 93000 ELECTROCARDIOGRAM COMPLETE: CPT | Performed by: NURSE PRACTITIONER

## 2022-05-31 PROCEDURE — 91305 HC SARSCOV2 VAC 30 MCG TRS-SUCR PFIZER: CPT | Performed by: INTERNAL MEDICINE

## 2022-05-31 PROCEDURE — 0054A HC ADM SARSCV2 30MCG TRS-SUCR BOOSTER: CPT | Performed by: INTERNAL MEDICINE

## 2022-05-31 PROCEDURE — 99214 OFFICE O/P EST MOD 30 MIN: CPT | Performed by: NURSE PRACTITIONER

## 2022-05-31 RX ORDER — CHLORTHALIDONE 25 MG/1
25 TABLET ORAL DAILY
Qty: 90 TABLET | Refills: 3 | Status: SHIPPED | OUTPATIENT
Start: 2022-05-31 | End: 2022-07-21 | Stop reason: SINTOL

## 2022-05-31 RX ORDER — CHLORTHALIDONE 25 MG/1
25 TABLET ORAL DAILY
Qty: 30 TABLET | Refills: 3 | Status: SHIPPED | OUTPATIENT
Start: 2022-05-31 | End: 2022-05-31 | Stop reason: SDUPTHER

## 2022-06-09 ENCOUNTER — TELEPHONE (OUTPATIENT)
Dept: FAMILY MEDICINE CLINIC | Facility: CLINIC | Age: 67
End: 2022-06-09

## 2022-06-09 DIAGNOSIS — E11.9 CONTROLLED TYPE 2 DIABETES MELLITUS WITHOUT COMPLICATION, WITH LONG-TERM CURRENT USE OF INSULIN: ICD-10-CM

## 2022-06-09 DIAGNOSIS — E78.5 HYPERLIPIDEMIA, UNSPECIFIED HYPERLIPIDEMIA TYPE: ICD-10-CM

## 2022-06-09 DIAGNOSIS — Z79.4 CONTROLLED TYPE 2 DIABETES MELLITUS WITHOUT COMPLICATION, WITH LONG-TERM CURRENT USE OF INSULIN: ICD-10-CM

## 2022-06-09 RX ORDER — ERGOCALCIFEROL 1.25 MG/1
CAPSULE ORAL
Qty: 12 CAPSULE | Refills: 0 | Status: SHIPPED | OUTPATIENT
Start: 2022-06-09 | End: 2022-10-18

## 2022-06-09 NOTE — TELEPHONE ENCOUNTER
Rx Refill Note  Requested Prescriptions     Pending Prescriptions Disp Refills   • vitamin D (ERGOCALCIFEROL) 1.25 MG (43141 UT) capsule capsule [Pharmacy Med Name: VIT D2 (ERGOCAL) 1.25MG(50,000U) CP] 12 capsule 0     Sig: TAKE ONE CAPSULE BY MOUTH ONCE WEEKLY      Last office visit with prescribing clinician: 4/25/2022      Next office visit with prescribing clinician: 10/21/2022            Carey Ignacio LPN  06/09/22, 12:44 EDT

## 2022-06-09 NOTE — TELEPHONE ENCOUNTER
Caller: [facility] [address] [place] [address] - [address] [facility] [phone] [facility] [phone] FX    Relationship: Pharmacy    Best call back number: [phone]    Requested Prescriptions:   Requested Prescriptions     Pending Prescriptions Disp Refills   • empagliflozin (Jardiance) 25 MG tablet tablet 90 tablet 0     Sig: Take 1 tablet by mouth Daily.   • rosuvastatin (CRESTOR) 40 MG tablet 90 tablet 3     Sig: Take 1 tablet by mouth Daily.        Pharmacy where request should be sent: [facility] [address] [place] [address] - [address] AT [facility] [phone] [facility] [phone] FX     Additional details provided by patient: ALSO NEEDS METFORMIN 500 MG THAT DOESN'T SHOW IN HIS CHART    Does the patient have less than a 3 day supply:  [x] Yes  [] No    Dannielle Magallanes Rep   06/09/22 13:55 EDT

## 2022-06-14 DIAGNOSIS — E11.9 CONTROLLED TYPE 2 DIABETES MELLITUS WITHOUT COMPLICATION, WITH LONG-TERM CURRENT USE OF INSULIN: ICD-10-CM

## 2022-06-14 DIAGNOSIS — Z79.4 CONTROLLED TYPE 2 DIABETES MELLITUS WITHOUT COMPLICATION, WITH LONG-TERM CURRENT USE OF INSULIN: ICD-10-CM

## 2022-06-15 RX ORDER — ROSUVASTATIN CALCIUM 40 MG/1
40 TABLET, COATED ORAL DAILY
Qty: 90 TABLET | Refills: 3 | Status: SHIPPED | OUTPATIENT
Start: 2022-06-15

## 2022-06-17 ENCOUNTER — LAB (OUTPATIENT)
Dept: LAB | Facility: HOSPITAL | Age: 67
End: 2022-06-17

## 2022-06-17 DIAGNOSIS — I25.10 CORONARY ARTERY DISEASE INVOLVING NATIVE CORONARY ARTERY OF NATIVE HEART WITHOUT ANGINA PECTORIS: ICD-10-CM

## 2022-06-17 LAB
ANION GAP SERPL CALCULATED.3IONS-SCNC: 14.4 MMOL/L (ref 5–15)
BUN SERPL-MCNC: 26 MG/DL (ref 8–23)
BUN/CREAT SERPL: 19.5 (ref 7–25)
CALCIUM SPEC-SCNC: 9.8 MG/DL (ref 8.6–10.5)
CHLORIDE SERPL-SCNC: 100 MMOL/L (ref 98–107)
CO2 SERPL-SCNC: 22.6 MMOL/L (ref 22–29)
CREAT SERPL-MCNC: 1.33 MG/DL (ref 0.76–1.27)
EGFRCR SERPLBLD CKD-EPI 2021: 58.6 ML/MIN/1.73
GLUCOSE SERPL-MCNC: 142 MG/DL (ref 65–99)
POTASSIUM SERPL-SCNC: 4.4 MMOL/L (ref 3.5–5.2)
SODIUM SERPL-SCNC: 137 MMOL/L (ref 136–145)

## 2022-06-17 PROCEDURE — 80048 BASIC METABOLIC PNL TOTAL CA: CPT

## 2022-06-17 PROCEDURE — 36415 COLL VENOUS BLD VENIPUNCTURE: CPT

## 2022-06-20 NOTE — PROGRESS NOTES
Spoke with patient regarding results. BP is well-controlled. Continue chlorthalidone. Will repeat labwork in one month to check kidney function.

## 2022-06-27 RX ORDER — AMLODIPINE BESYLATE 5 MG/1
5 TABLET ORAL DAILY
Qty: 30 TABLET | Refills: 0 | OUTPATIENT
Start: 2022-06-27

## 2022-07-06 RX ORDER — METHOCARBAMOL 500 MG/1
500 TABLET, FILM COATED ORAL 3 TIMES DAILY
Qty: 90 TABLET | Refills: 0 | Status: SHIPPED | OUTPATIENT
Start: 2022-07-06 | End: 2022-11-08 | Stop reason: SDUPTHER

## 2022-07-07 ENCOUNTER — TELEPHONE (OUTPATIENT)
Dept: FAMILY MEDICINE CLINIC | Facility: CLINIC | Age: 67
End: 2022-07-07

## 2022-07-07 NOTE — TELEPHONE ENCOUNTER
Caller: Micaela Monaco    Relationship to patient: Emergency Contact    Best call back number:[phone]    Patient is needing: PATIENT'S WIFE CALLING FROM A MESSAGE LEFT FROM NUNU TO CALL BACK.

## 2022-07-20 DIAGNOSIS — I10 ESSENTIAL HYPERTENSION: Primary | ICD-10-CM

## 2022-07-21 ENCOUNTER — OFFICE VISIT (OUTPATIENT)
Dept: CARDIOLOGY | Facility: CLINIC | Age: 67
End: 2022-07-21

## 2022-07-21 ENCOUNTER — LAB (OUTPATIENT)
Dept: LAB | Facility: HOSPITAL | Age: 67
End: 2022-07-21

## 2022-07-21 VITALS
HEIGHT: 77 IN | DIASTOLIC BLOOD PRESSURE: 76 MMHG | WEIGHT: 241 LBS | HEART RATE: 84 BPM | BODY MASS INDEX: 28.46 KG/M2 | SYSTOLIC BLOOD PRESSURE: 134 MMHG

## 2022-07-21 DIAGNOSIS — I25.10 CHRONIC CORONARY ARTERY DISEASE: Primary | ICD-10-CM

## 2022-07-21 DIAGNOSIS — E78.5 HYPERLIPIDEMIA, UNSPECIFIED HYPERLIPIDEMIA TYPE: ICD-10-CM

## 2022-07-21 DIAGNOSIS — R06.09 DYSPNEA ON EXERTION: ICD-10-CM

## 2022-07-21 DIAGNOSIS — F41.0 PANIC DISORDER: ICD-10-CM

## 2022-07-21 DIAGNOSIS — F41.9 ANXIETY: Primary | ICD-10-CM

## 2022-07-21 DIAGNOSIS — G51.4 FACIAL TWITCHING: ICD-10-CM

## 2022-07-21 DIAGNOSIS — I25.10 CHRONIC CORONARY ARTERY DISEASE: ICD-10-CM

## 2022-07-21 DIAGNOSIS — F95.9 TIC DISORDER, UNSPECIFIED: ICD-10-CM

## 2022-07-21 LAB
ALBUMIN SERPL-MCNC: 4.2 G/DL (ref 3.5–5.2)
ALBUMIN/GLOB SERPL: 1.1 G/DL
ALP SERPL-CCNC: 101 U/L (ref 39–117)
ALT SERPL W P-5'-P-CCNC: 124 U/L (ref 1–41)
ANION GAP SERPL CALCULATED.3IONS-SCNC: 16.7 MMOL/L (ref 5–15)
AST SERPL-CCNC: 135 U/L (ref 1–40)
BASOPHILS # BLD AUTO: 0.06 10*3/MM3 (ref 0–0.2)
BASOPHILS NFR BLD AUTO: 0.7 % (ref 0–1.5)
BILIRUB SERPL-MCNC: 0.7 MG/DL (ref 0–1.2)
BUN SERPL-MCNC: 23 MG/DL (ref 8–23)
BUN/CREAT SERPL: 10.7 (ref 7–25)
CALCIUM SPEC-SCNC: 9.5 MG/DL (ref 8.6–10.5)
CHLORIDE SERPL-SCNC: 96 MMOL/L (ref 98–107)
CO2 SERPL-SCNC: 23.3 MMOL/L (ref 22–29)
CREAT SERPL-MCNC: 2.14 MG/DL (ref 0.76–1.27)
DEPRECATED RDW RBC AUTO: 46.8 FL (ref 37–54)
EGFRCR SERPLBLD CKD-EPI 2021: 33.1 ML/MIN/1.73
EOSINOPHIL # BLD AUTO: 0.23 10*3/MM3 (ref 0–0.4)
EOSINOPHIL NFR BLD AUTO: 2.6 % (ref 0.3–6.2)
ERYTHROCYTE [DISTWIDTH] IN BLOOD BY AUTOMATED COUNT: 15.5 % (ref 12.3–15.4)
GLOBULIN UR ELPH-MCNC: 3.7 GM/DL
GLUCOSE SERPL-MCNC: 99 MG/DL (ref 65–99)
HCT VFR BLD AUTO: 51.5 % (ref 37.5–51)
HGB BLD-MCNC: 16.6 G/DL (ref 13–17.7)
IMM GRANULOCYTES # BLD AUTO: 0.02 10*3/MM3 (ref 0–0.05)
IMM GRANULOCYTES NFR BLD AUTO: 0.2 % (ref 0–0.5)
LYMPHOCYTES # BLD AUTO: 2.85 10*3/MM3 (ref 0.7–3.1)
LYMPHOCYTES NFR BLD AUTO: 32.1 % (ref 19.6–45.3)
MCH RBC QN AUTO: 28 PG (ref 26.6–33)
MCHC RBC AUTO-ENTMCNC: 32.2 G/DL (ref 31.5–35.7)
MCV RBC AUTO: 86.8 FL (ref 79–97)
MONOCYTES # BLD AUTO: 0.81 10*3/MM3 (ref 0.1–0.9)
MONOCYTES NFR BLD AUTO: 9.1 % (ref 5–12)
NEUTROPHILS NFR BLD AUTO: 4.9 10*3/MM3 (ref 1.7–7)
NEUTROPHILS NFR BLD AUTO: 55.3 % (ref 42.7–76)
NRBC BLD AUTO-RTO: 0 /100 WBC (ref 0–0.2)
PLATELET # BLD AUTO: 193 10*3/MM3 (ref 140–450)
PMV BLD AUTO: 10.4 FL (ref 6–12)
POTASSIUM SERPL-SCNC: 3.9 MMOL/L (ref 3.5–5.2)
PROT SERPL-MCNC: 7.9 G/DL (ref 6–8.5)
RBC # BLD AUTO: 5.93 10*6/MM3 (ref 4.14–5.8)
SODIUM SERPL-SCNC: 136 MMOL/L (ref 136–145)
TROPONIN T SERPL-MCNC: <0.01 NG/ML (ref 0–0.03)
WBC NRBC COR # BLD: 8.87 10*3/MM3 (ref 3.4–10.8)

## 2022-07-21 PROCEDURE — 99214 OFFICE O/P EST MOD 30 MIN: CPT | Performed by: INTERNAL MEDICINE

## 2022-07-21 PROCEDURE — 36415 COLL VENOUS BLD VENIPUNCTURE: CPT

## 2022-07-21 PROCEDURE — 85025 COMPLETE CBC W/AUTO DIFF WBC: CPT

## 2022-07-21 PROCEDURE — 80053 COMPREHEN METABOLIC PANEL: CPT

## 2022-07-21 PROCEDURE — 93000 ELECTROCARDIOGRAM COMPLETE: CPT | Performed by: INTERNAL MEDICINE

## 2022-07-21 PROCEDURE — 84484 ASSAY OF TROPONIN QUANT: CPT

## 2022-07-21 RX ORDER — CLONAZEPAM 0.5 MG/1
0.5 TABLET ORAL DAILY PRN
Qty: 15 TABLET | Refills: 0 | Status: SHIPPED | OUTPATIENT
Start: 2022-07-21 | End: 2023-03-16 | Stop reason: SDUPTHER

## 2022-07-21 NOTE — PROGRESS NOTES
"[facility] Follow Up Office Note     Encounter Date:22  Patient:Isaias Monaco  :1955  MRN:[id number]      Chief Complaint:   Multivessel coronary artery disease  Diabetes mellitus  Hypertension  Hyperlipidemia    History of Presenting Illness:      66 yo male with a past medical history of of CAD, hypertension and hyperlipidemia.  He presented in  with unstable angina and underwent DAVONTE of proximal to mid RCA .  In 2020 he presented with angina and was referred for cardiac catheterization with DAVONTE to 99% ostial LCx lesion.    Patient was seen by myself on 2022 with 2 weeks fullness in his throat and \"vague\" shortness of breath. These symptoms occured at rest and this had previously been his anginal equivalent. EKG with new ST depressions in III, AVF with TWIs. He was scheduled for [facility] for unstable angina which revealed new LCD and LCx stenosis.  He was referred for cardiac surgery but he was felt to be high risk as Confucianism and not able to receive blood products.  S/p PCI on  by Dr. Karimi with DAVONTE to proximal LCx and left main bifurcation x2 with DK Crush technique.  Echo prior to stenting demonstrated normal LV function. Discharged 2022.    Since our last visit, he has been having issues with nausea and loss of appetite. He has also been dealing with significant fatigue and anxiety. He denies chest pain. He states that he is having issues with neck pain and anxiety that improves with benzodiazepine usage.     Review of Systems:  Review of Systems   Constitutional: Negative for fever and malaise/fatigue.   HENT: Negative for nosebleeds and sore throat.    Eyes: Negative for blurred vision and double vision.   Cardiovascular: Positive for leg swelling. Negative for chest pain, claudication, dyspnea on exertion, orthopnea, palpitations and syncope.   Respiratory: Negative for cough, shortness of breath and snoring.    Endocrine: Negative for cold " intolerance, heat intolerance and polydipsia.   Skin: Negative for itching, poor wound healing and rash.   Musculoskeletal: Negative for joint pain, joint swelling, muscle weakness and myalgias.   Gastrointestinal: Negative for abdominal pain, melena, nausea and vomiting.   Neurological: Negative for light-headedness, loss of balance, seizures, vertigo and weakness.   Psychiatric/Behavioral: Negative for altered mental status and depression.                                   Current Outpatient Medications on File Prior to Visit   Medication Sig Dispense Refill   • ARIPiprazole (ABILIFY) 2 MG tablet Take 3 mg by mouth Daily.     • aspirin 81 MG EC tablet Take 1 tablet by mouth Daily. 30 tablet 6   • Blood Glucose Monitoring Suppl ([employer] BLOOD GLUCOSE) kit TEST AS DIRECTED 1 each 0   • carvedilol (COREG) 25 MG tablet Take 1 tablet by mouth Every 12 (Twelve) Hours. 180 tablet 3   • clopidogrel (PLAVIX) 75 MG tablet Take 1 tablet by mouth Daily. 30 tablet 11   • dorzolamide-timolol (COSOPT) 22.3-6.8 MG/ML ophthalmic solution Apply 1 drop to eye(s) to both eyes 2 (Two) Times a Day. 20 mL 3   • empagliflozin (Jardiance) 25 MG tablet tablet Take 1 tablet by mouth Daily. 90 tablet 0   • escitalopram (LEXAPRO) 20 MG tablet Take 1 tablet by mouth Daily. 90 tablet 3   • esomeprazole (nexIUM) 40 MG capsule Take 1 capsule by mouth Daily. 90 capsule 3   • famotidine (PEPCID) 20 MG tablet Take 1 tablet by mouth Daily With Dinner. 90 tablet 3   • glucose blood test strip Use 4 times a day 400 each 0   • Insulin Pen Needle ([employer] Pen Needles 31G) 31G X 8 MM misc USE AS DIRECTED TO INJECT TOUJEO 100 each 3   • Lancets (FREESTYLE) lancets Use to test BG 4 times daily 400 each 1   • latanoprost (XALATAN) 0.005 % ophthalmic solution Apply 1 drop to  each eye Daily. (Patient taking differently: Apply 1 drop to eye(s) as directed by provider Every Night.) 7.5 mL 3   • methocarbamol (Robaxin) 500 MG tablet Take 1 tablet by mouth 3  (Three) Times a Day. 90 tablet 0   • nitroglycerin (NITROSTAT) 0.4 MG SL tablet Place 1 tablet under the tongue Every 5 (Five) Minutes As Needed for Chest Pain (Only if SBP Greater Than 100). Take no more than 3 doses in 15 minutes. 30 tablet 12   • pioglitazone (ACTOS) 15 MG tablet Take 1 tablet by mouth Daily. 90 tablet 3   • rosuvastatin (CRESTOR) 40 MG tablet Take 1 tablet by mouth Daily. 90 tablet 3   • sildenafil (VIAGRA) 50 MG tablet Take 1 tablet by mouth Daily As Needed for erectile dysfunction. (Patient taking differently: Take 50 mg by mouth Daily As Needed for Erectile Dysfunction. PRN) 6 tablet 5   • Toujeo SoloStar 300 UNIT/ML solution pen-injector injection INJECT 60 UNITS UNDER THE SKIN INTO THE APPROPRIATE AREA AS DIRECTED EVERY MORNING 18 mL 3   • vitamin D (ERGOCALCIFEROL) 1.25 MG (52736 UT) capsule capsule TAKE ONE CAPSULE BY MOUTH ONCE WEEKLY 12 capsule 0   • ezetimibe (Zetia) 10 MG tablet Take 1 tablet by mouth Daily. 90 tablet 3   • [DISCONTINUED] chlorthalidone (HYGROTON) 25 MG tablet Take 1 tablet by mouth Daily. 90 tablet 3   • [DISCONTINUED] metFORMIN (GLUCOPHAGE) 500 MG tablet Take 2 tablets by mouth 2 (Two) Times a Day With Meals. 360 tablet 3     No current facility-administered medications on file prior to visit.       Allergies   Allergen Reactions   • Ace Inhibitors Angioedema   • Lisinopril Angioedema   • Testosterone Myalgia       Past Medical History:   Diagnosis Date   • Adiposity    • Anemia     post hemorrhagic   • Angioedema 02/21/2016    Secondary to ACE inhibitor   • Anxiety    • Arthritis    • CAD (coronary artery disease)    • Chest pain    • Colon polyps    • Diabetes mellitus, type 2 (HCC)    • ED (erectile dysfunction)    • Febrile illness    • GERD (gastroesophageal reflux disease)    • Glaucoma    • Hematoma    • High cholesterol    • History of foreign travel 12/2017; 08/2018    [place], [place], [place], [place], [place] and [place]; [place]   •  Hyperlipidemia    • Hypertension    • Hypogonadism in male 09/28/2016   • Low back pain    • Male erectile disorder    • Microalbuminuria    • Obesity (BMI 30-39.9)    • Osteoarthritis     knee   • Spinal stenosis of lumbar region without neurogenic claudication 06/02/2021   • Vitamin D deficiency    • Wound infection after surgery        Past Surgical History:   Procedure Laterality Date   • CARDIAC CATHETERIZATION N/A 05/15/2006    Dr. Mini Camarillo   • CARDIAC CATHETERIZATION N/A 3/10/2020    Procedure: Left Heart Cath;  Surgeon: Miguel Ángel Karimi MD;  Location:  [facility];  Service: Cardiology;  Laterality: N/A;   • CARDIAC CATHETERIZATION N/A 3/10/2020    Procedure: Stent DAVONTE coronary;  Surgeon: Miguel Ángel Karimi MD;  Location:  [facility];  Service: Cardiology;  Laterality: N/A;   • CARDIAC CATHETERIZATION N/A 3/10/2020    Procedure: Coronary angiography;  Surgeon: Miguel Ángel Karimi MD;  Location:  [facility];  Service: Cardiology;  Laterality: N/A;   • CARDIAC CATHETERIZATION N/A 3/10/2020    Procedure: Left ventriculography;  Surgeon: Miguel Ángel Karimi MD;  Location:  [facility];  Service: Cardiology;  Laterality: N/A;   • CARDIAC CATHETERIZATION N/A 5/20/2022    Procedure: Left Heart Cath;  Surgeon: Mackenzie Morales MD;  Location:  [facility];  Service: Cardiovascular;  Laterality: N/A;   • CARDIAC CATHETERIZATION N/A 5/20/2022    Procedure: Coronary angiography;  Surgeon: Mackenzie Morales MD;  Location:  [facility];  Service: Cardiovascular;  Laterality: N/A;   • CARDIAC CATHETERIZATION N/A 5/20/2022    Procedure: Percutaneous Coronary Intervention;  Surgeon: Mackenzie Morales MD;  Location:  [facility];  Service: Cardiovascular;  Laterality: N/A;   • CARDIAC CATHETERIZATION N/A 5/24/2022    Procedure: Percutaneous Coronary Intervention;  Surgeon: Trev  Miguel Ángel TIDWELL MD;  [facility];  Service: Cardiovascular;  Laterality: N/A;  LAD and Cx   • CARDIAC CATHETERIZATION N/A 5/24/2022    Procedure: Stent DAVONTE coronary;  Surgeon: Miguel Ángel Karimi MD;  Location: [facility] CATH INVASIVE LOCATION;  Service: Cardiovascular;  Laterality: N/A;   • CARDIAC CATHETERIZATION N/A 5/24/2022    Procedure: Resting Full Cycle Ratio;  Surgeon: Miguel Ángel Karimi MD;  [facility];  Service: Cardiovascular;  Laterality: N/A;   • COLONOSCOPY N/A 02/22/2006    Bilobed polyp at 30 cm, hemorrhoids-Dr. Ilya Zhao   • COLONOSCOPY N/A 02/28/2014    Normal ileum, one 6 mm polyp in the mid transverse colon-Dr. Ilya Zhao   • COLONOSCOPY N/A 11/19/2008    Ela ileum, two 3 to 4 mm polyps, non-bleeding internal hemorrhoids, repeat in 5 years-Dr. Ilya Zhao   • COLONOSCOPY N/A 10/31/2017    Procedure: COLONOSCOPY WITH POLYPECTOMY (COLD BIOPSY);  Surgeon: Ilya Zhao MD;  Location: [facility] ENDOSCOPY;  Service:    • COLONOSCOPY N/A 5/21/2021    Procedure: Colonoscopy into cecum and terminal ileum with cold biopsy polypectomy;  Surgeon: Ilya Zhao MD;  Location: [facility] ENDOSCOPY;  Service: Gastroenterology;  Laterality: N/A;  Pre op: History of Polyps  Post op: Polyp   • CORONARY ANGIOPLASTY WITH STENT PLACEMENT  2007, 2012, 2015    cardiac stents x3 occasions   • ENDOSCOPY N/A 10/4/2017    Procedure: ESOPHAGOGASTRODUODENOSCOPY;  Surgeon: Boyd Guidry MD;  Location: [facility] ENDOSCOPY;  Service:    • ENDOSCOPY N/A 5/21/2021    Procedure: ESOPHAGOGASTRODUODENOSCOPY with biopsies;  Surgeon: Ilya Zhao MD;  Location: [facility] ENDOSCOPY;  Service: Gastroenterology;  Laterality: N/A;  Pre op: GERD  Post op: Irregular  Z-Line, Hiatal Hernia, Gastritis   • EPIDURAL BLOCK     • INTERVENTIONAL RADIOLOGY PROCEDURE N/A 5/20/2022    Procedure: Intravascular Ultrasound;  Surgeon: Mackenzie Morales MD;  Location: [facility] CATH INVASIVE  LOCATION;  Service: Cardiovascular;  Laterality: N/A;   • KNEE INCISION AND DRAINAGE Right 6/20/2017    Procedure: RT. KNEE WASHOUT ;  Surgeon: Boyd Coyne MD;  Location: [facility] MAIN OR;  Service:    • MEDIAL BRANCH BLOCK Bilateral 12/17/2021    Procedure: LUMBAR MEDIAL BRANCH BLOCK bilateral ~L4-S1 x2 (~2 weeks apart);  Surgeon: Christina Villaseñor MD;  Location: [facility] MAIN OR;  Service: Pain Management;  Laterality: Bilateral;   • MEDIAL BRANCH BLOCK Bilateral 1/3/2022    Procedure: LUMBAR MEDIAL BRANCH BLOCK bilateral ~L4-S1 x2 (~2 weeks apart);  Surgeon: Christina Villaseñor MD;  Location: [facility] MAIN OR;  Service: Pain Management;  Laterality: Bilateral;   • MD TOTAL KNEE ARTHROPLASTY Right 6/15/2017    Procedure: RT TOTAL KNEE ARTHROPLASTY;  Surgeon: Boyd Coyne MD;  Location: [facility] MAIN OR;  Service: Orthopedics   • RADIOFREQUENCY ABLATION Bilateral 1/10/2022    Procedure: RADIOFREQUENCY ABLATION NERVES Bilateral L4-S1;  Surgeon: Christina Villaseñor MD;  Location: [facility] MAIN OR;  Service: Pain Management;  Laterality: Bilateral;   • SHOULDER SURGERY Right 2016    rotator cuff repair   • UPPER GASTROINTESTINAL ENDOSCOPY N/A 10/13/2015    Z-line irregular, normal stomach, normal duodenum-Dr. Ilya Zhao   • UPPER GASTROINTESTINAL ENDOSCOPY N/A 02/28/2014    Z-line irregular, normal stomach, normal duodenum-Dr. Ilya Zhao   • UPPER GASTROINTESTINAL ENDOSCOPY N/A 11/19/2008    Z-line irregular, chronic gastritis withotu hemorrhage, normal duodenum-Dr. Ilya Zhao   • UPPER GASTROINTESTINAL ENDOSCOPY N/A 06/22/2006    LA Grade A reflux esophagitis, non-bleeding erythematous gastropathy, normal duodenum-Dr. Ilya Zhao       Social History     Socioeconomic History   • Marital status:      Spouse name: Micaela   • Number of children: 1   • Years of education: College   Tobacco Use   • Smoking status: Never Smoker   • Smokeless tobacco: Never Used   • Tobacco comment: CAFFEINE USE: 2 CUPS COFFEE DAILY   Vaping  "Use   • Vaping Use: Never used   Substance and Sexual Activity   • Alcohol use: Yes     Alcohol/week: 6.0 standard drinks     Types: 2 Glasses of wine, 2 Cans of beer, 1 Shots of liquor, 1 Standard drinks or equivalent per week     Comment: occasional 2-4 DRINKS PER WEEK   • Drug use: No   • Sexual activity: Yes     Partners: Female       Family History   Problem Relation Age of Onset   • Lupus Sister    • Thyroid disease Sister    • Heart disease Other    • Hypertension Other    • Arthritis Mother    • Hyperlipidemia Mother    • Hypertension Mother    • Thyroid disease Mother    • Lupus Mother    • Vision loss Mother    • Heart disease Father    • Arthritis Father    • Other Father         Vascular disease   • Lupus Father    • Depression Father    • Alcohol abuse Father    • Dementia Father    • Hypertension Father    • Heart disease Brother    • Heart attack Brother 40   • Thyroid disease Sister    • Arthritis Brother    • Malig Hyperthermia Neg Hx        The following portions of the patient's history were reviewed and updated as appropriate: allergies, current medications, past family history, past medical history, past social history, past surgical history and problem list.       Objective:       Vitals:    07/21/22 1624   BP: 134/76   Pulse: 84   Weight: 109 kg (241 lb)   Height: 195.6 cm (77\")       Constitutional:       Appearance: Well-developed.   Eyes:      General: No scleral icterus.     Conjunctiva/sclera: Conjunctivae normal.   HENT:      Head: Normocephalic and atraumatic.   Neck:      Thyroid: No thyromegaly.      Vascular: Normal carotid pulses. No carotid bruit, hepatojugular reflux or JVD.      Trachea: No tracheal deviation.   Pulmonary:      Effort: No respiratory distress.      Breath sounds: Normal breath sounds. No decreased breath sounds. No wheezing. No rhonchi. No rales.   Chest:      Chest wall: Not tender to palpatation.   Cardiovascular:      Normal rate. Regular rhythm.      No " gallop.   Pulses:     Carotid: 2+ bilaterally.     Radial: 2+ bilaterally.     Femoral: 2+ bilaterally.     Dorsalis pedis: 2+ bilaterally.     Posterior tibial: 2+ bilaterally.  Edema:     Peripheral edema absent.   Abdominal:      General: Bowel sounds are normal. There is no distension.      Palpations: Abdomen is soft.      Tenderness: There is no abdominal tenderness.   Musculoskeletal:         General: No deformity.      Cervical back: Normal range of motion and neck supple. Skin:     Findings: No erythema or rash.   Neurological:      Mental Status: Alert and oriented to person, place, and time.      Sensory: No sensory deficit.   Psychiatric:         Behavior: Behavior normal.           Lab Results   Component Value Date     07/21/2022     06/17/2022    K 3.9 07/21/2022    K 4.4 06/17/2022    CL 96 (L) 07/21/2022     06/17/2022    CO2 23.3 07/21/2022    CO2 22.6 06/17/2022    BUN 23 07/21/2022    BUN 26 (H) 06/17/2022    CREATININE 2.14 (H) 07/21/2022    CREATININE 1.33 (H) 06/17/2022    EGFRIFNONA 82 10/04/2021    EGFRIFNONA 64 12/01/2020    EGFRIFAFRI 100 10/04/2021    EGFRIFAFRI 78 12/01/2020    GLUCOSE 99 07/21/2022    GLUCOSE 142 (H) 06/17/2022    CALCIUM 9.5 07/21/2022    CALCIUM 9.8 06/17/2022    PROTENTOTREF 8.0 04/25/2022    PROTENTOTREF 7.5 04/22/2022    ALBUMIN 4.20 07/21/2022    ALBUMIN 4.10 05/25/2022    BILITOT 0.7 07/21/2022    BILITOT 0.6 05/25/2022     (H) 07/21/2022    AST 44 (H) 05/25/2022     (H) 07/21/2022    ALT 35 05/25/2022     Lab Results   Component Value Date    WBC 8.87 07/21/2022    WBC 8.62 05/25/2022    HGB 16.6 07/21/2022    HGB 14.0 05/25/2022    HCT 51.5 (H) 07/21/2022    HCT 41.9 05/25/2022    MCV 86.8 07/21/2022    MCV 87.5 05/25/2022     07/21/2022     05/25/2022     Lab Results   Component Value Date    CHOL 98 05/25/2022    CHOL 97 05/23/2022    TRIG 158 (H) 05/25/2022    TRIG 96 05/23/2022    HDL 37 (L) 05/25/2022    HDL 39  (L) 05/23/2022    LDL 34 05/25/2022    LDL 39 05/23/2022     Lab Results   Component Value Date    PROBNP 36.0 05/19/2022    PROBNP 64.0 08/03/2018     Lab Results   Component Value Date    TROPONINT <0.010 07/21/2022     Lab Results   Component Value Date    TSH 0.626 05/23/2022    TSH 1.210 05/06/2022           ECG 12 Lead    Date/Time: 7/21/2022 6:23 PM  Performed by: Miguel Ángel Karimi MD  Authorized by: Miguel Ángel Karimi MD   Comparison: compared with previous ECG from 5/31/2022  Similar to previous ECG  Rhythm: sinus rhythm  Rate: normal  Conduction: non-specific intraventricular conduction delay  QRS axis: left                  LHC w/ PCI 5/24/2022:  Procedure findings:  Left main: Discrete 30% distal stenosis  LAD: Discrete 60% ostial stenois  LCX: Diffuse 70-80% in-stent restenosis  Ramus: Small caliber ramus with 99% ostial stenosis     Hemodynamics:   AO: 109/73        PCI CORONARY SEGMENT: proximal LCX  PRE-STENOSIS: 80  POST-STENOSIS: 0%  LESION TYPE: C  GRACIE FLOW PRE/POST: 2/3   CULPRIT LESION: Yes     PCI CORONARY SEGMENT:ostial LAD  PRE-STENOSIS: 80  POST-STENOSIS: 0%  LESION TYPE: C  GRACIE FLOW PRE/POST: 3/3   CULPRIT LESION: Yes     Estimated blood loss: Minimal  Complications: None     Conclusions:   1. Successful left main bifurcation stenting with a 4.0x15mm and 3.16s60vz Xience Skypoint drug eluting stents with DK Crush technique  2. Successful PCI to the mid LCX with a 3.0x23 mm Xience Skypoint DAVONTE     Echocardiogram 5/21/2022:  · Calculated left ventricular EF = 55.6% Estimated left ventricular EF was in agreement with the calculated left ventricular EF. Left ventricular systolic function is normal.  · Left ventricular wall thickness is consistent with borderline concentric hypertrophy.  · Left ventricular diastolic function is consistent with (grade I) impaired relaxation.  · There is calcification of the aortic valve. Valvular structure is poorly visualized.  · Aortic valve area is  1.36 cm2.  · Peak velocity of the flow distal to the aortic valve is 177.4 cm/s. Aortic valve maximum pressure gradient is 12.6 mmHg. Aortic valve mean pressure gradient is 8.1 mmHg. Aortic valve dimensionless index is 0.5 .         Assessment:           Plan:       66 yo male with a past medical history of of CAD, hypertension and hyperlipidemia.  He presented in 2015 with unstable angina and underwent DAVONTE of proximal to mid RCA .  In March 2020 he presented with angina and was referred for cardiac catheterization with DAVONTE to 99% ostial LCx lesion. He presented then underwent LM bifurcation stenting as above.   He has been having issues with weight loss, nausea, and severe fatigue. EKG is unchanged from prior. I don't think repeat ischemic evaluation is indicated, and I would start out with labwork    CAD:  · History prior PCI to RCA () 2018 and ostial LCx 2020  · Left heart cath for unstable angina demonstrated LCx occlusion and LAD stenosis.  Patient was referred for cardiac surgery but ultimately deemed high risk due to Protestant status and inability to receive blood products   · Status post PCI to LCx and ostial LAD bifurcation 5/24/2022 by Dr. Karimi  · Continue aspirin, clopidogrel, carvedilol, statin    Bilateral lower extremity edema:  · LVEF normal on echocardiogram  · Resolved off Amlodipine    Hypertension:  · Controlled at visit today.  He is fine off Amlodipine and Chlorthalidone. Labs pending    Hyperlipidemia:  · Well-controlled on recent labwork

## 2022-07-25 ENCOUNTER — OFFICE VISIT (OUTPATIENT)
Dept: FAMILY MEDICINE CLINIC | Facility: CLINIC | Age: 67
End: 2022-07-25

## 2022-07-25 VITALS
OXYGEN SATURATION: 97 % | WEIGHT: 241.8 LBS | BODY MASS INDEX: 28.55 KG/M2 | DIASTOLIC BLOOD PRESSURE: 72 MMHG | HEART RATE: 77 BPM | SYSTOLIC BLOOD PRESSURE: 114 MMHG | RESPIRATION RATE: 16 BRPM | HEIGHT: 77 IN | TEMPERATURE: 98.2 F

## 2022-07-25 DIAGNOSIS — R06.02 SHORTNESS OF BREATH: Primary | ICD-10-CM

## 2022-07-25 DIAGNOSIS — R11.0 NAUSEA: ICD-10-CM

## 2022-07-25 DIAGNOSIS — R74.8 ELEVATED LIVER ENZYMES: ICD-10-CM

## 2022-07-25 DIAGNOSIS — E11.9 TYPE 2 DIABETES MELLITUS WITHOUT COMPLICATION, WITH LONG-TERM CURRENT USE OF INSULIN: ICD-10-CM

## 2022-07-25 DIAGNOSIS — Z79.4 TYPE 2 DIABETES MELLITUS WITHOUT COMPLICATION, WITH LONG-TERM CURRENT USE OF INSULIN: ICD-10-CM

## 2022-07-25 LAB
EXPIRATION DATE: NORMAL
FLUAV AG UPPER RESP QL IA.RAPID: NOT DETECTED
FLUBV AG UPPER RESP QL IA.RAPID: NOT DETECTED
INTERNAL CONTROL: NORMAL
Lab: NORMAL
SARS-COV-2 AG UPPER RESP QL IA.RAPID: NOT DETECTED

## 2022-07-25 PROCEDURE — 99214 OFFICE O/P EST MOD 30 MIN: CPT | Performed by: FAMILY MEDICINE

## 2022-07-25 PROCEDURE — 87428 SARSCOV & INF VIR A&B AG IA: CPT | Performed by: FAMILY MEDICINE

## 2022-07-25 NOTE — PROGRESS NOTES
"Chief Complaint  Shortness of Breath (States consistent and at rest), Fatigue, and unexplain weight loss (Lost 18 lbs last 4 weeks )  Wife Micaela Monaco is on the phone for the visit giving history from her perspective as well as some more historic symptoms consistent with today's presentation.  Subjective        Isaias Monaco presents to [facility]  History of Present Illness  Weight loss  Poor appetite  Said was on chlorthalidone but since then he was nauseous. Went off the amlodipine because of the swelling in the legs.   Stopped at home since last week.  In the last month sick, weight loss, nausea.   Has been off the chlorthalidoone for 8 days  Last night he ate dinner for the first time.   Cr from 4 days ago was 2.4.  Also has SOB like a smothering feeling.   Said has had it for 16 years. Hs when anything is exacerbated.   Did not improve with angioplasty.   The clonazepam seemed to give him so relief of symptoms.   Big change in frequency. Feeling better overall when he took the medication.   Not really active beause of his energy and his back pain, back pain keeps him from being very active in general  Weakness generalized, like the fatigue.   Sugars have maintained at good level despite drop in food intake.   Worse since the angioplasty.  Slept a lot on vacation    Objective   Vital Signs:  /72 (BP Location: Left arm, Patient Position: Sitting, Cuff Size: Adult)   Pulse 77   Temp 98.2 °F (36.8 °C) (Infrared)   Resp 16   Ht 195.6 cm (77\")   Wt 110 kg (241 lb 12.8 oz)   SpO2 97%   BMI 28.67 kg/m²   Estimated body mass index is 28.67 kg/m² as calculated from the following:    Height as of this encounter: 195.6 cm (77\").    Weight as of this encounter: 110 kg (241 lb 12.8 oz).          Physical Exam  Vitals reviewed.   Constitutional:       General: He is not in acute distress.     Appearance: He is normal weight. He is not toxic-appearing.   HENT:      Right Ear: " Tympanic membrane, ear canal and external ear normal.      Left Ear: Tympanic membrane, ear canal and external ear normal.      Mouth/Throat:      Mouth: Mucous membranes are moist.      Pharynx: Oropharynx is clear. No oropharyngeal exudate or posterior oropharyngeal erythema.   Eyes:      General: No scleral icterus.        Right eye: No discharge.         Left eye: No discharge.      Conjunctiva/sclera: Conjunctivae normal.   Cardiovascular:      Rate and Rhythm: Normal rate and regular rhythm.      Heart sounds: Normal heart sounds. No murmur heard.    No friction rub. No gallop.   Pulmonary:      Effort: Pulmonary effort is normal. No respiratory distress.      Breath sounds: Normal breath sounds. No wheezing.   Musculoskeletal:      Right lower leg: No edema.      Left lower leg: No edema.   Neurological:      Mental Status: He is alert and oriented to person, place, and time.   Psychiatric:         Behavior: Behavior normal.        Result Review :                Assessment and Plan   Diagnoses and all orders for this visit:    1. Shortness of breath (Primary)  -     POCT SARS-CoV-2 Antigen MERRY + Flu    2. Type 2 diabetes mellitus without complication, with long-term current use of insulin (HCC)  -     metFORMIN (GLUCOPHAGE) 500 MG tablet; Take 2 tablets by mouth 2 (Two) Times a Day With Meals.  Dispense: 360 tablet; Refill: 3  -     POCT SARS-CoV-2 Antigen MERRY + Flu    3. Nausea  -     POCT SARS-CoV-2 Antigen MERRY + Flu    4. Elevated liver enzymes  -     POCT SARS-CoV-2 Antigen MERRY + Flu             Follow Up   No follow-ups on file.  Patient was given instructions and counseling regarding his condition or for health maintenance advice. Please see specific information pulled into the AVS if appropriate.     Pt has not been feeling well since prior to his angioplasty which was on 5/20/22. He was having fatigue and SOB with activity and had evaluation with cardiology. He already has stents. Per pt and wife  thought after evaluation of the effected coronaries was going to have to have open heart surgery but then Semder able to use stenting. Ultimately it was not thought that the areas stented were the cause of his symptoms was the understanding of pt and his wife, and maybe apparent in the fact that the pt actually feels worse after the angioplasty, though he believes the angioplasty is not to blame.     He developed nausea but no vomiting, no abdominal pain, no change in bowel movements.   He notes that the admission with cardiology also coincided with medication change. He was having swelling so the amlodipine was stopped and started on chlorthalidone for BP coverage. Nausea started around this time. Took the medication for about 6 weeks but stopped 8 days ago because thought it might have been making him sick. Last night he did feel a little better and ate dinner for the first time in a long time but overall he is not better after stopping chlorthalidone.      He and his wife both explain that this smothering feeling in his throat like he can't get a good breath sensation has historically been a symptom of his for other ailments. When his GERD is bad and other conditions. He has anxiety and this is heightened to a large degree when he is not feeling well. Historically he also has used clonazepam from his other providers which I have reviewed. He had taken it daily up to TID but did not want to continue in this way and worked to wean. Now he uses it more prn. I did give him a short supply several days ago after chart review and he shared today that he is needing one daily. It helps with the anxiety and smothering feeling. He can rest, often sleeps after taking but not always. This helps.   Furthermore we discussed that acutely with it helping so well and him already understanding the risks and having weaned off the medication from regular use, I will write for it daily at this time while we undergo further  investigation of his symptoms. Then we can do the 60 tablet fill that is anticipated to last him on year. We will do a contract at his regular follow up coming up next month. He voiced understanding and agreement of this.     We want to do labs and he is already set to have thorough labs with endocrinology tomorrow. Concerned given creatinine was up to 2 last week with cardiology. He had already been of the chlorthalidone for 4 days at that time. I am going to review these. Also the liver labs were in the 100s which was new for him. If they are revealing of anything we will pursue this. If not, I discussed considering CT abdomen and pelvis given his significant weight loss and nausea.  Discussed the importance of hydration without eating so much and kidney function and how some of his other medications like metformin and jardiance could further damage the kidneys. I told him no more metformin until we see his kidney function tomorrow. He voiced understanding.   Related to the elevated liver labs we discussed that his symptoms could be related to a viral infection. Potentially just having some post viral fatigue and symptoms related to this. The liver enzymes will hopefully show improvement if this is the case.     He also shares that he is having headaches. He does get headaches and these are like his usual headaches just getting them so much more frequently. Generally takes tylenol for these.

## 2022-07-26 ENCOUNTER — LAB (OUTPATIENT)
Dept: LAB | Facility: HOSPITAL | Age: 67
End: 2022-07-26

## 2022-07-26 PROCEDURE — 80061 LIPID PANEL: CPT | Performed by: INTERNAL MEDICINE

## 2022-07-26 PROCEDURE — 84439 ASSAY OF FREE THYROXINE: CPT | Performed by: INTERNAL MEDICINE

## 2022-07-26 PROCEDURE — 80053 COMPREHEN METABOLIC PANEL: CPT | Performed by: INTERNAL MEDICINE

## 2022-07-26 PROCEDURE — 82043 UR ALBUMIN QUANTITATIVE: CPT | Performed by: INTERNAL MEDICINE

## 2022-07-26 PROCEDURE — 82570 ASSAY OF URINE CREATININE: CPT | Performed by: INTERNAL MEDICINE

## 2022-07-26 PROCEDURE — 83036 HEMOGLOBIN GLYCOSYLATED A1C: CPT | Performed by: INTERNAL MEDICINE

## 2022-07-26 PROCEDURE — 84443 ASSAY THYROID STIM HORMONE: CPT | Performed by: INTERNAL MEDICINE

## 2022-07-27 ENCOUNTER — TELEPHONE (OUTPATIENT)
Dept: CARDIOLOGY | Facility: CLINIC | Age: 67
End: 2022-07-27

## 2022-07-27 DIAGNOSIS — R11.0 NAUSEA: Primary | ICD-10-CM

## 2022-07-27 DIAGNOSIS — R74.8 ELEVATED LIVER ENZYMES: ICD-10-CM

## 2022-07-27 DIAGNOSIS — R63.4 WEIGHT LOSS: ICD-10-CM

## 2022-07-27 DIAGNOSIS — N17.9 ACUTE KIDNEY INJURY: Primary | ICD-10-CM

## 2022-07-27 NOTE — TELEPHONE ENCOUNTER
I called and spoke with the pt. He doesn't currently have a nephrologist, but he used to see Dr. Brenner in the past.    Per conversation with Dr. Karimi if he doesn't he wants a referral put in for him to co-sign. I asked the pt if he would like to go back and see Dr. Clark or a different nephrologist and he is fine with seeing Dr. Brenner. So I put the referral in for Dr. Karimi to co-sign.    Jenny

## 2022-07-27 NOTE — TELEPHONE ENCOUNTER
----- Message from Miguel Ángel Karimi MD sent at 7/27/2022 10:40 AM EDT -----  Please find out if he has a renal doctor

## 2022-07-28 ENCOUNTER — HOSPITAL ENCOUNTER (OUTPATIENT)
Dept: ULTRASOUND IMAGING | Facility: HOSPITAL | Age: 67
Discharge: HOME | End: 2022-07-28
Admitting: FAMILY MEDICINE

## 2022-07-28 DIAGNOSIS — R63.4 WEIGHT LOSS: ICD-10-CM

## 2022-07-28 DIAGNOSIS — R11.0 NAUSEA: ICD-10-CM

## 2022-07-28 DIAGNOSIS — R74.8 ELEVATED LIVER ENZYMES: ICD-10-CM

## 2022-07-28 PROCEDURE — 76705 ECHO EXAM OF ABDOMEN: CPT

## 2022-07-28 NOTE — TELEPHONE ENCOUNTER
Dr. Brenner's office called and said that they have a new fax number for records for referrals. So I wanted to pass to you guys when you send over his referral and records.    Send to:  Miriam LESLIE#[phone]  F#[phone]

## 2022-08-03 ENCOUNTER — PATIENT MESSAGE (OUTPATIENT)
Dept: CARDIOLOGY | Facility: CLINIC | Age: 67
End: 2022-08-03

## 2022-08-03 DIAGNOSIS — E11.9 CONTROLLED TYPE 2 DIABETES MELLITUS WITHOUT COMPLICATION, WITH LONG-TERM CURRENT USE OF INSULIN: ICD-10-CM

## 2022-08-03 DIAGNOSIS — Z79.4 CONTROLLED TYPE 2 DIABETES MELLITUS WITHOUT COMPLICATION, WITH LONG-TERM CURRENT USE OF INSULIN: ICD-10-CM

## 2022-08-03 NOTE — TELEPHONE ENCOUNTER
PHARMACY CALLED FOR MEDICATION REFILL OF   pioglitazone (ACTOS) 15 MG tablet    [facility] [address] [facility], [place] - [address] AT [facility] [phone] [facility] [phone]   [phone]

## 2022-08-04 ENCOUNTER — LAB (OUTPATIENT)
Dept: LAB | Facility: HOSPITAL | Age: 67
End: 2022-08-04

## 2022-08-04 DIAGNOSIS — E78.5 HYPERLIPIDEMIA, UNSPECIFIED HYPERLIPIDEMIA TYPE: ICD-10-CM

## 2022-08-04 DIAGNOSIS — E78.5 HYPERLIPIDEMIA, UNSPECIFIED HYPERLIPIDEMIA TYPE: Primary | ICD-10-CM

## 2022-08-04 LAB
ALBUMIN SERPL-MCNC: 3.7 G/DL (ref 3.5–5.2)
ALBUMIN/GLOB SERPL: 1 G/DL
ALP SERPL-CCNC: 95 U/L (ref 39–117)
ALT SERPL W P-5'-P-CCNC: 81 U/L (ref 1–41)
ANION GAP SERPL CALCULATED.3IONS-SCNC: 13 MMOL/L (ref 5–15)
AST SERPL-CCNC: 63 U/L (ref 1–40)
BILIRUB SERPL-MCNC: 0.5 MG/DL (ref 0–1.2)
BUN SERPL-MCNC: 13 MG/DL (ref 8–23)
BUN/CREAT SERPL: 10.3 (ref 7–25)
CALCIUM SPEC-SCNC: 9.6 MG/DL (ref 8.6–10.5)
CHLORIDE SERPL-SCNC: 99 MMOL/L (ref 98–107)
CO2 SERPL-SCNC: 24 MMOL/L (ref 22–29)
CREAT SERPL-MCNC: 1.26 MG/DL (ref 0.76–1.27)
EGFRCR SERPLBLD CKD-EPI 2021: 62.5 ML/MIN/1.73
GLOBULIN UR ELPH-MCNC: 3.7 GM/DL
GLUCOSE SERPL-MCNC: 154 MG/DL (ref 65–99)
POTASSIUM SERPL-SCNC: 4.5 MMOL/L (ref 3.5–5.2)
PROT SERPL-MCNC: 7.4 G/DL (ref 6–8.5)
SODIUM SERPL-SCNC: 136 MMOL/L (ref 136–145)

## 2022-08-04 PROCEDURE — 80053 COMPREHEN METABOLIC PANEL: CPT

## 2022-08-04 PROCEDURE — 36415 COLL VENOUS BLD VENIPUNCTURE: CPT

## 2022-08-04 NOTE — TELEPHONE ENCOUNTER
Rx Refill Note  Requested Prescriptions     Pending Prescriptions Disp Refills   • pioglitazone (ACTOS) 15 MG tablet 90 tablet 3     Sig: Take 1 tablet by mouth Daily.      Last office visit with prescribing clinician: 7/25/2022      Next office visit with prescribing clinician: 4/27/2023            Carey Ignacio LPN  08/04/22, 16:46 EDT

## 2022-08-05 RX ORDER — PIOGLITAZONEHYDROCHLORIDE 15 MG/1
15 TABLET ORAL DAILY
Qty: 90 TABLET | Refills: 3 | Status: SHIPPED | OUTPATIENT
Start: 2022-08-05

## 2022-08-09 ENCOUNTER — OFFICE VISIT (OUTPATIENT)
Dept: ENDOCRINOLOGY | Age: 67
End: 2022-08-09

## 2022-08-09 VITALS
OXYGEN SATURATION: 98 % | SYSTOLIC BLOOD PRESSURE: 128 MMHG | HEART RATE: 82 BPM | BODY MASS INDEX: 29.47 KG/M2 | DIASTOLIC BLOOD PRESSURE: 84 MMHG | TEMPERATURE: 97.7 F | WEIGHT: 249.6 LBS | HEIGHT: 77 IN

## 2022-08-09 DIAGNOSIS — Z79.4 TYPE 2 DIABETES MELLITUS WITHOUT COMPLICATION, WITH LONG-TERM CURRENT USE OF INSULIN: ICD-10-CM

## 2022-08-09 DIAGNOSIS — Z79.4 CONTROLLED TYPE 2 DIABETES MELLITUS WITH COMPLICATION, WITH LONG-TERM CURRENT USE OF INSULIN: Primary | ICD-10-CM

## 2022-08-09 DIAGNOSIS — E11.9 TYPE 2 DIABETES MELLITUS WITHOUT COMPLICATION, WITH LONG-TERM CURRENT USE OF INSULIN: ICD-10-CM

## 2022-08-09 DIAGNOSIS — E11.8 CONTROLLED TYPE 2 DIABETES MELLITUS WITH COMPLICATION, WITH LONG-TERM CURRENT USE OF INSULIN: Primary | ICD-10-CM

## 2022-08-09 PROCEDURE — 99215 OFFICE O/P EST HI 40 MIN: CPT | Performed by: INTERNAL MEDICINE

## 2022-08-09 PROCEDURE — 82962 GLUCOSE BLOOD TEST: CPT | Performed by: INTERNAL MEDICINE

## 2022-08-09 NOTE — PROGRESS NOTES
Chief Complaint  Diabetes    Subjective          Isiaas Monaco presents to [facility] END[facility]  History of Present Illness   LOV 11/9/2021, 6 months ago.  67 year old -American male with type 2 diabetes.  Comorbidities include BMI 31.  Hypertension controlled, no retinopathy, no nephropathy, no neuropathy.  No history of ulcers.  He now has diagnosis of degenerative disc disease of the lumbar back and he has been symptomatic.  Exercise has been limited.      Since last visit : See if the concern as followsThe elevated RBC is new.   He is not on testosterone therapy through this office and I do not see a testosterone in this  system.  Patient notes he is not on testosterone.  Diabetes HGA1C is stable.Goal 6-7%  Lipids stable. Mild elevated triglycerides which only in diabetes is associated with coronary artery disease.  Would encourage patient to limit saturated fats.   Patient is welcome to make appointment to review data.   For CBC please consider reordering . Recommend patient drink 2 glasses of water 1/2 hr prior to blood draw.    8/9/22Since last visit had 2 cardiac stents placed in Cx A and LAD prox and distal.  5/31/22 to lower edema placed on chlorthaladone and off amlodipine  June 18 2022 ill, N,loss of appetite and loss weight x6 weeks during cruise.  Liver and creatinie increased      Type 2 diabetes: Checks fingersticks 0-2 times per day.  He has noticed glucose rising so increase Toujeo up to 60 units a day.  He has not noticed hypoglycemia.    Weight loss Patient reports weight loss due to increase exercise. However movement has been curtailed due to low back pain . Work up has been positive for DDD of low back. I recommend he consider physical therapy.     : Weight was 263 lbs on 11/09/21 OV. Now weight is 265 lbs.     Last eye exam 2 months ago. He is monitored every 6 months for glaucoma and an annual dilated exam due in 6 months. No retinopathy    Toujeou 60 units  "recently increased., I reduced his insulin to 54 units at last appointment because of concern for hypoglycemia.   Over the last 6 months patient indicates his appetite and meal intake has increased in size.. Now eatin more meals.  Lost weight . He has backpain when walking so interferes with exercise. Sees pain management and will see the neurosurgeon  Concern last 2 weeks  Of edema pitting ankles and lower leg    Current diabetes medications.   Pioglitazone 15 mg daily  Jardiance 25 mg daily  Metformin 1000 mg bid  rebelsus not started due to cost  Microalbuminurea  present for last 7 years and stable  Without renal failure.     Objective   Vital Signs:  /84   Pulse 82   Temp 97.7 °F (36.5 °C)   Ht 195.6 cm (77.01\")   Wt 113 kg (249 lb 9.6 oz)   SpO2 98%   BMI 29.59 kg/m²       Physical Exam  Vitals and nursing note reviewed.   HENT:      Head: Normocephalic.      Mouth/Throat:      Mouth: Mucous membranes are moist.   Cardiovascular:      Rate and Rhythm: Normal rate.      Pulses: Normal pulses.           Dorsalis pedis pulses are 2+ on the right side and 2+ on the left side.        Posterior tibial pulses are 2+ on the right side and 2+ on the left side.      Heart sounds: Normal heart sounds.   Pulmonary:      Effort: Pulmonary effort is normal.      Breath sounds: Normal breath sounds. No wheezing or rhonchi.   Abdominal:      General: Bowel sounds are normal.      Palpations: Abdomen is soft.   Musculoskeletal:         General: No swelling. Normal range of motion.      Cervical back: Normal range of motion and neck supple.   Feet:      Right foot:      Protective Sensation: 10 sites tested. 10 sites sensed.      Skin integrity: Skin integrity normal.      Toenail Condition: Right toenails are abnormally thick.      Left foot:      Protective Sensation: 10 sites tested. 10 sites sensed.      Skin integrity: Skin integrity normal.      Toenail Condition: Left toenails are abnormally thick.   Skin:    "  General: Skin is warm and dry.   Neurological:      Mental Status: He is alert and oriented to person, place, and time. Mental status is at baseline.   Psychiatric:         Mood and Affect: Mood normal.         Behavior: Behavior normal.         Judgment: Judgment normal.        Result Review :   The following data was reviewed by: Jamilah Vela MD on 05/06/2022:    7/26/22   Component      Latest Ref Rng & Units 7/26/2022 7/26/2022          11:22 AM 11:22 AM   Hemoglobin A1C      4.80 - 5.60 %  7.00 (H)   TSH Baseline      0.270 - 4.200 uIU/mL 0.539      Component      Latest Ref Rng & Units 7/26/2022          11:22 AM   Free T4      0.93 - 1.70 ng/dL 1.38       Component      Latest Ref Rng & Units 7/26/2022          11:22 AM   Microalbumin, Urine      mg/dL 34.9     Component      Latest Ref Rng & Units 4/22/2022          10:55 AM   Creatinine, Urine      Not Estab. mg/dL 119.1   Microalbumin, Urine      Not Estab. ug/mL 655.7   Microalbumin/Creatinine Ratio      0 - 29 mg/g creat 551 (H)       Component      Latest Ref Rng & Units 4/25/2022 4/25/2022 4/25/2022          11:23 AM 11:23 AM 11:23 AM   Total Cholesterol      100 - 199 mg/dL   113   Triglycerides      0 - 149 mg/dL   157 (H)   HDL Cholesterol      >39 mg/dL   41   VLDL Cholesterol Haresh      5 - 40 mg/dL   27   LDL Cholesterol       0 - 99 mg/dL   45   Hemoglobin A1C      4.8 - 5.6 % 7.0 (H)     PSA      0.0 - 4.0 ng/mL  0.2         Component      Latest Ref Rng & Units 7/26/2022 7/26/2022          11:22 AM 11:22 AM   Glucose      65 - 99 mg/dL 99    BUN      8 - 23 mg/dL 12    Creatinine      0.76 - 1.27 mg/dL 1.42 (H)    Sodium      136 - 145 mmol/L 142    Potassium      3.5 - 5.2 mmol/L 3.6    Chloride      98 - 107 mmol/L 100    CO2      22.0 - 29.0 mmol/L 27.3    Calcium      8.6 - 10.5 mg/dL 9.9    Total Protein      6.0 - 8.5 g/dL 8.1    Albumin      3.50 - 5.20 g/dL 4.30    ALT (SGPT)      1 - 41 U/L 174 (H)    AST (SGOT)      1 - 40  U/L 177 (H)    Alkaline Phosphatase      39 - 117 U/L 104    Total Bilirubin      0.0 - 1.2 mg/dL 0.5    Globulin      gm/dL 3.8    A/G Ratio      g/dL 1.1    BUN/Creatinine Ratio      7.0 - 25.0 8.5    Anion Gap      5.0 - 15.0 mmol/L 14.7    eGFR      >60.0 mL/min/1.73 54.2 (L)    Total Cholesterol      0 - 200 mg/dL  76   Triglycerides      0 - 150 mg/dL  146   HDL Cholesterol      40 - 60 mg/dL  31 (L)   LDL Cholesterol       0 - 100 mg/dL  20   VLDL Cholesterol      5 - 40 mg/dL  25   LDL/HDL Ratio        0.51     Component      Latest Ref Rng & Units 8/4/2022           4:25 PM   Glucose      65 - 99 mg/dL 154 (H)   ALT (SGPT)      1 - 41 U/L 81 (H)   AST (SGOT)      1 - 40 U/L 63 (H)     Component      Latest Ref Rng & Units 4/25/2022          11:23 AM   Glucose      65 - 99 mg/dL 98   BUN      8 - 27 mg/dL 13   Creatinine      0.76 - 1.27 mg/dL 1.09   EGFR Result      >59 mL/min/1.73 74   BUN/Creatinine Ratio      10 - 24 12   Sodium      134 - 144 mmol/L 140   Potassium      3.5 - 5.2 mmol/L 4.3   Chloride      96 - 106 mmol/L 102   CO2      20 - 29 mmol/L 21   Calcium      8.6 - 10.2 mg/dL 9.7   Total Protein      6.0 - 8.5 g/dL 8.0   Albumin      3.8 - 4.8 g/dL 4.5   Globulin      1.5 - 4.5 g/dL 3.5   A/G Ratio      1.2 - 2.2 1.3   Total Bilirubin      0.0 - 1.2 mg/dL 0.5   Alkaline Phosphatase      44 - 121 IU/L 79   AST (SGOT)      0 - 40 IU/L 38   ALT (SGPT)      0 - 44 IU/L 33            Assessment and Plan   67-year-old  male returns 6 months after his last visit.     Diagnoses and all orders for this visit:    1. Controlled type 2 diabetes mellitus with complication, with long-term current use of insulin (HCC) (Primary)  -     POC Glucose Fingerstick  -     metFORMIN (GLUCOPHAGE) 500 MG tablet; Take 2 tablets by mouth Daily With Breakfast. Indications: start in 48 hours  Dispense: 180 tablet; Refill: 3  -     Comprehensive Metabolic Panel; Future  -     Lipid Panel; Future    2. Type 2  diabetes mellitus without complication, with long-term current use of insulin (HCC)  -     metFORMIN (GLUCOPHAGE) 500 MG tablet; Take 2 tablets by mouth Daily With Breakfast. Indications: start in 48 hours  Dispense: 180 tablet; Refill: 3  -     Comprehensive Metabolic Panel; Future  -     Lipid Panel; Future      1.  Type 2 diabetes controlled with hemoglobin A1c of 7.0.  Control is due to in part getting ill.  I suspect he had COVID as he had lack of taste and appetite along with feeling very ill.  This lasted 6weeks.  Suspect the COVID testing just was not positive for this Friday.  It sounds like he had the be 2 variety.  Liver enzymes can rise with viruses such as COVID.  At this time liver enzymes still remain elevated.    Please keep on hold Crestor Zetia and metformin while liver enzymes are elevated.  These can be gradually added 1 at a time once liver enzymes are stable as follows.      Do not restart metformin 1000 mg in the morning until kidneys are cleared by nephrologist at the end of August.  Side effects: And then look for side effects of  diarrhea or nausea.    Do not restart Zetia or Crestor until liver enzymes are normal  Zetia can then be started 1 a day  2 or 3 weeks later may start Crestor as 1 on Monday Wednesdays and Fridays and see how you feel.       I spent 50 minutes caring for Isaias on this date of service. This time includes time spent by me in the following activities:reviewing tests, obtaining and/or reviewing a separately obtained history, performing a medically appropriate examination and/or evaluation , counseling and educating the patient/family/caregiver and ordering medications, tests, or procedures insulin: Reviewed with patient the importance of meal planning and glucose control.      He has had chronic microalbuminuria for 7 years along with hypertension and diabetes.  These are all risk factors for kidney failure.  Thus far he has not had kidney failure.  I would like to  continue that.  He reports difficulty with meal planning.  We discussed options that he could take to improve limiting carbohydrates at meals.  He declined a nutrition consult at this time.         Follow Up   Return in about 6 weeks (around 9/20/2022) for Recheck. labs 1 week prior  Patient was given instructions and counseling regarding his condition or for health maintenance advice. Please see specific information pulled into the AVS if appropriate.

## 2022-08-09 NOTE — PATIENT INSTRUCTIONS
1.  Type 2 diabetes controlled with hemoglobin A1c of 7.0.  Control is due to in part getting ill.  I suspect he had COVID as he had lack of taste and appetite along with feeling very ill.  This lasted 6weeks.  Suspect the COVID testing just was not positive for this Friday.  It sounds like he had the be 2 variety.  Liver enzymes can rise with viruses such as COVID.  At this time liver enzymes still remain elevated.  Please keep on hold Crestor Zetia and metformin while liver enzymes are elevated.  These can be gradually added 1 at a time once liver enzymes are stable as follows.      Do not restart metformin 1000 mg in the morning until kidneys are cleared by nephrologist at the end of August.  Side effects: And then look for side effects of  diarrhea or nausea.    Do not restart Zetia or Crestor until liver enzymes are normal  Zetia can then be started 1 a day  2 or 3 weeks later may start Crestor as 1 on Monday Wednesdays and Fridays and see how you feel.

## 2022-08-11 DIAGNOSIS — E66.9 ADIPOSITY: ICD-10-CM

## 2022-08-11 DIAGNOSIS — R80.9 MICROALBUMINURIA: ICD-10-CM

## 2022-08-11 DIAGNOSIS — E55.9 VITAMIN D DEFICIENCY: ICD-10-CM

## 2022-08-11 DIAGNOSIS — E11.65 TYPE 2 DIABETES MELLITUS WITH HYPERGLYCEMIA: ICD-10-CM

## 2022-08-11 DIAGNOSIS — N52.9 MALE ERECTILE DISORDER: ICD-10-CM

## 2022-08-11 DIAGNOSIS — E78.5 HYPERLIPIDEMIA: ICD-10-CM

## 2022-08-11 DIAGNOSIS — I10 ESSENTIAL HYPERTENSION: ICD-10-CM

## 2022-08-12 NOTE — TELEPHONE ENCOUNTER
Rx Refill Note  Requested Prescriptions     Pending Prescriptions Disp Refills   • Insulin Pen Needle ([employer] Pen Needles 31G) 31G X 8 MM misc [Pharmacy Med Name: [employer] PEN NEEDLE 8MM X 31G] 100 each 3     Sig: USE AS DIRECED FOR TOUJEO INJECTIONS      Last office visit with prescribing clinician: 7/25/2022      Next office visit with prescribing clinician: 4/27/2023            Carey Ignacio LPN  08/12/22, 16:58 EDT

## 2022-08-13 RX ORDER — PEN NEEDLE, DIABETIC 31 GX5/16"
NEEDLE, DISPOSABLE MISCELLANEOUS
Qty: 100 EACH | Refills: 3 | Status: SHIPPED | OUTPATIENT
Start: 2022-08-13 | End: 2023-03-29 | Stop reason: SDUPTHER

## 2022-08-22 ENCOUNTER — LAB (OUTPATIENT)
Dept: LAB | Facility: HOSPITAL | Age: 67
End: 2022-08-22

## 2022-08-22 ENCOUNTER — OFFICE VISIT (OUTPATIENT)
Dept: CARDIOLOGY | Facility: CLINIC | Age: 67
End: 2022-08-22

## 2022-08-22 VITALS
SYSTOLIC BLOOD PRESSURE: 152 MMHG | DIASTOLIC BLOOD PRESSURE: 100 MMHG | HEIGHT: 77 IN | BODY MASS INDEX: 29.76 KG/M2 | WEIGHT: 252 LBS | HEART RATE: 82 BPM

## 2022-08-22 DIAGNOSIS — I25.10 CORONARY ARTERY DISEASE INVOLVING NATIVE CORONARY ARTERY OF NATIVE HEART WITHOUT ANGINA PECTORIS: ICD-10-CM

## 2022-08-22 DIAGNOSIS — I10 ESSENTIAL HYPERTENSION: ICD-10-CM

## 2022-08-22 DIAGNOSIS — Z95.5 PRESENCE OF CORONARY ANGIOPLASTY IMPLANT AND GRAFT: ICD-10-CM

## 2022-08-22 DIAGNOSIS — I10 ESSENTIAL HYPERTENSION: Primary | ICD-10-CM

## 2022-08-22 LAB
ALBUMIN SERPL-MCNC: 4 G/DL (ref 3.5–5.2)
ALBUMIN/GLOB SERPL: 1.1 G/DL
ALP SERPL-CCNC: 92 U/L (ref 39–117)
ALT SERPL W P-5'-P-CCNC: 24 U/L (ref 1–41)
ANION GAP SERPL CALCULATED.3IONS-SCNC: 11.5 MMOL/L (ref 5–15)
AST SERPL-CCNC: 29 U/L (ref 1–40)
BILIRUB SERPL-MCNC: 0.4 MG/DL (ref 0–1.2)
BUN SERPL-MCNC: 12 MG/DL (ref 8–23)
BUN/CREAT SERPL: 9.8 (ref 7–25)
CALCIUM SPEC-SCNC: 9.2 MG/DL (ref 8.6–10.5)
CHLORIDE SERPL-SCNC: 105 MMOL/L (ref 98–107)
CO2 SERPL-SCNC: 24.5 MMOL/L (ref 22–29)
CREAT SERPL-MCNC: 1.22 MG/DL (ref 0.76–1.27)
EGFRCR SERPLBLD CKD-EPI 2021: 65 ML/MIN/1.73
GLOBULIN UR ELPH-MCNC: 3.6 GM/DL
GLUCOSE SERPL-MCNC: 128 MG/DL (ref 65–99)
POTASSIUM SERPL-SCNC: 4 MMOL/L (ref 3.5–5.2)
PROT SERPL-MCNC: 7.6 G/DL (ref 6–8.5)
SODIUM SERPL-SCNC: 141 MMOL/L (ref 136–145)

## 2022-08-22 PROCEDURE — 80053 COMPREHEN METABOLIC PANEL: CPT

## 2022-08-22 PROCEDURE — 36415 COLL VENOUS BLD VENIPUNCTURE: CPT

## 2022-08-22 PROCEDURE — 99214 OFFICE O/P EST MOD 30 MIN: CPT | Performed by: INTERNAL MEDICINE

## 2022-08-22 RX ORDER — DOXAZOSIN 2 MG/1
2 TABLET ORAL NIGHTLY
Qty: 30 TABLET | Refills: 6 | Status: SHIPPED | OUTPATIENT
Start: 2022-08-22 | End: 2022-10-31 | Stop reason: DRUGHIGH

## 2022-08-22 NOTE — PROGRESS NOTES
"[facility] Follow Up Office Note     Encounter Date:22  Patient:Isaias Monaco  :1955  MRN:[id number]      Chief Complaint:   Multivessel coronary artery disease  Diabetes mellitus  Hypertension  Hyperlipidemia    History of Presenting Illness:      66 yo male with a past medical history of of CAD, hypertension and hyperlipidemia.  He presented in  with unstable angina and underwent DAVONTE of proximal to mid RCA .  In 2020 he presented with angina and was referred for cardiac catheterization with DAVONTE to 99% ostial LCx lesion.    Patient was seen by myself on 2022 with 2 weeks fullness in his throat and \"vague\" shortness of breath. These symptoms occured at rest and this had previously been his anginal equivalent. EKG with new ST depressions in III, AVF with TWIs. He was scheduled for [facility] for unstable angina which revealed new LCD and LCx stenosis.  He was referred for cardiac surgery but he was felt to be high risk as Islam and not able to receive blood products.  S/p PCI on  by Dr. Karimi with DAVONTE to proximal LCx and left main bifurcation x2 with DK Crush technique.  Echo prior to stenting demonstrated normal LV function.    After his discharge she was noted to have an elevated creatinine along with transaminases which subsequently have improved.  He denies any chest discomfort as of late.  His blood pressure is still slightly elevated.  Previously he had been on amlodipine therapy but had issues with lower extremity edema and also did not tolerate chlorthalidone well.    Review of Systems:  Review of Systems   Constitutional: Negative for fever and malaise/fatigue.   HENT: Negative for nosebleeds and sore throat.    Eyes: Negative for blurred vision and double vision.   Cardiovascular: Positive for leg swelling. Negative for chest pain, claudication, dyspnea on exertion, orthopnea, palpitations and syncope.   Respiratory: Negative for cough, shortness of " breath and snoring.    Endocrine: Negative for cold intolerance, heat intolerance and polydipsia.   Skin: Negative for itching, poor wound healing and rash.   Musculoskeletal: Negative for joint pain, joint swelling, muscle weakness and myalgias.   Gastrointestinal: Negative for abdominal pain, melena, nausea and vomiting.   Neurological: Negative for light-headedness, loss of balance, seizures, vertigo and weakness.   Psychiatric/Behavioral: Negative for altered mental status and depression.                                   Current Outpatient Medications on File Prior to Visit   Medication Sig Dispense Refill   • ARIPiprazole (ABILIFY) 2 MG tablet Take 3 mg by mouth Daily.     • aspirin 81 MG EC tablet Take 1 tablet by mouth Daily. 30 tablet 6   • Blood Glucose Monitoring Suppl ([employer] BLOOD GLUCOSE) kit TEST AS DIRECTED 1 each 0   • carvedilol (COREG) 25 MG tablet Take 1 tablet by mouth Every 12 (Twelve) Hours. 180 tablet 3   • clonazePAM (KlonoPIN) 0.5 MG tablet Take 1 tablet by mouth Daily As Needed for Anxiety. 15 tablet 0   • clopidogrel (PLAVIX) 75 MG tablet Take 1 tablet by mouth Daily. 30 tablet 11   • dorzolamide-timolol (COSOPT) 22.3-6.8 MG/ML ophthalmic solution Apply 1 drop to eye(s) to both eyes 2 (Two) Times a Day. 20 mL 3   • empagliflozin (Jardiance) 25 MG tablet tablet Take 1 tablet by mouth Daily. 90 tablet 0   • escitalopram (LEXAPRO) 20 MG tablet Take 1 tablet by mouth Daily. 90 tablet 3   • esomeprazole (nexIUM) 40 MG capsule Take 1 capsule by mouth Daily. 90 capsule 3   • famotidine (PEPCID) 20 MG tablet Take 1 tablet by mouth Daily With Dinner. 90 tablet 3   • glucose blood test strip Use 4 times a day 400 each 0   • Insulin Pen Needle ([employer] Pen Needles 31G) 31G X 8 MM misc USE AS DIRECED FOR TOUJEO INJECTIONS 100 each 3   • Lancets (FREESTYLE) lancets Use to test BG 4 times daily 400 each 1   • latanoprost (XALATAN) 0.005 % ophthalmic solution Apply 1 drop to  each eye Daily. (Patient  taking differently: Apply 1 drop to eye(s) as directed by provider Every Night.) 7.5 mL 3   • methocarbamol (Robaxin) 500 MG tablet Take 1 tablet by mouth 3 (Three) Times a Day. (Patient taking differently: Take 500 mg by mouth As Needed.) 90 tablet 0   • nitroglycerin (NITROSTAT) 0.4 MG SL tablet Place 1 tablet under the tongue Every 5 (Five) Minutes As Needed for Chest Pain (Only if SBP Greater Than 100). Take no more than 3 doses in 15 minutes. 30 tablet 12   • pioglitazone (ACTOS) 15 MG tablet Take 1 tablet by mouth Daily. 90 tablet 3   • rosuvastatin (CRESTOR) 40 MG tablet Take 1 tablet by mouth Daily. (Patient taking differently: Take 40 mg by mouth. [facility]) 90 tablet 3   • sildenafil (VIAGRA) 50 MG tablet Take 1 tablet by mouth Daily As Needed for erectile dysfunction. (Patient taking differently: Take 50 mg by mouth Daily As Needed for Erectile Dysfunction. PRN) 6 tablet 5   • Toujeo SoloStar 300 UNIT/ML solution pen-injector injection INJECT 60 UNITS UNDER THE SKIN INTO THE APPROPRIATE AREA AS DIRECTED EVERY MORNING 18 mL 3   • vitamin D (ERGOCALCIFEROL) 1.25 MG (55963 UT) capsule capsule TAKE ONE CAPSULE BY MOUTH ONCE WEEKLY 12 capsule 0   • ezetimibe (Zetia) 10 MG tablet Take 1 tablet by mouth Daily. (Patient taking differently: Take 10 mg by mouth Daily. Held) 90 tablet 3   • metFORMIN (GLUCOPHAGE) 500 MG tablet Take 2 tablets by mouth Daily With Breakfast. Indications: start in 48 hours (Patient taking differently: Take 1,000 mg by mouth Daily With Breakfast. Held  Indications: start in 48 hours) 180 tablet 3     No current facility-administered medications on file prior to visit.       Allergies   Allergen Reactions   • Norvasc [Amlodipine] Swelling   • Ace Inhibitors Angioedema   • Lisinopril Angioedema   • Testosterone Myalgia       Past Medical History:   Diagnosis Date   • Adiposity    • Anemia     post hemorrhagic   • Angioedema 02/21/2016    Secondary to ACE inhibitor   • Anxiety    • Arthritis    •  CAD (coronary artery disease)    • Chest pain    • Colon polyps    • Diabetes mellitus, type 2 (HCC)    • ED (erectile dysfunction)    • Febrile illness    • GERD (gastroesophageal reflux disease)    • Glaucoma    • Hematoma    • High cholesterol    • History of foreign travel 12/2017; 08/2018    [place], [place], [place], [place], [place] and [place]; [place]   • Hyperlipidemia    • Hypertension    • Hypogonadism in male 09/28/2016   • Low back pain    • Male erectile disorder    • Microalbuminuria    • Obesity (BMI 30-39.9)    • Osteoarthritis     knee   • Spinal stenosis of lumbar region without neurogenic claudication 06/02/2021   • Vitamin D deficiency    • Wound infection after surgery        Past Surgical History:   Procedure Laterality Date   • CARDIAC CATHETERIZATION N/A 05/15/2006    Dr. Mini Camarillo   • CARDIAC CATHETERIZATION N/A 3/10/2020    Procedure: Left Heart Cath;  Surgeon: Miguel Ángel Karimi MD;  Location:  [facility];  Service: Cardiology;  Laterality: N/A;   • CARDIAC CATHETERIZATION N/A 3/10/2020    Procedure: Stent DAVONTE coronary;  Surgeon: Miguel Ángel Karimi MD;  Location:  [facility];  Service: Cardiology;  Laterality: N/A;   • CARDIAC CATHETERIZATION N/A 3/10/2020    Procedure: Coronary angiography;  Surgeon: Miguel Ángel Karimi MD;  Location:  [facility];  Service: Cardiology;  Laterality: N/A;   • CARDIAC CATHETERIZATION N/A 3/10/2020    Procedure: Left ventriculography;  Surgeon: Miguel Ángel Karimi MD;  Location:  [facility];  Service: Cardiology;  Laterality: N/A;   • CARDIAC CATHETERIZATION N/A 5/20/2022    Procedure: Left Heart Cath;  Surgeon: Mackenzie Morales MD;  Location:  [facility];  Service: Cardiovascular;  Laterality: N/A;   • CARDIAC CATHETERIZATION N/A 5/20/2022    Procedure: Coronary angiography;  Surgeon: Mackenzie Morales MD;  Location:  [facility];  Service: Cardiovascular;  Laterality: N/A;   • CARDIAC CATHETERIZATION N/A 5/20/2022    Procedure: Percutaneous Coronary Intervention;  Surgeon: Mackenzie Morales MD;  Location:  [facility];  Service: Cardiovascular;  Laterality: N/A;   • CARDIAC CATHETERIZATION N/A 5/24/2022    Procedure: Percutaneous Coronary Intervention;  Surgeon: Miguel Ángel Karimi MD;  Location:  [facility];  Service: Cardiovascular;  Laterality: N/A;  LAD and Cx   • CARDIAC CATHETERIZATION N/A 5/24/2022    Procedure: Stent DAVONTE coronary;  Surgeon: Miguel Ángel Karimi MD;  Location:  [facility];  Service: Cardiovascular;  Laterality: N/A;   • CARDIAC CATHETERIZATION N/A 5/24/2022    Procedure: Resting Full Cycle Ratio;  Surgeon: Miguel Ángel Karimi MD;  [facility];  Service: Cardiovascular;  Laterality: N/A;   • COLONOSCOPY N/A 02/22/2006    Bilobed polyp at 30 cm, hemorrhoids-Dr. Ilya Zhao   • COLONOSCOPY N/A 02/28/2014    Normal ileum, one 6 mm polyp in the mid transverse colon-Dr. Ilya Zhao   • COLONOSCOPY N/A 11/19/2008    Ela ileum, two 3 to 4 mm polyps, non-bleeding internal hemorrhoids, repeat in 5 years-Dr. Ilya Zhao   • COLONOSCOPY N/A 10/31/2017    Procedure: COLONOSCOPY WITH POLYPECTOMY (COLD BIOPSY);  Surgeon: Ilya Zhao MD;  Location: [facility] ENDOSCOPY;  Service:    • COLONOSCOPY N/A 5/21/2021    Procedure: Colonoscopy into cecum and terminal ileum with cold biopsy polypectomy;  Surgeon: Ilya Zhao MD;  Location: [facility] ENDOSCOPY;  Service: Gastroenterology;  Laterality: N/A;  Pre op: History of Polyps  Post op: Polyp   • CORONARY ANGIOPLASTY WITH STENT PLACEMENT  2007, 2012, 2015    cardiac stents x3 occasions   • ENDOSCOPY N/A 10/4/2017    Procedure: ESOPHAGOGASTRODUODENOSCOPY;  Surgeon: Boyd Guidry MD;  Location: [facility] ENDOSCOPY;  Service:    • ENDOSCOPY N/A 5/21/2021    Procedure:  ESOPHAGOGASTRODUODENOSCOPY with biopsies;  Surgeon: Ilya Zhao MD;  Location: [facility] ENDOSCOPY;  Service: Gastroenterology;  Laterality: N/A;  Pre op: GERD  Post op: Irregular  Z-Line, Hiatal Hernia, Gastritis   • EPIDURAL BLOCK     • INTERVENTIONAL RADIOLOGY PROCEDURE N/A 5/20/2022    Procedure: Intravascular Ultrasound;  Surgeon: Mackenzie Morales MD;  Location: [facility] CATH INVASIVE LOCATION;  Service: Cardiovascular;  Laterality: N/A;   • KNEE INCISION AND DRAINAGE Right 6/20/2017    Procedure: RT. KNEE WASHOUT ;  Surgeon: Boyd Coyne MD;  Location: [facility] MAIN OR;  Service:    • MEDIAL BRANCH BLOCK Bilateral 12/17/2021    Procedure: LUMBAR MEDIAL BRANCH BLOCK bilateral ~L4-S1 x2 (~2 weeks apart);  Surgeon: Christina Villaseñor MD;  [place] [facility] [place];  Service: Pain Management;  Laterality: Bilateral;   • MEDIAL BRANCH BLOCK Bilateral 1/3/2022    Procedure: LUMBAR MEDIAL BRANCH BLOCK bilateral ~L4-S1 x2 (~2 weeks apart);  Surgeon: Christina Villaseñor MD;  Location: [facility] MA[facility];  Service: Pain Management;  Laterality: Bilateral;   • VA TOTAL KNEE ARTHROPLASTY Right 6/15/2017    Procedure: RT TOTAL KNEE ARTHROPLASTY;  Surgeon: Boyd Coyne MD;  Location: [facility] MAIN OR;  Service: Orthopedics   • RADIOFREQUENCY ABLATION Bilateral 1/10/2022    Procedure: RADIOFREQUENCY ABLATION NERVES Bilateral L4-S1;  Surgeon: Christina Villaseñor MD;  Location: [facility] MAIN OR;  Service: Pain Management;  Laterality: Bilateral;   • SHOULDER SURGERY Right 2016    rotator cuff repair   • UPPER GASTROINTESTINAL ENDOSCOPY N/A 10/13/2015    Z-line irregular, normal stomach, normal duodenum-Dr. Ilya Zhao   • UPPER GASTROINTESTINAL ENDOSCOPY N/A 02/28/2014    Z-line irregular, normal stomach, normal duodenum-Dr. Ilya Zhao   • UPPER GASTROINTESTINAL ENDOSCOPY N/A 11/19/2008    Z-line irregular, chronic gastritis withotu hemorrhage, normal duodenum-Dr. Ilya Zhao   • UPPER GASTROINTESTINAL ENDOSCOPY N/A 06/22/2006    LA Grade A  "reflux esophagitis, non-bleeding erythematous gastropathy, normal duodenum-Dr. Ilya Zhao       Social History     Socioeconomic History   • Marital status:      Spouse name: Miacela   • Number of children: 1   • Years of education: College   Tobacco Use   • Smoking status: Never Smoker   • Smokeless tobacco: Never Used   • Tobacco comment: CAFFEINE USE: 2 CUPS COFFEE DAILY   Vaping Use   • Vaping Use: Never used   Substance and Sexual Activity   • Alcohol use: Yes     Alcohol/week: 6.0 standard drinks     Types: 2 Glasses of wine, 2 Cans of beer, 1 Shots of liquor, 1 Standard drinks or equivalent per week     Comment: occasional 2-4 DRINKS PER WEEK   • Drug use: No   • Sexual activity: Yes     Partners: Female       Family History   Problem Relation Age of Onset   • Lupus Sister    • Thyroid disease Sister    • Heart disease Other    • Hypertension Other    • Arthritis Mother    • Hyperlipidemia Mother    • Hypertension Mother    • Thyroid disease Mother    • Lupus Mother    • Vision loss Mother    • Heart disease Father    • Arthritis Father    • Other Father         Vascular disease   • Lupus Father    • Depression Father    • Alcohol abuse Father    • Dementia Father    • Hypertension Father    • Heart disease Brother    • Heart attack Brother 40   • Thyroid disease Sister    • Arthritis Brother    • Malig Hyperthermia Neg Hx        The following portions of the patient's history were reviewed and updated as appropriate: allergies, current medications, past family history, past medical history, past social history, past surgical history and problem list.       Objective:       Vitals:    08/22/22 1541   BP: 152/100   Pulse: 82   Weight: 114 kg (252 lb)   Height: 195.6 cm (77\")       Constitutional:       Appearance: Well-developed.   Eyes:      General: No scleral icterus.     Conjunctiva/sclera: Conjunctivae normal.   HENT:      Head: Normocephalic and atraumatic.   Neck:      Thyroid: No thyromegaly.      " Vascular: Normal carotid pulses. No carotid bruit, hepatojugular reflux or JVD.      Trachea: No tracheal deviation.   Pulmonary:      Effort: No respiratory distress.      Breath sounds: Normal breath sounds. No decreased breath sounds. No wheezing. No rhonchi. No rales.   Chest:      Chest wall: Not tender to palpatation.   Cardiovascular:      Normal rate. Regular rhythm.      No gallop.   Pulses:     Carotid: 2+ bilaterally.     Radial: 2+ bilaterally.     Femoral: 2+ bilaterally.     Dorsalis pedis: 2+ bilaterally.     Posterior tibial: 2+ bilaterally.  Edema:     Peripheral edema absent.   Abdominal:      General: Bowel sounds are normal. There is no distension.      Palpations: Abdomen is soft.      Tenderness: There is no abdominal tenderness.   Musculoskeletal:         General: No deformity.      Cervical back: Normal range of motion and neck supple. Skin:     Findings: No erythema or rash.   Neurological:      Mental Status: Alert and oriented to person, place, and time.      Sensory: No sensory deficit.   Psychiatric:         Behavior: Behavior normal.           Lab Results   Component Value Date     08/04/2022     07/26/2022    K 4.5 08/04/2022    K 3.6 07/26/2022    CL 99 08/04/2022     07/26/2022    CO2 24.0 08/04/2022    CO2 27.3 07/26/2022    BUN 13 08/04/2022    BUN 12 07/26/2022    CREATININE 1.26 08/04/2022    CREATININE 1.42 (H) 07/26/2022    EGFRIFNONA 82 10/04/2021    EGFRIFNONA 64 12/01/2020    EGFRIFAFRI 100 10/04/2021    EGFRIFAFRI 78 12/01/2020    GLUCOSE 154 (H) 08/04/2022    GLUCOSE 99 07/26/2022    CALCIUM 9.6 08/04/2022    CALCIUM 9.9 07/26/2022    PROTENTOTREF 8.0 04/25/2022    PROTENTOTREF 7.5 04/22/2022    ALBUMIN 3.70 08/04/2022    ALBUMIN 4.30 07/26/2022    BILITOT 0.5 08/04/2022    BILITOT 0.5 07/26/2022    AST 63 (H) 08/04/2022     (H) 07/26/2022    ALT 81 (H) 08/04/2022     (H) 07/26/2022     Lab Results   Component Value Date    WBC 8.87  07/21/2022    WBC 8.62 05/25/2022    HGB 16.6 07/21/2022    HGB 14.0 05/25/2022    HCT 51.5 (H) 07/21/2022    HCT 41.9 05/25/2022    MCV 86.8 07/21/2022    MCV 87.5 05/25/2022     07/21/2022     05/25/2022     Lab Results   Component Value Date    CHOL 76 07/26/2022    CHOL 98 05/25/2022    TRIG 146 07/26/2022    TRIG 158 (H) 05/25/2022    HDL 31 (L) 07/26/2022    HDL 37 (L) 05/25/2022    LDL 20 07/26/2022    LDL 34 05/25/2022     Lab Results   Component Value Date    PROBNP 36.0 05/19/2022    PROBNP 64.0 08/03/2018     Lab Results   Component Value Date    TROPONINT <0.010 07/21/2022     Lab Results   Component Value Date    TSH 0.539 07/26/2022    TSH 0.626 05/23/2022         Procedures        LHC w/ PCI 5/24/2022:  Procedure findings:  Left main: Discrete 30% distal stenosis  LAD: Discrete 60% ostial stenois  LCX: Diffuse 70-80% in-stent restenosis  Ramus: Small caliber ramus with 99% ostial stenosis     Hemodynamics:   AO: 109/73        PCI CORONARY SEGMENT: proximal LCX  PRE-STENOSIS: 80  POST-STENOSIS: 0%  LESION TYPE: C  GRACIE FLOW PRE/POST: 2/3   CULPRIT LESION: Yes     PCI CORONARY SEGMENT:ostial LAD  PRE-STENOSIS: 80  POST-STENOSIS: 0%  LESION TYPE: C  GRACIE FLOW PRE/POST: 3/3   CULPRIT LESION: Yes     Estimated blood loss: Minimal  Complications: None     Conclusions:   1. Successful left main bifurcation stenting with a 4.0x15mm and 3.00j40to Xience Skypoint drug eluting stents with DK Crush technique  2. Successful PCI to the mid LCX with a 3.0x23 mm Xience Skypoint DAVONTE     Echocardiogram 5/21/2022:  · Calculated left ventricular EF = 55.6% Estimated left ventricular EF was in agreement with the calculated left ventricular EF. Left ventricular systolic function is normal.  · Left ventricular wall thickness is consistent with borderline concentric hypertrophy.  · Left ventricular diastolic function is consistent with (grade I) impaired relaxation.  · There is calcification of the aortic  valve. Valvular structure is poorly visualized.  · Aortic valve area is 1.36 cm2.  · Peak velocity of the flow distal to the aortic valve is 177.4 cm/s. Aortic valve maximum pressure gradient is 12.6 mmHg. Aortic valve mean pressure gradient is 8.1 mmHg. Aortic valve dimensionless index is 0.5 .         Assessment:           Plan:       68 yo male with a past medical history of of CAD, hypertension and hyperlipidemia.  He presented in 2015 with unstable angina and underwent DAVONTE of proximal to mid RCA .  In March 2020 he presented with angina and was referred for cardiac catheterization with DAVONTE to 99% ostial LCx lesion. He presented again in May 2022 with restenosis underwent LM bifurcation stenting as above.     CAD:  · History prior PCI to RCA () 2018 and ostial LCx 2020  · Status post PCI to LCx and ostial LAD bifurcation 5/24/2022 by   · Continue aspirin, clopidogrel, carvedilol, statin.  No bleeding complications on dual antiplatelet therapy.  These will continue lifelong.    Bilateral lower extremity edema:  · LVEF normal on echocardiogram  · Resolved off Amlodipine.  Recommend leaving off of amlodipine therapy.    Hypertension:  · Not well controlled.  I will add Cardura 2 mg p.o. daily to the current regimen of carvedilol.  He did not tolerate diuretic class antihypertensives or calcium channel blockers.    Hyperlipidemia:  · Well-controlled on recent labwork      CMP repeat has been placed for today.

## 2022-08-25 ENCOUNTER — TELEPHONE (OUTPATIENT)
Dept: CARDIOLOGY | Facility: CLINIC | Age: 67
End: 2022-08-25

## 2022-08-25 NOTE — TELEPHONE ENCOUNTER
Called patient               Miguel Ángel Karimi MD Foster, Danielle Nicole, MA  Please let the patient know that his labs have completely normalized.               Associated Results       Contains abnormal data Comprehensive Metabolic Panel  Order: [id number]   Status: Final result     Visible to patient: Yes (seen)     Dx: Essential hypertension    Specimen Information: Blood         1 Result Note    Component   Ref Range & Units 3 d ago   (8/22/22) 3 wk ago   (8/4/22) 1 mo ago   (7/26/22) 1 mo ago   (7/21/22) 2 mo ago   (6/17/22) 3 mo ago   (5/25/22) 3 mo ago   (5/25/22)   Glucose   65 - 99 mg/dL 128 High   154 High   99  99  142 High   83 R, CM  120 High     BUN   8 - 23 mg/dL 12  13  12  23  26 High    16    Creatinine   0.76 - 1.27 mg/dL 1.22  1.26  1.42 High   2.14 High   1.33 High    1.02    Sodium   136 - 145 mmol/L 141  136  142  136  137   138    Potassium   3.5 - 5.2 mmol/L 4.0  4.5  3.6  3.9  4.4   3.8    Chloride   98 - 107 mmol/L 105  99  100  96 Low   100   104    CO2   22.0 - 29.0 mmol/L 24.5  24.0  27.3  23.3  22.6   23.0    Calcium   8.6 - 10.5 mg/dL 9.2  9.6  9.9  9.5  9.8   9.0    Total Protein   6.0 - 8.5 g/dL 7.6  7.4  8.1  7.9    7.2    Albumin   3.50 - 5.20 g/dL 4.00  3.70  4.30  4.20    4.10    ALT (SGPT)   1 - 41 U/L 24  81 High   174 High   124 High     35    AST (SGOT)   1 - 40 U/L 29  63 High   177 High   135 High     44 High     Alkaline Phosphatase   39 - 117 U/L 92  95  104  101    77    Total Bilirubin   0.0 - 1.2 mg/dL 0.4  0.5  0.5  0.7    0.6    Globulin   gm/dL 3.6  3.7  3.8  3.7    3.1    A/G Ratio   g/dL 1.1  1.0  1.1  1.1    1.3    BUN/Creatinine Ratio   7.0 - 25.0 9.8  10.3  8.5  10.7  19.5   15.7    Anion Gap   5.0 - 15.0 mmol/L 11.5  13.0  14.7  16.7 High   14.4   11.0    eGFR   >60.0 mL/min/1.73 65.0  62.5 CM  54.2 Low  CM  33.1 Low  CM  58.6 Low  CM   80.6 CM    Comment: National Kidney Foundation and American Society of Nephrology (ASN) Task Force recommended  calculation based on the Chronic Kidney Disease Epidemiology Collaboration (CKD-EPI) equation refit without adjustment for race.   Resulting Agency  [facility]  [facility]  [facility]  [facility]  [facility]  [facility]  [facility]             Narrative  Performed by:  [facility]  GFR Normal >60   Chronic Kidney Disease <60   Kidney Failure <15       Specimen Collected: 08/22/22 16:44 Last Resulted: 08/22/22 17:34         Lab Flowsheet      Order Details      View Encounter      Lab and Collection Details      Routing      Result History        CM=Additional comments  R=Reference range differs from displayed range          Result Care Coordination        Result Notes     Miguel Ángel Karimi MD   8/23/2022  8:42 AM EDT Back to Top        Please let the patient know that his labs have completely normalized.       Patient Communication     Released  Seen Back to Top               Routing History    Priority Sent On From To Message Type

## 2022-08-29 NOTE — PROGRESS NOTES
The patient has a pain history of the following:  Lumbar stenosis   Lumbar spondylosis  Lumbar facet arthropathy   Chronic low back pain     Previous interventions that the patient has received include:   Bilateral L4-S1 Radiofrequency ablation (thermal, non-pulsed) 1/10/22  Bilateral L4-S1 Medial branch blocks 1/3/22, 12/17/21  L3-4 Epidural 8/25/21, 7/8/21, 6/2/21 - no benefit      Pain medications include:  Methocarbamol - some relief   Ibuprofen - some relief      Other conservative modalities which the patient reports using include:  Physical Therapy: yes - helped some   Chiropractor: Yes  Massage Therapy: no  TENS: no  Neck or back surgery: no  Past pain management: yes     Past Significant Surgical History:  Right knee replacement     HPI:     CHIEF COMPLAINT Back Pain  6 month evaluation back pain   reports his pain level has continued to remain stabilized over the last couple of months. Wants to discuss moving forward with another RFA.    Subjective   Isaias Monaco is a 67 y.o. male  who presents for follow-up.  He has a history of chronic low back pain s/p L4-S1 Radiofrequency ablation (thermal, non-pulsed) 1/10/22 with no relief.  Rheumatological screen was performed and he was referred to rheumatology, where they confirmed no inflammatory arthritis.     He describes the pain as achy, across the low back, not worse on one side or the other.  The pain does not radiate into his lower extremities and they do not feel heavy when he's walking.      He states that he's been going to his chiropractor and he thinks that's been helping his back some.  His dentist has prescribed a compounded pain cream that he's going to  today.  He states he's been taking ibuprofen for the pain and he knows he shouldn't take that due to his taking plavix.  Robaxin has also been helping his back some.  He asks today about Tramadol and if that's an option for him.  He has a cruise to [place] and another trip to  [place] coming up in the next year and he would like to be able to walk further/enjoy his trips.        Opioid Risk Tool:       Opioid Risk Tool:  Family history of substance abuse: Alcohol - Yes  Illegal drug - No  Prescription drugs - No   Personal history of substance abuse: Alcohol - No  Illegal drugs - No  Prescription drugs - No   Patient is between 16 and 45 years of age: N/A   History of preadolescent sexual abuse: N/A   Psychological disease: ADD/OCD/Bipolar/Schizophrenia - No  Depression - No           Opioid Risk: 3       Scorin - 3 = Low Risk    4 - 7 = Moderate Risk    8+ = High Risk           PEG Assessment   What number best describes your pain on average in the past week?7  What number best describes how, during the past week, pain has interfered with your enjoyment of life?7  What number best describes how, during the past week, pain has interfered with your general activity?  7    REVIEW OF PERTINENT MEDICAL DATA  21 CT MYELOGRAM OF THORACIC AND LUMBAR SPINE      HISTORY: Evaluate stenosis     COMPARISON: IR myelogram and radiographs same day. MRI lumbar  2021. CT chest 2020.     TECHNIQUE:  2 mm axial images were acquired through the thoracic and  lumbar spine (with partially included cervical spine) after intrathecal  contrast instillation, reformatted axial, coronal, and sagittal  reconstructed images were also reviewed. Radiation dose reduction  techniques were utilized, including automated exposure control and  exposure modulation based on body size.     FINDINGS:     Preserved lumbar lordosis thoracic kyphosis. Disc height loss L3-S1,  most pronounced at L5-S1 with prominent osteophyte formation. Large  Schmorl's node in inferior endplate of L3. Grade 1 anterolisthesis L4 on  L5. 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 Conus medullaris terminates at  L1-2. Redemonstrated enlarged left thyroid lobe.     C2-3: Left sided neuroforaminal narrowing secondary to facet  arthropathy  may be significant.     C4-5: Central disc protrusion with osteophytes (versus partially  ossified hypertrophic posterior longitudinal ligament) compressing  spinal cord; minimum AP thecal sac diameter 7 mm.     C5-6: Central disc protrusion abutting possibly slightly deforming the  spinal cord; retained posterior thecal space. Right neuroforaminal  narrowing may be significant.      C6-7: Centrally prominent disc bulging.     T2-3: Partially calcified central disc protrusion abutting and  apparently slightly deflecting/flattening spinal cord with ample  posterior thecal space remaining.     T3-4: Right dominant disc bulging partially effacing anterior thecal  sac.     T10-11: Relative thecal sac narrowing due to mild disc bulging and  prominent epidural fat.     T11-12: Mild disc bulging. Right intraforaminal disc protrusion of  uncertain significance.     T12-L1: Bulging disc osteophyte complex effacing anterior thecal sac  with adequate posterior thecal space remaining. Right facet arthropathy.  Bilateral neuroforaminal narrowing of uncertain significance.     L1-2: Mild disc bulging. Moderate appearing neuroforaminal narrowing.     L2-3: Thecal sac narrowing secondary to bulky facet joints and prominent  ligamenta flava and epidural fat as well as lateral disc bulging on both  sides (partially calcified on right) with minimum thecal sac diameters  of 10 mm AP and 13 mm transverse. Moderate bilateral neuroforaminal  narrowing, worse appearing right.     L3-4: Thecal sac compression secondary to prominent broad-based  calcified disc protrusion in combination with facet arthropathy and  ligamenta flava hypertrophy. There is crowding of cauda equina nerve  roots with obliteration of thecal space; compressive effect is also  suggested by nerve root redundancy below this level. Minimum thecal sac  diameter approximately 8 mm AP x 12 mm transverse. Moderate bilateral  neuroforaminal narrowing; proximity of  exited left nerve to disc  material.     L4-5: Anterolisthesis with bulging uncovered disc, prominent anterior  epidural fat, as well as facet arthropathy cause thecal sac narrowing  without nerve root compression; minimum diameter approximately 10 mm AP  x 11 mm transverse just above the disc space. A calcified right  protrusion component is in proximity to the traversing right S1 nerve  root. Severe appearing right neuroforaminal narrowing with possible  nerve root compression. Moderate to severe left neuroforaminal narrowing  with proximity of exiting nerve root to disc material.     L5-S1: Prominently bulging disc osteophyte complex. Left worse than  right facet arthropathy. Ample thecal sac diameter. Moderate to severe  right neuroforaminal narrowing. Severe left neuroforaminal narrowing  with suspected nerve root compression.        IMPRESSION:     1. Central disc osteophyte complex compressing spinal cord at C4-5.      2. Central disc protrusion abutting spinal cord at T2-3.      3. Thecal sac compression at L3-4 secondary to broad-based calcified  disc protrusion.     4. Multilevel neuroforaminal narrowing which is most severe at L5-S1 on  the left.     5. See above for details and all findings.     This report was finalized on 9/14/2021 10:14 AM by Dr. Chirag Diallo M.D.    The following portions of the patient's history were reviewed and updated as appropriate: allergies, current medications, past family history, past medical history, past social history, past surgical history and problem list.    Review of Systems   Constitutional: Negative for activity change, fatigue and fever.   HENT: Negative for congestion.    Eyes: Negative for visual disturbance.   Respiratory: Negative for cough and chest tightness.    Cardiovascular: Negative for chest pain.   Gastrointestinal: Negative for abdominal pain, constipation and diarrhea.   Genitourinary: Negative for difficulty urinating and dysuria.  "  Musculoskeletal: Positive for back pain.   Neurological: Negative for dizziness, weakness, light-headedness, numbness and headaches.   Psychiatric/Behavioral: Negative for agitation, sleep disturbance and suicidal ideas. The patient is not nervous/anxious.      I have reviewed and confirmed the accuracy of the ROS as documented by the MA/LPN/RN Christina Villaseñor MD    Vitals:    08/31/22 0753   BP: 109/74   BP Location: Left arm   Patient Position: Sitting   Pulse: 102   Resp: 12   Temp: 97.3 °F (36.3 °C)   SpO2: 98%   Weight: 114 kg (250 lb 6.4 oz)   Height: 195.6 cm (77\")   PainSc:   2   PainLoc: Back         Objective   Physical Exam  Vitals reviewed.   Constitutional:       General: He is not in acute distress.  Pulmonary:      Effort: Pulmonary effort is normal. No respiratory distress.   Musculoskeletal:      Comments: Ambulation: Without assistive device.  Forward stooped.   Lumbar Exam:  Appearance: Scoliotic curve absent and scarring absent  Palpated over lumbosacral paravertebral regions and transverse processes with negative tenderness appreciated, Bilateral.   Sacroiliac joints are not tender, Bilateral.  Trochanteric bursa are not tender, Bilateral.  Straight leg raise is negative radiculopathy, Bilateral.  Slump test is negative  radiculopathy, Bilateral.  Facet loading is positive for pain, Bilateral.  Paraspinal/adjacent lumbar musculature are not tender to palpation, Bilateral.  Jayson Cecilia's test is negative sacroiliac pain, Bilateral.   Skin:     General: Skin is warm and dry.   Neurological:      General: No focal deficit present.      Mental Status: He is alert.      Deep Tendon Reflexes:      Reflex Scores:       Patellar reflexes are 2+ on the right side and 2+ on the left side.  Psychiatric:         Mood and Affect: Mood normal.         Thought Content: Thought content normal.             Assessment & Plan   Diagnoses and all orders for this visit:    1. Lumbar facet arthropathy " (Primary)  -     traMADol (ULTRAM) 50 MG tablet; Take 1 tablet PO up to twice daily as needed for pain.  30 day supply.  Dispense: 30 tablet; Refill: 0    2. Spinal stenosis of lumbar region without neurogenic claudication  -     traMADol (ULTRAM) 50 MG tablet; Take 1 tablet PO up to twice daily as needed for pain.  30 day supply.  Dispense: 30 tablet; Refill: 0    3. Spondylosis of lumbar region without myelopathy or radiculopathy  -     traMADol (ULTRAM) 50 MG tablet; Take 1 tablet PO up to twice daily as needed for pain.  30 day supply.  Dispense: 30 tablet; Refill: 0          - Explained that since he didn't receive benefit from previous RFA, I am hesitant to repeat it at this time - although his pain description and physical exam still fits best with facet syndrome.   - Opioid risk assessment performed today showing low risk.  - Urine drug screen performed in office today.  Routine UDS in office today as part of monitoring requirements for controlled substances.  The specimen was viewed and the immunoassay result reviewed and is negative.  This specimen will be sent to [facility] laboratory for confirmation.      - Controlled substance agreement explained and signed today for Tramadol.   - Will prescribe Tramadol 50mg #30 for 1 month.  Discussed medication with the patient.  Included in this discussion was the potential for side effects and adverse events.  Patient verbalized understanding and wished to proceed.  Prescription will be sent to pharmacy.       --- Follow-up 1 month office visit            RHODA LEWIS    As part of the patient's treatment plan, I am prescribing controlled substances. The patient has been made aware of appropriate use of such medications, including potential risk of somnolence, limited ability to drive and/or work safely, and the potential for dependence or overdose. It has also bee made clear that these medications are for use by this patient only, without concomitant use of  alcohol or other substances unless prescribed.     Patient has completed prescribing agreement detailing terms of continued prescribing of controlled substances, including monitoring RHODA reports, urine drug screening, and pill counts if necessary. The patient is aware that inappropriate use will results in cessation of prescribing such medications.    As the clinician, I personally reviewed the RHODA from 8/31/22 .    History and physical exam exhibit continued safe and appropriate use of controlled substances.    While examining this patient, I wore protective equipment including a mask, eye shield and gloves.  I washed my hands before and after this patient encounter.  The patient wore a mask throughout the visit as well.     Christina Villaseñor MD  Pain Management

## 2022-08-30 ENCOUNTER — TRANSCRIBE ORDERS (OUTPATIENT)
Dept: ADMINISTRATIVE | Facility: HOSPITAL | Age: 67
End: 2022-08-30

## 2022-08-30 DIAGNOSIS — N17.9 ACUTE KIDNEY INJURY: Primary | ICD-10-CM

## 2022-08-31 ENCOUNTER — OFFICE VISIT (OUTPATIENT)
Dept: PAIN MEDICINE | Facility: CLINIC | Age: 67
End: 2022-08-31

## 2022-08-31 VITALS
TEMPERATURE: 97.3 F | OXYGEN SATURATION: 98 % | DIASTOLIC BLOOD PRESSURE: 74 MMHG | WEIGHT: 250.4 LBS | RESPIRATION RATE: 12 BRPM | HEIGHT: 77 IN | SYSTOLIC BLOOD PRESSURE: 109 MMHG | HEART RATE: 102 BPM | BODY MASS INDEX: 29.57 KG/M2

## 2022-08-31 DIAGNOSIS — M47.816 LUMBAR FACET ARTHROPATHY: Primary | ICD-10-CM

## 2022-08-31 DIAGNOSIS — M47.816 SPONDYLOSIS OF LUMBAR REGION WITHOUT MYELOPATHY OR RADICULOPATHY: ICD-10-CM

## 2022-08-31 DIAGNOSIS — M48.061 SPINAL STENOSIS OF LUMBAR REGION WITHOUT NEUROGENIC CLAUDICATION: ICD-10-CM

## 2022-08-31 LAB
POC AMPHETAMINES: NEGATIVE
POC BARBITURATES: NEGATIVE
POC BENZODIAZEPHINES: NEGATIVE
POC COCAINE: NEGATIVE
POC METHADONE: NEGATIVE
POC METHAMPHETAMINE SCREEN URINE: NEGATIVE
POC OPIATES: NEGATIVE
POC OXYCODONE: NEGATIVE
POC PHENCYCLIDINE: NEGATIVE
POC PROPOXYPHENE: NEGATIVE
POC THC: NEGATIVE
POC TRICYCLIC ANTIDEPRESSANTS: NEGATIVE

## 2022-08-31 PROCEDURE — 99214 OFFICE O/P EST MOD 30 MIN: CPT | Performed by: ANESTHESIOLOGY

## 2022-08-31 PROCEDURE — 80305 DRUG TEST PRSMV DIR OPT OBS: CPT | Performed by: ANESTHESIOLOGY

## 2022-08-31 RX ORDER — TRAMADOL HYDROCHLORIDE 50 MG/1
TABLET ORAL
Qty: 30 TABLET | Refills: 0 | Status: SHIPPED | OUTPATIENT
Start: 2022-08-31 | End: 2022-11-10 | Stop reason: SDUPTHER

## 2022-09-12 ENCOUNTER — APPOINTMENT (OUTPATIENT)
Dept: ULTRASOUND IMAGING | Facility: HOSPITAL | Age: 67
End: 2022-09-12

## 2022-09-15 ENCOUNTER — HOSPITAL ENCOUNTER (OUTPATIENT)
Dept: ULTRASOUND IMAGING | Facility: HOSPITAL | Age: 67
Discharge: HOME | End: 2022-09-15
Admitting: INTERNAL MEDICINE

## 2022-09-15 DIAGNOSIS — N17.9 ACUTE KIDNEY INJURY: ICD-10-CM

## 2022-09-15 PROCEDURE — 76775 US EXAM ABDO BACK WALL LIM: CPT

## 2022-09-16 DIAGNOSIS — E78.5 HYPERLIPIDEMIA, UNSPECIFIED HYPERLIPIDEMIA TYPE: ICD-10-CM

## 2022-09-16 RX ORDER — EZETIMIBE 10 MG/1
10 TABLET ORAL DAILY
Qty: 90 TABLET | Refills: 3 | Status: SHIPPED | OUTPATIENT
Start: 2022-09-16 | End: 2023-09-16

## 2022-09-16 NOTE — TELEPHONE ENCOUNTER
Caller: CIARAKIEL ERIC [address] [place] [address] [phone] [address]    Relationship: Pharmacy    Best call back number:     Requested Prescriptions:   Requested Prescriptions     Pending Prescriptions Disp Refills   • ezetimibe (Zetia) 10 MG tablet 90 tablet 3     Sig: Take 1 tablet by mouth Daily.        Pharmacy where request should be sent: [facility] [address] [place] [address] [phone] [place] [phone] [place]     Additional details provided by patient: PHARMACY ASKING FOR REFILL.    Does the patient have less than a 3 day supply:  [] Yes  [x] No    Erika Raymundo, PCT   09/16/22 08:19 EDT

## 2022-09-16 NOTE — TELEPHONE ENCOUNTER
Rx Refill Note  Requested Prescriptions     Pending Prescriptions Disp Refills   • ezetimibe (Zetia) 10 MG tablet 90 tablet 3     Sig: Take 1 tablet by mouth Daily.      Last office visit with prescribing clinician: 7/25/2022      Next office visit with prescribing clinician: 4/27/2023            Carey Ignacio LPN  09/16/22, 08:33 EDT

## 2022-10-12 ENCOUNTER — LAB (OUTPATIENT)
Dept: LAB | Facility: HOSPITAL | Age: 67
End: 2022-10-12

## 2022-10-12 PROCEDURE — 80053 COMPREHEN METABOLIC PANEL: CPT | Performed by: INTERNAL MEDICINE

## 2022-10-12 PROCEDURE — 80061 LIPID PANEL: CPT | Performed by: INTERNAL MEDICINE

## 2022-10-17 NOTE — TELEPHONE ENCOUNTER
Rx Refill Note  Requested Prescriptions     Pending Prescriptions Disp Refills   • vitamin D (ERGOCALCIFEROL) 1.25 MG (48448 UT) capsule capsule [Pharmacy Med Name: VIT D2 (ERGOCAL) 1.25MG(50,000U) CP] 12 capsule 0     Sig: TAKE ONE CAPSULE BY MOUTH ONCE WEEKLY      Last office visit with prescribing clinician: 7/25/2022      Next office visit with prescribing clinician: 4/27/2023       {TIP  Please add Last Relevant Lab Date if appropriate: 10/12/22    Micaela Howard MA  10/17/22, 14:20 EDT

## 2022-10-18 RX ORDER — ERGOCALCIFEROL 1.25 MG/1
CAPSULE ORAL
Qty: 12 CAPSULE | Refills: 0 | Status: SHIPPED | OUTPATIENT
Start: 2022-10-18 | End: 2023-02-02 | Stop reason: SDUPTHER

## 2022-10-27 ENCOUNTER — OFFICE VISIT (OUTPATIENT)
Dept: ENDOCRINOLOGY | Age: 67
End: 2022-10-27

## 2022-10-27 VITALS
WEIGHT: 253 LBS | DIASTOLIC BLOOD PRESSURE: 96 MMHG | HEART RATE: 86 BPM | BODY MASS INDEX: 29.87 KG/M2 | TEMPERATURE: 97.7 F | SYSTOLIC BLOOD PRESSURE: 148 MMHG | OXYGEN SATURATION: 97 % | HEIGHT: 77 IN

## 2022-10-27 DIAGNOSIS — I10 ESSENTIAL HYPERTENSION: ICD-10-CM

## 2022-10-27 DIAGNOSIS — Z79.4 CONTROLLED TYPE 2 DIABETES MELLITUS WITH COMPLICATION, WITH LONG-TERM CURRENT USE OF INSULIN: Primary | ICD-10-CM

## 2022-10-27 DIAGNOSIS — E11.8 CONTROLLED TYPE 2 DIABETES MELLITUS WITH COMPLICATION, WITH LONG-TERM CURRENT USE OF INSULIN: Primary | ICD-10-CM

## 2022-10-27 DIAGNOSIS — E55.9 VITAMIN D DEFICIENCY: ICD-10-CM

## 2022-10-27 DIAGNOSIS — E78.5 HYPERLIPIDEMIA, UNSPECIFIED HYPERLIPIDEMIA TYPE: ICD-10-CM

## 2022-10-27 LAB — GLUCOSE BLDC GLUCOMTR-MCNC: 113 MG/DL (ref 70–130)

## 2022-10-27 PROCEDURE — 99215 OFFICE O/P EST HI 40 MIN: CPT | Performed by: INTERNAL MEDICINE

## 2022-10-27 PROCEDURE — 82962 GLUCOSE BLOOD TEST: CPT | Performed by: INTERNAL MEDICINE

## 2022-10-27 RX ORDER — PROCHLORPERAZINE 25 MG/1
1 SUPPOSITORY RECTAL
Qty: 1 EACH | Refills: 4 | Status: SHIPPED | OUTPATIENT
Start: 2022-10-27

## 2022-10-27 RX ORDER — HYDROCHLOROTHIAZIDE 25 MG/1
25 TABLET ORAL
COMMUNITY
Start: 2022-10-10 | End: 2023-10-10

## 2022-10-27 RX ORDER — PROCHLORPERAZINE 25 MG/1
1 SUPPOSITORY RECTAL ONCE
Qty: 1 EACH | Refills: 1 | Status: SHIPPED | OUTPATIENT
Start: 2022-10-27 | End: 2022-10-27

## 2022-10-27 RX ORDER — PROCHLORPERAZINE 25 MG/1
SUPPOSITORY RECTAL
Qty: 9 EACH | Refills: 4 | Status: SHIPPED | OUTPATIENT
Start: 2022-10-27

## 2022-10-27 RX ORDER — SEMAGLUTIDE 1.34 MG/ML
INJECTION, SOLUTION SUBCUTANEOUS
Qty: 4.5 ML | Refills: 1 | Status: SHIPPED | OUTPATIENT
Start: 2022-10-27 | End: 2023-01-06 | Stop reason: SDUPTHER

## 2022-10-27 NOTE — PROGRESS NOTES
Chief Complaint  Diabetes (Type 2: Pt doesn't have meter, is up to date on eye exam, no hx of retinopathy or neuropathy. )    Subjective        Patient presents for follow up for T2DM.    He has a rash in his groin.  He is in the donut hole for the rest of the year.  He finds Jardiance expensive.    DIET: He has three meals a day and does not restrict his carbs. Does not snack.  EXERCISE: none due to chronic back pain.  MEDS:    - metformin 1000 mg twice a day,   - Jardiance 25 mg daily and   - Actos 15 mg daily  - Toujeo 60 units daily   GLUCOSE:  FBS and premeal  mg/dL  Random BS range from  mg/dL    Eye exam- up to date: 2x/yr    Crestor 40 mg daily    Cardura, HCTZ and Coreg- Dr Karimi manages the BP.    Isaias Monaco presents to [facility] END[facility]OLOG[facility]  Diabetes       LOV 11/9/2021, 6 months ago.  67 year old -American male with type 2 diabetes.    Comorbidities include BMI 31.    Hypertension controlled, no retinopathy, no nephropathy, no neuropathy.  No history of ulcers.  He now has diagnosis of degenerative disc disease of the lumbar back and he has been symptomatic.  Exercise has been limited.      Since last visit : See if the concern as followsThe elevated RBC is new.   He is not on testosterone therapy through this office and I do not see a testosterone in this BH system.  Patient notes he is not on testosterone.  Diabetes HGA1C is stable.Goal 6-7%  Lipids stable. Mild elevated triglycerides which only in diabetes is associated with coronary artery disease.  Would encourage patient to limit saturated fats.   Patient is welcome to make appointment to review data.   For CBC please consider reordering . Recommend patient drink 2 glasses of water 1/2 hr prior to blood draw.    8/9/22Since last visit had 2 cardiac stents placed in Cx A and LAD prox and distal.  5/31/22 to lower edema placed on chlorthaladone and off amlodipine  June 18 2022 ill, N,loss of appetite and  "loss weight x6 weeks during cruise.  Liver and creatinie increased      Type 2 diabetes: Checks fingersticks 0-2 times per day.  He has noticed glucose rising so increase Toujeo up to 60 units a day.  He has not noticed hypoglycemia.    Weight loss Patient reports weight loss due to increase exercise. However movement has been curtailed due to low back pain . Work up has been positive for DDD of low back. I recommend he consider physical therapy.     : Weight was 263 lbs on 11/09/21 OV. Now weight is 265 lbs.     Last eye exam 2 months ago. He is monitored every 6 months for glaucoma and an annual dilated exam due in 6 months. No retinopathy    Toujeou 60 units recently increased., I reduced his insulin to 54 units at last appointment because of concern for hypoglycemia.   Over the last 6 months patient indicates his appetite and meal intake has increased in size.. Now eatin more meals.  Lost weight . He has backpain when walking so interferes with exercise. Sees pain management and will see the neurosurgeon  Concern last 2 weeks  Of edema pitting ankles and lower leg    Current diabetes medications.   Pioglitazone 15 mg daily  Jardiance 25 mg daily  Metformin 1000 mg bid  rebelsus not started due to cost  Microalbuminurea  present for last 7 years and stable  Without renal failure.     Objective   Vital Signs:  /96   Pulse 86   Temp 97.7 °F (36.5 °C) (Temporal)   Ht 195.6 cm (77.01\")   Wt 115 kg (253 lb)   SpO2 97%   BMI 30.00 kg/m²       Physical Exam  Vitals and nursing note reviewed.   HENT:      Head: Normocephalic.      Mouth/Throat:      Mouth: Mucous membranes are moist.   Cardiovascular:      Rate and Rhythm: Normal rate.      Pulses: Normal pulses.           Dorsalis pedis pulses are 2+ on the right side and 2+ on the left side.        Posterior tibial pulses are 2+ on the right side and 2+ on the left side.      Heart sounds: Normal heart sounds.   Pulmonary:      Effort: Pulmonary effort is " normal.      Breath sounds: Normal breath sounds. No wheezing or rhonchi.   Abdominal:      General: Bowel sounds are normal.      Palpations: Abdomen is soft.   Musculoskeletal:         General: No swelling. Normal range of motion.      Cervical back: Normal range of motion and neck supple.   Feet:      Right foot:      Protective Sensation: 10 sites tested. 10 sites sensed.      Skin integrity: Skin integrity normal.      Toenail Condition: Right toenails are abnormally thick.      Left foot:      Protective Sensation: 10 sites tested. 10 sites sensed.      Skin integrity: Skin integrity normal.      Toenail Condition: Left toenails are abnormally thick.   Skin:     General: Skin is warm and dry.   Neurological:      Mental Status: He is alert and oriented to person, place, and time. Mental status is at baseline.   Psychiatric:         Mood and Affect: Mood normal.         Behavior: Behavior normal.         Judgment: Judgment normal.        Result Review :   The following data was reviewed by: Lady Oconnor MD on 05/06/2022:    7/26/22   Component      Latest Ref Rng & Units 7/26/2022 7/26/2022          11:22 AM 11:22 AM   Hemoglobin A1C      4.80 - 5.60 %  7.00 (H)   TSH Baseline      0.270 - 4.200 uIU/mL 0.539      Component      Latest Ref Rng & Units 7/26/2022          11:22 AM   Free T4      0.93 - 1.70 ng/dL 1.38       Component      Latest Ref Rng & Units 7/26/2022          11:22 AM   Microalbumin, Urine      mg/dL 34.9     Component      Latest Ref Rng & Units 4/22/2022          10:55 AM   Creatinine, Urine      Not Estab. mg/dL 119.1   Microalbumin, Urine      Not Estab. ug/mL 655.7   Microalbumin/Creatinine Ratio      0 - 29 mg/g creat 551 (H)       Component      Latest Ref Rng & Units 4/25/2022 4/25/2022 4/25/2022          11:23 AM 11:23 AM 11:23 AM   Total Cholesterol      100 - 199 mg/dL   113   Triglycerides      0 - 149 mg/dL   157 (H)   HDL Cholesterol      >39 mg/dL   41   VLDL Cholesterol Haresh       5 - 40 mg/dL   27   LDL Cholesterol       0 - 99 mg/dL   45   Hemoglobin A1C      4.8 - 5.6 % 7.0 (H)     PSA      0.0 - 4.0 ng/mL  0.2         Component      Latest Ref Rng & Units 7/26/2022 7/26/2022          11:22 AM 11:22 AM   Glucose      65 - 99 mg/dL 99    BUN      8 - 23 mg/dL 12    Creatinine      0.76 - 1.27 mg/dL 1.42 (H)    Sodium      136 - 145 mmol/L 142    Potassium      3.5 - 5.2 mmol/L 3.6    Chloride      98 - 107 mmol/L 100    CO2      22.0 - 29.0 mmol/L 27.3    Calcium      8.6 - 10.5 mg/dL 9.9    Total Protein      6.0 - 8.5 g/dL 8.1    Albumin      3.50 - 5.20 g/dL 4.30    ALT (SGPT)      1 - 41 U/L 174 (H)    AST (SGOT)      1 - 40 U/L 177 (H)    Alkaline Phosphatase      39 - 117 U/L 104    Total Bilirubin      0.0 - 1.2 mg/dL 0.5    Globulin      gm/dL 3.8    A/G Ratio      g/dL 1.1    BUN/Creatinine Ratio      7.0 - 25.0 8.5    Anion Gap      5.0 - 15.0 mmol/L 14.7    eGFR      >60.0 mL/min/1.73 54.2 (L)    Total Cholesterol      0 - 200 mg/dL  76   Triglycerides      0 - 150 mg/dL  146   HDL Cholesterol      40 - 60 mg/dL  31 (L)   LDL Cholesterol       0 - 100 mg/dL  20   VLDL Cholesterol      5 - 40 mg/dL  25   LDL/HDL Ratio        0.51     Component      Latest Ref Rng & Units 8/4/2022           4:25 PM   Glucose      65 - 99 mg/dL 154 (H)   ALT (SGPT)      1 - 41 U/L 81 (H)   AST (SGOT)      1 - 40 U/L 63 (H)     Component      Latest Ref Rng & Units 4/25/2022          11:23 AM   Glucose      65 - 99 mg/dL 98   BUN      8 - 27 mg/dL 13   Creatinine      0.76 - 1.27 mg/dL 1.09   EGFR Result      >59 mL/min/1.73 74   BUN/Creatinine Ratio      10 - 24 12   Sodium      134 - 144 mmol/L 140   Potassium      3.5 - 5.2 mmol/L 4.3   Chloride      96 - 106 mmol/L 102   CO2      20 - 29 mmol/L 21   Calcium      8.6 - 10.2 mg/dL 9.7   Total Protein      6.0 - 8.5 g/dL 8.0   Albumin      3.8 - 4.8 g/dL 4.5   Globulin      1.5 - 4.5 g/dL 3.5   A/G Ratio      1.2 - 2.2 1.3   Total Bilirubin       0.0 - 1.2 mg/dL 0.5   Alkaline Phosphatase      44 - 121 IU/L 79   AST (SGOT)      0 - 40 IU/L 38   ALT (SGPT)      0 - 44 IU/L 33            Assessment and Plan   67-year-old  male returns 6 months after his last visit.     Diagnoses and all orders for this visit:    1. Controlled type 2 diabetes mellitus with complication, with long-term current use of insulin (HCC) (Primary)  -     POC Glucose Fingerstick  -     Hemoglobin A1c  -     Continuous Blood Gluc Sensor (Dexcom G6 Sensor); Dipsense Dexcom G6 Sensors, to replace every 10 days, 3 sensors per 30 day period ([facility] #[address]-74). Check 6 times a day. Dx: E 1  Dispense: 9 each; Refill: 4  -     Continuous Blood Gluc Transmit (Dexcom G6 Transmitter) misc; 1 each Every 3 (Three) Months. Dispense 1 Dexcom G6 Transmitter for each 3 month period ([facility] #[address]-38). Check 6 times a day. Dx: E 1  Dispense: 1 each; Refill: 4  -     Continuous Blood Gluc  (Dexcom G6 ) device; 1 Device 1 (One) Time for 1 dose. Dispense 1 Dexcom G6  ([facility] #[address]-27). Check 6 times a day. Dx: E 1  Dispense: 1 each; Refill: 1  -     Semaglutide,0.25 or 0.5MG/DOS, (Ozempic, 0.25 or 0.5 MG/DOSE,) 2 MG/1.5ML solution pen-injector; Inject 0.25 mg weekly for two weeks, then increase to 0.5 mg weekly if tolerated without nausea.  Dispense: 4.5 mL; Refill: 1  -     Hemoglobin A1c; Future  -     Comprehensive Metabolic Panel; Future  -     Lipid Panel; Future  -     Microalbumin / Creatinine Urine Ratio - Urine, Clean Catch; Future  -     Vitamin D,25-Hydroxy; Future  -     TSH; Future    2. Vitamin D deficiency  -     Vitamin D,25-Hydroxy; Future    3. Essential hypertension  -     Comprehensive Metabolic Panel; Future  -     Microalbumin / Creatinine Urine Ratio - Urine, Clean Catch; Future    4. Hyperlipidemia, unspecified hyperlipidemia type  -     Comprehensive Metabolic Panel; Future  -     Lipid Panel; Future  -     TSH; Future      1.  Type 2  diabetes:  Monitor glucose as directed. Will order a Dexcom CGM.  Restrict carbs in diet. Will refer to dietician.  Initiate an exercise routine. Consider swimming.  Meds- will d/c Jardiance.  Start Ozempic as directed. Risks and benefits reviewed. Patient stated understanding.  -  Continue metformin 1000 mg twice a day,   -  Continue Jardiance 25 mg daily and   -  Continue Actos 15 mg daily  -  Continue Toujeo 60 units daily   Will do labs now for HbA1c on patient request.  Will do labs one week before follow up in 3 months.    2. Dyslipidemia:  Contiue Crestor 40 mg daily along with LSC    3. HTN:  BP remains uncontroled.  Managed by his cardiologist.  Continue current BP regimen.  Next visit will discuss COREEN.    Rest, as above       I spent 50 minutes caring for Isaias on this date of service. This time includes time spent by me in the following activities:reviewing tests, obtaining and/or reviewing a separately obtained history, performing a medically appropriate examination and/or evaluation , counseling and educating the patient/family/caregiver and ordering medications, tests, or procedures insulin: Reviewed with patient the importance of meal planning and glucose control.      He has had chronic microalbuminuria for 7 years along with hypertension and diabetes.  These are all risk factors for kidney failure.  Thus far he has not had kidney failure.  I would like to continue that.  He reports difficulty with meal planning.  We discussed options that he could take to improve limiting carbohydrates at meals.  He declined a nutrition consult at this time.         Follow Up   Return in about 3 months (around 1/27/2023) for labs one week before your follow up visit. labs 1 week prior  Patient was given instructions and counseling regarding his condition or for health maintenance advice. Please see specific information pulled into the AVS if appropriate.

## 2022-10-28 LAB — HBA1C MFR BLD: 6.8 % (ref 4.8–5.6)

## 2022-10-31 ENCOUNTER — TELEPHONE (OUTPATIENT)
Dept: ENDOCRINOLOGY | Age: 67
End: 2022-10-31

## 2022-10-31 RX ORDER — DOXAZOSIN MESYLATE 4 MG/1
4 TABLET ORAL NIGHTLY
Qty: 90 TABLET | Refills: 2 | Status: SHIPPED | OUTPATIENT
Start: 2022-10-31

## 2022-10-31 NOTE — TELEPHONE ENCOUNTER
PT CALLED IN AND SAID THAT HE USES [employer] INSURANCE AND [facility] SAID THAT HIS INSURANCE DOES NOT APPROVE OF THE DEXCOM BECAUSE [employer] IS NOT A AUTHORIZED DISTRIBUTOR. SHE IS ASKING FOR AN ALTERNATIVE PHARMACY OR AN ALERNATIVE GLUCOSE MONITOR. PLEASE CALL BACK [phone]

## 2022-11-01 ENCOUNTER — PRIOR AUTHORIZATION (OUTPATIENT)
Dept: ENDOCRINOLOGY | Age: 67
End: 2022-11-01

## 2022-11-07 DIAGNOSIS — M47.816 SPONDYLOSIS OF LUMBAR REGION WITHOUT MYELOPATHY OR RADICULOPATHY: ICD-10-CM

## 2022-11-07 DIAGNOSIS — M47.816 LUMBAR FACET ARTHROPATHY: ICD-10-CM

## 2022-11-07 DIAGNOSIS — M48.061 SPINAL STENOSIS OF LUMBAR REGION WITHOUT NEUROGENIC CLAUDICATION: ICD-10-CM

## 2022-11-07 RX ORDER — TRAMADOL HYDROCHLORIDE 50 MG/1
TABLET ORAL
Qty: 30 TABLET | Refills: 0 | OUTPATIENT
Start: 2022-11-07

## 2022-11-07 NOTE — TELEPHONE ENCOUNTER
We were supposed to see him a month ago for follow-up.  I would like for him to be seen for follow-up prior to sending a refill.

## 2022-11-07 NOTE — TELEPHONE ENCOUNTER
Medication Refill Request    Date of phone call: 11/07/2022    Medication being requested: Tramadol 50 mg sig: Take 1 tablet PO up to twice daily as needed for pain  Qty: 30    Date of last visit: 08/31/2022    Date of last refill:     RHODA up to date?:     Next Follow up?:     Any new pertinent information? (i.e, new medication allergies, new use of medications, change in patient's health or condition, non-compliance or inconsistency with prescribing agreement?):

## 2022-11-08 RX ORDER — METHOCARBAMOL 500 MG/1
500 TABLET, FILM COATED ORAL AS NEEDED
Qty: 90 TABLET | Refills: 0 | Status: SHIPPED | OUTPATIENT
Start: 2022-11-08

## 2022-11-10 ENCOUNTER — OFFICE VISIT (OUTPATIENT)
Dept: PAIN MEDICINE | Facility: CLINIC | Age: 67
End: 2022-11-10

## 2022-11-10 VITALS
OXYGEN SATURATION: 95 % | RESPIRATION RATE: 20 BRPM | SYSTOLIC BLOOD PRESSURE: 144 MMHG | HEIGHT: 77 IN | TEMPERATURE: 97.3 F | DIASTOLIC BLOOD PRESSURE: 97 MMHG | HEART RATE: 96 BPM | WEIGHT: 253 LBS | BODY MASS INDEX: 29.87 KG/M2

## 2022-11-10 DIAGNOSIS — Z79.899 ENCOUNTER FOR LONG-TERM CURRENT USE OF HIGH RISK MEDICATION: ICD-10-CM

## 2022-11-10 DIAGNOSIS — M47.816 SPONDYLOSIS OF LUMBAR REGION WITHOUT MYELOPATHY OR RADICULOPATHY: ICD-10-CM

## 2022-11-10 DIAGNOSIS — M48.061 SPINAL STENOSIS OF LUMBAR REGION WITHOUT NEUROGENIC CLAUDICATION: ICD-10-CM

## 2022-11-10 DIAGNOSIS — M47.816 LUMBAR FACET ARTHROPATHY: Primary | ICD-10-CM

## 2022-11-10 PROCEDURE — 80305 DRUG TEST PRSMV DIR OPT OBS: CPT

## 2022-11-10 PROCEDURE — 99214 OFFICE O/P EST MOD 30 MIN: CPT

## 2022-11-10 RX ORDER — TRAMADOL HYDROCHLORIDE 50 MG/1
TABLET ORAL
Qty: 30 TABLET | Refills: 0 | Status: SHIPPED | OUTPATIENT
Start: 2022-11-10

## 2022-11-10 NOTE — PROGRESS NOTES
CHIEF COMPLAINT  Back pain     Subjective   Isaias Monaco is a 67 y.o. male  who presents for follow-up.  He has a history of chronic low back pain. He reports that his pain has remained consistent since his last office visit.     Today pain is 1/10VAS in severity. Pain is located in his low back, does not radiate. Describes this pain as intermittent ache. Pain is worsened by prolonged position (standing), increased activity level, and walking long distances. Pain improves with rest/reposition, medication, and PT. He continues with PT twice a week and HEP program as tolerated.     Continues with Tramadol 50mg 0-1 tablet/day and Robaxin 500mg PRN. Denies any side effects from the regimen, including constipation and somnolence. The regimen helps decrease pain by 40-50%. Notes improvement in activity and function with regimen. ADL's by self. Denies any bowel or bladder changes.     Patient is leaving for [place] for a 4 week vacation and presents today for a refill of medication. He is going to [place] for 4 weeks and would like to be able to enjoy his vacation and walk further.     Back Pain  This is a chronic problem. The current episode started more than 1 year ago. The problem occurs intermittently. The problem has been waxing and waning since onset. The pain is present in the lumbar spine. The quality of the pain is described as aching. The pain does not radiate. The pain is at a severity of 0/10. The symptoms are aggravated by position (increased activity level, walking long distances, ). Pertinent negatives include no chest pain, fever, headaches, numbness or weakness. He has tried ice, muscle relaxant, home exercises, chiropractic manipulation and analgesics (Tramadol, Robaxin, PT twice a week) for the symptoms. The treatment provided moderate relief.      PEG Assessment   What number best describes your pain on average in the past week?6  What number best describes how, during the past week, pain has  interfered with your enjoyment of life?6  What number best describes how, during the past week, pain has interfered with your general activity?  6    Review of Pertinent Medical Data ---  Reviewed office note from Dr. Christina Villaseñor from 8/31/2022.  Patient presents for a follow-up appointment.  He has a history of chronic low back pain.  Patient plan completed an L4-S1 radial frequency ablation 1/10/2022, no relief noted.  Rheumatological screen was performed patient was referred to rheumatology, confirm no inflammatory arthritis.  He reports that pain goes across to his low back and does not radiate to lower extremities.  He has been going to a chiropractor and reports that this has been helpful.  He takes Motrin for pain and is aware that he should not be taking this with Plavix.  He also takes Robaxin which has been helping with his muscle spasms.  At this office visit he asked for tramadol that is an option for him he is going on a cruise to [place] has another trip to [facility] coming up and he would like to be able to walk further initial history.  Patient was given 1 month supply of tramadol 50 mg at this appointment he was instructed to follow-up in 1 month.  UDS/CSA were completed.    CT MYELOGRAM OF THORACIC AND LUMBAR SPINE      HISTORY: Evaluate stenosis     COMPARISON: IR myelogram and radiographs same day. MRI lumbar  02/09/2021. CT chest 04/17/2020.     TECHNIQUE:  2 mm axial images were acquired through the thoracic and  lumbar spine (with partially included cervical spine) after intrathecal  contrast instillation, reformatted axial, coronal, and sagittal  reconstructed images were also reviewed. Radiation dose reduction  techniques were utilized, including automated exposure control and  exposure modulation based on body size.     FINDINGS:     Preserved lumbar lordosis thoracic kyphosis. Disc height loss L3-S1,  most pronounced at L5-S1 with prominent osteophyte formation. Large  Schmorl's node in  inferior endplate of L3. Grade 1 anterolisthesis L4 on  L5. 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 Conus medullaris terminates at  L1-2. Redemonstrated enlarged left thyroid lobe.     C2-3: Left sided neuroforaminal narrowing secondary to facet arthropathy  may be significant.     C4-5: Central disc protrusion with osteophytes (versus partially  ossified hypertrophic posterior longitudinal ligament) compressing  spinal cord; minimum AP thecal sac diameter 7 mm.     C5-6: Central disc protrusion abutting possibly slightly deforming the  spinal cord; retained posterior thecal space. Right neuroforaminal  narrowing may be significant.      C6-7: Centrally prominent disc bulging.     T2-3: Partially calcified central disc protrusion abutting and  apparently slightly deflecting/flattening spinal cord with ample  posterior thecal space remaining.     T3-4: Right dominant disc bulging partially effacing anterior thecal  sac.     T10-11: Relative thecal sac narrowing due to mild disc bulging and  prominent epidural fat.     T11-12: Mild disc bulging. Right intraforaminal disc protrusion of  uncertain significance.     T12-L1: Bulging disc osteophyte complex effacing anterior thecal sac  with adequate posterior thecal space remaining. Right facet arthropathy.  Bilateral neuroforaminal narrowing of uncertain significance.     L1-2: Mild disc bulging. Moderate appearing neuroforaminal narrowing.     L2-3: Thecal sac narrowing secondary to bulky facet joints and prominent  ligamenta flava and epidural fat as well as lateral disc bulging on both  sides (partially calcified on right) with minimum thecal sac diameters  of 10 mm AP and 13 mm transverse. Moderate bilateral neuroforaminal  narrowing, worse appearing right.     L3-4: Thecal sac compression secondary to prominent broad-based  calcified disc protrusion in combination with facet arthropathy and  ligamenta flava hypertrophy. There is crowding of cauda equina nerve  roots  with obliteration of thecal space; compressive effect is also  suggested by nerve root redundancy below this level. Minimum thecal sac  diameter approximately 8 mm AP x 12 mm transverse. Moderate bilateral  neuroforaminal narrowing; proximity of exited left nerve to disc  material.     L4-5: Anterolisthesis with bulging uncovered disc, prominent anterior  epidural fat, as well as facet arthropathy cause thecal sac narrowing  without nerve root compression; minimum diameter approximately 10 mm AP  x 11 mm transverse just above the disc space. A calcified right  protrusion component is in proximity to the traversing right S1 nerve  root. Severe appearing right neuroforaminal narrowing with possible  nerve root compression. Moderate to severe left neuroforaminal narrowing  with proximity of exiting nerve root to disc material.     L5-S1: Prominently bulging disc osteophyte complex. Left worse than  right facet arthropathy. Ample thecal sac diameter. Moderate to severe  right neuroforaminal narrowing. Severe left neuroforaminal narrowing  with suspected nerve root compression.        IMPRESSION:     1. Central disc osteophyte complex compressing spinal cord at C4-5.      2. Central disc protrusion abutting spinal cord at T2-3.      3. Thecal sac compression at L3-4 secondary to broad-based calcified  disc protrusion.     4. Multilevel neuroforaminal narrowing which is most severe at L5-S1 on  the left.     5. See above for details and all findings.     This report was finalized on 9/14/2021 10:14 AM by Dr. Chirag Diallo M.D.  The following portions of the patient's history were reviewed and updated as appropriate: allergies, current medications, past family history, past medical history, past social history, past surgical history and problem list.    Review of Systems   Constitutional: Positive for activity change (dec) and fatigue (occ). Negative for fever.   HENT: Negative for congestion.    Eyes: Negative for  "visual disturbance.   Respiratory: Negative for cough and shortness of breath.    Cardiovascular: Negative for chest pain.   Gastrointestinal: Negative for constipation and diarrhea.   Genitourinary: Negative for difficulty urinating.   Musculoskeletal: Positive for back pain.   Neurological: Negative for dizziness, weakness, light-headedness, numbness and headaches.   Psychiatric/Behavioral: Negative for agitation, sleep disturbance and suicidal ideas. The patient is not nervous/anxious.      I have reviewed and confirmed the accuracy of the ROS as documented by the MA/LPN/RN MICHELLE Luciano    Vitals:    11/10/22 1424   BP: 144/97  Comment: pt sts had bp meds last night   Pulse: 96   Resp: 20   Temp: 97.3 °F (36.3 °C)   SpO2: 95%   Weight: 115 kg (253 lb)   Height: 195.6 cm (77.01\")   PainSc: 0-No pain   PainLoc: Back     Objective   Physical Exam  Constitutional:       Appearance: Normal appearance.   HENT:      Head: Normocephalic.   Cardiovascular:      Rate and Rhythm: Normal rate and regular rhythm.   Pulmonary:      Effort: Pulmonary effort is normal.      Breath sounds: Normal breath sounds.   Musculoskeletal:         General: Normal range of motion.      Cervical back: Normal range of motion.   Skin:     General: Skin is warm and dry.      Capillary Refill: Capillary refill takes less than 2 seconds.   Neurological:      General: No focal deficit present.      Mental Status: He is alert and oriented to person, place, and time.   Psychiatric:         Mood and Affect: Mood normal.         Behavior: Behavior normal.         Thought Content: Thought content normal.         Cognition and Memory: Cognition normal.       Assessment & Plan   Diagnoses and all orders for this visit:    1. Lumbar facet arthropathy (Primary)  -     traMADol (ULTRAM) 50 MG tablet; Take 1 tablet PO up to twice daily as needed for pain.  30 day supply.  Dispense: 30 tablet; Refill: 0    2. Spinal stenosis of lumbar region without " neurogenic claudication  -     traMADol (ULTRAM) 50 MG tablet; Take 1 tablet PO up to twice daily as needed for pain.  30 day supply.  Dispense: 30 tablet; Refill: 0    3. Encounter for long-term current use of high risk medication    4. Spondylosis of lumbar region without myelopathy or radiculopathy  -     traMADol (ULTRAM) 50 MG tablet; Take 1 tablet PO up to twice daily as needed for pain.  30 day supply.  Dispense: 30 tablet; Refill: 0    --- Routine UDS in office today as part of monitoring requirements for controlled substances.  The specimen was viewed and the immunoassay result reviewed and is NEG (appropriate for Tramadol).  This specimen will be sent to [facility] laboratory for confirmation.     --- The patient signed an updated copy of the controlled substance agreement on 8/31/22  --- Refill Tramadol. Patient appears stable with current regimen. No adverse effects. Regarding continuation of opioids, there is no evidence of aberrant behavior or any red flags.  The patient continues with appropriate response to opioid therapy. ADL's remain intact by self.   --- Follow-up 2 months     RHODA REPORT  As part of the patient's treatment plan, I am prescribing controlled substances. The patient has been made aware of appropriate use of such medications, including potential risk of somnolence, limited ability to drive and/or work safely, and the potential for dependence or overdose. It has also been made clear that these medications are for use by this patient only, without concomitant use of alcohol or other substances unless prescribed.     Patient has completed prescribing agreement detailing terms of continued prescribing of controlled substances, including monitoring RHODA reports, urine drug screening, and pill counts if necessary. The patient is aware that inappropriate use will results in cessation of prescribing such medications.    As the clinician, I personally reviewed the RHODA from 11/10/22  while the patient was in the office today.    History and physical exam exhibit continued safe and appropriate use of controlled substances.    Dictated utilizing Dragon dictation.     Patient remained masked during entire encounter. No cough present. I donned a mask and eye protection throughout entire visit. Prior to donning mask and eye protection, hand hygiene was performed, as well as when it was doffed.  I was closer than 6 feet, but not for an extended period of time. No obvious exposure to any bodily fluids.

## 2023-01-03 ENCOUNTER — TELEPHONE (OUTPATIENT)
Dept: NEUROSURGERY | Facility: CLINIC | Age: 68
End: 2023-01-03
Payer: MEDICARE

## 2023-01-03 NOTE — TELEPHONE ENCOUNTER
Caller: Micaela Mnoaco    Relationship to patient: Emergency Contact    Best call back number: [phone]    Patient is needing: PATIENTS WIFE CALLED, PATIENT IS REQUESTING REFERRAL TO [facility], ADDRESS:[address] [place] [address], [place] [address], [phone]. [facility] STATES THEY NEED LAST 3 OFFICE NOTES, DEMOGRAPHICS AND INSURANCE INFO WITH REFERRAL TO SCHEDULE PATIENT. PLEASE CALL PATIENT OR PATIENTS WIFE TO ADVISE IF [facility] CAN REFER.    THANK YOU

## 2023-01-04 ENCOUNTER — TELEPHONE (OUTPATIENT)
Dept: ENDOCRINOLOGY | Age: 68
End: 2023-01-04
Payer: MEDICARE

## 2023-01-04 NOTE — TELEPHONE ENCOUNTER
Pt called needing a new RX for Ozempic for 1 MG sent to [place] [facility] [id number] - they do not have the other ozempic this is the only one they have. He is going out of town tomorrow and would like this to be filled.    Phone number [phone]

## 2023-01-04 NOTE — PROGRESS NOTES
Subjective   History of Present Illness: Isaias Monaco is a 67 y.o. male is here today for follow-up to discuss options.  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  He is doing well today.  Reports he continues to have severe lower back pain and lower thoracic back pain while walking.  He reports that he also has the pain when standing for prolonged periods of time.  He reports he does not have any pain radiating into his lower extremities.  Denies any changes in strength or sensation.  He also reports severe neck pain when standing for prolonged periods of time.    You have chosen to receive care through a telephone visit. Do you consent to use a telephone visit for your medical care today? Yes    History of Present Illness    The following portions of the patient's history were reviewed and updated as appropriate: allergies, past family history, past medical history, past social history, past surgical history and problem list.    Past Medical History:   Diagnosis Date   • Adiposity    • Anemia     post hemorrhagic   • Angioedema 02/21/2016    Secondary to ACE inhibitor   • Anxiety    • Arthritis    • CAD (coronary artery disease)    • Chest pain    • Colon polyps    • Diabetes mellitus, type 2 (HCC)    • ED (erectile dysfunction)    • Febrile illness    • GERD (gastroesophageal reflux disease)    • Glaucoma    • Hematoma    • High cholesterol    • History of foreign travel 12/2017; 08/2018    [place], [place], [place], [place], [place] and [place]; [place]   • Hyperlipidemia    • Hypertension    • Hypogonadism in male 09/28/2016   • Low back pain    • Male erectile disorder    • Microalbuminuria    • Obesity (BMI 30-39.9)    • Osteoarthritis     knee   • Spinal stenosis of lumbar region without neurogenic claudication 06/02/2021   • Vitamin D deficiency    • Wound infection after surgery         Past Surgical History:   Procedure Laterality Date   • CARDIAC  CATHETERIZATION N/A 05/15/2006    Dr. Mini Camarillo   • CARDIAC CATHETERIZATION N/A 3/10/2020    Procedure: Left Heart Cath;  Surgeon: Miguel Ángel Karimi MD;  Location:  [facility] CATH INVASIVE [facility];  Service: Cardiology;  Laterality: N/A;   • CARDIAC CATHETERIZATION N/A 3/10/2020    Procedure: Stent DAVONTE coronary;  Surgeon: Miguel Ángel Karimi MD;  Location:  [facility];  Service: Cardiology;  Laterality: N/A;   • CARDIAC CATHETERIZATION N/A 3/10/2020    Procedure: Coronary angiography;  Surgeon: Miguel Ángel Karimi MD;  Location:  [facility];  Service: Cardiology;  Laterality: N/A;   • CARDIAC CATHETERIZATION N/A 3/10/2020    Procedure: Left ventriculography;  Surgeon: Miguel Ángel Karimi MD;  [facility] C[facility]IVE [facility];  Service: Cardiology;  Laterality: N/A;   • CARDIAC CATHETERIZATION N/A 5/20/2022    Procedure: Left Heart Cath;  Surgeon: Mackenzie Morales MD;  [facility];  Service: Cardiovascular;  Laterality: N/A;   • CARDIAC CATHETERIZATION N/A 5/20/2022    Procedure: Coronary angiography;  Surgeon: Mackenzie Morales MD;  [facility];  Service: Cardiovascular;  Laterality: N/A;   • CARDIAC CATHETERIZATION N/A 5/20/2022    Procedure: Percutaneous Coronary Intervention;  Surgeon: Mackenzie Morales MD;  Location: [facility] C[facility];  Service: Cardiovascular;  Laterality: N/A;   • CARDIAC CATHETERIZATION N/A 5/24/2022    Procedure: Percutaneous Coronary Intervention;  Surgeon: Miguel Ángel Karimi MD;  Location: [facility] C[facility]IVE [facility];  Service: Cardiovascular;  Laterality: N/A;  LAD and Cx   • CARDIAC CATHETERIZATION N/A 5/24/2022    Procedure: Stent DAVONTE coronary;  Surgeon: Miguel Ángel Karimi MD;  [facility]IVE LOC[facility];  Service: Cardiovascular;  Laterality: N/A;   • CARDIAC CATHETERIZATION N/A 5/24/2022    Procedure: Resting Full Cycle Ratio;  Surgeon:  Miguel Ángel Karimi MD;  Location: [facility] CATH INVASIVE LOCATION;  Service: Cardiovascular;  Laterality: N/A;   • COLONOSCOPY N/A 02/22/2006    Bilobed polyp at 30 cm, hemorrhoids-Dr. Ilya Zhao   • COLONOSCOPY N/A 02/28/2014    Normal ileum, one 6 mm polyp in the mid transverse colon-Dr. Ilya Zhao   • COLONOSCOPY N/A 11/19/2008    Ela ileum, two 3 to 4 mm polyps, non-bleeding internal hemorrhoids, repeat in 5 years-Dr. Ilya Zhao   • COLONOSCOPY N/A 10/31/2017    Procedure: COLONOSCOPY WITH POLYPECTOMY (COLD BIOPSY);  Surgeon: Ilya Zhao MD;  Location: [facility] ENDOSCOPY;  Service:    • COLONOSCOPY N/A 5/21/2021    Procedure: Colonoscopy into cecum and terminal ileum with cold biopsy polypectomy;  Surgeon: Ilya Zhao MD;  Location: [facility] ENDOSCOPY;  Service: Gastroenterology;  Laterality: N/A;  Pre op: History of Polyps  Post op: Polyp   • CORONARY ANGIOPLASTY WITH STENT PLACEMENT  2007, 2012, 2015    cardiac stents x3 occasions   • ENDOSCOPY N/A 10/4/2017    Procedure: ESOPHAGOGASTRODUODENOSCOPY;  Surgeon: Boyd Guidry MD;  Location: [facility] ENDOSCOPY;  Service:    • ENDOSCOPY N/A 5/21/2021    Procedure: ESOPHAGOGASTRODUODENOSCOPY with biopsies;  Surgeon: Ilya Zhao MD;  Location: [facility] ENDOSCOPY;  Service: Gastroenterology;  Laterality: N/A;  Pre op: GERD  Post op: Irregular  Z-Line, Hiatal Hernia, Gastritis   • EPIDURAL BLOCK     • INTERVENTIONAL RADIOLOGY PROCEDURE N/A 5/20/2022    Procedure: Intravascular Ultrasound;  Surgeon: Mackenzie Morales MD;  Location: [facility] CATH INVASIVE LOCATION;  Service: Cardiovascular;  Laterality: N/A;   • KNEE INCISION AND DRAINAGE Right 6/20/2017    Procedure: RT. KNEE WASHOUT ;  Surgeon: Boyd Coyne MD;  Location: [facility] MAIN OR;  Service:    • MEDIAL BRANCH BLOCK Bilateral 12/17/2021    Procedure: LUMBAR MEDIAL BRANCH BLOCK bilateral ~L4-S1 x2 (~2 weeks apart);  Surgeon: Christina Villaseñor MD;  [place] [facility] [place];  Service: Pain Management;   Laterality: Bilateral;   • MEDIAL BRANCH BLOCK Bilateral 1/3/2022    Procedure: LUMBAR MEDIAL BRANCH BLOCK bilateral ~L4-S1 x2 (~2 weeks apart);  Surgeon: Christina Villaseñor MD;  [place] [facility] [place];  Service: Pain Management;  Laterality: Bilateral;   • MI ARTHRP KNE CONDYLE&PLATU MEDIAL&LAT COMPARTMENTS Right 6/15/2017    Procedure: RT TOTAL KNEE ARTHROPLASTY;  Surgeon: Boyd Coyne MD;  Location: [facility] MAIN OR;  Service: Orthopedics   • RADIOFREQUENCY ABLATION Bilateral 1/10/2022    Procedure: RADIOFREQUENCY ABLATION NERVES Bilateral L4-S1;  Surgeon: Christina Villaseñor MD;  Location: [facility];  Service: Pain Management;  Laterality: Bilateral;   • SHOULDER SURGERY Right 2016    rotator cuff repair   • UPPER GASTROINTESTINAL ENDOSCOPY N/A 10/13/2015    Z-line irregular, normal stomach, normal duodenum-Dr. Ilya Zhao   • UPPER GASTROINTESTINAL ENDOSCOPY N/A 02/28/2014    Z-line irregular, normal stomach, normal duodenum-Dr. Ilya Zhao   • UPPER GASTROINTESTINAL ENDOSCOPY N/A 11/19/2008    Z-line irregular, chronic gastritis withotu hemorrhage, normal duodenum-Dr. Ilya Zhao   • UPPER GASTROINTESTINAL ENDOSCOPY N/A 06/22/2006    LA Grade A reflux esophagitis, non-bleeding erythematous gastropathy, normal duodenum-Dr. Ilya Zhao          Current Outpatient Medications:   •  ARIPiprazole (ABILIFY) 2 MG tablet, Take 3 mg by mouth Daily., Disp: , Rfl:   •  aspirin 81 MG EC tablet, Take 1 tablet by mouth Daily., Disp: 30 tablet, Rfl: 6  •  carvedilol (COREG) 25 MG tablet, Take 1 tablet by mouth Every 12 (Twelve) Hours., Disp: 180 tablet, Rfl: 3  •  clonazePAM (KlonoPIN) 0.5 MG tablet, Take 1 tablet by mouth Daily As Needed for Anxiety., Disp: 15 tablet, Rfl: 0  •  clopidogrel (PLAVIX) 75 MG tablet, Take 1 tablet by mouth Daily., Disp: 30 tablet, Rfl: 11  •  dorzolamide-timolol (COSOPT) 22.3-6.8 MG/ML ophthalmic solution, Apply 1 drop to eye(s) to both eyes 2 (Two) Times a Day., Disp: 20 mL, Rfl: 3  •   doxazosin (Cardura) 4 MG tablet, Take 1 tablet by mouth Every Night., Disp: 90 tablet, Rfl: 2  •  escitalopram (LEXAPRO) 20 MG tablet, Take 1 tablet by mouth Daily., Disp: 90 tablet, Rfl: 3  •  esomeprazole (nexIUM) 40 MG capsule, Take 1 capsule by mouth Daily., Disp: 90 capsule, Rfl: 3  •  ezetimibe (Zetia) 10 MG tablet, Take 1 tablet by mouth Daily., Disp: 90 tablet, Rfl: 3  •  famotidine (PEPCID) 20 MG tablet, Take 1 tablet by mouth Daily With Dinner., Disp: 90 tablet, Rfl: 3  •  hydroCHLOROthiazide (HYDRODIURIL) 25 MG tablet, Take 1 tablet by mouth., Disp: , Rfl:   •  latanoprost (XALATAN) 0.005 % ophthalmic solution, Apply 1 drop to  each eye Daily. (Patient taking differently: Apply 1 drop to eye(s) as directed by provider Every Night.), Disp: 7.5 mL, Rfl: 3  •  metFORMIN (GLUCOPHAGE) 500 MG tablet, Take 2 tablets by mouth Daily With Breakfast. Indications: start in 48 hours (Patient taking differently: Take 1,000 mg by mouth Daily With Breakfast. Held  Indications: start in 48 hours), Disp: 180 tablet, Rfl: 3  •  methocarbamol (Robaxin) 500 MG tablet, Take 1 tablet by mouth As Needed (Take as needed for pain)., Disp: 90 tablet, Rfl: 0  •  pioglitazone (ACTOS) 15 MG tablet, Take 1 tablet by mouth Daily., Disp: 90 tablet, Rfl: 3  •  rosuvastatin (CRESTOR) 40 MG tablet, Take 1 tablet by mouth Daily. (Patient taking differently: Take 40 mg by mouth. [facility]), Disp: 90 tablet, Rfl: 3  •  Semaglutide,0.25 or 0.5MG/DOS, (Ozempic, 0.25 or 0.5 MG/DOSE,) 2 MG/1.5ML solution pen-injector, 0.5 mg weekly, Disp: 4.5 mL, Rfl: 1  •  Toujeo SoloStar 300 UNIT/ML solution pen-injector injection, INJECT 60 UNITS UNDER THE SKIN INTO THE APPROPRIATE AREA AS DIRECTED EVERY MORNING, Disp: 18 mL, Rfl: 3  •  traMADol (ULTRAM) 50 MG tablet, Take 1 tablet PO up to twice daily as needed for pain.  30 day supply., Disp: 30 tablet, Rfl: 0  •  vitamin D (ERGOCALCIFEROL) 1.25 MG (61476 UT) capsule capsule, TAKE ONE CAPSULE BY MOUTH ONCE WEEKLY,  Disp: 12 capsule, Rfl: 0  •  Blood Glucose Monitoring Suppl ([employer] BLOOD GLUCOSE) kit, TEST AS DIRECTED, Disp: 1 each, Rfl: 0  •  Continuous Blood Gluc Sensor (Dexcom G6 Sensor), Dipsense Dexcom G6 Sensors, to replace every 10 days, 3 sensors per 30 day period (NDC #[address]-53). Check 6 times a day. Dx: E 1, Disp: 9 each, Rfl: 4  •  Continuous Blood Gluc Transmit (Dexcom G6 Transmitter) misc, 1 each Every 3 (Three) Months. Dispense 1 Dexcom G6 Transmitter for each 3 month period (NDC #[address]-04). Check 6 times a day. Dx: E 1, Disp: 1 each, Rfl: 4  •  empagliflozin (Jardiance) 25 MG tablet tablet, Take 1 tablet by mouth Daily., Disp: 90 tablet, Rfl: 0  •  glucose blood test strip, Use 4 times a day, Disp: 400 each, Rfl: 0  •  Insulin Pen Needle ([employer] Pen Needles 31G) 31G X 8 MM misc, USE AS DIRECED FOR TOUJEO INJECTIONS, Disp: 100 each, Rfl: 3  •  Lancets (FREESTYLE) lancets, Use to test BG 4 times daily, Disp: 400 each, Rfl: 1  •  nitroglycerin (NITROSTAT) 0.4 MG SL tablet, Place 1 tablet under the tongue Every 5 (Five) Minutes As Needed for Chest Pain (Only if SBP Greater Than 100). Take no more than 3 doses in 15 minutes., Disp: 30 tablet, Rfl: 12  •  sildenafil (VIAGRA) 50 MG tablet, Take 1 tablet by mouth Daily As Needed for erectile dysfunction. (Patient taking differently: Take 1 tablet by mouth Daily As Needed for Erectile Dysfunction. PRN), Disp: 6 tablet, Rfl: 5     Allergies   Allergen Reactions   • Norvasc [Amlodipine] Swelling   • Ace Inhibitors Angioedema   • Lisinopril Angioedema   • Testosterone Myalgia        Social History     Socioeconomic History   • Marital status:      Spouse name: Micaela   • Number of children: 1   • Years of education: College   Tobacco Use   • Smoking status: Never   • Smokeless tobacco: Never   • Tobacco comments:     CAFFEINE USE: 2 CUPS COFFEE DAILY   Vaping Use   • Vaping Use: Never used   Substance and Sexual Activity   • Alcohol use: Yes     Alcohol/week:  6.0 standard drinks     Types: 2 Glasses of wine, 2 Cans of beer, 1 Shots of liquor, 1 Standard drinks or equivalent per week     Comment: occasional 2-4 DRINKS PER WEEK   • Drug use: No   • Sexual activity: Yes     Partners: Female        Family History   Problem Relation Age of Onset   • Lupus Sister    • Thyroid disease Sister    • Heart disease Other    • Hypertension Other    • Arthritis Mother    • Hyperlipidemia Mother    • Hypertension Mother    • Thyroid disease Mother    • Lupus Mother    • Vision loss Mother    • Heart disease Father    • Arthritis Father    • Other Father         Vascular disease   • Lupus Father    • Depression Father    • Alcohol abuse Father    • Dementia Father    • Hypertension Father    • Heart disease Brother    • Heart attack Brother 40   • Thyroid disease Sister    • Arthritis Brother    • Malig Hyperthermia Neg Hx         Review of Systems    Objective     There were no vitals filed for this visit.  There is no height or weight on file to calculate BMI.      Physical Exam  Neurologic Exam  Awake, alert, oriented x3  Pupils equal round reactive to light  Extraocular muscles intact  Face symmetric  Speech is fluent and clear  No pronator drift  Motor exam  Bilateral deltoids 5/5, bilateral biceps 5/5, bilateral triceps 5/5, bilateral wrist extension 5/5 bilateral hand  5/5  Bilateral hip flexion 5/5, bilateral knee extension 5/5, bilateral DF/PF 5/5  No clonus  No Allyssa's reflex  Steady normal gait  Able to detect  light touch in all 4 extremities        Assessment & Plan       Medical Decision Makin-year-old male with a greater than 1 year history of severe neck and lower back pain.  -He denies any pain radiating into his lower extremities.  Denies any changes in strength or sensation.  He has had multiple steroid injections without relief.  The lumbar spine MRI and CT myelogram show moderate to severe canal stenosis throughout the lumbar spine as well as  Gaston's disease.  Since he has no lower extremity weakness or sensory abnormalities I am hesitant to offer him surgery.  I will plan to follow-up in 3 months with repeat MRI of his thoracic and lumbar spine to evaluate his severe back pain.  He is also asked me to include an MRI of the cervical spine because his neck pain has worsened over the past few months  -He has requested a referral to Dr. Aponte at [facility] for further evaluation and treatment of his back pain    Diagnoses and all orders for this visit:    1. Baastrup's syndrome (Primary)  -     MRI Lumbar Spine Without Contrast; Future  -     MRI Thoracic Spine Without Contrast; Future  -     MRI Cervical Spine Without Contrast; Future  -     Ambulatory Referral to Pain Management    2. Spinal stenosis of lumbar region without neurogenic claudication  -     MRI Lumbar Spine Without Contrast; Future  -     MRI Thoracic Spine Without Contrast; Future  -     MRI Cervical Spine Without Contrast; Future  -     Ambulatory Referral to Pain Management    3. Back pain without radiculopathy  -     MRI Lumbar Spine Without Contrast; Future  -     MRI Thoracic Spine Without Contrast; Future  -     MRI Cervical Spine Without Contrast; Future  -     Ambulatory Referral to Pain Management    4. Severe back pain  -     MRI Lumbar Spine Without Contrast; Future  -     MRI Thoracic Spine Without Contrast; Future  -     MRI Cervical Spine Without Contrast; Future  -     Ambulatory Referral to Pain Management    5. Neck pain  -     MRI Lumbar Spine Without Contrast; Future  -     MRI Thoracic Spine Without Contrast; Future  -     MRI Cervical Spine Without Contrast; Future  -     Ambulatory Referral to Pain Management      Return in about 3 months (around 4/13/2023).    This was a telephone visit and we spoke for 10 minutes

## 2023-01-06 DIAGNOSIS — Z79.4 CONTROLLED TYPE 2 DIABETES MELLITUS WITH COMPLICATION, WITH LONG-TERM CURRENT USE OF INSULIN: ICD-10-CM

## 2023-01-06 DIAGNOSIS — E11.8 CONTROLLED TYPE 2 DIABETES MELLITUS WITH COMPLICATION, WITH LONG-TERM CURRENT USE OF INSULIN: ICD-10-CM

## 2023-01-06 RX ORDER — SEMAGLUTIDE 1.34 MG/ML
INJECTION, SOLUTION SUBCUTANEOUS
Qty: 4.5 ML | Refills: 1 | Status: SHIPPED | OUTPATIENT
Start: 2023-01-06 | End: 2023-02-21 | Stop reason: DRUGHIGH

## 2023-01-13 ENCOUNTER — OFFICE VISIT (OUTPATIENT)
Dept: NEUROSURGERY | Facility: CLINIC | Age: 68
End: 2023-01-13
Payer: MEDICARE

## 2023-01-13 DIAGNOSIS — M54.9 SEVERE BACK PAIN: ICD-10-CM

## 2023-01-13 DIAGNOSIS — M48.061 SPINAL STENOSIS OF LUMBAR REGION WITHOUT NEUROGENIC CLAUDICATION: ICD-10-CM

## 2023-01-13 DIAGNOSIS — M48.20 BAASTRUP'S SYNDROME: Primary | ICD-10-CM

## 2023-01-13 DIAGNOSIS — M54.2 NECK PAIN: ICD-10-CM

## 2023-01-13 DIAGNOSIS — M54.9 BACK PAIN WITHOUT RADICULOPATHY: ICD-10-CM

## 2023-01-13 PROCEDURE — 99213 OFFICE O/P EST LOW 20 MIN: CPT | Performed by: NEUROLOGICAL SURGERY

## 2023-01-24 ENCOUNTER — TRANSCRIBE ORDERS (OUTPATIENT)
Dept: ADMINISTRATIVE | Facility: HOSPITAL | Age: 68
End: 2023-01-24
Payer: MEDICARE

## 2023-01-24 ENCOUNTER — LAB (OUTPATIENT)
Dept: LAB | Facility: HOSPITAL | Age: 68
End: 2023-01-24
Payer: MEDICARE

## 2023-01-24 DIAGNOSIS — E11.9 DIABETES MELLITUS WITHOUT COMPLICATION: ICD-10-CM

## 2023-01-24 DIAGNOSIS — I10 ESSENTIAL HYPERTENSION, MALIGNANT: ICD-10-CM

## 2023-01-24 DIAGNOSIS — N18.31 CHRONIC KIDNEY DISEASE (CKD) STAGE G3A/A1, MODERATELY DECREASED GLOMERULAR FILTRATION RATE (GFR) BETWEEN 45-59 ML/MIN/1.73 SQUARE METER AND ALBUMINURIA CREATININE RATIO LESS THAN 30 MG/G (CMS/H*: Primary | ICD-10-CM

## 2023-01-24 DIAGNOSIS — N18.31 CHRONIC KIDNEY DISEASE (CKD) STAGE G3A/A1, MODERATELY DECREASED GLOMERULAR FILTRATION RATE (GFR) BETWEEN 45-59 ML/MIN/1.73 SQUARE METER AND ALBUMINURIA CREATININE RATIO LESS THAN 30 MG/G (CMS/H*: ICD-10-CM

## 2023-01-24 LAB
ALBUMIN SERPL-MCNC: 4.3 G/DL (ref 3.5–5.2)
ALBUMIN/GLOB SERPL: 1.2 G/DL
ALP SERPL-CCNC: 80 U/L (ref 39–117)
ALT SERPL W P-5'-P-CCNC: 30 U/L (ref 1–41)
ANION GAP SERPL CALCULATED.3IONS-SCNC: 9 MMOL/L (ref 5–15)
AST SERPL-CCNC: 34 U/L (ref 1–40)
BACTERIA UR QL AUTO: NORMAL /HPF
BILIRUB SERPL-MCNC: 0.5 MG/DL (ref 0–1.2)
BILIRUB UR QL STRIP: NEGATIVE
BUN SERPL-MCNC: 13 MG/DL (ref 8–23)
BUN/CREAT SERPL: 10.4 (ref 7–25)
CALCIUM SPEC-SCNC: 9.8 MG/DL (ref 8.6–10.5)
CHLORIDE SERPL-SCNC: 98 MMOL/L (ref 98–107)
CLARITY UR: CLEAR
CO2 SERPL-SCNC: 30 MMOL/L (ref 22–29)
COLOR UR: YELLOW
CREAT SERPL-MCNC: 1.25 MG/DL (ref 0.76–1.27)
CREAT UR-MCNC: 73.8 MG/DL
EGFRCR SERPLBLD CKD-EPI 2021: 63.1 ML/MIN/1.73
GLOBULIN UR ELPH-MCNC: 3.6 GM/DL
GLUCOSE SERPL-MCNC: 114 MG/DL (ref 65–99)
GLUCOSE UR STRIP-MCNC: NEGATIVE MG/DL
HGB UR QL STRIP.AUTO: NEGATIVE
HYALINE CASTS UR QL AUTO: NORMAL /LPF
KETONES UR QL STRIP: NEGATIVE
LEUKOCYTE ESTERASE UR QL STRIP.AUTO: NEGATIVE
NITRITE UR QL STRIP: NEGATIVE
PH UR STRIP.AUTO: 6.5 [PH] (ref 5–8)
POTASSIUM SERPL-SCNC: 4 MMOL/L (ref 3.5–5.2)
PROT ?TM UR-MCNC: 30.3 MG/DL
PROT SERPL-MCNC: 7.9 G/DL (ref 6–8.5)
PROT UR QL STRIP: ABNORMAL
PROT/CREAT UR: 410.6 MG/G CREA (ref 0–200)
RBC # UR STRIP: NORMAL /HPF
REF LAB TEST METHOD: NORMAL
SODIUM SERPL-SCNC: 137 MMOL/L (ref 136–145)
SP GR UR STRIP: 1.02 (ref 1–1.03)
SQUAMOUS #/AREA URNS HPF: NORMAL /HPF
URATE SERPL-MCNC: 5.4 MG/DL (ref 3.4–7)
UROBILINOGEN UR QL STRIP: ABNORMAL
WBC # UR STRIP: NORMAL /HPF

## 2023-01-24 PROCEDURE — 36415 COLL VENOUS BLD VENIPUNCTURE: CPT

## 2023-01-24 PROCEDURE — 82570 ASSAY OF URINE CREATININE: CPT

## 2023-01-24 PROCEDURE — 80053 COMPREHEN METABOLIC PANEL: CPT

## 2023-01-24 PROCEDURE — 84550 ASSAY OF BLOOD/URIC ACID: CPT

## 2023-01-24 PROCEDURE — 81001 URINALYSIS AUTO W/SCOPE: CPT

## 2023-01-24 PROCEDURE — 84156 ASSAY OF PROTEIN URINE: CPT

## 2023-01-30 ENCOUNTER — TELEPHONE (OUTPATIENT)
Dept: NEUROSURGERY | Facility: CLINIC | Age: 68
End: 2023-01-30
Payer: MEDICARE

## 2023-01-30 NOTE — TELEPHONE ENCOUNTER
Caller:JOSE KOTHARI    Relationship to patient:PTS WIFE    Best call back number: [phone]    Chief complaint:CHANGE APPT.    Type of visit:FOLLOW UP    Requested date:SEE BELOW    If rescheduling, when is the original appointment: 04/17/23    Additional notes:PTS WIFE CALLED AND STATES THAT THEY ARE GOING TO BE OUT OF TOWN ON 04/17/23-PTS WIFE WOULD LIKE TO SEE IF THERE ARE ANY DATES AVAILABLE ON THESE DATES 04/24/23 04/25/23 04/26/23 04/28/23 AND 05/08/23-05/12/23 PTS WIFE WOULD LIKE A CALL BACK THANK YOU!

## 2023-02-02 RX ORDER — ERGOCALCIFEROL 1.25 MG/1
50000 CAPSULE ORAL WEEKLY
Qty: 12 CAPSULE | Refills: 0 | Status: SHIPPED | OUTPATIENT
Start: 2023-02-02

## 2023-02-02 NOTE — TELEPHONE ENCOUNTER
Rx Refill Note  Requested Prescriptions     Pending Prescriptions Disp Refills   • vitamin D (ERGOCALCIFEROL) 1.25 MG (57207 UT) capsule capsule 12 capsule 0     Sig: Take 1 capsule by mouth 1 (One) Time Per Week.      Last office visit with prescribing clinician: 7/25/2022   Last telemedicine visit with prescribing clinician: 4/24/2023   Next office visit with prescribing clinician: 4/27/2023                         Would you like a call back once the refill request has been completed: [] Yes [] No    If the office needs to give you a call back, can they leave a voicemail: [] Yes [] No    Carey Ignacio LPN  02/02/23, 13:18 EST

## 2023-02-02 NOTE — TELEPHONE ENCOUNTER
Caller: Isaias Monaco    Relationship: Self    Best call back number: [phone]    Requested Prescriptions:   Requested Prescriptions     Pending Prescriptions Disp Refills   • vitamin D (ERGOCALCIFEROL) 1.25 MG (80298 UT) capsule capsule 12 capsule 0     Sig: Take 1 capsule by mouth 1 (One) Time Per Week.        Pharmacy where request should be sent: [id number] [address] AT [facility] [address]  [place] - [phone] [facility] [phone] FX     Additional details provided by patient: PATIENT STATES HE IS COMPLETELY OUT OF THIS PRESCRIPTION.     Does the patient have less than a 3 day supply:  [x] Yes  [] No    Would you like a call back once the refill request has been completed: [x] Yes [] No    If the office needs to give you a call back, can they leave a voicemail: [x] Yes [] No    Dannielle Gonzales   02/02/23 12:23 EST

## 2023-02-03 ENCOUNTER — HOSPITAL ENCOUNTER (OUTPATIENT)
Dept: MRI IMAGING | Facility: HOSPITAL | Age: 68
Discharge: HOME | End: 2023-02-03
Payer: MEDICARE

## 2023-02-03 DIAGNOSIS — M48.20 BAASTRUP'S SYNDROME: ICD-10-CM

## 2023-02-03 DIAGNOSIS — M54.2 NECK PAIN: ICD-10-CM

## 2023-02-03 DIAGNOSIS — M54.9 SEVERE BACK PAIN: ICD-10-CM

## 2023-02-03 DIAGNOSIS — M54.9 BACK PAIN WITHOUT RADICULOPATHY: ICD-10-CM

## 2023-02-03 DIAGNOSIS — M48.061 SPINAL STENOSIS OF LUMBAR REGION WITHOUT NEUROGENIC CLAUDICATION: ICD-10-CM

## 2023-02-03 PROCEDURE — 72146 MRI CHEST SPINE W/O DYE: CPT

## 2023-02-03 PROCEDURE — 72141 MRI NECK SPINE W/O DYE: CPT

## 2023-02-03 PROCEDURE — 72148 MRI LUMBAR SPINE W/O DYE: CPT

## 2023-02-10 DIAGNOSIS — K44.9 HIATAL HERNIA: ICD-10-CM

## 2023-02-10 DIAGNOSIS — K21.9 GASTROESOPHAGEAL REFLUX DISEASE WITHOUT ESOPHAGITIS: ICD-10-CM

## 2023-02-10 NOTE — TELEPHONE ENCOUNTER
Rx Refill Note  Requested Prescriptions     Pending Prescriptions Disp Refills   • esomeprazole (nexIUM) 40 MG capsule 90 capsule 3     Sig: Take 1 capsule by mouth Daily.      Last office visit with prescribing clinician: 7/25/2022   Last telemedicine visit with prescribing clinician: 4/24/2023   Next office visit with prescribing clinician: 4/27/2023                         Would you like a call back once the refill request has been completed: [] Yes [] No    If the office needs to give you a call back, can they leave a voicemail: [] Yes [] No    Carey Ignacio LPN  02/10/23, 13:04 EST

## 2023-02-11 RX ORDER — ESOMEPRAZOLE MAGNESIUM 40 MG/1
40 CAPSULE, DELAYED RELEASE ORAL DAILY
Qty: 90 CAPSULE | Refills: 3 | Status: SHIPPED | OUTPATIENT
Start: 2023-02-11

## 2023-02-13 ENCOUNTER — LAB (OUTPATIENT)
Dept: LAB | Facility: HOSPITAL | Age: 68
End: 2023-02-13
Payer: MEDICARE

## 2023-02-13 PROCEDURE — 82570 ASSAY OF URINE CREATININE: CPT | Performed by: INTERNAL MEDICINE

## 2023-02-13 PROCEDURE — 82306 VITAMIN D 25 HYDROXY: CPT | Performed by: INTERNAL MEDICINE

## 2023-02-13 PROCEDURE — 84443 ASSAY THYROID STIM HORMONE: CPT | Performed by: INTERNAL MEDICINE

## 2023-02-13 PROCEDURE — 80053 COMPREHEN METABOLIC PANEL: CPT | Performed by: INTERNAL MEDICINE

## 2023-02-13 PROCEDURE — 83036 HEMOGLOBIN GLYCOSYLATED A1C: CPT | Performed by: INTERNAL MEDICINE

## 2023-02-13 PROCEDURE — 82043 UR ALBUMIN QUANTITATIVE: CPT | Performed by: INTERNAL MEDICINE

## 2023-02-13 PROCEDURE — 80061 LIPID PANEL: CPT | Performed by: INTERNAL MEDICINE

## 2023-02-21 ENCOUNTER — TELEPHONE (OUTPATIENT)
Dept: ENDOCRINOLOGY | Age: 68
End: 2023-02-21

## 2023-02-21 ENCOUNTER — OFFICE VISIT (OUTPATIENT)
Dept: ENDOCRINOLOGY | Age: 68
End: 2023-02-21
Payer: MEDICARE

## 2023-02-21 VITALS
WEIGHT: 252.8 LBS | SYSTOLIC BLOOD PRESSURE: 128 MMHG | HEIGHT: 77 IN | HEART RATE: 88 BPM | BODY MASS INDEX: 29.85 KG/M2 | TEMPERATURE: 93.5 F | OXYGEN SATURATION: 98 % | DIASTOLIC BLOOD PRESSURE: 70 MMHG

## 2023-02-21 DIAGNOSIS — M48.061 SPINAL STENOSIS OF LUMBAR REGION WITHOUT NEUROGENIC CLAUDICATION: Primary | ICD-10-CM

## 2023-02-21 DIAGNOSIS — M48.062 SPINAL STENOSIS, LUMBAR REGION, WITH NEUROGENIC CLAUDICATION: ICD-10-CM

## 2023-02-21 DIAGNOSIS — Z79.4 TYPE 2 DIABETES MELLITUS WITH HYPERGLYCEMIA, WITH LONG-TERM CURRENT USE OF INSULIN: Primary | ICD-10-CM

## 2023-02-21 DIAGNOSIS — N18.31 HYPERTENSIVE KIDNEY DISEASE WITH STAGE 3A CHRONIC KIDNEY DISEASE: ICD-10-CM

## 2023-02-21 DIAGNOSIS — M48.061 SPINAL STENOSIS, LUMBAR REGION, WITHOUT NEUROGENIC CLAUDICATION: ICD-10-CM

## 2023-02-21 DIAGNOSIS — I12.9 HYPERTENSIVE KIDNEY DISEASE WITH STAGE 3A CHRONIC KIDNEY DISEASE: ICD-10-CM

## 2023-02-21 DIAGNOSIS — E11.65 TYPE 2 DIABETES MELLITUS WITH HYPERGLYCEMIA, WITH LONG-TERM CURRENT USE OF INSULIN: Primary | ICD-10-CM

## 2023-02-21 DIAGNOSIS — E78.5 HYPERLIPIDEMIA, UNSPECIFIED HYPERLIPIDEMIA TYPE: ICD-10-CM

## 2023-02-21 PROCEDURE — 99214 OFFICE O/P EST MOD 30 MIN: CPT | Performed by: NURSE PRACTITIONER

## 2023-02-21 RX ORDER — SEMAGLUTIDE 1.34 MG/ML
1 INJECTION, SOLUTION SUBCUTANEOUS WEEKLY
Qty: 9 ML | Refills: 1 | Status: SHIPPED | OUTPATIENT
Start: 2023-02-21

## 2023-02-21 NOTE — PROGRESS NOTES
Attached dexcom on patient and explained to him how to utilize the dexcom becky and change sensor/transmitter

## 2023-02-21 NOTE — PROGRESS NOTES
Chief Complaint  Chief Complaint   Patient presents with   • Diabetes     Type2: Patient doesn't have a meter with him today, but states that he checks his bs regularly he has no hx of retinopathy or neuropathy        Subjective          History of Present Illness    Isaias Monaco 67 y.o. presents for a follow-up evaluation for type 2 DM    He has been diabetic since greater than 10 years ago    Pt is on the following medications for their DM: metformin 1,000 mg twice a day,  Actos 15 mg daily, Ozempic 0.5 mg weekly and Toujeo 60 units daily    Jardiance 25 mg daily due to yeast infection in peritnal infection      Denies diarrhea, constipation, chest pain, shortness of breath, vision changes or numbness and tingling in feet/hands.    Pt does not have a history of diabetic retinopathy.  Last eye exam was 12/22  Pt has cataracts    Pt does/not have a history of nephropathy.  Patient is not currently taking ACE/ARB - follows with Nephrology  Angioedema with ACE    Pt does not have neuropathy.     Pt does have a history of CAD s/p 10 different stents.  May 2022 was last  No CVA or MI    Last A1C in 02/23 was 7.6    Last microalbumin in 02/23 was positive          Blood Sugars    Blood glucoses are checked 1/day.    Fasting blood glucoses: around 120    Pt has no episodes of hypoglycemia.            Hyperlipidemia     Pt denies any muscle/body aches, chest pain or shortness of breath    Pt is currently taking Crestor 40 mg HS and Zetia 10 mg daily    Last lipid panel in 02/23 showed Total 149, HDL 43, LDL 79 and Triglycerides 154            Hypertension with CKD Stage 3a    Pt denies any chest pain, palpitations, shortness of breath or headache     Current regimen includes carvedilol 25 mg BID, doxazosin 4 mg HS, HCTZ 25 mg daily            I have reviewed the patient's allergies, medicines, past medical hx, family hx and social hx.    Objective   Vital Signs:   /70   Pulse 88   Temp 93.5 °F (34.2 °C)  "(Temporal)   Ht 195.6 cm (77\")   Wt 115 kg (252 lb 12.8 oz)   SpO2 98%   BMI 29.98 kg/m²       Physical Exam   Physical Exam  Constitutional:       General: He is not in acute distress.     Appearance: Normal appearance. He is not diaphoretic.   HENT:      Head: Normocephalic and atraumatic.   Eyes:      General:         Right eye: No discharge.         Left eye: No discharge.   Skin:     General: Skin is warm and dry.   Neurological:      Mental Status: He is alert.   Psychiatric:         Mood and Affect: Mood normal.         Behavior: Behavior normal.                    Results Review:   Hemoglobin A1C   Date Value Ref Range Status   02/13/2023 7.60 (H) 4.80 - 5.60 % Final     Total Cholesterol   Date Value Ref Range Status   02/13/2023 149 0 - 200 mg/dL Final     Triglycerides   Date Value Ref Range Status   02/13/2023 154 (H) 0 - 150 mg/dL Final     HDL Cholesterol   Date Value Ref Range Status   02/13/2023 43 40 - 60 mg/dL Final     LDL Cholesterol    Date Value Ref Range Status   02/13/2023 79 0 - 100 mg/dL Final     LDL Chol Calc (NIH)   Date Value Ref Range Status   04/25/2022 45 0 - 99 mg/dL Final     VLDL Cholesterol   Date Value Ref Range Status   02/13/2023 27 5 - 40 mg/dL Final     VLDL Cholesterol Haresh   Date Value Ref Range Status   04/25/2022 27 5 - 40 mg/dL Final     LDL/HDL Ratio   Date Value Ref Range Status   02/13/2023 1.75  Final   05/21/2022 Unable to Calculate  Final         Assessment and Plan {CC Problem List  Visit Diagnosis  ROS  Review (Popup)  Health Maintenance  Quality  BestPractice  Medications  SmartSets  SnapShot Encounters  Media :23  Diagnoses and all orders for this visit:    1. Type 2 diabetes mellitus with hyperglycemia, with long-term current use of insulin (HCC) (Primary)  -     Semaglutide, 1 MG/DOSE, (Ozempic, 1 MG/DOSE,) 4 MG/3ML solution pen-injector; Inject 1 mg under the skin into the appropriate area as directed 1 (One) Time Per Week.  Dispense: 9 mL; " Refill: 1  -     Hemoglobin A1c; Future  -     Comprehensive Metabolic Panel; Future    Continue with metformin 1,000 mg twice a day and  Actos 15 mg daily,   Increase Ozempic 1 mg weekly   Increase Toujeo 62 units daily  Get CGM  A1C is slightly higher at 7.6%      2. Hyperlipidemia, unspecified hyperlipidemia type  -     Comprehensive Metabolic Panel; Future  -     Lipid Panel; Future    Total cholesterol and LDL are normal, continue with statin.    Triglycerides are slightly elevated, decrease dietary fat.       3. Hypertensive kidney disease with stage 3a chronic kidney disease (HCC)  -     Comprehensive Metabolic Panel; Future    Stable  Continue with current medication regimen  Defer management to PCP/cardiology          RTC in 4 months with me, labs prior and 8 months with Dr. Patten      Follow Up     Patient was given instructions and counseling regarding her condition or for health maintenance advice. Please see specific information pulled into the AVS if appropriate.              Ashley Martinez, MICHELLE  02/21/23

## 2023-02-21 NOTE — TELEPHONE ENCOUNTER
Pt called in said that his blood sugar has been high (276)and they are going on a big trip tomorrow and the dexcom. They are asking for a short acting insulin to be sent into [facility] [address] and [facility].

## 2023-02-21 NOTE — PATIENT INSTRUCTIONS
Continue with metformin 1,000 mg twice a day and  Actos 15 mg daily,   Increase Ozempic 1 mg weekly   Increase Toujeo 62 units daily

## 2023-02-22 RX ORDER — INSULIN LISPRO 100 [IU]/ML
INJECTION, SOLUTION INTRAVENOUS; SUBCUTANEOUS
Qty: 9 ML | Refills: 0 | Status: SHIPPED | OUTPATIENT
Start: 2023-02-22 | End: 2023-03-14

## 2023-03-14 RX ORDER — INSULIN LISPRO 100 [IU]/ML
INJECTION, SOLUTION INTRAVENOUS; SUBCUTANEOUS
Qty: 9 ML | Refills: 0 | Status: SHIPPED | OUTPATIENT
Start: 2023-03-14

## 2023-03-15 ENCOUNTER — PATIENT MESSAGE (OUTPATIENT)
Dept: FAMILY MEDICINE CLINIC | Facility: CLINIC | Age: 68
End: 2023-03-15
Payer: MEDICARE

## 2023-03-15 DIAGNOSIS — F95.9 TIC DISORDER, UNSPECIFIED: ICD-10-CM

## 2023-03-15 DIAGNOSIS — G51.4 FACIAL TWITCHING: ICD-10-CM

## 2023-03-15 DIAGNOSIS — F41.9 ANXIETY: ICD-10-CM

## 2023-03-15 DIAGNOSIS — F41.0 PANIC DISORDER: ICD-10-CM

## 2023-03-16 NOTE — TELEPHONE ENCOUNTER
From: Isaias Monaco  To: Antonia Huerta  Sent: 3/15/2023 2:45 PM EDT  Subject: Medications refill    Dr Deanna Turner,  I am requesting a refill on my Carvedilol and Clonazepam. They can be sent to [facility] [address], [phone]. Thank you for your consideration.

## 2023-03-16 NOTE — TELEPHONE ENCOUNTER
Rx Refill Note  Requested Prescriptions     Pending Prescriptions Disp Refills   • clonazePAM (KlonoPIN) 0.5 MG tablet 15 tablet 0     Sig: Take 1 tablet by mouth Daily As Needed for Anxiety.   • carvedilol (COREG) 25 MG tablet 180 tablet 3     Sig: Take 1 tablet by mouth Every 12 (Twelve) Hours.      Last office visit with prescribing clinician: 7/25/2022   Last telemedicine visit with prescribing clinician: 3/29/2023   Next office visit with prescribing clinician: Visit date not found                         Would you like a call back once the refill request has been completed: [] Yes [] No    If the office needs to give you a call back, can they leave a voicemail: [] Yes [] No    Carey Ignacio LPN  03/16/23, 11:57 EDT

## 2023-03-17 RX ORDER — CARVEDILOL 25 MG/1
25 TABLET ORAL EVERY 12 HOURS SCHEDULED
Qty: 180 TABLET | Refills: 3 | Status: SHIPPED | OUTPATIENT
Start: 2023-03-17

## 2023-03-17 RX ORDER — CLONAZEPAM 0.5 MG/1
0.5 TABLET ORAL DAILY PRN
Qty: 15 TABLET | Refills: 0 | Status: SHIPPED | OUTPATIENT
Start: 2023-03-17

## 2023-03-20 ENCOUNTER — OFFICE VISIT (OUTPATIENT)
Dept: CARDIOLOGY | Facility: CLINIC | Age: 68
End: 2023-03-20
Payer: MEDICARE

## 2023-03-20 VITALS
WEIGHT: 251 LBS | SYSTOLIC BLOOD PRESSURE: 116 MMHG | BODY MASS INDEX: 29.64 KG/M2 | DIASTOLIC BLOOD PRESSURE: 72 MMHG | HEART RATE: 82 BPM | HEIGHT: 77 IN

## 2023-03-20 DIAGNOSIS — E78.5 HYPERLIPIDEMIA, UNSPECIFIED HYPERLIPIDEMIA TYPE: ICD-10-CM

## 2023-03-20 DIAGNOSIS — Z95.5 PRESENCE OF CORONARY ANGIOPLASTY IMPLANT AND GRAFT: ICD-10-CM

## 2023-03-20 DIAGNOSIS — I25.10 CORONARY ARTERY DISEASE INVOLVING NATIVE CORONARY ARTERY OF NATIVE HEART WITHOUT ANGINA PECTORIS: ICD-10-CM

## 2023-03-20 DIAGNOSIS — R60.0 BILATERAL LOWER EXTREMITY EDEMA: ICD-10-CM

## 2023-03-20 DIAGNOSIS — I10 ESSENTIAL HYPERTENSION: Primary | ICD-10-CM

## 2023-03-20 PROCEDURE — 93000 ELECTROCARDIOGRAM COMPLETE: CPT | Performed by: INTERNAL MEDICINE

## 2023-03-20 PROCEDURE — 99214 OFFICE O/P EST MOD 30 MIN: CPT | Performed by: INTERNAL MEDICINE

## 2023-03-20 NOTE — PROGRESS NOTES
"[facility] Follow Up Office Note     Encounter Date:23  Patient:Isaias Monaco  :1955  MRN:[id number]      Chief Complaint:   Multivessel coronary artery disease  Diabetes mellitus  Hypertension  Hyperlipidemia    History of Presenting Illness:      67 yo male with a past medical history of of CAD, hypertension and hyperlipidemia.  He presented in  with unstable angina and underwent DAVONTE of proximal to mid RCA .  In 2020 he presented with angina and was referred for cardiac catheterization with DAVONTE to 99% ostial LCx lesion.    Patient was seen previously on 2022 with 2 weeks fullness in his throat and \"vague\" shortness of breath. These symptoms occured at rest and this had previously been his anginal equivalent. EKG with new ST depressions in III, AVF with TWIs. He was scheduled for [facility] for unstable angina which revealed new LAD and LCx stenosis.  He was referred for cardiac surgery but he was felt to be high risk as Faith and not able to receive blood products.  On 22 by Dr. Karimi with DAVONTE to proximal LCxand left main bifurcation x2 with DK Crush technique.  Echo prior to stenting demonstrated normal LV function. Discharged 2022.    He has been doing very well as of late.  They just went on a 2-week cruise to [facility] and occasionally during that cruise while he was at rest he would have central chest discomfort that would last a few minutes and then go away.  He denied any predisposition to have this with activity.  He has done well since that time.  He came home and the pain subsequently stopped.  He wonders if this was secondary to stress or anxiety during the time of the trip.  EKG is unchanged from prior    Review of Systems:  Review of Systems   Constitutional: Negative for fever and malaise/fatigue.   HENT: Negative for nosebleeds and sore throat.    Eyes: Negative for blurred vision and double vision.   Cardiovascular: Positive for leg " swelling. Negative for chest pain, claudication, dyspnea on exertion, orthopnea, palpitations and syncope.   Respiratory: Negative for cough, shortness of breath and snoring.    Endocrine: Negative for cold intolerance, heat intolerance and polydipsia.   Skin: Negative for itching, poor wound healing and rash.   Musculoskeletal: Negative for joint pain, joint swelling, muscle weakness and myalgias.   Gastrointestinal: Negative for abdominal pain, melena, nausea and vomiting.   Neurological: Negative for light-headedness, loss of balance, seizures, vertigo and weakness.   Psychiatric/Behavioral: Negative for altered mental status and depression.                                   Current Outpatient Medications on File Prior to Visit   Medication Sig Dispense Refill   • ARIPiprazole (ABILIFY) 2 MG tablet Take 1.5 tablets by mouth Daily.     • aspirin 81 MG EC tablet Take 1 tablet by mouth Daily. 30 tablet 6   • carvedilol (COREG) 25 MG tablet Take 1 tablet by mouth Every 12 (Twelve) Hours. 180 tablet 3   • clonazePAM (KlonoPIN) 0.5 MG tablet Take 1 tablet by mouth Daily As Needed for Anxiety. 15 tablet 0   • clopidogrel (PLAVIX) 75 MG tablet Take 1 tablet by mouth Daily. 30 tablet 11   • Continuous Blood Gluc Sensor (Dexcom G6 Sensor) Dipsense Dexcom G6 Sensors, to replace every 10 days, 3 sensors per 30 day period (NDC #[address]-38). Check 6 times a day. Dx: E 1 9 each 4   • Continuous Blood Gluc Transmit (Dexcom G6 Transmitter) misc 1 each Every 3 (Three) Months. Dispense 1 Dexcom G6 Transmitter for each 3 month period (NDC #[address]-54). Check 6 times a day. Dx: E 1 1 each 4   • dorzolamide-timolol (COSOPT) 22.3-6.8 MG/ML ophthalmic solution Apply 1 drop to eye(s) to both eyes 2 (Two) Times a Day. 20 mL 3   • doxazosin (Cardura) 4 MG tablet Take 1 tablet by mouth Every Night. 90 tablet 2   • escitalopram (LEXAPRO) 20 MG tablet Take 1 tablet by mouth Daily. 90 tablet 3   • esomeprazole (nexIUM) 40 MG capsule Take  1 capsule by mouth Daily. 90 capsule 3   • ezetimibe (Zetia) 10 MG tablet Take 1 tablet by mouth Daily. 90 tablet 3   • famotidine (PEPCID) 20 MG tablet Take 1 tablet by mouth Daily With Dinner. 90 tablet 3   • glucose blood test strip Use 4 times a day 400 each 0   • hydroCHLOROthiazide (HYDRODIURIL) 25 MG tablet Take 1 tablet by mouth.     • Insulin Lispro, 1 Unit Dial, (HUMALOG) 100 UNIT/ML solution pen-injector INJECT SUBCUTANEOUS AS DIRECTED BY PROVIDER DOSAGE IS ATTACHED MAX IS 40 UNITS PER DAY 9 mL 0   • Insulin Pen Needle ([employer] Pen Needles 31G) 31G X 8 MM misc USE AS DIRECED FOR TOUJEO INJECTIONS 100 each 3   • Lancets (FREESTYLE) lancets Use to test BG 4 times daily 400 each 1   • latanoprost (XALATAN) 0.005 % ophthalmic solution Apply 1 drop to  each eye Daily. (Patient taking differently: Apply 1 drop to eye(s) as directed by provider Every Night.) 7.5 mL 3   • metFORMIN (GLUCOPHAGE) 500 MG tablet Take 2 tablets by mouth Daily With Breakfast. Indications: start in 48 hours (Patient taking differently: Take 2 tablets by mouth Daily With Breakfast. Held  Indications: start in 48 hours) 180 tablet 3   • methocarbamol (Robaxin) 500 MG tablet Take 1 tablet by mouth As Needed (Take as needed for pain). 90 tablet 0   • pioglitazone (ACTOS) 15 MG tablet Take 1 tablet by mouth Daily. 90 tablet 3   • rosuvastatin (CRESTOR) 40 MG tablet Take 1 tablet by mouth Daily. (Patient taking differently: Take 1 tablet by mouth. MW) 90 tablet 3   • Semaglutide, 1 MG/DOSE, (Ozempic, 1 MG/DOSE,) 4 MG/3ML solution pen-injector Inject 1 mg under the skin into the appropriate area as directed 1 (One) Time Per Week. 9 mL 1   • sildenafil (VIAGRA) 50 MG tablet Take 1 tablet by mouth Daily As Needed for erectile dysfunction. (Patient taking differently: Take 1 tablet by mouth Daily As Needed for Erectile Dysfunction. PRN) 6 tablet 5   • Toujeo SoloStar 300 UNIT/ML solution pen-injector injection INJECT 60 UNITS UNDER THE SKIN INTO  THE APPROPRIATE AREA AS DIRECTED EVERY MORNING 18 mL 3   • traMADol (ULTRAM) 50 MG tablet Take 1 tablet PO up to twice daily as needed for pain.  30 day supply. 30 tablet 0   • vitamin D (ERGOCALCIFEROL) 1.25 MG (28437 UT) capsule capsule Take 1 capsule by mouth 1 (One) Time Per Week. 12 capsule 0   • Blood Glucose Monitoring Suppl (Eligible BLOOD GLUCOSE) kit TEST AS DIRECTED 1 each 0   • [DISCONTINUED] nitroglycerin (NITROSTAT) 0.4 MG SL tablet Place 1 tablet under the tongue Every 5 (Five) Minutes As Needed for Chest Pain (Only if SBP Greater Than 100). Take no more than 3 doses in 15 minutes. (Patient not taking: Reported on 3/20/2023) 30 tablet 12     No current facility-administered medications on file prior to visit.       Allergies   Allergen Reactions   • Norvasc [Amlodipine] Swelling   • Ace Inhibitors Angioedema   • Lisinopril Angioedema   • Testosterone Myalgia       Past Medical History:   Diagnosis Date   • Adiposity    • Anemia     post hemorrhagic   • Angioedema 02/21/2016    Secondary to ACE inhibitor   • Anxiety    • Arthritis    • CAD (coronary artery disease)    • Chest pain    • Colon polyps    • Diabetes mellitus, type 2 (HCC)    • ED (erectile dysfunction)    • Febrile illness    • GERD (gastroesophageal reflux disease)    • Glaucoma    • Hematoma    • High cholesterol    • History of foreign travel 12/2017; 08/2018    [place], [place], [place], [place], [place] and [place]; [place]   • Hyperlipidemia    • Hypertension    • Hypogonadism in male 09/28/2016   • Low back pain    • Male erectile disorder    • Microalbuminuria    • Obesity (BMI 30-39.9)    • Osteoarthritis     knee   • Spinal stenosis of lumbar region without neurogenic claudication 06/02/2021   • Vitamin D deficiency    • Wound infection after surgery        Past Surgical History:   Procedure Laterality Date   • CARDIAC CATHETERIZATION N/A 05/15/2006    Dr. Mini Camarillo   • CARDIAC CATHETERIZATION N/A 3/10/2020     Procedure: Left Heart Cath;  Surgeon: Miguel Ángel Karimi MD;  Location:  [facility];  Service: Cardiology;  Laterality: N/A;   • CARDIAC CATHETERIZATION N/A 3/10/2020    Procedure: Stent DAVONTE coronary;  Surgeon: Miguel Ángel Karimi MD;  Location:  [facility];  Service: Cardiology;  Laterality: N/A;   • CARDIAC CATHETERIZATION N/A 3/10/2020    Procedure: Coronary angiography;  Surgeon: Miguel Ángel Karimi MD;  Location:  [facility];  Service: Cardiology;  Laterality: N/A;   • CARDIAC CATHETERIZATION N/A 3/10/2020    Procedure: Left ventriculography;  Surgeon: Miguel Ángel Karimi MD;  Location[facility];  Service: Cardiology;  Laterality: N/A;   • CARDIAC CATHETERIZATION N/A 5/20/2022    Procedure: Left Heart Cath;  Surgeon: Mackenzie Morales MD;  Location: [facility];  Service: Cardiovascular;  Laterality: N/A;   • CARDIAC CATHETERIZATION N/A 5/20/2022    Procedure: Coronary angiography;  Surgeon: Mackenzie Morales MD;  Location: [facility];  Service: Cardiovascular;  Laterality: N/A;   • CARDIAC CATHETERIZATION N/A 5/20/2022    Procedure: Percutaneous Coronary Intervention;  Surgeon: Mackenzie Morales MD;  Location: [facility];  Service: Cardiovascular;  Laterality: N/A;   • CARDIAC CATHETERIZATION N/A 5/24/2022    Procedure: Percutaneous Coronary Intervention;  Surgeon: Miguel Ángel Karimi MD;  Location: [facility];  Service: Cardiovascular;  Laterality: N/A;  LAD and Cx   • CARDIAC CATHETERIZATION N/A 5/24/2022    Procedure: Stent DAVONTE coronary;  Surgeon: Miguel Ángel Karimi MD;  [facility]: [facility];  Service: Cardiovascular;  Laterality: N/A;   • CARDIAC CATHETERIZATION N/A 5/24/2022    Procedure: Resting Full Cycle Ratio;  Surgeon: Miguel Ángel Karimi MD;  Location: [facility];  Service: Cardiovascular;   Laterality: N/A;   • COLONOSCOPY N/A 02/22/2006    Bilobed polyp at 30 cm, hemorrhoids-Dr. Ilya Zhao   • COLONOSCOPY N/A 02/28/2014    Normal ileum, one 6 mm polyp in the mid transverse colon-Dr. Ilya Zhao   • COLONOSCOPY N/A 11/19/2008    Ela ileum, two 3 to 4 mm polyps, non-bleeding internal hemorrhoids, repeat in 5 years-Dr. Ilya Zhao   • COLONOSCOPY N/A 10/31/2017    Procedure: COLONOSCOPY WITH POLYPECTOMY (COLD BIOPSY);  Surgeon: Ilya Zhao MD;  Location: [facility] ENDOSCOPY;  Service:    • COLONOSCOPY N/A 5/21/2021    Procedure: Colonoscopy into cecum and terminal ileum with cold biopsy polypectomy;  Surgeon: Ilya Zhao MD;  Location: [facility] ENDOSCOPY;  Service: Gastroenterology;  Laterality: N/A;  Pre op: History of Polyps  Post op: Polyp   • CORONARY ANGIOPLASTY WITH STENT PLACEMENT  2007, 2012, 2015    cardiac stents x3 occasions   • ENDOSCOPY N/A 10/4/2017    Procedure: ESOPHAGOGASTRODUODENOSCOPY;  Surgeon: Boyd Guidry MD;  Location: [facility] ENDOSCOPY;  Service:    • ENDOSCOPY N/A 5/21/2021    Procedure: ESOPHAGOGASTRODUODENOSCOPY with biopsies;  Surgeon: Ilya Zhao MD;  Location: [facility] ENDOSCOPY;  Service: Gastroenterology;  Laterality: N/A;  Pre op: GERD  Post op: Irregular  Z-Line, Hiatal Hernia, Gastritis   • EPIDURAL BLOCK     • INTERVENTIONAL RADIOLOGY PROCEDURE N/A 5/20/2022    Procedure: Intravascular Ultrasound;  Surgeon: Mackenzie Morales MD;  Location: [facility] CATH INVASIVE LOCATION;  Service: Cardiovascular;  Laterality: N/A;   • KNEE INCISION AND DRAINAGE Right 6/20/2017    Procedure: RT. KNEE WASHOUT ;  Surgeon: Boyd Coyne MD;  Location: [facility] MAIN OR;  Service:    • MEDIAL BRANCH BLOCK Bilateral 12/17/2021    Procedure: LUMBAR MEDIAL BRANCH BLOCK bilateral ~L4-S1 x2 (~2 weeks apart);  Surgeon: Christina Villaseñor MD;  Location: [facility] MAIN OR;  Service: Pain Management;  Laterality: Bilateral;   • MEDIAL BRANCH BLOCK Bilateral 1/3/2022    Procedure: LUMBAR MEDIAL  BRANCH BLOCK bilateral ~L4-S1 x2 (~2 weeks apart);  Surgeon: Christina Villaseñor MD;  Location: [facility] MAIN OR;  Service: Pain Management;  Laterality: Bilateral;   • MI ARTHRP KNE CONDYLE&PLATU MEDIAL&LAT COMPARTMENTS Right 6/15/2017    Procedure: RT TOTAL KNEE ARTHROPLASTY;  Surgeon: Boyd Coyne MD;  Location: [facility] MAIN OR;  Service: Orthopedics   • RADIOFREQUENCY ABLATION Bilateral 1/10/2022    Procedure: RADIOFREQUENCY ABLATION NERVES Bilateral L4-S1;  Surgeon: Christina Villaseñor MD;  Location: [facility] MAIN [facility];  Service: Pain Management;  Laterality: Bilateral;   • SHOULDER SURGERY Right 2016    rotator cuff repair   • UPPER GASTROINTESTINAL ENDOSCOPY N/A 10/13/2015    Z-line irregular, normal stomach, normal duodenum-Dr. Ilya Zhao   • UPPER GASTROINTESTINAL ENDOSCOPY N/A 02/28/2014    Z-line irregular, normal stomach, normal duodenum-Dr. Ilya Zhao   • UPPER GASTROINTESTINAL ENDOSCOPY N/A 11/19/2008    Z-line irregular, chronic gastritis withotu hemorrhage, normal duodenum-Dr. Ilya Zhao   • UPPER GASTROINTESTINAL ENDOSCOPY N/A 06/22/2006    LA Grade A reflux esophagitis, non-bleeding erythematous gastropathy, normal duodenum-Dr. Ilya Zhao       Social History     Socioeconomic History   • Marital status:      Spouse name: Micaela   • Number of children: 1   • Years of education: College   Tobacco Use   • Smoking status: Never     Passive exposure: Never   • Smokeless tobacco: Never   • Tobacco comments:     CAFFEINE USE: 2 CUPS COFFEE DAILY   Vaping Use   • Vaping Use: Never used   Substance and Sexual Activity   • Alcohol use: Yes     Alcohol/week: 6.0 standard drinks     Types: 2 Glasses of wine, 2 Cans of beer, 1 Shots of liquor, 1 Standard drinks or equivalent per week     Comment: occasional 2-4 DRINKS PER WEEK   • Drug use: No   • Sexual activity: Yes     Partners: Female       Family History   Problem Relation Age of Onset   • Lupus Sister    • Thyroid disease Sister    • Heart disease Other   "  • Hypertension Other    • Arthritis Mother    • Hyperlipidemia Mother    • Hypertension Mother    • Thyroid disease Mother    • Lupus Mother    • Vision loss Mother    • Heart disease Father    • Arthritis Father    • Other Father         Vascular disease   • Lupus Father    • Depression Father    • Alcohol abuse Father    • Dementia Father    • Hypertension Father    • Heart disease Brother    • Heart attack Brother 40   • Thyroid disease Sister    • Arthritis Brother    • Malig Hyperthermia Neg Hx        The following portions of the patient's history were reviewed and updated as appropriate: allergies, current medications, past family history, past medical history, past social history, past surgical history and problem list.       Objective:       Vitals:    03/20/23 1535   BP: 116/72   Pulse: 82   Weight: 114 kg (251 lb)   Height: 195.6 cm (77\")       Constitutional:       Appearance: Well-developed.   Eyes:      General: No scleral icterus.     Conjunctiva/sclera: Conjunctivae normal.   HENT:      Head: Normocephalic and atraumatic.   Neck:      Thyroid: No thyromegaly.      Vascular: Normal carotid pulses. No carotid bruit, hepatojugular reflux or JVD.      Trachea: No tracheal deviation.   Pulmonary:      Effort: No respiratory distress.      Breath sounds: Normal breath sounds. No decreased breath sounds. No wheezing. No rhonchi. No rales.   Chest:      Chest wall: Not tender to palpatation.   Cardiovascular:      Normal rate. Regular rhythm.      No gallop.   Pulses:     Carotid: 2+ bilaterally.     Radial: 2+ bilaterally.     Femoral: 2+ bilaterally.     Dorsalis pedis: 2+ bilaterally.     Posterior tibial: 2+ bilaterally.  Edema:     Peripheral edema absent.   Abdominal:      General: Bowel sounds are normal. There is no distension.      Palpations: Abdomen is soft.      Tenderness: There is no abdominal tenderness.   Musculoskeletal:         General: No deformity.      Cervical back: Normal range of " motion and neck supple. Skin:     Findings: No erythema or rash.   Neurological:      Mental Status: Alert and oriented to person, place, and time.      Sensory: No sensory deficit.   Psychiatric:         Behavior: Behavior normal.           Lab Results   Component Value Date     02/13/2023     01/24/2023    K 4.2 02/13/2023    K 4.0 01/24/2023    CL 98 02/13/2023    CL 98 01/24/2023    CO2 28.7 02/13/2023    CO2 30.0 (H) 01/24/2023    BUN 16 02/13/2023    BUN 13 01/24/2023    CREATININE 1.28 (H) 02/13/2023    CREATININE 1.25 01/24/2023    EGFRIFNONA 82 10/04/2021    EGFRIFNONA 64 12/01/2020    EGFRIFAFRI 100 10/04/2021    EGFRIFAFRI 78 12/01/2020    GLUCOSE 145 (H) 02/13/2023    GLUCOSE 114 (H) 01/24/2023    CALCIUM 9.7 02/13/2023    CALCIUM 9.8 01/24/2023    PROTENTOTREF 8.0 04/25/2022    PROTENTOTREF 7.5 04/22/2022    ALBUMIN 3.9 02/13/2023    ALBUMIN 4.3 01/24/2023    BILITOT 0.4 02/13/2023    BILITOT 0.5 01/24/2023    AST 34 02/13/2023    AST 34 01/24/2023    ALT 33 02/13/2023    ALT 30 01/24/2023     Lab Results   Component Value Date    WBC 8.87 07/21/2022    WBC 8.62 05/25/2022    HGB 16.6 07/21/2022    HGB 14.0 05/25/2022    HCT 51.5 (H) 07/21/2022    HCT 41.9 05/25/2022    MCV 86.8 07/21/2022    MCV 87.5 05/25/2022     07/21/2022     05/25/2022     Lab Results   Component Value Date    CHOL 149 02/13/2023    CHOL 169 10/12/2022    TRIG 154 (H) 02/13/2023    TRIG 104 10/12/2022    HDL 43 02/13/2023    HDL 46 10/12/2022    LDL 79 02/13/2023     (H) 10/12/2022     Lab Results   Component Value Date    PROBNP 36.0 05/19/2022    PROBNP 64.0 08/03/2018     Lab Results   Component Value Date    TROPONINT <0.010 07/21/2022     Lab Results   Component Value Date    TSH 0.968 02/13/2023    TSH 0.539 07/26/2022           ECG 12 Lead    Date/Time: 3/20/2023 3:55 PM  Performed by: Miguel Ángel Karimi MD  Authorized by: Miguel Ángel Karimi MD   Comparison: compared with previous  ECG from 7/21/2022  Similar to previous ECG  Rhythm: sinus rhythm  Rate: normal  Conduction: left anterior fascicular block  QRS axis: left                  LHC w/ PCI 5/24/2022:  Procedure findings:  Left main: Discrete 30% distal stenosis  LAD: Discrete 60% ostial stenois  LCX: Diffuse 70-80% in-stent restenosis  Ramus: Small caliber ramus with 99% ostial stenosis     Hemodynamics:   AO: 109/73        PCI CORONARY SEGMENT: proximal LCX  PRE-STENOSIS: 80  POST-STENOSIS: 0%  LESION TYPE: C  GRACIE FLOW PRE/POST: 2/3   CULPRIT LESION: Yes     PCI CORONARY SEGMENT:ostial LAD  PRE-STENOSIS: 80  POST-STENOSIS: 0%  LESION TYPE: C  GRACIE FLOW PRE/POST: 3/3   CULPRIT LESION: Yes     Estimated blood loss: Minimal  Complications: None     Conclusions:   1. Successful left main bifurcation stenting with a 4.0x15mm and 3.63y95yy Xience Skypoint drug eluting stents with DK Crush technique  2. Successful PCI to the mid LCX with a 3.0x23 mm Xience Skypoint DAVONTE     Echocardiogram 5/21/2022:  · Calculated left ventricular EF = 55.6% Estimated left ventricular EF was in agreement with the calculated left ventricular EF. Left ventricular systolic function is normal.  · Left ventricular wall thickness is consistent with borderline concentric hypertrophy.  · Left ventricular diastolic function is consistent with (grade I) impaired relaxation.  · There is calcification of the aortic valve. Valvular structure is poorly visualized.  · Aortic valve area is 1.36 cm2.  · Peak velocity of the flow distal to the aortic valve is 177.4 cm/s. Aortic valve maximum pressure gradient is 12.6 mmHg. Aortic valve mean pressure gradient is 8.1 mmHg. Aortic valve dimensionless index is 0.5 .         Assessment:           Plan:       67 yo male with a past medical history of of CAD, hypertension and hyperlipidemia.  He presented in 2015 with unstable angina and underwent DAVONTE of proximal to mid RCA .  In March 2020 he presented with angina and was referred  for cardiac catheterization with DAVONTE to 99% ostial LCx lesion. He presented then underwent LM bifurcation stenting as above.  He had intermittent chest pain with his cruise but did not seem to have any increase with activity.  He has had none since he came home.  His EKG is unchanged from prior.  His blood pressure and heart rate are well controlled.    CAD:  · History prior PCI to RCA () 2018 and ostial LCx 2020  · Status post PCI to LCx and ostial LAD bifurcation 5/24/2022    · Continue aspirin and Plavix lifelong.  · Continue Crestor therapy.  No longer on Zetia.  · He will call me if he has any recurrence of this discomfort while he is here and also if he notices any increased pain with activity    Bilateral lower extremity edema:  · LVEF normal on echocardiogram  · Resolved off Amlodipine    Hypertension:  · Controlled at visit today.  He is fine off Amlodipine and Chlorthalidone.  Continue carvedilol, Cardura and hydrochlorothiazide therapy.    Hyperlipidemia:  · Well-controlled on recent labwork

## 2023-03-29 ENCOUNTER — OFFICE VISIT (OUTPATIENT)
Dept: FAMILY MEDICINE CLINIC | Facility: CLINIC | Age: 68
End: 2023-03-29
Payer: MEDICARE

## 2023-03-29 VITALS
RESPIRATION RATE: 18 BRPM | HEART RATE: 80 BPM | HEIGHT: 77 IN | OXYGEN SATURATION: 96 % | TEMPERATURE: 97.3 F | DIASTOLIC BLOOD PRESSURE: 70 MMHG | WEIGHT: 254 LBS | BODY MASS INDEX: 29.99 KG/M2 | SYSTOLIC BLOOD PRESSURE: 110 MMHG

## 2023-03-29 DIAGNOSIS — Z79.4 TYPE 2 DIABETES MELLITUS WITH HYPERGLYCEMIA, WITH LONG-TERM CURRENT USE OF INSULIN: Primary | ICD-10-CM

## 2023-03-29 DIAGNOSIS — E11.65 TYPE 2 DIABETES MELLITUS WITH HYPERGLYCEMIA, WITH LONG-TERM CURRENT USE OF INSULIN: Primary | ICD-10-CM

## 2023-03-29 DIAGNOSIS — Z00.00 ANNUAL PHYSICAL EXAM: Primary | ICD-10-CM

## 2023-03-29 DIAGNOSIS — Z12.5 SCREENING FOR PROSTATE CANCER: ICD-10-CM

## 2023-03-29 DIAGNOSIS — Z79.4 TYPE 2 DIABETES MELLITUS WITH HYPERGLYCEMIA, WITH LONG-TERM CURRENT USE OF INSULIN: ICD-10-CM

## 2023-03-29 DIAGNOSIS — I10 ESSENTIAL HYPERTENSION: ICD-10-CM

## 2023-03-29 DIAGNOSIS — E11.65 TYPE 2 DIABETES MELLITUS WITH HYPERGLYCEMIA, WITH LONG-TERM CURRENT USE OF INSULIN: ICD-10-CM

## 2023-03-29 PROBLEM — M47.817 LUMBOSACRAL SPONDYLOSIS WITHOUT MYELOPATHY: Status: ACTIVE | Noted: 2023-03-29

## 2023-03-29 PROBLEM — I25.10 CALCIFIC CORONARY ARTERIOSCLEROSIS: Status: ACTIVE | Noted: 2022-05-19

## 2023-03-29 PROBLEM — M51.36 DEGENERATION OF LUMBAR INTERVERTEBRAL DISC: Status: ACTIVE | Noted: 2023-03-29

## 2023-03-29 PROBLEM — M51.369 DEGENERATION OF LUMBAR INTERVERTEBRAL DISC: Status: ACTIVE | Noted: 2023-03-29

## 2023-03-29 PROBLEM — G24.9 DYSTONIA: Status: ACTIVE | Noted: 2022-12-16

## 2023-03-29 PROBLEM — K21.00 GASTRO-ESOPHAGEAL REFLUX DISEASE WITH ESOPHAGITIS: Status: ACTIVE | Noted: 2017-08-28

## 2023-03-29 PROCEDURE — 3074F SYST BP LT 130 MM HG: CPT | Performed by: FAMILY MEDICINE

## 2023-03-29 PROCEDURE — 99214 OFFICE O/P EST MOD 30 MIN: CPT | Performed by: FAMILY MEDICINE

## 2023-03-29 PROCEDURE — 3078F DIAST BP <80 MM HG: CPT | Performed by: FAMILY MEDICINE

## 2023-03-29 PROCEDURE — 3051F HG A1C>EQUAL 7.0%<8.0%: CPT | Performed by: FAMILY MEDICINE

## 2023-03-29 RX ORDER — PEN NEEDLE, DIABETIC 31 GX5/16"
NEEDLE, DISPOSABLE MISCELLANEOUS
Qty: 100 EACH | Refills: 3 | Status: SHIPPED | OUTPATIENT
Start: 2023-03-29

## 2023-03-29 NOTE — PROGRESS NOTES
"Chief Complaint  Chief Complaint   Patient presents with   • Diabetes   • Hypertension       Subjective    History of Present Illness        Isaias Monaco presents to [facility] for   Hypertension  This is a chronic problem. The current episode started more than 1 year ago. The problem is unchanged. The problem is controlled. Pertinent negatives include no blurred vision, chest pain, headaches, malaise/fatigue, neck pain, orthopnea or peripheral edema. Risk factors for coronary artery disease include male gender, dyslipidemia, family history and diabetes mellitus. Past treatments include diuretics and beta blockers. Current antihypertension treatment includes beta blockers and diuretics. The current treatment provides significant improvement. There are no compliance problems.  There is no history of heart failure or left ventricular hypertrophy. There is no history of coarctation of the aorta, hyperaldosteronism, hypercortisolism, hyperparathyroidism or pheochromocytoma.   Diabetes  He presents for his follow-up diabetic visit. He has type 2 diabetes mellitus. His disease course has been stable. There are no hypoglycemic associated symptoms. Pertinent negatives for hypoglycemia include no headaches. Pertinent negatives for diabetes include no blurred vision, no chest pain, no polydipsia, no polyphagia and no polyuria. There are no hypoglycemic complications. Symptoms are stable. There are no diabetic complications. Risk factors for coronary artery disease include diabetes mellitus, male sex and hypertension. He is following a generally healthy diet.        Objective   Vital Signs:   Visit Vitals  /70   Pulse 80   Temp 97.3 °F (36.3 °C)   Resp 18   Ht 195.6 cm (77\")   Wt 115 kg (254 lb)   SpO2 96%   BMI 30.12 kg/m²          Physical Exam  Vitals reviewed.   Constitutional:       Appearance: He is well-developed.   HENT:      Head: Normocephalic.      Right Ear: External ear " normal.      Left Ear: External ear normal.      Nose: Nose normal.   Eyes:      Conjunctiva/sclera: Conjunctivae normal.   Cardiovascular:      Rate and Rhythm: Normal rate and regular rhythm.   Pulmonary:      Effort: Pulmonary effort is normal.      Breath sounds: Normal breath sounds.   Musculoskeletal:         General: Normal range of motion.      Cervical back: Normal range of motion and neck supple.   Skin:     General: Skin is warm and dry.      Capillary Refill: Capillary refill takes less than 2 seconds.   Neurological:      Mental Status: He is alert and oriented to person, place, and time.            Result Review :                    Assessment and Plan      Diagnoses and all orders for this visit:    1. Type 2 diabetes mellitus with hyperglycemia, with long-term current use of insulin (HCC) (Primary)  Assessment & Plan:  Diabetes is unchanged.   Continue current treatment regimen.  Reminded to bring in blood sugar diary at next visit.  Dietary recommendations for ADA diet.  Regular aerobic exercise.  Discussed ways to avoid symptomatic hypoglycemia.  Discussed sick day management.  Discussed foot care.  Diabetes will be reassessed in 3 months.    Orders:  -     Insulin Pen Needle ([employer] Pen Needles 31G) 31G X 8 MM misc; USE AS DIRECED FOR TOUJEO INJECTIONS  Dispense: 100 each; Refill: 3    2. Essential hypertension  Assessment & Plan:  Hypertension is improving with treatment.  Continue current treatment regimen.  Dietary sodium restriction.  Weight loss.  Regular aerobic exercise.  Continue current medications.  Blood pressure will be reassessed at the next regular appointment.             Follow Up   No follow-ups on file.  Patient was given instructions and counseling regarding his condition or for health maintenance advice. Please see specific information pulled into the AVS if appropriate.

## 2023-03-31 NOTE — ASSESSMENT & PLAN NOTE
Diabetes is unchanged.   Continue current treatment regimen.  Reminded to bring in blood sugar diary at next visit.  Dietary recommendations for ADA diet.  Regular aerobic exercise.  Discussed ways to avoid symptomatic hypoglycemia.  Discussed sick day management.  Discussed foot care.  Diabetes will be reassessed in 3 months.

## 2023-04-08 DIAGNOSIS — E78.5 HYPERLIPIDEMIA, UNSPECIFIED HYPERLIPIDEMIA TYPE: ICD-10-CM

## 2023-04-11 RX ORDER — ROSUVASTATIN CALCIUM 40 MG/1
TABLET, COATED ORAL
Qty: 90 TABLET | Refills: 0 | Status: SHIPPED | OUTPATIENT
Start: 2023-04-11

## 2023-04-20 NOTE — PROGRESS NOTES
Subjective   History of Present Illness: Isaias Monaco is a 68 y.o. male is here today for follow-up on his neck and back pain. MRI cervical, thoracic and lumbar spine done on 2/3/23. He had a ablation which only provided relief for 2 days.  He had a recent MRI of his cervical spine which showed severe cervical stenosis at the C4-5 level and an MRI of the lumbar spine which showed severe stenosis at the L3-4 spinal level.  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  A lesion was identified within the T4 vertebral body.  He denies any back pain in the thoracic spine.  His back pain is in the lumbar spine around the L3-4 or L4-5 level.  He denies any lower extremity pain.  He does have difficulty walking due to the back pain.  He also denies any signs or symptoms of myelopathy.  Denies any hand numbness or weakness.    History of Present Illness    The following portions of the patient's history were reviewed and updated as appropriate: allergies, past family history, past medical history, past social history, past surgical history and problem list.    Past Medical History:   Diagnosis Date   • Adiposity    • Anemia     post hemorrhagic   • Angioedema 02/21/2016    Secondary to ACE inhibitor   • Anxiety    • Arthritis    • CAD (coronary artery disease)    • Chest pain    • Colon polyps    • Diabetes mellitus, type 2    • ED (erectile dysfunction)    • Febrile illness    • GERD (gastroesophageal reflux disease)    • Glaucoma    • Hematoma    • High cholesterol    • History of foreign travel 12/2017; 08/2018    [place], [place], [place], [place], [place] and [place]; [place]   • Hyperlipidemia    • Hypertension    • Hypogonadism in male 09/28/2016   • Low back pain    • Male erectile disorder    • Microalbuminuria    • Myocardial infarction    • Obesity (BMI 30-39.9)    • Osteoarthritis     knee   • Spinal stenosis of lumbar region without neurogenic claudication 06/02/2021   • Vitamin D  deficiency    • Wound infection after surgery         Past Surgical History:   Procedure Laterality Date   • CARDIAC CATHETERIZATION N/A 05/15/2006    Dr. Mini Camarillo   • CARDIAC CATHETERIZATION N/A 3/10/2020    Procedure: Left Heart Cath;  Surgeon: Miguel Ángel Karimi MD;  Location:  [facility];  Service: Cardiology;  Laterality: N/A;   • CARDIAC CATHETERIZATION N/A 3/10/2020    Procedure: Stent DAVONTE coronary;  Surgeon: Miguel Ángel Karimi MD;  Location:  [facility];  Service: Cardiology;  Laterality: N/A;   • CARDIAC CATHETERIZATION N/A 3/10/2020    Procedure: Coronary angiography;  Surgeon: Miguel Ángel Karimi MD;  Location:  [facility];  Service: Cardiology;  Laterality: N/A;   • CARDIAC CATHETERIZATION N/A 3/10/2020    Procedure: Left ventriculography;  Surgeon: Miguel Ángel Karimi MD;  Location:  [facility];  Service: Cardiology;  Laterality: N/A;   • CARDIAC CATHETERIZATION N/A 5/20/2022    Procedure: Left Heart Cath;  Surgeon: Mackenzie Morales MD;  Location:  [facility];  Service: Cardiovascular;  Laterality: N/A;   • CARDIAC CATHETERIZATION N/A 5/20/2022    Procedure: Coronary angiography;  Surgeon: Mackenzie Morales MD;  Location:  [facility];  Service: Cardiovascular;  Laterality: N/A;   • CARDIAC CATHETERIZATION N/A 5/20/2022    Procedure: Percutaneous Coronary Intervention;  Surgeon: Mackenzie Morales MD;  [facility];  Service: Cardiovascular;  Laterality: N/A;   • CARDIAC CATHETERIZATION N/A 5/24/2022    Procedure: Percutaneous Coronary Intervention;  Surgeon: Miguel Ángel Karimi MD;  Location:  [facility];  Service: Cardiovascular;  Laterality: N/A;  LAD and Cx   • CARDIAC CATHETERIZATION N/A 5/24/2022    Procedure: Stent DAVONTE coronary;  Surgeon: Miguel Ángel Karimi MD;  Location:  [facility];  Service:  Cardiovascular;  Laterality: N/A;   • CARDIAC CATHETERIZATION N/A 5/24/2022    Procedure: Resting Full Cycle Ratio;  Surgeon: Miguel Ángel Karimi MD;  Location: [facility] CATH INVASIVE LOCATION;  Service: Cardiovascular;  Laterality: N/A;   • COLONOSCOPY N/A 02/22/2006    Bilobed polyp at 30 cm, hemorrhoids-Dr. Ilya Zhao   • COLONOSCOPY N/A 02/28/2014    Normal ileum, one 6 mm polyp in the mid transverse colon-Dr. Ilya Zhao   • COLONOSCOPY N/A 11/19/2008    Ela ileum, two 3 to 4 mm polyps, non-bleeding internal hemorrhoids, repeat in 5 years-Dr. Ilya Zhao   • COLONOSCOPY N/A 10/31/2017    Procedure: COLONOSCOPY WITH POLYPECTOMY (COLD BIOPSY);  Surgeon: Ilya Zhao MD;  Location: [facility] ENDOSCOPY;  Service:    • COLONOSCOPY N/A 5/21/2021    Procedure: Colonoscopy into cecum and terminal ileum with cold biopsy polypectomy;  Surgeon: Ilya Zhao MD;  Location: [facility] ENDOSCOPY;  Service: Gastroenterology;  Laterality: N/A;  Pre op: History of Polyps  Post op: Polyp   • CORONARY ANGIOPLASTY WITH STENT PLACEMENT  2007, 2012, 2015    cardiac stents x3 occasions   • ENDOSCOPY N/A 10/4/2017    Procedure: ESOPHAGOGASTRODUODENOSCOPY;  Surgeon: Boyd Guidry MD;  Location: [facility] ENDOSCOPY;  Service:    • ENDOSCOPY N/A 5/21/2021    Procedure: ESOPHAGOGASTRODUODENOSCOPY with biopsies;  Surgeon: Ilya Zhao MD;  Location: [facility] ENDOSCOPY;  Service: Gastroenterology;  Laterality: N/A;  Pre op: GERD  Post op: Irregular  Z-Line, Hiatal Hernia, Gastritis   • EPIDURAL BLOCK     • INTERVENTIONAL RADIOLOGY PROCEDURE N/A 5/20/2022    Procedure: Intravascular Ultrasound;  Surgeon: Mackenzie Morales MD;  Location: [facility] CATH INVASIVE LOCATION;  Service: Cardiovascular;  Laterality: N/A;   • KNEE INCISION AND DRAINAGE Right 6/20/2017    Procedure: RT. KNEE WASHOUT ;  Surgeon: Boyd Coyne MD;  Location: [facility] MAIN OR;  Service:    • MEDIAL BRANCH BLOCK Bilateral 12/17/2021    Procedure: LUMBAR MEDIAL BRANCH  BLOCK bilateral ~L4-S1 x2 (~2 weeks apart);  Surgeon: Christina Villaseñor MD;  Location: [facility] MAIN [facility];  Service: Pain Management;  Laterality: Bilateral;   • MEDIAL BRANCH BLOCK Bilateral 1/3/2022    Procedure: LUMBAR MEDIAL BRANCH BLOCK bilateral ~L4-S1 x2 (~2 weeks apart);  Surgeon: Christina Villaseñor MD;  Location: [facility];  Service: Pain Management;  Laterality: Bilateral;   • VA ARTHRP KNE CONDYLE&PLATU MEDIAL&LAT COMPARTMENTS Right 6/15/2017    Procedure: RT TOTAL KNEE ARTHROPLASTY;  Surgeon: Boyd Coyne MD;  Location: [facility] MAIN OR;  Service: Orthopedics   • RADIOFREQUENCY ABLATION Bilateral 1/10/2022    Procedure: RADIOFREQUENCY ABLATION NERVES Bilateral L4-S1;  Surgeon: Christina Villaseñor MD;  [place] [facility] [place];  Service: Pain Management;  Laterality: Bilateral;   • SHOULDER SURGERY Right 2016    rotator cuff repair   • UPPER GASTROINTESTINAL ENDOSCOPY N/A 10/13/2015    Z-line irregular, normal stomach, normal duodenum-Dr. Ilya Zhao   • UPPER GASTROINTESTINAL ENDOSCOPY N/A 02/28/2014    Z-line irregular, normal stomach, normal duodenum-Dr. Ilya Zhao   • UPPER GASTROINTESTINAL ENDOSCOPY N/A 11/19/2008    Z-line irregular, chronic gastritis withotu hemorrhage, normal duodenum-Dr. Ilya Zhao   • UPPER GASTROINTESTINAL ENDOSCOPY N/A 06/22/2006    LA Grade A reflux esophagitis, non-bleeding erythematous gastropathy, normal duodenum-Dr. Ilya Zhao          Current Outpatient Medications:   •  ARIPiprazole (ABILIFY) 2 MG tablet, Take 1.5 tablets by mouth Daily., Disp: , Rfl:   •  aspirin 81 MG EC tablet, Take 1 tablet by mouth Daily., Disp: 30 tablet, Rfl: 6  •  Blood Glucose Monitoring Suppl ([facility]) kit, TEST AS DIRECTED, Disp: 1 each, Rfl: 0  •  carvedilol (COREG) 25 MG tablet, Take 1 tablet by mouth Every 12 (Twelve) Hours., Disp: 180 tablet, Rfl: 3  •  clonazePAM (KlonoPIN) 0.5 MG tablet, Take 1 tablet by mouth Daily As Needed for Anxiety., Disp: 15 tablet, Rfl: 0  •   clopidogrel (PLAVIX) 75 MG tablet, Take 1 tablet by mouth Daily., Disp: 30 tablet, Rfl: 11  •  Continuous Blood Gluc Sensor (Dexcom G6 Sensor), Dipsense Dexcom G6 Sensors, to replace every 10 days, 3 sensors per 30 day period (NDC #[address]-52). Check 6 times a day. Dx: E 1, Disp: 9 each, Rfl: 4  •  Continuous Blood Gluc Transmit (Dexcom G6 Transmitter) misc, 1 each Every 3 (Three) Months. Dispense 1 Dexcom G6 Transmitter for each 3 month period (NDC #[address]-75). Check 6 times a day. Dx: E 1, Disp: 1 each, Rfl: 4  •  dorzolamide-timolol (COSOPT) 22.3-6.8 MG/ML ophthalmic solution, Apply 1 drop to eye(s) to both eyes 2 (Two) Times a Day., Disp: 20 mL, Rfl: 3  •  doxazosin (Cardura) 4 MG tablet, Take 1 tablet by mouth Every Night., Disp: 90 tablet, Rfl: 2  •  escitalopram (LEXAPRO) 20 MG tablet, Take 1 tablet by mouth Daily., Disp: 90 tablet, Rfl: 3  •  esomeprazole (nexIUM) 40 MG capsule, Take 1 capsule by mouth Daily., Disp: 90 capsule, Rfl: 3  •  ezetimibe (Zetia) 10 MG tablet, Take 1 tablet by mouth Daily., Disp: 90 tablet, Rfl: 3  •  famotidine (PEPCID) 20 MG tablet, Take 1 tablet by mouth Daily With Dinner., Disp: 90 tablet, Rfl: 3  •  glucose blood test strip, Use 4 times a day, Disp: 400 each, Rfl: 0  •  hydroCHLOROthiazide (HYDRODIURIL) 25 MG tablet, Take 1 tablet by mouth., Disp: , Rfl:   •  Insulin Lispro, 1 Unit Dial, (HUMALOG) 100 UNIT/ML solution pen-injector, INJECT SUBCUTANEOUS AS DIRECTED BY PROVIDER DOSAGE IS ATTACHED MAX IS 40 UNITS PER DAY, Disp: 9 mL, Rfl: 0  •  Insulin Pen Needle ([employer] Pen Needles 31G) 31G X 8 MM misc, USE AS DIRECED FOR TOUJEO INJECTIONS, Disp: 100 each, Rfl: 3  •  Lancets (FREESTYLE) lancets, Use to test BG 4 times daily, Disp: 400 each, Rfl: 1  •  latanoprost (XALATAN) 0.005 % ophthalmic solution, Apply 1 drop to  each eye Daily. (Patient taking differently: Apply 1 drop to eye(s) as directed by provider Every Night.), Disp: 7.5 mL, Rfl: 3  •  metFORMIN (GLUCOPHAGE)  500 MG tablet, Take 2 tablets by mouth Daily With Breakfast. Indications: start in 48 hours (Patient taking differently: Take 2 tablets by mouth Daily With Breakfast. Held  Indications: start in 48 hours), Disp: 180 tablet, Rfl: 3  •  methocarbamol (Robaxin) 500 MG tablet, Take 1 tablet by mouth As Needed (Take as needed for pain)., Disp: 90 tablet, Rfl: 0  •  pioglitazone (ACTOS) 15 MG tablet, Take 1 tablet by mouth Daily., Disp: 90 tablet, Rfl: 3  •  rosuvastatin (CRESTOR) 40 MG tablet, TAKE ONE TABLET BY MOUTH EVERY DAY, Disp: 90 tablet, Rfl: 0  •  sildenafil (VIAGRA) 50 MG tablet, Take 1 tablet by mouth Daily As Needed for erectile dysfunction. (Patient taking differently: Take 1 tablet by mouth Daily As Needed for Erectile Dysfunction. PRN), Disp: 6 tablet, Rfl: 5  •  Toujeo SoloStar 300 UNIT/ML solution pen-injector injection, INJECT 60 UNITS UNDER THE SKIN INTO THE APPROPRIATE AREA AS DIRECTED EVERY MORNING, Disp: 18 mL, Rfl: 3  •  traMADol (ULTRAM) 50 MG tablet, Take 1 tablet PO up to twice daily as needed for pain.  30 day supply., Disp: 30 tablet, Rfl: 0  •  vitamin D (ERGOCALCIFEROL) 1.25 MG (62283 UT) capsule capsule, Take 1 capsule by mouth 1 (One) Time Per Week., Disp: 12 capsule, Rfl: 0     Allergies   Allergen Reactions   • Norvasc [Amlodipine] Swelling   • Ace Inhibitors Angioedema   • Lisinopril Angioedema   • Testosterone Myalgia        Social History     Socioeconomic History   • Marital status:      Spouse name: Micaela   • Number of children: 1   • Years of education: College   Tobacco Use   • Smoking status: Never     Passive exposure: Never   • Smokeless tobacco: Never   • Tobacco comments:     CAFFEINE USE: 2 CUPS COFFEE DAILY   Vaping Use   • Vaping Use: Never used   Substance and Sexual Activity   • Alcohol use: Yes     Alcohol/week: 6.0 standard drinks     Types: 2 Glasses of wine, 2 Cans of beer, 1 Shots of liquor, 1 Drinks containing 0.5 oz of alcohol per week     Comment:  "occasional 2-4 DRINKS PER WEEK   • Drug use: No   • Sexual activity: Yes     Partners: Female     Birth control/protection: Post-menopausal        Family History   Problem Relation Age of Onset   • Lupus Sister    • Thyroid disease Sister    • Heart disease Other    • Hypertension Other    • Arthritis Mother    • Hyperlipidemia Mother    • Hypertension Mother    • Thyroid disease Mother    • Lupus Mother    • Vision loss Mother    • Heart disease Father    • Arthritis Father    • Other Father         Vascular disease   • Lupus Father    • Depression Father    • Alcohol abuse Father    • Dementia Father    • Hypertension Father    • Heart disease Brother    • Heart attack Brother 40   • Thyroid disease Sister    • Arthritis Brother    • Malig Hyperthermia Neg Hx         Review of Systems   Musculoskeletal: Positive for back pain.   Neurological: Negative for weakness and numbness.       Objective     Vitals:    04/24/23 0938   BP: 124/82   Pulse: 74   Temp: 97.3 °F (36.3 °C)   SpO2: 99%   Weight: 121 kg (266 lb 9.6 oz)   Height: 195.6 cm (77\")     Body mass index is 31.61 kg/m².      Physical Exam  Neurologic Exam  Awake, alert, oriented x3  Speech is fluent and clear  No pronator drift  Motor exam  Bilateral deltoids 5/5, bilateral biceps 5/5, bilateral triceps 5/5, bilateral wrist extension 5/5 bilateral hand  5/5  Bilateral hip flexion 5/5, bilateral knee extension 5/5, bilateral DF/PF 5/5  No clonus  No Allyssa's reflex  Steady normal gait  Able to detect  light touch in all 4 extremities      Assessment & Plan   Independent Review of Radiographic Studies:      I personally reviewed the images from the following studies.  MRI of the cervical, thoracic, lumbar spine without contrast from February 3, 2023  The MRI images were reviewed.  There is moderate to severe cervical spinal stenosis at the C4-5 level and mild to moderate stenosis at the C5-6 level.  There is a T4 hypointense lesion within the vertebral " body.  There is severe degenerative changes in the lumbar spine including [employer]'s syndrome with touching spinous processes from L3-L5.  There is also severe canal stenosis at the L3-4 spinal level.    Medical Decision Makin-year-old male with severe lower back pain without neurogenic claudication  -He was also found to have severe cervical stenosis.  He denies any signs or symptoms of myelopathy.  He denies any lower extremity pain or weakness.  We talked about the risks and benefits of an L3-4 laminectomy however I do not think he is a good candidate for this at this time since he has no right lower extremity symptoms.  His pain is purely back pain at this time.  -I will plan to have him follow-up in 1 month with an MRI with and without contrast to evaluate the T4 spinal lesion.  I have also ordered a nuclear medicine study of his entire body  -I will order a TENS unit to his pharmacy    Diagnoses and all orders for this visit:    1. Spinal stenosis of lumbar region without neurogenic claudication (Primary)    2. Spinal stenosis in cervical region    3. Baastrup's syndrome  -     TENS (Transcutaneous Electrical Nerve Stimulator)    4. Back pain without radiculopathy  -     TENS (Transcutaneous Electrical Nerve Stimulator)    5. Spinal axis tumor  -     MRI Thoracic Spine With & Without Contrast; Future  -     NM Bone Scan 3 Ph Whole Body; Future      Return in about 4 weeks (around 2023).  I spent 35 minutes reviewing the medical record, reviewing the MRI images, discussing the management of back pain, discussing the management of spinal stenosis, discussing [employer]'s disease

## 2023-04-24 ENCOUNTER — OFFICE VISIT (OUTPATIENT)
Dept: NEUROSURGERY | Facility: CLINIC | Age: 68
End: 2023-04-24
Payer: MEDICARE

## 2023-04-24 VITALS
SYSTOLIC BLOOD PRESSURE: 124 MMHG | DIASTOLIC BLOOD PRESSURE: 82 MMHG | WEIGHT: 266.6 LBS | OXYGEN SATURATION: 99 % | BODY MASS INDEX: 31.48 KG/M2 | HEIGHT: 77 IN | TEMPERATURE: 97.3 F | HEART RATE: 74 BPM

## 2023-04-24 DIAGNOSIS — M54.9 BACK PAIN WITHOUT RADICULOPATHY: ICD-10-CM

## 2023-04-24 DIAGNOSIS — M48.061 SPINAL STENOSIS OF LUMBAR REGION WITHOUT NEUROGENIC CLAUDICATION: Primary | ICD-10-CM

## 2023-04-24 DIAGNOSIS — D49.2 SPINAL AXIS TUMOR: ICD-10-CM

## 2023-04-24 DIAGNOSIS — M48.20 BAASTRUP'S SYNDROME: ICD-10-CM

## 2023-04-24 DIAGNOSIS — M48.02 SPINAL STENOSIS IN CERVICAL REGION: ICD-10-CM

## 2023-04-24 PROCEDURE — 3074F SYST BP LT 130 MM HG: CPT | Performed by: NEUROLOGICAL SURGERY

## 2023-04-24 PROCEDURE — 3079F DIAST BP 80-89 MM HG: CPT | Performed by: NEUROLOGICAL SURGERY

## 2023-04-24 PROCEDURE — 99214 OFFICE O/P EST MOD 30 MIN: CPT | Performed by: NEUROLOGICAL SURGERY

## 2023-04-24 PROCEDURE — 1159F MED LIST DOCD IN RCRD: CPT | Performed by: NEUROLOGICAL SURGERY

## 2023-04-24 PROCEDURE — 1160F RVW MEDS BY RX/DR IN RCRD: CPT | Performed by: NEUROLOGICAL SURGERY

## 2023-04-25 DIAGNOSIS — Z79.4 CONTROLLED TYPE 2 DIABETES MELLITUS WITH COMPLICATION, WITH LONG-TERM CURRENT USE OF INSULIN: ICD-10-CM

## 2023-04-25 DIAGNOSIS — E11.8 CONTROLLED TYPE 2 DIABETES MELLITUS WITH COMPLICATION, WITH LONG-TERM CURRENT USE OF INSULIN: ICD-10-CM

## 2023-04-25 RX ORDER — INSULIN GLARGINE 300 U/ML
64 INJECTION, SOLUTION SUBCUTANEOUS DAILY
Qty: 21 ML | Refills: 1 | Status: SHIPPED | OUTPATIENT
Start: 2023-04-25

## 2023-05-04 ENCOUNTER — HOSPITAL ENCOUNTER (OUTPATIENT)
Dept: NUCLEAR MEDICINE | Facility: HOSPITAL | Age: 68
Discharge: HOME | End: 2023-05-04
Payer: MEDICARE

## 2023-05-04 ENCOUNTER — HOSPITAL ENCOUNTER (OUTPATIENT)
Dept: NUCLEAR MEDICINE | Facility: HOSPITAL | Age: 68
End: 2023-05-04
Payer: MEDICARE

## 2023-05-04 DIAGNOSIS — D49.2 SPINAL AXIS TUMOR: ICD-10-CM

## 2023-05-04 DIAGNOSIS — M48.061 SPINAL STENOSIS OF LUMBAR REGION WITHOUT NEUROGENIC CLAUDICATION: ICD-10-CM

## 2023-05-04 DIAGNOSIS — M48.062 SPINAL STENOSIS, LUMBAR REGION, WITH NEUROGENIC CLAUDICATION: ICD-10-CM

## 2023-05-04 DIAGNOSIS — M48.062 SPINAL STENOSIS, LUMBAR REGION, WITH NEUROGENIC CLAUDICATION: Primary | ICD-10-CM

## 2023-05-04 PROCEDURE — A9503 TC99M MEDRONATE: HCPCS | Performed by: NEUROLOGICAL SURGERY

## 2023-05-04 PROCEDURE — 0 TECHNETIUM MEDRONATE KIT: Performed by: NEUROLOGICAL SURGERY

## 2023-05-04 PROCEDURE — 78306 BONE IMAGING WHOLE BODY: CPT

## 2023-05-04 RX ORDER — TC 99M MEDRONATE 20 MG/10ML
21.8 INJECTION, POWDER, LYOPHILIZED, FOR SOLUTION INTRAVENOUS
Status: COMPLETED | OUTPATIENT
Start: 2023-05-04 | End: 2023-05-04

## 2023-05-04 RX ADMIN — Medication 21.8 MILLICURIE: at 09:58

## 2023-05-22 ENCOUNTER — HOSPITAL ENCOUNTER (OUTPATIENT)
Dept: MRI IMAGING | Facility: HOSPITAL | Age: 68
Discharge: HOME | End: 2023-05-22
Admitting: NEUROLOGICAL SURGERY
Payer: MEDICARE

## 2023-05-22 DIAGNOSIS — D49.2 SPINAL AXIS TUMOR: ICD-10-CM

## 2023-05-22 PROCEDURE — A9577 INJ MULTIHANCE: HCPCS | Performed by: NEUROLOGICAL SURGERY

## 2023-05-22 PROCEDURE — 72157 MRI CHEST SPINE W/O & W/DYE: CPT

## 2023-05-22 PROCEDURE — 82565 ASSAY OF CREATININE: CPT

## 2023-05-22 PROCEDURE — 0 GADOBENATE DIMEGLUMINE 529 MG/ML SOLUTION: Performed by: NEUROLOGICAL SURGERY

## 2023-05-22 RX ADMIN — GADOBENATE DIMEGLUMINE 20 ML: 529 INJECTION, SOLUTION INTRAVENOUS at 14:08

## 2023-05-23 LAB — CREAT BLDA-MCNC: 1.2 MG/DL (ref 0.6–1.3)

## 2023-06-01 NOTE — PROGRESS NOTES
Subjective   History of Present Illness: Isaias Monaco is a 68 y.o. male is here today for follow-up on neck and back pain. Total body bone scan done 5/1/23 and MRI Thoracic done 5/22/23 to evaluate a suspected T4 spinal lesion.   He is doing well today.  No new complaints.  Denies any changes in the the frequency or severity of his back and neck pain.  Denies any changes in strength or sensation.  Denies any difficulty walking.  Pain is still localized to his lower back and neck without any radiation into his extremities.    History of Present Illness    The following portions of the patient's history were reviewed and updated as appropriate: allergies, past family history, past medical history, past social history, past surgical history, and problem list.    Past Medical History:   Diagnosis Date    Adiposity     Anemia     post hemorrhagic    Angioedema 02/21/2016    Secondary to ACE inhibitor    Anxiety     Arthritis     CAD (coronary artery disease)     Chest pain     Colon polyps     Diabetes mellitus, type 2     ED (erectile dysfunction)     Febrile illness     GERD (gastroesophageal reflux disease)     Glaucoma     Hematoma     High cholesterol     History of foreign travel 12/2017; 08/2018    [place], [place], [place], [place], [place] and [place]; [place]    Hyperlipidemia     Hypertension     Hypogonadism in male 09/28/2016    Low back pain     Male erectile disorder     Microalbuminuria     Myocardial infarction     Obesity (BMI 30-39.9)     Osteoarthritis     knee    Spinal stenosis of lumbar region without neurogenic claudication 06/02/2021    Vitamin D deficiency     Wound infection after surgery         Past Surgical History:   Procedure Laterality Date    CARDIAC CATHETERIZATION N/A 05/15/2006    Dr. Mini Camarillo    CARDIAC CATHETERIZATION N/A 03/10/2020    Procedure: Left Heart Cath;  Surgeon: Miguel Ángel Karimi MD;  Location: [facility] INVASIVE LOCATION;  Service:  Cardiology;  Laterality: N/A;    CARDIAC CATHETERIZATION N/A 03/10/2020    Procedure: Stent DAVONTE coronary;  Surgeon: Miguel Ángel Karimi MD;  Location:  [facility] CATH INVASIVE LOCATION;  Service: Cardiology;  Laterality: N/A;    CARDIAC CATHETERIZATION N/A 03/10/2020    Procedure: Coronary angiography;  Surgeon: Miguel Ángel Karimi MD;  Location:  [facility] CATH INVASIVE LOCATION;  Service: Cardiology;  Laterality: N/A;    CARDIAC CATHETERIZATION N/A 03/10/2020    Procedure: Left ventriculography;  Surgeon: Miguel Ángel Karimi MD;  Location:  [facility] CATH INVASIVE LOCATION;  Service: Cardiology;  Laterality: N/A;    CARDIAC CATHETERIZATION N/A 05/20/2022    Procedure: Left Heart Cath;  Surgeon: Mackenzie Morales MD;  [facility] C[facility];  Service: Cardiovascular;  Laterality: N/A;    CARDIAC CATHETERIZATION N/A 05/20/2022    Procedure: Coronary angiography;  Surgeon: Mackenzie Morales MD;  [facility];  Service: Cardiovascular;  Laterality: N/A;    CARDIAC CATHETERIZATION N/A 05/20/2022    Procedure: Percutaneous Coronary Intervention;  Surgeon: Mackenzie Morales MD;  Location:  [facility] CATH INVASIVE LOCATION;  Service: Cardiovascular;  Laterality: N/A;    CARDIAC CATHETERIZATION N/A 05/24/2022    Procedure: Percutaneous Coronary Intervention;  Surgeon: Miguel Ángel Karimi MD;  [facility];  Service: Cardiovascular;  Laterality: N/A;  LAD and Cx    CARDIAC CATHETERIZATION N/A 05/24/2022    Procedure: Stent DAVONTE coronary;  Surgeon: Miguel Ángel Karimi MD;  [facility];  Service: Cardiovascular;  Laterality: N/A;    CARDIAC CATHETERIZATION N/A 05/24/2022    Procedure: Resting Full Cycle Ratio;  Surgeon: Miguel Ángel Karimi MD;  [facility];  Service: Cardiovascular;  Laterality: N/A;    COLONOSCOPY N/A 02/22/2006    Bilobed polyp at 30 cm, hemorrhoids-Dr. Ilya Zhao    COLONOSCOPY N/A  02/28/2014    Normal ileum, one 6 mm polyp in the mid transverse colon-Dr. Ilya Zhao    COLONOSCOPY N/A 11/19/2008    Ela ileum, two 3 to 4 mm polyps, non-bleeding internal hemorrhoids, repeat in 5 years-Dr. Ilya Zhao    COLONOSCOPY N/A 10/31/2017    Procedure: COLONOSCOPY WITH POLYPECTOMY (COLD BIOPSY);  Surgeon: Ilya Zhao MD;  Location: [facility] ENDOSCOPY;  Service:     COLONOSCOPY N/A 05/21/2021    Procedure: Colonoscopy into cecum and terminal ileum with cold biopsy polypectomy;  Surgeon: Ilya Zhao MD;  [facility];  Service: Gastroenterology;  Laterality: N/A;  Pre op: History of Polyps  Post op: Polyp    CORONARY ANGIOPLASTY WITH STENT PLACEMENT  2007, 2012, 2015    cardiac stents x3 occasions    ENDOSCOPY N/A 10/04/2017    Procedure: ESOPHAGOGASTRODUODENOSCOPY;  Surgeon: Boyd Guidry MD;  Location: [facility] ENDOSCOPY;  Service:     ENDOSCOPY N/A 05/21/2021    Procedure: ESOPHAGOGASTRODUODENOSCOPY with biopsies;  Surgeon: Ilya Zhao MD;  Location: [facility] ENDOSCOPY;  Service: Gastroenterology;  Laterality: N/A;  Pre op: GERD  Post op: Irregular  Z-Line, Hiatal Hernia, Gastritis    EPIDURAL BLOCK      INTERVENTIONAL RADIOLOGY PROCEDURE N/A 05/20/2022    Procedure: Intravascular Ultrasound;  Surgeon: Mackenzie Morales MD;  Location: [facility] CATH INVASIVE LOCATION;  Service: Cardiovascular;  Laterality: N/A;    KNEE INCISION AND DRAINAGE Right 06/20/2017    Procedure: RT. KNEE WASHOUT ;  Surgeon: Boyd Coyne MD;  [facility];  Service:     MEDIAL BRANCH BLOCK Bilateral 12/17/2021    Procedure: LUMBAR MEDIAL BRANCH BLOCK bilateral ~L4-S1 x2 (~2 weeks apart);  Surgeon: Christina Villaseñor MD;  Location: [facility] MAIN OR;  Service: Pain Management;  Laterality: Bilateral;    MEDIAL BRANCH BLOCK Bilateral 01/03/2022    Procedure: LUMBAR MEDIAL BRANCH BLOCK bilateral ~L4-S1 x2 (~2 weeks apart);  Surgeon: Christina Villaseñor MD;  Location: [facility] MAIN [facility];  Service: Pain  Management;  Laterality: Bilateral;    MA ARTHRP KNE CONDYLE&PLATU MEDIAL&LAT COMPARTMENTS Right 06/15/2017    Procedure: RT TOTAL KNEE ARTHROPLASTY;  Surgeon: Boyd Coyne MD;  Location[facility] MAIN OR;  Service: Orthopedics    RADIOFREQUENCY ABLATION Bilateral 01/10/2022    Procedure: RADIOFREQUENCY ABLATION NERVES Bilateral L4-S1;  Surgeon: Christina Villaseñor MD;  [place] [facility] [place] [place];  Service: Pain Management;  Laterality: Bilateral;    SHOULDER SURGERY Right 2016    rotator cuff repair    UPPER GASTROINTESTINAL ENDOSCOPY N/A 10/13/2015    Z-line irregular, normal stomach, normal duodenum-Dr. Ilya Zhao    UPPER GASTROINTESTINAL ENDOSCOPY N/A 02/28/2014    Z-line irregular, normal stomach, normal duodenum-Dr. Ilya Zhao    UPPER GASTROINTESTINAL ENDOSCOPY N/A 11/19/2008    Z-line irregular, chronic gastritis withotu hemorrhage, normal duodenum-Dr. Ilya Zhao    UPPER GASTROINTESTINAL ENDOSCOPY N/A 06/22/2006    LA Grade A reflux esophagitis, non-bleeding erythematous gastropathy, normal duodenum-Dr. Ilya Zhao          Current Outpatient Medications:     aspirin 81 MG EC tablet, Take 1 tablet by mouth Daily., Disp: 30 tablet, Rfl: 6    Blood Glucose Monitoring Suppl (TraveeOGER BLOOD GLUCOSE) kit, TEST AS DIRECTED, Disp: 1 each, Rfl: 0    carvedilol (COREG) 25 MG tablet, Take 1 tablet by mouth Every 12 (Twelve) Hours., Disp: 180 tablet, Rfl: 3    clonazePAM (KlonoPIN) 0.5 MG tablet, Take 1 tablet by mouth Daily As Needed for Anxiety., Disp: 15 tablet, Rfl: 0    clopidogrel (PLAVIX) 75 MG tablet, Take 1 tablet by mouth Daily., Disp: 30 tablet, Rfl: 11    Continuous Blood Gluc Sensor (Dexcom G6 Sensor), Dipsense Dexcom G6 Sensors, to replace every 10 days, 3 sensors per 30 day period (NDC #[address]-12). Check 6 times a day. Dx: E 1, Disp: 9 each, Rfl: 4    Continuous Blood Gluc Transmit (Dexcom G6 Transmitter) misc, 1 each Every 3 (Three) Months. Dispense 1 Dexcom G6 Transmitter for each 3 month period  (NDC #[address]-64). Check 6 times a day. Dx: E 1, Disp: 1 each, Rfl: 4    dorzolamide-timolol (COSOPT) 22.3-6.8 MG/ML ophthalmic solution, Apply 1 drop to eye(s) to both eyes 2 (Two) Times a Day., Disp: 20 mL, Rfl: 3    doxazosin (Cardura) 4 MG tablet, Take 1 tablet by mouth Every Night., Disp: 90 tablet, Rfl: 2    escitalopram (LEXAPRO) 20 MG tablet, Take 1 tablet by mouth Daily., Disp: 90 tablet, Rfl: 3    esomeprazole (nexIUM) 40 MG capsule, Take 1 capsule by mouth Daily., Disp: 90 capsule, Rfl: 3    famotidine (PEPCID) 20 MG tablet, Take 1 tablet by mouth Daily With Dinner., Disp: 90 tablet, Rfl: 3    glucose blood test strip, Use 4 times a day, Disp: 400 each, Rfl: 0    hydroCHLOROthiazide (HYDRODIURIL) 25 MG tablet, Take 1 tablet by mouth., Disp: , Rfl:     Insulin Lispro, 1 Unit Dial, (HUMALOG) 100 UNIT/ML solution pen-injector, INJECT SUBCUTANEOUS AS DIRECTED BY PROVIDER DOSAGE IS ATTACHED MAX IS 40 UNITS PER DAY, Disp: 9 mL, Rfl: 0    Insulin Pen Needle ([employer] Pen Needles 31G) 31G X 8 MM misc, USE AS DIRECED FOR TOUJEO INJECTIONS, Disp: 100 each, Rfl: 3    Lancets (FREESTYLE) lancets, Use to test BG 4 times daily, Disp: 400 each, Rfl: 1    latanoprost (XALATAN) 0.005 % ophthalmic solution, Apply 1 drop to  each eye Daily. (Patient taking differently: Apply 1 drop to eye(s) as directed by provider Every Night.), Disp: 7.5 mL, Rfl: 3    metFORMIN (GLUCOPHAGE) 500 MG tablet, Take 2 tablets by mouth Daily With Breakfast. Indications: start in 48 hours (Patient taking differently: Take 2 tablets by mouth Daily With Breakfast. Held  Indications: start in 48 hours), Disp: 180 tablet, Rfl: 3    methocarbamol (Robaxin) 500 MG tablet, Take 1 tablet by mouth As Needed (Take as needed for pain)., Disp: 90 tablet, Rfl: 0    pioglitazone (ACTOS) 15 MG tablet, Take 1 tablet by mouth Daily., Disp: 90 tablet, Rfl: 3    rosuvastatin (CRESTOR) 40 MG tablet, TAKE ONE TABLET BY MOUTH EVERY DAY, Disp: 90 tablet, Rfl: 0     sildenafil (VIAGRA) 50 MG tablet, Take 1 tablet by mouth Daily As Needed for erectile dysfunction. (Patient taking differently: Take 1 tablet by mouth Daily As Needed for Erectile Dysfunction. PRN), Disp: 6 tablet, Rfl: 5    Toujeo SoloStar 300 UNIT/ML solution pen-injector injection, Inject 64 Units under the skin into the appropriate area as directed Daily., Disp: 21 mL, Rfl: 1    traMADol (ULTRAM) 50 MG tablet, Take 1 tablet PO up to twice daily as needed for pain.  30 day supply., Disp: 30 tablet, Rfl: 0    ARIPiprazole (ABILIFY) 2 MG tablet, Take 1.5 tablets by mouth Daily., Disp: , Rfl:      Allergies   Allergen Reactions    Norvasc [Amlodipine] Swelling    Ace Inhibitors Angioedema    Lisinopril Angioedema    Testosterone Myalgia        Social History     Socioeconomic History    Marital status:      Spouse name: Micaela    Number of children: 1    Years of education: College   Tobacco Use    Smoking status: Never     Passive exposure: Never    Smokeless tobacco: Never    Tobacco comments:     CAFFEINE USE: 2 CUPS COFFEE DAILY   Vaping Use    Vaping Use: Never used   Substance and Sexual Activity    Alcohol use: Yes     Alcohol/week: 6.0 standard drinks     Types: 2 Glasses of wine, 2 Cans of beer, 1 Shots of liquor, 1 Drinks containing 0.5 oz of alcohol per week     Comment: occasional 2-4 DRINKS PER WEEK    Drug use: No    Sexual activity: Yes     Partners: Female     Birth control/protection: Post-menopausal        Family History   Problem Relation Age of Onset    Lupus Sister     Thyroid disease Sister     Heart disease Other     Hypertension Other     Arthritis Mother     Hyperlipidemia Mother     Hypertension Mother     Thyroid disease Mother     Lupus Mother     Vision loss Mother     Heart disease Father     Arthritis Father     Other Father         Vascular disease    Lupus Father     Depression Father     Alcohol abuse Father     Dementia Father     Hypertension Father     Heart disease Brother      Heart attack Brother 40    Thyroid disease Sister     Arthritis Brother     Malig Hyperthermia Neg Hx         Review of Systems   Musculoskeletal:  Positive for back pain (bilateral leg pain), neck pain and neck stiffness.     Objective     Vitals:    23 1306   BP: 120/76   Pulse: 79   Temp: 98.4 °F (36.9 °C)   SpO2: 97%   Weight: 119 kg (262 lb 12.8 oz)     Body mass index is 31.16 kg/m².      Physical Exam  Neurologic Exam  Awake, alert, oriented x3  Pupils equal round reactive to light  Extraocular muscles intact  Face symmetric  Speech is fluent and clear  No pronator drift  Motor exam  Bilateral deltoids 5/5, bilateral biceps 5/5, bilateral triceps 5/5, bilateral wrist extension 5/5 bilateral hand  5/5  Bilateral hip flexion 5/5, bilateral knee extension 5/5, bilateral DF/PF 5/5  No clonus  No Allyssa's reflex  Steady normal gait  Able to detect  light touch in all 4 extremities        Assessment & Plan   Independent Review of Radiographic Studies:      I personally reviewed the images from the following studies.  Nuclear medicine bone scan from May 4, 2023, MRI of the thoracic spine with and without contrast from May 22, 2023  No evidence of metastatic lesions    Medical Decision Makin-year-old male with severe lower back pain without neurogenic claudication  -He has previously been seen for severe neck pain and lower back pain.  The pain does not radiate into his extremities.  He denies any changes in strength or sensation.  He does have moderate to severe canal stenosis however without any neurologic symptoms I would not offer him surgery.  -On his previous thoracic MRI on February 3, 2023 there was a report from radiology indicating abnormal marrow edema within the T4 vertebral body.  It was recommended to follow-up with a nuclear medicine bone scan and repeat MRI.  The follow-up imaging shows no evidence of tumor.  No evidence of metastatic lesions.  I will plan to have him follow-up  as needed with any new neurologic symptoms or worsening of his condition.    Diagnoses and all orders for this visit:    1. Spinal stenosis of lumbar region without neurogenic claudication (Primary)    2. Back pain without radiculopathy    3. Neck pain      Return if symptoms worsen or fail to improve.  I spent 30 minutes reviewing the medical record, reviewing the MRI images, reviewing the nuclear medicine bone scan, discussing the management strategies for back pain

## 2023-06-05 ENCOUNTER — OFFICE VISIT (OUTPATIENT)
Dept: NEUROSURGERY | Facility: CLINIC | Age: 68
End: 2023-06-05
Payer: MEDICARE

## 2023-06-05 VITALS
SYSTOLIC BLOOD PRESSURE: 120 MMHG | HEART RATE: 79 BPM | TEMPERATURE: 98.4 F | DIASTOLIC BLOOD PRESSURE: 76 MMHG | WEIGHT: 262.8 LBS | BODY MASS INDEX: 31.16 KG/M2 | OXYGEN SATURATION: 97 %

## 2023-06-05 DIAGNOSIS — M54.2 NECK PAIN: ICD-10-CM

## 2023-06-05 DIAGNOSIS — M48.061 SPINAL STENOSIS OF LUMBAR REGION WITHOUT NEUROGENIC CLAUDICATION: Primary | ICD-10-CM

## 2023-06-05 DIAGNOSIS — M54.9 BACK PAIN WITHOUT RADICULOPATHY: ICD-10-CM

## 2023-06-06 DIAGNOSIS — G24.4 MEIGE SYNDROME (BLEPHAROSPASM WITH OROMANDIBULAR DYSTONIA): ICD-10-CM

## 2023-06-06 NOTE — TELEPHONE ENCOUNTER
Rx Refill Note  Requested Prescriptions     Pending Prescriptions Disp Refills    escitalopram (LEXAPRO) 20 MG tablet 90 tablet 3     Sig: Take 1 tablet by mouth Daily.      Last office visit with prescribing clinician: 3/29/2023   Last telemedicine visit with prescribing clinician: Visit date not found   Next office visit with prescribing clinician: 7/28/2023                         Would you like a call back once the refill request has been completed: [] Yes [] No    If the office needs to give you a call back, can they leave a voicemail: [] Yes [] No    Jing Garcia MA  06/06/23, 12:32 EDT

## 2023-06-07 RX ORDER — ESCITALOPRAM OXALATE 20 MG/1
20 TABLET ORAL DAILY
Qty: 90 TABLET | Refills: 3 | Status: SHIPPED | OUTPATIENT
Start: 2023-06-07

## 2023-06-13 ENCOUNTER — TELEPHONE (OUTPATIENT)
Dept: CARDIOLOGY | Facility: CLINIC | Age: 68
End: 2023-06-13
Payer: MEDICARE

## 2023-06-13 DIAGNOSIS — K21.9 GASTROESOPHAGEAL REFLUX DISEASE WITHOUT ESOPHAGITIS: ICD-10-CM

## 2023-06-13 RX ORDER — FAMOTIDINE 20 MG/1
20 TABLET, FILM COATED ORAL
Qty: 90 TABLET | Refills: 3 | Status: SHIPPED | OUTPATIENT
Start: 2023-06-13

## 2023-06-13 NOTE — TELEPHONE ENCOUNTER
"Viridiana called from [facility]. They are needing patient to stop his Plavix 7 days prior. I explain to them per (clearance was already sent in May). The max dose he would like for pt to hold Plavix is 5 days prior and continue his ASA. Per  \" Status post PCI to LCx and ostial LAD bifurcation 5/24/2022\".     Since he can not hold his Plavix 7 days prior. They would like for me to send the clearance back stating that it is no approved. Per Viridiana Basilio does not need to resign the papers.  "

## 2023-06-13 NOTE — TELEPHONE ENCOUNTER
Rx Refill Note  Requested Prescriptions     Pending Prescriptions Disp Refills    famotidine (PEPCID) 20 MG tablet 90 tablet 3     Sig: Take 1 tablet by mouth Daily With Dinner.      Last office visit with prescribing clinician: 3/29/2023   Last telemedicine visit with prescribing clinician: Visit date not found   Next office visit with prescribing clinician: 7/28/2023                         Would you like a call back once the refill request has been completed: [] Yes [] No    If the office needs to give you a call back, can they leave a voicemail: [] Yes [] No    Jing Garcia MA  06/13/23, 08:53 EDT

## 2023-06-15 ENCOUNTER — OFFICE VISIT (OUTPATIENT)
Dept: ENDOCRINOLOGY | Age: 68
End: 2023-06-15
Payer: MEDICARE

## 2023-06-15 VITALS
WEIGHT: 258.6 LBS | DIASTOLIC BLOOD PRESSURE: 82 MMHG | HEIGHT: 77 IN | SYSTOLIC BLOOD PRESSURE: 116 MMHG | OXYGEN SATURATION: 96 % | BODY MASS INDEX: 30.53 KG/M2 | HEART RATE: 87 BPM

## 2023-06-15 DIAGNOSIS — E11.29 TYPE 2 DIABETES MELLITUS WITH MICROALBUMINURIA, WITH LONG-TERM CURRENT USE OF INSULIN: Primary | ICD-10-CM

## 2023-06-15 DIAGNOSIS — I12.9 HYPERTENSIVE KIDNEY DISEASE WITH STAGE 3A CHRONIC KIDNEY DISEASE: ICD-10-CM

## 2023-06-15 DIAGNOSIS — R80.9 TYPE 2 DIABETES MELLITUS WITH MICROALBUMINURIA, WITH LONG-TERM CURRENT USE OF INSULIN: Primary | ICD-10-CM

## 2023-06-15 DIAGNOSIS — Z79.4 TYPE 2 DIABETES MELLITUS WITH MICROALBUMINURIA, WITH LONG-TERM CURRENT USE OF INSULIN: Primary | ICD-10-CM

## 2023-06-15 DIAGNOSIS — N18.31 HYPERTENSIVE KIDNEY DISEASE WITH STAGE 3A CHRONIC KIDNEY DISEASE: ICD-10-CM

## 2023-06-15 DIAGNOSIS — E78.5 HYPERLIPIDEMIA, UNSPECIFIED HYPERLIPIDEMIA TYPE: ICD-10-CM

## 2023-06-15 RX ORDER — ARIPIPRAZOLE 5 MG/1
1.5 TABLET ORAL DAILY
COMMUNITY
Start: 2023-05-31

## 2023-06-15 NOTE — PROGRESS NOTES
Chief Complaint  Chief Complaint   Patient presents with    Diabetes     No Neuropathy       Subjective          History of Present Illness    Isaias Monaco 68 y.o. presents for a follow-up evaluation for type 2 DM     He has been diabetic since greater than 10 years ago     Pt is on the following medications for their DM: metformin 1,000 mg daily,  Actos 15 mg daily and Toujeo 65 units daily        Ozempic 1 mg weekly - stopped 5-6 weeks ago due to cost  Jardiance 25 mg daily due to yeast infection in peritnal infection      Denies diarrhea, constipation, chest pain, shortness of breath, vision changes or numbness and tingling in feet/hands.    Weight gain of 6 lbs since last visit.    Pt does not have a history of diabetic retinopathy.  Last eye exam was 12/22  Pt has cataracts     Pt does have a history of nephropathy.  Patient is not currently taking ACE/ARB - follows with Nephrology  Angioedema with ACE     Pt does not have neuropathy.      Pt does have a history of CAD s/p 10 different stents.  May 2022 was last  No CVA or MI    Last A1C in 06/23 was 6.7    Last microalbumin in 02/23 was positive           Blood Sugars    Blood glucoses are checked via Dexcom    Fasting blood glucoses: low 100s - mid 100s    Pre-meal blood glucoses: 80 - low     Pt has few episodes of hypoglycemia.            Sensor Data    Time in range 77%  High 20%  Very high 2%  Low <1%  Very low <1%      Average Glucose - 148 mg/dL      Sensor Wear - 100 %            Hyperlipidemia     Pt denies any muscle/body aches, chest pain or shortness of breath    Pt is currently taking Crestor 40 mg HS and Zetia 10 mg daily     Last lipid panel in 02/23 showed Total 147, HDL 50, LDL 98 and Triglycerides 163    Pt is going to be in cholesterol study over around [facility]            Hypertension with CKD Stage 3a     Pt denies any chest pain, palpitations, shortness of breath or headache      Current regimen includes carvedilol 25 mg BID,  "doxazosin 4 mg HS, HCTZ 25 mg daily            I have reviewed the patient's allergies, medicines, past medical hx, family hx and social hx.    Objective   Vital Signs:   /82   Pulse 87   Ht 195.6 cm (77\")   Wt 117 kg (258 lb 9.6 oz)   SpO2 96%   BMI 30.67 kg/m²       Physical Exam   Physical Exam  Constitutional:       General: He is not in acute distress.     Appearance: Normal appearance. He is not diaphoretic.   HENT:      Head: Normocephalic and atraumatic.   Eyes:      General:         Right eye: No discharge.         Left eye: No discharge.   Skin:     General: Skin is warm and dry.   Neurological:      Mental Status: He is alert.   Psychiatric:         Mood and Affect: Mood normal.         Behavior: Behavior normal.                  Results Review:   Hemoglobin A1C   Date Value Ref Range Status   02/13/2023 7.60 (H) 4.80 - 5.60 % Final     Total Cholesterol   Date Value Ref Range Status   02/13/2023 149 0 - 200 mg/dL Final     Triglycerides   Date Value Ref Range Status   02/13/2023 154 (H) 0 - 150 mg/dL Final     HDL Cholesterol   Date Value Ref Range Status   02/13/2023 43 40 - 60 mg/dL Final     LDL Cholesterol    Date Value Ref Range Status   02/13/2023 79 0 - 100 mg/dL Final     LDL Chol Calc (NIH)   Date Value Ref Range Status   04/25/2022 45 0 - 99 mg/dL Final     VLDL Cholesterol   Date Value Ref Range Status   02/13/2023 27 5 - 40 mg/dL Final     VLDL Cholesterol Haresh   Date Value Ref Range Status   04/25/2022 27 5 - 40 mg/dL Final     LDL/HDL Ratio   Date Value Ref Range Status   02/13/2023 1.75  Final   05/21/2022 Unable to Calculate  Final         Assessment and Plan {CC Problem List  Visit Diagnosis  ROS  Review (Popup)  Health Maintenance  Quality  BestPractice  Medications  SmartSets  SnapShot Encounters  Media :23  Diagnoses and all orders for this visit:    1. Type 2 diabetes mellitus with microalbuminuria, with long-term current use of insulin (Primary)    A1c is great " at 6.7%  Continue with metformin 1,000 mg daily,  Actos 15 mg daily and Toujeo 65 units daily  Work on dietary modification - decrease carbohydrate intake  Continue with Dexcom G7  Advised to look into patient assistance program for Ozempic      2. Hyperlipidemia, unspecified hyperlipidemia type    Total cholesterol and LDL are normal, continue with statin.    Triglycerides are elevated, decrease dietary fat and carbohydrate intake.  Pt is going to start in a clinical trial with [facility]      3. Hypertensive kidney disease with stage 3a chronic kidney disease    Stable  Continue with current medication regimen  Defer management to PCP/Nephrology          No refills needed at this time      RTC on 10/23/23 with Dr. Patten      Follow Up     Patient was given instructions and counseling regarding her condition or for health maintenance advice. Please see specific information pulled into the AVS if appropriate.              MICHELLE Morgan  06/15/23

## 2023-07-27 ENCOUNTER — LAB (OUTPATIENT)
Dept: LAB | Facility: HOSPITAL | Age: 68
End: 2023-07-27
Payer: MEDICARE

## 2023-07-27 ENCOUNTER — TRANSCRIBE ORDERS (OUTPATIENT)
Dept: ADMINISTRATIVE | Facility: HOSPITAL | Age: 68
End: 2023-07-27
Payer: MEDICARE

## 2023-07-27 DIAGNOSIS — I10 BENIGN HYPERTENSION: ICD-10-CM

## 2023-07-27 DIAGNOSIS — E11.9 DIABETES MELLITUS WITHOUT COMPLICATION: ICD-10-CM

## 2023-07-27 DIAGNOSIS — N18.31 CHRONIC KIDNEY DISEASE (CKD) STAGE G3A/A1, MODERATELY DECREASED GLOMERULAR FILTRATION RATE (GFR) BETWEEN 45-59 ML/MIN/1.73 SQUARE METER AND ALBUMINURIA CREATININE RATIO LESS THAN 30 MG/G (CMS/H*: ICD-10-CM

## 2023-07-27 DIAGNOSIS — N18.31 CHRONIC KIDNEY DISEASE (CKD) STAGE G3A/A1, MODERATELY DECREASED GLOMERULAR FILTRATION RATE (GFR) BETWEEN 45-59 ML/MIN/1.73 SQUARE METER AND ALBUMINURIA CREATININE RATIO LESS THAN 30 MG/G (CMS/H*: Primary | ICD-10-CM

## 2023-07-27 LAB
25(OH)D3 SERPL-MCNC: 49.5 NG/ML (ref 30–100)
ALBUMIN SERPL-MCNC: 4.2 G/DL (ref 3.5–5.2)
ALBUMIN/GLOB SERPL: 1.1 G/DL
ALP SERPL-CCNC: 84 U/L (ref 39–117)
ALT SERPL W P-5'-P-CCNC: 44 U/L (ref 1–41)
ANION GAP SERPL CALCULATED.3IONS-SCNC: 10.9 MMOL/L (ref 5–15)
AST SERPL-CCNC: 49 U/L (ref 1–40)
BACTERIA UR QL AUTO: NORMAL /HPF
BILIRUB SERPL-MCNC: 0.5 MG/DL (ref 0–1.2)
BILIRUB UR QL STRIP: NEGATIVE
BUN SERPL-MCNC: 17 MG/DL (ref 8–23)
BUN/CREAT SERPL: 11.7 (ref 7–25)
CALCIUM SPEC-SCNC: 9.8 MG/DL (ref 8.6–10.5)
CHLORIDE SERPL-SCNC: 101 MMOL/L (ref 98–107)
CLARITY UR: ABNORMAL
CO2 SERPL-SCNC: 25.1 MMOL/L (ref 22–29)
COLOR UR: YELLOW
CREAT SERPL-MCNC: 1.45 MG/DL (ref 0.76–1.27)
CREAT UR-MCNC: 174.2 MG/DL
EGFRCR SERPLBLD CKD-EPI 2021: 52.5 ML/MIN/1.73
GLOBULIN UR ELPH-MCNC: 3.7 GM/DL
GLUCOSE SERPL-MCNC: 116 MG/DL (ref 65–99)
GLUCOSE UR STRIP-MCNC: NEGATIVE MG/DL
HGB UR QL STRIP.AUTO: ABNORMAL
HYALINE CASTS UR QL AUTO: NORMAL /LPF
KETONES UR QL STRIP: ABNORMAL
LEUKOCYTE ESTERASE UR QL STRIP.AUTO: NEGATIVE
NITRITE UR QL STRIP: NEGATIVE
PH UR STRIP.AUTO: 5.5 [PH] (ref 5–8)
PHOSPHATE SERPL-MCNC: 3.2 MG/DL (ref 2.5–4.5)
POTASSIUM SERPL-SCNC: 3.6 MMOL/L (ref 3.5–5.2)
PROT ?TM UR-MCNC: 131.7 MG/DL
PROT SERPL-MCNC: 7.9 G/DL (ref 6–8.5)
PROT UR QL STRIP: ABNORMAL
PROT/CREAT UR: 756 MG/G CREA (ref 0–200)
PTH-INTACT SERPL-MCNC: 40.6 PG/ML (ref 15–65)
RBC # UR STRIP: NORMAL /HPF
REF LAB TEST METHOD: NORMAL
SODIUM SERPL-SCNC: 137 MMOL/L (ref 136–145)
SP GR UR STRIP: 1.02 (ref 1–1.03)
SQUAMOUS #/AREA URNS HPF: NORMAL /HPF
URATE SERPL-MCNC: 6.8 MG/DL (ref 3.4–7)
UROBILINOGEN UR QL STRIP: ABNORMAL
WBC # UR STRIP: NORMAL /HPF

## 2023-07-27 PROCEDURE — 36415 COLL VENOUS BLD VENIPUNCTURE: CPT

## 2023-07-27 PROCEDURE — 84550 ASSAY OF BLOOD/URIC ACID: CPT

## 2023-07-27 PROCEDURE — 83970 ASSAY OF PARATHORMONE: CPT

## 2023-07-27 PROCEDURE — 84100 ASSAY OF PHOSPHORUS: CPT

## 2023-07-27 PROCEDURE — 82570 ASSAY OF URINE CREATININE: CPT

## 2023-07-27 PROCEDURE — 84156 ASSAY OF PROTEIN URINE: CPT

## 2023-07-27 PROCEDURE — 82306 VITAMIN D 25 HYDROXY: CPT

## 2023-07-27 PROCEDURE — 81001 URINALYSIS AUTO W/SCOPE: CPT

## 2023-07-27 PROCEDURE — 80053 COMPREHEN METABOLIC PANEL: CPT

## 2023-08-05 DIAGNOSIS — Z79.4 CONTROLLED TYPE 2 DIABETES MELLITUS WITH COMPLICATION, WITH LONG-TERM CURRENT USE OF INSULIN: ICD-10-CM

## 2023-08-05 DIAGNOSIS — E11.8 CONTROLLED TYPE 2 DIABETES MELLITUS WITH COMPLICATION, WITH LONG-TERM CURRENT USE OF INSULIN: ICD-10-CM

## 2023-08-07 RX ORDER — INSULIN GLARGINE 300 U/ML
65 INJECTION, SOLUTION SUBCUTANEOUS DAILY
Qty: 21 ML | Refills: 1 | Status: SHIPPED | OUTPATIENT
Start: 2023-08-07

## 2023-08-09 ENCOUNTER — OFFICE (OUTPATIENT)
Dept: URBAN - METROPOLITAN AREA CLINIC 66 | Facility: CLINIC | Age: 68
End: 2023-08-09
Payer: COMMERCIAL

## 2023-08-09 VITALS
SYSTOLIC BLOOD PRESSURE: 116 MMHG | DIASTOLIC BLOOD PRESSURE: 79 MMHG | WEIGHT: 253 LBS | HEIGHT: 77 IN | HEART RATE: 83 BPM

## 2023-08-09 DIAGNOSIS — K21.9 GASTRO-ESOPHAGEAL REFLUX DISEASE WITHOUT ESOPHAGITIS: ICD-10-CM

## 2023-08-09 DIAGNOSIS — F45.8 OTHER SOMATOFORM DISORDERS: ICD-10-CM

## 2023-08-09 PROCEDURE — 99213 OFFICE O/P EST LOW 20 MIN: CPT | Performed by: INTERNAL MEDICINE

## 2023-08-11 ENCOUNTER — OFFICE VISIT (OUTPATIENT)
Dept: FAMILY MEDICINE CLINIC | Facility: CLINIC | Age: 68
End: 2023-08-11
Payer: MEDICARE

## 2023-08-11 VITALS
DIASTOLIC BLOOD PRESSURE: 82 MMHG | WEIGHT: 262.3 LBS | OXYGEN SATURATION: 93 % | HEART RATE: 86 BPM | BODY MASS INDEX: 30.97 KG/M2 | HEIGHT: 77 IN | SYSTOLIC BLOOD PRESSURE: 116 MMHG

## 2023-08-11 DIAGNOSIS — Z00.00 MEDICARE ANNUAL WELLNESS VISIT, SUBSEQUENT: Primary | ICD-10-CM

## 2023-08-11 RX ORDER — PEN NEEDLE, DIABETIC 32GX 5/32"
NEEDLE, DISPOSABLE MISCELLANEOUS
COMMUNITY
Start: 2023-07-10 | End: 2023-08-24

## 2023-08-11 RX ORDER — SEMAGLUTIDE 1.34 MG/ML
INJECTION, SOLUTION SUBCUTANEOUS
COMMUNITY
Start: 2023-08-01

## 2023-08-11 NOTE — PROGRESS NOTES
QUICK REFERENCE INFORMATION:  The ABCs of the Annual Wellness Visit    Subsequent Medicare Wellness Visit     HEALTH RISK ASSESSMENT    : 1955    Recent Hospitalizations:  No hospitalization(s) within the last year..  ccc    Current Medical Providers:  Patient Care Team:  Gwyn Patten Sr., MD as PCP - General (Family Medicine)  Bebeto Monson II, MD as Consulting Physician (Hematology and Oncology)  Micaela Barfield APRN as Nurse Practitioner (Endocrinology)  Manuela Agustin APRN as Referring Physician (Internal Medicine)  Richardson Moon MD as Consulting Physician (Pulmonary Disease)    Smoking Status:  Social History     Tobacco Use   Smoking Status Never    Passive exposure: Never   Smokeless Tobacco Never   Tobacco Comments    CAFFEINE USE: 2 CUPS COFFEE DAILY       Alcohol Consumption:  Social History     Substance and Sexual Activity   Alcohol Use Yes    Alcohol/week: 6.0 standard drinks    Types: 2 Glasses of wine, 2 Cans of beer, 1 Shots of liquor, 1 Drinks containing 0.5 oz of alcohol per week    Comment: occasional 2-4 DRINKS PER WEEK       Depression Screen:       2023     2:50 PM   PHQ-2/PHQ-9 Depression Screening   Little Interest or Pleasure in Doing Things 0-->not at all   Feeling Down, Depressed or Hopeless 0-->not at all   PHQ-9: Brief Depression Severity Measure Score 0       Health Habits and Functional and Cognitive Screenin/11/2023     2:48 PM   Functional & Cognitive Status   Do you have difficulty preparing food and eating? No   Do you have difficulty bathing yourself, getting dressed or grooming yourself? No   Do you have difficulty using the toilet? No   Do you have difficulty moving around from place to place? No   Do you have trouble with steps or getting out of a bed or a chair? No   Current Diet Well Balanced Diet   Dental Exam Up to date   Eye Exam Up to date   Current Exercises Include No Regular Exercise   Do you need help using the phone?  No   Are  "you deaf or do you have serious difficulty hearing?  No   Do you need help to go to places out of walking distance? No   Do you need help shopping? No   Do you need help preparing meals?  No   Do you need help with housework?  No   Do you need help with laundry? No   Do you need help taking your medications? No   Do you need help managing money? No   Do you ever drive or ride in a car without wearing a seat belt? No   Have you felt unusual stress, anger or loneliness in the last month? No   Who do you live with? Spouse   If you need help, do you have trouble finding someone available to you? No   Have you been bothered in the last four weeks by sexual problems? No   Do you have difficulty concentrating, remembering or making decisions? No       Does the patient have evidence of cognitive impairment? No    Mini-Mental State Examination (MMSE)        Instructions: Ask the questions in the order listed. Score one point for each correct response within each question or activity.      Maximum Score  Patient's Score  Questions    5  5  "What is the year?  Season?  Date?  Day of the week?  Month?"    5   5 "Where are we now: State?  County?  Town/city?  Hospital?  Floor?"    3  3  The examiner names three unrelated objects clearly and slowly, then asks the patient to name all three of them. The patient's response is used for scoring. The examiner repeats them until patient learns all of them, if possible. Number of trials: ___________    5   5 "I would like you to count backward from 100 by sevens." (93, 86, 79, 72, 65, .) Stop after five answers.   Alternative: "Spell WORLD backwards." (D-[id number])    3   3 "Earlier I told you the names of three things. Can you tell me what those were?"    2  2  Show the patient two simple objects, such as a wristwatch and a pencil, and ask the patient to name them.    1  1  "Repeat the phrase: `No ifs, ands, or buts.'"    3   3 "Take the paper in your right hand, fold it in half, and put " "it on the floor."   (The examiner gives the patient a piece of blank paper.)    1   1 "Please read this and do what it says." (Written instruction is "Close your eyes.")    1   1 "Make up and write a sentence about anything." (This sentence must contain a noun and a verb.)    1   1 "Please copy this picture." (The examiner gives the patient a blank piece of paper and asks him/her to draw the symbol below. All 10 angles must be present and two must intersect.)             30   30 TOTAL          Aspirin use counseling: Taking ASA appropriately as indicated    Recent Lab Results:       Lab Results   Component Value Date    HGBA1C 7.30 (H) 08/08/2023     Lab Results   Component Value Date    CHOL 149 02/13/2023    TRIG 101 08/08/2023    HDL 44 08/08/2023    VLDL 19 08/08/2023    LDLHDL 1.75 02/13/2023       Age-appropriate Screening Schedule:  Refer to the list below for future screening recommendations based on patient's age, sex and/or medical conditions. Orders for these recommended tests are listed in the plan section. The patient has been provided with a written plan.    Health Maintenance   Topic Date Due    COVID-19 Vaccine (5 - Pfizer series) 07/26/2022    ANNUAL WELLNESS VISIT  04/25/2023    DIABETIC FOOT EXAM  04/25/2023    INFLUENZA VACCINE  10/01/2023    HEMOGLOBIN A1C  02/08/2024    COLORECTAL CANCER SCREENING  05/21/2024    DIABETIC EYE EXAM  06/27/2024    URINE MICROALBUMIN  07/27/2024    LIPID PANEL  08/08/2024    TDAP/TD VACCINES (3 - Td or Tdap) 02/26/2026    Pneumococcal Vaccine 65+  Completed    ZOSTER VACCINE  Completed    HEPATITIS C SCREENING  Addressed        Subjective   History of Present Illness:  Isaias Monaco is a 68 y.o. male who presents for an Annual Wellness Visit.  Compared to one year ago, the patient feels his physical health is the same.  Compared to one year ago, the patient feels his mental health is the same.  History of Present Illness     Allergies:  Allergies   Allergen " Reactions    Norvasc [Amlodipine] Swelling    Nsaids Other (See Comments)    Ace Inhibitors Angioedema    Lisinopril Angioedema    Testosterone Myalgia       Social History:  Social History     Socioeconomic History    Marital status:      Spouse name: Micaela    Number of children: 1    Years of education: College   Tobacco Use    Smoking status: Never     Passive exposure: Never    Smokeless tobacco: Never    Tobacco comments:     CAFFEINE USE: 2 CUPS COFFEE DAILY   Vaping Use    Vaping Use: Never used   Substance and Sexual Activity    Alcohol use: Yes     Alcohol/week: 6.0 standard drinks     Types: 2 Glasses of wine, 2 Cans of beer, 1 Shots of liquor, 1 Drinks containing 0.5 oz of alcohol per week     Comment: occasional 2-4 DRINKS PER WEEK    Drug use: No    Sexual activity: Yes     Partners: Female     Birth control/protection: Post-menopausal       Family History:  Family History   Problem Relation Age of Onset    Lupus Sister     Thyroid disease Sister     Heart disease Other     Hypertension Other     Arthritis Mother     Hyperlipidemia Mother     Hypertension Mother     Thyroid disease Mother     Lupus Mother     Vision loss Mother     Heart disease Father     Arthritis Father     Other Father         Vascular disease    Lupus Father     Depression Father     Alcohol abuse Father     Dementia Father     Hypertension Father     Heart disease Brother     Heart attack Brother 40    Thyroid disease Sister     Arthritis Brother     Malig Hyperthermia Neg Hx        Past Medical History :  Active Ambulatory Problems     Diagnosis Date Noted    Microalbuminuria 02/21/2016    Vitamin D deficiency 02/21/2016    Angioedema 02/21/2016    Chronic coronary artery disease 02/21/2016    Anxiety 02/21/2016    ED (erectile dysfunction) 02/21/2016    Hyperlipidemia 02/21/2016    Hypogonadism in male 09/28/2016    Presence of coronary angioplasty implant and graft 05/28/2013    Osteoarthritis of knee 06/15/2017     Hypertensive kidney disease with stage 3a chronic kidney disease 06/21/2017    Anemia, posthemorrhagic, acute 06/22/2017    Dyspnea on exertion 07/11/2017    Gastro-esophageal reflux disease with esophagitis 08/28/2017    Tubular adenoma of colon 11/01/2017    Nocturia associated with benign prostatic hyperplasia 05/02/2018    Iron deficiency anemia 10/30/2018    Adverse effect of iron and its compounds, sequela 11/27/2018    Facial twitching 12/04/2018    Blepharospasm 06/25/2019    Type 2 diabetes mellitus with microalbuminuria, with long-term current use of insulin 06/27/2013    Panic disorder 10/16/2020    Tic disorder, unspecified 10/16/2020    Spinal stenosis of lumbar region without neurogenic claudication 06/02/2021    Bilateral myopia 06/23/2021    Combined forms of age-related cataract, bilateral 06/23/2021    Presbyopia 06/23/2021    Primary open-angle glaucoma, bilateral, severe stage 06/23/2021    Lumbar facet arthropathy 12/14/2021    Spondylosis of lumbar region without myelopathy or radiculopathy 12/14/2021    Calcific coronary arteriosclerosis 05/19/2022    Essential hypertension 06/21/2017    Degeneration of lumbar intervertebral disc 03/29/2023    Dystonia 12/16/2022    Lumbosacral spondylosis without myelopathy 03/29/2023    Medicare annual wellness visit, subsequent 08/11/2023     Resolved Ambulatory Problems     Diagnosis Date Noted    Gastroesophageal reflux disease 02/21/2016    Diabetes mellitus 02/21/2016    Adiposity 02/21/2016    Routine health maintenance 06/30/2016    Uncontrolled type 2 diabetes mellitus 09/26/2016    Low testosterone 09/26/2016    MARC (acute kidney injury) 06/16/2017    Postoperative visit 06/19/2017    Hematoma 06/20/2017    Febrile illness 06/23/2017    Wound infection after surgery 06/23/2017    Angina at rest 03/04/2020     Past Medical History:   Diagnosis Date    Anemia     Arthritis     CAD (coronary artery disease)     Chest pain     Colon polyps     Diabetes  mellitus, type 2     GERD (gastroesophageal reflux disease)     Glaucoma     High cholesterol     History of foreign travel 12/2017; 08/2018    Hypertension     Low back pain     Male erectile disorder     Myocardial infarction     Obesity (BMI 30-39.9)     Osteoarthritis        Medication List:  Outpatient Encounter Medications as of 8/11/2023   Medication Sig Dispense Refill    ARIPiprazole (ABILIFY) 5 MG tablet Take 1.5 tablets by mouth Daily.      aspirin 81 MG EC tablet Take 1 tablet by mouth Daily. 30 tablet 6    BD Pen Needle Marissa 2nd Gen 32G X 4 MM misc USE 1 EACH DAILY.      Blood Glucose Monitoring Suppl ([employer] BLOOD GLUCOSE) kit TEST AS DIRECTED 1 each 0    carvedilol (COREG) 25 MG tablet Take 1 tablet by mouth Every 12 (Twelve) Hours. 180 tablet 3    clonazePAM (KlonoPIN) 0.5 MG tablet Take 1 tablet by mouth Daily As Needed for Anxiety. 15 tablet 0    clopidogrel (PLAVIX) 75 MG tablet Take 1 tablet by mouth Daily. 90 tablet 2    Continuous Blood Gluc Sensor (Dexcom G6 Sensor) Dipsense Dexcom G6 Sensors, to replace every 10 days, 3 sensors per 30 day period (NDC #[address]-48). Check 6 times a day. Dx: E 1 9 each 4    Continuous Blood Gluc Transmit (Dexcom G6 Transmitter) misc 1 each Every 3 (Three) Months. Dispense 1 Dexcom G6 Transmitter for each 3 month period (NDC #[address]-93). Check 6 times a day. Dx: E 1 1 each 4    dorzolamide-timolol (COSOPT) 22.3-6.8 MG/ML ophthalmic solution Apply 1 drop to eye(s) to both eyes 2 (Two) Times a Day. 20 mL 3    escitalopram (LEXAPRO) 20 MG tablet Take 1 tablet by mouth Daily. 90 tablet 3    esomeprazole (nexIUM) 40 MG capsule Take 1 capsule by mouth Daily. 90 capsule 3    famotidine (PEPCID) 20 MG tablet Take 1 tablet by mouth Daily With Dinner. 90 tablet 3    glucose blood test strip Use 4 times a day 400 each 0    hydroCHLOROthiazide (HYDRODIURIL) 25 MG tablet Take 1 tablet by mouth.      Insulin Lispro, 1 Unit Dial, (HUMALOG) 100 UNIT/ML solution  pen-injector INJECT SUBCUTANEOUS AS DIRECTED BY PROVIDER DOSAGE IS ATTACHED MAX IS 40 UNITS PER DAY 9 mL 0    Insulin Pen Needle 31G X 4 MM misc 1 each Daily. 100 each 3    Lancets (FREESTYLE) lancets Use to test BG 4 times daily 400 each 1    latanoprost (XALATAN) 0.005 % ophthalmic solution Apply 1 drop to  each eye Daily. (Patient taking differently: Apply 1 drop to eye(s) as directed by provider Every Night.) 7.5 mL 3    metFORMIN (GLUCOPHAGE) 500 MG tablet Take 2 tablets by mouth Daily With Breakfast. Indications: start in 48 hours (Patient taking differently: Take 2 tablets by mouth Daily With Breakfast. Held  Indications: start in 48 hours) 180 tablet 3    methocarbamol (Robaxin) 500 MG tablet Take 1 tablet by mouth As Needed (Take as needed for pain). 90 tablet 0    Ozempic, 1 MG/DOSE, 4 MG/3ML solution pen-injector INJECT 1MG SUBCUTANEOUS IN THE APPROPRIATE AREA AS DIRECTED ONCE PER WEEK      pioglitazone (ACTOS) 15 MG tablet Take 1 tablet by mouth Daily. 90 tablet 3    rosuvastatin (CRESTOR) 40 MG tablet Take 1 tablet by mouth Daily. 90 tablet 3    sildenafil (VIAGRA) 50 MG tablet Take 1 tablet by mouth Daily As Needed for erectile dysfunction. (Patient taking differently: Take 1 tablet by mouth Daily As Needed for Erectile Dysfunction. PRN) 6 tablet 5    Toujeo SoloStar 300 UNIT/ML solution pen-injector injection Inject 65 Units under the skin into the appropriate area as directed Daily. 21 mL 1    traMADol (ULTRAM) 50 MG tablet Take 1 tablet PO up to twice daily as needed for pain.  30 day supply. 30 tablet 0    [DISCONTINUED] doxazosin (Cardura) 4 MG tablet Take 1 tablet by mouth Every Night. 90 tablet 4    [DISCONTINUED] ARIPiprazole (ABILIFY) 2 MG tablet Take 1.5 tablets by mouth Daily.       No facility-administered encounter medications on file as of 8/11/2023.     Reviewed use of high risk medication in the elderly: yes  Reviewed for potential of harmful drug interactions in the elderly:  yes    Past Surgical History:  Past Surgical History:   Procedure Laterality Date    CARDIAC CATHETERIZATION N/A 05/15/2006    Dr. Mini Camarillo    CARDIAC CATHETERIZATION N/A 03/10/2020    Procedure: Left Heart Cath;  Surgeon: Miguel Ángel Karimi MD;  Location:  [facility];  Service: Cardiology;  Laterality: N/A;    CARDIAC CATHETERIZATION N/A 03/10/2020    Procedure: Stent DAVONTE coronary;  Surgeon: Miguel Ángel Karimi MD;  Location:  [facility];  Service: Cardiology;  Laterality: N/A;    CARDIAC CATHETERIZATION N/A 03/10/2020    Procedure: Coronary angiography;  Surgeon: Miguel Ángel Karimi MD;  Location:  [facility];  Service: Cardiology;  Laterality: N/A;    CARDIAC CATHETERIZATION N/A 03/10/2020    Procedure: Left ventriculography;  Surgeon: Miguel Ángel Karimi MD;  Location:  [facility];  Service: Cardiology;  Laterality: N/A;    CARDIAC CATHETERIZATION N/A 05/20/2022    Procedure: Left Heart Cath;  Surgeon: Mackenzie Morales MD;  Location:  [facility];  Service: Cardiovascular;  Laterality: N/A;    CARDIAC CATHETERIZATION N/A 05/20/2022    Procedure: Coronary angiography;  Surgeon: Mackenzie Morales MD;  Location:  [facility];  Service: Cardiovascular;  Laterality: N/A;    CARDIAC CATHETERIZATION N/A 05/20/2022    Procedure: Percutaneous Coronary Intervention;  Surgeon: Mackenzie Morales MD;  Location:  [facility];  Service: Cardiovascular;  Laterality: N/A;    CARDIAC CATHETERIZATION N/A 05/24/2022    Procedure: Percutaneous Coronary Intervention;  Surgeon: Miguel Ángel Karimi MD;  Location:  [facility];  Service: Cardiovascular;  Laterality: N/A;  LAD and Cx    CARDIAC CATHETERIZATION N/A 05/24/2022    Procedure: Stent DAVONTE coronary;  Surgeon: Miguel Ángel Karimi MD;  Location:  [facility];  Service: Cardiovascular;  Laterality: N/A;     CARDIAC CATHETERIZATION N/A 05/24/2022    Procedure: Resting Full Cycle Ratio;  Surgeon: Miguel Ángel Karimi MD;  Location: [facility] CATH INVASIVE LOCATION;  Service: Cardiovascular;  Laterality: N/A;    COLONOSCOPY N/A 02/22/2006    Bilobed polyp at 30 cm, hemorrhoids-Dr. Ilya Zhao    COLONOSCOPY N/A 02/28/2014    Normal ileum, one 6 mm polyp in the mid transverse colon-Dr. Ilya Zhao    COLONOSCOPY N/A 11/19/2008    Ela ileum, two 3 to 4 mm polyps, non-bleeding internal hemorrhoids, repeat in 5 years-Dr. Ilya Zhao    COLONOSCOPY N/A 10/31/2017    Procedure: COLONOSCOPY WITH POLYPECTOMY (COLD BIOPSY);  Surgeon: Ilya Zhao MD;  Location: [facility] ENDOSCOPY;  Service:     COLONOSCOPY N/A 05/21/2021    Procedure: Colonoscopy into cecum and terminal ileum with cold biopsy polypectomy;  Surgeon: Ilya Zhao MD;  Location: [facility] ENDOSCOPY;  Service: Gastroenterology;  Laterality: N/A;  Pre op: History of Polyps  Post op: Polyp    CORONARY ANGIOPLASTY WITH STENT PLACEMENT  2007, 2012, 2015    cardiac stents x3 occasions    ENDOSCOPY N/A 10/04/2017    Procedure: ESOPHAGOGASTRODUODENOSCOPY;  Surgeon: Boyd Guidry MD;  Location: [facility] ENDOSCOPY;  Service:     ENDOSCOPY N/A 05/21/2021    Procedure: ESOPHAGOGASTRODUODENOSCOPY with biopsies;  Surgeon: Ilya Zhao MD;  Location: [facility] ENDOSCOPY;  Service: Gastroenterology;  Laterality: N/A;  Pre op: GERD  Post op: Irregular  Z-Line, Hiatal Hernia, Gastritis    EPIDURAL BLOCK      INTERVENTIONAL RADIOLOGY PROCEDURE N/A 05/20/2022    Procedure: Intravascular Ultrasound;  Surgeon: Mackenzie Morales MD;  Location: [facility] CATH INVASIVE LOCATION;  Service: Cardiovascular;  Laterality: N/A;    KNEE INCISION AND DRAINAGE Right 06/20/2017    Procedure: RT. KNEE WASHOUT ;  Surgeon: Body Coyne MD;  Location: [facility] MAIN OR;  Service:     MEDIAL BRANCH BLOCK Bilateral 12/17/2021    Procedure: LUMBAR MEDIAL BRANCH BLOCK bilateral ~L4-S1 x2 (~2 weeks apart);   Surgeon: Christina Villaseñor MD;  [place] [facility] [place];  Service: Pain Management;  Laterality: Bilateral;    MEDIAL BRANCH BLOCK Bilateral 01/03/2022    Procedure: LUMBAR MEDIAL BRANCH BLOCK bilateral ~L4-S1 x2 (~2 weeks apart);  Surgeon: Christina Villaseñor MD;  [place] [facility] [place];  Service: Pain Management;  Laterality: Bilateral;    DC ARTHRP KNE CONDYLE&PLATU MEDIAL&LAT COMPARTMENTS Right 06/15/2017    Procedure: RT TOTAL KNEE ARTHROPLASTY;  Surgeon: Boyd Coyne MD;  Location: [facility] MAIN OR;  Service: Orthopedics    RADIOFREQUENCY ABLATION Bilateral 01/10/2022    Procedure: RADIOFREQUENCY ABLATION NERVES Bilateral L4-S1;  Surgeon: Christina Villaseñor MD;  [place] [facility] [place];  Service: Pain Management;  Laterality: Bilateral;    SHOULDER SURGERY Right 2016    rotator cuff repair    UPPER GASTROINTESTINAL ENDOSCOPY N/A 10/13/2015    Z-line irregular, normal stomach, normal duodenum-Dr. Ilya Zhao    UPPER GASTROINTESTINAL ENDOSCOPY N/A 02/28/2014    Z-line irregular, normal stomach, normal duodenum-Dr. Ilya Zhao    UPPER GASTROINTESTINAL ENDOSCOPY N/A 11/19/2008    Z-line irregular, chronic gastritis withotu hemorrhage, normal duodenum-Dr. Ilya Zhao    UPPER GASTROINTESTINAL ENDOSCOPY N/A 06/22/2006    LA Grade A reflux esophagitis, non-bleeding erythematous gastropathy, normal duodenum-Dr. Ilya Zhao        The following portions of the patient's history were reviewed and updated as appropriate: allergies, current medications, past family history, past medical history, past social history, past surgical history and problem list.    Review Of Systems:  Review of Systems   Constitutional:  Negative for activity change, appetite change and fatigue.   HENT:  Negative for congestion, postnasal drip, sinus pressure and sore throat.    Eyes:  Negative for blurred vision and itching.   Respiratory:  Negative for cough, shortness of breath and wheezing.    Cardiovascular:  Negative for chest pain.  "  Gastrointestinal:  Negative for abdominal pain, constipation, nausea, vomiting and GERD.   Endocrine: Negative for cold intolerance and heat intolerance.   Genitourinary:  Negative for difficulty urinating, dysuria and urinary incontinence.   Musculoskeletal:  Negative for back pain, joint swelling and neck pain.   Skin:  Negative for color change and rash.   Neurological:  Negative for dizziness, speech difficulty, weakness and memory problem.   Psychiatric/Behavioral:  Negative for behavioral problems, decreased concentration, suicidal ideas and depressed mood.    I have reviewed and confirmed the accuracy of the ROS as documented by the MA/LPN/RN Gwyn Patten Sr, MD    Objective     Physical Exam:  Vital Signs:  Visit Vitals  /82   Pulse 86   Ht 195.6 cm (77\")   Wt 119 kg (262 lb 4.8 oz)   SpO2 93%   BMI 31.10 kg/mý     BMI is >= 30 and <35. (Class 1 Obesity). The following options were offered after discussion;: exercise counseling/recommendations and nutrition counseling/recommendations      Physical Exam  Vitals reviewed.   Constitutional:       Appearance: He is well-developed.   HENT:      Head: Normocephalic and atraumatic.      Nose: Nose normal.   Eyes:      General: Lids are normal.      Conjunctiva/sclera: Conjunctivae normal.      Pupils: Pupils are equal, round, and reactive to light.   Cardiovascular:      Rate and Rhythm: Normal rate and regular rhythm.      Pulses: Normal pulses.      Heart sounds: Normal heart sounds.   Pulmonary:      Effort: Pulmonary effort is normal. No respiratory distress.      Breath sounds: Normal breath sounds.   Abdominal:      General: Bowel sounds are normal.      Palpations: Abdomen is soft.   Musculoskeletal:         General: Normal range of motion.      Cervical back: Normal range of motion and neck supple.   Skin:     General: Skin is warm and dry.      Capillary Refill: Capillary refill takes less than 2 seconds.   Neurological:      Mental Status: He is " alert and oriented to person, place, and time.   Psychiatric:         Behavior: Behavior normal.         Thought Content: Thought content normal.         Judgment: Judgment normal.       Assessment & Plan   Assessment and Plan:  Patient Self-Management and Personalized Health Advice  The patient has been provided with information about: designing advance directives and preventive services including:   Annual Wellness Visit (AWV).    Advance Care Planning:  ACP discussion was held with the patient during this visit. Patient has an advance directive in EMR which is still valid.   Identification of Risk Factors:  Risk factors include: Advance Directive Discussion.    Diagnoses and all orders for this visit:    1. Medicare annual wellness visit, subsequent (Primary)  Assessment & Plan:  Medicare wellness completed.  Discussed advanced directives with patient.  Performed the Mini-Mental exam and patient scored 30/30.            Follow Up:  No follow-ups on file.     An After Visit Summary and PPPS with all of these plans were given to the patient.

## 2023-08-18 RX ORDER — DOXAZOSIN MESYLATE 4 MG/1
TABLET ORAL
Qty: 90 TABLET | Refills: 4 | Status: SHIPPED | OUTPATIENT
Start: 2023-08-18

## 2023-08-24 NOTE — ASSESSMENT & PLAN NOTE
Medicare wellness completed.  Discussed advanced directives with patient.  Performed the Mini-Mental exam and patient scored 30/30.

## 2023-08-25 ENCOUNTER — ANESTHESIA (OUTPATIENT)
Dept: GASTROENTEROLOGY | Facility: HOSPITAL | Age: 68
End: 2023-08-25
Payer: MEDICARE

## 2023-08-25 ENCOUNTER — HOSPITAL ENCOUNTER (OUTPATIENT)
Facility: HOSPITAL | Age: 68
Setting detail: HOSPITAL OUTPATIENT SURGERY
Discharge: HOME | End: 2023-08-25
Attending: INTERNAL MEDICINE | Admitting: INTERNAL MEDICINE
Payer: MEDICARE

## 2023-08-25 ENCOUNTER — ON CAMPUS - OUTPATIENT (OUTPATIENT)
Dept: URBAN - METROPOLITAN AREA HOSPITAL 114 | Facility: HOSPITAL | Age: 68
End: 2023-08-25

## 2023-08-25 ENCOUNTER — ANESTHESIA EVENT (OUTPATIENT)
Dept: GASTROENTEROLOGY | Facility: HOSPITAL | Age: 68
End: 2023-08-25
Payer: MEDICARE

## 2023-08-25 VITALS
BODY MASS INDEX: 29.64 KG/M2 | RESPIRATION RATE: 18 BRPM | OXYGEN SATURATION: 98 % | SYSTOLIC BLOOD PRESSURE: 121 MMHG | DIASTOLIC BLOOD PRESSURE: 68 MMHG | WEIGHT: 251 LBS | HEIGHT: 77 IN | HEART RATE: 78 BPM

## 2023-08-25 DIAGNOSIS — K29.70 GASTRITIS, UNSPECIFIED, WITHOUT BLEEDING: ICD-10-CM

## 2023-08-25 DIAGNOSIS — K22.70 BARRETT'S ESOPHAGUS: ICD-10-CM

## 2023-08-25 DIAGNOSIS — K22.70 BARRETT'S ESOPHAGUS WITHOUT DYSPLASIA: ICD-10-CM

## 2023-08-25 LAB — GLUCOSE BLDC GLUCOMTR-MCNC: 122 MG/DL (ref 70–130)

## 2023-08-25 PROCEDURE — 82948 REAGENT STRIP/BLOOD GLUCOSE: CPT

## 2023-08-25 PROCEDURE — 25010000002 PROPOFOL 10 MG/ML EMULSION: Performed by: ANESTHESIOLOGY

## 2023-08-25 PROCEDURE — 43239 EGD BIOPSY SINGLE/MULTIPLE: CPT | Performed by: INTERNAL MEDICINE

## 2023-08-25 PROCEDURE — 88305 TISSUE EXAM BY PATHOLOGIST: CPT | Performed by: INTERNAL MEDICINE

## 2023-08-25 PROCEDURE — 88313 SPECIAL STAINS GROUP 2: CPT | Performed by: INTERNAL MEDICINE

## 2023-08-25 RX ORDER — LIDOCAINE HYDROCHLORIDE 20 MG/ML
INJECTION, SOLUTION EPIDURAL; INFILTRATION; INTRACAUDAL; PERINEURAL AS NEEDED
Status: DISCONTINUED | OUTPATIENT
Start: 2023-08-25 | End: 2023-08-25 | Stop reason: SURG

## 2023-08-25 RX ORDER — SODIUM CHLORIDE, SODIUM LACTATE, POTASSIUM CHLORIDE, CALCIUM CHLORIDE 600; 310; 30; 20 MG/100ML; MG/100ML; MG/100ML; MG/100ML
1000 INJECTION, SOLUTION INTRAVENOUS CONTINUOUS
Status: DISCONTINUED | OUTPATIENT
Start: 2023-08-25 | End: 2023-08-25 | Stop reason: HOSPADM

## 2023-08-25 RX ORDER — PROPOFOL 10 MG/ML
VIAL (ML) INTRAVENOUS AS NEEDED
Status: DISCONTINUED | OUTPATIENT
Start: 2023-08-25 | End: 2023-08-25 | Stop reason: SURG

## 2023-08-25 RX ADMIN — LIDOCAINE HYDROCHLORIDE 60 MG: 20 INJECTION, SOLUTION EPIDURAL; INFILTRATION; INTRACAUDAL; PERINEURAL at 11:06

## 2023-08-25 RX ADMIN — PROPOFOL 20 MG: 10 INJECTION, EMULSION INTRAVENOUS at 11:10

## 2023-08-25 RX ADMIN — SODIUM CHLORIDE, POTASSIUM CHLORIDE, SODIUM LACTATE AND CALCIUM CHLORIDE 1000 ML: 600; 310; 30; 20 INJECTION, SOLUTION INTRAVENOUS at 10:23

## 2023-08-25 RX ADMIN — PROPOFOL 100 MG: 10 INJECTION, EMULSION INTRAVENOUS at 11:06

## 2023-08-25 RX ADMIN — PROPOFOL 30 MG: 10 INJECTION, EMULSION INTRAVENOUS at 11:08

## 2023-08-25 NOTE — H&P
Psychiatric   HISTORY AND PHYSICAL    Patient Name: Isaias Monaco  : 1955  MRN: [id number]  Primary Care Physician:  Gwyn Patten Sr., MD  Date of admission: 2023    Subjective   Subjective     Chief Complaint: barretts reflux    History of Present Illness  Patient doing well. Enjoying group home. No dysphagia esomeprazole 40 mg am and famotidine 40 mg in the evening   Review of Systems   All other systems reviewed and are negative.     Personal History     Past Medical History:   Diagnosis Date    Anemia     post hemorrhagic    Angioedema 2016    Secondary to ACE inhibitor    Anxiety     Arthritis     CAD (coronary artery disease)     Colon polyps     Diabetes mellitus, type 2     GERD (gastroesophageal reflux disease)     Glaucoma     Hematoma     High cholesterol     History of foreign travel 2017; 2018    [place], [place], [place], [place], [place] and [place]; [place]    Hyperlipidemia     Hypertension     Hypogonadism in male 2016    Male erectile disorder     Microalbuminuria     Myocardial infarction     Osteoarthritis     knee    Spinal stenosis of lumbar region without neurogenic claudication 2021    Tubular adenoma of colon 2017    Vitamin D deficiency        Past Surgical History:   Procedure Laterality Date    CARDIAC CATHETERIZATION N/A 05/15/2006    Dr. Mini Camarillo    CARDIAC CATHETERIZATION N/A 03/10/2020    Procedure: Left Heart Cath;  Surgeon: Miguel Ángel Karimi MD;  Location:  [facility];  Service: Cardiology;  Laterality: N/A;    CARDIAC CATHETERIZATION N/A 03/10/2020    Procedure: Stent DAVONTE coronary;  Surgeon: Miguel Ángel Karimi MD;  Location:  [facility];  Service: Cardiology;  Laterality: N/A;    CARDIAC CATHETERIZATION N/A 03/10/2020    Procedure: Coronary angiography;  Surgeon: Miguel Ángel Karimi MD;  Location:  [facility];  Service: Cardiology;  Laterality:  N/A;    CARDIAC CATHETERIZATION N/A 03/10/2020    Procedure: Left ventriculography;  Surgeon: Miguel Ángel Karimi MD;  Location:  [facility];  Service: Cardiology;  Laterality: N/A;    CARDIAC CATHETERIZATION N/A 05/20/2022    Procedure: Left Heart Cath;  Surgeon: Mackenzie Morales MD;  Location:  [facility];  Service: Cardiovascular;  Laterality: N/A;    CARDIAC CATHETERIZATION N/A 05/20/2022    Procedure: Coronary angiography;  Surgeon: Mackenzie Morales MD;  Location:  [facility];  Service: Cardiovascular;  Laterality: N/A;    CARDIAC CATHETERIZATION N/A 05/20/2022    Procedure: Percutaneous Coronary Intervention;  Surgeon: Mackenzie Morales MD;  Location:  [facility];  Service: Cardiovascular;  Laterality: N/A;    CARDIAC CATHETERIZATION N/A 05/24/2022    Procedure: Percutaneous Coronary Intervention;  Surgeon: Miguel Ángel Karimi MD;  Location:  [facility];  Service: Cardiovascular;  Laterality: N/A;  LAD and Cx    CARDIAC CATHETERIZATION N/A 05/24/2022    Procedure: Stent DAVONTE coronary;  Surgeon: Miguel Ángel Karimi MD;  Location:  [facility];  Service: Cardiovascular;  Laterality: N/A;    CARDIAC CATHETERIZATION N/A 05/24/2022    Procedure: Resting Full Cycle Ratio;  Surgeon: Miguel Ángel Karimi MD;  Location:  [facility];  Service: Cardiovascular;  Laterality: N/A;    COLONOSCOPY N/A 02/22/2006    Bilobed polyp at 30 cm, hemorrhoids-Dr. Ilya Zhao    COLONOSCOPY N/A 02/28/2014    Normal ileum, one 6 mm polyp in the mid transverse colon-Dr. Ilya Zhao    COLONOSCOPY N/A 11/19/2008    Ela ileum, two 3 to 4 mm polyps, non-bleeding internal hemorrhoids, repeat in 5 years-Dr. Ilya Zhao    COLONOSCOPY N/A 10/31/2017    Procedure: COLONOSCOPY WITH POLYPECTOMY (COLD BIOPSY);  Surgeon: Ilya Zhao MD;  Location: [facility] ENDOSCOPY;  Service:     COLONOSCOPY N/A 05/21/2021     Procedure: Colonoscopy into cecum and terminal ileum with cold biopsy polypectomy;  Surgeon: Ilya Zhao MD;  Location: [facility] ENDOSCOPY;  Service: Gastroenterology;  Laterality: N/A;  Pre op: History of Polyps  Post op: Polyp    CORONARY ANGIOPLASTY WITH STENT PLACEMENT  2007, 2012, 2015    cardiac stents x3 occasions    ENDOSCOPY N/A 10/04/2017    Procedure: ESOPHAGOGASTRODUODENOSCOPY;  Surgeon: Boyd Guidry MD;  Location: [facility] ENDOSCOPY;  Service:     ENDOSCOPY N/A 05/21/2021    Procedure: ESOPHAGOGASTRODUODENOSCOPY with biopsies;  Surgeon: Ilya Zhao MD;  Location: [facility] ENDOSCOPY;  Service: Gastroenterology;  Laterality: N/A;  Pre op: GERD  Post op: Irregular  Z-Line, Hiatal Hernia, Gastritis    EPIDURAL BLOCK      INTERVENTIONAL RADIOLOGY PROCEDURE N/A 05/20/2022    Procedure: Intravascular Ultrasound;  Surgeon: Mackenzie Morales MD;  Location: [facility] CATH INVASIVE LOCATION;  Service: Cardiovascular;  Laterality: N/A;    KNEE INCISION AND DRAINAGE Right 06/20/2017    Procedure: RT. KNEE WASHOUT ;  Surgeon: Boyd Coyne MD;  Location: [facility] MAIN OR;  Service:     MEDIAL BRANCH BLOCK Bilateral 12/17/2021    Procedure: LUMBAR MEDIAL BRANCH BLOCK bilateral ~L4-S1 x2 (~2 weeks apart);  Surgeon: Christina Villaseñor MD;  Location: [facility] MAIN OR;  Service: Pain Management;  Laterality: Bilateral;    MEDIAL BRANCH BLOCK Bilateral 01/03/2022    Procedure: LUMBAR MEDIAL BRANCH BLOCK bilateral ~L4-S1 x2 (~2 weeks apart);  Surgeon: Christina Villaseñor MD;  Location: [facility];  Service: Pain Management;  Laterality: Bilateral;    ID ARTHRP KNE CONDYLE&PLATU MEDIAL&LAT COMPARTMENTS Right 06/15/2017    Procedure: RT TOTAL KNEE ARTHROPLASTY;  Surgeon: Boyd Coyne MD;  Location: [facility] MAIN OR;  Service: Orthopedics    RADIOFREQUENCY ABLATION Bilateral 01/10/2022    Procedure: RADIOFREQUENCY ABLATION NERVES Bilateral L4-S1;  Surgeon: Christina Villaseñor MD;  Location: [facility] MAIN OR;  Service: Pain  Management;  Laterality: Bilateral;    SHOULDER SURGERY Right 2016    rotator cuff repair    UPPER GASTROINTESTINAL ENDOSCOPY N/A 10/13/2015    Z-line irregular, normal stomach, normal duodenum-Dr. Ilya Zhao    UPPER GASTROINTESTINAL ENDOSCOPY N/A 02/28/2014    Z-line irregular, normal stomach, normal duodenum-Dr. Ilya Zhao    UPPER GASTROINTESTINAL ENDOSCOPY N/A 11/19/2008    Z-line irregular, chronic gastritis withotu hemorrhage, normal duodenum-Dr. Ilya Zhao    UPPER GASTROINTESTINAL ENDOSCOPY N/A 06/22/2006    LA Grade A reflux esophagitis, non-bleeding erythematous gastropathy, normal duodenum-Dr. Ilya Zhao       Family History: family history includes Alcohol abuse in his father; Arthritis in his brother, father, and mother; Dementia in his father; Depression in his father; Heart attack (age of onset: 40) in his brother; Heart disease in his brother, father, and another family member; Hyperlipidemia in his mother; Hypertension in his father, mother, and another family member; Lupus in his father, mother, and sister; Other in his father; Thyroid disease in his mother, sister, and sister; Vision loss in his mother. Otherwise pertinent FHx was reviewed and not pertinent to current issue.    Social History:  reports that he has never smoked. He has never been exposed to tobacco smoke. He has never used smokeless tobacco. He reports current alcohol use of about 6.0 standard drinks per week. He reports that he does not use drugs.    Home Medications:  ARIPiprazole, Dexcom G6 Sensor, Dexcom G6 Transmitter, Insulin Glargine (1 Unit Dial), Insulin Lispro (1 Unit Dial), Insulin Pen Needle, Semaglutide (1 MG/DOSE), aspirin, carvedilol, clonazePAM, clopidogrel, dorzolamide-timolol, doxazosin, escitalopram, esomeprazole, famotidine, freestyle, hydroCHLOROthiazide, latanoprost, metFORMIN, methocarbamol, pioglitazone, rosuvastatin, and traMADol    Allergies:  Allergies   Allergen Reactions    Norvasc [Amlodipine]  Swelling    Nsaids Other (See Comments)    Ace Inhibitors Angioedema    Lisinopril Angioedema    Testosterone Myalgia       Objective    Objective     Vitals:   Heart Rate:  [76] 76  Resp:  [16] 16  BP: (115)/(79) 115/79    Physical Exam  HENT:      Right Ear: External ear normal.      Left Ear: External ear normal.      Mouth/Throat:      Pharynx: Oropharynx is clear.   Eyes:      Conjunctiva/sclera: Conjunctivae normal.   Cardiovascular:      Rate and Rhythm: Normal rate.      Pulses: Normal pulses.   Pulmonary:      Effort: Pulmonary effort is normal.   Abdominal:      General: Abdomen is flat.   Skin:     General: Skin is warm and dry.   Neurological:      General: No focal deficit present.      Mental Status: He is alert.   Psychiatric:         Mood and Affect: Mood normal.       Result Review    Result Review:  I have personally reviewed the results from the time of this admission to 8/25/2023 11:06 EDT and agree with these findings:  []  Laboratory list / accordion  []  Microbiology  []  Radiology  []  EKG/Telemetry   []  Cardiology/Vascular   []  Pathology  []  Old records  []  Other:  Most notable findings include:       Assessment & Plan   Assessment / Plan     Brief Patient Summary:  Isaias Monaco is a 68 y.o. male who   Barretts esophagus     Active Hospital Problems:  There are no active hospital problems to display for this patient.    Plan: Upper tract endoscopy, risks, alternatives and benefits discussed with patient and patient is agreeable to proceedUpper tract endoscopy, risks, alternatives and benefits discussed with patient and patient is agreeable to proceed      DVT prophylaxis:  No DVT prophylaxis order currently exists.    CODE STATUS:       Admission Status:  I believe this patient meets OUTPATIENT  status.    Ilya Zhao MD

## 2023-08-25 NOTE — ANESTHESIA POSTPROCEDURE EVALUATION
"Patient: Isaias Monaco    Procedure Summary       Date: 08/25/23 Room / Location:  [facility] /  [facility]    Anesthesia Start: 1101 Anesthesia Stop: 1120    Procedure: ESOPHAGOGASTRODUODENOSCOPY with biopsy (Esophagus) Diagnosis:     Surgeons: Ilya Zhao MD Provider: Matt Lara MD    Anesthesia Type: MAC ASA Status: 3            Anesthesia Type: MAC    Vitals  No vitals data found for the desired time range.          Post Anesthesia Care and Evaluation    Patient location during evaluation: bedside  Patient participation: complete - patient participated  Level of consciousness: responsive to verbal stimuli and sleepy but conscious  Pain management: adequate    Airway patency: patent  Anesthetic complications: No anesthetic complications  PONV Status: controlled  Cardiovascular status: acceptable  Respiratory status: acceptable  Hydration status: acceptable    Comments: /79 (BP Location: Left arm, Patient Position: Lying)   Pulse 76   Resp 16   Ht 195.6 cm (77\")   Wt 114 kg (251 lb)   SpO2 97%   BMI 29.76 kg/mý       "

## 2023-08-25 NOTE — ANESTHESIA PREPROCEDURE EVALUATION
Anesthesia Evaluation     Patient summary reviewed and Nursing notes reviewed   NPO Solid Status: > 8 hours  NPO Liquid Status: > 2 hours           Airway   Mallampati: II  TM distance: >3 FB  Neck ROM: full  No difficulty expected  Dental - normal exam     Pulmonary - normal exam   Cardiovascular - normal exam  Exercise tolerance: good (4-7 METS)    ECG reviewed  PT is on anticoagulation therapy  Patient on routine beta blocker and Beta blocker given within 24 hours of surgery    (+) hypertension, past MI  >12 months, CAD, cardiac stents more than 12 months ago , hyperlipidemia      Neuro/Psych  (+) psychiatric history Anxiety and Depression  GI/Hepatic/Renal/Endo    (+) GERD, diabetes mellitus type 2    Musculoskeletal (-) negative ROS    Abdominal    Substance History - negative use     OB/GYN          Other                      Anesthesia Plan    ASA 3     MAC     intravenous induction     Anesthetic plan, risks, benefits, and alternatives have been provided, discussed and informed consent has been obtained with: patient.    CODE STATUS:

## 2023-08-29 LAB
LAB AP CASE REPORT: NORMAL
LAB AP DIAGNOSIS COMMENT: NORMAL
PATH REPORT.FINAL DX SPEC: NORMAL
PATH REPORT.GROSS SPEC: NORMAL

## 2023-08-30 DIAGNOSIS — F41.0 PANIC DISORDER: ICD-10-CM

## 2023-08-30 DIAGNOSIS — F41.9 ANXIETY: ICD-10-CM

## 2023-08-30 DIAGNOSIS — G51.4 FACIAL TWITCHING: ICD-10-CM

## 2023-08-30 DIAGNOSIS — F95.9 TIC DISORDER, UNSPECIFIED: ICD-10-CM

## 2023-08-30 RX ORDER — CLONAZEPAM 0.5 MG/1
0.5 TABLET ORAL DAILY PRN
Qty: 30 TABLET | Refills: 0 | Status: SHIPPED | OUTPATIENT
Start: 2023-08-30

## 2023-09-03 ENCOUNTER — PATIENT MESSAGE (OUTPATIENT)
Dept: FAMILY MEDICINE CLINIC | Facility: CLINIC | Age: 68
End: 2023-09-03
Payer: MEDICARE

## 2023-09-03 DIAGNOSIS — E11.9 TYPE 2 DIABETES MELLITUS WITHOUT COMPLICATION, WITH LONG-TERM CURRENT USE OF INSULIN: ICD-10-CM

## 2023-09-03 DIAGNOSIS — Z79.4 CONTROLLED TYPE 2 DIABETES MELLITUS WITH COMPLICATION, WITH LONG-TERM CURRENT USE OF INSULIN: ICD-10-CM

## 2023-09-03 DIAGNOSIS — Z79.4 TYPE 2 DIABETES MELLITUS WITHOUT COMPLICATION, WITH LONG-TERM CURRENT USE OF INSULIN: ICD-10-CM

## 2023-09-03 DIAGNOSIS — E11.8 CONTROLLED TYPE 2 DIABETES MELLITUS WITH COMPLICATION, WITH LONG-TERM CURRENT USE OF INSULIN: ICD-10-CM

## 2023-09-05 NOTE — TELEPHONE ENCOUNTER
From: Isaias Monaco  To: Gwyn Patten  Sent: 9/3/2023 5:58 PM EDT  Subject: Sildenafil     Hello Dr. Patten,  Could you please a script for Sildenafil 100 mg- 50 count to [facility] [address] [place] [address]? Thank you very much.

## 2023-09-07 ENCOUNTER — TRANSCRIBE ORDERS (OUTPATIENT)
Dept: ADMINISTRATIVE | Facility: HOSPITAL | Age: 68
End: 2023-09-07
Payer: MEDICARE

## 2023-09-07 DIAGNOSIS — R22.1 NECK MASS: Primary | ICD-10-CM

## 2023-09-11 DIAGNOSIS — N52.9 ERECTILE DYSFUNCTION, UNSPECIFIED ERECTILE DYSFUNCTION TYPE: ICD-10-CM

## 2023-09-11 DIAGNOSIS — N52.9 ERECTILE DYSFUNCTION, UNSPECIFIED ERECTILE DYSFUNCTION TYPE: Primary | ICD-10-CM

## 2023-09-11 RX ORDER — SILDENAFIL 100 MG/1
100 TABLET, FILM COATED ORAL DAILY PRN
Qty: 30 TABLET | Refills: 3 | Status: SHIPPED | OUTPATIENT
Start: 2023-09-11 | End: 2023-09-11 | Stop reason: SDUPTHER

## 2023-09-11 RX ORDER — SILDENAFIL 100 MG/1
100 TABLET, FILM COATED ORAL DAILY PRN
Qty: 50 TABLET | Refills: 3 | Status: SHIPPED | OUTPATIENT
Start: 2023-09-11

## 2023-09-11 NOTE — TELEPHONE ENCOUNTER
Rx Refill Note  Requested Prescriptions     Pending Prescriptions Disp Refills    metFORMIN (GLUCOPHAGE) 500 MG tablet 180 tablet 3     Sig: Take 2 tablets by mouth Daily With Breakfast. Indications: start in 48 hours      Last office visit with prescribing clinician: 8/11/2023   Last telemedicine visit with prescribing clinician: Visit date not found   Next office visit with prescribing clinician: 9/11/2024                         Would you like a call back once the refill request has been completed: [] Yes [] No    If the office needs to give you a call back, can they leave a voicemail: [] Yes [] No    Carey Ignacio LPN  09/11/23, 07:58 EDT

## 2023-09-14 ENCOUNTER — HOSPITAL ENCOUNTER (OUTPATIENT)
Dept: CT IMAGING | Facility: HOSPITAL | Age: 68
Discharge: HOME | End: 2023-09-14
Admitting: OTOLARYNGOLOGY
Payer: MEDICARE

## 2023-09-14 DIAGNOSIS — R22.1 NECK MASS: ICD-10-CM

## 2023-09-14 PROCEDURE — 25510000001 IOPAMIDOL 61 % SOLUTION: Performed by: OTOLARYNGOLOGY

## 2023-09-14 PROCEDURE — 70491 CT SOFT TISSUE NECK W/DYE: CPT

## 2023-09-14 PROCEDURE — 82565 ASSAY OF CREATININE: CPT

## 2023-09-14 RX ADMIN — IOPAMIDOL 100 ML: 612 INJECTION, SOLUTION INTRAVENOUS at 16:31

## 2023-09-15 LAB — CREAT BLDA-MCNC: 1.8 MG/DL (ref 0.6–1.3)

## 2023-09-20 ENCOUNTER — OFFICE VISIT (OUTPATIENT)
Dept: CARDIOLOGY | Facility: CLINIC | Age: 68
End: 2023-09-20
Payer: MEDICARE

## 2023-09-20 ENCOUNTER — HOSPITAL ENCOUNTER (OUTPATIENT)
Dept: CARDIOLOGY | Facility: HOSPITAL | Age: 68
Discharge: HOME | End: 2023-09-20
Payer: MEDICARE

## 2023-09-20 VITALS
HEART RATE: 84 BPM | HEIGHT: 77 IN | SYSTOLIC BLOOD PRESSURE: 98 MMHG | WEIGHT: 258.4 LBS | DIASTOLIC BLOOD PRESSURE: 64 MMHG | BODY MASS INDEX: 30.51 KG/M2

## 2023-09-20 DIAGNOSIS — I25.10 CORONARY ARTERY DISEASE INVOLVING NATIVE CORONARY ARTERY OF NATIVE HEART WITHOUT ANGINA PECTORIS: Primary | ICD-10-CM

## 2023-09-20 DIAGNOSIS — R80.9 TYPE 2 DIABETES MELLITUS WITH MICROALBUMINURIA, WITH LONG-TERM CURRENT USE OF INSULIN: ICD-10-CM

## 2023-09-20 DIAGNOSIS — I10 ESSENTIAL HYPERTENSION: ICD-10-CM

## 2023-09-20 DIAGNOSIS — E11.29 TYPE 2 DIABETES MELLITUS WITH MICROALBUMINURIA, WITH LONG-TERM CURRENT USE OF INSULIN: ICD-10-CM

## 2023-09-20 DIAGNOSIS — Z95.5 S/P PRIMARY ANGIOPLASTY WITH CORONARY STENT: ICD-10-CM

## 2023-09-20 DIAGNOSIS — E78.5 HYPERLIPIDEMIA, UNSPECIFIED HYPERLIPIDEMIA TYPE: ICD-10-CM

## 2023-09-20 DIAGNOSIS — Z79.4 TYPE 2 DIABETES MELLITUS WITH MICROALBUMINURIA, WITH LONG-TERM CURRENT USE OF INSULIN: ICD-10-CM

## 2023-09-20 DIAGNOSIS — E78.5 HYPERLIPIDEMIA LDL GOAL <70: ICD-10-CM

## 2023-09-20 DIAGNOSIS — I25.10 CORONARY ARTERY DISEASE INVOLVING NATIVE CORONARY ARTERY OF NATIVE HEART WITHOUT ANGINA PECTORIS: ICD-10-CM

## 2023-09-20 LAB
ALBUMIN SERPL-MCNC: 4.1 G/DL (ref 3.5–5.2)
ALBUMIN/GLOB SERPL: 1 G/DL
ALP SERPL-CCNC: 70 U/L (ref 39–117)
ALT SERPL W P-5'-P-CCNC: 31 U/L (ref 1–41)
ANION GAP SERPL CALCULATED.3IONS-SCNC: 15.9 MMOL/L (ref 5–15)
AST SERPL-CCNC: 43 U/L (ref 1–40)
BILIRUB SERPL-MCNC: 0.7 MG/DL (ref 0–1.2)
BUN SERPL-MCNC: 17 MG/DL (ref 8–23)
BUN/CREAT SERPL: 9.8 (ref 7–25)
CALCIUM SPEC-SCNC: 9.5 MG/DL (ref 8.6–10.5)
CHLORIDE SERPL-SCNC: 97 MMOL/L (ref 98–107)
CO2 SERPL-SCNC: 22.1 MMOL/L (ref 22–29)
CREAT SERPL-MCNC: 1.74 MG/DL (ref 0.76–1.27)
EGFRCR SERPLBLD CKD-EPI 2021: 42.2 ML/MIN/1.73
GLOBULIN UR ELPH-MCNC: 4.1 GM/DL
GLUCOSE SERPL-MCNC: 90 MG/DL (ref 65–99)
POTASSIUM SERPL-SCNC: 4.2 MMOL/L (ref 3.5–5.2)
PROT SERPL-MCNC: 8.2 G/DL (ref 6–8.5)
SODIUM SERPL-SCNC: 135 MMOL/L (ref 136–145)

## 2023-09-20 PROCEDURE — 93000 ELECTROCARDIOGRAM COMPLETE: CPT | Performed by: NURSE PRACTITIONER

## 2023-09-20 PROCEDURE — 36415 COLL VENOUS BLD VENIPUNCTURE: CPT

## 2023-09-20 PROCEDURE — 3074F SYST BP LT 130 MM HG: CPT | Performed by: NURSE PRACTITIONER

## 2023-09-20 PROCEDURE — 99214 OFFICE O/P EST MOD 30 MIN: CPT | Performed by: NURSE PRACTITIONER

## 2023-09-20 PROCEDURE — 80053 COMPREHEN METABOLIC PANEL: CPT | Performed by: NURSE PRACTITIONER

## 2023-09-20 PROCEDURE — 3078F DIAST BP <80 MM HG: CPT | Performed by: NURSE PRACTITIONER

## 2023-09-20 RX ORDER — HYDROCHLOROTHIAZIDE 25 MG/1
25 TABLET ORAL DAILY
Qty: 90 TABLET | Refills: 3 | Status: SHIPPED | OUTPATIENT
Start: 2023-09-20 | End: 2024-09-19

## 2023-09-20 NOTE — PROGRESS NOTES
Date of Office Visit: 2023  Encounter Provider: MICHELLE Francois  Place of Service: [facility] CARDIOLOGY  Patient Name: Isaias Monaco  :1955    No chief complaint on file.  : follow up     HPI: Isaias Monaco is a 68 y.o. male who is a patient of  Dr. Karimi.  He is new to me today and presents for 6-month office follow-up.  He has a history of coronary artery disease, hypertension and hyperlipidemia.  He presented in  with unstable angina and underwent DAVONTE proximal to mid RCA .  In 2020, he presented with angina and was referred for cardiac cath with DAVONTE to 99% ostial left circumflex lesion.    In May 2022, patient had complaints of 2 weeks of fullness in his throat and vague shortness of breath.  The symptoms had previously been his anginal equivalent.  EKG showed new ST depressions in 3, aVF with TWIs.  Left heart cath revealed new LAD and left circumflex stenosis.  He was referred for cardiac surgery but felt to be too high risk as Jehovah witness and not able to receive blood products.  On 2022, he underwent DAVONTE to proximal left circumflex and left main bifurcation x2 with DK crush technique by Dr. Karimi.  Echocardiogram prior to stenting demonstrated normal left ventricular function.  He was discharged the next day with no issues.    On his last office visit 3/20/2023, patient had been doing well.  He had just come back from a 2-week cruise to [facility] and occasionally had some central chest discomfort that would last a few minutes and go away at rest.  He reported the pain stopped subsequently when he came home.  EKG was unchanged from prior.    Mr. Monaco presents today with his wife.  He is a former cardiac registered nurse, and she is a registered nurse as well.  He has no complaints of chest pain or lightheadedness.  He has had some fatigue over the last couple of days.  He is wearing a mask at this time as he has been  struggling with a respiratory infection as well.  Blood pressures somewhat soft today at 98/64.  He endorse good blood pressure readings at home. EKG shows some new T wave inversions in leads V4 through V6 as well as lead III and AVF.  Patient's wife was concerned because his creatinine was slightly elevated a few days ago and appears to be trending up.  His liver enzymes were also slightly elevated and appear to be trending up as well.  On physical exam, he appears euvolemic.    Previous testing and notes have been reviewed by me.   Past Medical History:   Diagnosis Date    Anemia     post hemorrhagic    Angioedema 02/21/2016    Secondary to ACE inhibitor    Anxiety     Arthritis     CAD (coronary artery disease)     Colon polyps     Diabetes mellitus, type 2     GERD (gastroesophageal reflux disease)     Glaucoma     Hematoma     High cholesterol     History of foreign travel 12/2017; 08/2018    [place], [place], [place], [place], [place] and [place]; [place]    Hyperlipidemia     Hypertension     Hypogonadism in male 09/28/2016    Male erectile disorder     Microalbuminuria     Myocardial infarction     Osteoarthritis     knee    Spinal stenosis of lumbar region without neurogenic claudication 06/02/2021    Tubular adenoma of colon 11/01/2017    Vitamin D deficiency        Past Surgical History:   Procedure Laterality Date    CARDIAC CATHETERIZATION N/A 05/15/2006    Dr. Mini Camarillo    CARDIAC CATHETERIZATION N/A 03/10/2020    Procedure: Left Heart Cath;  Surgeon: Miguel Ángel Karimi MD;  Location: [facility] INVASIVE LOCATION;  Service: Cardiology;  Laterality: N/A;    CARDIAC CATHETERIZATION N/A 03/10/2020    Procedure: Stent DAVONTE coronary;  Surgeon: Miguel Ángel Karimi MD;  Location: [facility] CATH INVASIVE LOCATION;  Service: Cardiology;  Laterality: N/A;    CARDIAC CATHETERIZATION N/A 03/10/2020    Procedure: Coronary angiography;  Surgeon: Miguel Ángel Karimi MD;  Location: [facility]  CATH INVASIVE LOCATION;  Service: Cardiology;  Laterality: N/A;    CARDIAC CATHETERIZATION N/A 03/10/2020    Procedure: Left ventriculography;  Surgeon: Miguel Ángel Karimi MD;  Location: [facility] CATH INVASIVE LOCATION;  Service: Cardiology;  Laterality: N/A;    CARDIAC CATHETERIZATION N/A 05/20/2022    Procedure: Left Heart Cath;  Surgeon: Mackenzie Morales MD;  [facility] CATH INVASIVE LOCATION;  Service: Cardiovascular;  Laterality: N/A;    CARDIAC CATHETERIZATION N/A 05/20/2022    Procedure: Coronary angiography;  Surgeon: Mackenzie Morales MD;  Location: [facility] CATH INVASIVE LOCATION;  Service: Cardiovascular;  Laterality: N/A;    CARDIAC CATHETERIZATION N/A 05/20/2022    Procedure: Percutaneous Coronary Intervention;  Surgeon: Mackenzie Morales MD;  Location: [facility] CATH INVASIVE LOCATION;  Service: Cardiovascular;  Laterality: N/A;    CARDIAC CATHETERIZATION N/A 05/24/2022    Procedure: Percutaneous Coronary Intervention;  Surgeon: Miguel Ángel Karimi MD;  Location: [facility] CATH INVASIVE LOCATION;  Service: Cardiovascular;  Laterality: N/A;  LAD and Cx    CARDIAC CATHETERIZATION N/A 05/24/2022    Procedure: Stent DAVONTE coronary;  Surgeon: Miguel Ángel Karimi MD;  Location: [facility] CATH INVASIVE LOCATION;  Service: Cardiovascular;  Laterality: N/A;    CARDIAC CATHETERIZATION N/A 05/24/2022    Procedure: Resting Full Cycle Ratio;  Surgeon: Miguel Ángel Karimi MD;  Location: [facility] CATH INVASIVE LOCATION;  Service: Cardiovascular;  Laterality: N/A;    COLONOSCOPY N/A 02/22/2006    Bilobed polyp at 30 cm, hemorrhoids-Dr. Ilya Zhao    COLONOSCOPY N/A 02/28/2014    Normal ileum, one 6 mm polyp in the mid transverse colon-Dr. Ilya Zhao    COLONOSCOPY N/A 11/19/2008    Ela ileum, two 3 to 4 mm polyps, non-bleeding internal hemorrhoids, repeat in 5 years-Dr. Ilya Zhao    COLONOSCOPY N/A 10/31/2017    Procedure: COLONOSCOPY WITH POLYPECTOMY (COLD BIOPSY);  Surgeon: Ilya Zhao MD;  Location: [facility]  ENDOSCOPY;  Service:     COLONOSCOPY N/A 05/21/2021    Procedure: Colonoscopy into cecum and terminal ileum with cold biopsy polypectomy;  Surgeon: Ilya Zhao MD;  Location: [facility] ENDOSCOPY;  Service: Gastroenterology;  Laterality: N/A;  Pre op: History of Polyps  Post op: Polyp    CORONARY ANGIOPLASTY WITH STENT PLACEMENT  2007, 2012, 2015    cardiac stents x3 occasions    ENDOSCOPY N/A 10/04/2017    Procedure: ESOPHAGOGASTRODUODENOSCOPY;  Surgeon: Boyd Guidry MD;  Location: [facility] ENDOSCOPY;  Service:     ENDOSCOPY N/A 05/21/2021    Procedure: ESOPHAGOGASTRODUODENOSCOPY with biopsies;  Surgeon: Ilya Zhao MD;  Location: [facility] ENDOSCOPY;  Service: Gastroenterology;  Laterality: N/A;  Pre op: GERD  Post op: Irregular  Z-Line, Hiatal Hernia, Gastritis    ENDOSCOPY N/A 8/25/2023    Procedure: ESOPHAGOGASTRODUODENOSCOPY with biopsy;  Surgeon: Ilya Zhao MD;  Location: [facility] ENDOSCOPY;  Service: Gastroenterology;  Laterality: N/A;  errosive gastritis    EPIDURAL BLOCK      INTERVENTIONAL RADIOLOGY PROCEDURE N/A 05/20/2022    Procedure: Intravascular Ultrasound;  Surgeon: Mackenzie Morales MD;  Location: [facility] CATH INVASIVE LOCATION;  Service: Cardiovascular;  Laterality: N/A;    KNEE INCISION AND DRAINAGE Right 06/20/2017    Procedure: RT. KNEE WASHOUT ;  Surgeon: Boyd Coyne MD;  Location: [facility] MAIN OR;  Service:     MEDIAL BRANCH BLOCK Bilateral 12/17/2021    Procedure: LUMBAR MEDIAL BRANCH BLOCK bilateral ~L4-S1 x2 (~2 weeks apart);  Surgeon: Christina Villaseñor MD;  Location: [facility] MAIN OR;  Service: Pain Management;  Laterality: Bilateral;    MEDIAL BRANCH BLOCK Bilateral 01/03/2022    Procedure: LUMBAR MEDIAL BRANCH BLOCK bilateral ~L4-S1 x2 (~2 weeks apart);  Surgeon: Christina Villaseñor MD;  Location: [facility] MAIN OR;  Service: Pain Management;  Laterality: Bilateral;    IN ARTHRP KNE CONDYLE&PLATU MEDIAL&LAT COMPARTMENTS Right 06/15/2017    Procedure: RT TOTAL KNEE ARTHROPLASTY;  Surgeon:  Boyd Coyne MD;  Location: [facility] MAIN OR;  Service: Orthopedics    RADIOFREQUENCY ABLATION Bilateral 01/10/2022    Procedure: RADIOFREQUENCY ABLATION NERVES Bilateral L4-S1;  Surgeon: Christina Villaseñor MD;  Location: [facility] MAIN OR;  Service: Pain Management;  Laterality: Bilateral;    SHOULDER SURGERY Right 2016    rotator cuff repair    UPPER GASTROINTESTINAL ENDOSCOPY N/A 10/13/2015    Z-line irregular, normal stomach, normal duodenum-Dr. Ilya Zhao    UPPER GASTROINTESTINAL ENDOSCOPY N/A 02/28/2014    Z-line irregular, normal stomach, normal duodenum-Dr. Ilya Zhao    UPPER GASTROINTESTINAL ENDOSCOPY N/A 11/19/2008    Z-line irregular, chronic gastritis withotu hemorrhage, normal duodenum-Dr. Ilya Zhao    UPPER GASTROINTESTINAL ENDOSCOPY N/A 06/22/2006    LA Grade A reflux esophagitis, non-bleeding erythematous gastropathy, normal duodenum-Dr. Ilya Zhao       Social History     Socioeconomic History    Marital status:      Spouse name: Micaela    Number of children: 1    Years of education: College   Tobacco Use    Smoking status: Never     Passive exposure: Never    Smokeless tobacco: Never    Tobacco comments:     CAFFEINE USE: 2 CUPS COFFEE DAILY   Vaping Use    Vaping Use: Never used   Substance and Sexual Activity    Alcohol use: Yes     Alcohol/week: 6.0 standard drinks     Types: 2 Glasses of wine, 2 Cans of beer, 1 Shots of liquor, 1 Drinks containing 0.5 oz of alcohol per week     Comment: occasional 2-4 DRINKS PER WEEK    Drug use: No    Sexual activity: Yes     Partners: Female     Birth control/protection: Post-menopausal       Family History   Problem Relation Age of Onset    Lupus Sister     Thyroid disease Sister     Heart disease Other     Hypertension Other     Arthritis Mother     Hyperlipidemia Mother     Hypertension Mother     Thyroid disease Mother     Lupus Mother     Vision loss Mother     Heart disease Father     Arthritis Father     Other Father         Vascular disease     Lupus Father     Depression Father     Alcohol abuse Father     Dementia Father     Hypertension Father     Heart disease Brother     Heart attack Brother 40    Thyroid disease Sister     Arthritis Brother     Malig Hyperthermia Neg Hx        Review of Systems   Constitutional: Positive for malaise/fatigue.   HENT: Negative.     Eyes: Negative.    Cardiovascular: Negative.    Respiratory: Negative.     Endocrine: Negative.    Hematologic/Lymphatic:        Asa/ plavix   Skin: Negative.    Musculoskeletal: Negative.    Gastrointestinal: Negative.    Genitourinary: Negative.    Neurological: Negative.    Psychiatric/Behavioral: Negative.     Allergic/Immunologic: Negative.      Allergies   Allergen Reactions    Nsaids Other (See Comments)     Renal failure    Norvasc [Amlodipine] Swelling    Ace Inhibitors Angioedema    Lisinopril Angioedema    Testosterone Myalgia         Current Outpatient Medications:     ARIPiprazole (ABILIFY) 5 MG tablet, Take 1.5 tablets by mouth Daily., Disp: , Rfl:     aspirin 81 MG EC tablet, Take 1 tablet by mouth Daily., Disp: 30 tablet, Rfl: 6    carvedilol (COREG) 25 MG tablet, Take 1 tablet by mouth Every 12 (Twelve) Hours., Disp: 180 tablet, Rfl: 3    clonazePAM (KlonoPIN) 0.5 MG tablet, Take 1 tablet by mouth Daily As Needed for Anxiety., Disp: 30 tablet, Rfl: 0    clopidogrel (PLAVIX) 75 MG tablet, Take 1 tablet by mouth Daily. (Patient taking differently: Take 1 tablet by mouth Daily. HOLD X 3 DAYS FOR ENDO), Disp: 90 tablet, Rfl: 2    Continuous Blood Gluc Sensor (Dexcom G6 Sensor), Dipsense Dexcom G6 Sensors, to replace every 10 days, 3 sensors per 30 day period (NDC #[address]-49). Check 6 times a day. Dx: E 1, Disp: 9 each, Rfl: 4    Continuous Blood Gluc Transmit (Dexcom G6 Transmitter) misc, 1 each Every 3 (Three) Months. Dispense 1 Dexcom G6 Transmitter for each 3 month period (NDC #[address]-38). Check 6 times a day. Dx: E 1, Disp: 1 each, Rfl: 4    dorzolamide-timolol  (COSOPT) 22.3-6.8 MG/ML ophthalmic solution, Apply 1 drop to eye(s) to both eyes 2 (Two) Times a Day., Disp: 20 mL, Rfl: 3    doxazosin (CARDURA) 4 MG tablet, TAKE 1 TABLET BY MOUTH EVERY DAY AT NIGHT, Disp: 90 tablet, Rfl: 4    escitalopram (LEXAPRO) 20 MG tablet, Take 1 tablet by mouth Daily., Disp: 90 tablet, Rfl: 3    esomeprazole (nexIUM) 40 MG capsule, Take 1 capsule by mouth Daily., Disp: 90 capsule, Rfl: 3    famotidine (PEPCID) 20 MG tablet, Take 1 tablet by mouth Daily With Dinner., Disp: 90 tablet, Rfl: 3    gabapentin (NEURONTIN) 300 MG capsule, Take 1 capsule every day by oral route in the evening for 30 days., Disp: , Rfl:     hydroCHLOROthiazide (HYDRODIURIL) 25 MG tablet, Take 1 tablet by mouth., Disp: , Rfl:     HYDROcodone-acetaminophen (NORCO) 5-325 MG per tablet, , Disp: , Rfl:     Insulin Lispro, 1 Unit Dial, (HUMALOG) 100 UNIT/ML solution pen-injector, INJECT SUBCUTANEOUS AS DIRECTED BY PROVIDER DOSAGE IS ATTACHED MAX IS 40 UNITS PER DAY, Disp: 9 mL, Rfl: 0    Insulin Pen Needle 31G X 4 MM misc, 1 each Daily., Disp: 100 each, Rfl: 3    Lancets (FREESTYLE) lancets, Use to test BG 4 times daily, Disp: 400 each, Rfl: 1    latanoprost (XALATAN) 0.005 % ophthalmic solution, Apply 1 drop to  each eye Daily. (Patient taking differently: Apply 1 drop to eye(s) as directed by provider Every Night.), Disp: 7.5 mL, Rfl: 3    metFORMIN (GLUCOPHAGE) 500 MG tablet, Take 2 tablets by mouth Daily With Breakfast. Indications: start in 48 hours, Disp: 180 tablet, Rfl: 3    methocarbamol (Robaxin) 500 MG tablet, Take 1 tablet by mouth As Needed (Take as needed for pain)., Disp: 90 tablet, Rfl: 0    Ozempic, 1 MG/DOSE, 4 MG/3ML solution pen-injector, INJECT 1MG SUBCUTANEOUS IN THE APPROPRIATE AREA AS DIRECTED ONCE PER WEEK, Disp: , Rfl:     pioglitazone (ACTOS) 15 MG tablet, Take 1 tablet by mouth Daily., Disp: 90 tablet, Rfl: 3    promethazine-dextromethorphan (PROMETHAZINE-DM) 6.25-15 MG/5ML syrup, Take 5 mL by  mouth 4 (Four) Times a Day As Needed for Cough., Disp: 120 mL, Rfl: 0    rosuvastatin (CRESTOR) 40 MG tablet, Take 1 tablet by mouth Daily., Disp: 90 tablet, Rfl: 3    sildenafil (VIAGRA) 100 MG tablet, Take 1 tablet by mouth Daily As Needed for Erectile Dysfunction. Take 30 minutes to 4 hours prior to sexual activity., Disp: 50 tablet, Rfl: 3    Toujeo SoloStar 300 UNIT/ML solution pen-injector injection, Inject 65 Units under the skin into the appropriate area as directed Daily., Disp: 21 mL, Rfl: 1    traMADol (ULTRAM) 50 MG tablet, Take 1 tablet PO up to twice daily as needed for pain.  30 day supply., Disp: 30 tablet, Rfl: 0      Objective:     There were no vitals filed for this visit.  There is no height or weight on file to calculate BMI.    Left Heart Cath 05/24/2022:  Left main: Discrete 30% distal stenosis  LAD: Discrete 60% ostial stenois  LCX: Diffuse 70-80% in-stent restenosis  Ramus: Small caliber ramus with 99% ostial stenosis  Intervention:   1. Successful left main bifurcation stenting with a 4.0x15mm and 3.18n25jk Xience Skypoint drug eluting stents with DK Crush technique  2. Successful PCI to the mid LCX with a 3.0x23 mm Xience Skypoint DAVONTE    2D Echocardiogram 05/21/2022:  Calculated left ventricular EF = 55.6% Estimated left ventricular EF was in agreement with the calculated left ventricular EF. Left ventricular systolic function is normal.  Left ventricular wall thickness is consistent with borderline concentric hypertrophy.  Left ventricular diastolic function is consistent with (grade I) impaired relaxation.  There is calcification of the aortic valve. Valvular structure is poorly visualized.  Aortic valve area is 1.36 cm2.  Peak velocity of the flow distal to the aortic valve is 177.4 cm/s. Aortic valve maximum pressure gradient is 12.6 mmHg. Aortic valve mean pressure gradient is 8.1 mmHg. Aortic valve dimensionless index is 0.5 .      Left Heart Cath 05/20/2022:  LEFT MAIN: Large  caliber normal, trifurcates to give the LAD ramus and circumflex arteries.   LAD: Ostial 70% lesion. The mid LAD is normal. The distal LAD stent is widely patent. There are 3 small diagonal vessel which are essentially normal.   RAMUS: Ostial 70-80% stenosis, otherwise normal.   CIRCUMFLEX: Ostial circumflex in stent restenosis which is hazy appearing and tortuous. Gives rise to a medium caliber OM which is normal.   RCA: Large caliber RCA which has proximal to distal stents which have mild ISR.      Severe ostial circumflex in stent restenosis, 90%  Status post PTCA using a 3.5x15mm NC balloon  The ostial circumflex was ballooned given the patients unstable symptoms. He has severe multivessel disease involving the ostium LAD and ostium ramus . Have discussed with Dr Winchester of CT surgery, will keep him on Integrillin over the weekend as plavix metabolizes and plan for CABG Surgery next week.    2D Echocardiogram 4/14/2020:  Left ventricular systolic function is normal. Calculated EF = 56%. Estimated EF was in agreement with the calculated EF. Normal left ventricular cavity size noted. All left ventricular wall segments contract normally. Left ventricular wall thickness is consistent with mild concentric hypertrophy. Left ventricular diastolic dysfunction is noted (grade I) consistent with impaired relaxation.     Left Heart Cath 03/10/2020:  1. Left main: Normal  2. LAD: 20 to 30% ostial stenosis.  40% mid vessel stenosis.  3. LCX: 99% ostial stenosis with GRACIE II flow.  40 to 50% mid vessel stenosis.  4. RCA: Previously placed stents from the proximal to distal RCA with luminal irregularities.  5.  Normal left ventricular size and systolic function  6.  PCI of the ostial circumflex with a 3.0 x 15 mm Xience Kaylynn drug-eluting stent.    Lexiscan Stress test 07/18/2017:  Myocardial perfusion imaging indicates a normal myocardial perfusion study with no evidence of ischemia.  Left ventricular ejection fraction is  normal (Calculated EF = 61%).  Impressions are consistent with a low risk study.    Left Heart Cath 11/05/2015:  1. Successful stenting of the jrajljvy-[id number]-jerkrp right coronary artery.   2. Angio-Seal of both femoral arteries.     Left Heart Cath 08/28/2015:  1.  Hemodynamics: /13. /82/98.   2.  Left ventriculography: EF 50%, apical hypokinesis.   3.  Coronary angiography: Right dominant system. Two-vessel coronary disease.  The left main is normal. The proximal LAD has 30% to 40% diffuse disease. There  is a stent in the proximal to mid LAD that is patent. The mid to distal LAD has  30% to 40% diffuse disease. The first diagonal branch has a 60% stenosis. The  circumflex has 50% proximal stenosis. The RCA is totally occluded in the  proximal to midvessel. It reconstitutes via robust left-sided collaterals.      SUMMARY: Moderate left-sided disease with chronic total occlusion of the  proximal to mid RCA.      RECOMMENDATIONS:  intervention of the RCA.       PHYSICAL EXAM:    Constitutional:       Appearance: Healthy appearance. Not in distress.   Neck:      Vascular: No JVR. JVD normal.   Pulmonary:      Effort: Pulmonary effort is normal.      Breath sounds: Normal breath sounds. No wheezing. No rhonchi. No rales.   Chest:      Chest wall: Not tender to palpatation.   Cardiovascular:      PMI at left midclavicular line. Normal rate. Regular rhythm. Normal S1. Normal S2.       Murmurs: There is no murmur.      No gallop.  No click. No rub.   Pulses:     Intact distal pulses.   Edema:     Peripheral edema absent.   Abdominal:      General: Bowel sounds are normal.      Palpations: Abdomen is soft.      Tenderness: There is no abdominal tenderness.   Musculoskeletal: Normal range of motion.         General: No tenderness. Skin:     General: Skin is warm and dry.   Neurological:      General: No focal deficit present.      Mental Status: Alert and oriented to person, place and time.         ECG 12  Lead    Date/Time: 9/20/2023 3:35 PM  Performed by: Hattie Foster APRN  Authorized by: Hattie Foster APRN   Comparison: compared with previous ECG from 3/20/2023  Rhythm: sinus rhythm  Rate: normal  BPM: 84  Conduction: left anterior fascicular block  T inversion: III, aVF, V4, V5 and V6  Other findings: T wave abnormality          Assessment:       Diagnosis Plan   1. Coronary artery disease involving native coronary artery of native heart without angina pectoris        2. Type 2 diabetes mellitus with microalbuminuria, with long-term current use of insulin        3. Essential hypertension        4. Hyperlipidemia LDL goal <70        5. S/P primary angioplasty with coronary stent          No orders of the defined types were placed in this encounter.         Plan:       1.  Coronary artery disease: History of prior PCI to RCA( 2018 and ostial circumflex 2020).  Status post PCI to left circumflex and ostial LAD bifurcation 5/24/2022.  Continue DAPT with aspirin/Plavix lifelong.  On statin therapy.  Normal left ventricular function. Slight new T wave inversion leads II, avF, V4-V6.  No angina. Will come back to office on Tuesday 09/26/23 for repeat EKG  2.  Hypertension: Controlled on carvedilol, Cardura and hydrochlorothiazide  3.  Hyperlipidemia goal LDL < 70: Recent lipid panel in target range except LDL 80.  On statin.   4.  Diabetes type 2: On oral medication and Ozempic.  Recent HbA1c 7.3.  Followed by endocrinology.    Due to uptrending liver enzymes and creatinine, patient will go to [facility] [place] [place] and have a CMP.  He will come back on Tuesday for repeat EKG.  At this time, he will make an appointment with Dr. Karimi for 6 months.             Your medication list            Accurate as of September 20, 2023 12:57 PM. If you have any questions, ask your nurse or doctor.                CHANGE how you take these medications        Instructions Last Dose Given Next Dose Due   clopidogrel 75 MG tablet  Commonly  known as: PLAVIX  What changed: additional instructions      Take 1 tablet by mouth Daily.       latanoprost 0.005 % ophthalmic solution  Commonly known as: XALATAN  What changed: when to take this      Apply 1 drop to  each eye Daily.              CONTINUE taking these medications        Instructions Last Dose Given Next Dose Due   Adult Aspirin Regimen 81 MG EC tablet  Generic drug: aspirin      Take 1 tablet by mouth Daily.       ARIPiprazole 5 MG tablet  Commonly known as: ABILIFY      Take 1.5 tablets by mouth Daily.       carvedilol 25 MG tablet  Commonly known as: COREG      Take 1 tablet by mouth Every 12 (Twelve) Hours.       clonazePAM 0.5 MG tablet  Commonly known as: KlonoPIN      Take 1 tablet by mouth Daily As Needed for Anxiety.       Dexcom G6 Sensor      Dipsense Dexcom G6 Sensors, to replace every 10 days, 3 sensors per 30 day period (NDC #[address]-89). Check 6 times a day. Dx: E 1       Dexcom G6 Transmitter misc      1 each Every 3 (Three) Months. Dispense 1 Dexcom G6 Transmitter for each 3 month period (NDC #[address]-41). Check 6 times a day. Dx: E 1       dorzolamide-timolol 22.3-6.8 MG/ML ophthalmic solution  Commonly known as: COSOPT      Apply 1 drop to eye(s) to both eyes 2 (Two) Times a Day.       doxazosin 4 MG tablet  Commonly known as: CARDURA      TAKE 1 TABLET BY MOUTH EVERY DAY AT NIGHT       escitalopram 20 MG tablet  Commonly known as: LEXAPRO      Take 1 tablet by mouth Daily.       esomeprazole 40 MG capsule  Commonly known as: nexIUM      Take 1 capsule by mouth Daily.       famotidine 20 MG tablet  Commonly known as: PEPCID      Take 1 tablet by mouth Daily With Dinner.       freestyle lancets      Use to test BG 4 times daily       gabapentin 300 MG capsule  Commonly known as: NEURONTIN      Take 1 capsule every day by oral route in the evening for 30 days.       hydroCHLOROthiazide 25 MG tablet  Commonly known as: HYDRODIURIL      Take 1 tablet by mouth.        HYDROcodone-acetaminophen 5-325 MG per tablet  Commonly known as: NORCO           Insulin Lispro (1 Unit Dial) 100 UNIT/ML solution pen-injector  Commonly known as: HUMALOG      INJECT SUBCUTANEOUS AS DIRECTED BY PROVIDER DOSAGE IS ATTACHED MAX IS 40 UNITS PER DAY       Insulin Pen Needle 31G X 4 MM misc      1 each Daily.       metFORMIN 500 MG tablet  Commonly known as: GLUCOPHAGE      Take 2 tablets by mouth Daily With Breakfast. Indications: start in 48 hours       methocarbamol 500 MG tablet  Commonly known as: Robaxin      Take 1 tablet by mouth As Needed (Take as needed for pain).       Ozempic (1 MG/DOSE) 4 MG/3ML solution pen-injector  Generic drug: Semaglutide (1 MG/DOSE)      INJECT 1MG SUBCUTANEOUS IN THE APPROPRIATE AREA AS DIRECTED ONCE PER WEEK       pioglitazone 15 MG tablet  Commonly known as: ACTOS      Take 1 tablet by mouth Daily.       promethazine-dextromethorphan 6.25-15 MG/5ML syrup  Commonly known as: PROMETHAZINE-DM      Take 5 mL by mouth 4 (Four) Times a Day As Needed for Cough.       rosuvastatin 40 MG tablet  Commonly known as: CRESTOR      Take 1 tablet by mouth Daily.       sildenafil 100 MG tablet  Commonly known as: VIAGRA      Take 1 tablet by mouth Daily As Needed for Erectile Dysfunction. Take 30 minutes to 4 hours prior to sexual activity.       Toujeo SoloStar 300 UNIT/ML solution pen-injector injection  Generic drug: Insulin Glargine (1 Unit Dial)      Inject 65 Units under the skin into the appropriate area as directed Daily.       traMADol 50 MG tablet  Commonly known as: ULTRAM      Take 1 tablet PO up to twice daily as needed for pain.  30 day supply.                  As always, it has been a pleasure to participate in your patient's care.      Sincerely,       MICHELLE Brothers

## 2023-09-21 DIAGNOSIS — I25.10 CORONARY ARTERY DISEASE INVOLVING NATIVE CORONARY ARTERY OF NATIVE HEART WITHOUT ANGINA PECTORIS: Primary | ICD-10-CM

## 2023-09-22 ENCOUNTER — TRANSCRIBE ORDERS (OUTPATIENT)
Dept: PHYSICAL THERAPY | Facility: CLINIC | Age: 68
End: 2023-09-22
Payer: MEDICARE

## 2023-09-22 DIAGNOSIS — M51.36 DEGENERATION OF LUMBAR INTERVERTEBRAL DISC: Primary | ICD-10-CM

## 2023-10-23 ENCOUNTER — OFFICE VISIT (OUTPATIENT)
Dept: ENDOCRINOLOGY | Age: 68
End: 2023-10-23
Payer: MEDICARE

## 2023-10-23 VITALS
DIASTOLIC BLOOD PRESSURE: 80 MMHG | BODY MASS INDEX: 30.7 KG/M2 | WEIGHT: 260 LBS | HEART RATE: 89 BPM | HEIGHT: 77 IN | OXYGEN SATURATION: 97 % | SYSTOLIC BLOOD PRESSURE: 130 MMHG | TEMPERATURE: 96.9 F

## 2023-10-23 DIAGNOSIS — Z79.4 TYPE 2 DIABETES MELLITUS WITH HYPERGLYCEMIA, WITH LONG-TERM CURRENT USE OF INSULIN: Primary | ICD-10-CM

## 2023-10-23 DIAGNOSIS — E78.5 HYPERLIPIDEMIA, UNSPECIFIED HYPERLIPIDEMIA TYPE: ICD-10-CM

## 2023-10-23 DIAGNOSIS — E11.65 TYPE 2 DIABETES MELLITUS WITH HYPERGLYCEMIA, WITH LONG-TERM CURRENT USE OF INSULIN: Primary | ICD-10-CM

## 2023-10-23 NOTE — PROGRESS NOTES
Chief complaint:  T2DM    HPI:   - 68 year old male here for management of diabetes mellitus type 2  - Has had diabetes for over 10 years  - Complications include CAD, CKD  - No known complications to date  - Is currently taking Toujeo 65 units daily, Humalog 16-20 units qac (but he does state that sometimes he does not take any Humalog before meals), Ozempic 1 mg weekly, pioglitazone 15 mg daily, metformin 1 g daily  - He was on Jardiance previously but states he had a skin/soft tissue infection due to it so it was stopped  - Denies hypoglycemia  - States BG is typically more elevated after eating over the last 3-6 months  - Is also on Crestor 40 mg daily    The following portions of the patient's history were reviewed and updated as appropriate: allergies, current medications, past family history, past medical history, past social history, past surgical history, and problem list.    Objective     Vitals:    10/23/23 1009   BP: 130/80   Pulse: 89   Temp: 96.9 °F (36.1 °C)   SpO2: 97%        Physical Exam  Vitals reviewed.   Constitutional:       Appearance: Normal appearance.   HENT:      Head: Normocephalic and atraumatic.   Eyes:      General: No scleral icterus.  Pulmonary:      Effort: Pulmonary effort is normal. No respiratory distress.   Neurological:      Mental Status: He is alert. Mental status is at baseline.      Cranial Nerves: No cranial nerve deficit.   Psychiatric:         Mood and Affect: Mood normal.         Behavior: Behavior normal.         Thought Content: Thought content normal.         Judgment: Judgment normal.       Assessment & Plan   T2DM, uncontrolled due to hyperglycemia  - Increase Ozempic to 2 mg weekly  - I asked him to notify us if he still has BG levels over 200 two hours after eating after he has increased the Ozempic dose so we can titrate his Humalog  - Cont. Toujeo 65 units daily, Humalog 16-20 units qac, pioglitazone 15 mg daily, metformin 1 g daily    2. Hyperlipidemia  - On  Crestor 40 mg daily    - Return to clinic in 4 months

## 2023-11-08 DIAGNOSIS — Z79.4 CONTROLLED TYPE 2 DIABETES MELLITUS WITHOUT COMPLICATION, WITH LONG-TERM CURRENT USE OF INSULIN: ICD-10-CM

## 2023-11-08 DIAGNOSIS — E11.9 CONTROLLED TYPE 2 DIABETES MELLITUS WITHOUT COMPLICATION, WITH LONG-TERM CURRENT USE OF INSULIN: ICD-10-CM

## 2023-11-08 RX ORDER — PIOGLITAZONEHYDROCHLORIDE 15 MG/1
15 TABLET ORAL DAILY
Qty: 90 TABLET | Refills: 2 | Status: SHIPPED | OUTPATIENT
Start: 2023-11-08

## 2023-11-10 ENCOUNTER — TREATMENT (OUTPATIENT)
Dept: PHYSICAL THERAPY | Facility: CLINIC | Age: 68
End: 2023-11-10
Payer: MEDICARE

## 2023-11-10 DIAGNOSIS — Z78.9 DIFFICULTY NAVIGATING STAIRS: ICD-10-CM

## 2023-11-10 DIAGNOSIS — R29.898 LOSS OF MOVEMENT: ICD-10-CM

## 2023-11-10 DIAGNOSIS — M54.50 CHRONIC BILATERAL LOW BACK PAIN WITHOUT SCIATICA: Primary | ICD-10-CM

## 2023-11-10 DIAGNOSIS — R26.9 ABNORMAL GAIT: ICD-10-CM

## 2023-11-10 DIAGNOSIS — G89.29 CHRONIC BILATERAL LOW BACK PAIN WITHOUT SCIATICA: Primary | ICD-10-CM

## 2023-11-10 DIAGNOSIS — R26.2 DIFFICULTY WALKING: ICD-10-CM

## 2023-11-10 PROCEDURE — 97162 PT EVAL MOD COMPLEX 30 MIN: CPT | Performed by: PHYSICAL THERAPIST

## 2023-11-10 PROCEDURE — 97110 THERAPEUTIC EXERCISES: CPT | Performed by: PHYSICAL THERAPIST

## 2023-11-10 NOTE — PROGRESS NOTES
Physical Therapy Initial Evaluation and Plan of Care      Patient: Isaias Monaco   : 1955  Diagnosis/ICD-10 Code:  Chronic bilateral low back pain without sciatica [M54.50, G89.29]  Referring practitioner: BRYON Lauren  Date of Initial Visit: 11/10/2023  Today's Date: 11/10/2023  Patient seen for 1 sessions           Subjective: Thony reports today as an outpatient with complaints of chronic low back pain.  He notices pain with weight bearing activities.  Peak pain recently has been 8/10.  Thony reports that with increased weight bearing activity pain will worsen.  He does not experience pain in his legs or parasthesia in the legs.  Walking for 2 blocks, navigating stairs, lifting, pulling or pushing activities will all cause pain to worsen.  He is able to sit and lie down for rest without pain.    Thony's goals with physical therapy: reduce pain and improve mobility with normal daily activity.    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  Thony enjoys travel, socializing     Objective     Observation:  Thony arrived in the clinic ambulating into the clinic independently without an assistive device.  His gait pattern is abnormal.  There is very little pelvic rotation during the gait cycle.  His stride length is reduced and heel strike is reduced, B.    Posture  Lateral view: forward head, increased thoracic kyphosis and decreased lumbar lordosis.  Posterior view: The R shoulder, scapula and ilium are all lower than the L side.  He has a mild scoliosis with concavity on the L in the lumbar lordosis.     Lumbar spine AROM  Flexion, moderate loss with mild end range pain  LB: L moderate loss with moderate end and R with moderate loss and milld end range pain  Extension: Moderate to severe loss, with mild end range pain    DTRs: Patellar B WNL/equal and Hattie's B trace/equal  Upper motor neuron: Babinski and Clonus  were normal  Sensation:  dermatomes L1-S2 were intact with light perception, B  Motor control: L2-S2 myotomes were 4+ to 5/5, B    Functional Outcome Score: TOBIN, 20/50=40% deficit    Treatment    Therapeutic exercise  SKTC, hook lying, x 5, B, 10s hold  LTR, hook lying x 10, B    NMR: verbal cues for exercise technique were given    Self care:  I demonstrated a log roll transfer sit to supine and supine to sit and recommended using that for bed transfers. I began his HEP with SKTC and LTR.      Assessment & Plan       Assessment  Impairments: abnormal gait, abnormal or restricted ROM, activity intolerance, lacks appropriate home exercise program, pain with function and weight-bearing intolerance   Functional limitations: carrying objects, lifting, walking, pulling, pushing, uncomfortable because of pain, standing, stooping and unable to perform repetitive tasks   Assessment details: Isaias Monaco is a 68 y.o. year-old male referred to physical therapy for chronic bilateral low back without sciatica. He presents with a evolving clinical presentation.  He has comorbidities to include soft/bony tissue adaptations to postural habits in his spine. He has no known personal factors  that may affect his progress in the plan of care.  Signs and symptoms are consistent with physical therapy diagnosis of chronic bilateral low back pain with sciatica, loss of movement, poor posture, difficulty walking/navigating stairs, abnormal gait pattern and he will require education for self care. .    Prognosis: good    Goals  Plan Goals: STGs to be met in 6 weeks  1. Thony is introduced to a basic core strengthening exercise in hook lying.  2. He begins aquatic exercise to improve general spine mobility and promote decreased pain and improved function.  3. He is independent with an aquatic exercise program.     LTGs to be met in 12 weeks  1. Thony reports improved tolerance to community walking and is able to walk 4 blocks before pain  increases in the low back.  2. He is independent with a HEP and education for self care.  3. TOBIN deficit is reduced to 30%.    Plan  Therapy options: will be seen for skilled therapy services  Planned modality interventions: ultrasound and TENS  Other planned modality interventions: aquatic therapy  Planned therapy interventions: manual therapy, neuromuscular re-education, postural training, soft tissue mobilization, spinal/joint mobilization, strengthening, stretching, therapeutic activities, abdominal trunk stabilization, ADL retraining, body mechanics training, flexibility, functional ROM exercises, gait training, home exercise program and transfer training  Frequency: 1-2 x per week.  Duration in weeks: 12  Treatment plan discussed with: patient  Plan details: Introduce core exercise and review today's self care next visit.  After he is finished with aquatic therapy, hopefully reducing pain and improving ADL function, to return to skilled land physical therapy for progression of exercises and other treatment.         Timed:  Manual Therapy:         mins  93242;  Therapeutic Exercise:    8     mins  98165;     Neuromuscular Meño:   2     mins  35623;    Therapeutic Activity:          mins  62777;     Gait Training:           mins  51110;     Ultrasound:          mins  12707;    Iontophoresis         mins 92555  Dry Needling        mins 10159/ 06336 (Self-pay)  Self care                     2 mins  46844    Untimed:  Electrical Stimulation:         mins  38645 ( );  Traction:       mins  69302;   Low Eval          Mins  44347  Mod Eval     35     Mins  59183  High Eval                            Mins  45599    Timed Treatment:   12   mins   Total Treatment:     45   mins    PT SIGNATURE: Gwyn Bobby PT     License Number: [place] [id number]    Electronically signed by Gwyn Bobby PT, 11/10/23, 12:23 PM EST    DATE TREATMENT INITIATED: 11/10/2023    Initial Certification  Certification Period: 2/8/2024  I  certify that the therapy services are furnished while this patient is under my care.  The services outlined above are required by this patient, and will be reviewed every 90 days.     PHYSICIAN: Giovanna Rodrigez PA   NPI: [id number]                                         DATE:     Please sign and return via fax to [phone] Thank you, [facility].

## 2023-11-14 ENCOUNTER — TREATMENT (OUTPATIENT)
Dept: PHYSICAL THERAPY | Facility: CLINIC | Age: 68
End: 2023-11-14
Payer: MEDICARE

## 2023-11-14 ENCOUNTER — TRANSCRIBE ORDERS (OUTPATIENT)
Dept: ADMINISTRATIVE | Facility: HOSPITAL | Age: 68
End: 2023-11-14
Payer: MEDICARE

## 2023-11-14 DIAGNOSIS — G89.29 CHRONIC BILATERAL LOW BACK PAIN WITHOUT SCIATICA: Primary | ICD-10-CM

## 2023-11-14 DIAGNOSIS — R26.2 DIFFICULTY WALKING: ICD-10-CM

## 2023-11-14 DIAGNOSIS — M54.50 CHRONIC BILATERAL LOW BACK PAIN WITHOUT SCIATICA: Primary | ICD-10-CM

## 2023-11-14 DIAGNOSIS — R29.898 LOSS OF MOVEMENT: ICD-10-CM

## 2023-11-14 DIAGNOSIS — E04.1 NONTOXIC SINGLE THYROID NODULE: Primary | ICD-10-CM

## 2023-11-14 DIAGNOSIS — Z78.9 DIFFICULTY NAVIGATING STAIRS: ICD-10-CM

## 2023-11-14 PROCEDURE — 97112 NEUROMUSCULAR REEDUCATION: CPT | Performed by: PHYSICAL THERAPIST

## 2023-11-14 PROCEDURE — 97110 THERAPEUTIC EXERCISES: CPT | Performed by: PHYSICAL THERAPIST

## 2023-11-14 NOTE — PROGRESS NOTES
Physical Therapy Daily Treatment Note    [facility]  [address] [place] [address]  [phone] (phone)  [phone] (fax)    Patient: Isaias Monaco   : 1955  Diagnosis/ICD-10 Code:  No primary diagnosis found.  Referring practitioner: BRYON Lauren  Today's Date: 2023  Patient seen for Visit count could not be calculated. Make sure you are using a visit which is associated with an episode. sessions    Visit Diagnoses:  No diagnosis found.           Subjective: Thony reports that his initial HEP is going well thus far.  He was able to go to [employer] and was able to go for a full lap.  That is approximately 1.2 miles.    Objective     Treatment    Therapeutic exercise  LAQs, x 10, 5s hold, B  2. SKTC, hook lying, x 10, B  3. LTR, x 10, B  4. Wand flexion, hook lying, x 10  5. Core bracing, hook lying, 10s x 10  6. Gastrocnemius stretch, 30s x 2, B    NMR: verbal cues for exercise included neutral postioning in hook lying for the lumbar spine, proper technique for setting the core and using the diaphragm.  For the LAQs to sit erectly, extend the lower leg and flex the ankle, and to stretch lightly with the wand flexion and gastrocnemius stretch.    Self care: review of the log roll transfer and I added LAQs, wand flexion, core bracing and the gastrocnemius stretch to his HEP.    Assessment & Plan       Assessment  Assessment details: Savana escaleraromed a good transfer from supine to sit using the log roll.  I progressed his HEP to improve spinal mobility, shoulder mobility and ankle mobility.  He also was given the initial instructions for setting his core.     Plan  Plan details: Thony will continue with aqua therapy later this week.                Timed:    Manual Therapy:         mins  52190;  Therapeutic Exercise:    32     mins  74192;     Neuromuscular Meño:    8    mins  43623;    Therapeutic Activity:          mins  75198;     Gait  Training:           mins  13973;     Ultrasound:          mins  09566;    Aquatic Therapy         mins 73345;  Self Care                            mins   88190        Untimed:  Electrical Stimulation:         mins  82202 ( );  Traction:         mins  03567;   Dry Needling  (1-2 muscles)                 mins 20560 (Self-pay)  Dry Needling (3-4 muscles)      20561 (Self-pay)  Dry Needling Trial         DRYNDLTRIAL  (No Charge)    Timed Treatment:   40   mins   Total Treatment:     40   mins    Gwyn Bobby PT  Physical Therapist    [place] License:[id number]

## 2023-11-22 ENCOUNTER — TRANSCRIBE ORDERS (OUTPATIENT)
Dept: ADMINISTRATIVE | Facility: HOSPITAL | Age: 68
End: 2023-11-22
Payer: MEDICARE

## 2023-11-22 ENCOUNTER — LAB (OUTPATIENT)
Dept: LAB | Facility: HOSPITAL | Age: 68
End: 2023-11-22
Payer: MEDICARE

## 2023-11-22 DIAGNOSIS — N17.9 ACUTE RENAL FAILURE, UNSPECIFIED ACUTE RENAL FAILURE TYPE: ICD-10-CM

## 2023-11-22 DIAGNOSIS — N17.9 ACUTE RENAL FAILURE, UNSPECIFIED ACUTE RENAL FAILURE TYPE: Primary | ICD-10-CM

## 2023-11-22 DIAGNOSIS — N18.31 CHRONIC KIDNEY DISEASE (CKD) STAGE G3A/A1, MODERATELY DECREASED GLOMERULAR FILTRATION RATE (GFR) BETWEEN 45-59 ML/MIN/1.73 SQUARE METER AND ALBUMINURIA CREATININE RATIO LESS THAN 30 MG/G (CMS/H*: ICD-10-CM

## 2023-11-22 DIAGNOSIS — I10 HYPERTENSION, ESSENTIAL: ICD-10-CM

## 2023-11-22 DIAGNOSIS — E11.9 DIABETES MELLITUS WITHOUT COMPLICATION: ICD-10-CM

## 2023-11-22 LAB
ALBUMIN SERPL-MCNC: 4 G/DL (ref 3.5–5.2)
ALBUMIN/GLOB SERPL: 1.1 G/DL
ALP SERPL-CCNC: 84 U/L (ref 39–117)
ALT SERPL W P-5'-P-CCNC: 35 U/L (ref 1–41)
ANION GAP SERPL CALCULATED.3IONS-SCNC: 8.3 MMOL/L (ref 5–15)
AST SERPL-CCNC: 41 U/L (ref 1–40)
BILIRUB SERPL-MCNC: 0.4 MG/DL (ref 0–1.2)
BUN SERPL-MCNC: 11 MG/DL (ref 8–23)
BUN/CREAT SERPL: 9.1 (ref 7–25)
CALCIUM SPEC-SCNC: 9.3 MG/DL (ref 8.6–10.5)
CHLORIDE SERPL-SCNC: 102 MMOL/L (ref 98–107)
CO2 SERPL-SCNC: 27.7 MMOL/L (ref 22–29)
CREAT SERPL-MCNC: 1.21 MG/DL (ref 0.76–1.27)
EGFRCR SERPLBLD CKD-EPI 2021: 65.2 ML/MIN/1.73
GLOBULIN UR ELPH-MCNC: 3.8 GM/DL
GLUCOSE SERPL-MCNC: 116 MG/DL (ref 65–99)
POTASSIUM SERPL-SCNC: 4.3 MMOL/L (ref 3.5–5.2)
PROT SERPL-MCNC: 7.8 G/DL (ref 6–8.5)
SODIUM SERPL-SCNC: 138 MMOL/L (ref 136–145)

## 2023-11-22 PROCEDURE — 80053 COMPREHEN METABOLIC PANEL: CPT

## 2023-11-22 PROCEDURE — 36415 COLL VENOUS BLD VENIPUNCTURE: CPT

## 2023-11-29 ENCOUNTER — HOSPITAL ENCOUNTER (OUTPATIENT)
Dept: ULTRASOUND IMAGING | Facility: HOSPITAL | Age: 68
Discharge: HOME | End: 2023-11-29
Admitting: OTOLARYNGOLOGY
Payer: MEDICARE

## 2023-11-29 DIAGNOSIS — E04.1 NONTOXIC SINGLE THYROID NODULE: ICD-10-CM

## 2023-11-29 PROCEDURE — 76536 US EXAM OF HEAD AND NECK: CPT

## 2023-12-19 DIAGNOSIS — G51.4 FACIAL TWITCHING: ICD-10-CM

## 2023-12-19 DIAGNOSIS — F41.0 PANIC DISORDER: ICD-10-CM

## 2023-12-19 DIAGNOSIS — F41.9 ANXIETY: ICD-10-CM

## 2023-12-19 DIAGNOSIS — F95.9 TIC DISORDER, UNSPECIFIED: ICD-10-CM

## 2023-12-19 NOTE — TELEPHONE ENCOUNTER
Rx Refill Note  Requested Prescriptions     Pending Prescriptions Disp Refills    clonazePAM (KlonoPIN) 0.5 MG tablet 30 tablet 0     Sig: Take 1 tablet by mouth Daily As Needed for Anxiety.      Last office visit with prescribing clinician: 8/11/2023   Last telemedicine visit with prescribing clinician: Visit date not found   Next office visit with prescribing clinician: 9/11/2024                         Would you like a call back once the refill request has been completed: [] Yes [] No    If the office needs to give you a call back, can they leave a voicemail: [] Yes [] No    Jing Garcia MA  12/19/23, 15:04 EST

## 2023-12-21 RX ORDER — CLONAZEPAM 0.5 MG/1
0.5 TABLET ORAL DAILY PRN
Qty: 30 TABLET | Refills: 0 | Status: SHIPPED | OUTPATIENT
Start: 2023-12-21

## 2024-01-04 ENCOUNTER — PRIOR AUTHORIZATION (OUTPATIENT)
Dept: ENDOCRINOLOGY | Age: 69
End: 2024-01-04
Payer: MEDICARE

## 2024-01-04 ENCOUNTER — HOSPITAL ENCOUNTER (OUTPATIENT)
Dept: GENERAL RADIOLOGY | Facility: HOSPITAL | Age: 69
Discharge: HOME | End: 2024-01-04
Admitting: STUDENT IN AN ORGANIZED HEALTH CARE EDUCATION/TRAINING PROGRAM
Payer: MEDICARE

## 2024-01-04 DIAGNOSIS — M79.675 PAIN IN LEFT TOE(S): ICD-10-CM

## 2024-01-04 DIAGNOSIS — E11.65 TYPE 2 DIABETES MELLITUS WITH HYPERGLYCEMIA, WITH LONG-TERM CURRENT USE OF INSULIN: ICD-10-CM

## 2024-01-04 DIAGNOSIS — Z79.4 TYPE 2 DIABETES MELLITUS WITH HYPERGLYCEMIA, WITH LONG-TERM CURRENT USE OF INSULIN: ICD-10-CM

## 2024-01-04 DIAGNOSIS — K21.9 GASTROESOPHAGEAL REFLUX DISEASE WITHOUT ESOPHAGITIS: ICD-10-CM

## 2024-01-04 DIAGNOSIS — M19.072 OSTEOARTHRITIS OF JOINT OF TOE OF LEFT FOOT: Primary | ICD-10-CM

## 2024-01-04 DIAGNOSIS — M19.072 OSTEOARTHRITIS OF JOINT OF TOE OF LEFT FOOT: ICD-10-CM

## 2024-01-04 PROCEDURE — 73660 X-RAY EXAM OF TOE(S): CPT

## 2024-01-04 RX ORDER — FAMOTIDINE 20 MG/1
20 TABLET, FILM COATED ORAL
Qty: 90 TABLET | Refills: 3 | Status: SHIPPED | OUTPATIENT
Start: 2024-01-04

## 2024-01-15 ENCOUNTER — TELEPHONE (OUTPATIENT)
Dept: NEUROSURGERY | Facility: CLINIC | Age: 69
End: 2024-01-15
Payer: MEDICARE

## 2024-01-15 DIAGNOSIS — M48.061 SPINAL STENOSIS OF LUMBAR REGION WITHOUT NEUROGENIC CLAUDICATION: Primary | ICD-10-CM

## 2024-01-15 DIAGNOSIS — M79.602 PAIN OF LEFT UPPER EXTREMITY: ICD-10-CM

## 2024-01-15 NOTE — TELEPHONE ENCOUNTER
PATIENTS WIFE CALLED AND STATES THAT HIS PAIN HAS NOW MOVED DOWN INTO HIS LEGS.  STATES THE PAIN IS NOW IN BOTH OF HIS LEGS AND STATES HE IS NOW ALSO HAVING LEFT ARM PAIN.  STATES THE SYMPTOMS STARTED 2-3 MONTHS AGO AND THEY ARE GETTING WORSE.  DENIES LOSS OF BOWEL AND BLADDER.  STATES THE PATIENT HAS STUMBLED A COUPLE OF TIMES.      PLEASE CALL PATIENTS WIFE @ PHONE NUMBER [phone]  THANK YOU

## 2024-01-16 ENCOUNTER — HOSPITAL ENCOUNTER (OUTPATIENT)
Dept: ULTRASOUND IMAGING | Facility: HOSPITAL | Age: 69
Discharge: HOME | End: 2024-01-16
Admitting: OTOLARYNGOLOGY
Payer: MEDICARE

## 2024-01-16 DIAGNOSIS — E04.1 LEFT THYROID NODULE: ICD-10-CM

## 2024-01-16 PROCEDURE — 88173 CYTOPATH EVAL FNA REPORT: CPT | Performed by: OTOLARYNGOLOGY

## 2024-01-16 PROCEDURE — 25010000002 LIDOCAINE 1 % SOLUTION: Performed by: OTOLARYNGOLOGY

## 2024-01-16 PROCEDURE — 88172 CYTP DX EVAL FNA 1ST EA SITE: CPT | Performed by: OTOLARYNGOLOGY

## 2024-01-16 PROCEDURE — 88305 TISSUE EXAM BY PATHOLOGIST: CPT | Performed by: OTOLARYNGOLOGY

## 2024-01-16 RX ORDER — SODIUM BICARBONATE 42 MG/ML
5 INJECTION, SOLUTION INTRAVENOUS ONCE
Status: COMPLETED | OUTPATIENT
Start: 2024-01-16 | End: 2024-01-16

## 2024-01-16 RX ORDER — LIDOCAINE HYDROCHLORIDE 10 MG/ML
5 INJECTION, SOLUTION INFILTRATION; PERINEURAL ONCE
Status: COMPLETED | OUTPATIENT
Start: 2024-01-16 | End: 2024-01-16

## 2024-01-16 RX ADMIN — LIDOCAINE HYDROCHLORIDE 5 ML: 10 INJECTION, SOLUTION INFILTRATION; PERINEURAL at 09:44

## 2024-01-16 RX ADMIN — SODIUM BICARBONATE 5 MEQ: 42 INJECTION, SOLUTION INTRAVENOUS at 09:44

## 2024-01-17 LAB
CYTO UR: NORMAL
DX PRELIMINARY: NORMAL
LAB AP CASE REPORT: NORMAL
LAB AP CLINICAL INFORMATION: NORMAL
LAB AP DIAGNOSIS COMMENT: NORMAL
LAB AP NON-GYN SPECIMEN ADEQUACY: NORMAL
PATH REPORT.FINAL DX SPEC: NORMAL
PATH REPORT.GROSS SPEC: NORMAL

## 2024-01-17 NOTE — TELEPHONE ENCOUNTER
Per Dr. Miguel we will order MRI Lumbar and Thoracic without contrast. Patient/Pt's wife will call to schedule follow up with Dr. Miguel after MRI's are scheduled.

## 2024-01-18 NOTE — TELEPHONE ENCOUNTER
PATIENT'S WIFE CALLED TO LET THE OFFICE KNOW MRI IS NOW SCHEDULED FOR 1/23/24 - ATTEMPTED TO SCHEDULE BUT FIRST AVAILABLE FOR DR. CASTELLANOS IS 2/28/24; PER JOSE HE IS SUPPOSED TO BE SEEN SOON AFTER MRI IS COMPLETE AND DID NOT WANT TO GET SCHEDULED THAT FAR OUT.     PLEASE CALL JOSE FOR SCHEDULING.    THANK YOU!

## 2024-01-23 ENCOUNTER — HOSPITAL ENCOUNTER (OUTPATIENT)
Facility: HOSPITAL | Age: 69
Discharge: HOME | End: 2024-01-23
Payer: MEDICARE

## 2024-01-23 ENCOUNTER — TRANSCRIBE ORDERS (OUTPATIENT)
Dept: ADMINISTRATIVE | Facility: HOSPITAL | Age: 69
End: 2024-01-23
Payer: MEDICARE

## 2024-01-23 ENCOUNTER — LAB (OUTPATIENT)
Facility: HOSPITAL | Age: 69
End: 2024-01-23
Payer: MEDICARE

## 2024-01-23 ENCOUNTER — LAB (OUTPATIENT)
Dept: LAB | Facility: HOSPITAL | Age: 69
End: 2024-01-23
Payer: MEDICARE

## 2024-01-23 DIAGNOSIS — I10 BENIGN ESSENTIAL HYPERTENSION: ICD-10-CM

## 2024-01-23 DIAGNOSIS — I10 HYPERTENSION, ESSENTIAL: ICD-10-CM

## 2024-01-23 DIAGNOSIS — N18.31 CHRONIC KIDNEY DISEASE (CKD) STAGE G3A/A1, MODERATELY DECREASED GLOMERULAR FILTRATION RATE (GFR) BETWEEN 45-59 ML/MIN/1.73 SQUARE METER AND ALBUMINURIA CREATININE RATIO LESS THAN 30 MG/G (CMS/H*: Primary | ICD-10-CM

## 2024-01-23 DIAGNOSIS — M48.061 SPINAL STENOSIS OF LUMBAR REGION WITHOUT NEUROGENIC CLAUDICATION: ICD-10-CM

## 2024-01-23 DIAGNOSIS — N18.31 CHRONIC KIDNEY DISEASE (CKD) STAGE G3A/A1, MODERATELY DECREASED GLOMERULAR FILTRATION RATE (GFR) BETWEEN 45-59 ML/MIN/1.73 SQUARE METER AND ALBUMINURIA CREATININE RATIO LESS THAN 30 MG/G (CMS/H*: ICD-10-CM

## 2024-01-23 DIAGNOSIS — E11.9 DIABETES MELLITUS WITHOUT COMPLICATION: ICD-10-CM

## 2024-01-23 DIAGNOSIS — M79.602 PAIN OF LEFT UPPER EXTREMITY: ICD-10-CM

## 2024-01-23 LAB
BACTERIA UR QL AUTO: NORMAL /HPF
BILIRUB UR QL STRIP: NEGATIVE
CK SERPL-CCNC: 260 U/L (ref 20–200)
CLARITY UR: CLEAR
COLOR UR: ABNORMAL
CREAT UR-MCNC: 326.3 MG/DL
GLUCOSE UR STRIP-MCNC: NEGATIVE MG/DL
HGB UR QL STRIP.AUTO: NEGATIVE
HYALINE CASTS UR QL AUTO: NORMAL /LPF
KETONES UR QL STRIP: ABNORMAL
LEUKOCYTE ESTERASE UR QL STRIP.AUTO: NEGATIVE
NITRITE UR QL STRIP: NEGATIVE
PH UR STRIP.AUTO: 6 [PH] (ref 5–8)
PHOSPHATE SERPL-MCNC: 3.8 MG/DL (ref 2.5–4.5)
PROT ?TM UR-MCNC: 189 MG/DL
PROT UR QL STRIP: ABNORMAL
PROT/CREAT UR: 579.2 MG/G CREA (ref 0–200)
PTH-INTACT SERPL-MCNC: 49.3 PG/ML (ref 15–65)
RBC # UR STRIP: NORMAL /HPF
REF LAB TEST METHOD: NORMAL
SP GR UR STRIP: >=1.03 (ref 1–1.03)
SQUAMOUS #/AREA URNS HPF: NORMAL /HPF
URATE SERPL-MCNC: 4.7 MG/DL (ref 3.4–7)
UROBILINOGEN UR QL STRIP: ABNORMAL
WBC # UR STRIP: NORMAL /HPF

## 2024-01-23 PROCEDURE — 72141 MRI NECK SPINE W/O DYE: CPT

## 2024-01-23 PROCEDURE — 80053 COMPREHEN METABOLIC PANEL: CPT | Performed by: INTERNAL MEDICINE

## 2024-01-23 PROCEDURE — 82570 ASSAY OF URINE CREATININE: CPT

## 2024-01-23 PROCEDURE — 82550 ASSAY OF CK (CPK): CPT

## 2024-01-23 PROCEDURE — 83970 ASSAY OF PARATHORMONE: CPT

## 2024-01-23 PROCEDURE — 83036 HEMOGLOBIN GLYCOSYLATED A1C: CPT | Performed by: INTERNAL MEDICINE

## 2024-01-23 PROCEDURE — 84156 ASSAY OF PROTEIN URINE: CPT

## 2024-01-23 PROCEDURE — 36415 COLL VENOUS BLD VENIPUNCTURE: CPT

## 2024-01-23 PROCEDURE — 72148 MRI LUMBAR SPINE W/O DYE: CPT

## 2024-01-23 PROCEDURE — 81001 URINALYSIS AUTO W/SCOPE: CPT

## 2024-01-23 PROCEDURE — 80061 LIPID PANEL: CPT | Performed by: INTERNAL MEDICINE

## 2024-01-23 PROCEDURE — 84550 ASSAY OF BLOOD/URIC ACID: CPT

## 2024-01-23 PROCEDURE — 84100 ASSAY OF PHOSPHORUS: CPT

## 2024-02-01 ENCOUNTER — OFFICE VISIT (OUTPATIENT)
Dept: ENDOCRINOLOGY | Age: 69
End: 2024-02-01
Payer: MEDICARE

## 2024-02-01 VITALS
SYSTOLIC BLOOD PRESSURE: 126 MMHG | HEART RATE: 82 BPM | BODY MASS INDEX: 31.55 KG/M2 | HEIGHT: 77 IN | TEMPERATURE: 96.9 F | OXYGEN SATURATION: 96 % | DIASTOLIC BLOOD PRESSURE: 84 MMHG | WEIGHT: 267.2 LBS

## 2024-02-01 DIAGNOSIS — E11.29 TYPE 2 DIABETES MELLITUS WITH MICROALBUMINURIA, WITH LONG-TERM CURRENT USE OF INSULIN: Primary | ICD-10-CM

## 2024-02-01 DIAGNOSIS — R80.9 TYPE 2 DIABETES MELLITUS WITH MICROALBUMINURIA, WITH LONG-TERM CURRENT USE OF INSULIN: Primary | ICD-10-CM

## 2024-02-01 DIAGNOSIS — I12.9 HYPERTENSIVE KIDNEY DISEASE WITH STAGE 3A CHRONIC KIDNEY DISEASE: ICD-10-CM

## 2024-02-01 DIAGNOSIS — N18.31 HYPERTENSIVE KIDNEY DISEASE WITH STAGE 3A CHRONIC KIDNEY DISEASE: ICD-10-CM

## 2024-02-01 DIAGNOSIS — E78.5 HYPERLIPIDEMIA LDL GOAL <70: ICD-10-CM

## 2024-02-01 DIAGNOSIS — Z79.4 TYPE 2 DIABETES MELLITUS WITH MICROALBUMINURIA, WITH LONG-TERM CURRENT USE OF INSULIN: Primary | ICD-10-CM

## 2024-02-01 PROCEDURE — 3074F SYST BP LT 130 MM HG: CPT | Performed by: NURSE PRACTITIONER

## 2024-02-01 PROCEDURE — 99214 OFFICE O/P EST MOD 30 MIN: CPT | Performed by: NURSE PRACTITIONER

## 2024-02-01 PROCEDURE — 95251 CONT GLUC MNTR ANALYSIS I&R: CPT | Performed by: NURSE PRACTITIONER

## 2024-02-01 PROCEDURE — 3044F HG A1C LEVEL LT 7.0%: CPT | Performed by: NURSE PRACTITIONER

## 2024-02-01 PROCEDURE — 3079F DIAST BP 80-89 MM HG: CPT | Performed by: NURSE PRACTITIONER

## 2024-02-01 RX ORDER — HYDROCHLOROTHIAZIDE 12.5 MG/1
12.5 TABLET ORAL DAILY
COMMUNITY

## 2024-02-01 RX ORDER — AMOXICILLIN 875 MG/1
1 TABLET, COATED ORAL
COMMUNITY
Start: 2024-01-26

## 2024-02-01 RX ORDER — ACYCLOVIR 400 MG/1
TABLET ORAL
COMMUNITY

## 2024-02-01 NOTE — PROGRESS NOTES
Chief Complaint  Chief Complaint   Patient presents with    Diabetes     Type 2: Pt Dexcom is attached, is up to date on eye exam, no hx of retinopathy or neuropathy.        Subjective          History of Present Illness    Isaias Monaco 68 y.o. presents for a follow-up evaluation for type 2 DM     He has been diabetic since greater than 10 years ago     Pt is on the following medications for their DM: metformin 1,000 mg daily,  Actos 15 mg daily, Ozempic 2 mg weekly and Toujeo 65 units daily    Humalog 16-20 units before each meal           Jardiance 25 mg daily due to yeast infection in peritnal infection      Denies diarrhea, constipation, chest pain, shortness of breath, vision changes or numbness and tingling in feet/hands.    Pt does not have a history of diabetic retinopathy.  Last eye exam was Winter 2023  Pt has cataracts     Pt does have a history of nephropathy.  Patient is not currently taking ACE/ARB - follows with Nephrology  Angioedema with ACE     Pt does not have neuropathy.      Pt does have a history of CAD s/p 10 different stents.  May 2022 was last  No CVA or MI    Last A1C in 01/24 was 6.4    Last microalbumin in 01/24 was positive           Blood Sugars    Blood glucoses are checked via Dexcom    Fasting blood glucoses: 60s- low 100s    Pre-meal blood glucoses: low to mid 100s    Pt has episodes of hypoglycemia in early morning hours            Sensor Data    Time in range 93%  High 3%  Very high <1%  Low 2%  Very low 1%      Average Glucose - 118 mg/dL      Sensor Wear - 57 %            Hyperlipidemia     Pt is currently taking Crestor 40 mg HS     Last lipid panel in 01/24 showed Total 135, HDL 51, LDL 67 and Triglycerides 86            Hypertension with CKD Stage 3a     Pt denies any chest pain, palpitations, shortness of breath or headache      Current regimen includes carvedilol 25 mg BID, doxazosin 4 mg HS, HCTZ 25 mg daily            I have reviewed the patient's allergies,  "medicines, past medical hx, family hx and social hx.    Objective   Vital Signs:   /84   Pulse 82   Temp 96.9 °F (36.1 °C) (Temporal)   Ht 195.6 cm (77.01\")   Wt 121 kg (267 lb 3.2 oz)   SpO2 96%   BMI 31.68 kg/m²       Physical Exam   Physical Exam  Constitutional:       General: He is not in acute distress.     Appearance: Normal appearance. He is not diaphoretic.   HENT:      Head: Normocephalic and atraumatic.   Eyes:      General:         Right eye: No discharge.         Left eye: No discharge.   Skin:     General: Skin is warm and dry.   Neurological:      Mental Status: He is alert.   Psychiatric:         Mood and Affect: Mood normal.         Behavior: Behavior normal.                    Results Review:   Hemoglobin A1C   Date Value Ref Range Status   01/23/2024 6.40 (H) 4.80 - 5.60 % Final     Total Cholesterol   Date Value Ref Range Status   01/23/2024 135 0 - 200 mg/dL Final     Triglycerides   Date Value Ref Range Status   01/23/2024 86 0 - 150 mg/dL Final     HDL Cholesterol   Date Value Ref Range Status   01/23/2024 51 40 - 60 mg/dL Final     LDL Cholesterol    Date Value Ref Range Status   01/23/2024 67 0 - 100 mg/dL Final     LDL Chol Calc (NIH)   Date Value Ref Range Status   08/08/2023 81 0 - 100 mg/dL Final     VLDL Cholesterol   Date Value Ref Range Status   01/23/2024 17 5 - 40 mg/dL Final     VLDL Cholesterol Haresh   Date Value Ref Range Status   08/08/2023 19 5 - 40 mg/dL Final     LDL/HDL Ratio   Date Value Ref Range Status   01/23/2024 1.31  Final   05/21/2022 Unable to Calculate  Final         Assessment and Plan {CC Problem List  Visit Diagnosis  ROS  Review (Popup)  Health Maintenance  Quality  BestPractice  Medications  SmartSets  SnapShot Encounters  Media :23  Diagnoses and all orders for this visit:    1. Type 2 diabetes mellitus with microalbuminuria, with long-term current use of insulin (Primary)  -     Hemoglobin A1c; Future  -     Comprehensive Metabolic " Panel; Future    Continue with metformin 1,000 mg daily  Continue with Actos 15 mg daily  Continue with Ozempic 2 mg weekly  Decrease Toujeo 60 units daily  Continue with Dexcom  A1c is good at 6.4%    2. Hyperlipidemia LDL goal <70  -     Comprehensive Metabolic Panel; Future  -     Lipid Panel; Future    Lipid panel is good.    Continue with statin.       3. Hypertensive kidney disease with stage 3a chronic kidney disease  -     Comprehensive Metabolic Panel; Future     Stable  Continue with current medication regimen  Defer management to PCP/Nephrology        No refills needed at this time        RTC on 06/25/24 with Dr. Patten, labs prior at hospital       Follow Up     Patient was given instructions and counseling regarding her condition or for health maintenance advice. Please see specific information pulled into the AVS if appropriate.              Ashley Martinez, MICHELLE  02/01/24

## 2024-03-08 ENCOUNTER — TELEPHONE (OUTPATIENT)
Dept: CARDIOLOGY | Facility: CLINIC | Age: 69
End: 2024-03-08

## 2024-03-08 NOTE — TELEPHONE ENCOUNTER
See message below.  When you get a chance can you call and get the pt scheduled.  For Dr. Karimi, you could put him on for 3/27/24 at 12:40p. Or if he is feeling he needs to be seen sooner, he can see Shelbie Foster.    Thanks,  Jenny

## 2024-03-08 NOTE — TELEPHONE ENCOUNTER
Caller: Micaela Monaco    Relationship to patient: Emergency Contact    Best call back number: [phone]    Chief complaint: LAST COUPLE OF WEEKS HE HAS HAD ANXIETY, SOB, AND FATIGUE     Type of visit: FOLLOW UP    Requested date: NEXT WEEK    If rescheduling, when is the original appointment:      Additional notes:

## 2024-03-11 DIAGNOSIS — F95.9 TIC DISORDER, UNSPECIFIED: ICD-10-CM

## 2024-03-11 DIAGNOSIS — G51.4 FACIAL TWITCHING: ICD-10-CM

## 2024-03-11 DIAGNOSIS — F41.9 ANXIETY: ICD-10-CM

## 2024-03-11 DIAGNOSIS — F41.0 PANIC DISORDER: ICD-10-CM

## 2024-03-11 RX ORDER — CARVEDILOL 25 MG/1
25 TABLET ORAL EVERY 12 HOURS SCHEDULED
Qty: 180 TABLET | Refills: 3 | Status: SHIPPED | OUTPATIENT
Start: 2024-03-11

## 2024-03-11 RX ORDER — CLONAZEPAM 0.5 MG/1
0.5 TABLET ORAL DAILY PRN
Qty: 30 TABLET | Refills: 0 | OUTPATIENT
Start: 2024-03-11

## 2024-03-11 NOTE — TELEPHONE ENCOUNTER
03/11/2024    Called and left a voicemail for patient to return call to schedule this appointment.    Thanks  Femi

## 2024-03-11 NOTE — TELEPHONE ENCOUNTER
Rx Refill Note  Requested Prescriptions     Pending Prescriptions Disp Refills    carvedilol (COREG) 25 MG tablet 180 tablet 3     Sig: Take 1 tablet by mouth Every 12 (Twelve) Hours.    clonazePAM (KlonoPIN) 0.5 MG tablet 30 tablet 0     Sig: Take 1 tablet by mouth Daily As Needed for Anxiety.      Last office visit with prescribing clinician: 8/11/2023   Last telemedicine visit with prescribing clinician: Visit date not found   Next office visit with prescribing clinician: 9/11/2024                         Would you like a call back once the refill request has been completed: [] Yes [] No    If the office needs to give you a call back, can they leave a voicemail: [] Yes [] No    Jing Garcia MA  03/11/24, 12:37 EDT

## 2024-03-12 ENCOUNTER — OFFICE VISIT (OUTPATIENT)
Dept: CARDIOLOGY | Facility: CLINIC | Age: 69
End: 2024-03-12
Payer: MEDICARE

## 2024-03-12 ENCOUNTER — LAB (OUTPATIENT)
Dept: LAB | Facility: HOSPITAL | Age: 69
End: 2024-03-12
Payer: MEDICARE

## 2024-03-12 ENCOUNTER — TRANSCRIBE ORDERS (OUTPATIENT)
Dept: CARDIOLOGY | Facility: CLINIC | Age: 69
End: 2024-03-12
Payer: MEDICARE

## 2024-03-12 VITALS
DIASTOLIC BLOOD PRESSURE: 78 MMHG | HEIGHT: 77 IN | WEIGHT: 260.8 LBS | BODY MASS INDEX: 30.79 KG/M2 | SYSTOLIC BLOOD PRESSURE: 128 MMHG | HEART RATE: 86 BPM

## 2024-03-12 DIAGNOSIS — N18.31 HYPERTENSIVE KIDNEY DISEASE WITH STAGE 3A CHRONIC KIDNEY DISEASE: ICD-10-CM

## 2024-03-12 DIAGNOSIS — Z01.810 PRE-OPERATIVE CARDIOVASCULAR EXAMINATION: ICD-10-CM

## 2024-03-12 DIAGNOSIS — Z79.4 TYPE 2 DIABETES MELLITUS WITH MICROALBUMINURIA, WITH LONG-TERM CURRENT USE OF INSULIN: ICD-10-CM

## 2024-03-12 DIAGNOSIS — I10 ESSENTIAL HYPERTENSION: ICD-10-CM

## 2024-03-12 DIAGNOSIS — I12.9 HYPERTENSIVE KIDNEY DISEASE WITH STAGE 3A CHRONIC KIDNEY DISEASE: ICD-10-CM

## 2024-03-12 DIAGNOSIS — E78.5 HYPERLIPIDEMIA LDL GOAL <70: ICD-10-CM

## 2024-03-12 DIAGNOSIS — E11.29 TYPE 2 DIABETES MELLITUS WITH MICROALBUMINURIA, WITH LONG-TERM CURRENT USE OF INSULIN: ICD-10-CM

## 2024-03-12 DIAGNOSIS — I25.10 CORONARY ARTERY DISEASE INVOLVING NATIVE CORONARY ARTERY OF NATIVE HEART WITHOUT ANGINA PECTORIS: Primary | ICD-10-CM

## 2024-03-12 DIAGNOSIS — Z13.6 SCREENING FOR ISCHEMIC HEART DISEASE: ICD-10-CM

## 2024-03-12 DIAGNOSIS — Z95.5 S/P PRIMARY ANGIOPLASTY WITH CORONARY STENT: ICD-10-CM

## 2024-03-12 DIAGNOSIS — I20.0 UNSTABLE ANGINA: ICD-10-CM

## 2024-03-12 DIAGNOSIS — R80.9 TYPE 2 DIABETES MELLITUS WITH MICROALBUMINURIA, WITH LONG-TERM CURRENT USE OF INSULIN: ICD-10-CM

## 2024-03-12 DIAGNOSIS — Z01.810 PRE-OPERATIVE CARDIOVASCULAR EXAMINATION: Primary | ICD-10-CM

## 2024-03-12 LAB
ALBUMIN SERPL-MCNC: 4.2 G/DL (ref 3.5–5.2)
ALBUMIN/GLOB SERPL: 1.1 G/DL
ALP SERPL-CCNC: 92 U/L (ref 39–117)
ALT SERPL W P-5'-P-CCNC: 45 U/L (ref 1–41)
ANION GAP SERPL CALCULATED.3IONS-SCNC: 14.6 MMOL/L (ref 5–15)
AST SERPL-CCNC: 41 U/L (ref 1–40)
BASOPHILS # BLD AUTO: 0.04 10*3/MM3 (ref 0–0.2)
BASOPHILS NFR BLD AUTO: 0.4 % (ref 0–1.5)
BILIRUB SERPL-MCNC: 0.5 MG/DL (ref 0–1.2)
BUN SERPL-MCNC: 20 MG/DL (ref 8–23)
BUN/CREAT SERPL: 13.7 (ref 7–25)
CALCIUM SPEC-SCNC: 9.4 MG/DL (ref 8.6–10.5)
CHLORIDE SERPL-SCNC: 98 MMOL/L (ref 98–107)
CHOLEST SERPL-MCNC: 126 MG/DL (ref 0–200)
CO2 SERPL-SCNC: 23.4 MMOL/L (ref 22–29)
CREAT SERPL-MCNC: 1.46 MG/DL (ref 0.76–1.27)
DEPRECATED RDW RBC AUTO: 48.7 FL (ref 37–54)
EGFRCR SERPLBLD CKD-EPI 2021: 52.1 ML/MIN/1.73
EOSINOPHIL # BLD AUTO: 0.11 10*3/MM3 (ref 0–0.4)
EOSINOPHIL NFR BLD AUTO: 1.1 % (ref 0.3–6.2)
ERYTHROCYTE [DISTWIDTH] IN BLOOD BY AUTOMATED COUNT: 15.5 % (ref 12.3–15.4)
GLOBULIN UR ELPH-MCNC: 3.7 GM/DL
GLUCOSE SERPL-MCNC: 112 MG/DL (ref 65–99)
HBA1C MFR BLD: 6.4 % (ref 4.8–5.6)
HCT VFR BLD AUTO: 48.7 % (ref 37.5–51)
HDLC SERPL-MCNC: 49 MG/DL (ref 40–60)
HGB BLD-MCNC: 15.7 G/DL (ref 13–17.7)
IMM GRANULOCYTES # BLD AUTO: 0.04 10*3/MM3 (ref 0–0.05)
IMM GRANULOCYTES NFR BLD AUTO: 0.4 % (ref 0–0.5)
LDLC SERPL CALC-MCNC: 54 MG/DL (ref 0–100)
LDLC/HDLC SERPL: 1.02 {RATIO}
LYMPHOCYTES # BLD AUTO: 2.45 10*3/MM3 (ref 0.7–3.1)
LYMPHOCYTES NFR BLD AUTO: 24.1 % (ref 19.6–45.3)
MCH RBC QN AUTO: 28 PG (ref 26.6–33)
MCHC RBC AUTO-ENTMCNC: 32.2 G/DL (ref 31.5–35.7)
MCV RBC AUTO: 87 FL (ref 79–97)
MONOCYTES # BLD AUTO: 0.93 10*3/MM3 (ref 0.1–0.9)
MONOCYTES NFR BLD AUTO: 9.1 % (ref 5–12)
NEUTROPHILS NFR BLD AUTO: 6.6 10*3/MM3 (ref 1.7–7)
NEUTROPHILS NFR BLD AUTO: 64.9 % (ref 42.7–76)
NRBC BLD AUTO-RTO: 0 /100 WBC (ref 0–0.2)
PLATELET # BLD AUTO: 202 10*3/MM3 (ref 140–450)
PMV BLD AUTO: 10.4 FL (ref 6–12)
POTASSIUM SERPL-SCNC: 3.8 MMOL/L (ref 3.5–5.2)
PROT SERPL-MCNC: 7.9 G/DL (ref 6–8.5)
RBC # BLD AUTO: 5.6 10*6/MM3 (ref 4.14–5.8)
SODIUM SERPL-SCNC: 136 MMOL/L (ref 136–145)
TRIGL SERPL-MCNC: 135 MG/DL (ref 0–150)
VLDLC SERPL-MCNC: 23 MG/DL (ref 5–40)
WBC NRBC COR # BLD AUTO: 10.17 10*3/MM3 (ref 3.4–10.8)

## 2024-03-12 PROCEDURE — 80053 COMPREHEN METABOLIC PANEL: CPT

## 2024-03-12 PROCEDURE — 36415 COLL VENOUS BLD VENIPUNCTURE: CPT

## 2024-03-12 PROCEDURE — 83036 HEMOGLOBIN GLYCOSYLATED A1C: CPT

## 2024-03-12 PROCEDURE — 85025 COMPLETE CBC W/AUTO DIFF WBC: CPT

## 2024-03-12 PROCEDURE — 80061 LIPID PANEL: CPT

## 2024-03-12 RX ORDER — VALBENAZINE 60 MG/1
1 CAPSULE ORAL DAILY
COMMUNITY
Start: 2024-03-08

## 2024-03-12 NOTE — H&P (VIEW-ONLY)
Date of Office Visit: 2024  Encounter Provider: MICHELLE Francois  Place of Service: [facility]  Patient Name: Isaias Monaco  :1955    No chief complaint on file.  : coronary artery disease  Unstable angina    HPI: Isaias Monaco is a 68 y.o. male who is a patient of  Dr. Karimi.  He presents for 6-month office follow-up.  He has a history of coronary artery disease, hypertension and hyperlipidemia.  He presented in  with unstable angina and underwent DAVONTE proximal to mid RCA .  In 2020, he presented with angina and was referred for cardiac cath with DAVONTE to 99% ostial left circumflex lesion.    In May 2022, patient had complaints of 2 weeks of fullness in his throat and vague shortness of breath.  The symptoms had previously been his anginal equivalent.  EKG showed new ST depressions in III, aVF with TWIs.  Left heart cath revealed new LAD and left circumflex stenosis.  He was referred for cardiac surgery but felt to be too high risk as Jehovah witness and not able to receive blood products.  On 2022, he underwent DAVONTE to proximal left circumflex and left main bifurcation x2 with DK crush technique by Dr. Karimi.  Echocardiogram prior to stenting demonstrated normal left ventricular function.  He was discharged the next day with no issues.    On his last visit he had no complaints of chest pain or lightheadedness.  He did however have some fatigue over the last couple of days and was struggling with respiratory infection.  Blood pressure was somewhat soft however blood pressure readings were normally pretty good at home.  EKG showed some new T wave inversions in leads V3, V4 through V6 as well as lead III and aVF.  Labs had shown creatinine was slightly elevated as well as liver enzymes.  He appeared euvolemic.  Recent labs show normal creatinine as well as liver enzymes.  T wave inversions had improved on EKG one week later.      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  He enjoys traveling with his wife who is also a registered nurse.  His blood pressure has been controlled as he does monitor it at home.  Follows with Dr. Brenner with nephrology and was recently started back on HCTZ.    Patient presents today with similar complaints for his anginal equivalent which is fullness in his throat and shortness of breath.  He is euvolemic on physical exam.  EKG shows no new ischemic changes.  His anginal equivalent never included chest pain.  He denies lightheadedness.  States that he has been very tired for the past couple of months which has been gradually getting worse.  He noted that he would normally have to take Klonopin for his anxiety about once a week maybe once every other week, now he is taking it on a daily basis.  He also notices a cycle of anginal equivalent causing anxiety and vice versa.    Previous testing and notes have been reviewed by me.   Past Medical History:   Diagnosis Date    Anemia     post hemorrhagic    Angioedema 02/21/2016    Secondary to ACE inhibitor    Anxiety     Arthritis     CAD (coronary artery disease)     Colon polyps     Diabetes mellitus, type 2     GERD (gastroesophageal reflux disease)     Glaucoma     Hematoma     High cholesterol     History of foreign travel 12/2017; 08/2018    [place], [place], [place], [place], [place] and [place]; [place]    Hyperlipidemia     Hypertension     Hypogonadism in male 09/28/2016    Male erectile disorder     Microalbuminuria     Myocardial infarction     Osteoarthritis     knee    Spinal stenosis of lumbar region without neurogenic claudication 06/02/2021    Tubular adenoma of colon 11/01/2017    Vitamin D deficiency        Past Surgical History:   Procedure Laterality Date    CARDIAC CATHETERIZATION N/A 05/15/2006    Dr. Mini Camarillo    CARDIAC CATHETERIZATION N/A 03/10/2020    Procedure: Left Heart Cath;  Surgeon: Miguel Ángel Karimi MD;  Location:   [facility] INVASIVE [facility]ATION;  Service: Cardiology;  Laterality: N/A;    CARDIAC CATHETERIZATION N/A 03/10/2020    Procedure: Stent DAVONTE coronary;  Surgeon: Miguel Ángel Karimi MD;  [facility];  Service: Cardiology;  Laterality: N/A;    CARDIAC CATHETERIZATION N/A 03/10/2020    Procedure: Coronary angiography;  Surgeon: Miguel Ángel Karimi MD;  [facility];  Service: Cardiology;  Laterality: N/A;    CARDIAC CATHETERIZATION N/A 03/10/2020    Procedure: Left ventriculography;  Surgeon: Miguel Ángel Karimi MD;  [facility];  Service: Cardiology;  Laterality: N/A;    CARDIAC CATHETERIZATION N/A 05/20/2022    Procedure: Left Heart Cath;  Surgeon: Mackenzie Morales MD;  Location: [facility] CATH INVASIVE LOCATION;  Service: Cardiovascular;  Laterality: N/A;    CARDIAC CATHETERIZATION N/A 05/20/2022    Procedure: Coronary angiography;  Surgeon: Mackenzie Morales MD;  Location: [facility] CATH INVASIVE LOCATION;  Service: Cardiovascular;  Laterality: N/A;    CARDIAC CATHETERIZATION N/A 05/20/2022    Procedure: Percutaneous Coronary Intervention;  Surgeon: Mackenzie Morales MD;  Location: [facility] CATH INVASIVE LOCATION;  Service: Cardiovascular;  Laterality: N/A;    CARDIAC CATHETERIZATION N/A 05/24/2022    Procedure: Percutaneous Coronary Intervention;  Surgeon: Miguel Ángel Karimi MD;  Location: [facility] CATH INVASIVE LOCATION;  Service: Cardiovascular;  Laterality: N/A;  LAD and Cx    CARDIAC CATHETERIZATION N/A 05/24/2022    Procedure: Stent DAVONTE coronary;  Surgeon: Miguel Ángel Karimi MD;  Location: [facility] CATH INVASIVE LOCATION;  Service: Cardiovascular;  Laterality: N/A;    CARDIAC CATHETERIZATION N/A 05/24/2022    Procedure: Resting Full Cycle Ratio;  Surgeon: Miguel Ángel Karimi MD;  Location: [facility] CATH INVASIVE LOCATION;  Service: Cardiovascular;  Laterality: N/A;    COLONOSCOPY N/A 02/22/2006    Bilobed polyp at 30 cm,  hemorrhoids-Dr. Ilya Zhao    COLONOSCOPY N/A 02/28/2014    Normal ileum, one 6 mm polyp in the mid transverse colon-Dr. Ilya Zhao    COLONOSCOPY N/A 11/19/2008    Ela ileum, two 3 to 4 mm polyps, non-bleeding internal hemorrhoids, repeat in 5 years-Dr. Ilya Zhao    COLONOSCOPY N/A 10/31/2017    Procedure: COLONOSCOPY WITH POLYPECTOMY (COLD BIOPSY);  Surgeon: Ilya Zhao MD;  Location: [facility] ENDOSCOPY;  Service:     COLONOSCOPY N/A 05/21/2021    Procedure: Colonoscopy into cecum and terminal ileum with cold biopsy polypectomy;  Surgeon: Ilya Zhao MD;  Location: [facility] ENDOSCOPY;  Service: Gastroenterology;  Laterality: N/A;  Pre op: History of Polyps  Post op: Polyp    CORONARY ANGIOPLASTY WITH STENT PLACEMENT  2007, 2012, 2015    cardiac stents x3 occasions    ENDOSCOPY N/A 10/04/2017    Procedure: ESOPHAGOGASTRODUODENOSCOPY;  Surgeon: Boyd Guidry MD;  Location: [facility] ENDOSCOPY;  Service:     ENDOSCOPY N/A 05/21/2021    Procedure: ESOPHAGOGASTRODUODENOSCOPY with biopsies;  Surgeon: Ilya Zhao MD;  Location: [facility] ENDOSCOPY;  Service: Gastroenterology;  Laterality: N/A;  Pre op: GERD  Post op: Irregular  Z-Line, Hiatal Hernia, Gastritis    ENDOSCOPY N/A 8/25/2023    Procedure: ESOPHAGOGASTRODUODENOSCOPY with biopsy;  Surgeon: Ilya Zhao MD;  Location: [facility] ENDOSCOPY;  Service: Gastroenterology;  Laterality: N/A;  errosive gastritis    EPIDURAL BLOCK      INTERVENTIONAL RADIOLOGY PROCEDURE N/A 05/20/2022    Procedure: Intravascular Ultrasound;  Surgeon: Mackenzie Morales MD;  Location: [facility] CATH INVASIVE LOCATION;  Service: Cardiovascular;  Laterality: N/A;    KNEE INCISION AND DRAINAGE Right 06/20/2017    Procedure: RT. KNEE WASHOUT ;  Surgeon: Boyd Coyne MD;  Location: [facility] OR;  Service:     MEDIAL BRANCH BLOCK Bilateral 12/17/2021    Procedure: LUMBAR MEDIAL BRANCH BLOCK bilateral ~L4-S1 x2 (~2 weeks apart);  Surgeon: Christina Villaseñor MD;  Location: [facility]  OR;  Service: Pain Management;  Laterality: Bilateral;    MEDIAL BRANCH BLOCK Bilateral 01/03/2022    Procedure: LUMBAR MEDIAL BRANCH BLOCK bilateral ~L4-S1 x2 (~2 weeks apart);  Surgeon: Christina Villaseñor MD;  [place] [facility];  Service: Pain Management;  Laterality: Bilateral;    VA ARTHRP KNE CONDYLE&PLATU MEDIAL&LAT COMPARTMENTS Right 06/15/2017    Procedure: RT TOTAL KNEE ARTHROPLASTY;  Surgeon: Boyd Coyne MD;  Location: [facility] MAIN OR;  Service: Orthopedics    RADIOFREQUENCY ABLATION Bilateral 01/10/2022    Procedure: RADIOFREQUENCY ABLATION NERVES Bilateral L4-S1;  Surgeon: Christina Villaseñor MD;  [place] [facility];  Service: Pain Management;  Laterality: Bilateral;    SHOULDER SURGERY Right 2016    rotator cuff repair    UPPER GASTROINTESTINAL ENDOSCOPY N/A 10/13/2015    Z-line irregular, normal stomach, normal duodenum-Dr. Ilya Zhao    UPPER GASTROINTESTINAL ENDOSCOPY N/A 02/28/2014    Z-line irregular, normal stomach, normal duodenum-Dr. Ilya Zhao    UPPER GASTROINTESTINAL ENDOSCOPY N/A 11/19/2008    Z-line irregular, chronic gastritis withotu hemorrhage, normal duodenum-Dr. Ilya Zhao    UPPER GASTROINTESTINAL ENDOSCOPY N/A 06/22/2006    LA Grade A reflux esophagitis, non-bleeding erythematous gastropathy, normal duodenum-Dr. Ilya Zhao       Social History     Socioeconomic History    Marital status:      Spouse name: Micaela    Number of children: 1    Years of education: College   Tobacco Use    Smoking status: Never     Passive exposure: Never    Smokeless tobacco: Never    Tobacco comments:     CAFFEINE USE: 2 CUPS COFFEE DAILY   Vaping Use    Vaping status: Never Used   Substance and Sexual Activity    Alcohol use: Yes     Alcohol/week: 6.0 standard drinks of alcohol     Types: 2 Glasses of wine, 2 Cans of beer, 1 Shots of liquor, 1 Drinks containing 0.5 oz of alcohol per week     Comment: occasional 2-4 DRINKS PER WEEK    Drug use: No    Sexual activity: Yes     Partners:  Female     Birth control/protection: Post-menopausal       Family History   Problem Relation Age of Onset    Lupus Sister     Thyroid disease Sister     Heart disease Other     Hypertension Other     Arthritis Mother     Hyperlipidemia Mother     Hypertension Mother     Thyroid disease Mother     Lupus Mother     Vision loss Mother     Heart disease Father     Arthritis Father     Other Father         Vascular disease    Lupus Father     Depression Father     Alcohol abuse Father     Dementia Father     Hypertension Father     Heart disease Brother     Heart attack Brother 40    Thyroid disease Sister     Arthritis Brother     Malig Hyperthermia Neg Hx        Review of Systems   Constitutional: Positive for malaise/fatigue.   HENT: Negative.     Eyes: Negative.    Cardiovascular:         Upper chest fullness   Respiratory: Negative.     Endocrine: Negative.    Hematologic/Lymphatic:        Asa/ plavix   Skin: Negative.    Musculoskeletal: Negative.    Gastrointestinal: Negative.    Genitourinary: Negative.    Neurological: Negative.    Psychiatric/Behavioral: Negative.     Allergic/Immunologic: Negative.        Allergies   Allergen Reactions    Nsaids Other (See Comments)     Renal failure    Hydralazine Myalgia    Norvasc [Amlodipine] Swelling    Zetia [Ezetimibe] Nausea And Vomiting    Ace Inhibitors Angioedema    Lisinopril Angioedema    Testosterone Myalgia         Current Outpatient Medications:     amoxicillin (AMOXIL) 875 MG tablet, Take 1 tablet by mouth., Disp: , Rfl:     ARIPiprazole (ABILIFY) 5 MG tablet, Take 1.5 tablets by mouth Daily., Disp: , Rfl:     aspirin 81 MG EC tablet, Take 1 tablet by mouth Daily., Disp: 30 tablet, Rfl: 6    carvedilol (COREG) 25 MG tablet, Take 1 tablet by mouth Every 12 (Twelve) Hours., Disp: 180 tablet, Rfl: 3    clonazePAM (KlonoPIN) 0.5 MG tablet, Take 1 tablet by mouth Daily As Needed for Anxiety., Disp: 30 tablet, Rfl: 0    clopidogrel (PLAVIX) 75 MG tablet, Take 1  tablet by mouth Daily. (Patient taking differently: Take 1 tablet by mouth Daily. HOLD X 3 DAYS FOR ENDO), Disp: 90 tablet, Rfl: 2    Continuous Blood Gluc Sensor (Dexcom G7 Sensor) misc, Use., Disp: , Rfl:     dorzolamide-timolol (COSOPT) 22.3-6.8 MG/ML ophthalmic solution, Apply 1 drop to eye(s) to both eyes 2 (Two) Times a Day., Disp: 20 mL, Rfl: 3    doxazosin (CARDURA) 4 MG tablet, TAKE 1 TABLET BY MOUTH EVERY DAY AT NIGHT, Disp: 90 tablet, Rfl: 4    escitalopram (LEXAPRO) 20 MG tablet, Take 1 tablet by mouth Daily., Disp: 90 tablet, Rfl: 3    esomeprazole (nexIUM) 40 MG capsule, Take 1 capsule by mouth Daily., Disp: 90 capsule, Rfl: 3    famotidine (PEPCID) 20 MG tablet, Take 1 tablet by mouth Daily With Dinner., Disp: 90 tablet, Rfl: 3    gabapentin (NEURONTIN) 300 MG capsule, Take 1 capsule every day by oral route in the evening for 30 days., Disp: , Rfl:     hydroCHLOROthiazide (HYDRODIURIL) 12.5 MG tablet, Take 1 tablet by mouth Daily., Disp: , Rfl:     HYDROcodone-acetaminophen (NORCO) 5-325 MG per tablet, , Disp: , Rfl:     Insulin Lispro, 1 Unit Dial, (HUMALOG) 100 UNIT/ML solution pen-injector, INJECT SUBCUTANEOUS AS DIRECTED BY PROVIDER DOSAGE IS ATTACHED MAX IS 40 UNITS PER DAY (Patient taking differently: INJECT SUBCUTANEOUS AS DIRECTED BY PROVIDER DOSAGE IS ATTACHED MAX IS 40 UNITS PER DAY  **10 units), Disp: 9 mL, Rfl: 0    Insulin Pen Needle 31G X 4 MM misc, 1 each Daily., Disp: 100 each, Rfl: 3    Lancets (FREESTYLE) lancets, Use to test BG 4 times daily, Disp: 400 each, Rfl: 1    latanoprost (XALATAN) 0.005 % ophthalmic solution, Apply 1 drop to  each eye Daily. (Patient taking differently: Apply 1 drop to eye(s) as directed by provider Every Night.), Disp: 7.5 mL, Rfl: 3    metFORMIN (GLUCOPHAGE) 500 MG tablet, Take 2 tablets by mouth Daily With Breakfast. Indications: start in 48 hours, Disp: 180 tablet, Rfl: 3    methocarbamol (Robaxin) 500 MG tablet, Take 1 tablet by mouth As Needed (Take  as needed for pain)., Disp: 90 tablet, Rfl: 0    pioglitazone (ACTOS) 15 MG tablet, Take 1 tablet by mouth Daily., Disp: 90 tablet, Rfl: 2    rosuvastatin (CRESTOR) 40 MG tablet, Take 1 tablet by mouth Daily., Disp: 90 tablet, Rfl: 3    Semaglutide, 2 MG/DOSE, (OZEMPIC) 8 MG/3ML solution pen-injector, Inject 2 mg under the skin into the appropriate area as directed 1 (One) Time Per Week., Disp: 3 mL, Rfl: 5    sildenafil (VIAGRA) 100 MG tablet, Take 1 tablet by mouth Daily As Needed for Erectile Dysfunction. Take 30 minutes to 4 hours prior to sexual activity., Disp: 50 tablet, Rfl: 3    Toujeo SoloStar 300 UNIT/ML solution pen-injector injection, Inject 65 Units under the skin into the appropriate area as directed Daily., Disp: 21 mL, Rfl: 1      Objective:     There were no vitals filed for this visit.  There is no height or weight on file to calculate BMI.    Left Heart Cath 05/24/2022:  Left main: Discrete 30% distal stenosis  LAD: Discrete 60% ostial stenois  LCX: Diffuse 70-80% in-stent restenosis  Ramus: Small caliber ramus with 99% ostial stenosis  Intervention:   1. Successful left main bifurcation stenting with a 4.0x15mm and 3.46w57hy Xience Skypoint drug eluting stents with DK Crush technique  2. Successful PCI to the mid LCX with a 3.0x23 mm Xience Skypoint DAVONTE    2D Echocardiogram 05/21/2022:  Calculated left ventricular EF = 55.6% Estimated left ventricular EF was in agreement with the calculated left ventricular EF. Left ventricular systolic function is normal.  Left ventricular wall thickness is consistent with borderline concentric hypertrophy.  Left ventricular diastolic function is consistent with (grade I) impaired relaxation.  There is calcification of the aortic valve. Valvular structure is poorly visualized.  Aortic valve area is 1.36 cm2.  Peak velocity of the flow distal to the aortic valve is 177.4 cm/s. Aortic valve maximum pressure gradient is 12.6 mmHg. Aortic valve mean pressure  gradient is 8.1 mmHg. Aortic valve dimensionless index is 0.5 .      Left Heart Cath 05/20/2022:  LEFT MAIN: Large caliber normal, trifurcates to give the LAD ramus and circumflex arteries.   LAD: Ostial 70% lesion. The mid LAD is normal. The distal LAD stent is widely patent. There are 3 small diagonal vessel which are essentially normal.   RAMUS: Ostial 70-80% stenosis, otherwise normal.   CIRCUMFLEX: Ostial circumflex in stent restenosis which is hazy appearing and tortuous. Gives rise to a medium caliber OM which is normal.   RCA: Large caliber RCA which has proximal to distal stents which have mild ISR.      Severe ostial circumflex in stent restenosis, 90%  Status post PTCA using a 3.5x15mm NC balloon  The ostial circumflex was ballooned given the patients unstable symptoms. He has severe multivessel disease involving the ostium LAD and ostium ramus . Have discussed with Dr Winchester of CT surgery, will keep him on Integrillin over the weekend as plavix metabolizes and plan for CABG Surgery next week.    2D Echocardiogram 4/14/2020:  Left ventricular systolic function is normal. Calculated EF = 56%. Estimated EF was in agreement with the calculated EF. Normal left ventricular cavity size noted. All left ventricular wall segments contract normally. Left ventricular wall thickness is consistent with mild concentric hypertrophy. Left ventricular diastolic dysfunction is noted (grade I) consistent with impaired relaxation.     Left Heart Cath 03/10/2020:  1. Left main: Normal  2. LAD: 20 to 30% ostial stenosis.  40% mid vessel stenosis.  3. LCX: 99% ostial stenosis with GRACIE II flow.  40 to 50% mid vessel stenosis.  4. RCA: Previously placed stents from the proximal to distal RCA with luminal irregularities.  5.  Normal left ventricular size and systolic function  6.  PCI of the ostial circumflex with a 3.0 x 15 mm Xience Kaylynn drug-eluting stent.    Lexiscan Stress test 07/18/2017:  Myocardial perfusion imaging  indicates a normal myocardial perfusion study with no evidence of ischemia.  Left ventricular ejection fraction is normal (Calculated EF = 61%).  Impressions are consistent with a low risk study.    Left Heart Cath 11/05/2015:  1. Successful stenting of the [id number] right coronary artery.   2. Angio-Seal of both femoral arteries.     Left Heart Cath 08/28/2015:  1.  Hemodynamics: /13. /82/98.   2.  Left ventriculography: EF 50%, apical hypokinesis.   3.  Coronary angiography: Right dominant system. Two-vessel coronary disease.  The left main is normal. The proximal LAD has 30% to 40% diffuse disease. There  is a stent in the proximal to mid LAD that is patent. The mid to distal LAD has  30% to 40% diffuse disease. The first diagonal branch has a 60% stenosis. The  circumflex has 50% proximal stenosis. The RCA is totally occluded in the  proximal to midvessel. It reconstitutes via robust left-sided collaterals.      SUMMARY: Moderate left-sided disease with chronic total occlusion of the  proximal to mid RCA.      RECOMMENDATIONS:  intervention of the RCA.       PHYSICAL EXAM:    Constitutional:       Appearance: Healthy appearance. Not in distress.   Neck:      Vascular: No JVR. JVD normal.   Pulmonary:      Effort: Pulmonary effort is normal.      Breath sounds: Normal breath sounds. No wheezing. No rhonchi. No rales.   Chest:      Chest wall: Not tender to palpatation.   Cardiovascular:      PMI at left midclavicular line. Normal rate. Regular rhythm. Normal S1. Normal S2.       Murmurs: There is no murmur.      No gallop.  No click. No rub.   Pulses:     Intact distal pulses.   Edema:     Peripheral edema absent.   Abdominal:      General: Bowel sounds are normal.      Palpations: Abdomen is soft.      Tenderness: There is no abdominal tenderness.   Musculoskeletal: Normal range of motion.         General: No tenderness. Skin:     General: Skin is warm and dry.   Neurological:       General: No focal deficit present.      Mental Status: Alert and oriented to person, place and time.           ECG 12 Lead    Date/Time: 3/12/2024 1:15 PM  Performed by: Hattie Foster APRN    Authorized by: Hattie Foster APRN  Comparison: compared with previous ECG from 9/26/2023  Rhythm: sinus rhythm  Rate: normal  BPM: 86  Q waves: aVL    Other findings: early transition  Other findings comments: V2            Assessment:       Diagnosis Plan   1. Coronary artery disease involving native coronary artery of native heart without angina pectoris        2. Hyperlipidemia LDL goal <70        3. S/P primary angioplasty with coronary stent        4. Essential hypertension        5. Type 2 diabetes mellitus with microalbuminuria, with long-term current use of insulin            No orders of the defined types were placed in this encounter.         Plan:       1.  Coronary artery disease: History of prior PCI to RCA( 2018 and ostial circumflex 2020).  Status post PCI to left circumflex and ostial LAD bifurcation 5/24/2022.  Continue DAPT with aspirin/Plavix lifelong.  On statin therapy.  Normal left ventricular function.  Experiencing his anginal equivalent.  No new ischemia on EKG.  2.  Hypertension: Controlled on carvedilol, Cardura and hydrochlorothiazide  3.  Hyperlipidemia goal LDL < 70: Recent lipid panel in target range. On statin.  Normal liver enzymes  4.  Diabetes type 2: HbA1c has improved to 6.4.  On oral medication and Ozempic. Followed by endocrinology.  5.  Chronic kidney disease: Follows with Dr. Brenner with nephrology.    Patient's complicated cardiac history, we will proceed with cardiac cath.  Discussed with patient and his wife and they are in agreement.  We will hydrate prior to procedure as patient does have a kidney disease.                   Your medication list            Accurate as of March 12, 2024  9:30 AM. If you have any questions, ask your nurse or doctor.                CHANGE  how you take these medications        Instructions Last Dose Given Next Dose Due   clopidogrel 75 MG tablet  Commonly known as: PLAVIX  What changed: additional instructions      Take 1 tablet by mouth Daily.       Insulin Lispro (1 Unit Dial) 100 UNIT/ML solution pen-injector  Commonly known as: HUMALOG  What changed: additional instructions      INJECT SUBCUTANEOUS AS DIRECTED BY PROVIDER DOSAGE IS ATTACHED MAX IS 40 UNITS PER DAY       latanoprost 0.005 % ophthalmic solution  Commonly known as: XALATAN  What changed: when to take this      Apply 1 drop to  each eye Daily.              CONTINUE taking these medications        Instructions Last Dose Given Next Dose Due   Adult Aspirin Regimen 81 MG EC tablet  Generic drug: aspirin      Take 1 tablet by mouth Daily.       amoxicillin 875 MG tablet  Commonly known as: AMOXIL      Take 1 tablet by mouth.       ARIPiprazole 5 MG tablet  Commonly known as: ABILIFY      Take 1.5 tablets by mouth Daily.       carvedilol 25 MG tablet  Commonly known as: COREG      Take 1 tablet by mouth Every 12 (Twelve) Hours.       clonazePAM 0.5 MG tablet  Commonly known as: KlonoPIN      Take 1 tablet by mouth Daily As Needed for Anxiety.       Dexcom G7 Sensor misc      Use.       dorzolamide-timolol 2-0.5 % ophthalmic solution  Commonly known as: COSOPT      Apply 1 drop to eye(s) to both eyes 2 (Two) Times a Day.       doxazosin 4 MG tablet  Commonly known as: CARDURA      TAKE 1 TABLET BY MOUTH EVERY DAY AT NIGHT       escitalopram 20 MG tablet  Commonly known as: LEXAPRO      Take 1 tablet by mouth Daily.       esomeprazole 40 MG capsule  Commonly known as: nexIUM      Take 1 capsule by mouth Daily.       famotidine 20 MG tablet  Commonly known as: PEPCID      Take 1 tablet by mouth Daily With Dinner.       freestyle lancets      Use to test BG 4 times daily       gabapentin 300 MG capsule  Commonly known as: NEURONTIN      Take 1 capsule every day by oral route in the evening for  30 days.       hydroCHLOROthiazide 12.5 MG tablet      Take 1 tablet by mouth Daily.       HYDROcodone-acetaminophen 5-325 MG per tablet  Commonly known as: NORCO           Insulin Pen Needle 31G X 4 MM misc      1 each Daily.       metFORMIN 500 MG tablet  Commonly known as: GLUCOPHAGE      Take 2 tablets by mouth Daily With Breakfast. Indications: start in 48 hours       methocarbamol 500 MG tablet  Commonly known as: Robaxin      Take 1 tablet by mouth As Needed (Take as needed for pain).       pioglitazone 15 MG tablet  Commonly known as: ACTOS      Take 1 tablet by mouth Daily.       rosuvastatin 40 MG tablet  Commonly known as: CRESTOR      Take 1 tablet by mouth Daily.       Semaglutide (2 MG/DOSE) 8 MG/3ML solution pen-injector  Commonly known as: OZEMPIC      Inject 2 mg under the skin into the appropriate area as directed 1 (One) Time Per Week.       sildenafil 100 MG tablet  Commonly known as: VIAGRA      Take 1 tablet by mouth Daily As Needed for Erectile Dysfunction. Take 30 minutes to 4 hours prior to sexual activity.       Toujeo SoloStar 300 UNIT/ML solution pen-injector injection  Generic drug: Insulin Glargine (1 Unit Dial)      Inject 65 Units under the skin into the appropriate area as directed Daily.                  As always, it has been a pleasure to participate in your patient's care.      Sincerely,       MICHELLE Brothers

## 2024-03-12 NOTE — PROGRESS NOTES
Date of Office Visit: 2024  Encounter Provider: MICHELLE Francois  Place of Service: [facility] CARDIOLOGY  Patient Name: Isaias Monaco  :1955    No chief complaint on file.  : coronary artery disease  Unstable angina    HPI: Isaias Monaco is a 68 y.o. male who is a patient of  Dr. Karimi.  He presents for 6-month office follow-up.  He has a history of coronary artery disease, hypertension and hyperlipidemia.  He presented in  with unstable angina and underwent DAVONTE proximal to mid RCA .  In 2020, he presented with angina and was referred for cardiac cath with DAVONTE to 99% ostial left circumflex lesion.    In May 2022, patient had complaints of 2 weeks of fullness in his throat and vague shortness of breath.  The symptoms had previously been his anginal equivalent.  EKG showed new ST depressions in III, aVF with TWIs.  Left heart cath revealed new LAD and left circumflex stenosis.  He was referred for cardiac surgery but felt to be too high risk as Jehovah witness and not able to receive blood products.  On 2022, he underwent DAVONTE to proximal left circumflex and left main bifurcation x2 with DK crush technique by Dr. Karimi.  Echocardiogram prior to stenting demonstrated normal left ventricular function.  He was discharged the next day with no issues.    On his last visit he had no complaints of chest pain or lightheadedness.  He did however have some fatigue over the last couple of days and was struggling with respiratory infection.  Blood pressure was somewhat soft however blood pressure readings were normally pretty good at home.  EKG showed some new T wave inversions in leads V3, V4 through V6 as well as lead III and aVF.  Labs had shown creatinine was slightly elevated as well as liver enzymes.  He appeared euvolemic.  Recent labs show normal creatinine as well as liver enzymes.  T wave inversions had improved on EKG one week later.      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  He enjoys traveling with his wife who is also a registered nurse.  His blood pressure has been controlled as he does monitor it at home.  Follows with Dr. Brenner with nephrology and was recently started back on HCTZ.    Patient presents today with similar complaints for his anginal equivalent which is fullness in his throat and shortness of breath.  He is euvolemic on physical exam.  EKG shows no new ischemic changes.  His anginal equivalent never included chest pain.  He denies lightheadedness.  States that he has been very tired for the past couple of months which has been gradually getting worse.  He noted that he would normally have to take Klonopin for his anxiety about once a week maybe once every other week, now he is taking it on a daily basis.  He also notices a cycle of anginal equivalent causing anxiety and vice versa.    Previous testing and notes have been reviewed by me.   Past Medical History:   Diagnosis Date    Anemia     post hemorrhagic    Angioedema 02/21/2016    Secondary to ACE inhibitor    Anxiety     Arthritis     CAD (coronary artery disease)     Colon polyps     Diabetes mellitus, type 2     GERD (gastroesophageal reflux disease)     Glaucoma     Hematoma     High cholesterol     History of foreign travel 12/2017; 08/2018    [place], [place], [place], [place], [place] and [place]; [place]    Hyperlipidemia     Hypertension     Hypogonadism in male 09/28/2016    Male erectile disorder     Microalbuminuria     Myocardial infarction     Osteoarthritis     knee    Spinal stenosis of lumbar region without neurogenic claudication 06/02/2021    Tubular adenoma of colon 11/01/2017    Vitamin D deficiency        Past Surgical History:   Procedure Laterality Date    CARDIAC CATHETERIZATION N/A 05/15/2006    Dr. Mini Camarillo    CARDIAC CATHETERIZATION N/A 03/10/2020    Procedure: Left Heart Cath;  Surgeon: Miguel Ángel Karimi MD;  Location:   [facility] CATH INVASIVE LOCATION;  Service: Cardiology;  Laterality: N/A;    CARDIAC CATHETERIZATION N/A 03/10/2020    Procedure: Stent DAVONTE coronary;  Surgeon: Miguel Ángel Karimi MD;  Location: [facility] CATH INVASIVE LOCATION;  Service: Cardiology;  Laterality: N/A;    CARDIAC CATHETERIZATION N/A 03/10/2020    Procedure: Coronary angiography;  Surgeon: Miguel Ángel Karimi MD;  Location: [facility] C[facility];  Service: Cardiology;  Laterality: N/A;    CARDIAC CATHETERIZATION N/A 03/10/2020    Procedure: Left ventriculography;  Surgeon: Miguel Ángel Karimi MD;  Location: [facility] CATH [facility]AS[facility] LOC[facility];  Service: Cardiology;  Laterality: N/A;    CARDIAC CATHETERIZATION N/A 05/20/2022    Procedure: Left Heart Cath;  Surgeon: Mackenzie Morales MD;  Location: [facility] CATH INVASIVE LOCATION;  Service: Cardiovascular;  Laterality: N/A;    CARDIAC CATHETERIZATION N/A 05/20/2022    Procedure: Coronary angiography;  Surgeon: Mackenzie Morales MD;  Location: [facility] CATH INVASIVE LOCATION;  Service: Cardiovascular;  Laterality: N/A;    CARDIAC CATHETERIZATION N/A 05/20/2022    Procedure: Percutaneous Coronary Intervention;  Surgeon: Mackenzie Morales MD;  Location: [facility] CATH INVASIVE LOCATION;  Service: Cardiovascular;  Laterality: N/A;    CARDIAC CATHETERIZATION N/A 05/24/2022    Procedure: Percutaneous Coronary Intervention;  Surgeon: Miguel Ángel Karimi MD;  Location: [facility] CATH INVASIVE LOCATION;  Service: Cardiovascular;  Laterality: N/A;  LAD and Cx    CARDIAC CATHETERIZATION N/A 05/24/2022    Procedure: Stent DAVONTE coronary;  Surgeon: Miguel Ángel Karimi MD;  Location: [facility] CATH INVASIVE LOCATION;  Service: Cardiovascular;  Laterality: N/A;    CARDIAC CATHETERIZATION N/A 05/24/2022    Procedure: Resting Full Cycle Ratio;  Surgeon: Miguel Ángel Karimi MD;  Location: [facility] CATH INVASIVE LOCATION;  Service: Cardiovascular;  Laterality: N/A;    COLONOSCOPY N/A 02/22/2006    Bilobed polyp at 30 cm,  hemorrhoids-Dr. Ilya Zhao    COLONOSCOPY N/A 02/28/2014    Normal ileum, one 6 mm polyp in the mid transverse colon-Dr. Ilya Zhao    COLONOSCOPY N/A 11/19/2008    Ela ileum, two 3 to 4 mm polyps, non-bleeding internal hemorrhoids, repeat in 5 years-Dr. Ilya Zhao    COLONOSCOPY N/A 10/31/2017    Procedure: COLONOSCOPY WITH POLYPECTOMY (COLD BIOPSY);  Surgeon: Ilya Zhao MD;  Location: [facility] ENDOSCOPY;  Service:     COLONOSCOPY N/A 05/21/2021    Procedure: Colonoscopy into cecum and terminal ileum with cold biopsy polypectomy;  Surgeon: Ilya Zhao MD;  Location: [facility] ENDOSCOPY;  Service: Gastroenterology;  Laterality: N/A;  Pre op: History of Polyps  Post op: Polyp    CORONARY ANGIOPLASTY WITH STENT PLACEMENT  2007, 2012, 2015    cardiac stents x3 occasions    ENDOSCOPY N/A 10/04/2017    Procedure: ESOPHAGOGASTRODUODENOSCOPY;  Surgeon: Boyd Guidry MD;  Location: [facility] ENDOSCOPY;  Service:     ENDOSCOPY N/A 05/21/2021    Procedure: ESOPHAGOGASTRODUODENOSCOPY with biopsies;  Surgeon: Ilya Zhao MD;  Location: [facility] ENDOSCOPY;  Service: Gastroenterology;  Laterality: N/A;  Pre op: GERD  Post op: Irregular  Z-Line, Hiatal Hernia, Gastritis    ENDOSCOPY N/A 8/25/2023    Procedure: ESOPHAGOGASTRODUODENOSCOPY with biopsy;  Surgeon: Ilya Zhao MD;  Location: [facility] ENDOSCOPY;  Service: Gastroenterology;  Laterality: N/A;  errosive gastritis    EPIDURAL BLOCK      INTERVENTIONAL RADIOLOGY PROCEDURE N/A 05/20/2022    Procedure: Intravascular Ultrasound;  Surgeon: Mackenzie Morales MD;  Location: [facility] CATH INVASIVE LOCATION;  Service: Cardiovascular;  Laterality: N/A;    KNEE INCISION AND DRAINAGE Right 06/20/2017    Procedure: RT. KNEE WASHOUT ;  Surgeon: Boyd Coyne MD;  Location: [facility] OR;  Service:     MEDIAL BRANCH BLOCK Bilateral 12/17/2021    Procedure: LUMBAR MEDIAL BRANCH BLOCK bilateral ~L4-S1 x2 (~2 weeks apart);  Surgeon: Christina Villaseñor MD;  Location: [facility]  OR;  Service: Pain Management;  Laterality: Bilateral;    MEDIAL BRANCH BLOCK Bilateral 01/03/2022    Procedure: LUMBAR MEDIAL BRANCH BLOCK bilateral ~L4-S1 x2 (~2 weeks apart);  Surgeon: Christina Villaseñor MD;  Location: [facility] MAIN OR;  Service: Pain Management;  Laterality: Bilateral;    IN ARTHRP KNE CONDYLE&PLATU MEDIAL&LAT COMPARTMENTS Right 06/15/2017    Procedure: RT TOTAL KNEE ARTHROPLASTY;  Surgeon: Boyd Coyne MD;  Location: [facility] MAIN OR;  Service: Orthopedics    RADIOFREQUENCY ABLATION Bilateral 01/10/2022    Procedure: RADIOFREQUENCY ABLATION NERVES Bilateral L4-S1;  Surgeon: Christina Villaseñor MD;  Location: [facility] MAIN OR;  Service: Pain Management;  Laterality: Bilateral;    SHOULDER SURGERY Right 2016    rotator cuff repair    UPPER GASTROINTESTINAL ENDOSCOPY N/A 10/13/2015    Z-line irregular, normal stomach, normal duodenum-Dr. Ilya Zhao    UPPER GASTROINTESTINAL ENDOSCOPY N/A 02/28/2014    Z-line irregular, normal stomach, normal duodenum-Dr. Ilya Zhao    UPPER GASTROINTESTINAL ENDOSCOPY N/A 11/19/2008    Z-line irregular, chronic gastritis withotu hemorrhage, normal duodenum-Dr. Ilya Zhao    UPPER GASTROINTESTINAL ENDOSCOPY N/A 06/22/2006    LA Grade A reflux esophagitis, non-bleeding erythematous gastropathy, normal duodenum-Dr. Ilya Zhao       Social History     Socioeconomic History    Marital status:      Spouse name: Micaela    Number of children: 1    Years of education: College   Tobacco Use    Smoking status: Never     Passive exposure: Never    Smokeless tobacco: Never    Tobacco comments:     CAFFEINE USE: 2 CUPS COFFEE DAILY   Vaping Use    Vaping status: Never Used   Substance and Sexual Activity    Alcohol use: Yes     Alcohol/week: 6.0 standard drinks of alcohol     Types: 2 Glasses of wine, 2 Cans of beer, 1 Shots of liquor, 1 Drinks containing 0.5 oz of alcohol per week     Comment: occasional 2-4 DRINKS PER WEEK    Drug use: No    Sexual activity: Yes     Partners:  Female     Birth control/protection: Post-menopausal       Family History   Problem Relation Age of Onset    Lupus Sister     Thyroid disease Sister     Heart disease Other     Hypertension Other     Arthritis Mother     Hyperlipidemia Mother     Hypertension Mother     Thyroid disease Mother     Lupus Mother     Vision loss Mother     Heart disease Father     Arthritis Father     Other Father         Vascular disease    Lupus Father     Depression Father     Alcohol abuse Father     Dementia Father     Hypertension Father     Heart disease Brother     Heart attack Brother 40    Thyroid disease Sister     Arthritis Brother     Malig Hyperthermia Neg Hx        Review of Systems   Constitutional: Positive for malaise/fatigue.   HENT: Negative.     Eyes: Negative.    Cardiovascular:         Upper chest fullness   Respiratory: Negative.     Endocrine: Negative.    Hematologic/Lymphatic:        Asa/ plavix   Skin: Negative.    Musculoskeletal: Negative.    Gastrointestinal: Negative.    Genitourinary: Negative.    Neurological: Negative.    Psychiatric/Behavioral: Negative.     Allergic/Immunologic: Negative.        Allergies   Allergen Reactions    Nsaids Other (See Comments)     Renal failure    Hydralazine Myalgia    Norvasc [Amlodipine] Swelling    Zetia [Ezetimibe] Nausea And Vomiting    Ace Inhibitors Angioedema    Lisinopril Angioedema    Testosterone Myalgia         Current Outpatient Medications:     amoxicillin (AMOXIL) 875 MG tablet, Take 1 tablet by mouth., Disp: , Rfl:     ARIPiprazole (ABILIFY) 5 MG tablet, Take 1.5 tablets by mouth Daily., Disp: , Rfl:     aspirin 81 MG EC tablet, Take 1 tablet by mouth Daily., Disp: 30 tablet, Rfl: 6    carvedilol (COREG) 25 MG tablet, Take 1 tablet by mouth Every 12 (Twelve) Hours., Disp: 180 tablet, Rfl: 3    clonazePAM (KlonoPIN) 0.5 MG tablet, Take 1 tablet by mouth Daily As Needed for Anxiety., Disp: 30 tablet, Rfl: 0    clopidogrel (PLAVIX) 75 MG tablet, Take 1  tablet by mouth Daily. (Patient taking differently: Take 1 tablet by mouth Daily. HOLD X 3 DAYS FOR ENDO), Disp: 90 tablet, Rfl: 2    Continuous Blood Gluc Sensor (Dexcom G7 Sensor) misc, Use., Disp: , Rfl:     dorzolamide-timolol (COSOPT) 22.3-6.8 MG/ML ophthalmic solution, Apply 1 drop to eye(s) to both eyes 2 (Two) Times a Day., Disp: 20 mL, Rfl: 3    doxazosin (CARDURA) 4 MG tablet, TAKE 1 TABLET BY MOUTH EVERY DAY AT NIGHT, Disp: 90 tablet, Rfl: 4    escitalopram (LEXAPRO) 20 MG tablet, Take 1 tablet by mouth Daily., Disp: 90 tablet, Rfl: 3    esomeprazole (nexIUM) 40 MG capsule, Take 1 capsule by mouth Daily., Disp: 90 capsule, Rfl: 3    famotidine (PEPCID) 20 MG tablet, Take 1 tablet by mouth Daily With Dinner., Disp: 90 tablet, Rfl: 3    gabapentin (NEURONTIN) 300 MG capsule, Take 1 capsule every day by oral route in the evening for 30 days., Disp: , Rfl:     hydroCHLOROthiazide (HYDRODIURIL) 12.5 MG tablet, Take 1 tablet by mouth Daily., Disp: , Rfl:     HYDROcodone-acetaminophen (NORCO) 5-325 MG per tablet, , Disp: , Rfl:     Insulin Lispro, 1 Unit Dial, (HUMALOG) 100 UNIT/ML solution pen-injector, INJECT SUBCUTANEOUS AS DIRECTED BY PROVIDER DOSAGE IS ATTACHED MAX IS 40 UNITS PER DAY (Patient taking differently: INJECT SUBCUTANEOUS AS DIRECTED BY PROVIDER DOSAGE IS ATTACHED MAX IS 40 UNITS PER DAY  **10 units), Disp: 9 mL, Rfl: 0    Insulin Pen Needle 31G X 4 MM misc, 1 each Daily., Disp: 100 each, Rfl: 3    Lancets (FREESTYLE) lancets, Use to test BG 4 times daily, Disp: 400 each, Rfl: 1    latanoprost (XALATAN) 0.005 % ophthalmic solution, Apply 1 drop to  each eye Daily. (Patient taking differently: Apply 1 drop to eye(s) as directed by provider Every Night.), Disp: 7.5 mL, Rfl: 3    metFORMIN (GLUCOPHAGE) 500 MG tablet, Take 2 tablets by mouth Daily With Breakfast. Indications: start in 48 hours, Disp: 180 tablet, Rfl: 3    methocarbamol (Robaxin) 500 MG tablet, Take 1 tablet by mouth As Needed (Take  as needed for pain)., Disp: 90 tablet, Rfl: 0    pioglitazone (ACTOS) 15 MG tablet, Take 1 tablet by mouth Daily., Disp: 90 tablet, Rfl: 2    rosuvastatin (CRESTOR) 40 MG tablet, Take 1 tablet by mouth Daily., Disp: 90 tablet, Rfl: 3    Semaglutide, 2 MG/DOSE, (OZEMPIC) 8 MG/3ML solution pen-injector, Inject 2 mg under the skin into the appropriate area as directed 1 (One) Time Per Week., Disp: 3 mL, Rfl: 5    sildenafil (VIAGRA) 100 MG tablet, Take 1 tablet by mouth Daily As Needed for Erectile Dysfunction. Take 30 minutes to 4 hours prior to sexual activity., Disp: 50 tablet, Rfl: 3    Toujeo SoloStar 300 UNIT/ML solution pen-injector injection, Inject 65 Units under the skin into the appropriate area as directed Daily., Disp: 21 mL, Rfl: 1      Objective:     There were no vitals filed for this visit.  There is no height or weight on file to calculate BMI.    Left Heart Cath 05/24/2022:  Left main: Discrete 30% distal stenosis  LAD: Discrete 60% ostial stenois  LCX: Diffuse 70-80% in-stent restenosis  Ramus: Small caliber ramus with 99% ostial stenosis  Intervention:   1. Successful left main bifurcation stenting with a 4.0x15mm and 3.43c51dc Xience Skypoint drug eluting stents with DK Crush technique  2. Successful PCI to the mid LCX with a 3.0x23 mm Xience Skypoint DAVONTE    2D Echocardiogram 05/21/2022:  Calculated left ventricular EF = 55.6% Estimated left ventricular EF was in agreement with the calculated left ventricular EF. Left ventricular systolic function is normal.  Left ventricular wall thickness is consistent with borderline concentric hypertrophy.  Left ventricular diastolic function is consistent with (grade I) impaired relaxation.  There is calcification of the aortic valve. Valvular structure is poorly visualized.  Aortic valve area is 1.36 cm2.  Peak velocity of the flow distal to the aortic valve is 177.4 cm/s. Aortic valve maximum pressure gradient is 12.6 mmHg. Aortic valve mean pressure  gradient is 8.1 mmHg. Aortic valve dimensionless index is 0.5 .      Left Heart Cath 05/20/2022:  LEFT MAIN: Large caliber normal, trifurcates to give the LAD ramus and circumflex arteries.   LAD: Ostial 70% lesion. The mid LAD is normal. The distal LAD stent is widely patent. There are 3 small diagonal vessel which are essentially normal.   RAMUS: Ostial 70-80% stenosis, otherwise normal.   CIRCUMFLEX: Ostial circumflex in stent restenosis which is hazy appearing and tortuous. Gives rise to a medium caliber OM which is normal.   RCA: Large caliber RCA which has proximal to distal stents which have mild ISR.      Severe ostial circumflex in stent restenosis, 90%  Status post PTCA using a 3.5x15mm NC balloon  The ostial circumflex was ballooned given the patients unstable symptoms. He has severe multivessel disease involving the ostium LAD and ostium ramus . Have discussed with Dr Winchester of CT surgery, will keep him on Integrillin over the weekend as plavix metabolizes and plan for CABG Surgery next week.    2D Echocardiogram 4/14/2020:  Left ventricular systolic function is normal. Calculated EF = 56%. Estimated EF was in agreement with the calculated EF. Normal left ventricular cavity size noted. All left ventricular wall segments contract normally. Left ventricular wall thickness is consistent with mild concentric hypertrophy. Left ventricular diastolic dysfunction is noted (grade I) consistent with impaired relaxation.     Left Heart Cath 03/10/2020:  1. Left main: Normal  2. LAD: 20 to 30% ostial stenosis.  40% mid vessel stenosis.  3. LCX: 99% ostial stenosis with GRACIE II flow.  40 to 50% mid vessel stenosis.  4. RCA: Previously placed stents from the proximal to distal RCA with luminal irregularities.  5.  Normal left ventricular size and systolic function  6.  PCI of the ostial circumflex with a 3.0 x 15 mm Xience Kaylynn drug-eluting stent.    Lexiscan Stress test 07/18/2017:  Myocardial perfusion imaging  indicates a normal myocardial perfusion study with no evidence of ischemia.  Left ventricular ejection fraction is normal (Calculated EF = 61%).  Impressions are consistent with a low risk study.    Left Heart Cath 11/05/2015:  1. Successful stenting of the [id number] right coronary artery.   2. Angio-Seal of both femoral arteries.     Left Heart Cath 08/28/2015:  1.  Hemodynamics: /13. /82/98.   2.  Left ventriculography: EF 50%, apical hypokinesis.   3.  Coronary angiography: Right dominant system. Two-vessel coronary disease.  The left main is normal. The proximal LAD has 30% to 40% diffuse disease. There  is a stent in the proximal to mid LAD that is patent. The mid to distal LAD has  30% to 40% diffuse disease. The first diagonal branch has a 60% stenosis. The  circumflex has 50% proximal stenosis. The RCA is totally occluded in the  proximal to midvessel. It reconstitutes via robust left-sided collaterals.      SUMMARY: Moderate left-sided disease with chronic total occlusion of the  proximal to mid RCA.      RECOMMENDATIONS:  intervention of the RCA.       PHYSICAL EXAM:    Constitutional:       Appearance: Healthy appearance. Not in distress.   Neck:      Vascular: No JVR. JVD normal.   Pulmonary:      Effort: Pulmonary effort is normal.      Breath sounds: Normal breath sounds. No wheezing. No rhonchi. No rales.   Chest:      Chest wall: Not tender to palpatation.   Cardiovascular:      PMI at left midclavicular line. Normal rate. Regular rhythm. Normal S1. Normal S2.       Murmurs: There is no murmur.      No gallop.  No click. No rub.   Pulses:     Intact distal pulses.   Edema:     Peripheral edema absent.   Abdominal:      General: Bowel sounds are normal.      Palpations: Abdomen is soft.      Tenderness: There is no abdominal tenderness.   Musculoskeletal: Normal range of motion.         General: No tenderness. Skin:     General: Skin is warm and dry.   Neurological:       General: No focal deficit present.      Mental Status: Alert and oriented to person, place and time.           ECG 12 Lead    Date/Time: 3/12/2024 1:15 PM  Performed by: Hattie Foster APRN    Authorized by: Hattie Foster APRN  Comparison: compared with previous ECG from 9/26/2023  Rhythm: sinus rhythm  Rate: normal  BPM: 86  Q waves: aVL    Other findings: early transition  Other findings comments: V2            Assessment:       Diagnosis Plan   1. Coronary artery disease involving native coronary artery of native heart without angina pectoris        2. Hyperlipidemia LDL goal <70        3. S/P primary angioplasty with coronary stent        4. Essential hypertension        5. Type 2 diabetes mellitus with microalbuminuria, with long-term current use of insulin            No orders of the defined types were placed in this encounter.         Plan:       1.  Coronary artery disease: History of prior PCI to RCA( 2018 and ostial circumflex 2020).  Status post PCI to left circumflex and ostial LAD bifurcation 5/24/2022.  Continue DAPT with aspirin/Plavix lifelong.  On statin therapy.  Normal left ventricular function.  Experiencing his anginal equivalent.  No new ischemia on EKG.  2.  Hypertension: Controlled on carvedilol, Cardura and hydrochlorothiazide  3.  Hyperlipidemia goal LDL < 70: Recent lipid panel in target range. On statin.  Normal liver enzymes  4.  Diabetes type 2: HbA1c has improved to 6.4.  On oral medication and Ozempic. Followed by endocrinology.  5.  Chronic kidney disease: Follows with Dr. Brenner with nephrology.    Patient's complicated cardiac history, we will proceed with cardiac cath.  Discussed with patient and his wife and they are in agreement.  We will hydrate prior to procedure as patient does have a kidney disease.                   Your medication list            Accurate as of March 12, 2024  9:30 AM. If you have any questions, ask your nurse or doctor.                CHANGE  how you take these medications        Instructions Last Dose Given Next Dose Due   clopidogrel 75 MG tablet  Commonly known as: PLAVIX  What changed: additional instructions      Take 1 tablet by mouth Daily.       Insulin Lispro (1 Unit Dial) 100 UNIT/ML solution pen-injector  Commonly known as: HUMALOG  What changed: additional instructions      INJECT SUBCUTANEOUS AS DIRECTED BY PROVIDER DOSAGE IS ATTACHED MAX IS 40 UNITS PER DAY       latanoprost 0.005 % ophthalmic solution  Commonly known as: XALATAN  What changed: when to take this      Apply 1 drop to  each eye Daily.              CONTINUE taking these medications        Instructions Last Dose Given Next Dose Due   Adult Aspirin Regimen 81 MG EC tablet  Generic drug: aspirin      Take 1 tablet by mouth Daily.       amoxicillin 875 MG tablet  Commonly known as: AMOXIL      Take 1 tablet by mouth.       ARIPiprazole 5 MG tablet  Commonly known as: ABILIFY      Take 1.5 tablets by mouth Daily.       carvedilol 25 MG tablet  Commonly known as: COREG      Take 1 tablet by mouth Every 12 (Twelve) Hours.       clonazePAM 0.5 MG tablet  Commonly known as: KlonoPIN      Take 1 tablet by mouth Daily As Needed for Anxiety.       Dexcom G7 Sensor misc      Use.       dorzolamide-timolol 2-0.5 % ophthalmic solution  Commonly known as: COSOPT      Apply 1 drop to eye(s) to both eyes 2 (Two) Times a Day.       doxazosin 4 MG tablet  Commonly known as: CARDURA      TAKE 1 TABLET BY MOUTH EVERY DAY AT NIGHT       escitalopram 20 MG tablet  Commonly known as: LEXAPRO      Take 1 tablet by mouth Daily.       esomeprazole 40 MG capsule  Commonly known as: nexIUM      Take 1 capsule by mouth Daily.       famotidine 20 MG tablet  Commonly known as: PEPCID      Take 1 tablet by mouth Daily With Dinner.       freestyle lancets      Use to test BG 4 times daily       gabapentin 300 MG capsule  Commonly known as: NEURONTIN      Take 1 capsule every day by oral route in the evening for  30 days.       hydroCHLOROthiazide 12.5 MG tablet      Take 1 tablet by mouth Daily.       HYDROcodone-acetaminophen 5-325 MG per tablet  Commonly known as: NORCO           Insulin Pen Needle 31G X 4 MM misc      1 each Daily.       metFORMIN 500 MG tablet  Commonly known as: GLUCOPHAGE      Take 2 tablets by mouth Daily With Breakfast. Indications: start in 48 hours       methocarbamol 500 MG tablet  Commonly known as: Robaxin      Take 1 tablet by mouth As Needed (Take as needed for pain).       pioglitazone 15 MG tablet  Commonly known as: ACTOS      Take 1 tablet by mouth Daily.       rosuvastatin 40 MG tablet  Commonly known as: CRESTOR      Take 1 tablet by mouth Daily.       Semaglutide (2 MG/DOSE) 8 MG/3ML solution pen-injector  Commonly known as: OZEMPIC      Inject 2 mg under the skin into the appropriate area as directed 1 (One) Time Per Week.       sildenafil 100 MG tablet  Commonly known as: VIAGRA      Take 1 tablet by mouth Daily As Needed for Erectile Dysfunction. Take 30 minutes to 4 hours prior to sexual activity.       Toujeo SoloStar 300 UNIT/ML solution pen-injector injection  Generic drug: Insulin Glargine (1 Unit Dial)      Inject 65 Units under the skin into the appropriate area as directed Daily.                  As always, it has been a pleasure to participate in your patient's care.      Sincerely,       MICHELLE Brothers

## 2024-03-19 ENCOUNTER — HOSPITAL ENCOUNTER (OUTPATIENT)
Facility: HOSPITAL | Age: 69
Discharge: HOME | End: 2024-03-20
Attending: INTERNAL MEDICINE | Admitting: INTERNAL MEDICINE
Payer: MEDICARE

## 2024-03-19 DIAGNOSIS — R80.9 TYPE 2 DIABETES MELLITUS WITH MICROALBUMINURIA, WITH LONG-TERM CURRENT USE OF INSULIN: ICD-10-CM

## 2024-03-19 DIAGNOSIS — I25.10 CORONARY ARTERY DISEASE INVOLVING NATIVE CORONARY ARTERY OF NATIVE HEART WITHOUT ANGINA PECTORIS: ICD-10-CM

## 2024-03-19 DIAGNOSIS — Z79.4 TYPE 2 DIABETES MELLITUS WITH MICROALBUMINURIA, WITH LONG-TERM CURRENT USE OF INSULIN: ICD-10-CM

## 2024-03-19 DIAGNOSIS — I20.0 UNSTABLE ANGINA: Primary | ICD-10-CM

## 2024-03-19 DIAGNOSIS — E11.29 TYPE 2 DIABETES MELLITUS WITH MICROALBUMINURIA, WITH LONG-TERM CURRENT USE OF INSULIN: ICD-10-CM

## 2024-03-19 DIAGNOSIS — E78.5 HYPERLIPIDEMIA LDL GOAL <70: ICD-10-CM

## 2024-03-19 DIAGNOSIS — I10 ESSENTIAL HYPERTENSION: ICD-10-CM

## 2024-03-19 DIAGNOSIS — Z95.5 S/P PRIMARY ANGIOPLASTY WITH CORONARY STENT: ICD-10-CM

## 2024-03-19 LAB
GLUCOSE BLDC GLUCOMTR-MCNC: 119 MG/DL (ref 70–130)
GLUCOSE BLDC GLUCOMTR-MCNC: 89 MG/DL (ref 70–130)
GLUCOSE BLDC GLUCOMTR-MCNC: 90 MG/DL (ref 70–130)
GLUCOSE BLDC GLUCOMTR-MCNC: 91 MG/DL (ref 70–130)
QT INTERVAL: 387 MS
QTC INTERVAL: 424 MS

## 2024-03-19 PROCEDURE — C1894 INTRO/SHEATH, NON-LASER: HCPCS | Performed by: INTERNAL MEDICINE

## 2024-03-19 PROCEDURE — 25810000003 SODIUM CHLORIDE 0.9 % SOLUTION: Performed by: INTERNAL MEDICINE

## 2024-03-19 PROCEDURE — 25010000002 FENTANYL CITRATE (PF) 50 MCG/ML SOLUTION: Performed by: INTERNAL MEDICINE

## 2024-03-19 PROCEDURE — 93005 ELECTROCARDIOGRAM TRACING: CPT | Performed by: INTERNAL MEDICINE

## 2024-03-19 PROCEDURE — 92972 PERQ TRLUML CORONRY LITHOTRP: CPT | Performed by: INTERNAL MEDICINE

## 2024-03-19 PROCEDURE — G0378 HOSPITAL OBSERVATION PER HR: HCPCS

## 2024-03-19 PROCEDURE — C1769 GUIDE WIRE: HCPCS | Performed by: INTERNAL MEDICINE

## 2024-03-19 PROCEDURE — C1725 CATH, TRANSLUMIN NON-LASER: HCPCS | Performed by: INTERNAL MEDICINE

## 2024-03-19 PROCEDURE — 93454 CORONARY ARTERY ANGIO S&I: CPT | Performed by: INTERNAL MEDICINE

## 2024-03-19 PROCEDURE — C1887 CATHETER, GUIDING: HCPCS | Performed by: INTERNAL MEDICINE

## 2024-03-19 PROCEDURE — C9600 PERC DRUG-EL COR STENT SING: HCPCS | Performed by: INTERNAL MEDICINE

## 2024-03-19 PROCEDURE — 85347 COAGULATION TIME ACTIVATED: CPT

## 2024-03-19 PROCEDURE — 92928 PRQ TCAT PLMT NTRAC ST 1 LES: CPT | Performed by: INTERNAL MEDICINE

## 2024-03-19 PROCEDURE — 92920 PRQ TRLUML C ANGIOP 1ART&/BR: CPT | Performed by: INTERNAL MEDICINE

## 2024-03-19 PROCEDURE — C1761: HCPCS | Performed by: INTERNAL MEDICINE

## 2024-03-19 PROCEDURE — 25510000001 IOPAMIDOL PER 1 ML: Performed by: INTERNAL MEDICINE

## 2024-03-19 PROCEDURE — C1874 STENT, COATED/COV W/DEL SYS: HCPCS | Performed by: INTERNAL MEDICINE

## 2024-03-19 PROCEDURE — 25010000002 MIDAZOLAM PER 1 MG: Performed by: INTERNAL MEDICINE

## 2024-03-19 PROCEDURE — 82948 REAGENT STRIP/BLOOD GLUCOSE: CPT

## 2024-03-19 PROCEDURE — 25010000002 HEPARIN (PORCINE) PER 1000 UNITS: Performed by: INTERNAL MEDICINE

## 2024-03-19 PROCEDURE — 93010 ELECTROCARDIOGRAM REPORT: CPT | Performed by: INTERNAL MEDICINE

## 2024-03-19 DEVICE — XIENCE SKYPOINT™ EVEROLIMUS ELUTING CORONARY STENT SYSTEM 3.25 MM X 15 MM / RAPID-EXCHANGE
Type: IMPLANTABLE DEVICE | Site: CORONARY | Status: FUNCTIONAL
Brand: XIENCE SKYPOINT™

## 2024-03-19 RX ORDER — ONDANSETRON 2 MG/ML
4 INJECTION INTRAMUSCULAR; INTRAVENOUS EVERY 6 HOURS PRN
Status: DISCONTINUED | OUTPATIENT
Start: 2024-03-19 | End: 2024-03-20 | Stop reason: HOSPADM

## 2024-03-19 RX ORDER — ARIPIPRAZOLE 5 MG/1
7.5 TABLET ORAL DAILY
Status: DISCONTINUED | OUTPATIENT
Start: 2024-03-19 | End: 2024-03-20 | Stop reason: HOSPADM

## 2024-03-19 RX ORDER — SODIUM CHLORIDE 9 MG/ML
150 INJECTION, SOLUTION INTRAVENOUS CONTINUOUS
Status: DISCONTINUED | OUTPATIENT
Start: 2024-03-19 | End: 2024-03-20 | Stop reason: HOSPADM

## 2024-03-19 RX ORDER — HYDROCODONE BITARTRATE AND ACETAMINOPHEN 5; 325 MG/1; MG/1
1 TABLET ORAL EVERY 4 HOURS PRN
Status: DISCONTINUED | OUTPATIENT
Start: 2024-03-19 | End: 2024-03-19 | Stop reason: SDUPTHER

## 2024-03-19 RX ORDER — LIDOCAINE HYDROCHLORIDE 20 MG/ML
INJECTION, SOLUTION INFILTRATION; PERINEURAL
Status: DISCONTINUED | OUTPATIENT
Start: 2024-03-19 | End: 2024-03-19 | Stop reason: HOSPADM

## 2024-03-19 RX ORDER — PIOGLITAZONEHYDROCHLORIDE 15 MG/1
15 TABLET ORAL DAILY
Status: DISCONTINUED | OUTPATIENT
Start: 2024-03-19 | End: 2024-03-20 | Stop reason: HOSPADM

## 2024-03-19 RX ORDER — HYDROCODONE BITARTRATE AND ACETAMINOPHEN 5; 325 MG/1; MG/1
1 TABLET ORAL EVERY 4 HOURS PRN
Status: DISCONTINUED | OUTPATIENT
Start: 2024-03-19 | End: 2024-03-20 | Stop reason: HOSPADM

## 2024-03-19 RX ORDER — VERAPAMIL HYDROCHLORIDE 2.5 MG/ML
INJECTION, SOLUTION INTRAVENOUS
Status: DISCONTINUED | OUTPATIENT
Start: 2024-03-19 | End: 2024-03-19 | Stop reason: HOSPADM

## 2024-03-19 RX ORDER — FAMOTIDINE 20 MG/1
20 TABLET, FILM COATED ORAL
Status: DISCONTINUED | OUTPATIENT
Start: 2024-03-19 | End: 2024-03-20 | Stop reason: HOSPADM

## 2024-03-19 RX ORDER — NITROGLYCERIN 0.4 MG/1
0.4 TABLET SUBLINGUAL
Status: DISCONTINUED | OUTPATIENT
Start: 2024-03-19 | End: 2024-03-20 | Stop reason: HOSPADM

## 2024-03-19 RX ORDER — FENTANYL CITRATE 50 UG/ML
INJECTION, SOLUTION INTRAMUSCULAR; INTRAVENOUS
Status: DISCONTINUED | OUTPATIENT
Start: 2024-03-19 | End: 2024-03-19 | Stop reason: HOSPADM

## 2024-03-19 RX ORDER — SODIUM CHLORIDE 0.9 % (FLUSH) 0.9 %
10 SYRINGE (ML) INJECTION AS NEEDED
Status: DISCONTINUED | OUTPATIENT
Start: 2024-03-19 | End: 2024-03-19

## 2024-03-19 RX ORDER — ACETAMINOPHEN 325 MG/1
650 TABLET ORAL EVERY 4 HOURS PRN
Status: DISCONTINUED | OUTPATIENT
Start: 2024-03-19 | End: 2024-03-20 | Stop reason: HOSPADM

## 2024-03-19 RX ORDER — ASPIRIN 81 MG/1
TABLET, CHEWABLE ORAL
Status: DISCONTINUED | OUTPATIENT
Start: 2024-03-19 | End: 2024-03-19 | Stop reason: HOSPADM

## 2024-03-19 RX ORDER — ROSUVASTATIN CALCIUM 40 MG/1
40 TABLET, COATED ORAL DAILY
Status: DISCONTINUED | OUTPATIENT
Start: 2024-03-19 | End: 2024-03-20 | Stop reason: HOSPADM

## 2024-03-19 RX ORDER — SODIUM CHLORIDE 9 MG/ML
100 INJECTION, SOLUTION INTRAVENOUS CONTINUOUS
Status: ACTIVE | OUTPATIENT
Start: 2024-03-19 | End: 2024-03-19

## 2024-03-19 RX ORDER — CLONAZEPAM 0.5 MG/1
0.5 TABLET ORAL DAILY PRN
Status: DISCONTINUED | OUTPATIENT
Start: 2024-03-19 | End: 2024-03-20 | Stop reason: HOSPADM

## 2024-03-19 RX ORDER — METHOCARBAMOL 500 MG/1
500 TABLET, FILM COATED ORAL AS NEEDED
Status: DISCONTINUED | OUTPATIENT
Start: 2024-03-19 | End: 2024-03-20 | Stop reason: HOSPADM

## 2024-03-19 RX ORDER — CARVEDILOL 25 MG/1
25 TABLET ORAL EVERY 12 HOURS SCHEDULED
Status: DISCONTINUED | OUTPATIENT
Start: 2024-03-19 | End: 2024-03-20 | Stop reason: HOSPADM

## 2024-03-19 RX ORDER — ESCITALOPRAM OXALATE 20 MG/1
20 TABLET ORAL DAILY
Status: DISCONTINUED | OUTPATIENT
Start: 2024-03-19 | End: 2024-03-20 | Stop reason: HOSPADM

## 2024-03-19 RX ORDER — ONDANSETRON 4 MG/1
4 TABLET, ORALLY DISINTEGRATING ORAL EVERY 6 HOURS PRN
Status: DISCONTINUED | OUTPATIENT
Start: 2024-03-19 | End: 2024-03-20 | Stop reason: HOSPADM

## 2024-03-19 RX ORDER — TEMAZEPAM 15 MG/1
15 CAPSULE ORAL NIGHTLY PRN
Status: DISCONTINUED | OUTPATIENT
Start: 2024-03-19 | End: 2024-03-20 | Stop reason: HOSPADM

## 2024-03-19 RX ORDER — CLOPIDOGREL BISULFATE 75 MG/1
75 TABLET ORAL DAILY
Status: DISCONTINUED | OUTPATIENT
Start: 2024-03-19 | End: 2024-03-20 | Stop reason: HOSPADM

## 2024-03-19 RX ORDER — TERAZOSIN 5 MG/1
5 CAPSULE ORAL NIGHTLY
Status: DISCONTINUED | OUTPATIENT
Start: 2024-03-19 | End: 2024-03-20 | Stop reason: HOSPADM

## 2024-03-19 RX ORDER — MIDAZOLAM HYDROCHLORIDE 1 MG/ML
INJECTION INTRAMUSCULAR; INTRAVENOUS
Status: DISCONTINUED | OUTPATIENT
Start: 2024-03-19 | End: 2024-03-19 | Stop reason: HOSPADM

## 2024-03-19 RX ORDER — ASPIRIN 81 MG/1
81 TABLET ORAL DAILY
Status: DISCONTINUED | OUTPATIENT
Start: 2024-03-19 | End: 2024-03-20 | Stop reason: HOSPADM

## 2024-03-19 RX ORDER — HEPARIN SODIUM 1000 [USP'U]/ML
INJECTION, SOLUTION INTRAVENOUS; SUBCUTANEOUS
Status: DISCONTINUED | OUTPATIENT
Start: 2024-03-19 | End: 2024-03-19 | Stop reason: HOSPADM

## 2024-03-19 RX ORDER — HYDROCHLOROTHIAZIDE 12.5 MG/1
12.5 TABLET ORAL DAILY
Status: DISCONTINUED | OUTPATIENT
Start: 2024-03-19 | End: 2024-03-20 | Stop reason: HOSPADM

## 2024-03-19 RX ORDER — PANTOPRAZOLE SODIUM 40 MG/1
40 TABLET, DELAYED RELEASE ORAL
Status: DISCONTINUED | OUTPATIENT
Start: 2024-03-19 | End: 2024-03-20 | Stop reason: HOSPADM

## 2024-03-19 RX ADMIN — TERAZOSIN HYDROCHLORIDE 5 MG: 5 CAPSULE ORAL at 20:58

## 2024-03-19 RX ADMIN — PIOGLITAZONE 15 MG: 15 TABLET ORAL at 13:59

## 2024-03-19 RX ADMIN — ROSUVASTATIN CALCIUM 40 MG: 40 TABLET, FILM COATED ORAL at 20:58

## 2024-03-19 RX ADMIN — SODIUM CHLORIDE 100 ML/HR: 9 INJECTION, SOLUTION INTRAVENOUS at 07:31

## 2024-03-19 RX ADMIN — CARVEDILOL 25 MG: 25 TABLET, FILM COATED ORAL at 20:58

## 2024-03-19 RX ADMIN — HYDROCHLOROTHIAZIDE 12.5 MG: 12.5 TABLET ORAL at 13:58

## 2024-03-19 RX ADMIN — FAMOTIDINE 20 MG: 20 TABLET, FILM COATED ORAL at 17:55

## 2024-03-19 RX ADMIN — ACETAMINOPHEN 325MG 650 MG: 325 TABLET ORAL at 13:15

## 2024-03-19 RX ADMIN — SODIUM CHLORIDE 100 ML/HR: 9 INJECTION, SOLUTION INTRAVENOUS at 13:12

## 2024-03-19 NOTE — PLAN OF CARE
Goal Outcome Evaluation:              Outcome Evaluation: Pt admitted from cath lab, s/p pci. Pt on room air, all VSS. TR band removed, site is C/D/I/S and new dressing of gauze and tegaderm has been placed.

## 2024-03-19 NOTE — DISCHARGE INSTRUCTIONS
[facility]  [address] [place] [address]    Coronary Angioplasty with or without  Stent (Radial Approach) After Care    Refer to this sheet in the next few weeks. These instructions provide you with information on caring for yourself after your procedure. Your health care provider may also give you more specific instructions. Your treatment has been planned according to current medical practices, but problems sometimes occur. Call your health care provider if you have any problems or questions after your procedure.       Home Care Instructions:  You may shower the day after the procedure. Remove the bandage (dressing) and gently wash the site with plain soap and water. Gently pat the site dry. You may apply a band aid daily for 2 days if desired.    Do not apply powder or lotion to the site.  Do not submerge the affected site in water for 3 to 5 days or until the site is completely healed.   Do not flex or bend at the wrist with affected arm for 24 hours.  Do not lift, push or pull anything over 5 pounds for 5 days after your procedure or as directed by your physician.  For a reference, a gallon of milk weighs 8 pounds.    Do not operate machinery or power tools for 24 hours.  Inspect the site at least twice daily. You may notice some bruising at the site and it may be tender for 1 to 2 weeks.     Increase your fluid intake for the next 2 days.    Keep arm elevated for 24 hours.  For the remainder of the day, keep your arm in the “Pledge of Allegiance” position when up and about.    Limit your activity for the next 48 hours and avoid strenuous activity as directed by your physician.   Cardiac Rehab may or may not be ordered.  Please consult with your physician  You may drive 24 hours after the procedure unless otherwise instructed by your caregiver.  A responsible adult should be with you for the first 24 hours after you arrive home.   Do not make any important legal decisions or sign legal  papers for 24 hours. Do not drink alcohol for 24 hours.    Take medicines only as directed by your health care provider.  Blood thinners may be prescribed after your procedure to improve blood flow through the stent.    Metformin or any medications containing Metformin should not be taken for 48 hours after your procedure.    Eat a heart-healthy diet. This should include plenty of fresh fruits and vegetables. Meat should be lean cuts. Avoid the following types of food:    Food that is high in salt.    Canned or highly processed food.    Food that is high in saturated fat or sugar.    Fried food.    Make any other lifestyle changes recommended by your health care provider. This may include:    Not using any tobacco products including cigarettes, chewing tobacco, or electronic cigarettes.   Managing your weight.    Getting regular exercise.    Managing your blood pressure.    Limiting your alcohol intake.    Managing other health problems, such as diabetes.    If you need an MRI after your heart stent was placed, be sure to tell the health care provider who orders the MRI that you have a heart stent.    Keep all follow-up visits as directed by your health care provider.      Call Your Doctor If:    You have unusual pain at the radial/ulnar (wrist) site.  You have redness, warmth, swelling, or pain at the radial/ulnar (wrist) site.  You have drainage (other than a small amount of blood on the dressing).  `You have chills or a fever > 101.  Your arm becomes pale or dark, cool, tingly, or numb.  You develop chest pain, shortness of breath, feel faint or pass out.    You have heavy bleeding from the site.  If you do, hold pressure on the site for 20 minutes.  If the bleeding stops, apply a fresh bandage and call your cardiologist.  However, if you continue to have bleeding, maintain pressure and call 911.    You have any symptoms of a stroke.  Remember BE FAST  B-balance. Sudden trouble walking or loss of  balance.  E-eyes.  Sudden changes in how you see or a sudden onset of a very bad headache.   F-face. Sudden weakness or loss of feeling of the face or facial droop on one side.   A-arms Sudden weakness or numbness in one arm. One arm drifts down if they are both held out in front of you. This happens suddenly and usually on one side of the body.   S-speech.  Sudden trouble speaking, slurred speech or trouble understanding what people are saying.   T-time  Time to call emergency services.  Write down the symptoms and the time they started.

## 2024-03-19 NOTE — Clinical Note
A 6 fr sheath was  inserted into the right radial artery.
ACT = 271 (sec). ACT was drawn at 10:16 EDT. ACT result was completed at 10:20 EDT.
ACT = 277 (sec). ACT was drawn at 10:28 EDT. ACT result was completed at 10:30 EDT.
ACT = 542 (sec). ACT was drawn at 09:48 EDT. ACT result was completed at 09:57 EDT.
All interventional equipment removed.
Allergies reviewed.  H&P note has been confirmed for the patient. Procedural consent has been signed.  Staff has reviewed the patient's labs.
Allergies reviewed.  H&P note has been confirmed for the patient. Procedural consent has been signed.  Staff has reviewed the patient's labs.
Balloon inserted in circumflex.
Balloon inserted in left anterior descending.
Balloon inserted in left anterior descending. 4x12 shockwave balloon
Catheter inserted with wire simultaneously.
Catheter inserted with wire simultaneously.
Circumflex stent inserted.
First balloon inflation max pressure = 10 rozina. First balloon inflation duration = 5 seconds. Second inflation of balloon - Max pressure = 10 rozina. 2nd Inflation of balloon - Duration = 7 seconds. 2nd inflation was done at 09:50 EDT. Third inflation of balloon - Max pressure = 10 rozina. 3rd Inflation of balloon - Duration = 4 seconds. 3rd inflation was done at 09:51 EDT. Fourth inflation of balloon - Max pressure = 20 rozina. 4th Inflation of  balloon - Duration = 5 seconds. 4th inflation was done at 09:51 EDT.
First balloon inflation max pressure = 12 rozina. First balloon inflation duration = 7 seconds.
First balloon inflation max pressure = 14 rozina. First balloon inflation duration = 28 seconds.
First balloon inflation max pressure = 14 rozina. First balloon inflation duration = 5 seconds.
First balloon inflation max pressure = 14 rozina. First balloon inflation duration = 5 seconds.
First balloon inflation max pressure = 14 rozina. First balloon inflation duration = 5 seconds. Second inflation of balloon - Max pressure = 12 rozina. 2nd Inflation of balloon - Duration = 8 seconds. 2nd inflation was done at 09:40 EDT. Third inflation of balloon - Max pressure = 10 rozina. 3rd Inflation of balloon - Duration = 7 seconds. 3rd inflation was done at 09:41 EDT. Fourth inflation of balloon - Max pressure = 12 rozina. 4th Inflation of  balloon - Duration = 20 seconds. 4th inflation was done at 09:41 EDT.
First balloon inflation max pressure = 14 rozina. First balloon inflation duration = 8 seconds. Second inflation of balloon - Max pressure = 14 rozina. 2nd Inflation of balloon - Duration = 4 seconds. 2nd inflation was done at 10:18 EDT. Third inflation of balloon - Max pressure = 14 rozina. 3rd Inflation of balloon - Duration = 6 seconds. 3rd inflation was done at 10:18 EDT.
First balloon inflation max pressure = 16 rozina. First balloon inflation duration = 15 seconds.
First balloon inflation max pressure = 20 rozina. First balloon inflation duration = 4 seconds.
First balloon inflation max pressure = 24 rozina. First balloon inflation duration = 10 seconds. Second inflation of balloon - Max pressure = 25 rozina. 2nd Inflation of balloon - Duration = 15 seconds. 2nd inflation was done at 10:21 EDT.
First balloon inflation max pressure = 24 rozina. First balloon inflation duration = 11 seconds. Second inflation of balloon - Max pressure = 26 rozina. 2nd Inflation of balloon - Duration = 8 seconds. 2nd inflation was done at 10:01 EDT.
First balloon inflation max pressure = 24 rozina. First balloon inflation duration = 9 seconds. Second inflation of balloon - Max pressure = 24 rozina. 2nd Inflation of balloon - Duration = 9 seconds. 2nd inflation was done at 09:45 EDT.
First balloon inflation max pressure = 6 rozina. First balloon inflation duration = 10 seconds.
First balloon inflation max pressure = 6 rozina. First balloon inflation duration = 10 seconds. Second inflation of balloon - Max pressure = 6 rozina. 2nd Inflation of balloon - Duration = 10 seconds. 2nd inflation was done at 09:56 EDT. Third inflation of balloon - Max pressure = 6 rozina. 3rd Inflation of balloon - Duration = 10 seconds. 3rd inflation was done at 09:57 EDT. Fourth inflation of balloon - Max pressure = 6 rozina. 4th Inflation of b alloon - Duration = 10 seconds. 4th inflation was done at 09:57 EDT.
First balloon inflation max pressure = 6 rozina. First balloon inflation duration = 10 seconds. Second inflation of balloon - Max pressure = 6 rozina. 2nd Inflation of balloon - Duration = 10 seconds. 2nd inflation was done at 09:57 EDT. Third inflation of balloon - Max pressure = 6 rozina. 3rd Inflation of balloon - Duration = 10 seconds. 3rd inflation was done at 09:58 EDT. Fourth inflation of balloon - Max pressure = 6 rozina. 4th Inflation of b alloon - Duration = 10 seconds. 4th inflation was done at 09:58 EDT.
Hemostasis started on the right radial artery. R-Band was used in achieving hemostasis. Radial compression device applied to vessel. Hemostasis achieved successfully. Closure device additional comment: 14 cc of air
Inserted under fluoro.
Interventional Guidewire removed without incident
No in lab complications
Patient was given Post Procedure instruction by the staff.
Physician notified by staff.
Post-Procedural Saturation was taken from the Right Hand. Sat result is 97%.
Pre-Procedural Saturation was taken from the Right Hand. Sat result is 97%
Prepped: Right Wrist. Prepped with: ChloraPrep. The patient was draped in a sterile fashion.
Removed
Removed intact
Removed intact (shockwave balloon)
Stent balloon removed intact.
The left coronary artery was selectively engaged, injected and visualized.
The physician has confirmed that the patient has been reassessed and is appropriate for moderate sedation
The right coronary artery was selectively engaged, injected and visualized.
The right radial pulse is +2. The right ulnar pulse is +1.
Updated pt's wife
Wire inserted in circumflex.
Wire inserted in circumflex.
Wire inserted in left anterior descending.
Wire inserted in left anterior descending.
Wire inserted in left anterior descending. #2
catheter removed  over the wire.
inserted over wire.
inserted over wire.
removed.
no

## 2024-03-20 ENCOUNTER — TELEPHONE (OUTPATIENT)
Dept: CARDIOLOGY | Facility: CLINIC | Age: 69
End: 2024-03-20

## 2024-03-20 ENCOUNTER — READMISSION MANAGEMENT (OUTPATIENT)
Dept: CALL CENTER | Facility: HOSPITAL | Age: 69
End: 2024-03-20
Payer: MEDICARE

## 2024-03-20 VITALS
RESPIRATION RATE: 18 BRPM | SYSTOLIC BLOOD PRESSURE: 126 MMHG | HEIGHT: 77 IN | OXYGEN SATURATION: 96 % | BODY MASS INDEX: 30.7 KG/M2 | DIASTOLIC BLOOD PRESSURE: 89 MMHG | TEMPERATURE: 98.2 F | WEIGHT: 260 LBS | HEART RATE: 88 BPM

## 2024-03-20 PROBLEM — I20.0 UNSTABLE ANGINA: Status: RESOLVED | Noted: 2024-03-19 | Resolved: 2024-03-20

## 2024-03-20 LAB
ACT BLD: 271 SECONDS (ref 82–152)
ACT BLD: 277 SECONDS (ref 82–152)
ANION GAP SERPL CALCULATED.3IONS-SCNC: 8 MMOL/L (ref 5–15)
BUN SERPL-MCNC: 12 MG/DL (ref 8–23)
BUN/CREAT SERPL: 9.2 (ref 7–25)
CALCIUM SPEC-SCNC: 9.6 MG/DL (ref 8.6–10.5)
CHLORIDE SERPL-SCNC: 101 MMOL/L (ref 98–107)
CO2 SERPL-SCNC: 27 MMOL/L (ref 22–29)
CREAT SERPL-MCNC: 1.31 MG/DL (ref 0.76–1.27)
DEPRECATED RDW RBC AUTO: 49 FL (ref 37–54)
EGFRCR SERPLBLD CKD-EPI 2021: 58.9 ML/MIN/1.73
ERYTHROCYTE [DISTWIDTH] IN BLOOD BY AUTOMATED COUNT: 15.4 % (ref 12.3–15.4)
GLUCOSE BLDC GLUCOMTR-MCNC: 98 MG/DL (ref 70–130)
GLUCOSE SERPL-MCNC: 149 MG/DL (ref 65–99)
HCT VFR BLD AUTO: 41.9 % (ref 37.5–51)
HGB BLD-MCNC: 13.9 G/DL (ref 13–17.7)
MCH RBC QN AUTO: 28.6 PG (ref 26.6–33)
MCHC RBC AUTO-ENTMCNC: 33.2 G/DL (ref 31.5–35.7)
MCV RBC AUTO: 86.2 FL (ref 79–97)
PLATELET # BLD AUTO: 162 10*3/MM3 (ref 140–450)
PMV BLD AUTO: 10.4 FL (ref 6–12)
POTASSIUM SERPL-SCNC: 3.4 MMOL/L (ref 3.5–5.2)
QT INTERVAL: 370 MS
QTC INTERVAL: 414 MS
RBC # BLD AUTO: 4.86 10*6/MM3 (ref 4.14–5.8)
SODIUM SERPL-SCNC: 136 MMOL/L (ref 136–145)
WBC NRBC COR # BLD AUTO: 9.49 10*3/MM3 (ref 3.4–10.8)

## 2024-03-20 PROCEDURE — 99238 HOSP IP/OBS DSCHRG MGMT 30/<: CPT | Performed by: NURSE PRACTITIONER

## 2024-03-20 PROCEDURE — 85027 COMPLETE CBC AUTOMATED: CPT | Performed by: INTERNAL MEDICINE

## 2024-03-20 PROCEDURE — 80048 BASIC METABOLIC PNL TOTAL CA: CPT | Performed by: INTERNAL MEDICINE

## 2024-03-20 PROCEDURE — G0378 HOSPITAL OBSERVATION PER HR: HCPCS

## 2024-03-20 PROCEDURE — 82948 REAGENT STRIP/BLOOD GLUCOSE: CPT

## 2024-03-20 PROCEDURE — 93005 ELECTROCARDIOGRAM TRACING: CPT | Performed by: INTERNAL MEDICINE

## 2024-03-20 PROCEDURE — 63710000001 INSULIN GLARGINE PER 5 UNITS: Performed by: INTERNAL MEDICINE

## 2024-03-20 PROCEDURE — 93010 ELECTROCARDIOGRAM REPORT: CPT | Performed by: INTERNAL MEDICINE

## 2024-03-20 RX ORDER — PANTOPRAZOLE SODIUM 40 MG/1
40 TABLET, DELAYED RELEASE ORAL
Qty: 30 TABLET | Refills: 2 | Status: SHIPPED | OUTPATIENT
Start: 2024-03-21

## 2024-03-20 RX ADMIN — CLOPIDOGREL BISULFATE 75 MG: 75 TABLET, FILM COATED ORAL at 08:18

## 2024-03-20 RX ADMIN — INSULIN GLARGINE 65 UNITS: 100 INJECTION, SOLUTION SUBCUTANEOUS at 08:17

## 2024-03-20 RX ADMIN — PIOGLITAZONE 15 MG: 15 TABLET ORAL at 08:18

## 2024-03-20 RX ADMIN — ESCITALOPRAM 20 MG: 20 TABLET, FILM COATED ORAL at 08:18

## 2024-03-20 RX ADMIN — ASPIRIN 81 MG: 81 TABLET, COATED ORAL at 08:17

## 2024-03-20 RX ADMIN — CARVEDILOL 25 MG: 25 TABLET, FILM COATED ORAL at 08:18

## 2024-03-20 RX ADMIN — HYDROCHLOROTHIAZIDE 12.5 MG: 12.5 TABLET ORAL at 08:18

## 2024-03-20 RX ADMIN — ARIPIPRAZOLE 7.5 MG: 5 TABLET ORAL at 08:18

## 2024-03-20 RX ADMIN — PANTOPRAZOLE SODIUM 40 MG: 40 TABLET, DELAYED RELEASE ORAL at 08:17

## 2024-03-20 NOTE — DISCHARGE SUMMARY
HOSPITAL DISCHARGE SUMMARY    Patient Name: Isaias Monaco  Age/Sex: 69 y.o. male  : 1955  MRN: [id number]    Encounter Provider: MICHELLE Francois  Referring Provider: Miguel Ángel Karimi MD  Place of Service: [facility] CARDIOLOGY  Patient Care Team:  Gwyn Patten Sr., MD as PCP - General (Family Medicine)  Bebeto Monson II, MD as Consulting Physician (Hematology and Oncology)  Micaela Barfield APRN as Nurse Practitioner (Endocrinology)  Manuela Agustin APRN as Referring Physician (Internal Medicine)  Richardson Moon MD as Consulting Physician (Pulmonary Disease)         Date of Discharge:  3/20/2024     Date of Admit: 3/19/2024      Discharge Diagnosis:     Coronary artery disease involving native coronary artery of native heart without angina pectoris    Hyperlipidemia LDL goal <70    S/P primary angioplasty with coronary stent    Type 2 diabetes mellitus with microalbuminuria, with long-term current use of insulin    Essential hypertension      Hospital Course:   Mr. Monaco is a 69-year-old male with a significant cardiac history.  Has a history of coronary artery disease, hypertension, upper lipidemia and chronic kidney disease.  In  he underwent DAVONTE of proximal to mid RCA due to .  In 2020, he he underwent DAVONTE 99% ostial left circumflex lesion.    In May 2022, patient had complaints of 2 weeks of fullness in his throat and vague shortness of breath.  These times are his anginal equivalent.  He mainly underwent DAVONTE to proximal left circumflex and left main bifurcation x 2 with DK crush technique.  Echocardiogram prior to stenting demonstrated normal left ventricular function.  Patient has been on DAPT with aspirin/Plavix, statin and beta-blocker.    Presented to the office 3/12/2024 with similar complaints for his anginal equivalent.  He was euvolemic on  physical exam.  EKG showed no ischemic changes.  Noticed significant fatigue for a couple of months which was gradually getting worse.  With his cardiac history, he was scheduled for cardiac cath.    Patient presented 3/19/2024 for elective cardiac cath with Dr. Karimi.  He was pretreated with IV fluids secondary to chronic kidney disease.  Cardiac cath revealed 50% distal in-stent restenosis of left main, calcified 60% ostial in-stent restenosis of LAD with diffuse 50% mid vessel stenosis, 99% ostial in-stent restenosis in the circumflex and 50-60 proximal stenosis in RCA.  Went angioplasty and shockwave lithotripsy to distal left main and proximal LAD.  He underwent Cutting Balloon angioplasty and PCI of distal left main to proximal circumflex with 3.25 x 15 mm Xience baldomero point drug-eluting stent.  He also underwent kissing balloon angioplasty of the left main bifurcation.  This morning, patient has no complaints.  Creatinine is stable.  He has been up walking to the bathroom with no issues.  Blood pressure well-controlled.  He has no bleeding issues on his aspirin and Plavix.  Right radial site looks good with no issues.  Sinus rhythm on telemetry.  No complaints and feels better.  Cardiac rehab referral has been placed.  He will follow-up with me in 1 to 2 weeks in office and Dr. Karimi in 6 to 8 weeks.  He will have an echocardiogram as an outpatient.    Physical Exam:   General Appearance:    Alert, cooperative, in no acute distress   Head:    Normocephalic, without obvious abnormality, atraumatic   Eyes:            Conjunctivae and sclerae normal, no   icterus, no pallor, corneas clear, PERRLA   Neck:   No adenopathy, supple, trachea midline, no thyromegaly, no   carotid bruit, no JVD   Lungs:     Clear to auscultation,respirations regular, even and unlabored    Heart:    Regular rhythm and normal rate, normal S1 and S2, no murmur, no gallop, no rub, no click   Chest Wall:    No abnormalities observed  "  Abdomen:     Normal bowel sounds, no masses, no organomegaly, soft, nontender, nondistended, no guarding, no rebound  tenderness   Extremities:   Moves all extremities well, no edema, no cyanosis, no redness, right radial site no issues   Pulses:   Pulses palpable and equal bilaterally.      /89 (BP Location: Left arm, Patient Position: Sitting)   Pulse 88   Temp 98.2 °F (36.8 °C) (Oral)   Resp 18   Ht 195.6 cm (77\")   Wt 118 kg (260 lb)   SpO2 96%   BMI 30.83 kg/m²      Procedures Performed:  Procedure(s):  Left Heart Cath  Percutaneous Coronary Intervention  Intravascular Lithotripsy  Stent DAVONTE coronary     Left Heart Cath 03/19/2024:  1. Left main: Discrete calcified 50% distal in-stent restenosis.  2. LAD: Discrete calcified 60% ostial in-stent restenosis.  Diffuse 50% mid vessel stenosis  3. LCX: Discrete 99% ostial in-stent restenosis  4. RCA: Discrete 50 to 60% proximal stenosis  5.  Angioplasty and shockwave balloon lithotripsy of the distal left main to proximal LAD with a 4.0 x 12 mm shockwave balloon  6.  Cutting Balloon angioplasty and PCI of the distal left main to proximal circumflex with a 3.25 x 15 mm Xience baldomero point drug-eluting stent.   7.  Kissing balloon angioplasty of the left main bifurcation with a 4.0 x 15 mm compliant and 3.5 x 15 mm noncompliant Takeru balloon     Pertinent Test Results:    Results from last 7 days   Lab Units 03/20/24  0418   SODIUM mmol/L 136   POTASSIUM mmol/L 3.4*   CHLORIDE mmol/L 101   CO2 mmol/L 27.0   BUN mg/dL 12   CREATININE mg/dL 1.31*   GLUCOSE mg/dL 149*   CALCIUM mg/dL 9.6         Results from last 7 days   Lab Units 03/20/24  0418   WBC 10*3/mm3 9.49   HEMOGLOBIN g/dL 13.9   HEMATOCRIT % 41.9   PLATELETS 10*3/mm3 162       Discharge Medications     Discharge Medications        New Medications        Instructions Start Date   pantoprazole 40 MG EC tablet  Commonly known as: PROTONIX  Replaces: esomeprazole 40 MG capsule   40 mg, Oral, Every " Early Morning   Start Date: March 21, 2024            Changes to Medications        Instructions Start Date   clopidogrel 75 MG tablet  Commonly known as: PLAVIX  What changed: additional instructions   75 mg, Oral, Daily      Insulin Lispro (1 Unit Dial) 100 UNIT/ML solution pen-injector  Commonly known as: HUMALOG  What changed: additional instructions   INJECT SUBCUTANEOUS AS DIRECTED BY PROVIDER DOSAGE IS ATTACHED MAX IS 40 UNITS PER DAY      latanoprost 0.005 % ophthalmic solution  Commonly known as: XALATAN  What changed: when to take this   Apply 1 drop to  each eye Daily.             Continue These Medications        Instructions Start Date   Adult Aspirin Regimen 81 MG EC tablet  Generic drug: aspirin   81 mg, Oral, Daily      ARIPiprazole 5 MG tablet  Commonly known as: ABILIFY   1.5 tablets, Oral, Daily      carvedilol 25 MG tablet  Commonly known as: COREG   25 mg, Oral, Every 12 Hours Scheduled      clonazePAM 0.5 MG tablet  Commonly known as: KlonoPIN   0.5 mg, Oral, Daily PRN      Dexcom G7 Sensor misc   Does not apply      dorzolamide-timolol 2-0.5 % ophthalmic solution  Commonly known as: COSOPT   Apply 1 drop to eye(s) to both eyes 2 (Two) Times a Day.      doxazosin 4 MG tablet  Commonly known as: CARDURA   TAKE 1 TABLET BY MOUTH EVERY DAY AT NIGHT      escitalopram 20 MG tablet  Commonly known as: LEXAPRO   20 mg, Oral, Daily      freestyle lancets   Use to test BG 4 times daily      gabapentin 300 MG capsule  Commonly known as: NEURONTIN   Take 1 capsule every day by oral route in the evening for 30 days.      hydroCHLOROthiazide 12.5 MG tablet   12.5 mg, Oral, Daily      HYDROcodone-acetaminophen 5-325 MG per tablet  Commonly known as: NORCO       Ingrezza 60 MG capsule  Generic drug: Valbenazine Tosylate   1 capsule, Oral, Daily      Insulin Pen Needle 31G X 4 MM misc   1 each, Does not apply, Daily      metFORMIN 500 MG tablet  Commonly known as: GLUCOPHAGE   1,000 mg, Oral, Daily With  Breakfast      methocarbamol 500 MG tablet  Commonly known as: Robaxin   500 mg, Oral, As Needed      pioglitazone 15 MG tablet  Commonly known as: ACTOS   15 mg, Oral, Daily      rosuvastatin 40 MG tablet  Commonly known as: CRESTOR   40 mg, Oral, Daily      Semaglutide (2 MG/DOSE) 8 MG/3ML solution pen-injector  Commonly known as: OZEMPIC   2 mg, Subcutaneous, Weekly      sildenafil 100 MG tablet  Commonly known as: VIAGRA   100 mg, Oral, Daily PRN, Take 30 minutes to 4 hours prior to sexual activity.      Toujeo SoloStar 300 UNIT/ML solution pen-injector injection  Generic drug: Insulin Glargine (1 Unit Dial)   65 Units, Subcutaneous, Daily             Stop These Medications      esomeprazole 40 MG capsule  Commonly known as: nexIUM  Replaced by: pantoprazole 40 MG EC tablet     famotidine 20 MG tablet  Commonly known as: PEPCID                    Discharge disposition:   Stable    Follow-up Appointments  Future Appointments   Date Time Provider Department Center   3/22/2024  1:00 PM Gwyn Patten Sr., MD [facility]   4/19/2024 10:30 AM Marshal Miguel MD [facility] NS LOU41 [facility]   5/30/2024  9:40 AM Miguel Ángel Karimi MD [facility] CD [id number] None   6/25/2024  9:00 AM Levi Patten DO [facility]  [facility]   9/11/2024  2:30 PM Gwyn Patten Sr., MD [facility]   10/1/2024 11:30 AM Ahsley Martinez APRN [facility]  [facility]      Follow-up Information       [facility] .    Specialty: Cardiac Rehabilitation  Contact information:  [address] [address] [phone]             Hattie Foster APRN Follow up in 1 week(s).    Specialties: Cardiology, Nurse Practitioner  Why: office to call patient to schedule 1-2 week hospital follow up  Contact information:  [address] [facility] [address]  [facility] [id number] [phone]               Miguel Ángel Karimi MD Follow up in 8 week(s).    Specialty: Cardiology  Why: office to call patient to schedule 8 week follow  up  Contact information:  [address]  [facility] [address]  [place] [id number] [phone]               Gwyn Patten Sr., MD .    Specialties: Family Medicine, Urgent Care  Contact information:  [address]  [facility] [address]  [place] [id number] [phone]                                  Hattie Foster, MICHELLE  03/20/24  10:02 EDT

## 2024-03-20 NOTE — OUTREACH NOTE
Prep Survey      Flowsheet Row Responses   [facility] facility patient discharged from? [facility]   Is LACE score < 7 ? Yes   Eligibility [facility]   Date of Admission 03/19/24   Date of Discharge 03/20/24   Discharge Disposition Home or Self Care   Discharge diagnosis Unstable angina-Left heart cath with DAVONTE stent   Does the patient have one of the following disease processes/diagnoses(primary or secondary)? Other   Does the patient have Home health ordered? No   Is there a DME ordered? No   Prep survey completed? Yes            DEJA TIDWELL - Registered Nurse

## 2024-03-20 NOTE — TELEPHONE ENCOUNTER
Caller: Micaela Monaco    Relationship to patient: Emergency Contact    Best call back number: [phone]     Chief complaint: PROCEDURE FU    Type of visit: FU    Requested date: 05.30.24     If rescheduling, when is the original appointment: 05.16.24     Additional notes: PT WAS SCHEDULED ON 05.30.24 TO SEE DR MAYO AND RECENTLY HAD PROCEDURE DONE THAT REQUIRED AN 8 WEEK FU - PT WAS SCHEDULED FOR 05.16.24 AND OTHER APPT WAS CANCELLED - PT AND WIFE STATE THEY WILL BE OUT OF TOWN AND WONT BE RETURNING UNTIL THE 16TH AND ARE ASKING IF POSSIBLE TO HAVE PREVIOUS TIME AND DATE OR TO RSD - NO AVAILABILITY SHOWING - SHE STATES SHE HAS A LIST OF DATES THAT WORK IF THE 30TH IS UNAVAILABLE TO BE WORKED OUT

## 2024-03-21 ENCOUNTER — TRANSITIONAL CARE MANAGEMENT TELEPHONE ENCOUNTER (OUTPATIENT)
Dept: CALL CENTER | Facility: HOSPITAL | Age: 69
End: 2024-03-21
Payer: MEDICARE

## 2024-03-21 NOTE — OUTREACH NOTE
Call Center TCM Note      Flowsheet Row Responses   [facility] patient discharged from? [place]   Does the patient have one of the following disease processes/diagnoses(primary or secondary)? Other   TCM attempt successful? Yes   Call start time 1508   Call end time 1510   List who call center can speak with pt   Medication alerts for this patient protonix.   Meds reviewed with patient/caregiver? Yes   Is the patient having any side effects they believe may be caused by any medication additions or changes? No   Does the patient have all medications ordered at discharge? Yes   Is the patient taking all medications as directed (includes completed medication regime)? Yes   Comments Hospital f/u scheduled for 3- @ 1 pm.   Does the patient have an appointment with their PCP within 7-14 days of discharge? Yes   Has home health visited the patient within 72 hours of discharge? N/A   Psychosocial issues? No   Did the patient receive a copy of their discharge instructions? Yes   Nursing interventions Reviewed instructions with patient   What is the patient's perception of their health status since discharge? Improving   Is the patient/caregiver able to teach back signs and symptoms related to disease process for when to call PCP? Yes   Is the patient/caregiver able to teach back signs and symptoms related to disease process for when to call 911? Yes   Is the patient/caregiver able to teach back the hierarchy of who to call/visit for symptoms/problems? PCP, Specialist, Home health nurse, Urgent Care, ED, 911 Yes   TCM call completed? Yes   Wrap up additional comments Pt reports feeling well. Pt has all medications.   Call end time 1510   Would this patient benefit from a Referral to Amb Social Work? No   Is the patient interested in additional calls from an ambulatory ? No            Elba Drake RN    3/21/2024, 15:11 EDT

## 2024-03-22 ENCOUNTER — OFFICE VISIT (OUTPATIENT)
Dept: FAMILY MEDICINE CLINIC | Facility: CLINIC | Age: 69
End: 2024-03-22
Payer: MEDICARE

## 2024-03-22 VITALS
DIASTOLIC BLOOD PRESSURE: 70 MMHG | RESPIRATION RATE: 18 BRPM | TEMPERATURE: 97.3 F | SYSTOLIC BLOOD PRESSURE: 112 MMHG | BODY MASS INDEX: 30.7 KG/M2 | OXYGEN SATURATION: 97 % | HEART RATE: 80 BPM | WEIGHT: 260 LBS | HEIGHT: 77 IN

## 2024-03-22 DIAGNOSIS — F41.9 ANXIETY: ICD-10-CM

## 2024-03-22 DIAGNOSIS — F41.0 PANIC DISORDER: ICD-10-CM

## 2024-03-22 PROCEDURE — 3074F SYST BP LT 130 MM HG: CPT | Performed by: FAMILY MEDICINE

## 2024-03-22 PROCEDURE — 3078F DIAST BP <80 MM HG: CPT | Performed by: FAMILY MEDICINE

## 2024-03-22 PROCEDURE — 3044F HG A1C LEVEL LT 7.0%: CPT | Performed by: FAMILY MEDICINE

## 2024-03-22 PROCEDURE — 99213 OFFICE O/P EST LOW 20 MIN: CPT | Performed by: FAMILY MEDICINE

## 2024-03-22 PROCEDURE — G2211 COMPLEX E/M VISIT ADD ON: HCPCS | Performed by: FAMILY MEDICINE

## 2024-03-22 RX ORDER — CLONAZEPAM 0.5 MG/1
0.5 TABLET ORAL DAILY PRN
Qty: 30 TABLET | Refills: 2 | Status: SHIPPED | OUTPATIENT
Start: 2024-03-22

## 2024-03-22 NOTE — PROGRESS NOTES
"Chief Complaint  Chief Complaint   Patient presents with    Med Refill     Pt here for med refills        Subjective    History of Present Illness        Isaias Monaco presents to [facility] for   Anxiety  Presents for follow-up visit. Symptoms include decreased concentration, excessive worry, nervous/anxious behavior and panic. Patient reports no confusion, depressed mood, dizziness, feeling of choking, hyperventilation, insomnia or suicidal ideas. The severity of symptoms is moderate. The quality of sleep is fair.     Compliance with medications is %.        Objective   Vital Signs:   Visit Vitals  /70   Pulse 80   Temp 97.3 °F (36.3 °C)   Resp 18   Ht 195.6 cm (77\")   Wt 118 kg (260 lb)   SpO2 97%   BMI 30.83 kg/m²                Physical Exam  Vitals reviewed.   Constitutional:       Appearance: He is well-developed.   HENT:      Head: Normocephalic.      Right Ear: External ear normal.      Left Ear: External ear normal.      Nose: Nose normal.   Eyes:      Conjunctiva/sclera: Conjunctivae normal.   Cardiovascular:      Rate and Rhythm: Normal rate and regular rhythm.   Pulmonary:      Effort: Pulmonary effort is normal.      Breath sounds: Normal breath sounds.   Musculoskeletal:         General: Normal range of motion.      Cervical back: Normal range of motion and neck supple.   Skin:     General: Skin is warm and dry.      Capillary Refill: Capillary refill takes less than 2 seconds.   Neurological:      Mental Status: He is alert and oriented to person, place, and time.            Result Review :                    Assessment and Plan      Diagnoses and all orders for this visit:    1. Anxiety  Assessment & Plan:  Due to his symptoms he was given prescription for clonazepam.  Patient encouraged return to clinic in 3 to 6 months.  Will only refill clonazepam if the patient is seen every 3 months.    Orders:  -     clonazePAM (KlonoPIN) 0.5 MG tablet; Take 1 " tablet by mouth Daily As Needed for Anxiety.  Dispense: 30 tablet; Refill: 2    2. Panic disorder  -     clonazePAM (KlonoPIN) 0.5 MG tablet; Take 1 tablet by mouth Daily As Needed for Anxiety.  Dispense: 30 tablet; Refill: 2             Follow Up   No follow-ups on file.  Patient was given instructions and counseling regarding his condition or for health maintenance advice. Please see specific information pulled into the AVS if appropriate.

## 2024-03-30 DIAGNOSIS — Z79.4 CONTROLLED TYPE 2 DIABETES MELLITUS WITH COMPLICATION, WITH LONG-TERM CURRENT USE OF INSULIN: ICD-10-CM

## 2024-03-30 DIAGNOSIS — E11.8 CONTROLLED TYPE 2 DIABETES MELLITUS WITH COMPLICATION, WITH LONG-TERM CURRENT USE OF INSULIN: ICD-10-CM

## 2024-04-01 ENCOUNTER — OFFICE VISIT (OUTPATIENT)
Dept: CARDIOLOGY | Facility: CLINIC | Age: 69
End: 2024-04-01
Payer: MEDICARE

## 2024-04-01 ENCOUNTER — HOSPITAL ENCOUNTER (OUTPATIENT)
Dept: CARDIOLOGY | Facility: HOSPITAL | Age: 69
Discharge: HOME | End: 2024-04-01
Admitting: NURSE PRACTITIONER
Payer: MEDICARE

## 2024-04-01 VITALS
BODY MASS INDEX: 30.46 KG/M2 | HEART RATE: 87 BPM | DIASTOLIC BLOOD PRESSURE: 68 MMHG | HEIGHT: 77 IN | WEIGHT: 258 LBS | SYSTOLIC BLOOD PRESSURE: 108 MMHG

## 2024-04-01 VITALS
HEART RATE: 84 BPM | DIASTOLIC BLOOD PRESSURE: 80 MMHG | OXYGEN SATURATION: 94 % | SYSTOLIC BLOOD PRESSURE: 110 MMHG | WEIGHT: 260 LBS | HEIGHT: 77 IN | BODY MASS INDEX: 30.7 KG/M2

## 2024-04-01 DIAGNOSIS — Z95.5 S/P PRIMARY ANGIOPLASTY WITH CORONARY STENT: ICD-10-CM

## 2024-04-01 DIAGNOSIS — Z79.4 TYPE 2 DIABETES MELLITUS WITH MICROALBUMINURIA, WITH LONG-TERM CURRENT USE OF INSULIN: ICD-10-CM

## 2024-04-01 DIAGNOSIS — T82.855A CORONARY STENT RESTENOSIS, INITIAL ENCOUNTER: ICD-10-CM

## 2024-04-01 DIAGNOSIS — I10 ESSENTIAL HYPERTENSION: ICD-10-CM

## 2024-04-01 DIAGNOSIS — E11.29 TYPE 2 DIABETES MELLITUS WITH MICROALBUMINURIA, WITH LONG-TERM CURRENT USE OF INSULIN: ICD-10-CM

## 2024-04-01 DIAGNOSIS — I25.10 CORONARY ARTERY DISEASE INVOLVING NATIVE CORONARY ARTERY OF NATIVE HEART WITHOUT ANGINA PECTORIS: ICD-10-CM

## 2024-04-01 DIAGNOSIS — N18.31 HYPERTENSIVE KIDNEY DISEASE WITH STAGE 3A CHRONIC KIDNEY DISEASE: ICD-10-CM

## 2024-04-01 DIAGNOSIS — I12.9 HYPERTENSIVE KIDNEY DISEASE WITH STAGE 3A CHRONIC KIDNEY DISEASE: ICD-10-CM

## 2024-04-01 DIAGNOSIS — R80.9 TYPE 2 DIABETES MELLITUS WITH MICROALBUMINURIA, WITH LONG-TERM CURRENT USE OF INSULIN: ICD-10-CM

## 2024-04-01 DIAGNOSIS — I25.10 CORONARY ARTERY DISEASE INVOLVING NATIVE CORONARY ARTERY OF NATIVE HEART WITHOUT ANGINA PECTORIS: Primary | ICD-10-CM

## 2024-04-01 DIAGNOSIS — E78.5 HYPERLIPIDEMIA LDL GOAL <70: ICD-10-CM

## 2024-04-01 LAB
AORTIC ARCH: 3.1 CM
AORTIC DIMENSIONLESS INDEX: 0.5 (DI)
ASCENDING AORTA: 3.1 CM
BH CV ECHO MEAS - ACS: 1.51 CM
BH CV ECHO MEAS - AO MAX PG: 9.9 MMHG
BH CV ECHO MEAS - AO MEAN PG: 5.6 MMHG
BH CV ECHO MEAS - AO ROOT DIAM: 3 CM
BH CV ECHO MEAS - AO V2 MAX: 157.5 CM/SEC
BH CV ECHO MEAS - AO V2 VTI: 27.8 CM
BH CV ECHO MEAS - AVA(I,D): 1.62 CM2
BH CV ECHO MEAS - EDV(CUBED): 62.6 ML
BH CV ECHO MEAS - EDV(MOD-SP2): 91 ML
BH CV ECHO MEAS - EDV(MOD-SP4): 100 ML
BH CV ECHO MEAS - EF(MOD-BP): 57.6 %
BH CV ECHO MEAS - EF(MOD-SP2): 58.2 %
BH CV ECHO MEAS - EF(MOD-SP4): 57 %
BH CV ECHO MEAS - ESV(CUBED): 24 ML
BH CV ECHO MEAS - ESV(MOD-SP2): 38 ML
BH CV ECHO MEAS - ESV(MOD-SP4): 43 ML
BH CV ECHO MEAS - FS: 27.4 %
BH CV ECHO MEAS - IVS/LVPW: 1.05 CM
BH CV ECHO MEAS - IVSD: 1.28 CM
BH CV ECHO MEAS - LAT PEAK E' VEL: 7.5 CM/SEC
BH CV ECHO MEAS - LV DIASTOLIC VOL/BSA (35-75): 40 CM2
BH CV ECHO MEAS - LV MASS(C)D: 173.5 GRAMS
BH CV ECHO MEAS - LV MAX PG: 2.25 MMHG
BH CV ECHO MEAS - LV MEAN PG: 1.43 MMHG
BH CV ECHO MEAS - LV SYSTOLIC VOL/BSA (12-30): 17.2 CM2
BH CV ECHO MEAS - LV V1 MAX: 75 CM/SEC
BH CV ECHO MEAS - LV V1 VTI: 14.5 CM
BH CV ECHO MEAS - LVIDD: 4 CM
BH CV ECHO MEAS - LVIDS: 2.9 CM
BH CV ECHO MEAS - LVOT AREA: 3.1 CM2
BH CV ECHO MEAS - LVOT DIAM: 1.98 CM
BH CV ECHO MEAS - LVPWD: 1.22 CM
BH CV ECHO MEAS - MED PEAK E' VEL: 6.1 CM/SEC
BH CV ECHO MEAS - MV A DUR: 0.11 SEC
BH CV ECHO MEAS - MV A MAX VEL: 99.8 CM/SEC
BH CV ECHO MEAS - MV DEC SLOPE: 329 CM/SEC2
BH CV ECHO MEAS - MV DEC TIME: 0.1 SEC
BH CV ECHO MEAS - MV E MAX VEL: 60.6 CM/SEC
BH CV ECHO MEAS - MV E/A: 0.61
BH CV ECHO MEAS - MV MAX PG: 4.9 MMHG
BH CV ECHO MEAS - MV MEAN PG: 2.25 MMHG
BH CV ECHO MEAS - MV P1/2T: 54.1 MSEC
BH CV ECHO MEAS - MV V2 VTI: 17.7 CM
BH CV ECHO MEAS - MVA(P1/2T): 4.1 CM2
BH CV ECHO MEAS - MVA(VTI): 2.5 CM2
BH CV ECHO MEAS - PA ACC TIME: 0.07 SEC
BH CV ECHO MEAS - PA V2 MAX: 81.4 CM/SEC
BH CV ECHO MEAS - PULM A REVS DUR: 0.09 SEC
BH CV ECHO MEAS - PULM A REVS VEL: 25.7 CM/SEC
BH CV ECHO MEAS - PULM DIAS VEL: 34.6 CM/SEC
BH CV ECHO MEAS - PULM S/D: 1.17
BH CV ECHO MEAS - PULM SYS VEL: 40.3 CM/SEC
BH CV ECHO MEAS - QP/QS: 1.17
BH CV ECHO MEAS - RAP SYSTOLE: 3 MMHG
BH CV ECHO MEAS - RV MAX PG: 1.18 MMHG
BH CV ECHO MEAS - RV V1 MAX: 54.4 CM/SEC
BH CV ECHO MEAS - RV V1 VTI: 10.9 CM
BH CV ECHO MEAS - RVOT DIAM: 2.48 CM
BH CV ECHO MEAS - SI(MOD-SP2): 21.2 ML/M2
BH CV ECHO MEAS - SI(MOD-SP4): 22.8 ML/M2
BH CV ECHO MEAS - SUP REN AO DIAM: 1.5 CM
BH CV ECHO MEAS - SV(LVOT): 45 ML
BH CV ECHO MEAS - SV(MOD-SP2): 53 ML
BH CV ECHO MEAS - SV(MOD-SP4): 57 ML
BH CV ECHO MEAS - SV(RVOT): 52.4 ML
BH CV ECHO MEAS - TAPSE (>1.6): 2.14 CM
BH CV ECHO MEASUREMENTS AVERAGE E/E' RATIO: 8.91
BH CV VAS BP LEFT ARM: NORMAL MMHG
BH CV XLRA - RV BASE: 2.7 CM
BH CV XLRA - RV LENGTH: 7.8 CM
BH CV XLRA - RV MID: 2.8 CM
BH CV XLRA - TDI S': 7.9 CM/SEC
LEFT ATRIUM VOLUME INDEX: 12.5 ML/M2
SINUS: 3.1 CM
STJ: 3.1 CM

## 2024-04-01 PROCEDURE — 93306 TTE W/DOPPLER COMPLETE: CPT | Performed by: INTERNAL MEDICINE

## 2024-04-01 PROCEDURE — 25510000001 PERFLUTREN (DEFINITY) 8.476 MG IN SODIUM CHLORIDE (PF) 0.9 % 10 ML INJECTION: Performed by: NURSE PRACTITIONER

## 2024-04-01 PROCEDURE — 93306 TTE W/DOPPLER COMPLETE: CPT

## 2024-04-01 RX ORDER — INSULIN GLARGINE 300 U/ML
INJECTION, SOLUTION SUBCUTANEOUS
Qty: 18 ML | Refills: 3 | Status: SHIPPED | OUTPATIENT
Start: 2024-04-01

## 2024-04-01 RX ADMIN — PERFLUTREN 1.5 ML: 6.52 INJECTION, SUSPENSION INTRAVENOUS at 13:46

## 2024-04-01 NOTE — PROGRESS NOTES
Date of Office Visit: 2024  Encounter Provider: MICHELLE Francois  Place of Service: [facility] CARDIOLOGY  Patient Name: Isaias Monaco  :1955    No chief complaint on file.  : coronary artery disease  Unstable angina    HPI: Isaias Monaco is a 69 y.o. male who is a patient of  Dr. Karimi.  He presents for 6-month office follow-up.  He has a history of coronary artery disease, hypertension and hyperlipidemia.  He presented in  with unstable angina and underwent DAVONTE proximal to mid RCA .  In 2020, he presented with angina and was referred for cardiac cath with DAVONTE to 99% ostial left circumflex lesion.    In May 2022, patient had complaints of 2 weeks of fullness in his throat and vague shortness of breath.  The symptoms had previously been his anginal equivalent.  EKG showed new ST depressions in III, aVF with TWIs.  Left heart cath revealed new LAD and left circumflex stenosis.  He was referred for cardiac surgery but felt to be too high risk as Jehovah witness and not able to receive blood products.  On 2022, he underwent DAVONTE to proximal left circumflex and left main bifurcation x2 with DK crush technique by Dr. Karimi.  Echocardiogram prior to stenting demonstrated normal left ventricular function.  He was discharged the next day with no issues.    Last visit, patient complained of fullness in his throat and shortness of breath which were his anginal equivalent in the past.  He was euvolemic.  EKG showed no new ischemic changes.  He noted that he had been tired for the past couple months and it was gradually getting worse.  He would normally have to take Klonopin for anxiety about once a week and he was needing to take it on a daily basis.    Underwent elective cardiac cath on 3/19/2024 showed 99% ostial circumflex in-stent restenosis and 60% ostial LAD in-stent restenosis with diffuse mid 50% mid vessel disease.  He also had discrete  calcified 50% distal in-stent restenosis of left main.  Angioplasty and shockwave balloon lithotripsy of distal left main to proximal LAD, angioplasty and PCI of distal left main to proximal circumflex with 3.25 x 15 mm Xience baldomero point drug-eluting stent as well as kissing balloon angioplasty of the left main bifurcation.    Echocardiogram today showed normal LV systolic function with an EF of 51 to 55% as well as normal diastolic function.  He is to have some fullness in his chest however he thinks it might be a little anxiety.  Blood pressure is well-controlled.  Stool softer than what the nephrologist would like, therefore patient is going to speak with nephrology team about titrating hydrochlorothiazide.  His activity level is limited secondary to back pain.  He would like to check into cardiac rehab and see about participating in the program.      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  He enjoys traveling with his wife who is also a registered nurse.   Follows with Dr. Brenner with nephrology.      Previous testing and notes have been reviewed by me.   Past Medical History:   Diagnosis Date    Anemia     post hemorrhagic    Angioedema 02/21/2016    Secondary to ACE inhibitor    Anxiety     Arthritis     CAD (coronary artery disease)     Colon polyps     Diabetes mellitus, type 2     GERD (gastroesophageal reflux disease)     Glaucoma     Hematoma     High cholesterol     History of foreign travel 12/2017; 08/2018    [place], [place], [place], [place], [place] and [place]; [place]    Hyperlipidemia     Hypertension     Hypogonadism in male 09/28/2016    Male erectile disorder     Microalbuminuria     Myocardial infarction     Osteoarthritis     knee    Spinal stenosis of lumbar region without neurogenic claudication 06/02/2021    Tubular adenoma of colon 11/01/2017    Vitamin D deficiency        Past Surgical History:   Procedure Laterality Date    CARDIAC CATHETERIZATION N/A 05/15/2006      [facility] [place]    CARDIAC CATHETERIZATION N/A 03/10/2020    Procedure: Left Heart Cath;  Surgeon: Miguel Ángel Karimi MD;  [facility] C[facility];  Service: Cardiology;  Laterality: N/A;    CARDIAC CATHETERIZATION N/A 03/10/2020    Procedure: Stent DAVONTE coronary;  Surgeon: Miguel Ángel Karimi MD;  Location:  [facility] CATH INVASIVE LOCATION;  Service: Cardiology;  Laterality: N/A;    CARDIAC CATHETERIZATION N/A 03/10/2020    Procedure: Coronary angiography;  Surgeon: Miguel Ángel Karimi MD;  [facility];  Service: Cardiology;  Laterality: N/A;    CARDIAC CATHETERIZATION N/A 03/10/2020    Procedure: Left ventriculography;  Surgeon: Miguel Ángel Karimi MD;  [facility];  Service: Cardiology;  Laterality: N/A;    CARDIAC CATHETERIZATION N/A 05/20/2022    Procedure: Left Heart Cath;  Surgeon: Mackenzie Morales MD;  [facility];  Service: Cardiovascular;  Laterality: N/A;    CARDIAC CATHETERIZATION N/A 05/20/2022    Procedure: Coronary angiography;  Surgeon: Mackenzie Morales MD;  [facility];  Service: Cardiovascular;  Laterality: N/A;    CARDIAC CATHETERIZATION N/A 05/20/2022    Procedure: Percutaneous Coronary Intervention;  Surgeon: Mackenzie Morales MD;  [facility];  Service: Cardiovascular;  Laterality: N/A;    CARDIAC CATHETERIZATION N/A 05/24/2022    Procedure: Percutaneous Coronary Intervention;  Surgeon: Miguel Ángel Karimi MD;  [facility];  Service: Cardiovascular;  Laterality: N/A;  LAD and Cx    CARDIAC CATHETERIZATION N/A 05/24/2022    Procedure: Stent DAVONTE coronary;  Surgeon: Miguel Ángel Karimi MD;  [facility];  Service: Cardiovascular;  Laterality: N/A;    CARDIAC CATHETERIZATION N/A 05/24/2022    Procedure: Resting Full Cycle Ratio;  Surgeon: Miguel Ángel Karimi MD;  Location:   [facility] CATH INVASIVE LOCATION;  Service: Cardiovascular;  Laterality: N/A;    CARDIAC CATHETERIZATION N/A 3/19/2024    Procedure: Left Heart Cath;  Surgeon: Miguel Ángel Karimi MD;  Location:  [facility] CATH INVASIVE LOCATION;  Service: Cardiovascular;  Laterality: N/A;    CARDIAC CATHETERIZATION N/A 3/19/2024    Procedure: Percutaneous Coronary Intervention;  Surgeon: Miguel Ángel Karimi MD;  Location:  [facility] CATH INVASIVE LOCATION;  Service: Cardiovascular;  Laterality: N/A;    CARDIAC CATHETERIZATION N/A 3/19/2024    Procedure: Stent DAVONTE coronary;  Surgeon: Miguel Ángel Karimi MD;  Location:  [facility] CATH INVASIVE LOCATION;  Service: Cardiovascular;  Laterality: N/A;    COLONOSCOPY N/A 02/22/2006    Bilobed polyp at 30 cm, hemorrhoids-Dr. Ilya Zhao    COLONOSCOPY N/A 02/28/2014    Normal ileum, one 6 mm polyp in the mid transverse colon-Dr. Ilya Zhao    COLONOSCOPY N/A 11/19/2008    Ela ileum, two 3 to 4 mm polyps, non-bleeding internal hemorrhoids, repeat in 5 years-Dr. Ilya Zhao    COLONOSCOPY N/A 10/31/2017    Procedure: COLONOSCOPY WITH POLYPECTOMY (COLD BIOPSY);  Surgeon: Ilya Zhao MD;  Location: [facility] ENDOSCOPY;  Service:     COLONOSCOPY N/A 05/21/2021    Procedure: Colonoscopy into cecum and terminal ileum with cold biopsy polypectomy;  Surgeon: Ilya Zhao MD;  Location: [facility] ENDOSCOPY;  Service: Gastroenterology;  Laterality: N/A;  Pre op: History of Polyps  Post op: Polyp    CORONARY ANGIOPLASTY WITH STENT PLACEMENT  2007, 2012, 2015    cardiac stents x3 occasions    ENDOSCOPY N/A 10/04/2017    Procedure: ESOPHAGOGASTRODUODENOSCOPY;  Surgeon: Boyd Guidry MD;  Location: [facility] ENDOSCOPY;  Service:     ENDOSCOPY N/A 05/21/2021    Procedure: ESOPHAGOGASTRODUODENOSCOPY with biopsies;  Surgeon: Ilya Zhao MD;  Location: [facility] ENDOSCOPY;  Service: Gastroenterology;  Laterality: N/A;  Pre op: GERD  Post op: Irregular  Z-Line, Hiatal Hernia, Gastritis    ENDOSCOPY N/A  8/25/2023    Procedure: ESOPHAGOGASTRODUODENOSCOPY with biopsy;  Surgeon: Ilya Zhao MD;  Location: [facility] ENDOSCOPY;  Service: Gastroenterology;  Laterality: N/A;  errosive gastritis    EPIDURAL BLOCK      INTERVENTIONAL RADIOLOGY PROCEDURE N/A 05/20/2022    Procedure: Intravascular Ultrasound;  Surgeon: Mackenzie Morales MD;  Location: [facility] CATH INVASIVE LOCATION;  Service: Cardiovascular;  Laterality: N/A;    KNEE INCISION AND DRAINAGE Right 06/20/2017    Procedure: RT. KNEE WASHOUT ;  Surgeon: Boyd Coyne MD;  [facility];  Service:     MEDIAL BRANCH BLOCK Bilateral 12/17/2021    Procedure: LUMBAR MEDIAL BRANCH BLOCK bilateral ~L4-S1 x2 (~2 weeks apart);  Surgeon: Christina Villaseñor MD;  Location: [facility] MAIN OR;  Service: Pain Management;  Laterality: Bilateral;    MEDIAL BRANCH BLOCK Bilateral 01/03/2022    Procedure: LUMBAR MEDIAL BRANCH BLOCK bilateral ~L4-S1 x2 (~2 weeks apart);  Surgeon: Christina Villaseñor MD;  Location: [facility] MA[facility];  Service: Pain Management;  Laterality: Bilateral;    SC ARTHRP KNE CONDYLE&PLATU MEDIAL&LAT COMPARTMENTS Right 06/15/2017    Procedure: RT TOTAL KNEE ARTHROPLASTY;  Surgeon: Boyd Coyne MD;  Location: [facility] MAIN OR;  Service: Orthopedics    RADIOFREQUENCY ABLATION Bilateral 01/10/2022    Procedure: RADIOFREQUENCY ABLATION NERVES Bilateral L4-S1;  Surgeon: Christina Villaseñor MD;  Location: [facility] MAIN OR;  Service: Pain Management;  Laterality: Bilateral;    SHOULDER SURGERY Right 2016    rotator cuff repair    UPPER GASTROINTESTINAL ENDOSCOPY N/A 10/13/2015    Z-line irregular, normal stomach, normal duodenum-Dr. Ilya Zhao    UPPER GASTROINTESTINAL ENDOSCOPY N/A 02/28/2014    Z-line irregular, normal stomach, normal duodenum-Dr. Ilya Zhao    UPPER GASTROINTESTINAL ENDOSCOPY N/A 11/19/2008    Z-line irregular, chronic gastritis withotu hemorrhage, normal duodenum-Dr. Ilya Zhao    UPPER GASTROINTESTINAL ENDOSCOPY N/A 06/22/2006    LA Grade A reflux  esophagitis, non-bleeding erythematous gastropathy, normal duodenum-Dr. Ilya Zhao       Social History     Socioeconomic History    Marital status:      Spouse name: Micaela    Number of children: 1    Years of education: College   Tobacco Use    Smoking status: Never     Passive exposure: Never    Smokeless tobacco: Never    Tobacco comments:     CAFFEINE USE: 2 CUPS COFFEE DAILY   Vaping Use    Vaping status: Never Used   Substance and Sexual Activity    Alcohol use: Yes     Alcohol/week: 6.0 standard drinks of alcohol     Types: 2 Glasses of wine, 2 Cans of beer, 1 Shots of liquor, 1 Drinks containing 0.5 oz of alcohol per week     Comment: occasional 2-4 DRINKS PER WEEK    Drug use: No    Sexual activity: Defer     Partners: Female     Birth control/protection: Post-menopausal       Family History   Problem Relation Age of Onset    Lupus Sister     Thyroid disease Sister     Heart disease Other     Hypertension Other     Arthritis Mother     Hyperlipidemia Mother     Hypertension Mother     Thyroid disease Mother     Lupus Mother     Vision loss Mother     Heart disease Father     Arthritis Father     Other Father         Vascular disease    Lupus Father     Depression Father     Alcohol abuse Father     Dementia Father     Hypertension Father     Heart disease Brother     Heart attack Brother 40    Thyroid disease Sister     Arthritis Brother     Malig Hyperthermia Neg Hx        Review of Systems   Constitutional: Negative. Negative for malaise/fatigue.   HENT: Negative.     Eyes: Negative.    Cardiovascular:         Upper chest fullness   Respiratory: Negative.     Endocrine: Negative.    Hematologic/Lymphatic:        Asa/ plavix   Skin: Negative.    Musculoskeletal: Negative.    Gastrointestinal: Negative.    Genitourinary: Negative.    Neurological: Negative.    Psychiatric/Behavioral: Negative.     Allergic/Immunologic: Negative.        Allergies   Allergen Reactions    Nsaids Other (See Comments)      Renal failure    Hydralazine Myalgia    Norvasc [Amlodipine] Swelling    Zetia [Ezetimibe] Nausea And Vomiting    Ace Inhibitors Angioedema    Lisinopril Angioedema    Testosterone Myalgia         Current Outpatient Medications:     ARIPiprazole (ABILIFY) 5 MG tablet, Take 1.5 tablets by mouth Daily., Disp: , Rfl:     aspirin 81 MG EC tablet, Take 1 tablet by mouth Daily., Disp: 30 tablet, Rfl: 6    carvedilol (COREG) 25 MG tablet, Take 1 tablet by mouth Every 12 (Twelve) Hours., Disp: 180 tablet, Rfl: 3    clonazePAM (KlonoPIN) 0.5 MG tablet, Take 1 tablet by mouth Daily As Needed for Anxiety., Disp: 30 tablet, Rfl: 2    clopidogrel (PLAVIX) 75 MG tablet, Take 1 tablet by mouth Daily., Disp: 90 tablet, Rfl: 2    Continuous Blood Gluc Sensor (Dexcom G7 Sensor) misc, Use., Disp: , Rfl:     dorzolamide-timolol (COSOPT) 22.3-6.8 MG/ML ophthalmic solution, Apply 1 drop to eye(s) to both eyes 2 (Two) Times a Day., Disp: 20 mL, Rfl: 3    doxazosin (CARDURA) 4 MG tablet, TAKE 1 TABLET BY MOUTH EVERY DAY AT NIGHT, Disp: 90 tablet, Rfl: 4    escitalopram (LEXAPRO) 20 MG tablet, Take 1 tablet by mouth Daily., Disp: 90 tablet, Rfl: 3    gabapentin (NEURONTIN) 300 MG capsule, Take 1 capsule every day by oral route in the evening for 30 days., Disp: , Rfl:     hydroCHLOROthiazide (HYDRODIURIL) 12.5 MG tablet, Take 1 tablet by mouth Daily., Disp: , Rfl:     HYDROcodone-acetaminophen (NORCO) 5-325 MG per tablet, , Disp: , Rfl:     Ingrezza 60 MG capsule, Take 1 capsule by mouth Daily., Disp: , Rfl:     Insulin Glargine, 1 Unit Dial, (Toujeo SoloStar) 300 UNIT/ML solution pen-injector injection, INJECT 65 UNITS UNDER THE SKIN DAILY, Disp: 18 mL, Rfl: 3    Insulin Lispro, 1 Unit Dial, (HUMALOG) 100 UNIT/ML solution pen-injector, INJECT SUBCUTANEOUS AS DIRECTED BY PROVIDER DOSAGE IS ATTACHED MAX IS 40 UNITS PER DAY (Patient taking differently: INJECT SUBCUTANEOUS AS DIRECTED BY PROVIDER DOSAGE IS ATTACHED MAX IS 40 UNITS PER DAY   "**10 units), Disp: 9 mL, Rfl: 0    Insulin Pen Needle 31G X 4 MM misc, 1 each Daily., Disp: 100 each, Rfl: 3    Lancets (FREESTYLE) lancets, Use to test BG 4 times daily, Disp: 400 each, Rfl: 1    latanoprost (XALATAN) 0.005 % ophthalmic solution, Apply 1 drop to  each eye Daily. (Patient taking differently: Apply 1 drop to eye(s) as directed by provider Every Night.), Disp: 7.5 mL, Rfl: 3    metFORMIN (GLUCOPHAGE) 500 MG tablet, Take 2 tablets by mouth Daily With Breakfast. Indications: start in 48 hours, Disp: 180 tablet, Rfl: 3    methocarbamol (Robaxin) 500 MG tablet, Take 1 tablet by mouth As Needed (Take as needed for pain)., Disp: 90 tablet, Rfl: 0    pantoprazole (PROTONIX) 40 MG EC tablet, Take 1 tablet by mouth Every Morning., Disp: 30 tablet, Rfl: 2    pioglitazone (ACTOS) 15 MG tablet, Take 1 tablet by mouth Daily., Disp: 90 tablet, Rfl: 2    rosuvastatin (CRESTOR) 40 MG tablet, Take 1 tablet by mouth Daily., Disp: 90 tablet, Rfl: 3    Semaglutide, 2 MG/DOSE, (OZEMPIC) 8 MG/3ML solution pen-injector, Inject 2 mg under the skin into the appropriate area as directed 1 (One) Time Per Week., Disp: 3 mL, Rfl: 5    sildenafil (VIAGRA) 100 MG tablet, Take 1 tablet by mouth Daily As Needed for Erectile Dysfunction. Take 30 minutes to 4 hours prior to sexual activity., Disp: 50 tablet, Rfl: 3  No current facility-administered medications for this visit.      Objective:     Vitals:    04/01/24 1408   BP: 108/68   Pulse: 87   Weight: 117 kg (258 lb)   Height: 195.6 cm (77.01\")     Body mass index is 30.59 kg/m².    2D Echocardiogram 03/20/2024:    Left ventricular systolic function is low normal. Left ventricular ejection fraction appears to be 51 - 55%.    Left ventricular diastolic function was normal.    Left heart cath 3/19/2024:  1. Left main: Discrete calcified 50% distal in-stent restenosis.  2. LAD: Discrete calcified 60% ostial in-stent restenosis.  Diffuse 50% mid vessel stenosis  3. LCX: Discrete 99% " ostial in-stent restenosis  4. RCA: Discrete 50 to 60% proximal stenosis  5.  Angioplasty and shockwave balloon lithotripsy of the distal left main to proximal LAD with a 4.0 x 12 mm shockwave balloon  6.  Cutting Balloon angioplasty and PCI of the distal left main to proximal circumflex with a 3.25 x 15 mm Xience baldomero point drug-eluting stent.   7.  Kissing balloon angioplasty of the left main bifurcation with a 4.0 x 15 mm compliant and 3.5 x 15 mm noncompliant Takeru balloon       Left Heart Cath 05/24/2022:  Left main: Discrete 30% distal stenosis  LAD: Discrete 60% ostial stenois  LCX: Diffuse 70-80% in-stent restenosis  Ramus: Small caliber ramus with 99% ostial stenosis  Intervention:   1. Successful left main bifurcation stenting with a 4.0x15mm and 3.68k60jc Xience Skypoint drug eluting stents with DK Crush technique  2. Successful PCI to the mid LCX with a 3.0x23 mm Xience Skypoint DAVONTE    2D Echocardiogram 05/21/2022:  Calculated left ventricular EF = 55.6% Estimated left ventricular EF was in agreement with the calculated left ventricular EF. Left ventricular systolic function is normal.  Left ventricular wall thickness is consistent with borderline concentric hypertrophy.  Left ventricular diastolic function is consistent with (grade I) impaired relaxation.  There is calcification of the aortic valve. Valvular structure is poorly visualized.  Aortic valve area is 1.36 cm2.  Peak velocity of the flow distal to the aortic valve is 177.4 cm/s. Aortic valve maximum pressure gradient is 12.6 mmHg. Aortic valve mean pressure gradient is 8.1 mmHg. Aortic valve dimensionless index is 0.5 .      Left Heart Cath 05/20/2022:  LEFT MAIN: Large caliber normal, trifurcates to give the LAD ramus and circumflex arteries.   LAD: Ostial 70% lesion. The mid LAD is normal. The distal LAD stent is widely patent. There are 3 small diagonal vessel which are essentially normal.   RAMUS: Ostial 70-80% stenosis, otherwise normal.    CIRCUMFLEX: Ostial circumflex in stent restenosis which is hazy appearing and tortuous. Gives rise to a medium caliber OM which is normal.   RCA: Large caliber RCA which has proximal to distal stents which have mild ISR.      Severe ostial circumflex in stent restenosis, 90%  Status post PTCA using a 3.5x15mm NC balloon  The ostial circumflex was ballooned given the patients unstable symptoms. He has severe multivessel disease involving the ostium LAD and ostium ramus . Have discussed with Dr Winchester of CT surgery, will keep him on Integrillin over the weekend as plavix metabolizes and plan for CABG Surgery next week.    2D Echocardiogram 4/14/2020:  Left ventricular systolic function is normal. Calculated EF = 56%. Estimated EF was in agreement with the calculated EF. Normal left ventricular cavity size noted. All left ventricular wall segments contract normally. Left ventricular wall thickness is consistent with mild concentric hypertrophy. Left ventricular diastolic dysfunction is noted (grade I) consistent with impaired relaxation.     Left Heart Cath 03/10/2020:  1. Left main: Normal  2. LAD: 20 to 30% ostial stenosis.  40% mid vessel stenosis.  3. LCX: 99% ostial stenosis with GRACIE II flow.  40 to 50% mid vessel stenosis.  4. RCA: Previously placed stents from the proximal to distal RCA with luminal irregularities.  5.  Normal left ventricular size and systolic function  6.  PCI of the ostial circumflex with a 3.0 x 15 mm Xience Kaylynn drug-eluting stent.    Lexiscan Stress test 07/18/2017:  Myocardial perfusion imaging indicates a normal myocardial perfusion study with no evidence of ischemia.  Left ventricular ejection fraction is normal (Calculated EF = 61%).  Impressions are consistent with a low risk study.    Left Heart Cath 11/05/2015:  1. Successful stenting of the aeuoiodc-xh-heohwc right coronary artery.   2. Angio-Seal of both femoral arteries.     Left Heart Cath 08/28/2015:  1.  Hemodynamics: LV  114/13. /82/98.   2.  Left ventriculography: EF 50%, apical hypokinesis.   3.  Coronary angiography: Right dominant system. Two-vessel coronary disease.  The left main is normal. The proximal LAD has 30% to 40% diffuse disease. There  is a stent in the proximal to mid LAD that is patent. The mid to distal LAD has  30% to 40% diffuse disease. The first diagonal branch has a 60% stenosis. The  circumflex has 50% proximal stenosis. The RCA is totally occluded in the  proximal to midvessel. It reconstitutes via robust left-sided collaterals.      SUMMARY: Moderate left-sided disease with chronic total occlusion of the  proximal to mid RCA.      RECOMMENDATIONS:  intervention of the RCA.       PHYSICAL EXAM:    Constitutional:       Appearance: Healthy appearance. Not in distress.   Neck:      Vascular: No JVR. JVD normal.   Pulmonary:      Effort: Pulmonary effort is normal.      Breath sounds: Normal breath sounds. No wheezing. No rhonchi. No rales.   Chest:      Chest wall: Not tender to palpatation.   Cardiovascular:      PMI at left midclavicular line. Normal rate. Regular rhythm. Normal S1. Normal S2.       Murmurs: There is no murmur.      No gallop.  No click. No rub.   Pulses:     Intact distal pulses.   Edema:     Peripheral edema absent.   Abdominal:      General: Bowel sounds are normal.      Palpations: Abdomen is soft.      Tenderness: There is no abdominal tenderness.   Musculoskeletal: Normal range of motion.         General: No tenderness. Skin:     General: Skin is warm and dry.   Neurological:      General: No focal deficit present.      Mental Status: Alert and oriented to person, place and time.           ECG 12 Lead    Date/Time: 4/1/2024 3:09 PM  Performed by: Hattie Foster APRN    Authorized by: Hattie Foster APRN  Comparison: compared with previous ECG from 3/19/2024  Similar to previous ECG  Rhythm: sinus rhythm  Rate: normal  BPM: 87  Conduction: left anterior fascicular  block  T inversion: aVL            Assessment:       Diagnosis Plan   1. Coronary artery disease involving native coronary artery of native heart without angina pectoris        2. Hyperlipidemia LDL goal <70        3. S/P primary angioplasty with coronary stent        4. Essential hypertension        5. Type 2 diabetes mellitus with microalbuminuria, with long-term current use of insulin        6. Hypertensive kidney disease with stage 3a chronic kidney disease        7. Coronary stent restenosis, initial encounter              No orders of the defined types were placed in this encounter.         Plan:       1.  Coronary artery disease: History of prior PCI to RCA  (2018) and ostial circumflex (2020).  Status post PCI to left circumflex and ostial LAD bifurcation (5/24/2022).  Angioplasty and shockwave lithotripsy of distal left main to proximal LAD, kissing balloon angioplasty and PCI of distal left main to proximal circumflex with DAVONTE, kissing balloon angioplasty of left main bifurcation (03/12/2024) . Continue DAPT with aspirin/Plavix lifelong.  On statin therapy.  Normal left ventricular function.      2.  Hypertension: Controlled     3.  Hyperlipidemia goal LDL < 70: Recent lipid panel in target range. On statin.  Normal liver enzymes    4.  Diabetes type 2: HbA1c has improved to 6.4.  On oral medication and Ozempic. Followed by endocrinology.    5.  Chronic kidney disease: Follows with Dr. Brenner with nephrology.    Mr. Monaco will follow-up with Dr. Karimi in May.  He will call sooner for any questions or concerns.                 Your medication list            Accurate as of April 1, 2024  3:08 PM. If you have any questions, ask your nurse or doctor.                CHANGE how you take these medications        Instructions Last Dose Given Next Dose Due   Insulin Lispro (1 Unit Dial) 100 UNIT/ML solution pen-injector  Commonly known as: HUMALOG  What changed: additional instructions      INJECT  SUBCUTANEOUS AS DIRECTED BY PROVIDER DOSAGE IS ATTACHED MAX IS 40 UNITS PER DAY       latanoprost 0.005 % ophthalmic solution  Commonly known as: XALATAN  What changed: when to take this      Apply 1 drop to  each eye Daily.              CONTINUE taking these medications        Instructions Last Dose Given Next Dose Due   Adult Aspirin Regimen 81 MG EC tablet  Generic drug: aspirin      Take 1 tablet by mouth Daily.       ARIPiprazole 5 MG tablet  Commonly known as: ABILIFY      Take 1.5 tablets by mouth Daily.       carvedilol 25 MG tablet  Commonly known as: COREG      Take 1 tablet by mouth Every 12 (Twelve) Hours.       clonazePAM 0.5 MG tablet  Commonly known as: KlonoPIN      Take 1 tablet by mouth Daily As Needed for Anxiety.       clopidogrel 75 MG tablet  Commonly known as: PLAVIX      Take 1 tablet by mouth Daily.       Dexcom G7 Sensor misc      Use.       dorzolamide-timolol 2-0.5 % ophthalmic solution  Commonly known as: COSOPT      Apply 1 drop to eye(s) to both eyes 2 (Two) Times a Day.       doxazosin 4 MG tablet  Commonly known as: CARDURA      TAKE 1 TABLET BY MOUTH EVERY DAY AT NIGHT       escitalopram 20 MG tablet  Commonly known as: LEXAPRO      Take 1 tablet by mouth Daily.       freestyle lancets      Use to test BG 4 times daily       gabapentin 300 MG capsule  Commonly known as: NEURONTIN      Take 1 capsule every day by oral route in the evening for 30 days.       hydroCHLOROthiazide 12.5 MG tablet      Take 1 tablet by mouth Daily.       HYDROcodone-acetaminophen 5-325 MG per tablet  Commonly known as: NORCO           Ingrezza 60 MG capsule  Generic drug: Valbenazine Tosylate      Take 1 capsule by mouth Daily.       Insulin Pen Needle 31G X 4 MM misc      1 each Daily.       metFORMIN 500 MG tablet  Commonly known as: GLUCOPHAGE      Take 2 tablets by mouth Daily With Breakfast. Indications: start in 48 hours       methocarbamol 500 MG tablet  Commonly known as: Robaxin      Take 1 tablet  by mouth As Needed (Take as needed for pain).       pantoprazole 40 MG EC tablet  Commonly known as: PROTONIX      Take 1 tablet by mouth Every Morning.       pioglitazone 15 MG tablet  Commonly known as: ACTOS      Take 1 tablet by mouth Daily.       rosuvastatin 40 MG tablet  Commonly known as: CRESTOR      Take 1 tablet by mouth Daily.       Semaglutide (2 MG/DOSE) 8 MG/3ML solution pen-injector  Commonly known as: OZEMPIC      Inject 2 mg under the skin into the appropriate area as directed 1 (One) Time Per Week.       sildenafil 100 MG tablet  Commonly known as: VIAGRA      Take 1 tablet by mouth Daily As Needed for Erectile Dysfunction. Take 30 minutes to 4 hours prior to sexual activity.       Toujeo SoloStar 300 UNIT/ML solution pen-injector injection  Generic drug: Insulin Glargine (1 Unit Dial)      INJECT 65 UNITS UNDER THE SKIN DAILY                  As always, it has been a pleasure to participate in your patient's care.      Sincerely,       MICHELLE Brothers

## 2024-04-01 NOTE — TELEPHONE ENCOUNTER
Rx Refill Note  Requested Prescriptions     Pending Prescriptions Disp Refills    Toujeo SoloStar 300 UNIT/ML solution pen-injector injection [Pharmacy Med Name: TOUJEO SOLOSTAR 300 UNIT/ML] 18 mL 0     Sig: INJECT 65 UNITS UNDER THE SKIN DAILY      Last office visit with prescribing clinician: 2/1/2024   Last telemedicine visit with prescribing clinician: Visit date not found   Next office visit with prescribing clinician: 10/1/2024                         Would you like a call back once the refill request has been completed: [] Yes [] No    If the office needs to give you a call back, can they leave a voicemail: [] Yes [] No    Angelita Griffiths  04/01/24, 07:44 EDT

## 2024-04-08 NOTE — ASSESSMENT & PLAN NOTE
Due to his symptoms he was given prescription for clonazepam.  Patient encouraged return to clinic in 3 to 6 months.  Will only refill clonazepam if the patient is seen every 3 months.

## 2024-04-11 DIAGNOSIS — R80.9 TYPE 2 DIABETES MELLITUS WITH MICROALBUMINURIA, WITH LONG-TERM CURRENT USE OF INSULIN: Primary | ICD-10-CM

## 2024-04-11 DIAGNOSIS — Z79.4 TYPE 2 DIABETES MELLITUS WITH MICROALBUMINURIA, WITH LONG-TERM CURRENT USE OF INSULIN: Primary | ICD-10-CM

## 2024-04-11 DIAGNOSIS — E11.29 TYPE 2 DIABETES MELLITUS WITH MICROALBUMINURIA, WITH LONG-TERM CURRENT USE OF INSULIN: Primary | ICD-10-CM

## 2024-04-11 RX ORDER — BLOOD-GLUCOSE METER
1 KIT MISCELLANEOUS TAKE AS DIRECTED
Qty: 1 EACH | Refills: 0 | Status: SHIPPED | OUTPATIENT
Start: 2024-04-11

## 2024-04-11 RX ORDER — LANCETS 30 GAUGE
EACH MISCELLANEOUS
Qty: 200 EACH | Refills: 2 | Status: SHIPPED | OUTPATIENT
Start: 2024-04-11

## 2024-04-16 DIAGNOSIS — K21.9 GASTROESOPHAGEAL REFLUX DISEASE WITHOUT ESOPHAGITIS: Primary | ICD-10-CM

## 2024-04-16 NOTE — PROGRESS NOTES
Subjective   History of Present Illness: Isaias Monaco is a 69 y.o. male is here today for follow-up on neck and back pain. MRI cervical and lumbar done on 1/23/24.  He reports that over the past year he has developed worsening of his back and neck pain.  He has also developed weakness in his hands and loss of hand dexterity.  He reports that he is now dropping objects and has weakness in his handgrip.  He reports that he has lost some fine motor skills.  He is also having unsteadiness while walking.  He reports he has difficulty standing up after sitting in a chair.  He reports that he has some instability while walking.  All of the symptoms have progressed over the last several months.      History of Present Illness    The following portions of the patient's history were reviewed and updated as appropriate: allergies, current medications, past family history, past medical history, past social history, past surgical history, and problem list.    Past Medical History:   Diagnosis Date    Anemia     post hemorrhagic    Angioedema 02/21/2016    Secondary to ACE inhibitor    Anxiety     Arthritis     CAD (coronary artery disease)     Colon polyps     Diabetes mellitus, type 2     GERD (gastroesophageal reflux disease)     Glaucoma     Hematoma     High cholesterol     History of foreign travel 12/2017; 08/2018    [place], [place], [place], [place], [place] and [place]; [place]    Hyperlipidemia     Hypertension     Hypogonadism in male 09/28/2016    Male erectile disorder     Microalbuminuria     Myocardial infarction     Osteoarthritis     knee    Spinal stenosis of lumbar region without neurogenic claudication 06/02/2021    Tubular adenoma of colon 11/01/2017    Vitamin D deficiency         Past Surgical History:   Procedure Laterality Date    CARDIAC CATHETERIZATION N/A 05/15/2006    Dr. Mini Camarillo    CARDIAC CATHETERIZATION N/A 03/10/2020    Procedure: Left Heart Cath;  Surgeon: Trev  Miguel Ángel TIDWELL MD;  Location[facility] CATH [facility]VASIVE LOC[facility];  Service: Cardiology;  Laterality: N/A;    CARDIAC CATHETERIZATION N/A 03/10/2020    Procedure: Stent DAVONTE coronary;  Surgeon: Miguel Ángel Karimi MD;  Location[facility] C[facility];  Service: Cardiology;  Laterality: N/A;    CARDIAC CATHETERIZATION N/A 03/10/2020    Procedure: Coronary angiography;  Surgeon: Miguel Ángel Karimi MD;  Location[facility] C[facility];  Service: Cardiology;  Laterality: N/A;    CARDIAC CATHETERIZATION N/A 03/10/2020    Procedure: Left ventriculography;  Surgeon: Miguel Ángel Karimi MD;  [facility] C[facility];  Service: Cardiology;  Laterality: N/A;    CARDIAC CATHETERIZATION N/A 05/20/2022    Procedure: Left Heart Cath;  Surgeon: Mackenzie Morales MD;  Location: [facility] CATH INVASIVE LOCATION;  Service: Cardiovascular;  Laterality: N/A;    CARDIAC CATHETERIZATION N/A 05/20/2022    Procedure: Coronary angiography;  Surgeon: Mackenzie Morales MD;  Location: [facility] CATH INVASIVE LOCATION;  Service: Cardiovascular;  Laterality: N/A;    CARDIAC CATHETERIZATION N/A 05/20/2022    Procedure: Percutaneous Coronary Intervention;  Surgeon: Mackenzie Morales MD;  Location: [facility] CATH [facility] LOC[facility];  Service: Cardiovascular;  Laterality: N/A;    CARDIAC CATHETERIZATION N/A 05/24/2022    Procedure: Percutaneous Coronary Intervention;  Surgeon: Miguel Ángel Karimi MD;  Location: [facility] CATH INVASIVE LOCATION;  Service: Cardiovascular;  Laterality: N/A;  LAD and Cx    CARDIAC CATHETERIZATION N/A 05/24/2022    Procedure: Stent DAVONTE coronary;  Surgeon: Miguel Ángel Karimi MD;  Location: [facility] CATH INVASIVE LOCATION;  Service: Cardiovascular;  Laterality: N/A;    CARDIAC CATHETERIZATION N/A 05/24/2022    Procedure: Resting Full Cycle Ratio;  Surgeon: Miguel Ángel Karimi MD;  Location: [facility] CATH INVASIVE LOCATION;  Service: Cardiovascular;  Laterality: N/A;    CARDIAC CATHETERIZATION N/A  3/19/2024    Procedure: Left Heart Cath;  Surgeon: Miguel Ángel Karimi MD;  Location: [facility] CATH INVASIVE LOCATION;  Service: Cardiovascular;  Laterality: N/A;    CARDIAC CATHETERIZATION N/A 3/19/2024    Procedure: Percutaneous Coronary Intervention;  Surgeon: Miguel Ángel Karimi MD;  [facility];  Service: Cardiovascular;  Laterality: N/A;    CARDIAC CATHETERIZATION N/A 3/19/2024    Procedure: Stent DAVONTE coronary;  Surgeon: Miguel Ángel Karimi MD;  [facility];  Service: Cardiovascular;  Laterality: N/A;    COLONOSCOPY N/A 02/22/2006    Bilobed polyp at 30 cm, hemorrhoids-Dr. Ilya Zhao    COLONOSCOPY N/A 02/28/2014    Normal ileum, one 6 mm polyp in the mid transverse colon-Dr. Ilya Zhao    COLONOSCOPY N/A 11/19/2008    Ela ileum, two 3 to 4 mm polyps, non-bleeding internal hemorrhoids, repeat in 5 years-Dr. Ilya Zhao    COLONOSCOPY N/A 10/31/2017    Procedure: COLONOSCOPY WITH POLYPECTOMY (COLD BIOPSY);  Surgeon: Ilya Zhao MD;  Location: [facility] ENDOSCOPY;  Service:     COLONOSCOPY N/A 05/21/2021    Procedure: Colonoscopy into cecum and terminal ileum with cold biopsy polypectomy;  Surgeon: Ilya Zhao MD;  Location: [facility] ENDOSCOPY;  Service: Gastroenterology;  Laterality: N/A;  Pre op: History of Polyps  Post op: Polyp    CORONARY ANGIOPLASTY WITH STENT PLACEMENT  2007, 2012, 2015    cardiac stents x3 occasions    ENDOSCOPY N/A 10/04/2017    Procedure: ESOPHAGOGASTRODUODENOSCOPY;  Surgeon: Boyd Guidry MD;  Location: [facility] ENDOSCOPY;  Service:     ENDOSCOPY N/A 05/21/2021    Procedure: ESOPHAGOGASTRODUODENOSCOPY with biopsies;  Surgeon: Ilya Zhao MD;  Location: [facility] ENDOSCOPY;  Service: Gastroenterology;  Laterality: N/A;  Pre op: GERD  Post op: Irregular  Z-Line, Hiatal Hernia, Gastritis    ENDOSCOPY N/A 8/25/2023    Procedure: ESOPHAGOGASTRODUODENOSCOPY with biopsy;  Surgeon: Ilya Zhao MD;  Location: [facility]  ENDOSCOPY;  Service: Gastroenterology;  Laterality: N/A;  errosive gastritis    EPIDURAL BLOCK      INTERVENTIONAL RADIOLOGY PROCEDURE N/A 05/20/2022    Procedure: Intravascular Ultrasound;  Surgeon: Mackenzie Morales MD;  Location: [facility] CATH INVASIVE LOCATION;  Service: Cardiovascular;  Laterality: N/A;    KNEE INCISION AND DRAINAGE Right 06/20/2017    Procedure: RT. KNEE WASHOUT ;  Surgeon: Boyd Coyne MD;  [facility];  Service:     MEDIAL BRANCH BLOCK Bilateral 12/17/2021    Procedure: LUMBAR MEDIAL BRANCH BLOCK bilateral ~L4-S1 x2 (~2 weeks apart);  Surgeon: Christina Villaseñor MD;  Location: [facility] MAIN [facility];  Service: Pain Management;  Laterality: Bilateral;    MEDIAL BRANCH BLOCK Bilateral 01/03/2022    Procedure: LUMBAR MEDIAL BRANCH BLOCK bilateral ~L4-S1 x2 (~2 weeks apart);  Surgeon: Christina Villaseñor MD;  Location: [facility] MAIN OR;  Service: Pain Management;  Laterality: Bilateral;    IA ARTHRP KNE CONDYLE&PLATU MEDIAL&LAT COMPARTMENTS Right 06/15/2017    Procedure: RT TOTAL KNEE ARTHROPLASTY;  Surgeon: Boyd Coyne MD;  Location: [facility] MAIN OR;  Service: Orthopedics    RADIOFREQUENCY ABLATION Bilateral 01/10/2022    Procedure: RADIOFREQUENCY ABLATION NERVES Bilateral L4-S1;  Surgeon: Christina Villaseñor MD;  Location: [facility] MAIN OR;  Service: Pain Management;  Laterality: Bilateral;    SHOULDER SURGERY Right 2016    rotator cuff repair    UPPER GASTROINTESTINAL ENDOSCOPY N/A 10/13/2015    Z-line irregular, normal stomach, normal duodenum-Dr. Ilya Zhao    UPPER GASTROINTESTINAL ENDOSCOPY N/A 02/28/2014    Z-line irregular, normal stomach, normal duodenum-Dr. Ilya Zhao    UPPER GASTROINTESTINAL ENDOSCOPY N/A 11/19/2008    Z-line irregular, chronic gastritis withotu hemorrhage, normal duodenum-Dr. Ilya Zhao    UPPER GASTROINTESTINAL ENDOSCOPY N/A 06/22/2006    LA Grade A reflux esophagitis, non-bleeding erythematous gastropathy, normal duodenum-Dr. Ilya Zhao          Current Outpatient  Medications:     ARIPiprazole (ABILIFY) 5 MG tablet, Take 1.5 tablets by mouth Daily., Disp: , Rfl:     aspirin 81 MG EC tablet, Take 1 tablet by mouth Daily., Disp: 30 tablet, Rfl: 6    carvedilol (COREG) 25 MG tablet, Take 1 tablet by mouth Every 12 (Twelve) Hours., Disp: 180 tablet, Rfl: 3    clonazePAM (KlonoPIN) 0.5 MG tablet, Take 1 tablet by mouth Daily As Needed for Anxiety., Disp: 30 tablet, Rfl: 2    clopidogrel (PLAVIX) 75 MG tablet, Take 1 tablet by mouth Daily., Disp: 90 tablet, Rfl: 2    Continuous Blood Gluc Sensor (Dexcom G7 Sensor) misc, Use., Disp: , Rfl:     dorzolamide-timolol (COSOPT) 22.3-6.8 MG/ML ophthalmic solution, Apply 1 drop to eye(s) to both eyes 2 (Two) Times a Day., Disp: 20 mL, Rfl: 3    doxazosin (CARDURA) 4 MG tablet, TAKE 1 TABLET BY MOUTH EVERY DAY AT NIGHT, Disp: 90 tablet, Rfl: 4    escitalopram (LEXAPRO) 20 MG tablet, Take 1 tablet by mouth Daily., Disp: 90 tablet, Rfl: 3    esomeprazole (nexIUM) 40 MG capsule, Take 1 capsule by mouth Every Morning Before Breakfast., Disp: 30 capsule, Rfl: 3    glucose blood test strip, Use to check blood sugars 1-2 times a day. E11.65, Disp: 200 each, Rfl: 2    glucose monitor monitoring kit, Use 1 each Take As Directed. Use to check blood sugars 1-2 times a day. E11.65, Disp: 1 each, Rfl: 0    hydroCHLOROthiazide (HYDRODIURIL) 12.5 MG tablet, Take 1 tablet by mouth Daily., Disp: , Rfl:     HYDROcodone-acetaminophen (NORCO) 5-325 MG per tablet, , Disp: , Rfl:     Ingrezza 60 MG capsule, Take 1 capsule by mouth Daily., Disp: , Rfl:     Insulin Glargine, 1 Unit Dial, (Toujeo SoloStar) 300 UNIT/ML solution pen-injector injection, INJECT 65 UNITS UNDER THE SKIN DAILY, Disp: 18 mL, Rfl: 3    Insulin Lispro, 1 Unit Dial, (HUMALOG) 100 UNIT/ML solution pen-injector, INJECT SUBCUTANEOUS AS DIRECTED BY PROVIDER DOSAGE IS ATTACHED MAX IS 40 UNITS PER DAY (Patient taking differently: INJECT SUBCUTANEOUS AS DIRECTED BY PROVIDER DOSAGE IS ATTACHED MAX IS  40 UNITS PER DAY  **10 units), Disp: 9 mL, Rfl: 0    Insulin Pen Needle 31G X 4 MM misc, 1 each Daily., Disp: 100 each, Rfl: 3    Lancets misc, Use to check blood sugars 1-2 times a day. E11.65, Disp: 200 each, Rfl: 2    latanoprost (XALATAN) 0.005 % ophthalmic solution, Apply 1 drop to  each eye Daily. (Patient taking differently: Apply 1 drop to eye(s) as directed by provider Every Night.), Disp: 7.5 mL, Rfl: 3    metFORMIN (GLUCOPHAGE) 500 MG tablet, Take 2 tablets by mouth Daily With Breakfast. Indications: start in 48 hours, Disp: 180 tablet, Rfl: 3    methocarbamol (Robaxin) 500 MG tablet, Take 1 tablet by mouth As Needed (Take as needed for pain)., Disp: 90 tablet, Rfl: 0    pioglitazone (ACTOS) 15 MG tablet, Take 1 tablet by mouth Daily., Disp: 90 tablet, Rfl: 2    rosuvastatin (CRESTOR) 40 MG tablet, Take 1 tablet by mouth Daily., Disp: 90 tablet, Rfl: 3    Semaglutide, 2 MG/DOSE, (OZEMPIC) 8 MG/3ML solution pen-injector, Inject 2 mg under the skin into the appropriate area as directed 1 (One) Time Per Week., Disp: 3 mL, Rfl: 5    sildenafil (VIAGRA) 100 MG tablet, Take 1 tablet by mouth Daily As Needed for Erectile Dysfunction. Take 30 minutes to 4 hours prior to sexual activity., Disp: 50 tablet, Rfl: 3    gabapentin (NEURONTIN) 300 MG capsule, Take 1 capsule every day by oral route in the evening for 30 days. (Patient not taking: Reported on 4/19/2024), Disp: , Rfl:      Allergies   Allergen Reactions    Nsaids Other (See Comments)     Renal failure    Hydralazine Myalgia    Norvasc [Amlodipine] Swelling    Zetia [Ezetimibe] Nausea And Vomiting    Ace Inhibitors Angioedema    Lisinopril Angioedema    Testosterone Myalgia        Social History     Socioeconomic History    Marital status:      Spouse name: Micaela    Number of children: 1    Years of education: College   Tobacco Use    Smoking status: Never     Passive exposure: Never    Smokeless tobacco: Never    Tobacco comments:     CAFFEINE USE: 2  "CUPS COFFEE DAILY   Vaping Use    Vaping status: Never Used   Substance and Sexual Activity    Alcohol use: Yes     Alcohol/week: 6.0 standard drinks of alcohol     Types: 2 Glasses of wine, 2 Cans of beer, 1 Shots of liquor, 1 Drinks containing 0.5 oz of alcohol per week     Comment: occasional 2-4 DRINKS PER WEEK    Drug use: No    Sexual activity: Defer     Partners: Female     Birth control/protection: Post-menopausal        Family History   Problem Relation Age of Onset    Lupus Sister     Thyroid disease Sister     Heart disease Other     Hypertension Other     Arthritis Mother     Hyperlipidemia Mother     Hypertension Mother     Thyroid disease Mother     Lupus Mother     Vision loss Mother     Heart disease Father     Arthritis Father     Other Father         Vascular disease    Lupus Father     Depression Father     Alcohol abuse Father     Dementia Father     Hypertension Father     Heart disease Brother     Heart attack Brother 40    Thyroid disease Sister     Arthritis Brother     Malig Hyperthermia Neg Hx         Review of Systems   Genitourinary:  Negative for difficulty urinating and testicular pain.   Musculoskeletal:  Positive for gait problem and neck stiffness. Negative for neck pain.   Neurological:  Positive for weakness. Negative for numbness.   All other systems reviewed and are negative.      Objective     Vitals:    04/19/24 1045   BP: 116/70   Pulse: 84   Temp: 98 °F (36.7 °C)   SpO2: 98%   Weight: 118 kg (260 lb 3.2 oz)   Height: 195.6 cm (77\")     Body mass index is 30.86 kg/m².      Physical Exam  Neurologic Exam  Awake, alert, oriented x3  Face symmetric  Speech is fluent and clear  Motor exam  Bilateral deltoids 5/5, bilateral biceps 5/5, bilateral triceps 5/5, bilateral wrist extension 5/5 bilateral hand  4/5  Bilateral hip flexion 5/5, bilateral knee extension 5/5, bilateral DF/PF 5/5  No clonus  Left-sided Danielle's reflex  Slightly unsteady gait however able to walk without " the need for assistance throughout the office  Able to detect  light touch in all 4 extremities        Assessment & Plan   Independent Review of Radiographic Studies:      I personally reviewed the images from the following studies.  MRI of the cervical spine from 2024  The MRI images were reviewed and demonstrate diffuse degenerative changes.  The most severe changes are seen at C4-5 where there is a disc osteophyte complex with moderate to severe cord compression.  At C5-6 there is also severe degenerative changes with mild to moderate narrowing.    MRI of the lumbar spine from 2024  The MRI images of the lumbar spine demonstrate no significant change from the prior MRI on February 3, 2023 however he continues to have moderate to severe canal stenosis at L2-3 and L3-4.    Medical Decision Makin-year-old male with a several month history of neck pain, back pain, loss of hand dexterity, instability while walking, lower extremity weakness  -He has had a significant change from his prior visit.  He now has signs and symptoms concerning for myelopathy.  I reviewed his MRI images and he has severe canal stenosis at C4-5.  There is moderate changes at C5-6.  I have ordered a CT of the cervical spine to further evaluate for OPLL.  If there is significant OPLL this may change the surgical approach.  We talked about the risks and benefits of an ACDF versus posterior cervical approach.  He recently has had a cardiac catheterization with angioplasty.  He is scheduled to follow-up with his cardiologist in 1 month.  I will plan to see him back with a CT of the cervical spine and x-ray with flexion and extension views after that visit.  He appears to be myelopathic and would benefit from a cervical decompression and fusion.  We will discuss the risks and benefit of this at the next visit  -In addition to his cervical stenosis he also has lumbar stenosis which appears to be severe at L2-3 and L3-4.   This could also be contributing to his lower extremity weakness.  He reports that occasionally he will drag his foot when walking up stairs.  I am more concerned about his cervical spine, but the lumbar spine may also need to be addressed in the near future.    Diagnoses and all orders for this visit:    1. Spinal stenosis, lumbar region, with neurogenic claudication (Primary)  -     CT Cervical Spine Without Contrast; Future  -     XR spine cervical ap and lat w flex and ext; Future    2. Stenosis of cervical spine with myelopathy  -     CT Cervical Spine Without Contrast; Future  -     XR spine cervical ap and lat w flex and ext; Future      Return in about 4 weeks (around 5/17/2024).    I spent 30 minutes reviewing the medical record, reviewing the MRI images, discussing the signs and symptoms of myelopathy, discussing the signs and symptoms of lumbar stenosis with neurogenic claudication, discussing the risks and benefits of an ACDF, discussing the risks and benefits of a posterior cervical decompression and fusion

## 2024-04-17 RX ORDER — ESOMEPRAZOLE MAGNESIUM 40 MG/1
40 CAPSULE, DELAYED RELEASE ORAL
Qty: 30 CAPSULE | Refills: 3 | Status: SHIPPED | OUTPATIENT
Start: 2024-04-17

## 2024-04-17 RX ORDER — ESOMEPRAZOLE MAGNESIUM 40 MG/1
40 CAPSULE, DELAYED RELEASE ORAL
COMMUNITY
End: 2024-04-17 | Stop reason: SDUPTHER

## 2024-04-18 ENCOUNTER — LAB (OUTPATIENT)
Dept: LAB | Facility: HOSPITAL | Age: 69
End: 2024-04-18
Payer: MEDICARE

## 2024-04-18 ENCOUNTER — TRANSCRIBE ORDERS (OUTPATIENT)
Dept: ADMINISTRATIVE | Facility: HOSPITAL | Age: 69
End: 2024-04-18
Payer: MEDICARE

## 2024-04-18 DIAGNOSIS — N17.9 ACUTE RENAL FAILURE, UNSPECIFIED ACUTE RENAL FAILURE TYPE: ICD-10-CM

## 2024-04-18 DIAGNOSIS — E11.9 DIABETES MELLITUS WITHOUT COMPLICATION: ICD-10-CM

## 2024-04-18 DIAGNOSIS — I10 PRIMARY HYPERTENSION: ICD-10-CM

## 2024-04-18 DIAGNOSIS — N17.9 ACUTE RENAL FAILURE, UNSPECIFIED ACUTE RENAL FAILURE TYPE: Primary | ICD-10-CM

## 2024-04-18 DIAGNOSIS — N18.31 CHRONIC KIDNEY DISEASE (CKD) STAGE G3A/A1, MODERATELY DECREASED GLOMERULAR FILTRATION RATE (GFR) BETWEEN 45-59 ML/MIN/1.73 SQUARE METER AND ALBUMINURIA CREATININE RATIO LESS THAN 30 MG/G (CMS/H*: ICD-10-CM

## 2024-04-18 LAB
ALBUMIN SERPL-MCNC: 4.2 G/DL (ref 3.5–5.2)
ALBUMIN/GLOB SERPL: 1 G/DL
ALP SERPL-CCNC: 87 U/L (ref 39–117)
ALT SERPL W P-5'-P-CCNC: 34 U/L (ref 1–41)
ANION GAP SERPL CALCULATED.3IONS-SCNC: 9.5 MMOL/L (ref 5–15)
AST SERPL-CCNC: 43 U/L (ref 1–40)
BACTERIA UR QL AUTO: NORMAL /HPF
BILIRUB SERPL-MCNC: 0.5 MG/DL (ref 0–1.2)
BILIRUB UR QL STRIP: NEGATIVE
BUN SERPL-MCNC: 18 MG/DL (ref 8–23)
BUN/CREAT SERPL: 9.2 (ref 7–25)
CALCIUM SPEC-SCNC: 10.2 MG/DL (ref 8.6–10.5)
CHLORIDE SERPL-SCNC: 98 MMOL/L (ref 98–107)
CLARITY UR: CLEAR
CO2 SERPL-SCNC: 29.5 MMOL/L (ref 22–29)
COLOR UR: YELLOW
CREAT SERPL-MCNC: 1.95 MG/DL (ref 0.76–1.27)
CREAT UR-MCNC: 161 MG/DL
EGFRCR SERPLBLD CKD-EPI 2021: 36.6 ML/MIN/1.73
GLOBULIN UR ELPH-MCNC: 4.4 GM/DL
GLUCOSE SERPL-MCNC: 122 MG/DL (ref 65–99)
GLUCOSE UR STRIP-MCNC: NEGATIVE MG/DL
HGB UR QL STRIP.AUTO: ABNORMAL
HYALINE CASTS UR QL AUTO: NORMAL /LPF
KETONES UR QL STRIP: NEGATIVE
LEUKOCYTE ESTERASE UR QL STRIP.AUTO: NEGATIVE
NITRITE UR QL STRIP: NEGATIVE
PH UR STRIP.AUTO: 5.5 [PH] (ref 5–8)
PHOSPHATE SERPL-MCNC: 3.4 MG/DL (ref 2.5–4.5)
POTASSIUM SERPL-SCNC: 3.7 MMOL/L (ref 3.5–5.2)
PROT ?TM UR-MCNC: 88.3 MG/DL
PROT SERPL-MCNC: 8.6 G/DL (ref 6–8.5)
PROT UR QL STRIP: ABNORMAL
PROT/CREAT UR: 548.4 MG/G CREA (ref 0–200)
PTH-INTACT SERPL-MCNC: 36.3 PG/ML (ref 15–65)
RBC # UR STRIP: NORMAL /HPF
REF LAB TEST METHOD: NORMAL
SODIUM SERPL-SCNC: 137 MMOL/L (ref 136–145)
SP GR UR STRIP: 1.02 (ref 1–1.03)
SQUAMOUS #/AREA URNS HPF: NORMAL /HPF
URATE SERPL-MCNC: 4.8 MG/DL (ref 3.4–7)
UROBILINOGEN UR QL STRIP: ABNORMAL
WBC # UR STRIP: NORMAL /HPF

## 2024-04-18 PROCEDURE — 84156 ASSAY OF PROTEIN URINE: CPT

## 2024-04-18 PROCEDURE — 81001 URINALYSIS AUTO W/SCOPE: CPT

## 2024-04-18 PROCEDURE — 84100 ASSAY OF PHOSPHORUS: CPT

## 2024-04-18 PROCEDURE — 83970 ASSAY OF PARATHORMONE: CPT

## 2024-04-18 PROCEDURE — 36415 COLL VENOUS BLD VENIPUNCTURE: CPT

## 2024-04-18 PROCEDURE — 80053 COMPREHEN METABOLIC PANEL: CPT

## 2024-04-18 PROCEDURE — 82570 ASSAY OF URINE CREATININE: CPT

## 2024-04-18 PROCEDURE — 84550 ASSAY OF BLOOD/URIC ACID: CPT

## 2024-04-19 ENCOUNTER — OFFICE VISIT (OUTPATIENT)
Dept: NEUROSURGERY | Facility: CLINIC | Age: 69
End: 2024-04-19
Payer: MEDICARE

## 2024-04-19 VITALS
TEMPERATURE: 98 F | OXYGEN SATURATION: 98 % | HEIGHT: 77 IN | DIASTOLIC BLOOD PRESSURE: 70 MMHG | BODY MASS INDEX: 30.72 KG/M2 | HEART RATE: 84 BPM | SYSTOLIC BLOOD PRESSURE: 116 MMHG | WEIGHT: 260.2 LBS

## 2024-04-19 DIAGNOSIS — G99.2 STENOSIS OF CERVICAL SPINE WITH MYELOPATHY: ICD-10-CM

## 2024-04-19 DIAGNOSIS — M48.062 SPINAL STENOSIS, LUMBAR REGION, WITH NEUROGENIC CLAUDICATION: Primary | ICD-10-CM

## 2024-04-19 DIAGNOSIS — M48.02 STENOSIS OF CERVICAL SPINE WITH MYELOPATHY: ICD-10-CM

## 2024-04-19 PROCEDURE — 1160F RVW MEDS BY RX/DR IN RCRD: CPT | Performed by: NEUROLOGICAL SURGERY

## 2024-04-19 PROCEDURE — 1159F MED LIST DOCD IN RCRD: CPT | Performed by: NEUROLOGICAL SURGERY

## 2024-04-19 PROCEDURE — 3074F SYST BP LT 130 MM HG: CPT | Performed by: NEUROLOGICAL SURGERY

## 2024-04-19 PROCEDURE — 3078F DIAST BP <80 MM HG: CPT | Performed by: NEUROLOGICAL SURGERY

## 2024-04-19 PROCEDURE — 99214 OFFICE O/P EST MOD 30 MIN: CPT | Performed by: NEUROLOGICAL SURGERY

## 2024-04-22 ENCOUNTER — TELEPHONE (OUTPATIENT)
Age: 69
End: 2024-04-22
Payer: MEDICARE

## 2024-04-22 NOTE — TELEPHONE ENCOUNTER
Ron with Dr. Chris Miguel's office called. They are wanting to do spinal surgery in the future where he will hold his Plavix and Aspirin 7 days prior to his procedure.    They are aware that he had a cath with stents on 3/19/24.    They want to know how long the pt will have to wait before he can have surgery and hold Plavix/Aspirin.    Please advise.    Thanks,    Jenny

## 2024-04-22 NOTE — TELEPHONE ENCOUNTER
Since his stents were 1 month ago how long out from then should he wait to have the surgery?    Jenny

## 2024-04-25 DIAGNOSIS — E11.65 TYPE 2 DIABETES MELLITUS WITH HYPERGLYCEMIA, WITH LONG-TERM CURRENT USE OF INSULIN: ICD-10-CM

## 2024-04-25 DIAGNOSIS — Z79.4 TYPE 2 DIABETES MELLITUS WITH HYPERGLYCEMIA, WITH LONG-TERM CURRENT USE OF INSULIN: ICD-10-CM

## 2024-04-25 NOTE — TELEPHONE ENCOUNTER
Rx Refill Note  Requested Prescriptions     Pending Prescriptions Disp Refills    Insulin Pen Needle 31G X 4 MM misc 100 each 3     Sig: Use 1 each Daily.      Last office visit with prescribing clinician: 3/22/2024   Last telemedicine visit with prescribing clinician: Visit date not found   Next office visit with prescribing clinician: 9/11/2024                         Would you like a call back once the refill request has been completed: [] Yes [] No    If the office needs to give you a call back, can they leave a voicemail: [] Yes [] No    Jing Garcia MA  04/25/24, 09:58 EDT

## 2024-05-20 ENCOUNTER — HOSPITAL ENCOUNTER (OUTPATIENT)
Facility: HOSPITAL | Age: 69
Discharge: HOME | End: 2024-05-20
Payer: MEDICARE

## 2024-05-20 DIAGNOSIS — G99.2 STENOSIS OF CERVICAL SPINE WITH MYELOPATHY: ICD-10-CM

## 2024-05-20 DIAGNOSIS — M48.02 STENOSIS OF CERVICAL SPINE WITH MYELOPATHY: ICD-10-CM

## 2024-05-20 DIAGNOSIS — M48.062 SPINAL STENOSIS, LUMBAR REGION, WITH NEUROGENIC CLAUDICATION: ICD-10-CM

## 2024-05-20 PROCEDURE — 72050 X-RAY EXAM NECK SPINE 4/5VWS: CPT

## 2024-05-20 PROCEDURE — 72125 CT NECK SPINE W/O DYE: CPT

## 2024-05-28 NOTE — PROGRESS NOTES
Subjective   History of Present Illness: Isaias Monaco is a 69 y.o. male is here today for follow-up for neck and back pain. Cervical CT and cervical XR was completed on 5/20/24.      History of Present Illness    {Common H&P Review Areas:21114}    Past Medical History:   Diagnosis Date    Anemia     post hemorrhagic    Angioedema 02/21/2016    Secondary to ACE inhibitor    Anxiety     Arthritis     CAD (coronary artery disease)     Colon polyps     Diabetes mellitus, type 2     GERD (gastroesophageal reflux disease)     Glaucoma     Hematoma     High cholesterol     History of foreign travel 12/2017; 08/2018    [place], [place], [place], [place], [place] and [place]; [place]    Hyperlipidemia     Hypertension     Hypogonadism in male 09/28/2016    Male erectile disorder     Microalbuminuria     Myocardial infarction     Osteoarthritis     knee    Spinal stenosis of lumbar region without neurogenic claudication 06/02/2021    Tubular adenoma of colon 11/01/2017    Vitamin D deficiency         Past Surgical History:   Procedure Laterality Date    CARDIAC CATHETERIZATION N/A 05/15/2006    Dr. Mini Camarillo    CARDIAC CATHETERIZATION N/A 03/10/2020    Procedure: Left Heart Cath;  Surgeon: Miguel Ángel Karimi MD;  Location: [facility] CATH INVASIVE LOCATION;  Service: Cardiology;  Laterality: N/A;    CARDIAC CATHETERIZATION N/A 03/10/2020    Procedure: Stent DAVONTE coronary;  Surgeon: Miguel Ángel Karimi MD;  [facility];  Service: Cardiology;  Laterality: N/A;    CARDIAC CATHETERIZATION N/A 03/10/2020    Procedure: Coronary angiography;  Surgeon: Miguel Ángel Karimi MD;  [facility];  Service: Cardiology;  Laterality: N/A;    CARDIAC CATHETERIZATION N/A 03/10/2020    Procedure: Left ventriculography;  Surgeon: Miguel Ángel Karimi MD;  [facility];  Service: Cardiology;  Laterality: N/A;    CARDIAC CATHETERIZATION  N/A 05/20/2022    Procedure: Left Heart Cath;  Surgeon: Mackenzie Morales MD;  Location:  [facility] CATH INVASIVE LOCATION;  Service: Cardiovascular;  Laterality: N/A;    CARDIAC CATHETERIZATION N/A 05/20/2022    Procedure: Coronary angiography;  Surgeon: Mackenzie Morales MD;  Location:  [facility] CATH INVASIVE LOCATION;  Service: Cardiovascular;  Laterality: N/A;    CARDIAC CATHETERIZATION N/A 05/20/2022    Procedure: Percutaneous Coronary Intervention;  Surgeon: Mackenzie Morales MD;  Location:  [facility] CATH INVASIVE LOCATION;  Service: Cardiovascular;  Laterality: N/A;    CARDIAC CATHETERIZATION N/A 05/24/2022    Procedure: Percutaneous Coronary Intervention;  Surgeon: Miguel Ángel Karimi MD;  Location:  [facility] CATH INVASIVE LOCATION;  Service: Cardiovascular;  Laterality: N/A;  LAD and Cx    CARDIAC CATHETERIZATION N/A 05/24/2022    Procedure: Stent DAVONTE coronary;  Surgeon: Miguel Ángel Karimi MD;  Location:  [facility] CATH INVASIVE LOCATION;  Service: Cardiovascular;  Laterality: N/A;    CARDIAC CATHETERIZATION N/A 05/24/2022    Procedure: Resting Full Cycle Ratio;  Surgeon: Miguel Ángel Karimi MD;  Location:  [facility] CATH INVASIVE LOCATION;  Service: Cardiovascular;  Laterality: N/A;    CARDIAC CATHETERIZATION N/A 3/19/2024    Procedure: Left Heart Cath;  Surgeon: Miguel Ángel Karimi MD;  Location:  [facility] CATH INVASIVE LOCATION;  Service: Cardiovascular;  Laterality: N/A;    CARDIAC CATHETERIZATION N/A 3/19/2024    Procedure: Percutaneous Coronary Intervention;  Surgeon: Miguel Ángel Karimi MD;  Location:  [facility] CATH INVASIVE LOCATION;  Service: Cardiovascular;  Laterality: N/A;    CARDIAC CATHETERIZATION N/A 3/19/2024    Procedure: Stent DAVONTE coronary;  Surgeon: Miguel Ángel Karimi MD;  Location:  [facility] CATH INVASIVE LOCATION;  Service: Cardiovascular;  Laterality: N/A;    COLONOSCOPY N/A 02/22/2006    Bilobed polyp at 30 cm, hemorrhoids-Dr. Ilya Zhao    COLONOSCOPY N/A 02/28/2014    Normal ileum, one 6 mm polyp in  the mid transverse colon-Dr. Ilya Zhao    COLONOSCOPY N/A 11/19/2008    Ela ileum, two 3 to 4 mm polyps, non-bleeding internal hemorrhoids, repeat in 5 years-Dr. Ilya Zhao    COLONOSCOPY N/A 10/31/2017    Procedure: COLONOSCOPY WITH POLYPECTOMY (COLD BIOPSY);  Surgeon: Ilya Zhao MD;  Location: [facility] ENDOSCOPY;  Service:     COLONOSCOPY N/A 05/21/2021    Procedure: Colonoscopy into cecum and terminal ileum with cold biopsy polypectomy;  Surgeon: Ilya Zhao MD;  Location: [facility] ENDOSCOPY;  Service: Gastroenterology;  Laterality: N/A;  Pre op: History of Polyps  Post op: Polyp    CORONARY ANGIOPLASTY WITH STENT PLACEMENT  2007, 2012, 2015    cardiac stents x3 occasions    ENDOSCOPY N/A 10/04/2017    Procedure: ESOPHAGOGASTRODUODENOSCOPY;  Surgeon: Boyd Guidry MD;  Location: [facility] ENDOSCOPY;  Service:     ENDOSCOPY N/A 05/21/2021    Procedure: ESOPHAGOGASTRODUODENOSCOPY with biopsies;  Surgeon: Ilya Zhao MD;  Location: [facility] ENDOSCOPY;  Service: Gastroenterology;  Laterality: N/A;  Pre op: GERD  Post op: Irregular  Z-Line, Hiatal Hernia, Gastritis    ENDOSCOPY N/A 8/25/2023    Procedure: ESOPHAGOGASTRODUODENOSCOPY with biopsy;  Surgeon: Ilya Zhao MD;  Location: [facility] ENDOSCOPY;  Service: Gastroenterology;  Laterality: N/A;  errosive gastritis    EPIDURAL BLOCK      INTERVENTIONAL RADIOLOGY PROCEDURE N/A 05/20/2022    Procedure: Intravascular Ultrasound;  Surgeon: Mackenzie Morales MD;  Location: [facility] CATH INVASIVE LOCATION;  Service: Cardiovascular;  Laterality: N/A;    KNEE INCISION AND DRAINAGE Right 06/20/2017    Procedure: RT. KNEE WASHOUT ;  Surgeon: Boyd Coyne MD;  Location: [facility] MAIN OR;  Service:     MEDIAL BRANCH BLOCK Bilateral 12/17/2021    Procedure: LUMBAR MEDIAL BRANCH BLOCK bilateral ~L4-S1 x2 (~2 weeks apart);  Surgeon: Christina Villaseñor MD;  Location: [facility] MAIN OR;  Service: Pain Management;  Laterality: Bilateral;    MEDIAL BRANCH BLOCK Bilateral  01/03/2022    Procedure: LUMBAR MEDIAL BRANCH BLOCK bilateral ~L4-S1 x2 (~2 weeks apart);  Surgeon: Christina Villaseñor MD;  Location: [facility] MAIN OR;  Service: Pain Management;  Laterality: Bilateral;    WV ARTHRP KNE CONDYLE&PLATU MEDIAL&LAT COMPARTMENTS Right 06/15/2017    Procedure: RT TOTAL KNEE ARTHROPLASTY;  Surgeon: Boyd Coyne MD;  Location: [facility] MAIN OR;  Service: Orthopedics    RADIOFREQUENCY ABLATION Bilateral 01/10/2022    Procedure: RADIOFREQUENCY ABLATION NERVES Bilateral L4-S1;  Surgeon: Christina Villaseñor MD;  Location: [facility] MAIN OR;  Service: Pain Management;  Laterality: Bilateral;    SHOULDER SURGERY Right 2016    rotator cuff repair    UPPER GASTROINTESTINAL ENDOSCOPY N/A 10/13/2015    Z-line irregular, normal stomach, normal duodenum-Dr. Ilya Zhao    UPPER GASTROINTESTINAL ENDOSCOPY N/A 02/28/2014    Z-line irregular, normal stomach, normal duodenum-Dr. Ilya Zhao    UPPER GASTROINTESTINAL ENDOSCOPY N/A 11/19/2008    Z-line irregular, chronic gastritis withotu hemorrhage, normal duodenum-Dr. Ilya Zhao    UPPER GASTROINTESTINAL ENDOSCOPY N/A 06/22/2006    LA Grade A reflux esophagitis, non-bleeding erythematous gastropathy, normal duodenum-Dr. Ilya Zhao          Current Outpatient Medications:     ARIPiprazole (ABILIFY) 5 MG tablet, Take 1.5 tablets by mouth Daily., Disp: , Rfl:     aspirin 81 MG EC tablet, Take 1 tablet by mouth Daily., Disp: 30 tablet, Rfl: 6    carvedilol (COREG) 25 MG tablet, Take 1 tablet by mouth Every 12 (Twelve) Hours., Disp: 180 tablet, Rfl: 3    clonazePAM (KlonoPIN) 0.5 MG tablet, Take 1 tablet by mouth Daily As Needed for Anxiety., Disp: 30 tablet, Rfl: 2    clopidogrel (PLAVIX) 75 MG tablet, Take 1 tablet by mouth Daily., Disp: 90 tablet, Rfl: 2    Continuous Blood Gluc Sensor (Dexcom G7 Sensor) misc, Use., Disp: , Rfl:     dorzolamide-timolol (COSOPT) 22.3-6.8 MG/ML ophthalmic solution, Apply 1 drop to eye(s) to both eyes 2 (Two) Times a Day., Disp: 20 mL,  Rfl: 3    doxazosin (CARDURA) 4 MG tablet, TAKE 1 TABLET BY MOUTH EVERY DAY AT NIGHT, Disp: 90 tablet, Rfl: 4    escitalopram (LEXAPRO) 20 MG tablet, Take 1 tablet by mouth Daily., Disp: 90 tablet, Rfl: 3    esomeprazole (nexIUM) 40 MG capsule, Take 1 capsule by mouth Every Morning Before Breakfast., Disp: 30 capsule, Rfl: 3    gabapentin (NEURONTIN) 300 MG capsule, Take 1 capsule every day by oral route in the evening for 30 days. (Patient not taking: Reported on 4/19/2024), Disp: , Rfl:     glucose blood test strip, Use to check blood sugars 1-2 times a day. E11.65, Disp: 200 each, Rfl: 2    glucose monitor monitoring kit, Use 1 each Take As Directed. Use to check blood sugars 1-2 times a day. E11.65, Disp: 1 each, Rfl: 0    hydroCHLOROthiazide (HYDRODIURIL) 12.5 MG tablet, Take 1 tablet by mouth Daily., Disp: , Rfl:     HYDROcodone-acetaminophen (NORCO) 5-325 MG per tablet, , Disp: , Rfl:     Ingrezza 60 MG capsule, Take 1 capsule by mouth Daily., Disp: , Rfl:     Insulin Glargine, 1 Unit Dial, (Toujeo SoloStar) 300 UNIT/ML solution pen-injector injection, INJECT 65 UNITS UNDER THE SKIN DAILY, Disp: 18 mL, Rfl: 3    Insulin Lispro, 1 Unit Dial, (HUMALOG) 100 UNIT/ML solution pen-injector, INJECT SUBCUTANEOUS AS DIRECTED BY PROVIDER DOSAGE IS ATTACHED MAX IS 40 UNITS PER DAY (Patient taking differently: INJECT SUBCUTANEOUS AS DIRECTED BY PROVIDER DOSAGE IS ATTACHED MAX IS 40 UNITS PER DAY  **10 units), Disp: 9 mL, Rfl: 0    Insulin Pen Needle 31G X 4 MM misc, Use 1 each Daily., Disp: 100 each, Rfl: 3    Lancets misc, Use to check blood sugars 1-2 times a day. E11.65, Disp: 200 each, Rfl: 2    latanoprost (XALATAN) 0.005 % ophthalmic solution, Apply 1 drop to  each eye Daily. (Patient taking differently: Apply 1 drop to eye(s) as directed by provider Every Night.), Disp: 7.5 mL, Rfl: 3    metFORMIN (GLUCOPHAGE) 500 MG tablet, Take 2 tablets by mouth Daily With Breakfast. Indications: start in 48 hours, Disp: 180  tablet, Rfl: 3    methocarbamol (Robaxin) 500 MG tablet, Take 1 tablet by mouth As Needed (Take as needed for pain)., Disp: 90 tablet, Rfl: 0    pioglitazone (ACTOS) 15 MG tablet, Take 1 tablet by mouth Daily., Disp: 90 tablet, Rfl: 2    rosuvastatin (CRESTOR) 40 MG tablet, Take 1 tablet by mouth Daily., Disp: 90 tablet, Rfl: 3    Semaglutide, 2 MG/DOSE, (OZEMPIC) 8 MG/3ML solution pen-injector, Inject 2 mg under the skin into the appropriate area as directed 1 (One) Time Per Week., Disp: 3 mL, Rfl: 5    sildenafil (VIAGRA) 100 MG tablet, Take 1 tablet by mouth Daily As Needed for Erectile Dysfunction. Take 30 minutes to 4 hours prior to sexual activity., Disp: 50 tablet, Rfl: 3     Allergies   Allergen Reactions    Nsaids Other (See Comments)     Renal failure    Hydralazine Myalgia    Norvasc [Amlodipine] Swelling    Zetia [Ezetimibe] Nausea And Vomiting    Ace Inhibitors Angioedema    Lisinopril Angioedema    Testosterone Myalgia        Social History     Socioeconomic History    Marital status:      Spouse name: Micaela    Number of children: 1    Years of education: College   Tobacco Use    Smoking status: Never     Passive exposure: Never    Smokeless tobacco: Never    Tobacco comments:     CAFFEINE USE: 2 CUPS COFFEE DAILY   Vaping Use    Vaping status: Never Used   Substance and Sexual Activity    Alcohol use: Yes     Alcohol/week: 6.0 standard drinks of alcohol     Types: 2 Glasses of wine, 2 Cans of beer, 1 Shots of liquor, 1 Drinks containing 0.5 oz of alcohol per week     Comment: occasional 2-4 DRINKS PER WEEK    Drug use: No    Sexual activity: Defer     Partners: Female     Birth control/protection: Post-menopausal        Family History   Problem Relation Age of Onset    Lupus Sister     Thyroid disease Sister     Heart disease Other     Hypertension Other     Arthritis Mother     Hyperlipidemia Mother     Hypertension Mother     Thyroid disease Mother     Lupus Mother     Vision loss Mother      Heart disease Father     Arthritis Father     Other Father         Vascular disease    Lupus Father     Depression Father     Alcohol abuse Father     Dementia Father     Hypertension Father     Heart disease Brother     Heart attack Brother 40    Thyroid disease Sister     Arthritis Brother     Malig Hyperthermia Neg Hx         Review of Systems   Musculoskeletal:  Positive for back pain (walking and standing) and gait problem.   Neurological:  Positive for weakness (legs and arms). Negative for light-headedness and numbness.   All other systems reviewed and are negative.      Objective     There were no vitals filed for this visit.  There is no height or weight on file to calculate BMI.      Physical Exam  Neurologic Exam        Assessment & Plan   Independent Review of Radiographic Studies:      I personally reviewed the images from the following studies.    ***    Medical Decision Making:      ***  There are no diagnoses linked to this encounter.  No follow-ups on file.

## 2024-05-30 DIAGNOSIS — Z79.4 TYPE 2 DIABETES MELLITUS WITH HYPERGLYCEMIA, WITH LONG-TERM CURRENT USE OF INSULIN: Primary | ICD-10-CM

## 2024-05-30 DIAGNOSIS — E11.65 TYPE 2 DIABETES MELLITUS WITH HYPERGLYCEMIA, WITH LONG-TERM CURRENT USE OF INSULIN: Primary | ICD-10-CM

## 2024-05-30 RX ORDER — PEN NEEDLE, DIABETIC 32GX 5/32"
NEEDLE, DISPOSABLE MISCELLANEOUS
COMMUNITY
End: 2024-05-30 | Stop reason: SDUPTHER

## 2024-05-30 RX ORDER — PEN NEEDLE, DIABETIC 32GX 5/32"
1 NEEDLE, DISPOSABLE MISCELLANEOUS 3 TIMES DAILY
Qty: 300 EACH | Refills: 3 | Status: SHIPPED | OUTPATIENT
Start: 2024-05-30

## 2024-05-31 ENCOUNTER — PREP FOR SURGERY (OUTPATIENT)
Dept: OTHER | Facility: HOSPITAL | Age: 69
End: 2024-05-31
Payer: MEDICARE

## 2024-05-31 ENCOUNTER — OFFICE VISIT (OUTPATIENT)
Dept: NEUROSURGERY | Facility: CLINIC | Age: 69
End: 2024-05-31
Payer: MEDICARE

## 2024-05-31 VITALS
OXYGEN SATURATION: 95 % | HEART RATE: 82 BPM | RESPIRATION RATE: 16 BRPM | TEMPERATURE: 96.9 F | WEIGHT: 267 LBS | SYSTOLIC BLOOD PRESSURE: 118 MMHG | DIASTOLIC BLOOD PRESSURE: 80 MMHG | BODY MASS INDEX: 31.66 KG/M2

## 2024-05-31 DIAGNOSIS — G99.2 STENOSIS OF CERVICAL SPINE WITH MYELOPATHY: Primary | ICD-10-CM

## 2024-05-31 DIAGNOSIS — Z95.5 S/P PRIMARY ANGIOPLASTY WITH CORONARY STENT: ICD-10-CM

## 2024-05-31 DIAGNOSIS — M48.02 STENOSIS OF CERVICAL SPINE WITH MYELOPATHY: Primary | ICD-10-CM

## 2024-05-31 DIAGNOSIS — Z01.818 PREOP EXAMINATION: ICD-10-CM

## 2024-05-31 DIAGNOSIS — D68.9 COAGULATION DEFECT, UNSPECIFIED: ICD-10-CM

## 2024-05-31 PROCEDURE — 99214 OFFICE O/P EST MOD 30 MIN: CPT | Performed by: NEUROLOGICAL SURGERY

## 2024-05-31 PROCEDURE — 3074F SYST BP LT 130 MM HG: CPT | Performed by: NEUROLOGICAL SURGERY

## 2024-05-31 PROCEDURE — 3079F DIAST BP 80-89 MM HG: CPT | Performed by: NEUROLOGICAL SURGERY

## 2024-05-31 PROCEDURE — 1160F RVW MEDS BY RX/DR IN RCRD: CPT | Performed by: NEUROLOGICAL SURGERY

## 2024-05-31 PROCEDURE — 1159F MED LIST DOCD IN RCRD: CPT | Performed by: NEUROLOGICAL SURGERY

## 2024-05-31 NOTE — H&P (VIEW-ONLY)
Subjective   History of Present Illness: Isaias Monaco is a 69 y.o. male presents for follow-up regarding his cervical stenosis and myelopathy.  He reports that he is having worsening dexterity in his hands.  He reports the weakness in his handgrip.  Decreased sensation in his hands.  Instability while walking.  He reports that all the symptoms have progressed over the past several months.  He was recently seen by his cardiologist and cleared to be off of aspirin and Plavix starting June 24.  We discussed the risks and benefits of a posterior cervical decompression and fusion including, but not limited to the risk of infection, hemorrhage, CSF leak, spinal cord injury.  He expressed understanding of the risks and benefits and has elected to proceed at the next available date.    History of Present Illness    The following portions of the patient's history were reviewed and updated as appropriate: allergies, current medications, past family history, past medical history, past social history, past surgical history, and problem list.    Past Medical History:   Diagnosis Date    Anemia     post hemorrhagic    Angioedema 02/21/2016    Secondary to ACE inhibitor    Anxiety     Arthritis     CAD (coronary artery disease)     Colon polyps     Diabetes mellitus, type 2     GERD (gastroesophageal reflux disease)     Glaucoma     Hematoma     High cholesterol     History of foreign travel 12/2017; 08/2018    [place], [place], [place], [place], [place] and [place]; [place]    Hyperlipidemia     Hypertension     Hypogonadism in male 09/28/2016    Male erectile disorder     Microalbuminuria     Myocardial infarction     Osteoarthritis     knee    Spinal stenosis of lumbar region without neurogenic claudication 06/02/2021    Tubular adenoma of colon 11/01/2017    Vitamin D deficiency         Past Surgical History:   Procedure Laterality Date    CARDIAC CATHETERIZATION N/A 05/15/2006    Dr. Mini Camarillo     CARDIAC CATHETERIZATION N/A 03/10/2020    Procedure: Left Heart Cath;  Surgeon: Miguel Ángel Karimi MD;  Location:  [facility];  Service: Cardiology;  Laterality: N/A;    CARDIAC CATHETERIZATION N/A 03/10/2020    Procedure: Stent DAVONTE coronary;  Surgeon: Miguel Ángel Karimi MD;  Location:  [facility];  Service: Cardiology;  Laterality: N/A;    CARDIAC CATHETERIZATION N/A 03/10/2020    Procedure: Coronary angiography;  Surgeon: Miguel Ángel Karimi MD;  Location:  [facility];  Service: Cardiology;  Laterality: N/A;    CARDIAC CATHETERIZATION N/A 03/10/2020    Procedure: Left ventriculography;  Surgeon: Miguel Ángel Karimi MD;  Location:  [facility];  Service: Cardiology;  Laterality: N/A;    CARDIAC CATHETERIZATION N/A 05/20/2022    Procedure: Left Heart Cath;  Surgeon: Mackenzie Morales MD;  [facility];  Service: Cardiovascular;  Laterality: N/A;    CARDIAC CATHETERIZATION N/A 05/20/2022    Procedure: Coronary angiography;  Surgeon: Mackenzie Morales MD;  Location:  [facility];  Service: Cardiovascular;  Laterality: N/A;    CARDIAC CATHETERIZATION N/A 05/20/2022    Procedure: Percutaneous Coronary Intervention;  Surgeon: Mackenzie Morales MD;  Location:  [facility];  Service: Cardiovascular;  Laterality: N/A;    CARDIAC CATHETERIZATION N/A 05/24/2022    Procedure: Percutaneous Coronary Intervention;  Surgeon: Miguel Ángel Karimi MD;  [facility];  Service: Cardiovascular;  Laterality: N/A;  LAD and Cx    CARDIAC CATHETERIZATION N/A 05/24/2022    Procedure: Stent DAVONTE coronary;  Surgeon: Miguel Ángel Karimi MD;  [facility];  Service: Cardiovascular;  Laterality: N/A;    CARDIAC CATHETERIZATION N/A 05/24/2022    Procedure: Resting Full Cycle Ratio;  Surgeon: Miguel Ángel Karimi MD;  [facility];  Service: Cardiovascular;  Laterality: N/A;    CARDIAC CATHETERIZATION N/A 3/19/2024    Procedure: Left Heart Cath;  Surgeon: Miguel Ángel Karimi MD;  Location:  [facility];  Service: Cardiovascular;  Laterality: N/A;    CARDIAC CATHETERIZATION N/A 3/19/2024    Procedure: Percutaneous Coronary Intervention;  Surgeon: Miguel Ángel Karimi MD;  Location:  [facility];  Service: Cardiovascular;  Laterality: N/A;    CARDIAC CATHETERIZATION N/A 3/19/2024    Procedure: Stent DAVONTE coronary;  Surgeon: Miguel Ángel Karimi MD;  Location:  [facility];  Service: Cardiovascular;  Laterality: N/A;    COLONOSCOPY N/A 02/22/2006    Bilobed polyp at 30 cm, hemorrhoids-Dr. Ilya Zhao    COLONOSCOPY N/A 02/28/2014    Normal ileum, one 6 mm polyp in the mid transverse colon-Dr. Ilya Zhao    COLONOSCOPY N/A 11/19/2008    Ela ileum, two 3 to 4 mm polyps, non-bleeding internal hemorrhoids, repeat in 5 years-Dr. Ilya Zhao    COLONOSCOPY N/A 10/31/2017    Procedure: COLONOSCOPY WITH POLYPECTOMY (COLD BIOPSY);  Surgeon: Ilya Zhao MD;  Location: [facility] ENDOSCOPY;  Service:     COLONOSCOPY N/A 05/21/2021    Procedure: Colonoscopy into cecum and terminal ileum with cold biopsy polypectomy;  Surgeon: Ilya Zhao MD;  Location: [facility] ENDOSCOPY;  Service: Gastroenterology;  Laterality: N/A;  Pre op: History of Polyps  Post op: Polyp    CORONARY ANGIOPLASTY WITH STENT PLACEMENT  2007, 2012, 2015    cardiac stents x3 occasions    ENDOSCOPY N/A 10/04/2017    Procedure: ESOPHAGOGASTRODUODENOSCOPY;  Surgeon: Boyd Guidry MD;  [facility];  Service:     ENDOSCOPY N/A 05/21/2021    Procedure: ESOPHAGOGASTRODUODENOSCOPY with biopsies;  Surgeon: Ilya Zhao MD;  Location: [facility] ENDOSCOPY;  Service: Gastroenterology;  Laterality: N/A;  Pre op: GERD  Post op: Irregular  Z-Line, Hiatal Hernia, Gastritis    ENDOSCOPY N/A 8/25/2023    Procedure:  ESOPHAGOGASTRODUODENOSCOPY with biopsy;  Surgeon: Ilya Zhao MD;  Location: [facility] ENDOSCOPY;  Service: Gastroenterology;  Laterality: N/A;  errosive gastritis    EPIDURAL BLOCK      INTERVENTIONAL RADIOLOGY PROCEDURE N/A 05/20/2022    Procedure: Intravascular Ultrasound;  Surgeon: Mackenzie Morales MD;  [facility];  Service: Cardiovascular;  Laterality: N/A;    KNEE INCISION AND DRAINAGE Right 06/20/2017    Procedure: RT. KNEE WASHOUT ;  Surgeon: Boyd Coyne MD;  [facility];  Service:     MEDIAL BRANCH BLOCK Bilateral 12/17/2021    Procedure: LUMBAR MEDIAL BRANCH BLOCK bilateral ~L4-S1 x2 (~2 weeks apart);  Surgeon: Christina Villaseñor MD;  Location: [facility];  Service: Pain Management;  Laterality: Bilateral;    MEDIAL BRANCH BLOCK Bilateral 01/03/2022    Procedure: LUMBAR MEDIAL BRANCH BLOCK bilateral ~L4-S1 x2 (~2 weeks apart);  Surgeon: Christina Villaseñor MD;  Location: [facility];  Service: Pain Management;  Laterality: Bilateral;    SC ARTHRP KNE CONDYLE&PLATU MEDIAL&LAT COMPARTMENTS Right 06/15/2017    Procedure: RT TOTAL KNEE ARTHROPLASTY;  Surgeon: Boyd Coyne MD;  [facility];  Service: Orthopedics    RADIOFREQUENCY ABLATION Bilateral 01/10/2022    Procedure: RADIOFREQUENCY ABLATION NERVES Bilateral L4-S1;  Surgeon: Christina Villaseñor MD;  Location: [facility];  Service: Pain Management;  Laterality: Bilateral;    SHOULDER SURGERY Right 2016    rotator cuff repair    UPPER GASTROINTESTINAL ENDOSCOPY N/A 10/13/2015    Z-line irregular, normal stomach, normal duodenum-Dr. Ilya Zhao    UPPER GASTROINTESTINAL ENDOSCOPY N/A 02/28/2014    Z-line irregular, normal stomach, normal duodenum-Dr. Ilya Zhao    UPPER GASTROINTESTINAL ENDOSCOPY N/A 11/19/2008    Z-line irregular, chronic gastritis withotu hemorrhage, normal duodenum-Dr. Ilya Zhao    UPPER GASTROINTESTINAL ENDOSCOPY N/A 06/22/2006    LA Grade A reflux esophagitis,  non-bleeding erythematous gastropathy, normal duodenum-Dr. Ilya Zhao          Current Outpatient Medications:     ARIPiprazole (ABILIFY) 5 MG tablet, Take 1.5 tablets by mouth Daily., Disp: , Rfl:     aspirin 81 MG EC tablet, Take 1 tablet by mouth Daily., Disp: 30 tablet, Rfl: 6    carvedilol (COREG) 25 MG tablet, Take 1 tablet by mouth Every 12 (Twelve) Hours. (Patient taking differently: Take 0.5 tablets by mouth 2 (Two) Times a Day With Meals.), Disp: 180 tablet, Rfl: 3    clonazePAM (KlonoPIN) 0.5 MG tablet, Take 1 tablet by mouth Daily As Needed for Anxiety., Disp: 30 tablet, Rfl: 2    clopidogrel (PLAVIX) 75 MG tablet, Take 1 tablet by mouth Daily., Disp: 90 tablet, Rfl: 2    Continuous Blood Gluc Sensor (Dexcom G7 Sensor) misc, Use., Disp: , Rfl:     dorzolamide-timolol (COSOPT) 22.3-6.8 MG/ML ophthalmic solution, Apply 1 drop to eye(s) to both eyes 2 (Two) Times a Day., Disp: 20 mL, Rfl: 3    doxazosin (CARDURA) 4 MG tablet, TAKE 1 TABLET BY MOUTH EVERY DAY AT NIGHT, Disp: 90 tablet, Rfl: 4    escitalopram (LEXAPRO) 20 MG tablet, Take 1 tablet by mouth Daily., Disp: 90 tablet, Rfl: 3    esomeprazole (nexIUM) 40 MG capsule, Take 1 capsule by mouth Every Morning Before Breakfast., Disp: 30 capsule, Rfl: 3    glucose blood test strip, Use to check blood sugars 1-2 times a day. E11.65, Disp: 200 each, Rfl: 2    glucose monitor monitoring kit, Use 1 each Take As Directed. Use to check blood sugars 1-2 times a day. E11.65, Disp: 1 each, Rfl: 0    HYDROcodone-acetaminophen (NORCO) 5-325 MG per tablet, , Disp: , Rfl:     Ingrezza 60 MG capsule, Take 1 capsule by mouth Daily., Disp: , Rfl:     Insulin Glargine, 1 Unit Dial, (Toujeo SoloStar) 300 UNIT/ML solution pen-injector injection, INJECT 65 UNITS UNDER THE SKIN DAILY, Disp: 18 mL, Rfl: 3    metFORMIN (GLUCOPHAGE) 500 MG tablet, Take 2 tablets by mouth Daily With Breakfast. Indications: start in 48 hours, Disp: 180 tablet, Rfl: 3    pioglitazone (ACTOS) 15 MG  tablet, Take 1 tablet by mouth Daily., Disp: 90 tablet, Rfl: 2    rosuvastatin (CRESTOR) 40 MG tablet, Take 1 tablet by mouth Daily., Disp: 90 tablet, Rfl: 3    Semaglutide, 2 MG/DOSE, (OZEMPIC) 8 MG/3ML solution pen-injector, Inject 2 mg under the skin into the appropriate area as directed 1 (One) Time Per Week., Disp: 3 mL, Rfl: 5    sildenafil (VIAGRA) 100 MG tablet, Take 1 tablet by mouth Daily As Needed for Erectile Dysfunction. Take 30 minutes to 4 hours prior to sexual activity., Disp: 50 tablet, Rfl: 3    gabapentin (NEURONTIN) 300 MG capsule, Take 1 capsule every day by oral route in the evening for 30 days. (Patient not taking: Reported on 4/19/2024), Disp: , Rfl:     hydroCHLOROthiazide (HYDRODIURIL) 12.5 MG tablet, Take 1 tablet by mouth Daily., Disp: , Rfl:     Insulin Lispro, 1 Unit Dial, (HUMALOG) 100 UNIT/ML solution pen-injector, INJECT SUBCUTANEOUS AS DIRECTED BY PROVIDER DOSAGE IS ATTACHED MAX IS 40 UNITS PER DAY (Patient taking differently: INJECT SUBCUTANEOUS AS DIRECTED BY PROVIDER DOSAGE IS ATTACHED MAX IS 40 UNITS PER DAY  **10 units), Disp: 9 mL, Rfl: 0    Insulin Pen Needle (BD Pen Needle Marissa 2nd Gen) 32G X 4 MM misc, Inject 1 each under the skin into the appropriate area as directed 3 (Three) Times a Day., Disp: 300 each, Rfl: 3    Insulin Pen Needle 31G X 4 MM misc, Use 1 each Daily., Disp: 100 each, Rfl: 3    Lancets misc, Use to check blood sugars 1-2 times a day. E11.65, Disp: 200 each, Rfl: 2    latanoprost (XALATAN) 0.005 % ophthalmic solution, Apply 1 drop to  each eye Daily. (Patient taking differently: Apply 1 drop to eye(s) as directed by provider Every Night.), Disp: 7.5 mL, Rfl: 3    methocarbamol (Robaxin) 500 MG tablet, Take 1 tablet by mouth As Needed (Take as needed for pain). (Patient not taking: Reported on 5/31/2024), Disp: 90 tablet, Rfl: 0     Allergies   Allergen Reactions    Nsaids Other (See Comments)     Renal failure    Hydralazine Myalgia    Norvasc [Amlodipine]  Swelling    Zetia [Ezetimibe] Nausea And Vomiting    Ace Inhibitors Angioedema    Lisinopril Angioedema    Testosterone Myalgia        Social History     Socioeconomic History    Marital status:      Spouse name: Micaela    Number of children: 1    Years of education: College   Tobacco Use    Smoking status: Never     Passive exposure: Never    Smokeless tobacco: Never    Tobacco comments:     CAFFEINE USE: 2 CUPS COFFEE DAILY   Vaping Use    Vaping status: Never Used   Substance and Sexual Activity    Alcohol use: Yes     Alcohol/week: 6.0 standard drinks of alcohol     Types: 2 Glasses of wine, 2 Cans of beer, 1 Shots of liquor, 1 Drinks containing 0.5 oz of alcohol per week     Comment: occasional 2-4 DRINKS PER WEEK    Drug use: No    Sexual activity: Defer     Partners: Female     Birth control/protection: Post-menopausal        Family History   Problem Relation Age of Onset    Lupus Sister     Thyroid disease Sister     Heart disease Other     Hypertension Other     Arthritis Mother     Hyperlipidemia Mother     Hypertension Mother     Thyroid disease Mother     Lupus Mother     Vision loss Mother     Heart disease Father     Arthritis Father     Other Father         Vascular disease    Lupus Father     Depression Father     Alcohol abuse Father     Dementia Father     Hypertension Father     Heart disease Brother     Heart attack Brother 40    Thyroid disease Sister     Arthritis Brother     Malig Hyperthermia Neg Hx         Review of Systems    Objective     Vitals:    05/31/24 1000   BP: 118/80   BP Location: Left arm   Patient Position: Sitting   Cuff Size: Adult   Pulse: 82   Resp: 16   Temp: 96.9 °F (36.1 °C)   SpO2: 95%   Weight: 121 kg (267 lb)     Body mass index is 31.66 kg/m².      Physical Exam  Neurologic Exam  Awake, alert, oriented x3  Pupils equal round reactive to light  Extraocular muscles intact  Face symmetric  Speech is fluent and clear  No pronator drift  Motor exam  Bilateral  deltoids 5/5, bilateral biceps 5/5, bilateral triceps 5/5, bilateral wrist extension 5/5 bilateral hand  4/5  Bilateral hip flexion 5/5, bilateral knee extension 5/5, bilateral DF/PF 5/5  No clonus  Bilateral Allyssa's reflex  2+ bilateral patellar reflexes  2+ bilateral biceps reflex  Slightly unsteady gait  Able to detect  light touch in all 4 extremities        Assessment & Plan   Independent Review of Radiographic Studies:      I personally reviewed the images from the following studies.  MRI of the cervical spine from 2024 and CT of the cervical spine from May 20, 2024  The follow-up MRI images were reviewed and demonstrate severe cervical stenosis at C4-5, moderate stenosis at C3-4 and C5-6.  There is OPLL evident at the C4-5 level.    Medical Decision Makin-year-old male with severe cervical stenosis and signs and symptoms of myelopathy  -He has OPLL and a posterior approach is preferred.  We discussed the risks and benefits of a posterior cervical fusion.  I will plan to have him follow-up at the next available date for a C3-6 decompression and fusion.  He will need to hold his aspirin and Plavix for 7 days before the surgery.  We will try to restart the aspirin as soon as possible  -He does not want blood products received during the surgery.  We will plan to use Cell Saver.  Diagnoses and all orders for this visit:    1. Stenosis of cervical spine with myelopathy (Primary)      Return C3-C6 posterior cervical decompression and fusion.    I spent 30 minutes reviewing the medical record, reviewing the MRI images, reviewing the CT images, discussing the risks and benefits of surgery

## 2024-06-02 DIAGNOSIS — E78.5 HYPERLIPIDEMIA, UNSPECIFIED HYPERLIPIDEMIA TYPE: ICD-10-CM

## 2024-06-03 RX ORDER — ROSUVASTATIN CALCIUM 40 MG/1
40 TABLET, COATED ORAL DAILY
Qty: 90 TABLET | Refills: 3 | Status: SHIPPED | OUTPATIENT
Start: 2024-06-03

## 2024-06-06 ENCOUNTER — TRANSCRIBE ORDERS (OUTPATIENT)
Dept: ADMINISTRATIVE | Facility: HOSPITAL | Age: 69
End: 2024-06-06
Payer: MEDICARE

## 2024-06-06 DIAGNOSIS — N18.31 STAGE 3A CHRONIC KIDNEY DISEASE (CKD): Primary | ICD-10-CM

## 2024-06-07 ENCOUNTER — TELEPHONE (OUTPATIENT)
Age: 69
End: 2024-06-07
Payer: MEDICARE

## 2024-06-07 NOTE — TELEPHONE ENCOUNTER
I called and spoke with the pt/his wife to get him rescheduled with Dr. Karimi.    When I was on the phone they let me know that he had to stop the Rosuvastatin due to serious muscle cramping.     They were concerned about him being off of a cholesterol medication with his stents placed earlier this year. So they wanted to ask before he sees you, if there is something else he can take instead.    He has also had myalgia with Atorvastatin and Ezetimibe caused GI issues. But he has never tried Simvastatin or Pravastatin, etc.    Please advise.    Thanks,  Jenny

## 2024-06-12 ENCOUNTER — ANESTHESIA EVENT (OUTPATIENT)
Dept: PERIOP | Facility: HOSPITAL | Age: 69
End: 2024-06-12
Payer: MEDICARE

## 2024-06-13 ENCOUNTER — OFFICE VISIT (OUTPATIENT)
Age: 69
End: 2024-06-13
Payer: MEDICARE

## 2024-06-13 VITALS
HEIGHT: 77 IN | WEIGHT: 260 LBS | HEART RATE: 82 BPM | BODY MASS INDEX: 30.7 KG/M2 | SYSTOLIC BLOOD PRESSURE: 138 MMHG | DIASTOLIC BLOOD PRESSURE: 90 MMHG

## 2024-06-13 DIAGNOSIS — Z79.4 TYPE 2 DIABETES MELLITUS WITH MICROALBUMINURIA, WITH LONG-TERM CURRENT USE OF INSULIN: ICD-10-CM

## 2024-06-13 DIAGNOSIS — T82.855A CORONARY STENT RESTENOSIS, INITIAL ENCOUNTER: ICD-10-CM

## 2024-06-13 DIAGNOSIS — I25.10 CORONARY ARTERY DISEASE INVOLVING NATIVE CORONARY ARTERY OF NATIVE HEART WITHOUT ANGINA PECTORIS: Primary | ICD-10-CM

## 2024-06-13 DIAGNOSIS — E11.29 TYPE 2 DIABETES MELLITUS WITH MICROALBUMINURIA, WITH LONG-TERM CURRENT USE OF INSULIN: ICD-10-CM

## 2024-06-13 DIAGNOSIS — E78.5 HYPERLIPIDEMIA LDL GOAL <70: ICD-10-CM

## 2024-06-13 DIAGNOSIS — R80.9 TYPE 2 DIABETES MELLITUS WITH MICROALBUMINURIA, WITH LONG-TERM CURRENT USE OF INSULIN: ICD-10-CM

## 2024-06-13 DIAGNOSIS — Z95.5 S/P PRIMARY ANGIOPLASTY WITH CORONARY STENT: ICD-10-CM

## 2024-06-13 DIAGNOSIS — I10 ESSENTIAL HYPERTENSION: ICD-10-CM

## 2024-06-13 NOTE — PROGRESS NOTES
Isaias Monaco  1955  Date of Office Visit: 06/13/24  Encounter Provider: Miguel Ángel Karimi MD  Place of Service: [facility]      CHIEF COMPLAINT:  Coronary disease with history of left main bifurcation stenting    HISTORY OF PRESENT ILLNESS:  Very pleasant 69-year-old male with a medical history of coronary artery disease with prior PCI to an RCA , PCI to 99% ostial left circumflex coronary artery stenosis in 2020, and left main bifurcation stenting in May 2022 with DK crush technique.  Echocardiogram prior to this was normal.  He came back 3/19/2024 and underwent an elective cardiac catheterization secondary to angina.  He had an ostial 99% circumflex in-stent restenosis and 60% ostial LAD in-stent restenosis at that time.  He also had a calcified 50% distal in-stent restenosis of the left main.  He underwent angioplasty and shockwave balloon lithotripsy of the distal left main to proximal LAD and PCI of the distal left main to proximal circumflex followed by kissing balloon angioplasty.  He also follows with Dr. Miguel and for plan for cervical decompression.  He has stopped multiple statins secondary to severe myalgias    He presents today and states that he is feeling well from a cardiac standpoint.  He denies any chest pain or dyspnea on exertion.  He denies neck pain but does have some weakness in his arm and hand and it sounds like they are going on a posterior approach in the near future to his cervical spine    Review of Systems   Constitutional: Negative for fever and malaise/fatigue.   HENT:  Negative for nosebleeds and sore throat.    Eyes:  Negative for blurred vision and double vision.   Cardiovascular:  Negative for chest pain, claudication, palpitations and syncope.   Respiratory:  Negative for cough, shortness of breath and snoring.    Endocrine: Negative for cold intolerance, heat intolerance and polydipsia.   Skin:  Negative for itching,  poor wound healing and rash.   Musculoskeletal:  Negative for joint pain, joint swelling, muscle weakness and myalgias.   Gastrointestinal:  Negative for abdominal pain, melena, nausea and vomiting.   Neurological:  Negative for light-headedness, loss of balance, seizures, vertigo and weakness.   Psychiatric/Behavioral:  Negative for altered mental status and depression.        Past Medical History:   Diagnosis Date    Anemia     post hemorrhagic    Angioedema 02/21/2016    Secondary to ACE inhibitor    Anxiety     Arthritis     CAD (coronary artery disease)     Colon polyps     Diabetes mellitus, type 2     GERD (gastroesophageal reflux disease)     Glaucoma     Hematoma     High cholesterol     History of foreign travel 12/2017; 08/2018    [place], [place], [place], [place], [place] and [place]; [place]    Hyperlipidemia     Hypertension     Hypogonadism in male 09/28/2016    Male erectile disorder     Microalbuminuria     Myocardial infarction     Osteoarthritis     knee    Spinal stenosis of lumbar region without neurogenic claudication 06/02/2021    Tubular adenoma of colon 11/01/2017    Vitamin D deficiency        The following portions of the patient's history were reviewed and updated as appropriate: Social history , Family history, and Surgical history     Current Outpatient Medications on File Prior to Visit   Medication Sig Dispense Refill    ARIPiprazole (ABILIFY) 5 MG tablet Take 1.5 tablets by mouth Daily.      aspirin 81 MG EC tablet Take 1 tablet by mouth Daily. 30 tablet 6    carvedilol (COREG) 25 MG tablet Take 1 tablet by mouth Every 12 (Twelve) Hours. (Patient taking differently: Take 0.5 tablets by mouth 2 (Two) Times a Day With Meals.) 180 tablet 3    clonazePAM (KlonoPIN) 0.5 MG tablet Take 1 tablet by mouth Daily As Needed for Anxiety. 30 tablet 2    clopidogrel (PLAVIX) 75 MG tablet Take 1 tablet by mouth Daily. 90 tablet 2    Continuous Blood Gluc Sensor (Dexcom G7 Sensor) misc Use.       dorzolamide-timolol (COSOPT) 22.3-6.8 MG/ML ophthalmic solution Apply 1 drop to eye(s) to both eyes 2 (Two) Times a Day. 20 mL 3    doxazosin (CARDURA) 4 MG tablet TAKE 1 TABLET BY MOUTH EVERY DAY AT NIGHT 90 tablet 4    escitalopram (LEXAPRO) 20 MG tablet Take 1 tablet by mouth Daily. 90 tablet 3    esomeprazole (nexIUM) 40 MG capsule Take 1 capsule by mouth Every Morning Before Breakfast. 30 capsule 3    gabapentin (NEURONTIN) 300 MG capsule       glucose blood test strip Use to check blood sugars 1-2 times a day. E11.65 200 each 2    glucose monitor monitoring kit Use 1 each Take As Directed. Use to check blood sugars 1-2 times a day. E11.65 1 each 0    HYDROcodone-acetaminophen (NORCO) 5-325 MG per tablet       Ingrezza 60 MG capsule Take 1 capsule by mouth Daily.      Insulin Glargine, 1 Unit Dial, (Toujeo SoloStar) 300 UNIT/ML solution pen-injector injection INJECT 65 UNITS UNDER THE SKIN DAILY 18 mL 3    Insulin Lispro, 1 Unit Dial, (HUMALOG) 100 UNIT/ML solution pen-injector INJECT SUBCUTANEOUS AS DIRECTED BY PROVIDER DOSAGE IS ATTACHED MAX IS 40 UNITS PER DAY (Patient taking differently: INJECT SUBCUTANEOUS AS DIRECTED BY PROVIDER DOSAGE IS ATTACHED MAX IS 40 UNITS PER DAY    **10 units) 9 mL 0    Insulin Pen Needle (BD Pen Needle Marissa 2nd Gen) 32G X 4 MM misc Inject 1 each under the skin into the appropriate area as directed 3 (Three) Times a Day. 300 each 3    Insulin Pen Needle 31G X 4 MM misc Use 1 each Daily. 100 each 3    Lancets misc Use to check blood sugars 1-2 times a day. E11.65 200 each 2    latanoprost (XALATAN) 0.005 % ophthalmic solution Apply 1 drop to  each eye Daily. (Patient taking differently: Apply 1 drop to eye(s) as directed by provider Every Night.) 7.5 mL 3    metFORMIN (GLUCOPHAGE) 500 MG tablet Take 2 tablets by mouth Daily With Breakfast. Indications: start in 48 hours 180 tablet 3    methocarbamol (Robaxin) 500 MG tablet Take 1 tablet by mouth As Needed (Take as needed for  "pain). 90 tablet 0    pioglitazone (ACTOS) 15 MG tablet Take 1 tablet by mouth Daily. 90 tablet 2    Semaglutide, 2 MG/DOSE, (OZEMPIC) 8 MG/3ML solution pen-injector Inject 2 mg under the skin into the appropriate area as directed 1 (One) Time Per Week. 3 mL 5    sildenafil (VIAGRA) 100 MG tablet Take 1 tablet by mouth Daily As Needed for Erectile Dysfunction. Take 30 minutes to 4 hours prior to sexual activity. 50 tablet 3    [DISCONTINUED] hydroCHLOROthiazide (HYDRODIURIL) 12.5 MG tablet Take 1 tablet by mouth Daily.      [DISCONTINUED] rosuvastatin (CRESTOR) 40 MG tablet TAKE 1 TABLET BY MOUTH DAILY 90 tablet 3     No current facility-administered medications on file prior to visit.       Allergies   Allergen Reactions    Nsaids Other (See Comments)     Renal failure    Atorvastatin Myalgia    Hydralazine Myalgia    Norvasc [Amlodipine] Swelling    Zetia [Ezetimibe] Nausea And Vomiting    Ace Inhibitors Angioedema    Lisinopril Angioedema    Testosterone Myalgia       Vitals:    06/13/24 1454   BP: 138/90   Pulse: 82   Weight: 118 kg (260 lb)   Height: 195.6 cm (77\")     Body mass index is 30.83 kg/m².   Constitutional:       Appearance: Well-developed.   Eyes:      General: No scleral icterus.     Conjunctiva/sclera: Conjunctivae normal.   HENT:      Head: Normocephalic and atraumatic.   Neck:      Thyroid: No thyromegaly.      Vascular: Normal carotid pulses. No carotid bruit, hepatojugular reflux or JVD.      Trachea: No tracheal deviation.   Pulmonary:      Effort: No respiratory distress.      Breath sounds: Normal breath sounds. No decreased breath sounds. No wheezing. No rhonchi. No rales.   Chest:      Chest wall: Not tender to palpatation.   Cardiovascular:      Normal rate. Regular rhythm.      No gallop.    Pulses:     Carotid: 2+ bilaterally.     Radial: 2+ bilaterally.     Femoral: 2+ bilaterally.     Dorsalis pedis: 2+ bilaterally.     Posterior tibial: 2+ bilaterally.  Edema:     Peripheral edema " absent.   Abdominal:      General: Bowel sounds are normal. There is no distension.      Palpations: Abdomen is soft.      Tenderness: There is no abdominal tenderness.   Musculoskeletal:         General: No deformity.      Cervical back: Normal range of motion and neck supple. Skin:     Findings: No erythema or rash.   Neurological:      Mental Status: Alert and oriented to person, place, and time.      Sensory: No sensory deficit.   Psychiatric:         Behavior: Behavior normal.         Lab Results   Component Value Date    WBC 9.49 03/20/2024    HGB 13.9 03/20/2024    HCT 41.9 03/20/2024    MCV 86.2 03/20/2024     03/20/2024       Lab Results   Component Value Date    GLUCOSE 122 (H) 04/18/2024    BUN 18 04/18/2024    CREATININE 1.95 (H) 04/18/2024    EGFRRESULT 45.6 (L) 08/08/2023    EGFR 36.6 (L) 04/18/2024    BCR 9.2 04/18/2024    K 3.7 04/18/2024    CO2 29.5 (H) 04/18/2024    CALCIUM 10.2 04/18/2024    PROTENTOTREF 7.9 08/08/2023    ALBUMIN 4.2 04/18/2024    BILITOT 0.5 04/18/2024    AST 43 (H) 04/18/2024    ALT 34 04/18/2024       Lab Results   Component Value Date    GLUCOSE 122 (H) 04/18/2024    CALCIUM 10.2 04/18/2024     04/18/2024    K 3.7 04/18/2024    CO2 29.5 (H) 04/18/2024    CL 98 04/18/2024    BUN 18 04/18/2024    CREATININE 1.95 (H) 04/18/2024    EGFRRESULT 45.6 (L) 08/08/2023    EGFR 36.6 (L) 04/18/2024    BCR 9.2 04/18/2024    ANIONGAP 9.5 04/18/2024       Lab Results   Component Value Date    CHOL 126 03/12/2024    CHLPL 144 08/08/2023    TRIG 135 03/12/2024    HDL 49 03/12/2024    LDL 54 03/12/2024       Procedures     Results for orders placed during the hospital encounter of 04/01/24    Adult Transthoracic Echo Complete W/ Cont if Necessary Per Protocol    Interpretation Summary    Left ventricular systolic function is low normal. Left ventricular ejection fraction appears to be 51 - 55%.    Left ventricular diastolic function was normal.    3/19/24  Conclusions:   1. Left  main: Discrete calcified 50% distal in-stent restenosis.  2. LAD: Discrete calcified 60% ostial in-stent restenosis.  Diffuse 50% mid vessel stenosis  3. LCX: Discrete 99% ostial in-stent restenosis  4. RCA: Discrete 50 to 60% proximal stenosis  5.  Angioplasty and shockwave balloon lithotripsy of the distal left main to proximal LAD with a 4.0 x 12 mm shockwave balloon  6.  Cutting Balloon angioplasty and PCI of the distal left main to proximal circumflex with a 3.25 x 15 mm Xience baldomero point drug-eluting stent.   7.  Kissing balloon angioplasty of the left main bifurcation with a 4.0 x 15 mm compliant and 3.5 x 15 mm noncompliant Takeru balloon       DISCUSSION/SUMMARY  64-year-old male with a medical history of coronary artery disease with prior PCI to the RCA, PCI of the left main bifurcation in 2022 and repeat PCI in 2024 as documented above, essential hypertension, hyperlipidemia, diabetes mellitus type 2, chronic kidney disease, and cervical spine disease with upcoming surgery who presents to me in follow-up.  He is doing well and denies any chest pain or dyspnea.  His blood pressure is acceptable.  His lipid panel had been well-controlled on statin therapy, however he has had multiple allergies typically including myalgias to all statins.    1.  Coronary artery disease: History of prior PCI to RCA( 2018 and ostial circumflex 2020).  Status post PCI to left circumflex and ostial LAD bifurcation 5/24/2022 and March 2024  -Recommend continuing aspirin and Plavix lifelong.  I have recommended continuing aspirin 81 mg p.o. daily if possible at this time around the time of surgery, however if he needs a neurosurgical intervention which it sounds like he does then holding the Plavix would be an elevated but acceptable risk.  It does not sound like the neurosurgical procedure would be able to be done safely on aspirin and have explained the risks of this to the patient and he understands.    2.  Hypertension:  Controlled on carvedilol, Cardura and hydrochlorothiazide    3.  Hyperlipidemia goal LDL < 70: Recent lipid panel in target range, however patient has stopped Crestor therapy.  Recommend Repatha.  Order has been placed.    4.  Diabetes type 2: HbA1c improved..  On oral medication and Ozempic. Followed by endocrinology.    5.  Chronic kidney disease: Follows with Dr. Brenner with nephrology.

## 2024-06-17 ENCOUNTER — HOSPITAL ENCOUNTER (OUTPATIENT)
Dept: ULTRASOUND IMAGING | Facility: HOSPITAL | Age: 69
Discharge: HOME | End: 2024-06-17
Payer: MEDICARE

## 2024-06-17 ENCOUNTER — PRE-ADMISSION TESTING (OUTPATIENT)
Dept: PREADMISSION TESTING | Facility: HOSPITAL | Age: 69
End: 2024-06-17
Payer: MEDICARE

## 2024-06-17 VITALS
HEART RATE: 94 BPM | TEMPERATURE: 97 F | OXYGEN SATURATION: 98 % | DIASTOLIC BLOOD PRESSURE: 83 MMHG | SYSTOLIC BLOOD PRESSURE: 120 MMHG | HEIGHT: 77 IN | BODY MASS INDEX: 31.05 KG/M2 | RESPIRATION RATE: 20 BRPM | WEIGHT: 263 LBS

## 2024-06-17 DIAGNOSIS — D68.9 COAGULATION DEFECT, UNSPECIFIED: ICD-10-CM

## 2024-06-17 DIAGNOSIS — G99.2 STENOSIS OF CERVICAL SPINE WITH MYELOPATHY: ICD-10-CM

## 2024-06-17 DIAGNOSIS — Z01.818 PREOP EXAMINATION: ICD-10-CM

## 2024-06-17 DIAGNOSIS — Z95.5 S/P PRIMARY ANGIOPLASTY WITH CORONARY STENT: ICD-10-CM

## 2024-06-17 DIAGNOSIS — N18.31 STAGE 3A CHRONIC KIDNEY DISEASE (CKD): ICD-10-CM

## 2024-06-17 DIAGNOSIS — M48.02 STENOSIS OF CERVICAL SPINE WITH MYELOPATHY: ICD-10-CM

## 2024-06-17 LAB
ANION GAP SERPL CALCULATED.3IONS-SCNC: 14 MMOL/L (ref 5–15)
APTT PPP: 23.9 SECONDS (ref 22.7–35.4)
BUN SERPL-MCNC: 10 MG/DL (ref 8–23)
BUN/CREAT SERPL: 8.5 (ref 7–25)
CALCIUM SPEC-SCNC: 8.8 MG/DL (ref 8.6–10.5)
CHLORIDE SERPL-SCNC: 102 MMOL/L (ref 98–107)
CO2 SERPL-SCNC: 20 MMOL/L (ref 22–29)
CREAT SERPL-MCNC: 1.17 MG/DL (ref 0.76–1.27)
DEPRECATED RDW RBC AUTO: 49.5 FL (ref 37–54)
EGFRCR SERPLBLD CKD-EPI 2021: 67.5 ML/MIN/1.73
ERYTHROCYTE [DISTWIDTH] IN BLOOD BY AUTOMATED COUNT: 15.3 % (ref 12.3–15.4)
GLUCOSE SERPL-MCNC: 136 MG/DL (ref 65–99)
HCT VFR BLD AUTO: 44.4 % (ref 37.5–51)
HGB BLD-MCNC: 14.4 G/DL (ref 13–17.7)
INR PPP: 1.04 (ref 0.9–1.1)
MCH RBC QN AUTO: 28.5 PG (ref 26.6–33)
MCHC RBC AUTO-ENTMCNC: 32.4 G/DL (ref 31.5–35.7)
MCV RBC AUTO: 87.9 FL (ref 79–97)
PLATELET # BLD AUTO: 177 10*3/MM3 (ref 140–450)
PMV BLD AUTO: 11 FL (ref 6–12)
POTASSIUM SERPL-SCNC: 4 MMOL/L (ref 3.5–5.2)
PROTHROMBIN TIME: 13.8 SECONDS (ref 11.7–14.2)
RBC # BLD AUTO: 5.05 10*6/MM3 (ref 4.14–5.8)
SODIUM SERPL-SCNC: 136 MMOL/L (ref 136–145)
WBC NRBC COR # BLD AUTO: 6.31 10*3/MM3 (ref 3.4–10.8)

## 2024-06-17 PROCEDURE — 85610 PROTHROMBIN TIME: CPT

## 2024-06-17 PROCEDURE — 76775 US EXAM ABDO BACK WALL LIM: CPT

## 2024-06-17 PROCEDURE — 36415 COLL VENOUS BLD VENIPUNCTURE: CPT

## 2024-06-17 PROCEDURE — 80048 BASIC METABOLIC PNL TOTAL CA: CPT

## 2024-06-17 PROCEDURE — 85730 THROMBOPLASTIN TIME PARTIAL: CPT

## 2024-06-17 PROCEDURE — 85027 COMPLETE CBC AUTOMATED: CPT

## 2024-06-17 RX ORDER — CARVEDILOL 25 MG/1
12.5 TABLET ORAL 2 TIMES DAILY WITH MEALS
Qty: 180 TABLET | Refills: 3 | Status: SHIPPED | OUTPATIENT
Start: 2024-06-17 | End: 2024-06-18 | Stop reason: SDUPTHER

## 2024-06-17 RX ORDER — FAMOTIDINE 20 MG/1
20 TABLET, FILM COATED ORAL EVERY EVENING
Status: ON HOLD | COMMUNITY
Start: 2024-03-30

## 2024-06-17 NOTE — DISCHARGE INSTRUCTIONS
"Holding GLP-1 Receptor Agonists Prior to Anesthesia    In order for safe anesthesia, GLP-1 receptor agonist mediations need to be held before the procedure.     What are GLP-1 Receptor Agonists?  Glucagon-like peptide-1 (GLP-1) receptor agonists are approved by the Food and Drug Administration for treatment of type 2 diabetes mellitus and cardiovascular risk reduction. In addition, GLP-1 receptor agonists are also used for weight loss.     Which of my medications are GLP-1 Receptor Agonists?   Exanetide (Byetta®, Bydureon®)  Lixisenatide (Adlyxin®, Soliqua®)  Semaglutide (Wegovy®, Ozempic®, Rybelsus®)  Liraglutide (Saxenda®, Victoza®, Xutolphy®)  Dulaglutide (Trulicity®)   Tirzepatide (Mounjaro®, Zepbound®)    How can GLP-1 Receptor Agonists cause problems during surgery?  GLP-1 agonists can potentially cause adverse gastrointestinal effects, including the consequences of delayed gastric emptying. This can cause the stomach to be full even after refraining from eating and drinking before surgery and can cause regurgitation or \"throwing up\" of the stomach contents during anesthesia. If the contents enter the airway and the lungs, it could cause anesthesia complications and a severe pneumonia.     What should I do prior to my procedure?  According to the recommendations of the American Society of Anesthesiologists:    If you take GLP-1 agonists every day: Hold the medication on the day of the procedure/surgery.   If you take GLP-1 agonists once a week: Hold the medication a week prior to the procedure/surgery    What if I have questions?  If you have concerns or questions about holding your medications prior to your procedure, contact your doctors before stopping your medications.    Modified from the American Society of Anesthesiologists Consensus-Based Guidance on Preoperative Management of Patients (Adults and Children) on Glucagon-Like Peptide-1 (GLP-1) Receptor Agonists June 29,2023     Take the following " medications the morning of surgery:     Coreg   lexapro   nexium   ingrezza  abilify    If you are on prescription narcotic pain medication to control your pain you may also take that medication the morning of surgery.    General Instructions:  Do not eat solid food after midnight the night before surgery.  You may drink clear liquids day of surgery but must stop at least one hour before your hospital arrival time.  It is beneficial for you to have a clear drink that contains carbohydrates the day of surgery.  We suggest a 12 to 20 ounce bottle of Gatorade or Powerade for non-diabetic patients or a 12 to 20 ounce bottle of G2 or Powerade Zero for diabetic patients. (Pediatric patients, are not advised to drink a 12 to 20 ounce carbohydrate drink)    Clear liquids are liquids you can see through.  Nothing red in color.     Plain water                               Sports drinks  Sodas                                   Gelatin (Jell-O)  Fruit juices without pulp such as white grape juice and apple juice  Popsicles that contain no fruit or yogurt  Tea or coffee (no cream or milk added)  Gatorade / Powerade  G2 / Powerade Zero    Infants may have breast milk up to four hours before surgery.  Infants drinking formula may drink formula up to six hours before surgery.   Patients who avoid smoking, chewing tobacco and alcohol for 4 weeks prior to surgery have a reduced risk of post-operative complications.  Quit smoking as many days before surgery as you can.  Do not smoke, use chewing tobacco or drink alcohol the day of surgery.   If applicable bring your C-PAP/ BI-PAP machine in with you to preop day of surgery.  Bring any papers given to you in the doctor’s office.  Wear clean comfortable clothes.  Do not wear contact lenses, false eyelashes or make-up.  Bring a case for your glasses.   Bring crutches or walker if applicable.  Remove all piercings.  Leave jewelry and any other valuables at home.  Hair extensions with  metal clips must be removed prior to surgery.  The Pre-Admission Testing nurse will instruct you to bring medications if unable to obtain an accurate list in Pre-Admission Testing.        If you were given a blood bank ID arm band remember to bring it with you the day of surgery.    Preventing a Surgical Site Infection:  For 2 to 3 days before surgery, avoid shaving with a razor because the razor can irritate skin and make it easier to develop an infection.    Any areas of open skin can increase the risk of a post-operative wound infection by allowing bacteria to enter and travel throughout the body.  Notify your surgeon if you have any skin wounds / rashes even if it is not near the expected surgical site.  The area will need assessed to determine if surgery should be delayed until it is healed.  The night prior to surgery shower using a fresh bar of anti-bacterial soap (such as Dial) and clean washcloth.  Sleep in a clean bed with clean clothing.  Do not allow pets to sleep with you.  Shower on the morning of surgery using a fresh bar of anti-bacterial soap (such as Dial) and clean washcloth.  Dry with a clean towel and dress in clean clothing.  Ask your surgeon if you will be receiving antibiotics prior to surgery.  Make sure you, your family, and all healthcare providers clean their hands with soap and water or an alcohol based hand  before caring for you or your wound.    Day of surgery:6/25/2024  Your arrival time is approximately two hours before your scheduled surgery time.  Upon arrival, a Pre-op nurse and Anesthesiologist will review your health history, obtain vital signs, and answer questions you may have.  The only belongings needed at this time will be a list of your home medications and if applicable your C-PAP/BI-PAP machine.  A Pre-op nurse will start an IV and you may receive medication in preparation for surgery, including something to help you relax.     Please be aware that surgery does  come with discomfort.  We want to make every effort to control your discomfort so please discuss any uncontrolled symptoms with your nurse.   Your doctor will most likely have prescribed pain medications.      If you are going home after surgery you will receive individualized written care instructions before being discharged.  A responsible adult must drive you to and from the hospital on the day of your surgery and ideally stay with you through the night.   .  Discharge prescriptions can be filled by the hospital pharmacy during regular pharmacy hours.  If you are having surgery late in the day/evening your prescription may be e-prescribed to your pharmacy.  Please verify your pharmacy hours or chose a 24 hour pharmacy to avoid not having access to your prescription because your pharmacy has closed for the day.    If you are staying overnight following surgery, you will be transported to your hospital room following the recovery period.  [facility] has all private rooms.    If you have any questions please call Pre-Admission Testing at [phone].  Deductibles and co-payments are collected on the day of service. Please be prepared to pay the required co-pay, deductible or deposit on the day of service as defined by your plan.    Call your surgeon immediately if you experience any of the following symptoms:  Sore Throat  Shortness of Breath or difficulty breathing  Cough  Chills  Body soreness or muscle pain  Headache  Fever  New loss of taste or smell  Do not arrive for your surgery ill.  Your procedure will need to be rescheduled to another time.  You will need to call your physician before the day of surgery to avoid any unnecessary exposure to hospital staff as well as other patients.  CHLORHEXIDINE CLOTH INSTRUCTIONS  The morning of surgery follow these instructions using the Chlorhexidine cloths you've been given.  These steps reduce bacteria on the body.  Do not use the cloths near your  eyes, ears mouth, genitalia or on open wounds.  Throw the cloths away after use but do not try to flush them down a toilet.      Open and remove one cloth at a time from the package.    Leave the cloth unfolded and begin the bathing.  Massage the skin with the cloths using gentle pressure to remove bacteria.  Do not scrub harshly.   Follow the steps below with one 2% CHG cloth per area (6 total cloths).  One cloth for neck, shoulders and chest.  One cloth for both arms, hands, fingers and underarms (do underarms last).  One cloth for the abdomen followed by groin.  One cloth for right leg and foot including between the toes.  One cloth for left leg and foot including between the toes.  The last cloth is to be used for the back of the neck, back and buttocks.    Allow the CHG to air dry 3 minutes on the skin which will give it time to work and decrease the chance of irritation.  The skin may feel sticky until it is dry.  Do not rinse with water or any other liquid or you will lose the beneficial effects of the CHG.  If mild skin irritation occurs, do rinse the skin to remove the CHG.  Report this to the nurse at time of admission.  Do not apply lotions, creams, ointments, deodorants or perfumes after using the clothes. Dress in clean clothes before coming to the hospital.

## 2024-06-18 RX ORDER — CARVEDILOL 25 MG/1
12.5 TABLET ORAL 2 TIMES DAILY WITH MEALS
Qty: 180 TABLET | Refills: 3 | Status: SHIPPED | OUTPATIENT
Start: 2024-06-18

## 2024-06-20 ENCOUNTER — TELEPHONE (OUTPATIENT)
Dept: NEUROSURGERY | Facility: CLINIC | Age: 69
End: 2024-06-20
Payer: MEDICARE

## 2024-06-20 NOTE — TELEPHONE ENCOUNTER
06/20/24 PT CALLED, HE SAID HE HAS SURGERY WITH DR. CASTELLANOS ON 06/25/24   PT STATES HE HAS A DENTAL APPOINTMENT AND WANTS TO MAKE SURE HE CAN GO TO THAT APPOINTMENT. HE DOES NOT WANT ANY SERVICES TO INTERRUPT HIS SURGERY.  HE WANTS TO KNOW IF IT'S OK TO GO TO  HIS DENTIST FOR TEETH CLEANING.    CALL PT AT: [phone]

## 2024-06-20 NOTE — TELEPHONE ENCOUNTER
Called patient to advise that Lamont Monsivais said that it was ok for patient to go to his dentist appointment.

## 2024-06-25 ENCOUNTER — HOSPITAL ENCOUNTER (INPATIENT)
Facility: HOSPITAL | Age: 69
LOS: 7 days | Discharge: NURSING, REHAB OR LONG-TERM CARE | End: 2024-07-02
Attending: NEUROLOGICAL SURGERY | Admitting: NEUROLOGICAL SURGERY
Payer: MEDICARE

## 2024-06-25 ENCOUNTER — ANESTHESIA (OUTPATIENT)
Dept: PERIOP | Facility: HOSPITAL | Age: 69
End: 2024-06-25
Payer: MEDICARE

## 2024-06-25 ENCOUNTER — APPOINTMENT (OUTPATIENT)
Dept: GENERAL RADIOLOGY | Facility: HOSPITAL | Age: 69
End: 2024-06-25
Payer: MEDICARE

## 2024-06-25 DIAGNOSIS — N52.9 ERECTILE DYSFUNCTION, UNSPECIFIED ERECTILE DYSFUNCTION TYPE: ICD-10-CM

## 2024-06-25 DIAGNOSIS — Z79.4 TYPE 2 DIABETES MELLITUS WITH HYPERGLYCEMIA, WITH LONG-TERM CURRENT USE OF INSULIN: ICD-10-CM

## 2024-06-25 DIAGNOSIS — E11.65 TYPE 2 DIABETES MELLITUS WITH HYPERGLYCEMIA, WITH LONG-TERM CURRENT USE OF INSULIN: ICD-10-CM

## 2024-06-25 LAB
ARTERIAL PATENCY WRIST A: ABNORMAL
ATMOSPHERIC PRESS: 746.5 MMHG
BASE EXCESS BLDA CALC-SCNC: -4 MMOL/L (ref 0–2)
BDY SITE: ABNORMAL
CO2 BLDA-SCNC: 21.6 MMOL/L (ref 23–27)
DEVICE COMMENT: ABNORMAL
GAS FLOW AIRWAY: 4 LPM
GLUCOSE BLDC GLUCOMTR-MCNC: 118 MG/DL (ref 70–130)
GLUCOSE BLDC GLUCOMTR-MCNC: 130 MG/DL (ref 70–130)
GLUCOSE BLDC GLUCOMTR-MCNC: 184 MG/DL (ref 70–130)
GLUCOSE BLDC GLUCOMTR-MCNC: 82 MG/DL (ref 70–130)
HCO3 BLDA-SCNC: 20.5 MMOL/L (ref 22–28)
HEMODILUTION: NO
MODALITY: ABNORMAL
PCO2 BLDA: 35.3 MM HG (ref 35–45)
PH BLDA: 7.37 PH UNITS (ref 7.35–7.45)
PO2 BLDA: 81.4 MM HG (ref 80–100)
SAO2 % BLDCOA: 95.7 % (ref 92–98.5)
TOTAL RATE: 14 BREATHS/MINUTE

## 2024-06-25 PROCEDURE — 0RG20K1 FUSION OF 2 OR MORE CERVICAL VERTEBRAL JOINTS WITH NONAUTOLOGOUS TISSUE SUBSTITUTE, POSTERIOR APPROACH, POSTERIOR COLUMN, OPEN APPROACH: ICD-10-PCS | Performed by: NEUROLOGICAL SURGERY

## 2024-06-25 PROCEDURE — 25810000003 LACTATED RINGERS PER 1000 ML: Performed by: ANESTHESIOLOGY

## 2024-06-25 PROCEDURE — 25010000002 MIDAZOLAM PER 1 MG: Performed by: ANESTHESIOLOGY

## 2024-06-25 PROCEDURE — 25010000002 PROPOFOL 200 MG/20ML EMULSION: Performed by: NURSE ANESTHETIST, CERTIFIED REGISTERED

## 2024-06-25 PROCEDURE — 93005 ELECTROCARDIOGRAM TRACING: CPT

## 2024-06-25 PROCEDURE — 25010000002 CEFAZOLIN PER 500 MG: Performed by: NEUROLOGICAL SURGERY

## 2024-06-25 PROCEDURE — 76000 FLUOROSCOPY <1 HR PHYS/QHP: CPT

## 2024-06-25 PROCEDURE — 25010000002 DEXAMETHASONE PER 1 MG: Performed by: NURSE ANESTHETIST, CERTIFIED REGISTERED

## 2024-06-25 PROCEDURE — 25010000002 HYDROMORPHONE 1 MG/ML SOLUTION: Performed by: NURSE ANESTHETIST, CERTIFIED REGISTERED

## 2024-06-25 PROCEDURE — 25010000002 GLYCOPYRROLATE 0.2 MG/ML SOLUTION: Performed by: ANESTHESIOLOGY

## 2024-06-25 PROCEDURE — 25810000003 LACTATED RINGERS PER 1000 ML: Performed by: NEUROLOGICAL SURGERY

## 2024-06-25 PROCEDURE — 25010000002 PROPOFOL 10 MG/ML EMULSION: Performed by: NURSE ANESTHETIST, CERTIFIED REGISTERED

## 2024-06-25 PROCEDURE — 25010000002 FENTANYL CITRATE (PF) 50 MCG/ML SOLUTION: Performed by: ANESTHESIOLOGY

## 2024-06-25 PROCEDURE — 61783 SCAN PROC SPINAL: CPT | Performed by: NEUROLOGICAL SURGERY

## 2024-06-25 PROCEDURE — 63045 LAM FACETEC & FORAMOT CRV: CPT | Performed by: NEUROLOGICAL SURGERY

## 2024-06-25 PROCEDURE — 22600 ARTHRD PST TQ 1NTRSPC CRV: CPT | Performed by: NEUROLOGICAL SURGERY

## 2024-06-25 PROCEDURE — 25010000002 MAGNESIUM SULFATE PER 500 MG OF MAGNESIUM: Performed by: ANESTHESIOLOGY

## 2024-06-25 PROCEDURE — 82803 BLOOD GASES ANY COMBINATION: CPT | Performed by: ANESTHESIOLOGY

## 2024-06-25 PROCEDURE — C1713 ANCHOR/SCREW BN/BN,TIS/BN: HCPCS | Performed by: NEUROLOGICAL SURGERY

## 2024-06-25 PROCEDURE — 25010000002 LABETALOL 5 MG/ML SOLUTION: Performed by: NURSE ANESTHETIST, CERTIFIED REGISTERED

## 2024-06-25 PROCEDURE — 25010000002 NICARDIPINE 2.5 MG/ML SOLUTION 10 ML VIAL: Performed by: NURSE ANESTHETIST, CERTIFIED REGISTERED

## 2024-06-25 PROCEDURE — 25010000002 MIDAZOLAM PER 1 MG: Performed by: NURSE ANESTHETIST, CERTIFIED REGISTERED

## 2024-06-25 PROCEDURE — 93010 ELECTROCARDIOGRAM REPORT: CPT | Performed by: INTERNAL MEDICINE

## 2024-06-25 PROCEDURE — 25810000003 SODIUM CHLORIDE 0.9 % SOLUTION 250 ML FLEX CONT: Performed by: NURSE ANESTHETIST, CERTIFIED REGISTERED

## 2024-06-25 PROCEDURE — 25010000002 NALOXONE PER 1 MG: Performed by: NURSE ANESTHETIST, CERTIFIED REGISTERED

## 2024-06-25 PROCEDURE — 25010000002 CEFAZOLIN PER 500 MG: Performed by: NURSE ANESTHETIST, CERTIFIED REGISTERED

## 2024-06-25 PROCEDURE — 82948 REAGENT STRIP/BLOOD GLUCOSE: CPT

## 2024-06-25 PROCEDURE — 25010000002 HYDRALAZINE PER 20 MG: Performed by: NURSE ANESTHETIST, CERTIFIED REGISTERED

## 2024-06-25 PROCEDURE — 22842 INSERT SPINE FIXATION DEVICE: CPT | Performed by: NEUROLOGICAL SURGERY

## 2024-06-25 PROCEDURE — 22614 ARTHRD PST TQ 1NTRSPC EA ADD: CPT | Performed by: NEUROLOGICAL SURGERY

## 2024-06-25 PROCEDURE — 25010000002 FENTANYL CITRATE (PF) 50 MCG/ML SOLUTION: Performed by: NURSE ANESTHETIST, CERTIFIED REGISTERED

## 2024-06-25 PROCEDURE — 63048 LAM FACETEC &FORAMOT EA ADDL: CPT | Performed by: NEUROLOGICAL SURGERY

## 2024-06-25 DEVICE — MAGNETOS FLEX MATRIX  10.08CC 0.25-1MM LARGE
Type: IMPLANTABLE DEVICE | Site: SPINE CERVICAL | Status: FUNCTIONAL
Brand: MAGNETOS

## 2024-06-25 DEVICE — MULTI AXIAL SCREW 3603514 3.5 X 14MM
Type: IMPLANTABLE DEVICE | Site: SPINE CERVICAL | Status: FUNCTIONAL
Brand: INFINITY™ OCCIPITOCERVICAL UPPER THORACIC SYSTEM

## 2024-06-25 DEVICE — MEDLINE BONEWAX YELLOW
Type: IMPLANTABLE DEVICE | Site: SPINE CERVICAL | Status: FUNCTIONAL
Brand: MEDLINE BONEWAX YELLOW

## 2024-06-25 DEVICE — HEMOST ABS SURGIFOAM SZ100 8X12 10MM: Type: IMPLANTABLE DEVICE | Site: SPINE CERVICAL | Status: FUNCTIONAL

## 2024-06-25 DEVICE — FLOSEAL WITH RECOTHROM - 10ML.
Type: IMPLANTABLE DEVICE | Site: SPINE CERVICAL | Status: FUNCTIONAL
Brand: FLOSEAL HEMOSTATIC MATRIX

## 2024-06-25 RX ORDER — IBUPROFEN 600 MG/1
1 TABLET ORAL
Status: DISCONTINUED | OUTPATIENT
Start: 2024-06-25 | End: 2024-07-02 | Stop reason: HOSPADM

## 2024-06-25 RX ORDER — DROPERIDOL 2.5 MG/ML
0.62 INJECTION, SOLUTION INTRAMUSCULAR; INTRAVENOUS
Status: DISCONTINUED | OUTPATIENT
Start: 2024-06-25 | End: 2024-06-25 | Stop reason: HOSPADM

## 2024-06-25 RX ORDER — GLYCOPYRROLATE 0.2 MG/ML
0.2 INJECTION INTRAMUSCULAR; INTRAVENOUS
Status: COMPLETED | OUTPATIENT
Start: 2024-06-25 | End: 2024-06-25

## 2024-06-25 RX ORDER — METHOCARBAMOL 750 MG/1
750 TABLET, FILM COATED ORAL EVERY 8 HOURS SCHEDULED
Status: DISCONTINUED | OUTPATIENT
Start: 2024-06-25 | End: 2024-06-27

## 2024-06-25 RX ORDER — HYDROMORPHONE HYDROCHLORIDE 1 MG/ML
0.5 INJECTION, SOLUTION INTRAMUSCULAR; INTRAVENOUS; SUBCUTANEOUS
Status: DISCONTINUED | OUTPATIENT
Start: 2024-06-25 | End: 2024-06-25 | Stop reason: HOSPADM

## 2024-06-25 RX ORDER — POLYETHYLENE GLYCOL 3350 17 G/17G
17 POWDER, FOR SOLUTION ORAL DAILY PRN
Status: DISCONTINUED | OUTPATIENT
Start: 2024-06-25 | End: 2024-07-02 | Stop reason: HOSPADM

## 2024-06-25 RX ORDER — SODIUM CHLORIDE 0.9 % (FLUSH) 0.9 %
3 SYRINGE (ML) INJECTION EVERY 12 HOURS SCHEDULED
Status: DISCONTINUED | OUTPATIENT
Start: 2024-06-25 | End: 2024-06-25 | Stop reason: HOSPADM

## 2024-06-25 RX ORDER — IPRATROPIUM BROMIDE AND ALBUTEROL SULFATE 2.5; .5 MG/3ML; MG/3ML
3 SOLUTION RESPIRATORY (INHALATION) ONCE AS NEEDED
Status: DISCONTINUED | OUTPATIENT
Start: 2024-06-25 | End: 2024-06-25 | Stop reason: HOSPADM

## 2024-06-25 RX ORDER — ACETAMINOPHEN 160 MG/5ML
650 SOLUTION ORAL EVERY 4 HOURS PRN
Status: DISCONTINUED | OUTPATIENT
Start: 2024-06-25 | End: 2024-07-02 | Stop reason: HOSPADM

## 2024-06-25 RX ORDER — MIDAZOLAM HYDROCHLORIDE 1 MG/ML
INJECTION INTRAMUSCULAR; INTRAVENOUS AS NEEDED
Status: DISCONTINUED | OUTPATIENT
Start: 2024-06-25 | End: 2024-06-25 | Stop reason: SURG

## 2024-06-25 RX ORDER — DEXAMETHASONE SODIUM PHOSPHATE 4 MG/ML
INJECTION, SOLUTION INTRA-ARTICULAR; INTRALESIONAL; INTRAMUSCULAR; INTRAVENOUS; SOFT TISSUE AS NEEDED
Status: DISCONTINUED | OUTPATIENT
Start: 2024-06-25 | End: 2024-06-25 | Stop reason: SURG

## 2024-06-25 RX ORDER — DEXTROSE MONOHYDRATE 25 G/50ML
25 INJECTION, SOLUTION INTRAVENOUS
Status: DISCONTINUED | OUTPATIENT
Start: 2024-06-25 | End: 2024-07-02 | Stop reason: HOSPADM

## 2024-06-25 RX ORDER — MORPHINE SULFATE 2 MG/ML
2 INJECTION, SOLUTION INTRAMUSCULAR; INTRAVENOUS
Status: DISCONTINUED | OUTPATIENT
Start: 2024-06-25 | End: 2024-06-27

## 2024-06-25 RX ORDER — BISACODYL 5 MG/1
5 TABLET, DELAYED RELEASE ORAL DAILY PRN
Status: DISCONTINUED | OUTPATIENT
Start: 2024-06-25 | End: 2024-07-02 | Stop reason: HOSPADM

## 2024-06-25 RX ORDER — BISACODYL 10 MG
10 SUPPOSITORY, RECTAL RECTAL DAILY PRN
Status: DISCONTINUED | OUTPATIENT
Start: 2024-06-25 | End: 2024-07-02 | Stop reason: HOSPADM

## 2024-06-25 RX ORDER — CYCLOBENZAPRINE HCL 10 MG
10 TABLET ORAL 3 TIMES DAILY PRN
Status: DISCONTINUED | OUTPATIENT
Start: 2024-06-25 | End: 2024-07-02 | Stop reason: HOSPADM

## 2024-06-25 RX ORDER — LATANOPROST 50 UG/ML
1 SOLUTION/ DROPS OPHTHALMIC NIGHTLY
Status: DISCONTINUED | OUTPATIENT
Start: 2024-06-25 | End: 2024-07-02 | Stop reason: HOSPADM

## 2024-06-25 RX ORDER — GABAPENTIN 300 MG/1
300 CAPSULE ORAL NIGHTLY
Status: DISCONTINUED | OUTPATIENT
Start: 2024-06-25 | End: 2024-07-02 | Stop reason: HOSPADM

## 2024-06-25 RX ORDER — ONDANSETRON 2 MG/ML
4 INJECTION INTRAMUSCULAR; INTRAVENOUS ONCE AS NEEDED
Status: DISCONTINUED | OUTPATIENT
Start: 2024-06-25 | End: 2024-06-25 | Stop reason: HOSPADM

## 2024-06-25 RX ORDER — SODIUM CHLORIDE 9 MG/ML
40 INJECTION, SOLUTION INTRAVENOUS AS NEEDED
Status: DISCONTINUED | OUTPATIENT
Start: 2024-06-25 | End: 2024-07-02 | Stop reason: HOSPADM

## 2024-06-25 RX ORDER — NALOXONE HCL 0.4 MG/ML
0.4 VIAL (ML) INJECTION
Status: DISCONTINUED | OUTPATIENT
Start: 2024-06-25 | End: 2024-07-02 | Stop reason: HOSPADM

## 2024-06-25 RX ORDER — HYDROCODONE BITARTRATE AND ACETAMINOPHEN 5; 325 MG/1; MG/1
2 TABLET ORAL EVERY 4 HOURS PRN
Status: CANCELLED | OUTPATIENT
Start: 2024-06-25 | End: 2024-07-05

## 2024-06-25 RX ORDER — NICOTINE POLACRILEX 4 MG
15 LOZENGE BUCCAL
Status: DISCONTINUED | OUTPATIENT
Start: 2024-06-25 | End: 2024-07-02 | Stop reason: HOSPADM

## 2024-06-25 RX ORDER — LABETALOL HYDROCHLORIDE 5 MG/ML
INJECTION, SOLUTION INTRAVENOUS AS NEEDED
Status: DISCONTINUED | OUTPATIENT
Start: 2024-06-25 | End: 2024-06-25 | Stop reason: SURG

## 2024-06-25 RX ORDER — AMOXICILLIN 250 MG
2 CAPSULE ORAL 2 TIMES DAILY PRN
Status: DISCONTINUED | OUTPATIENT
Start: 2024-06-25 | End: 2024-07-02 | Stop reason: HOSPADM

## 2024-06-25 RX ORDER — PROMETHAZINE HYDROCHLORIDE 25 MG/1
25 SUPPOSITORY RECTAL ONCE AS NEEDED
Status: DISCONTINUED | OUTPATIENT
Start: 2024-06-25 | End: 2024-06-25 | Stop reason: HOSPADM

## 2024-06-25 RX ORDER — FENTANYL CITRATE 50 UG/ML
50 INJECTION, SOLUTION INTRAMUSCULAR; INTRAVENOUS
Status: DISCONTINUED | OUTPATIENT
Start: 2024-06-25 | End: 2024-06-25 | Stop reason: HOSPADM

## 2024-06-25 RX ORDER — SODIUM CHLORIDE, SODIUM LACTATE, POTASSIUM CHLORIDE, CALCIUM CHLORIDE 600; 310; 30; 20 MG/100ML; MG/100ML; MG/100ML; MG/100ML
9 INJECTION, SOLUTION INTRAVENOUS CONTINUOUS
Status: DISCONTINUED | OUTPATIENT
Start: 2024-06-25 | End: 2024-06-29

## 2024-06-25 RX ORDER — PROMETHAZINE HYDROCHLORIDE 25 MG/1
25 TABLET ORAL ONCE AS NEEDED
Status: DISCONTINUED | OUTPATIENT
Start: 2024-06-25 | End: 2024-06-25 | Stop reason: HOSPADM

## 2024-06-25 RX ORDER — CEFAZOLIN SODIUM 500 MG/2.2ML
INJECTION, POWDER, FOR SOLUTION INTRAMUSCULAR; INTRAVENOUS AS NEEDED
Status: DISCONTINUED | OUTPATIENT
Start: 2024-06-25 | End: 2024-06-25 | Stop reason: SURG

## 2024-06-25 RX ORDER — ARIPIPRAZOLE 5 MG/1
5 TABLET ORAL DAILY
Status: DISCONTINUED | OUTPATIENT
Start: 2024-06-26 | End: 2024-07-02 | Stop reason: HOSPADM

## 2024-06-25 RX ORDER — MAGNESIUM SULFATE HEPTAHYDRATE 500 MG/ML
INJECTION, SOLUTION INTRAMUSCULAR; INTRAVENOUS AS NEEDED
Status: DISCONTINUED | OUTPATIENT
Start: 2024-06-25 | End: 2024-06-25 | Stop reason: SURG

## 2024-06-25 RX ORDER — HYDRALAZINE HYDROCHLORIDE 20 MG/ML
5 INJECTION INTRAMUSCULAR; INTRAVENOUS
Status: DISCONTINUED | OUTPATIENT
Start: 2024-06-25 | End: 2024-06-25 | Stop reason: HOSPADM

## 2024-06-25 RX ORDER — SODIUM CHLORIDE 0.9 % (FLUSH) 0.9 %
3-10 SYRINGE (ML) INJECTION AS NEEDED
Status: DISCONTINUED | OUTPATIENT
Start: 2024-06-25 | End: 2024-06-25 | Stop reason: HOSPADM

## 2024-06-25 RX ORDER — LIDOCAINE HYDROCHLORIDE 20 MG/ML
INJECTION, SOLUTION INFILTRATION; PERINEURAL AS NEEDED
Status: DISCONTINUED | OUTPATIENT
Start: 2024-06-25 | End: 2024-06-25 | Stop reason: SURG

## 2024-06-25 RX ORDER — PANTOPRAZOLE SODIUM 40 MG/1
40 TABLET, DELAYED RELEASE ORAL
Status: DISCONTINUED | OUTPATIENT
Start: 2024-06-26 | End: 2024-07-02 | Stop reason: HOSPADM

## 2024-06-25 RX ORDER — TERAZOSIN 5 MG/1
5 CAPSULE ORAL NIGHTLY
Status: DISCONTINUED | OUTPATIENT
Start: 2024-06-25 | End: 2024-07-02 | Stop reason: HOSPADM

## 2024-06-25 RX ORDER — FAMOTIDINE 10 MG/ML
20 INJECTION, SOLUTION INTRAVENOUS ONCE
Status: COMPLETED | OUTPATIENT
Start: 2024-06-25 | End: 2024-06-25

## 2024-06-25 RX ORDER — CARVEDILOL 12.5 MG/1
12.5 TABLET ORAL 2 TIMES DAILY WITH MEALS
Status: DISCONTINUED | OUTPATIENT
Start: 2024-06-25 | End: 2024-07-02 | Stop reason: HOSPADM

## 2024-06-25 RX ORDER — FENTANYL CITRATE 50 UG/ML
50 INJECTION, SOLUTION INTRAMUSCULAR; INTRAVENOUS ONCE AS NEEDED
Status: DISCONTINUED | OUTPATIENT
Start: 2024-06-25 | End: 2024-06-25 | Stop reason: HOSPADM

## 2024-06-25 RX ORDER — MIDAZOLAM HYDROCHLORIDE 1 MG/ML
0.5 INJECTION INTRAMUSCULAR; INTRAVENOUS
Status: DISCONTINUED | OUTPATIENT
Start: 2024-06-25 | End: 2024-06-25 | Stop reason: HOSPADM

## 2024-06-25 RX ORDER — SODIUM CHLORIDE 0.9 % (FLUSH) 0.9 %
10 SYRINGE (ML) INJECTION AS NEEDED
Status: DISCONTINUED | OUTPATIENT
Start: 2024-06-25 | End: 2024-07-02 | Stop reason: HOSPADM

## 2024-06-25 RX ORDER — LIDOCAINE HYDROCHLORIDE AND EPINEPHRINE 10; 10 MG/ML; UG/ML
INJECTION, SOLUTION INFILTRATION; PERINEURAL AS NEEDED
Status: DISCONTINUED | OUTPATIENT
Start: 2024-06-25 | End: 2024-06-25 | Stop reason: HOSPADM

## 2024-06-25 RX ORDER — ACETAMINOPHEN 325 MG/1
650 TABLET ORAL EVERY 4 HOURS PRN
Status: DISCONTINUED | OUTPATIENT
Start: 2024-06-25 | End: 2024-07-02 | Stop reason: HOSPADM

## 2024-06-25 RX ORDER — NALOXONE HCL 0.4 MG/ML
0.2 VIAL (ML) INJECTION AS NEEDED
Status: DISCONTINUED | OUTPATIENT
Start: 2024-06-25 | End: 2024-06-25 | Stop reason: HOSPADM

## 2024-06-25 RX ORDER — FENTANYL CITRATE 50 UG/ML
INJECTION, SOLUTION INTRAMUSCULAR; INTRAVENOUS
Status: COMPLETED | OUTPATIENT
Start: 2024-06-25 | End: 2024-06-25

## 2024-06-25 RX ORDER — HYDRALAZINE HYDROCHLORIDE 20 MG/ML
INJECTION INTRAMUSCULAR; INTRAVENOUS AS NEEDED
Status: DISCONTINUED | OUTPATIENT
Start: 2024-06-25 | End: 2024-06-25 | Stop reason: SURG

## 2024-06-25 RX ORDER — ONDANSETRON 4 MG/1
4 TABLET, ORALLY DISINTEGRATING ORAL EVERY 6 HOURS PRN
Status: DISCONTINUED | OUTPATIENT
Start: 2024-06-25 | End: 2024-07-02 | Stop reason: HOSPADM

## 2024-06-25 RX ORDER — OXYCODONE HYDROCHLORIDE AND ACETAMINOPHEN 5; 325 MG/1; MG/1
2 TABLET ORAL EVERY 4 HOURS PRN
Status: DISCONTINUED | OUTPATIENT
Start: 2024-06-25 | End: 2024-06-27

## 2024-06-25 RX ORDER — ACETAMINOPHEN 650 MG/1
650 SUPPOSITORY RECTAL EVERY 4 HOURS PRN
Status: DISCONTINUED | OUTPATIENT
Start: 2024-06-25 | End: 2024-07-02 | Stop reason: HOSPADM

## 2024-06-25 RX ORDER — ESCITALOPRAM OXALATE 20 MG/1
20 TABLET ORAL EVERY MORNING
Status: DISCONTINUED | OUTPATIENT
Start: 2024-06-26 | End: 2024-07-02 | Stop reason: HOSPADM

## 2024-06-25 RX ORDER — SODIUM CHLORIDE AND POTASSIUM CHLORIDE 150; 900 MG/100ML; MG/100ML
75 INJECTION, SOLUTION INTRAVENOUS CONTINUOUS
Status: DISCONTINUED | OUTPATIENT
Start: 2024-06-25 | End: 2024-06-26

## 2024-06-25 RX ORDER — LIDOCAINE HYDROCHLORIDE 10 MG/ML
0.5 INJECTION, SOLUTION INFILTRATION; PERINEURAL ONCE AS NEEDED
Status: DISCONTINUED | OUTPATIENT
Start: 2024-06-25 | End: 2024-06-25 | Stop reason: HOSPADM

## 2024-06-25 RX ORDER — ROCURONIUM BROMIDE 10 MG/ML
INJECTION, SOLUTION INTRAVENOUS AS NEEDED
Status: DISCONTINUED | OUTPATIENT
Start: 2024-06-25 | End: 2024-06-25 | Stop reason: SURG

## 2024-06-25 RX ORDER — CLONAZEPAM 0.5 MG/1
0.5 TABLET ORAL DAILY PRN
Status: DISPENSED | OUTPATIENT
Start: 2024-06-25 | End: 2024-06-30

## 2024-06-25 RX ORDER — HYDROCODONE BITARTRATE AND ACETAMINOPHEN 5; 325 MG/1; MG/1
1 TABLET ORAL ONCE AS NEEDED
Status: DISCONTINUED | OUTPATIENT
Start: 2024-06-25 | End: 2024-06-25 | Stop reason: HOSPADM

## 2024-06-25 RX ORDER — BUPIVACAINE HYDROCHLORIDE AND EPINEPHRINE 5; 5 MG/ML; UG/ML
INJECTION, SOLUTION PERINEURAL AS NEEDED
Status: DISCONTINUED | OUTPATIENT
Start: 2024-06-25 | End: 2024-06-25 | Stop reason: HOSPADM

## 2024-06-25 RX ORDER — LABETALOL HYDROCHLORIDE 5 MG/ML
5 INJECTION, SOLUTION INTRAVENOUS
Status: DISCONTINUED | OUTPATIENT
Start: 2024-06-25 | End: 2024-06-25 | Stop reason: HOSPADM

## 2024-06-25 RX ORDER — HEPARIN SODIUM 5000 [USP'U]/ML
5000 INJECTION, SOLUTION INTRAVENOUS; SUBCUTANEOUS EVERY 12 HOURS SCHEDULED
Status: DISCONTINUED | OUTPATIENT
Start: 2024-06-26 | End: 2024-07-02 | Stop reason: HOSPADM

## 2024-06-25 RX ORDER — CLOPIDOGREL BISULFATE 75 MG/1
75 TABLET ORAL DAILY
Qty: 90 TABLET | Refills: 2 | Status: ON HOLD | OUTPATIENT
Start: 2024-06-25

## 2024-06-25 RX ORDER — DORZOLAMIDE HYDROCHLORIDE AND TIMOLOL MALEATE 20; 5 MG/ML; MG/ML
SOLUTION/ DROPS OPHTHALMIC 2 TIMES DAILY
Status: DISCONTINUED | OUTPATIENT
Start: 2024-06-25 | End: 2024-07-02 | Stop reason: HOSPADM

## 2024-06-25 RX ORDER — SODIUM CHLORIDE 0.9 % (FLUSH) 0.9 %
3 SYRINGE (ML) INJECTION EVERY 12 HOURS SCHEDULED
Status: DISCONTINUED | OUTPATIENT
Start: 2024-06-25 | End: 2024-06-25

## 2024-06-25 RX ORDER — FENTANYL CITRATE 50 UG/ML
INJECTION, SOLUTION INTRAMUSCULAR; INTRAVENOUS AS NEEDED
Status: DISCONTINUED | OUTPATIENT
Start: 2024-06-25 | End: 2024-06-25 | Stop reason: SURG

## 2024-06-25 RX ORDER — TRANEXAMIC ACID 100 MG/ML
INJECTION, SOLUTION INTRAVENOUS AS NEEDED
Status: DISCONTINUED | OUTPATIENT
Start: 2024-06-25 | End: 2024-06-25 | Stop reason: SURG

## 2024-06-25 RX ORDER — DIPHENHYDRAMINE HYDROCHLORIDE 50 MG/ML
12.5 INJECTION INTRAMUSCULAR; INTRAVENOUS
Status: DISCONTINUED | OUTPATIENT
Start: 2024-06-25 | End: 2024-06-25 | Stop reason: HOSPADM

## 2024-06-25 RX ORDER — MIDAZOLAM HYDROCHLORIDE 1 MG/ML
INJECTION INTRAMUSCULAR; INTRAVENOUS
Status: COMPLETED | OUTPATIENT
Start: 2024-06-25 | End: 2024-06-25

## 2024-06-25 RX ORDER — EPHEDRINE SULFATE 50 MG/ML
5 INJECTION, SOLUTION INTRAVENOUS ONCE AS NEEDED
Status: DISCONTINUED | OUTPATIENT
Start: 2024-06-25 | End: 2024-06-25 | Stop reason: HOSPADM

## 2024-06-25 RX ORDER — FLUMAZENIL 0.1 MG/ML
0.2 INJECTION INTRAVENOUS AS NEEDED
Status: DISCONTINUED | OUTPATIENT
Start: 2024-06-25 | End: 2024-06-25 | Stop reason: HOSPADM

## 2024-06-25 RX ORDER — PROPOFOL 10 MG/ML
INJECTION, EMULSION INTRAVENOUS AS NEEDED
Status: DISCONTINUED | OUTPATIENT
Start: 2024-06-25 | End: 2024-06-25 | Stop reason: SURG

## 2024-06-25 RX ORDER — DEXMEDETOMIDINE HYDROCHLORIDE 4 UG/ML
INJECTION, SOLUTION INTRAVENOUS CONTINUOUS PRN
Status: DISCONTINUED | OUTPATIENT
Start: 2024-06-25 | End: 2024-06-25 | Stop reason: SURG

## 2024-06-25 RX ORDER — OXYCODONE AND ACETAMINOPHEN 7.5; 325 MG/1; MG/1
1 TABLET ORAL EVERY 4 HOURS PRN
Status: DISCONTINUED | OUTPATIENT
Start: 2024-06-25 | End: 2024-06-25 | Stop reason: HOSPADM

## 2024-06-25 RX ORDER — ONDANSETRON 2 MG/ML
4 INJECTION INTRAMUSCULAR; INTRAVENOUS EVERY 6 HOURS PRN
Status: DISCONTINUED | OUTPATIENT
Start: 2024-06-25 | End: 2024-07-02 | Stop reason: HOSPADM

## 2024-06-25 RX ADMIN — FAMOTIDINE 20 MG: 10 INJECTION INTRAVENOUS at 11:32

## 2024-06-25 RX ADMIN — NALOXONE HYDROCHLORIDE 0.08 MG: 0.4 INJECTION, SOLUTION INTRAMUSCULAR; INTRAVENOUS; SUBCUTANEOUS at 21:11

## 2024-06-25 RX ADMIN — DEXAMETHASONE SODIUM PHOSPHATE 6 MG: 4 INJECTION, SOLUTION INTRA-ARTICULAR; INTRALESIONAL; INTRAMUSCULAR; INTRAVENOUS; SOFT TISSUE at 12:50

## 2024-06-25 RX ADMIN — HYDRALAZINE HYDROCHLORIDE 5 MG: 20 INJECTION, SOLUTION INTRAMUSCULAR; INTRAVENOUS at 18:14

## 2024-06-25 RX ADMIN — MIDAZOLAM 0.5 MG: 1 INJECTION INTRAMUSCULAR; INTRAVENOUS at 11:33

## 2024-06-25 RX ADMIN — MAGNESIUM SULFATE HEPTAHYDRATE 1 G: 500 INJECTION, SOLUTION INTRAMUSCULAR; INTRAVENOUS at 17:38

## 2024-06-25 RX ADMIN — PROPOFOL 150 MG: 10 INJECTION, EMULSION INTRAVENOUS at 12:29

## 2024-06-25 RX ADMIN — PROPOFOL 180 MCG/KG/MIN: 10 INJECTION, EMULSION INTRAVENOUS at 13:21

## 2024-06-25 RX ADMIN — SODIUM CHLORIDE, POTASSIUM CHLORIDE, SODIUM LACTATE AND CALCIUM CHLORIDE: 600; 310; 30; 20 INJECTION, SOLUTION INTRAVENOUS at 15:00

## 2024-06-25 RX ADMIN — NICARDIPINE HYDROCHLORIDE 5 MG/HR: 25 INJECTION, SOLUTION INTRAVENOUS at 13:15

## 2024-06-25 RX ADMIN — PROPOFOL 50 MG: 10 INJECTION, EMULSION INTRAVENOUS at 13:00

## 2024-06-25 RX ADMIN — PROPOFOL 50 MG: 10 INJECTION, EMULSION INTRAVENOUS at 13:11

## 2024-06-25 RX ADMIN — FENTANYL CITRATE 25 MCG: 50 INJECTION, SOLUTION INTRAMUSCULAR; INTRAVENOUS at 11:44

## 2024-06-25 RX ADMIN — LABETALOL HYDROCHLORIDE 5 MG: 5 INJECTION, SOLUTION INTRAVENOUS at 16:01

## 2024-06-25 RX ADMIN — NALOXONE HYDROCHLORIDE 0.08 MG: 0.4 INJECTION, SOLUTION INTRAMUSCULAR; INTRAVENOUS; SUBCUTANEOUS at 21:09

## 2024-06-25 RX ADMIN — ROCURONIUM BROMIDE 50 MG: 10 INJECTION, SOLUTION INTRAVENOUS at 12:29

## 2024-06-25 RX ADMIN — MIDAZOLAM 1 MG: 1 INJECTION INTRAMUSCULAR; INTRAVENOUS at 11:44

## 2024-06-25 RX ADMIN — SODIUM CHLORIDE, POTASSIUM CHLORIDE, SODIUM LACTATE AND CALCIUM CHLORIDE 9 ML/HR: 600; 310; 30; 20 INJECTION, SOLUTION INTRAVENOUS at 11:26

## 2024-06-25 RX ADMIN — GLYCOPYRROLATE 0.2 MG: 0.2 INJECTION INTRAMUSCULAR; INTRAVENOUS at 11:48

## 2024-06-25 RX ADMIN — HYDROMORPHONE HYDROCHLORIDE 0.5 MG: 1 INJECTION, SOLUTION INTRAMUSCULAR; INTRAVENOUS; SUBCUTANEOUS at 17:57

## 2024-06-25 RX ADMIN — LIDOCAINE HYDROCHLORIDE 100 MG: 20 INJECTION, SOLUTION INFILTRATION; PERINEURAL at 12:29

## 2024-06-25 RX ADMIN — MIDAZOLAM 2 MG: 1 INJECTION INTRAMUSCULAR; INTRAVENOUS at 13:15

## 2024-06-25 RX ADMIN — DEXMEDETOMIDINE HYDROCHLORIDE 0.4 MCG/KG/HR: 4 INJECTION, SOLUTION INTRAVENOUS at 12:29

## 2024-06-25 RX ADMIN — PROPOFOL 50 MG: 10 INJECTION, EMULSION INTRAVENOUS at 12:47

## 2024-06-25 RX ADMIN — LABETALOL HYDROCHLORIDE 5 MG: 5 INJECTION, SOLUTION INTRAVENOUS at 13:15

## 2024-06-25 RX ADMIN — NALOXONE HYDROCHLORIDE 0.2 MG: 0.4 INJECTION, SOLUTION INTRAMUSCULAR; INTRAVENOUS; SUBCUTANEOUS at 21:07

## 2024-06-25 RX ADMIN — PROPOFOL 180 MCG/KG/MIN: 10 INJECTION, EMULSION INTRAVENOUS at 12:29

## 2024-06-25 RX ADMIN — SODIUM CHLORIDE, POTASSIUM CHLORIDE, SODIUM LACTATE AND CALCIUM CHLORIDE: 600; 310; 30; 20 INJECTION, SOLUTION INTRAVENOUS at 16:00

## 2024-06-25 RX ADMIN — LABETALOL HYDROCHLORIDE 5 MG: 5 INJECTION, SOLUTION INTRAVENOUS at 13:35

## 2024-06-25 RX ADMIN — FENTANYL CITRATE 50 MCG: 50 INJECTION, SOLUTION INTRAMUSCULAR; INTRAVENOUS at 12:29

## 2024-06-25 RX ADMIN — FENTANYL CITRATE 50 MCG: 50 INJECTION, SOLUTION INTRAMUSCULAR; INTRAVENOUS at 18:42

## 2024-06-25 RX ADMIN — SODIUM CHLORIDE 2000 MG: 900 INJECTION INTRAVENOUS at 12:12

## 2024-06-25 RX ADMIN — FENTANYL CITRATE 50 MCG: 50 INJECTION, SOLUTION INTRAMUSCULAR; INTRAVENOUS at 12:46

## 2024-06-25 RX ADMIN — HYDROMORPHONE HYDROCHLORIDE 1 MG: 1 INJECTION, SOLUTION INTRAMUSCULAR; INTRAVENOUS; SUBCUTANEOUS at 13:38

## 2024-06-25 RX ADMIN — NALOXONE HYDROCHLORIDE 0.08 MG: 0.4 INJECTION, SOLUTION INTRAMUSCULAR; INTRAVENOUS; SUBCUTANEOUS at 21:13

## 2024-06-25 RX ADMIN — TRANEXAMIC ACID 1000 MG: 100 INJECTION, SOLUTION INTRAVENOUS at 14:28

## 2024-06-25 RX ADMIN — MAGNESIUM SULFATE HEPTAHYDRATE 1 G: 500 INJECTION, SOLUTION INTRAMUSCULAR; INTRAVENOUS at 17:39

## 2024-06-25 RX ADMIN — SODIUM CHLORIDE, POTASSIUM CHLORIDE, SODIUM LACTATE AND CALCIUM CHLORIDE 9 ML/HR: 600; 310; 30; 20 INJECTION, SOLUTION INTRAVENOUS at 11:25

## 2024-06-25 RX ADMIN — CEFAZOLIN 2 G: 225 INJECTION, POWDER, FOR SOLUTION INTRAMUSCULAR; INTRAVENOUS at 16:13

## 2024-06-25 NOTE — ANESTHESIA PROCEDURE NOTES
Airway  Urgency: elective    Date/Time: 6/25/2024 12:30 PM  Airway not difficult    General Information and Staff    Patient location during procedure: OR  Anesthesiologist: Jason Raphael MD  CRNA/CAA: Chuck Carrera CRNA    Indications and Patient Condition  Indications for airway management: airway protection    Preoxygenated: yes  MILS maintained throughout  Mask difficulty assessment: 2 - vent by mask + OA or adjuvant +/- NMBA    Final Airway Details  Final airway type: endotracheal airway      Successful airway: ETT  Cuffed: yes   Successful intubation technique: video laryngoscopy  Endotracheal tube insertion site: oral  Blade: CMAC  Blade size: D  ETT size (mm): 7.5  Cormack-Lehane Classification: grade I - full view of glottis  Placement verified by: chest auscultation and capnometry   Measured from: teeth  ETT/EBT  to teeth (cm): 21  Number of attempts at approach: 1  Assessment: lips, teeth, and gum same as pre-op and atraumatic intubation

## 2024-06-25 NOTE — ANESTHESIA PREPROCEDURE EVALUATION
Anesthesia Evaluation     Patient summary reviewed and Nursing notes reviewed   NPO Solid Status: > 8 hours  NPO Liquid Status: > 2 hours           Airway   Mallampati: II  TM distance: >3 FB  Neck ROM: full  No difficulty expected  Dental - normal exam     Pulmonary - normal exam    breath sounds clear to auscultation  (+) ,shortness of breath  Cardiovascular - normal exam  Exercise tolerance: good (4-7 METS)    ECG reviewed  PT is on anticoagulation therapy  Patient on routine beta blocker and Beta blocker given within 24 hours of surgery  Rhythm: regular  Rate: normal    (+) hypertension less than 2 medications, past MI  >12 months, CAD, cardiac stents within the past 12 months , hyperlipidemia    ROS comment: CAD with hx MI and multiple stents, most recently in 3/24    Neuro/Psych  (+) psychiatric history Anxiety and Depression  GI/Hepatic/Renal/Endo    (+) GERD, renal disease- CRI, diabetes mellitus type 2 using insulin    Musculoskeletal     Abdominal   (+) obese   Substance History - negative use     OB/GYN          Other   arthritis,                 Anesthesia Plan    ASA 3     general     (Art line/2 IVs/CMAC for intubation/TIVA for neuro-monitoring)  intravenous induction     Anesthetic plan, risks, benefits, and alternatives have been provided, discussed and informed consent has been obtained with: patient and spouse/significant other.    Use of blood products discussed with patient  Did not consent to blood products. Special considerations: Hoahaoism.     CODE STATUS:

## 2024-06-25 NOTE — ANESTHESIA PROCEDURE NOTES
Arterial Line      Patient reassessed immediately prior to procedure    Patient location during procedure: pre-op  Start time: 6/25/2024 11:40 AM  Stop Time:6/25/2024 11:45 AM       Line placed for hemodynamic monitoring and ABGs/Labs/ISTAT.  Performed By   Anesthesiologist: Jason Raphael MD   Preanesthetic Checklist  Completed: patient identified, IV checked, site marked, risks and benefits discussed, surgical consent, monitors and equipment checked, pre-op evaluation and timeout performed  Arterial Line Prep    Sterile Tech: cap, gloves and mask  Prep: ChloraPrep  Patient monitoring: blood pressure monitoring, continuous pulse oximetry and EKG  Arterial Line Procedure   Laterality:left  Location:  radial artery  Catheter size: 20 G   Guidance: palpation technique  Number of attempts: 1  Successful placement: yes   Post Assessment   Dressing Type: biopatch applied, occlusive dressing applied, secured with tape and wrist guard applied.   Complications no  Circ/Move/Sens Assessment: normal and unchanged.   Patient Tolerance: patient tolerated the procedure well with no apparent complications

## 2024-06-25 NOTE — OP NOTE
Surgeon: Chris Miguel MD  Assistant: first Ashley assist      Procedure:   1.  Cervical 3 to cervical 6 bilateral laminectomies  2.  Cervical 3 to cervical 6 fusion with placement of bilateral lateral mass screws, facet decortication, magnetos      Preoperative diagnosis: Severe cervical stenosis with myelopathy  Postoperative diagnosis: Same      Anesthesia: General endotracheal anesthesia      Indications/consent: The patient is a 69-year-old male previously seen in clinic for severe cervical stenosis with myelopathy.  He had an MRI of the cervical spine that showed severe cervical stenosis at C4-5.  He was noted to have OPLL and moderate stenosis at C3-4 and C5-6.  He was consented for a C3-C6 laminectomy and posterior spinal fusion.  The risks and benefits of the procedure were discussed with the patient including, but not limited to the risk of infection, hemorrhage, spinal cord injury, C5 nerve palsy, CSF leak.  He expressed understanding of the risks and benefits and elected to proceed with the surgical intervention.    Description of the procedure: The patient was brought into the operating room and a timeout was performed.  He was positioned prone onto a spine frame.  All of his bony prominences were padded.  His neck was cleaned, draped, and prepped in the usual sterile fashion.  Fluoroscopy was used to localize the cervical 3 through cervical 6 levels.  Due to his anatomy/stature I was only able to visualize the C2 and C3 levels using fluoroscopy..  After the timeout was performed and the patient was given 3g of Ancef.  The incision was made with a 10 blade knife.  A subperiosteal dissection was carried out over the spinous process and lamina of C3, C4, C5, C6.  after the lamina and spinous process were completely exposed another fluoroscopy image was obtained with  instruments clamped on the spinous process of C7..  Next the levels were identified of C3, C4, C5, and C6. .  A high-speed  matchstick drill bit was used to perform bilateral hemilaminotomies at the C4 and C5 level creating a trough  the lamina and spinous process from the lateral masses.  The interspinous ligament was then cut using curved Urban scissors between C3 and C4 and C5 and C6.  An intraoperative O-arm spin was performed and navigation was used to create  holes for the lateral mass screws.  The 12 mm drill guide was used to drill a  hole in the lateral masses of C3, C4, C5, C6 bilaterally.  Next a 14 mm lateral mass screw was placed using navigation with a cephalad and lateral trajectory.  A follow-up fluoroscopic  O-arm spin was obtained.  Next, 2 towel clamps were attached to the spinous processes of C4 and C5 and the spinous process and lamina were lifted away as well as the underlying ligamentum flavum.  The dura was then completely exposed and a 2 mm and 3 mm Kerrison was used to remove the remaining ligamentum flavum as well as a small hemilaminotomy bilaterally at C3 and C6.  The dental and Penfield instruments were used to slide under the lamina to confirm that the spinal canal was well decompressed above and below.  There was no evidence of residual stenosis.  A high-speed matchstick bur was used to decorticate the facet joints in between the lateral mass screws.  Magnetos nonbiological allograft was placed over the lateral masses.  60 mm titanium rods were then inserted into the tulip heads of the screws and secured with locking caps.  The wound was copiously irrigated with 2 L of antibiotic irrigation.   FloSeal and Gelfoam was laid over top of the dura.  Hemostasis was obtained using bipolar electrocautery, Gelfoam, and thrombin.  The fascia was then closed with 0 Vicryl sutures, the subcutaneous tissue was closed with 2-0 Vicryl sutures.  The skin was closed with 3-0 Monocryl and covered with Dermabond.  There were no obvious complications.  Neuro monitoring was used throughout the procedure with  no change of signals from baseline.  The patient was then extubated and transferred to the recovery unit in stable condition.  Kathe Patterson was the first assist who helped me during this case.  Her assistance was greatly appreciated and necessary for completion of the case with her help with suctioning, retraction, and irrigation.     Complications: None  Estimated blood loss: 200 mL  Specimen: None    Disposition: We will plan to admit to floor for pain control and PT/OT evaluation

## 2024-06-26 LAB
ANION GAP SERPL CALCULATED.3IONS-SCNC: 10 MMOL/L (ref 5–15)
BASOPHILS # BLD AUTO: 0.02 10*3/MM3 (ref 0–0.2)
BASOPHILS NFR BLD AUTO: 0.2 % (ref 0–1.5)
BUN SERPL-MCNC: 10 MG/DL (ref 8–23)
BUN/CREAT SERPL: 11.8 (ref 7–25)
CALCIUM SPEC-SCNC: 8.8 MG/DL (ref 8.6–10.5)
CHLORIDE SERPL-SCNC: 104 MMOL/L (ref 98–107)
CO2 SERPL-SCNC: 21 MMOL/L (ref 22–29)
CREAT SERPL-MCNC: 0.85 MG/DL (ref 0.76–1.27)
DEPRECATED RDW RBC AUTO: 48.7 FL (ref 37–54)
EGFRCR SERPLBLD CKD-EPI 2021: 94.1 ML/MIN/1.73
EOSINOPHIL # BLD AUTO: 0 10*3/MM3 (ref 0–0.4)
EOSINOPHIL NFR BLD AUTO: 0 % (ref 0.3–6.2)
ERYTHROCYTE [DISTWIDTH] IN BLOOD BY AUTOMATED COUNT: 15.5 % (ref 12.3–15.4)
GLUCOSE BLDC GLUCOMTR-MCNC: 100 MG/DL (ref 70–130)
GLUCOSE BLDC GLUCOMTR-MCNC: 101 MG/DL (ref 70–130)
GLUCOSE BLDC GLUCOMTR-MCNC: 95 MG/DL (ref 70–130)
GLUCOSE SERPL-MCNC: 123 MG/DL (ref 65–99)
HCT VFR BLD AUTO: 46.3 % (ref 37.5–51)
HGB BLD-MCNC: 15 G/DL (ref 13–17.7)
IMM GRANULOCYTES # BLD AUTO: 0.04 10*3/MM3 (ref 0–0.05)
IMM GRANULOCYTES NFR BLD AUTO: 0.4 % (ref 0–0.5)
LYMPHOCYTES # BLD AUTO: 0.93 10*3/MM3 (ref 0.7–3.1)
LYMPHOCYTES NFR BLD AUTO: 8.1 % (ref 19.6–45.3)
MCH RBC QN AUTO: 28.3 PG (ref 26.6–33)
MCHC RBC AUTO-ENTMCNC: 32.4 G/DL (ref 31.5–35.7)
MCV RBC AUTO: 87.4 FL (ref 79–97)
MONOCYTES # BLD AUTO: 0.46 10*3/MM3 (ref 0.1–0.9)
MONOCYTES NFR BLD AUTO: 4 % (ref 5–12)
NEUTROPHILS NFR BLD AUTO: 87.3 % (ref 42.7–76)
NEUTROPHILS NFR BLD AUTO: 9.97 10*3/MM3 (ref 1.7–7)
NRBC BLD AUTO-RTO: 0 /100 WBC (ref 0–0.2)
PLATELET # BLD AUTO: 179 10*3/MM3 (ref 140–450)
PMV BLD AUTO: 10.3 FL (ref 6–12)
POTASSIUM SERPL-SCNC: 4.2 MMOL/L (ref 3.5–5.2)
QT INTERVAL: 396 MS
QTC INTERVAL: 460 MS
RBC # BLD AUTO: 5.3 10*6/MM3 (ref 4.14–5.8)
SODIUM SERPL-SCNC: 135 MMOL/L (ref 136–145)
WBC NRBC COR # BLD AUTO: 11.42 10*3/MM3 (ref 3.4–10.8)

## 2024-06-26 PROCEDURE — 63710000001 INSULIN GLARGINE PER 5 UNITS

## 2024-06-26 PROCEDURE — 25010000002 SODIUM CHLORIDE 0.9 % WITH KCL 20 MEQ 20-0.9 MEQ/L-% SOLUTION: Performed by: NEUROLOGICAL SURGERY

## 2024-06-26 PROCEDURE — 97162 PT EVAL MOD COMPLEX 30 MIN: CPT

## 2024-06-26 PROCEDURE — 97110 THERAPEUTIC EXERCISES: CPT

## 2024-06-26 PROCEDURE — 25010000002 HEPARIN (PORCINE) PER 1000 UNITS: Performed by: NEUROLOGICAL SURGERY

## 2024-06-26 PROCEDURE — 99024 POSTOP FOLLOW-UP VISIT: CPT | Performed by: NURSE PRACTITIONER

## 2024-06-26 PROCEDURE — 80048 BASIC METABOLIC PNL TOTAL CA: CPT

## 2024-06-26 PROCEDURE — 82948 REAGENT STRIP/BLOOD GLUCOSE: CPT

## 2024-06-26 PROCEDURE — 85025 COMPLETE CBC W/AUTO DIFF WBC: CPT

## 2024-06-26 PROCEDURE — 25010000002 MORPHINE PER 10 MG: Performed by: NEUROLOGICAL SURGERY

## 2024-06-26 RX ORDER — ROSUVASTATIN CALCIUM 20 MG/1
40 TABLET, COATED ORAL DAILY
COMMUNITY

## 2024-06-26 RX ADMIN — CARVEDILOL 12.5 MG: 12.5 TABLET, FILM COATED ORAL at 17:55

## 2024-06-26 RX ADMIN — MORPHINE SULFATE 2 MG: 2 INJECTION, SOLUTION INTRAMUSCULAR; INTRAVENOUS at 12:07

## 2024-06-26 RX ADMIN — TERAZOSIN HYDROCHLORIDE 5 MG: 5 CAPSULE ORAL at 20:15

## 2024-06-26 RX ADMIN — OXYCODONE AND ACETAMINOPHEN 2 TABLET: 5; 325 TABLET ORAL at 09:02

## 2024-06-26 RX ADMIN — OXYCODONE AND ACETAMINOPHEN 2 TABLET: 5; 325 TABLET ORAL at 17:54

## 2024-06-26 RX ADMIN — MORPHINE SULFATE 2 MG: 2 INJECTION, SOLUTION INTRAMUSCULAR; INTRAVENOUS at 05:39

## 2024-06-26 RX ADMIN — CARVEDILOL 12.5 MG: 12.5 TABLET, FILM COATED ORAL at 08:25

## 2024-06-26 RX ADMIN — DORZOLAMIDE HYDROCHLORIDE: 20 SOLUTION OPHTHALMIC at 08:27

## 2024-06-26 RX ADMIN — GABAPENTIN 300 MG: 300 CAPSULE ORAL at 20:15

## 2024-06-26 RX ADMIN — LATANOPROST 1 DROP: 50 SOLUTION/ DROPS OPHTHALMIC at 20:17

## 2024-06-26 RX ADMIN — MORPHINE SULFATE 2 MG: 2 INJECTION, SOLUTION INTRAMUSCULAR; INTRAVENOUS at 14:44

## 2024-06-26 RX ADMIN — HEPARIN SODIUM 5000 UNITS: 5000 INJECTION INTRAVENOUS; SUBCUTANEOUS at 08:25

## 2024-06-26 RX ADMIN — ARIPIPRAZOLE 5 MG: 5 TABLET ORAL at 11:15

## 2024-06-26 RX ADMIN — MORPHINE SULFATE 2 MG: 2 INJECTION, SOLUTION INTRAMUSCULAR; INTRAVENOUS at 20:14

## 2024-06-26 RX ADMIN — MORPHINE SULFATE 2 MG: 2 INJECTION, SOLUTION INTRAMUSCULAR; INTRAVENOUS at 16:50

## 2024-06-26 RX ADMIN — INSULIN GLARGINE 30 UNITS: 100 INJECTION, SOLUTION SUBCUTANEOUS at 08:25

## 2024-06-26 RX ADMIN — OXYCODONE AND ACETAMINOPHEN 2 TABLET: 5; 325 TABLET ORAL at 13:14

## 2024-06-26 RX ADMIN — CLONAZEPAM 0.5 MG: 0.5 TABLET ORAL at 16:21

## 2024-06-26 RX ADMIN — DORZOLAMIDE HYDROCHLORIDE: 20 SOLUTION OPHTHALMIC at 20:17

## 2024-06-26 RX ADMIN — CYCLOBENZAPRINE HYDROCHLORIDE 10 MG: 10 TABLET, FILM COATED ORAL at 11:15

## 2024-06-26 RX ADMIN — METHOCARBAMOL TABLETS 750 MG: 750 TABLET, COATED ORAL at 22:39

## 2024-06-26 RX ADMIN — HEPARIN SODIUM 5000 UNITS: 5000 INJECTION INTRAVENOUS; SUBCUTANEOUS at 20:15

## 2024-06-26 RX ADMIN — POTASSIUM CHLORIDE AND SODIUM CHLORIDE 75 ML/HR: 900; 150 INJECTION, SOLUTION INTRAVENOUS at 00:32

## 2024-06-26 NOTE — CONSULTS
[facility]   Consult Note    Patient Name: Isaias Monaco  : 1955  MRN: [id number]  Primary Care Physician:  Gwyn Patten Sr., MD  Referring Physician: Gwyn Patten Sr., MD  Date of admission: 2024    Consults  Subjective   Subjective     Reason for Consult/ Chief Complaint:   Cervical myelopathy-status post C3-C6 decompression and fusion 2024  Impaired mobility  Impaired dexterity  Impaired self-care  Coronary disease-multiple cardiac stents most recently 2024-on lifelong dual antiplatelet therapy  Diabetes mellitus type 2  Chronic kidney disease stage III    History of Present Illness  Isaias Monaco is a 69 y.o. male who was admitted to [facility] on 2024 with a history of cervical stenosis myelopathy.  He reports that has been having worsening dexterity in his hands.  Weakness in his hand .  Decreased sensation in the hands.  Instability when walking.  Symptoms have been progressive over the past several months.  He was cleared to be off of aspirin and Plavix for 7 days before surgery with plans to restart aspirin as soon as possible.  MRI of the cervical spine from 2024 and CT of the cervical spine from May 20, 2024 showed severe cervical stenosis at C4-5, moderate stenosis at C3-4 and C5-6.  He had ossification of the posterior longitudinal ligament.  He was admitted to [facility] for a C3-C6 decompression with bilateral laminectomies and fusion with bilateral lateral mass screws, facet decortication, magnetos 9 biologic allograft..  He was monitored in the ICU and transferred to [address] [place] [address]    At home he had progressive weakness and needed assistance from spouse for dressing but can walk without assistive device short distances.  He could not thread his feet for the second pants leg at home.  He would have difficulty with buttons.  Difficulty with manipulating his phone.  Was independent with bathing.  No bowel or bladder  issues at home.    With physical therapy today he needed moderate assist to get out of bed, stand and take a few steps to the chair.  Ambulated 4 feet with moderate assist of 2 with rolling walker.  Poor gait quality, short shuffling steps.    DVT prophylaxis -on heparin subcutaneous   Depression/anxiety-escitalopram/Abilify/clonazepam daily as needed  Diabetes mellitus-Lantus 30 units daily  Hypertension-carvedilol/terazosin  Pain management-gabapentin and methocarbamol have been placed on hold.  On cyclobenzaprine 10 mg 3 times daily as needed and Percocet 10 mg every 4 hours as needed.  Smith report reviewed  He took gabapentin at nighttime at home for radicular pain in the left upper extremity but did not require during today    Currently he is with a flat affect, somewhat sedated, and does not initiate much with conversation      Review of Systems     Personal History     Past Medical History:   Diagnosis Date    Anemia     post hemorrhagic    Angioedema 02/21/2016    Secondary to ACE inhibitor    Anxiety     Arthritis     CAD (coronary artery disease)     Colon polyps     Diabetes mellitus, type 2     GERD (gastroesophageal reflux disease)     Glaucoma     Hematoma     history    High cholesterol     History of foreign travel 12/2017; 08/2018    [place], [place], [place], [place], [place] and [place]; [place]    Hyperlipidemia     Hypertension     Hypogonadism in male 09/28/2016    Male erectile disorder     Microalbuminuria     Osteoarthritis     knee    Spinal stenosis of lumbar region without neurogenic claudication 06/02/2021    Tubular adenoma of colon 11/01/2017    Vitamin D deficiency        Past Surgical History:   Procedure Laterality Date    CARDIAC CATHETERIZATION N/A 05/15/2006    Dr. Mini Camarillo    CARDIAC CATHETERIZATION N/A 03/10/2020    Procedure: Left Heart Cath;  Surgeon: Miguel Ángel Karimi MD;  Location: [facility] CATH INVASIVE LOCATION;  Service: Cardiology;  Laterality:  N/A;    CARDIAC CATHETERIZATION N/A 03/10/2020    Procedure: Stent DAVONTE coronary;  Surgeon: Miguel Ángel Karimi MD;  Location:  [facility] CATH INVASIVE LOCATION;  Service: Cardiology;  Laterality: N/A;    CARDIAC CATHETERIZATION N/A 03/10/2020    Procedure: Coronary angiography;  Surgeon: Miguel Ángel Karimi MD;  Location:  [facility] CATH INVASIVE LOCATION;  Service: Cardiology;  Laterality: N/A;    CARDIAC CATHETERIZATION N/A 03/10/2020    Procedure: Left ventriculography;  Surgeon: Miguel Ángel Karimi MD;  Location:  [facility] CATH INVASIVE LOCATION;  Service: Cardiology;  Laterality: N/A;    CARDIAC CATHETERIZATION N/A 05/20/2022    Procedure: Left Heart Cath;  Surgeon: Mackenzie Morales MD;  Location:  [facility] CATH INVASIVE LOCATION;  Service: Cardiovascular;  Laterality: N/A;    CARDIAC CATHETERIZATION N/A 05/20/2022    Procedure: Coronary angiography;  Surgeon: Mackenzie Morales MD;  [facility];  Service: Cardiovascular;  Laterality: N/A;    CARDIAC CATHETERIZATION N/A 05/20/2022    Procedure: Percutaneous Coronary Intervention;  Surgeon: Mackenzie Morales MD;  Location:  [facility] CATH INVASIVE LOCATION;  Service: Cardiovascular;  Laterality: N/A;    CARDIAC CATHETERIZATION N/A 05/24/2022    Procedure: Percutaneous Coronary Intervention;  Surgeon: Miguel Ángel Karimi MD;  [facility];  Service: Cardiovascular;  Laterality: N/A;  LAD and Cx    CARDIAC CATHETERIZATION N/A 05/24/2022    Procedure: Stent DAVONTE coronary;  Surgeon: Miguel Ángel Karimi MD;  [facility];  Service: Cardiovascular;  Laterality: N/A;    CARDIAC CATHETERIZATION N/A 05/24/2022    Procedure: Resting Full Cycle Ratio;  Surgeon: Miguel Ángel Karimi MD;  [facility] C[facility];  Service: Cardiovascular;  Laterality: N/A;    CARDIAC CATHETERIZATION N/A 03/19/2024    Procedure: Left Heart Cath;  Surgeon: Miguel Ángel Karimi MD;  [facility] CATH  [facility]VAS[facility];  Service: Cardiovascular;  Laterality: N/A;    CARDIAC CATHETERIZATION N/A 03/19/2024    Procedure: Percutaneous Coronary Intervention;  Surgeon: Miguel Ángel Karimi MD;  Location:  [facility] CATH INVASIVE LOCATION;  Service: Cardiovascular;  Laterality: N/A;    CARDIAC CATHETERIZATION N/A 03/19/2024    Procedure: Stent DAVONTE coronary;  Surgeon: Miguel Ángel Karimi MD;  Location:  [facility] CATH INVASIVE LOCATION;  Service: Cardiovascular;  Laterality: N/A;    CERVICAL DISCECTOMY POSTERIOR FUSION WITH INSTRUMENTATION Bilateral 6/25/2024    Procedure: Cervical 3 to cervical 6 laminectomies and posterior spinal fusion - Bilateral;  Surgeon: Marshal Miguel MD;  Location: [facility] MAIN OR;  Service: Neurosurgery;  Laterality: Bilateral;    COLONOSCOPY N/A 02/22/2006    Bilobed polyp at 30 cm, hemorrhoids-Dr. Ilya Zhao    COLONOSCOPY N/A 02/28/2014    Normal ileum, one 6 mm polyp in the mid transverse colon-Dr. Ilya Zhao    COLONOSCOPY N/A 11/19/2008    Ela ileum, two 3 to 4 mm polyps, non-bleeding internal hemorrhoids, repeat in 5 years-Dr. Ilya Zhao    COLONOSCOPY N/A 10/31/2017    Procedure: COLONOSCOPY WITH POLYPECTOMY (COLD BIOPSY);  Surgeon: Ilya Zhao MD;  Location: [facility] ENDOSCOPY;  Service:     COLONOSCOPY N/A 05/21/2021    Procedure: Colonoscopy into cecum and terminal ileum with cold biopsy polypectomy;  Surgeon: Ilya Zhao MD;  Location: [facility] ENDOSCOPY;  Service: Gastroenterology;  Laterality: N/A;  Pre op: History of Polyps  Post op: Polyp    CORONARY ANGIOPLASTY WITH STENT PLACEMENT  2007, 2012, 2015    cardiac stents x3 occasions    ENDOSCOPY N/A 10/04/2017    Procedure: ESOPHAGOGASTRODUODENOSCOPY;  Surgeon: Boyd Guidry MD;  [facility];  Service:     ENDOSCOPY N/A 05/21/2021    Procedure: ESOPHAGOGASTRODUODENOSCOPY with biopsies;  Surgeon: Ilya Zhao MD;  Location: [facility] ENDOSCOPY;  Service: Gastroenterology;  Laterality: N/A;  Pre op:  GERD  Post op: Irregular  Z-Line, Hiatal Hernia, Gastritis    ENDOSCOPY N/A 08/25/2023    Procedure: ESOPHAGOGASTRODUODENOSCOPY with biopsy;  Surgeon: Ilya Zhao MD;  Location: [facility] ENDOSCOPY;  Service: Gastroenterology;  Laterality: N/A;  errosive gastritis    EPIDURAL BLOCK      INTERVENTIONAL RADIOLOGY PROCEDURE N/A 05/20/2022    Procedure: Intravascular Ultrasound;  Surgeon: Mackenzie Morales MD;  Location: [facility] CATH INVASIVE LOCATION;  Service: Cardiovascular;  Laterality: N/A;    KNEE INCISION AND DRAINAGE Right 06/20/2017    Procedure: RT. KNEE WASHOUT ;  Surgeon: Boyd Coyne MD;  Location: [facility] MAIN OR;  Service:     MEDIAL BRANCH BLOCK Bilateral 12/17/2021    Procedure: LUMBAR MEDIAL BRANCH BLOCK bilateral ~L4-S1 x2 (~2 weeks apart);  Surgeon: Christina Villaseñor MD;  Location: [facility] MAIN OR;  Service: Pain Management;  Laterality: Bilateral;    MEDIAL BRANCH BLOCK Bilateral 01/03/2022    Procedure: LUMBAR MEDIAL BRANCH BLOCK bilateral ~L4-S1 x2 (~2 weeks apart);  Surgeon: Christina Villaseñor MD;  Location: [facility] MAIN OR;  Service: Pain Management;  Laterality: Bilateral;    WA ARTHRP KNE CONDYLE&PLATU MEDIAL&LAT COMPARTMENTS Right 06/15/2017    Procedure: RT TOTAL KNEE ARTHROPLASTY;  Surgeon: Boyd Coyne MD;  Location: [facility] MAIN OR;  Service: Orthopedics    RADIOFREQUENCY ABLATION Bilateral 01/10/2022    Procedure: RADIOFREQUENCY ABLATION NERVES Bilateral L4-S1;  Surgeon: Christina Villaseñor MD;  Location: [facility] MAIN OR;  Service: Pain Management;  Laterality: Bilateral;    SHOULDER SURGERY Right 2016    rotator cuff repair    UPPER GASTROINTESTINAL ENDOSCOPY N/A 10/13/2015    Z-line irregular, normal stomach, normal duodenum-Dr. Ilya Zhao    UPPER GASTROINTESTINAL ENDOSCOPY N/A 02/28/2014    Z-line irregular, normal stomach, normal duodenum-Dr. Ilya Zhao    UPPER GASTROINTESTINAL ENDOSCOPY N/A 11/19/2008    Z-line irregular, chronic gastritis withotu hemorrhage, normal duodenum-Dr. Caraballo  Cece    UPPER GASTROINTESTINAL ENDOSCOPY N/A 06/22/2006    LA Grade A reflux esophagitis, non-bleeding erythematous gastropathy, normal duodenum-Dr. Ilya Zhao       Family History: family history includes Alcohol abuse in his father; Arthritis in his brother, father, and mother; Dementia in his father; Depression in his father; Heart attack (age of onset: 40) in his brother; Heart disease in his brother, father, and another family member; Hyperlipidemia in his mother; Hypertension in his father, mother, and another family member; Lupus in his father, mother, and sister; Other in his father; Thyroid disease in his mother, sister, and sister; Vision loss in his mother. Otherwise pertinent FHx was reviewed and not pertinent to current issue.    Social History:  reports that he has never smoked. He has never been exposed to tobacco smoke. He has never used smokeless tobacco. He reports current alcohol use of about 3.0 standard drinks of alcohol per week. He reports that he does not use drugs.  He lives with his wife in a house with 8 steps to enter, can stay on 1 level once inside.  The patient is a retired critical care nurse.  His wife is a nurse in postop recovery    Home Medications:   ARIPiprazole, Dexcom G7 Sensor, Evolocumab, HYDROcodone-acetaminophen, Insulin Glargine (1 Unit Dial), Insulin Pen Needle, Lancets, Semaglutide (2 MG/DOSE), Valbenazine Tosylate, aspirin, carvedilol, clonazePAM, clopidogrel, dorzolamide-timolol, doxazosin, escitalopram, esomeprazole, famotidine, gabapentin, glucose blood, glucose monitor, latanoprost, metFORMIN, methocarbamol, pioglitazone, rosuvastatin, and sildenafil    Allergies:  Allergies   Allergen Reactions    Nsaids Other (See Comments)     Renal failure    Atorvastatin Myalgia    Hydralazine Myalgia    Norvasc [Amlodipine] Swelling    Zetia [Ezetimibe] Nausea And Vomiting    Crestor [Rosuvastatin] Other (See Comments)     Flu like s/s    Ace Inhibitors Angioedema     Lisinopril Angioedema    Testosterone Myalgia     Scheduled Meds:ARIPiprazole, 5 mg, Oral, Daily  carvedilol, 12.5 mg, Oral, BID With Meals  dorzolamide (TRUSOPT) 2 % 1 drop, timolol (TIMOPTIC) 0.5 % 1 drop for Cosopt 22.3-6.8 mg/mL, , Both Eyes, BID  escitalopram, 20 mg, Oral, QAM  [Held by provider] gabapentin, 300 mg, Oral, Nightly  heparin (porcine), 5,000 Units, Subcutaneous, Q12H  insulin glargine, 30 Units, Subcutaneous, Daily  insulin regular, 2-7 Units, Subcutaneous, Q6H  latanoprost, 1 drop, Both Eyes, Nightly  [Held by provider] methocarbamol, 750 mg, Oral, Q8H  pantoprazole, 40 mg, Oral, Q AM  terazosin, 5 mg, Oral, Nightly      Continuous Infusions:lactated ringers, 9 mL/hr, Last Rate: Stopped (06/25/24 1934)  lactated ringers, 9 mL/hr, Last Rate: Stopped (06/25/24 1934)      PRN Meds:.  acetaminophen **OR** acetaminophen **OR** acetaminophen    senna-docusate sodium **AND** polyethylene glycol **AND** bisacodyl **AND** bisacodyl    clonazePAM    cyclobenzaprine    dextrose    dextrose    glucagon (human recombinant)    Morphine **AND** naloxone    ondansetron ODT **OR** ondansetron    oxyCODONE-acetaminophen    sodium chloride    sodium chloride    Objective    Objective     Vitals:  Temp:  [97.5 °F (36.4 °C)-98.6 °F (37 °C)] 98.6 °F (37 °C)  Heart Rate:  [79-90] 82  Resp:  [13-18] 18  BP: ()/() 139/91  Flow (L/min):  [2-4] 2    Physical Exam  MENTAL STATUS -  AWAKE / ALERT  HEENT-he has a cryotherapy neck wrap on  , PUPILS EQUALLY ROUND, SCLERAE ANICTERIC, CONJUNCTIVAE PINK, OP MOIST, NO JVD, EARS UNREMARKABLE EXTERNALLY  LUNGS - CTA, NO WHEEZES, RALES OR RHONCHI  HEART- RRR, NO RUB, MURMUR, OR GALLOP  ABD - NORMOACTIVE BOWEL SOUNDS, SOFT, NT. NO HEPATOSPLENOMEGALY APPRECIATED  EXT - NO EDEMA OR CYANOSIS  Difficulty ranging the bilateral knees and ankle secondary to hypertonicity  NEURO -oriented person place time situation  Takes resistance with bilateral elbow flexion and elbow  extension  Bilateral finger flexion approximately 4/5, hand intrinsics 4/5, impaired dexterity in the hands.  Bilateral knee extension 5/5, ankle dorsiflexion 5/5 but has significant hypertonicity in the bilateral lower extremities which impacts accurate manual muscle testing       Result Review    Result Review:    Results from last 7 days   Lab Units 06/26/24 0223   WBC 10*3/mm3 11.42*   HEMOGLOBIN g/dL 15.0   HEMATOCRIT % 46.3   PLATELETS 10*3/mm3 179     Results from last 7 days   Lab Units 06/26/24 0223   SODIUM mmol/L 135*   POTASSIUM mmol/L 4.2   CHLORIDE mmol/L 104   CO2 mmol/L 21.0*   BUN mg/dL 10   CREATININE mg/dL 0.85   CALCIUM mg/dL 8.8   GLUCOSE mg/dL 123*           Assessment & Plan   Assessment / Plan           Active Hospital Problems:  Active Hospital Problems    Diagnosis     **Preop examination     Stenosis of cervical spine with myelopathy     S/P primary angioplasty with coronary stent        Cervical myelopathy-status post C3-C6 decompression and fusion June 25, 2024  Impaired mobility  Impaired dexterity  Impaired self-care  Hypertonicity bilateral lower extremities-assess for possible addition of baclofen.  Follow his examination    Physical therapy initiated.  Also ordered occupational therapy to address gross and fine motor control, functional mobility, ADL training, dexterity.    Coronary disease-multiple cardiac stents most recently March 2024-on lifelong dual antiplatelet therapy.  Off aspirin and Plavix for surgery, to resume aspirin as soon as possible    DVT prophylaxis -on heparin subcutaneous     Depression/anxiety-escitalopram/Abilify/clonazepam daily as needed    Diabetes mellitus-Lantus 30 units daily    Hypertension-carvedilol/terazosin    Pain management-gabapentin and methocarbamol have been placed on hold.  On cyclobenzaprine 10 mg 3 times daily as needed and Percocet 10 mg every 4 hours as needed.  Smith report reviewed    Given his functional impairments and  comorbidities current recommendation is to pursue acute inpatient rehabilitation.  This should be a comprehensive interdisciplinary program with physical therapy 1.5 hours, Occupational Therapy 1.5 hours, 5 days a week to address gross and fine motor control, functional mobility, ADL training, balance, transfers, gait activities.  Rehabilitation nursing for carryover and ongoing monitoring of his neurologic status, bowel and bladder, skin, pain management.  Ongoing physician follow-up and management with physical medicine and rehabilitation with weekly team conferences.    I have reviewed the 3 options of acute inpatient rehabilitation with the patient and his wife -[facility], [facility], or [facility] downw with the specific spinal cord program.  We discussed seeing how he mobilizes with physical therapy and how he does with his ADL performance with Occupational Therapy to determine which program would be the best fit.  He would not require precertification.  Will reassess tomorrow a.m.   Patient reviewed with CCP            Titus Perez MD

## 2024-06-26 NOTE — CONSULTS
"Group: [place] [facility]         CONSULT NOTE    Patient Identification:  Isaias Monaco  69 y.o.  male  1955  [id number]            Requesting physician: Dr Miguel    Reason for Consultation: ICU admission     CC: S/p C3-C6 laminectomy bilateral posterior spinal fusion    History of Present Illness:  Isaias \"Thony\" Jacinda is a 69-year-old male with past medical history CAD, cardiac stents, diabetes mellitus 2, CKD, GERD, HTN, HLD who presented to [facility] today for scheduled C3-C6 laminectomy and fusion with Dr. Miguel for severe cervical spine stenosis.  Patient was brought to the recovery room, RN reports patient with oral airway for at least 1 hour, hard to arouse.  He was a TIVA, in the OR for 7-8 hours.  Anesthesia elected to give patient 0.4 Mg Narcan, patient now arousable but still sleepy.  ABG collected in PACU, WNL.  Hemodynamically stable, on 4 LNC.  Per chart review .  Received 2L IVF, over 2 L of urine output in the OR.  Blood glucose 184 postoperatively.  He did not receive any narcotic or muscle relaxer in PACU.  Patient stopped his ASA/Plavix for planned procedure as advised, he follows with Dr. Karimi and received cardiac clearance preoperatively.  Dr. Miguel requests ICU admission tonight for close monitoring.    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  Spoke with wife at bedside, preoperatively patient was experiencing weakness in all 4 extremities however this was not pronounced, he was having difficulties with minor activities like opening a bottle or picking up objects with bilateral upper extremities.  He reportedly had bilateral lower extremity weakness as well but was ambulating without assistance.      Review of Systems  Unable related to level of sedation    Past Medical History:  Past Medical History:   Diagnosis Date    Anemia     post hemorrhagic    Angioedema 02/21/2016    Secondary to ACE inhibitor    Anxiety     Arthritis     CAD (coronary artery disease)     " Colon polyps     Diabetes mellitus, type 2     GERD (gastroesophageal reflux disease)     Glaucoma     Hematoma     history    High cholesterol     History of foreign travel 12/2017; 08/2018    [place], [place], [place], [place], [place] and [place]; [place]    Hyperlipidemia     Hypertension     Hypogonadism in male 09/28/2016    Male erectile disorder     Microalbuminuria     Osteoarthritis     knee    Spinal stenosis of lumbar region without neurogenic claudication 06/02/2021    Tubular adenoma of colon 11/01/2017    Vitamin D deficiency        Past Surgical History:  Past Surgical History:   Procedure Laterality Date    CARDIAC CATHETERIZATION N/A 05/15/2006    Dr. Mini Camarillo    CARDIAC CATHETERIZATION N/A 03/10/2020    Procedure: Left Heart Cath;  Surgeon: Miguel Ángel Karimi MD;  Location:  [facility];  Service: Cardiology;  Laterality: N/A;    CARDIAC CATHETERIZATION N/A 03/10/2020    Procedure: Stent DAVONTE coronary;  Surgeon: Miguel Ángel Karimi MD;  Location:  [facility];  Service: Cardiology;  Laterality: N/A;    CARDIAC CATHETERIZATION N/A 03/10/2020    Procedure: Coronary angiography;  Surgeon: Miguel Ángel Karimi MD;  Location:  [facility];  Service: Cardiology;  Laterality: N/A;    CARDIAC CATHETERIZATION N/A 03/10/2020    Procedure: Left ventriculography;  Surgeon: Miguel Ángel Karimi MD;  Location:  [facility];  Service: Cardiology;  Laterality: N/A;    CARDIAC CATHETERIZATION N/A 05/20/2022    Procedure: Left Heart Cath;  Surgeon: Mackenzie Morales MD;  Location:  [facility];  Service: Cardiovascular;  Laterality: N/A;    CARDIAC CATHETERIZATION N/A 05/20/2022    Procedure: Coronary angiography;  Surgeon: Mackenzie Morales MD;  Location:  [facility];  Service: Cardiovascular;  Laterality: N/A;    CARDIAC CATHETERIZATION N/A 05/20/2022    Procedure: Percutaneous Coronary  Intervention;  Surgeon: Mackenzie Morales MD;  Location:  [facility];  Service: Cardiovascular;  Laterality: N/A;    CARDIAC CATHETERIZATION N/A 05/24/2022    Procedure: Percutaneous Coronary Intervention;  Surgeon: Miguel Ángel Karimi MD;  Location:  [facility];  Service: Cardiovascular;  Laterality: N/A;  LAD and Cx    CARDIAC CATHETERIZATION N/A 05/24/2022    Procedure: Stent DAVONTE coronary;  Surgeon: Miguel Ángel Karimi MD;  Location:  [facility];  Service: Cardiovascular;  Laterality: N/A;    CARDIAC CATHETERIZATION N/A 05/24/2022    Procedure: Resting Full Cycle Ratio;  Surgeon: Miguel Ángel Karimi MD;  Location:  [facility];  Service: Cardiovascular;  Laterality: N/A;    CARDIAC CATHETERIZATION N/A 03/19/2024    Procedure: Left Heart Cath;  Surgeon: Miguel Ángel Karimi MD;  Location:  [facility];  Service: Cardiovascular;  Laterality: N/A;    CARDIAC CATHETERIZATION N/A 03/19/2024    Procedure: Percutaneous Coronary Intervention;  Surgeon: Miguel Ángel Karimi MD;  Location:  [facility];  Service: Cardiovascular;  Laterality: N/A;    CARDIAC CATHETERIZATION N/A 03/19/2024    Procedure: Stent DAVONTE coronary;  Surgeon: Miguel Ángel Karimi MD;  [facility];  Service: Cardiovascular;  Laterality: N/A;    COLONOSCOPY N/A 02/22/2006    Bilobed polyp at 30 cm, hemorrhoids-Dr. Ilya Zhao    COLONOSCOPY N/A 02/28/2014    Normal ileum, one 6 mm polyp in the mid transverse colon-Dr. Ilya Zhao    COLONOSCOPY N/A 11/19/2008    Ela ileum, two 3 to 4 mm polyps, non-bleeding internal hemorrhoids, repeat in 5 years-Dr. Ilya Zhao    COLONOSCOPY N/A 10/31/2017    Procedure: COLONOSCOPY WITH POLYPECTOMY (COLD BIOPSY);  Surgeon: Ilya Zhao MD;  Location: [facility] ENDOSCOPY;  Service:     COLONOSCOPY N/A 05/21/2021    Procedure: Colonoscopy into cecum and terminal ileum with  cold biopsy polypectomy;  Surgeon: Ilya Zhao MD;  Location: [facility] ENDOSCOPY;  Service: Gastroenterology;  Laterality: N/A;  Pre op: History of Polyps  Post op: Polyp    CORONARY ANGIOPLASTY WITH STENT PLACEMENT  2007, 2012, 2015    cardiac stents x3 occasions    ENDOSCOPY N/A 10/04/2017    Procedure: ESOPHAGOGASTRODUODENOSCOPY;  Surgeon: Boyd Guidry MD;  Location: [facility] ENDOSCOPY;  Service:     ENDOSCOPY N/A 05/21/2021    Procedure: ESOPHAGOGASTRODUODENOSCOPY with biopsies;  Surgeon: Ilya Zhao MD;  [facility];  Service: Gastroenterology;  Laterality: N/A;  Pre op: GERD  Post op: Irregular  Z-Line, Hiatal Hernia, Gastritis    ENDOSCOPY N/A 08/25/2023    Procedure: ESOPHAGOGASTRODUODENOSCOPY with biopsy;  Surgeon: Ilya Zhao MD;  Location: [facility] ENDOSCOPY;  Service: Gastroenterology;  Laterality: N/A;  errosive gastritis    EPIDURAL BLOCK      INTERVENTIONAL RADIOLOGY PROCEDURE N/A 05/20/2022    Procedure: Intravascular Ultrasound;  Surgeon: Mackenzie Morales MD;  Location: [facility] CATH INVASIVE LOCATION;  Service: Cardiovascular;  Laterality: N/A;    KNEE INCISION AND DRAINAGE Right 06/20/2017    Procedure: RT. KNEE WASHOUT ;  Surgeon: Boyd Coyne MD;  Location: [facility] MAIN OR;  Service:     MEDIAL BRANCH BLOCK Bilateral 12/17/2021    Procedure: LUMBAR MEDIAL BRANCH BLOCK bilateral ~L4-S1 x2 (~2 weeks apart);  Surgeon: Christina Villaseñor MD;  Location: [facility] MAIN OR;  Service: Pain Management;  Laterality: Bilateral;    MEDIAL BRANCH BLOCK Bilateral 01/03/2022    Procedure: LUMBAR MEDIAL BRANCH BLOCK bilateral ~L4-S1 x2 (~2 weeks apart);  Surgeon: Christina Villaseñor MD;  Location: [facility] MAIN [facility];  Service: Pain Management;  Laterality: Bilateral;    MO ARTHRP KNE CONDYLE&PLATU MEDIAL&LAT COMPARTMENTS Right 06/15/2017    Procedure: RT TOTAL KNEE ARTHROPLASTY;  Surgeon: Boyd Coyne MD;  Location: [facility] MAIN OR;  Service: Orthopedics    RADIOFREQUENCY ABLATION Bilateral  01/10/2022    Procedure: RADIOFREQUENCY ABLATION NERVES Bilateral L4-S1;  Surgeon: Christina Villaseñor MD;  Location: [facility];  Service: Pain Management;  Laterality: Bilateral;    SHOULDER SURGERY Right 2016    rotator cuff repair    UPPER GASTROINTESTINAL ENDOSCOPY N/A 10/13/2015    Z-line irregular, normal stomach, normal duodenum-Dr. Ilya Zhao    UPPER GASTROINTESTINAL ENDOSCOPY N/A 02/28/2014    Z-line irregular, normal stomach, normal duodenum-Dr. Ilya Zhao    UPPER GASTROINTESTINAL ENDOSCOPY N/A 11/19/2008    Z-line irregular, chronic gastritis withotu hemorrhage, normal duodenum-Dr. Ilya Zhao    UPPER GASTROINTESTINAL ENDOSCOPY N/A 06/22/2006    LA Grade A reflux esophagitis, non-bleeding erythematous gastropathy, normal duodenum-Dr. Ilya Zhao        Home Meds:  Medications Prior to Admission   Medication Sig Dispense Refill Last Dose    ARIPiprazole (ABILIFY) 5 MG tablet Take 1 tablet by mouth Daily.   6/25/2024 at 0800    aspirin 81 MG EC tablet Take 1 tablet by mouth Daily. 30 tablet 6 Past Week    carvedilol (COREG) 25 MG tablet Take 0.5 tablets by mouth 2 (Two) Times a Day With Meals. 180 tablet 3 6/25/2024 at 0800    clonazePAM (KlonoPIN) 0.5 MG tablet Take 1 tablet by mouth Daily As Needed for Anxiety. 30 tablet 2 Past Week    dorzolamide-timolol (COSOPT) 22.3-6.8 MG/ML ophthalmic solution Apply 1 drop to eye(s) to both eyes 2 (Two) Times a Day. (Patient taking differently: Administer 1 drop to both eyes 2 (Two) Times a Day.) 20 mL 3 6/25/2024    doxazosin (CARDURA) 4 MG tablet TAKE 1 TABLET BY MOUTH EVERY DAY AT NIGHT (Patient taking differently: Take 1 tablet by mouth Every Night.) 90 tablet 4 6/24/2024 at 2200    escitalopram (LEXAPRO) 20 MG tablet Take 1 tablet by mouth Daily. (Patient taking differently: Take 1 tablet by mouth Every Morning.) 90 tablet 3 6/25/2024 at 0800    esomeprazole (nexIUM) 40 MG capsule Take 1 capsule by mouth Every Morning Before Breakfast. 30 capsule 3  6/24/2024 at 0800    Evolocumab (REPATHA) solution auto-injector SureClick injection Inject 1 mL under the skin into the appropriate area as directed Every 14 (Fourteen) Days. 2 mL 6 Past Month    famotidine (PEPCID) 20 MG tablet Take 1 tablet by mouth Every Evening.   6/24/2024 at 2200    gabapentin (NEURONTIN) 300 MG capsule Take 1 capsule by mouth Every Night.   6/24/2024 at 2200    HYDROcodone-acetaminophen (NORCO) 5-325 MG per tablet Take 1 tablet by mouth Every 4 (Four) Hours As Needed for Moderate Pain.   6/25/2024 at 0700    Ingrezza 60 MG capsule Take 1 capsule by mouth Daily.   6/25/2024 at 0800    Insulin Glargine, 1 Unit Dial, (Toujeo SoloStar) 300 UNIT/ML solution pen-injector injection INJECT 65 UNITS UNDER THE SKIN DAILY (Patient taking differently: Inject 60 Units under the skin into the appropriate area as directed Every Morning.) 18 mL 3 6/24/2024 at 0700    latanoprost (XALATAN) 0.005 % ophthalmic solution Apply 1 drop to  each eye Daily. (Patient taking differently: Administer 1 drop to both eyes Every Night.) 7.5 mL 3 6/25/2024 at 0800    metFORMIN (GLUCOPHAGE) 500 MG tablet Take 2 tablets by mouth Daily With Breakfast. Indications: start in 48 hours 180 tablet 3 6/24/2024 at 0700    methocarbamol (Robaxin) 500 MG tablet Take 1 tablet by mouth As Needed (Take as needed for pain). (Patient taking differently: Take 1 tablet by mouth As Needed for Muscle Spasms (Take as needed for pain).) 90 tablet 0 Past Month    pioglitazone (ACTOS) 15 MG tablet Take 1 tablet by mouth Daily. 90 tablet 2 6/24/2024 at 0700    sildenafil (VIAGRA) 100 MG tablet Take 1 tablet by mouth Daily As Needed for Erectile Dysfunction. Take 30 minutes to 4 hours prior to sexual activity. (Patient taking differently: Take 1 tablet by mouth Daily As Needed for Erectile Dysfunction. Take 30 minutes to 4 hours prior to sexual activity.   To hold 48 hours prior to surgery) 50 tablet 3 Past Week    clopidogrel (PLAVIX) 75 MG tablet  TAKE 1 TABLET BY MOUTH EVERY DAY 90 tablet 2 6/18/2024    Continuous Blood Gluc Sensor (Dexcom G7 Sensor) misc Use.   Unknown    glucose blood test strip Use to check blood sugars 1-2 times a day. E11.65 200 each 2 Unknown    glucose monitor monitoring kit Use 1 each Take As Directed. Use to check blood sugars 1-2 times a day. E11.65 1 each 0 Unknown    Insulin Pen Needle (BD Pen Needle Marissa 2nd Gen) 32G X 4 MM misc Inject 1 each under the skin into the appropriate area as directed 3 (Three) Times a Day. 300 each 3 Unknown    Insulin Pen Needle 31G X 4 MM misc Use 1 each Daily. 100 each 3 Unknown    Lancets misc Use to check blood sugars 1-2 times a day. E11.65 200 each 2 Unknown    Semaglutide, 2 MG/DOSE, (OZEMPIC) 8 MG/3ML solution pen-injector Inject 2 mg under the skin into the appropriate area as directed 1 (One) Time Per Week. (Patient taking differently: Inject 2 mg under the skin into the appropriate area as directed 1 (One) Time Per Week. Pt verbalizes understanding can't take till after surgery) 3 mL 5 6/12/2024       Allergies:  Allergies   Allergen Reactions    Nsaids Other (See Comments)     Renal failure    Atorvastatin Myalgia    Hydralazine Myalgia    Norvasc [Amlodipine] Swelling    Zetia [Ezetimibe] Nausea And Vomiting    Crestor [Rosuvastatin] Other (See Comments)     Flu like s/s    Ace Inhibitors Angioedema    Lisinopril Angioedema    Testosterone Myalgia       Social History:   Social History     Socioeconomic History    Marital status:      Spouse name: Micaela    Number of children: 1    Years of education: College   Tobacco Use    Smoking status: Never     Passive exposure: Never    Smokeless tobacco: Never    Tobacco comments:     CAFFEINE USE: 2 CUPS COFFEE DAILY   Vaping Use    Vaping status: Never Used   Substance and Sexual Activity    Alcohol use: Yes     Alcohol/week: 3.0 standard drinks of alcohol     Types: 1 Glasses of wine, 2 Shots of liquor per week    Drug use: Never     "Sexual activity: Defer     Partners: Female     Birth control/protection: Post-menopausal       Family History:  Family History   Problem Relation Age of Onset    Lupus Sister     Thyroid disease Sister     Heart disease Other     Hypertension Other     Arthritis Mother     Hyperlipidemia Mother     Hypertension Mother     Thyroid disease Mother     Lupus Mother     Vision loss Mother     Heart disease Father     Arthritis Father     Other Father         Vascular disease    Lupus Father     Depression Father     Alcohol abuse Father     Dementia Father     Hypertension Father     Heart disease Brother     Heart attack Brother 40    Thyroid disease Sister     Arthritis Brother     Malig Hyperthermia Neg Hx        Physical Exam:  /86 (BP Location: Right arm, Patient Position: Lying)   Pulse 84   Temp 97.7 °F (36.5 °C) (Oral)   Resp 15   SpO2 97%  There is no height or weight on file to calculate BMI. 97%      Physical Exam  Constitutional: Middle-age male lying in bed, opens eyes to voice, SpO2 97% on 4 LNC  Head: NCAT  Eyes: No pallor, anicteric conjunctiva, pupils 2 bilateral sluggish but responsive to light  ENMT:  No oral thrush. Dry MM.   NECK: Trachea midline, no thyromegaly, no JVD.  Operative dressing posterior neck clean and dry.  Patient with Aspen cervical collar secured.  Heart: RRR, no pedal edema  Lungs: LEWIS +, clear to auscultation throughout, no wheezing or crackles  Abdomen: Soft, nondistended.  No tenderness, guarding or rigidity. No palpable masses.  Eckert catheter to bedside drainage, clear yellow urine.  Extremities: Extremities warm and well perfused. No cyanosis/ clubbing.  All pulses palpable.  Neuro: Opens eyes to voice, occasionally says \"yes\" to questions, does follow commands with all extremities but with much encouragement and weakly with all extremities.  Can wiggle toes, can move fingers bilaterally.    Psych: Sedated postoperatively    PPE recommended per [facility] " infectious disease Isolation protocol for the current clinical scenario(as mentioned below) was followed.    LABS:  COVID19   Date Value Ref Range Status   05/19/2022 Not Detected Not Detected - Ref. Range Final       Lab Results   Component Value Date    CALCIUM 8.8 06/17/2024    PHOS 3.4 04/18/2024       Lab Results   Component Value Date    CKTOTAL 183 05/28/2024    TROPONINT <0.010 07/21/2022                 Results from last 7 days   Lab Units 06/25/24 2059   PH, ARTERIAL pH units 7.372   PCO2, ARTERIAL mm Hg 35.3   PO2 ART mm Hg 81.4   O2 SATURATION ART % 95.7   FLOW RATE lpm 4.0000   MODALITY  Cannula                 Lab Results   Component Value Date    TSH 0.798 08/08/2023     Estimated Creatinine Clearance: 85.1 mL/min (by C-G formula based on SCr of 1.17 mg/dL).         Imaging: I personally visualized the images of scans/x-rays performed within last 3 days.      Assessment:  S/p C3-C6 bilateral laminectomies, C3-C6 fusion POD 0  Excessive postoperative sleepiness  Severe cervical spine stenosis with myelopathy  CAD  Hx multiple cardiac stents, most recent March 2024 on lifelong DAPT  HTN  HLD  Diabetes mellitus type 2  CKD 3  GERD  Anxiety and depression  Arthritis    Recommendations:   S/p C3-C6 bilateral laminectomies, C3-C6 fusion POD 0  -Admit to ICU, patient is now awakening and opening eyes, following commands with all extremities but very weakly.  Hemodynamically stable.  Protecting his airway.  Hopefully level of sedation will improve throughout the night  -Neurochecks hourly  -Obviously will hold any scheduled sedating medications tonight, he is not safe for p.o. currently.  Will place hold on scheduled gabapentin, methocarbamol.  Patient appears comfortable.  Will use morphine tonight prn if needed  -NPO until more awake, will need nursing dysphagia screening prior to any PO  -Continue IVF per neurosurg    2.  CAD  -ASA and Plavix on hold until okay to resume per neurosurgery.  Will get EKG to  establish baseline    3.  HTN  -SBP currently 120-130s, acceptable.  Heart rate NSR in the 80s.  Carvedilol p.o. has been continued, will change to IV beta-blocker if needed overnight, he received his morning dose.  Otherwise will hold the rest of his antihypertensives for now and monitor BP/HR    4.  HLD  -Appears to be on Repatha, of note patient has multiple allergies to oral statins.  Lipid profile in March looks great    5.  Diabetes mellitus type 2  -Home medications: Metformin, pioglitazone, semaglutide will hold these  -Monitors with Dexcom at home.  Takes glargine 60 units every a.m., per wife patient was advised to hold this altogether this morning prior to surgery.  Will restart at 30 units in the morning and add SSI, accuchecks every 6 while he is not alert enough to eat or drink, adjust regimen as needed     6.  CKD 3  -Follows with Dr. Brenner.  Most recent GFR 67.5, per chart review has been down to as low as 36.6 in April 2024.  Baseline creatinine appears approx 1.2-1.9.  Kidney disease could be contributing to level of sedation postoperatively, will get repeat BMP in the morning   -Continue Eckert catheter, he has more than sufficient urine output    7.  GERD  -Protonix    8.  Anxiety and depression  -Abilify, lexapro restarted per neurosurg.  Takes Klonopin as needed, will try to minimize usage    Strict NPO until more alert  Heparin subcu for VTE prophylaxis  Indwelling Eckert catheter  Arterial line  Protonix GI prophy  AM CBC, BMP    Miriam Gan, MICHELLE  6/25/2024  21:49 EDT      Much of this encounter note is an electronic transcription/translation of spoken language to printed text using Dragon Software.

## 2024-06-26 NOTE — PLAN OF CARE
Goal Outcome Evaluation:  Plan of Care Reviewed With: patient, spouse           Outcome Evaluation: Pt is POD 1 C3-6 B laminectomies with screw fixation due to severe cervical cord stenosis with myelopathy, he is seen in ICU, he presents with weakness and impaired functional mobility, today he needed moderate assist to get out of bed, stand, and take a few steps to a chair, he had poor gait quality with short, shuffling steps. Pt lives with his spouse in a house with 8 steps to enter, can stay on one level once inside. Anticipate that pt will benefit from short stay in acute rehab facility      Anticipated Discharge Disposition (PT): inpatient rehabilitation facility

## 2024-06-26 NOTE — CONSULTS
Visited patient at bedside, patient's spouse also present. Patient and spouse described patient's medical issues from last day and shared hopefulness about his progress. Patient spouse was preparing to go home to rest, but further family support was expected.     This  offered to be keeping patient in thoughts. Patient and spouse aware of how to reach out for further support if desired. Chaplains remain available.

## 2024-06-26 NOTE — NURSING NOTE
"1931- Pt arrived to PACU with oral airway in place and unresponsive  2030- After attempts to awaken pt with sternal rub, pressure to 4 extremities pt will only flutter eyes and will not awake, oral airway remains in place  2035- Called Dr Tang AA and informed of pt continued lethargy- MD to bedside and administered Narcan, ABG drawn per RT  2100- Pt will open eyes and say \"yes\" or \"what\" will immediately fall asleep without verbal stimuation. Tolerating oral airway removal.   2120- ABG results noted. Dr Miguel called and informed that pt will not follow commands, is lethargic but will open eyes to sound of name and answer \"yes\" or \"what\"  New orders noted.  2139- Pt will now move feet and hands to repeated verbal commands- all weak movements. Left had weaker than other extremities. Dr Tang updated.   2149- ICU consult placed through [facility] service and spoke with Miriam Gan to inform of consult.   2205- Dr Miguel updated that pt will now follow commands to repeated verbal commands, informed of weakness, informed that pt will answer \"yes\" no\" or \"what\" to questions. Informed that pt remains lethargic. Told of pt transfer to ICU       "

## 2024-06-26 NOTE — PLAN OF CARE
Goal Outcome Evaluation:            VSS. Severe pain this evening; robaxin and gabapentin restarted. Eckert removed; voiding.

## 2024-06-26 NOTE — PROGRESS NOTES
[facility] PROGRESS NOTE      CC: POD #1 C3-C6 laminectomy and posterior fusion      Subjective     Interval History: Patient finally woke up around 3 AM.  Feels about back to baseline.  Spouse present at bedside.    ROS:  Constitutional: No fever, chills  MS: No back pain, + neck soreness post op  Neuro: No numbness, tingling, or weakness  : FC in place    Objective     Vital signs in last 24 hours:  Temp:  [97.5 °F (36.4 °C)-97.7 °F (36.5 °C)] 97.6 °F (36.4 °C)  Heart Rate:  [65-88] 84  Resp:  [13-18] 15  BP: ()/() 127/79  Arterial Line BP: (137-156)/(73-86) 145/76    Intake/Output this shift:  No intake/output data recorded.    LABS:  Results from last 7 days   Lab Units 06/26/24  0223   WBC 10*3/mm3 11.42*   HEMOGLOBIN g/dL 15.0   HEMATOCRIT % 46.3   PLATELETS 10*3/mm3 179     Results from last 7 days   Lab Units 06/26/24  0223   SODIUM mmol/L 135*   POTASSIUM mmol/L 4.2   CHLORIDE mmol/L 104   CO2 mmol/L 21.0*   BUN mg/dL 10   CREATININE mg/dL 0.85   GLUCOSE mg/dL 123*   CALCIUM mg/dL 8.8         IMAGING STUDIES:  no new imaging to review    I personally viewed and interpreted the patient's chart.    Meds reviewed/changed: Yes  Scheduled Meds:ARIPiprazole, 5 mg, Oral, Daily  carvedilol, 12.5 mg, Oral, BID With Meals  dorzolamide (TRUSOPT) 2 % 1 drop, timolol (TIMOPTIC) 0.5 % 1 drop for Cosopt 22.3-6.8 mg/mL, , Both Eyes, BID  escitalopram, 20 mg, Oral, QAM  [Held by provider] gabapentin, 300 mg, Oral, Nightly  heparin (porcine), 5,000 Units, Subcutaneous, Q12H  insulin glargine, 30 Units, Subcutaneous, Daily  insulin regular, 2-7 Units, Subcutaneous, Q6H  latanoprost, 1 drop, Both Eyes, Nightly  [Held by provider] methocarbamol, 750 mg, Oral, Q8H  pantoprazole, 40 mg, Oral, Q AM  terazosin, 5 mg, Oral, Nightly      Continuous Infusions:lactated ringers, 9 mL/hr, Last Rate: Stopped (06/25/24 1934)  lactated ringers, 9 mL/hr, Last Rate: Stopped (06/25/24 1934)  sodium chloride 0.9 % with  KCl 20 mEq, 75 mL/hr, Last Rate: Stopped (06/26/24 0902)      PRN Meds:.  acetaminophen **OR** acetaminophen **OR** acetaminophen    senna-docusate sodium **AND** polyethylene glycol **AND** bisacodyl **AND** bisacodyl    clonazePAM    cyclobenzaprine    dextrose    dextrose    glucagon (human recombinant)    Morphine **AND** naloxone    ondansetron ODT **OR** ondansetron    oxyCODONE-acetaminophen    sodium chloride    sodium chloride    Physical exam  Awake, alert, oriented x3  Pupils equal round reactive to light  Extraocular muscles intact  Face symmetric  Speech is fluent and clear  No pronator drift  Motor exam  Bilateral deltoids 5/5, bilateral biceps 5/5, bilateral triceps 5/5, bilateral wrist extension 5/5 bilateral hand  5/5  Bilateral hip flexion 5/5, bilateral knee extension 5/5, bilateral DF/PF 5/5  gait deferred  Able to detect  light touch in all 4 extremities      Assessment & Plan     ASSESSMENT:      Preop examination    S/P primary angioplasty with coronary stent    Stenosis of cervical spine with myelopathy    69-year-old male who is POD #1 C3-C6 laminectomies with posterior spinal fusion    PLAN:     Okay to transfer to floor  DC Eckert catheter  PT to evaluate  Physical medicine to evaluate for inpatient therapy at discharge  C-collar at bedside  Hopefully discharge tomorrow    I discussed the patient's findings and my recommendations with patient, family, and nursing staff    I spent 30 minutes caring for Isaias Monaco on this date of service. This time includes time spent by me in the following activities: preparing for the visit, reviewing tests, obtaining and/or reviewing a separately obtained history, performing a medically appropriate examination and/or evaluation, counseling and educating the patient/family/caregiver, ordering medications, tests, or procedures, referring and communicating with other health care professionals, documenting information in the medical record,  "independently interpreting results and communicating that information with the patient/family/caregiver, and care coordination           LOS: 1 day       Masha Cloud, APRN  6/26/2024  09:27 EDT    \"Dictated utilizing Dragon dictation\".      "

## 2024-06-26 NOTE — THERAPY EVALUATION
Patient Name: Isaias Monaco  : 1955    MRN: [id number]                              Today's Date: 2024       Admit Date: 2024    Visit Dx: No diagnosis found.  Patient Active Problem List   Diagnosis    Microalbuminuria    Vitamin D deficiency    Angioedema    Coronary artery disease involving native coronary artery of native heart without angina pectoris    Anxiety    ED (erectile dysfunction)    Hyperlipidemia LDL goal <70    Hypogonadism in male    S/P primary angioplasty with coronary stent    Osteoarthritis of knee    Hypertensive kidney disease with stage 3a chronic kidney disease    Anemia, posthemorrhagic, acute    Dyspnea on exertion    Gastro-esophageal reflux disease with esophagitis    Tubular adenoma of colon    Nocturia associated with benign prostatic hyperplasia    Iron deficiency anemia    Adverse effect of iron and its compounds, sequela    Facial twitching    Blepharospasm    Type 2 diabetes mellitus with microalbuminuria, with long-term current use of insulin    Panic disorder    Tic disorder, unspecified    Spinal stenosis of lumbar region without neurogenic claudication    Bilateral myopia    Combined forms of age-related cataract, bilateral    Presbyopia    Primary open-angle glaucoma, bilateral, severe stage    Lumbar facet arthropathy    Spondylosis of lumbar region without myelopathy or radiculopathy    Calcific coronary arteriosclerosis    Essential hypertension    Degeneration of lumbar intervertebral disc    Dystonia    Lumbosacral spondylosis without myelopathy    Medicare annual wellness visit, subsequent    Coronary stent restenosis    Stenosis of cervical spine with myelopathy    Preop examination     Past Medical History:   Diagnosis Date    Anemia     post hemorrhagic    Angioedema 2016    Secondary to ACE inhibitor    Anxiety     Arthritis     CAD (coronary artery disease)     Colon polyps     Diabetes mellitus, type 2     GERD (gastroesophageal reflux  disease)     Glaucoma     Hematoma     history    High cholesterol     History of foreign travel 12/2017; 08/2018    [place], [place], [place], [place], [place] and [place]; [place]    Hyperlipidemia     Hypertension     Hypogonadism in male 09/28/2016    Male erectile disorder     Microalbuminuria     Osteoarthritis     knee    Spinal stenosis of lumbar region without neurogenic claudication 06/02/2021    Tubular adenoma of colon 11/01/2017    Vitamin D deficiency      Past Surgical History:   Procedure Laterality Date    CARDIAC CATHETERIZATION N/A 05/15/2006    Dr. Mini Camarillo    CARDIAC CATHETERIZATION N/A 03/10/2020    Procedure: Left Heart Cath;  Surgeon: Miguel Ángel Karimi MD;  Location:  [facility];  Service: Cardiology;  Laterality: N/A;    CARDIAC CATHETERIZATION N/A 03/10/2020    Procedure: Stent DAVONTE coronary;  Surgeon: Miguel Ángel Karimi MD;  Location:  [facility];  Service: Cardiology;  Laterality: N/A;    CARDIAC CATHETERIZATION N/A 03/10/2020    Procedure: Coronary angiography;  Surgeon: Miguel Ángel Karimi MD;  Location:  [facility];  Service: Cardiology;  Laterality: N/A;    CARDIAC CATHETERIZATION N/A 03/10/2020    Procedure: Left ventriculography;  Surgeon: Miguel Ángel Karimi MD;  Location:  [facility];  Service: Cardiology;  Laterality: N/A;    CARDIAC CATHETERIZATION N/A 05/20/2022    Procedure: Left Heart Cath;  Surgeon: Mackenzie Morales MD;  Location:  [facility];  Service: Cardiovascular;  Laterality: N/A;    CARDIAC CATHETERIZATION N/A 05/20/2022    Procedure: Coronary angiography;  Surgeon: Mackenzie Morales MD;  Location:  [facility];  Service: Cardiovascular;  Laterality: N/A;    CARDIAC CATHETERIZATION N/A 05/20/2022    Procedure: Percutaneous Coronary Intervention;  Surgeon: Mackenzie Morales MD;  Location:  [facility];  Service:  Cardiovascular;  Laterality: N/A;    CARDIAC CATHETERIZATION N/A 05/24/2022    Procedure: Percutaneous Coronary Intervention;  Surgeon: Miguel Ángel Karimi MD;  Location:  [facility];  Service: Cardiovascular;  Laterality: N/A;  LAD and Cx    CARDIAC CATHETERIZATION N/A 05/24/2022    Procedure: Stent DAVONTE coronary;  Surgeon: Miguel Ángel Karimi MD;  Location:  [facility];  Service: Cardiovascular;  Laterality: N/A;    CARDIAC CATHETERIZATION N/A 05/24/2022    Procedure: Resting Full Cycle Ratio;  Surgeon: Miguel Ángel Karimi MD;  Location:  [facility];  Service: Cardiovascular;  Laterality: N/A;    CARDIAC CATHETERIZATION N/A 03/19/2024    Procedure: Left Heart Cath;  Surgeon: Miguel Ángel Karimi MD;  Location:  [facility];  Service: Cardiovascular;  Laterality: N/A;    CARDIAC CATHETERIZATION N/A 03/19/2024    Procedure: Percutaneous Coronary Intervention;  Surgeon: Miguel Ángel Karimi MD;  Location:  [facility];  Service: Cardiovascular;  Laterality: N/A;    CARDIAC CATHETERIZATION N/A 03/19/2024    Procedure: Stent DAVONTE coronary;  Surgeon: Miguel Ángel Karimi MD;  Location:  [facility];  Service: Cardiovascular;  Laterality: N/A;    CERVICAL DISCECTOMY POSTERIOR FUSION WITH INSTRUMENTATION Bilateral 6/25/2024    Procedure: Cervical 3 to cervical 6 laminectomies and posterior spinal fusion - Bilateral;  Surgeon: Marshal Miguel MD;  Location: [facility] MAIN OR;  Service: Neurosurgery;  Laterality: Bilateral;    COLONOSCOPY N/A 02/22/2006    Bilobed polyp at 30 cm, hemorrhoids-Dr. Ilya Zhao    COLONOSCOPY N/A 02/28/2014    Normal ileum, one 6 mm polyp in the mid transverse colon-Dr. Ilya Zhao    COLONOSCOPY N/A 11/19/2008    Ela ileum, two 3 to 4 mm polyps, non-bleeding internal hemorrhoids, repeat in 5 years-Dr. Ilya Zhao    COLONOSCOPY N/A 10/31/2017    Procedure: COLONOSCOPY WITH  POLYPECTOMY (COLD BIOPSY);  Surgeon: Ilya Zhao MD;  Location: [facility] ENDOSCOPY;  Service:     COLONOSCOPY N/A 05/21/2021    Procedure: Colonoscopy into cecum and terminal ileum with cold biopsy polypectomy;  Surgeon: Ilya Zhao MD;  Location: [facility] ENDOSCOPY;  Service: Gastroenterology;  Laterality: N/A;  Pre op: History of Polyps  Post op: Polyp    CORONARY ANGIOPLASTY WITH STENT PLACEMENT  2007, 2012, 2015    cardiac stents x3 occasions    ENDOSCOPY N/A 10/04/2017    Procedure: ESOPHAGOGASTRODUODENOSCOPY;  Surgeon: Boyd Guidry MD;  Location: [facility] ENDOSCOPY;  Service:     ENDOSCOPY N/A 05/21/2021    Procedure: ESOPHAGOGASTRODUODENOSCOPY with biopsies;  Surgeon: Ilya Zhao MD;  Location: [facility] ENDOSCOPY;  Service: Gastroenterology;  Laterality: N/A;  Pre op: GERD  Post op: Irregular  Z-Line, Hiatal Hernia, Gastritis    ENDOSCOPY N/A 08/25/2023    Procedure: ESOPHAGOGASTRODUODENOSCOPY with biopsy;  Surgeon: Ilya Zhao MD;  Location: [facility] ENDOSCOPY;  Service: Gastroenterology;  Laterality: N/A;  errosive gastritis    EPIDURAL BLOCK      INTERVENTIONAL RADIOLOGY PROCEDURE N/A 05/20/2022    Procedure: Intravascular Ultrasound;  Surgeon: Mackenzie Morales MD;  Location: [facility] CATH INVASIVE LOCATION;  Service: Cardiovascular;  Laterality: N/A;    KNEE INCISION AND DRAINAGE Right 06/20/2017    Procedure: RT. KNEE WASHOUT ;  Surgeon: Boyd Coyne MD;  Location: [facility] MAIN OR;  Service:     MEDIAL BRANCH BLOCK Bilateral 12/17/2021    Procedure: LUMBAR MEDIAL BRANCH BLOCK bilateral ~L4-S1 x2 (~2 weeks apart);  Surgeon: Christina Villaseñor MD;  Location: [facility] MAIN OR;  Service: Pain Management;  Laterality: Bilateral;    MEDIAL BRANCH BLOCK Bilateral 01/03/2022    Procedure: LUMBAR MEDIAL BRANCH BLOCK bilateral ~L4-S1 x2 (~2 weeks apart);  Surgeon: Christina Villaseñor MD;  Location: [facility];  Service: Pain Management;  Laterality: Bilateral;    VT ARTHRP KNE CONDYLE&PLATU MEDIAL&LAT  COMPARTMENTS Right 06/15/2017    Procedure: RT TOTAL KNEE ARTHROPLASTY;  Surgeon: Boyd Coyne MD;  Location: [facility] MAIN OR;  Service: Orthopedics    RADIOFREQUENCY ABLATION Bilateral 01/10/2022    Procedure: RADIOFREQUENCY ABLATION NERVES Bilateral L4-S1;  Surgeon: Christina Villaseñor MD;  Location: [facility] MA[facility] OR;  Service: Pain Management;  Laterality: Bilateral;    SHOULDER SURGERY Right 2016    rotator cuff repair    UPPER GASTROINTESTINAL ENDOSCOPY N/A 10/13/2015    Z-line irregular, normal stomach, normal duodenum-Dr. Ilya Zhao    UPPER GASTROINTESTINAL ENDOSCOPY N/A 02/28/2014    Z-line irregular, normal stomach, normal duodenum-Dr. Ilya Zhao    UPPER GASTROINTESTINAL ENDOSCOPY N/A 11/19/2008    Z-line irregular, chronic gastritis withotu hemorrhage, normal duodenum-Dr. Ilya Zhao    UPPER GASTROINTESTINAL ENDOSCOPY N/A 06/22/2006    LA Grade A reflux esophagitis, non-bleeding erythematous gastropathy, normal duodenum-Dr. Ilya Zhao      General Information       Row Name 06/26/24 1314          Physical Therapy Time and Intention    Document Type evaluation  -     Mode of Treatment physical therapy  -       Row Name 06/26/24 1314          General Information    Patient Profile Reviewed yes  -PC     Prior Level of Function --  progressive weakness at home, needed assist from spouse for dressing, but could walk without assistive device short distances  -PC       Row Name 06/26/24 1314          Living Environment    People in Home spouse  -PC       Row Name 06/26/24 1314          Home Main Entrance    Number of Stairs, Main Entrance eight  -PC     Stair Railings, Main Entrance railings safe and in good condition  -PC       Row Name 06/26/24 1314          Stairs Within Home, Primary    Number of Stairs, Within Home, Primary none  -PC       Row Name 06/26/24 1314          Cognition    Orientation Status (Cognition) oriented x 4  -PC       Row Name 06/26/24 1314          Safety Issues, Functional  Mobility    Impairments Affecting Function (Mobility) endurance/activity tolerance;strength;motor control;motor planning;pain  -PC               User Key  (r) = Recorded By, (t) = Taken By, (c) = Cosigned By      Initials Name Provider Type    PC Nisha Rivas PT Physical Therapist                   Mobility       Row Name 06/26/24 1316          Bed Mobility    Bed Mobility sidelying-sit;sit-sidelying  -PC     Sidelying-Sit [place] (Bed Mobility) moderate assist (50% patient effort)  -PC     Assistive Device (Bed Mobility) head of bed elevated;bed rails  -PC     Comment, (Bed Mobility) log roll to sit  -PC       Row Name 06/26/24 1316          Sit-Stand Transfer    Sit-Stand [place] (Transfers) minimum assist (75% patient effort);moderate assist (50% patient effort);2 person assist  -PC     Assistive Device (Sit-Stand Transfers) walker, front-wheeled  -PC       Row Name 06/26/24 1316          Gait/Stairs (Locomotion)    [place] Level (Gait) moderate assist (50% patient effort);2 person assist  -PC     Assistive Device (Gait) walker, front-wheeled  -PC     Distance in Feet (Gait) 4  -PC     Deviations/Abnormal Patterns (Gait) festinating/shuffling;bashir decreased;gait speed decreased;stride length decreased;antalgic  -PC               User Key  (r) = Recorded By, (t) = Taken By, (c) = Cosigned By      Initials Name Provider Type    PC Nisha Rivas PT Physical Therapist                   Obj/Interventions       Row Name 06/26/24 1317          Range of Motion Comprehensive    General Range of Motion no range of motion deficits identified  -PC       Row Name 06/26/24 1317          Strength Comprehensive (MMT)    Comment, General Manual Muscle Testing (MMT) Assessment B UE 4-/5, B LE 5/5, pain limiting, delayed motor planning  -PC       Row Name 06/26/24 1317          Sensory Assessment (Somatosensory)    Sensory Assessment (Somatosensory) sensation intact  -PC               User Key  (r) =  Recorded By, (t) = Taken By, (c) = Cosigned By      Initials Name Provider Type    PC Nisha Rivas, PT Physical Therapist                   Goals/Plan       Row Name 06/26/24 1325          Bed Mobility Goal 1 (PT)    Activity/Assistive Device (Bed Mobility Goal 1, PT) sit to supine/supine to sit  -PC     [place] Level/Cues Needed (Bed Mobility Goal 1, PT) minimum assist (75% or more patient effort)  -PC     Time Frame (Bed Mobility Goal 1, PT) 1 week  -PC       Row Name 06/26/24 1325          Transfer Goal 1 (PT)    Activity/Assistive Device (Transfer Goal 1, PT) sit-to-stand/stand-to-sit  -PC     [place] Level/Cues Needed (Transfer Goal 1, PT) minimum assist (75% or more patient effort)  -PC     Time Frame (Transfer Goal 1, PT) 1 week  -PC       Row Name 06/26/24 1328          Gait Training Goal 1 (PT)    Activity/Assistive Device (Gait Training Goal 1, PT) gait (walking locomotion);assistive device use  -PC     [place] Level (Gait Training Goal 1, PT) minimum assist (75% or more patient effort);contact guard required  -PC     Distance (Gait Training Goal 1, PT) 50  -PC     Time Frame (Gait Training Goal 1, PT) 1 week  -PC       Row Name 06/26/24 1327          Therapy Assessment/Plan (PT)    Planned Therapy Interventions (PT) balance training;bed mobility training;gait training;transfer training;stair training;strengthening  -PC               User Key  (r) = Recorded By, (t) = Taken By, (c) = Cosigned By      Initials Name Provider Type    PC Nisha Rivas PT Physical Therapist                   Clinical Impression       Row Name 06/26/24 1313          Pain    Pretreatment Pain Rating 5/10  -PC     Pain Location - neck  -PC     Pain Intervention(s) Medication (See MAR);Repositioned  -PC       Row Name 06/26/24 4415          Plan of Care Review    Plan of Care Reviewed With patient;spouse  -PC     Outcome Evaluation Pt is POD 1 C3-6 B laminectomies with screw fixation due to severe cervical cord  stenosis with myelopathy, he is seen in ICU, he presents with weakness and impaired functional mobility, today he needed moderate assist to get out of bed, stand, and take a few steps to a chair, he had poor gait quality with short, shuffling steps. Pt lives with his spouse in a house with 8 steps to enter, can stay on one level once inside. Anticipate that pt will benefit from short stay in acute rehab facility  -PC       Row Name 06/26/24 1319          Therapy Assessment/Plan (PT)    Rehab Potential (PT) good, to achieve stated therapy goals  -PC     Criteria for Skilled Interventions Met (PT) yes;meets criteria  -PC     Therapy Frequency (PT) 6 times/wk  -PC       Row Name 06/26/24 1319          Positioning and Restraints    Pre-Treatment Position in bed  -PC     Post Treatment Position chair  -PC     In Chair reclined;encouraged to call for assist;exit alarm on;with family/caregiver;call light within reach  -PC               User Key  (r) = Recorded By, (t) = Taken By, (c) = Cosigned By      Initials Name Provider Type    PC Nisha Rivas, PT Physical Therapist                   Outcome Measures       Row Name 06/26/24 1326 06/26/24 0800       How much help from another person do you currently need...    Turning from your back to your side while in flat bed without using bedrails? 2  -PC 3  -SE    Moving from lying on back to sitting on the side of a flat bed without bedrails? 2  -PC 3  -SE    Moving to and from a bed to a chair (including a wheelchair)? 2  -PC 2  -SE    Standing up from a chair using your arms (e.g., wheelchair, bedside chair)? 2  -PC 2  -SE    Climbing 3-5 steps with a railing? 1  -PC 2  -SE    To walk in hospital room? 2  -PC 2  -SE    AM-PAC 6 Clicks Score (PT) 11  -PC 14  -SE    Highest Level of Mobility Goal 4 --> Transfer to chair/commode  -PC 4 --> Transfer to chair/commode  -SE              User Key  (r) = Recorded By, (t) = Taken By, (c) = Cosigned By      Initials Name Provider Type     PC Nisha Rivas, PT Physical Therapist    Diane Torrez, RN Registered Nurse                                 Physical Therapy Education       Title: PT OT SLP Therapies (Done)       Topic: Physical Therapy (Done)       Point: Mobility training (Done)       Learning Progress Summary             Patient Acceptance, E,D, DU by PC at 6/26/2024 1327                         Point: Home exercise program (Done)       Learning Progress Summary             Patient Acceptance, E,D, DU by PC at 6/26/2024 1327                         Point: Body mechanics (Done)       Learning Progress Summary             Patient Acceptance, E,D, DU by PC at 6/26/2024 1327                         Point: Precautions (Done)       Learning Progress Summary             Patient Acceptance, E,D, DU by PC at 6/26/2024 1327                                         User Key       Initials Effective Dates Name Provider Type Discipline     06/16/21 -  Nisha Rivas, PT Physical Therapist PT                  PT Recommendation and Plan  Planned Therapy Interventions (PT): balance training, bed mobility training, gait training, transfer training, stair training, strengthening  Plan of Care Reviewed With: patient, spouse  Outcome Evaluation: Pt is POD 1 C3-6 B laminectomies with screw fixation due to severe cervical cord stenosis with myelopathy, he is seen in ICU, he presents with weakness and impaired functional mobility, today he needed moderate assist to get out of bed, stand, and take a few steps to a chair, he had poor gait quality with short, shuffling steps. Pt lives with his spouse in a house with 8 steps to enter, can stay on one level once inside. Anticipate that pt will benefit from short stay in acute rehab facility     Time Calculation:         PT Charges       Row Name 06/26/24 1327             Time Calculation    Start Time 1031  -PC      Stop Time 1053  -PC      Time Calculation (min) 22 min  -PC      PT Received On 06/26/24   -PC      PT - Next Appointment 06/27/24  -PC      PT Goal Re-Cert Due Date 07/03/24  -PC                User Key  (r) = Recorded By, (t) = Taken By, (c) = Cosigned By      Initials Name Provider Type    PC Nisha Rivas, PT Physical Therapist                  Therapy Charges for Today       Code Description Service Date Service Provider Modifiers Qty    75670142993 HC PT EVAL MOD COMPLEXITY 3 6/26/2024 Nisha Rivas, PT GP 1    57300671197 HC PT THER PROC EA 15 MIN 6/26/2024 Nisha Rivas, PT GP 1            PT G-Codes  AM-PAC 6 Clicks Score (PT): 11  PT Discharge Summary  Anticipated Discharge Disposition (PT): inpatient rehabilitation facility    Nisha Rivas PT  6/26/2024

## 2024-06-26 NOTE — ANESTHESIA POSTPROCEDURE EVALUATION
Patient: Isaias Monaco    Procedure Summary       Date: 06/25/24 Room / Location: [facility] OR [address] [place] [address] MAIN OR    Anesthesia Start: 1217 Anesthesia Stop: 1934    Procedure: Cervical 3 to cervical 6 laminectomies and posterior spinal fusion - Bilateral (Bilateral: Spine Cervical) Diagnosis:       Stenosis of cervical spine with myelopathy      S/P primary angioplasty with coronary stent      Preop examination      (Stenosis of cervical spine with myelopathy [M48.02, G99.2])      (S/P primary angioplasty with coronary stent [Z95.5])      (Preop examination [Z01.818])    Surgeons: Marshal Miguel MD Provider: Jason Raphael MD    Anesthesia Type: general ASA Status: 3            Anesthesia Type: general    Vitals  Vitals Value Taken Time   /86 06/25/24 2130   Temp 36.5 °C (97.7 °F) 06/25/24 1931   Pulse 83 06/25/24 2142   Resp 13 06/25/24 2030   SpO2 95 % 06/25/24 2142   Vitals shown include unfiled device data.        Post Anesthesia Care and Evaluation    Patient location during evaluation: bedside  Patient participation: complete - patient participated  Level of consciousness: awake  Pain management: adequate    Airway patency: patent  Anesthetic complications: No anesthetic complications    Cardiovascular status: acceptable  Respiratory status: acceptable  Hydration status: acceptable    Comments: /71   Pulse 83   Temp 36.5 °C (97.7 °F) (Oral)   Resp 13   SpO2 98%

## 2024-06-26 NOTE — PLAN OF CARE
Goal Outcome Evaluation:      Pt arrived on unit still very somnolent. At 0300, pt began moving all extremities spontaneously. Pt AOX4. IV fluids started/ 1000 mL UOP. See am labs.

## 2024-06-27 ENCOUNTER — TRANSCRIBE ORDERS (OUTPATIENT)
Dept: ADMINISTRATIVE | Facility: HOSPITAL | Age: 69
End: 2024-06-27
Payer: MEDICARE

## 2024-06-27 DIAGNOSIS — E04.1 THYROID NODULE: Primary | ICD-10-CM

## 2024-06-27 LAB
GLUCOSE BLDC GLUCOMTR-MCNC: 100 MG/DL (ref 70–130)
GLUCOSE BLDC GLUCOMTR-MCNC: 116 MG/DL (ref 70–130)
GLUCOSE BLDC GLUCOMTR-MCNC: 124 MG/DL (ref 70–130)
GLUCOSE BLDC GLUCOMTR-MCNC: 89 MG/DL (ref 70–130)

## 2024-06-27 PROCEDURE — 25010000002 MORPHINE PER 10 MG: Performed by: NEUROLOGICAL SURGERY

## 2024-06-27 PROCEDURE — 97530 THERAPEUTIC ACTIVITIES: CPT

## 2024-06-27 PROCEDURE — 97166 OT EVAL MOD COMPLEX 45 MIN: CPT

## 2024-06-27 PROCEDURE — 82948 REAGENT STRIP/BLOOD GLUCOSE: CPT

## 2024-06-27 PROCEDURE — 25010000002 HEPARIN (PORCINE) PER 1000 UNITS: Performed by: NEUROLOGICAL SURGERY

## 2024-06-27 RX ORDER — OXYCODONE AND ACETAMINOPHEN 7.5; 325 MG/1; MG/1
1 TABLET ORAL EVERY 4 HOURS PRN
Status: DISPENSED | OUTPATIENT
Start: 2024-06-27 | End: 2024-07-02

## 2024-06-27 RX ORDER — METHOCARBAMOL 500 MG/1
500 TABLET, FILM COATED ORAL EVERY 8 HOURS PRN
Status: DISCONTINUED | OUTPATIENT
Start: 2024-06-27 | End: 2024-07-02 | Stop reason: HOSPADM

## 2024-06-27 RX ADMIN — HEPARIN SODIUM 5000 UNITS: 5000 INJECTION INTRAVENOUS; SUBCUTANEOUS at 08:28

## 2024-06-27 RX ADMIN — DORZOLAMIDE HYDROCHLORIDE: 20 SOLUTION OPHTHALMIC at 21:29

## 2024-06-27 RX ADMIN — OXYCODONE AND ACETAMINOPHEN 1 TABLET: 7.5; 325 TABLET ORAL at 19:51

## 2024-06-27 RX ADMIN — MORPHINE SULFATE 2 MG: 2 INJECTION, SOLUTION INTRAMUSCULAR; INTRAVENOUS at 08:28

## 2024-06-27 RX ADMIN — ARIPIPRAZOLE 5 MG: 5 TABLET ORAL at 14:43

## 2024-06-27 RX ADMIN — OXYCODONE AND ACETAMINOPHEN 1 TABLET: 7.5; 325 TABLET ORAL at 14:00

## 2024-06-27 RX ADMIN — ESCITALOPRAM 20 MG: 20 TABLET, FILM COATED ORAL at 08:28

## 2024-06-27 RX ADMIN — OXYCODONE AND ACETAMINOPHEN 2 TABLET: 5; 325 TABLET ORAL at 04:52

## 2024-06-27 RX ADMIN — METHOCARBAMOL TABLETS 500 MG: 500 TABLET, COATED ORAL at 21:24

## 2024-06-27 RX ADMIN — TERAZOSIN HYDROCHLORIDE 5 MG: 5 CAPSULE ORAL at 21:36

## 2024-06-27 RX ADMIN — DORZOLAMIDE HYDROCHLORIDE: 20 SOLUTION OPHTHALMIC at 08:28

## 2024-06-27 RX ADMIN — SENNOSIDES AND DOCUSATE SODIUM 2 TABLET: 50; 8.6 TABLET ORAL at 21:24

## 2024-06-27 RX ADMIN — CARVEDILOL 12.5 MG: 12.5 TABLET, FILM COATED ORAL at 08:28

## 2024-06-27 RX ADMIN — CARVEDILOL 12.5 MG: 12.5 TABLET, FILM COATED ORAL at 18:25

## 2024-06-27 RX ADMIN — LATANOPROST 1 DROP: 50 SOLUTION/ DROPS OPHTHALMIC at 21:30

## 2024-06-27 RX ADMIN — METHOCARBAMOL TABLETS 750 MG: 750 TABLET, COATED ORAL at 05:00

## 2024-06-27 RX ADMIN — PANTOPRAZOLE SODIUM 40 MG: 40 TABLET, DELAYED RELEASE ORAL at 05:00

## 2024-06-27 RX ADMIN — GABAPENTIN 300 MG: 300 CAPSULE ORAL at 21:24

## 2024-06-27 RX ADMIN — HEPARIN SODIUM 5000 UNITS: 5000 INJECTION INTRAVENOUS; SUBCUTANEOUS at 21:24

## 2024-06-27 NOTE — PROGRESS NOTES
LOS: 2 days   Patient Care Team:  Gwyn Patten Sr., MD as PCP - General (Family Medicine)  Bebeto Monson II, MD as Consulting Physician (Hematology and Oncology)  Micaela Barfield APRN as Nurse Practitioner (Endocrinology)  Manuela Agustin APRN as Referring Physician (Internal Medicine)  Richardson Moon MD as Consulting Physician (Pulmonary Disease)      LISSETTE KOTHARI  1955      ADMITTING DIAGNOSIS:  Cervical myelopathy-status post C3-C6 decompression and fusion June 25, 2024  Impaired mobility  Impaired dexterity  Impaired self-care      Subjective     Patient noted to have sedation this morning.  More alert this afternoon.  Medications have been adjusted.  Does not describe any back pain if he is not moving around.  Does not describe strength in the arms or the legs well.    Objective     Vitals:    06/27/24 1245   BP:    Pulse: 79   Resp:    Temp: 98 °F (36.7 °C)   SpO2: 96%       Intake/Output Summary (Last 24 hours) at 6/27/2024 1501  Last data filed at 6/27/2024 1452  Gross per 24 hour   Intake 300 ml   Output 1150 ml   Net -850 ml     PHYSICAL EXAM:   MENTAL STATUS -  AWAKE / ALERT  HEENT-cryotherapy wrap in place  Posterior neck incision-dressed  LUNGS -normal respiration  HEART- RRR   ABD -nondistended  -Eckert  EXT - NO EDEMA   Difficulty ranging the bilateral knees and ankle secondary to hypertonicity  NEURO -oriented person place time situation  Takes resistance with bilateral elbow flexion and elbow extension  Bilateral finger flexion approximately 4/5, hand intrinsics 4/5, impaired dexterity in the hands.  Significant hypertonicity in the bilateral lower extremities which impacts accurate manual muscle testing      MEDICATIONS  Scheduled Meds:ARIPiprazole, 5 mg, Oral, Daily  carvedilol, 12.5 mg, Oral, BID With Meals  dorzolamide (TRUSOPT) 2 % 1 drop, timolol (TIMOPTIC) 0.5 % 1 drop for Cosopt 22.3-6.8 mg/mL, , Both Eyes, BID  escitalopram, 20 mg, Oral, QAM  gabapentin, 300 mg,  Oral, Nightly  heparin (porcine), 5,000 Units, Subcutaneous, Q12H  insulin glargine, 30 Units, Subcutaneous, Daily  insulin regular, 2-7 Units, Subcutaneous, Q6H  latanoprost, 1 drop, Both Eyes, Nightly  pantoprazole, 40 mg, Oral, Q AM  terazosin, 5 mg, Oral, Nightly      Continuous Infusions:lactated ringers, 9 mL/hr, Last Rate: Stopped (06/25/24 1934)  lactated ringers, 9 mL/hr, Last Rate: Stopped (06/25/24 1934)      PRN Meds:.  acetaminophen **OR** acetaminophen **OR** acetaminophen    senna-docusate sodium **AND** polyethylene glycol **AND** bisacodyl **AND** bisacodyl    clonazePAM    cyclobenzaprine    dextrose    dextrose    glucagon (human recombinant)    methocarbamol    [DISCONTINUED] Morphine **AND** naloxone    ondansetron ODT **OR** ondansetron    oxyCODONE-acetaminophen    sodium chloride    sodium chloride      RESULTS  Glucose   Date/Time Value Ref Range Status   06/27/2024 1143 124 70 - 130 mg/dL Final   06/27/2024 0626 89 70 - 130 mg/dL Final   06/27/2024 0047 100 70 - 130 mg/dL Final   06/26/2024 2040 100 70 - 130 mg/dL Final   06/26/2024 1717 95 70 - 130 mg/dL Final   06/26/2024 1056 101 70 - 130 mg/dL Final   06/25/2024 2214 118 70 - 130 mg/dL Final   06/25/2024 1935 184 (H) 70 - 130 mg/dL Final     Results from last 7 days   Lab Units 06/26/24  0223   WBC 10*3/mm3 11.42*   HEMOGLOBIN g/dL 15.0   HEMATOCRIT % 46.3   PLATELETS 10*3/mm3 179     Results from last 7 days   Lab Units 06/26/24  0223   SODIUM mmol/L 135*   POTASSIUM mmol/L 4.2   CHLORIDE mmol/L 104   CO2 mmol/L 21.0*   BUN mg/dL 10   CREATININE mg/dL 0.85   CALCIUM mg/dL 8.8   GLUCOSE mg/dL 123*           ASSESSMENT and PLAN  Cervical myelopathy-status post C3-C6 decompression and fusion June 25, 2024  Impaired mobility  Impaired dexterity  Impaired self-care  Hypertonicity bilateral lower extremities-assess for possible addition of baclofen.  Follow his examination     - urinary retention - Urology replaced Eckert June 27 with  return 1200 urine. Voiding trail possibly June 30.      Physical therapy initiated.  Also ordered occupational therapy to address gross and fine motor control, functional mobility, ADL training, dexterity.  June 27-slow processing, required cues for initiating movements with Occupational Therapy.  Moderate max assist of 2 for bed mobility.  Had difficulty grasping and releasing onto bed rail with right upper extremity.  Minimal moderate assist for sitting balance.  Mod to max assist for sit to stand transfer with use of rolling walker.  Attempted take sidesteps but unable to weight shift and take steps toward head of bed.  Drowsy and fatigued throughout the session.  Due to patient's weak  strength anticipated max assist for grooming task, dependent for lower body dressing and toileting at this time.  With physical therapy moderate max assist to complete bed mobility.  Posterior lean when sitting edge of bed requiring minimal moderate assist to maintain balance.  Stood with rolling walker requiring moderate max assist of 2 and attempted to take sidesteps for poor weight shifting and sequencing.  Mobility difficult to progress at this time due to cognition, pain, coordination, and weakness.     Coronary disease-multiple cardiac stents most recently March 2024-on lifelong dual antiplatelet therapy.  Off aspirin and Plavix for surgery, to resume aspirin as soon as possible  June 27 - per Neurosurgery, may resume aspirin June 28 and Plavix on July 3.     DVT prophylaxis -on heparin subcutaneous      Depression/anxiety-escitalopram/Abilify/clonazepam daily as needed     Diabetes mellitus-Lantus 30 units daily     Hypertension-carvedilol/terazosin     Pain management-  Smith report reviewed  June 27 - Neurosurgery decreased percocet, change robaxin to 500mg PRN, DC morphine, last dose [id number] June 27. On gabapentin nightly     Disposition given his functional impairments and comorbidities current recommendation is to  pursue acute inpatient rehabilitation.  This should be a comprehensive interdisciplinary program with physical therapy 1.5 hours, Occupational Therapy 1.5 hours, 5 days a week to address gross and fine motor control, functional mobility, ADL training, balance, transfers, gait activities.  Rehabilitation nursing for carryover and ongoing monitoring of his neurologic status, bowel and bladder, skin, pain management.  Ongoing physician follow-up and management with physical medicine and rehabilitation with weekly team conferences.     Given his current functional status the current plan is to pursue the specific spinal cord program at [facility].  Patient and his wife are in agreement with this plan.  Patient reviewed with CCP             Titus Perez MD      Appropriate PPE was worn during the entire visit.  Hand hygiene was completed before and after.

## 2024-06-27 NOTE — DISCHARGE PLACEMENT REQUEST
"Lissette Kothari \"Thony\" (69 y.o. Male)       Date of Birth   1955    Social Security Number       Address   [address] [address]    Home Phone   [phone]    MRN   [id number]       Holiness   Anabaptism    Marital Status                               Admission Date   6/25/24    Admission Type   Elective    Admitting Provider   Marshal Miguel MD    Attending Provider   Marshal Miguel MD    Department, Room/Bed   [facility] [address] [place], P594/1       Discharge Date       Discharge Disposition       Discharge Destination                                 Attending Provider: Marshal Miguel MD    Allergies: Nsaids, Atorvastatin, Hydralazine, Norvasc [Amlodipine], Zetia [Ezetimibe], Crestor [Rosuvastatin], Ace Inhibitors, Lisinopril, Testosterone    Isolation: None   Infection: None   Code Status: Prior    Ht: 195.6 cm (77.01\")   Wt: 121 kg (265 lb 10.5 oz)    Admission Cmt: None   Principal Problem: Preop examination [Z01.818]                   Active Insurance as of 6/25/2024       Primary Coverage       Payor Plan Insurance Group Employer/Plan Group    MEDICARE MEDICARE A & B        Payor Plan Address Payor Plan Phone Number Payor Plan Fax Number Effective Dates    [address] [phone]  3/1/2020 - None Entered    [facility] [id number]         Subscriber Name Subscriber Birth Date Member ID       LISSETTE KOTHARI 1955 [id number]               Secondary Coverage       Payor Plan Insurance Group Employer/Plan Group    [facility] SUPP [id number]       Payor Plan Address Payor Plan Phone Number Payor Plan Fax Number Effective Dates    [address]   3/1/2020 - None Entered    [facility] [address]         Subscriber Name Subscriber Birth Date Member ID       LISSETTE KOTHARI 1955 [id number]                     Emergency Contacts        (Rel.) Home Phone Work Phone Mobile Phone    Micaela Kothari " (Spouse) [phone] -- [phone]

## 2024-06-27 NOTE — PLAN OF CARE
Goal Outcome Evaluation:         VSS. Pt lethargic and slow to respond this morning; medications adjusted; quicker response this afternoon with intermittent lethargy. Intermittent cath attempted, unable to place; urology consulted and placed indwelling beltran cath. Wife at bedside.

## 2024-06-27 NOTE — CASE MANAGEMENT/SOCIAL WORK
Discharge Planning Assessment  [facility]     Patient Name: Isaias Monaco  MRN: [id number]  Today's Date: 6/27/2024    Admit Date: 6/25/2024    Plan: Referrals placed to [facility] and [facility]   Discharge Needs Assessment       Row Name 06/27/24 1356       Living Environment    People in Home spouse    Current Living Arrangements condominium    Potentially Unsafe Housing Conditions none    In the past 12 months has the electric, gas, oil, or water company threatened to shut off services in your home? No    Primary Care Provided by self    Provides Primary Care For no one    Family Caregiver if Needed spouse    Quality of Family Relationships involved;helpful;supportive    Able to Return to Prior Arrangements yes       Resource/Environmental Concerns    Resource/Environmental Concerns none    Transportation Concerns none       Transportation Needs    In the past 12 months, has lack of transportation kept you from medical appointments or from getting medications? no    In the past 12 months, has lack of transportation kept you from meetings, work, or from getting things needed for daily living? No       Food Insecurity    Within the past 12 months, you worried that your food would run out before you got the money to buy more. Never true    Within the past 12 months, the food you bought just didn't last and you didn't have money to get more. Never true       Transition Planning    Patient/Family Anticipates Transition to inpatient rehabilitation facility    Patient/Family Anticipated Services at Transition ;rehabilitation services    Transportation Anticipated health plan transportation  will need transportation arranged (ambulance or wheelchair)       Discharge Needs Assessment    Readmission Within the Last 30 Days no previous admission in last 30 days    Equipment Currently Used at Home shower chair    Concerns to be Addressed discharge planning     Outpatient/Agency/Support Group Needs inpatient rehabilitation facility    Discharge Facility/Level of Care Needs rehabilitation facility    Provided Post Acute Provider List? N/A    N/A Provider List Comment Family identified facilities to send referrals    Patient's Choice of Community Agency(s) [facility] and [facility]                   Discharge Plan       Row Name 06/27/24 3210       Plan    Plan Referrals placed to [facility]) and [facility]    Plan Comments Spoke with patient and his wife Micaela at bedside. Confirmed information on facesheet. Lives in a single level condominium with eight steps to enter. Uses [facility]. Plans dc to acute rehab. [facility], [place] and [facility]. Will need transportation arranged at discharge. Partial packet in office. Continue  to follow.......Emerald Delgado RN, CCP                  Continued Care and Services - Admitted Since 6/25/2024       Destination       Service Provider Request Status Selected Services Address Phone Fax Patient Preferred    [facility] Accepted N/A [address] [address] [phone] --    [facility] Considering  considering for IPR N/A [address] [facility] [id number] [phone] --    [facility] Pending - Request Sent N/A [address] [id number] [phone] --                  Expected Discharge Date and Time       Expected Discharge Date Expected Discharge Time    Jul 1, 2024            Demographic Summary    No documentation.                  Functional Status    No documentation.                  Psychosocial    No documentation.                  Abuse/Neglect    No documentation.                  Legal    No  documentation.                  Substance Abuse    No documentation.                  Patient Forms    No documentation.                     Emerald Delgado RN

## 2024-06-27 NOTE — THERAPY TREATMENT NOTE
Patient Name: Isaias Monaco  : 1955    MRN: [id number]                              Today's Date: 2024       Admit Date: 2024    Visit Dx: No diagnosis found.  Patient Active Problem List   Diagnosis    Microalbuminuria    Vitamin D deficiency    Angioedema    Coronary artery disease involving native coronary artery of native heart without angina pectoris    Anxiety    ED (erectile dysfunction)    Hyperlipidemia LDL goal <70    Hypogonadism in male    S/P primary angioplasty with coronary stent    Osteoarthritis of knee    Hypertensive kidney disease with stage 3a chronic kidney disease    Anemia, posthemorrhagic, acute    Dyspnea on exertion    Gastro-esophageal reflux disease with esophagitis    Tubular adenoma of colon    Nocturia associated with benign prostatic hyperplasia    Iron deficiency anemia    Adverse effect of iron and its compounds, sequela    Facial twitching    Blepharospasm    Type 2 diabetes mellitus with microalbuminuria, with long-term current use of insulin    Panic disorder    Tic disorder, unspecified    Spinal stenosis of lumbar region without neurogenic claudication    Bilateral myopia    Combined forms of age-related cataract, bilateral    Presbyopia    Primary open-angle glaucoma, bilateral, severe stage    Lumbar facet arthropathy    Spondylosis of lumbar region without myelopathy or radiculopathy    Calcific coronary arteriosclerosis    Essential hypertension    Degeneration of lumbar intervertebral disc    Dystonia    Lumbosacral spondylosis without myelopathy    Medicare annual wellness visit, subsequent    Coronary stent restenosis    Stenosis of cervical spine with myelopathy    Preop examination     Past Medical History:   Diagnosis Date    Anemia     post hemorrhagic    Angioedema 2016    Secondary to ACE inhibitor    Anxiety     Arthritis     CAD (coronary artery disease)     Colon polyps     Diabetes mellitus, type 2     GERD (gastroesophageal reflux  disease)     Glaucoma     Hematoma     history    High cholesterol     History of foreign travel 12/2017; 08/2018    [place], [place], [place], [place], [place] and [place]; [place]    Hyperlipidemia     Hypertension     Hypogonadism in male 09/28/2016    Male erectile disorder     Microalbuminuria     Osteoarthritis     knee    Spinal stenosis of lumbar region without neurogenic claudication 06/02/2021    Tubular adenoma of colon 11/01/2017    Vitamin D deficiency      Past Surgical History:   Procedure Laterality Date    CARDIAC CATHETERIZATION N/A 05/15/2006    Dr. Mini Camarillo    CARDIAC CATHETERIZATION N/A 03/10/2020    Procedure: Left Heart Cath;  Surgeon: Miguel Ángel Karimi MD;  Location:  [facility];  Service: Cardiology;  Laterality: N/A;    CARDIAC CATHETERIZATION N/A 03/10/2020    Procedure: Stent DAVONTE coronary;  Surgeon: Miguel Ángel Karimi MD;  Location:  [facility];  Service: Cardiology;  Laterality: N/A;    CARDIAC CATHETERIZATION N/A 03/10/2020    Procedure: Coronary angiography;  Surgeon: Miguel Ángel Karimi MD;  Location:  [facility];  Service: Cardiology;  Laterality: N/A;    CARDIAC CATHETERIZATION N/A 03/10/2020    Procedure: Left ventriculography;  Surgeon: Miguel Ángel Karimi MD;  Location:  [facility];  Service: Cardiology;  Laterality: N/A;    CARDIAC CATHETERIZATION N/A 05/20/2022    Procedure: Left Heart Cath;  Surgeon: Mackenzie Morales MD;  Location:  [facility];  Service: Cardiovascular;  Laterality: N/A;    CARDIAC CATHETERIZATION N/A 05/20/2022    Procedure: Coronary angiography;  Surgeon: Mackenzie Morales MD;  Location:  [facility];  Service: Cardiovascular;  Laterality: N/A;    CARDIAC CATHETERIZATION N/A 05/20/2022    Procedure: Percutaneous Coronary Intervention;  Surgeon: Mackenzie Morales MD;  Location:  [facility];  Service:  Cardiovascular;  Laterality: N/A;    CARDIAC CATHETERIZATION N/A 05/24/2022    Procedure: Percutaneous Coronary Intervention;  Surgeon: Miguel Ángel Karimi MD;  Location:  [facility];  Service: Cardiovascular;  Laterality: N/A;  LAD and Cx    CARDIAC CATHETERIZATION N/A 05/24/2022    Procedure: Stent DAVONTE coronary;  Surgeon: Miguel Ángel Karimi MD;  Location:  [facility];  Service: Cardiovascular;  Laterality: N/A;    CARDIAC CATHETERIZATION N/A 05/24/2022    Procedure: Resting Full Cycle Ratio;  Surgeon: Miguel Ángel Karimi MD;  Location:  [facility];  Service: Cardiovascular;  Laterality: N/A;    CARDIAC CATHETERIZATION N/A 03/19/2024    Procedure: Left Heart Cath;  Surgeon: Miguel Ángel Karimi MD;  Location:  [facility];  Service: Cardiovascular;  Laterality: N/A;    CARDIAC CATHETERIZATION N/A 03/19/2024    Procedure: Percutaneous Coronary Intervention;  Surgeon: Miguel Ángel Karimi MD;  Location:  [facility];  Service: Cardiovascular;  Laterality: N/A;    CARDIAC CATHETERIZATION N/A 03/19/2024    Procedure: Stent DAVONTE coronary;  Surgeon: Miguel Ángel Karimi MD;  Location:  [facility];  Service: Cardiovascular;  Laterality: N/A;    CERVICAL DISCECTOMY POSTERIOR FUSION WITH INSTRUMENTATION Bilateral 6/25/2024    Procedure: Cervical 3 to cervical 6 laminectomies and posterior spinal fusion - Bilateral;  Surgeon: Marshal Miguel MD;  Location: [facility] OR;  Service: Neurosurgery;  Laterality: Bilateral;    COLONOSCOPY N/A 02/22/2006    Bilobed polyp at 30 cm, hemorrhoids-Dr. Ilya Zhao    COLONOSCOPY N/A 02/28/2014    Normal ileum, one 6 mm polyp in the mid transverse colon-Dr. Ilya Zhao    COLONOSCOPY N/A 11/19/2008    Ela ileum, two 3 to 4 mm polyps, non-bleeding internal hemorrhoids, repeat in 5 years-Dr. Ilya Zhao    COLONOSCOPY N/A 10/31/2017    Procedure: COLONOSCOPY WITH  POLYPECTOMY (COLD BIOPSY);  Surgeon: Ilya Zhao MD;  Location: [facility] ENDOSCOPY;  Service:     COLONOSCOPY N/A 05/21/2021    Procedure: Colonoscopy into cecum and terminal ileum with cold biopsy polypectomy;  Surgeon: Ilya Zhao MD;  Location: [facility] ENDOSCOPY;  Service: Gastroenterology;  Laterality: N/A;  Pre op: History of Polyps  Post op: Polyp    CORONARY ANGIOPLASTY WITH STENT PLACEMENT  2007, 2012, 2015    cardiac stents x3 occasions    ENDOSCOPY N/A 10/04/2017    Procedure: ESOPHAGOGASTRODUODENOSCOPY;  Surgeon: Boyd Guidry MD;  Location: [facility] ENDOSCOPY;  Service:     ENDOSCOPY N/A 05/21/2021    Procedure: ESOPHAGOGASTRODUODENOSCOPY with biopsies;  Surgeon: Ilya Zhao MD;  Location: [facility] ENDOSCOPY;  Service: Gastroenterology;  Laterality: N/A;  Pre op: GERD  Post op: Irregular  Z-Line, Hiatal Hernia, Gastritis    ENDOSCOPY N/A 08/25/2023    Procedure: ESOPHAGOGASTRODUODENOSCOPY with biopsy;  Surgeon: Ilya Zhao MD;  Location: [facility] ENDOSCOPY;  Service: Gastroenterology;  Laterality: N/A;  errosive gastritis    EPIDURAL BLOCK      INTERVENTIONAL RADIOLOGY PROCEDURE N/A 05/20/2022    Procedure: Intravascular Ultrasound;  Surgeon: Mackenzie Morales MD;  Location: [facility] CATH INVASIVE LOCATION;  Service: Cardiovascular;  Laterality: N/A;    KNEE INCISION AND DRAINAGE Right 06/20/2017    Procedure: RT. KNEE WASHOUT ;  Surgeon: Body Coyne MD;  Location: [facility] MAIN OR;  Service:     MEDIAL BRANCH BLOCK Bilateral 12/17/2021    Procedure: LUMBAR MEDIAL BRANCH BLOCK bilateral ~L4-S1 x2 (~2 weeks apart);  Surgeon: Christina Villaseñor MD;  Location: [facility];  Service: Pain Management;  Laterality: Bilateral;    MEDIAL BRANCH BLOCK Bilateral 01/03/2022    Procedure: LUMBAR MEDIAL BRANCH BLOCK bilateral ~L4-S1 x2 (~2 weeks apart);  Surgeon: Christina Villaseñor MD;  Location: [facility];  Service: Pain Management;  Laterality: Bilateral;    CO ARTHRP KNE CONDYLE&PLATU MEDIAL&LAT  COMPARTMENTS Right 06/15/2017    Procedure: RT TOTAL KNEE ARTHROPLASTY;  Surgeon: Boyd Coyne MD;  [facility];  Service: Orthopedics    RADIOFREQUENCY ABLATION Bilateral 01/10/2022    Procedure: RADIOFREQUENCY ABLATION NERVES Bilateral L4-S1;  Surgeon: Christina Villaseñor MD;  [place] [facility] [place]  Service: Pain Management;  Laterality: Bilateral;    SHOULDER SURGERY Right 2016    rotator cuff repair    UPPER GASTROINTESTINAL ENDOSCOPY N/A 10/13/2015    Z-line irregular, normal stomach, normal duodenum-Dr. Ilya Zhao    UPPER GASTROINTESTINAL ENDOSCOPY N/A 02/28/2014    Z-line irregular, normal stomach, normal duodenum-Dr. Ilya Zhao    UPPER GASTROINTESTINAL ENDOSCOPY N/A 11/19/2008    Z-line irregular, chronic gastritis withotu hemorrhage, normal duodenum-Dr. Ilya Zhao    UPPER GASTROINTESTINAL ENDOSCOPY N/A 06/22/2006    LA Grade A reflux esophagitis, non-bleeding erythematous gastropathy, normal duodenum-Dr. Ilya Zhao      General Information       Row Name 06/27/24 1009          Physical Therapy Time and Intention    Document Type therapy note (daily note)  -CS     Mode of Treatment co-treatment;physical therapy;occupational therapy  -CS       Row Name 06/27/24 1009          General Information    Patient Profile Reviewed yes  -CS     Existing Precautions/Restrictions fall;cervical collar;spinal  -CS       Row Name 06/27/24 1009          Cognition    Orientation Status (Cognition) oriented x 3;other (see comments)  mild confusion & slow processing  -CS       Row Name 06/27/24 1009          Safety Issues, Functional Mobility    Safety Issues Affecting Function (Mobility) positioning of assistive device;sequencing abilities  -CS     Impairments Affecting Function (Mobility) endurance/activity tolerance;strength;motor control;motor planning;pain;grasp;balance;range of motion (ROM);coordination  -CS     Comment, Safety Issues/Impairments (Mobility) Co treatment medically appropriate and  necessary due to patient acuity level, activity tolerance and safety of patient and staff. Treatment is focusing on progression of care and goals established in the POC.  -CS               User Key  (r) = Recorded By, (t) = Taken By, (c) = Cosigned By      Initials Name Provider Type    CS Ana Coello, PT Physical Therapist                   Mobility       Row Name 06/27/24 1010          Bed Mobility    Bed Mobility supine-sit;sit-supine  -CS     Supine-Sit [place] (Bed Mobility) moderate assist (50% patient effort);maximum assist (25% patient effort);2 person assist;verbal cues;nonverbal cues (demo/gesture)  -CS     Sit-Supine [place] (Bed Mobility) maximum assist (25% patient effort);2 person assist;verbal cues;nonverbal cues (demo/gesture)  -CS     Assistive Device (Bed Mobility) head of bed elevated;bed rails  -CS     Comment, (Bed Mobility) cues for sequencing/log roll technique - unable to grasp bed rail to assist with slow processing  -CS       Row Name 06/27/24 1010          Bed-Chair Transfer    Bed-Chair [place] (Transfers) unable to assess  -CS       Row Name 06/27/24 1010          Sit-Stand Transfer    Sit-Stand [place] (Transfers) moderate assist (50% patient effort);maximum assist (25% patient effort);2 person assist;verbal cues;nonverbal cues (demo/gesture)  -CS     Assistive Device (Sit-Stand Transfers) walker, front-wheeled  -CS     Comment, (Sit-Stand Transfer) cues for UE placement but poor follow-through; flexed posture & B knee flexion - cues provided to correct posture  -CS       Row Name 06/27/24 1010          Gait/Stairs (Locomotion)    [place] Level (Gait) moderate assist (50% patient effort);2 person assist  -CS     Assistive Device (Gait) walker, front-wheeled  -CS     Distance in Feet (Gait) 2  side-steps  -CS     Deviations/Abnormal Patterns (Gait) bashir decreased;festinating/shuffling;gait speed decreased;stride length decreased;weight shifting decreased   -CS     Bilateral Gait Deviations forward flexed posture;heel strike decreased  -CS     Comment, (Gait/Stairs) max cues for sequencing & weight shifting; difficult to progress mobility with poor follow-through with cues  -CS               User Key  (r) = Recorded By, (t) = Taken By, (c) = Cosigned By      Initials Name Provider Type    CS Ana Coello, PT Physical Therapist                   Obj/Interventions       Row Name 06/27/24 1012          Motor Skills    Therapeutic Exercise other (see comments)  discussed HEP with pt & spouse  -CS       Row Name 06/27/24 1012          Balance    Balance Assessment sitting static balance;sitting dynamic balance;standing static balance  -CS     Static Sitting Balance minimal assist;verbal cues  -CS     Dynamic Sitting Balance moderate assist;verbal cues  -CS     Position, Sitting Balance sitting edge of bed  -CS     Static Standing Balance moderate assist;2-person assist  -CS     Position/Device Used, Standing Balance supported;walker, front-wheeled  -CS     Comment, Balance posterior lean while sitting EOB with cues to correct  -CS               User Key  (r) = Recorded By, (t) = Taken By, (c) = Cosigned By      Initials Name Provider Type    Ana Hayes, PT Physical Therapist                   Goals/Plan    No documentation.                  Clinical Impression       Row Name 06/27/24 1015          Pain    Pretreatment Pain Rating 5/10  -CS     Pain Location incisional  -CS     Pain Location - neck  -CS     Pain Intervention(s) Rest;Cold pack;Repositioned  -CS       Row Name 06/27/24 1015          Plan of Care Review    Plan of Care Reviewed With patient;spouse  -CS     Progress no change  -CS     Outcome Evaluation Pt received in bed and agreeable to PT. Pt noted with mild confusion with slow processing to questions or cues throughout therapy session. Pt required mod/max A x 2 to complete bed mobility. Cues for sequencing/log roll technique but poor carry over.  Pt demo's a posterior lean while sitting EOB requiring min/mod A to maintain balance. Pt stood c RW requiring mod/max A x2 and attempted to take side-steps but poor weight shifting and sequencing. Mobility difficult to progress at this time due to cognition, pain, coordination, and weakness. Pt returned to bed with all needs in reach. PT will continue to follow to address functional deficits.  -CS       Row Name 06/27/24 1015          Therapy Assessment/Plan (PT)    Criteria for Skilled Interventions Met (PT) yes;meets criteria  -CS     Therapy Frequency (PT) 6 times/wk  -CS       Row Name 06/27/24 1015          Positioning and Restraints    Pre-Treatment Position in bed  -CS     Post Treatment Position bed  -CS     In Bed fowlers;call light within reach;encouraged to call for assist;exit alarm on;with family/caregiver;heels elevated  -CS               User Key  (r) = Recorded By, (t) = Taken By, (c) = Cosigned By      Initials Name Provider Type    CS Ana Coello, PT Physical Therapist                   Outcome Measures       Row Name 06/27/24 1018 06/27/24 0828       How much help from another person do you currently need...    Turning from your back to your side while in flat bed without using bedrails? 2  -CS 2  -RL    Moving from lying on back to sitting on the side of a flat bed without bedrails? 2  -CS 2  -RL    Moving to and from a bed to a chair (including a wheelchair)? 2  -CS 2  -RL    Standing up from a chair using your arms (e.g., wheelchair, bedside chair)? 2  -CS 2  -RL    Climbing 3-5 steps with a railing? 1  -CS 1  -RL    To walk in hospital room? 1  -CS 2  -RL    AM-PAC 6 Clicks Score (PT) 10  -CS 11  -RL    Highest Level of Mobility Goal 4 --> Transfer to chair/commode  -CS 4 --> Transfer to chair/commode  -RL      Row Name 06/27/24 1018          Functional Assessment    Outcome Measure Options AM-PAC 6 Clicks Basic Mobility (PT)  -CS               User Key  (r) = Recorded By, (t) = Taken By,  (c) = Cosigned By      Initials Name Provider Type    Gloria Diaz, RN Registered Nurse    Ana Hayes, PT Physical Therapist                                 Physical Therapy Education       Title: PT OT SLP Therapies (Done)       Topic: Physical Therapy (Done)       Point: Mobility training (Done)       Learning Progress Summary             Patient Acceptance, E,TB, VU,DU,NR by  at 6/27/2024 1018    Acceptance, E,D, DU by  at 6/26/2024 1327   Family Acceptance, E,TB, VU,DU,NR by  at 6/27/2024 1018                         Point: Home exercise program (Done)       Learning Progress Summary             Patient Acceptance, E,TB, VU,DU,NR by  at 6/27/2024 1018    Acceptance, E,D, DU by  at 6/26/2024 1327   Family Acceptance, E,TB, VU,DU,NR by  at 6/27/2024 1018                         Point: Body mechanics (Done)       Learning Progress Summary             Patient Acceptance, E,TB, VU,DU,NR by  at 6/27/2024 1018    Acceptance, E,D, DU by  at 6/26/2024 1327   Family Acceptance, E,TB, VU,DU,NR by  at 6/27/2024 1018                         Point: Precautions (Done)       Learning Progress Summary             Patient Acceptance, E,TB, VU,DU,NR by  at 6/27/2024 1018    Acceptance, E,D, DU by  at 6/26/2024 1327   Family Acceptance, E,TB, VU,DU,NR by  at 6/27/2024 1018                                         User Key       Initials Effective Dates Name Provider Type Discipline    PC 06/16/21 -  Nisha Rivas PT Physical Therapist PT     09/22/22 -  Ana Coello PT Physical Therapist PT                  PT Recommendation and Plan     Plan of Care Reviewed With: patient, spouse  Progress: no change  Outcome Evaluation: Pt received in bed and agreeable to PT. Pt noted with mild confusion with slow processing to questions or cues throughout therapy session. Pt required mod/max A x 2 to complete bed mobility. Cues for sequencing/log roll technique but poor carry over. Pt demo's a  posterior lean while sitting EOB requiring min/mod A to maintain balance. Pt stood c RW requiring mod/max A x2 and attempted to take side-steps but poor weight shifting and sequencing. Mobility difficult to progress at this time due to cognition, pain, coordination, and weakness. Pt returned to bed with all needs in reach. PT will continue to follow to address functional deficits.     Time Calculation:         PT Charges       Row Name 06/27/24 1018             Time Calculation    Start Time 0942  -CS      Stop Time 1002  -CS      Time Calculation (min) 20 min  -CS      PT Received On 06/27/24  -CS      PT - Next Appointment 06/28/24  -CS         Time Calculation- PT    Total Timed Code Minutes- PT 15 minute(s)  -CS         Timed Charges    42509 - PT Therapeutic Activity Minutes 15  -CS         Total Minutes    Timed Charges Total Minutes 15  -CS       Total Minutes 15  -CS                User Key  (r) = Recorded By, (t) = Taken By, (c) = Cosigned By      Initials Name Provider Type    CS Ana Coello, PT Physical Therapist                  Therapy Charges for Today       Code Description Service Date Service Provider Modifiers Qty    51254060150  PT THERAPEUTIC ACT EA 15 MIN 6/27/2024 Ana Coello, PT GP 1            PT G-Codes  Outcome Measure Options: AM-PAC 6 Clicks Basic Mobility (PT)  AM-PAC 6 Clicks Score (PT): 10  PT Discharge Summary  Anticipated Discharge Disposition (PT): inpatient rehabilitation facility    Ana Coello PT  6/27/2024

## 2024-06-27 NOTE — CONSULTS
First Urology Catheter Placement    Patient Identification:  NAME:  Isaias Monaco  Age:  69 y.o.   Sex:  male   :  1955   MRN:  [id number]     Called to see patient due to difficult catheter placement. Patient is POD 2 s/p C3-C6 decompression and fusion. An indwelling beltran catheter was placed for procedure and removed yesterday for attempted voiding trial. Nurse reports that patient unable to urinate overnight. Attempted catheterization x 2 which were unsuccessful. Urology consulted for placement of beltran catheter. Patient denies any history of urinary retention.   Bladder scan this afternoon yielded 548 cc in patient's bladder.     Patient was instructed on need for urethral catheter following failed voiding trial. Verbal consent was obtained.   Perineal hygiene given  Patient was prepped and draped in sterile fashion  Lidocaine Urojet numbing jelly was applied directly into urethral opening  Sterile water was injected directly into plastic sheath of glidewire  Using a sterile technique the hydrophilic glidewire was placed into the urethral meatus and gently advanced into the bladder without difficulty  A 18 f [place]-tip catheter was then placed around the wire and advanced through the urethra into bladder with immediate return of urine  Catheter was advanced to the hub and balloon inflated with 10 mL of water  Guidewire was removed and catheter gently retracted until resistance met  Closed drainage system attached  1200 mL of orange urine immediately returned to bag  Patient tolerated well.     Plan:  - Continue indwelling catheter   - Plan is for patient to be discharged on Monday. Okay for voiding trial on . Discussed with patient and family. Prefer day prior to planned discharge.   - If unable to void and/or PVR >300 mL, replace beltran  - If the patient is discharged with an indwelling catheter, please arrange an outpatient consult in the urology office: [phone].  - Urology will sign  off. Please call with questions or concerns.    Zoe Castillo, APRN  06/27/24  14:35 EDT

## 2024-06-27 NOTE — PROGRESS NOTES
[facility] NEUROSURGERY PROGRESS NOTE      CC: POD #2 C3-C6 laminectomy and posterior fusion      Subjective     Interval History: Appears oversedated this morning but following commands.    ROS:  Unobtainable due to being oversedated.  No fever or chills.  No nausea or vomiting.    Objective     Vital signs in last 24 hours:  Temp:  [98.6 °F (37 °C)-99.7 °F (37.6 °C)] 99.7 °F (37.6 °C)  Heart Rate:  [79-94] 80  Resp:  [16-18] 16  BP: (129-169)/() 129/79    Intake/Output this shift:  No intake/output data recorded.    LABS:  Results from last 7 days   Lab Units 06/26/24  0223   WBC 10*3/mm3 11.42*   HEMOGLOBIN g/dL 15.0   HEMATOCRIT % 46.3   PLATELETS 10*3/mm3 179     Results from last 7 days   Lab Units 06/26/24  0223   SODIUM mmol/L 135*   POTASSIUM mmol/L 4.2   CHLORIDE mmol/L 104   CO2 mmol/L 21.0*   BUN mg/dL 10   CREATININE mg/dL 0.85   GLUCOSE mg/dL 123*   CALCIUM mg/dL 8.8         IMAGING STUDIES:  no new imaging to review    I personally viewed and interpreted the patient's chart.    Meds reviewed/changed: Yes  Scheduled Meds:ARIPiprazole, 5 mg, Oral, Daily  carvedilol, 12.5 mg, Oral, BID With Meals  dorzolamide (TRUSOPT) 2 % 1 drop, timolol (TIMOPTIC) 0.5 % 1 drop for Cosopt 22.3-6.8 mg/mL, , Both Eyes, BID  escitalopram, 20 mg, Oral, QAM  gabapentin, 300 mg, Oral, Nightly  heparin (porcine), 5,000 Units, Subcutaneous, Q12H  insulin glargine, 30 Units, Subcutaneous, Daily  insulin regular, 2-7 Units, Subcutaneous, Q6H  latanoprost, 1 drop, Both Eyes, Nightly  pantoprazole, 40 mg, Oral, Q AM  terazosin, 5 mg, Oral, Nightly      Continuous Infusions:lactated ringers, 9 mL/hr, Last Rate: Stopped (06/25/24 1934)  lactated ringers, 9 mL/hr, Last Rate: Stopped (06/25/24 1934)      PRN Meds:.  acetaminophen **OR** acetaminophen **OR** acetaminophen    senna-docusate sodium **AND** polyethylene glycol **AND** bisacodyl **AND** bisacodyl    clonazePAM    cyclobenzaprine    dextrose    dextrose    glucagon (human  recombinant)    methocarbamol    Morphine **AND** naloxone    ondansetron ODT **OR** ondansetron    oxyCODONE-acetaminophen    sodium chloride    sodium chloride    Physical exam  Drowsy, arouses, follows commands   Pupils equal round reactive to light  Extraocular muscles intact  Face symmetric  Speech is fluent and clear  Motor exam  Bilateral deltoids 5/5, bilateral biceps 5/5, bilateral triceps 5/5, bilateral wrist extension 5/5 bilateral hand  5/5  Bilateral hip flexion 5/5, bilateral knee extension 5/5, bilateral DF/PF 5/5  gait deferred  Able to detect  light touch in all 4 extremities      Assessment & Plan     ASSESSMENT:      Preop examination    S/P primary angioplasty with coronary stent    Stenosis of cervical spine with myelopathy    69-year-old male who is POD #2 C3-C6 laminectomies with posterior spinal fusion    PLAN:     decrease percocet, change robaxin to 500mg PRN, DC morphine  plan is acute rehab at [place]- hopefully tomorrow if mentation improves   urinary retention unable to cath, consult urology to place catheter at bedside   May resume aspirin tomorrow and Plavix on July 3.    I discussed the patient's findings and my recommendations with patient, family, and nursing staff    I spent 30 minutes caring for Isaias Monaco on this date of service. This time includes time spent by me in the following activities: preparing for the visit, reviewing tests, obtaining and/or reviewing a separately obtained history, performing a medically appropriate examination and/or evaluation, counseling and educating the patient/family/caregiver, ordering medications, tests, or procedures, referring and communicating with other health care professionals, documenting information in the medical record, independently interpreting results and communicating that information with the patient/family/caregiver, and care coordination           LOS: 2 days       Masha Cloud, APRN  6/27/2024  12:39  "EDT    \"Dictated utilizing Dragon dictation\".      "

## 2024-06-27 NOTE — PLAN OF CARE
Goal Outcome Evaluation:  Plan of Care Reviewed With: patient, spouse           Outcome Evaluation: Pt seen for OT giles this AM. He is s/p C3-C6 B laminectomies with screw fixation due to severe cervical cord stenosis with myelopathy. He lives with his spouse at home with 8 [address] and bedroom/bathroom on main level once inside. Today pt noted to have slow processing and require cues for initiating movements. He required mod/max Ax2 for bed mobility. Cues required for log roll technique. Pt with difficulty grasping and holding onto bed rail with RUE. While seated EOB he required min-mod A for sitting balance. He required mod-max Ax2 for STS with use of rwx. Attempted to take side steps but pt unable to weightshift and take side steps towards HOB. Pt drowsy and fatigued throughout session. Due to pt's weak  strength anticipate max A for grooming tasks, Dep for LBD and toileting at this time. OT to continue to follow to address deficits      Anticipated Discharge Disposition (OT): inpatient rehabilitation facility

## 2024-06-27 NOTE — THERAPY EVALUATION
Acute Care - Occupational Therapy Initial Evaluation  [facility]     Patient Name: Isaias Monaco  : 1955  MRN: [id number]  Today's Date: 2024             Admit Date: 2024     No diagnosis found.  Patient Active Problem List   Diagnosis    Microalbuminuria    Vitamin D deficiency    Angioedema    Coronary artery disease involving native coronary artery of native heart without angina pectoris    Anxiety    ED (erectile dysfunction)    Hyperlipidemia LDL goal <70    Hypogonadism in male    S/P primary angioplasty with coronary stent    Osteoarthritis of knee    Hypertensive kidney disease with stage 3a chronic kidney disease    Anemia, posthemorrhagic, acute    Dyspnea on exertion    Gastro-esophageal reflux disease with esophagitis    Tubular adenoma of colon    Nocturia associated with benign prostatic hyperplasia    Iron deficiency anemia    Adverse effect of iron and its compounds, sequela    Facial twitching    Blepharospasm    Type 2 diabetes mellitus with microalbuminuria, with long-term current use of insulin    Panic disorder    Tic disorder, unspecified    Spinal stenosis of lumbar region without neurogenic claudication    Bilateral myopia    Combined forms of age-related cataract, bilateral    Presbyopia    Primary open-angle glaucoma, bilateral, severe stage    Lumbar facet arthropathy    Spondylosis of lumbar region without myelopathy or radiculopathy    Calcific coronary arteriosclerosis    Essential hypertension    Degeneration of lumbar intervertebral disc    Dystonia    Lumbosacral spondylosis without myelopathy    Medicare annual wellness visit, subsequent    Coronary stent restenosis    Stenosis of cervical spine with myelopathy    Preop examination     Past Medical History:   Diagnosis Date    Anemia     post hemorrhagic    Angioedema 2016    Secondary to ACE inhibitor    Anxiety     Arthritis     CAD (coronary artery disease)     Colon polyps     Diabetes mellitus,  type 2     GERD (gastroesophageal reflux disease)     Glaucoma     Hematoma     history    High cholesterol     History of foreign travel 12/2017; 08/2018    [place], [place], [place], [place], [place] and [place]; [place]    Hyperlipidemia     Hypertension     Hypogonadism in male 09/28/2016    Male erectile disorder     Microalbuminuria     Osteoarthritis     knee    Spinal stenosis of lumbar region without neurogenic claudication 06/02/2021    Tubular adenoma of colon 11/01/2017    Vitamin D deficiency      Past Surgical History:   Procedure Laterality Date    CARDIAC CATHETERIZATION N/A 05/15/2006    Dr. Mini Camarillo    CARDIAC CATHETERIZATION N/A 03/10/2020    Procedure: Left Heart Cath;  Surgeon: Miguel Ángel Karimi MD;  Location:  [facility] CATH INVASIVE [facility];  Service: Cardiology;  Laterality: N/A;    CARDIAC CATHETERIZATION N/A 03/10/2020    Procedure: Stent DAVONTE coronary;  Surgeon: Miguel Ángel Karimi MD;  Location:  [facility] C[facility]VASIVE [facility];  Service: Cardiology;  Laterality: N/A;    CARDIAC CATHETERIZATION N/A 03/10/2020    Procedure: Coronary angiography;  Surgeon: Miguel Ángel Karimi MD;  [facility]: [facility] CATH INVASIVE LOC[facility];  Service: Cardiology;  Laterality: N/A;    CARDIAC CATHETERIZATION N/A 03/10/2020    Procedure: Left ventriculography;  Surgeon: Miguel Ángel Karimi MD;  Location:  [facility] CATH INVASIVE [facility]ATION;  Service: Cardiology;  Laterality: N/A;    CARDIAC CATHETERIZATION N/A 05/20/2022    Procedure: Left Heart Cath;  Surgeon: Mackenzie Morales MD;  Location: [facility] CATH INVASIVE LOCATION;  Service: Cardiovascular;  Laterality: N/A;    CARDIAC CATHETERIZATION N/A 05/20/2022    Procedure: Coronary angiography;  Surgeon: Mackenzie Morales MD;  Location:  [facility] CATH INVASIVE LOC[facility];  Service: Cardiovascular;  Laterality: N/A;    CARDIAC CATHETERIZATION N/A 05/20/2022    Procedure: Percutaneous Coronary Intervention;  Surgeon: Mackenzie Morales MD;  Location: [facility]  CATH INVASIVE LOCATION;  Service: Cardiovascular;  Laterality: N/A;    CARDIAC CATHETERIZATION N/A 05/24/2022    Procedure: Percutaneous Coronary Intervention;  Surgeon: Miguel Ángel Karimi MD;  Location:  [facility] CATH INVASIVE LOC[facility];  Service: Cardiovascular;  Laterality: N/A;  LAD and Cx    CARDIAC CATHETERIZATION N/A 05/24/2022    Procedure: Stent DAVONTE coronary;  Surgeon: Miguel Ángel Karimi MD;  Location:  [facility] CATH INVASIVE LOC[facility];  Service: Cardiovascular;  Laterality: N/A;    CARDIAC CATHETERIZATION N/A 05/24/2022    Procedure: Resting Full Cycle Ratio;  Surgeon: Miguel Ángel Karimi MD;  [facility];  Service: Cardiovascular;  Laterality: N/A;    CARDIAC CATHETERIZATION N/A 03/19/2024    Procedure: Left Heart Cath;  Surgeon: Miguel Ángel Karimi MD;  Location: [facility] CATH INVASIVE LOCATION;  Service: Cardiovascular;  Laterality: N/A;    CARDIAC CATHETERIZATION N/A 03/19/2024    Procedure: Percutaneous Coronary Intervention;  Surgeon: Miguel Ángel Karimi MD;  [facility];  Service: Cardiovascular;  Laterality: N/A;    CARDIAC CATHETERIZATION N/A 03/19/2024    Procedure: Stent DAVONTE coronary;  Surgeon: Miguel Ángel Karimi MD;  Location: [facility] CATH INVASIVE LOCATION;  Service: Cardiovascular;  Laterality: N/A;    CERVICAL DISCECTOMY POSTERIOR FUSION WITH INSTRUMENTATION Bilateral 6/25/2024    Procedure: Cervical 3 to cervical 6 laminectomies and posterior spinal fusion - Bilateral;  Surgeon: Marshal Miguel MD;  Location: [facility];  Service: Neurosurgery;  Laterality: Bilateral;    COLONOSCOPY N/A 02/22/2006    Bilobed polyp at 30 cm, hemorrhoids-Dr. Ilya Zhao    COLONOSCOPY N/A 02/28/2014    Normal ileum, one 6 mm polyp in the mid transverse colon-Dr. Ilya Zhao    COLONOSCOPY N/A 11/19/2008    Ela ileum, two 3 to 4 mm polyps, non-bleeding internal hemorrhoids, repeat in 5 years-Dr. Ilya Zhao    COLONOSCOPY N/A 10/31/2017     Procedure: COLONOSCOPY WITH POLYPECTOMY (COLD BIOPSY);  Surgeon: Ilya Zhao MD;  Location: [facility] ENDOSCOPY;  Service:     COLONOSCOPY N/A 05/21/2021    Procedure: Colonoscopy into cecum and terminal ileum with cold biopsy polypectomy;  Surgeon: Ilya Zhao MD;  Location: [facility] ENDOSCOPY;  Service: Gastroenterology;  Laterality: N/A;  Pre op: History of Polyps  Post op: Polyp    CORONARY ANGIOPLASTY WITH STENT PLACEMENT  2007, 2012, 2015    cardiac stents x3 occasions    ENDOSCOPY N/A 10/04/2017    Procedure: ESOPHAGOGASTRODUODENOSCOPY;  Surgeon: Boyd Guidry MD;  Location: [facility] ENDOSCOPY;  Service:     ENDOSCOPY N/A 05/21/2021    Procedure: ESOPHAGOGASTRODUODENOSCOPY with biopsies;  Surgeon: Ilya Zhao MD;  Location: [facility] ENDOSCOPY;  Service: Gastroenterology;  Laterality: N/A;  Pre op: GERD  Post op: Irregular  Z-Line, Hiatal Hernia, Gastritis    ENDOSCOPY N/A 08/25/2023    Procedure: ESOPHAGOGASTRODUODENOSCOPY with biopsy;  Surgeon: Ilya Zhao MD;  Location: [facility] ENDOSCOPY;  Service: Gastroenterology;  Laterality: N/A;  errosive gastritis    EPIDURAL BLOCK      INTERVENTIONAL RADIOLOGY PROCEDURE N/A 05/20/2022    Procedure: Intravascular Ultrasound;  Surgeon: Mackenzie Morales MD;  Location: [facility] CATH INVASIVE LOCATION;  Service: Cardiovascular;  Laterality: N/A;    KNEE INCISION AND DRAINAGE Right 06/20/2017    Procedure: RT. KNEE WASHOUT ;  Surgeon: Boyd Coyne MD;  Location: [facility] MAIN OR;  Service:     MEDIAL BRANCH BLOCK Bilateral 12/17/2021    Procedure: LUMBAR MEDIAL BRANCH BLOCK bilateral ~L4-S1 x2 (~2 weeks apart);  Surgeon: Christina Villaseñor MD;  Location: [facility] MAIN [facility];  Service: Pain Management;  Laterality: Bilateral;    MEDIAL BRANCH BLOCK Bilateral 01/03/2022    Procedure: LUMBAR MEDIAL BRANCH BLOCK bilateral ~L4-S1 x2 (~2 weeks apart);  Surgeon: Christina Villaseñor MD;  Location: [facility];  Service: Pain Management;  Laterality: Bilateral;    VA ARTHRP KNE  CONDYLE&PLATU MEDIAL&LAT COMPARTMENTS Right 06/15/2017    Procedure: RT TOTAL KNEE ARTHROPLASTY;  Surgeon: Boyd Coyne MD;  Location: [facility] MAIN OR;  Service: Orthopedics    RADIOFREQUENCY ABLATION Bilateral 01/10/2022    Procedure: RADIOFREQUENCY ABLATION NERVES Bilateral L4-S1;  Surgeon: Christina Villaseñor MD;  [place] [facility] [place] [facility];  Service: Pain Management;  Laterality: Bilateral;    SHOULDER SURGERY Right 2016    rotator cuff repair    UPPER GASTROINTESTINAL ENDOSCOPY N/A 10/13/2015    Z-line irregular, normal stomach, normal duodenum-Dr. Ilya Zhao    UPPER GASTROINTESTINAL ENDOSCOPY N/A 02/28/2014    Z-line irregular, normal stomach, normal duodenum-Dr. Ilya Zhao    UPPER GASTROINTESTINAL ENDOSCOPY N/A 11/19/2008    Z-line irregular, chronic gastritis withotu hemorrhage, normal duodenum-Dr. Ilya Zhao    UPPER GASTROINTESTINAL ENDOSCOPY N/A 06/22/2006    LA Grade A reflux esophagitis, non-bleeding erythematous gastropathy, normal duodenum-Dr. Ilya Zhao         OT ASSESSMENT FLOWSHEET (Last 12 Hours)       OT Evaluation and Treatment       Row Name 06/27/24 3357                   OT Time and Intention    Subjective Information fatigue  drowsy. pt was given pain meds prior to session  -KR        Document Type therapy note (daily note)  -KR        Mode of Treatment co-treatment;physical therapy;occupational therapy  -KR           General Information    Patient Profile Reviewed yes  -KR        Prior Level of Function --  Walked without AD for short distances and required assist with dressing from spouse due to progressive weakness  -KR        Existing Precautions/Restrictions fall;cervical collar;spinal  -KR           Living Environment    Current Living Arrangements home  -KR        People in Home spouse  -KR           Home Main Entrance    Number of Stairs, Main Entrance eight  -KR        Stair Railings, Main Entrance railings safe and in good condition  -KR           Stairs Within Home, Primary     Number of Stairs, Within Home, Primary none  -KR           Home Use of Assistive/Adaptive Equipment    Equipment Currently Used at Home shower chair  -KR           Pain Assessment    Pretreatment Pain Rating 5/10  -KR        Pain Location incisional  -KR        Pain Location - neck  -KR        Pain Intervention(s) Repositioned;Rest  -KR           Cognition    Orientation Status (Cognition) oriented x 3  Pt drowsy due to medication- mild confusion noted with slow processing  -KR           Range of Motion Comprehensive    General Range of Motion upper extremity range of motion deficits identified  -KR        Comment, General Range of Motion BUE shoulder flexion ~90 degrees  delayed motor planning noted/processing  -KR           Strength Comprehensive (MMT)    Comment, General Manual Muscle Testing (MMT) Assessment Did not formally assess due to pain. Grossly 3+/5  -KR           Activities of Daily Living    BADL Assessment/Intervention lower body dressing;grooming;toileting  -KR           Lower Body Dressing Assessment/Training    [place] Level (Lower Body Dressing) lower body dressing skills;don;doff;maximum assist (25% patient effort)  -KR        Position (Lower Body Dressing) edge of bed sitting  -KR           Grooming Assessment/Training    Comment, (Grooming) Anticipate max A today with grooming due to pt's decreased  strength in BUE. Pt drowsy from medication and demo very weak  strength bilaterally. Unable to grasp bed rail as well  provided pt with built up handles for grooming or self feeding  -KR           Toileting Assessment/Training    [place] Level (Toileting) toileting skills;dependent (less than 25% patient effort);adjust/manage clothing;change pad/brief;perform perineal hygiene  -KR           BADL Safety/Performance    Impairments, BADL Safety/Performance balance;endurance/activity tolerance;coordination;cognition;motor planning;pain;range of motion;strength  -KR           Bed  Mobility    Supine-Sit [place] (Bed Mobility) moderate assist (50% patient effort);maximum assist (25% patient effort);2 person assist;verbal cues;nonverbal cues (demo/gesture)  -KR        Sit-Supine [place] (Bed Mobility) maximum assist (25% patient effort);2 person assist;verbal cues;nonverbal cues (demo/gesture)  -KR           Bed-Chair Transfer    Bed-Chair [place] (Transfers) unable to assess  -KR           Sit-Stand Transfer    Sit-Stand [place] (Transfers) moderate assist (50% patient effort);maximum assist (25% patient effort);2 person assist;verbal cues;nonverbal cues (demo/gesture)  -KR        Assistive Device (Sit-Stand Transfers) walker, front-wheeled  -KR           Safety Issues, Functional Mobility    Safety Issues Affecting Function (Mobility) problem-solving;sequencing abilities  -KR        Impairments Affecting Function (Mobility) endurance/activity tolerance;strength;motor control;motor planning;pain;grasp;balance;range of motion (ROM);coordination;cognition  -KR           Motor Skills    Motor Skills coordination  -KR        Coordination fine motor deficit;gross motor deficit;left;right;bilateral  very weak  strength  -KR           Balance    Static Sitting Balance minimal assist;verbal cues  -KR        Dynamic Sitting Balance moderate assist;verbal cues  -KR        Position, Sitting Balance sitting edge of bed  -KR        Static Standing Balance moderate assist;2-person assist  -KR        Position/Device Used, Standing Balance walker, front-wheeled  -KR           Wound 06/25/24 posterior cervical spine Incision    Wound - Properties Group Placement Date: 06/25/24  -SB Present on Original Admission: N  -SB Orientation: posterior  -SB Location: cervical spine  -SB Primary Wound Type: Incision  -SB    Retired Wound - Properties Group Placement Date: 06/25/24  -SB Present on Original Admission: N  -SB Orientation: posterior  -SB Location: cervical spine  -SB Primary Wound  Type: Incision  -SB    Retired Wound - Properties Group Date first assessed: 06/25/24  -SB Present on Original Admission: N  -SB Location: cervical spine  -SB Primary Wound Type: Incision  -SB       Plan of Care Review    Plan of Care Reviewed With patient;spouse  -KR        Outcome Evaluation Pt seen for OT eval this AM. He is s/p C3-C6 B laminectomies with screw fixation due to severe cervical cord stenosis with myelopathy. He lives with his spouse at home with 8 [address] and bedroom/bathroom on main level once inside. Today pt noted to have slow processing and require cues for initiating movements. He required mod/max Ax2 for bed mobility. Cues required for log roll technique. Pt with difficulty grasping and holding onto bed rail with RUE. While seated EOB he required min-mod A for sitting balance. He required mod-max Ax2 for STS with use of rwx. Attempted to take side steps but pt unable to weightshift and take side steps towards HOB. Pt drowsy and fatigued throughout session. Due to pt's weak  strength anticipate max A for grooming tasks, Dep for LBD and toileting at this time. OT to continue to follow to address deficits  -KR           Positioning and Restraints    Pre-Treatment Position in bed  -KR        Post Treatment Position bed  -KR        In Bed notified nsg;supine;call light within reach;encouraged to call for assist;exit alarm on;with family/caregiver  -KR           Therapy Assessment/Plan (OT)    Therapy Frequency (OT) 5 times/wk  -KR           Therapy Plan Review/Discharge Plan (OT)    Anticipated Discharge Disposition (OT) inpatient rehabilitation facility  -KR           OT Goals    Grooming Goal Selection (OT) grooming, OT goal 1  -KR        Strength Goal Selection (OT) strength, OT goal 1  -KR        Balance Goal Selection (OT) balance, OT goal 1  -KR           Grooming Goal 1 (OT)    Activity/Device (Grooming Goal 1, OT) grooming skills, all;built-up handle items  -KR        [place]  (Grooming Goal 1, OT) minimum assist (75% or more patient effort)  -KR        Time Frame (Grooming Goal 1, OT) short term goal (STG)  -KR        Progress/Outcome (Grooming Goal 1, OT) goal ongoing  -KR           Strength Goal 1 (OT)    Strength Goal 1 (OT) Pt to demo understanding of bilateral hand ROM/strengthening HEP in order to improve performance with ADLs  -KR        Time Frame (Strength Goal 1, OT) short term goal (STG)  -KR        Progress/Outcome (Strength Goal 1, OT) goal ongoing  -KR           Balance Goal 1 (OT)    Activity/Assistive Device (Balance Goal 1, OT) sitting, dynamic  -KR        [place] Level/Cues Needed (Balance Goal 1, OT) standby assist  -KR        Time Frame (Balance Goal 1, OT) short term goal (STG)  -KR        Progress/Outcomes (Balance Goal 1, OT) goal ongoing  -KR                  User Key  (r) = Recorded By, (t) = Taken By, (c) = Cosigned By      Initials Name Effective Dates    Marti Becerra RN 08/16/21 -     Guerda French, TALON 06/25/24 -                            OT Recommendation and Plan  Therapy Frequency (OT): 5 times/wk  Plan of Care Review  Plan of Care Reviewed With: patient, spouse  Outcome Evaluation: Pt seen for OT eval this AM. He is s/p C3-C6 B laminectomies with screw fixation due to severe cervical cord stenosis with myelopathy. He lives with his spouse at home with 8 [address] and bedroom/bathroom on main level once inside. Today pt noted to have slow processing and require cues for initiating movements. He required mod/max Ax2 for bed mobility. Cues required for log roll technique. Pt with difficulty grasping and holding onto bed rail with RUE. While seated EOB he required min-mod A for sitting balance. He required mod-max Ax2 for STS with use of rwx. Attempted to take side steps but pt unable to weightshift and take side steps towards HOB. Pt drowsy and fatigued throughout session. Due to pt's weak  strength anticipate max A for grooming tasks,  Dep for LBD and toileting at this time. OT to continue to follow to address deficits  Plan of Care Reviewed With: patient, spouse  Outcome Evaluation: Pt seen for OT eval this AM. He is s/p C3-C6 B laminectomies with screw fixation due to severe cervical cord stenosis with myelopathy. He lives with his spouse at home with 8 [address] and bedroom/bathroom on main level once inside. Today pt noted to have slow processing and require cues for initiating movements. He required mod/max Ax2 for bed mobility. Cues required for log roll technique. Pt with difficulty grasping and holding onto bed rail with RUE. While seated EOB he required min-mod A for sitting balance. He required mod-max Ax2 for STS with use of rwx. Attempted to take side steps but pt unable to weightshift and take side steps towards HOB. Pt drowsy and fatigued throughout session. Due to pt's weak  strength anticipate max A for grooming tasks, Dep for LBD and toileting at this time. OT to continue to follow to address deficits        Time Calculation:    Time Calculation- OT       Row Name 06/27/24 1455             Time Calculation- OT    OT Start Time 0942  -KR      OT Stop Time 1003  -KR      OT Time Calculation (min) 21 min  -KR      Total Timed Code Minutes- OT 8 minute(s)  -KR      OT Received On 06/27/24  -KR      OT - Next Appointment 06/28/24  -KR      OT Goal Re-Cert Due Date 07/11/24  -KR         Timed Charges    31021 - OT Therapeutic Activity Minutes 8  -KR         Total Minutes    Timed Charges Total Minutes 8  -KR       Total Minutes 8  -KR                User Key  (r) = Recorded By, (t) = Taken By, (c) = Cosigned By      Initials Name Provider Type    Guerda French OT Occupational Therapist                  Therapy Charges for Today       Code Description Service Date Service Provider Modifiers Qty    [id number]  OT THERAPEUTIC ACT EA 15 MIN 6/27/2024 Guerda Morrison OT GO 1    [id number]  OT EVAL MOD COMPLEXITY 2 6/27/2024 Tamiko  Guerda, OT GO 1                 Guerda Morrison, OT  6/27/2024

## 2024-06-27 NOTE — PLAN OF CARE
Goal Outcome Evaluation:  Plan of Care Reviewed With: patient, spouse        Progress: no change  Outcome Evaluation: Pt received in bed and agreeable to PT. Pt noted with mild confusion with slow processing to questions or cues throughout therapy session. Pt required mod/max A x 2 to complete bed mobility. Cues for sequencing/log roll technique but poor carry over. Pt demo's a posterior lean while sitting EOB requiring min/mod A to maintain balance. Pt stood c RW requiring mod/max A x2 and attempted to take side-steps but poor weight shifting and sequencing. Mobility difficult to progress at this time due to cognition, pain, coordination, and weakness. Pt returned to bed with all needs in reach. PT will continue to follow to address functional deficits.      Anticipated Discharge Disposition (PT): inpatient rehabilitation facility

## 2024-06-28 ENCOUNTER — APPOINTMENT (OUTPATIENT)
Dept: GENERAL RADIOLOGY | Facility: HOSPITAL | Age: 69
End: 2024-06-28
Payer: MEDICARE

## 2024-06-28 LAB
ANION GAP SERPL CALCULATED.3IONS-SCNC: 8 MMOL/L (ref 5–15)
BASOPHILS # BLD AUTO: 0.04 10*3/MM3 (ref 0–0.2)
BASOPHILS NFR BLD AUTO: 0.3 % (ref 0–1.5)
BUN SERPL-MCNC: 15 MG/DL (ref 8–23)
BUN/CREAT SERPL: 12.7 (ref 7–25)
CALCIUM SPEC-SCNC: 8.4 MG/DL (ref 8.6–10.5)
CHLORIDE SERPL-SCNC: 102 MMOL/L (ref 98–107)
CO2 SERPL-SCNC: 24 MMOL/L (ref 22–29)
CREAT SERPL-MCNC: 1.18 MG/DL (ref 0.76–1.27)
DEPRECATED RDW RBC AUTO: 49.4 FL (ref 37–54)
EGFRCR SERPLBLD CKD-EPI 2021: 66.8 ML/MIN/1.73
EOSINOPHIL # BLD AUTO: 0.18 10*3/MM3 (ref 0–0.4)
EOSINOPHIL NFR BLD AUTO: 1.5 % (ref 0.3–6.2)
ERYTHROCYTE [DISTWIDTH] IN BLOOD BY AUTOMATED COUNT: 15.8 % (ref 12.3–15.4)
GLUCOSE BLDC GLUCOMTR-MCNC: 110 MG/DL (ref 70–130)
GLUCOSE BLDC GLUCOMTR-MCNC: 118 MG/DL (ref 70–130)
GLUCOSE BLDC GLUCOMTR-MCNC: 95 MG/DL (ref 70–130)
GLUCOSE SERPL-MCNC: 121 MG/DL (ref 65–99)
HCT VFR BLD AUTO: 40.1 % (ref 37.5–51)
HGB BLD-MCNC: 13 G/DL (ref 13–17.7)
IMM GRANULOCYTES # BLD AUTO: 0.06 10*3/MM3 (ref 0–0.05)
IMM GRANULOCYTES NFR BLD AUTO: 0.5 % (ref 0–0.5)
LYMPHOCYTES # BLD AUTO: 2.03 10*3/MM3 (ref 0.7–3.1)
LYMPHOCYTES NFR BLD AUTO: 16.6 % (ref 19.6–45.3)
MCH RBC QN AUTO: 28.2 PG (ref 26.6–33)
MCHC RBC AUTO-ENTMCNC: 32.4 G/DL (ref 31.5–35.7)
MCV RBC AUTO: 87 FL (ref 79–97)
MONOCYTES # BLD AUTO: 1.74 10*3/MM3 (ref 0.1–0.9)
MONOCYTES NFR BLD AUTO: 14.3 % (ref 5–12)
NEUTROPHILS NFR BLD AUTO: 66.8 % (ref 42.7–76)
NEUTROPHILS NFR BLD AUTO: 8.15 10*3/MM3 (ref 1.7–7)
NRBC BLD AUTO-RTO: 0 /100 WBC (ref 0–0.2)
PLATELET # BLD AUTO: 172 10*3/MM3 (ref 140–450)
PMV BLD AUTO: 11.2 FL (ref 6–12)
POTASSIUM SERPL-SCNC: 3.8 MMOL/L (ref 3.5–5.2)
RBC # BLD AUTO: 4.61 10*6/MM3 (ref 4.14–5.8)
SODIUM SERPL-SCNC: 134 MMOL/L (ref 136–145)
WBC NRBC COR # BLD AUTO: 12.2 10*3/MM3 (ref 3.4–10.8)

## 2024-06-28 PROCEDURE — 84484 ASSAY OF TROPONIN QUANT: CPT | Performed by: HOSPITALIST

## 2024-06-28 PROCEDURE — 97530 THERAPEUTIC ACTIVITIES: CPT

## 2024-06-28 PROCEDURE — 25010000002 HEPARIN (PORCINE) PER 1000 UNITS: Performed by: NEUROLOGICAL SURGERY

## 2024-06-28 PROCEDURE — 80048 BASIC METABOLIC PNL TOTAL CA: CPT | Performed by: NURSE PRACTITIONER

## 2024-06-28 PROCEDURE — 94799 UNLISTED PULMONARY SVC/PX: CPT

## 2024-06-28 PROCEDURE — 83880 ASSAY OF NATRIURETIC PEPTIDE: CPT | Performed by: HOSPITALIST

## 2024-06-28 PROCEDURE — 97535 SELF CARE MNGMENT TRAINING: CPT

## 2024-06-28 PROCEDURE — 84145 PROCALCITONIN (PCT): CPT | Performed by: HOSPITALIST

## 2024-06-28 PROCEDURE — 94761 N-INVAS EAR/PLS OXIMETRY MLT: CPT

## 2024-06-28 PROCEDURE — 63710000001 INSULIN GLARGINE PER 5 UNITS

## 2024-06-28 PROCEDURE — 94664 DEMO&/EVAL PT USE INHALER: CPT

## 2024-06-28 PROCEDURE — 82948 REAGENT STRIP/BLOOD GLUCOSE: CPT

## 2024-06-28 PROCEDURE — 71046 X-RAY EXAM CHEST 2 VIEWS: CPT

## 2024-06-28 PROCEDURE — 85025 COMPLETE CBC W/AUTO DIFF WBC: CPT | Performed by: NURSE PRACTITIONER

## 2024-06-28 PROCEDURE — 94760 N-INVAS EAR/PLS OXIMETRY 1: CPT

## 2024-06-28 PROCEDURE — 94640 AIRWAY INHALATION TREATMENT: CPT

## 2024-06-28 RX ORDER — IPRATROPIUM BROMIDE AND ALBUTEROL SULFATE 2.5; .5 MG/3ML; MG/3ML
3 SOLUTION RESPIRATORY (INHALATION) EVERY 4 HOURS PRN
Status: DISCONTINUED | OUTPATIENT
Start: 2024-06-28 | End: 2024-07-02 | Stop reason: HOSPADM

## 2024-06-28 RX ORDER — ASPIRIN 81 MG/1
81 TABLET ORAL DAILY
Status: DISCONTINUED | OUTPATIENT
Start: 2024-06-28 | End: 2024-07-02 | Stop reason: HOSPADM

## 2024-06-28 RX ADMIN — OXYCODONE AND ACETAMINOPHEN 1 TABLET: 7.5; 325 TABLET ORAL at 21:42

## 2024-06-28 RX ADMIN — ACETAMINOPHEN 325MG 650 MG: 325 TABLET ORAL at 15:05

## 2024-06-28 RX ADMIN — OXYCODONE AND ACETAMINOPHEN 1 TABLET: 7.5; 325 TABLET ORAL at 16:44

## 2024-06-28 RX ADMIN — PANTOPRAZOLE SODIUM 40 MG: 40 TABLET, DELAYED RELEASE ORAL at 06:14

## 2024-06-28 RX ADMIN — TERAZOSIN HYDROCHLORIDE 5 MG: 5 CAPSULE ORAL at 21:41

## 2024-06-28 RX ADMIN — CARVEDILOL 12.5 MG: 12.5 TABLET, FILM COATED ORAL at 08:15

## 2024-06-28 RX ADMIN — OXYCODONE AND ACETAMINOPHEN 1 TABLET: 7.5; 325 TABLET ORAL at 12:44

## 2024-06-28 RX ADMIN — HEPARIN SODIUM 5000 UNITS: 5000 INJECTION INTRAVENOUS; SUBCUTANEOUS at 08:16

## 2024-06-28 RX ADMIN — CARVEDILOL 12.5 MG: 12.5 TABLET, FILM COATED ORAL at 18:50

## 2024-06-28 RX ADMIN — HEPARIN SODIUM 5000 UNITS: 5000 INJECTION INTRAVENOUS; SUBCUTANEOUS at 21:42

## 2024-06-28 RX ADMIN — DORZOLAMIDE HYDROCHLORIDE: 20 SOLUTION OPHTHALMIC at 08:16

## 2024-06-28 RX ADMIN — ACETAMINOPHEN 325MG 650 MG: 325 TABLET ORAL at 06:14

## 2024-06-28 RX ADMIN — IPRATROPIUM BROMIDE AND ALBUTEROL SULFATE 3 ML: .5; 3 SOLUTION RESPIRATORY (INHALATION) at 22:15

## 2024-06-28 RX ADMIN — OXYCODONE AND ACETAMINOPHEN 1 TABLET: 7.5; 325 TABLET ORAL at 08:17

## 2024-06-28 RX ADMIN — LATANOPROST 1 DROP: 50 SOLUTION/ DROPS OPHTHALMIC at 21:42

## 2024-06-28 RX ADMIN — ASPIRIN 81 MG: 81 TABLET, COATED ORAL at 12:44

## 2024-06-28 RX ADMIN — DORZOLAMIDE HYDROCHLORIDE: 20 SOLUTION OPHTHALMIC at 21:42

## 2024-06-28 RX ADMIN — INSULIN GLARGINE 30 UNITS: 100 INJECTION, SOLUTION SUBCUTANEOUS at 08:16

## 2024-06-28 RX ADMIN — ARIPIPRAZOLE 5 MG: 5 TABLET ORAL at 08:15

## 2024-06-28 RX ADMIN — IPRATROPIUM BROMIDE AND ALBUTEROL SULFATE 3 ML: .5; 3 SOLUTION RESPIRATORY (INHALATION) at 16:23

## 2024-06-28 RX ADMIN — GABAPENTIN 300 MG: 300 CAPSULE ORAL at 21:42

## 2024-06-28 RX ADMIN — ESCITALOPRAM 20 MG: 20 TABLET, FILM COATED ORAL at 08:15

## 2024-06-28 RX ADMIN — METHOCARBAMOL TABLETS 500 MG: 500 TABLET, COATED ORAL at 10:16

## 2024-06-28 NOTE — PLAN OF CARE
Goal Outcome Evaluation:  Plan of Care Reviewed With: patient, spouse        Progress: improving  Outcome Evaluation: Pt received in bed and agreeable to PT. Cognition improved this visit. C-collar donned in bed prior to mobility. Pt required mod/max A x2 and increased time to reach sitting EOB. Pt continues to demo a posterior lean while sitting EOB requiring min/mod A to maintain balance. Pt eventually stood and took steps c RW requiring Ax2. Frequent cues provided throughout therapy session for sequencing and weight shifting to correct balance. Pt improving this visit but mobility still limited due to pain, weakness, and fatigue. Pt UIC and encouraged HEP. PT discussed txf technique with RN. Pt plans to hopefully D/C to rehab soon.      Anticipated Discharge Disposition (PT): inpatient rehabilitation facility

## 2024-06-28 NOTE — PLAN OF CARE
Goal Outcome Evaluation:  Plan of Care Reviewed With: patient, spouse        Progress: improving  Outcome Evaluation: Pt. received in bed, agreeable to OT session. Pt. demo'd improved awareness and alertness, BUE ROM and strength improved in hands and was able to participate in grooming with improved ease. Pt. educated to continue hand strengthening exercises. Pt. was able to move with increased ease this date however continues to demo decerased sitting balance with posterior lean however able to transfer into standing and short distance walk to the recliner. Pt. will continue to benefit from skilled OT services at this time.      Anticipated Discharge Disposition (OT): inpatient rehabilitation facility

## 2024-06-28 NOTE — THERAPY TREATMENT NOTE
Patient Name: Isaias Monaco  : 1955    MRN: [id number]                              Today's Date: 2024       Admit Date: 2024    Visit Dx: No diagnosis found.  Patient Active Problem List   Diagnosis    Microalbuminuria    Vitamin D deficiency    Angioedema    Coronary artery disease involving native coronary artery of native heart without angina pectoris    Anxiety    ED (erectile dysfunction)    Hyperlipidemia LDL goal <70    Hypogonadism in male    S/P primary angioplasty with coronary stent    Osteoarthritis of knee    Hypertensive kidney disease with stage 3a chronic kidney disease    Anemia, posthemorrhagic, acute    Dyspnea on exertion    Gastro-esophageal reflux disease with esophagitis    Tubular adenoma of colon    Nocturia associated with benign prostatic hyperplasia    Iron deficiency anemia    Adverse effect of iron and its compounds, sequela    Facial twitching    Blepharospasm    Type 2 diabetes mellitus with microalbuminuria, with long-term current use of insulin    Panic disorder    Tic disorder, unspecified    Spinal stenosis of lumbar region without neurogenic claudication    Bilateral myopia    Combined forms of age-related cataract, bilateral    Presbyopia    Primary open-angle glaucoma, bilateral, severe stage    Lumbar facet arthropathy    Spondylosis of lumbar region without myelopathy or radiculopathy    Calcific coronary arteriosclerosis    Essential hypertension    Degeneration of lumbar intervertebral disc    Dystonia    Lumbosacral spondylosis without myelopathy    Medicare annual wellness visit, subsequent    Coronary stent restenosis    Stenosis of cervical spine with myelopathy    Preop examination     Past Medical History:   Diagnosis Date    Anemia     post hemorrhagic    Angioedema 2016    Secondary to ACE inhibitor    Anxiety     Arthritis     CAD (coronary artery disease)     Colon polyps     Diabetes mellitus, type 2     GERD (gastroesophageal reflux  disease)     Glaucoma     Hematoma     history    High cholesterol     History of foreign travel 12/2017; 08/2018    [place], [place], [place], [place], [place] and [place]; [place]    Hyperlipidemia     Hypertension     Hypogonadism in male 09/28/2016    Male erectile disorder     Microalbuminuria     Osteoarthritis     knee    Spinal stenosis of lumbar region without neurogenic claudication 06/02/2021    Tubular adenoma of colon 11/01/2017    Vitamin D deficiency      Past Surgical History:   Procedure Laterality Date    CARDIAC CATHETERIZATION N/A 05/15/2006    Dr. Mini Camarillo    CARDIAC CATHETERIZATION N/A 03/10/2020    Procedure: Left Heart Cath;  Surgeon: Miguel Ángel Karimi MD;  Location:  [facility];  Service: Cardiology;  Laterality: N/A;    CARDIAC CATHETERIZATION N/A 03/10/2020    Procedure: Stent DAVONTE coronary;  Surgeon: Miguel Ángel Karimi MD;  Location:  [facility];  Service: Cardiology;  Laterality: N/A;    CARDIAC CATHETERIZATION N/A 03/10/2020    Procedure: Coronary angiography;  Surgeon: Miguel Ángel Karimi MD;  Location:  [facility];  Service: Cardiology;  Laterality: N/A;    CARDIAC CATHETERIZATION N/A 03/10/2020    Procedure: Left ventriculography;  Surgeon: Miguel Ángel Karimi MD;  Location:  [facility];  Service: Cardiology;  Laterality: N/A;    CARDIAC CATHETERIZATION N/A 05/20/2022    Procedure: Left Heart Cath;  Surgeon: Mackenzie Morales MD;  Location:  [facility];  Service: Cardiovascular;  Laterality: N/A;    CARDIAC CATHETERIZATION N/A 05/20/2022    Procedure: Coronary angiography;  Surgeon: Mackenzie Morales MD;  Location:  [facility];  Service: Cardiovascular;  Laterality: N/A;    CARDIAC CATHETERIZATION N/A 05/20/2022    Procedure: Percutaneous Coronary Intervention;  Surgeon: Mackenzie Morales MD;  Location:  [facility];  Service:  Cardiovascular;  Laterality: N/A;    CARDIAC CATHETERIZATION N/A 05/24/2022    Procedure: Percutaneous Coronary Intervention;  Surgeon: Miguel Ángel Karimi MD;  Location:  [facility];  Service: Cardiovascular;  Laterality: N/A;  LAD and Cx    CARDIAC CATHETERIZATION N/A 05/24/2022    Procedure: Stent DAVONTE coronary;  Surgeon: Miguel Ángel Karimi MD;  Location:  [facility];  Service: Cardiovascular;  Laterality: N/A;    CARDIAC CATHETERIZATION N/A 05/24/2022    Procedure: Resting Full Cycle Ratio;  Surgeon: Miguel Ángel Karimi MD;  Location:  [facility];  Service: Cardiovascular;  Laterality: N/A;    CARDIAC CATHETERIZATION N/A 03/19/2024    Procedure: Left Heart Cath;  Surgeon: Miguel Ángel Karimi MD;  Location:  [facility];  Service: Cardiovascular;  Laterality: N/A;    CARDIAC CATHETERIZATION N/A 03/19/2024    Procedure: Percutaneous Coronary Intervention;  Surgeon: Miguel Ángel Karimi MD;  Location:  [facility];  Service: Cardiovascular;  Laterality: N/A;    CARDIAC CATHETERIZATION N/A 03/19/2024    Procedure: Stent DAVONTE coronary;  Surgeon: Miguel Ángel Karimi MD;  Location:  [facility];  Service: Cardiovascular;  Laterality: N/A;    CERVICAL DISCECTOMY POSTERIOR FUSION WITH INSTRUMENTATION Bilateral 6/25/2024    Procedure: Cervical 3 to cervical 6 laminectomies and posterior spinal fusion - Bilateral;  Surgeon: Marshal Miguel MD;  Location: [facility] MAIN OR;  Service: Neurosurgery;  Laterality: Bilateral;    COLONOSCOPY N/A 02/22/2006    Bilobed polyp at 30 cm, hemorrhoids-Dr. Ilya Zhao    COLONOSCOPY N/A 02/28/2014    Normal ileum, one 6 mm polyp in the mid transverse colon-Dr. Ilya Zhao    COLONOSCOPY N/A 11/19/2008    Ela ileum, two 3 to 4 mm polyps, non-bleeding internal hemorrhoids, repeat in 5 years-Dr. Ilya Zhao    COLONOSCOPY N/A 10/31/2017    Procedure: COLONOSCOPY WITH  POLYPECTOMY (COLD BIOPSY);  Surgeon: Ilya Zhao MD;  Location: [facility] ENDOSCOPY;  Service:     COLONOSCOPY N/A 05/21/2021    Procedure: Colonoscopy into cecum and terminal ileum with cold biopsy polypectomy;  Surgeon: Ilya Zhao MD;  Location: [facility] ENDOSCOPY;  Service: Gastroenterology;  Laterality: N/A;  Pre op: History of Polyps  Post op: Polyp    CORONARY ANGIOPLASTY WITH STENT PLACEMENT  2007, 2012, 2015    cardiac stents x3 occasions    ENDOSCOPY N/A 10/04/2017    Procedure: ESOPHAGOGASTRODUODENOSCOPY;  Surgeon: Boyd Guidry MD;  Location: [facility] ENDOSCOPY;  Service:     ENDOSCOPY N/A 05/21/2021    Procedure: ESOPHAGOGASTRODUODENOSCOPY with biopsies;  Surgeon: Ilya Zhao MD;  Location: [facility] ENDOSCOPY;  Service: Gastroenterology;  Laterality: N/A;  Pre op: GERD  Post op: Irregular  Z-Line, Hiatal Hernia, Gastritis    ENDOSCOPY N/A 08/25/2023    Procedure: ESOPHAGOGASTRODUODENOSCOPY with biopsy;  Surgeon: Ilya Zhao MD;  Location: [facility] ENDOSCOPY;  Service: Gastroenterology;  Laterality: N/A;  errosive gastritis    EPIDURAL BLOCK      INTERVENTIONAL RADIOLOGY PROCEDURE N/A 05/20/2022    Procedure: Intravascular Ultrasound;  Surgeon: Mackenzie Morales MD;  Location: [facility] CATH INVASIVE LOCATION;  Service: Cardiovascular;  Laterality: N/A;    KNEE INCISION AND DRAINAGE Right 06/20/2017    Procedure: RT. KNEE WASHOUT ;  Surgeon: Boyd Coyne MD;  Location: [facility] MAIN OR;  Service:     MEDIAL BRANCH BLOCK Bilateral 12/17/2021    Procedure: LUMBAR MEDIAL BRANCH BLOCK bilateral ~L4-S1 x2 (~2 weeks apart);  Surgeon: Christina Villaseñor MD;  Location: [facility];  Service: Pain Management;  Laterality: Bilateral;    MEDIAL BRANCH BLOCK Bilateral 01/03/2022    Procedure: LUMBAR MEDIAL BRANCH BLOCK bilateral ~L4-S1 x2 (~2 weeks apart);  Surgeon: Christina Villaseñor MD;  Location: [facility];  Service: Pain Management;  Laterality: Bilateral;    CA ARTHRP KNE CONDYLE&PLATU MEDIAL&LAT  COMPARTMENTS Right 06/15/2017    Procedure: RT TOTAL KNEE ARTHROPLASTY;  Surgeon: Boyd Coyne MD;  [facility] MAIN OR;  Service: Orthopedics    RADIOFREQUENCY ABLATION Bilateral 01/10/2022    Procedure: RADIOFREQUENCY ABLATION NERVES Bilateral L4-S1;  Surgeon: Christina Villaseñor MD;  [place];  Service: Pain Management;  Laterality: Bilateral;    SHOULDER SURGERY Right 2016    rotator cuff repair    UPPER GASTROINTESTINAL ENDOSCOPY N/A 10/13/2015    Z-line irregular, normal stomach, normal duodenum-Dr. Ilya Zhao    UPPER GASTROINTESTINAL ENDOSCOPY N/A 02/28/2014    Z-line irregular, normal stomach, normal duodenum-Dr. Ilya Zhao    UPPER GASTROINTESTINAL ENDOSCOPY N/A 11/19/2008    Z-line irregular, chronic gastritis withotu hemorrhage, normal duodenum-Dr. Ilya Zhao    UPPER GASTROINTESTINAL ENDOSCOPY N/A 06/22/2006    LA Grade A reflux esophagitis, non-bleeding erythematous gastropathy, normal duodenum-Dr. Ilya Zhao      General Information       Row Name 06/28/24 1312          OT Time and Intention    Document Type therapy note (daily note)  -AG     Mode of Treatment co-treatment;physical therapy;occupational therapy  -AG       Row Name 06/28/24 1312          General Information    Patient Profile Reviewed yes  -AG     Existing Precautions/Restrictions fall;cervical collar;spinal  -AG       Row Name 06/28/24 1312          Cognition    Orientation Status (Cognition) oriented x 3  -AG       Row Name 06/28/24 1312          Safety Issues, Functional Mobility    Safety Issues Affecting Function (Mobility) sequencing abilities;safety precaution awareness  -AG     Impairments Affecting Function (Mobility) endurance/activity tolerance;strength;motor control;motor planning;pain;grasp;balance;range of motion (ROM);coordination;cognition  -AG     Cognitive Impairments, Mobility Safety/Performance problem-solving/reasoning;safety precaution awareness  -AG     Comment, Safety Issues/Impairments  (Mobility) PT/OT cotreatment medically appropriate and necessary due to patient acuity level, to maximize therapeutic benefit due to impaired act tolerance, and for safety of patient and staff. Treatment focused on progression of care and goals established in POC.  -AG               User Key  (r) = Recorded By, (t) = Taken By, (c) = Cosigned By      Initials Name Provider Type    AG Pio Lama OT Occupational Therapist                     Mobility/ADL's       Row Name 06/28/24 1313          Bed Mobility    Bed Mobility supine-sit  -AG     Supine-Sit [place] (Bed Mobility) moderate assist (50% patient effort);maximum assist (25% patient effort);2 person assist;verbal cues;nonverbal cues (demo/gesture)  -AG     Assistive Device (Bed Mobility) bed rails;draw sheet;head of bed elevated  -AG     Comment, (Bed Mobility) cues for hand placement, sequencing and excess time. UIC at end of session  -AG       Row Name 06/28/24 1313          Transfers    Transfers bed-chair transfer;sit-stand transfer  -AG       Row Name 06/28/24 1313          Bed-Chair Transfer    Bed-Chair [place] (Transfers) minimum assist (75% patient effort);moderate assist (50% patient effort);2 person assist;verbal cues;nonverbal cues (demo/gesture)  -AG     Assistive Device (Bed-Chair Transfers) walker, front-wheeled  -AG       Row Name 06/28/24 1313          Sit-Stand Transfer    Sit-Stand [place] (Transfers) moderate assist (50% patient effort);maximum assist (25% patient effort);2 person assist;verbal cues;nonverbal cues (demo/gesture)  -AG     Assistive Device (Sit-Stand Transfers) walker, front-wheeled  -AG     Comment, (Sit-Stand Transfer) cues for hand placement  -AG       Row Name 06/28/24 1313          Functional Mobility    Functional Mobility- Comment short distance in room bed > recliner w RW  -AG       Row Name 06/28/24 1313          Activities of Daily Living    BADL Assessment/Intervention upper body  dressing;grooming;toileting  -AG       Row Name 06/28/24 1313          Upper Body Dressing Assessment/Training    [place] Level (Upper Body Dressing) upper body dressing skills;don;front opening garment;moderate assist (50% patient effort)  -AG     Position (Upper Body Dressing) edge of bed sitting  -AG       Row Name 06/28/24 1313          Grooming Assessment/Training    [place] Level (Grooming) grooming skills;wash face, hands;minimum assist (75% patient effort)  -AG     Position (Grooming) edge of bed sitting  -AG     Comment, (Grooming) improved BUE use for grooming  -AG       Row Name 06/28/24 1313          Toileting Assessment/Training    [place] Level (Toileting) dependent (less than 25% patient effort)  -AG     Comment, (Toileting) beltran cath  -AG               User Key  (r) = Recorded By, (t) = Taken By, (c) = Cosigned By      Initials Name Provider Type    AG Pio Lama OT Occupational Therapist                   Obj/Interventions       [facility] Name 06/28/24 1323          Balance    Balance Assessment sitting static balance;sitting dynamic balance;sit to stand dynamic balance;standing static balance;standing dynamic balance  -AG     Static Sitting Balance minimal assist  -AG     Dynamic Sitting Balance minimal assist;moderate assist  -AG     Position, Sitting Balance unsupported;sitting edge of bed  -AG     Static Standing Balance minimal assist;moderate assist;2-person assist  -AG     Dynamic Standing Balance minimal assist;moderate assist;2-person assist  -AG     Position/Device Used, Standing Balance supported;walker, front-wheeled  -AG     Balance Interventions sitting;standing  -AG               User Key  (r) = Recorded By, (t) = Taken By, (c) = Cosigned By      Initials Name Provider Type    AG Pio Lama OT Occupational Therapist                   Goals/Plan    No documentation.                  Clinical Impression       [facility] Name 06/28/24 1324          Pain Assessment     Pretreatment Pain Rating 5/10  -AG     Pain Location incisional  -AG     Pain Location - neck  -AG     Pain Intervention(s) Repositioned;Rest  -AG       Row Name 06/28/24 1324          Plan of Care Review    Plan of Care Reviewed With patient;spouse  -AG     Progress improving  -AG     Outcome Evaluation Pt. received in bed, agreeable to OT session. Pt. demo'd improved awareness and alertness, BUE ROM and strength improved in hands and was able to participate in grooming with improved ease. Pt. educated to continue hand strengthening exercises. Pt. was able to move with increased ease this date however continues to demo decerased sitting balance with posterior lean however able to transfer into standing and short distance walk to the recliner. Pt. will continue to benefit from skilled OT services at this time.  -AG       Row Name 06/28/24 1324          Therapy Assessment/Plan (OT)    Rehab Potential (OT) good, to achieve stated therapy goals  -AG     Criteria for Skilled Therapeutic Interventions Met (OT) yes;meets criteria;skilled treatment is necessary  -AG     Therapy Frequency (OT) 5 times/wk  -AG       Row Name 06/28/24 1328          Therapy Plan Review/Discharge Plan (OT)    Anticipated Discharge Disposition (OT) inpatient rehabilitation facility  -AG       Row Name 06/28/24 132          Positioning and Restraints    Pre-Treatment Position in bed  -AG     Post Treatment Position chair  -AG     In Chair notified nsg;reclined;call light within reach;encouraged to call for assist;exit alarm on;with family/caregiver  -AG               User Key  (r) = Recorded By, (t) = Taken By, (c) = Cosigned By      Initials Name Provider Type     Pio Lama, OT Occupational Therapist                   Outcome Measures       Row Name 06/28/24 6178          How much help from another is currently needed...    Putting on and taking off regular lower body clothing? 1  -AG     Bathing (including washing, rinsing, and  drying) 2  -AG     Toileting (which includes using toilet bed pan or urinal) 1  -AG     Putting on and taking off regular upper body clothing 2  -AG     Taking care of personal grooming (such as brushing teeth) 2  -AG     Eating meals 3  -AG     AM-PAC 6 Clicks Score (OT) 11  -AG       Row Name 06/28/24 1122          How much help from another person do you currently need...    Turning from your back to your side while in flat bed without using bedrails? 2  -CS     Moving from lying on back to sitting on the side of a flat bed without bedrails? 2  -CS     Moving to and from a bed to a chair (including a wheelchair)? 2  -CS     Standing up from a chair using your arms (e.g., wheelchair, bedside chair)? 2  -CS     Climbing 3-5 steps with a railing? 1  -CS     To walk in hospital room? 2  -CS     AM-PAC 6 Clicks Score (PT) 11  -CS     Highest Level of Mobility Goal 4 --> Transfer to chair/commode  -CS       Row Name 06/28/24 1329 06/28/24 1122       Functional Assessment    Outcome Measure Options AM-PAC 6 Clicks Daily Activity (OT)  -AG AM-PAC 6 Clicks Basic Mobility (PT)  -CS              User Key  (r) = Recorded By, (t) = Taken By, (c) = Cosigned By      Initials Name Provider Type    CS Ana Coello, PT Physical Therapist    AG Pio Lama, OT Occupational Therapist                      OT Recommendation and Plan  Therapy Frequency (OT): 5 times/wk  Plan of Care Review  Plan of Care Reviewed With: patient, spouse  Progress: improving  Outcome Evaluation: Pt. received in bed, agreeable to OT session. Pt. demo'd improved awareness and alertness, BUE ROM and strength improved in hands and was able to participate in grooming with improved ease. Pt. educated to continue hand strengthening exercises. Pt. was able to move with increased ease this date however continues to demo decerased sitting balance with posterior lean however able to transfer into standing and short distance walk to the recliner. Pt. will  continue to benefit from skilled OT services at this time.     Time Calculation:         Time Calculation- OT       Row Name 06/28/24 1329 06/28/24 1122          Time Calculation- OT    OT Start Time 1008  -AG --     OT Stop Time 1031  -AG --     OT Time Calculation (min) 23 min  -AG --     Total Timed Code Minutes- OT 23 minute(s)  -AG --     OT Received On 06/28/24  -AG --     OT - Next Appointment 07/01/24  -AG --     OT Goal Re-Cert Due Date 07/11/24  -AG --        Timed Charges    55720 - Gait Training Minutes  -- 6  -CS     66968 - OT Therapeutic Activity Minutes 13  -AG --     85462 - OT Self Care/Mgmt Minutes 10  -AG --        Total Minutes    Timed Charges Total Minutes 23  -AG 6  -CS      Total Minutes 23  -AG 6  -CS               User Key  (r) = Recorded By, (t) = Taken By, (c) = Cosigned By      Initials Name Provider Type    CS Ana Coello, PT Physical Therapist    AG Pio Lama, OT Occupational Therapist                  Therapy Charges for Today       Code Description Service Date Service Provider Modifiers Qty    94575273050 HC OT THERAPEUTIC ACT EA 15 MIN 6/28/2024 Pio Lama OT GO 1    66090737628 HC OT SELF CARE/MGMT/TRAIN EA 15 MIN 6/28/2024 Pio Lama OT GO 1                 Pio Lama OT  6/28/2024

## 2024-06-28 NOTE — CASE MANAGEMENT/SOCIAL WORK
Continued Stay Note  [facility]     Patient Name: Isaias Monaco  MRN: [id number]  Today's Date: 6/28/2024    Admit Date: 6/25/2024    Plan: [facility]. [facility] arranged for pickup 6/28/24 @ 1700. Packet on chart.   Discharge Plan       Row Name 06/28/24 1121       Plan    Plan [facility]. [facility] arranged for pickup 6/28/24 @ 1700. Packet on chart.    Plan Comments Received call from [facility] stating bed available this evening.[facility] arranged for pickup 6/28/24 @ 1700. Packet on chart.....Emerald Delgado RN, CCP                   Discharge Codes    No documentation.                 Expected Discharge Date and Time       Expected Discharge Date Expected Discharge Time    Jun 28, 2024  5:30 PM              Emerald Delgado RN

## 2024-06-28 NOTE — CASE MANAGEMENT/SOCIAL WORK
Continued Stay Note  [facility]     Patient Name: Isaias Monaco  MRN: [id number]  Today's Date: 6/28/2024    Admit Date: 6/25/2024    Plan: [facility] [place] when medically ready.Will need transportation arranged. Packet in office.   Discharge Plan       Row Name 06/28/24 1209       Plan    Plan [facility] when medically ready.Will need transportation arranged. Packet in office.    Plan Comments Discussed case with Jenny/AYAN. States patient to discharge on Monday per [facility] notes. Call placed to [facility] to inform. Left voice message to cancel [facility] for this evening. No additional needs noted at this time. Continue to follow......Emerald Delgado RN, CCP      Row Name 06/28/24 1121       Plan    [facility] arranged for pickup 6/28/24 @ 1700. Packet on chart.    Plan Comments Received call from [facility] stating bed available this evening.[facility] arranged for pickup 6/28/24 @ 1700. Packet on chart.....Emerald Delgado RN, CCP                   Discharge Codes    No documentation.                 Expected Discharge Date and Time       Expected Discharge Date Expected Discharge Time    Jul 1, 2024               Emerald Delgado, AYAN

## 2024-06-28 NOTE — THERAPY TREATMENT NOTE
Patient Name: Isaias Monaco  : 1955    MRN: [id number]                              Today's Date: 2024       Admit Date: 2024    Visit Dx: No diagnosis found.  Patient Active Problem List   Diagnosis    Microalbuminuria    Vitamin D deficiency    Angioedema    Coronary artery disease involving native coronary artery of native heart without angina pectoris    Anxiety    ED (erectile dysfunction)    Hyperlipidemia LDL goal <70    Hypogonadism in male    S/P primary angioplasty with coronary stent    Osteoarthritis of knee    Hypertensive kidney disease with stage 3a chronic kidney disease    Anemia, posthemorrhagic, acute    Dyspnea on exertion    Gastro-esophageal reflux disease with esophagitis    Tubular adenoma of colon    Nocturia associated with benign prostatic hyperplasia    Iron deficiency anemia    Adverse effect of iron and its compounds, sequela    Facial twitching    Blepharospasm    Type 2 diabetes mellitus with microalbuminuria, with long-term current use of insulin    Panic disorder    Tic disorder, unspecified    Spinal stenosis of lumbar region without neurogenic claudication    Bilateral myopia    Combined forms of age-related cataract, bilateral    Presbyopia    Primary open-angle glaucoma, bilateral, severe stage    Lumbar facet arthropathy    Spondylosis of lumbar region without myelopathy or radiculopathy    Calcific coronary arteriosclerosis    Essential hypertension    Degeneration of lumbar intervertebral disc    Dystonia    Lumbosacral spondylosis without myelopathy    Medicare annual wellness visit, subsequent    Coronary stent restenosis    Stenosis of cervical spine with myelopathy    Preop examination     Past Medical History:   Diagnosis Date    Anemia     post hemorrhagic    Angioedema 2016    Secondary to ACE inhibitor    Anxiety     Arthritis     CAD (coronary artery disease)     Colon polyps     Diabetes mellitus, type 2     GERD (gastroesophageal reflux  disease)     Glaucoma     Hematoma     history    High cholesterol     History of foreign travel 12/2017; 08/2018    [place], [place], [place], [place], [place] and [place]; [place]    Hyperlipidemia     Hypertension     Hypogonadism in male 09/28/2016    Male erectile disorder     Microalbuminuria     Osteoarthritis     knee    Spinal stenosis of lumbar region without neurogenic claudication 06/02/2021    Tubular adenoma of colon 11/01/2017    Vitamin D deficiency      Past Surgical History:   Procedure Laterality Date    CARDIAC CATHETERIZATION N/A 05/15/2006    Dr. Mini Camarillo    CARDIAC CATHETERIZATION N/A 03/10/2020    Procedure: Left Heart Cath;  Surgeon: Miguel Ángel Karimi MD;  Location:  [facility];  Service: Cardiology;  Laterality: N/A;    CARDIAC CATHETERIZATION N/A 03/10/2020    Procedure: Stent DAVONTE coronary;  Surgeon: Miguel Ángel Karimi MD;  Location:  [facility];  Service: Cardiology;  Laterality: N/A;    CARDIAC CATHETERIZATION N/A 03/10/2020    Procedure: Coronary angiography;  Surgeon: Miguel Ángel Karimi MD;  Location:  [facility];  Service: Cardiology;  Laterality: N/A;    CARDIAC CATHETERIZATION N/A 03/10/2020    Procedure: Left ventriculography;  Surgeon: Miguel Ángel Karimi MD;  Location:  [facility];  Service: Cardiology;  Laterality: N/A;    CARDIAC CATHETERIZATION N/A 05/20/2022    Procedure: Left Heart Cath;  Surgeon: Mackenzie Morales MD;  Location:  [facility];  Service: Cardiovascular;  Laterality: N/A;    CARDIAC CATHETERIZATION N/A 05/20/2022    Procedure: Coronary angiography;  Surgeon: Mackenzie Morales MD;  Location:  [facility];  Service: Cardiovascular;  Laterality: N/A;    CARDIAC CATHETERIZATION N/A 05/20/2022    Procedure: Percutaneous Coronary Intervention;  Surgeon: Mackenzie Morales MD;  Location:  [facility];  Service:  Cardiovascular;  Laterality: N/A;    CARDIAC CATHETERIZATION N/A 05/24/2022    Procedure: Percutaneous Coronary Intervention;  Surgeon: Miguel Ángel Karimi MD;  Location:  [facility];  Service: Cardiovascular;  Laterality: N/A;  LAD and Cx    CARDIAC CATHETERIZATION N/A 05/24/2022    Procedure: Stent DAVONTE coronary;  Surgeon: Miguel Ángel Karimi MD;  Location:  [facility];  Service: Cardiovascular;  Laterality: N/A;    CARDIAC CATHETERIZATION N/A 05/24/2022    Procedure: Resting Full Cycle Ratio;  Surgeon: Miguel Ángel Karimi MD;  Location:  [facility];  Service: Cardiovascular;  Laterality: N/A;    CARDIAC CATHETERIZATION N/A 03/19/2024    Procedure: Left Heart Cath;  Surgeon: Miguel Ángel Karimi MD;  Location:  [facility];  Service: Cardiovascular;  Laterality: N/A;    CARDIAC CATHETERIZATION N/A 03/19/2024    Procedure: Percutaneous Coronary Intervention;  Surgeon: Miguel Ángel Karimi MD;  Location:  [facility];  Service: Cardiovascular;  Laterality: N/A;    CARDIAC CATHETERIZATION N/A 03/19/2024    Procedure: Stent DAVONTE coronary;  Surgeon: Miguel Ángel Karimi MD;  Location:  [facility];  Service: Cardiovascular;  Laterality: N/A;    CERVICAL DISCECTOMY POSTERIOR FUSION WITH INSTRUMENTATION Bilateral 6/25/2024    Procedure: Cervical 3 to cervical 6 laminectomies and posterior spinal fusion - Bilateral;  Surgeon: Marshal Miguel MD;  Location: [facility] MAIN OR;  Service: Neurosurgery;  Laterality: Bilateral;    COLONOSCOPY N/A 02/22/2006    Bilobed polyp at 30 cm, hemorrhoids-Dr. Ilya Zhao    COLONOSCOPY N/A 02/28/2014    Normal ileum, one 6 mm polyp in the mid transverse colon-Dr. Ilya Zhao    COLONOSCOPY N/A 11/19/2008    Ela ileum, two 3 to 4 mm polyps, non-bleeding internal hemorrhoids, repeat in 5 years-Dr. Ilya Zhao    COLONOSCOPY N/A 10/31/2017    Procedure: COLONOSCOPY WITH  POLYPECTOMY (COLD BIOPSY);  Surgeon: Ilya Zhao MD;  Location: [facility] ENDOSCOPY;  Service:     COLONOSCOPY N/A 05/21/2021    Procedure: Colonoscopy into cecum and terminal ileum with cold biopsy polypectomy;  Surgeon: Ilya Zhao MD;  Location: [facility] ENDOSCOPY;  Service: Gastroenterology;  Laterality: N/A;  Pre op: History of Polyps  Post op: Polyp    CORONARY ANGIOPLASTY WITH STENT PLACEMENT  2007, 2012, 2015    cardiac stents x3 occasions    ENDOSCOPY N/A 10/04/2017    Procedure: ESOPHAGOGASTRODUODENOSCOPY;  Surgeon: Boyd Guidry MD;  Location: [facility] ENDOSCOPY;  Service:     ENDOSCOPY N/A 05/21/2021    Procedure: ESOPHAGOGASTRODUODENOSCOPY with biopsies;  Surgeon: Ilya Zhao MD;  Location: [facility] ENDOSCOPY;  Service: Gastroenterology;  Laterality: N/A;  Pre op: GERD  Post op: Irregular  Z-Line, Hiatal Hernia, Gastritis    ENDOSCOPY N/A 08/25/2023    Procedure: ESOPHAGOGASTRODUODENOSCOPY with biopsy;  Surgeon: Ilya Zhao MD;  Location: [facility] ENDOSCOPY;  Service: Gastroenterology;  Laterality: N/A;  errosive gastritis    EPIDURAL BLOCK      INTERVENTIONAL RADIOLOGY PROCEDURE N/A 05/20/2022    Procedure: Intravascular Ultrasound;  Surgeon: Mackenzie Morales MD;  Location: [facility] CATH INVASIVE LOCATION;  Service: Cardiovascular;  Laterality: N/A;    KNEE INCISION AND DRAINAGE Right 06/20/2017    Procedure: RT. KNEE WASHOUT ;  Surgeon: Boyd Coyne MD;  Location: [facility] MAIN OR;  Service:     MEDIAL BRANCH BLOCK Bilateral 12/17/2021    Procedure: LUMBAR MEDIAL BRANCH BLOCK bilateral ~L4-S1 x2 (~2 weeks apart);  Surgeon: Christina Villaseñor MD;  Location: [facility];  Service: Pain Management;  Laterality: Bilateral;    MEDIAL BRANCH BLOCK Bilateral 01/03/2022    Procedure: LUMBAR MEDIAL BRANCH BLOCK bilateral ~L4-S1 x2 (~2 weeks apart);  Surgeon: Christina Villaseñor MD;  Location: [facility];  Service: Pain Management;  Laterality: Bilateral;    OH ARTHRP KNE CONDYLE&PLATU MEDIAL&LAT  COMPARTMENTS Right 06/15/2017    Procedure: RT TOTAL KNEE ARTHROPLASTY;  Surgeon: Boyd Coyne MD;  Location: [facility] MAIN OR;  Service: Orthopedics    RADIOFREQUENCY ABLATION Bilateral 01/10/2022    Procedure: RADIOFREQUENCY ABLATION NERVES Bilateral L4-S1;  Surgeon: Christina Villaseñor MD;  Location: [facility] MA[facility] OR;  Service: Pain Management;  Laterality: Bilateral;    SHOULDER SURGERY Right 2016    rotator cuff repair    UPPER GASTROINTESTINAL ENDOSCOPY N/A 10/13/2015    Z-line irregular, normal stomach, normal duodenum-Dr. Ilya Zhao    UPPER GASTROINTESTINAL ENDOSCOPY N/A 02/28/2014    Z-line irregular, normal stomach, normal duodenum-Dr. Ilya Zhao    UPPER GASTROINTESTINAL ENDOSCOPY N/A 11/19/2008    Z-line irregular, chronic gastritis withotu hemorrhage, normal duodenum-Dr. Ilya Zhao    UPPER GASTROINTESTINAL ENDOSCOPY N/A 06/22/2006    LA Grade A reflux esophagitis, non-bleeding erythematous gastropathy, normal duodenum-Dr. Ilya Zhao      General Information       Row Name 06/28/24 1114          Physical Therapy Time and Intention    Document Type therapy note (daily note)  -CS     Mode of Treatment co-treatment;physical therapy;occupational therapy  -CS       Row Name 06/28/24 1114          General Information    Patient Profile Reviewed yes  -CS     Existing Precautions/Restrictions fall;cervical collar;spinal  -CS       Row Name 06/28/24 1114          Cognition    Orientation Status (Cognition) oriented x 3  -CS       Row Name 06/28/24 1114          Safety Issues, Functional Mobility    Safety Issues Affecting Function (Mobility) sequencing abilities  -CS     Impairments Affecting Function (Mobility) endurance/activity tolerance;strength;motor control;motor planning;pain;grasp;balance;range of motion (ROM);coordination;cognition  -CS     Comment, Safety Issues/Impairments (Mobility) Co treatment medically appropriate and necessary due to patient acuity level, activity tolerance and safety of patient  and staff. Treatment is focusing on progression of care and goals established in the POC.  -CS               User Key  (r) = Recorded By, (t) = Taken By, (c) = Cosigned By      Initials Name Provider Type    CS Ana Coello PT Physical Therapist                   Mobility       Row Name 06/28/24 1114          Bed Mobility    Bed Mobility supine-sit  -CS     Supine-Sit [place] (Bed Mobility) moderate assist (50% patient effort);maximum assist (25% patient effort);2 person assist;verbal cues;nonverbal cues (demo/gesture)  -CS     Assistive Device (Bed Mobility) bed rails;draw sheet;head of bed elevated  -CS     Comment, (Bed Mobility) cues for sequencing + increased time to complete; UIC at end of session  -CS       Row Name 06/28/24 1114          Bed-Chair Transfer    Bed-Chair [place] (Transfers) minimum assist (75% patient effort);moderate assist (50% patient effort);2 person assist;verbal cues;nonverbal cues (demo/gesture)  -CS     Assistive Device (Bed-Chair Transfers) walker, front-wheeled  -CS       Row Name 06/28/24 1114          Sit-Stand Transfer    Sit-Stand [place] (Transfers) moderate assist (50% patient effort);maximum assist (25% patient effort);2 person assist;verbal cues;nonverbal cues (demo/gesture)  -CS     Assistive Device (Sit-Stand Transfers) walker, front-wheeled  -CS     Comment, (Sit-Stand Transfer) cues for UE placement & anterior weight shifting; flexed posture with cues provided to correct; posterior lean initially but able to correct  -       Row Name 06/28/24 1114          Gait/Stairs (Locomotion)    [place] Level (Gait) minimum assist (75% patient effort);moderate assist (50% patient effort);2 person assist;verbal cues;nonverbal cues (demo/gesture)  -CS     Assistive Device (Gait) walker, front-wheeled  -CS     Distance in Feet (Gait) 4  -CS     Deviations/Abnormal Patterns (Gait) bashir decreased;festinating/shuffling;gait speed decreased;stride length  decreased;weight shifting decreased  -CS     Bilateral Gait Deviations forward flexed posture;heel strike decreased  -CS     Comment, (Gait/Stairs) cues for sequencing; improved balance when initiating steps  -CS               User Key  (r) = Recorded By, (t) = Taken By, (c) = Cosigned By      Initials Name Provider Type    CS Ana Coello, PT Physical Therapist                   Obj/Interventions       Row Name 06/28/24 1117          Motor Skills    Therapeutic Exercise other (see comments)  discussed HEP  -CS       Row Name 06/28/24 1117          Balance    Balance Assessment sitting static balance;sitting dynamic balance;standing static balance;standing dynamic balance  -CS     Static Sitting Balance minimal assist  -CS     Dynamic Sitting Balance minimal assist;moderate assist  -CS     Position, Sitting Balance unsupported;sitting edge of bed  -CS     Static Standing Balance minimal assist;moderate assist;2-person assist  -CS     Dynamic Standing Balance minimal assist;moderate assist;2-person assist  -CS     Position/Device Used, Standing Balance supported;walker, front-wheeled  -CS     Comment, Balance posterior lean while sitting EOB with frequent cues to correct  -CS               User Key  (r) = Recorded By, (t) = Taken By, (c) = Cosigned By      Initials Name Provider Type    Ana Hayes, PT Physical Therapist                   Goals/Plan    No documentation.                  Clinical Impression       Row Name 06/28/24 1117          Pain    Pretreatment Pain Rating 5/10  -CS     Pain Location incisional  -CS     Pain Location - neck  -CS     Pain Intervention(s) Ambulation/increased activity;Rest;Repositioned  -CS       Row Name 06/28/24 1117          Plan of Care Review    Plan of Care Reviewed With patient;spouse  -CS     Progress improving  -CS     Outcome Evaluation Pt received in bed and agreeable to PT. Cognition improved this visit. C-collar donned in bed prior to mobility. Pt required  mod/max A x2 and increased time to reach sitting EOB. Pt continues to demo a posterior lean while sitting EOB requiring min/mod A to maintain balance. Pt eventually stood and took steps c RW requiring Ax2. Frequent cues provided throughout therapy session for sequencing and weight shifting to correct balance. Pt improving this visit but mobility still limited due to pain, weakness, and fatigue. Pt UIC and encouraged HEP. PT discussed txf technique with RN. Pt plans to hopefully D/C to rehab soon.  -CS       Row Name 06/28/24 1117          Therapy Assessment/Plan (PT)    Criteria for Skilled Interventions Met (PT) yes;meets criteria  -CS     Therapy Frequency (PT) 6 times/wk  -CS       Row Name 06/28/24 1117          Positioning and Restraints    Pre-Treatment Position in bed  -CS     Post Treatment Position chair  -CS     In Chair reclined;call light within reach;encouraged to call for assist;exit alarm on;with family/caregiver;heels elevated;notified nsg  -CS               User Key  (r) = Recorded By, (t) = Taken By, (c) = Cosigned By      Initials Name Provider Type    CS Ana Coello, PT Physical Therapist                   Outcome Measures       Row Name 06/28/24 1122          How much help from another person do you currently need...    Turning from your back to your side while in flat bed without using bedrails? 2  -CS     Moving from lying on back to sitting on the side of a flat bed without bedrails? 2  -CS     Moving to and from a bed to a chair (including a wheelchair)? 2  -CS     Standing up from a chair using your arms (e.g., wheelchair, bedside chair)? 2  -CS     Climbing 3-5 steps with a railing? 1  -CS     To walk in hospital room? 2  -CS     AM-PAC 6 Clicks Score (PT) 11  -CS     Highest Level of Mobility Goal 4 --> Transfer to chair/commode  -CS       Row Name 06/28/24 1122          Functional Assessment    Outcome Measure Options AM-PAC 6 Clicks Basic Mobility (PT)  -CS               User Key   (r) = Recorded By, (t) = Taken By, (c) = Cosigned By      Initials Name Provider Type    Ana Hayes PT Physical Therapist                                 Physical Therapy Education       Title: PT OT SLP Therapies (Done)       Topic: Physical Therapy (Done)       Point: Mobility training (Done)       Learning Progress Summary             Patient Acceptance, E,TB, VU,DU,NR by CS at 6/28/2024 1122    Acceptance, E,TB, VU,DU,NR by CS at 6/27/2024 1018    Acceptance, E,D, DU by PC at 6/26/2024 1327   Family Acceptance, E,TB, VU,DU,NR by CS at 6/28/2024 1122    Acceptance, E,TB, VU,DU,NR by CS at 6/27/2024 1018                         Point: Home exercise program (Done)       Learning Progress Summary             Patient Acceptance, E,TB, VU,DU,NR by CS at 6/28/2024 1122    Acceptance, E,TB, VU,DU,NR by CS at 6/27/2024 1018    Acceptance, E,D, DU by PC at 6/26/2024 1327   Family Acceptance, E,TB, VU,DU,NR by CS at 6/28/2024 1122    Acceptance, E,TB, VU,DU,NR by CS at 6/27/2024 1018                         Point: Body mechanics (Done)       Learning Progress Summary             Patient Acceptance, E,TB, VU,DU,NR by CS at 6/28/2024 1122    Acceptance, E,TB, VU,DU,NR by CS at 6/27/2024 1018    Acceptance, E,D, DU by PC at 6/26/2024 1327   Family Acceptance, E,TB, VU,DU,NR by CS at 6/28/2024 1122    Acceptance, E,TB, VU,DU,NR by CS at 6/27/2024 1018                         Point: Precautions (Done)       Learning Progress Summary             Patient Acceptance, E,TB, VU,DU,NR by CS at 6/28/2024 1122    Acceptance, E,TB, VU,DU,NR by CS at 6/27/2024 1018    Acceptance, E,D, DU by PC at 6/26/2024 1327   Family Acceptance, E,TB, VU,DU,NR by CS at 6/28/2024 1122    Acceptance, E,TB, VU,DU,NR by CS at 6/27/2024 1018                                         User Key       Initials Effective Dates Name Provider Type Discipline    PC 06/16/21 -  Nisha Rivas PT Physical Therapist PT    CS 09/22/22 -  Ana Coello, LEIDY  Physical Therapist PT                  PT Recommendation and Plan     Plan of Care Reviewed With: patient, spouse  Progress: improving  Outcome Evaluation: Pt received in bed and agreeable to PT. Cognition improved this visit. C-collar donned in bed prior to mobility. Pt required mod/max A x2 and increased time to reach sitting EOB. Pt continues to demo a posterior lean while sitting EOB requiring min/mod A to maintain balance. Pt eventually stood and took steps c RW requiring Ax2. Frequent cues provided throughout therapy session for sequencing and weight shifting to correct balance. Pt improving this visit but mobility still limited due to pain, weakness, and fatigue. Pt UIC and encouraged HEP. PT discussed txf technique with RN. Pt plans to hopefully D/C to rehab soon.     Time Calculation:         PT Charges       Row Name 06/28/24 1122             Time Calculation    Start Time 1008  -CS      Stop Time 1029  -CS      Time Calculation (min) 21 min  -CS      PT Received On 06/28/24  -CS      PT - Next Appointment 06/29/24  -CS         Time Calculation- PT    Total Timed Code Minutes- PT 18 minute(s)  -CS         Timed Charges    90415 - Gait Training Minutes  6  -CS      63371 - PT Therapeutic Activity Minutes 12  -CS         Total Minutes    Timed Charges Total Minutes 18  -CS       Total Minutes 18  -CS                User Key  (r) = Recorded By, (t) = Taken By, (c) = Cosigned By      Initials Name Provider Type    CS Ana Coello, PT Physical Therapist                  Therapy Charges for Today       Code Description Service Date Service Provider Modifiers Qty    73183974191  PT THERAPEUTIC ACT EA 15 MIN 6/27/2024 Ana Coello, PT GP 1    97356086497 HC PT THERAPEUTIC ACT EA 15 MIN 6/28/2024 Ana Coello, PT GP 1            PT G-Codes  Outcome Measure Options: AM-PAC 6 Clicks Basic Mobility (PT)  AM-PAC 6 Clicks Score (PT): 11  AM-PAC 6 Clicks Score (OT): 10  Modified Bradford Scale: 4 - Moderately  severe disability.  Unable to walk without assistance, and unable to attend to own bodily needs without assistance.  PT Discharge Summary  Anticipated Discharge Disposition (PT): inpatient rehabilitation facility    Ana Coello, LEIDY  6/28/2024

## 2024-06-28 NOTE — CASE MANAGEMENT/SOCIAL WORK
"Physicians Statement of Medical Necessity for  Ambulance Transportation    GENERAL INFORMATION     Name: Isaias Monaco  YOB: 1955  Medicare #: [id number]  Transport Date: 7/2/24 (Valid for round trips this date, or for scheduled repetitive trips for 60 days from the date signed below.)  Origin: [facility]  Destination: [facility]   Is the Patient's stay covered under Medicare Part A (PPS/DRG?)Yes  Closest appropriate facility? Yes  If this a hosp-hosp transfer? No  Is this a hospice patient? No    MEDICAL NECESSITY QUESTIONAIRE    Ambulance Transportation is medically necessary only if other means of transportation are contraindicated or would be potentially harmful to the patient.  To meet this requirement, the patient must be either \"bed confined\" or suffer from a condition such that transport by means other than an ambulance is contraindicated by the patient's condition.  The following questions must be answered by the healthcare professional signing below for this form to be valid:     1) Describe the MEDICAL CONDITION (physical and/or mental) of this patient AT THE TIME OF AMBULANCE TRANSPORT that requires the patient to be transported in an ambulance, and why transport by other means is contraindicated by the patient's condition: POD # 3 s/p C-C6 laminectomy & posterior fusion   Past Medical History:   Diagnosis Date    Anemia     post hemorrhagic    Angioedema 02/21/2016    Secondary to ACE inhibitor    Anxiety     Arthritis     CAD (coronary artery disease)     Colon polyps     Diabetes mellitus, type 2     GERD (gastroesophageal reflux disease)     Glaucoma     Hematoma     history    High cholesterol     History of foreign travel 12/2017; 08/2018    [place], [place], [place], [place], [place] and [place]; [place]    Hyperlipidemia     Hypertension     Hypogonadism in male 09/28/2016    Male erectile disorder     Microalbuminuria     Osteoarthritis     knee    Spinal stenosis of " lumbar region without neurogenic claudication 06/02/2021    Tubular adenoma of colon 11/01/2017    Vitamin D deficiency       Past Surgical History:   Procedure Laterality Date    CARDIAC CATHETERIZATION N/A 05/15/2006    Dr. Mini Camarillo    CARDIAC CATHETERIZATION N/A 03/10/2020    Procedure: Left Heart Cath;  Surgeon: Miguel Ángel Karimi MD;  Location:  [facility];  Service: Cardiology;  Laterality: N/A;    CARDIAC CATHETERIZATION N/A 03/10/2020    Procedure: Stent DAVONTE coronary;  Surgeon: Miguel Ángel Karimi MD;  Location:  [facility];  Service: Cardiology;  Laterality: N/A;    CARDIAC CATHETERIZATION N/A 03/10/2020    Procedure: Coronary angiography;  Surgeon: Miguel Ángel Karimi MD;  Location:  [facility];  Service: Cardiology;  Laterality: N/A;    CARDIAC CATHETERIZATION N/A 03/10/2020    Procedure: Left ventriculography;  Surgeon: Miguel Ángel Karimi MD;  Location:  [facility];  Service: Cardiology;  Laterality: N/A;    CARDIAC CATHETERIZATION N/A 05/20/2022    Procedure: Left Heart Cath;  Surgeon: Mackenzie Morales MD;  Location:  [facility];  Service: Cardiovascular;  Laterality: N/A;    CARDIAC CATHETERIZATION N/A 05/20/2022    Procedure: Coronary angiography;  Surgeon: Mackenzie Morales MD;  Location:  [facility];  Service: Cardiovascular;  Laterality: N/A;    CARDIAC CATHETERIZATION N/A 05/20/2022    Procedure: Percutaneous Coronary Intervention;  Surgeon: Mackenzie Morales MD;  Location:  [facility];  Service: Cardiovascular;  Laterality: N/A;    CARDIAC CATHETERIZATION N/A 05/24/2022    Procedure: Percutaneous Coronary Intervention;  Surgeon: Miguel Ángel Karimi MD;  Location:  [facility];  Service: Cardiovascular;  Laterality: N/A;  LAD and Cx    CARDIAC CATHETERIZATION N/A 05/24/2022    Procedure: Stent DAVONTE coronary;  Surgeon: Trev  Miguel Ángel TIDWELL MD;  Location[facility] C[facility]VASIVE [facility];  Service: Cardiovascular;  Laterality: N/A;    CARDIAC CATHETERIZATION N/A 05/24/2022    Procedure: Resting Full Cycle Ratio;  Surgeon: Miguel Ángel Karimi MD;  Location: [facility] CATH INVASIVE LOCATION;  Service: Cardiovascular;  Laterality: N/A;    CARDIAC CATHETERIZATION N/A 03/19/2024    Procedure: Left Heart Cath;  Surgeon: Miguel Ángel Karimi MD;  Location: [facility] CATH INVASIVE LOCATION;  Service: Cardiovascular;  Laterality: N/A;    CARDIAC CATHETERIZATION N/A 03/19/2024    Procedure: Percutaneous Coronary Intervention;  Surgeon: Miguel Ángel Karimi MD;  Location: [facility] CATH INVASIVE LOCATION;  Service: Cardiovascular;  Laterality: N/A;    CARDIAC CATHETERIZATION N/A 03/19/2024    Procedure: Stent DAVONTE coronary;  Surgeon: Miguel Ángel Karimi MD;  Location: [facility] CATH INVASIVE LOCATION;  Service: Cardiovascular;  Laterality: N/A;    CERVICAL DISCECTOMY POSTERIOR FUSION WITH INSTRUMENTATION Bilateral 6/25/2024    Procedure: Cervical 3 to cervical 6 laminectomies and posterior spinal fusion - Bilateral;  Surgeon: Marshal Miguel MD;  Location: [facility] MAIN OR;  Service: Neurosurgery;  Laterality: Bilateral;    COLONOSCOPY N/A 02/22/2006    Bilobed polyp at 30 cm, hemorrhoids-Dr. Ilya Zhao    COLONOSCOPY N/A 02/28/2014    Normal ileum, one 6 mm polyp in the mid transverse colon-Dr. Ilya Zhao    COLONOSCOPY N/A 11/19/2008    Ela ileum, two 3 to 4 mm polyps, non-bleeding internal hemorrhoids, repeat in 5 years-Dr. Ilya Zhao    COLONOSCOPY N/A 10/31/2017    Procedure: COLONOSCOPY WITH POLYPECTOMY (COLD BIOPSY);  Surgeon: Ilya Zhao MD;  Location: [facility] ENDOSCOPY;  Service:     COLONOSCOPY N/A 05/21/2021    Procedure: Colonoscopy into cecum and terminal ileum with cold biopsy polypectomy;  Surgeon: Ilya Zhao MD;  Location: [facility] ENDOSCOPY;  Service: Gastroenterology;  Laterality: N/A;  Pre op: History of Polyps  Post op:  Polyp    CORONARY ANGIOPLASTY WITH STENT PLACEMENT  2007, 2012, 2015    cardiac stents x3 occasions    ENDOSCOPY N/A 10/04/2017    Procedure: ESOPHAGOGASTRODUODENOSCOPY;  Surgeon: Boyd Guidry MD;  Location: [facility] ENDOSCOPY;  Service:     ENDOSCOPY N/A 05/21/2021    Procedure: ESOPHAGOGASTRODUODENOSCOPY with biopsies;  Surgeon: Ilya Zhao MD;  [facility];  Service: Gastroenterology;  Laterality: N/A;  Pre op: GERD  Post op: Irregular  Z-Line, Hiatal Hernia, Gastritis    ENDOSCOPY N/A 08/25/2023    Procedure: ESOPHAGOGASTRODUODENOSCOPY with biopsy;  Surgeon: Ilya Zhao MD;  Location: [facility] ENDOSCOPY;  Service: Gastroenterology;  Laterality: N/A;  errosive gastritis    EPIDURAL BLOCK      INTERVENTIONAL RADIOLOGY PROCEDURE N/A 05/20/2022    Procedure: Intravascular Ultrasound;  Surgeon: Mackenzie Morales MD;  Location: [facility] CATH INVASIVE LOCATION;  Service: Cardiovascular;  Laterality: N/A;    KNEE INCISION AND DRAINAGE Right 06/20/2017    Procedure: RT. KNEE WASHOUT ;  Surgeon: Boyd Coyne MD;  Location: [facility] MAIN OR;  Service:     MEDIAL BRANCH BLOCK Bilateral 12/17/2021    Procedure: LUMBAR MEDIAL BRANCH BLOCK bilateral ~L4-S1 x2 (~2 weeks apart);  Surgeon: Christina Villaseñor MD;  [place] [facility] [place];  Service: Pain Management;  Laterality: Bilateral;    MEDIAL BRANCH BLOCK Bilateral 01/03/2022    Procedure: LUMBAR MEDIAL BRANCH BLOCK bilateral ~L4-S1 x2 (~2 weeks apart);  Surgeon: Christina Villaseñor MD;  Location: [facility];  Service: Pain Management;  Laterality: Bilateral;    CA ARTHRP KNE CONDYLE&PLATU MEDIAL&LAT COMPARTMENTS Right 06/15/2017    Procedure: RT TOTAL KNEE ARTHROPLASTY;  Surgeon: Boyd Coyne MD;  Location: [facility] MAIN OR;  Service: Orthopedics    RADIOFREQUENCY ABLATION Bilateral 01/10/2022    Procedure: RADIOFREQUENCY ABLATION NERVES Bilateral L4-S1;  Surgeon: Christina Villaseñor MD;  [place] [facility] [place];  Service: Pain Management;  Laterality:  "Bilateral;    SHOULDER SURGERY Right 2016    rotator cuff repair    UPPER GASTROINTESTINAL ENDOSCOPY N/A 10/13/2015    Z-line irregular, normal stomach, normal duodenum-Dr. Ilya Zhao    UPPER GASTROINTESTINAL ENDOSCOPY N/A 02/28/2014    Z-line irregular, normal stomach, normal duodenum-Dr. Ilya Zhao    UPPER GASTROINTESTINAL ENDOSCOPY N/A 11/19/2008    Z-line irregular, chronic gastritis withotu hemorrhage, normal duodenum-Dr. Ilya Zhao    UPPER GASTROINTESTINAL ENDOSCOPY N/A 06/22/2006    LA Grade A reflux esophagitis, non-bleeding erythematous gastropathy, normal duodenum-Dr. Ilya Zhao      2) Is this patient \"bed confined\" as defined below?Yes   To be \"bed confined\" the patient must satisfy all three of the following criteria:  (1) unable to get up from bed without assistance; AND (2) unable to ambulate;  AND (3) unable to sit in a chair or wheelchair.  3) Can this patient safely be transported by car or wheelchair van (I.e., may safely sit during transport, without an attendant or monitoring?) No  4. In addition to completing questions 1-3 above, please check any of the following conditions that apply*:          *Note: supporting documentation for any boxes checked must be maintained in the patient's medical records mod/max A x 2 with bed mobility and transfers      SIGNATURE OF PHYSICIAN OR OTHER AUTHORIZED HEALTHCARE PROFESSIONAL    I certify that the above information is true and correct based on my evaluation of this patient, and represent that the patient requires transport by ambulance and that other forms of transport are contraindicated.  I understand that this information will be used by the Centers for Medicare and Medicaid Services (CMS) to support the determiniation of medical necessity for ambulance services, and I represent that I have personal knowledge of the patient's condition at the time of transport.    X   If this box is checked, I also certify that the patient is physically or " mentally incapable of signing the ambulance service's claim form and that the institution with which I am affiliated has furnished care, services or assistance to the patient.  My signature below is made on behalf of the patient pursuant to 42 .36(b)(4). In accordance with 42 .37, the specific reason(s) that the patient is physically or mentally incapable of signing the claim for is as follows:     Signature of Physician or Healthcare Professional    Emerald Delgado RN, [facility] Date/Time:    7/1/24 @ 9216     (For Scheduled repetitive transport, this form is not valid for transports performed more than 60 days after this date).                                                                                                                                            --------------------------------------------------------------------------------------------  Printed Name and Credentials of Physician or Authorized Healthcare Professional     *Form must be signed by patient's attending physician for scheduled, repetitive transports,.  For non-repetitive ambulance transports, if unable to obtain the signature of the attending physician, any of the following may sign (please select below):     Physician  Clinical Nurse Specialist  Registered Nurse     Physician Assistant  Discharge Planner  Licensed Practical Nurse     Nurse Practitioner  X

## 2024-06-28 NOTE — PROGRESS NOTES
[facility] NEUROSURGERY CERVICAL PROGRESS NOTE      Cc: POD # 3 s/p C-C6 laminectomy & posterior fusion      Subjective     Interval History: More awake today after changes made to Robaxin.  Is complaining of a cough, fever of 100.6 this a.m.        Objective     Vital signs in last 24 hours:  Temp:  [98 °F (36.7 °C)-100.6 °F (38.1 °C)] 99.5 °F (37.5 °C)  Heart Rate:  [79-94] 93  Resp:  [16-18] 18  BP: (126-153)/(79-98) 126/81    Intake/Output this shift:  No intake/output data recorded.    LABS:  Results from last 7 days   Lab Units 06/28/24  0305 06/26/24  0223   WBC 10*3/mm3 12.20* 11.42*   HEMOGLOBIN g/dL 13.0 15.0   HEMATOCRIT % 40.1 46.3   PLATELETS 10*3/mm3 172 179     Results from last 7 days   Lab Units 06/28/24  0305 06/26/24  0223   SODIUM mmol/L 134* 135*   POTASSIUM mmol/L 3.8 4.2   CHLORIDE mmol/L 102 104   CO2 mmol/L 24.0 21.0*   BUN mg/dL 15 10   CREATININE mg/dL 1.18 0.85   CALCIUM mg/dL 8.4* 8.8   GLUCOSE mg/dL 121* 123*         IMAGING STUDIES:  No new imaging to review    I personally viewed the patient's chart, it was also reviewed by and discussed with Dr Lamont Mccain reviewed/changed: Yes  Abilify 5 mg p.o. daily  Coreg 12.5 mg p.o. twice daily  Lexapro 20 mg p.o. every morning  Neurontin 300 mg p.o. nightly  Heparin 5000 units SQ every 12 hours  Lantus 30 units SQ daily  Regular insulin 2-70 units SQ every 6 hours  Protonix 40 mg p.o. every morning  Hytrin 5 mg p.o. nightly  Tylenol 650 mg p.o. every 4 hours as needed  Klonopin 0.5 mg p.o. daily as needed  Robaxin 500 mg p.o. every 8 hours as needed  Percocet 7.51 p.o. every 4 hours as needed      Physical Exam:    General:  Awake, alert.  Neck: Ice collar On ; posterior cervical incision is well-approximated, well-appearing with skin glue.  No surrounding erythema, swelling or drainage  Motor:   Normal muscle strength, bulk and tone in upper and lower extremities.  No fasciculations, rigidity, spasticity, or abnormal movements.  Sensation:   "Normal to light touch bilaterally  Station and Gait:   Not tested  Extremities: SCD's in place    Assessment & Plan     ASSESSMENT:      Preop examination    S/P primary angioplasty with coronary stent    Stenosis of cervical spine with myelopathy    POD # 3 s/p C-C6 laminectomy & posterior fusion  Patient more alert this morning after changes made to Robaxin yesterday.  He has ran a low-grade fever with Tmax of 100.6 this morning.  He is also complaining of a cough.  Chest x-ray has been ordered, patient was encouraged to continue using his incentive spirometer and to mobilize as much as possible today.  Patient had a Eckert catheter placed secondary to urinary retention.  The plan is to DC this on Sunday for a voiding trial.  If patient unable to void or postvoid residual greater than 300 mL Eckert catheter will need to be replaced.    PLAN:   -Chest x-ray  -Encourage incentive spirometer use  -Out of bed is much as possible  -Keep Eckert catheter, plan is to DC on Sunday for a voiding trial, if patient is unable to void or has a post void residual greater than 300 Eckert catheter will need to be replaced  -Plan for discharge to [facility] on Monday  -Okay to resume aspirin today and Plavix can be resumed on July 1      I discussed the patient's findings and my recommendations with patient, family, and Dr Miguel    During patient visit, I utilized appropriate personal protective equipment including gloves.  Appropriate PPE was worn during the entire visit.  Hand hygiene was completed before and after.      LOS: 3 days       Jossy Green, APRN  6/28/2024  10:13 EDT    \"Dictated utilizing Dragon dictation\".      "

## 2024-06-29 ENCOUNTER — APPOINTMENT (OUTPATIENT)
Dept: GENERAL RADIOLOGY | Facility: HOSPITAL | Age: 69
End: 2024-06-29
Payer: MEDICARE

## 2024-06-29 ENCOUNTER — APPOINTMENT (OUTPATIENT)
Dept: CT IMAGING | Facility: HOSPITAL | Age: 69
End: 2024-06-29
Payer: MEDICARE

## 2024-06-29 PROBLEM — J18.9 PNEUMONIA: Status: ACTIVE | Noted: 2024-06-29

## 2024-06-29 PROBLEM — R06.1 STRIDOR: Status: ACTIVE | Noted: 2024-06-29

## 2024-06-29 LAB
B PARAPERT DNA SPEC QL NAA+PROBE: NOT DETECTED
B PERT DNA SPEC QL NAA+PROBE: NOT DETECTED
C PNEUM DNA NPH QL NAA+NON-PROBE: NOT DETECTED
D-LACTATE SERPL-SCNC: 1.3 MMOL/L (ref 0.5–2)
FLUAV SUBTYP SPEC NAA+PROBE: NOT DETECTED
FLUBV RNA ISLT QL NAA+PROBE: NOT DETECTED
GLUCOSE BLDC GLUCOMTR-MCNC: 114 MG/DL (ref 70–130)
GLUCOSE BLDC GLUCOMTR-MCNC: 121 MG/DL (ref 70–130)
GLUCOSE BLDC GLUCOMTR-MCNC: 88 MG/DL (ref 70–130)
HADV DNA SPEC NAA+PROBE: NOT DETECTED
HCOV 229E RNA SPEC QL NAA+PROBE: NOT DETECTED
HCOV HKU1 RNA SPEC QL NAA+PROBE: NOT DETECTED
HCOV NL63 RNA SPEC QL NAA+PROBE: NOT DETECTED
HCOV OC43 RNA SPEC QL NAA+PROBE: NOT DETECTED
HMPV RNA NPH QL NAA+NON-PROBE: NOT DETECTED
HPIV1 RNA ISLT QL NAA+PROBE: NOT DETECTED
HPIV2 RNA SPEC QL NAA+PROBE: NOT DETECTED
HPIV3 RNA NPH QL NAA+PROBE: NOT DETECTED
HPIV4 P GENE NPH QL NAA+PROBE: NOT DETECTED
L PNEUMO1 AG UR QL IA: NEGATIVE
M PNEUMO IGG SER IA-ACNC: NOT DETECTED
MRSA DNA SPEC QL NAA+PROBE: NORMAL
NT-PROBNP SERPL-MCNC: 308 PG/ML (ref 0–900)
PROCALCITONIN SERPL-MCNC: 0.19 NG/ML (ref 0–0.25)
RHINOVIRUS RNA SPEC NAA+PROBE: NOT DETECTED
RSV RNA NPH QL NAA+NON-PROBE: NOT DETECTED
S PNEUM AG SPEC QL LA: NEGATIVE
SARS-COV-2 RNA NPH QL NAA+NON-PROBE: NOT DETECTED
TROPONIN T SERPL HS-MCNC: 23 NG/L
TROPONIN T SERPL HS-MCNC: 24 NG/L

## 2024-06-29 PROCEDURE — 82948 REAGENT STRIP/BLOOD GLUCOSE: CPT

## 2024-06-29 PROCEDURE — 0202U NFCT DS 22 TRGT SARS-COV-2: CPT | Performed by: HOSPITALIST

## 2024-06-29 PROCEDURE — 71046 X-RAY EXAM CHEST 2 VIEWS: CPT

## 2024-06-29 PROCEDURE — 70491 CT SOFT TISSUE NECK W/DYE: CPT

## 2024-06-29 PROCEDURE — 93005 ELECTROCARDIOGRAM TRACING: CPT | Performed by: NURSE PRACTITIONER

## 2024-06-29 PROCEDURE — 93010 ELECTROCARDIOGRAM REPORT: CPT | Performed by: STUDENT IN AN ORGANIZED HEALTH CARE EDUCATION/TRAINING PROGRAM

## 2024-06-29 PROCEDURE — 83605 ASSAY OF LACTIC ACID: CPT | Performed by: HOSPITALIST

## 2024-06-29 PROCEDURE — 25010000002 PIPERACILLIN SOD-TAZOBACTAM PER 1 G: Performed by: HOSPITALIST

## 2024-06-29 PROCEDURE — 87449 NOS EACH ORGANISM AG IA: CPT | Performed by: HOSPITALIST

## 2024-06-29 PROCEDURE — 76000 FLUOROSCOPY <1 HR PHYS/QHP: CPT

## 2024-06-29 PROCEDURE — 87641 MR-STAPH DNA AMP PROBE: CPT | Performed by: HOSPITALIST

## 2024-06-29 PROCEDURE — 94799 UNLISTED PULMONARY SVC/PX: CPT

## 2024-06-29 PROCEDURE — 25010000002 HEPARIN (PORCINE) PER 1000 UNITS: Performed by: NEUROLOGICAL SURGERY

## 2024-06-29 PROCEDURE — 25510000001 IOPAMIDOL 61 % SOLUTION: Performed by: NEUROLOGICAL SURGERY

## 2024-06-29 PROCEDURE — 84484 ASSAY OF TROPONIN QUANT: CPT | Performed by: HOSPITALIST

## 2024-06-29 PROCEDURE — 97530 THERAPEUTIC ACTIVITIES: CPT

## 2024-06-29 PROCEDURE — 87040 BLOOD CULTURE FOR BACTERIA: CPT | Performed by: HOSPITALIST

## 2024-06-29 RX ORDER — ALBUTEROL SULFATE 2.5 MG/3ML
2.5 SOLUTION RESPIRATORY (INHALATION) 4 TIMES DAILY
Status: DISCONTINUED | OUTPATIENT
Start: 2024-06-29 | End: 2024-07-02 | Stop reason: HOSPADM

## 2024-06-29 RX ADMIN — CARVEDILOL 12.5 MG: 12.5 TABLET, FILM COATED ORAL at 19:15

## 2024-06-29 RX ADMIN — LATANOPROST 1 DROP: 50 SOLUTION/ DROPS OPHTHALMIC at 21:56

## 2024-06-29 RX ADMIN — ASPIRIN 81 MG: 81 TABLET, COATED ORAL at 08:19

## 2024-06-29 RX ADMIN — CARVEDILOL 12.5 MG: 12.5 TABLET, FILM COATED ORAL at 08:19

## 2024-06-29 RX ADMIN — DORZOLAMIDE HYDROCHLORIDE: 20 SOLUTION OPHTHALMIC at 08:19

## 2024-06-29 RX ADMIN — PIPERACILLIN AND TAZOBACTAM 3.38 G: 3; .375 INJECTION, POWDER, FOR SOLUTION INTRAVENOUS at 19:56

## 2024-06-29 RX ADMIN — METHOCARBAMOL TABLETS 500 MG: 500 TABLET, COATED ORAL at 21:56

## 2024-06-29 RX ADMIN — HEPARIN SODIUM 5000 UNITS: 5000 INJECTION INTRAVENOUS; SUBCUTANEOUS at 21:56

## 2024-06-29 RX ADMIN — OXYCODONE AND ACETAMINOPHEN 1 TABLET: 7.5; 325 TABLET ORAL at 12:56

## 2024-06-29 RX ADMIN — GABAPENTIN 300 MG: 300 CAPSULE ORAL at 21:56

## 2024-06-29 RX ADMIN — OXYCODONE AND ACETAMINOPHEN 1 TABLET: 7.5; 325 TABLET ORAL at 08:19

## 2024-06-29 RX ADMIN — ALBUTEROL SULFATE 2.5 MG: 2.5 SOLUTION RESPIRATORY (INHALATION) at 21:00

## 2024-06-29 RX ADMIN — OXYCODONE AND ACETAMINOPHEN 1 TABLET: 7.5; 325 TABLET ORAL at 03:06

## 2024-06-29 RX ADMIN — IOPAMIDOL 85 ML: 612 INJECTION, SOLUTION INTRAVENOUS at 18:24

## 2024-06-29 RX ADMIN — DORZOLAMIDE HYDROCHLORIDE: 20 SOLUTION OPHTHALMIC at 21:56

## 2024-06-29 RX ADMIN — HEPARIN SODIUM 5000 UNITS: 5000 INJECTION INTRAVENOUS; SUBCUTANEOUS at 08:19

## 2024-06-29 RX ADMIN — ARIPIPRAZOLE 5 MG: 5 TABLET ORAL at 08:18

## 2024-06-29 RX ADMIN — OXYCODONE AND ACETAMINOPHEN 1 TABLET: 7.5; 325 TABLET ORAL at 19:48

## 2024-06-29 RX ADMIN — PIPERACILLIN AND TAZOBACTAM 3.38 G: 3; .375 INJECTION, POWDER, FOR SOLUTION INTRAVENOUS at 12:13

## 2024-06-29 RX ADMIN — ESCITALOPRAM 20 MG: 20 TABLET, FILM COATED ORAL at 08:18

## 2024-06-29 RX ADMIN — PANTOPRAZOLE SODIUM 40 MG: 40 TABLET, DELAYED RELEASE ORAL at 06:10

## 2024-06-29 RX ADMIN — TERAZOSIN HYDROCHLORIDE 5 MG: 5 CAPSULE ORAL at 21:56

## 2024-06-29 RX ADMIN — METHOCARBAMOL TABLETS 500 MG: 500 TABLET, COATED ORAL at 06:10

## 2024-06-29 NOTE — PROGRESS NOTES
[facility]     Mar/chart reviewed  Called back for stridor  Patient seen by LPC earlier in hospital course  Reviewed chart since then and prior consult note.  Asked to re-visit patient as stridorous sounds/audible wheezing noted since surgery  Patient is sleeping peacefully in bed on room air with oxygen saturation in the upper 90's when I enter the room.  He has no audible stridor or respiratory distress with sleep.  He does arouse easily and is able to conversed, note normal voice. He denies any increased neck pain, or feelings of tightness in his throat, he is not having any trouble handling secretions or swallowing. He does not feel distressed. He does note at times a wheezing sound, he says it comes and goes without particular reason.  He notes it upon occasion at rest and upon occasion on exertion    Vital Sign Min/Max for last 24 hours  Temp  Min: 99 °F (37.2 °C)  Max: 100.4 °F (38 °C)   BP  Min: 116/82  Max: 161/90   Pulse  Min: 77  Max: 102   Resp  Min: 18  Max: 20   SpO2  Min: 94 %  Max: 98 %   No data recorded   No data recorded     Nad, axox3,   perrl, eomi, no icterus,  mmm,  trachea midline, neck in soft brace  chest decreased bilaterally, no crackles, no wheezes,  at one pont I can her a very faint audible wheeze at the end of exhalation.   rrr,   soft, nt, nd +bs,  no c/c/ e  Skin warm, dry no rashes    Labs: 6/29: reviewed:  Na 134  Bicarb 24  Wb 12  Hgb 13  Plts 172    CXR: elevated right diaphragm some atelectasis 6/28: reviewed images    A/P:  Audible wheezing -- suspect upper airway issue, he doesn't appear in any eminent danger, given recent instrumentation to the neck will check repeat ct neck. Will have speech path revisit with him on Monday as well  Elevated right diaphragm -- check sniff test  DMII  Obesity

## 2024-06-29 NOTE — PLAN OF CARE
Goal Outcome Evaluation:         Patient with pain controlled using oral medication. Patient not able to tolerate much movement and requires 2-3 staff to turn. Spouse at bedside and very helpful with patient care. Ordered testing complete except still needing a sputum sample if patient able to provide one.

## 2024-06-29 NOTE — CONSULTS
"    Patient Name:  Isaias Monaco  YOB: 1955  MRN:  [id number]  Date of Admission:  6/25/2024  Date of Consult:  6/29/2024  Patient Care Team:  Gwyn Patten Sr., MD as PCP - General (Family Medicine)  Ermias, Bebeto CASTANEDA II, MD as Consulting Physician (Hematology and Oncology)  Micaela Barfield APRN as Nurse Practitioner (Endocrinology)  Manuela Agustin APRN as Referring Physician (Internal Medicine)  Richardson Moon MD as Consulting Physician (Pulmonary Disease)    Inpatient Hospitalist Consult  Consult performed by: Hattie Desai APRN  Consult ordered by: Kathe Flanagan APRN        Evaluate status and make recommendations regarding treatment for wheezing and cough  Subjective   History of Present Illness  Mr. Monaco is a 69 y.o. male that has been admitted to [facility] due to laminectomy and fusion.  He has been admitted by the neurosurgery service and and we were asked to see and assist with his medical problems, specifically relating to audible wheezing and cough.  Patient is postop day 4.  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  She reports he has had issues with pain control, he is either knocked out tired or in too much pain and has not been moving as hoped.  Is attempting to use the I-S and gets around 1500 every hour.  Apparently has been audibly wheezing while he is asleep, stops when he wakes up.  He has a congested nonproductive cough.  No chills, sweats, fever.  No shortness of breath.  Last attempt to get him up out of bed was a \"disaster\".     The gentleman also had some urinary retention and initially a difficult placement of his Eckert, requiring urology to come and place the Eckert.  His urine has been a little dark-colored since.  He does have chronic kidney disease and nephrology has been pleased with his numbers lately.  Wife concerned that anesthesia and medications are just taking longer to leave his system, " making him more drowsy due to his kidney disease.    Blood sugars have been well-controlled at home.    He has moderate pain in the middle of his back right now.  He is able to turn to his side for posterior auscultation of the lungs.    Past Medical History:   Diagnosis Date    Anemia     post hemorrhagic    Angioedema 02/21/2016    Secondary to ACE inhibitor    Anxiety     Arthritis     CAD (coronary artery disease)     Colon polyps     Diabetes mellitus, type 2     GERD (gastroesophageal reflux disease)     Glaucoma     Hematoma     history    High cholesterol     History of foreign travel 12/2017; 08/2018    [place], [place], [place], [place], [place] and [place]; [place]    Hyperlipidemia     Hypertension     Hypogonadism in male 09/28/2016    Male erectile disorder     Microalbuminuria     Osteoarthritis     knee    Spinal stenosis of lumbar region without neurogenic claudication 06/02/2021    Tubular adenoma of colon 11/01/2017    Vitamin D deficiency      Past Surgical History:   Procedure Laterality Date    CARDIAC CATHETERIZATION N/A 05/15/2006    Dr. Mini Camarillo    CARDIAC CATHETERIZATION N/A 03/10/2020    Procedure: Left Heart Cath;  Surgeon: Miguel Ángel Karimi MD;  Location: [facility] CATH INVASIVE LOCATION;  Service: Cardiology;  Laterality: N/A;    CARDIAC CATHETERIZATION N/A 03/10/2020    Procedure: Stent DAVONTE coronary;  Surgeon: Miguel Ángel Karimi MD;  [facility];  Service: Cardiology;  Laterality: N/A;    CARDIAC CATHETERIZATION N/A 03/10/2020    Procedure: Coronary angiography;  Surgeon: Miguel Ángel Karimi MD;  [facility];  Service: Cardiology;  Laterality: N/A;    CARDIAC CATHETERIZATION N/A 03/10/2020    Procedure: Left ventriculography;  Surgeon: Miguel Ángel Karimi MD;  Location: [facility] CATH INVASIVE LOCATION;  Service: Cardiology;  Laterality: N/A;    CARDIAC CATHETERIZATION N/A 05/20/2022    Procedure:  Left Heart Cath;  Surgeon: Mackenzie Morales MD;  Location:  [facility] CATH INVASIVE LOCATION;  Service: Cardiovascular;  Laterality: N/A;    CARDIAC CATHETERIZATION N/A 05/20/2022    Procedure: Coronary angiography;  Surgeon: Mackenzie Morales MD;  Location:  [facility] CATH INVASIVE LOCATION;  Service: Cardiovascular;  Laterality: N/A;    CARDIAC CATHETERIZATION N/A 05/20/2022    Procedure: Percutaneous Coronary Intervention;  Surgeon: Mackenzie Morales MD;  Location:  [facility] CATH INVASIVE LOCATION;  Service: Cardiovascular;  Laterality: N/A;    CARDIAC CATHETERIZATION N/A 05/24/2022    Procedure: Percutaneous Coronary Intervention;  Surgeon: Miguel Ángel Karimi MD;  Location:  [facility] CATH INVASIVE LOCATION;  Service: Cardiovascular;  Laterality: N/A;  LAD and Cx    CARDIAC CATHETERIZATION N/A 05/24/2022    Procedure: Stent DAVONTE coronary;  Surgeon: Miguel Ángel Karimi MD;  Location:  [facility] CATH INVASIVE LOCATION;  Service: Cardiovascular;  Laterality: N/A;    CARDIAC CATHETERIZATION N/A 05/24/2022    Procedure: Resting Full Cycle Ratio;  Surgeon: Miguel Ángel Karimi MD;  Location:  [facility] CATH INVASIVE LOCATION;  Service: Cardiovascular;  Laterality: N/A;    CARDIAC CATHETERIZATION N/A 03/19/2024    Procedure: Left Heart Cath;  Surgeon: Miguel Ángel Karimi MD;  [facility];  Service: Cardiovascular;  Laterality: N/A;    CARDIAC CATHETERIZATION N/A 03/19/2024    Procedure: Percutaneous Coronary Intervention;  Surgeon: Miguel Ángel Karimi MD;  [facility];  Service: Cardiovascular;  Laterality: N/A;    CARDIAC CATHETERIZATION N/A 03/19/2024    Procedure: Stent DAVONTE coronary;  Surgeon: Miguel Ángel Karimi MD;  Location: [facility] CATH INVASIVE LOCATION;  Service: Cardiovascular;  Laterality: N/A;    CERVICAL DISCECTOMY POSTERIOR FUSION WITH INSTRUMENTATION Bilateral 6/25/2024    Procedure: Cervical 3 to cervical 6 laminectomies and posterior spinal fusion - Bilateral;   Surgeon: Marshal Miguel MD;  Location: [facility] MAIN OR;  Service: Neurosurgery;  Laterality: Bilateral;    COLONOSCOPY N/A 02/22/2006    Bilobed polyp at 30 cm, hemorrhoids-Dr. Ilya Zhao    COLONOSCOPY N/A 02/28/2014    Normal ileum, one 6 mm polyp in the mid transverse colon-Dr. Ilya Zhao    COLONOSCOPY N/A 11/19/2008    Ela ileum, two 3 to 4 mm polyps, non-bleeding internal hemorrhoids, repeat in 5 years-Dr. Ilya Zhao    COLONOSCOPY N/A 10/31/2017    Procedure: COLONOSCOPY WITH POLYPECTOMY (COLD BIOPSY);  Surgeon: Ilya Zhao MD;  [facility];  Service:     COLONOSCOPY N/A 05/21/2021    Procedure: Colonoscopy into cecum and terminal ileum with cold biopsy polypectomy;  Surgeon: Ilya Zhao MD;  [facility] END[facility];  Service: Gastroenterology;  Laterality: N/A;  Pre op: History of Polyps  Post op: Polyp    CORONARY ANGIOPLASTY WITH STENT PLACEMENT  2007, 2012, 2015    cardiac stents x3 occasions    ENDOSCOPY N/A 10/04/2017    Procedure: ESOPHAGOGASTRODUODENOSCOPY;  Surgeon: Boyd Guidry MD;  [facility];  Service:     ENDOSCOPY N/A 05/21/2021    Procedure: ESOPHAGOGASTRODUODENOSCOPY with biopsies;  Surgeon: Ilya Zhao MD;  [facility] ENDOS[facility];  Service: Gastroenterology;  Laterality: N/A;  Pre op: GERD  Post op: Irregular  Z-Line, Hiatal Hernia, Gastritis    ENDOSCOPY N/A 08/25/2023    Procedure: ESOPHAGOGASTRODUODENOSCOPY with biopsy;  Surgeon: Ilya Zhao MD;  [facility] END[facility];  Service: Gastroenterology;  Laterality: N/A;  errosive gastritis    EPIDURAL BLOCK      INTERVENTIONAL RADIOLOGY PROCEDURE N/A 05/20/2022    Procedure: Intravascular Ultrasound;  Surgeon: Mackenzie Morales MD;  Location: [facility] CATH INVASIVE LOCATION;  Service: Cardiovascular;  Laterality: N/A;    KNEE INCISION AND DRAINAGE Right 06/20/2017    Procedure: RT. KNEE WASHOUT ;  Surgeon: Boyd Coyne MD;  Location: [facility] MAIN OR;  Service:     MEDIAL  BRANCH BLOCK Bilateral 12/17/2021    Procedure: LUMBAR MEDIAL BRANCH BLOCK bilateral ~L4-S1 x2 (~2 weeks apart);  Surgeon: Christina Villaseñor MD;  Location: [facility] MAIN OR;  Service: Pain Management;  Laterality: Bilateral;    MEDIAL BRANCH BLOCK Bilateral 01/03/2022    Procedure: LUMBAR MEDIAL BRANCH BLOCK bilateral ~L4-S1 x2 (~2 weeks apart);  Surgeon: Christina Villaseñor MD;  Location: [facility] MAIN [facility];  Service: Pain Management;  Laterality: Bilateral;    KS ARTHRP KNE CONDYLE&PLATU MEDIAL&LAT COMPARTMENTS Right 06/15/2017    Procedure: RT TOTAL KNEE ARTHROPLASTY;  Surgeon: Boyd Coyne MD;  Location: [facility] MAIN OR;  Service: Orthopedics    RADIOFREQUENCY ABLATION Bilateral 01/10/2022    Procedure: RADIOFREQUENCY ABLATION NERVES Bilateral L4-S1;  Surgeon: Christina Villaseñor MD;  Location: [facility] MAIN OR;  Service: Pain Management;  Laterality: Bilateral;    SHOULDER SURGERY Right 2016    rotator cuff repair    UPPER GASTROINTESTINAL ENDOSCOPY N/A 10/13/2015    Z-line irregular, normal stomach, normal duodenum-Dr. Ilya Zhao    UPPER GASTROINTESTINAL ENDOSCOPY N/A 02/28/2014    Z-line irregular, normal stomach, normal duodenum-Dr. Ilya Zhao    UPPER GASTROINTESTINAL ENDOSCOPY N/A 11/19/2008    Z-line irregular, chronic gastritis withotu hemorrhage, normal duodenum-Dr. Ilya Zhao    UPPER GASTROINTESTINAL ENDOSCOPY N/A 06/22/2006    LA Grade A reflux esophagitis, non-bleeding erythematous gastropathy, normal duodenum-Dr. Ilya Zhao     Family History   Problem Relation Age of Onset    Lupus Sister     Thyroid disease Sister     Heart disease Other     Hypertension Other     Arthritis Mother     Hyperlipidemia Mother     Hypertension Mother     Thyroid disease Mother     Lupus Mother     Vision loss Mother     Heart disease Father     Arthritis Father     Other Father         Vascular disease    Lupus Father     Depression Father     Alcohol abuse Father     Dementia Father     Hypertension Father     Heart disease  Brother     Heart attack Brother 40    Thyroid disease Sister     Arthritis Brother     Malig Hyperthermia Neg Hx      Social History     Tobacco Use    Smoking status: Never     Passive exposure: Never    Smokeless tobacco: Never    Tobacco comments:     CAFFEINE USE: 2 CUPS COFFEE DAILY   Vaping Use    Vaping status: Never Used   Substance Use Topics    Alcohol use: Yes     Alcohol/week: 3.0 standard drinks of alcohol     Types: 1 Glasses of wine, 2 Shots of liquor per week    Drug use: Never     Medications Prior to Admission   Medication Sig Dispense Refill Last Dose    ARIPiprazole (ABILIFY) 5 MG tablet Take 1 tablet by mouth Daily.   6/25/2024 at 0800    aspirin 81 MG EC tablet Take 1 tablet by mouth Daily. 30 tablet 6 Past Week    carvedilol (COREG) 25 MG tablet Take 0.5 tablets by mouth 2 (Two) Times a Day With Meals. 180 tablet 3 6/25/2024 at 0800    clonazePAM (KlonoPIN) 0.5 MG tablet Take 1 tablet by mouth Daily As Needed for Anxiety. 30 tablet 2 Past Week    dorzolamide-timolol (COSOPT) 22.3-6.8 MG/ML ophthalmic solution Apply 1 drop to eye(s) to both eyes 2 (Two) Times a Day. (Patient taking differently: Administer 1 drop to both eyes 2 (Two) Times a Day.) 20 mL 3 6/25/2024    doxazosin (CARDURA) 4 MG tablet TAKE 1 TABLET BY MOUTH EVERY DAY AT NIGHT (Patient taking differently: Take 1 tablet by mouth Every Night.) 90 tablet 4 6/24/2024 at 2200    escitalopram (LEXAPRO) 20 MG tablet Take 1 tablet by mouth Daily. (Patient taking differently: Take 1 tablet by mouth Every Morning.) 90 tablet 3 6/25/2024 at 0800    esomeprazole (nexIUM) 40 MG capsule Take 1 capsule by mouth Every Morning Before Breakfast. 30 capsule 3 6/24/2024 at 0800    Evolocumab (REPATHA) solution auto-injector SureClick injection Inject 1 mL under the skin into the appropriate area as directed Every 14 (Fourteen) Days. 2 mL 6 Past Month    famotidine (PEPCID) 20 MG tablet Take 1 tablet by mouth Every Evening.   6/24/2024 at 2200     gabapentin (NEURONTIN) 300 MG capsule Take 1 capsule by mouth Every Night.   6/24/2024 at 2200    HYDROcodone-acetaminophen (NORCO) 5-325 MG per tablet Take 1 tablet by mouth Every 4 (Four) Hours As Needed for Moderate Pain.   6/25/2024 at 0700    Ingrezza 60 MG capsule Take 1 capsule by mouth Daily.   6/25/2024 at 0800    Insulin Glargine, 1 Unit Dial, (Toujeo SoloStar) 300 UNIT/ML solution pen-injector injection INJECT 65 UNITS UNDER THE SKIN DAILY (Patient taking differently: Inject 60 Units under the skin into the appropriate area as directed Every Morning.) 18 mL 3 6/24/2024 at 0700    latanoprost (XALATAN) 0.005 % ophthalmic solution Apply 1 drop to  each eye Daily. (Patient taking differently: Administer 1 drop to both eyes Every Night.) 7.5 mL 3 6/25/2024 at 0800    metFORMIN (GLUCOPHAGE) 500 MG tablet Take 2 tablets by mouth Daily With Breakfast. Indications: start in 48 hours 180 tablet 3 6/24/2024 at 0700    methocarbamol (Robaxin) 500 MG tablet Take 1 tablet by mouth As Needed (Take as needed for pain). (Patient taking differently: Take 1 tablet by mouth As Needed for Muscle Spasms (Take as needed for pain).) 90 tablet 0 Past Month    pioglitazone (ACTOS) 15 MG tablet Take 1 tablet by mouth Daily. 90 tablet 2 6/24/2024 at 0700    sildenafil (VIAGRA) 100 MG tablet Take 1 tablet by mouth Daily As Needed for Erectile Dysfunction. Take 30 minutes to 4 hours prior to sexual activity. (Patient taking differently: Take 1 tablet by mouth Daily As Needed for Erectile Dysfunction. Take 30 minutes to 4 hours prior to sexual activity.   To hold 48 hours prior to surgery) 50 tablet 3 Past Week    clopidogrel (PLAVIX) 75 MG tablet TAKE 1 TABLET BY MOUTH EVERY DAY 90 tablet 2 6/18/2024    Continuous Blood Gluc Sensor (Dexcom G7 Sensor) misc Use.   Unknown    glucose blood test strip Use to check blood sugars 1-2 times a day. E11.65 200 each 2 Unknown    glucose monitor monitoring kit Use 1 each Take As Directed. Use to  check blood sugars 1-2 times a day. E11.65 1 each 0 Unknown    Insulin Pen Needle (BD Pen Needle Marissa 2nd Gen) 32G X 4 MM misc Inject 1 each under the skin into the appropriate area as directed 3 (Three) Times a Day. 300 each 3 Unknown    Insulin Pen Needle 31G X 4 MM misc Use 1 each Daily. 100 each 3 Unknown    Lancets misc Use to check blood sugars 1-2 times a day. E11.65 200 each 2 Unknown    rosuvastatin (CRESTOR) 20 MG tablet Take 2 tablets by mouth Daily.       Semaglutide, 2 MG/DOSE, (OZEMPIC) 8 MG/3ML solution pen-injector Inject 2 mg under the skin into the appropriate area as directed 1 (One) Time Per Week. (Patient taking differently: Inject 2 mg under the skin into the appropriate area as directed 1 (One) Time Per Week. Pt verbalizes understanding can't take till after surgery) 3 mL 5 6/12/2024     Allergies:  Nsaids, Atorvastatin, Hydralazine, Norvasc [amlodipine], Zetia [ezetimibe], Crestor [rosuvastatin], Ace inhibitors, Lisinopril, and Testosterone        Objective      Vital Signs  Temp:  [98.9 °F (37.2 °C)-99.9 °F (37.7 °C)] 98.9 °F (37.2 °C)  Heart Rate:  [] 86  Resp:  [17-20] 17  BP: (116-154)/(79-91) 154/91  Body mass index is 31.5 kg/m².    Physical Exam  Constitutional:       Appearance: He is obese.      Comments: Appears fatigued   HENT:      Head: Normocephalic and atraumatic.      Nose: Nose normal.   Eyes:      Extraocular Movements: Extraocular movements intact.      Comments: Wearing glasses, makes eye contact   Cardiovascular:      Rate and Rhythm: Normal rate and regular rhythm.      Pulses: Normal pulses.      Heart sounds: Normal heart sounds.   Pulmonary:      Effort: Pulmonary effort is normal.      Breath sounds: Stridor present. Rhonchi present.      Comments: Rhonchi to left posterior upper and lower lobes  Left lower lobe diminished.  Right upper and lower lobes diminished  On room air.  Audible stridor/wheeze while dozing off/asleep, resolves when he is awake and  talking.  Cough is harsh, congested, nonproductive  Abdominal:      General: Bowel sounds are normal. There is no distension.      Palpations: Abdomen is soft.      Tenderness: There is no abdominal tenderness.   Genitourinary:     Comments: Indwelling urinary catheter patent, draining peach angus-colored clear urine without sediment  Musculoskeletal:         General: No swelling or tenderness. Normal range of motion.      Comments: Generalized weakness.  Wearing c-collar   Skin:     General: Skin is warm and dry.   Neurological:      Mental Status: He is oriented to person, place, and time.   Psychiatric:      Comments: Cooperative, pleasant         Results Review:   I reviewed the patient's new clinical results.           Assessment/Plan     Active Hospital Problems    Diagnosis  POA    **Preop examination [Z01.818]  Not Applicable    Pneumonia [J18.9]  Unknown    Stridor [R06.1]  Unknown    Stenosis of cervical spine with myelopathy [M48.02, G99.2]  Yes    Essential hypertension [I10]  Yes    Type 2 diabetes mellitus with microalbuminuria, with long-term current use of insulin [E11.29, R80.9, Z79.4]  Not Applicable    S/P primary angioplasty with coronary stent [Z95.5]  Not Applicable     We have started Zosyn for probable pneumonia though chest x-ray somewhat a poor study.  Repeat chest x-ray in the morning.  Negative Legionella, strep pneumo, MRSA and RVP.  Continue Zosyn and follow blood cultures.  Continue incentive spirometry and DuoNebs.  Pain control for him to be able to move around.  Up to chair when able.  Discussed turning in bed is much as possible to mobilize secretions.  Pulmonology following and a CT of the neck has been ordered for suspected upper airway issue causing the audible wheezing.    A1c in March of this year was 6.4.  His metformin and Actos are on hold.  Lantus has been resumed at a lower dose of his home regimen and  additional coverage with Humulin R low-dose sliding scale.    Thank  you very much for asking [facility] to be involved in this patient's care. We will follow along with you.      MICHELLE Vaughan  [facility]  06/29/24  16:54 EDT

## 2024-06-29 NOTE — PLAN OF CARE
Goal Outcome Evaluation:  Plan of Care Reviewed With: patient, spouse        Progress: declining  Outcome Evaluation: Pt seen for PT this AM and tolerated mobility well, though reporting 9/10 neck pain during session. Donned c-collar in bed prior to mobility and pt required max A x2 to transfer to sit. Noted strong posterior lean initially - heavy cueing throughout to correct but pt with limited ability to maintain. Pt stood 2x with mod-max A x2. Tolerated standing for several minutes attempting to initiate side steps. PT assisted with lateral weightshifting and pt needing manual assist to move feet laterally to HOB. Pt reports feeling like his legs are heavy and he is unable to initiate foot clearance without assist. Pt unsafe to transfer to the chair at this time - required x4 assist to get back from chair yesterday. Pt returned to supine with max A x2 and repostioned. Educated wife on LE/UE exercises to work on throughout the day as tolerated. PT will continue to follow, anticipate DC to [facility].      Anticipated Discharge Disposition (PT): inpatient rehabilitation facility

## 2024-06-29 NOTE — PROGRESS NOTES
[facility] NEUROSURGERY CERVICAL PROGRESS NOTE      CC: POD 4, sp C3-C6 laminectomy and posterior fusion       Subjective     Interval History:  Reporting left side chest/arm pain; Has audible wheezing and cough; Reports generalized weakness     ROS:  Constitutional: No fever, chills  Neck: +neck pain  GI: No nausea, vomiting, no swallow difficulties  Neuro: generalized weakness,  +balance difficulties  : +F/C    Objective     Vital signs in last 24 hours:  Temp:  [99 °F (37.2 °C)-100.4 °F (38 °C)] 99.3 °F (37.4 °C)  Heart Rate:  [] 95  Resp:  [18-20] 20  BP: (118-161)/(73-90) 120/79    Intake/Output this shift:  No intake/output data recorded.    LABS:  Results from last 7 days   Lab Units 06/28/24  0305 06/26/24  0223   WBC 10*3/mm3 12.20* 11.42*   HEMOGLOBIN g/dL 13.0 15.0   HEMATOCRIT % 40.1 46.3   PLATELETS 10*3/mm3 172 179      Results from last 7 days   Lab Units 06/28/24  0305 06/26/24  0223   SODIUM mmol/L 134* 135*   POTASSIUM mmol/L 3.8 4.2   CHLORIDE mmol/L 102 104   CO2 mmol/L 24.0 21.0*   BUN mg/dL 15 10   CREATININE mg/dL 1.18 0.85   CALCIUM mg/dL 8.4* 8.8   GLUCOSE mg/dL 121* 123*      6/29/24 proBNP 308  6/29/24 Procalcitonin 0.19  6/29/24 High sensitivity troponin 24^    IMAGING STUDIES:  6/28/24 CXR   Narrative & Impression   XR CHEST PA AND LATERAL-     HISTORY: Male who is 69 years-old, cough and fever     TECHNIQUE: Frontal and lateral views of the chest     COMPARISON: 5/23/2022     FINDINGS: The heart size is borderline with mild pulmonary vascular  congestion. Lung volume is low with likely atelectasis in both lungs,  follow-up as indications persist. No large pleural effusion, or  pneumothorax. Right hemidiaphragm is elevated. No acute osseous process.     IMPRESSION:  As described.     This report was finalized on 6/28/2024 3:37 PM by Dr. Alek Soni M.D on Workstation[facility]       I personally viewed and interpreted the patient's chart.    Meds reviewed/changed:  Yes  Abilify 5mg daily  Aspirin 81mg daily  Coreg 12.5mg BID  Lexapro 20 mg daily  Gabapentin 300mg at night  Heparin 5000 units every 12 hours  Lantus 30 units SQ daily  Insulin Sliding Scale  Protonix 40mg daily  Hytrin 5mg at night     Physical Exam:    General:   Awake, alert.  Neck:    Posterior cervical dressing clean, dry, intact   Motor:    No fasciculations, rigidity, spasticity, or abnormal movements.  Sensation:   Normal to light touch  Station and Gait:             Per PT note, patient stood 2x with mod-max Ax2. Tolerated standing for several minutes attempting to initiate side steps. Patient unsafe to transfer to chair at this time  Extremities:   SCD in place    Assessment & Plan     ASSESSMENT:      Preop examination    S/P primary angioplasty with coronary stent    Stenosis of cervical spine with myelopathy    The patient is POD 4, sp C3-C6 laminectomy and posterior fusion. Today, he has audible wheezing and also reports cough. He has had low grade temperature for the past three days.     He denies any difficulty swallowing or feeling of tightness in his throat. He denies any worsening neck pain.     He had a CXR yesterday that showed possible atelectasis and elevated right diaphragm.     He is also having some left side chest/shoulder pain that is not radiating. According to his wife, he does have several cardiac stents and is currently off Plavix due to his recent surgery.     PLAN:   Will order EKG STAT  Will consult [facility] for chest pain, wheezing   Keep F/C in place  Plan is for discharge to [place] [id number].   Aspirin resumed and Plavix may be resumed on July 1    I discussed the patient's findings and my recommendations with patient, family, nursing staff, and .          LOS: 4 days       Kathe Flanagan, APRN  6/29/2024  09:33 EDT

## 2024-06-29 NOTE — THERAPY TREATMENT NOTE
Patient Name: Isaias Monaco  : 1955    MRN: [id number]                              Today's Date: 2024       Admit Date: 2024    Visit Dx: No diagnosis found.  Patient Active Problem List   Diagnosis    Microalbuminuria    Vitamin D deficiency    Angioedema    Coronary artery disease involving native coronary artery of native heart without angina pectoris    Anxiety    ED (erectile dysfunction)    Hyperlipidemia LDL goal <70    Hypogonadism in male    S/P primary angioplasty with coronary stent    Osteoarthritis of knee    Hypertensive kidney disease with stage 3a chronic kidney disease    Anemia, posthemorrhagic, acute    Dyspnea on exertion    Gastro-esophageal reflux disease with esophagitis    Tubular adenoma of colon    Nocturia associated with benign prostatic hyperplasia    Iron deficiency anemia    Adverse effect of iron and its compounds, sequela    Facial twitching    Blepharospasm    Type 2 diabetes mellitus with microalbuminuria, with long-term current use of insulin    Panic disorder    Tic disorder, unspecified    Spinal stenosis of lumbar region without neurogenic claudication    Bilateral myopia    Combined forms of age-related cataract, bilateral    Presbyopia    Primary open-angle glaucoma, bilateral, severe stage    Lumbar facet arthropathy    Spondylosis of lumbar region without myelopathy or radiculopathy    Calcific coronary arteriosclerosis    Essential hypertension    Degeneration of lumbar intervertebral disc    Dystonia    Lumbosacral spondylosis without myelopathy    Medicare annual wellness visit, subsequent    Coronary stent restenosis    Stenosis of cervical spine with myelopathy    Preop examination     Past Medical History:   Diagnosis Date    Anemia     post hemorrhagic    Angioedema 2016    Secondary to ACE inhibitor    Anxiety     Arthritis     CAD (coronary artery disease)     Colon polyps     Diabetes mellitus, type 2     GERD (gastroesophageal reflux  disease)     Glaucoma     Hematoma     history    High cholesterol     History of foreign travel 12/2017; 08/2018    [place], [place], [place], [place], [place] and [place]; [place]    Hyperlipidemia     Hypertension     Hypogonadism in male 09/28/2016    Male erectile disorder     Microalbuminuria     Osteoarthritis     knee    Spinal stenosis of lumbar region without neurogenic claudication 06/02/2021    Tubular adenoma of colon 11/01/2017    Vitamin D deficiency      Past Surgical History:   Procedure Laterality Date    CARDIAC CATHETERIZATION N/A 05/15/2006    Dr. Mini Camarillo    CARDIAC CATHETERIZATION N/A 03/10/2020    Procedure: Left Heart Cath;  Surgeon: Miguel Ángel Karimi MD;  Location:  [facility];  Service: Cardiology;  Laterality: N/A;    CARDIAC CATHETERIZATION N/A 03/10/2020    Procedure: Stent DAVONTE coronary;  Surgeon: Miguel Ángel Karimi MD;  Location:  [facility];  Service: Cardiology;  Laterality: N/A;    CARDIAC CATHETERIZATION N/A 03/10/2020    Procedure: Coronary angiography;  Surgeon: Miguel Ángel Karimi MD;  Location:  [facility];  Service: Cardiology;  Laterality: N/A;    CARDIAC CATHETERIZATION N/A 03/10/2020    Procedure: Left ventriculography;  Surgeon: Miguel Ángel Karimi MD;  Location:  [facility];  Service: Cardiology;  Laterality: N/A;    CARDIAC CATHETERIZATION N/A 05/20/2022    Procedure: Left Heart Cath;  Surgeon: Mackenzie Morales MD;  Location:  [facility];  Service: Cardiovascular;  Laterality: N/A;    CARDIAC CATHETERIZATION N/A 05/20/2022    Procedure: Coronary angiography;  Surgeon: Mackenzie Morales MD;  Location:  [facility];  Service: Cardiovascular;  Laterality: N/A;    CARDIAC CATHETERIZATION N/A 05/20/2022    Procedure: Percutaneous Coronary Intervention;  Surgeon: Mackenzie Morales MD;  Location:  [facility];  Service:  Cardiovascular;  Laterality: N/A;    CARDIAC CATHETERIZATION N/A 05/24/2022    Procedure: Percutaneous Coronary Intervention;  Surgeon: Miguel Ángel Karimi MD;  Location:  [facility];  Service: Cardiovascular;  Laterality: N/A;  LAD and Cx    CARDIAC CATHETERIZATION N/A 05/24/2022    Procedure: Stent DAVONTE coronary;  Surgeon: Miguel Ángel Karimi MD;  Location:  [facility];  Service: Cardiovascular;  Laterality: N/A;    CARDIAC CATHETERIZATION N/A 05/24/2022    Procedure: Resting Full Cycle Ratio;  Surgeon: Miguel Ángel Karimi MD;  Location:  [facility];  Service: Cardiovascular;  Laterality: N/A;    CARDIAC CATHETERIZATION N/A 03/19/2024    Procedure: Left Heart Cath;  Surgeon: Miguel Ángel Karimi MD;  Location:  [facility];  Service: Cardiovascular;  Laterality: N/A;    CARDIAC CATHETERIZATION N/A 03/19/2024    Procedure: Percutaneous Coronary Intervention;  Surgeon: Miguel Ángel Karimi MD;  Location:  [facility];  Service: Cardiovascular;  Laterality: N/A;    CARDIAC CATHETERIZATION N/A 03/19/2024    Procedure: Stent DAVONTE coronary;  Surgeon: Miguel Ángel Karimi MD;  Location:  [facility];  Service: Cardiovascular;  Laterality: N/A;    CERVICAL DISCECTOMY POSTERIOR FUSION WITH INSTRUMENTATION Bilateral 6/25/2024    Procedure: Cervical 3 to cervical 6 laminectomies and posterior spinal fusion - Bilateral;  Surgeon: Marshal Miguel MD;  Location: [facility] MAIN OR;  Service: Neurosurgery;  Laterality: Bilateral;    COLONOSCOPY N/A 02/22/2006    Bilobed polyp at 30 cm, hemorrhoids-Dr. Ilya Zhao    COLONOSCOPY N/A 02/28/2014    Normal ileum, one 6 mm polyp in the mid transverse colon-Dr. Ilya Zhao    COLONOSCOPY N/A 11/19/2008    Ela ileum, two 3 to 4 mm polyps, non-bleeding internal hemorrhoids, repeat in 5 years-Dr. Ilya Zhao    COLONOSCOPY N/A 10/31/2017    Procedure: COLONOSCOPY WITH  POLYPECTOMY (COLD BIOPSY);  Surgeon: Ilya Zhao MD;  Location: [facility] ENDOSCOPY;  Service:     COLONOSCOPY N/A 05/21/2021    Procedure: Colonoscopy into cecum and terminal ileum with cold biopsy polypectomy;  Surgeon: Ilya Zhao MD;  Location: [facility] ENDOSCOPY;  Service: Gastroenterology;  Laterality: N/A;  Pre op: History of Polyps  Post op: Polyp    CORONARY ANGIOPLASTY WITH STENT PLACEMENT  2007, 2012, 2015    cardiac stents x3 occasions    ENDOSCOPY N/A 10/04/2017    Procedure: ESOPHAGOGASTRODUODENOSCOPY;  Surgeon: Boyd Guidry MD;  Location: [facility] ENDOSCOPY;  Service:     ENDOSCOPY N/A 05/21/2021    Procedure: ESOPHAGOGASTRODUODENOSCOPY with biopsies;  Surgeon: Ilya Zhao MD;  Location: [facility] ENDOSCOPY;  Service: Gastroenterology;  Laterality: N/A;  Pre op: GERD  Post op: Irregular  Z-Line, Hiatal Hernia, Gastritis    ENDOSCOPY N/A 08/25/2023    Procedure: ESOPHAGOGASTRODUODENOSCOPY with biopsy;  Surgeon: Ilya Zhao MD;  Location: [facility] ENDOSCOPY;  Service: Gastroenterology;  Laterality: N/A;  errosive gastritis    EPIDURAL BLOCK      INTERVENTIONAL RADIOLOGY PROCEDURE N/A 05/20/2022    Procedure: Intravascular Ultrasound;  Surgeon: Mackenzie Morales MD;  Location: [facility] CATH INVASIVE LOCATION;  Service: Cardiovascular;  Laterality: N/A;    KNEE INCISION AND DRAINAGE Right 06/20/2017    Procedure: RT. KNEE WASHOUT ;  Surgeon: Boyd Coyne MD;  Location: [facility] MAIN OR;  Service:     MEDIAL BRANCH BLOCK Bilateral 12/17/2021    Procedure: LUMBAR MEDIAL BRANCH BLOCK bilateral ~L4-S1 x2 (~2 weeks apart);  Surgeon: Christina Villaseñor MD;  Location: [facility] MAIN [facility];  Service: Pain Management;  Laterality: Bilateral;    MEDIAL BRANCH BLOCK Bilateral 01/03/2022    Procedure: LUMBAR MEDIAL BRANCH BLOCK bilateral ~L4-S1 x2 (~2 weeks apart);  Surgeon: Christina Villaseñor MD;  Location: [facility];  Service: Pain Management;  Laterality: Bilateral;    KS ARTHRP KNE CONDYLE&PLATU MEDIAL&LAT  COMPARTMENTS Right 06/15/2017    Procedure: RT TOTAL KNEE ARTHROPLASTY;  Surgeon: Boyd Coyne MD;  Location: [facility] MAIN OR;  Service: Orthopedics    RADIOFREQUENCY ABLATION Bilateral 01/10/2022    Procedure: RADIOFREQUENCY ABLATION NERVES Bilateral L4-S1;  Surgeon: Christina Villaseñor MD;  Location: [facility] MAIN OR;  Service: Pain Management;  Laterality: Bilateral;    SHOULDER SURGERY Right 2016    rotator cuff repair    UPPER GASTROINTESTINAL ENDOSCOPY N/A 10/13/2015    Z-line irregular, normal stomach, normal duodenum-Dr. Ilya Zhao    UPPER GASTROINTESTINAL ENDOSCOPY N/A 02/28/2014    Z-line irregular, normal stomach, normal duodenum-Dr. Ilya Zhao    UPPER GASTROINTESTINAL ENDOSCOPY N/A 11/19/2008    Z-line irregular, chronic gastritis withotu hemorrhage, normal duodenum-Dr. Ilya Zhao    UPPER GASTROINTESTINAL ENDOSCOPY N/A 06/22/2006    LA Grade A reflux esophagitis, non-bleeding erythematous gastropathy, normal duodenum-Dr. Ilya Zhao      General Information       [facility] Name 06/29/24 5873          Physical Therapy Time and Intention    Document Type therapy note (daily note)  -     Mode of Treatment individual therapy;physical therapy  -       Row Name 06/29/24 9255          General Information    Patient Profile Reviewed yes  -     Existing Precautions/Restrictions fall;cervical collar;spinal  -       Row Name 06/29/24 2821          Cognition    Orientation Status (Cognition) oriented x 3  -[facility] Name 06/29/24 9768          Safety Issues, Functional Mobility    Impairments Affecting Function (Mobility) endurance/activity tolerance;strength;motor control;motor planning;pain;grasp;balance;range of motion (ROM);coordination;cognition  -               User Key  (r) = Recorded By, (t) = Taken By, (c) = Cosigned By      Initials Name Provider Type     Cynthia Mandujano PT Physical Therapist                   Mobility       Row Name 06/29/24 2213          Bed Mobility    Bed Mobility  supine-sit;sit-supine  -     Supine-Sit [place] (Bed Mobility) maximum assist (25% patient effort);2 person assist;verbal cues;nonverbal cues (demo/gesture)  -     Sit-Supine [place] (Bed Mobility) maximum assist (25% patient effort);2 person assist;verbal cues;nonverbal cues (demo/gesture)  -     Assistive Device (Bed Mobility) bed rails;draw sheet;head of bed elevated  -     Comment, (Bed Mobility) Increased assist today 2/2 pain  -       Row Name 06/29/24 1334          Sit-Stand Transfer    Sit-Stand [place] (Transfers) moderate assist (50% patient effort);maximum assist (25% patient effort);2 person assist;verbal cues;nonverbal cues (demo/gesture)  -     Assistive Device (Sit-Stand Transfers) walker, front-wheeled  -       Row Name 06/29/24 1334          Gait/Stairs (Locomotion)    [place] Level (Gait) moderate assist (50% patient effort);2 person assist;verbal cues;nonverbal cues (demo/gesture);maximum assist (25% patient effort)  -     Assistive Device (Gait) walker, front-wheeled  -     Distance in Feet (Gait) 3  side steps  -     Deviations/Abnormal Patterns (Gait) bashir decreased;festinating/shuffling;gait speed decreased;stride length decreased;weight shifting decreased  -     Bilateral Gait Deviations forward flexed posture;heel strike decreased  -     Comment, (Gait/Stairs) Manual assist moving B feet, unable to initiate side steps today, reports legs feel heavy  -               User Key  (r) = Recorded By, (t) = Taken By, (c) = Cosigned By      Initials Name Provider Type     Cynthia Mandujano PT Physical Therapist                   Obj/Interventions       Row Name 06/29/24 1336          Balance    Comment, Balance Mod-max A x1-2 for sitting balance EOB - strong posterior lean, able to correct with cues but unable to maintain  -               User Key  (r) = Recorded By, (t) = Taken By, (c) = Cosigned By      Initials Name Provider Type     Delbert  Cynthia GRIMES PT Physical Therapist                   Goals/Plan    No documentation.                  Clinical Impression       Row Name 06/29/24 1337          Pain    Pretreatment Pain Rating 9/10  formerly [facility]     Posttreatment Pain Rating 9/10  formerly [facility]     Pain Location incisional  -     Pain Location - neck  -     Pain Intervention(s) Repositioned;Ambulation/increased activity;Rest  -       Row Name 06/29/24 1336          Plan of Care Review    Plan of Care Reviewed With patient;spouse  -     Progress declining  -     Outcome Evaluation Pt seen for PT this AM and tolerated mobility well, though reporting 9/10 neck pain during session. Donned c-collar in bed prior to mobility and pt required max A x2 to transfer to sit. Noted strong posterior lean initially - heavy cueing throughout to correct but pt with limited ability to maintain. Pt stood 2x with mod-max A x2. Tolerated standing for several minutes attempting to initiate side steps. PT assisted with lateral weightshifting and pt needing manual assist to move feet laterally to HOB. Pt reports feeling like his legs are heavy and he is unable to initiate foot clearance without assist. Pt unsafe to transfer to the chair at this time - required x4 assist to get back from chair yesterday. Pt returned to supine with max A x2 and repostioned. Educated wife on LE/UE exercises to work on throughout the day as tolerated. PT will continue to follow, anticipate DC to [facility].  -       Row Name 06/29/24 1339          Vital Signs    O2 Delivery Pre Treatment room air  -     O2 Delivery Intra Treatment room air  -     O2 Delivery Post Treatment room air  formerly [facility]       Row Name 06/29/24 133          Positioning and Restraints    Pre-Treatment Position in bed  -     Post Treatment Position bed  -     In Bed fowlers;call light within reach;encouraged to call for assist;exit alarm on;with family/caregiver;notified [facility]               User Key  (r) = Recorded By, (t) = Taken  By, (c) = Cosigned By      Initials Name Provider Type    Cynthia Chau PT Physical Therapist                   Outcome Measures       Row Name 06/29/24 1343          How much help from another person do you currently need...    Turning from your back to your side while in flat bed without using bedrails? 2  -BH     Moving from lying on back to sitting on the side of a flat bed without bedrails? 2  -BH     Moving to and from a bed to a chair (including a wheelchair)? 1  -BH     Standing up from a chair using your arms (e.g., wheelchair, bedside chair)? 2  -BH     Climbing 3-5 steps with a railing? 1  -BH     To walk in hospital room? 2  -     AM-PAC 6 Clicks Score (PT) 10  -     Highest Level of Mobility Goal 4 --> Transfer to chair/commode  -       Row Name 06/29/24 1343          Functional Assessment    Outcome Measure Options AM-PAC 6 Clicks Basic Mobility (PT)  -               User Key  (r) = Recorded By, (t) = Taken By, (c) = Cosigned By      Initials Name Provider Type    Cynthia Chau PT Physical Therapist                                 Physical Therapy Education       Title: PT OT SLP Therapies (Done)       Topic: Physical Therapy (Done)       Point: Mobility training (Done)       Learning Progress Summary             Patient Acceptance, E,D,TB, VU,NR by  at 6/29/2024 1343    Acceptance, E,TB, VU,DU,NR by  at 6/28/2024 1122    Acceptance, E,TB, VU,DU,NR by  at 6/27/2024 1018    Acceptance, E,D, DU by PC at 6/26/2024 1327   Family Acceptance, E,TB, VU,DU,NR by CS at 6/28/2024 1122    Acceptance, E,TB, VU,DU,NR by  at 6/27/2024 1018                         Point: Home exercise program (Done)       Learning Progress Summary             Patient Acceptance, E,D,TB, VU,NR by  at 6/29/2024 1343    Acceptance, E,TB, VU,DU,NR by CS at 6/28/2024 1122    Acceptance, E,TB, VU,DU,NR by CS at 6/27/2024 1018    Acceptance, E,D, DU by PC at 6/26/2024 1327   Family Acceptance, E,TB, JUAN CARLOS,DU,NR  by  at 6/28/2024 1122    Acceptance, E,TB, VU,DU,NR by CS at 6/27/2024 1018                         Point: Body mechanics (Done)       Learning Progress Summary             Patient Acceptance, E,D,TB, VU,NR by  at 6/29/2024 1343    Acceptance, E,TB, VU,DU,NR by CS at 6/28/2024 1122    Acceptance, E,TB, VU,DU,NR by CS at 6/27/2024 1018    Acceptance, E,D, DU by  at 6/26/2024 1327   Family Acceptance, E,TB, VU,DU,NR by CS at 6/28/2024 1122    Acceptance, E,TB, VU,DU,NR by CS at 6/27/2024 1018                         Point: Precautions (Done)       Learning Progress Summary             Patient Acceptance, E,D,TB, VU,NR by  at 6/29/2024 1343    Acceptance, E,TB, VU,DU,NR by  at 6/28/2024 1122    Acceptance, E,TB, VU,DU,NR by  at 6/27/2024 1018    Acceptance, E,D, DU by  at 6/26/2024 1327   Family Acceptance, E,TB, VU,DU,NR by CS at 6/28/2024 1122    Acceptance, E,TB, VU,DU,NR by  at 6/27/2024 1018                                         User Key       Initials Effective Dates Name Provider Type Discipline     06/16/21 -  Nisha Rivas, PT Physical Therapist PT     04/08/22 -  Cynthia Mandujano PT Physical Therapist PT     09/22/22 -  Ana Coello, LEIDY Physical Therapist PT                  PT Recommendation and Plan     Plan of Care Reviewed With: patient, spouse  Progress: declining  Outcome Evaluation: Pt seen for PT this AM and tolerated mobility well, though reporting 9/10 neck pain during session. Donned c-collar in bed prior to mobility and pt required max A x2 to transfer to sit. Noted strong posterior lean initially - heavy cueing throughout to correct but pt with limited ability to maintain. Pt stood 2x with mod-max A x2. Tolerated standing for several minutes attempting to initiate side steps. PT assisted with lateral weightshifting and pt needing manual assist to move feet laterally to HOB. Pt reports feeling like his legs are heavy and he is unable to initiate foot clearance without  assist. Pt unsafe to transfer to the chair at this time - required x4 assist to get back from chair yesterday. Pt returned to supine with max A x2 and repostioned. Educated wife on LE/UE exercises to work on throughout the day as tolerated. PT will continue to follow, anticipate DC to [facility].     Time Calculation:         PT Charges       Row Name 06/29/24 1343             Time Calculation    Start Time 1015  -      Stop Time 1041  -      Time Calculation (min) 26 min  -      PT Received On 06/29/24  -      PT - Next Appointment 07/01/24  -         Time Calculation- PT    Total Timed Code Minutes- PT 26 minute(s)  -         Timed Charges    29454 - PT Therapeutic Activity Minutes 26  -BH         Total Minutes    Timed Charges Total Minutes 26  -       Total Minutes 26  -BH                User Key  (r) = Recorded By, (t) = Taken By, (c) = Cosigned By      Initials Name Provider Type     Cynthia Mandujano, PT Physical Therapist                  Therapy Charges for Today       Code Description Service Date Service Provider Modifiers Qty    60794375637  PT THERAPEUTIC ACT EA 15 MIN 6/29/2024 Cynthia Mandujano, PT GP 2    53785246448 HC PT THER SUPP EA 15 MIN 6/29/2024 Cynthia Mandujano, PT GP 1            PT G-Codes  Outcome Measure Options: AM-PAC 6 Clicks Basic Mobility (PT)  AM-PAC 6 Clicks Score (PT): 10  AM-PAC 6 Clicks Score (OT): 11  Modified Elver Scale: 4 - Moderately severe disability.  Unable to walk without assistance, and unable to attend to own bodily needs without assistance.  PT Discharge Summary  Anticipated Discharge Disposition (PT): inpatient rehabilitation facility    Cynthia Mandujano PT  6/29/2024

## 2024-06-29 NOTE — NURSING NOTE
MD notified regarding patient complaints of throat soreness and audible stridor. Awaiting return call.

## 2024-06-30 LAB
ALBUMIN SERPL-MCNC: 3.1 G/DL (ref 3.5–5.2)
ALBUMIN/GLOB SERPL: 0.8 G/DL
ALP SERPL-CCNC: 92 U/L (ref 39–117)
ALT SERPL W P-5'-P-CCNC: 35 U/L (ref 1–41)
ANION GAP SERPL CALCULATED.3IONS-SCNC: 8 MMOL/L (ref 5–15)
AST SERPL-CCNC: 76 U/L (ref 1–40)
BACTERIA UR QL AUTO: ABNORMAL /HPF
BASOPHILS # BLD AUTO: 0.04 10*3/MM3 (ref 0–0.2)
BASOPHILS NFR BLD AUTO: 0.3 % (ref 0–1.5)
BILIRUB SERPL-MCNC: 0.8 MG/DL (ref 0–1.2)
BILIRUB UR QL STRIP: NEGATIVE
BUN SERPL-MCNC: 14 MG/DL (ref 8–23)
BUN/CREAT SERPL: 12.5 (ref 7–25)
CALCIUM SPEC-SCNC: 9.1 MG/DL (ref 8.6–10.5)
CHLORIDE SERPL-SCNC: 100 MMOL/L (ref 98–107)
CLARITY UR: CLEAR
CO2 SERPL-SCNC: 25 MMOL/L (ref 22–29)
COLOR UR: ABNORMAL
CREAT SERPL-MCNC: 1.12 MG/DL (ref 0.76–1.27)
DEPRECATED RDW RBC AUTO: 49.9 FL (ref 37–54)
EGFRCR SERPLBLD CKD-EPI 2021: 71.1 ML/MIN/1.73
EOSINOPHIL # BLD AUTO: 0.3 10*3/MM3 (ref 0–0.4)
EOSINOPHIL NFR BLD AUTO: 2.6 % (ref 0.3–6.2)
ERYTHROCYTE [DISTWIDTH] IN BLOOD BY AUTOMATED COUNT: 15.6 % (ref 12.3–15.4)
GLOBULIN UR ELPH-MCNC: 3.7 GM/DL
GLUCOSE BLDC GLUCOMTR-MCNC: 118 MG/DL (ref 70–130)
GLUCOSE BLDC GLUCOMTR-MCNC: 125 MG/DL (ref 70–130)
GLUCOSE BLDC GLUCOMTR-MCNC: 142 MG/DL (ref 70–130)
GLUCOSE BLDC GLUCOMTR-MCNC: 99 MG/DL (ref 70–130)
GLUCOSE SERPL-MCNC: 152 MG/DL (ref 65–99)
GLUCOSE UR STRIP-MCNC: NEGATIVE MG/DL
HCT VFR BLD AUTO: 39.3 % (ref 37.5–51)
HGB BLD-MCNC: 12.7 G/DL (ref 13–17.7)
HGB UR QL STRIP.AUTO: ABNORMAL
HYALINE CASTS UR QL AUTO: ABNORMAL /LPF
IMM GRANULOCYTES # BLD AUTO: 0.07 10*3/MM3 (ref 0–0.05)
IMM GRANULOCYTES NFR BLD AUTO: 0.6 % (ref 0–0.5)
KETONES UR QL STRIP: ABNORMAL
LEUKOCYTE ESTERASE UR QL STRIP.AUTO: ABNORMAL
LYMPHOCYTES # BLD AUTO: 1.81 10*3/MM3 (ref 0.7–3.1)
LYMPHOCYTES NFR BLD AUTO: 15.5 % (ref 19.6–45.3)
MCH RBC QN AUTO: 28.4 PG (ref 26.6–33)
MCHC RBC AUTO-ENTMCNC: 32.3 G/DL (ref 31.5–35.7)
MCV RBC AUTO: 87.9 FL (ref 79–97)
MONOCYTES # BLD AUTO: 1.87 10*3/MM3 (ref 0.1–0.9)
MONOCYTES NFR BLD AUTO: 16 % (ref 5–12)
NEUTROPHILS NFR BLD AUTO: 65 % (ref 42.7–76)
NEUTROPHILS NFR BLD AUTO: 7.6 10*3/MM3 (ref 1.7–7)
NITRITE UR QL STRIP: NEGATIVE
NRBC BLD AUTO-RTO: 0 /100 WBC (ref 0–0.2)
OSMOLALITY UR: 666 MOSM/KG
PH UR STRIP.AUTO: 6 [PH] (ref 5–8)
PLATELET # BLD AUTO: 192 10*3/MM3 (ref 140–450)
PMV BLD AUTO: 10.2 FL (ref 6–12)
POTASSIUM SERPL-SCNC: 3.8 MMOL/L (ref 3.5–5.2)
PROT SERPL-MCNC: 6.8 G/DL (ref 6–8.5)
PROT UR QL STRIP: ABNORMAL
QT INTERVAL: 359 MS
QTC INTERVAL: 445 MS
RBC # BLD AUTO: 4.47 10*6/MM3 (ref 4.14–5.8)
RBC # UR STRIP: ABNORMAL /HPF
REF LAB TEST METHOD: ABNORMAL
SODIUM SERPL-SCNC: 133 MMOL/L (ref 136–145)
SODIUM UR-SCNC: 33 MMOL/L
SP GR UR STRIP: >1.03 (ref 1–1.03)
SQUAMOUS #/AREA URNS HPF: ABNORMAL /HPF
URATE SERPL-MCNC: 3.5 MG/DL (ref 3.4–7)
UROBILINOGEN UR QL STRIP: ABNORMAL
WBC # UR STRIP: ABNORMAL /HPF
WBC NRBC COR # BLD AUTO: 11.69 10*3/MM3 (ref 3.4–10.8)

## 2024-06-30 PROCEDURE — 83935 ASSAY OF URINE OSMOLALITY: CPT | Performed by: HOSPITALIST

## 2024-06-30 PROCEDURE — 63710000001 INSULIN GLARGINE PER 5 UNITS

## 2024-06-30 PROCEDURE — 25010000002 HEPARIN (PORCINE) PER 1000 UNITS: Performed by: NEUROLOGICAL SURGERY

## 2024-06-30 PROCEDURE — 84300 ASSAY OF URINE SODIUM: CPT | Performed by: HOSPITALIST

## 2024-06-30 PROCEDURE — 80053 COMPREHEN METABOLIC PANEL: CPT | Performed by: NURSE PRACTITIONER

## 2024-06-30 PROCEDURE — 84550 ASSAY OF BLOOD/URIC ACID: CPT | Performed by: HOSPITALIST

## 2024-06-30 PROCEDURE — 82948 REAGENT STRIP/BLOOD GLUCOSE: CPT

## 2024-06-30 PROCEDURE — 25010000002 PIPERACILLIN SOD-TAZOBACTAM PER 1 G: Performed by: HOSPITALIST

## 2024-06-30 PROCEDURE — 94799 UNLISTED PULMONARY SVC/PX: CPT

## 2024-06-30 PROCEDURE — 85025 COMPLETE CBC W/AUTO DIFF WBC: CPT | Performed by: HOSPITALIST

## 2024-06-30 PROCEDURE — 94761 N-INVAS EAR/PLS OXIMETRY MLT: CPT

## 2024-06-30 PROCEDURE — 81001 URINALYSIS AUTO W/SCOPE: CPT | Performed by: NURSE PRACTITIONER

## 2024-06-30 PROCEDURE — 94664 DEMO&/EVAL PT USE INHALER: CPT

## 2024-06-30 PROCEDURE — 94760 N-INVAS EAR/PLS OXIMETRY 1: CPT

## 2024-06-30 PROCEDURE — 87086 URINE CULTURE/COLONY COUNT: CPT | Performed by: NURSE PRACTITIONER

## 2024-06-30 RX ORDER — POLYETHYLENE GLYCOL 3350 17 G/17G
17 POWDER, FOR SOLUTION ORAL DAILY
Status: DISCONTINUED | OUTPATIENT
Start: 2024-06-30 | End: 2024-07-02 | Stop reason: HOSPADM

## 2024-06-30 RX ORDER — AMOXICILLIN 250 MG
2 CAPSULE ORAL NIGHTLY
Status: DISCONTINUED | OUTPATIENT
Start: 2024-06-30 | End: 2024-07-02 | Stop reason: HOSPADM

## 2024-06-30 RX ADMIN — ALBUTEROL SULFATE 2.5 MG: 2.5 SOLUTION RESPIRATORY (INHALATION) at 07:13

## 2024-06-30 RX ADMIN — PIPERACILLIN AND TAZOBACTAM 3.38 G: 3; .375 INJECTION, POWDER, FOR SOLUTION INTRAVENOUS at 08:58

## 2024-06-30 RX ADMIN — GABAPENTIN 300 MG: 300 CAPSULE ORAL at 20:49

## 2024-06-30 RX ADMIN — DORZOLAMIDE HYDROCHLORIDE: 20 SOLUTION OPHTHALMIC at 09:02

## 2024-06-30 RX ADMIN — OXYCODONE AND ACETAMINOPHEN 1 TABLET: 7.5; 325 TABLET ORAL at 10:05

## 2024-06-30 RX ADMIN — HEPARIN SODIUM 5000 UNITS: 5000 INJECTION INTRAVENOUS; SUBCUTANEOUS at 20:53

## 2024-06-30 RX ADMIN — CARVEDILOL 12.5 MG: 12.5 TABLET, FILM COATED ORAL at 08:58

## 2024-06-30 RX ADMIN — PIPERACILLIN AND TAZOBACTAM 3.38 G: 3; .375 INJECTION, POWDER, FOR SOLUTION INTRAVENOUS at 04:08

## 2024-06-30 RX ADMIN — OXYCODONE AND ACETAMINOPHEN 1 TABLET: 7.5; 325 TABLET ORAL at 06:00

## 2024-06-30 RX ADMIN — ARIPIPRAZOLE 5 MG: 5 TABLET ORAL at 08:58

## 2024-06-30 RX ADMIN — OXYCODONE AND ACETAMINOPHEN 1 TABLET: 7.5; 325 TABLET ORAL at 13:57

## 2024-06-30 RX ADMIN — HEPARIN SODIUM 5000 UNITS: 5000 INJECTION INTRAVENOUS; SUBCUTANEOUS at 08:58

## 2024-06-30 RX ADMIN — OXYCODONE AND ACETAMINOPHEN 1 TABLET: 7.5; 325 TABLET ORAL at 18:14

## 2024-06-30 RX ADMIN — DORZOLAMIDE HYDROCHLORIDE: 20 SOLUTION OPHTHALMIC at 20:49

## 2024-06-30 RX ADMIN — OXYCODONE AND ACETAMINOPHEN 1 TABLET: 7.5; 325 TABLET ORAL at 02:08

## 2024-06-30 RX ADMIN — PIPERACILLIN AND TAZOBACTAM 3.38 G: 3; .375 INJECTION, POWDER, FOR SOLUTION INTRAVENOUS at 18:14

## 2024-06-30 RX ADMIN — SENNOSIDES AND DOCUSATE SODIUM 2 TABLET: 50; 8.6 TABLET ORAL at 11:25

## 2024-06-30 RX ADMIN — POLYETHYLENE GLYCOL 3350 17 G: 17 POWDER, FOR SOLUTION ORAL at 12:59

## 2024-06-30 RX ADMIN — ALBUTEROL SULFATE 2.5 MG: 2.5 SOLUTION RESPIRATORY (INHALATION) at 11:10

## 2024-06-30 RX ADMIN — ASPIRIN 81 MG: 81 TABLET, COATED ORAL at 08:57

## 2024-06-30 RX ADMIN — ESCITALOPRAM 20 MG: 20 TABLET, FILM COATED ORAL at 08:58

## 2024-06-30 RX ADMIN — METHOCARBAMOL TABLETS 500 MG: 500 TABLET, COATED ORAL at 20:49

## 2024-06-30 RX ADMIN — TERAZOSIN HYDROCHLORIDE 5 MG: 5 CAPSULE ORAL at 20:51

## 2024-06-30 RX ADMIN — OXYCODONE AND ACETAMINOPHEN 1 TABLET: 7.5; 325 TABLET ORAL at 22:27

## 2024-06-30 RX ADMIN — METHOCARBAMOL TABLETS 500 MG: 500 TABLET, COATED ORAL at 08:57

## 2024-06-30 RX ADMIN — LATANOPROST 1 DROP: 50 SOLUTION/ DROPS OPHTHALMIC at 20:50

## 2024-06-30 RX ADMIN — ALBUTEROL SULFATE 2.5 MG: 2.5 SOLUTION RESPIRATORY (INHALATION) at 21:33

## 2024-06-30 RX ADMIN — SENNOSIDES AND DOCUSATE SODIUM 2 TABLET: 50; 8.6 TABLET ORAL at 20:49

## 2024-06-30 RX ADMIN — CARVEDILOL 12.5 MG: 12.5 TABLET, FILM COATED ORAL at 18:15

## 2024-06-30 RX ADMIN — INSULIN GLARGINE 30 UNITS: 100 INJECTION, SOLUTION SUBCUTANEOUS at 08:59

## 2024-06-30 RX ADMIN — PANTOPRAZOLE SODIUM 40 MG: 40 TABLET, DELAYED RELEASE ORAL at 06:00

## 2024-06-30 NOTE — PLAN OF CARE
Problem: Adult Inpatient Plan of Care  Goal: Plan of Care Review  6/30/2024 1829 by Yue De Leon, RN  Outcome: Ongoing, Progressing  Flowsheets (Taken 6/30/2024 1829)  Progress: no change  Plan of Care Reviewed With:   patient   spouse  Outcome Evaluation: pt was able to sit up on the side of the bed with 3 assist. sat there for about 20 minutes before pain. gave percocet q4 hours. pt also recieved roboxin x1. but made patient very groggy. pt still has a beltran catheter. due to try removing tomorrow. MD wanted to wait due to him not ambulating well. pt is going to have a swallow study done tomorrow and qwill possibly go to [facility] rehab after this. IV antibotics given. pt is a Q6 accucheck. dressing CDI. will continue to treat per MD     Problem: Adult Inpatient Plan of Care  Goal: Patient-Specific Goal (Individualized)  6/30/2024 1829 by Yue De Leon, RN  Outcome: Ongoing, Progressing  Flowsheets (Taken 6/30/2024 1829)  Patient-Specific Goals (Include Timeframe): to go to rehab  Individualized Care Needs: goes by [facility], q6 accuchecks, beltran catheter, no BM ( bowel regiment) assist x3

## 2024-06-30 NOTE — PROGRESS NOTES
[facility] CERVICAL PROGRESS NOTE      CC: POD 5, sp C3-C6 laminectomy and posterior fusion       Subjective     Interval History:  Patient with minimal audible wheezes today. He denies cough. Reports feeling improvement with UE strength today     ROS:  Constitutional: No fever, chills  Neck: +neck pain  GI: No nausea, vomiting, no swallow difficulties  Neuro: No numbness, tingling, or weakness,  no balance difficulties  : +F/C    Objective     Vital signs in last 24 hours:  Temp:  [98.1 °F (36.7 °C)-99.3 °F (37.4 °C)] 98.1 °F (36.7 °C)  Heart Rate:  [80-98] 82  Resp:  [13-20] 13  BP: (102-154)/(63-91) 102/63    Intake/Output this shift:  I/O this shift:  In: 100 [IV Piggyback:100]  Out: -     LABS:  Results from last 7 days   Lab Units 06/30/24  0613 06/28/24  0305 06/26/24 0223   WBC 10*3/mm3 11.69* 12.20* 11.42*   HEMOGLOBIN g/dL 12.7* 13.0 15.0   HEMATOCRIT % 39.3 40.1 46.3   PLATELETS 10*3/mm3 192 172 179      Results from last 7 days   Lab Units 06/30/24  0613 06/28/24  0305 06/26/24 0223   SODIUM mmol/L 133* 134* 135*   POTASSIUM mmol/L 3.8 3.8 4.2   CHLORIDE mmol/L 100 102 104   CO2 mmol/L 25.0 24.0 21.0*   BUN mg/dL 14 15 10   CREATININE mg/dL 1.12 1.18 0.85   CALCIUM mg/dL 9.1 8.4* 8.8   BILIRUBIN mg/dL 0.8  --   --    ALK PHOS U/L 92  --   --    ALT (SGPT) U/L 35  --   --    AST (SGOT) U/L 76*  --   --    GLUCOSE mg/dL 152* 121* 123*      6/29/24 HS Troponin T 23^  6/29/24 Blood cultures pending  6/29/24 Strep pneumo Ag negative  6/29/24 Legionella Antigen, urine negative   6/29/24 Respiratory panel negative   6/29/24 MRSA Screen No MRSA detected   6/29/24 Lactate 1.3  6/29/24 Respiratory culture pending   6/28/24 proBNP 308  6/28/24 Procalcitonin 0.19  6/28/24 High sensitivity troponin 24^    IMAGING STUDIES:  CT soft tissue neck with contrast 6/29/24  IMPRESSION:  1. This patient is status post cervical spine surgery on 06/25/2024, 4  days ago and had bilateral laminectomies at C3, C4 and  C5 and placement  of bilateral rods bridging the posterior elements from C3-C6 anchored by  bilateral articular pillar screws at C3, C4, C5 and C6 and there is  postoperative midline soft tissue fluid and air and extends in the  posterior epidural space posterior to the dura from C3-C6 and thin rind  of post operative epidural fluid and air in the right posterior and  posterior central epidural space at the C2 and C2-3 levels that  mild-moderately narrows the canal. There is a band of ossification  posterior longitudinal ligament extending from along the posterior  midline of the inferior body of C4 across the posterior disc space and  along the posterior midline of the body of C5 that prominently indents  the anterior midline of the thecal sac abuts and deforms the ventral  cord. At some point a follow-up MRI of the cervical spine could be  obtained to further evaluate if clinical symptoms warrant.     2. There is mild enlargement of the thyroid gland. There are some  platelike areas of atelectasis scattered in the visualized portion of  the upper lung zones and lung apices bilaterally. The remainder of the  neck CT is essentially within normal limits.     The results were communicated to Jorge Luis Pedersen by telephone on 06/29/2024  at 6:45 p.m.    CXR 6/29/24  Narrative & Impression   XR CHEST PA AND LATERAL-     HISTORY: Cough and wheezing.     COMPARISON: Chest radiograph 6/28/2024     FINDINGS:    6 views of the chest were obtained.  Limited examination due to difficulties with patient positioning and  underlying condition. The cardiac silhouette is normal in size. Linear  perihilar opacities are similar to prior. There are low lung volumes.  There is no definite new focal consolidation. There is no large pleural  effusion. There is gaseous distention of the stomach, which has  progressed. Correlate with abdominal symptoms, and consider dedicated  abdominal imaging. There is partially imaged surgical hardware in  the  visualized lower cervical spine. There are suspected old rib fractures.     This report was finalized on 6/29/2024 6:36 PM by Dr. Roslyn Lopez M.D  on Workstation: [id number]       I personally viewed and interpreted the patient's chart.    Meds reviewed/changed: Yes  Proventil QID  Abilify 5mg daily  Aspirin EC 81mg daily  Coreg 12.5mg BID  Lexapro 20mg daily  Gabapentin 300mg at night   Heparin 5000 units BID  Lantus 30 units daily  Insulin Sliding Scale  Protonix 40mg daily  Zosyn 3.375mg every 8 hours for 7 days-start 6/29/24  Hytrin 5mg at night   Robaxin 500mg every 8 hours prn-1 dose/12 hours  Oxycodone 7.5mg every 4 hours prn-3 doses/12 hours     Physical Exam:    General:   Awake, alert.  Neck:    Posterior cervical incision well approximated, no edema, no erythema  Motor:    BUE 5/5; No fasciculations, rigidity, spasticity, or abnormal movements.  Reflexes:   No Danielle, no clonus  Sensation:   Normal to light touch  Station and Gait:               Per PT note, patient stood 2x with mod-max Ax2. Tolerated standing for several minutes attempting to initiate side steps. Patient unsafe to transfer to chair at this time   Extremities:   SCD in place    Assessment & Plan     ASSESSMENT:      Preop examination    S/P primary angioplasty with coronary stent    Type 2 diabetes mellitus with microalbuminuria, with long-term current use of insulin    Essential hypertension    Stenosis of cervical spine with myelopathy    Pneumonia    Stridor    The patient is POD 5, sp C3-C6 laminectomy and posterior fusion. Yesterday, he had audible wheezing and was reporting a cough. He was also having some left side chest pain/shoulder pain. He does have a history of stents and had been off of his Plavix. An EKG was ordered and showed no significant changes. He has not had anymore complaints of chest pain.    [facility] was consulted yesterday and started Zosyn for probable pneumonia. The legionella, strep pneumonia, MRSA and  "RVP were negative.     The patient was also seen by pulmonology who ordered a CT neck. Dr. Cohen reviewed the CT neck. There is nothing obstructing the airway. There are post operative changes as to be expected.     Today, the patient states that he is feeling slightly better than yesterday. He has very minimal audible wheezing. He denies any cough. He is not having any issues with swallowing .    The goal had been to d/c the beltran catheter today for a voiding trial. Given his decreased mobility, we will leave the FC in today.     PLAN:   Medical management per [facility]  Keep F/C in place  Plan is for discharge to [facility]   Aspirin resumed and Plavix to be resumed July 1 ST eval ordered per pulmonology     I discussed the patient's findings and my recommendations with patient, family, nursing staff, and Dr. Cohen.     During patient visit, I utilized appropriate personal protective equipment including mask and gloves.  Mask used was standard procedure mask. Appropriate PPE was worn during the entire visit.  Hand hygiene was completed before and after.      LOS: 5 days       Kathe Flanagan, APRN  6/30/2024  09:21 EDT    \"Dictated utilizing Dragon dictation\".     "

## 2024-06-30 NOTE — PROGRESS NOTES
[facility]    [phone]   LOS: 5 days     Name: Isaias Monaco  Age/Sex: 69 y.o. male  :  1955        PCP: Gwyn Patten Sr., MD  No chief complaint on file.     Subjective   He is feeling better today.  Feels like he is breathing much better with no wheezing or stridor noted today.  He also feels like his strength is improving.  He is able to hold his arms up over his head and is able to lift his legs off the bed  General: No Fever or Chills, Cardiac: No Chest Pain or Palpitations, Resp: No Cough or SOA, GI: No Nausea, Vomiting, or Diarrhea, and Other: No bleeding    albuterol, 2.5 mg, Nebulization, 4x Daily  ARIPiprazole, 5 mg, Oral, Daily  aspirin, 81 mg, Oral, Daily  carvedilol, 12.5 mg, Oral, BID With Meals  dorzolamide (TRUSOPT) 2 % 1 drop, timolol (TIMOPTIC) 0.5 % 1 drop for Cosopt 22.3-6.8 mg/mL, , Both Eyes, BID  escitalopram, 20 mg, Oral, QAM  gabapentin, 300 mg, Oral, Nightly  heparin (porcine), 5,000 Units, Subcutaneous, Q12H  insulin glargine, 30 Units, Subcutaneous, Daily  insulin regular, 2-7 Units, Subcutaneous, Q6H  latanoprost, 1 drop, Both Eyes, Nightly  pantoprazole, 40 mg, Oral, Q AM  piperacillin-tazobactam, 3.375 g, Intravenous, Q8H  terazosin, 5 mg, Oral, Nightly           Objective   Vital Signs  Temp:  [98.4 °F (36.9 °C)-99.3 °F (37.4 °C)] 99.1 °F (37.3 °C)  Heart Rate:  [84-98] 84  Resp:  [17-20] 17  BP: (116-154)/(70-91) 132/70  Body mass index is 31.5 kg/m².    Intake/Output Summary (Last 24 hours) at 2024 0613  Last data filed at 2024 0430  Gross per 24 hour   Intake 580 ml   Output 2700 ml   Net -2120 ml       Physical Exam  Vitals and nursing note reviewed.   Constitutional:       Appearance: He is ill-appearing.   Cardiovascular:      Rate and Rhythm: Regular rhythm. Tachycardia present.   Pulmonary:      Effort: No respiratory distress.      Breath sounds: Normal breath sounds.   Abdominal:      General: Bowel sounds are normal.       Palpations: Abdomen is soft.   Neurological:      General: No focal deficit present.      Mental Status: He is alert and oriented to person, place, and time. Mental status is at baseline.      Comments: Lower extremities remain weak bilaterally and symmetric upper extremity motion and strength is improved           Results Review:       I reviewed the patient's new clinical results.  Results from last 7 days   Lab Units 06/28/24  0305 06/26/24  0223   WBC 10*3/mm3 12.20* 11.42*   HEMOGLOBIN g/dL 13.0 15.0   PLATELETS 10*3/mm3 172 179     Results from last 7 days   Lab Units 06/30/24  0613 06/28/24  0305 06/26/24  0223   SODIUM mmol/L 133* 134* 135*   POTASSIUM mmol/L 3.8 3.8 4.2   CHLORIDE mmol/L 100 102 104   CO2 mmol/L 25.0 24.0 21.0*   BUN mg/dL 14 15 10   CREATININE mg/dL 1.12 1.18 0.85   CALCIUM mg/dL 9.1 8.4* 8.8   Estimated Creatinine Clearance: 85.2 mL/min (by C-G formula based on SCr of 1.18 mg/dL).      Assessment & Plan   Active Hospital Problems    Diagnosis  POA    **Preop examination [Z01.818]  Not Applicable    Pneumonia [J18.9]  Unknown    Stridor [R06.1]  Unknown    Stenosis of cervical spine with myelopathy [M48.02, G99.2]  Yes    Essential hypertension [I10]  Yes    Type 2 diabetes mellitus with microalbuminuria, with long-term current use of insulin [E11.29, R80.9, Z79.4]  Not Applicable    S/P primary angioplasty with coronary stent [Z95.5]  Not Applicable      Resolved Hospital Problems   No resolved problems to display.       PLAN  This is a 69-year-old gentleman with a history of hypertension type 2 diabetes hyperlipidemia and obesity as well as underlying chronic spinal stenosis and new development of myelopathy who presented for cervical laminectomy and fusion.  He had a rather complicated procedure and postoperative recovery and A was consulted yesterday for chest pain stridor and low sodium levels.  -From a pulmonary standpoint things seem to be improving.  Discussed with Dr. Pedersen  today after his evaluation.  -I agree with speech therapy evaluation to make sure that he has no issues with swallowing.  Recommend elevation of the head of bed is much as tolerated as he probably has some degree of reflux as well  -He is certainly at risk for aspiration pneumonia and did have an elevated white count although this was not checked yesterday during his acute event and a procalcitonin was not checked either.  I do not see any definitive evidence of pneumonia on his chest x-ray from last evening.  He does have low lung volumes  -Will plan to continue the antibiotics for now but will treat just for 4 days with antibiotics to cover for aspiration pneumonia  -Electrolytes are stable today other than his low sodium levels.  Urine sodium uric acid and urine osmolality have been ordered.  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  -Subcu heparin for DVT prophylaxis  -Full code      Disposition  Expected Discharge Date: 7/1/2024; Expected Discharge Time:    Would like to see his video swallow study and labs tomorrow before making decision on fitness for discharge to rehab.    Anton Carvalho MD  [facility]  06/30/24  06:13 EDT

## 2024-06-30 NOTE — PROGRESS NOTES
"  Daily Progress Note.   [address]  6/30/2024    Patient:  Name:  Isaias Monaco  MRN:  [id number]  1955  69 y.o.  male         CC: Concern for stridor    Interval History:    Awake alert patient in family member report no cough.  No stridor anymore.  No respiratory distress no difficulty breathing.  Tolerating a diet no significant swallowing difficulties.    Patient has no audible stridor      Physical Exam:  /63 (BP Location: Right arm, Patient Position: Lying)   Pulse 82   Temp 98.1 °F (36.7 °C) (Oral)   Resp 13   Ht 195.6 cm (77.01\")   Wt 121 kg (265 lb 10.5 oz)   SpO2 98%   BMI 31.50 kg/m²   Body mass index is 31.5 kg/m².    Intake/Output Summary (Last 24 hours) at 6/30/2024 0914  Last data filed at 6/30/2024 0857  Gross per 24 hour   Intake 680 ml   Output 2200 ml   Net -1520 ml     General appearance: Nontoxic, conversant   Eyes: anicteric sclerae, moist conjunctivae; no lidlag;    HENT: Atraumatic; oropharynx clear with moist mucous membranes    Neck: In collar limiting exam  Lungs: CTA, with normal respiratory effort and no intercostal retractions  CV: RRR, no rub   Abdomen: Soft, nontender; BS+  Extremities: No peripheral edema or ext lad  Skin: WWP no diffuse visible rash  Psych: Appropriate affect, alert   Neuro: Normal cognition CN II-XII. Speech intact.    Data Review:  Notable Labs:  Results from last 7 days   Lab Units 06/30/24  0613 06/28/24  0305 06/26/24  0223   WBC 10*3/mm3 11.69* 12.20* 11.42*   HEMOGLOBIN g/dL 12.7* 13.0 15.0   PLATELETS 10*3/mm3 192 172 179     Results from last 7 days   Lab Units 06/30/24  0613 06/28/24  0305 06/26/24  0223   SODIUM mmol/L 133* 134* 135*   POTASSIUM mmol/L 3.8 3.8 4.2   CHLORIDE mmol/L 100 102 104   CO2 mmol/L 25.0 24.0 21.0*   BUN mg/dL 14 15 10   CREATININE mg/dL 1.12 1.18 0.85   GLUCOSE mg/dL 152* 121* 123*   CALCIUM mg/dL 9.1 8.4* 8.8   Estimated Creatinine Clearance: 89.8 mL/min (by C-G formula based on SCr of " 1.12 mg/dL).    Results from last 7 days   Lab Units 06/30/24  0613 06/29/24  1109 06/28/24  0305 06/26/24  0223   AST (SGOT) U/L 76*  --   --   --    ALT (SGPT) U/L 35  --   --   --    PROCALCITONIN ng/mL  --   --  0.19  --    LACTATE mmol/L  --  1.3  --   --    PLATELETS 10*3/mm3 192  --  172 179       Results from last 7 days   Lab Units 06/25/24  2059   PH, ARTERIAL pH units 7.372   PCO2, ARTERIAL mm Hg 35.3   PO2 ART mm Hg 81.4   HCO3 ART mmol/L 20.5*       Imaging:  Reviewed chest images personally from past 3 days    CT spine no airway impingement.  Personally reviewed imaging  Sniff test normal diaphragm movement.         ASSESSMENT  /  PLAN:  History of audible wheezing  Elevated hemidiaphragm  Diabetes  Obesity  Status post cervical spinal surgery    CT scan reviewed no concerns for airway management.  Patient has no stridor or wheeze appreciated on exam or reported by family today.  He has no shortness of breath.  He is nontoxic awake and denies any shortness of breath.  Imaging is revealed no significant abnormality  Speech pathology evaluation has been ordered.  Discussed with the patient's family and patient.  Coordinated care with Dr. Carvalho today.  All issues new to me today.  Prior hospital course, labs and imaging reviewed.            Electronically signed by aDgo Pedersen MD, 06/30/24, 9:14 AM EDT.  [facility], [place]

## 2024-07-01 LAB
ALBUMIN SERPL-MCNC: 2.9 G/DL (ref 3.5–5.2)
ANION GAP SERPL CALCULATED.3IONS-SCNC: 9.3 MMOL/L (ref 5–15)
BASOPHILS # BLD AUTO: 0.05 10*3/MM3 (ref 0–0.2)
BASOPHILS NFR BLD AUTO: 0.5 % (ref 0–1.5)
BUN SERPL-MCNC: 15 MG/DL (ref 8–23)
BUN/CREAT SERPL: 13.3 (ref 7–25)
CALCIUM SPEC-SCNC: 8.8 MG/DL (ref 8.6–10.5)
CHLORIDE SERPL-SCNC: 99 MMOL/L (ref 98–107)
CO2 SERPL-SCNC: 25.7 MMOL/L (ref 22–29)
CREAT SERPL-MCNC: 1.13 MG/DL (ref 0.76–1.27)
DEPRECATED RDW RBC AUTO: 47.3 FL (ref 37–54)
EGFRCR SERPLBLD CKD-EPI 2021: 70.4 ML/MIN/1.73
EOSINOPHIL # BLD AUTO: 0.47 10*3/MM3 (ref 0–0.4)
EOSINOPHIL NFR BLD AUTO: 4.5 % (ref 0.3–6.2)
ERYTHROCYTE [DISTWIDTH] IN BLOOD BY AUTOMATED COUNT: 14.9 % (ref 12.3–15.4)
GLUCOSE BLDC GLUCOMTR-MCNC: 102 MG/DL (ref 70–130)
GLUCOSE BLDC GLUCOMTR-MCNC: 102 MG/DL (ref 70–130)
GLUCOSE BLDC GLUCOMTR-MCNC: 106 MG/DL (ref 70–130)
GLUCOSE BLDC GLUCOMTR-MCNC: 144 MG/DL (ref 70–130)
GLUCOSE SERPL-MCNC: 97 MG/DL (ref 65–99)
HCT VFR BLD AUTO: 36.2 % (ref 37.5–51)
HGB BLD-MCNC: 11.6 G/DL (ref 13–17.7)
IMM GRANULOCYTES # BLD AUTO: 0.1 10*3/MM3 (ref 0–0.05)
IMM GRANULOCYTES NFR BLD AUTO: 1 % (ref 0–0.5)
LYMPHOCYTES # BLD AUTO: 2.57 10*3/MM3 (ref 0.7–3.1)
LYMPHOCYTES NFR BLD AUTO: 24.5 % (ref 19.6–45.3)
MAGNESIUM SERPL-MCNC: 1.9 MG/DL (ref 1.6–2.4)
MCH RBC QN AUTO: 28 PG (ref 26.6–33)
MCHC RBC AUTO-ENTMCNC: 32 G/DL (ref 31.5–35.7)
MCV RBC AUTO: 87.2 FL (ref 79–97)
MONOCYTES # BLD AUTO: 1.52 10*3/MM3 (ref 0.1–0.9)
MONOCYTES NFR BLD AUTO: 14.5 % (ref 5–12)
NEUTROPHILS NFR BLD AUTO: 5.79 10*3/MM3 (ref 1.7–7)
NEUTROPHILS NFR BLD AUTO: 55 % (ref 42.7–76)
NRBC BLD AUTO-RTO: 0 /100 WBC (ref 0–0.2)
PHOSPHATE SERPL-MCNC: 3.2 MG/DL (ref 2.5–4.5)
PLATELET # BLD AUTO: 197 10*3/MM3 (ref 140–450)
PMV BLD AUTO: 11.1 FL (ref 6–12)
POTASSIUM SERPL-SCNC: 3.5 MMOL/L (ref 3.5–5.2)
RBC # BLD AUTO: 4.15 10*6/MM3 (ref 4.14–5.8)
SODIUM SERPL-SCNC: 134 MMOL/L (ref 136–145)
WBC NRBC COR # BLD AUTO: 10.5 10*3/MM3 (ref 3.4–10.8)

## 2024-07-01 PROCEDURE — 94760 N-INVAS EAR/PLS OXIMETRY 1: CPT

## 2024-07-01 PROCEDURE — 80069 RENAL FUNCTION PANEL: CPT | Performed by: HOSPITALIST

## 2024-07-01 PROCEDURE — 25010000002 PIPERACILLIN SOD-TAZOBACTAM PER 1 G: Performed by: HOSPITALIST

## 2024-07-01 PROCEDURE — 83735 ASSAY OF MAGNESIUM: CPT | Performed by: HOSPITALIST

## 2024-07-01 PROCEDURE — 94664 DEMO&/EVAL PT USE INHALER: CPT

## 2024-07-01 PROCEDURE — 94799 UNLISTED PULMONARY SVC/PX: CPT

## 2024-07-01 PROCEDURE — 94761 N-INVAS EAR/PLS OXIMETRY MLT: CPT

## 2024-07-01 PROCEDURE — 82948 REAGENT STRIP/BLOOD GLUCOSE: CPT

## 2024-07-01 PROCEDURE — 85025 COMPLETE CBC W/AUTO DIFF WBC: CPT | Performed by: HOSPITALIST

## 2024-07-01 PROCEDURE — 25010000002 HEPARIN (PORCINE) PER 1000 UNITS: Performed by: NEUROLOGICAL SURGERY

## 2024-07-01 PROCEDURE — 97530 THERAPEUTIC ACTIVITIES: CPT

## 2024-07-01 PROCEDURE — 63710000001 INSULIN GLARGINE PER 5 UNITS

## 2024-07-01 RX ORDER — SILDENAFIL 100 MG/1
100 TABLET, FILM COATED ORAL DAILY PRN
Start: 2024-07-01

## 2024-07-01 RX ORDER — POLYETHYLENE GLYCOL 3350 17 G/17G
17 POWDER, FOR SOLUTION ORAL DAILY PRN
Start: 2024-07-01

## 2024-07-01 RX ORDER — CLONAZEPAM 0.5 MG/1
0.5 TABLET ORAL DAILY PRN
Status: DISCONTINUED | OUTPATIENT
Start: 2024-07-01 | End: 2024-07-02 | Stop reason: HOSPADM

## 2024-07-01 RX ORDER — POLYETHYLENE GLYCOL 3350 17 G/17G
17 POWDER, FOR SOLUTION ORAL DAILY
Start: 2024-07-02

## 2024-07-01 RX ORDER — METHOCARBAMOL 500 MG/1
500 TABLET, FILM COATED ORAL AS NEEDED
Start: 2024-07-01

## 2024-07-01 RX ORDER — DORZOLAMIDE HYDROCHLORIDE AND TIMOLOL MALEATE 20; 5 MG/ML; MG/ML
1 SOLUTION/ DROPS OPHTHALMIC 2 TIMES DAILY
Start: 2024-07-01

## 2024-07-01 RX ORDER — BISACODYL 5 MG/1
5 TABLET, DELAYED RELEASE ORAL DAILY PRN
Start: 2024-07-01

## 2024-07-01 RX ORDER — AMOXICILLIN 250 MG
2 CAPSULE ORAL 2 TIMES DAILY PRN
Start: 2024-07-01

## 2024-07-01 RX ORDER — LATANOPROST 50 UG/ML
1 SOLUTION/ DROPS OPHTHALMIC NIGHTLY
Start: 2024-07-01

## 2024-07-01 RX ORDER — AMOXICILLIN 250 MG
2 CAPSULE ORAL NIGHTLY
Start: 2024-07-01

## 2024-07-01 RX ORDER — BISACODYL 10 MG
10 SUPPOSITORY, RECTAL RECTAL DAILY PRN
Start: 2024-07-01

## 2024-07-01 RX ADMIN — TERAZOSIN HYDROCHLORIDE 5 MG: 5 CAPSULE ORAL at 21:26

## 2024-07-01 RX ADMIN — LATANOPROST 1 DROP: 50 SOLUTION/ DROPS OPHTHALMIC at 21:18

## 2024-07-01 RX ADMIN — CLONAZEPAM 0.5 MG: 0.5 TABLET ORAL at 14:58

## 2024-07-01 RX ADMIN — METHOCARBAMOL TABLETS 500 MG: 500 TABLET, COATED ORAL at 21:17

## 2024-07-01 RX ADMIN — SENNOSIDES AND DOCUSATE SODIUM 2 TABLET: 50; 8.6 TABLET ORAL at 10:48

## 2024-07-01 RX ADMIN — OXYCODONE AND ACETAMINOPHEN 1 TABLET: 7.5; 325 TABLET ORAL at 18:54

## 2024-07-01 RX ADMIN — PANTOPRAZOLE SODIUM 40 MG: 40 TABLET, DELAYED RELEASE ORAL at 06:44

## 2024-07-01 RX ADMIN — POLYETHYLENE GLYCOL 3350 17 G: 17 POWDER, FOR SOLUTION ORAL at 08:56

## 2024-07-01 RX ADMIN — PIPERACILLIN AND TAZOBACTAM 3.38 G: 3; .375 INJECTION, POWDER, FOR SOLUTION INTRAVENOUS at 01:59

## 2024-07-01 RX ADMIN — ASPIRIN 81 MG: 81 TABLET, COATED ORAL at 08:54

## 2024-07-01 RX ADMIN — ALBUTEROL SULFATE 2.5 MG: 2.5 SOLUTION RESPIRATORY (INHALATION) at 07:56

## 2024-07-01 RX ADMIN — PIPERACILLIN AND TAZOBACTAM 3.38 G: 3; .375 INJECTION, POWDER, FOR SOLUTION INTRAVENOUS at 18:07

## 2024-07-01 RX ADMIN — CARVEDILOL 12.5 MG: 12.5 TABLET, FILM COATED ORAL at 08:54

## 2024-07-01 RX ADMIN — OXYCODONE AND ACETAMINOPHEN 1 TABLET: 7.5; 325 TABLET ORAL at 14:58

## 2024-07-01 RX ADMIN — OXYCODONE AND ACETAMINOPHEN 1 TABLET: 7.5; 325 TABLET ORAL at 02:03

## 2024-07-01 RX ADMIN — GABAPENTIN 300 MG: 300 CAPSULE ORAL at 21:17

## 2024-07-01 RX ADMIN — OXYCODONE AND ACETAMINOPHEN 1 TABLET: 7.5; 325 TABLET ORAL at 10:47

## 2024-07-01 RX ADMIN — INSULIN GLARGINE 30 UNITS: 100 INJECTION, SOLUTION SUBCUTANEOUS at 08:55

## 2024-07-01 RX ADMIN — OXYCODONE AND ACETAMINOPHEN 1 TABLET: 7.5; 325 TABLET ORAL at 06:44

## 2024-07-01 RX ADMIN — HEPARIN SODIUM 5000 UNITS: 5000 INJECTION INTRAVENOUS; SUBCUTANEOUS at 08:57

## 2024-07-01 RX ADMIN — ALBUTEROL SULFATE 2.5 MG: 2.5 SOLUTION RESPIRATORY (INHALATION) at 15:59

## 2024-07-01 RX ADMIN — DORZOLAMIDE HYDROCHLORIDE: 20 SOLUTION OPHTHALMIC at 08:55

## 2024-07-01 RX ADMIN — DORZOLAMIDE HYDROCHLORIDE: 20 SOLUTION OPHTHALMIC at 21:18

## 2024-07-01 RX ADMIN — ALBUTEROL SULFATE 2.5 MG: 2.5 SOLUTION RESPIRATORY (INHALATION) at 22:44

## 2024-07-01 RX ADMIN — CARVEDILOL 12.5 MG: 12.5 TABLET, FILM COATED ORAL at 18:07

## 2024-07-01 RX ADMIN — PIPERACILLIN AND TAZOBACTAM 3.38 G: 3; .375 INJECTION, POWDER, FOR SOLUTION INTRAVENOUS at 08:56

## 2024-07-01 RX ADMIN — ESCITALOPRAM 20 MG: 20 TABLET, FILM COATED ORAL at 06:44

## 2024-07-01 RX ADMIN — HEPARIN SODIUM 5000 UNITS: 5000 INJECTION INTRAVENOUS; SUBCUTANEOUS at 21:17

## 2024-07-01 RX ADMIN — ALBUTEROL SULFATE 2.5 MG: 2.5 SOLUTION RESPIRATORY (INHALATION) at 11:36

## 2024-07-01 RX ADMIN — METHOCARBAMOL TABLETS 500 MG: 500 TABLET, COATED ORAL at 09:58

## 2024-07-01 RX ADMIN — SENNOSIDES AND DOCUSATE SODIUM 2 TABLET: 50; 8.6 TABLET ORAL at 21:17

## 2024-07-01 RX ADMIN — ARIPIPRAZOLE 5 MG: 5 TABLET ORAL at 09:58

## 2024-07-01 NOTE — CASE MANAGEMENT/SOCIAL WORK
Continued Stay Note  [facility]     Patient Name: Isaias Monaco  MRN: [id number]  Today's Date: 7/1/2024    Admit Date: 6/25/2024    Plan: Plans dc to [facility].  [facility] arranged for pickup 7/2/24 @ 1500. Packet in office.   Discharge Plan       Row Name 07/01/24 1707       Plan    Plan Plans dc to [facility].  [facility] arranged for pickup 7/2/24 @ 1500. Packet in office.    Patient/Family in Agreement with Plan yes    Plan Comments Received call from [facility] stating bed unavailable today and able to accept tomorrow after 1500. Spoke with patient and wife at bedside to update regarding bed availability. [facility] arranged for pickup 7/2/24 @ 1500. No additional needs noted. Continue to follow......Emerald Delgado RN, CCP                   Discharge Codes    No documentation.                 Expected Discharge Date and Time       Expected Discharge Date Expected Discharge Time    Jul 2, 2024  3:00 PM              Emerald Delgado RN

## 2024-07-01 NOTE — THERAPY TREATMENT NOTE
Patient Name: Isaias Monaco  : 1955    MRN: [id number]                              Today's Date: 2024       Admit Date: 2024    Visit Dx: No diagnosis found.  Patient Active Problem List   Diagnosis    Microalbuminuria    Vitamin D deficiency    Angioedema    Coronary artery disease involving native coronary artery of native heart without angina pectoris    Anxiety    ED (erectile dysfunction)    Hyperlipidemia LDL goal <70    Hypogonadism in male    S/P primary angioplasty with coronary stent    Osteoarthritis of knee    Hypertensive kidney disease with stage 3a chronic kidney disease    Anemia, posthemorrhagic, acute    Dyspnea on exertion    Gastro-esophageal reflux disease with esophagitis    Tubular adenoma of colon    Nocturia associated with benign prostatic hyperplasia    Iron deficiency anemia    Adverse effect of iron and its compounds, sequela    Facial twitching    Blepharospasm    Type 2 diabetes mellitus with microalbuminuria, with long-term current use of insulin    Panic disorder    Tic disorder, unspecified    Spinal stenosis of lumbar region without neurogenic claudication    Bilateral myopia    Combined forms of age-related cataract, bilateral    Presbyopia    Primary open-angle glaucoma, bilateral, severe stage    Lumbar facet arthropathy    Spondylosis of lumbar region without myelopathy or radiculopathy    Calcific coronary arteriosclerosis    Essential hypertension    Degeneration of lumbar intervertebral disc    Dystonia    Lumbosacral spondylosis without myelopathy    Medicare annual wellness visit, subsequent    Coronary stent restenosis    Stenosis of cervical spine with myelopathy    Preop examination    Pneumonia    Stridor     Past Medical History:   Diagnosis Date    Anemia     post hemorrhagic    Angioedema 2016    Secondary to ACE inhibitor    Anxiety     Arthritis     CAD (coronary artery disease)     Colon polyps     Diabetes mellitus, type 2     GERD  (gastroesophageal reflux disease)     Glaucoma     Hematoma     history    High cholesterol     History of foreign travel 12/2017; 08/2018    [place], [place], [place], [place], [place] and [place]; [place]    Hyperlipidemia     Hypertension     Hypogonadism in male 09/28/2016    Male erectile disorder     Microalbuminuria     Osteoarthritis     knee    Spinal stenosis of lumbar region without neurogenic claudication 06/02/2021    Tubular adenoma of colon 11/01/2017    Vitamin D deficiency      Past Surgical History:   Procedure Laterality Date    CARDIAC CATHETERIZATION N/A 05/15/2006    Dr. Mini Camarillo    CARDIAC CATHETERIZATION N/A 03/10/2020    Procedure: Left Heart Cath;  Surgeon: Miguel Ángel Karimi MD;  Location:  [facility];  Service: Cardiology;  Laterality: N/A;    CARDIAC CATHETERIZATION N/A 03/10/2020    Procedure: Stent DAVONTE coronary;  Surgeon: Miguel Ángel aKrimi MD;  Location:  [facility];  Service: Cardiology;  Laterality: N/A;    CARDIAC CATHETERIZATION N/A 03/10/2020    Procedure: Coronary angiography;  Surgeon: Miguel Ángel Karimi MD;  Location:  [facility];  Service: Cardiology;  Laterality: N/A;    CARDIAC CATHETERIZATION N/A 03/10/2020    Procedure: Left ventriculography;  Surgeon: Miguel Ángel Karimi MD;  Location:  [facility];  Service: Cardiology;  Laterality: N/A;    CARDIAC CATHETERIZATION N/A 05/20/2022    Procedure: Left Heart Cath;  Surgeon: Mackenzie Morales MD;  Location:  [facility];  Service: Cardiovascular;  Laterality: N/A;    CARDIAC CATHETERIZATION N/A 05/20/2022    Procedure: Coronary angiography;  Surgeon: Mackenzie Morales MD;  Location:  [facility];  Service: Cardiovascular;  Laterality: N/A;    CARDIAC CATHETERIZATION N/A 05/20/2022    Procedure: Percutaneous Coronary Intervention;  Surgeon: Mackenzie Morales MD;  Location:  [facility];  Service: Cardiovascular;  Laterality: N/A;    CARDIAC CATHETERIZATION N/A 05/24/2022    Procedure: Percutaneous Coronary Intervention;  Surgeon: Miguel Ángel Kraimi MD;  Location:  [facility];  Service: Cardiovascular;  Laterality: N/A;  LAD and Cx    CARDIAC CATHETERIZATION N/A 05/24/2022    Procedure: Stent DAVONTE coronary;  Surgeon: Miguel Ángel Karimi MD;  Location:  [facility];  Service: Cardiovascular;  Laterality: N/A;    CARDIAC CATHETERIZATION N/A 05/24/2022    Procedure: Resting Full Cycle Ratio;  Surgeon: Miguel Ángel Karimi MD;  Location:  [facility];  Service: Cardiovascular;  Laterality: N/A;    CARDIAC CATHETERIZATION N/A 03/19/2024    Procedure: Left Heart Cath;  Surgeon: Miguel Ángel Karimi MD;  Location:  [facility];  Service: Cardiovascular;  Laterality: N/A;    CARDIAC CATHETERIZATION N/A 03/19/2024    Procedure: Percutaneous Coronary Intervention;  Surgeon: Miguel Ángel Karimi MD;  Location:  [facility];  Service: Cardiovascular;  Laterality: N/A;    CARDIAC CATHETERIZATION N/A 03/19/2024    Procedure: Stent DAVONTE coronary;  Surgeon: Miguel Ángel Karimi MD;  Location:  [facility];  Service: Cardiovascular;  Laterality: N/A;    CERVICAL DISCECTOMY POSTERIOR FUSION WITH INSTRUMENTATION Bilateral 6/25/2024    Procedure: Cervical 3 to cervical 6 laminectomies and posterior spinal fusion - Bilateral;  Surgeon: Marshal Miguel MD;  Location: [facility] MAIN OR;  Service: Neurosurgery;  Laterality: Bilateral;    COLONOSCOPY N/A 02/22/2006    Bilobed polyp at 30 cm, hemorrhoids-Dr. Ilya Zhao    COLONOSCOPY N/A 02/28/2014    Normal ileum, one 6 mm polyp in the mid transverse colon-Dr. Ilya Zhao    COLONOSCOPY N/A 11/19/2008    Ela ileum, two 3 to 4 mm polyps, non-bleeding internal hemorrhoids, repeat in 5 years-Dr. Ilya Zhao    COLONOSCOPY N/A 10/31/2017    Procedure:  COLONOSCOPY WITH POLYPECTOMY (COLD BIOPSY);  Surgeon: Ilya Zhao MD;  Location: [facility] ENDOSCOPY;  Service:     COLONOSCOPY N/A 05/21/2021    Procedure: Colonoscopy into cecum and terminal ileum with cold biopsy polypectomy;  Surgeon: Ilya Zhao MD;  Location: [facility] ENDOSCOPY;  Service: Gastroenterology;  Laterality: N/A;  Pre op: History of Polyps  Post op: Polyp    CORONARY ANGIOPLASTY WITH STENT PLACEMENT  2007, 2012, 2015    cardiac stents x3 occasions    ENDOSCOPY N/A 10/04/2017    Procedure: ESOPHAGOGASTRODUODENOSCOPY;  Surgeon: Boyd Guidry MD;  Location: [facility] ENDOSCOPY;  Service:     ENDOSCOPY N/A 05/21/2021    Procedure: ESOPHAGOGASTRODUODENOSCOPY with biopsies;  Surgeon: Ilya Zhao MD;  Location: [facility] ENDOSCOPY;  Service: Gastroenterology;  Laterality: N/A;  Pre op: GERD  Post op: Irregular  Z-Line, Hiatal Hernia, Gastritis    ENDOSCOPY N/A 08/25/2023    Procedure: ESOPHAGOGASTRODUODENOSCOPY with biopsy;  Surgeon: Ilya Zhao MD;  Location: [facility] ENDOSCOPY;  Service: Gastroenterology;  Laterality: N/A;  errosive gastritis    EPIDURAL BLOCK      INTERVENTIONAL RADIOLOGY PROCEDURE N/A 05/20/2022    Procedure: Intravascular Ultrasound;  Surgeon: Mackenzie Morales MD;  Location: [facility] CATH INVASIVE LOCATION;  Service: Cardiovascular;  Laterality: N/A;    KNEE INCISION AND DRAINAGE Right 06/20/2017    Procedure: RT. KNEE WASHOUT ;  Surgeon: Boyd Coyne MD;  Location: [facility] MAIN OR;  Service:     MEDIAL BRANCH BLOCK Bilateral 12/17/2021    Procedure: LUMBAR MEDIAL BRANCH BLOCK bilateral ~L4-S1 x2 (~2 weeks apart);  Surgeon: Christina Villaseñor MD;  Location: [facility];  Service: Pain Management;  Laterality: Bilateral;    MEDIAL BRANCH BLOCK Bilateral 01/03/2022    Procedure: LUMBAR MEDIAL BRANCH BLOCK bilateral ~L4-S1 x2 (~2 weeks apart);  Surgeon: Christina Villaseñor MD;  Location: [facility];  Service: Pain Management;  Laterality: Bilateral;    WI ARTHRP KNE CONDYLE&PLATU  MEDIAL&LAT COMPARTMENTS Right 06/15/2017    Procedure: RT TOTAL KNEE ARTHROPLASTY;  Surgeon: Boyd Coyne MD;  Location: [facility] MAIN OR;  Service: Orthopedics    RADIOFREQUENCY ABLATION Bilateral 01/10/2022    Procedure: RADIOFREQUENCY ABLATION NERVES Bilateral L4-S1;  Surgeon: Christina Villaseñor MD;  Location: [facility] MAIN OR;  Service: Pain Management;  Laterality: Bilateral;    SHOULDER SURGERY Right 2016    rotator cuff repair    UPPER GASTROINTESTINAL ENDOSCOPY N/A 10/13/2015    Z-line irregular, normal stomach, normal duodenum-Dr. Ilya Zhao    UPPER GASTROINTESTINAL ENDOSCOPY N/A 02/28/2014    Z-line irregular, normal stomach, normal duodenum-Dr. Ilya Zhao    UPPER GASTROINTESTINAL ENDOSCOPY N/A 11/19/2008    Z-line irregular, chronic gastritis withotu hemorrhage, normal duodenum-Dr. Ilya Zhao    UPPER GASTROINTESTINAL ENDOSCOPY N/A 06/22/2006    LA Grade A reflux esophagitis, non-bleeding erythematous gastropathy, normal duodenum-Dr. Ilya Zhao      General Information       Row Name 07/01/24 1340          Physical Therapy Time and Intention    Document Type therapy note (daily note)  -ZB     Mode of Treatment individual therapy;physical therapy  -ZB       Row Name 07/01/24 1340          General Information    Patient Profile Reviewed yes  -ZB     Existing Precautions/Restrictions fall;cervical collar;spinal  -ZB       Row Name 07/01/24 1340          Cognition    Orientation Status (Cognition) oriented x 3  -ZB       Row Name 07/01/24 1340          Safety Issues, Functional Mobility    Impairments Affecting Function (Mobility) endurance/activity tolerance;strength;motor control;motor planning;pain;grasp;balance;range of motion (ROM);coordination;cognition  -ZB     Comment, Safety Issues/Impairments (Mobility) cervical collar gail prior to performing bed mobility; gait belt and non skid socks donned  -ZB               User Key  (r) = Recorded By, (t) = Taken By, (c) = Cosigned By      Initials Name  Provider Type    Clifton Pennington PT Physical Therapist                   Mobility       Row Name 07/01/24 1341          Bed Mobility    Bed Mobility supine-sit  -ZB     Supine-Sit [place] (Bed Mobility) moderate assist (50% patient effort);2 person assist;verbal cues;nonverbal cues (demo/gesture)  -ZB     Comment, (Bed Mobility) [facility] to end the visit  -ZB       Row Name 07/01/24 1341          Bed-Chair Transfer    Bed-Chair [place] (Transfers) moderate assist (50% patient effort);2 person assist;verbal cues;nonverbal cues (demo/gesture)  -ZB     Assistive Device (Bed-Chair Transfers) walker, front-wheeled  -ZB       Row Name 07/01/24 1341          Sit-Stand Transfer    Sit-Stand [place] (Transfers) moderate assist (50% patient effort);2 person assist;verbal cues;nonverbal cues (demo/gesture)  -ZB     Assistive Device (Sit-Stand Transfers) walker, front-wheeled  -ZB       Row Name 07/01/24 1341          Gait/Stairs (Locomotion)    [place] Level (Gait) minimum assist (75% patient effort);2 person assist;verbal cues;nonverbal cues (demo/gesture)  -ZB     Assistive Device (Gait) walker, front-wheeled  -ZB     Patient was able to Ambulate yes  -ZB     Distance in Feet (Gait) 6  -ZB     Deviations/Abnormal Patterns (Gait) bashir decreased;festinating/shuffling;gait speed decreased;stride length decreased;weight shifting decreased  -ZB     Bilateral Gait Deviations heel strike decreased  -ZB     [place] Level (Stairs) not tested  -ZB     Comment, (Gait/Stairs) patient able to take multiple steps fwd/retro this date with no manual assistance and only minAx2 for trunk control/AD management  -ZB               User Key  (r) = Recorded By, (t) = Taken By, (c) = Cosigned By      Initials Name Provider Type    Clifton Pennington PT Physical Therapist                   Obj/Interventions       Row Name 07/01/24 1346          Balance    Balance Assessment sitting static balance;sitting dynamic  balance;standing static balance;standing dynamic balance  -ZB     Static Sitting Balance contact guard  -ZB     Dynamic Sitting Balance minimal assist  -ZB     Position, Sitting Balance sitting edge of bed  -ZB     Static Standing Balance minimal assist;2-person assist;verbal cues;non-verbal cues (demo/gesture)  -ZB     Dynamic Standing Balance minimal assist;2-person assist;non-verbal cues (demo/gesture);verbal cues  -ZB     Position/Device Used, Standing Balance supported;walker, front-wheeled  -ZB     Balance Interventions sitting;standing;sit to stand;supported;static;dynamic  -ZB               User Key  (r) = Recorded By, (t) = Taken By, (c) = Cosigned By      Initials Name Provider Type    ZB Clifton Alexander, PT Physical Therapist                   Goals/Plan    No documentation.                  Clinical Impression       Row Name 07/01/24 1346          Pain    Pretreatment Pain Rating 9/10  -ZB     Posttreatment Pain Rating 9/10  -ZB     Pain Location - neck  -ZB     Pain Intervention(s) Ambulation/increased activity;Repositioned;Rest  -ZB       Row Name 07/01/24 1346          Plan of Care Review    Plan of Care Reviewed With patient;spouse;son  -ZB     Progress improving  -ZB     Outcome Evaluation Patient in bed and agreeable to therapy on arrival this PM. Cervical collar donned prior to attempting any mobility. Patient performed bed mobility modAx2, sat EOB with Apolinar, performed STS from partially elevated HOB with modAx2, and ambulated multiple steps forward/retro with minAx2 + rwx. Patient up in chair to end visit with family present in the room. PT will continue to monitor and progress as tolerated.  -ZB       Row Name 07/01/24 1346          Positioning and Restraints    Pre-Treatment Position in bed  -ZB     Post Treatment Position chair  -ZB     In Chair notified nsg;reclined;call light within reach;encouraged to call for assist;exit alarm on;with family/caregiver  -ZB               User Key  (r) =  Recorded By, (t) = Taken By, (c) = Cosigned By      Initials Name Provider Type    Clifton Pennington, LEIDY Physical Therapist                   Outcome Measures       Row Name 07/01/24 1349 07/01/24 0856       How much help from another person do you currently need...    Turning from your back to your side while in flat bed without using bedrails? 3  -ZB 2  -GILDARDO    Moving from lying on back to sitting on the side of a flat bed without bedrails? 2  -ZB 2  -GILDARDO    Moving to and from a bed to a chair (including a wheelchair)? 2  -ZB 2  -GILDARDO    Standing up from a chair using your arms (e.g., wheelchair, bedside chair)? 2  -ZB 2  -GILDARDO    Climbing 3-5 steps with a railing? 2  -ZB 2  -GILDARDO    To walk in hospital room? 2  -ZB 2  -GILDARDO    AM-PAC 6 Clicks Score (PT) 13  -ZB 12  -GILDARDO    Highest Level of Mobility Goal 4 --> Transfer to chair/commode  -ZB 4 --> Transfer to chair/commode  -GILDARDO      Row Name 07/01/24 1349          Functional Assessment    Outcome Measure Options AM-PAC 6 Clicks Basic Mobility (PT)  -ZB               User Key  (r) = Recorded By, (t) = Taken By, (c) = Cosigned By      Initials Name Provider Type    Saw Huffman, RN Registered Nurse    Clifton Pennington, LEIDY Physical Therapist                                 Physical Therapy Education       Title: PT OT SLP Therapies (Done)       Topic: Physical Therapy (Done)       Point: Mobility training (Done)       Learning Progress Summary             Patient Acceptance, E,D,TB, VU,NR by  at 6/29/2024 1343    Acceptance, E,TB, VU,DU,NR by  at 6/28/2024 1122    Acceptance, E,TB, VU,DU,NR by  at 6/27/2024 1018    Acceptance, E,D, DU by  at 6/26/2024 1327   Family Acceptance, E,TB, VU,DU,NR by CS at 6/28/2024 1122    Acceptance, E,TB, VU,DU,NR by  at 6/27/2024 1018                         Point: Home exercise program (Done)       Learning Progress Summary             Patient Acceptance, E,D,TB, VU,NR by  at 6/29/2024 1343    Acceptance, E,TB, VU,DU,NR by CS at  6/28/2024 1122    Acceptance, E,TB, VU,DU,NR by CS at 6/27/2024 1018    Acceptance, E,D, DU by PC at 6/26/2024 1327   Family Acceptance, E,TB, VU,DU,NR by CS at 6/28/2024 1122    Acceptance, E,TB, VU,DU,NR by CS at 6/27/2024 1018                         Point: Body mechanics (Done)       Learning Progress Summary             Patient Acceptance, E,D,TB, VU,NR by  at 6/29/2024 1343    Acceptance, E,TB, VU,DU,NR by CS at 6/28/2024 1122    Acceptance, E,TB, VU,DU,NR by CS at 6/27/2024 1018    Acceptance, E,D, DU by PC at 6/26/2024 1327   Family Acceptance, E,TB, VU,DU,NR by CS at 6/28/2024 1122    Acceptance, E,TB, VU,DU,NR by CS at 6/27/2024 1018                         Point: Precautions (Done)       Learning Progress Summary             Patient Acceptance, E,D,TB, VU,NR by  at 6/29/2024 1343    Acceptance, E,TB, VU,DU,NR by CS at 6/28/2024 1122    Acceptance, E,TB, VU,DU,NR by  at 6/27/2024 1018    Acceptance, E,D, DU by  at 6/26/2024 1327   Family Acceptance, E,TB, VU,DU,NR by CS at 6/28/2024 1122    Acceptance, E,TB, VU,DU,NR by  at 6/27/2024 1018                                         User Key       Initials Effective Dates Name Provider Type Discipline     06/16/21 -  Nisha Rivas, PT Physical Therapist PT     04/08/22 -  Cynthia Mandujano, PT Physical Therapist PT     09/22/22 -  Ana Coello, LEIDY Physical Therapist PT                  PT Recommendation and Plan     Plan of Care Reviewed With: patient, spouse, son  Progress: improving  Outcome Evaluation: Patient in bed and agreeable to therapy on arrival this PM. Cervical collar donned prior to attempting any mobility. Patient performed bed mobility modAx2, sat EOB with Apolinar, performed STS from partially elevated HOB with modAx2, and ambulated multiple steps forward/retro with minAx2 + rwx. Patient up in chair to end visit with family present in the room. PT will continue to monitor and progress as tolerated.     Time Calculation:          PT Charges       Row Name 07/01/24 1350             Time Calculation    Start Time 1318  -ZB      Stop Time 1336  -ZB      Time Calculation (min) 18 min  -ZB      PT Received On 07/01/24  -ZB      PT - Next Appointment 07/02/24  -ZB         Time Calculation- PT    Total Timed Code Minutes- PT 18 minute(s)  -ZB         Timed Charges    46815 - PT Therapeutic Activity Minutes 18  -ZB         Total Minutes    Timed Charges Total Minutes 18  -ZB       Total Minutes 18  -ZB                User Key  (r) = Recorded By, (t) = Taken By, (c) = Cosigned By      Initials Name Provider Type    CARYB Clifton Alexander, PT Physical Therapist                  Therapy Charges for Today       Code Description Service Date Service Provider Modifiers Qty    72408898651 HC PT THERAPEUTIC ACT EA 15 MIN 7/1/2024 Clifton Alexander, PT GP 1            PT G-Codes  Outcome Measure Options: AM-PAC 6 Clicks Basic Mobility (PT)  AM-PAC 6 Clicks Score (PT): 13  AM-PAC 6 Clicks Score (OT): 11  Modified Elver Scale: 4 - Moderately severe disability.  Unable to walk without assistance, and unable to attend to own bodily needs without assistance.       Cathy Alexander PT  7/1/2024

## 2024-07-01 NOTE — PROGRESS NOTES
Dedicated to Hospital Care    [phone]   LOS: 6 days     Name: Isaias Monaco  Age/Sex: 69 y.o. male  :  1955        PCP: Gwyn Patten Sr., MD  No chief complaint on file.     Subjective   Still with a little stridor this morning but otherwise feels he is doing much better.  Breathing is improved he is reaching higher volumes on his incentive spirometer and overall improving.  Still is yet to have a bowel movement following his procedure.  He is moving his legs even more today and was able to sit up on the edge of the bed for 3 to 4 minutes yesterday.  He has not worked with therapy yet today.  He would like his Eckert catheter removed and would like to get up and move today.  Awaiting speech therapy evaluation they are hopeful he can discharge today.  General: No Fever or Chills, Cardiac: No Chest Pain or Palpitations, Resp: No Cough or SOA, GI: No Nausea, Vomiting, or Diarrhea, and Other: No bleeding    albuterol, 2.5 mg, Nebulization, 4x Daily  ARIPiprazole, 5 mg, Oral, Daily  aspirin, 81 mg, Oral, Daily  carvedilol, 12.5 mg, Oral, BID With Meals  dorzolamide (TRUSOPT) 2 % 1 drop, timolol (TIMOPTIC) 0.5 % 1 drop for Cosopt 22.3-6.8 mg/mL, , Both Eyes, BID  escitalopram, 20 mg, Oral, QAM  gabapentin, 300 mg, Oral, Nightly  heparin (porcine), 5,000 Units, Subcutaneous, Q12H  insulin glargine, 30 Units, Subcutaneous, Daily  insulin regular, 2-7 Units, Subcutaneous, Q6H  latanoprost, 1 drop, Both Eyes, Nightly  pantoprazole, 40 mg, Oral, Q AM  piperacillin-tazobactam, 3.375 g, Intravenous, Q8H  polyethylene glycol, 17 g, Oral, Daily  senna-docusate sodium, 2 tablet, Oral, Nightly  terazosin, 5 mg, Oral, Nightly           Objective   Vital Signs  Temp:  [97.3 °F (36.3 °C)-99.1 °F (37.3 °C)] 97.3 °F (36.3 °C)  Heart Rate:  [72-87] 72  Resp:  [14-22] 16  BP: (107-158)/(64-92) 133/79  Body mass index is 31.31 kg/m².    Intake/Output Summary (Last 24 hours) at 2024 0910  Last data filed at 2024  0527  Gross per 24 hour   Intake 940 ml   Output 1065 ml   Net -125 ml       Physical Exam  Vitals and nursing note reviewed.   Constitutional:       Appearance: He is ill-appearing.   Cardiovascular:      Rate and Rhythm: Regular rhythm. Tachycardia present.   Pulmonary:      Effort: No respiratory distress.      Breath sounds: Normal breath sounds.   Abdominal:      General: Bowel sounds are normal.      Palpations: Abdomen is soft.   Neurological:      Mental Status: He is alert and oriented to person, place, and time.      Comments: Strength in bilateral upper and lower extremities continues to improve daily.  He is able to raise his legs against some resistance today and is even stronger in upper extremities than yesterday.           Results Review:       I reviewed the patient's new clinical results.  Results from last 7 days   Lab Units 07/01/24 0440 06/30/24 0613 06/28/24 0305 06/26/24  0223   WBC 10*3/mm3 10.50 11.69* 12.20* 11.42*   HEMOGLOBIN g/dL 11.6* 12.7* 13.0 15.0   PLATELETS 10*3/mm3 197 192 172 179     Results from last 7 days   Lab Units 07/01/24 0440 06/30/24 0613 06/28/24 0305 06/26/24  0223   SODIUM mmol/L 134* 133* 134* 135*   POTASSIUM mmol/L 3.5 3.8 3.8 4.2   CHLORIDE mmol/L 99 100 102 104   CO2 mmol/L 25.7 25.0 24.0 21.0*   BUN mg/dL 15 14 15 10   CREATININE mg/dL 1.13 1.12 1.18 0.85   CALCIUM mg/dL 8.8 9.1 8.4* 8.8   MAGNESIUM mg/dL 1.9  --   --   --    PHOSPHORUS mg/dL 3.2  --   --   --    Estimated Creatinine Clearance: 88.1 mL/min (by C-G formula based on SCr of 1.13 mg/dL).      Assessment & Plan   Active Hospital Problems    Diagnosis  POA    **Preop examination [Z01.818]  Not Applicable    Pneumonia [J18.9]  Unknown    Stridor [R06.1]  Unknown    Stenosis of cervical spine with myelopathy [M48.02, G99.2]  Yes    Essential hypertension [I10]  Yes    Type 2 diabetes mellitus with microalbuminuria, with long-term current use of insulin [E11.29, R80.9, Z79.4]  Not Applicable    S/P  primary angioplasty with coronary stent [Z95.5]  Not Applicable      Resolved Hospital Problems   No resolved problems to display.       PLAN  This is a 69-year-old gentleman with a history of hypertension type 2 diabetes hyperlipidemia and obesity as well as underlying chronic spinal stenosis and new development of myelopathy who presented for cervical laminectomy and fusion.  He had a rather complicated procedure and postoperative recovery and [facility] was consulted yesterday for chest pain stridor and low sodium levels.  -From a pulmonary standpoint things seem to be improving.    -I agree with speech therapy evaluation to make sure that he has no issues with swallowing.  Recommend elevation of the head of bed is much as tolerated as he probably has some degree of reflux as well  -Will plan to change to Augmentin this evening again short course of treatment of antibiotics  -Electrolytes are stable today other than his low sodium levels.  Urine sodium uric acid and urine osmolality have been ordered.  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  -Orders written to discontinue Eckert catheter today  -Placed on scheduled senna and MiraLAX would like to see him have a bowel movement prior to discharge but this is not a absolute requirement.  -Subcu heparin for DVT prophylaxis  -Full code      Disposition  Expected Discharge Date: 7/1/2024; Expected Discharge Time:    His labs are stable today.  Sodium levels are stable and should continue to improve once he is eating better and up and moving and pain is better controlled.  I would like to see the results of his speech therapy evaluation if no evidence of aspiration he stable to discharge from medical perspective to acute rehab today    Anton Carvalho MD  [facility]  07/01/24  09:10 EDT

## 2024-07-01 NOTE — PLAN OF CARE
Goal Outcome Evaluation:  Plan of Care Reviewed With: patient, spouse, son        Progress: improving  Outcome Evaluation: Patient in bed and agreeable to therapy on arrival this PM. Cervical collar donned prior to attempting any mobility. Patient performed bed mobility modAx2, sat EOB with Apolinar, performed STS from partially elevated HOB with modAx2, and ambulated multiple steps forward/retro with minAx2 + rwx. Patient up in chair to end visit with family present in the room. PT will continue to monitor and progress as tolerated.

## 2024-07-01 NOTE — DISCHARGE SUMMARY
Isaias Monaco  1955    Patient Care Team:  Gwyn Patten Sr., MD as PCP - General (Family Medicine)  Bebeto Monson II, MD as Consulting Physician (Hematology and Oncology)  Micaela Barfield APRN as Nurse Practitioner (Endocrinology)  Manuela Agustin APRN as Referring Physician (Internal Medicine)  Richardson Moon MD as Consulting Physician (Pulmonary Disease)    Date of Admit: 6/25/2024    Date of Discharge:  7/1/2024    Discharge Diagnosis:  Preop examination    S/P primary angioplasty with coronary stent    Type 2 diabetes mellitus with microalbuminuria, with long-term current use of insulin    Essential hypertension    Stenosis of cervical spine with myelopathy    Pneumonia    Stridor      Procedures Performed  Procedure(s):  Cervical 3 to cervical 6 laminectomies and posterior spinal fusion - Bilateral       Complications: None    Consultants:   Consults       Date and Time Order Name Status Description    6/29/2024 10:38 AM Inpatient Pulmonology Consult      6/29/2024  9:37 AM Inpatient Hospitalist Consult Completed     6/27/2024 12:51 PM Inpatient Urology Consult Completed     6/26/2024 10:45 AM Inpatient Physical Medicine Rehab Consult      6/26/2024  9:34 AM Inpatient Physical Medicine Rehab Consult              Condition on Discharge: Improved    Discharge disposition: Acute rehab    Brief HPI: 69-year-old male who was previously seen in clinic for cervical stenosis and myelopathy.  He had weakness in his handgrip and decreased sensation in hands as well as instability while walking.  Symptoms progressively worsened.  The risks and benefits of a posterior cervical decompression fusion were discussed including but not limited to the risk of infection, hemorrhage, CSF leak and spinal cord injury.  He expressed understanding of the risks and benefits and elected to proceed.    Hospital Course: Patient admitted for above procedure. The procedure itself was without complication. The patient  was transferred to ICU following recovery where he has done very well.  He developed postoperative urinary retention in which a Eckert catheter was placed.  Catheter was discontinued prior to discharge and he is voiding.  He was evaluated by physical therapy who recommended rehab at discharge.    Discharge Physical Exam:    Temp:  [97.3 °F (36.3 °C)-99.1 °F (37.3 °C)] 98.1 °F (36.7 °C)  Heart Rate:  [72-87] 77  Resp:  [12-20] 12  BP: (127-158)/(79-92) 153/92    Current labs:  Lab Results (last 24 hours)       Procedure Component Value Units Date/Time    POC Glucose Once [[id number]]  (Normal) Collected: 06/30/24 1804    Specimen: Blood Updated: 06/30/24 1806     Glucose 125 mg/dL     POC Glucose Once [[id number]]  (Normal) Collected: 07/01/24 0011    Specimen: Blood Updated: 07/01/24 0023     Glucose 106 mg/dL     CBC & Differential [[id number]]  (Abnormal) Collected: 07/01/24 0440    Specimen: Blood Updated: 07/01/24 0523    Narrative:      The following orders were created for panel order CBC & Differential.  Procedure                               Abnormality         Status                     ---------                               -----------         ------                     CBC Auto Differential[[id number]]        Abnormal            Final result                 Please view results for these tests on the individual orders.    Renal Function Panel [[id number]]  (Abnormal) Collected: 07/01/24 0440    Specimen: Blood Updated: 07/01/24 0537     Glucose 97 mg/dL      BUN 15 mg/dL      Creatinine 1.13 mg/dL      Sodium 134 mmol/L      Potassium 3.5 mmol/L      Chloride 99 mmol/L      CO2 25.7 mmol/L      Calcium 8.8 mg/dL      Albumin 2.9 g/dL      Phosphorus 3.2 mg/dL      Anion Gap 9.3 mmol/L      BUN/Creatinine Ratio 13.3     eGFR 70.4 mL/min/1.73     Narrative:      GFR Normal >60  Chronic Kidney Disease <60  Kidney Failure <15      Magnesium [[id number]]  (Normal) Collected: 07/01/24 0440    Specimen: Blood Updated:  07/01/24 0537     Magnesium 1.9 mg/dL     CBC Auto Differential [[id number]]  (Abnormal) Collected: 07/01/24 0440    Specimen: Blood Updated: 07/01/24 0523     WBC 10.50 10*3/mm3      RBC 4.15 10*6/mm3      Hemoglobin 11.6 g/dL      Hematocrit 36.2 %      MCV 87.2 fL      MCH 28.0 pg      MCHC 32.0 g/dL      RDW 14.9 %      RDW-SD 47.3 fl      MPV 11.1 fL      Platelets 197 10*3/mm3      Neutrophil % 55.0 %      Lymphocyte % 24.5 %      Monocyte % 14.5 %      Eosinophil % 4.5 %      Basophil % 0.5 %      Immature Grans % 1.0 %      Neutrophils, Absolute 5.79 10*3/mm3      Lymphocytes, Absolute 2.57 10*3/mm3      Monocytes, Absolute 1.52 10*3/mm3      Eosinophils, Absolute 0.47 10*3/mm3      Basophils, Absolute 0.05 10*3/mm3      Immature Grans, Absolute 0.10 10*3/mm3      nRBC 0.0 /100 WBC     POC Glucose Once [[id number]]  (Normal) Collected: 07/01/24 0547    Specimen: Blood Updated: 07/01/24 0548     Glucose 102 mg/dL     POC Glucose Once [[id number]]  (Abnormal) Collected: 07/01/24 1210    Specimen: Blood Updated: 07/01/24 1211     Glucose 144 mg/dL               General Appearance No acute distress   HEENT NC/AT   Neurological Awake, Alert, and oriented x 3   Cranial nerves CN II-XII intact   Motor Strength 5/5 throughout, tone normal   Sensory Intact to light touch upper and lower extremities   Gait and station Ambulating independently with walker   Neck Supple, trachea midline, no carotid bruit, posterior incision with overlying dressing CDI   Extremities Moves all extremities well, no edema, no cyanosis, no redness         Discharge Medications  RHODA has been reviewed and narcotic consent is on file in the patient's chart.     Your medication list        START taking these medications        Instructions Last Dose Given Next Dose Due   bisacodyl 5 MG EC tablet  Commonly known as: DULCOLAX      Take 1 tablet by mouth Daily As Needed for Constipation (Use if polyethylene glycol is ineffective).       bisacodyl  10 MG suppository  Commonly known as: DULCOLAX      Insert 1 suppository into the rectum Daily As Needed for Constipation (Use if bisacodyl oral is ineffective).       polyethylene glycol 17 g packet  Commonly known as: MIRALAX      Take 17 g by mouth Daily As Needed (Use if senna-docusate is ineffective).       polyethylene glycol 17 g packet  Commonly known as: MIRALAX  Start taking on: July 2, 2024      Take 17 g by mouth Daily.       sennosides-docusate 8.6-50 MG per tablet  Commonly known as: PERICOLACE      Take 2 tablets by mouth 2 (Two) Times a Day As Needed for Constipation.       sennosides-docusate 8.6-50 MG per tablet  Commonly known as: PERICOLACE      Take 2 tablets by mouth Every Night.              CHANGE how you take these medications        Instructions Last Dose Given Next Dose Due   doxazosin 4 MG tablet  Commonly known as: CARDURA  What changed: when to take this      TAKE 1 TABLET BY MOUTH EVERY DAY AT NIGHT       escitalopram 20 MG tablet  Commonly known as: LEXAPRO  What changed: when to take this      Take 1 tablet by mouth Daily.       Toujedelta SoloStar 300 UNIT/ML solution pen-injector injection  Generic drug: Insulin Glargine (1 Unit Dial)  What changed: See the new instructions.      INJECT 65 UNITS UNDER THE SKIN DAILY              CONTINUE taking these medications        Instructions Last Dose Given Next Dose Due   Adult Aspirin Regimen 81 MG EC tablet  Generic drug: aspirin      Take 1 tablet by mouth Daily.       ARIPiprazole 5 MG tablet  Commonly known as: ABILIFY      Take 1 tablet by mouth Daily.       carvedilol 25 MG tablet  Commonly known as: COREG      Take 0.5 tablets by mouth 2 (Two) Times a Day With Meals.       clonazePAM 0.5 MG tablet  Commonly known as: KlonoPIN      Take 1 tablet by mouth Daily As Needed for Anxiety.       clopidogrel 75 MG tablet  Commonly known as: PLAVIX      TAKE 1 TABLET BY MOUTH EVERY DAY       Dexcom G7 Sensor misc      Use.        dorzolamide-timolol 2-0.5 % ophthalmic solution  Commonly known as: COSOPT      Administer 1 drop to both eyes 2 (Two) Times a Day.       esomeprazole 40 MG capsule  Commonly known as: nexIUM      Take 1 capsule by mouth Every Morning Before Breakfast.       Evolocumab solution auto-injector SureClick injection  Commonly known as: REPATHA      Inject 1 mL under the skin into the appropriate area as directed Every 14 (Fourteen) Days.       famotidine 20 MG tablet  Commonly known as: PEPCID      Take 1 tablet by mouth Every Evening.       gabapentin 300 MG capsule  Commonly known as: NEURONTIN      Take 1 capsule by mouth Every Night.       glucose blood test strip      Use to check blood sugars 1-2 times a day. E11.65       glucose monitor monitoring kit      Use 1 each Take As Directed. Use to check blood sugars 1-2 times a day. E11.65       HYDROcodone-acetaminophen 5-325 MG per tablet  Commonly known as: NORCO      Take 1 tablet by mouth Every 4 (Four) Hours As Needed for Moderate Pain.       Ingrezza 60 MG capsule  Generic drug: Valbenazine Tosylate      Take 1 capsule by mouth Daily.       Insulin Pen Needle 31G X 4 MM misc      Use 1 each Daily.       BD Pen Needle Marissa 2nd Gen 32G X 4 MM misc  Generic drug: Insulin Pen Needle      Inject 1 each under the skin into the appropriate area as directed 3 (Three) Times a Day.       Lancets misc      Use to check blood sugars 1-2 times a day. E11.65       latanoprost 0.005 % ophthalmic solution  Commonly known as: XALATAN      Administer 1 drop to both eyes Every Night.       metFORMIN 500 MG tablet  Commonly known as: GLUCOPHAGE      Take 2 tablets by mouth Daily With Breakfast. Indications: start in 48 hours       methocarbamol 500 MG tablet  Commonly known as: Robaxin      Take 1 tablet by mouth As Needed for Muscle Spasms (Take as needed for pain).       pioglitazone 15 MG tablet  Commonly known as: ACTOS      Take 1 tablet by mouth Daily.       rosuvastatin 20  MG tablet  Commonly known as: CRESTOR      Take 2 tablets by mouth Daily.       Semaglutide (2 MG/DOSE) 8 MG/3ML solution pen-injector  Commonly known as: OZEMPIC      Inject 2 mg under the skin into the appropriate area as directed 1 (One) Time Per Week. Pt verbalizes understanding can't take till after surgery       sildenafil 100 MG tablet  Commonly known as: VIAGRA      Take 1 tablet by mouth Daily As Needed for Erectile Dysfunction. Take 30 minutes to 4 hours prior to sexual activity.   To hold 48 hours prior to surgery                 Where to Get Your Medications        Information about where to get these medications is not yet available    Ask your nurse or doctor about these medications  bisacodyl 10 MG suppository  bisacodyl 5 MG EC tablet  dorzolamide-timolol 2-0.5 % ophthalmic solution  latanoprost 0.005 % ophthalmic solution  methocarbamol 500 MG tablet  polyethylene glycol 17 g packet  polyethylene glycol 17 g packet  Semaglutide (2 MG/DOSE) 8 MG/3ML solution pen-injector  sennosides-docusate 8.6-50 MG per tablet  sennosides-docusate 8.6-50 MG per tablet  sildenafil 100 MG tablet         Discharge Diet: regular         Diet Order   Procedures    Diet: Diabetic; Consistent Carbohydrate; Fluid Consistency: Thin (IDDSI 0)       Activity at Discharge:     Posterior Cervical Discectomy with Fusion    Post-Operative Instructions    1. You should have a post-operative visit scheduled with the Physician Assistant or Nurse Practitioner for approximately 2 to 2 ½ weeks after surgery. If you do not have an appointment, call the office when you return home to schedule one. You will remain off work at least until your first visit.    2. No lifting overhead, although you may place your arms above your head. DO NOT lift anything over five (5) pounds. Walking is allowed and encouraged. There are no restrictions on sitting or climbing stairs, but refrain from overly strenuous activity. Do not drive until the first  visit, although you may be driven. In general, activity restrictions will be lessened following the first visit.    3. Refrain from any sexual activity until after your first evaluation with the Physician Assistant or Nurse Practitioner.    4. You may receive a cervical collar. Please wear this at all times until the first visit, except while in the shower. During showering, refrain from moving your neck from side to side or forward and backward.    5. Take your dressings off and leave them off after the first day at home. Keep the wound dry. IF you have staples/stiches clean your incision with peroxide three times daily. IF your incision is closed with glue, do not pick, scratch or rub the glue on the wound, so it does not loosen before the wound heals. Do not soak your wound in water until the glue film falls off. Avoid swimming or using a hot tub while the glue is in place. When you shower or bathe, let water run over the wound but do not rub. Pat the wound gently with a soft towel to dry. Avoid direct sunlight to the wound and do not use tanning beds or lamps with the glue film in place. Do not apply any cream, lotion, or ointment to the skin near the wound; it could loosen the glue before the wound heals. Do not apply any tape, sticky dressing, alcohol or Chloraprep to the glue site as these could loosen the glue.    6. You will leave the hospital with medications for pain and possibly oral antibiotics if indicated. These medications must be taken as prescribed only. If a refill of your pain medication is required, in order to have this medication refilled you must contact the office four days prior to the due date.    7. Notify the office if there are any problems, such as excessive neck or arm pain, persistent temperature of 100 degrees or greater that is not relieved by Tylenol. A small amount of bleeding from the incision during the first few days is not unusual. In addition, mild hoarseness of voice and  "mild difficulty swallowing are not unusual during the first two weeks. Please call the office if there is excessive bleeding, redness, heat or swelling around the incision or increased difficulty swallowing.      Call for: questions or concerns    Follow-up Appointments  Future Appointments   Date Time Provider Department Center   7/9/2024  2:00 PM Masha Cloud APRN [facility] LOU41 [facility]   9/9/2024  4:00 PM Levi Patten DO [facility]  [facility]   9/11/2024  2:30 PM Gwyn Patten Sr., MD [facility] EASPT [facility]   9/16/2024  2:30 PM [facility] US 1 BH [facility] US E [facility]CE   10/1/2024 11:30 AM Ashley Martinez APRN [facility]  [facility]   12/19/2024 12:00 PM Hattie Foster APRN [facility] CD LCGKR [facility]      Follow-up Information       Gwyn Patten Sr., MD .    Specialties: Family Medicine, Urgent Care  Contact information:  [address]  [place] [id number] [phone]                               Test Results Pending at Discharge  Pending Labs       Order Current Status    Blood Culture - Blood, Arm, Left Preliminary result    Blood Culture - Blood, Arm, Right Preliminary result    Urine Culture - Urine, Indwelling Urethral Catheter Preliminary result           None    I discussed the discharge instructions with patient, family, and nursing staff    MICHELLE Coughlin  07/01/24  14:03 EDT    35 min spent in reviewing records, discussion and examination of the patient and discussion with other members of the patient's medical team.    \"Dictated utilizing Dragon dictation\".      "

## 2024-07-01 NOTE — PLAN OF CARE
Goal Outcome Evaluation:   Patient is POD 6. Aox4. VSS on RA. Patient has been able to ambulate from bed to chair today with an assist of 2. Eckert catheter removed, patient has voided since removal. Dressing over incision remains CDI. Speech therapy eval pending. Plan at discharge is [facility], possibly tomorrow. All needs currently met, will continue to monitor.

## 2024-07-01 NOTE — DISCHARGE INSTRUCTIONS
Activity at Discharge:      Posterior Cervical Discectomy with Fusion     Post-Operative Instructions     1. You should have a post-operative visit scheduled with the Physician Assistant or Nurse Practitioner for approximately 2 to 2 ½ weeks after surgery. If you do not have an appointment, call the office when you return home to schedule one. You will remain off work at least until your first visit.     2. No lifting overhead, although you may place your arms above your head. DO NOT lift anything over five (5) pounds. Walking is allowed and encouraged. There are no restrictions on sitting or climbing stairs, but refrain from overly strenuous activity. Do not drive until the first visit, although you may be driven. In general, activity restrictions will be lessened following the first visit.     3. Refrain from any sexual activity until after your first evaluation with the Physician Assistant or Nurse Practitioner.     4. You may receive a cervical collar. Please wear this at all times until the first visit, except while in the shower. During showering, refrain from moving your neck from side to side or forward and backward.     5. Take your dressings off and leave them off after the first day at home. Keep the wound dry. IF you have staples/stiches clean your incision with peroxide three times daily. IF your incision is closed with glue, do not pick, scratch or rub the glue on the wound, so it does not loosen before the wound heals. Do not soak your wound in water until the glue film falls off. Avoid swimming or using a hot tub while the glue is in place. When you shower or bathe, let water run over the wound but do not rub. Pat the wound gently with a soft towel to dry. Avoid direct sunlight to the wound and do not use tanning beds or lamps with the glue film in place. Do not apply any cream, lotion, or ointment to the skin near the wound; it could loosen the glue before the wound heals. Do not apply any tape,  sticky dressing, alcohol or Chloraprep to the glue site as these could loosen the glue.     6. You will leave the hospital with medications for pain and possibly oral antibiotics if indicated. These medications must be taken as prescribed only. If a refill of your pain medication is required, in order to have this medication refilled you must contact the office four days prior to the due date.     7. Notify the office if there are any problems, such as excessive neck or arm pain, persistent temperature of 100 degrees or greater that is not relieved by Tylenol. A small amount of bleeding from the incision during the first few days is not unusual. In addition, mild hoarseness of voice and mild difficulty swallowing are not unusual during the first two weeks. Please call the office if there is excessive bleeding, redness, heat or swelling around the incision or increased difficulty swallowing.        Call for: questions or concerns

## 2024-07-01 NOTE — PROGRESS NOTES
Consult Daily Progress Note  [address]  07/01/24      Patient Name:  Isaias Monaco  MRN:  [id number]   YOB: 1955  Age: 69 y.o.  Sex: male  LOS: 6    Reason for Consult:  Wheezing    Interval History:  No acute events overnight  Family is at bedside  Breathing significantly improved  No wheezing or stridor  No nausea or vomiting  No chest pain or palpitations  Plan is for [facility] rehab tomorrow    Physical Exam:  Vitals:    07/01/24 1229   BP: 153/92   Pulse: 77   Resp: 12   Temp: 98.1 °F (36.7 °C)   SpO2: 99%       Intake/Output    Intake/Output Summary (Last 24 hours) at 7/1/2024 1522  Last data filed at 7/1/2024 0920  Gross per 24 hour   Intake 700 ml   Output 975 ml   Net -275 ml       General: Alert, nontoxic, NAD  HEENT: NC/AT, EOMI, MMM  Neck: Trach collar  Cardiac: RRR, no murmur, gallops, rubs  Pulmonary: Clear to auscultation bilaterally, no adventitious breath sounds, normal respiratory effort  GI: Soft, non-tender, non-distended, normal bowel sounds  Extremities: Warm, well perfused, no LE edema  Skin: no visible rash  Neuro: CN II - XII grossly intact  Psychiatry: Normal mood and affect      Data Review:  Results from last 7 days   Lab Units 07/01/24 0440 06/30/24  0613 06/28/24  0305 06/26/24  0223   WBC 10*3/mm3 10.50 11.69* 12.20* 11.42*   HEMOGLOBIN g/dL 11.6* 12.7* 13.0 15.0   PLATELETS 10*3/mm3 197 192 172 179     Results from last 7 days   Lab Units 07/01/24  0440 06/30/24  0613 06/28/24  0305 06/26/24  0223   SODIUM mmol/L 134* 133* 134* 135*   POTASSIUM mmol/L 3.5 3.8 3.8 4.2   CHLORIDE mmol/L 99 100 102 104   CO2 mmol/L 25.7 25.0 24.0 21.0*   BUN mg/dL 15 14 15 10   CREATININE mg/dL 1.13 1.12 1.18 0.85   GLUCOSE mg/dL 97 152* 121* 123*   CALCIUM mg/dL 8.8 9.1 8.4* 8.8   MAGNESIUM mg/dL 1.9  --   --   --    PHOSPHORUS mg/dL 3.2  --   --   --    Estimated Creatinine Clearance: 88.1 mL/min (by C-G formula based on SCr of 1.13 mg/dL).    Results from last  7 days   Lab Units 07/01/24  0440 06/30/24  0613 06/29/24  1109 06/28/24  0305   AST (SGOT) U/L  --  76*  --   --    ALT (SGPT) U/L  --  35  --   --    PROCALCITONIN ng/mL  --   --   --  0.19   LACTATE mmol/L  --   --  1.3  --    PLATELETS 10*3/mm3 197 192  --  172       Results from last 7 days   Lab Units 06/25/24  2059   PH, ARTERIAL pH units 7.372   PCO2, ARTERIAL mm Hg 35.3   PO2 ART mm Hg 81.4   HCO3 ART mmol/L 20.5*         Imaging:  Reviewed chest images personally from past 3 days    ASSESSMENT  /  PLAN:    History of audible wheezing  Elevated hemidiaphragm  Diabetes  Obesity  Status post cervical spinal surgery    -Patient stable from a respiratory standpoint, saturating well on room air without any evidence of stridor or wheezing.  Has no known underlying pulmonary history.  -Chest imaging over unremarkable.  -Speech pathology evaluation pending  -Stable for discharge from pulmonary standpoint.    All issues new to me today.  Prior hospital course, labs and imaging reviewed.    Thank you for allowing us to participate in this patients care.  Pulmonary will sign off at this time.  Please contact us if further questions or concerns arise.    Chang Gandara MD  [facility]  Pulmonary and Critical Care Medicine, Interventional Pulmonology    Parts of this note may be an electronic transcription/translation of spoken language to printed text using the Dragon dictation system.

## 2024-07-02 VITALS
HEIGHT: 77 IN | BODY MASS INDEX: 31.18 KG/M2 | WEIGHT: 264.11 LBS | HEART RATE: 77 BPM | TEMPERATURE: 97.7 F | DIASTOLIC BLOOD PRESSURE: 73 MMHG | OXYGEN SATURATION: 97 % | RESPIRATION RATE: 18 BRPM | SYSTOLIC BLOOD PRESSURE: 133 MMHG

## 2024-07-02 LAB
BACTERIA SPEC AEROBE CULT: NO GROWTH
GLUCOSE BLDC GLUCOMTR-MCNC: 111 MG/DL (ref 70–130)
GLUCOSE BLDC GLUCOMTR-MCNC: 117 MG/DL (ref 70–130)
GLUCOSE BLDC GLUCOMTR-MCNC: 99 MG/DL (ref 70–130)

## 2024-07-02 PROCEDURE — 63710000001 INSULIN GLARGINE PER 5 UNITS

## 2024-07-02 PROCEDURE — 94664 DEMO&/EVAL PT USE INHALER: CPT

## 2024-07-02 PROCEDURE — 94799 UNLISTED PULMONARY SVC/PX: CPT

## 2024-07-02 PROCEDURE — 82948 REAGENT STRIP/BLOOD GLUCOSE: CPT

## 2024-07-02 PROCEDURE — 92610 EVALUATE SWALLOWING FUNCTION: CPT

## 2024-07-02 PROCEDURE — 25010000002 HEPARIN (PORCINE) PER 1000 UNITS: Performed by: NEUROLOGICAL SURGERY

## 2024-07-02 PROCEDURE — 94761 N-INVAS EAR/PLS OXIMETRY MLT: CPT

## 2024-07-02 PROCEDURE — 25010000002 PIPERACILLIN SOD-TAZOBACTAM PER 1 G: Performed by: HOSPITALIST

## 2024-07-02 PROCEDURE — 94760 N-INVAS EAR/PLS OXIMETRY 1: CPT

## 2024-07-02 RX ORDER — AMOXICILLIN AND CLAVULANATE POTASSIUM 875; 125 MG/1; MG/1
1 TABLET, FILM COATED ORAL EVERY 12 HOURS SCHEDULED
Start: 2024-07-02 | End: 2024-07-03

## 2024-07-02 RX ORDER — CLOPIDOGREL BISULFATE 75 MG/1
75 TABLET ORAL DAILY
Status: DISCONTINUED | OUTPATIENT
Start: 2024-07-02 | End: 2024-07-02 | Stop reason: HOSPADM

## 2024-07-02 RX ORDER — AMOXICILLIN AND CLAVULANATE POTASSIUM 875; 125 MG/1; MG/1
1 TABLET, FILM COATED ORAL EVERY 12 HOURS SCHEDULED
Status: DISCONTINUED | OUTPATIENT
Start: 2024-07-02 | End: 2024-07-02 | Stop reason: HOSPADM

## 2024-07-02 RX ORDER — OXYCODONE AND ACETAMINOPHEN 7.5; 325 MG/1; MG/1
1 TABLET ORAL EVERY 4 HOURS PRN
Status: DISCONTINUED | OUTPATIENT
Start: 2024-07-02 | End: 2024-07-02 | Stop reason: HOSPADM

## 2024-07-02 RX ADMIN — CARVEDILOL 12.5 MG: 12.5 TABLET, FILM COATED ORAL at 08:40

## 2024-07-02 RX ADMIN — CLOPIDOGREL BISULFATE 75 MG: 75 TABLET, FILM COATED ORAL at 10:57

## 2024-07-02 RX ADMIN — METHOCARBAMOL TABLETS 500 MG: 500 TABLET, COATED ORAL at 08:40

## 2024-07-02 RX ADMIN — POLYETHYLENE GLYCOL 3350 17 G: 17 POWDER, FOR SOLUTION ORAL at 08:43

## 2024-07-02 RX ADMIN — PIPERACILLIN AND TAZOBACTAM 3.38 G: 3; .375 INJECTION, POWDER, FOR SOLUTION INTRAVENOUS at 00:42

## 2024-07-02 RX ADMIN — ALBUTEROL SULFATE 2.5 MG: 2.5 SOLUTION RESPIRATORY (INHALATION) at 08:22

## 2024-07-02 RX ADMIN — INSULIN GLARGINE 30 UNITS: 100 INJECTION, SOLUTION SUBCUTANEOUS at 08:40

## 2024-07-02 RX ADMIN — CLONAZEPAM 0.5 MG: 0.5 TABLET ORAL at 15:03

## 2024-07-02 RX ADMIN — ARIPIPRAZOLE 5 MG: 5 TABLET ORAL at 08:40

## 2024-07-02 RX ADMIN — ASPIRIN 81 MG: 81 TABLET, COATED ORAL at 08:40

## 2024-07-02 RX ADMIN — HEPARIN SODIUM 5000 UNITS: 5000 INJECTION INTRAVENOUS; SUBCUTANEOUS at 08:40

## 2024-07-02 RX ADMIN — OXYCODONE AND ACETAMINOPHEN 1 TABLET: 7.5; 325 TABLET ORAL at 11:24

## 2024-07-02 RX ADMIN — DORZOLAMIDE HYDROCHLORIDE: 20 SOLUTION OPHTHALMIC at 08:44

## 2024-07-02 RX ADMIN — OXYCODONE AND ACETAMINOPHEN 1 TABLET: 7.5; 325 TABLET ORAL at 06:17

## 2024-07-02 RX ADMIN — OXYCODONE AND ACETAMINOPHEN 1 TABLET: 7.5; 325 TABLET ORAL at 15:02

## 2024-07-02 RX ADMIN — AMOXICILLIN AND CLAVULANATE POTASSIUM 1 TABLET: 875; 125 TABLET, FILM COATED ORAL at 12:43

## 2024-07-02 RX ADMIN — OXYCODONE AND ACETAMINOPHEN 1 TABLET: 7.5; 325 TABLET ORAL at 00:40

## 2024-07-02 RX ADMIN — ESCITALOPRAM 20 MG: 20 TABLET, FILM COATED ORAL at 06:17

## 2024-07-02 RX ADMIN — PANTOPRAZOLE SODIUM 40 MG: 40 TABLET, DELAYED RELEASE ORAL at 06:17

## 2024-07-02 RX ADMIN — PIPERACILLIN AND TAZOBACTAM 3.38 G: 3; .375 INJECTION, POWDER, FOR SOLUTION INTRAVENOUS at 09:05

## 2024-07-02 NOTE — THERAPY EVALUATION
Acute Care - Speech Language Pathology   Swallow Initial Evaluation [facility]     Patient Name: Isaias Monaco  : 1955  MRN: [id number]  Today's Date: 2024               Admit Date: 2024    Visit Dx:     ICD-10-CM ICD-9-CM   1. Type 2 diabetes mellitus with hyperglycemia, with long-term current use of insulin  E11.65 250.00    Z79.4 790.29     V58.67   2. Erectile dysfunction, unspecified erectile dysfunction type  N52.9 607.84     Patient Active Problem List   Diagnosis    Microalbuminuria    Vitamin D deficiency    Angioedema    Coronary artery disease involving native coronary artery of native heart without angina pectoris    Anxiety    ED (erectile dysfunction)    Hyperlipidemia LDL goal <70    Hypogonadism in male    S/P primary angioplasty with coronary stent    Osteoarthritis of knee    Hypertensive kidney disease with stage 3a chronic kidney disease    Anemia, posthemorrhagic, acute    Dyspnea on exertion    Gastro-esophageal reflux disease with esophagitis    Tubular adenoma of colon    Nocturia associated with benign prostatic hyperplasia    Iron deficiency anemia    Adverse effect of iron and its compounds, sequela    Facial twitching    Blepharospasm    Type 2 diabetes mellitus with microalbuminuria, with long-term current use of insulin    Panic disorder    Tic disorder, unspecified    Spinal stenosis of lumbar region without neurogenic claudication    Bilateral myopia    Combined forms of age-related cataract, bilateral    Presbyopia    Primary open-angle glaucoma, bilateral, severe stage    Lumbar facet arthropathy    Spondylosis of lumbar region without myelopathy or radiculopathy    Calcific coronary arteriosclerosis    Essential hypertension    Degeneration of lumbar intervertebral disc    Dystonia    Lumbosacral spondylosis without myelopathy    Medicare annual wellness visit, subsequent    Coronary stent restenosis    Stenosis of cervical spine with myelopathy    Preop  examination    Pneumonia    Stridor     Past Medical History:   Diagnosis Date    Anemia     post hemorrhagic    Angioedema 02/21/2016    Secondary to ACE inhibitor    Anxiety     Arthritis     CAD (coronary artery disease)     Colon polyps     Diabetes mellitus, type 2     GERD (gastroesophageal reflux disease)     Glaucoma     Hematoma     history    High cholesterol     History of foreign travel 12/2017; 08/2018    [place], [place], [place], [place], [place] and [place]; [place]    Hyperlipidemia     Hypertension     Hypogonadism in male 09/28/2016    Male erectile disorder     Microalbuminuria     Osteoarthritis     knee    Spinal stenosis of lumbar region without neurogenic claudication 06/02/2021    Tubular adenoma of colon 11/01/2017    Vitamin D deficiency      Past Surgical History:   Procedure Laterality Date    CARDIAC CATHETERIZATION N/A 05/15/2006    Dr. Mini Camarillo    CARDIAC CATHETERIZATION N/A 03/10/2020    Procedure: Left Heart Cath;  Surgeon: Miguel Ángel Karimi MD;  Location:  [facility];  Service: Cardiology;  Laterality: N/A;    CARDIAC CATHETERIZATION N/A 03/10/2020    Procedure: Stent DAVONTE coronary;  Surgeon: Miguel Ángel Karimi MD;  Location:  [facility];  Service: Cardiology;  Laterality: N/A;    CARDIAC CATHETERIZATION N/A 03/10/2020    Procedure: Coronary angiography;  Surgeon: Miguel Ángel Karimi MD;  Location:  [facility];  Service: Cardiology;  Laterality: N/A;    CARDIAC CATHETERIZATION N/A 03/10/2020    Procedure: Left ventriculography;  Surgeon: Miguel Ángel Karimi MD;  Location:  [facility];  Service: Cardiology;  Laterality: N/A;    CARDIAC CATHETERIZATION N/A 05/20/2022    Procedure: Left Heart Cath;  Surgeon: Mackenzie Morales MD;  Location:  [facility];  Service: Cardiovascular;  Laterality: N/A;    CARDIAC CATHETERIZATION N/A 05/20/2022    Procedure: Coronary  angiography;  Surgeon: Mackenzie Morales MD;  Location:  [facility];  Service: Cardiovascular;  Laterality: N/A;    CARDIAC CATHETERIZATION N/A 05/20/2022    Procedure: Percutaneous Coronary Intervention;  Surgeon: Mackenzie Morales MD;  [facility];  Service: Cardiovascular;  Laterality: N/A;    CARDIAC CATHETERIZATION N/A 05/24/2022    Procedure: Percutaneous Coronary Intervention;  Surgeon: Miguel Ángel Karimi MD;  Location:  [facility];  Service: Cardiovascular;  Laterality: N/A;  LAD and Cx    CARDIAC CATHETERIZATION N/A 05/24/2022    Procedure: Stent DAVONTE coronary;  Surgeon: Miguel Ángel Karimi MD;  Location:  [facility];  Service: Cardiovascular;  Laterality: N/A;    CARDIAC CATHETERIZATION N/A 05/24/2022    Procedure: Resting Full Cycle Ratio;  Surgeon: Miguel Ángel Karimi MD;  [facility];  Service: Cardiovascular;  Laterality: N/A;    CARDIAC CATHETERIZATION N/A 03/19/2024    Procedure: Left Heart Cath;  Surgeon: Miguel Ángel Karimi MD;  [facility];  Service: Cardiovascular;  Laterality: N/A;    CARDIAC CATHETERIZATION N/A 03/19/2024    Procedure: Percutaneous Coronary Intervention;  Surgeon: Miguel Ángel Karimi MD;  [facility];  Service: Cardiovascular;  Laterality: N/A;    CARDIAC CATHETERIZATION N/A 03/19/2024    Procedure: Stent DAVONTE coronary;  Surgeon: Miguel Ángel Karimi MD;  Location: [facility] CATH INVASIVE LOCATION;  Service: Cardiovascular;  Laterality: N/A;    CERVICAL DISCECTOMY POSTERIOR FUSION WITH INSTRUMENTATION Bilateral 6/25/2024    Procedure: Cervical 3 to cervical 6 laminectomies and posterior spinal fusion - Bilateral;  Surgeon: Marshal Miguel MD;  Location: [facility] MAIN OR;  Service: Neurosurgery;  Laterality: Bilateral;    COLONOSCOPY N/A 02/22/2006    Bilobed polyp at 30 cm, hemorrhoids-Dr. Ilya Zhao     COLONOSCOPY N/A 02/28/2014    Normal ileum, one 6 mm polyp in the mid transverse colon-Dr. Ilya Zhao    COLONOSCOPY N/A 11/19/2008    Ela ileum, two 3 to 4 mm polyps, non-bleeding internal hemorrhoids, repeat in 5 years-Dr. Ilya Zhao    COLONOSCOPY N/A 10/31/2017    Procedure: COLONOSCOPY WITH POLYPECTOMY (COLD BIOPSY);  Surgeon: Ilya Zhao MD;  Location: [facility] ENDOSCOPY;  Service:     COLONOSCOPY N/A 05/21/2021    Procedure: Colonoscopy into cecum and terminal ileum with cold biopsy polypectomy;  Surgeon: Ilya Zhao MD;  [facility];  Service: Gastroenterology;  Laterality: N/A;  Pre op: History of Polyps  Post op: Polyp    CORONARY ANGIOPLASTY WITH STENT PLACEMENT  2007, 2012, 2015    cardiac stents x3 occasions    ENDOSCOPY N/A 10/04/2017    Procedure: ESOPHAGOGASTRODUODENOSCOPY;  Surgeon: Boyd Guidry MD;  Location: [facility] ENDOSCOPY;  Service:     ENDOSCOPY N/A 05/21/2021    Procedure: ESOPHAGOGASTRODUODENOSCOPY with biopsies;  Surgeon: Ilya Zhao MD;  Location: [facility] ENDOSCOPY;  Service: Gastroenterology;  Laterality: N/A;  Pre op: GERD  Post op: Irregular  Z-Line, Hiatal Hernia, Gastritis    ENDOSCOPY N/A 08/25/2023    Procedure: ESOPHAGOGASTRODUODENOSCOPY with biopsy;  Surgeon: Ilya Zhao MD;  Location: [facility] ENDOSCOPY;  Service: Gastroenterology;  Laterality: N/A;  errosive gastritis    EPIDURAL BLOCK      INTERVENTIONAL RADIOLOGY PROCEDURE N/A 05/20/2022    Procedure: Intravascular Ultrasound;  Surgeon: Mackenzie Morales MD;  Location: [facility] CATH INVASIVE LOCATION;  Service: Cardiovascular;  Laterality: N/A;    KNEE INCISION AND DRAINAGE Right 06/20/2017    Procedure: RT. KNEE WASHOUT ;  Surgeon: Boyd Coyne MD;  Location: [facility] MAIN OR;  Service:     MEDIAL BRANCH BLOCK Bilateral 12/17/2021    Procedure: LUMBAR MEDIAL BRANCH BLOCK bilateral ~L4-S1 x2 (~2 weeks apart);  Surgeon: Christina Villaseñor MD;  Location: [facility] OR;  Service: Pain Management;   Laterality: Bilateral;    MEDIAL BRANCH BLOCK Bilateral 01/03/2022    Procedure: LUMBAR MEDIAL BRANCH BLOCK bilateral ~L4-S1 x2 (~2 weeks apart);  Surgeon: Christina Villaseñor MD;  Location: [facility] MAIN OR;  Service: Pain Management;  Laterality: Bilateral;    DE ARTHRP KNE CONDYLE&PLATU MEDIAL&LAT COMPARTMENTS Right 06/15/2017    Procedure: RT TOTAL KNEE ARTHROPLASTY;  Surgeon: Boyd Coyne MD;  Location: [facility] MAIN OR;  Service: Orthopedics    RADIOFREQUENCY ABLATION Bilateral 01/10/2022    Procedure: RADIOFREQUENCY ABLATION NERVES Bilateral L4-S1;  Surgeon: Christina Villaseñor MD;  Location: [facility] MAIN OR;  Service: Pain Management;  Laterality: Bilateral;    SHOULDER SURGERY Right 2016    rotator cuff repair    UPPER GASTROINTESTINAL ENDOSCOPY N/A 10/13/2015    Z-line irregular, normal stomach, normal duodenum-Dr. Ilya Zhao    UPPER GASTROINTESTINAL ENDOSCOPY N/A 02/28/2014    Z-line irregular, normal stomach, normal duodenum-Dr. Ilya Zhao    UPPER GASTROINTESTINAL ENDOSCOPY N/A 11/19/2008    Z-line irregular, chronic gastritis withotu hemorrhage, normal duodenum-Dr. Ilya Zhao    UPPER GASTROINTESTINAL ENDOSCOPY N/A 06/22/2006    LA Grade A reflux esophagitis, non-bleeding erythematous gastropathy, normal duodenum-Dr. Ilya Zhao       SLP Recommendation and Plan  SLP Swallowing Diagnosis: R/O pharyngeal dysphagia (07/02/24 1300)  SLP Diet Recommendation: regular textures, thin liquids (07/02/24 1300)  Recommended Precautions and Strategies: upright posture during/after eating, small bites of food and sips of liquid, general aspiration precautions (07/02/24 1300)  SLP Rec. for Method of Medication Administration: meds whole, as tolerated (07/02/24 1300)     Monitor for Signs of Aspiration: yes, notify SLP if any concerns (07/02/24 1300)  Recommended Diagnostics: reassess via clinical swallow evaluation (07/02/24 1300)           Therapy Frequency (Swallow): PRN (07/02/24 1300)  Predicted Duration Therapy  Intervention (Days): until discharge (07/02/24 1300)  Oral Care Recommendations: Oral Care BID/PRN (07/02/24 1300)                                        Outcome Evaluation: Clinical swallow evaluation completed. Orders received this date at 0833. Planned discharge to acute in-patient rehab this date. Wife at bedside. No difficulties swallowing but decreased appetite, per pt and wife. Cervical collar in place. Pt reporting pain near surgical site when in upright position therefore evaluation completed in semi-upright position to reduce pain. No overt s/s of aspiration with ice chips, thins via spoon/cup/straw, puree, soft/mixed solids, or regular solids. Recommend patient continue regular diet with thin liquids. Meds as tolerated. Sitting upright, slow rate, small bites/sips. With difficulties swallowing or concern for aspiration pneumonia, consider VFSS at next level of care or as outpatient.      SWALLOW EVALUATION (Last 72 Hours)       SLP Adult Swallow Evaluation       Row Name 07/02/24 1300                   Rehab Evaluation    Document Type evaluation  -CR        Subjective Information no complaints  -CR        Patient Observations alert;cooperative  -CR        Patient Effort excellent  -CR        Symptoms Noted During/After Treatment none  -CR           General Information    Patient Profile Reviewed yes  -CR        Pertinent History Of Current Problem 70 y/o male admitted for planned Posterior Cervical Discectomy with Fusion. Admitted to ICU after procedure d/t urinary retention and audible wheezing.  -CR        Current Method of Nutrition regular textures;thin liquids  -CR        Precautions/Limitations, Vision WFL;for purposes of eval  -CR        Precautions/Limitations, Hearing WFL;for purposes of eval  -CR        Prior Level of Function-Communication WFL  -CR        Prior Level of Function-Swallowing no diet consistency restrictions  -CR        Plans/Goals Discussed with patient;spouse/S.O.;agreed upon   -CR        Barriers to Rehab none identified  -CR           Pain Scale: Numbers Pre/Post-Treatment    Pretreatment Pain Rating 8/10  -CR        Posttreatment Pain Rating 5/10  -CR           Oral Motor Structure and Function    Dentition Assessment natural, present and adequate  -CR        Secretion Management WNL/WFL  -CR        Mucosal Quality moist, healthy  -CR           Oral Musculature and Cranial Nerve Assessment    Oral Motor General Assessment WFL  -CR           Clinical Swallow Eval    Clinical Swallow Evaluation Summary Clinical swallow evaluation completed. Orders received this date at 0833. Planned discharge to acute in-patient rehab this date. Wife at bedside. No difficulties swallowing but decreased appetite, per pt and wife. Cervical collar in place. Pt reporting pain near surgical site when in upright position therefore evaluation completed in semi-upright position to reduce pain. No overt s/s of aspiration with ice chips, thins via spoon/cup/straw, puree, soft/mixed solids, or regular solids. Oral phase appears grossly WFL. Recommend patient continue regular diet with thin liquids. Meds as tolerated. Sitting upright, slow rate, small bites/sips. With difficulties swallowing or concern for aspiration pneumonia, consider VFSS at next level of care or as outpatient.  -CR           SLP Evaluation Clinical Impression    SLP Swallowing Diagnosis R/O pharyngeal dysphagia  -CR           Recommendations    Therapy Frequency (Swallow) PRN  -CR        Predicted Duration Therapy Intervention (Days) until discharge  -CR        SLP Diet Recommendation regular textures;thin liquids  -CR        Recommended Diagnostics reassess via clinical swallow evaluation  -CR        Recommended Precautions and Strategies upright posture during/after eating;small bites of food and sips of liquid;general aspiration precautions  -CR        Oral Care Recommendations Oral Care BID/PRN  -CR        SLP Rec. for Method of Medication  Administration meds whole;as tolerated  -CR        Monitor for Signs of Aspiration yes;notify SLP if any concerns  -CR           Swallow Goals (SLP)    Swallow STGs diet tolerance goal selection (SLP)  -CR        Diet Tolerance Goal Selection (SLP) Patient will tolerate trials of  -CR           (STG) Patient will tolerate trials of    Consistencies Trialed (Tolerate trials) regular textures;thin liquids  -CR        Desired Outcome (Tolerate trials) without signs/symptoms of aspiration  -CR        [place] (Tolerate trials) independently (over 90% accuracy)  -CR        Time Frame (Tolerate trials) by discharge  -CR        Progress/Outcomes (Tolerate trials) new goal  -CR                  User Key  (r) = Recorded By, (t) = Taken By, (c) = Cosigned By      Initials Name Effective Dates    CR Rina Pickard SLP 08/28/23 -                     EDUCATION  The patient has been educated in the following areas:   Dysphagia (Swallowing Impairment).        SLP GOALS       Row Name 07/02/24 1300             (STG) Patient will tolerate trials of    Consistencies Trialed (Tolerate trials) regular textures;thin liquids  -CR      Desired Outcome (Tolerate trials) without signs/symptoms of aspiration  -CR      [place] (Tolerate trials) independently (over 90% accuracy)  -CR      Time Frame (Tolerate trials) by discharge  -CR      Progress/Outcomes (Tolerate trials) new goal  -CR                User Key  (r) = Recorded By, (t) = Taken By, (c) = Cosigned By      Initials Name Provider Type    Rina Ramirez SLP Speech and Language Pathologist                         Time Calculation:    Time Calculation- SLP       Row Name 07/02/24 1329             Time Calculation- SLP    SLP Start Time 1200  -CR      SLP Received On 07/02/24  -CR         Untimed Charges    54729-[id number] Eval Oral Pharyng Swallow Minutes 45  -CR         Total Minutes    Untimed Charges Total Minutes 45  -CR       Total Minutes 45  -CR                 User Key  (r) = Recorded By, (t) = Taken By, (c) = Cosigned By      Initials Name Provider Type    Rina Ramirez SLP Speech and Language Pathologist                    Therapy Charges for Today       Code Description Service Date Service Provider Modifiers Qty    [id number]  ST EVAL ORAL PHARYNG SWALLOW 3 7/2/2024 Rina Pickard SLP GN 1                 [employer] [place]  7/2/2024

## 2024-07-02 NOTE — DISCHARGE SUMMARY
Isaias Monaco  1955    Patient Care Team:  Gwyn Patten Sr., MD as PCP - General (Family Medicine)  Bebeto Monson II, MD as Consulting Physician (Hematology and Oncology)  Micaela Barfield APRN as Nurse Practitioner (Endocrinology)  Manuela Agustin APRN as Referring Physician (Internal Medicine)  Richardson Moon MD as Consulting Physician (Pulmonary Disease)    Date of Admit: 6/25/2024    Date of Discharge:  7/2/2024    Discharge Diagnosis:  Preop examination    S/P primary angioplasty with coronary stent    Type 2 diabetes mellitus with microalbuminuria, with long-term current use of insulin    Essential hypertension    Stenosis of cervical spine with myelopathy    Pneumonia    Stridor      Procedures Performed  Procedure(s):  Cervical 3 to cervical 6 laminectomies and posterior spinal fusion - Bilateral       Complications: None    Consultants:   Consults       Date and Time Order Name Status Description    6/29/2024 10:38 AM Inpatient Pulmonology Consult      6/29/2024  9:37 AM Inpatient Hospitalist Consult Completed     6/27/2024 12:51 PM Inpatient Urology Consult Completed     6/26/2024 10:45 AM Inpatient Physical Medicine Rehab Consult      6/26/2024  9:34 AM Inpatient Physical Medicine Rehab Consult              Condition on Discharge: Improved    Discharge disposition: Acute rehab    Brief HPI: 69-year-old male who was previously seen in clinic for cervical stenosis and myelopathy.  He had weakness in his handgrip and decreased sensation in hands as well as instability while walking.  Symptoms progressively worsened.  The risks and benefits of a posterior cervical decompression fusion were discussed including but not limited to the risk of infection, hemorrhage, CSF leak and spinal cord injury.  He expressed understanding of the risks and benefits and elected to proceed.    Hospital Course: Patient admitted for above procedure. The procedure itself was without complication. The patient  was transferred to ICU following recovery where he has done very well.  He developed postoperative urinary retention in which a Eckert catheter was placed.  Catheter was discontinued prior to discharge and he is voiding.  He was evaluated by physical therapy who recommended rehab at discharge.    Discharge Physical Exam:    Temp:  [97.7 °F (36.5 °C)-98.3 °F (36.8 °C)] 97.7 °F (36.5 °C)  Heart Rate:  [70-79] 77  Resp:  [12-18] 18  BP: (133-156)/(73-92) 133/73    Current labs:  Lab Results (last 24 hours)       Procedure Component Value Units Date/Time    POC Glucose Once [[id number]]  (Abnormal) Collected: 07/01/24 1210    Specimen: Blood Updated: 07/01/24 1211     Glucose 144 mg/dL     POC Glucose Once [[id number]]  (Normal) Collected: 07/01/24 1759    Specimen: Blood Updated: 07/01/24 1801     Glucose 102 mg/dL     POC Glucose Once [[id number]]  (Normal) Collected: 07/02/24 0004    Specimen: Blood Updated: 07/02/24 0006     Glucose 117 mg/dL     POC Glucose Once [[id number]]  (Normal) Collected: 07/02/24 0608    Specimen: Blood Updated: 07/02/24 0609     Glucose 99 mg/dL               General Appearance No acute distress   HEENT NC/AT   Neurological Awake, Alert, and oriented x 3   Cranial nerves CN II-XII intact   Motor Strength 5/5 throughout, tone normal   Sensory Intact to light touch upper and lower extremities   Gait and station Ambulating independently with walker   Neck Supple, trachea midline, no carotid bruit, posterior incision with overlying dressing CDI   Extremities Moves all extremities well, no edema, no cyanosis, no redness         Discharge Medications  RHODA has been reviewed and narcotic consent is on file in the patient's chart.    Start taking:  amoxicillin-clavulanate (AUGMENTIN) 875-125 MG per tablet   Take 1 tablet by mouth Every 12 (Twelve) Hours for 2 doses. Indications: Pneumonia, Starting Tue 7/2/2024, Until Wed 7/3/2024, No Print     Your medication list        START taking these  medications        Instructions Last Dose Given Next Dose Due   bisacodyl 5 MG EC tablet  Commonly known as: DULCOLAX      Take 1 tablet by mouth Daily As Needed for Constipation (Use if polyethylene glycol is ineffective).       bisacodyl 10 MG suppository  Commonly known as: DULCOLAX      Insert 1 suppository into the rectum Daily As Needed for Constipation (Use if bisacodyl oral is ineffective).       polyethylene glycol 17 g packet  Commonly known as: MIRALAX      Take 17 g by mouth Daily As Needed (Use if senna-docusate is ineffective).       polyethylene glycol 17 g packet  Commonly known as: MIRALAX      Take 17 g by mouth Daily.       sennosides-docusate 8.6-50 MG per tablet  Commonly known as: PERICOLACE      Take 2 tablets by mouth 2 (Two) Times a Day As Needed for Constipation.       sennosides-docusate 8.6-50 MG per tablet  Commonly known as: PERICOLACE      Take 2 tablets by mouth Every Night.              CHANGE how you take these medications        Instructions Last Dose Given Next Dose Due   doxazosin 4 MG tablet  Commonly known as: CARDURA  What changed: when to take this      TAKE 1 TABLET BY MOUTH EVERY DAY AT NIGHT       escitalopram 20 MG tablet  Commonly known as: LEXAPRO  What changed: when to take this      Take 1 tablet by mouth Daily.       Toujeo SoloStar 300 UNIT/ML solution pen-injector injection  Generic drug: Insulin Glargine (1 Unit Dial)  What changed: See the new instructions.      INJECT 65 UNITS UNDER THE SKIN DAILY              CONTINUE taking these medications        Instructions Last Dose Given Next Dose Due   Adult Aspirin Regimen 81 MG EC tablet  Generic drug: aspirin      Take 1 tablet by mouth Daily.       ARIPiprazole 5 MG tablet  Commonly known as: ABILIFY      Take 1 tablet by mouth Daily.       carvedilol 25 MG tablet  Commonly known as: COREG      Take 0.5 tablets by mouth 2 (Two) Times a Day With Meals.       clonazePAM 0.5 MG tablet  Commonly known as: KlonoPIN       Take 1 tablet by mouth Daily As Needed for Anxiety.       clopidogrel 75 MG tablet  Commonly known as: PLAVIX      TAKE 1 TABLET BY MOUTH EVERY DAY       Dexcom G7 Sensor misc      Use.       dorzolamide-timolol 2-0.5 % ophthalmic solution  Commonly known as: COSOPT      Administer 1 drop to both eyes 2 (Two) Times a Day.       esomeprazole 40 MG capsule  Commonly known as: nexIUM      Take 1 capsule by mouth Every Morning Before Breakfast.       Evolocumab solution auto-injector SureClick injection  Commonly known as: REPATHA      Inject 1 mL under the skin into the appropriate area as directed Every 14 (Fourteen) Days.       famotidine 20 MG tablet  Commonly known as: PEPCID      Take 1 tablet by mouth Every Evening.       gabapentin 300 MG capsule  Commonly known as: NEURONTIN      Take 1 capsule by mouth Every Night.       glucose blood test strip      Use to check blood sugars 1-2 times a day. E11.65       glucose monitor monitoring kit      Use 1 each Take As Directed. Use to check blood sugars 1-2 times a day. E11.65       HYDROcodone-acetaminophen 5-325 MG per tablet  Commonly known as: NORCO      Take 1 tablet by mouth Every 4 (Four) Hours As Needed for Moderate Pain.       Ingrezza 60 MG capsule  Generic drug: Valbenazine Tosylate      Take 1 capsule by mouth Daily.       Insulin Pen Needle 31G X 4 MM misc      Use 1 each Daily.       BD Pen Needle Marissa 2nd Gen 32G X 4 MM misc  Generic drug: Insulin Pen Needle      Inject 1 each under the skin into the appropriate area as directed 3 (Three) Times a Day.       Lancets misc      Use to check blood sugars 1-2 times a day. E11.65       latanoprost 0.005 % ophthalmic solution  Commonly known as: XALATAN      Administer 1 drop to both eyes Every Night.       metFORMIN 500 MG tablet  Commonly known as: GLUCOPHAGE      Take 2 tablets by mouth Daily With Breakfast. Indications: start in 48 hours       methocarbamol 500 MG tablet  Commonly known as: Robaxin       Take 1 tablet by mouth As Needed for Muscle Spasms (Take as needed for pain).       pioglitazone 15 MG tablet  Commonly known as: ACTOS      Take 1 tablet by mouth Daily.       rosuvastatin 20 MG tablet  Commonly known as: CRESTOR      Take 2 tablets by mouth Daily.       Semaglutide (2 MG/DOSE) 8 MG/3ML solution pen-injector  Commonly known as: OZEMPIC      Inject 2 mg under the skin into the appropriate area as directed 1 (One) Time Per Week. Pt verbalizes understanding can't take till after surgery       sildenafil 100 MG tablet  Commonly known as: VIAGRA      Take 1 tablet by mouth Daily As Needed for Erectile Dysfunction. Take 30 minutes to 4 hours prior to sexual activity.   To hold 48 hours prior to surgery                 Where to Get Your Medications        Information about where to get these medications is not yet available    Ask your nurse or doctor about these medications  bisacodyl 10 MG suppository  bisacodyl 5 MG EC tablet  dorzolamide-timolol 2-0.5 % ophthalmic solution  latanoprost 0.005 % ophthalmic solution  methocarbamol 500 MG tablet  polyethylene glycol 17 g packet  polyethylene glycol 17 g packet  Semaglutide (2 MG/DOSE) 8 MG/3ML solution pen-injector  sennosides-docusate 8.6-50 MG per tablet  sennosides-docusate 8.6-50 MG per tablet  sildenafil 100 MG tablet         Discharge Diet: regular         Diet Order   Procedures    Diet: Diabetic; Consistent Carbohydrate; Fluid Consistency: Thin (IDDSI 0)       Activity at Discharge:     Posterior Cervical Discectomy with Fusion    Post-Operative Instructions    1. You should have a post-operative visit scheduled with the Physician Assistant or Nurse Practitioner for approximately 2 to 2 ½ weeks after surgery. If you do not have an appointment, call the office when you return home to schedule one. You will remain off work at least until your first visit.    2. No lifting overhead, although you may place your arms above your head. DO NOT lift  anything over five (5) pounds. Walking is allowed and encouraged. There are no restrictions on sitting or climbing stairs, but refrain from overly strenuous activity. Do not drive until the first visit, although you may be driven. In general, activity restrictions will be lessened following the first visit.    3. Refrain from any sexual activity until after your first evaluation with the Physician Assistant or Nurse Practitioner.    4. You may receive a cervical collar. Please wear this at all times until the first visit, except while in the shower. During showering, refrain from moving your neck from side to side or forward and backward.    5. Take your dressings off and leave them off after the first day at home. Keep the wound dry. IF you have staples/stiches clean your incision with peroxide three times daily. IF your incision is closed with glue, do not pick, scratch or rub the glue on the wound, so it does not loosen before the wound heals. Do not soak your wound in water until the glue film falls off. Avoid swimming or using a hot tub while the glue is in place. When you shower or bathe, let water run over the wound but do not rub. Pat the wound gently with a soft towel to dry. Avoid direct sunlight to the wound and do not use tanning beds or lamps with the glue film in place. Do not apply any cream, lotion, or ointment to the skin near the wound; it could loosen the glue before the wound heals. Do not apply any tape, sticky dressing, alcohol or Chloraprep to the glue site as these could loosen the glue.    6. You will leave the hospital with medications for pain and possibly oral antibiotics if indicated. These medications must be taken as prescribed only. If a refill of your pain medication is required, in order to have this medication refilled you must contact the office four days prior to the due date.    7. Notify the office if there are any problems, such as excessive neck or arm pain, persistent  "temperature of 100 degrees or greater that is not relieved by Tylenol. A small amount of bleeding from the incision during the first few days is not unusual. In addition, mild hoarseness of voice and mild difficulty swallowing are not unusual during the first two weeks. Please call the office if there is excessive bleeding, redness, heat or swelling around the incision or increased difficulty swallowing.      Call for: questions or concerns    Follow-up Appointments  Future Appointments   Date Time Provider Department Center   7/9/2024  2:00 PM Masha Cloud APRN [facility] LOU41 [facility]   9/9/2024  4:00 PM Levi Patten DO [facility]  [facility]   9/11/2024  2:30 PM Gwyn Patten Sr., MD [facility] EASPT [facility]   9/16/2024  2:30 PM [facility] US 1 BH [facility] US E UCE   10/1/2024 11:30 AM Ashley Martinez APRN [facility]  [facility]   12/19/2024 12:00 PM Hattie Foster APRN [facility] CD LCGKR [facility]      Follow-up Information       Gwyn Patten Sr., MD .    Specialties: Family Medicine, Urgent Care  Contact information:  [address]  [place] [id number] [phone]                               Test Results Pending at Discharge  Pending Labs       Order Current Status    Blood Culture - Blood, Arm, Left Preliminary result    Blood Culture - Blood, Arm, Right Preliminary result           None    I discussed the discharge instructions with patient, family, and nursing staff    MICHELLE Coughlin  07/02/24  11:27 EDT    35 min spent in reviewing records, discussion and examination of the patient and discussion with other members of the patient's medical team.    \"Dictated utilizing Dragon dictation\".      "

## 2024-07-02 NOTE — PROGRESS NOTES
Dedicated to Hospital Care    [phone]   LOS: 7 days     Name: Isaias Monaco  Age/Sex: 69 y.o. male  :  1955        PCP: Gwyn Patten Sr., MD  No chief complaint on file.     Subjective   Continues to improve today.  Denies new issues or complaints.  Rested decently overnight.  He still has not had a bowel movement yet but Eckert catheter was discontinued and he is voiding well without any issues or concerns  General: No Fever or Chills, Cardiac: No Chest Pain or Palpitations, Resp: No Cough or SOA, GI: No Nausea, Vomiting, or Diarrhea, and Other: No bleeding    albuterol, 2.5 mg, Nebulization, 4x Daily  amoxicillin-clavulanate, 1 tablet, Oral, Q12H  ARIPiprazole, 5 mg, Oral, Daily  aspirin, 81 mg, Oral, Daily  carvedilol, 12.5 mg, Oral, BID With Meals  clopidogrel, 75 mg, Oral, Daily  dorzolamide (TRUSOPT) 2 % 1 drop, timolol (TIMOPTIC) 0.5 % 1 drop for Cosopt 22.3-6.8 mg/mL, , Both Eyes, BID  escitalopram, 20 mg, Oral, QAM  gabapentin, 300 mg, Oral, Nightly  heparin (porcine), 5,000 Units, Subcutaneous, Q12H  insulin glargine, 30 Units, Subcutaneous, Daily  insulin regular, 2-7 Units, Subcutaneous, Q6H  latanoprost, 1 drop, Both Eyes, Nightly  pantoprazole, 40 mg, Oral, Q AM  polyethylene glycol, 17 g, Oral, Daily  senna-docusate sodium, 2 tablet, Oral, Nightly  terazosin, 5 mg, Oral, Nightly           Objective   Vital Signs  Temp:  [97.7 °F (36.5 °C)-98.3 °F (36.8 °C)] 97.7 °F (36.5 °C)  Heart Rate:  [70-79] 77  Resp:  [14-18] 18  BP: (133-156)/(73-92) 133/73  Body mass index is 31.31 kg/m².    Intake/Output Summary (Last 24 hours) at 2024 1300  Last data filed at 2024 1100  Gross per 24 hour   Intake 100 ml   Output 1350 ml   Net -1250 ml       Physical Exam  Vitals and nursing note reviewed.   Constitutional:       Appearance: He is ill-appearing.   Cardiovascular:      Rate and Rhythm: Regular rhythm. Tachycardia present.   Pulmonary:      Effort: No respiratory distress.      Breath  sounds: Normal breath sounds.   Abdominal:      General: Bowel sounds are normal.      Palpations: Abdomen is soft.   Neurological:      Mental Status: He is alert and oriented to person, place, and time.      Comments: Strength in bilateral upper and lower extremities continues to improve daily.  He is able to raise his legs against some resistance today and is even stronger in upper extremities than yesterday.           Results Review:       I reviewed the patient's new clinical results.  Results from last 7 days   Lab Units 07/01/24 0440 06/30/24 0613 06/28/24 0305 06/26/24 0223   WBC 10*3/mm3 10.50 11.69* 12.20* 11.42*   HEMOGLOBIN g/dL 11.6* 12.7* 13.0 15.0   PLATELETS 10*3/mm3 197 192 172 179     Results from last 7 days   Lab Units 07/01/24 0440 06/30/24 0613 06/28/24 0305 06/26/24 0223   SODIUM mmol/L 134* 133* 134* 135*   POTASSIUM mmol/L 3.5 3.8 3.8 4.2   CHLORIDE mmol/L 99 100 102 104   CO2 mmol/L 25.7 25.0 24.0 21.0*   BUN mg/dL 15 14 15 10   CREATININE mg/dL 1.13 1.12 1.18 0.85   CALCIUM mg/dL 8.8 9.1 8.4* 8.8   MAGNESIUM mg/dL 1.9  --   --   --    PHOSPHORUS mg/dL 3.2  --   --   --    Estimated Creatinine Clearance: 88.1 mL/min (by C-G formula based on SCr of 1.13 mg/dL).      Assessment & Plan   Active Hospital Problems    Diagnosis  POA    **Preop examination [Z01.818]  Not Applicable    Pneumonia [J18.9]  Unknown    Stridor [R06.1]  Unknown    Stenosis of cervical spine with myelopathy [M48.02, G99.2]  Yes    Essential hypertension [I10]  Yes    Type 2 diabetes mellitus with microalbuminuria, with long-term current use of insulin [E11.29, R80.9, Z79.4]  Not Applicable    S/P primary angioplasty with coronary stent [Z95.5]  Not Applicable      Resolved Hospital Problems   No resolved problems to display.       PLAN  This is a 69-year-old gentleman with a history of hypertension type 2 diabetes hyperlipidemia and obesity as well as underlying chronic spinal stenosis and new development of  myelopathy who presented for cervical laminectomy and fusion.  He had a rather complicated procedure and postoperative recovery and [facility] was consulted yesterday for chest pain stridor and low sodium levels.  -From a pulmonary standpoint things seem to be improving.  Cleared for discharge by pulmonary  -Orders written for Augmentin and will complete antibiotics today  -Electrolytes are stable today other than his low sodium levels.  Urine sodium uric acid and urine osmolality have been ordered.  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  -Voiding trial successful  -Placed on scheduled senna and MiraLAX would like to see him have a bowel movement prior to discharge but this is not a absolute requirement.  -Subcu heparin for DVT prophylaxis  -Full code      Disposition  Expected Discharge Date: 7/2/2024; Expected Discharge Time:    No medical contraindication for discharge today    Anton Carvalho MD  [facility]  07/02/24  13:00 EDT

## 2024-07-02 NOTE — CASE MANAGEMENT/SOCIAL WORK
Case Management Discharge Note      Final Note: [facility] per [facility]    Provided Post Acute Provider List?: N/A  N/A Provider List Comment: Family identified facilities to send referrals    Selected Continued Care - Admitted Since 6/25/2024       Destination    No services have been selected for the patient.                Durable Medical Equipment    No services have been selected for the patient.                Dialysis/Infusion    No services have been selected for the patient.                Home Medical Care    No services have been selected for the patient.                Therapy    No services have been selected for the patient.                Community Resources    No services have been selected for the patient.                Community & DME    No services have been selected for the patient.                    Transportation Services  Ambulance: [facility]    Final Discharge Disposition Code: 62 - inpatient rehab facility

## 2024-07-02 NOTE — PLAN OF CARE
Goal Outcome Evaluation:              Outcome Evaluation: Clinical swallow evaluation completed. Orders received this date at 0833. Planned discharge to acute in-patient rehab this date. Wife at bedside. No difficulties swallowing but decreased appetite, per pt and wife. Cervical collar in place. Pt reporting pain near surgical site when in upright position therefore evaluation completed in semi-upright position to reduce pain. No overt s/s of aspiration with ice chips, thins via spoon/cup/straw, puree, soft/mixed solids, or regular solids. Recommend patient continue regular diet with thin liquids. Meds as tolerated. Sitting upright, slow rate, small bites/sips. With difficulties swallowing or concern for aspiration pneumonia, consider VFSS at next level of care or as outpatient.

## 2024-07-04 LAB
BACTERIA SPEC AEROBE CULT: NORMAL
BACTERIA SPEC AEROBE CULT: NORMAL

## 2024-07-06 DIAGNOSIS — G24.4 MEIGE SYNDROME (BLEPHAROSPASM WITH OROMANDIBULAR DYSTONIA): ICD-10-CM

## 2024-07-07 RX ORDER — ESCITALOPRAM OXALATE 20 MG/1
20 TABLET ORAL DAILY
Qty: 90 TABLET | Refills: 3 | Status: SHIPPED | OUTPATIENT
Start: 2024-07-07

## 2024-07-19 ENCOUNTER — HOSPITAL ENCOUNTER (INPATIENT)
Facility: HOSPITAL | Age: 69
LOS: 4 days | Discharge: HOME | DRG: 872 | End: 2024-07-24
Attending: EMERGENCY MEDICINE | Admitting: INTERNAL MEDICINE
Payer: MEDICARE

## 2024-07-19 DIAGNOSIS — A41.9 SEPSIS WITHOUT ACUTE ORGAN DYSFUNCTION, DUE TO UNSPECIFIED ORGANISM: Primary | ICD-10-CM

## 2024-07-19 LAB
ALBUMIN SERPL-MCNC: 3.7 G/DL (ref 3.5–5.2)
ALBUMIN/GLOB SERPL: 1 G/DL
ALP SERPL-CCNC: 104 U/L (ref 39–117)
ALT SERPL W P-5'-P-CCNC: 37 U/L (ref 1–41)
ANION GAP SERPL CALCULATED.3IONS-SCNC: 10.3 MMOL/L (ref 5–15)
AST SERPL-CCNC: 36 U/L (ref 1–40)
BASOPHILS # BLD AUTO: 0.02 10*3/MM3 (ref 0–0.2)
BASOPHILS NFR BLD AUTO: 0.1 % (ref 0–1.5)
BILIRUB SERPL-MCNC: 0.8 MG/DL (ref 0–1.2)
BUN SERPL-MCNC: 9 MG/DL (ref 8–23)
BUN/CREAT SERPL: 8.3 (ref 7–25)
CALCIUM SPEC-SCNC: 9.1 MG/DL (ref 8.6–10.5)
CHLORIDE SERPL-SCNC: 98 MMOL/L (ref 98–107)
CO2 SERPL-SCNC: 22.7 MMOL/L (ref 22–29)
CREAT SERPL-MCNC: 1.09 MG/DL (ref 0.76–1.27)
D-LACTATE SERPL-SCNC: 1.1 MMOL/L (ref 0.5–2)
DEPRECATED RDW RBC AUTO: 51.4 FL (ref 37–54)
EGFRCR SERPLBLD CKD-EPI 2021: 73.5 ML/MIN/1.73
EOSINOPHIL # BLD AUTO: 0.09 10*3/MM3 (ref 0–0.4)
EOSINOPHIL NFR BLD AUTO: 0.6 % (ref 0.3–6.2)
ERYTHROCYTE [DISTWIDTH] IN BLOOD BY AUTOMATED COUNT: 15.8 % (ref 12.3–15.4)
GLOBULIN UR ELPH-MCNC: 3.8 GM/DL
GLUCOSE SERPL-MCNC: 233 MG/DL (ref 65–99)
HCT VFR BLD AUTO: 41.3 % (ref 37.5–51)
HGB BLD-MCNC: 13.4 G/DL (ref 13–17.7)
IMM GRANULOCYTES # BLD AUTO: 0.06 10*3/MM3 (ref 0–0.05)
IMM GRANULOCYTES NFR BLD AUTO: 0.4 % (ref 0–0.5)
LYMPHOCYTES # BLD AUTO: 1.88 10*3/MM3 (ref 0.7–3.1)
LYMPHOCYTES NFR BLD AUTO: 11.7 % (ref 19.6–45.3)
MCH RBC QN AUTO: 28.2 PG (ref 26.6–33)
MCHC RBC AUTO-ENTMCNC: 32.4 G/DL (ref 31.5–35.7)
MCV RBC AUTO: 86.8 FL (ref 79–97)
MONOCYTES # BLD AUTO: 1.94 10*3/MM3 (ref 0.1–0.9)
MONOCYTES NFR BLD AUTO: 12.1 % (ref 5–12)
NEUTROPHILS NFR BLD AUTO: 12.09 10*3/MM3 (ref 1.7–7)
NEUTROPHILS NFR BLD AUTO: 75.1 % (ref 42.7–76)
PLATELET # BLD AUTO: 280 10*3/MM3 (ref 140–450)
PMV BLD AUTO: 10.5 FL (ref 6–12)
POTASSIUM SERPL-SCNC: 4 MMOL/L (ref 3.5–5.2)
PROT SERPL-MCNC: 7.5 G/DL (ref 6–8.5)
RBC # BLD AUTO: 4.76 10*6/MM3 (ref 4.14–5.8)
SODIUM SERPL-SCNC: 131 MMOL/L (ref 136–145)
WBC NRBC COR # BLD AUTO: 16.08 10*3/MM3 (ref 3.4–10.8)

## 2024-07-19 PROCEDURE — 85025 COMPLETE CBC W/AUTO DIFF WBC: CPT

## 2024-07-19 PROCEDURE — 99285 EMERGENCY DEPT VISIT HI MDM: CPT

## 2024-07-19 PROCEDURE — 83605 ASSAY OF LACTIC ACID: CPT

## 2024-07-19 PROCEDURE — 80053 COMPREHEN METABOLIC PANEL: CPT

## 2024-07-20 ENCOUNTER — APPOINTMENT (OUTPATIENT)
Dept: CT IMAGING | Facility: HOSPITAL | Age: 69
DRG: 872 | End: 2024-07-20
Payer: MEDICARE

## 2024-07-20 ENCOUNTER — APPOINTMENT (OUTPATIENT)
Dept: CARDIOLOGY | Facility: HOSPITAL | Age: 69
DRG: 872 | End: 2024-07-20
Payer: MEDICARE

## 2024-07-20 ENCOUNTER — APPOINTMENT (OUTPATIENT)
Dept: GENERAL RADIOLOGY | Facility: HOSPITAL | Age: 69
DRG: 872 | End: 2024-07-20
Payer: MEDICARE

## 2024-07-20 ENCOUNTER — APPOINTMENT (OUTPATIENT)
Dept: MRI IMAGING | Facility: HOSPITAL | Age: 69
DRG: 872 | End: 2024-07-20
Payer: MEDICARE

## 2024-07-20 PROBLEM — A41.9 SEPSIS: Status: ACTIVE | Noted: 2024-07-20

## 2024-07-20 PROBLEM — Z98.1 STATUS POST CERVICAL SPINAL FUSION: Status: ACTIVE | Noted: 2024-07-20

## 2024-07-20 PROBLEM — R10.33 PERIUMBILICAL ABDOMINAL PAIN: Status: ACTIVE | Noted: 2024-07-20

## 2024-07-20 LAB
BACTERIA UR QL AUTO: NORMAL /HPF
BH CV LOWER VASCULAR LEFT COMMON FEMORAL AUGMENT: NORMAL
BH CV LOWER VASCULAR LEFT COMMON FEMORAL COMPETENT: NORMAL
BH CV LOWER VASCULAR LEFT COMMON FEMORAL COMPRESS: NORMAL
BH CV LOWER VASCULAR LEFT COMMON FEMORAL PHASIC: NORMAL
BH CV LOWER VASCULAR LEFT COMMON FEMORAL SPONT: NORMAL
BH CV LOWER VASCULAR LEFT DISTAL FEMORAL COMPRESS: NORMAL
BH CV LOWER VASCULAR LEFT GASTRONEMIUS COMPRESS: NORMAL
BH CV LOWER VASCULAR LEFT GREATER SAPH AK COMPRESS: NORMAL
BH CV LOWER VASCULAR LEFT GREATER SAPH BK COMPRESS: NORMAL
BH CV LOWER VASCULAR LEFT LESSER SAPH COMPRESS: NORMAL
BH CV LOWER VASCULAR LEFT MID FEMORAL AUGMENT: NORMAL
BH CV LOWER VASCULAR LEFT MID FEMORAL COMPETENT: NORMAL
BH CV LOWER VASCULAR LEFT MID FEMORAL COMPRESS: NORMAL
BH CV LOWER VASCULAR LEFT MID FEMORAL PHASIC: NORMAL
BH CV LOWER VASCULAR LEFT MID FEMORAL SPONT: NORMAL
BH CV LOWER VASCULAR LEFT PERONEAL COMPRESS: NORMAL
BH CV LOWER VASCULAR LEFT POPLITEAL AUGMENT: NORMAL
BH CV LOWER VASCULAR LEFT POPLITEAL COMPETENT: NORMAL
BH CV LOWER VASCULAR LEFT POPLITEAL COMPRESS: NORMAL
BH CV LOWER VASCULAR LEFT POPLITEAL PHASIC: NORMAL
BH CV LOWER VASCULAR LEFT POPLITEAL SPONT: NORMAL
BH CV LOWER VASCULAR LEFT POSTERIOR TIBIAL COMPRESS: NORMAL
BH CV LOWER VASCULAR LEFT PROFUNDA FEMORAL COMPRESS: NORMAL
BH CV LOWER VASCULAR LEFT PROXIMAL FEMORAL COMPRESS: NORMAL
BH CV LOWER VASCULAR LEFT SAPHENOFEMORAL JUNCTION COMPRESS: NORMAL
BH CV LOWER VASCULAR RIGHT COMMON FEMORAL AUGMENT: NORMAL
BH CV LOWER VASCULAR RIGHT COMMON FEMORAL COMPETENT: NORMAL
BH CV LOWER VASCULAR RIGHT COMMON FEMORAL COMPRESS: NORMAL
BH CV LOWER VASCULAR RIGHT COMMON FEMORAL PHASIC: NORMAL
BH CV LOWER VASCULAR RIGHT COMMON FEMORAL SPONT: NORMAL
BH CV LOWER VASCULAR RIGHT DISTAL FEMORAL COMPRESS: NORMAL
BH CV LOWER VASCULAR RIGHT GASTRONEMIUS COMPRESS: NORMAL
BH CV LOWER VASCULAR RIGHT GREATER SAPH AK COMPRESS: NORMAL
BH CV LOWER VASCULAR RIGHT GREATER SAPH BK COMPRESS: NORMAL
BH CV LOWER VASCULAR RIGHT LESSER SAPH COMPRESS: NORMAL
BH CV LOWER VASCULAR RIGHT MID FEMORAL AUGMENT: NORMAL
BH CV LOWER VASCULAR RIGHT MID FEMORAL COMPETENT: NORMAL
BH CV LOWER VASCULAR RIGHT MID FEMORAL COMPRESS: NORMAL
BH CV LOWER VASCULAR RIGHT MID FEMORAL PHASIC: NORMAL
BH CV LOWER VASCULAR RIGHT MID FEMORAL SPONT: NORMAL
BH CV LOWER VASCULAR RIGHT PERONEAL COMPRESS: NORMAL
BH CV LOWER VASCULAR RIGHT POPLITEAL AUGMENT: NORMAL
BH CV LOWER VASCULAR RIGHT POPLITEAL COMPETENT: NORMAL
BH CV LOWER VASCULAR RIGHT POPLITEAL COMPRESS: NORMAL
BH CV LOWER VASCULAR RIGHT POPLITEAL PHASIC: NORMAL
BH CV LOWER VASCULAR RIGHT POPLITEAL SPONT: NORMAL
BH CV LOWER VASCULAR RIGHT POSTERIOR TIBIAL COMPRESS: NORMAL
BH CV LOWER VASCULAR RIGHT PROFUNDA FEMORAL COMPRESS: NORMAL
BH CV LOWER VASCULAR RIGHT PROXIMAL FEMORAL COMPRESS: NORMAL
BH CV LOWER VASCULAR RIGHT SAPHENOFEMORAL JUNCTION COMPRESS: NORMAL
BILIRUB UR QL STRIP: NEGATIVE
CLARITY UR: CLEAR
COLOR UR: YELLOW
CRP SERPL-MCNC: 17.3 MG/DL (ref 0–0.5)
ERYTHROCYTE [SEDIMENTATION RATE] IN BLOOD: 86 MM/HR (ref 0–20)
FLUAV SUBTYP SPEC NAA+PROBE: NOT DETECTED
FLUBV RNA ISLT QL NAA+PROBE: NOT DETECTED
GLUCOSE BLDC GLUCOMTR-MCNC: 128 MG/DL (ref 70–130)
GLUCOSE BLDC GLUCOMTR-MCNC: 140 MG/DL (ref 70–130)
GLUCOSE BLDC GLUCOMTR-MCNC: 166 MG/DL (ref 70–130)
GLUCOSE BLDC GLUCOMTR-MCNC: 190 MG/DL (ref 70–130)
GLUCOSE UR STRIP-MCNC: ABNORMAL MG/DL
HGB UR QL STRIP.AUTO: NEGATIVE
HOLD SPECIMEN: NORMAL
HYALINE CASTS UR QL AUTO: NORMAL /LPF
KETONES UR QL STRIP: ABNORMAL
LEUKOCYTE ESTERASE UR QL STRIP.AUTO: NEGATIVE
NITRITE UR QL STRIP: NEGATIVE
PH UR STRIP.AUTO: 6.5 [PH] (ref 5–8)
PROCALCITONIN SERPL-MCNC: 0.49 NG/ML (ref 0–0.25)
PROT UR QL STRIP: ABNORMAL
RBC # UR STRIP: NORMAL /HPF
REF LAB TEST METHOD: NORMAL
SARS-COV-2 RNA RESP QL NAA+PROBE: NOT DETECTED
SP GR UR STRIP: 1.02 (ref 1–1.03)
SQUAMOUS #/AREA URNS HPF: NORMAL /HPF
UROBILINOGEN UR QL STRIP: ABNORMAL
WBC # UR STRIP: NORMAL /HPF

## 2024-07-20 PROCEDURE — 85652 RBC SED RATE AUTOMATED: CPT | Performed by: INTERNAL MEDICINE

## 2024-07-20 PROCEDURE — 25810000003 SODIUM CHLORIDE 0.9 % SOLUTION: Performed by: INTERNAL MEDICINE

## 2024-07-20 PROCEDURE — 93970 EXTREMITY STUDY: CPT | Performed by: SURGERY

## 2024-07-20 PROCEDURE — 99221 1ST HOSP IP/OBS SF/LOW 40: CPT | Performed by: SURGERY

## 2024-07-20 PROCEDURE — 25810000003 SODIUM CHLORIDE 0.9 % SOLUTION: Performed by: EMERGENCY MEDICINE

## 2024-07-20 PROCEDURE — 84145 PROCALCITONIN (PCT): CPT | Performed by: INTERNAL MEDICINE

## 2024-07-20 PROCEDURE — 25010000002 CEFEPIME PER 500 MG: Performed by: INTERNAL MEDICINE

## 2024-07-20 PROCEDURE — 25010000002 CEFTRIAXONE PER 250 MG: Performed by: EMERGENCY MEDICINE

## 2024-07-20 PROCEDURE — 25010000002 VANCOMYCIN 10 G RECONSTITUTED SOLUTION: Performed by: INTERNAL MEDICINE

## 2024-07-20 PROCEDURE — 86140 C-REACTIVE PROTEIN: CPT | Performed by: INTERNAL MEDICINE

## 2024-07-20 PROCEDURE — 81001 URINALYSIS AUTO W/SCOPE: CPT | Performed by: EMERGENCY MEDICINE

## 2024-07-20 PROCEDURE — 93970 EXTREMITY STUDY: CPT

## 2024-07-20 PROCEDURE — 0 GADOBENATE DIMEGLUMINE 529 MG/ML SOLUTION: Performed by: INTERNAL MEDICINE

## 2024-07-20 PROCEDURE — 72156 MRI NECK SPINE W/O & W/DYE: CPT

## 2024-07-20 PROCEDURE — 71045 X-RAY EXAM CHEST 1 VIEW: CPT

## 2024-07-20 PROCEDURE — 99284 EMERGENCY DEPT VISIT MOD MDM: CPT | Performed by: EMERGENCY MEDICINE

## 2024-07-20 PROCEDURE — 74177 CT ABD & PELVIS W/CONTRAST: CPT

## 2024-07-20 PROCEDURE — 99223 1ST HOSP IP/OBS HIGH 75: CPT | Performed by: STUDENT IN AN ORGANIZED HEALTH CARE EDUCATION/TRAINING PROGRAM

## 2024-07-20 PROCEDURE — 87040 BLOOD CULTURE FOR BACTERIA: CPT | Performed by: EMERGENCY MEDICINE

## 2024-07-20 PROCEDURE — 25510000001 IOPAMIDOL PER 1 ML: Performed by: HOSPITALIST

## 2024-07-20 PROCEDURE — 70491 CT SOFT TISSUE NECK W/DYE: CPT

## 2024-07-20 PROCEDURE — 25510000001 IOPAMIDOL 61 % SOLUTION: Performed by: INTERNAL MEDICINE

## 2024-07-20 PROCEDURE — A9577 INJ MULTIHANCE: HCPCS | Performed by: INTERNAL MEDICINE

## 2024-07-20 PROCEDURE — 87636 SARSCOV2 & INF A&B AMP PRB: CPT | Performed by: EMERGENCY MEDICINE

## 2024-07-20 PROCEDURE — 82948 REAGENT STRIP/BLOOD GLUCOSE: CPT

## 2024-07-20 RX ORDER — ACETAMINOPHEN 650 MG/1
650 SUPPOSITORY RECTAL EVERY 4 HOURS PRN
Status: DISCONTINUED | OUTPATIENT
Start: 2024-07-20 | End: 2024-07-24 | Stop reason: HOSPADM

## 2024-07-20 RX ORDER — ONDANSETRON 2 MG/ML
4 INJECTION INTRAMUSCULAR; INTRAVENOUS EVERY 6 HOURS PRN
Status: DISCONTINUED | OUTPATIENT
Start: 2024-07-20 | End: 2024-07-24 | Stop reason: HOSPADM

## 2024-07-20 RX ORDER — CARVEDILOL 25 MG/1
25 TABLET ORAL 2 TIMES DAILY WITH MEALS
Status: DISCONTINUED | OUTPATIENT
Start: 2024-07-20 | End: 2024-07-24 | Stop reason: HOSPADM

## 2024-07-20 RX ORDER — ESCITALOPRAM OXALATE 20 MG/1
20 TABLET ORAL DAILY
Status: DISCONTINUED | OUTPATIENT
Start: 2024-07-20 | End: 2024-07-24 | Stop reason: HOSPADM

## 2024-07-20 RX ORDER — METHOCARBAMOL 500 MG/1
500 TABLET, FILM COATED ORAL EVERY 8 HOURS PRN
Status: DISCONTINUED | OUTPATIENT
Start: 2024-07-20 | End: 2024-07-24 | Stop reason: HOSPADM

## 2024-07-20 RX ORDER — ASPIRIN 81 MG/1
81 TABLET ORAL DAILY
Status: DISCONTINUED | OUTPATIENT
Start: 2024-07-20 | End: 2024-07-24 | Stop reason: HOSPADM

## 2024-07-20 RX ORDER — BISACODYL 10 MG
10 SUPPOSITORY, RECTAL RECTAL DAILY PRN
Status: DISCONTINUED | OUTPATIENT
Start: 2024-07-20 | End: 2024-07-24 | Stop reason: HOSPADM

## 2024-07-20 RX ORDER — SODIUM CHLORIDE 9 MG/ML
40 INJECTION, SOLUTION INTRAVENOUS AS NEEDED
Status: DISCONTINUED | OUTPATIENT
Start: 2024-07-20 | End: 2024-07-24 | Stop reason: HOSPADM

## 2024-07-20 RX ORDER — ARIPIPRAZOLE 5 MG/1
5 TABLET ORAL DAILY
Status: DISCONTINUED | OUTPATIENT
Start: 2024-07-20 | End: 2024-07-24 | Stop reason: HOSPADM

## 2024-07-20 RX ORDER — OXYCODONE AND ACETAMINOPHEN 7.5; 325 MG/1; MG/1
1 TABLET ORAL EVERY 4 HOURS PRN
Status: ON HOLD | COMMUNITY
End: 2024-07-24 | Stop reason: SDUPTHER

## 2024-07-20 RX ORDER — DEXTROSE MONOHYDRATE 25 G/50ML
25 INJECTION, SOLUTION INTRAVENOUS
Status: DISCONTINUED | OUTPATIENT
Start: 2024-07-20 | End: 2024-07-24 | Stop reason: HOSPADM

## 2024-07-20 RX ORDER — CYCLOBENZAPRINE HCL 10 MG
10 TABLET ORAL 3 TIMES DAILY PRN
COMMUNITY

## 2024-07-20 RX ORDER — INSULIN LISPRO 100 [IU]/ML
2-9 INJECTION, SOLUTION INTRAVENOUS; SUBCUTANEOUS
Status: DISCONTINUED | OUTPATIENT
Start: 2024-07-20 | End: 2024-07-24 | Stop reason: HOSPADM

## 2024-07-20 RX ORDER — GABAPENTIN 300 MG/1
300 CAPSULE ORAL NIGHTLY
Status: DISCONTINUED | OUTPATIENT
Start: 2024-07-20 | End: 2024-07-24 | Stop reason: HOSPADM

## 2024-07-20 RX ORDER — BISACODYL 5 MG/1
5 TABLET, DELAYED RELEASE ORAL DAILY PRN
Status: DISCONTINUED | OUTPATIENT
Start: 2024-07-20 | End: 2024-07-24 | Stop reason: HOSPADM

## 2024-07-20 RX ORDER — SODIUM CHLORIDE 9 MG/ML
125 INJECTION, SOLUTION INTRAVENOUS CONTINUOUS
Status: DISCONTINUED | OUTPATIENT
Start: 2024-07-20 | End: 2024-07-21

## 2024-07-20 RX ORDER — NICOTINE POLACRILEX 4 MG
15 LOZENGE BUCCAL
Status: DISCONTINUED | OUTPATIENT
Start: 2024-07-20 | End: 2024-07-24 | Stop reason: HOSPADM

## 2024-07-20 RX ORDER — SODIUM CHLORIDE 0.9 % (FLUSH) 0.9 %
10 SYRINGE (ML) INJECTION AS NEEDED
Status: DISCONTINUED | OUTPATIENT
Start: 2024-07-20 | End: 2024-07-24 | Stop reason: HOSPADM

## 2024-07-20 RX ORDER — TERAZOSIN 5 MG/1
5 CAPSULE ORAL NIGHTLY
Status: DISCONTINUED | OUTPATIENT
Start: 2024-07-20 | End: 2024-07-24 | Stop reason: HOSPADM

## 2024-07-20 RX ORDER — LATANOPROST 50 UG/ML
1 SOLUTION/ DROPS OPHTHALMIC NIGHTLY
Status: DISCONTINUED | OUTPATIENT
Start: 2024-07-20 | End: 2024-07-24 | Stop reason: HOSPADM

## 2024-07-20 RX ORDER — CARVEDILOL 25 MG/1
25 TABLET ORAL 2 TIMES DAILY WITH MEALS
COMMUNITY

## 2024-07-20 RX ORDER — ACETAMINOPHEN 160 MG/5ML
650 SOLUTION ORAL EVERY 4 HOURS PRN
Status: DISCONTINUED | OUTPATIENT
Start: 2024-07-20 | End: 2024-07-24 | Stop reason: HOSPADM

## 2024-07-20 RX ORDER — ONDANSETRON 4 MG/1
4 TABLET, ORALLY DISINTEGRATING ORAL EVERY 6 HOURS PRN
Status: DISCONTINUED | OUTPATIENT
Start: 2024-07-20 | End: 2024-07-24 | Stop reason: HOSPADM

## 2024-07-20 RX ORDER — ACETAMINOPHEN 325 MG/1
650 TABLET ORAL EVERY 4 HOURS PRN
Status: DISCONTINUED | OUTPATIENT
Start: 2024-07-20 | End: 2024-07-24 | Stop reason: HOSPADM

## 2024-07-20 RX ORDER — OXYCODONE AND ACETAMINOPHEN 7.5; 325 MG/1; MG/1
1 TABLET ORAL EVERY 4 HOURS PRN
Status: DISCONTINUED | OUTPATIENT
Start: 2024-07-20 | End: 2024-07-24 | Stop reason: HOSPADM

## 2024-07-20 RX ORDER — SODIUM CHLORIDE 0.9 % (FLUSH) 0.9 %
10 SYRINGE (ML) INJECTION EVERY 12 HOURS SCHEDULED
Status: DISCONTINUED | OUTPATIENT
Start: 2024-07-20 | End: 2024-07-24 | Stop reason: HOSPADM

## 2024-07-20 RX ORDER — SENNOSIDES 8.6 MG
650 CAPSULE ORAL EVERY 4 HOURS PRN
COMMUNITY

## 2024-07-20 RX ORDER — PANTOPRAZOLE SODIUM 40 MG/1
40 TABLET, DELAYED RELEASE ORAL
Status: DISCONTINUED | OUTPATIENT
Start: 2024-07-20 | End: 2024-07-24 | Stop reason: HOSPADM

## 2024-07-20 RX ORDER — CLONAZEPAM 0.5 MG/1
0.5 TABLET ORAL 2 TIMES DAILY PRN
Status: DISCONTINUED | OUTPATIENT
Start: 2024-07-20 | End: 2024-07-24 | Stop reason: HOSPADM

## 2024-07-20 RX ORDER — ACETAMINOPHEN 325 MG/1
650 TABLET ORAL ONCE
Status: COMPLETED | OUTPATIENT
Start: 2024-07-20 | End: 2024-07-20

## 2024-07-20 RX ORDER — IBUPROFEN 600 MG/1
1 TABLET ORAL
Status: DISCONTINUED | OUTPATIENT
Start: 2024-07-20 | End: 2024-07-24 | Stop reason: HOSPADM

## 2024-07-20 RX ORDER — DORZOLAMIDE HYDROCHLORIDE AND TIMOLOL MALEATE 20; 5 MG/ML; MG/ML
SOLUTION/ DROPS OPHTHALMIC 2 TIMES DAILY
Status: DISCONTINUED | OUTPATIENT
Start: 2024-07-20 | End: 2024-07-24 | Stop reason: HOSPADM

## 2024-07-20 RX ORDER — VANCOMYCIN 2 GRAM/500 ML IN 0.9 % SODIUM CHLORIDE INTRAVENOUS
2000 ONCE
Status: COMPLETED | OUTPATIENT
Start: 2024-07-20 | End: 2024-07-20

## 2024-07-20 RX ORDER — POLYETHYLENE GLYCOL 3350 17 G/17G
17 POWDER, FOR SOLUTION ORAL DAILY PRN
Status: DISCONTINUED | OUTPATIENT
Start: 2024-07-20 | End: 2024-07-24 | Stop reason: HOSPADM

## 2024-07-20 RX ORDER — METRONIDAZOLE 500 MG/1
500 TABLET ORAL EVERY 8 HOURS SCHEDULED
Status: DISCONTINUED | OUTPATIENT
Start: 2024-07-20 | End: 2024-07-24 | Stop reason: HOSPADM

## 2024-07-20 RX ORDER — AMOXICILLIN 250 MG
2 CAPSULE ORAL 2 TIMES DAILY PRN
Status: DISCONTINUED | OUTPATIENT
Start: 2024-07-20 | End: 2024-07-24 | Stop reason: HOSPADM

## 2024-07-20 RX ADMIN — ARIPIPRAZOLE 5 MG: 5 TABLET ORAL at 10:51

## 2024-07-20 RX ADMIN — ACETAMINOPHEN 325MG 650 MG: 325 TABLET ORAL at 11:41

## 2024-07-20 RX ADMIN — CEFTRIAXONE SODIUM 1000 MG: 1 INJECTION, POWDER, FOR SOLUTION INTRAMUSCULAR; INTRAVENOUS at 04:22

## 2024-07-20 RX ADMIN — Medication 10 ML: at 21:31

## 2024-07-20 RX ADMIN — METRONIDAZOLE 500 MG: 500 TABLET, FILM COATED ORAL at 21:31

## 2024-07-20 RX ADMIN — CEFEPIME 2000 MG: 2 INJECTION, POWDER, FOR SOLUTION INTRAVENOUS at 10:00

## 2024-07-20 RX ADMIN — CARVEDILOL 25 MG: 25 TABLET, FILM COATED ORAL at 10:50

## 2024-07-20 RX ADMIN — SODIUM CHLORIDE 125 ML/HR: 9 INJECTION, SOLUTION INTRAVENOUS at 09:42

## 2024-07-20 RX ADMIN — SODIUM CHLORIDE 125 ML/HR: 9 INJECTION, SOLUTION INTRAVENOUS at 19:27

## 2024-07-20 RX ADMIN — GABAPENTIN 300 MG: 300 CAPSULE ORAL at 21:34

## 2024-07-20 RX ADMIN — IOPAMIDOL 85 ML: 612 INJECTION, SOLUTION INTRAVENOUS at 12:57

## 2024-07-20 RX ADMIN — CARVEDILOL 25 MG: 25 TABLET, FILM COATED ORAL at 18:05

## 2024-07-20 RX ADMIN — SODIUM CHLORIDE 125 ML/HR: 9 INJECTION, SOLUTION INTRAVENOUS at 02:53

## 2024-07-20 RX ADMIN — OXYCODONE AND ACETAMINOPHEN 1 TABLET: 7.5; 325 TABLET ORAL at 21:38

## 2024-07-20 RX ADMIN — VANCOMYCIN HYDROCHLORIDE 2000 MG: 10 INJECTION, POWDER, LYOPHILIZED, FOR SOLUTION INTRAVENOUS at 10:49

## 2024-07-20 RX ADMIN — IOPAMIDOL 100 ML: 755 INJECTION, SOLUTION INTRAVENOUS at 05:16

## 2024-07-20 RX ADMIN — CEFEPIME 2000 MG: 2 INJECTION, POWDER, FOR SOLUTION INTRAVENOUS at 18:06

## 2024-07-20 RX ADMIN — ESCITALOPRAM 20 MG: 20 TABLET, FILM COATED ORAL at 10:51

## 2024-07-20 RX ADMIN — LATANOPROST 1 DROP: 50 SOLUTION OPHTHALMIC at 21:31

## 2024-07-20 RX ADMIN — METRONIDAZOLE 500 MG: 500 TABLET, FILM COATED ORAL at 16:09

## 2024-07-20 RX ADMIN — TIMOLOL MALEATE: 5 SOLUTION/ DROPS OPHTHALMIC at 21:34

## 2024-07-20 RX ADMIN — ACETAMINOPHEN 325MG 650 MG: 325 TABLET ORAL at 02:53

## 2024-07-20 RX ADMIN — GADOBENATE DIMEGLUMINE 20 ML: 529 INJECTION, SOLUTION INTRAVENOUS at 14:29

## 2024-07-20 RX ADMIN — TIMOLOL MALEATE: 5 SOLUTION/ DROPS OPHTHALMIC at 10:52

## 2024-07-20 RX ADMIN — ASPIRIN 81 MG: 81 TABLET, COATED ORAL at 10:00

## 2024-07-20 RX ADMIN — Medication 10 ML: at 10:01

## 2024-07-20 RX ADMIN — TERAZOSIN HYDROCHLORIDE 5 MG: 5 CAPSULE ORAL at 21:31

## 2024-07-20 RX ADMIN — PANTOPRAZOLE SODIUM 40 MG: 40 TABLET, DELAYED RELEASE ORAL at 10:00

## 2024-07-20 NOTE — CONSULTS
"Referring Provider: Gwyn Patten Sr., MD  Reason for Consultation:     sepsis, uncertain source     Chief Complaint   Patient presents with    Fever     Pt had recent hospitalization at [facility] for cervical spine surgery and rehab, dx on 7/18. Pt was in hospital and rehab for combined 3 weeks. Pt reports feeling lethargic, weak starting last PM, denies urinary symptoms. Wife reports fever and two episodes of urinary incontinence today and that pt was catheterized several times during rehab stay. Pt baseline is independent with all activities before surgery.          Subjective   History of present illness: Patient is a 69-year-old male with recent admission at [facility] for C3-C6 posterior cervical discectomy with fusion on 6/25 and subsequent discharged to [facility] rehab who presents with fever and malaise.  ID consulted for \"sepsis, unclear source \".    Patient admitted 7/19 with fever of 101.5 and leukocytosis of 16.  Lactate has been normal.  Started on cefepime and vancomycin.  Blood cultures are pending.  CT of the neck with findings of soft tissue edema, gas and fluid of unclear significance and MRI pending.    Today patient reports neck pain and abdominal pain.  States he is moving his bowels and has not had any diarrhea.  Pain is intermittent in his abdomen.  Denies any shortness of breath or cough.  Denies any urinary symptoms.  Chest x-ray and urinalysis have been unremarkable.    Past Medical History:   Diagnosis Date    Anemia     post hemorrhagic    Angioedema 02/21/2016    Secondary to ACE inhibitor    Anxiety     Arthritis     CAD (coronary artery disease)     Colon polyps     Diabetes mellitus, type 2     GERD (gastroesophageal reflux disease)     Glaucoma     Hematoma     history    High cholesterol     History of foreign travel 12/2017; 08/2018    [place], [place], [place], [place], [place] and [place]; [place]    Hyperlipidemia     Hypertension     Hypogonadism in male 09/28/2016    " Male erectile disorder     Microalbuminuria     Osteoarthritis     knee    Spinal stenosis of lumbar region without neurogenic claudication 06/02/2021    Tubular adenoma of colon 11/01/2017    Vitamin D deficiency        Past Surgical History:   Procedure Laterality Date    CARDIAC CATHETERIZATION N/A 05/15/2006    Dr. Mini Camarillo    CARDIAC CATHETERIZATION N/A 03/10/2020    Procedure: Left Heart Cath;  Surgeon: Miguel Ángel Karimi MD;  Location: [facility] CATH INVASIVE LOCATION;  Service: Cardiology;  Laterality: N/A;    CARDIAC CATHETERIZATION N/A 03/10/2020    Procedure: Stent DAVONTE coronary;  Surgeon: Miguel Ángel Karimi MD;  Location:  [facility] CATH INVASIVE LOCATION;  Service: Cardiology;  Laterality: N/A;    CARDIAC CATHETERIZATION N/A 03/10/2020    Procedure: Coronary angiography;  Surgeon: Miguel Ángel Karimi MD;  Location: [facility] CATH INVASIVE LOCATION;  Service: Cardiology;  Laterality: N/A;    CARDIAC CATHETERIZATION N/A 03/10/2020    Procedure: Left ventriculography;  Surgeon: Miguel Ángel Karimi MD;  Location: [facility] CATH INVASIVE LOCATION;  Service: Cardiology;  Laterality: N/A;    CARDIAC CATHETERIZATION N/A 05/20/2022    Procedure: Left Heart Cath;  Surgeon: Mackenzie Morales MD;  Location:  [facility] CATH INVASIVE LOCATION;  Service: Cardiovascular;  Laterality: N/A;    CARDIAC CATHETERIZATION N/A 05/20/2022    Procedure: Coronary angiography;  Surgeon: Mackenzie Morales MD;  Location:  [facility] CATH INVASIVE LOCATION;  Service: Cardiovascular;  Laterality: N/A;    CARDIAC CATHETERIZATION N/A 05/20/2022    Procedure: Percutaneous Coronary Intervention;  Surgeon: Mackenzie Morales MD;  Location: [facility] CATH INVASIVE LOCATION;  Service: Cardiovascular;  Laterality: N/A;    CARDIAC CATHETERIZATION N/A 05/24/2022    Procedure: Percutaneous Coronary Intervention;  Surgeon: Miguel Ángel Karimi MD;  Location:  [facility] CATH INVASIVE LOCATION;  Service: Cardiovascular;  Laterality: N/A;  LAD and Cx     CARDIAC CATHETERIZATION N/A 05/24/2022    Procedure: Stent DAVONTE coronary;  Surgeon: Miguel Ángel aKrimi MD;  [facility];  Service: Cardiovascular;  Laterality: N/A;    CARDIAC CATHETERIZATION N/A 05/24/2022    Procedure: Resting Full Cycle Ratio;  Surgeon: Miguel Ángel Karimi MD;  Location: [facility] CATH INVASIVE LOCATION;  Service: Cardiovascular;  Laterality: N/A;    CARDIAC CATHETERIZATION N/A 03/19/2024    Procedure: Left Heart Cath;  Surgeon: Miguel Ángel Karimi MD;  Location: [facility] CATH INVASIVE LOCATION;  Service: Cardiovascular;  Laterality: N/A;    CARDIAC CATHETERIZATION N/A 03/19/2024    Procedure: Percutaneous Coronary Intervention;  Surgeon: Miguel Ángel Karimi MD;  Location: [facility] CATH INVASIVE LOCATION;  Service: Cardiovascular;  Laterality: N/A;    CARDIAC CATHETERIZATION N/A 03/19/2024    Procedure: Stent DAVONTE coronary;  Surgeon: Miguel Ángel Karimi MD;  Location: [facility] CATH INVASIVE LOCATION;  Service: Cardiovascular;  Laterality: N/A;    CERVICAL DISCECTOMY POSTERIOR FUSION WITH INSTRUMENTATION Bilateral 6/25/2024    Procedure: Cervical 3 to cervical 6 laminectomies and posterior spinal fusion - Bilateral;  Surgeon: Marshal Miguel MD;  Location: [facility] OR;  Service: Neurosurgery;  Laterality: Bilateral;    COLONOSCOPY N/A 02/22/2006    Bilobed polyp at 30 cm, hemorrhoids-Dr. Ilya Zhao    COLONOSCOPY N/A 02/28/2014    Normal ileum, one 6 mm polyp in the mid transverse colon-Dr. Ilya Zhao    COLONOSCOPY N/A 11/19/2008    Ela ileum, two 3 to 4 mm polyps, non-bleeding internal hemorrhoids, repeat in 5 years-Dr. Ilya Zhao    COLONOSCOPY N/A 10/31/2017    Procedure: COLONOSCOPY WITH POLYPECTOMY (COLD BIOPSY);  Surgeon: Ilya Zhao MD;  Location: [facility] ENDOSCOPY;  Service:     COLONOSCOPY N/A 05/21/2021    Procedure: Colonoscopy into cecum and terminal ileum with cold biopsy polypectomy;  Surgeon: Ilya Zhao MD;  Location: [facility]  ENDOSCOPY;  Service: Gastroenterology;  Laterality: N/A;  Pre op: History of Polyps  Post op: Polyp    CORONARY ANGIOPLASTY WITH STENT PLACEMENT  2007, 2012, 2015    cardiac stents x3 occasions    ENDOSCOPY N/A 10/04/2017    Procedure: ESOPHAGOGASTRODUODENOSCOPY;  Surgeon: Boyd Guidry MD;  Location: [facility] ENDOSCOPY;  Service:     ENDOSCOPY N/A 05/21/2021    Procedure: ESOPHAGOGASTRODUODENOSCOPY with biopsies;  Surgeon: Ilya Zhao MD;  Location: [facility] ENDOSCOPY;  Service: Gastroenterology;  Laterality: N/A;  Pre op: GERD  Post op: Irregular  Z-Line, Hiatal Hernia, Gastritis    ENDOSCOPY N/A 08/25/2023    Procedure: ESOPHAGOGASTRODUODENOSCOPY with biopsy;  Surgeon: Ilya Zhao MD;  Location: [facility] ENDOSCOPY;  Service: Gastroenterology;  Laterality: N/A;  errosive gastritis    EPIDURAL BLOCK      INTERVENTIONAL RADIOLOGY PROCEDURE N/A 05/20/2022    Procedure: Intravascular Ultrasound;  Surgeon: Mackenzie Morales MD;  Location: [facility] CATH INVASIVE LOCATION;  Service: Cardiovascular;  Laterality: N/A;    KNEE INCISION AND DRAINAGE Right 06/20/2017    Procedure: RT. KNEE WASHOUT ;  Surgeon: Boyd Coyne MD;  Location: [facility] MAIN OR;  Service:     MEDIAL BRANCH BLOCK Bilateral 12/17/2021    Procedure: LUMBAR MEDIAL BRANCH BLOCK bilateral ~L4-S1 x2 (~2 weeks apart);  Surgeon: Christina Villaseñor MD;  Location: [facility] MAIN OR;  Service: Pain Management;  Laterality: Bilateral;    MEDIAL BRANCH BLOCK Bilateral 01/03/2022    Procedure: LUMBAR MEDIAL BRANCH BLOCK bilateral ~L4-S1 x2 (~2 weeks apart);  Surgeon: Christina Villaseñor MD;  Location: [facility] MAIN OR;  Service: Pain Management;  Laterality: Bilateral;    [place] ARTHRP KNE CONDYLE&PLATU MEDIAL&LAT COMPARTMENTS Right 06/15/2017    Procedure: RT TOTAL KNEE ARTHROPLASTY;  Surgeon: Boyd Coyne MD;  Location: [facility] MAIN OR;  Service: Orthopedics    RADIOFREQUENCY ABLATION Bilateral 01/10/2022    Procedure: RADIOFREQUENCY ABLATION NERVES Bilateral L4-S1;   Surgeon: Christina Villaseñor MD;  Location: [facility] MAIN OR;  Service: Pain Management;  Laterality: Bilateral;    SHOULDER SURGERY Right 2016    rotator cuff repair    UPPER GASTROINTESTINAL ENDOSCOPY N/A 10/13/2015    Z-line irregular, normal stomach, normal duodenum-Dr. Ilya Zhao    UPPER GASTROINTESTINAL ENDOSCOPY N/A 02/28/2014    Z-line irregular, normal stomach, normal duodenum-Dr. Ilya Zhao    UPPER GASTROINTESTINAL ENDOSCOPY N/A 11/19/2008    Z-line irregular, chronic gastritis withotu hemorrhage, normal duodenum-Dr. Ilya Zhao    UPPER GASTROINTESTINAL ENDOSCOPY N/A 06/22/2006    LA Grade A reflux esophagitis, non-bleeding erythematous gastropathy, normal duodenum-Dr. Ilya Zhao       family history includes Alcohol abuse in his father; Arthritis in his brother, father, and mother; Dementia in his father; Depression in his father; Heart attack (age of onset: 40) in his brother; Heart disease in his brother, father, and another family member; Hyperlipidemia in his mother; Hypertension in his father, mother, and another family member; Lupus in his father, mother, and sister; Other in his father; Thyroid disease in his mother, sister, and sister; Vision loss in his mother.     reports that he has never smoked. He has never been exposed to tobacco smoke. He has never used smokeless tobacco. He reports current alcohol use of about 3.0 standard drinks of alcohol per week. He reports that he does not use drugs.     Allergies   Allergen Reactions    Nsaids Other (See Comments)     Renal failure    Atorvastatin Myalgia    Hydralazine Myalgia    Norvasc [Amlodipine] Swelling    Zetia [Ezetimibe] Nausea And Vomiting    Crestor [Rosuvastatin] Other (See Comments)     Flu like s/s    Ace Inhibitors Angioedema    Lisinopril Angioedema    Testosterone Myalgia       Medication:  Antibiotics:  Anti-Infectives (From admission, onward)      Ordered     Dose/Rate Route Frequency Start Stop    07/20/24 0974  vancomycin  (VANCOCIN) 1,000 mg in sodium chloride 0.9 % 250 mL IVPB-VTB        Ordering Provider: Jayson Elias MD    1,000 mg  250 mL/hr over 60 Minutes Intravenous Every 12 Hours 07/20/24 2200 07/23/24 0959    07/20/24 0854  cefepime 2000 mg IVPB in 100 mL NS (MBP)        Ordering Provider: Jayson Elias MD    2,000 mg  over 4 Hours Intravenous Every 8 Hours 07/20/24 1745 07/23/24 1744    07/20/24 0918  vancomycin IVPB 2000 mg in 0.9% Sodium Chloride 500 mL        Ordering Provider: Jayson Elias MD    2,000 mg  250 mL/hr over 120 Minutes Intravenous Once 07/20/24 1015      07/20/24 0854  cefepime 2000 mg IVPB in 100 mL NS (MBP)        Ordering Provider: Jayson Elias MD    2,000 mg  over 30 Minutes Intravenous Once 07/20/24 0945      07/20/24 0854  Pharmacy to dose vancomycin        Ordering Provider: Jayson Elias MD     Does not apply Continuous PRN 07/20/24 0854 07/23/24 0853    07/20/24 0403  cefTRIAXone (ROCEPHIN) 1,000 mg in sodium chloride 0.9 % 100 mL MBP        Ordering Provider: Faheem Carr MD    1,000 mg  200 mL/hr over 30 Minutes Intravenous Once 07/20/24 0430 07/20/24 0524              Objective     Physical Exam:   Vital Signs   Temp:  [97.7 °F (36.5 °C)-101.5 °F (38.6 °C)] 97.7 °F (36.5 °C)  Heart Rate:  [] 92  Resp:  [18-20] 18  BP: (127-157)/(68-96) 127/68    GENERAL: Awake and alert, in no acute distress.   HEENT: Oropharynx is clear. Hearing is grossly normal.  In c-collar  EYES: PERRL. No conjunctival injection. No lid lag.   LUNGS: Normal work of breathing  GI: Tender diffusely  SKIN: Warm and dry without cutaneous eruptions in exposed areas  PSYCHIATRIC: Appropriate mood, affect, insight, and judgment.     Results Review:   I reviewed the patient's new clinical results.  I reviewed the patient's new imaging results and agree with the interpretation.  I reviewed the patient's other test results and agree with the interpretation    Lab Results   Component Value  Date    WBC 16.08 (H) 07/19/2024    HGB 13.4 07/19/2024    HCT 41.3 07/19/2024    MCV 86.8 07/19/2024     07/19/2024       Lab Results   Component Value Date    DUSTIN 16.40 07/17/2017       Lab Results   Component Value Date    GLUCOSE 233 (H) 07/19/2024    BUN 9 07/19/2024    CREATININE 1.09 07/19/2024    EGFRIFNONA 82 10/04/2021    EGFRIFAFRI 100 10/04/2021    BCR 8.3 07/19/2024    CO2 22.7 07/19/2024    CALCIUM 9.1 07/19/2024    PROTENTOTREF 7.9 08/08/2023    ALBUMIN 3.7 07/19/2024    LABIL2 1.3 08/08/2023    AST 36 07/19/2024    ALT 37 07/19/2024         Estimated Creatinine Clearance: 88.6 mL/min (by C-G formula based on SCr of 1.09 mg/dL).      Microbiology:  6/29 blood cultures no growth  6/21 respiratory panel negative  6/29 MRSA nares negative  6/29 strep pneumo urine antigen negative  Legionella urination negative  6/30 urine culture no growth  7/20 blood cultures in process  7/20 COVID and flu negative    Radiology:  7/20 CT neck report reviewed with fluid and gas containing collection within the posterior cervical soft tissue with extension to the posterior dural space.    Assessment     #Sepsis, leukocytosis and fever  #Status post posterior cervical discectomy with fusion C3-6 On 6/25  #Abdominal pain     Chest x-ray and urinalysis unremarkable.  CT report reviewed and MRI pending.  Currently on cefepime and vancomycin.  Follow-up blood cultures and MRI results.  Obtain CT abdomen.  Perform lower extremity Dopplers.  Follow-up neurosurgical eval.    Addendum:  Received call from radiology with CT abdomen pelvis showing acute cholecystitis.  General surgery consult will be obtained.  Plan to stop vancomycin and continue cefepime.  Add Flagyl 500 mg 3 times daily for now.

## 2024-07-20 NOTE — CONSULTS
"[facility] NEUROSURGERY CONSULT NOTE    Patient name: Isaias Monaco  Referring Provider: Jayson Elias MD   Reason for Consultation: Sepsis from unknown source, recent cervical fusion    Patient Care Team:  Gwyn Patten Sr., MD as PCP - General (Family Medicine)  Ermias, Bebeto CASTANEDA II, MD as Consulting Physician (Hematology and Oncology)  Micaela Barfield APRN as Nurse Practitioner (Endocrinology)  Manuela Agustin APRN as Referring Physician (Internal Medicine)  Richardson Moon MD as Consulting Physician (Pulmonary Disease)    Chief complaint: Fever, \"feeling bad\", generalized weakness    Subjective .     History of present illness:    Patient is a 69 y.o.  male neurological movement disorder, hypertension, DM2, anemia, DANG, stage III, CAD, proteinuria who presents to [facility] on 7/19 for a few days of lethargy, fever, generalized weakness and overall \"feeling bad\".  He has approximately 3.5 weeks out from a cervical spinal fusion with Dr. Miguel and discharged from [facility] rehab on 7/18.  He reported that he was doing quite well.  Today he denies any focal weakness in the arms or legs and also denies any radiating pain, numbness or tingling into the arms.  He does report some issues with urinary urgency.  He has had a couple episodes where he was on his way to the bathroom but was unable to get there in time before he had some incontinence.  Otherwise he is able to control his bowels.  No report of any saddle anesthesia.  No significant change in gait stability.    Review of Systems  Review of Systems   Constitutional:  Positive for fatigue and fever.   Gastrointestinal:         Denies bowel issues   Genitourinary:  Positive for enuresis.   Musculoskeletal:  Negative for neck pain.   Neurological:  Positive for weakness (Generalized, nonfocal). Negative for numbness.   Psychiatric/Behavioral:  Negative for confusion.        History  PAST MEDICAL HISTORY  Past Medical History:   Diagnosis Date    " Anemia     post hemorrhagic    Angioedema 02/21/2016    Secondary to ACE inhibitor    Anxiety     Arthritis     CAD (coronary artery disease)     Colon polyps     Diabetes mellitus, type 2     GERD (gastroesophageal reflux disease)     Glaucoma     Hematoma     history    High cholesterol     History of foreign travel 12/2017; 08/2018    [place], [place], [place], [place], [place] and [place]; [place]    Hyperlipidemia     Hypertension     Hypogonadism in male 09/28/2016    Male erectile disorder     Microalbuminuria     Osteoarthritis     knee    Spinal stenosis of lumbar region without neurogenic claudication 06/02/2021    Tubular adenoma of colon 11/01/2017    Vitamin D deficiency        PAST SURGICAL HISTORY  Past Surgical History:   Procedure Laterality Date    CARDIAC CATHETERIZATION N/A 05/15/2006    Dr. Mini Camarillo    CARDIAC CATHETERIZATION N/A 03/10/2020    Procedure: Left Heart Cath;  Surgeon: Miguel Ángel Karimi MD;  [facility];  Service: Cardiology;  Laterality: N/A;    CARDIAC CATHETERIZATION N/A 03/10/2020    Procedure: Stent DAVONTE coronary;  Surgeon: Miguel Ángel Karimi MD;  [facility];  Service: Cardiology;  Laterality: N/A;    CARDIAC CATHETERIZATION N/A 03/10/2020    Procedure: Coronary angiography;  Surgeon: Miguel Ángel Karimi MD;  [facility];  Service: Cardiology;  Laterality: N/A;    CARDIAC CATHETERIZATION N/A 03/10/2020    Procedure: Left ventriculography;  Surgeon: Miguel Ángel Karimi MD;  [facility];  Service: Cardiology;  Laterality: N/A;    CARDIAC CATHETERIZATION N/A 05/20/2022    Procedure: Left Heart Cath;  Surgeon: Mackenzie Morales MD;  [facility];  Service: Cardiovascular;  Laterality: N/A;    CARDIAC CATHETERIZATION N/A 05/20/2022    Procedure: Coronary angiography;  Surgeon: Mackenzie Morales MD;  Location: [facility]  CATH INVASIVE LOCATION;  Service: Cardiovascular;  Laterality: N/A;    CARDIAC CATHETERIZATION N/A 05/20/2022    Procedure: Percutaneous Coronary Intervention;  Surgeon: Mackenzie Morales MD;  Location: [facility] CATH INVASIVE LOCATION;  Service: Cardiovascular;  Laterality: N/A;    CARDIAC CATHETERIZATION N/A 05/24/2022    Procedure: Percutaneous Coronary Intervention;  Surgeon: Miguel Ángel Karimi MD;  Location: [facility] CATH INVASIVE LOCATION;  Service: Cardiovascular;  Laterality: N/A;  LAD and Cx    CARDIAC CATHETERIZATION N/A 05/24/2022    Procedure: Stent DAVONTE coronary;  Surgeon: Miguel Ángel Karimi MD;  [facility];  Service: Cardiovascular;  Laterality: N/A;    CARDIAC CATHETERIZATION N/A 05/24/2022    Procedure: Resting Full Cycle Ratio;  Surgeon: Miguel Ángel Karimi MD;  Location: [facility] CATH INVASIVE LOCATION;  Service: Cardiovascular;  Laterality: N/A;    CARDIAC CATHETERIZATION N/A 03/19/2024    Procedure: Left Heart Cath;  Surgeon: Miguel Ángel Karimi MD;  Location: [facility] CATH INVASIVE LOCATION;  Service: Cardiovascular;  Laterality: N/A;    CARDIAC CATHETERIZATION N/A 03/19/2024    Procedure: Percutaneous Coronary Intervention;  Surgeon: Miguel Ángel Karimi MD;  Location: [facility] CATH INVASIVE LOCATION;  Service: Cardiovascular;  Laterality: N/A;    CARDIAC CATHETERIZATION N/A 03/19/2024    Procedure: Stent DAVONTE coronary;  Surgeon: Miguel Ángel Karimi MD;  Location: [facility] CATH INVASIVE LOCATION;  Service: Cardiovascular;  Laterality: N/A;    CERVICAL DISCECTOMY POSTERIOR FUSION WITH INSTRUMENTATION Bilateral 6/25/2024    Procedure: Cervical 3 to cervical 6 laminectomies and posterior spinal fusion - Bilateral;  Surgeon: Marshal Miguel MD;  Location: [facility] MAIN OR;  Service: Neurosurgery;  Laterality: Bilateral;    COLONOSCOPY N/A 02/22/2006    Bilobed polyp at 30 cm, hemorrhoids-Dr. Ilya Zhao    COLONOSCOPY N/A 02/28/2014    Normal ileum, one 6 mm polyp in  the mid transverse colon-Dr. Ilya Zhao    COLONOSCOPY N/A 11/19/2008    Ela ileum, two 3 to 4 mm polyps, non-bleeding internal hemorrhoids, repeat in 5 years-Dr. Ilya Zhao    COLONOSCOPY N/A 10/31/2017    Procedure: COLONOSCOPY WITH POLYPECTOMY (COLD BIOPSY);  Surgeon: Ilya Zhao MD;  Location: [facility] ENDOSCOPY;  Service:     COLONOSCOPY N/A 05/21/2021    Procedure: Colonoscopy into cecum and terminal ileum with cold biopsy polypectomy;  Surgeon: Ilya Zhao MD;  Location: [facility] ENDOSCOPY;  Service: Gastroenterology;  Laterality: N/A;  Pre op: History of Polyps  Post op: Polyp    CORONARY ANGIOPLASTY WITH STENT PLACEMENT  2007, 2012, 2015    cardiac stents x3 occasions    ENDOSCOPY N/A 10/04/2017    Procedure: ESOPHAGOGASTRODUODENOSCOPY;  Surgeon: Boyd Guidry MD;  Location: [facility] ENDOSCOPY;  Service:     ENDOSCOPY N/A 05/21/2021    Procedure: ESOPHAGOGASTRODUODENOSCOPY with biopsies;  Surgeon: Ilya Zhao MD;  Location: [facility] ENDOSCOPY;  Service: Gastroenterology;  Laterality: N/A;  Pre op: GERD  Post op: Irregular  Z-Line, Hiatal Hernia, Gastritis    ENDOSCOPY N/A 08/25/2023    Procedure: ESOPHAGOGASTRODUODENOSCOPY with biopsy;  Surgeon: Ilya Zhao MD;  Location: [facility] ENDOSCOPY;  Service: Gastroenterology;  Laterality: N/A;  errosive gastritis    EPIDURAL BLOCK      INTERVENTIONAL RADIOLOGY PROCEDURE N/A 05/20/2022    Procedure: Intravascular Ultrasound;  Surgeon: Mackenzie Morales MD;  Location: [facility] CATH INVASIVE LOCATION;  Service: Cardiovascular;  Laterality: N/A;    KNEE INCISION AND DRAINAGE Right 06/20/2017    Procedure: RT. KNEE WASHOUT ;  Surgeon: Boyd Coyne MD;  Location: [facility] MAIN OR;  Service:     MEDIAL BRANCH BLOCK Bilateral 12/17/2021    Procedure: LUMBAR MEDIAL BRANCH BLOCK bilateral ~L4-S1 x2 (~2 weeks apart);  Surgeon: Christina Villaseñor MD;  [place] [facility] [place];  Service: Pain Management;  Laterality: Bilateral;    MEDIAL BRANCH BLOCK Bilateral  01/03/2022    Procedure: LUMBAR MEDIAL BRANCH BLOCK bilateral ~L4-S1 x2 (~2 weeks apart);  Surgeon: Christina Villaseñor MD;  Location: [facility];  Service: Pain Management;  Laterality: Bilateral;    TN ARTHRP KNE CONDYLE&PLATU MEDIAL&LAT COMPARTMENTS Right 06/15/2017    Procedure: RT TOTAL KNEE ARTHROPLASTY;  Surgeon: Boyd Coyne MD;  Location: [facility] MAIN OR;  Service: Orthopedics    RADIOFREQUENCY ABLATION Bilateral 01/10/2022    Procedure: RADIOFREQUENCY ABLATION NERVES Bilateral L4-S1;  Surgeon: Christina Villaseñor MD;  Location: [facility] MAIN [facility];  Service: Pain Management;  Laterality: Bilateral;    SHOULDER SURGERY Right 2016    rotator cuff repair    UPPER GASTROINTESTINAL ENDOSCOPY N/A 10/13/2015    Z-line irregular, normal stomach, normal duodenum-Dr. Ilya Zhao    UPPER GASTROINTESTINAL ENDOSCOPY N/A 02/28/2014    Z-line irregular, normal stomach, normal duodenum-Dr. Ilya Zhao    UPPER GASTROINTESTINAL ENDOSCOPY N/A 11/19/2008    Z-line irregular, chronic gastritis withotu hemorrhage, normal duodenum-Dr. Ilya Zhao    UPPER GASTROINTESTINAL ENDOSCOPY N/A 06/22/2006    LA Grade A reflux esophagitis, non-bleeding erythematous gastropathy, normal duodenum-Dr. Ilya Zhao       FAMILY HISTORY  Family History   Problem Relation Age of Onset    Lupus Sister     Thyroid disease Sister     Heart disease Other     Hypertension Other     Arthritis Mother     Hyperlipidemia Mother     Hypertension Mother     Thyroid disease Mother     Lupus Mother     Vision loss Mother     Heart disease Father     Arthritis Father     Other Father         Vascular disease    Lupus Father     Depression Father     Alcohol abuse Father     Dementia Father     Hypertension Father     Heart disease Brother     Heart attack Brother 40    Thyroid disease Sister     Arthritis Brother     Malig Hyperthermia Neg Hx        SOCIAL HISTORY  Social History     Tobacco Use    Smoking status: Never     Passive exposure: Never    Smokeless  tobacco: Never    Tobacco comments:     CAFFEINE USE: 2 CUPS COFFEE DAILY   Vaping Use    Vaping status: Never Used   Substance Use Topics    Alcohol use: Yes     Alcohol/week: 3.0 standard drinks of alcohol     Types: 1 Glasses of wine, 2 Shots of liquor per week    Drug use: Never           Allergies:  Nsaids, Atorvastatin, Hydralazine, Norvasc [amlodipine], Zetia [ezetimibe], Crestor [rosuvastatin], Ace inhibitors, Lisinopril, and Testosterone    MEDICATIONS:  Medications Prior to Admission   Medication Sig Dispense Refill Last Dose    acetaminophen (TYLENOL) 650 MG 8 hr tablet Take 1 tablet by mouth Every 4 (Four) Hours As Needed for Mild Pain.       ARIPiprazole (ABILIFY) 5 MG tablet Take 1 tablet by mouth Daily.       aspirin 81 MG EC tablet Take 1 tablet by mouth Daily. 30 tablet 6     bisacodyl (DULCOLAX) 5 MG EC tablet Take 1 tablet by mouth Daily As Needed for Constipation (Use if polyethylene glycol is ineffective).       carvedilol (COREG) 25 MG tablet Take 1 tablet by mouth 2 (Two) Times a Day With Meals.       clonazePAM (KlonoPIN) 0.5 MG tablet Take 1 tablet by mouth Daily As Needed for Anxiety. 30 tablet 2     clopidogrel (PLAVIX) 75 MG tablet TAKE 1 TABLET BY MOUTH EVERY DAY 90 tablet 2     dorzolamide-timolol (COSOPT) 2-0.5 % ophthalmic solution Administer 1 drop to both eyes 2 (Two) Times a Day.       doxazosin (CARDURA) 4 MG tablet TAKE 1 TABLET BY MOUTH EVERY DAY AT NIGHT (Patient taking differently: Take 1 tablet by mouth Every Night.) 90 tablet 4     escitalopram (LEXAPRO) 20 MG tablet TAKE 1 TABLET BY MOUTH DAILY 90 tablet 3     esomeprazole (nexIUM) 40 MG capsule Take 1 capsule by mouth Every Morning Before Breakfast. 30 capsule 3     Evolocumab (REPATHA) solution auto-injector SureClick injection Inject 1 mL under the skin into the appropriate area as directed Every 14 (Fourteen) Days. 2 mL 6 7/7/2024    gabapentin (NEURONTIN) 300 MG capsule Take 1 capsule by mouth Every Night.        Insulin Glargine, 1 Unit Dial, (Toujeo SoloStar) 300 UNIT/ML solution pen-injector injection INJECT 65 UNITS UNDER THE SKIN DAILY (Patient taking differently: Inject 15 Units under the skin into the appropriate area as directed Every Morning.) 18 mL 3     latanoprost (XALATAN) 0.005 % ophthalmic solution Administer 1 drop to both eyes Every Night.       methocarbamol (Robaxin) 500 MG tablet Take 1 tablet by mouth As Needed for Muscle Spasms (Take as needed for pain). (Patient taking differently: Take 1 tablet by mouth 2 (Two) Times a Day.)       oxyCODONE-acetaminophen (PERCOCET) 7.5-325 MG per tablet Take 1 tablet by mouth Every 4 (Four) Hours As Needed for Moderate Pain.       sennosides-docusate (PERICOLACE) 8.6-50 MG per tablet Take 2 tablets by mouth 2 (Two) Times a Day As Needed for Constipation.       cyclobenzaprine (FLEXERIL) 10 MG tablet Take 1 tablet by mouth 3 (Three) Times a Day As Needed for Muscle Spasms.          Objective     Results Review:  LABS:    Admission on 07/19/2024   Component Date Value Ref Range Status    Color, UA 07/20/2024 Yellow  Yellow, Straw Final    Appearance, UA 07/20/2024 Clear  Clear Final    pH, UA 07/20/2024 6.5  5.0 - 8.0 Final    Specific Gravity, UA 07/20/2024 1.025  1.005 - 1.030 Final    Glucose, UA 07/20/2024 100 mg/dL (Trace) (A)  Negative Final    Ketones, UA 07/20/2024 Trace (A)  Negative Final    Bilirubin, UA 07/20/2024 Negative  Negative Final    Blood, UA 07/20/2024 Negative  Negative Final    Protein, UA 07/20/2024 100 mg/dL (2+) (A)  Negative Final    Leuk Esterase, UA 07/20/2024 Negative  Negative Final    Nitrite, UA 07/20/2024 Negative  Negative Final    Urobilinogen, UA 07/20/2024 4.0 E.U./dL (A)  0.2 - 1.0 E.U./dL Final    Glucose 07/19/2024 233 (H)  65 - 99 mg/dL Final    BUN 07/19/2024 9  8 - 23 mg/dL Final    Creatinine 07/19/2024 1.09  0.76 - 1.27 mg/dL Final    Sodium 07/19/2024 131 (L)  136 - 145 mmol/L Final    Potassium 07/19/2024 4.0  3.5 - 5.2  mmol/L Final    Chloride 07/19/2024 98  98 - 107 mmol/L Final    CO2 07/19/2024 22.7  22.0 - 29.0 mmol/L Final    Calcium 07/19/2024 9.1  8.6 - 10.5 mg/dL Final    Total Protein 07/19/2024 7.5  6.0 - 8.5 g/dL Final    Albumin 07/19/2024 3.7  3.5 - 5.2 g/dL Final    ALT (SGPT) 07/19/2024 37  1 - 41 U/L Final    AST (SGOT) 07/19/2024 36  1 - 40 U/L Final    Alkaline Phosphatase 07/19/2024 104  39 - 117 U/L Final    Total Bilirubin 07/19/2024 0.8  0.0 - 1.2 mg/dL Final    Globulin 07/19/2024 3.8  gm/dL Final    A/G Ratio 07/19/2024 1.0  g/dL Final    BUN/Creatinine Ratio 07/19/2024 8.3  7.0 - 25.0 Final    Anion Gap 07/19/2024 10.3  5.0 - 15.0 mmol/L Final    eGFR 07/19/2024 73.5  >60.0 mL/min/1.73 Final    Lactate 07/19/2024 1.1  0.5 - 2.0 mmol/L Final    WBC 07/19/2024 16.08 (H)  3.40 - 10.80 10*3/mm3 Final    RBC 07/19/2024 4.76  4.14 - 5.80 10*6/mm3 Final    Hemoglobin 07/19/2024 13.4  13.0 - 17.7 g/dL Final    Hematocrit 07/19/2024 41.3  37.5 - 51.0 % Final    MCV 07/19/2024 86.8  79.0 - 97.0 fL Final    MCH 07/19/2024 28.2  26.6 - 33.0 pg Final    MCHC 07/19/2024 32.4  31.5 - 35.7 g/dL Final    RDW 07/19/2024 15.8 (H)  12.3 - 15.4 % Final    RDW-SD 07/19/2024 51.4  37.0 - 54.0 fl Final    MPV 07/19/2024 10.5  6.0 - 12.0 fL Final    Platelets 07/19/2024 280  140 - 450 10*3/mm3 Final    Neutrophil % 07/19/2024 75.1  42.7 - 76.0 % Final    Lymphocyte % 07/19/2024 11.7 (L)  19.6 - 45.3 % Final    Monocyte % 07/19/2024 12.1 (H)  5.0 - 12.0 % Final    Eosinophil % 07/19/2024 0.6  0.3 - 6.2 % Final    Basophil % 07/19/2024 0.1  0.0 - 1.5 % Final    Immature Grans % 07/19/2024 0.4  0.0 - 0.5 % Final    Neutrophils, Absolute 07/19/2024 12.09 (H)  1.70 - 7.00 10*3/mm3 Final    Lymphocytes, Absolute 07/19/2024 1.88  0.70 - 3.10 10*3/mm3 Final    Monocytes, Absolute 07/19/2024 1.94 (H)  0.10 - 0.90 10*3/mm3 Final    Eosinophils, Absolute 07/19/2024 0.09  0.00 - 0.40 10*3/mm3 Final    Basophils, Absolute 07/19/2024 0.02  0.00  - 0.20 10*3/mm3 Final    Immature Grans, Absolute 07/19/2024 0.06 (H)  0.00 - 0.05 10*3/mm3 Final    RBC, UA 07/20/2024 0-2  None Seen, 0-2 /HPF Final    WBC, UA 07/20/2024 0-2  None Seen, 0-2 /HPF Final    Urine culture not indicated.    Bacteria, UA 07/20/2024 None Seen  None Seen /HPF Final    Squamous Epithelial Cells, UA 07/20/2024 0-2  None Seen, 0-2 /HPF Final    Hyaline Casts, UA 07/20/2024 None Seen  None Seen /LPF Final    Methodology 07/20/2024 Manual Light Microscopy   Final    Extra Tube 07/20/2024 Hold for add-ons.   Final    Auto resulted.    COVID19 07/20/2024 Not Detected  Not Detected - Ref. Range Final    Influenza A PCR 07/20/2024 Not Detected  Not Detected Final    Influenza B PCR 07/20/2024 Not Detected  Not Detected Final    Glucose 07/20/2024 190 (H)  70 - 130 mg/dL Final    Procalcitonin 07/20/2024 0.49 (H)  0.00 - 0.25 ng/mL Final    Sed Rate 07/20/2024 86 (H)  0 - 20 mm/hr Final    C-Reactive Protein 07/20/2024 17.30 (H)  0.00 - 0.50 mg/dL Final       DIAGNOSTICS:  CT cervical spine 7/20/2024:  Shows previously demonstrated fluid and gas within the posterior cervical soft tissues with extension into the posterior epidural space.  Apparently the gas noted on the previous study is now resolved.  Overall this study is difficult to evaluate given streak artifact from dental amalgam as well as cervical hardware.    Results Review:   I reviewed the patient's new clinical results.  I personally viewed and interpreted the patient's labs, imaging study, medications and chart    Vital Signs   Temp:  [97.7 °F (36.5 °C)-101.5 °F (38.6 °C)] 97.7 °F (36.5 °C)  Heart Rate:  [] 92  Resp:  [18-20] 18  BP: (127-157)/(68-96) 127/68    Physical Exam:  Physical Exam  Vitals reviewed.   Constitutional:       General: He is not in acute distress.     Appearance: Normal appearance. He is not ill-appearing, toxic-appearing or diaphoretic.   HENT:      Head: Normocephalic.      Nose: Nose normal.       Mouth/Throat:      Mouth: Mucous membranes are moist.      Pharynx: Oropharynx is clear.   Eyes:      Extraocular Movements: Extraocular movements intact.      Conjunctiva/sclera: Conjunctivae normal.   Neck:      Comments: Posterior cervical incision with sutures in place.  No significant erythema, swelling or drainage.  Some scabbing in place.  Wound edges well-approximated.  No palpable underlying fluid collection.  Cardiovascular:      Rate and Rhythm: Normal rate.   Pulmonary:      Effort: Pulmonary effort is normal.   Musculoskeletal:         General: Normal range of motion.      Cervical back: Normal range of motion.   Skin:     General: Skin is warm.   Neurological:      General: No focal deficit present.      Mental Status: He is alert and oriented to person, place, and time. Mental status is at baseline.      Deep Tendon Reflexes:      Reflex Scores:       Tricep reflexes are 2+ on the right side and 2+ on the left side.       Bicep reflexes are 2+ on the right side and 2+ on the left side.       Brachioradialis reflexes are 2+ on the right side and 2+ on the left side.       Patellar reflexes are 2+ on the right side and 2+ on the left side.  Psychiatric:         Mood and Affect: Mood normal.         Speech: Speech normal.         Behavior: Behavior normal.         Thought Content: Thought content normal.         Judgment: Judgment normal.       Neurologic Exam     Mental Status   Oriented to person, place, and time.   Attention: normal. Concentration: normal.   Speech: speech is normal   Level of consciousness: alert  Knowledge: consistent with education.     Cranial Nerves     CN VIII   Hearing: intact    Motor Exam   Muscle bulk: normal  Overall muscle tone: normal  Right arm tone: normal  Left arm tone: normal  Right leg tone: normal  Left leg tone: normal    Strength   Right deltoid: 5/5  Left deltoid: 5/5  Right biceps: 5/5  Left biceps: 5/5  Right triceps: 5/5  Left triceps: 5/5  Right wrist  flexion: 5/5  Left wrist flexion: 5/5  Right wrist extension: 5/5  Left wrist extension: 5/5  Right interossei: 5/5  Left interossei: 5/5  Right quadriceps: 5/5  Left quadriceps: 5/5  Right anterior tibial: 5/5  Left anterior tibial: 5/5  Right posterior tibial: 5/5  Left posterior tibial: 5/5  Right gastroc: 5/5  Left gastroc: 5/5    Sensory Exam   Light touch normal.     Gait, Coordination, and Reflexes     Gait  Gait: (Gait deferred)    Reflexes   Right brachioradialis: 2+  Left brachioradialis: 2+  Right biceps: 2+  Left biceps: 2+  Right triceps: 2+  Left triceps: 2+  Right patellar: 2+  Left patellar: 2+  Right Danielle: absent  Left Danielle: absent  Right ankle clonus: absent  Left ankle clonus: absent      Assessment & Plan       Sepsis    Status post cervical spinal fusion      Very pleasant 69-year-old male approximately 3.5 weeks s/p C3-6 laminectomies and posterior spinal fusion with Dr. Miguel.  Overall he has been doing quite well and was in [facility] rehab.  Over the past few days he has developed fever and overall feeling bad.  Sed rate and CRP significantly elevated with leukocytosis.  ID following and the patient is currently on IV antibiotics.  Blood cultures are pending.  Overall his exam is nonfocal and posterior cervical incision site looks good.  CT of the cervical spine shows previously demonstrated fluid collection however it is poorly evaluated on that exam.  Recommendations to follow completion of MRI of the cervical spine.    PLAN:     MRI cervical spine with and without contrast  Blood cultures pending  ID following  On IV antibiotics    I discussed the patient's findings and my recommendations with patient, family, nursing staff, and Dr. Malone.     During patient visit, I utilized appropriate personal protective equipment including mask and gloves.  Mask used was standard procedure mask. Appropriate PPE was worn during the entire visit.  Hand hygiene was completed before and after.  "    Yoan Thomas, APRN  07/20/24  11:23 EDT    \"Dictated utilizing Dragon dictation\".    "

## 2024-07-20 NOTE — PROGRESS NOTES
"[facility] Clinical Pharmacy Services: Vancomycin Pharmacokinetic Initial Consult Note    Isaias Monaco is a 69 y.o. male who is on day 1 of pharmacy to dose vancomycin.    Indication: Sepsis  Consulting Provider: Dr. Elias  Planned Duration of Therapy: 3 days  Loading Dose Ordered or Given: 2000 mg x 1 dose now  Culture/Source: blood cx in progress  Target: -600 mg/L.hr     Other Antimicrobials: cefepime    Vitals/Labs  Ht: 195.6 cm (77\"); Wt: 111 kg (245 lb 2.4 oz)  Temp Readings from Last 1 Encounters:   07/20/24 97.7 °F (36.5 °C) (Oral)    Estimated Creatinine Clearance: 88.6 mL/min (by C-G formula based on SCr of 1.09 mg/dL).     Results from last 7 days   Lab Units 07/19/24  2304   CREATININE mg/dL 1.09   WBC 10*3/mm3 16.08*     Assessment/Plan:    Vancomycin Dose:  2000mg IV x 1 dose now, then 1gm IV q12h   Predictive AUC level for the dose ordered is 482 mg/L.hr, which is within the target of 400-600 mg/L.hr  Vanc Trough has been ordered for 7/21 at 2130     Pharmacy will follow patient's kidney function and will adjust doses and obtain levels as necessary. Thank you for involving pharmacy in this patient's care. Please contact pharmacy with any questions or concerns.                           Marti Ortiz, PharmD  Clinical Pharmacist    "

## 2024-07-20 NOTE — CONSULTS
[facility]   Consult Note    Patient Name: Isaias Monaco  : 1955  MRN: [id number]  Primary Care Physician:  Gwyn Patten Sr., MD  Referring Physician: Gwyn Patten Sr., MD  Date of admission: 2024    Inpatient General Surgery Consult  Consult performed by: Luis Enrique Gaston Jr., MD  Consult ordered by: Marcin Colon,         Subjective   Subjective     Reason for Consult/ Chief Complaint: Abdominal pain    History of present illness  Isaias Monaco is a very pleasant 69 y.o. male with coronary artery disease, hypertension, dyslipidemia, and type 2 diabetes mellitus who underwent a cervical spinal fusion almost a month ago and then spent time at [facility].  He was discharged from [facility] on 2024 doing well.  He then developed some fevers with diarrhea and presented to the emergency room.  He was noted to have a fever of 101.5 as well as an elevated WBC and diagnosed with sepsis.  Evaluation included a CT scan of the abdomen and pelvis that showed an abnormal appearance of the gallbladder with thickened wall and some pericholecystic fluid.  I have carefully reviewed these images and there are inflammatory changes in the region of the gallbladder and involving the mesentery of the hepatic flexure of the colon.    He states that he has had some intermittent mild mid abdominal pain and points to his periumbilical region.  He has not had any nausea or vomiting.  Every once a while his appetite is decreased but he has been able to eat relatively normally.  He has had diarrhea over the past several days that is very malodorous.  He has not had any constipation.    Review of Systems   Constitutional:  Positive for fever. Negative for chills.   Respiratory:  Negative for chest tightness and shortness of breath.    Cardiovascular:  Negative for chest pain and palpitations.   Gastrointestinal:  Positive for abdominal pain and diarrhea. Negative for abdominal distention,  constipation, nausea and vomiting.        Personal History     Past Medical History:   Diagnosis Date    Anemia     post hemorrhagic    Angioedema 02/21/2016    Secondary to ACE inhibitor    Anxiety     Arthritis     CAD (coronary artery disease)     Colon polyps     Diabetes mellitus, type 2     GERD (gastroesophageal reflux disease)     Glaucoma     Hematoma     history    High cholesterol     History of foreign travel 12/2017; 08/2018    [place], [place], [place], [place], [place] and [place]; [place]    Hyperlipidemia     Hypertension     Hypogonadism in male 09/28/2016    Male erectile disorder     Microalbuminuria     Osteoarthritis     knee    Spinal stenosis of lumbar region without neurogenic claudication 06/02/2021    Tubular adenoma of colon 11/01/2017    Vitamin D deficiency        Past Surgical History:   Procedure Laterality Date    CARDIAC CATHETERIZATION N/A 05/15/2006    Dr. Mini Camarillo    CARDIAC CATHETERIZATION N/A 03/10/2020    Procedure: Left Heart Cath;  Surgeon: Miguel Ángel Karimi MD;  [facility];  Service: Cardiology;  Laterality: N/A;    CARDIAC CATHETERIZATION N/A 03/10/2020    Procedure: Stent DAVONTE coronary;  Surgeon: Miguel Ángel Karimi MD;  Location:  [facility] CATH INVASIVE LOCATION;  Service: Cardiology;  Laterality: N/A;    CARDIAC CATHETERIZATION N/A 03/10/2020    Procedure: Coronary angiography;  Surgeon: Miguel Ángel Karimi MD;  Location:  [facility] CATH INVASIVE LOCATION;  Service: Cardiology;  Laterality: N/A;    CARDIAC CATHETERIZATION N/A 03/10/2020    Procedure: Left ventriculography;  Surgeon: Miguel Ángel Karimi MD;  Location:  [facility] CATH INVASIVE LOCATION;  Service: Cardiology;  Laterality: N/A;    CARDIAC CATHETERIZATION N/A 05/20/2022    Procedure: Left Heart Cath;  Surgeon: Mackenzie Morales MD;  [facility];  Service: Cardiovascular;  Laterality: N/A;    CARDIAC CATHETERIZATION N/A 05/20/2022     Procedure: Coronary angiography;  Surgeon: Mackenzie Morales MD;  Location:  [facility] CATH INVASIVE LOCATION;  Service: Cardiovascular;  Laterality: N/A;    CARDIAC CATHETERIZATION N/A 05/20/2022    Procedure: Percutaneous Coronary Intervention;  Surgeon: Mackenzie Morales MD;  Location:  [facility] CATH INVASIVE LOCATION;  Service: Cardiovascular;  Laterality: N/A;    CARDIAC CATHETERIZATION N/A 05/24/2022    Procedure: Percutaneous Coronary Intervention;  Surgeon: Miguel Ángel Karimi MD;  Location:  [facility] CATH INVASIVE LOCATION;  Service: Cardiovascular;  Laterality: N/A;  LAD and Cx    CARDIAC CATHETERIZATION N/A 05/24/2022    Procedure: Stent DAVONTE coronary;  Surgeon: Miguel Ángel Karimi MD;  Location:  [facility] CATH INVASIVE LOCATION;  Service: Cardiovascular;  Laterality: N/A;    CARDIAC CATHETERIZATION N/A 05/24/2022    Procedure: Resting Full Cycle Ratio;  Surgeon: Miguel Ángel Karimi MD;  [facility];  Service: Cardiovascular;  Laterality: N/A;    CARDIAC CATHETERIZATION N/A 03/19/2024    Procedure: Left Heart Cath;  Surgeon: Miguel Ángel Karimi MD;  [facility];  Service: Cardiovascular;  Laterality: N/A;    CARDIAC CATHETERIZATION N/A 03/19/2024    Procedure: Percutaneous Coronary Intervention;  Surgeon: Miguel Ángel Karimi MD;  [facility];  Service: Cardiovascular;  Laterality: N/A;    CARDIAC CATHETERIZATION N/A 03/19/2024    Procedure: Stent DAVONTE coronary;  Surgeon: Miguel Ángel Karimi MD;  [facility];  Service: Cardiovascular;  Laterality: N/A;    CERVICAL DISCECTOMY POSTERIOR FUSION WITH INSTRUMENTATION Bilateral 6/25/2024    Procedure: Cervical 3 to cervical 6 laminectomies and posterior spinal fusion - Bilateral;  Surgeon: Marshal Miguel MD;  Location: [facility] OR;  Service: Neurosurgery;  Laterality: Bilateral;    COLONOSCOPY N/A 02/22/2006    Bilobed polyp at 30 cm,  hemorrhoids-Dr. Ilya Zhao    COLONOSCOPY N/A 02/28/2014    Normal ileum, one 6 mm polyp in the mid transverse colon-Dr. Ilya Zhao    COLONOSCOPY N/A 11/19/2008    Ela ileum, two 3 to 4 mm polyps, non-bleeding internal hemorrhoids, repeat in 5 years-Dr. Ilya Zhao    COLONOSCOPY N/A 10/31/2017    Procedure: COLONOSCOPY WITH POLYPECTOMY (COLD BIOPSY);  Surgeon: Ilya Zhao MD;  Location: [facility] ENDOSCOPY;  Service:     COLONOSCOPY N/A 05/21/2021    Procedure: Colonoscopy into cecum and terminal ileum with cold biopsy polypectomy;  Surgeon: Ilya Zhao MD;  Location: [facility] ENDOSCOPY;  Service: Gastroenterology;  Laterality: N/A;  Pre op: History of Polyps  Post op: Polyp    CORONARY ANGIOPLASTY WITH STENT PLACEMENT  2007, 2012, 2015    cardiac stents x3 occasions    ENDOSCOPY N/A 10/04/2017    Procedure: ESOPHAGOGASTRODUODENOSCOPY;  Surgeon: Boyd Guidry MD;  Location: [facility] ENDOSCOPY;  Service:     ENDOSCOPY N/A 05/21/2021    Procedure: ESOPHAGOGASTRODUODENOSCOPY with biopsies;  Surgeon: Ilya Zhao MD;  Location: [facility] ENDOSCOPY;  Service: Gastroenterology;  Laterality: N/A;  Pre op: GERD  Post op: Irregular  Z-Line, Hiatal Hernia, Gastritis    ENDOSCOPY N/A 08/25/2023    Procedure: ESOPHAGOGASTRODUODENOSCOPY with biopsy;  Surgeon: Ilya Zhao MD;  Location: [facility] ENDOSCOPY;  Service: Gastroenterology;  Laterality: N/A;  errosive gastritis    EPIDURAL BLOCK      INTERVENTIONAL RADIOLOGY PROCEDURE N/A 05/20/2022    Procedure: Intravascular Ultrasound;  Surgeon: Mackenzie Morales MD;  Location: [facility] CATH INVASIVE LOCATION;  Service: Cardiovascular;  Laterality: N/A;    KNEE INCISION AND DRAINAGE Right 06/20/2017    Procedure: RT. KNEE WASHOUT ;  Surgeon: Boyd Coyne MD;  Location: [facility] OR;  Service:     MEDIAL BRANCH BLOCK Bilateral 12/17/2021    Procedure: LUMBAR MEDIAL BRANCH BLOCK bilateral ~L4-S1 x2 (~2 weeks apart);  Surgeon: Christina Villaseñor MD;  Location: [facility]  OR;  Service: Pain Management;  Laterality: Bilateral;    MEDIAL BRANCH BLOCK Bilateral 01/03/2022    Procedure: LUMBAR MEDIAL BRANCH BLOCK bilateral ~L4-S1 x2 (~2 weeks apart);  Surgeon: Christina Villaseñor MD;  Location: [facility];  Service: Pain Management;  Laterality: Bilateral;    NJ ARTHRP KNE CONDYLE&PLATU MEDIAL&LAT COMPARTMENTS Right 06/15/2017    Procedure: RT TOTAL KNEE ARTHROPLASTY;  Surgeon: Boyd Coyne MD;  Location: [facility] MAIN OR;  Service: Orthopedics    RADIOFREQUENCY ABLATION Bilateral 01/10/2022    Procedure: RADIOFREQUENCY ABLATION NERVES Bilateral L4-S1;  Surgeon: Christina Villaseñor MD;  Location: [facility];  Service: Pain Management;  Laterality: Bilateral;    SHOULDER SURGERY Right 2016    rotator cuff repair    UPPER GASTROINTESTINAL ENDOSCOPY N/A 10/13/2015    Z-line irregular, normal stomach, normal duodenum-Dr. Ilya Zhao    UPPER GASTROINTESTINAL ENDOSCOPY N/A 02/28/2014    Z-line irregular, normal stomach, normal duodenum-Dr. Ilya Zhao    UPPER GASTROINTESTINAL ENDOSCOPY N/A 11/19/2008    Z-line irregular, chronic gastritis withotu hemorrhage, normal duodenum-Dr. Ilya Zhao    UPPER GASTROINTESTINAL ENDOSCOPY N/A 06/22/2006    LA Grade A reflux esophagitis, non-bleeding erythematous gastropathy, normal duodenum-Dr. Ilya Zhao       Family History: family history includes Alcohol abuse in his father; Arthritis in his brother, father, and mother; Dementia in his father; Depression in his father; Heart attack (age of onset: 40) in his brother; Heart disease in his brother, father, and another family member; Hyperlipidemia in his mother; Hypertension in his father, mother, and another family member; Lupus in his father, mother, and sister; Other in his father; Thyroid disease in his mother, sister, and sister; Vision loss in his mother. Otherwise pertinent FHx was reviewed and not pertinent to current issue.    Social History:  reports that he has never smoked. He has never  been exposed to tobacco smoke. He has never used smokeless tobacco. He reports current alcohol use of about 3.0 standard drinks of alcohol per week. He reports that he does not use drugs.    Home Medications:   ARIPiprazole, Evolocumab, Insulin Glargine (1 Unit Dial), acetaminophen, aspirin, bisacodyl, carvedilol, clonazePAM, clopidogrel, cyclobenzaprine, dorzolamide-timolol, doxazosin, escitalopram, esomeprazole, gabapentin, latanoprost, methocarbamol, oxyCODONE-acetaminophen, and sennosides-docusate    Allergies:  Allergies   Allergen Reactions    Nsaids Other (See Comments)     Renal failure    Atorvastatin Myalgia    Hydralazine Myalgia    Norvasc [Amlodipine] Swelling    Zetia [Ezetimibe] Nausea And Vomiting    Crestor [Rosuvastatin] Other (See Comments)     Flu like s/s    Ace Inhibitors Angioedema    Lisinopril Angioedema    Testosterone Myalgia       Objective    Objective     Vitals:  Temp:  [97.7 °F (36.5 °C)-101.5 °F (38.6 °C)] 98.6 °F (37 °C)  Heart Rate:  [] 92  Resp:  [18-20] 18  BP: (127-157)/(68-96) 127/68    Physical Exam  Constitutional:       Appearance: He is not ill-appearing or toxic-appearing.   HENT:      Head: Normocephalic and atraumatic.   Eyes:      General: No scleral icterus.     Extraocular Movements: Extraocular movements intact.   Pulmonary:      Effort: Pulmonary effort is normal. No respiratory distress.   Abdominal:      General: Abdomen is flat. There is no distension.      Palpations: Abdomen is soft.      Tenderness: There is no abdominal tenderness.   Neurological:      Mental Status: He is alert.   Psychiatric:         Behavior: Behavior is cooperative.         Result Review    Result Review:  I have personally reviewed the results from the time of this admission to 7/20/2024 16:36 EDT and agree with these findings:  [x]  Laboratory list / accordion  []  Microbiology  [x]  Radiology  []  EKG/Telemetry   []  Cardiology/Vascular   []  Pathology  []  Old records  []   Other:      Assessment & Plan   Assessment / Plan     Brief Patient Summary with assessment and plan:  Isaias Monaco is a 69 y.o. male who has been recovering from cervical spinal fusion and developed fevers and diarrhea.  Evaluation included a CT scan of the abdomen pelvis that showed findings concerning for acute cholecystitis.    1.  Abnormal gallbladder: The patient has inflammatory changes in the region of the gallbladder on CT scan of the abdomen and pelvis that is concerning for acute cholecystitis.  His physical exam is completely benign and his clinical history is not consistent with acute cholecystitis.  There is no need for urgent surgical management.  I will follow his clinical course and make decisions regarding further workup or management.  He can start a diet from a general surgery standpoint but I will not order it until the MRI returns and neurosurgery determines that no surgery is necessary.    2.  Abdominal pain: The patient has been having intermittent mild periumbilical abdominal pain that is vague in nature.  It is unlikely to represent gallbladder type symptoms.  He is currently without pain.  The symptom will be followed clinically.    3.  Diarrhea: The patient has been having diarrhea and does have inflammatory changes in the region of the gallbladder that also includes the hepatic flexure of the colon.  There may be some focal colitis.  He is currently on cefepime and Flagyl which will cover a colonic process.        Luis Enrique Gaston Jr., MD

## 2024-07-20 NOTE — SIGNIFICANT NOTE
07/20/24 1156   OTHER   Discipline physical therapist   Rehab Time/Intention   Session Not Performed other (see comments);patient unavailable for evaluation  (pt off floor to vascular, will follow up tomorrow.)   Recommendation   PT - Next Appointment 07/21/24

## 2024-07-20 NOTE — PLAN OF CARE
Goal Outcome Evaluation:               Patient is a very pleasant, calm and cooperative patient.  He came here from [facility].  On 6/23 patient had a posterior discectomy w/fusion.  Upon discharge he went to [facility].  He was discharged from [facility] on 7/28/2024 and on 7/19/2024 developed a fever of 101.5.  His wife took him to [facility] whereupon he was transferred here with diagnosis of sepsis.      Patient has had two CT (abdomen and neck), MRI of Cervical Spine, blood cultures, multiple labs and venous doppler study of bilateral lower extremities.      Patient has been afebrile today and vitals within normal limits.  Patient has slept majority of the day, but was awake most of the night.      His wife is a retired nurse in PACU here at [facility].    ALCON

## 2024-07-20 NOTE — H&P
Patient Name:  Isaias Monaco  YOB: 1955  MRN:  [id number]  Admit Date:  7/19/2024  Patient Care Team:  Gwyn Patten Sr., MD as PCP - General (Family Medicine)  Ermias, Bebeto CASTANEDA II, MD as Consulting Physician (Hematology and Oncology)  Micaela Barfield APRN as Nurse Practitioner (Endocrinology)  Manuela Agustin APRN as Referring Physician (Internal Medicine)  Richardson Moon MD as Consulting Physician (Pulmonary Disease)      Subjective   History Present Illness     Chief Complaint   Patient presents with    Fever     Pt had recent hospitalization at [facility] for cervical spine surgery and rehab, dx on 7/18. Pt was in hospital and rehab for combined 3 weeks. Pt reports feeling lethargic, weak starting last PM, denies urinary symptoms. Wife reports fever and two episodes of urinary incontinence today and that pt was catheterized several times during rehab stay. Pt baseline is independent with all activities before surgery.        Mr. Monaco is a 69 y.o. with a history of cervical fusion and recent discharge from [facility] rehab, CAD, diabetes, GERD, HLD, hypertension who presents to [facility] complaining of worsening generalized weakness since getting home from rehab and new onset of fever yesterday.  He was having some more difficulty with transitioning and ambulating after getting home from rehab.  He was having some urinary urgency and his wife noticed that he felt warm when she was helping him ambulate.  He had a fever of 101.7 at home.  Does not report any chills or diaphoresis.  He is not reporting any shortness of breath or productive cough.  Not reporting any dysuria currently.  He did have the urgency prior to admission noted.  He also reports some central abdominal pain which is nonradiating.  He has not had any nausea vomiting or diarrhea.  He has been having bowel movements.  The abdominal pain is improved currently.  He is in a hard collar and they had not  noticed any rash or discharge.  He does have some soreness of his neck.    Review of Systems   Constitutional:  Positive for fever. Negative for chills and diaphoresis.   HENT:  Negative for sore throat and trouble swallowing.    Eyes:  Negative for pain and visual disturbance.   Respiratory:  Negative for cough, shortness of breath and wheezing.    Cardiovascular:  Negative for chest pain and palpitations.   Gastrointestinal:  Negative for constipation, diarrhea, nausea and vomiting.   Endocrine: Negative for cold intolerance and heat intolerance.   Genitourinary:  Positive for urgency. Negative for dysuria and flank pain.   Musculoskeletal:  Positive for neck pain.   Skin:  Negative for pallor and rash.   Allergic/Immunologic: Negative for environmental allergies and food allergies.   Neurological:  Negative for seizures and syncope.   Hematological:  Negative for adenopathy. Does not bruise/bleed easily.   Psychiatric/Behavioral:  Negative for agitation and confusion.         Personal History     Past Medical History:   Diagnosis Date    Anemia     post hemorrhagic    Angioedema 02/21/2016    Secondary to ACE inhibitor    Anxiety     Arthritis     CAD (coronary artery disease)     Colon polyps     Diabetes mellitus, type 2     GERD (gastroesophageal reflux disease)     Glaucoma     Hematoma     history    High cholesterol     History of foreign travel 12/2017; 08/2018    [place], [place], [place], [place], [place] and [place]; [place]    Hyperlipidemia     Hypertension     Hypogonadism in male 09/28/2016    Male erectile disorder     Microalbuminuria     Osteoarthritis     knee    Spinal stenosis of lumbar region without neurogenic claudication 06/02/2021    Tubular adenoma of colon 11/01/2017    Vitamin D deficiency      Past Surgical History:   Procedure Laterality Date    CARDIAC CATHETERIZATION N/A 05/15/2006    Dr. Mini Camarillo    CARDIAC CATHETERIZATION N/A 03/10/2020    Procedure: Left  Heart Cath;  Surgeon: Miguel Ángel Karimi MD;  [facility];  Service: Cardiology;  Laterality: N/A;    CARDIAC CATHETERIZATION N/A 03/10/2020    Procedure: Stent DAVONTE coronary;  Surgeon: Miguel Ángel Karimi MD;  [facility];  Service: Cardiology;  Laterality: N/A;    CARDIAC CATHETERIZATION N/A 03/10/2020    Procedure: Coronary angiography;  Surgeon: Miguel Ángel Karimi MD;  [facility];  Service: Cardiology;  Laterality: N/A;    CARDIAC CATHETERIZATION N/A 03/10/2020    Procedure: Left ventriculography;  Surgeon: Miguel Ángel Karimi MD;  [facility];  Service: Cardiology;  Laterality: N/A;    CARDIAC CATHETERIZATION N/A 05/20/2022    Procedure: Left Heart Cath;  Surgeon: Mackenzie Morales MD;  Location: [facility] CATH INVASIVE LOCATION;  Service: Cardiovascular;  Laterality: N/A;    CARDIAC CATHETERIZATION N/A 05/20/2022    Procedure: Coronary angiography;  Surgeon: Mackenzie Morales MD;  [facility];  Service: Cardiovascular;  Laterality: N/A;    CARDIAC CATHETERIZATION N/A 05/20/2022    Procedure: Percutaneous Coronary Intervention;  Surgeon: Mackenzie Morales MD;  [facility];  Service: Cardiovascular;  Laterality: N/A;    CARDIAC CATHETERIZATION N/A 05/24/2022    Procedure: Percutaneous Coronary Intervention;  Surgeon: Miguel Ángel Karimi MD;  Location: [facility] CATH INVASIVE LOCATION;  Service: Cardiovascular;  Laterality: N/A;  LAD and Cx    CARDIAC CATHETERIZATION N/A 05/24/2022    Procedure: Stent DAVONTE coronary;  Surgeon: Miguel nÁgel Karimi MD;  Location: [facility] CATH INVASIVE LOCATION;  Service: Cardiovascular;  Laterality: N/A;    CARDIAC CATHETERIZATION N/A 05/24/2022    Procedure: Resting Full Cycle Ratio;  Surgeon: Miguel Ángel Karimi MD;  [facility];  Service: Cardiovascular;  Laterality: N/A;     CARDIAC CATHETERIZATION N/A 03/19/2024    Procedure: Left Heart Cath;  Surgeon: Miguel Ángel Karimi MD;  Location: [facility] CATH INVASIVE LOCATION;  Service: Cardiovascular;  Laterality: N/A;    CARDIAC CATHETERIZATION N/A 03/19/2024    Procedure: Percutaneous Coronary Intervention;  Surgeon: Miguel Ángel Karimi MD;  [facility];  Service: Cardiovascular;  Laterality: N/A;    CARDIAC CATHETERIZATION N/A 03/19/2024    Procedure: Stent DAVONTE coronary;  Surgeon: Miguel Ángel Karimi MD;  [facility];  Service: Cardiovascular;  Laterality: N/A;    CERVICAL DISCECTOMY POSTERIOR FUSION WITH INSTRUMENTATION Bilateral 6/25/2024    Procedure: Cervical 3 to cervical 6 laminectomies and posterior spinal fusion - Bilateral;  Surgeon: Marshal Miguel MD;  Location: [facility] MAIN OR;  Service: Neurosurgery;  Laterality: Bilateral;    COLONOSCOPY N/A 02/22/2006    Bilobed polyp at 30 cm, hemorrhoids-Dr. Ilya Zhao    COLONOSCOPY N/A 02/28/2014    Normal ileum, one 6 mm polyp in the mid transverse colon-Dr. Ilya Zhao    COLONOSCOPY N/A 11/19/2008    Ela ileum, two 3 to 4 mm polyps, non-bleeding internal hemorrhoids, repeat in 5 years-Dr. Ilya Zhao    COLONOSCOPY N/A 10/31/2017    Procedure: COLONOSCOPY WITH POLYPECTOMY (COLD BIOPSY);  Surgeon: Ilya Zhao MD;  Location: [facility] ENDOSCOPY;  Service:     COLONOSCOPY N/A 05/21/2021    Procedure: Colonoscopy into cecum and terminal ileum with cold biopsy polypectomy;  Surgeon: Ilya Zhao MD;  Location: [facility] ENDOSCOPY;  Service: Gastroenterology;  Laterality: N/A;  Pre op: History of Polyps  Post op: Polyp    CORONARY ANGIOPLASTY WITH STENT PLACEMENT  2007, 2012, 2015    cardiac stents x3 occasions    ENDOSCOPY N/A 10/04/2017    Procedure: ESOPHAGOGASTRODUODENOSCOPY;  Surgeon: Boyd Guidry MD;  Location: [facility] ENDOSCOPY;  Service:     ENDOSCOPY N/A 05/21/2021    Procedure: ESOPHAGOGASTRODUODENOSCOPY with  biopsies;  Surgeon: Ilya Zhao MD;  Location: [facility] ENDOSCOPY;  Service: Gastroenterology;  Laterality: N/A;  Pre op: GERD  Post op: Irregular  Z-Line, Hiatal Hernia, Gastritis    ENDOSCOPY N/A 08/25/2023    Procedure: ESOPHAGOGASTRODUODENOSCOPY with biopsy;  Surgeon: Ilya Zhao MD;  Location: [facility] ENDOSCOPY;  Service: Gastroenterology;  Laterality: N/A;  errosive gastritis    EPIDURAL BLOCK      INTERVENTIONAL RADIOLOGY PROCEDURE N/A 05/20/2022    Procedure: Intravascular Ultrasound;  Surgeon: Mackenzie Morales MD;  Location: [facility] CATH INVASIVE LOCATION;  Service: Cardiovascular;  Laterality: N/A;    KNEE INCISION AND DRAINAGE Right 06/20/2017    Procedure: RT. KNEE WASHOUT ;  Surgeon: Boyd Coyne MD;  Location: [facility] MAIN OR;  Service:     MEDIAL BRANCH BLOCK Bilateral 12/17/2021    Procedure: LUMBAR MEDIAL BRANCH BLOCK bilateral ~L4-S1 x2 (~2 weeks apart);  Surgeon: Christina Villaseñor MD;  Location: [facility] MAIN OR;  Service: Pain Management;  Laterality: Bilateral;    MEDIAL BRANCH BLOCK Bilateral 01/03/2022    Procedure: LUMBAR MEDIAL BRANCH BLOCK bilateral ~L4-S1 x2 (~2 weeks apart);  Surgeon: Christina Villaseñor MD;  Location: [facility] MAIN [facility];  Service: Pain Management;  Laterality: Bilateral;    TX ARTHRP KNE CONDYLE&PLATU MEDIAL&LAT COMPARTMENTS Right 06/15/2017    Procedure: RT TOTAL KNEE ARTHROPLASTY;  Surgeon: Boyd Coyne MD;  Location: [facility] MAIN OR;  Service: Orthopedics    RADIOFREQUENCY ABLATION Bilateral 01/10/2022    Procedure: RADIOFREQUENCY ABLATION NERVES Bilateral L4-S1;  Surgeon: Christina Villaseñor MD;  Location: [facility] MAIN [facility];  Service: Pain Management;  Laterality: Bilateral;    SHOULDER SURGERY Right 2016    rotator cuff repair    UPPER GASTROINTESTINAL ENDOSCOPY N/A 10/13/2015    Z-line irregular, normal stomach, normal duodenum-Dr. Ilya Zhao    UPPER GASTROINTESTINAL ENDOSCOPY N/A 02/28/2014    Z-line irregular, normal stomach, normal duodenum-Dr. Ilya Zhao    UPPER  GASTROINTESTINAL ENDOSCOPY N/A 11/19/2008    Z-line irregular, chronic gastritis withotu hemorrhage, normal duodenum-Dr. Ilya Zhao    UPPER GASTROINTESTINAL ENDOSCOPY N/A 06/22/2006    LA Grade A reflux esophagitis, non-bleeding erythematous gastropathy, normal duodenum-Dr. Ilya Zhao     Family History   Problem Relation Age of Onset    Lupus Sister     Thyroid disease Sister     Heart disease Other     Hypertension Other     Arthritis Mother     Hyperlipidemia Mother     Hypertension Mother     Thyroid disease Mother     Lupus Mother     Vision loss Mother     Heart disease Father     Arthritis Father     Other Father         Vascular disease    Lupus Father     Depression Father     Alcohol abuse Father     Dementia Father     Hypertension Father     Heart disease Brother     Heart attack Brother 40    Thyroid disease Sister     Arthritis Brother     Malig Hyperthermia Neg Hx      Social History     Tobacco Use    Smoking status: Never     Passive exposure: Never    Smokeless tobacco: Never    Tobacco comments:     CAFFEINE USE: 2 CUPS COFFEE DAILY   Vaping Use    Vaping status: Never Used   Substance Use Topics    Alcohol use: Yes     Alcohol/week: 3.0 standard drinks of alcohol     Types: 1 Glasses of wine, 2 Shots of liquor per week    Drug use: Never     No current facility-administered medications on file prior to encounter.     Current Outpatient Medications on File Prior to Encounter   Medication Sig Dispense Refill    acetaminophen (TYLENOL) 650 MG 8 hr tablet Take 1 tablet by mouth Every 4 (Four) Hours As Needed for Mild Pain.      ARIPiprazole (ABILIFY) 5 MG tablet Take 1 tablet by mouth Daily.      aspirin 81 MG EC tablet Take 1 tablet by mouth Daily. 30 tablet 6    bisacodyl (DULCOLAX) 5 MG EC tablet Take 1 tablet by mouth Daily As Needed for Constipation (Use if polyethylene glycol is ineffective).      carvedilol (COREG) 25 MG tablet Take 1 tablet by mouth 2 (Two) Times a Day With Meals.       clonazePAM (KlonoPIN) 0.5 MG tablet Take 1 tablet by mouth Daily As Needed for Anxiety. 30 tablet 2    clopidogrel (PLAVIX) 75 MG tablet TAKE 1 TABLET BY MOUTH EVERY DAY 90 tablet 2    dorzolamide-timolol (COSOPT) 2-0.5 % ophthalmic solution Administer 1 drop to both eyes 2 (Two) Times a Day.      doxazosin (CARDURA) 4 MG tablet TAKE 1 TABLET BY MOUTH EVERY DAY AT NIGHT (Patient taking differently: Take 1 tablet by mouth Every Night.) 90 tablet 4    escitalopram (LEXAPRO) 20 MG tablet TAKE 1 TABLET BY MOUTH DAILY 90 tablet 3    esomeprazole (nexIUM) 40 MG capsule Take 1 capsule by mouth Every Morning Before Breakfast. 30 capsule 3    Evolocumab (REPATHA) solution auto-injector SureClick injection Inject 1 mL under the skin into the appropriate area as directed Every 14 (Fourteen) Days. 2 mL 6    gabapentin (NEURONTIN) 300 MG capsule Take 1 capsule by mouth Every Night.      Insulin Glargine, 1 Unit Dial, (Toujeo SoloStar) 300 UNIT/ML solution pen-injector injection INJECT 65 UNITS UNDER THE SKIN DAILY (Patient taking differently: Inject 15 Units under the skin into the appropriate area as directed Every Morning.) 18 mL 3    latanoprost (XALATAN) 0.005 % ophthalmic solution Administer 1 drop to both eyes Every Night.      methocarbamol (Robaxin) 500 MG tablet Take 1 tablet by mouth As Needed for Muscle Spasms (Take as needed for pain). (Patient taking differently: Take 1 tablet by mouth 2 (Two) Times a Day.)      oxyCODONE-acetaminophen (PERCOCET) 7.5-325 MG per tablet Take 1 tablet by mouth Every 4 (Four) Hours As Needed for Moderate Pain.      sennosides-docusate (PERICOLACE) 8.6-50 MG per tablet Take 2 tablets by mouth 2 (Two) Times a Day As Needed for Constipation.      cyclobenzaprine (FLEXERIL) 10 MG tablet Take 1 tablet by mouth 3 (Three) Times a Day As Needed for Muscle Spasms.      [DISCONTINUED] bisacodyl (DULCOLAX) 10 MG suppository Insert 1 suppository into the rectum Daily As Needed for Constipation (Use  if bisacodyl oral is ineffective).      [DISCONTINUED] carvedilol (COREG) 25 MG tablet Take 0.5 tablets by mouth 2 (Two) Times a Day With Meals. (Patient taking differently: Take 1 tablet by mouth 2 (Two) Times a Day With Meals.) 180 tablet 3    [DISCONTINUED] Continuous Blood Gluc Sensor (Dexcom G7 Sensor) misc Use.      [DISCONTINUED] famotidine (PEPCID) 20 MG tablet Take 1 tablet by mouth Every Evening.      [DISCONTINUED] glucose blood test strip Use to check blood sugars 1-2 times a day. E11.65 200 each 2    [DISCONTINUED] glucose monitor monitoring kit Use 1 each Take As Directed. Use to check blood sugars 1-2 times a day. E11.65 1 each 0    [DISCONTINUED] HYDROcodone-acetaminophen (NORCO) 5-325 MG per tablet Take 1 tablet by mouth Every 4 (Four) Hours As Needed for Moderate Pain.      [DISCONTINUED] Ingrezza 60 MG capsule Take 1 capsule by mouth Daily.      [DISCONTINUED] Insulin Pen Needle (BD Pen Needle Marissa 2nd Gen) 32G X 4 MM misc Inject 1 each under the skin into the appropriate area as directed 3 (Three) Times a Day. 300 each 3    [DISCONTINUED] Insulin Pen Needle 31G X 4 MM misc Use 1 each Daily. 100 each 3    [DISCONTINUED] Lancets misc Use to check blood sugars 1-2 times a day. E11.65 200 each 2    [DISCONTINUED] metFORMIN (GLUCOPHAGE) 500 MG tablet Take 2 tablets by mouth Daily With Breakfast. Indications: start in 48 hours 180 tablet 3    [DISCONTINUED] pioglitazone (ACTOS) 15 MG tablet Take 1 tablet by mouth Daily. 90 tablet 2    [DISCONTINUED] polyethylene glycol (MIRALAX) 17 g packet Take 17 g by mouth Daily As Needed (Use if senna-docusate is ineffective).      [DISCONTINUED] polyethylene glycol 17 g packet Take 17 g by mouth Daily.      [DISCONTINUED] rosuvastatin (CRESTOR) 20 MG tablet Take 2 tablets by mouth Daily.      [DISCONTINUED] Semaglutide, 2 MG/DOSE, (OZEMPIC) 8 MG/3ML solution pen-injector Inject 2 mg under the skin into the appropriate area as directed 1 (One) Time Per Week. Pt  verbalizes understanding can't take till after surgery      [DISCONTINUED] sennosides-docusate (PERICOLACE) 8.6-50 MG per tablet Take 2 tablets by mouth Every Night.      [DISCONTINUED] sildenafil (VIAGRA) 100 MG tablet Take 1 tablet by mouth Daily As Needed for Erectile Dysfunction. Take 30 minutes to 4 hours prior to sexual activity.   To hold 48 hours prior to surgery       Allergies   Allergen Reactions    Nsaids Other (See Comments)     Renal failure    Atorvastatin Myalgia    Hydralazine Myalgia    Norvasc [Amlodipine] Swelling    Zetia [Ezetimibe] Nausea And Vomiting    Crestor [Rosuvastatin] Other (See Comments)     Flu like s/s    Ace Inhibitors Angioedema    Lisinopril Angioedema    Testosterone Myalgia       Objective    Objective     Vital Signs  Temp:  [97.7 °F (36.5 °C)-101.5 °F (38.6 °C)] 97.7 °F (36.5 °C)  Heart Rate:  [] 92  Resp:  [18-20] 18  BP: (127-157)/(68-96) 127/68  SpO2:  [94 %-100 %] 94 %  on   ;   Device (Oxygen Therapy): room air  Body mass index is 29.07 kg/m².    Physical Exam  Vitals and nursing note reviewed.   Constitutional:       General: He is not in acute distress.     Appearance: He is not diaphoretic.   HENT:      Head: Atraumatic.   Eyes:      Conjunctiva/sclera: Conjunctivae normal.      Pupils: Pupils are equal, round, and reactive to light.   Neck:      Comments: Hard collar  Cardiovascular:      Rate and Rhythm: Normal rate and regular rhythm.      Pulses: Normal pulses.   Pulmonary:      Effort: Pulmonary effort is normal.      Breath sounds: No wheezing or rhonchi.   Abdominal:      General: There is no distension.      Palpations: Abdomen is soft.      Tenderness: There is no abdominal tenderness. There is no guarding or rebound.   Musculoskeletal:         General: No swelling or tenderness.   Skin:     General: Skin is warm and dry.   Neurological:      Mental Status: He is alert.      Comments: Some limited testing due to hard collar.  PERRL, no facial droop,  tongue midline   Psychiatric:         Mood and Affect: Mood normal.         Behavior: Behavior normal.         Results Review:  I reviewed the patient's new clinical results.  I reviewed the patient's new imaging results and agree with the interpretation.  I personally viewed and interpreted the patient's EKG/Telemetry data  I reviewed prior records.    Lab Results (last 24 hours)       Procedure Component Value Units Date/Time    CBC & Differential [[id number]]  (Abnormal) Collected: 07/19/24 2304    Specimen: Blood Updated: 07/19/24 2315    Narrative:      The following orders were created for panel order CBC & Differential.  Procedure                               Abnormality         Status                     ---------                               -----------         ------                     CBC Auto Differential[[id number]]        Abnormal            Final result                 Please view results for these tests on the individual orders.    Comprehensive Metabolic Panel [[id number]]  (Abnormal) Collected: 07/19/24 2304    Specimen: Blood Updated: 07/19/24 2327     Glucose 233 mg/dL      BUN 9 mg/dL      Creatinine 1.09 mg/dL      Sodium 131 mmol/L      Potassium 4.0 mmol/L      Chloride 98 mmol/L      CO2 22.7 mmol/L      Calcium 9.1 mg/dL      Total Protein 7.5 g/dL      Albumin 3.7 g/dL      ALT (SGPT) 37 U/L      AST (SGOT) 36 U/L      Alkaline Phosphatase 104 U/L      Total Bilirubin 0.8 mg/dL      Globulin 3.8 gm/dL      A/G Ratio 1.0 g/dL      BUN/Creatinine Ratio 8.3     Anion Gap 10.3 mmol/L      eGFR 73.5 mL/min/1.73     Narrative:      GFR Normal >60  Chronic Kidney Disease <60  Kidney Failure <15      Lactic Acid, Plasma [[id number]]  (Normal) Collected: 07/19/24 2304    Specimen: Blood Updated: 07/19/24 2321     Lactate 1.1 mmol/L     CBC Auto Differential [[id number]]  (Abnormal) Collected: 07/19/24 2304    Specimen: Blood Updated: 07/19/24 2315     WBC 16.08 10*3/mm3      RBC 4.76 10*6/mm3       Hemoglobin 13.4 g/dL      Hematocrit 41.3 %      MCV 86.8 fL      MCH 28.2 pg      MCHC 32.4 g/dL      RDW 15.8 %      RDW-SD 51.4 fl      MPV 10.5 fL      Platelets 280 10*3/mm3      Neutrophil % 75.1 %      Lymphocyte % 11.7 %      Monocyte % 12.1 %      Eosinophil % 0.6 %      Basophil % 0.1 %      Immature Grans % 0.4 %      Neutrophils, Absolute 12.09 10*3/mm3      Lymphocytes, Absolute 1.88 10*3/mm3      Monocytes, Absolute 1.94 10*3/mm3      Eosinophils, Absolute 0.09 10*3/mm3      Basophils, Absolute 0.02 10*3/mm3      Immature Grans, Absolute 0.06 10*3/mm3     Urinalysis With Culture If Indicated - Urine, Clean Catch [[id number]]  (Abnormal) Collected: 07/20/24 0131    Specimen: Urine, Clean Catch Updated: 07/20/24 0136     Color, UA Yellow     Appearance, UA Clear     pH, UA 6.5     Specific Gravity, UA 1.025     Glucose,  mg/dL (Trace)     Ketones, UA Trace     Bilirubin, UA Negative     Blood, UA Negative     Protein,  mg/dL (2+)     Leuk Esterase, UA Negative     Nitrite, UA Negative     Urobilinogen, UA 4.0 E.U./dL    Narrative:      In absence of clinical symptoms, the presence of pyuria, bacteria, and/or nitrites on the urinalysis result does not correlate with infection.    Urinalysis, Microscopic Only - Urine, Clean Catch [[id number]] Collected: 07/20/24 0131    Specimen: Urine, Clean Catch Updated: 07/20/24 0905     RBC, UA 0-2 /HPF      WBC, UA 0-2 /HPF      Comment: Urine culture not indicated.        Bacteria, UA None Seen /HPF      Squamous Epithelial Cells, UA 0-2 /HPF      Hyaline Casts, UA None Seen /LPF      Methodology Manual Light Microscopy    [place] Urine Culture Tube - Urine, Clean Catch [[id number]] Collected: 07/20/24 0131    Specimen: Urine, Clean Catch Updated: 07/20/24 0842     Extra Tube Hold for add-ons.     Comment: Auto resulted.       Blood Culture - Blood, Arm, Right [[id number]] Collected: 07/20/24 0253    Specimen: Blood from Arm, Right Updated: 07/20/24 0304     "Blood Culture - Blood, Arm, Right [[id number]] Collected: 07/20/24 0253    Specimen: Blood from Arm, Right Updated: 07/20/24 0303    COVID-19 and FLU A/B PCR, 1 HR TAT - Swab, Nasopharynx [[id number]]  (Normal) Collected: 07/20/24 0300    Specimen: Swab from Nasopharynx Updated: 07/20/24 0322     COVID19 Not Detected     Influenza A PCR Not Detected     Influenza B PCR Not Detected    Narrative:      Fact sheet for providers: https://www.fda.gov/media/574306/download    Fact sheet for patients: https://www.fda.gov/media/882457/download    Test performed by PCR.    POC Glucose Once [[id number]]  (Abnormal) Collected: 07/20/24 0802    Specimen: Blood Updated: 07/20/24 0803     Glucose 190 mg/dL     Procalcitonin [[id number]]  (Abnormal) Collected: 07/20/24 0936    Specimen: Blood Updated: 07/20/24 1019     Procalcitonin 0.49 ng/mL     Narrative:      As a Marker for Sepsis (Non-Neonates):    1. <0.5 ng/mL represents a low risk of severe sepsis and/or septic shock.  2. >2 ng/mL represents a high risk of severe sepsis and/or septic shock.    As a Marker for Lower Respiratory Tract Infections that require antibiotic therapy:    PCT on Admission    Antibiotic Therapy       6-12 Hrs later    >0.5                Strongly Recommended  >0.25 - <0.5        Recommended   0.1 - 0.25          Discouraged              Remeasure/reassess PCT  <0.1                Strongly Discouraged     Remeasure/reassess PCT    As 28 day mortality risk marker: \"Change in Procalcitonin Result\" (>80% or <=80%) if Day 0 (or Day 1) and Day 4 values are available. Refer to http://www.[facility]-pct-calculator.com    Change in PCT <=80%  A decrease of PCT levels below or equal to 80% defines a positive change in PCT test result representing a higher risk for 28-day all-cause mortality of patients diagnosed with severe sepsis for septic shock.    Change in PCT >80%  A decrease of PCT levels of more than 80% defines a negative change in PCT result representing a " lower risk for 28-day all-cause mortality of patients diagnosed with severe sepsis or septic shock.       Sedimentation Rate [[id number]]  (Abnormal) Collected: 07/20/24 0936    Specimen: Blood Updated: 07/20/24 0956     Sed Rate 86 mm/hr     C-reactive Protein [[id number]]  (Abnormal) Collected: 07/20/24 0936    Specimen: Blood Updated: 07/20/24 1012     C-Reactive Protein 17.30 mg/dL             Imaging Results (Last 24 Hours)       Procedure Component Value Units Date/Time    CT Soft Tissue Neck With Contrast [[id number]] Collected: 07/20/24 0541     Updated: 07/20/24 0556    Narrative:      CT NECK SOFT TISSUE WITH CONTRAST     HISTORY: Sepsis     COMPARISON: June 29, 2024     TECHNIQUE: Axial CT imaging was obtained through the neck. IV contrast  was administered.     FINDINGS:  Visualized portions of the brain parenchyma do not demonstrate any acute  abnormalities. Orbits appear unremarkable. There is some minimal mucosal  thickening within the ethmoid sinuses. Mastoid air cells are clear.  Nasopharynx is normal. Oropharynx also appears normal. Epiglottis is  within normal limits. Valleculae and piriform sinuses are normal, as are  the vocal cords. The patient is again noted to be status post posterior  spinal fusion, extending from C3-C6, with bilateral laminectomies at C3,  C4, and C5. Hardware appears stable when compared to prior exam. Prior  study demonstrated fluid and air within the posterior paraspinous soft  tissues, with extension into the posterior epidural space. The gas  within this area has resolved. Assessment of the fluid collection is  limited, due to streak artifact from the patient's dental amalgam and  hardware. Consider further assessment with MRI. Thyroid gland is  prominent in size. Trachea is within normal limits. Limited images of  the lung apices are clear. There is calcified plaque noted at the  carotid bifurcations bilaterally. Hemodynamically significant stenosis  is suspected  involving the proximal left internal carotid artery. This  is incompletely assessed on a nonangiographic protocol CT. There is no  prevertebral edema. Salivary glands appear normal. Shotty cervical lymph  nodes are likely reactive.       Impression:      Prior examination demonstrated fluid and gas containing collection  within the posterior cervical soft tissues, with extension into the  posterior epidural space the gas has resolved on the current study.  However, this area is poorly assessed, due to streak artifact from  dental amalgam, as well as the patient's cervical hardware. Consider  further assessment with MRI of the cervical spine.     Radiation dose reduction techniques were utilized, including automated  exposure control and exposure modulation based on body size.        This report was finalized on 7/20/2024 5:52 AM by Dr. Rebecca Pradhan M.D on Workstation: [id number]       XR Chest 1 View [[id number]] Collected: 07/20/24 0343     Updated: 07/20/24 0347    Narrative:      SINGLE VIEW OF THE CHEST     HISTORY: Fever     COMPARISON: 3/29/2024     FINDINGS:  Heart size is within normal limits. No pneumothorax, pleural effusion,  or acute infiltrate is seen. There is stable elevation of the right  hemidiaphragm, with chronic scarring noted within the right lung.       Impression:      No acute findings.     This report was finalized on 7/20/2024 3:44 AM by Dr. Rebecca Pradhan M.D on Workstation: [id number]               Results for orders placed during the hospital encounter of 04/01/24    Adult Transthoracic Echo Complete W/ Cont if Necessary Per Protocol    Interpretation Summary    Left ventricular systolic function is low normal. Left ventricular ejection fraction appears to be 51 - 55%.    Left ventricular diastolic function was normal.      No orders to display        Assessment/Plan     Active Hospital Problems    Diagnosis  POA    **Sepsis [A41.9]  Yes      Resolved Hospital Problems   No  resolved problems to display.       Mr. Monaco is a 69 y.o.     Sepsis: Uncertain source.  Leukocytosis and fever present.  Lactic acid was okay.  He did have some abdominal pain and urinary urgency.  Urinalysis does not appear infectious.  CT abdomen is planned.  CT soft tissue neck had some fluid collection which appeared somewhat improved but not completely evaluated from the previous surgery.  MRI C-spine planned.  Expand antibiotics to vancomycin and cefepime.  Blood cultures are pending.  Have ordered inflammatory markers and procalcitonin which are elevated.  Consult infectious disease and neurosurgery.  History of cervical fusion  Abdominal pain  Diabetes: Check A1c.  SSI.  Resume home long-acting insulin.  Hypertension: Monitor.  Urinary urgency: Resume home BPH medicine.  Monitor postvoid .  CAD: Stent was placed over a year ago with plan for lifelong DAPT per prior cardiology note.  Holding Plavix for now in case he requires intervention.  Continue aspirin.  Resume Plavix when able.  PPx: SCD  I discussed the patient's findings and my recommendations with patient, family, and nursing staff.      Jayson Elias MD  [facility]  07/20/24  11:08 EDT    Dictated portions of note using dragon dictation software.

## 2024-07-20 NOTE — FSED PROVIDER NOTE
Subjective   History of Present Illness  70yo male pmh significant htn/hyperlipidemia/dm2/cad/ckd, [id number]discharged [facility] 07.01.2024 s/p C3-C6 posterior cervical diskectomy/fusion on 06.25.2024, presents ED c/o 1d hx fever/generalized malaise.  Pt does report persistent posterior neck discomfort from surgery.  ROS (+) rhinorrhea/congestion.  Denies cough/chest pain/soa/abd pain/dysuria.    History provided by:  Patient and spouse  Fever  Associated symptoms: congestion and rhinorrhea        Review of Systems   Constitutional:  Positive for fatigue and fever.   HENT:  Positive for congestion and rhinorrhea.    Eyes: Negative.    Respiratory: Negative.     Cardiovascular: Negative.    Gastrointestinal: Negative.    Genitourinary: Negative.    Musculoskeletal: Negative.    Skin:  Positive for wound.   Allergic/Immunologic: Negative for immunocompromised state.   All other systems reviewed and are negative.      Past Medical History:   Diagnosis Date    Anemia     post hemorrhagic    Angioedema 02/21/2016    Secondary to ACE inhibitor    Anxiety     Arthritis     CAD (coronary artery disease)     Colon polyps     Diabetes mellitus, type 2     GERD (gastroesophageal reflux disease)     Glaucoma     Hematoma     history    High cholesterol     History of foreign travel 12/2017; 08/2018    [place], [place], [place], [place], [place] and [place]; [place]    Hyperlipidemia     Hypertension     Hypogonadism in male 09/28/2016    Male erectile disorder     Microalbuminuria     Osteoarthritis     knee    Spinal stenosis of lumbar region without neurogenic claudication 06/02/2021    Tubular adenoma of colon 11/01/2017    Vitamin D deficiency        Allergies   Allergen Reactions    Nsaids Other (See Comments)     Renal failure    Atorvastatin Myalgia    Hydralazine Myalgia    Norvasc [Amlodipine] Swelling    Zetia [Ezetimibe] Nausea And Vomiting    Crestor [Rosuvastatin]  Other (See Comments)     Flu like s/s    Ace Inhibitors Angioedema    Lisinopril Angioedema    Testosterone Myalgia       Past Surgical History:   Procedure Laterality Date    CARDIAC CATHETERIZATION N/A 05/15/2006    Dr. Mini Camarillo    CARDIAC CATHETERIZATION N/A 03/10/2020    Procedure: Left Heart Cath;  Surgeon: Miguel Ángel Karimi MD;  Location:  [facility] CATH INVASIVE LOCATION;  Service: Cardiology;  Laterality: N/A;    CARDIAC CATHETERIZATION N/A 03/10/2020    Procedure: Stent DAVONTE coronary;  Surgeon: Miguel Ángel Karimi MD;  Location:  [facility] CATH INVASIVE [facility]ATION;  Service: Cardiology;  Laterality: N/A;    CARDIAC CATHETERIZATION N/A 03/10/2020    Procedure: Coronary angiography;  Surgeon: Miguel Ángel Karimi MD;  Location:  [facility] CATH INVASIVE LOCATION;  Service: Cardiology;  Laterality: N/A;    CARDIAC CATHETERIZATION N/A 03/10/2020    Procedure: Left ventriculography;  Surgeon: Miguel Ángel Karimi MD;  [facility];  Service: Cardiology;  Laterality: N/A;    CARDIAC CATHETERIZATION N/A 05/20/2022    Procedure: Left Heart Cath;  Surgeon: Mackenzie Morales MD;  Location:  [facility] CATH INVASIVE LOCATION;  Service: Cardiovascular;  Laterality: N/A;    CARDIAC CATHETERIZATION N/A 05/20/2022    Procedure: Coronary angiography;  Surgeon: Mackenzie Morales MD;  Location:  [facility] CATH INVASIVE LOCATION;  Service: Cardiovascular;  Laterality: N/A;    CARDIAC CATHETERIZATION N/A 05/20/2022    Procedure: Percutaneous Coronary Intervention;  Surgeon: Mackenzie Morales MD;  [facility];  Service: Cardiovascular;  Laterality: N/A;    CARDIAC CATHETERIZATION N/A 05/24/2022    Procedure: Percutaneous Coronary Intervention;  Surgeon: Miguel Ángel Karimi MD;  Location:  [facility] CATH INVASIVE LOCATION;  Service: Cardiovascular;  Laterality: N/A;  LAD and Cx    CARDIAC CATHETERIZATION N/A 05/24/2022    Procedure: Stent DAVONTE coronary;  Surgeon: Miguel Ángel Karimi  MD;  Location: [facility] CATH INVASIVE LOCATION;  Service: Cardiovascular;  Laterality: N/A;    CARDIAC CATHETERIZATION N/A 05/24/2022    Procedure: Resting Full Cycle Ratio;  Surgeon: Miguel Ángel Karimi MD;  Location: [facility] CATH INVASIVE LOCATION;  Service: Cardiovascular;  Laterality: N/A;    CARDIAC CATHETERIZATION N/A 03/19/2024    Procedure: Left Heart Cath;  Surgeon: Miguel Ángel Karimi MD;  Location: [facility] CATH INVASIVE LOCATION;  Service: Cardiovascular;  Laterality: N/A;    CARDIAC CATHETERIZATION N/A 03/19/2024    Procedure: Percutaneous Coronary Intervention;  Surgeon: Miguel Ángel Karimi MD;  Location: [facility] CATH INVASIVE LOCATION;  Service: Cardiovascular;  Laterality: N/A;    CARDIAC CATHETERIZATION N/A 03/19/2024    Procedure: Stent DAVONTE coronary;  Surgeon: Miguel Ángel Karimi MD;  Location: [facility] CATH INVASIVE LOCATION;  Service: Cardiovascular;  Laterality: N/A;    CERVICAL DISCECTOMY POSTERIOR FUSION WITH INSTRUMENTATION Bilateral 6/25/2024    Procedure: Cervical 3 to cervical 6 laminectomies and posterior spinal fusion - Bilateral;  Surgeon: Marshal Miguel MD;  Location: [facility] MAIN OR;  Service: Neurosurgery;  Laterality: Bilateral;    COLONOSCOPY N/A 02/22/2006    Bilobed polyp at 30 cm, hemorrhoids-Dr. Ilya Zhao    COLONOSCOPY N/A 02/28/2014    Normal ileum, one 6 mm polyp in the mid transverse colon-Dr. Ilya Zhao    COLONOSCOPY N/A 11/19/2008    Ela ileum, two 3 to 4 mm polyps, non-bleeding internal hemorrhoids, repeat in 5 years-Dr. Ilya Zhao    COLONOSCOPY N/A 10/31/2017    Procedure: COLONOSCOPY WITH POLYPECTOMY (COLD BIOPSY);  Surgeon: Ilya Zhao MD;  Location: [facility] ENDOSCOPY;  Service:     COLONOSCOPY N/A 05/21/2021    Procedure: Colonoscopy into cecum and terminal ileum with cold biopsy polypectomy;  Surgeon: Ilya Zhao MD;  Location: [facility] ENDOSCOPY;  Service: Gastroenterology;  Laterality: N/A;  Pre op: History of Polyps  Post op: Polyp     CORONARY ANGIOPLASTY WITH STENT PLACEMENT  2007, 2012, 2015    cardiac stents x3 occasions    ENDOSCOPY N/A 10/04/2017    Procedure: ESOPHAGOGASTRODUODENOSCOPY;  Surgeon: Boyd Guidry MD;  Location: [facility] ENDOSCOPY;  Service:     ENDOSCOPY N/A 05/21/2021    Procedure: ESOPHAGOGASTRODUODENOSCOPY with biopsies;  Surgeon: Ilya Zhao MD;  [facility];  Service: Gastroenterology;  Laterality: N/A;  Pre op: GERD  Post op: Irregular  Z-Line, Hiatal Hernia, Gastritis    ENDOSCOPY N/A 08/25/2023    Procedure: ESOPHAGOGASTRODUODENOSCOPY with biopsy;  Surgeon: Ilya Zhao MD;  Location: [facility] ENDOSCOPY;  Service: Gastroenterology;  Laterality: N/A;  errosive gastritis    EPIDURAL BLOCK      INTERVENTIONAL RADIOLOGY PROCEDURE N/A 05/20/2022    Procedure: Intravascular Ultrasound;  Surgeon: Mackenzie Morales MD;  Location: [facility] CATH INVASIVE LOCATION;  Service: Cardiovascular;  Laterality: N/A;    KNEE INCISION AND DRAINAGE Right 06/20/2017    Procedure: RT. KNEE WASHOUT ;  Surgeon: Boyd Coyne MD;  Location: [facility] MAIN OR;  Service:     MEDIAL BRANCH BLOCK Bilateral 12/17/2021    Procedure: LUMBAR MEDIAL BRANCH BLOCK bilateral ~L4-S1 x2 (~2 weeks apart);  Surgeon: Christina Villaseñor MD;  Location: [facility];  Service: Pain Management;  Laterality: Bilateral;    MEDIAL BRANCH BLOCK Bilateral 01/03/2022    Procedure: LUMBAR MEDIAL BRANCH BLOCK bilateral ~L4-S1 x2 (~2 weeks apart);  Surgeon: Christina Villaseñor MD;  Location: [facility] OR;  Service: Pain Management;  Laterality: Bilateral;    WY ARTHRP KNE CONDYLE&PLATU MEDIAL&LAT COMPARTMENTS Right 06/15/2017    Procedure: RT TOTAL KNEE ARTHROPLASTY;  Surgeon: Boyd Coyne MD;  Location: [facility] MAIN OR;  Service: Orthopedics    RADIOFREQUENCY ABLATION Bilateral 01/10/2022    Procedure: RADIOFREQUENCY ABLATION NERVES Bilateral L4-S1;  Surgeon: Christina Villaseñor MD;  Location: [facility] MAIN OR;  Service: Pain Management;  Laterality: Bilateral;     SHOULDER SURGERY Right 2016    rotator cuff repair    UPPER GASTROINTESTINAL ENDOSCOPY N/A 10/13/2015    Z-line irregular, normal stomach, normal duodenum-Dr. Ilya Zhao    UPPER GASTROINTESTINAL ENDOSCOPY N/A 02/28/2014    Z-line irregular, normal stomach, normal duodenum-Dr. Ilya Zhao    UPPER GASTROINTESTINAL ENDOSCOPY N/A 11/19/2008    Z-line irregular, chronic gastritis withotu hemorrhage, normal duodenum-Dr. Ilya Zhao    UPPER GASTROINTESTINAL ENDOSCOPY N/A 06/22/2006    LA Grade A reflux esophagitis, non-bleeding erythematous gastropathy, normal duodenum-Dr. Ilya Zhao       Family History   Problem Relation Age of Onset    Lupus Sister     Thyroid disease Sister     Heart disease Other     Hypertension Other     Arthritis Mother     Hyperlipidemia Mother     Hypertension Mother     Thyroid disease Mother     Lupus Mother     Vision loss Mother     Heart disease Father     Arthritis Father     Other Father         Vascular disease    Lupus Father     Depression Father     Alcohol abuse Father     Dementia Father     Hypertension Father     Heart disease Brother     Heart attack Brother 40    Thyroid disease Sister     Arthritis Brother     Malig Hyperthermia Neg Hx        Social History     Socioeconomic History    Marital status:      Spouse name: Micaela    Number of children: 1    Years of education: College   Tobacco Use    Smoking status: Never     Passive exposure: Never    Smokeless tobacco: Never    Tobacco comments:     CAFFEINE USE: 2 CUPS COFFEE DAILY   Vaping Use    Vaping status: Never Used   Substance and Sexual Activity    Alcohol use: Yes     Alcohol/week: 3.0 standard drinks of alcohol     Types: 1 Glasses of wine, 2 Shots of liquor per week    Drug use: Never    Sexual activity: Not Currently     Partners: Female     Birth control/protection: Post-menopausal           Objective   Physical Exam  Vitals and nursing note reviewed.   Constitutional:       Appearance: Normal  appearance. He is not toxic-appearing or diaphoretic.   HENT:      Head: Normocephalic and atraumatic.      Right Ear: External ear normal.      Left Ear: External ear normal.      Nose: Nose normal. No rhinorrhea.      Mouth/Throat:      Mouth: Mucous membranes are moist.      Pharynx: Oropharynx is clear. No oropharyngeal exudate or posterior oropharyngeal erythema.   Eyes:      Pupils: Pupils are equal, round, and reactive to light.   Neck:        Comments: C collar in place  Cardiovascular:      Rate and Rhythm: Normal rate and regular rhythm.      Pulses: Normal pulses.      Heart sounds: Normal heart sounds. No murmur heard.     No friction rub. No gallop.   Pulmonary:      Effort: Pulmonary effort is normal. No respiratory distress.      Breath sounds: Normal breath sounds. No wheezing, rhonchi or rales.   Abdominal:      General: Abdomen is flat. Bowel sounds are normal. There is no distension.      Palpations: Abdomen is soft.      Tenderness: There is no abdominal tenderness. There is no guarding or rebound.   Musculoskeletal:         General: No swelling or deformity.      Cervical back: Neck supple. No tenderness.      Right lower leg: No edema.      Left lower leg: No edema.   Lymphadenopathy:      Cervical: No cervical adenopathy.   Skin:     General: Skin is warm and dry.   Neurological:      General: No focal deficit present.      Mental Status: He is alert and oriented to person, place, and time.      GCS: GCS eye subscore is 4. GCS verbal subscore is 5. GCS motor subscore is 6.         Procedures           ED Course  ED Course as of 07/20/24 0448   Sat Jul 20, 2024   0433 [facility] paged for admit [SD]   0448 D/w [facility] accepting for Dr. TEAGAN Tejada, hospitalist.  Pt stable transport. [SD]      ED Course User Index  [SD] Faheem Carr MD      Labs Reviewed   URINALYSIS W/ CULTURE IF INDICATED - Abnormal; Notable for the following components:       Result Value    Glucose,  mg/dL (Trace) (*)      Ketones, UA Trace (*)     Protein,  mg/dL (2+) (*)     Urobilinogen, UA 4.0 E.U./dL (*)     All other components within normal limits    Narrative:     In absence of clinical symptoms, the presence of pyuria, bacteria, and/or nitrites on the urinalysis result does not correlate with infection.   COMPREHENSIVE METABOLIC PANEL - Abnormal; Notable for the following components:    Glucose 233 (*)     Sodium 131 (*)     All other components within normal limits    Narrative:     GFR Normal >60  Chronic Kidney Disease <60  Kidney Failure <15     CBC WITH AUTO DIFFERENTIAL - Abnormal; Notable for the following components:    WBC 16.08 (*)     RDW 15.8 (*)     Lymphocyte % 11.7 (*)     Monocyte % 12.1 (*)     Neutrophils, Absolute 12.09 (*)     Monocytes, Absolute 1.94 (*)     Immature Grans, Absolute 0.06 (*)     All other components within normal limits   COVID-19 AND FLU A/B, NP SWAB IN TRANSPORT MEDIA 1 HR TAT - Normal    Narrative:     Fact sheet for providers: https://www.fda.gov/media/341927/download    Fact sheet for patients: https://www.fda.gov/media/042717/download    Test performed by PCR.   LACTIC ACID, PLASMA - Normal   BLOOD CULTURE   BLOOD CULTURE   URINALYSIS, MICROSCOPIC ONLY   RAINBOW URINE CULTURE TUBE   CBC AND DIFFERENTIAL    Narrative:     The following orders were created for panel order CBC & Differential.  Procedure                               Abnormality         Status                     ---------                               -----------         ------                     CBC Auto Differential[[id number]]        Abnormal            Final result                 Please view results for these tests on the individual orders.     XR Chest 1 View    Result Date: 7/20/2024  Narrative: SINGLE VIEW OF THE CHEST  HISTORY: Fever  COMPARISON: 3/29/2024  FINDINGS: Heart size is within normal limits. No pneumothorax, pleural effusion, or acute infiltrate is seen. There is stable elevation of the right  hemidiaphragm, with chronic scarring noted within the right lung.      Impression: No acute findings.  This report was finalized on 7/20/2024 3:44 AM by Dr. Rebecca Pradhan M.D on Workstation: [id number]      CT Soft Tissue Neck With Contrast    Result Date: 7/1/2024  Narrative: CT SCAN OF THE NECK WITH CONTRAST ON 06/19/2024  CLINICAL HISTORY: Patient is status post C3-C6 laminectomies and posterior fusion C3-C6 on 06/25/2024. Patient has stridor  TECHNIQUE: Spiral CT images were obtained from the tops of the petrous apices down through the sternoclavicular junction following intravenous contrast. The images were reformatted and submitted in 3 mm thick axial CT sections with soft tissue algorithm and 2 mm thick sagittal and coronal reconstructions were performed and submitted in soft tissue algorithm.  FINDINGS: The paranasal sinuses and the mastoid air cells and the middle ear cavities are clear. The posterior fossa structures are within normal limits. The nasopharynx, oral pharynx, the hypopharynx and the true cords and subglottic airway are within normal limits. There is mild enlargement of the thyroid gland. There is some platelike areas of atelectasis scattered in the upper lung zones. The parotid, , parapharyngeal and submandibular spaces are symmetric and normal in appearance. On 06/25/2024, 4 days ago the patient underwent cervical spine surgery. There is evidence of previous bilateral laminectomies at C3, C4, C5 and there are bilateral posterior rods bridging the posterior elements from C3-C6 anchored by bilateral articular pillar screws at C3, C4, C5 and C6 and there is some postoperative fluid posterior to the dura and bubbles of air, thin band of posterior epidural air in the right posterolateral epidural space at C2. Some posterior epidural fluid narrowing the posterior right posterolateral epidural space at C2 mild-moderately narrows the canal at C2-3 and there is ossification posterior  longitudinal ligament extending from the posterior inferior body of C4 along the posterior disc margin and posterior body of C5 that prominently indents the ventral thecal sac and abuts the ventral cord.      Impression: 1. This patient is status post cervical spine surgery on 06/25/2024, 4 days ago and had bilateral laminectomies at C3, C4 and C5 and placement of bilateral rods bridging the posterior elements from C3-C6 anchored by bilateral articular pillar screws at C3, C4, C5 and C6 and there is postoperative midline soft tissue fluid and air and extends in the posterior epidural space posterior to the dura from C3-C6 and thin rind of post operative epidural fluid and air in the right posterior and posterior central epidural space at the C2 and C2-3 levels that mild-moderately narrows the canal. There is a band of ossification posterior longitudinal ligament extending from along the posterior midline of the inferior body of C4 across the posterior disc space and along the posterior midline of the body of C5 that prominently indents the anterior midline of the thecal sac abuts and deforms the ventral cord. At some point a follow-up MRI of the cervical spine could be obtained to further evaluate if clinical symptoms warrant.  2. There is mild enlargement of the thyroid gland. There are some platelike areas of atelectasis scattered in the visualized portion of the upper lung zones and lung apices bilaterally. The remainder of the neck CT is essentially within normal limits.  The results were communicated to Jorge Luis Pedersen by telephone on 06/29/2024 at 6:45 p.m.  Radiation dose reduction techniques were utilized, including automated exposure control and exposure modulation based on body size. l  This report was finalized on 7/1/2024 5:41 AM by Dr. Vinny Cochran M.D on Workstation: [id number]      XR Chest PA & Lateral    Result Date: 6/29/2024  Narrative: XR CHEST PA AND LATERAL-  HISTORY: Cough and wheezing.   COMPARISON: Chest radiograph 6/28/2024  FINDINGS:  6 views of the chest were obtained. Limited examination due to difficulties with patient positioning and underlying condition. The cardiac silhouette is normal in size. Linear perihilar opacities are similar to prior. There are low lung volumes. There is no definite new focal consolidation. There is no large pleural effusion. There is gaseous distention of the stomach, which has progressed. Correlate with abdominal symptoms, and consider dedicated abdominal imaging. There is partially imaged surgical hardware in the visualized lower cervical spine. There are suspected old rib fractures.  This report was finalized on 6/29/2024 6:36 PM by Dr. Roslyn Lopez M.D on Workstation: [id number]      FL Sniff Test    Result Date: 6/29/2024  Narrative: FL SNIFF TEST-  INDICATIONS: Elevated right hemidiaphragm  TECHNIQUE: FLUOROSCOPIC ASSISTANCE IN THE OPERATING ROOM.  FINDINGS:  The patient's diaphragm was observed fluoroscopically during free breathing and rapid inspiration (sniffing), revealing normal and symmetric diaphragmatic excursion. No evidence for diaphragmatic paralysis.  Fluoroscopy time: 12 seconds, 44 fluoroscopic images  Radiation exposure: Dose area product: 2/1/2006 uGy x m(2)       Impression:  As described.  This report was finalized on 6/29/2024 4:27 PM by Dr. Alek Soni M.D on Workstation: [id number]      XR Chest PA & Lateral    Result Date: 6/28/2024  Narrative: XR CHEST PA AND LATERAL-  HISTORY: Male who is 69 years-old, cough and fever  TECHNIQUE: Frontal and lateral views of the chest  COMPARISON: 5/23/2022  FINDINGS: The heart size is borderline with mild pulmonary vascular congestion. Lung volume is low with likely atelectasis in both lungs, follow-up as indications persist. No large pleural effusion, or pneumothorax. Right hemidiaphragm is elevated. No acute osseous process.      Impression: As described.    This report was finalized on  6/28/2024 3:37 PM by Dr. Alek Soni M.D on Workstation: [employer] C Arm During Surgery    Result Date: 6/25/2024  Narrative: This procedure was auto-finalized with no dictation required.    Arterial Line    Result Date: 6/25/2024  Narrative: Jason Raphael MD     6/25/2024 11:50 AM Arterial Line Patient reassessed immediately prior to procedure Patient location during procedure: pre-op Start time: 6/25/2024 11:40 AM Stop Time:6/25/2024 11:45 AM  Line placed for hemodynamic monitoring and ABGs/Labs/ISTAT. Performed By Anesthesiologist: Jason Raphael MD Preanesthetic Checklist Completed: patient identified, IV checked, site marked, risks and benefits discussed, surgical consent, monitors and equipment checked, pre-op evaluation and timeout performed Arterial Line Prep  Sterile Tech: cap, gloves and mask Prep: ChloraPrep Patient monitoring: blood pressure monitoring, continuous pulse oximetry and EKG Arterial Line Procedure Laterality:left Location:  radial artery Catheter size: 20 G Guidance: palpation technique Number of attempts: 1 Successful placement: yes Post Assessment Dressing Type: biopatch applied, occlusive dressing applied, secured with tape and wrist guard applied. Complications no Circ/Move/Sens Assessment: normal and unchanged. Patient Tolerance: patient tolerated the procedure well with no apparent complications                                         Medical Decision Making  Problems Addressed:  Sepsis without acute organ dysfunction, due to unspecified organism: complicated acute illness or injury    Amount and/or Complexity of Data Reviewed  Labs: ordered.  Radiology: ordered.    Risk  OTC drugs.  Prescription drug management.  Decision regarding hospitalization.        Final diagnoses:   Sepsis without acute organ dysfunction, due to unspecified organism       ED Disposition  ED Disposition       ED Disposition   Decision to Admit    Condition   --    Comment   Level of Care:  Med/Surg [1]   Diagnosis: Sepsis [[id number]]   Admitting Physician: LYDIA ROSS [[id number]]   Attending Physician: LYDIA ROSS [[id number]]   Certification: I Certify That Inpatient Hospital Services Are Medically Necessary For Greater Than 2 Midnights                 No follow-up provider specified.       Medication List      No changes were made to your prescriptions during this visit.

## 2024-07-21 LAB
ALBUMIN SERPL-MCNC: 2.9 G/DL (ref 3.5–5.2)
ALBUMIN/GLOB SERPL: 0.8 G/DL
ALP SERPL-CCNC: 80 U/L (ref 39–117)
ALT SERPL W P-5'-P-CCNC: 34 U/L (ref 1–41)
ANION GAP SERPL CALCULATED.3IONS-SCNC: 11.2 MMOL/L (ref 5–15)
AST SERPL-CCNC: 27 U/L (ref 1–40)
BASOPHILS # BLD AUTO: 0.07 10*3/MM3 (ref 0–0.2)
BASOPHILS NFR BLD AUTO: 0.4 % (ref 0–1.5)
BILIRUB SERPL-MCNC: 0.8 MG/DL (ref 0–1.2)
BUN SERPL-MCNC: 7 MG/DL (ref 8–23)
BUN/CREAT SERPL: 6.4 (ref 7–25)
CALCIUM SPEC-SCNC: 8.2 MG/DL (ref 8.6–10.5)
CHLORIDE SERPL-SCNC: 103 MMOL/L (ref 98–107)
CO2 SERPL-SCNC: 19.8 MMOL/L (ref 22–29)
CREAT SERPL-MCNC: 1.1 MG/DL (ref 0.76–1.27)
DEPRECATED RDW RBC AUTO: 46.4 FL (ref 37–54)
EGFRCR SERPLBLD CKD-EPI 2021: 72.7 ML/MIN/1.73
EOSINOPHIL # BLD AUTO: 0.17 10*3/MM3 (ref 0–0.4)
EOSINOPHIL NFR BLD AUTO: 1 % (ref 0.3–6.2)
ERYTHROCYTE [DISTWIDTH] IN BLOOD BY AUTOMATED COUNT: 14.7 % (ref 12.3–15.4)
GLOBULIN UR ELPH-MCNC: 3.5 GM/DL
GLUCOSE BLDC GLUCOMTR-MCNC: 103 MG/DL (ref 70–130)
GLUCOSE BLDC GLUCOMTR-MCNC: 116 MG/DL (ref 70–130)
GLUCOSE BLDC GLUCOMTR-MCNC: 128 MG/DL (ref 70–130)
GLUCOSE BLDC GLUCOMTR-MCNC: 99 MG/DL (ref 70–130)
GLUCOSE SERPL-MCNC: 113 MG/DL (ref 65–99)
HBA1C MFR BLD: 6.2 % (ref 4.8–5.6)
HCT VFR BLD AUTO: 34.6 % (ref 37.5–51)
HGB BLD-MCNC: 11.3 G/DL (ref 13–17.7)
IMM GRANULOCYTES # BLD AUTO: 0.15 10*3/MM3 (ref 0–0.05)
IMM GRANULOCYTES NFR BLD AUTO: 0.8 % (ref 0–0.5)
LYMPHOCYTES # BLD AUTO: 1.2 10*3/MM3 (ref 0.7–3.1)
LYMPHOCYTES NFR BLD AUTO: 6.8 % (ref 19.6–45.3)
MAGNESIUM SERPL-MCNC: 1.5 MG/DL (ref 1.6–2.4)
MCH RBC QN AUTO: 28.2 PG (ref 26.6–33)
MCHC RBC AUTO-ENTMCNC: 32.7 G/DL (ref 31.5–35.7)
MCV RBC AUTO: 86.3 FL (ref 79–97)
MONOCYTES # BLD AUTO: 2.13 10*3/MM3 (ref 0.1–0.9)
MONOCYTES NFR BLD AUTO: 12 % (ref 5–12)
NEUTROPHILS NFR BLD AUTO: 13.96 10*3/MM3 (ref 1.7–7)
NEUTROPHILS NFR BLD AUTO: 79 % (ref 42.7–76)
NRBC BLD AUTO-RTO: 0 /100 WBC (ref 0–0.2)
PHOSPHATE SERPL-MCNC: 2.3 MG/DL (ref 2.5–4.5)
PLATELET # BLD AUTO: 202 10*3/MM3 (ref 140–450)
PMV BLD AUTO: 10.7 FL (ref 6–12)
POTASSIUM SERPL-SCNC: 3.7 MMOL/L (ref 3.5–5.2)
PROT SERPL-MCNC: 6.4 G/DL (ref 6–8.5)
RBC # BLD AUTO: 4.01 10*6/MM3 (ref 4.14–5.8)
SODIUM SERPL-SCNC: 134 MMOL/L (ref 136–145)
WBC NRBC COR # BLD AUTO: 17.68 10*3/MM3 (ref 3.4–10.8)

## 2024-07-21 PROCEDURE — 25010000002 MAGNESIUM SULFATE 2 GM/50ML SOLUTION: Performed by: INTERNAL MEDICINE

## 2024-07-21 PROCEDURE — 25010000002 CEFEPIME PER 500 MG: Performed by: INTERNAL MEDICINE

## 2024-07-21 PROCEDURE — 85025 COMPLETE CBC W/AUTO DIFF WBC: CPT | Performed by: INTERNAL MEDICINE

## 2024-07-21 PROCEDURE — 99231 SBSQ HOSP IP/OBS SF/LOW 25: CPT | Performed by: SURGERY

## 2024-07-21 PROCEDURE — 25810000003 SODIUM CHLORIDE 0.9 % SOLUTION: Performed by: EMERGENCY MEDICINE

## 2024-07-21 PROCEDURE — 97116 GAIT TRAINING THERAPY: CPT

## 2024-07-21 PROCEDURE — 97162 PT EVAL MOD COMPLEX 30 MIN: CPT

## 2024-07-21 PROCEDURE — 80053 COMPREHEN METABOLIC PANEL: CPT | Performed by: INTERNAL MEDICINE

## 2024-07-21 PROCEDURE — 83735 ASSAY OF MAGNESIUM: CPT | Performed by: INTERNAL MEDICINE

## 2024-07-21 PROCEDURE — 83036 HEMOGLOBIN GLYCOSYLATED A1C: CPT | Performed by: INTERNAL MEDICINE

## 2024-07-21 PROCEDURE — 25810000003 SODIUM CHLORIDE 0.9 % SOLUTION: Performed by: INTERNAL MEDICINE

## 2024-07-21 PROCEDURE — 82948 REAGENT STRIP/BLOOD GLUCOSE: CPT

## 2024-07-21 PROCEDURE — 84100 ASSAY OF PHOSPHORUS: CPT | Performed by: INTERNAL MEDICINE

## 2024-07-21 RX ORDER — SODIUM CHLORIDE 9 MG/ML
75 INJECTION, SOLUTION INTRAVENOUS CONTINUOUS
Status: ACTIVE | OUTPATIENT
Start: 2024-07-21 | End: 2024-07-22

## 2024-07-21 RX ORDER — MAGNESIUM SULFATE HEPTAHYDRATE 40 MG/ML
2 INJECTION, SOLUTION INTRAVENOUS
Status: COMPLETED | OUTPATIENT
Start: 2024-07-21 | End: 2024-07-21

## 2024-07-21 RX ADMIN — METRONIDAZOLE 500 MG: 500 TABLET, FILM COATED ORAL at 13:57

## 2024-07-21 RX ADMIN — MAGNESIUM SULFATE HEPTAHYDRATE 2 G: 2 INJECTION, SOLUTION INTRAVENOUS at 12:07

## 2024-07-21 RX ADMIN — GABAPENTIN 300 MG: 300 CAPSULE ORAL at 21:15

## 2024-07-21 RX ADMIN — ACETAMINOPHEN 325MG 650 MG: 325 TABLET ORAL at 12:07

## 2024-07-21 RX ADMIN — MAGNESIUM SULFATE HEPTAHYDRATE 2 G: 2 INJECTION, SOLUTION INTRAVENOUS at 10:49

## 2024-07-21 RX ADMIN — ARIPIPRAZOLE 5 MG: 5 TABLET ORAL at 09:04

## 2024-07-21 RX ADMIN — CARVEDILOL 25 MG: 25 TABLET, FILM COATED ORAL at 09:04

## 2024-07-21 RX ADMIN — METRONIDAZOLE 500 MG: 500 TABLET, FILM COATED ORAL at 21:15

## 2024-07-21 RX ADMIN — SODIUM CHLORIDE 125 ML/HR: 9 INJECTION, SOLUTION INTRAVENOUS at 03:50

## 2024-07-21 RX ADMIN — CEFEPIME 2000 MG: 2 INJECTION, POWDER, FOR SOLUTION INTRAVENOUS at 18:03

## 2024-07-21 RX ADMIN — METRONIDAZOLE 500 MG: 500 TABLET, FILM COATED ORAL at 06:33

## 2024-07-21 RX ADMIN — LATANOPROST 1 DROP: 50 SOLUTION OPHTHALMIC at 21:14

## 2024-07-21 RX ADMIN — SODIUM CHLORIDE 75 ML/HR: 9 INJECTION, SOLUTION INTRAVENOUS at 21:16

## 2024-07-21 RX ADMIN — TIMOLOL MALEATE: 5 SOLUTION/ DROPS OPHTHALMIC at 21:14

## 2024-07-21 RX ADMIN — Medication 10 ML: at 09:01

## 2024-07-21 RX ADMIN — TERAZOSIN HYDROCHLORIDE 5 MG: 5 CAPSULE ORAL at 21:15

## 2024-07-21 RX ADMIN — PANTOPRAZOLE SODIUM 40 MG: 40 TABLET, DELAYED RELEASE ORAL at 06:33

## 2024-07-21 RX ADMIN — Medication 10 ML: at 21:18

## 2024-07-21 RX ADMIN — CARVEDILOL 25 MG: 25 TABLET, FILM COATED ORAL at 18:04

## 2024-07-21 RX ADMIN — CEFEPIME 2000 MG: 2 INJECTION, POWDER, FOR SOLUTION INTRAVENOUS at 10:04

## 2024-07-21 RX ADMIN — ASPIRIN 81 MG: 81 TABLET, COATED ORAL at 09:03

## 2024-07-21 RX ADMIN — OXYCODONE AND ACETAMINOPHEN 1 TABLET: 7.5; 325 TABLET ORAL at 21:15

## 2024-07-21 RX ADMIN — SODIUM CHLORIDE 75 ML/HR: 9 INJECTION, SOLUTION INTRAVENOUS at 17:57

## 2024-07-21 RX ADMIN — TIMOLOL MALEATE: 5 SOLUTION/ DROPS OPHTHALMIC at 09:05

## 2024-07-21 RX ADMIN — CEFEPIME 2000 MG: 2 INJECTION, POWDER, FOR SOLUTION INTRAVENOUS at 02:01

## 2024-07-21 RX ADMIN — MAGNESIUM SULFATE HEPTAHYDRATE 2 G: 2 INJECTION, SOLUTION INTRAVENOUS at 13:56

## 2024-07-21 RX ADMIN — ESCITALOPRAM 20 MG: 20 TABLET, FILM COATED ORAL at 09:03

## 2024-07-21 NOTE — THERAPY EVALUATION
Patient Name: Isaias Monaco  : 1955    MRN: [id number]                              Today's Date: 2024       Admit Date: 2024    Visit Dx:     ICD-10-CM ICD-9-CM   1. Sepsis without acute organ dysfunction, due to unspecified organism  A41.9 038.9     995.91     Patient Active Problem List   Diagnosis    Microalbuminuria    Vitamin D deficiency    Angioedema    Coronary artery disease involving native coronary artery of native heart without angina pectoris    Anxiety    ED (erectile dysfunction)    Hyperlipidemia LDL goal <70    Hypogonadism in male    S/P primary angioplasty with coronary stent    Osteoarthritis of knee    Hypertensive kidney disease with stage 3a chronic kidney disease    Anemia, posthemorrhagic, acute    Dyspnea on exertion    Gastro-esophageal reflux disease with esophagitis    Tubular adenoma of colon    Nocturia associated with benign prostatic hyperplasia    Iron deficiency anemia    Adverse effect of iron and its compounds, sequela    Facial twitching    Blepharospasm    Type 2 diabetes mellitus with microalbuminuria, with long-term current use of insulin    Panic disorder    Tic disorder, unspecified    Spinal stenosis of lumbar region without neurogenic claudication    Bilateral myopia    Combined forms of age-related cataract, bilateral    Presbyopia    Primary open-angle glaucoma, bilateral, severe stage    Lumbar facet arthropathy    Spondylosis of lumbar region without myelopathy or radiculopathy    Calcific coronary arteriosclerosis    Essential hypertension    Degeneration of lumbar intervertebral disc    Dystonia    Lumbosacral spondylosis without myelopathy    Medicare annual wellness visit, subsequent    Coronary stent restenosis    Stenosis of cervical spine with myelopathy    Preop examination    Pneumonia    Stridor    Sepsis    Status post cervical spinal fusion    Periumbilical abdominal pain     Past Medical History:   Diagnosis Date    Anemia     post  hemorrhagic    Angioedema 02/21/2016    Secondary to ACE inhibitor    Anxiety     Arthritis     CAD (coronary artery disease)     Colon polyps     Diabetes mellitus, type 2     GERD (gastroesophageal reflux disease)     Glaucoma     Hematoma     history    High cholesterol     History of foreign travel 12/2017; 08/2018    [place], [place], [place], [place], [place] and [place]; [place]    Hyperlipidemia     Hypertension     Hypogonadism in male 09/28/2016    Male erectile disorder     Microalbuminuria     Osteoarthritis     knee    Spinal stenosis of lumbar region without neurogenic claudication 06/02/2021    Tubular adenoma of colon 11/01/2017    Vitamin D deficiency      Past Surgical History:   Procedure Laterality Date    CARDIAC CATHETERIZATION N/A 05/15/2006    Dr. Mini Camarillo    CARDIAC CATHETERIZATION N/A 03/10/2020    Procedure: Left Heart Cath;  Surgeon: Miguel Ángel Karimi MD;  Location:  [facility];  Service: Cardiology;  Laterality: N/A;    CARDIAC CATHETERIZATION N/A 03/10/2020    Procedure: Stent DAVONTE coronary;  Surgeon: Miguel Ángel Karimi MD;  Location:  [facility];  Service: Cardiology;  Laterality: N/A;    CARDIAC CATHETERIZATION N/A 03/10/2020    Procedure: Coronary angiography;  Surgeon: Miguel Ángel Karimi MD;  Location:  [facility];  Service: Cardiology;  Laterality: N/A;    CARDIAC CATHETERIZATION N/A 03/10/2020    Procedure: Left ventriculography;  Surgeon: Miguel Ángel Karimi MD;  Location:  [facility];  Service: Cardiology;  Laterality: N/A;    CARDIAC CATHETERIZATION N/A 05/20/2022    Procedure: Left Heart Cath;  Surgeon: Mackenzie Morales MD;  Location:  [facility];  Service: Cardiovascular;  Laterality: N/A;    CARDIAC CATHETERIZATION N/A 05/20/2022    Procedure: Coronary angiography;  Surgeon: Mackenzie Morales MD;  Location:  [facility];  Service:  Cardiovascular;  Laterality: N/A;    CARDIAC CATHETERIZATION N/A 05/20/2022    Procedure: Percutaneous Coronary Intervention;  Surgeon: Mackenzie Morales MD;  Location:  [facility];  Service: Cardiovascular;  Laterality: N/A;    CARDIAC CATHETERIZATION N/A 05/24/2022    Procedure: Percutaneous Coronary Intervention;  Surgeon: Miguel Ángel Karimi MD;  Location:  [facility];  Service: Cardiovascular;  Laterality: N/A;  LAD and Cx    CARDIAC CATHETERIZATION N/A 05/24/2022    Procedure: Stent DAVONTE coronary;  Surgeon: Miguel Ángel Karimi MD;  Location:  [facility];  Service: Cardiovascular;  Laterality: N/A;    CARDIAC CATHETERIZATION N/A 05/24/2022    Procedure: Resting Full Cycle Ratio;  Surgeon: Miguel Ángel Karimi MD;  Location: [facility] C[facility];  Service: Cardiovascular;  Laterality: N/A;    CARDIAC CATHETERIZATION N/A 03/19/2024    Procedure: Left Heart Cath;  Surgeon: Miguel Ángel Karimi MD;  [facility] C[facility];  Service: Cardiovascular;  Laterality: N/A;    CARDIAC CATHETERIZATION N/A 03/19/2024    Procedure: Percutaneous Coronary Intervention;  Surgeon: Miguel Ángel Karimi MD;  [facility] C[facility];  Service: Cardiovascular;  Laterality: N/A;    CARDIAC CATHETERIZATION N/A 03/19/2024    Procedure: Stent DAVONTE coronary;  Surgeon: Miguel Ángel Karimi MD;  [facility];  Service: Cardiovascular;  Laterality: N/A;    CERVICAL DISCECTOMY POSTERIOR FUSION WITH INSTRUMENTATION Bilateral 6/25/2024    Procedure: Cervical 3 to cervical 6 laminectomies and posterior spinal fusion - Bilateral;  Surgeon: Marshal Miguel MD;  Location: [facility] MAIN OR;  Service: Neurosurgery;  Laterality: Bilateral;    COLONOSCOPY N/A 02/22/2006    Bilobed polyp at 30 cm, hemorrhoids-Dr. Ilya Zhao    COLONOSCOPY N/A 02/28/2014    Normal ileum, one 6 mm polyp in the mid transverse colon-  Ilya Zhao    COLONOSCOPY N/A 11/19/2008    Ela ileum, two 3 to 4 mm polyps, non-bleeding internal hemorrhoids, repeat in 5 years-Dr. Ilya Zhao    COLONOSCOPY N/A 10/31/2017    Procedure: COLONOSCOPY WITH POLYPECTOMY (COLD BIOPSY);  Surgeon: Ilya Zhao MD;  Location: [facility] ENDOSCOPY;  Service:     COLONOSCOPY N/A 05/21/2021    Procedure: Colonoscopy into cecum and terminal ileum with cold biopsy polypectomy;  Surgeon: Ilya Zhao MD;  Location: [facility] ENDOSCOPY;  Service: Gastroenterology;  Laterality: N/A;  Pre op: History of Polyps  Post op: Polyp    CORONARY ANGIOPLASTY WITH STENT PLACEMENT  2007, 2012, 2015    cardiac stents x3 occasions    ENDOSCOPY N/A 10/04/2017    Procedure: ESOPHAGOGASTRODUODENOSCOPY;  Surgeon: Boyd Guidry MD;  Location: [facility] ENDOSCOPY;  Service:     ENDOSCOPY N/A 05/21/2021    Procedure: ESOPHAGOGASTRODUODENOSCOPY with biopsies;  Surgeon: Ilya Zhao MD;  Location: [facility] ENDOSCOPY;  Service: Gastroenterology;  Laterality: N/A;  Pre op: GERD  Post op: Irregular  Z-Line, Hiatal Hernia, Gastritis    ENDOSCOPY N/A 08/25/2023    Procedure: ESOPHAGOGASTRODUODENOSCOPY with biopsy;  Surgeon: Ilya Zhao MD;  Location: [facility] ENDOSCOPY;  Service: Gastroenterology;  Laterality: N/A;  errosive gastritis    EPIDURAL BLOCK      INTERVENTIONAL RADIOLOGY PROCEDURE N/A 05/20/2022    Procedure: Intravascular Ultrasound;  Surgeon: Mackenzie Morales MD;  Location: [facility] CATH INVASIVE LOCATION;  Service: Cardiovascular;  Laterality: N/A;    KNEE INCISION AND DRAINAGE Right 06/20/2017    Procedure: RT. KNEE WASHOUT ;  Surgeon: Boyd Coyne MD;  Location: [facility] MAIN OR;  Service:     MEDIAL BRANCH BLOCK Bilateral 12/17/2021    Procedure: LUMBAR MEDIAL BRANCH BLOCK bilateral ~L4-S1 x2 (~2 weeks apart);  Surgeon: Christina Villaseñor MD;  Location: [facility] [place];  Service: Pain Management;  Laterality: Bilateral;    MEDIAL BRANCH BLOCK Bilateral 01/03/2022    Procedure: LUMBAR  MEDIAL BRANCH BLOCK bilateral ~L4-S1 x2 (~2 weeks apart);  Surgeon: Christina Villaseñor MD;  [place] [facility] [place]  Service: Pain Management;  Laterality: Bilateral;    GA ARTHRP KNE CONDYLE&PLATU MEDIAL&LAT COMPARTMENTS Right 06/15/2017    Procedure: RT TOTAL KNEE ARTHROPLASTY;  Surgeon: Boyd Coyne MD;  Location: [facility] MAIN OR;  Service: Orthopedics    RADIOFREQUENCY ABLATION Bilateral 01/10/2022    Procedure: RADIOFREQUENCY ABLATION NERVES Bilateral L4-S1;  Surgeon: Christina Villaseñor MD;  Location: [facility] MAIN [facility];  Service: Pain Management;  Laterality: Bilateral;    SHOULDER SURGERY Right 2016    rotator cuff repair    UPPER GASTROINTESTINAL ENDOSCOPY N/A 10/13/2015    Z-line irregular, normal stomach, normal duodenum-Dr. Ilya Zhao    UPPER GASTROINTESTINAL ENDOSCOPY N/A 02/28/2014    Z-line irregular, normal stomach, normal duodenum-Dr. Ilya Zhao    UPPER GASTROINTESTINAL ENDOSCOPY N/A 11/19/2008    Z-line irregular, chronic gastritis withotu hemorrhage, normal duodenum-Dr. Ilya Zhao    UPPER GASTROINTESTINAL ENDOSCOPY N/A 06/22/2006    LA Grade A reflux esophagitis, non-bleeding erythematous gastropathy, normal duodenum-Dr. Ilya Zhao      General Information       Row Name 07/21/24 1309          Physical Therapy Time and Intention    Document Type evaluation  -JL     Mode of Treatment individual therapy;physical therapy  -       Row Name 07/21/24 1309          General Information    Patient Profile Reviewed yes  -JL     Prior Level of Function min assist:;independent:;dressing  -JL     Existing Precautions/Restrictions brace on at all times;spinal  cervical  -JL     Barriers to Rehab medically complex  -JL       Row Name 07/21/24 1309          Living Environment    People in Home spouse  -JL       Row Name 07/21/24 1309          Home Main Entrance    Number of Stairs, Main Entrance eight  -JL     Stair Railings, Main Entrance railings safe and in good condition  -JL       Row Name 07/21/24 1305           Stairs Within Home, Primary    Number of Stairs, Within Home, Primary none  -JL       Row Name 07/21/24 1309          Cognition    Orientation Status (Cognition) oriented x 4  -JL       Row Name 07/21/24 1309          Safety Issues, Functional Mobility    Safety Issues Affecting Function (Mobility) awareness of need for assistance;judgment  -JL     Impairments Affecting Function (Mobility) endurance/activity tolerance;shortness of breath;strength;pain;coordination  -JL               User Key  (r) = Recorded By, (t) = Taken By, (c) = Cosigned By      Initials Name Provider Type    Elizabeth Emanuel PT Physical Therapist                   Mobility       Row Name 07/21/24 1311          Bed Mobility    Bed Mobility supine-sit  -JL     Supine-Sit [place] (Bed Mobility) minimum assist (75% patient effort)  -JL     Assistive Device (Bed Mobility) bed rails  -JL     Comment, (Bed Mobility) Pulled on PTs arm to sit up  -JL       Row Name 07/21/24 1311          Bed-Chair Transfer    Bed-Chair [place] (Transfers) standby assist  -JL     Assistive Device (Bed-Chair Transfers) walker, front-wheeled  -JL       Row Name 07/21/24 1311          Sit-Stand Transfer    Sit-Stand [place] (Transfers) standby assist  -JL     Assistive Device (Sit-Stand Transfers) walker, front-wheeled  -JL       Row Name 07/21/24 1311          Gait/Stairs (Locomotion)    [place] Level (Gait) verbal cues;contact guard  -JL     Assistive Device (Gait) walker, front-wheeled  -JL     Patient was able to Ambulate yes  -JL     Distance in Feet (Gait) 300  -JL     Deviations/Abnormal Patterns (Gait) left sided deviations;stride length decreased;gait speed decreased;base of support, narrow  patient shifted to L while ambulating  -JL               User Key  (r) = Recorded By, (t) = Taken By, (c) = Cosigned By      Initials Name Provider Type    Elizabeth Emanuel PT Physical Therapist                   Obj/Interventions       Row Name  07/21/24 1317          Range of Motion Comprehensive    General Range of Motion no range of motion deficits identified  -JL       Row Name 07/21/24 1317          Strength Comprehensive (MMT)    General Manual Muscle Testing (MMT) Assessment lower extremity strength deficits identified;neck/trunk strength deficits identified  -JL     Comment, General Manual Muscle Testing (MMT) Assessment General trunk and lower extremity strength deficit for functional movement  -JL       Row Name 07/21/24 1317          Balance    Balance Assessment sitting static balance;standing static balance  -JL     Static Sitting Balance independent  -JL     Position, Sitting Balance unsupported;sitting edge of bed  -JL     Static Standing Balance standby assist  -JL     Position/Device Used, Standing Balance supported;walker, front-wheeled  -JL               User Key  (r) = Recorded By, (t) = Taken By, (c) = Cosigned By      Initials Name Provider Type    Elizabeth Emanuel, PT Physical Therapist                   Goals/Plan       Row Name 07/21/24 1324          Bed Mobility Goal 1 (PT)    Activity/Assistive Device (Bed Mobility Goal 1, PT) bed mobility activities, all  -JL     [place] Level/Cues Needed (Bed Mobility Goal 1, PT) independent  -JL     Time Frame (Bed Mobility Goal 1, PT) 2 weeks;short term goal (STG)  -[facility] Name 07/21/24 1324          Transfer Goal 1 (PT)    Activity/Assistive Device (Transfer Goal 1, PT) transfers, all  -JL     [place] Level/Cues Needed (Transfer Goal 1, PT) modified independence  -JL     Time Frame (Transfer Goal 1, PT) 10 days;short term goal (STG)  -[facility] Name 07/21/24 1324          Gait Training Goal 1 (PT)    Activity/Assistive Device (Gait Training Goal 1, PT) gait (walking locomotion);assistive device use;decrease fall risk;diminish gait deviation  -JL     [place] Level (Gait Training Goal 1, PT) standby assist  -JL     Time Frame (Gait Training Goal 1, PT) short term goal  (STG);10 days  -       Row Name 07/21/24 1324          Therapy Assessment/Plan (PT)    Planned Therapy Interventions (PT) balance training;bed mobility training;gait training;patient/family education;transfer training;strengthening  -               User Key  (r) = Recorded By, (t) = Taken By, (c) = Cosigned By      Initials Name Provider Type    Elizabeth Emanuel, PT Physical Therapist                   Clinical Impression       Row Name 07/21/24 1318          Pain    Pretreatment Pain Rating 0/10 - no pain  -       Row Name 07/21/24 1318          Plan of Care Review    Plan of Care Reviewed With patient;spouse  -LAKESHIA     Outcome Evaluation Patient is a 69y.o. male with PMH of CAD, DM, GERD, hyperlipidemia, and HTN. Patient originally had a cervical spinal fusion about a month ago and was in [facility] rehab for about 3 weeks. When he returned home he started developing a fever and increased weakness. Patient's wife was present and able to answer questions if pt was unable to. Pt reports he lives in a condo and there are 8 steps outside to get in. He was independent with ambulation prior to surgery and hospitalization. Patient did require min A for fine motor UE movement such as dressing, buttons, etc. Patient uses no AD prior to hospitalization. Patient was in cervical collar upon arrival. He was able to transfer supine to sitting EOB with min A from PT by holding onto PT's hand and pulling himself up. He was able to stand with SBA and and utilized walker for ambulation. Pt ambulate a over 300ft in hallway, but continuously bumped into the wall or door on his L side from deviating to the L. Patient was able to transfer into the chair with SBA back in the room. Patient will benefit from skilled PT to address functional endurance decline. PT recommendation is home with HH.  -       Row Name 07/21/24 2291          Therapy Assessment/Plan (PT)    Rehab Potential (PT) good, to achieve stated therapy goals  -LAKESHIA      Criteria for Skilled Interventions Met (PT) yes;skilled treatment is necessary  -JL     Therapy Frequency (PT) 5 times/wk  -JL       Row Name 07/21/24 1318          Positioning and Restraints    Pre-Treatment Position in bed  -JL     Post Treatment Position chair  -JL     In Chair with family/caregiver;encouraged to call for assist;call light within reach;reclined  -JL               User Key  (r) = Recorded By, (t) = Taken By, (c) = Cosigned By      Initials Name Provider Type    Elizabeth Emanuel PT Physical Therapist                   Outcome Measures       Row Name 07/21/24 1325          How much help from another person do you currently need...    Turning from your back to your side while in flat bed without using bedrails? 3  -JL     Moving from lying on back to sitting on the side of a flat bed without bedrails? 3  -JL     Moving to and from a bed to a chair (including a wheelchair)? 3  -JL     Standing up from a chair using your arms (e.g., wheelchair, bedside chair)? 3  -JL     Climbing 3-5 steps with a railing? 2  -JL     To walk in hospital room? 3  -JL     AM-PAC 6 Clicks Score (PT) 17  -JL     Highest Level of Mobility Goal 5 --> Static standing  -JL               User Key  (r) = Recorded By, (t) = Taken By, (c) = Cosigned By      Initials Name Provider Type    Elizabeth Emanuel PT Physical Therapist                                 Physical Therapy Education       Title: PT OT SLP Therapies (Done)       Topic: Physical Therapy (Done)       Point: Mobility training (Done)       Learning Progress Summary             Patient Acceptance, E, VU by LAKESHIA at 7/21/2024 1326   Significant Other Acceptance, E, VU by LAKESHIA at 7/21/2024 1326                         Point: Home exercise program (Done)       Learning Progress Summary             Patient Acceptance, E, VU by LAKESHIA at 7/21/2024 1326   Significant Other Acceptance, E, VU by LAKESHIA at 7/21/2024 1326                         Point: Body mechanics (Done)       Learning  Progress Summary             Patient Acceptance, E, VU by LAKESHIA at 7/21/2024 1326   Significant Other Acceptance, E, VU by LAKESHIA at 7/21/2024 1326                         Point: Precautions (Done)       Learning Progress Summary             Patient Acceptance, E, VU by LAKESHIA at 7/21/2024 1326   Significant Other Acceptance, E, VU by LAKESHIA at 7/21/2024 1326                                         User Key       Initials Effective Dates Name Provider Type Discipline     01/16/24 -  Eliazbeth Hernández, PT Physical Therapist PT                  PT Recommendation and Plan  Planned Therapy Interventions (PT): balance training, bed mobility training, gait training, patient/family education, transfer training, strengthening  Plan of Care Reviewed With: patient, spouse  Outcome Evaluation: Patient is a 69y.o. male with PMH of CAD, DM, GERD, hyperlipidemia, and HTN. Patient originally had a cervical spinal fusion about a month ago and was in [facility] rehab for about 3 weeks. When he returned home he started developing a fever and increased weakness. Patient's wife was present and able to answer questions if pt was unable to. Pt reports he lives in a condo and there are 8 steps outside to get in. He was independent with ambulation prior to surgery and hospitalization. Patient did require min A for fine motor UE movement such as dressing, buttons, etc. Patient uses no AD prior to hospitalization. Patient was in cervical collar upon arrival. He was able to transfer supine to sitting EOB with min A from PT by holding onto PT's hand and pulling himself up. He was able to stand with SBA and and utilized walker for ambulation. Pt ambulate a over 300ft in hallway, but continuously bumped into the wall or door on his L side from deviating to the L. Patient was able to transfer into the chair with SBA back in the room. Patient will benefit from skilled PT to address functional endurance decline. PT recommendation is home with .     Time  Calculation:         PT Charges       Row Name 07/21/24 1326             Time Calculation    Start Time 1010  -JL      Stop Time 1035  -JL      Time Calculation (min) 25 min  -JL      PT Non-Billable Time (min) 10 min  -JL      PT Received On 07/21/24  -JL      PT - Next Appointment 07/22/24  -JL      PT Goal Re-Cert Due Date 07/31/24  -JL         Time Calculation- PT    Total Timed Code Minutes- PT 15 minute(s)  -JL         Timed Charges    51893 - Gait Training Minutes  15  -JL         Untimed Charges    PT Eval/Re-eval Minutes 10  -JL         Total Minutes    Timed Charges Total Minutes 15  -JL      Untimed Charges Total Minutes 10  -JL       Total Minutes 25  -JL                User Key  (r) = Recorded By, (t) = Taken By, (c) = Cosigned By      Initials Name Provider Type    Elizabeth Emanuel, PT Physical Therapist                  Therapy Charges for Today       Code Description Service Date Service Provider Modifiers Qty    89837274089 HC GAIT TRAINING EA 15 MIN 7/21/2024 Elizabeth Hernández, PT GP 1    [id number] HC PT EVAL MOD COMPLEXITY 2 7/21/2024 Elizabeth Hernández, PT GP 1            PT G-Codes  AM-PAC 6 Clicks Score (PT): 17  PT Discharge Summary  Anticipated Discharge Disposition (PT): home with home health    Elizabeth Hernández PT  7/21/2024

## 2024-07-21 NOTE — PLAN OF CARE
Goal Outcome Evaluation:           Progress: improving  Outcome Evaluation: Patient alert and oriented, on room air, assist of 1 around the room and working with physical therapy, tolerating regular diet, blood glucose monitored ACHS, IV antibiotics continued per MAR, magnesium replaced per protocol, normal saline infusing 75ml/hr, not voicing nausea, Tylenol given once for pain, vitals stable, plan of care continued

## 2024-07-21 NOTE — PROGRESS NOTES
Chief Complaint:    Possible cholecystitis    Subjective:    The patient is feeling better today.  He has no abdominal pain.  He is hungry and wanting a diet.  He just came back from a walk.  His diarrhea is improved    Objective:    Temp:  [97.9 °F (36.6 °C)-100.2 °F (37.9 °C)] 100.2 °F (37.9 °C)  Heart Rate:  [81-96] 96  Resp:  [16-18] 16  BP: (120-141)/(62-81) 137/81    Physical Exam  Constitutional:       Appearance: He is not ill-appearing or toxic-appearing.   Abdominal:      Palpations: Abdomen is soft.      Tenderness: There is no abdominal tenderness.   Neurological:      Mental Status: He is alert.   Psychiatric:         Behavior: Behavior is cooperative.             Results:    WBC is 17.68.  H/H is 11.3/34.6.  BUN is 7 creatinine is 1.10.  Albumin is 2.9.    Assessment/Plan:    The patient has leukocytosis but currently has no abdominal pain.  His abdominal exam is completely benign.  His clinical course is not consistent with acute cholecystitis.    WBC increased today as compared to yesterday but he has clinically improved.    I will start a diet and advance it as tolerated.    If he continues to have a leukocytosis or concern remains that cholecystitis could be present, I will order a right upper quadrant ultrasound.    Luis Enrique Gaston Jr., M.D.

## 2024-07-21 NOTE — PROGRESS NOTES
[facility] NEUROSURGERY CERVICAL PROGRESS NOTE      CC: Lethargy, generalized weakness      Subjective     Interval History: No acute issues overnight.  WBC count fairly stable.  Fever curve trending down.  Evaluate by general surgery yesterday who feels the patient is dealing with colitis.    ROS:  Constitutional: No fever, chills  Neck: no neck pain  GI: No nausea, vomiting, no swallow difficulties  Neuro: No numbness, tingling, or weakness,  no balance difficulties  : no difficulty voiding, no incontinence    Objective     Vital signs in last 24 hours:  Temp:  [97.9 °F (36.6 °C)-100.2 °F (37.9 °C)] 97.9 °F (36.6 °C)  Heart Rate:  [] 103  Resp:  [16-18] 16  BP: (117-141)/(62-81) 117/73    Intake/Output this shift:  No intake/output data recorded.    LABS:  .  Results from last 7 days   Lab Units 07/21/24  0819 07/19/24  2304   WBC 10*3/mm3 17.68* 16.08*   HEMOGLOBIN g/dL 11.3* 13.4   HEMATOCRIT % 34.6* 41.3   PLATELETS 10*3/mm3 202 280     .  Results from last 7 days   Lab Units 07/21/24  0819 07/19/24  2304   SODIUM mmol/L 134* 131*   POTASSIUM mmol/L 3.7 4.0   CHLORIDE mmol/L 103 98   CO2 mmol/L 19.8* 22.7   BUN mg/dL 7* 9   CREATININE mg/dL 1.10 1.09   CALCIUM mg/dL 8.2* 9.1   BILIRUBIN mg/dL 0.8 0.8   ALK PHOS U/L 80 104   ALT (SGPT) U/L 34 37   AST (SGOT) U/L 27 36   GLUCOSE mg/dL 113* 233*       Blood cultures 7/20/24: No growth at 24 hours    IMAGING STUDIES:  MRI cervical spine with and without contrast 7/20/2024:  FINDINGS:     There is a small amount of fluid signal intensity noted within the C5-6  disc space which is stable in appearance when compared to the prior  examination dated 01/23/2024. Also, on the previous examination dated  02/03/2023, there was also a small amount of fluid signal intensity  within the disc space. I suspect that this is representative of  degenerative phenomena.     Interval laminectomies have been performed at the level of the C3, C4,  and C5 levels. There is hardware  fusing the cervical spine posteriorly  from C3 down to C6. There is nonspecific fluid signal within the  laminectomy defects that measures up to approximately 4.3 x 1.1 cm in  greatest parasagittal dimensions and approximately 2.1 cm in greatest  transverse dimensions. This is presumably representative of a  postoperative seroma or pseudomeningocele. However, the possibility of  an infected fluid collection cannot be entirely excluded. Additional  similar more superficial fluid is seen at the level of the supraspinous  ligament extending from C4-5 down to C7-T1. There is edema appreciated  within the posterior paraspinal soft tissues extending from C3 down to  T1 which is also nonspecific in nature and is probably simply  representative of postoperative change. However, again the possibility  of infection in this area cannot be entirely excluded.     At C2-3, there is moderate left foraminal narrowing secondary to  uncovertebral joint hypertrophy and facet arthrosis and this is stable  when compared to the prior exam.     At C3-4, uncovertebral joint hypertrophy results in mild foraminal  narrowing which is stable. No significant canal narrowing is seen at  this level. The previously identified canal stenotic change has been  relieved in the interval.     At C4-5, there is a disc bulge and a central disc protrusion. This  results in a mild degree of canal narrowing. The previously identified  canal stenosis is markedly improved in the interval since the  laminectomy procedure. There is mild-to-moderate right foraminal  narrowing secondary to uncovertebral joint hypertrophy and there is  moderate left foraminal narrowing secondary to uncovertebral joint  hypertrophy and facet arthrosis. A similar degree of foraminal stenosis  was appreciated on the prior exam.     At C5-6, there is a prominent improvement in the degree of canal  narrowing since the prior exam with the performance of the laminectomy  procedures. There  is a severe degree of right foraminal narrowing  secondary to uncovertebral joint hypertrophy. No significant interval  change in the degree of foraminal narrowing is noted since the prior  exam.     At C6-7, mild right foraminal narrowing secondary to uncovertebral joint  hypertrophy and similar findings are noted on the prior exam.     At C7-T1, there is no significant degree of canal or foraminal  narrowing.     The cervical spinal cord has normal signal intensity. The visualized  contents of the cranial vault are unremarkable.     Within the visualized upper thoracic spine, there is a small central  disc protrusion at T2-3 mildly indenting the ventral subarachnoid space.     IMPRESSION:     In the interval since the prior exam dated 01/23/2024, there has been  performance of laminectomy procedures from C3 down to C5. There is  hardware posteriorly fusing the cervical spine from C3 down to C6.  Nonspecific fluid signal is seen within the laminectomy defects  measuring up to 4.3 x 1.1 x 2.1 cm in greatest dimensions that is  presumably representative of a postoperative seroma or  pseudomeningocele. A small amount of similar nonspecific fluid is seen  at the level of the supraspinous ligament extending from C4-5 down to  C7-T1. However, the possibility of an infected fluid collection cannot  be entirely excluded at either of the sites. Edema is noted within the  posterior paraspinal soft tissues extending from C3 down to T1 which is  nonspecific and is probably simply representative of postoperative  change. However, the possibility of an infection in this area also  cannot be entirely excluded.     The canal narrowing at the C3-4, C4-5, and C5-6 levels is prominently  improved in the interval since the prior exam. Multilevel foraminal  narrowing remains stable.     There continues to be some fluid signal intensity within the C5-6 disc  space which is presumably degenerative in nature as similar findings  were seen  on prior studies dated 01/23/2024 as well as 02/03/2023.     I personally reviewed the MRI of the cervical spine and discussed with Dr. Malone on 7/21/2024:  I personally viewed and interpreted the patient's labs, imaging study, medications and chart..medsc.    Meds reviewed/changed: Yes    Current Facility-Administered Medications:     acetaminophen (TYLENOL) tablet 650 mg, 650 mg, Oral, Q4H PRN, 650 mg at 07/21/24 1207 **OR** acetaminophen (TYLENOL) 160 MG/5ML oral solution 650 mg, 650 mg, Oral, Q4H PRN **OR** acetaminophen (TYLENOL) suppository 650 mg, 650 mg, Rectal, Q4H PRN, Jayson Elias MD    ARIPiprazole (ABILIFY) tablet 5 mg, 5 mg, Oral, Daily, Jayson Elias MD, 5 mg at 07/21/24 0904    aspirin EC tablet 81 mg, 81 mg, Oral, Daily, Jayson Elias MD, 81 mg at 07/21/24 0903    sennosides-docusate (PERICOLACE) 8.6-50 MG per tablet 2 tablet, 2 tablet, Oral, BID PRN **AND** polyethylene glycol (MIRALAX) packet 17 g, 17 g, Oral, Daily PRN **AND** bisacodyl (DULCOLAX) EC tablet 5 mg, 5 mg, Oral, Daily PRN **AND** bisacodyl (DULCOLAX) suppository 10 mg, 10 mg, Rectal, Daily PRN, Jayson Elias MD    Calcium Replacement - Follow Nurse / BPA Driven Protocol, , Does not apply, PRN, Jayson Elias MD    carvedilol (COREG) tablet 25 mg, 25 mg, Oral, BID With Meals, Jayson Elias MD, 25 mg at 07/21/24 0904    cefepime 2000 mg IVPB in 100 mL NS (MBP), 2,000 mg, Intravenous, Q8H, Jayson Elias MD, 2,000 mg at 07/21/24 1004    clonazePAM (KlonoPIN) tablet 0.5 mg, 0.5 mg, Oral, BID PRN, Jayson Elias MD    dextrose (D50W) (25 g/50 mL) IV injection 25 g, 25 g, Intravenous, Q15 Min PRN, Jayson Elias MD    dextrose (GLUTOSE) oral gel 15 g, 15 g, Oral, Q15 Min PRN, Jayson Elias MD    dorzolamide (TRUSOPT) 2 % 1 drop, timolol (TIMOPTIC) 0.5 % 1 drop for Cosopt 22.3-6.8 mg/mL, , Both Eyes, BID, Jayson Elias MD, Given at 07/21/24 0905    escitalopram (LEXAPRO) tablet  20 mg, 20 mg, Oral, Daily, Jayson Elias MD, 20 mg at 07/21/24 0903    gabapentin (NEURONTIN) capsule 300 mg, 300 mg, Oral, Nightly, Jayson Elias MD, 300 mg at 07/20/24 2134    glucagon (GLUCAGEN) injection 1 mg, 1 mg, Intramuscular, Q15 Min PRN, Jayson Elias MD    insulin lispro (HUMALOG/ADMELOG) injection 2-9 Units, 2-9 Units, Subcutaneous, 4x Daily AC & at Bedtime, Jayson Elias MD    latanoprost (XALATAN) 0.005 % ophthalmic solution 1 drop, 1 drop, Both Eyes, Nightly, Jayson Elias MD, 1 drop at 07/20/24 2131    Magnesium Standard Dose Replacement - Follow Nurse / BPA Driven Protocol, , Does not apply, PRN, Jayson Elias MD    magnesium sulfate 2g/50 mL (PREMIX) infusion, 2 g, Intravenous, Q2H, Jayson Elias MD, 2 g at 07/21/24 1207    methocarbamol (ROBAXIN) tablet 500 mg, 500 mg, Oral, Q8H PRN, Jayson Elias MD    metroNIDAZOLE (FLAGYL) tablet 500 mg, 500 mg, Oral, Q8H, Marcin Colon, , 500 mg at 07/21/24 0633    ondansetron ODT (ZOFRAN-ODT) disintegrating tablet 4 mg, 4 mg, Oral, Q6H PRN **OR** ondansetron (ZOFRAN) injection 4 mg, 4 mg, Intravenous, Q6H PRN, Jayson Elias MD    oxyCODONE-acetaminophen (PERCOCET) 7.5-325 MG per tablet 1 tablet, 1 tablet, Oral, Q4H PRN, Jayson Elias MD, 1 tablet at 07/20/24 2138    pantoprazole (PROTONIX) EC tablet 40 mg, 40 mg, Oral, Q AM, Jayson Elias MD, 40 mg at 07/21/24 0633    Phosphorus Replacement - Follow Nurse / BPA Driven Protocol, , Does not apply, PRNJada Patrick D, MD    Potassium Replacement - Follow Nurse / BPA Driven Protocol, , Does not apply, PRJada MICHEL Patrick D, MD    sodium chloride 0.9 % flush 10 mL, 10 mL, Intravenous, Q12H, Jayson Elias MD, 10 mL at 07/21/24 0901    sodium chloride 0.9 % flush 10 mL, 10 mL, Intravenous, Jada LIZ Patrick D, MD    sodium chloride 0.9 % infusion 40 mL, 40 mL, Intravenous, Jada LIZ Patrick D, MD    sodium chloride 0.9 %  infusion, 125 mL/hr, Intravenous, Continuous, Faheem Carr MD, Last Rate: 125 mL/hr at 07/21/24 0350, 125 mL/hr at 07/21/24 0350    terazosin (HYTRIN) capsule 5 mg, 5 mg, Oral, Nightly, Jayson Elias MD, 5 mg at 07/20/24 3031      Physical Exam:    General:   Awake, alert.  Neck:    Rigid cervical collar on; ROM deferred  Motor:    Normal muscle strength, bulk and tone in upper extremities.  No fasciculations, rigidity, spasticity, or abnormal movements.  Reflexes:   2+ in the upper extremities. No Danielle  Sensation:   Normal to light touch  Coordination:  Walked 1 lap around the unit without difficulty.  Station and Gait:           Walked 1 lap around the unit without difficulty.  Extremities:   No SCDs-encouraged patient to wear them.    Assessment & Plan     ASSESSMENT:      Sepsis    Status post cervical spinal fusion    Periumbilical abdominal pain    69-year-old male approximately 3 and half weeks s/p C3-6 laminectomies and posterior spinal fusion with Dr. Miguel.  Approximately 4 days ago he started feeling lethargic, generally weak and presented on 7/19.  Sed rate and CRP were elevated along with WBC count.  At this point WBC is stable.  No growth from blood cultures at 24 hours.  MRI of the cervical spine does not show any definite infection but rather postop seroma.  Incision site looks good yesterday.  I am not convinced that he has any acute cervical spine infectious process occurring..  Neurosurgery will continue to follow along.  He is scheduled to get his sutures out soon which we plan to do sometime midweek.  ID continuing to follow.    There was a finding on a CT scan yesterday concerning for acute cholecystitis however Dr. Gaston with general surgery feels that there is no acute issue with the gallbladder.  If she felt more related to colitis-he is being covered with appropriate antibiotic therapy for that.    PLAN:     No definite acute neurosurgical process occurring  Neurosurgery will  "continue to follow     I discussed the patient's findings and my recommendations with patient, family, and Dr. Malone.     During patient visit, I utilized appropriate personal protective equipment including mask and gloves.  Mask used was standard procedure mask. Appropriate PPE was worn during the entire visit.  Hand hygiene was completed before and after.      LOS: 1 day       Yoan [facility], APRN  7/21/2024  13:13 EDT    \"Dictated utilizing Dragon dictation\".    "

## 2024-07-21 NOTE — PLAN OF CARE
Goal Outcome Evaluation:  Plan of Care Reviewed With: patient, spouse           Outcome Evaluation: Patient is a 69y.o. male with PMH of CAD, DM, GERD, hyperlipidemia, and HTN. Patient originally had a cervical spinal fusion about a month ago and was in [facility] rehab for about 3 weeks. When he returned home he started developing a fever and increased weakness. Patient's wife was present and able to answer questions if pt was unable to. Pt reports he lives in a condo and there are 8 steps outside to get in. He was independent with ambulation prior to surgery and hospitalization. Patient did require min A for fine motor UE movement such as dressing, buttons, etc. Patient uses no AD prior to hospitalization. Patient was in cervical collar upon arrival. He was able to transfer supine to sitting EOB with min A from PT by holding onto PT's hand and pulling himself up. He was able to stand with SBA and and utilized walker for ambulation. Pt ambulate a over 300ft in hallway, but continuously bumped into the wall or door on his L side from deviating to the L. Patient was able to transfer into the chair with SBA back in the room. Patient will benefit from skilled PT to address functional endurance decline. PT recommendation is home with HH.      Anticipated Discharge Disposition (PT): home with home health

## 2024-07-21 NOTE — PROGRESS NOTES
Name: Isaias Monaco ADMIT: 2024   : 1955  PCP: Gwyn Patten Sr., MD    MRN: [id number] LOS: 1 days   AGE/SEX: 69 y.o. male  ROOM: [facility]     Subjective   Subjective   Chief Complaint   Patient presents with    Fever     Pt had recent hospitalization at [facility] for cervical spine surgery and rehab, dx on . Pt was in hospital and rehab for combined 3 weeks. Pt reports feeling lethargic, weak starting last PM, denies urinary symptoms. Wife reports fever and two episodes of urinary incontinence today and that pt was catheterized several times during rehab stay. Pt baseline is independent with all activities before surgery.      Not reporting any abdominal pain today.  No nausea or vomiting reported.  He had some diarrhea prior to admission but has not had any in the past 24 hours.  Not reporting any chest pain palpitations or shortness of breath.  No productive cough.  His neck is not acutely worse.  He reports that he was able to ambulate a bit better today in the hallway near the nurses station.     Objective   Objective   Vital Signs  Temp:  [97.9 °F (36.6 °C)-100.2 °F (37.9 °C)] 97.9 °F (36.6 °C)  Heart Rate:  [] 103  Resp:  [16-18] 16  BP: (117-141)/(62-81) 117/73  SpO2:  [93 %-97 %] 96 %  on   ;   Device (Oxygen Therapy): room air  Body mass index is 29.07 kg/m².    Physical Exam  Vitals and nursing note reviewed.   Constitutional:       General: He is not in acute distress.     Appearance: He is not diaphoretic.   HENT:      Head: Atraumatic.   Neck:      Comments: Hard collar  Cardiovascular:      Rate and Rhythm: Normal rate and regular rhythm.      Pulses: Normal pulses.   Pulmonary:      Effort: Pulmonary effort is normal.      Breath sounds: No wheezing.   Abdominal:      General: There is no distension.      Palpations: Abdomen is soft.      Tenderness: There is no abdominal tenderness. There is no guarding or rebound.   Musculoskeletal:         General: No tenderness.    Skin:     General: Skin is warm and dry.   Neurological:      Mental Status: He is alert.      Cranial Nerves: No cranial nerve deficit.      Motor: Weakness present.   Psychiatric:         Mood and Affect: Mood normal.         Behavior: Behavior normal.       Results Review  I reviewed the patient's new clinical results.    Results from last 7 days   Lab Units 07/21/24  0819 07/19/24  2304   WBC 10*3/mm3 17.68* 16.08*   HEMOGLOBIN g/dL 11.3* 13.4   PLATELETS 10*3/mm3 202 280     Results from last 7 days   Lab Units 07/21/24  0819 07/19/24  2304   SODIUM mmol/L 134* 131*   POTASSIUM mmol/L 3.7 4.0   CHLORIDE mmol/L 103 98   CO2 mmol/L 19.8* 22.7   BUN mg/dL 7* 9   CREATININE mg/dL 1.10 1.09   GLUCOSE mg/dL 113* 233*   EGFR mL/min/1.73 72.7 73.5     Results from last 7 days   Lab Units 07/21/24  0819 07/19/24  2304   ALBUMIN g/dL 2.9* 3.7   BILIRUBIN mg/dL 0.8 0.8   ALK PHOS U/L 80 104   AST (SGOT) U/L 27 36   ALT (SGPT) U/L 34 37     Results from last 7 days   Lab Units 07/21/24  0819 07/19/24  2304   CALCIUM mg/dL 8.2* 9.1   ALBUMIN g/dL 2.9* 3.7   MAGNESIUM mg/dL 1.5*  --    PHOSPHORUS mg/dL 2.3*  --      Results from last 7 days   Lab Units 07/20/24  0936 07/19/24  2304   PROCALCITONIN ng/mL 0.49*  --    LACTATE mmol/L  --  1.1     Hemoglobin A1C   Date/Time Value Ref Range Status   07/21/2024 0819 6.20 (H) 4.80 - 5.60 % Final     Glucose   Date/Time Value Ref Range Status   07/21/2024 1119 128 70 - 130 mg/dL Final   07/21/2024 0726 103 70 - 130 mg/dL Final   07/20/2024 2044 140 (H) 70 - 130 mg/dL Final   07/20/2024 1640 128 70 - 130 mg/dL Final   07/20/2024 1142 166 (H) 70 - 130 mg/dL Final   07/20/2024 0802 190 (H) 70 - 130 mg/dL Final       MRI Cervical Spine With & Without Contrast    Result Date: 7/20/2024   In the interval since the prior exam dated 01/23/2024, there has been performance of laminectomy procedures from C3 down to C5. There is hardware posteriorly fusing the cervical spine from C3 down to  C6. Nonspecific fluid signal is seen within the laminectomy defects measuring up to 4.3 x 1.1 x 2.1 cm in greatest dimensions that is presumably representative of a postoperative seroma or pseudomeningocele. A small amount of similar nonspecific fluid is seen at the level of the supraspinous ligament extending from C4-5 down to C7-T1. However, the possibility of an infected fluid collection cannot be entirely excluded at either of the sites. Edema is noted within the posterior paraspinal soft tissues extending from C3 down to T1 which is nonspecific and is probably simply representative of postoperative change. However, the possibility of an infection in this area also cannot be entirely excluded.  The canal narrowing at the C3-4, C4-5, and C5-6 levels is prominently improved in the interval since the prior exam. Multilevel foraminal narrowing remains stable.  There continues to be some fluid signal intensity within the C5-6 disc space which is presumably degenerative in nature as similar findings were seen on prior studies dated 01/23/2024 as well as 02/03/2023.  This report was finalized on 7/20/2024 5:03 PM by Dr. Ty Romero M.D on Workstation: [id number]      CT Abdomen Pelvis With Contrast    Result Date: 7/20/2024  Findings suggesting acute cholecystitis.   Above findings were discussed with Marcin Colon at the time of dictation.  This report was finalized on 7/20/2024 1:55 PM by Dr. Matt Mora M.D on Workstation: [id number]      CT Soft Tissue Neck With Contrast    Result Date: 7/20/2024  Prior examination demonstrated fluid and gas containing collection within the posterior cervical soft tissues, with extension into the posterior epidural space the gas has resolved on the current study. However, this area is poorly assessed, due to streak artifact from dental amalgam, as well as the patient's cervical hardware. Consider further assessment with MRI of the cervical spine.  Radiation dose reduction  techniques were utilized, including automated exposure control and exposure modulation based on body size.   This report was finalized on 7/20/2024 5:52 AM by Dr. Rebecca Pradhan M.D on Workstation: [id number]      XR Chest 1 View    Result Date: 7/20/2024  No acute findings.  This report was finalized on 7/20/2024 3:44 AM by Dr. Rebecca Pradhan M.D on Workstation: [id number]       I have personally reviewed all medications:  Scheduled Medications  ARIPiprazole, 5 mg, Oral, Daily  aspirin, 81 mg, Oral, Daily  carvedilol, 25 mg, Oral, BID With Meals  cefepime, 2,000 mg, Intravenous, Q8H  dorzolamide (TRUSOPT) 2 % 1 drop, timolol (TIMOPTIC) 0.5 % 1 drop for Cosopt 22.3-6.8 mg/mL, , Both Eyes, BID  escitalopram, 20 mg, Oral, Daily  gabapentin, 300 mg, Oral, Nightly  insulin lispro, 2-9 Units, Subcutaneous, 4x Daily AC & at Bedtime  latanoprost, 1 drop, Both Eyes, Nightly  metroNIDAZOLE, 500 mg, Oral, Q8H  pantoprazole, 40 mg, Oral, Q AM  sodium chloride, 10 mL, Intravenous, Q12H  terazosin, 5 mg, Oral, Nightly      Infusions  sodium chloride, 125 mL/hr, Last Rate: 125 mL/hr (07/21/24 0350)      Diet  Diet: Liquid, Diabetic; Clear Liquid; Consistent Carbohydrate; Fluid Consistency: Thin (IDDSI 0)    I have personally reviewed:  [x]  Laboratory   [x]  Microbiology   [x]  Radiology   [x]  EKG/Telemetry  []  Cardiology/Vascular   []  Pathology    []  Records       Assessment/Plan     Active Hospital Problems    Diagnosis  POA    **Sepsis [A41.9]  Yes    Status post cervical spinal fusion [Z98.1]  Not Applicable    Periumbilical abdominal pain [R10.33]  Unknown      Resolved Hospital Problems   No resolved problems to display.       69 y.o. male admitted with Sepsis.    Sepsis: Potential diarrheal source but is not having any ongoing diarrhea today.  GI PCR if diarrhea recurs.  Cholecystitis type changes on CT abdomen without correlating symptoms.  No acute surgical intervention is planned.  Potential ultrasound if  symptoms develop.  General surgery following.  MRI obtained and no acute surgical intervention is planned on his neck.  Neurosurgery following.  No blood clot on duplex.  Leukocytosis is still present.  Blood cultures no growth to date.  Monitoring fever curve.  Continuing antibiotic therapy.  Infectious disease following.  Status post cervical spinal fusion  Periumbilical abdominal pain: Improved.  No rebound or guarding on exam.  Diabetes: A1c 6.2.  He has not needed any insulin yet.  Going to hold the long-acting insulin and will monitor requirements with SSI.  Hypertension: Acceptable acutely.  Monitoring.  Urinary urgency: PVR as needed.  Continue BPH medicine.  CAD: No acute chest pain.  Stent was placed over a year ago with plan for lifelong DAPT.  Continue aspirin.  Holding Plavix in case acute intervention is required as above.  Potentially can resume tomorrow if he is clinically improving.  PPX: SCD  Disposition: TBD    Expected discharge date/ time has not been documented.     Jayson Elias MD  [facility]  07/21/24  15:56 EDT    Dictated portions of note using Dragon dictation software.  Copied text in this note has been reviewed by me and remains accurate as of 07/21/24

## 2024-07-22 ENCOUNTER — APPOINTMENT (OUTPATIENT)
Dept: ULTRASOUND IMAGING | Facility: HOSPITAL | Age: 69
DRG: 872 | End: 2024-07-22
Payer: MEDICARE

## 2024-07-22 LAB
ADV 40+41 DNA STL QL NAA+NON-PROBE: NOT DETECTED
ALBUMIN SERPL-MCNC: 2.9 G/DL (ref 3.5–5.2)
ALBUMIN/GLOB SERPL: 0.8 G/DL
ALP SERPL-CCNC: 84 U/L (ref 39–117)
ALT SERPL W P-5'-P-CCNC: 26 U/L (ref 1–41)
ANION GAP SERPL CALCULATED.3IONS-SCNC: 11.9 MMOL/L (ref 5–15)
AST SERPL-CCNC: 22 U/L (ref 1–40)
ASTRO TYP 1-8 RNA STL QL NAA+NON-PROBE: NOT DETECTED
BASOPHILS # BLD AUTO: 0.03 10*3/MM3 (ref 0–0.2)
BASOPHILS NFR BLD AUTO: 0.2 % (ref 0–1.5)
BILIRUB SERPL-MCNC: 0.7 MG/DL (ref 0–1.2)
BUN SERPL-MCNC: 7 MG/DL (ref 8–23)
BUN/CREAT SERPL: 7.6 (ref 7–25)
C CAYETANENSIS DNA STL QL NAA+NON-PROBE: NOT DETECTED
C COLI+JEJ+UPSA DNA STL QL NAA+NON-PROBE: NOT DETECTED
C DIFF TOX GENS STL QL NAA+PROBE: NEGATIVE
CALCIUM SPEC-SCNC: 8.1 MG/DL (ref 8.6–10.5)
CHLORIDE SERPL-SCNC: 102 MMOL/L (ref 98–107)
CO2 SERPL-SCNC: 18.1 MMOL/L (ref 22–29)
CREAT SERPL-MCNC: 0.92 MG/DL (ref 0.76–1.27)
CRYPTOSP DNA STL QL NAA+NON-PROBE: NOT DETECTED
DEPRECATED RDW RBC AUTO: 45.9 FL (ref 37–54)
E HISTOLYT DNA STL QL NAA+NON-PROBE: NOT DETECTED
EAEC PAA PLAS AGGR+AATA ST NAA+NON-PRB: NOT DETECTED
EC STX1+STX2 GENES STL QL NAA+NON-PROBE: NOT DETECTED
EGFRCR SERPLBLD CKD-EPI 2021: 90 ML/MIN/1.73
EOSINOPHIL # BLD AUTO: 0.45 10*3/MM3 (ref 0–0.4)
EOSINOPHIL NFR BLD AUTO: 3.2 % (ref 0.3–6.2)
EPEC EAE GENE STL QL NAA+NON-PROBE: NOT DETECTED
ERYTHROCYTE [DISTWIDTH] IN BLOOD BY AUTOMATED COUNT: 14.7 % (ref 12.3–15.4)
ETEC LTA+ST1A+ST1B TOX ST NAA+NON-PROBE: NOT DETECTED
G LAMBLIA DNA STL QL NAA+NON-PROBE: NOT DETECTED
GLOBULIN UR ELPH-MCNC: 3.5 GM/DL
GLUCOSE BLDC GLUCOMTR-MCNC: 104 MG/DL (ref 70–130)
GLUCOSE BLDC GLUCOMTR-MCNC: 107 MG/DL (ref 70–130)
GLUCOSE BLDC GLUCOMTR-MCNC: 111 MG/DL (ref 70–130)
GLUCOSE BLDC GLUCOMTR-MCNC: 97 MG/DL (ref 70–130)
GLUCOSE BLDC GLUCOMTR-MCNC: 98 MG/DL (ref 70–130)
GLUCOSE SERPL-MCNC: 100 MG/DL (ref 65–99)
HCT VFR BLD AUTO: 34.6 % (ref 37.5–51)
HGB BLD-MCNC: 11.5 G/DL (ref 13–17.7)
IMM GRANULOCYTES # BLD AUTO: 0.08 10*3/MM3 (ref 0–0.05)
IMM GRANULOCYTES NFR BLD AUTO: 0.6 % (ref 0–0.5)
LYMPHOCYTES # BLD AUTO: 0.89 10*3/MM3 (ref 0.7–3.1)
LYMPHOCYTES NFR BLD AUTO: 6.4 % (ref 19.6–45.3)
MAGNESIUM SERPL-MCNC: 1.9 MG/DL (ref 1.6–2.4)
MCH RBC QN AUTO: 28.5 PG (ref 26.6–33)
MCHC RBC AUTO-ENTMCNC: 33.2 G/DL (ref 31.5–35.7)
MCV RBC AUTO: 85.9 FL (ref 79–97)
MONOCYTES # BLD AUTO: 1.6 10*3/MM3 (ref 0.1–0.9)
MONOCYTES NFR BLD AUTO: 11.5 % (ref 5–12)
NEUTROPHILS NFR BLD AUTO: 10.88 10*3/MM3 (ref 1.7–7)
NEUTROPHILS NFR BLD AUTO: 78.1 % (ref 42.7–76)
NOROVIRUS GI+II RNA STL QL NAA+NON-PROBE: NOT DETECTED
NRBC BLD AUTO-RTO: 0 /100 WBC (ref 0–0.2)
P SHIGELLOIDES DNA STL QL NAA+NON-PROBE: NOT DETECTED
PHOSPHATE SERPL-MCNC: 2 MG/DL (ref 2.5–4.5)
PLATELET # BLD AUTO: 204 10*3/MM3 (ref 140–450)
PMV BLD AUTO: 11.6 FL (ref 6–12)
POTASSIUM SERPL-SCNC: 3.3 MMOL/L (ref 3.5–5.2)
PROT SERPL-MCNC: 6.4 G/DL (ref 6–8.5)
RBC # BLD AUTO: 4.03 10*6/MM3 (ref 4.14–5.8)
RVA RNA STL QL NAA+NON-PROBE: NOT DETECTED
S ENT+BONG DNA STL QL NAA+NON-PROBE: NOT DETECTED
SAPO I+II+IV+V RNA STL QL NAA+NON-PROBE: NOT DETECTED
SHIGELLA SP+EIEC IPAH ST NAA+NON-PROBE: NOT DETECTED
SODIUM SERPL-SCNC: 132 MMOL/L (ref 136–145)
V CHOL+PARA+VUL DNA STL QL NAA+NON-PROBE: NOT DETECTED
V CHOLERAE DNA STL QL NAA+NON-PROBE: NOT DETECTED
WBC NRBC COR # BLD AUTO: 13.93 10*3/MM3 (ref 3.4–10.8)
Y ENTEROCOL DNA STL QL NAA+NON-PROBE: NOT DETECTED

## 2024-07-22 PROCEDURE — 84100 ASSAY OF PHOSPHORUS: CPT | Performed by: INTERNAL MEDICINE

## 2024-07-22 PROCEDURE — 99232 SBSQ HOSP IP/OBS MODERATE 35: CPT | Performed by: STUDENT IN AN ORGANIZED HEALTH CARE EDUCATION/TRAINING PROGRAM

## 2024-07-22 PROCEDURE — 97535 SELF CARE MNGMENT TRAINING: CPT

## 2024-07-22 PROCEDURE — 25810000003 SODIUM CHLORIDE 0.9 % SOLUTION: Performed by: INTERNAL MEDICINE

## 2024-07-22 PROCEDURE — 85025 COMPLETE CBC W/AUTO DIFF WBC: CPT | Performed by: INTERNAL MEDICINE

## 2024-07-22 PROCEDURE — 76705 ECHO EXAM OF ABDOMEN: CPT

## 2024-07-22 PROCEDURE — 99231 SBSQ HOSP IP/OBS SF/LOW 25: CPT | Performed by: SURGERY

## 2024-07-22 PROCEDURE — 87493 C DIFF AMPLIFIED PROBE: CPT | Performed by: INTERNAL MEDICINE

## 2024-07-22 PROCEDURE — 97166 OT EVAL MOD COMPLEX 45 MIN: CPT

## 2024-07-22 PROCEDURE — 80053 COMPREHEN METABOLIC PANEL: CPT | Performed by: INTERNAL MEDICINE

## 2024-07-22 PROCEDURE — 82948 REAGENT STRIP/BLOOD GLUCOSE: CPT

## 2024-07-22 PROCEDURE — 25010000002 CEFEPIME PER 500 MG: Performed by: INTERNAL MEDICINE

## 2024-07-22 PROCEDURE — 87507 IADNA-DNA/RNA PROBE TQ 12-25: CPT | Performed by: INTERNAL MEDICINE

## 2024-07-22 PROCEDURE — 83735 ASSAY OF MAGNESIUM: CPT | Performed by: INTERNAL MEDICINE

## 2024-07-22 PROCEDURE — 63710000001 ONDANSETRON ODT 4 MG TABLET DISPERSIBLE: Performed by: INTERNAL MEDICINE

## 2024-07-22 RX ORDER — POTASSIUM CHLORIDE 1.5 G/1.58G
40 POWDER, FOR SOLUTION ORAL EVERY 4 HOURS
Status: COMPLETED | OUTPATIENT
Start: 2024-07-22 | End: 2024-07-22

## 2024-07-22 RX ORDER — L.ACID,PARA/B.BIFIDUM/S.THERM 8B CELL
1 CAPSULE ORAL DAILY
Status: DISCONTINUED | OUTPATIENT
Start: 2024-07-22 | End: 2024-07-24 | Stop reason: HOSPADM

## 2024-07-22 RX ORDER — CLOPIDOGREL BISULFATE 75 MG/1
75 TABLET ORAL DAILY
Status: DISCONTINUED | OUTPATIENT
Start: 2024-07-22 | End: 2024-07-24 | Stop reason: HOSPADM

## 2024-07-22 RX ORDER — SODIUM CHLORIDE 9 MG/ML
100 INJECTION, SOLUTION INTRAVENOUS CONTINUOUS
Status: DISCONTINUED | OUTPATIENT
Start: 2024-07-22 | End: 2024-07-24

## 2024-07-22 RX ORDER — POTASSIUM CHLORIDE 750 MG/1
40 TABLET, FILM COATED, EXTENDED RELEASE ORAL EVERY 4 HOURS
Status: DISCONTINUED | OUTPATIENT
Start: 2024-07-22 | End: 2024-07-22

## 2024-07-22 RX ORDER — CHOLESTYRAMINE 4 G/9G
1 POWDER, FOR SUSPENSION ORAL 2 TIMES DAILY PRN
Status: DISCONTINUED | OUTPATIENT
Start: 2024-07-22 | End: 2024-07-24 | Stop reason: HOSPADM

## 2024-07-22 RX ORDER — LEVOFLOXACIN 750 MG/1
750 TABLET, FILM COATED ORAL EVERY 24 HOURS
Status: DISCONTINUED | OUTPATIENT
Start: 2024-07-22 | End: 2024-07-24 | Stop reason: HOSPADM

## 2024-07-22 RX ORDER — DIPHENOXYLATE HYDROCHLORIDE AND ATROPINE SULFATE 2.5; .025 MG/1; MG/1
1 TABLET ORAL 4 TIMES DAILY PRN
Status: DISCONTINUED | OUTPATIENT
Start: 2024-07-22 | End: 2024-07-22

## 2024-07-22 RX ADMIN — METHOCARBAMOL TABLETS 500 MG: 500 TABLET, COATED ORAL at 22:08

## 2024-07-22 RX ADMIN — METRONIDAZOLE 500 MG: 500 TABLET, FILM COATED ORAL at 13:56

## 2024-07-22 RX ADMIN — Medication 10 ML: at 09:39

## 2024-07-22 RX ADMIN — ESCITALOPRAM 20 MG: 20 TABLET, FILM COATED ORAL at 09:37

## 2024-07-22 RX ADMIN — TIMOLOL MALEATE: 5 SOLUTION/ DROPS OPHTHALMIC at 11:41

## 2024-07-22 RX ADMIN — CHOLESTYRAMINE 1 PACKET: 4 POWDER, FOR SUSPENSION ORAL at 18:54

## 2024-07-22 RX ADMIN — LEVOFLOXACIN 750 MG: 750 TABLET, FILM COATED ORAL at 11:40

## 2024-07-22 RX ADMIN — OXYCODONE AND ACETAMINOPHEN 1 TABLET: 7.5; 325 TABLET ORAL at 20:21

## 2024-07-22 RX ADMIN — ASPIRIN 81 MG: 81 TABLET, COATED ORAL at 09:37

## 2024-07-22 RX ADMIN — METRONIDAZOLE 500 MG: 500 TABLET, FILM COATED ORAL at 06:53

## 2024-07-22 RX ADMIN — POTASSIUM, SODIUM PHOSPHATES 280 MG-160 MG-250 MG ORAL POWDER PACKET 2 PACKET: POWDER IN PACKET at 11:40

## 2024-07-22 RX ADMIN — METRONIDAZOLE 500 MG: 500 TABLET, FILM COATED ORAL at 22:08

## 2024-07-22 RX ADMIN — LATANOPROST 1 DROP: 50 SOLUTION OPHTHALMIC at 20:22

## 2024-07-22 RX ADMIN — Medication 10 ML: at 20:25

## 2024-07-22 RX ADMIN — ONDANSETRON 4 MG: 4 TABLET, ORALLY DISINTEGRATING ORAL at 19:03

## 2024-07-22 RX ADMIN — CARVEDILOL 25 MG: 25 TABLET, FILM COATED ORAL at 17:02

## 2024-07-22 RX ADMIN — CLOPIDOGREL BISULFATE 75 MG: 75 TABLET, FILM COATED ORAL at 17:05

## 2024-07-22 RX ADMIN — PANTOPRAZOLE SODIUM 40 MG: 40 TABLET, DELAYED RELEASE ORAL at 06:53

## 2024-07-22 RX ADMIN — SODIUM CHLORIDE 75 ML/HR: 9 INJECTION, SOLUTION INTRAVENOUS at 10:23

## 2024-07-22 RX ADMIN — CEFEPIME 2000 MG: 2 INJECTION, POWDER, FOR SOLUTION INTRAVENOUS at 01:35

## 2024-07-22 RX ADMIN — OXYCODONE AND ACETAMINOPHEN 1 TABLET: 7.5; 325 TABLET ORAL at 10:26

## 2024-07-22 RX ADMIN — CARVEDILOL 25 MG: 25 TABLET, FILM COATED ORAL at 09:37

## 2024-07-22 RX ADMIN — GABAPENTIN 300 MG: 300 CAPSULE ORAL at 20:21

## 2024-07-22 RX ADMIN — TIMOLOL MALEATE: 5 SOLUTION/ DROPS OPHTHALMIC at 20:22

## 2024-07-22 RX ADMIN — POTASSIUM CHLORIDE 40 MEQ: 1.5 POWDER, FOR SOLUTION ORAL at 13:54

## 2024-07-22 RX ADMIN — TERAZOSIN HYDROCHLORIDE 5 MG: 5 CAPSULE ORAL at 20:21

## 2024-07-22 RX ADMIN — ARIPIPRAZOLE 5 MG: 5 TABLET ORAL at 09:37

## 2024-07-22 RX ADMIN — Medication 1 CAPSULE: at 17:05

## 2024-07-22 RX ADMIN — POTASSIUM CHLORIDE 40 MEQ: 1.5 POWDER, FOR SOLUTION ORAL at 16:59

## 2024-07-22 NOTE — PLAN OF CARE
Goal Outcome Evaluation:  Plan of Care Reviewed With: patient      Poor PO intake this shift. NS infusing 100mL/hr.   Progress: improving  Outcome Evaluation: Potassium and phosphorus replaced this shift. US gallbladder completed. Stool panel and C. Diff negative. Started on questran BID PRN and daily risaquad. Plavix restarted this evening. Percocet given x1 this shift. VSS.

## 2024-07-22 NOTE — PROGRESS NOTES
[facility] CERVICAL PROGRESS NOTE      Cc:Generalized weakness, fever      Subjective     Interval History: No significant overnight events.        Objective     Vital signs in last 24 hours:  Temp:  [97.9 °F (36.6 °C)-99.9 °F (37.7 °C)] 99.9 °F (37.7 °C)  Heart Rate:  [] 85  Resp:  [16-18] 18  BP: (117-153)/(69-83) 149/75    Intake/Output this shift:  No intake/output data recorded.    LABS:  Results from last 7 days   Lab Units 07/22/24  0600 07/21/24  0819 07/19/24  2304   WBC 10*3/mm3 13.93* 17.68* 16.08*   HEMOGLOBIN g/dL 11.5* 11.3* 13.4   HEMATOCRIT % 34.6* 34.6* 41.3   PLATELETS 10*3/mm3 204 202 280     Results from last 7 days   Lab Units 07/22/24  0600 07/21/24  0819 07/19/24  2304   SODIUM mmol/L 132* 134* 131*   POTASSIUM mmol/L 3.3* 3.7 4.0   CHLORIDE mmol/L 102 103 98   CO2 mmol/L 18.1* 19.8* 22.7   BUN mg/dL 7* 7* 9   CREATININE mg/dL 0.92 1.10 1.09   CALCIUM mg/dL 8.1* 8.2* 9.1   BILIRUBIN mg/dL 0.7 0.8 0.8   ALK PHOS U/L 84 80 104   ALT (SGPT) U/L 26 34 37   AST (SGOT) U/L 22 27 36   GLUCOSE mg/dL 100* 113* 233*     7/20-Bld Cx NGTD    IMAGING STUDIES:  No new imaging to review    I personally viewed the patient's chart, it was also reviewed by and discussed with Dr Miguel    Meds reviewed/changed: Yes  Abilify 5 mg p.o. daily  Aspirin 81 mg p.o. daily  Coreg 25 mg p.o. twice daily  Lexapro 20 mg p.o. daily  Gabapentin 300 mg p.o. nightly  Humalog insulin 2-9 units SQ 4 times daily  Levaquin 750 mg p.o. every 24 hours  Flagyl 500 mg p.o. every 8 hours  Protonix 40 mg p.o. every morning  Tylenol 650 mg p.o. every 4 hours as needed  Oxycodone 7.51 p.o. every 4 hours as needed      Physical Exam:    General:  Awake, alert.  Neck: Frederica collar On ; posterior cervical incision is well-healing, well-appearing, sutures in place.  No surrounding erythema, drainage or swelling  Motor:   Normal muscle strength, bulk and tone in upper and lower extremities.  No fasciculations, rigidity,  "spasticity, or abnormal movements.  Sensation:  Normal to light touch bilaterally  Station and Gait:   Per PT note 7/21-able to transfer supine to sitting EOB with min A from PT by holding onto PT's hand and pulling himself up. He was able to stand with SBA and and utilized walker for ambulation. Pt ambulate a over 300ft in hallway, but continuously bumped into the wall or door on his L side from deviating to the L. Patient was able to transfer into the chair with SBA back in the room. Patient will benefit from skilled PT to address functional endurance decline. PT recommendation is home with        Assessment & Plan     ASSESSMENT:      Sepsis    Status post cervical spinal fusion    Periumbilical abdominal pain    69-year-old male status post C3-6 laminectomy and posterior fusion on 6/25/2024.  Patient was recently returned home after completing rehab at [facility].  Patient developed fever at home per wife along with urinary urgency on 7/2024.  He is being followed by ID and general surgery.  General surgery feels this is more related to a acute focal colitis near the gallbladder rather than acute cholecystitis.  Patient is currently on Levaquin and Flagyl.  His posterior cervical incision is well-appearing, without signs of infection.  Patient is due for follow-up appointment with Dr. Miguel on Wednesday to have sutures removed.  We will plan to remove them tomorrow or Wednesday.     PLAN:   -Suture removal Tuesday or Wednesday  -Medical management per admitting service  -ID & General Surgery following    I discussed the patient's findings and my recommendations with patient, family, and Dr Miguel.    During patient visit, I utilized appropriate personal protective equipment including gloves.  Appropriate PPE was worn during the entire visit.  Hand hygiene was completed before and after.      LOS: 2 days       Jossy Green, APRN  7/22/2024  10:59 EDT    \"Dictated utilizing Dragon dictation\".      "

## 2024-07-22 NOTE — PROGRESS NOTES
Chief Complaint:    Possible cholecystitis and colitis    Subjective:    The patient is feeling well with no abdominal pain.  He is continuing to have diarrhea.  He does not have much of an appetite.    Objective:    Temp:  [97.9 °F (36.6 °C)-99.9 °F (37.7 °C)] 99.9 °F (37.7 °C)  Heart Rate:  [] 85  Resp:  [16-18] 18  BP: (117-153)/(69-83) 149/75    Physical Exam  Constitutional:       Appearance: He is not ill-appearing or toxic-appearing.   Abdominal:      Palpations: Abdomen is soft.      Tenderness: There is no abdominal tenderness.   Neurological:      Mental Status: He is alert.   Psychiatric:         Behavior: Behavior is cooperative.             Results:    WBC is 13.93.  H/H is 11.5/34.6.  BUN is 7 creatinine 0.92.    Assessment/Plan:    The patient continues to have diarrhea which can be a symptom of gallbladder disease or colitis.  His gallbladder looked abnormal on CT scan of the abdomen pelvis suggesting acute cholecystitis but his clinical exam is not consistent with acute cholecystitis.  I will order a right upper quadrant ultrasound for further evaluation of the gallbladder.    Luis Enrique Gaston Jr., M.D.

## 2024-07-22 NOTE — THERAPY EVALUATION
Patient Name: Isaias Monaco  : 1955    MRN: [id number]                              Today's Date: 2024       Admit Date: 2024    Visit Dx:     ICD-10-CM ICD-9-CM   1. Sepsis without acute organ dysfunction, due to unspecified organism  A41.9 038.9     995.91     Patient Active Problem List   Diagnosis    Microalbuminuria    Vitamin D deficiency    Angioedema    Coronary artery disease involving native coronary artery of native heart without angina pectoris    Anxiety    ED (erectile dysfunction)    Hyperlipidemia LDL goal <70    Hypogonadism in male    S/P primary angioplasty with coronary stent    Osteoarthritis of knee    Hypertensive kidney disease with stage 3a chronic kidney disease    Anemia, posthemorrhagic, acute    Dyspnea on exertion    Gastro-esophageal reflux disease with esophagitis    Tubular adenoma of colon    Nocturia associated with benign prostatic hyperplasia    Iron deficiency anemia    Adverse effect of iron and its compounds, sequela    Facial twitching    Blepharospasm    Type 2 diabetes mellitus with microalbuminuria, with long-term current use of insulin    Panic disorder    Tic disorder, unspecified    Spinal stenosis of lumbar region without neurogenic claudication    Bilateral myopia    Combined forms of age-related cataract, bilateral    Presbyopia    Primary open-angle glaucoma, bilateral, severe stage    Lumbar facet arthropathy    Spondylosis of lumbar region without myelopathy or radiculopathy    Calcific coronary arteriosclerosis    Essential hypertension    Degeneration of lumbar intervertebral disc    Dystonia    Lumbosacral spondylosis without myelopathy    Medicare annual wellness visit, subsequent    Coronary stent restenosis    Stenosis of cervical spine with myelopathy    Preop examination    Pneumonia    Stridor    Sepsis    Status post cervical spinal fusion    Periumbilical abdominal pain     Past Medical History:   Diagnosis Date    Anemia     post  hemorrhagic    Angioedema 02/21/2016    Secondary to ACE inhibitor    Anxiety     Arthritis     CAD (coronary artery disease)     Colon polyps     Diabetes mellitus, type 2     GERD (gastroesophageal reflux disease)     Glaucoma     Hematoma     history    High cholesterol     History of foreign travel 12/2017; 08/2018    [place], [place], [place], [place], [place] and [place]; [place]    Hyperlipidemia     Hypertension     Hypogonadism in male 09/28/2016    Male erectile disorder     Microalbuminuria     Osteoarthritis     knee    Spinal stenosis of lumbar region without neurogenic claudication 06/02/2021    Tubular adenoma of colon 11/01/2017    Vitamin D deficiency      Past Surgical History:   Procedure Laterality Date    CARDIAC CATHETERIZATION N/A 05/15/2006    Dr. Mini Camarillo    CARDIAC CATHETERIZATION N/A 03/10/2020    Procedure: Left Heart Cath;  Surgeon: Miguel Ángel Karimi MD;  Location:  [facility];  Service: Cardiology;  Laterality: N/A;    CARDIAC CATHETERIZATION N/A 03/10/2020    Procedure: Stent DAVONTE coronary;  Surgeon: Miguel Ángel Karimi MD;  Location:  [facility];  Service: Cardiology;  Laterality: N/A;    CARDIAC CATHETERIZATION N/A 03/10/2020    Procedure: Coronary angiography;  Surgeon: Miguel Ángel Karimi MD;  Location:  [facility];  Service: Cardiology;  Laterality: N/A;    CARDIAC CATHETERIZATION N/A 03/10/2020    Procedure: Left ventriculography;  Surgeon: Miguel Ángel Karimi MD;  Location:  [facility];  Service: Cardiology;  Laterality: N/A;    CARDIAC CATHETERIZATION N/A 05/20/2022    Procedure: Left Heart Cath;  Surgeon: Mackenzie Morales MD;  Location:  [facility];  Service: Cardiovascular;  Laterality: N/A;    CARDIAC CATHETERIZATION N/A 05/20/2022    Procedure: Coronary angiography;  Surgeon: Mackenzie Morales MD;  Location:  [facility];  Service:  Cardiovascular;  Laterality: N/A;    CARDIAC CATHETERIZATION N/A 05/20/2022    Procedure: Percutaneous Coronary Intervention;  Surgeon: Mackenzie Morales MD;  Location:  [facility];  Service: Cardiovascular;  Laterality: N/A;    CARDIAC CATHETERIZATION N/A 05/24/2022    Procedure: Percutaneous Coronary Intervention;  Surgeon: Miguel Ángel Karimi MD;  Location:  [facility];  Service: Cardiovascular;  Laterality: N/A;  LAD and Cx    CARDIAC CATHETERIZATION N/A 05/24/2022    Procedure: Stent DAVONTE coronary;  Surgeon: Miguel Ángel Karimi MD;  Location:  [facility];  Service: Cardiovascular;  Laterality: N/A;    CARDIAC CATHETERIZATION N/A 05/24/2022    Procedure: Resting Full Cycle Ratio;  Surgeon: Miguel Ángel Karimi MD;  Location: [facility] CATH INVASIVE LOC[facility];  Service: Cardiovascular;  Laterality: N/A;    CARDIAC CATHETERIZATION N/A 03/19/2024    Procedure: Left Heart Cath;  Surgeon: Miguel Ángel Karimi MD;  Location: [facility] C[facility]VASIVE LOC[facility];  Service: Cardiovascular;  Laterality: N/A;    CARDIAC CATHETERIZATION N/A 03/19/2024    Procedure: Percutaneous Coronary Intervention;  Surgeon: Miguel Ángel Karimi MD;  Location: [facility] CATH [facility]VASIVE LOC[facility];  Service: Cardiovascular;  Laterality: N/A;    CARDIAC CATHETERIZATION N/A 03/19/2024    Procedure: Stent DAVONTE coronary;  Surgeon: Miguel Ángel Karimi MD;  Location: [facility] C[facility] LOC[facility];  Service: Cardiovascular;  Laterality: N/A;    CERVICAL DISCECTOMY POSTERIOR FUSION WITH INSTRUMENTATION Bilateral 6/25/2024    Procedure: Cervical 3 to cervical 6 laminectomies and posterior spinal fusion - Bilateral;  Surgeon: Marshal Miguel MD;  Location: [facility] MAIN OR;  Service: Neurosurgery;  Laterality: Bilateral;    COLONOSCOPY N/A 02/22/2006    Bilobed polyp at 30 cm, hemorrhoids-Dr. Ilya Zhao    COLONOSCOPY N/A 02/28/2014    Normal ileum, one 6 mm polyp in the mid transverse colon-  Ilya Zhao    COLONOSCOPY N/A 11/19/2008    Ela ileum, two 3 to 4 mm polyps, non-bleeding internal hemorrhoids, repeat in 5 years-Dr. Ilya Zhao    COLONOSCOPY N/A 10/31/2017    Procedure: COLONOSCOPY WITH POLYPECTOMY (COLD BIOPSY);  Surgeon: Ilya Zhao MD;  Location: [facility] ENDOSCOPY;  Service:     COLONOSCOPY N/A 05/21/2021    Procedure: Colonoscopy into cecum and terminal ileum with cold biopsy polypectomy;  Surgeon: Ilya Zhao MD;  Location: [facility] ENDOSCOPY;  Service: Gastroenterology;  Laterality: N/A;  Pre op: History of Polyps  Post op: Polyp    CORONARY ANGIOPLASTY WITH STENT PLACEMENT  2007, 2012, 2015    cardiac stents x3 occasions    ENDOSCOPY N/A 10/04/2017    Procedure: ESOPHAGOGASTRODUODENOSCOPY;  Surgeon: Boyd Guidry MD;  Location: [facility] ENDOSCOPY;  Service:     ENDOSCOPY N/A 05/21/2021    Procedure: ESOPHAGOGASTRODUODENOSCOPY with biopsies;  Surgeon: Ilya Zhao MD;  Location: [facility] ENDOSCOPY;  Service: Gastroenterology;  Laterality: N/A;  Pre op: GERD  Post op: Irregular  Z-Line, Hiatal Hernia, Gastritis    ENDOSCOPY N/A 08/25/2023    Procedure: ESOPHAGOGASTRODUODENOSCOPY with biopsy;  Surgeon: Ilya Zhao MD;  Location: [facility] ENDOSCOPY;  Service: Gastroenterology;  Laterality: N/A;  errosive gastritis    EPIDURAL BLOCK      INTERVENTIONAL RADIOLOGY PROCEDURE N/A 05/20/2022    Procedure: Intravascular Ultrasound;  Surgeon: Mackenzie Morales MD;  Location: [facility] CATH INVASIVE LOCATION;  Service: Cardiovascular;  Laterality: N/A;    KNEE INCISION AND DRAINAGE Right 06/20/2017    Procedure: RT. KNEE WASHOUT ;  Surgeon: Boyd Coyne MD;  Location: [facility] MAIN OR;  Service:     MEDIAL BRANCH BLOCK Bilateral 12/17/2021    Procedure: LUMBAR MEDIAL BRANCH BLOCK bilateral ~L4-S1 x2 (~2 weeks apart);  Surgeon: Christina Villaseñor MD;  Location: [facility] MAIN OR;  Service: Pain Management;  Laterality: Bilateral;    MEDIAL BRANCH BLOCK Bilateral 01/03/2022    Procedure: LUMBAR  MEDIAL BRANCH BLOCK bilateral ~L4-S1 x2 (~2 weeks apart);  Surgeon: Christina Villaseñor MD;  Location: [facility] [place] OR;  Service: Pain Management;  Laterality: Bilateral;    MN ARTHRP KNE CONDYLE&PLATU MEDIAL&LAT COMPARTMENTS Right 06/15/2017    Procedure: RT TOTAL KNEE ARTHROPLASTY;  Surgeon: Boyd Coyne MD;  Location: [facility] MAIN OR;  Service: Orthopedics    RADIOFREQUENCY ABLATION Bilateral 01/10/2022    Procedure: RADIOFREQUENCY ABLATION NERVES Bilateral L4-S1;  Surgeon: Christina Villaseñor MD;  Location: [facility] MAIN [facility];  Service: Pain Management;  Laterality: Bilateral;    SHOULDER SURGERY Right 2016    rotator cuff repair    UPPER GASTROINTESTINAL ENDOSCOPY N/A 10/13/2015    Z-line irregular, normal stomach, normal duodenum-Dr. Ilya Zhao    UPPER GASTROINTESTINAL ENDOSCOPY N/A 02/28/2014    Z-line irregular, normal stomach, normal duodenum-Dr. Ilya Zhao    UPPER GASTROINTESTINAL ENDOSCOPY N/A 11/19/2008    Z-line irregular, chronic gastritis withotu hemorrhage, normal duodenum-Dr. Ilya Zhao    UPPER GASTROINTESTINAL ENDOSCOPY N/A 06/22/2006    LA Grade A reflux esophagitis, non-bleeding erythematous gastropathy, normal duodenum-Dr. Ilya Zhao      General Information       Row Name 07/22/24 1455          OT Time and Intention    Document Type evaluation  -     Mode of Treatment individual therapy;occupational therapy  -       Row Name 07/22/24 1455          General Information    Patient Profile Reviewed yes  -     Prior Level of Function independent:  -     Existing Precautions/Restrictions brace on at all times;spinal;other (see comments)  Cervical BLT Precautions  -     Barriers to Rehab none identified  -       Row Name 07/22/24 1455          Living Environment    People in Home spouse  -       Row Name 07/22/24 1455          Home Main Entrance    Number of Stairs, Main Entrance eight  -     Stair Railings, Main Entrance railings safe and in good condition  -       Row Name 07/22/24  1455          Stairs Within Home, Primary    Number of Stairs, Within Home, Primary none  -[facility] Name 07/22/24 1455          Cognition    Orientation Status (Cognition) oriented x 4  -[facility] Name 07/22/24 1455          Safety Issues, Functional Mobility    Safety Issues Affecting Function (Mobility) ability to follow commands  -     Impairments Affecting Function (Mobility) balance;shortness of breath;strength  -               User Key  (r) = Recorded By, (t) = Taken By, (c) = Cosigned By      Initials Name Provider Type     Gwyn Schmitt, OT Occupational Therapist                     Mobility/ADL's       [facility] Name 07/22/24 1456          Bed Mobility    Bed Mobility supine-sit-supine  -     Supine-Sit-Supine [place] (Bed Mobility) 1 person assist;contact guard  -     Assistive Device (Bed Mobility) bed rails  -MK       Row Name 07/22/24 1456          Transfers    Transfers sit-stand transfer;stand-sit transfer  -MK       Row Name 07/22/24 1456          Sit-Stand Transfer    Sit-Stand [place] (Transfers) 1 person assist;contact guard  -MK       Row Name 07/22/24 1456          Stand-Sit Transfer    Stand-Sit [place] (Transfers) 1 person assist;contact guard  -MK       Row Name 07/22/24 1456          Functional Mobility    Functional Mobility- Ind. Level 1 person;minimum assist (75% patient effort)  -     Functional Mobility-Distance (Feet) 125  -     Functional Mobility- Safety Issues balance decreased during turns  -     Patient was able to Ambulate yes  -[facility] Name 07/22/24 1456          Activities of Daily Living    BADL Assessment/Intervention lower body dressing  -MK       Row Name 07/22/24 1456          Lower Body Dressing Assessment/Training    [place] Level (Lower Body Dressing) lower body dressing skills;don;socks;minimum assist (75% patient effort)  -     Position (Lower Body Dressing) edge of bed sitting  -               User Key  (r) = Recorded By,  (t) = Taken By, (c) = Cosigned By      Initials Name Provider Type    Gwyn Carter OT Occupational Therapist                   Obj/Interventions       [facility] Name 07/22/24 1457          Sensory Assessment (Somatosensory)    Sensory Assessment (Somatosensory) sensation intact  -MK       Row Name 07/22/24 1457          Vision Assessment/Intervention    Visual Impairment/Limitations WFL  -MK       Row Name 07/22/24 1457          Range of Motion Comprehensive    General Range of Motion no range of motion deficits identified  -MK       Row Name 07/22/24 1457          Strength Comprehensive (MMT)    General Manual Muscle Testing (MMT) Assessment neck/trunk strength deficits identified  -     Comment, General Manual Muscle Testing (MMT) Assessment Trunk  -MK       Row Name 07/22/24 1457          Balance    Balance Assessment sitting static balance;sitting dynamic balance;sit to stand dynamic balance;standing static balance;standing dynamic balance  -     Static Sitting Balance 1-person assist;supervision  -     Dynamic Sitting Balance 1-person assist;contact guard  -     Position, Sitting Balance unsupported  -     Sit to Stand Dynamic Balance 1-person assist;contact guard  -     Static Standing Balance 1-person assist;contact guard  -MK     Dynamic Standing Balance 1-person assist;minimal assist  -     Position/Device Used, Standing Balance unsupported  -     Balance Interventions sitting;standing;sit to stand;supported;static;dynamic  -               User Key  (r) = Recorded By, (t) = Taken By, (c) = Cosigned By      Initials Name Provider Type    Gwyn Carter OT Occupational Therapist                   Goals/Plan       [facility] Name 07/22/24 1506          Bed Mobility Goal 1 (OT)    Activity/Assistive Device (Bed Mobility Goal 1, OT) bed mobility activities, all  -     [place] Level/Cues Needed (Bed Mobility Goal 1, OT) independent  -     Time Frame (Bed Mobility Goal 1, OT) short term goal  (STG)  -       Row Name 07/22/24 1506          Transfer Goal 1 (OT)    Activity/Assistive Device (Transfer Goal 1, OT) transfers, all  -MK     [place] Level/Cues Needed (Transfer Goal 1, OT) independent  -       Row Name 07/22/24 1506          Bathing Goal 1 (OT)    Activity/Device (Bathing Goal 1, OT) bathing skills, all  -MK     [place] Level/Cues Needed (Bathing Goal 1, OT) independent  -MK     Time Frame (Bathing Goal 1, OT) short term goal (STG)  -       Row Name 07/22/24 1506          Dressing Goal 1 (OT)    [place]/Cues Needed (Dressing Goal 1, OT) independent  -MK     Time Frame (Dressing Goal 1, OT) short term goal (STG)  -       Row Name 07/22/24 1506          Toileting Goal 1 (OT)    Activity/Device (Toileting Goal 1, OT) toileting skills, all  -MK     [place] Level/Cues Needed (Toileting Goal 1, OT) independent  -     Time Frame (Toileting Goal 1, OT) short term goal (STG)  -       Row Name 07/22/24 1506          Grooming Goal 1 (OT)    Activity/Device (Grooming Goal 1, OT) grooming skills, all  -MK     [place] (Grooming Goal 1, OT) independent  -MK     Time Frame (Grooming Goal 1, OT) short term goal (STG)  -[facility] Name 07/22/24 1506          Strength Goal 1 (OT)    Strength Goal 1 (OT) Pt will participate in a client-centered HEP to increase strength in order to improve performance un functional self-care tasks  -     Time Frame (Strength Goal 1, OT) short term goal (STG)  -[facility] Name 07/22/24 1506          Therapy Assessment/Plan (OT)    Planned Therapy Interventions (OT) occupation/activity based interventions;ROM/therapeutic exercise;strengthening exercise;functional balance retraining;patient/caregiver education/training  -               User Key  (r) = Recorded By, (t) = Taken By, (c) = Cosigned By      Initials Name Provider Type    Gwyn Carter, OT Occupational Therapist                   Clinical Impression       Row Name 07/22/24 3917           Pain Assessment    Pretreatment Pain Rating 0/10 - no pain  -     Posttreatment Pain Rating 0/10 - no pain  -       Row Name 07/22/24 1450          Plan of Care Review    Plan of Care Reviewed With patient;spouse  -     Progress improving  -     Outcome Evaluation Pt supine in bed with this OT entered room. Pt is wearing [place] Hard Collar. This OT called surgeons office and they office relays that the [place] Hard Collar should remain on at all-times until post-op. Pt performs supine>sit with HOB elevated and to Pt's left with SBA. Pt is able to center self and scoot hips to EOB. Pt demo good static/dynamic balance. Pt dons socks sitting EOB using crossover method needing MIN A. Pt performs sit>stand to NO DME needing MIN A to clear hips, balance, and safety. Pt ambulates in room and hallway using NO DME needing MIN A for balance and safety. Pt does lose balance forwards when ambulating. Pt required VC for slowing pace and to correct balace. This OT spoke with Pt and spouse regarding HEP, Yoga, and water aerobics but to get clearnace from all doctors prior to stating HEP, Yoga, or water aerobics. Pt and spouse verbalize understanding.  -       Row Name 07/22/24 1454          Therapy Assessment/Plan (OT)    Rehab Potential (OT) good, to achieve stated therapy goals  -     Criteria for Skilled Therapeutic Interventions Met (OT) yes;skilled treatment is necessary  -     Therapy Frequency (OT) 3 times/wk  -       Row Name 07/22/24 1451          Therapy Plan Review/Discharge Plan (OT)    Anticipated Discharge Disposition (OT) home with assist  -       Row Name 07/22/24 0827          Vital Signs    O2 Delivery Pre Treatment room air  -     O2 Delivery Intra Treatment room air  -     O2 Delivery Post Treatment room air  -     Pre Patient Position Supine  -     Intra Patient Position Standing  -     Post Patient Position Supine  -       Row Name 07/22/24 4099          Positioning and  Restraints    Pre-Treatment Position in bed  -MK     Post Treatment Position bed  -MK     In Bed notified nsg;supine;call light within reach;encouraged to call for assist;side rails up x2;with family/caregiver  -               User Key  (r) = Recorded By, (t) = Taken By, (c) = Cosigned By      Initials Name Provider Type    Gwyn Carter OT Occupational Therapist                   Outcome Measures       Row Name 07/22/24 1507          How much help from another is currently needed...    Putting on and taking off regular lower body clothing? 3  -MK     Bathing (including washing, rinsing, and drying) 3  -MK     Toileting (which includes using toilet bed pan or urinal) 3  -MK     Putting on and taking off regular upper body clothing 3  -MK     Taking care of personal grooming (such as brushing teeth) 3  -MK     Eating meals 4  -     AM-PAC 6 Clicks Score (OT) 19  -MK       Row Name 07/22/24 0940          How much help from another person do you currently need...    Turning from your back to your side while in flat bed without using bedrails? 4  -MKA     Moving from lying on back to sitting on the side of a flat bed without bedrails? 3  -MKA     Moving to and from a bed to a chair (including a wheelchair)? 3  -MKA     Standing up from a chair using your arms (e.g., wheelchair, bedside chair)? 3  -MKA     Climbing 3-5 steps with a railing? 3  -MKA     To walk in hospital room? 3  -MKA     AM-PAC 6 Clicks Score (PT) 19  -MKA     Highest Level of Mobility Goal 6 --> Walk 10 steps or more  -[facility]       Row Name 07/22/24 1507          Functional Assessment    Outcome Measure Options AM-PAC 6 Clicks Daily Activity (OT)  -               User Key  (r) = Recorded By, (t) = Taken By, (c) = Cosigned By      Initials Name Provider Type    Zoe Mayes, RN Registered Nurse    Gwyn Carter OT Occupational Therapist                    Occupational Therapy Education       Title: PT OT SLP Therapies (In Progress)        Topic: Occupational Therapy (Not Started)       Point: ADL training (Not Started)       Description:   Instruct learner(s) on proper safety adaptation and remediation techniques during self care or transfers.   Instruct in proper use of assistive devices.                  Learner Progress:  Not documented in this visit.              Point: Home exercise program (Not Started)       Description:   Instruct learner(s) on appropriate technique for monitoring, assisting and/or progressing therapeutic exercises/activities.                  Learner Progress:  Not documented in this visit.              Point: Precautions (Not Started)       Description:   Instruct learner(s) on prescribed precautions during self-care and functional transfers.                  Learner Progress:  Not documented in this visit.              Point: Body mechanics (Not Started)       Description:   Instruct learner(s) on proper positioning and spine alignment during self-care, functional mobility activities and/or exercises.                  Learner Progress:  Not documented in this visit.                                  OT Recommendation and Plan  Planned Therapy Interventions (OT): occupation/activity based interventions, ROM/therapeutic exercise, strengthening exercise, functional balance retraining, patient/caregiver education/training  Therapy Frequency (OT): 3 times/wk  Plan of Care Review  Plan of Care Reviewed With: patient, spouse  Progress: improving  Outcome Evaluation: Pt supine in bed with this OT entered room. Pt is wearing [place] Hard Collar. This OT called surgeons office and they office relays that the [place] Hard Collar should remain on at all-times until post-op. Pt performs supine>sit with HOB elevated and to Pt's left with SBA. Pt is able to center self and scoot hips to EOB. Pt demo good static/dynamic balance. Pt dons socks sitting EOB using crossover method needing MIN A. Pt performs sit>stand to NO DME needing MIN A to  clear hips, balance, and safety. Pt ambulates in room and hallway using NO DME needing MIN A for balance and safety. Pt does lose balance forwards when ambulating. Pt required VC for slowing pace and to correct balace. This OT spoke with Pt and spouse regarding HEP, Yoga, and water aerobics but to get clearnace from all doctors prior to stating HEP, Yoga, or water aerobics. Pt and spouse verbalize understanding.     Time Calculation:         Time Calculation- OT       Row Name 07/22/24 1509             Time Calculation- OT    OT Start Time 1026  -MK      OT Stop Time 1058  -MK      OT Time Calculation (min) 32 min  -MK      OT Received On 07/22/24  -MK      OT - Next Appointment 07/24/24  -MK         Timed Charges    63580 - OT Self Care/Mgmt Minutes 32  -MK         Untimed Charges    OT Eval/Re-eval Minutes 15  -MK         Total Minutes    Timed Charges Total Minutes 32  -MK      Untimed Charges Total Minutes 15  -MK       Total Minutes 47  -MK                User Key  (r) = Recorded By, (t) = Taken By, (c) = Cosigned By      Initials Name Provider Type    Gwyn Carter, OT Occupational Therapist                  Therapy Charges for Today       Code Description Service Date Service Provider Modifiers Qty    [id number] HC OT EVAL MOD COMPLEXITY 2 7/22/2024 Gwyn Schmitt OT GO 1    [id number]  OT SELF CARE/MGMT/TRAIN EA 15 MIN 7/22/2024 Gwyn Schmitt OT GO 2                 Gwyn Schmitt OT  7/22/2024

## 2024-07-22 NOTE — PROGRESS NOTES
Name: Isaias Monaco ADMIT: 2024   : 1955  PCP: Gwyn Patten Sr., MD    MRN: [id number] LOS: 2 days   AGE/SEX: 69 y.o. male  [facility]     Subjective   Subjective   Chief Complaint   Patient presents with    Fever     Pt had recent hospitalization at [facility] for cervical spine surgery and rehab, dx on . Pt was in hospital and rehab for combined 3 weeks. Pt reports feeling lethargic, weak starting last PM, denies urinary symptoms. Wife reports fever and two episodes of urinary incontinence today and that pt was catheterized several times during rehab stay. Pt baseline is independent with all activities before surgery.      No abdominal pain or nausea. Diarrhea was present this morning.     Objective   Objective   Vital Signs  Temp:  [98.2 °F (36.8 °C)-99.9 °F (37.7 °C)] 98.6 °F (37 °C)  Heart Rate:  [77-88] 78  Resp:  [18] 18  BP: (119-153)/(69-83) 144/74  SpO2:  [96 %-98 %] 96 %  on   ;   Device (Oxygen Therapy): room air  Body mass index is 29.07 kg/m².    Physical Exam  Vitals and nursing note reviewed.   Constitutional:       General: He is not in acute distress.     Appearance: He is not diaphoretic.   HENT:      Head: Atraumatic.   Neck:      Comments: Hard collar  Cardiovascular:      Rate and Rhythm: Normal rate and regular rhythm.      Pulses: Normal pulses.   Pulmonary:      Effort: Pulmonary effort is normal.      Breath sounds: No wheezing.   Abdominal:      General: There is no distension.      Palpations: Abdomen is soft.      Tenderness: There is no abdominal tenderness. There is no guarding or rebound.   Musculoskeletal:         General: No tenderness.   Skin:     General: Skin is warm and dry.   Neurological:      Mental Status: He is alert.      Cranial Nerves: No cranial nerve deficit.      Motor: Weakness present.   Psychiatric:         Mood and Affect: Mood normal.         Behavior: Behavior normal.       Results Review  I reviewed the patient's new clinical  results.    Results from last 7 days   Lab Units 07/22/24  0600 07/21/24  0819 07/19/24  2304   WBC 10*3/mm3 13.93* 17.68* 16.08*   HEMOGLOBIN g/dL 11.5* 11.3* 13.4   PLATELETS 10*3/mm3 204 202 280     Results from last 7 days   Lab Units 07/22/24  0600 07/21/24  0819 07/19/24  2304   SODIUM mmol/L 132* 134* 131*   POTASSIUM mmol/L 3.3* 3.7 4.0   CHLORIDE mmol/L 102 103 98   CO2 mmol/L 18.1* 19.8* 22.7   BUN mg/dL 7* 7* 9   CREATININE mg/dL 0.92 1.10 1.09   GLUCOSE mg/dL 100* 113* 233*   EGFR mL/min/1.73 90.0 72.7 73.5     Results from last 7 days   Lab Units 07/22/24  0600 07/21/24  0819 07/19/24  2304   ALBUMIN g/dL 2.9* 2.9* 3.7   BILIRUBIN mg/dL 0.7 0.8 0.8   ALK PHOS U/L 84 80 104   AST (SGOT) U/L 22 27 36   ALT (SGPT) U/L 26 34 37     Results from last 7 days   Lab Units 07/22/24  0600 07/21/24  0819 07/19/24  2304   CALCIUM mg/dL 8.1* 8.2* 9.1   ALBUMIN g/dL 2.9* 2.9* 3.7   MAGNESIUM mg/dL 1.9 1.5*  --    PHOSPHORUS mg/dL 2.0* 2.3*  --      Results from last 7 days   Lab Units 07/20/24  0936 07/19/24  2304   PROCALCITONIN ng/mL 0.49*  --    LACTATE mmol/L  --  1.1     Hemoglobin A1C   Date/Time Value Ref Range Status   07/21/2024 0819 6.20 (H) 4.80 - 5.60 % Final     Glucose   Date/Time Value Ref Range Status   07/22/2024 1359 104 70 - 130 mg/dL Final   07/22/2024 0802 111 70 - 130 mg/dL Final   07/21/2024 2026 99 70 - 130 mg/dL Final   07/21/2024 1633 116 70 - 130 mg/dL Final   07/21/2024 1119 128 70 - 130 mg/dL Final   07/21/2024 0726 103 70 - 130 mg/dL Final   07/20/2024 2044 140 (H) 70 - 130 mg/dL Final       US Gallbladder    Result Date: 7/22/2024  Ultrasound findings remain concerning for acute cholecystitis. Gallbladder wall is thickened up to 8 mm. Sonographic Colon sign was reportedly negative. Recommend correlation with exam.    This report was finalized on 7/22/2024 2:32 PM by Dr. Matt Mora M.D on Workstation: [id number]       I have personally reviewed all medications:  Scheduled  Medications  ARIPiprazole, 5 mg, Oral, Daily  aspirin, 81 mg, Oral, Daily  carvedilol, 25 mg, Oral, BID With Meals  dorzolamide (TRUSOPT) 2 % 1 drop, timolol (TIMOPTIC) 0.5 % 1 drop for Cosopt 22.3-6.8 mg/mL, , Both Eyes, BID  escitalopram, 20 mg, Oral, Daily  gabapentin, 300 mg, Oral, Nightly  insulin lispro, 2-9 Units, Subcutaneous, 4x Daily AC & at Bedtime  lactobacillus acidophilus, 1 capsule, Oral, Daily  latanoprost, 1 drop, Both Eyes, Nightly  levoFLOXacin, 750 mg, Oral, Q24H  metroNIDAZOLE, 500 mg, Oral, Q8H  pantoprazole, 40 mg, Oral, Q AM  potassium & sodium phosphates, 2 packet, Oral, Once  potassium chloride, 40 mEq, Oral, Q4H  sodium chloride, 10 mL, Intravenous, Q12H  terazosin, 5 mg, Oral, Nightly      Infusions  sodium chloride, 75 mL/hr, Last Rate: 75 mL/hr (07/22/24 1023)      Diet  Diet: Liquid, Diabetic; Full Liquid; Consistent Carbohydrate; Fluid Consistency: Thin (IDDSI 0)    I have personally reviewed:  [x]  Laboratory   [x]  Microbiology   [x]  Radiology   [x]  EKG/Telemetry  []  Cardiology/Vascular   []  Pathology    []  Records       Assessment/Plan     Active Hospital Problems    Diagnosis  POA    **Sepsis [A41.9]  Yes    Status post cervical spinal fusion [Z98.1]  Not Applicable    Periumbilical abdominal pain [R10.33]  Unknown      Resolved Hospital Problems   No resolved problems to display.       69 y.o. male admitted with Sepsis.    Sepsis: Potential diarrheal/colitis source.  GI PCR and cdiff negative.  Cholecystitis type changes on CT abdomen without correlating symptoms.  US obtained today. General surgery following.  MRI obtained and no acute surgical intervention is planned on his neck.  Neurosurgery following.  No blood clot on duplex.  Blood cultures no growth to date.  Monitoring fever curve.  Continuing antibiotic therapy.  Infectious disease following.  Status post cervical spinal fusion  Periumbilical abdominal pain: Improved.  Diabetes: A1c 6.2.  Long acting held. SSI  available but has not required.  Hypertension: Acceptable acutely.  Monitoring.  Urinary urgency: PVR as needed.  Continue BPH medicine.  CAD: DAPT lifelong due to multiple stent hx, last place 03/2024. Resume plavix when able.  PPX: SCD  Disposition: TBD    Expected discharge date/ time has not been documented.     Jayson Elias MD  [facility]  07/22/24  14:43 EDT    Dictated portions of note using Dragon dictation software.  Copied text in this note has been reviewed by me and remains accurate as of 07/22/24

## 2024-07-22 NOTE — PROGRESS NOTES
LOS: 2 days     Chief Complaint: Sepsis    Interval History: Patient resting comfortably this morning.  Tolerating antibiotics.  Remains afebrile with improving WBC down to 13.    Vital Signs  Temp:  [97.9 °F (36.6 °C)-99.9 °F (37.7 °C)] 99.9 °F (37.7 °C)  Heart Rate:  [] 85  Resp:  [16-18] 18  BP: (117-153)/(69-83) 149/75    Physical Exam:  General: In no acute distress  HEENT: Oropharynx clear, moist mucous membranes  Respiratory: Normal work of breathing  Skin: No rashes  in exposed areas  Extremities: No edema, cyanosis  Access: Peripheral IV    Antibiotics:  Anti-Infectives (From admission, onward)      Ordered     Dose/Rate Route Frequency Start Stop    07/20/24 0854  cefepime 2000 mg IVPB in 100 mL NS (MBP)        Ordering Provider: Jayson Elias MD    2,000 mg  over 4 Hours Intravenous Every 8 Hours 07/20/24 1745 07/23/24 1744    07/20/24 1353  metroNIDAZOLE (FLAGYL) tablet 500 mg        Ordering Provider: Marcin Colon DO    500 mg Oral Every 8 Hours Scheduled 07/20/24 1445 07/27/24 1359    07/20/24 0918  vancomycin IVPB 2000 mg in 0.9% Sodium Chloride 500 mL        Ordering Provider: Jayson Elias MD    2,000 mg  250 mL/hr over 120 Minutes Intravenous Once 07/20/24 1015 07/20/24 1249    07/20/24 0854  cefepime 2000 mg IVPB in 100 mL NS (MBP)        Ordering Provider: Jayson Elias MD    2,000 mg  over 30 Minutes Intravenous Once 07/20/24 0945 07/20/24 1030    07/20/24 0403  cefTRIAXone (ROCEPHIN) 1,000 mg in sodium chloride 0.9 % 100 mL MBP        Ordering Provider: Faheem Carr MD    1,000 mg  200 mL/hr over 30 Minutes Intravenous Once 07/20/24 0430 07/20/24 0524             Results Review:     I reviewed the patient's new clinical results.  I reviewed the patient's new imaging results and agree with the interpretation.    Lab Results   Component Value Date    WBC 13.93 (H) 07/22/2024    HGB 11.5 (L) 07/22/2024    HCT 34.6 (L) 07/22/2024    MCV 85.9 07/22/2024    PLT  204 07/22/2024     Lab Results   Component Value Date    GLUCOSE 100 (H) 07/22/2024    BUN 7 (L) 07/22/2024    CREATININE 0.92 07/22/2024    EGFRIFNONA 82 10/04/2021    EGFRIFAFRI 100 10/04/2021    BCR 7.6 07/22/2024    CO2 18.1 (L) 07/22/2024    CALCIUM 8.1 (L) 07/22/2024    PROTENTOTREF 7.9 08/08/2023    ALBUMIN 2.9 (L) 07/22/2024    LABIL2 1.3 08/08/2023    AST 22 07/22/2024    ALT 26 07/22/2024       Microbiology:  7/20 blood cultures no growth today  7/20 flu and COVID-negative    Imaging:  MRI C-spine report reviewed with postoperative fluid collection with nonspecific signal changes.    Assessment    #Sepsis, leukocytosis and fever  #Status post posterior cervical discectomy with fusion C3-6 On 6/25  # Abnormal CT abdomen with possible acute cholecystitis    General surgery feels imaging more representative of acute focal colitis near the gallbladder than acute cholecystitis.  Neurosurgery feels imaging represents postoperative seroma and not acute infection.    Patient proved on cefepime and Flagyl.  Suspect that if gallbladder or postoperative site does have a component of infection then it will undoubtedly declare itself in the future and require further surgical evaluation.    Hopefully this is focal colitis and will respond to antibiotic therapy.  Plan to transition from cefepime to oral levofloxacin 750 mg daily and continue oral Flagyl 500 mg 3 times daily.  Plan for 10-day course through end date 7/30.    Thank you for allowing me to be involved in the care of this patient. Infectious diseases will sign off at this time with antibiotics plan in place, but please call me at [phone] if any further ID questions or new ID concerns.

## 2024-07-22 NOTE — PLAN OF CARE
Goal Outcome Evaluation:  Plan of Care Reviewed With: patient, spouse        Progress: improving  Outcome Evaluation: Pt supine in bed with this OT entered room. Pt is wearing [place] Hard Collar. This OT called surgeons office and the office relays that the [place] Hard Collar should remain on at all-times until post-op. Pt performs supine>sit with HOB elevated and to Pt's left with SBA. Pt is able to center self and scoot hips to EOB. Pt demo good static/dynamic balance. Pt dons socks sitting EOB using crossover method needing MIN A. Pt performs sit>stand to NO DME needing MIN A to clear hips, balance, and safety. Pt ambulates in room and hallway using NO DME needing MIN A for balance and safety. Pt does lose balance forwards when ambulating. Pt required VC for slowing pace and to correct balace. This OT spoke with Pt and spouse regarding HEP, Yoga, and water aerobics but to get clearnace from all doctors prior to stating HEP, Yoga, or water aerobics. Pt and spouse verbalize understanding.      Anticipated Discharge Disposition (OT): home with assist

## 2024-07-23 LAB
ALBUMIN SERPL-MCNC: 2.9 G/DL (ref 3.5–5.2)
ALBUMIN/GLOB SERPL: 0.8 G/DL
ALP SERPL-CCNC: 83 U/L (ref 39–117)
ALT SERPL W P-5'-P-CCNC: 18 U/L (ref 1–41)
ANION GAP SERPL CALCULATED.3IONS-SCNC: 9 MMOL/L (ref 5–15)
AST SERPL-CCNC: 21 U/L (ref 1–40)
BILIRUB SERPL-MCNC: 0.4 MG/DL (ref 0–1.2)
BUN SERPL-MCNC: 7 MG/DL (ref 8–23)
BUN/CREAT SERPL: 9.5 (ref 7–25)
CALCIUM SPEC-SCNC: 8.6 MG/DL (ref 8.6–10.5)
CHLORIDE SERPL-SCNC: 102 MMOL/L (ref 98–107)
CO2 SERPL-SCNC: 22 MMOL/L (ref 22–29)
CREAT SERPL-MCNC: 0.74 MG/DL (ref 0.76–1.27)
DEPRECATED RDW RBC AUTO: 47.5 FL (ref 37–54)
EGFRCR SERPLBLD CKD-EPI 2021: 98.1 ML/MIN/1.73
ERYTHROCYTE [DISTWIDTH] IN BLOOD BY AUTOMATED COUNT: 14.8 % (ref 12.3–15.4)
GLOBULIN UR ELPH-MCNC: 3.7 GM/DL
GLUCOSE BLDC GLUCOMTR-MCNC: 109 MG/DL (ref 70–130)
GLUCOSE BLDC GLUCOMTR-MCNC: 112 MG/DL (ref 70–130)
GLUCOSE BLDC GLUCOMTR-MCNC: 90 MG/DL (ref 70–130)
GLUCOSE BLDC GLUCOMTR-MCNC: 99 MG/DL (ref 70–130)
GLUCOSE SERPL-MCNC: 120 MG/DL (ref 65–99)
HCT VFR BLD AUTO: 35.8 % (ref 37.5–51)
HGB BLD-MCNC: 11.3 G/DL (ref 13–17.7)
MAGNESIUM SERPL-MCNC: 1.7 MG/DL (ref 1.6–2.4)
MCH RBC QN AUTO: 27.7 PG (ref 26.6–33)
MCHC RBC AUTO-ENTMCNC: 31.6 G/DL (ref 31.5–35.7)
MCV RBC AUTO: 87.7 FL (ref 79–97)
PHOSPHATE SERPL-MCNC: 2.3 MG/DL (ref 2.5–4.5)
PLATELET # BLD AUTO: 206 10*3/MM3 (ref 140–450)
PMV BLD AUTO: 11.1 FL (ref 6–12)
POTASSIUM SERPL-SCNC: 3.5 MMOL/L (ref 3.5–5.2)
PROT SERPL-MCNC: 6.6 G/DL (ref 6–8.5)
RBC # BLD AUTO: 4.08 10*6/MM3 (ref 4.14–5.8)
SODIUM SERPL-SCNC: 133 MMOL/L (ref 136–145)
WBC NRBC COR # BLD AUTO: 10.16 10*3/MM3 (ref 3.4–10.8)

## 2024-07-23 PROCEDURE — 83735 ASSAY OF MAGNESIUM: CPT | Performed by: INTERNAL MEDICINE

## 2024-07-23 PROCEDURE — 82948 REAGENT STRIP/BLOOD GLUCOSE: CPT

## 2024-07-23 PROCEDURE — 25810000003 SODIUM CHLORIDE 0.9 % SOLUTION: Performed by: INTERNAL MEDICINE

## 2024-07-23 PROCEDURE — 97530 THERAPEUTIC ACTIVITIES: CPT

## 2024-07-23 PROCEDURE — 84100 ASSAY OF PHOSPHORUS: CPT | Performed by: INTERNAL MEDICINE

## 2024-07-23 PROCEDURE — 63710000001 ONDANSETRON ODT 4 MG TABLET DISPERSIBLE: Performed by: INTERNAL MEDICINE

## 2024-07-23 PROCEDURE — 80053 COMPREHEN METABOLIC PANEL: CPT | Performed by: INTERNAL MEDICINE

## 2024-07-23 PROCEDURE — 99231 SBSQ HOSP IP/OBS SF/LOW 25: CPT | Performed by: SURGERY

## 2024-07-23 PROCEDURE — 85027 COMPLETE CBC AUTOMATED: CPT | Performed by: INTERNAL MEDICINE

## 2024-07-23 RX ORDER — POTASSIUM CHLORIDE 750 MG/1
40 TABLET, FILM COATED, EXTENDED RELEASE ORAL EVERY 4 HOURS
Status: COMPLETED | OUTPATIENT
Start: 2024-07-23 | End: 2024-07-23

## 2024-07-23 RX ADMIN — OXYCODONE AND ACETAMINOPHEN 1 TABLET: 7.5; 325 TABLET ORAL at 20:14

## 2024-07-23 RX ADMIN — LEVOFLOXACIN 750 MG: 750 TABLET, FILM COATED ORAL at 08:47

## 2024-07-23 RX ADMIN — TIMOLOL MALEATE: 5 SOLUTION/ DROPS OPHTHALMIC at 08:48

## 2024-07-23 RX ADMIN — METHOCARBAMOL TABLETS 500 MG: 500 TABLET, COATED ORAL at 22:29

## 2024-07-23 RX ADMIN — SODIUM CHLORIDE 100 ML/HR: 9 INJECTION, SOLUTION INTRAVENOUS at 10:16

## 2024-07-23 RX ADMIN — METRONIDAZOLE 500 MG: 500 TABLET, FILM COATED ORAL at 22:29

## 2024-07-23 RX ADMIN — Medication 10 ML: at 08:48

## 2024-07-23 RX ADMIN — PANTOPRAZOLE SODIUM 40 MG: 40 TABLET, DELAYED RELEASE ORAL at 05:58

## 2024-07-23 RX ADMIN — METRONIDAZOLE 500 MG: 500 TABLET, FILM COATED ORAL at 14:35

## 2024-07-23 RX ADMIN — ESCITALOPRAM 20 MG: 20 TABLET, FILM COATED ORAL at 08:46

## 2024-07-23 RX ADMIN — GABAPENTIN 300 MG: 300 CAPSULE ORAL at 20:14

## 2024-07-23 RX ADMIN — Medication 10 ML: at 20:14

## 2024-07-23 RX ADMIN — CHOLESTYRAMINE 1 PACKET: 4 POWDER, FOR SUSPENSION ORAL at 18:57

## 2024-07-23 RX ADMIN — POTASSIUM CHLORIDE 40 MEQ: 750 TABLET, EXTENDED RELEASE ORAL at 14:36

## 2024-07-23 RX ADMIN — CARVEDILOL 25 MG: 25 TABLET, FILM COATED ORAL at 08:46

## 2024-07-23 RX ADMIN — METRONIDAZOLE 500 MG: 500 TABLET, FILM COATED ORAL at 05:58

## 2024-07-23 RX ADMIN — CHOLESTYRAMINE 1 PACKET: 4 POWDER, FOR SUSPENSION ORAL at 13:12

## 2024-07-23 RX ADMIN — ONDANSETRON 4 MG: 4 TABLET, ORALLY DISINTEGRATING ORAL at 18:14

## 2024-07-23 RX ADMIN — POTASSIUM CHLORIDE 40 MEQ: 750 TABLET, EXTENDED RELEASE ORAL at 10:16

## 2024-07-23 RX ADMIN — Medication 1 CAPSULE: at 08:46

## 2024-07-23 RX ADMIN — CLOPIDOGREL BISULFATE 75 MG: 75 TABLET, FILM COATED ORAL at 08:48

## 2024-07-23 RX ADMIN — LATANOPROST 1 DROP: 50 SOLUTION OPHTHALMIC at 20:15

## 2024-07-23 RX ADMIN — CARVEDILOL 25 MG: 25 TABLET, FILM COATED ORAL at 18:14

## 2024-07-23 RX ADMIN — TIMOLOL MALEATE: 5 SOLUTION/ DROPS OPHTHALMIC at 20:14

## 2024-07-23 RX ADMIN — TERAZOSIN HYDROCHLORIDE 5 MG: 5 CAPSULE ORAL at 20:14

## 2024-07-23 RX ADMIN — ASPIRIN 81 MG: 81 TABLET, COATED ORAL at 08:46

## 2024-07-23 RX ADMIN — ARIPIPRAZOLE 5 MG: 5 TABLET ORAL at 08:46

## 2024-07-23 NOTE — THERAPY DISCHARGE NOTE
Patient Name: Isaias Monaco  : 1955    MRN: [id number]                              Today's Date: 2024       Admit Date: 2024    Visit Dx:     ICD-10-CM ICD-9-CM   1. Sepsis without acute organ dysfunction, due to unspecified organism  A41.9 038.9     995.91     Patient Active Problem List   Diagnosis    Microalbuminuria    Vitamin D deficiency    Angioedema    Coronary artery disease involving native coronary artery of native heart without angina pectoris    Anxiety    ED (erectile dysfunction)    Hyperlipidemia LDL goal <70    Hypogonadism in male    S/P primary angioplasty with coronary stent    Osteoarthritis of knee    Hypertensive kidney disease with stage 3a chronic kidney disease    Anemia, posthemorrhagic, acute    Dyspnea on exertion    Gastro-esophageal reflux disease with esophagitis    Tubular adenoma of colon    Nocturia associated with benign prostatic hyperplasia    Iron deficiency anemia    Adverse effect of iron and its compounds, sequela    Facial twitching    Blepharospasm    Type 2 diabetes mellitus with microalbuminuria, with long-term current use of insulin    Panic disorder    Tic disorder, unspecified    Spinal stenosis of lumbar region without neurogenic claudication    Bilateral myopia    Combined forms of age-related cataract, bilateral    Presbyopia    Primary open-angle glaucoma, bilateral, severe stage    Lumbar facet arthropathy    Spondylosis of lumbar region without myelopathy or radiculopathy    Calcific coronary arteriosclerosis    Essential hypertension    Degeneration of lumbar intervertebral disc    Dystonia    Lumbosacral spondylosis without myelopathy    Medicare annual wellness visit, subsequent    Coronary stent restenosis    Stenosis of cervical spine with myelopathy    Preop examination    Pneumonia    Stridor    Sepsis    Status post cervical spinal fusion    Periumbilical abdominal pain     Past Medical History:   Diagnosis Date    Anemia     post  hemorrhagic    Angioedema 02/21/2016    Secondary to ACE inhibitor    Anxiety     Arthritis     CAD (coronary artery disease)     Colon polyps     Diabetes mellitus, type 2     GERD (gastroesophageal reflux disease)     Glaucoma     Hematoma     history    High cholesterol     History of foreign travel 12/2017; 08/2018    [place], [place], [place], [place], [place] and [place]; [place]    Hyperlipidemia     Hypertension     Hypogonadism in male 09/28/2016    Male erectile disorder     Microalbuminuria     Osteoarthritis     knee    Spinal stenosis of lumbar region without neurogenic claudication 06/02/2021    Tubular adenoma of colon 11/01/2017    Vitamin D deficiency      Past Surgical History:   Procedure Laterality Date    CARDIAC CATHETERIZATION N/A 05/15/2006    Dr. Mini Camarillo    CARDIAC CATHETERIZATION N/A 03/10/2020    Procedure: Left Heart Cath;  Surgeon: Miguel Ángel Karimi MD;  Location:  [facility];  Service: Cardiology;  Laterality: N/A;    CARDIAC CATHETERIZATION N/A 03/10/2020    Procedure: Stent DAVONTE coronary;  Surgeon: Miguel Ángel Karimi MD;  Location:  [facility];  Service: Cardiology;  Laterality: N/A;    CARDIAC CATHETERIZATION N/A 03/10/2020    Procedure: Coronary angiography;  Surgeon: Miguel Ángel Karimi MD;  Location:  [facility];  Service: Cardiology;  Laterality: N/A;    CARDIAC CATHETERIZATION N/A 03/10/2020    Procedure: Left ventriculography;  Surgeon: Miguel Ángel Karimi MD;  Location:  [facility];  Service: Cardiology;  Laterality: N/A;    CARDIAC CATHETERIZATION N/A 05/20/2022    Procedure: Left Heart Cath;  Surgeon: Mackenzie Morales MD;  Location:  [facility];  Service: Cardiovascular;  Laterality: N/A;    CARDIAC CATHETERIZATION N/A 05/20/2022    Procedure: Coronary angiography;  Surgeon: Mackenzie Morales MD;  Location:  [facility];  Service:  Cardiovascular;  Laterality: N/A;    CARDIAC CATHETERIZATION N/A 05/20/2022    Procedure: Percutaneous Coronary Intervention;  Surgeon: Mackenzie Morales MD;  Location:  [facility];  Service: Cardiovascular;  Laterality: N/A;    CARDIAC CATHETERIZATION N/A 05/24/2022    Procedure: Percutaneous Coronary Intervention;  Surgeon: Miguel Ángel Karimi MD;  Location:  [facility];  Service: Cardiovascular;  Laterality: N/A;  LAD and Cx    CARDIAC CATHETERIZATION N/A 05/24/2022    Procedure: Stent DAVONTE coronary;  Surgeon: Miguel Ángel Karimi MD;  Location:  [facility];  Service: Cardiovascular;  Laterality: N/A;    CARDIAC CATHETERIZATION N/A 05/24/2022    Procedure: Resting Full Cycle Ratio;  Surgeon: Miguel Ángel Karimi MD;  [facility];  Service: Cardiovascular;  Laterality: N/A;    CARDIAC CATHETERIZATION N/A 03/19/2024    Procedure: Left Heart Cath;  Surgeon: Miguel Ángel Karimi MD;  [facility];  Service: Cardiovascular;  Laterality: N/A;    CARDIAC CATHETERIZATION N/A 03/19/2024    Procedure: Percutaneous Coronary Intervention;  Surgeon: Miguel Ángel Karimi MD;  [facility];  Service: Cardiovascular;  Laterality: N/A;    CARDIAC CATHETERIZATION N/A 03/19/2024    Procedure: Stent DAVONTE coronary;  Surgeon: Miguel Ángel Karimi MD;  [facility];  Service: Cardiovascular;  Laterality: N/A;    CERVICAL DISCECTOMY POSTERIOR FUSION WITH INSTRUMENTATION Bilateral 6/25/2024    Procedure: Cervical 3 to cervical 6 laminectomies and posterior spinal fusion - Bilateral;  Surgeon: Marshal Miguel MD;  Location: [facility] MAIN OR;  Service: Neurosurgery;  Laterality: Bilateral;    COLONOSCOPY N/A 02/22/2006    Bilobed polyp at 30 cm, hemorrhoids-Dr. Ilya Zhao    COLONOSCOPY N/A 02/28/2014    Normal ileum, one 6 mm polyp in the mid transverse colon-  Ilya Zhao    COLONOSCOPY N/A 11/19/2008    Ela ileum, two 3 to 4 mm polyps, non-bleeding internal hemorrhoids, repeat in 5 years-Dr. Ilya Zhao    COLONOSCOPY N/A 10/31/2017    Procedure: COLONOSCOPY WITH POLYPECTOMY (COLD BIOPSY);  Surgeon: Ilya Zhao MD;  Location: [facility] ENDOSCOPY;  Service:     COLONOSCOPY N/A 05/21/2021    Procedure: Colonoscopy into cecum and terminal ileum with cold biopsy polypectomy;  Surgeon: Ilya Zhao MD;  Location: [facility] ENDOSCOPY;  Service: Gastroenterology;  Laterality: N/A;  Pre op: History of Polyps  Post op: Polyp    CORONARY ANGIOPLASTY WITH STENT PLACEMENT  2007, 2012, 2015    cardiac stents x3 occasions    ENDOSCOPY N/A 10/04/2017    Procedure: ESOPHAGOGASTRODUODENOSCOPY;  Surgeon: Boyd Guidry MD;  Location: [facility] ENDOSCOPY;  Service:     ENDOSCOPY N/A 05/21/2021    Procedure: ESOPHAGOGASTRODUODENOSCOPY with biopsies;  Surgeon: Ilya Zhao MD;  Location: [facility] ENDOSCOPY;  Service: Gastroenterology;  Laterality: N/A;  Pre op: GERD  Post op: Irregular  Z-Line, Hiatal Hernia, Gastritis    ENDOSCOPY N/A 08/25/2023    Procedure: ESOPHAGOGASTRODUODENOSCOPY with biopsy;  Surgeon: Ilya Zhao MD;  Location: [facility] ENDOSCOPY;  Service: Gastroenterology;  Laterality: N/A;  errosive gastritis    EPIDURAL BLOCK      INTERVENTIONAL RADIOLOGY PROCEDURE N/A 05/20/2022    Procedure: Intravascular Ultrasound;  Surgeon: Mackenzie Morales MD;  Location: [facility] CATH INVASIVE LOCATION;  Service: Cardiovascular;  Laterality: N/A;    KNEE INCISION AND DRAINAGE Right 06/20/2017    Procedure: RT. KNEE WASHOUT ;  Surgeon: Boyd Coyne MD;  Location: [facility] MAIN OR;  Service:     MEDIAL BRANCH BLOCK Bilateral 12/17/2021    Procedure: LUMBAR MEDIAL BRANCH BLOCK bilateral ~L4-S1 x2 (~2 weeks apart);  Surgeon: Christina Villaseñor MD;  [place] [facility] [place];  Service: Pain Management;  Laterality: Bilateral;    MEDIAL BRANCH BLOCK Bilateral 01/03/2022    Procedure: LUMBAR  MEDIAL BRANCH BLOCK bilateral ~L4-S1 x2 (~2 weeks apart);  Surgeon: Christina Villaseñor MD;  Location: [facility] MAIN OR;  Service: Pain Management;  Laterality: Bilateral;    MO ARTHRP KNE CONDYLE&PLATU MEDIAL&LAT COMPARTMENTS Right 06/15/2017    Procedure: RT TOTAL KNEE ARTHROPLASTY;  Surgeon: Boyd Coyne MD;  Location: [facility] MAIN OR;  Service: Orthopedics    RADIOFREQUENCY ABLATION Bilateral 01/10/2022    Procedure: RADIOFREQUENCY ABLATION NERVES Bilateral L4-S1;  Surgeon: Christina Villaseñor MD;  Location: [facility] MAIN OR;  Service: Pain Management;  Laterality: Bilateral;    SHOULDER SURGERY Right 2016    rotator cuff repair    UPPER GASTROINTESTINAL ENDOSCOPY N/A 10/13/2015    Z-line irregular, normal stomach, normal duodenum-Dr. Ilya Zhao    UPPER GASTROINTESTINAL ENDOSCOPY N/A 02/28/2014    Z-line irregular, normal stomach, normal duodenum-Dr. Ilya Zhao    UPPER GASTROINTESTINAL ENDOSCOPY N/A 11/19/2008    Z-line irregular, chronic gastritis withotu hemorrhage, normal duodenum-Dr. Ilya Zhao    UPPER GASTROINTESTINAL ENDOSCOPY N/A 06/22/2006    LA Grade A reflux esophagitis, non-bleeding erythematous gastropathy, normal duodenum-Dr. Ilya Zhao      General Information       Row Name 07/23/24 1423          Physical Therapy Time and Intention    Document Type discharge treatment  -CB     Mode of Treatment individual therapy;physical therapy  -CB       Row Name 07/23/24 1423          General Information    Existing Precautions/Restrictions brace on at all times;cervical collar  -CB       Row Name 07/23/24 1423          Cognition    Orientation Status (Cognition) oriented x 4  -CB               User Key  (r) = Recorded By, (t) = Taken By, (c) = Cosigned By      Initials Name Provider Type    CB Dixie Seay PT Physical Therapist                   Mobility       Row Name 07/23/24 1424          Bed Mobility    Bed Mobility supine-sit;sit-supine  -CB     Supine-Sit [place] (Bed Mobility) modified  independence  -CB     Sit-Supine [place] (Bed Mobility) modified independence  -CB       Row Name 07/23/24 1424          Sit-Stand Transfer    Sit-Stand [place] (Transfers) modified independence  -CB     Assistive Device (Sit-Stand Transfers) --  no AD  -CB       Row Name 07/23/24 1424          Gait/Stairs (Locomotion)    [place] Level (Gait) standby assist;supervision  -CB     Distance in Feet (Gait) 400  -CB     Comment, (Gait/Stairs) no overt LOB or unsteadiness noted; pt reports back to mobility level when he discharged [facility]  -CB               User Key  (r) = Recorded By, (t) = Taken By, (c) = Cosigned By      Initials Name Provider Type    Dixie Nogueira PT Physical Therapist                   Obj/Interventions       Row Name 07/23/24 1425          Balance    Balance Assessment standing static balance;standing dynamic balance  -CB     Static Standing Balance modified independence  -CB     Dynamic Standing Balance standby assist;supervision  -CB     Position/Device Used, Standing Balance unsupported  -CB     Balance Interventions sitting;standing;supported;sit to stand;static;dynamic;minimal challenge  -CB               User Key  (r) = Recorded By, (t) = Taken By, (c) = Cosigned By      Initials Name Provider Type    Dixie Nogueira, LEIDY Physical Therapist                   Goals/Plan    No documentation.                  Clinical Impression       Row Name 07/23/24 1425          Pain    Pretreatment Pain Rating 0/10 - no pain  -CB       Row Name 07/23/24 1425          Plan of Care Review    Plan of Care Reviewed With patient  -CB     Progress improving  -CB     Outcome Evaluation Patient seen today for PT. He completed bed mobility and STS with Rema. No use of AD this date. He increased ambulation distance to 400ft without AD with S/SBA. Pt reports he feels back to mobility level when he was discharged from [facility]. Encouraged pt to complete HEP he was given at dc from [facility]. PT  encouraged pt to continue to ambulate with [facility] staff. PT to s/o.  -CB       Row Name 07/23/24 1425          Positioning and Restraints    Pre-Treatment Position in bed  -CB     Post Treatment Position bed  -CB     In Bed notified nsg;fowlers;call light within reach;encouraged to call for assist;exit alarm on;side rails up x2  -CB               User Key  (r) = Recorded By, (t) = Taken By, (c) = Cosigned By      Initials Name Provider Type    Dixie Nogueira PT Physical Therapist                   Outcome Measures       Row Name 07/23/24 1429 07/23/24 0850       How much help from another person do you currently need...    Turning from your back to your side while in flat bed without using bedrails? 4  -CB 4  -MK    Moving from lying on back to sitting on the side of a flat bed without bedrails? 4  -CB 4  -MK    Moving to and from a bed to a chair (including a wheelchair)? 4  -CB 3  -MK    Standing up from a chair using your arms (e.g., wheelchair, bedside chair)? 4  -CB 4  -MK    Climbing 3-5 steps with a railing? 3  -CB 3  -MK    To walk in hospital room? 3  -CB 3  -MK    AM-PAC 6 Clicks Score (PT) 22  -CB 21  -MK    Highest Level of Mobility Goal 7 --> Walk 25 feet or more  -CB 6 --> Walk 10 steps or more  -MK      Row Name 07/23/24 1429          Functional Assessment    Outcome Measure Options AM-PAC 6 Clicks Basic Mobility (PT)  -CB               User Key  (r) = Recorded By, (t) = Taken By, (c) = Cosigned By      Initials Name Provider Type    Zoe Bryson, RN Registered Nurse    Dixie Nogueira, PT Physical Therapist                  Physical Therapy Education       Title: PT OT SLP Therapies (In Progress)       Topic: Physical Therapy (Done)       Point: Mobility training (Done)       Learning Progress Summary             Patient Acceptance, E,TB, VU by HEATHER at 7/23/2024 1430    Acceptance, E, VU by LAKESHIA at 7/21/2024 1326   Significant Other Acceptance, E, VU by LAKESHIA at 7/21/2024 1326                          Point: Home exercise program (Done)       Learning Progress Summary             Patient Acceptance, E,TB, VU by CB at 7/23/2024 1430    Acceptance, E, VU by JL at 7/21/2024 1326   Significant Other Acceptance, E, VU by JL at 7/21/2024 1326                         Point: Body mechanics (Done)       Learning Progress Summary             Patient Acceptance, E,TB, VU by CB at 7/23/2024 1430    Acceptance, E, VU by JL at 7/21/2024 1326   Significant Other Acceptance, E, VU by JL at 7/21/2024 1326                         Point: Precautions (Done)       Learning Progress Summary             Patient Acceptance, E,TB, VU by CB at 7/23/2024 1430    Acceptance, E, VU by JL at 7/21/2024 1326   Significant Other Acceptance, E, VU by JL at 7/21/2024 1326                                         User Key       Initials Effective Dates Name Provider Type Discipline    CB 10/22/21 -  Dixie Seay, PT Physical Therapist PT    LAKESHIA 01/16/24 -  Elizabeth Hernández PT Physical Therapist PT                  PT Recommendation and Plan     Plan of Care Reviewed With: patient  Progress: improving  Outcome Evaluation: Patient seen today for PT. He completed bed mobility and STS with Rema. No use of AD this date. He increased ambulation distance to 400ft without AD with S/SBA. Pt reports he feels back to mobility level when he was discharged from [facility]. Encouraged pt to complete HEP he was given at dc from [facility]. PT encouraged pt to continue to ambulate with [place] staff. PT to s/o.     Time Calculation:         PT Charges       Row Name 07/23/24 1430             Time Calculation    Start Time 1400  -CB      Stop Time 1410  -CB      Time Calculation (min) 10 min  -CB      PT Received On 07/23/24  -CB         Time Calculation- PT    Total Timed Code Minutes- PT 10 minute(s)  -CB         Timed Charges    19324 - PT Therapeutic Activity Minutes 10  -CB         Total Minutes    Timed Charges Total Minutes 10  -CB       Total Minutes 10  -CB                 User Key  (r) = Recorded By, (t) = Taken By, (c) = Cosigned By      Initials Name Provider Type    CB Dixie Seay, PT Physical Therapist                  Therapy Charges for Today       Code Description Service Date Service Provider Modifiers Qty    [id number]  PT THERAPEUTIC ACT EA 15 MIN 7/23/2024 Dixie Seay, PT GP 1            PT G-Codes  Outcome Measure Options: AM-PAC 6 Clicks Basic Mobility (PT)  AM-PAC 6 Clicks Score (PT): 22  AM-PAC 6 Clicks Score (OT): 19    PT Discharge Summary  Anticipated Discharge Disposition (PT): home with outpatient therapy services, home with home health, home with assist    Dixie Seay PT  7/23/2024

## 2024-07-23 NOTE — PLAN OF CARE
Goal Outcome Evaluation:  Plan of Care Reviewed With: patient   A&Ox4, VSS, RA, up w/ SBA, soft c. Spine collar in place, cervical spine incision w/ staples, tolerating full liquid diet, diarrhea improving per pt, ACHS BG checks w/ sliding scale coverage- none needed, PRN percocet & robaxin given x1 this shift, wife at bedside throughout the night, no major issues this shift & pt resting comfortably at this time, plan of care ongoing, WCTM      Progress: improving

## 2024-07-23 NOTE — PROGRESS NOTES
Name: Isaias Monaco ADMIT: 2024   : 1955  PCP: Gwyn Patten Sr., MD    MRN: [id number] LOS: 3 days   AGE/SEX: 69 y.o. male  ROOM: [facility]     Subjective   Subjective   No acute events. Patient denies new complaints. Pain is reasonably controlled. Taking some PO but appetite is not great. No CP/n/v.His wife and friend are at bedside.    Objective   Objective   Vital Signs  Temp:  [97.7 °F (36.5 °C)-98.6 °F (37 °C)] 98.4 °F (36.9 °C)  Heart Rate:  [73-81] 76  Resp:  [16-18] 16  BP: (125-166)/(73-88) 151/88  SpO2:  [95 %-99 %] 95 %  on   ;   Device (Oxygen Therapy): room air  Body mass index is 29.07 kg/m².  Physical Exam  Vitals and nursing note reviewed.   Constitutional:       General: He is not in acute distress.     Appearance: He is not toxic-appearing or diaphoretic.   HENT:      Head: Normocephalic and atraumatic.      Nose: Nose normal.      Mouth/Throat:      Mouth: Mucous membranes are moist.      Pharynx: Oropharynx is clear.   Eyes:      Conjunctiva/sclera: Conjunctivae normal.      Pupils: Pupils are equal, round, and reactive to light.   Cardiovascular:      Rate and Rhythm: Normal rate and regular rhythm.      Pulses: Normal pulses.   Pulmonary:      Effort: Pulmonary effort is normal.      Breath sounds: Normal breath sounds.   Abdominal:      General: Bowel sounds are normal. There is no distension.      Palpations: Abdomen is soft.      Tenderness: There is no abdominal tenderness.   Musculoskeletal:         General: No swelling or tenderness.   Skin:     General: Skin is warm and dry.      Capillary Refill: Capillary refill takes less than 2 seconds.   Neurological:      General: No focal deficit present.      Mental Status: He is alert and oriented to person, place, and time.   Psychiatric:         Mood and Affect: Mood normal.         Behavior: Behavior normal.       Results Review     I reviewed the patient's new clinical results.  Results from last 7 days   Lab Units  07/23/24  0904 07/22/24  0600 07/21/24  0819 07/19/24  2304   WBC 10*3/mm3 10.16 13.93* 17.68* 16.08*   HEMOGLOBIN g/dL 11.3* 11.5* 11.3* 13.4   PLATELETS 10*3/mm3 206 204 202 280     Results from last 7 days   Lab Units 07/23/24  0904 07/22/24  0600 07/21/24  0819 07/19/24  2304   SODIUM mmol/L 133* 132* 134* 131*   POTASSIUM mmol/L 3.5 3.3* 3.7 4.0   CHLORIDE mmol/L 102 102 103 98   CO2 mmol/L 22.0 18.1* 19.8* 22.7   BUN mg/dL 7* 7* 7* 9   CREATININE mg/dL 0.74* 0.92 1.10 1.09   GLUCOSE mg/dL 120* 100* 113* 233*   EGFR mL/min/1.73 98.1 90.0 72.7 73.5     Results from last 7 days   Lab Units 07/23/24  0904 07/22/24  0600 07/21/24  0819 07/19/24  2304   ALBUMIN g/dL 2.9* 2.9* 2.9* 3.7   BILIRUBIN mg/dL 0.4 0.7 0.8 0.8   ALK PHOS U/L 83 84 80 104   AST (SGOT) U/L 21 22 27 36   ALT (SGPT) U/L 18 26 34 37     Results from last 7 days   Lab Units 07/23/24 0904 07/22/24  0600 07/21/24 0819 07/19/24  2304   CALCIUM mg/dL 8.6 8.1* 8.2* 9.1   ALBUMIN g/dL 2.9* 2.9* 2.9* 3.7   MAGNESIUM mg/dL 1.7 1.9 1.5*  --    PHOSPHORUS mg/dL 2.3* 2.0* 2.3*  --      Results from last 7 days   Lab Units 07/20/24  0936 07/19/24  2304   PROCALCITONIN ng/mL 0.49*  --    LACTATE mmol/L  --  1.1     Hemoglobin A1C   Date/Time Value Ref Range Status   07/21/2024 0819 6.20 (H) 4.80 - 5.60 % Final     Glucose   Date/Time Value Ref Range Status   07/23/2024 1621 109 70 - 130 mg/dL Final   07/23/2024 1117 112 70 - 130 mg/dL Final   07/23/2024 0727 90 70 - 130 mg/dL Final   07/22/2024 2026 97 70 - 130 mg/dL Final   07/22/2024 1700 107 70 - 130 mg/dL Final   07/22/2024 1457 98 70 - 130 mg/dL Final   07/22/2024 1359 104 70 - 130 mg/dL Final       US Gallbladder    Result Date: 7/22/2024  Ultrasound findings remain concerning for acute cholecystitis. Gallbladder wall is thickened up to 8 mm. Sonographic Colon sign was reportedly negative. Recommend correlation with exam.    This report was finalized on 7/22/2024 2:32 PM by Dr. Matt Mora M.D on  Workstation: [id number]       I have personally reviewed all medications:  Scheduled Medications  ARIPiprazole, 5 mg, Oral, Daily  aspirin, 81 mg, Oral, Daily  carvedilol, 25 mg, Oral, BID With Meals  clopidogrel, 75 mg, Oral, Daily  dorzolamide (TRUSOPT) 2 % 1 drop, timolol (TIMOPTIC) 0.5 % 1 drop for Cosopt 22.3-6.8 mg/mL, , Both Eyes, BID  escitalopram, 20 mg, Oral, Daily  gabapentin, 300 mg, Oral, Nightly  insulin lispro, 2-9 Units, Subcutaneous, 4x Daily AC & at Bedtime  lactobacillus acidophilus, 1 capsule, Oral, Daily  latanoprost, 1 drop, Both Eyes, Nightly  levoFLOXacin, 750 mg, Oral, Q24H  metroNIDAZOLE, 500 mg, Oral, Q8H  pantoprazole, 40 mg, Oral, Q AM  sodium chloride, 10 mL, Intravenous, Q12H  terazosin, 5 mg, Oral, Nightly    Infusions  sodium chloride, 100 mL/hr, Last Rate: 100 mL/hr (07/23/24 1016)    Diet  Diet: Liquid, Diabetic, Gastrointestinal; Full Liquid; Consistent Carbohydrate; Fat-Restricted; Fluid Consistency: Thin (IDDSI 0)    I have personally reviewed:  [x]  Laboratory   [x]  Microbiology   [x]  Radiology   []  EKG/Telemetry  []  Cardiology/Vascular   []  Pathology    [x]  Records    Assessment/Plan     Active Hospital Problems    Diagnosis  POA    **Sepsis [A41.9]  Yes    Status post cervical spinal fusion [Z98.1]  Not Applicable    Periumbilical abdominal pain [R10.33]  Unknown    Essential hypertension [I10]  Yes    Hyperlipidemia LDL goal <70 [E78.5]  Yes    Coronary artery disease involving native coronary artery of native heart without angina pectoris [I25.10]  Yes    Type 2 diabetes mellitus with microalbuminuria, with long-term current use of insulin [E11.29, R80.9, Z79.4]  Not Applicable      Resolved Hospital Problems   No resolved problems to display.   Colitis/Acute Cholecystitis with Sepsis, Present on admission  - seems to be resolving  - continue on levofloxacin and flagyl for 10 day course to conclude on 7/30/24 per ID, appreciate recs  - no urgent need for surgical  intervention, diet advanced  - appreciate general surgery recs    Type 2 DM  - BG acceptable, A1C 6.2%  - continue coverage with ssi/hypoglycemia protocol    CAD  - no anginal symptoms  - on indefinite DAPT given multiple stents    Recent Cervical Spinal Fusion  - no acute neurosurgical issues-they are following and plan suture removal today or tomorrow, appreciate recs    BPH  - continue terazosin    SCDs for DVT prophylaxis.  Full code.  Discussed with patient, spouse, family, and nursing staff.  Anticipate discharge home tomorrow.  Expected Discharge Date: 7/24/2024; Expected Discharge Time:       Matt Proctor MD  [facility]  07/23/24  18:17 EDT

## 2024-07-23 NOTE — PLAN OF CARE
Goal Outcome Evaluation:  Plan of Care Reviewed With: patient        Progress: improving  Outcome Evaluation: Patient seen today for PT. He completed bed mobility and STS with Rema. No use of AD this date. He increased ambulation distance to 400ft without AD with S/SBA. Pt reports he feels back to mobility level when he was discharged from [facility]. Encouraged pt to complete HEP he was given at dc from [facility]. PT encouraged pt to continue to ambulate with [facility] staff. PT to s/o.      Anticipated Discharge Disposition (PT): home with outpatient therapy services, home with home health, home with assist

## 2024-07-23 NOTE — PROGRESS NOTES
Chief Complaint:    Cholecystitis    Subjective:    The patient is feeling well today.  He is having no abdominal pain.  The right upper quadrant ultrasound yesterday shows an abnormal appearance of the gallbladder still consistent with acute cholecystitis.    Objective:    Temp:  [98.4 °F (36.9 °C)-98.6 °F (37 °C)] 98.6 °F (37 °C)  Heart Rate:  [74-81] 74  Resp:  [16-18] 16  BP: (125-159)/(73-86) 156/86    Physical Exam  Constitutional:       Appearance: He is not ill-appearing or toxic-appearing.   Pulmonary:      Effort: Pulmonary effort is normal. No respiratory distress.   Abdominal:      Palpations: Abdomen is soft.      Tenderness: There is no abdominal tenderness.   Neurological:      Mental Status: He is alert.   Psychiatric:         Behavior: Behavior is cooperative.              Results:    Labs are pending this morning.    Assessment/Plan:    The patient is doing well from a general surgical standpoint.  He likely had cholecystitis at some point that has clinically resolved and there is no urgent need for cholecystectomy.  Based on his previous cardiac stent, Plavix was restarted yesterday which is appropriate.  I will slowly advance his diet.  If he is able to tolerate a diet, he may be ready for discharge to home tomorrow from a surgical standpoint.  I will then follow him as an outpatient with plans to do a cholecystectomy if necessary.    Luis Enrique Gaston Jr., M.D.

## 2024-07-23 NOTE — PLAN OF CARE
Goal Outcome Evaluation:  Plan of Care Reviewed With: patient      Potassium replaced this shift.  Progress: no change  Outcome Evaluation: Worked with PT this shift. Did not require PRN pain medication. Continues to experience diarrhea after PO intake, despite eating very little from meal trays. Zofran and questran given PRN. LR infusing 100mL/hr. VSS.

## 2024-07-24 ENCOUNTER — READMISSION MANAGEMENT (OUTPATIENT)
Dept: CALL CENTER | Facility: HOSPITAL | Age: 69
End: 2024-07-24
Payer: MEDICARE

## 2024-07-24 ENCOUNTER — TELEPHONE (OUTPATIENT)
Dept: NEUROSURGERY | Facility: CLINIC | Age: 69
End: 2024-07-24

## 2024-07-24 ENCOUNTER — DOCUMENTATION (OUTPATIENT)
Dept: INTERNAL MEDICINE | Facility: HOSPITAL | Age: 69
End: 2024-07-24
Payer: MEDICARE

## 2024-07-24 VITALS
RESPIRATION RATE: 16 BRPM | TEMPERATURE: 98.1 F | HEART RATE: 77 BPM | WEIGHT: 245.15 LBS | OXYGEN SATURATION: 98 % | SYSTOLIC BLOOD PRESSURE: 142 MMHG | DIASTOLIC BLOOD PRESSURE: 82 MMHG | BODY MASS INDEX: 28.95 KG/M2 | HEIGHT: 77 IN

## 2024-07-24 DIAGNOSIS — Z98.1 S/P SPINAL FUSION: Primary | ICD-10-CM

## 2024-07-24 DIAGNOSIS — M48.02 STENOSIS OF CERVICAL SPINE WITH MYELOPATHY: Primary | ICD-10-CM

## 2024-07-24 DIAGNOSIS — G99.2 STENOSIS OF CERVICAL SPINE WITH MYELOPATHY: Primary | ICD-10-CM

## 2024-07-24 LAB
ANION GAP SERPL CALCULATED.3IONS-SCNC: 11.1 MMOL/L (ref 5–15)
BASOPHILS # BLD AUTO: 0.05 10*3/MM3 (ref 0–0.2)
BASOPHILS NFR BLD AUTO: 0.6 % (ref 0–1.5)
BUN SERPL-MCNC: 6 MG/DL (ref 8–23)
BUN/CREAT SERPL: 8.2 (ref 7–25)
CALCIUM SPEC-SCNC: 8.5 MG/DL (ref 8.6–10.5)
CHLORIDE SERPL-SCNC: 99 MMOL/L (ref 98–107)
CO2 SERPL-SCNC: 21.9 MMOL/L (ref 22–29)
CREAT SERPL-MCNC: 0.73 MG/DL (ref 0.76–1.27)
DEPRECATED RDW RBC AUTO: 46.8 FL (ref 37–54)
EGFRCR SERPLBLD CKD-EPI 2021: 98.5 ML/MIN/1.73
EOSINOPHIL # BLD AUTO: 0.5 10*3/MM3 (ref 0–0.4)
EOSINOPHIL NFR BLD AUTO: 6 % (ref 0.3–6.2)
ERYTHROCYTE [DISTWIDTH] IN BLOOD BY AUTOMATED COUNT: 14.7 % (ref 12.3–15.4)
GLUCOSE BLDC GLUCOMTR-MCNC: 111 MG/DL (ref 70–130)
GLUCOSE BLDC GLUCOMTR-MCNC: 130 MG/DL (ref 70–130)
GLUCOSE BLDC GLUCOMTR-MCNC: 99 MG/DL (ref 70–130)
GLUCOSE SERPL-MCNC: 103 MG/DL (ref 65–99)
HCT VFR BLD AUTO: 36.3 % (ref 37.5–51)
HGB BLD-MCNC: 11.5 G/DL (ref 13–17.7)
IMM GRANULOCYTES # BLD AUTO: 0.05 10*3/MM3 (ref 0–0.05)
IMM GRANULOCYTES NFR BLD AUTO: 0.6 % (ref 0–0.5)
LYMPHOCYTES # BLD AUTO: 1.75 10*3/MM3 (ref 0.7–3.1)
LYMPHOCYTES NFR BLD AUTO: 21.1 % (ref 19.6–45.3)
MCH RBC QN AUTO: 27.6 PG (ref 26.6–33)
MCHC RBC AUTO-ENTMCNC: 31.7 G/DL (ref 31.5–35.7)
MCV RBC AUTO: 87.3 FL (ref 79–97)
MONOCYTES # BLD AUTO: 1.03 10*3/MM3 (ref 0.1–0.9)
MONOCYTES NFR BLD AUTO: 12.4 % (ref 5–12)
NEUTROPHILS NFR BLD AUTO: 4.92 10*3/MM3 (ref 1.7–7)
NEUTROPHILS NFR BLD AUTO: 59.3 % (ref 42.7–76)
NRBC BLD AUTO-RTO: 0 /100 WBC (ref 0–0.2)
PLATELET # BLD AUTO: 211 10*3/MM3 (ref 140–450)
PMV BLD AUTO: 11.2 FL (ref 6–12)
POTASSIUM SERPL-SCNC: 3.4 MMOL/L (ref 3.5–5.2)
RBC # BLD AUTO: 4.16 10*6/MM3 (ref 4.14–5.8)
SODIUM SERPL-SCNC: 132 MMOL/L (ref 136–145)
WBC NRBC COR # BLD AUTO: 8.3 10*3/MM3 (ref 3.4–10.8)

## 2024-07-24 PROCEDURE — 82948 REAGENT STRIP/BLOOD GLUCOSE: CPT

## 2024-07-24 PROCEDURE — 80048 BASIC METABOLIC PNL TOTAL CA: CPT | Performed by: INTERNAL MEDICINE

## 2024-07-24 PROCEDURE — 99231 SBSQ HOSP IP/OBS SF/LOW 25: CPT | Performed by: SURGERY

## 2024-07-24 PROCEDURE — 85025 COMPLETE CBC W/AUTO DIFF WBC: CPT | Performed by: INTERNAL MEDICINE

## 2024-07-24 RX ORDER — METRONIDAZOLE 500 MG/1
500 TABLET ORAL EVERY 8 HOURS SCHEDULED
Qty: 17 TABLET | Refills: 0 | Status: SHIPPED | OUTPATIENT
Start: 2024-07-24 | End: 2024-07-30

## 2024-07-24 RX ORDER — OXYCODONE AND ACETAMINOPHEN 7.5; 325 MG/1; MG/1
1 TABLET ORAL EVERY 6 HOURS PRN
Qty: 40 TABLET | Refills: 0 | Status: SHIPPED | OUTPATIENT
Start: 2024-07-24 | End: 2024-08-03

## 2024-07-24 RX ORDER — METRONIDAZOLE 500 MG/1
500 TABLET ORAL EVERY 8 HOURS SCHEDULED
Qty: 17 TABLET | Refills: 0 | Status: SHIPPED | OUTPATIENT
Start: 2024-07-24 | End: 2024-07-24

## 2024-07-24 RX ORDER — LEVOFLOXACIN 750 MG/1
750 TABLET, FILM COATED ORAL EVERY 24 HOURS
Qty: 6 TABLET | Refills: 0 | Status: SHIPPED | OUTPATIENT
Start: 2024-07-25 | End: 2024-07-24

## 2024-07-24 RX ORDER — SPIRONOLACTONE 25 MG/1
25 TABLET ORAL DAILY
Status: DISCONTINUED | OUTPATIENT
Start: 2024-07-24 | End: 2024-07-24 | Stop reason: HOSPADM

## 2024-07-24 RX ORDER — SACCHAROMYCES BOULARDII 250 MG
250 CAPSULE ORAL DAILY
Qty: 30 CAPSULE | Refills: 0 | Status: SHIPPED | OUTPATIENT
Start: 2024-07-25

## 2024-07-24 RX ORDER — METHOCARBAMOL 500 MG/1
500 TABLET, FILM COATED ORAL 3 TIMES DAILY PRN
Start: 2024-07-24 | End: 2024-08-23

## 2024-07-24 RX ORDER — CHOLESTYRAMINE 4 G/9G
1 POWDER, FOR SUSPENSION ORAL 2 TIMES DAILY PRN
Qty: 60 EACH | Refills: 0 | Status: SHIPPED | OUTPATIENT
Start: 2024-07-24 | End: 2024-07-24

## 2024-07-24 RX ORDER — ONDANSETRON 4 MG/1
4 TABLET, ORALLY DISINTEGRATING ORAL EVERY 6 HOURS PRN
Qty: 120 TABLET | Refills: 0 | Status: SHIPPED | OUTPATIENT
Start: 2024-07-24 | End: 2024-07-24

## 2024-07-24 RX ORDER — SPIRONOLACTONE 25 MG/1
25 TABLET ORAL DAILY
COMMUNITY

## 2024-07-24 RX ORDER — LEVOFLOXACIN 750 MG/1
750 TABLET, FILM COATED ORAL EVERY 24 HOURS
Qty: 6 TABLET | Refills: 0 | Status: SHIPPED | OUTPATIENT
Start: 2024-07-25 | End: 2024-07-31

## 2024-07-24 RX ORDER — ONDANSETRON 4 MG/1
4 TABLET, ORALLY DISINTEGRATING ORAL EVERY 6 HOURS PRN
Qty: 120 TABLET | Refills: 0 | Status: SHIPPED | OUTPATIENT
Start: 2024-07-24

## 2024-07-24 RX ORDER — POTASSIUM CHLORIDE 750 MG/1
40 TABLET, FILM COATED, EXTENDED RELEASE ORAL EVERY 4 HOURS
Status: COMPLETED | OUTPATIENT
Start: 2024-07-24 | End: 2024-07-24

## 2024-07-24 RX ORDER — SACCHAROMYCES BOULARDII 250 MG
250 CAPSULE ORAL DAILY
Qty: 30 CAPSULE | Refills: 0 | Status: SHIPPED | OUTPATIENT
Start: 2024-07-25 | End: 2024-07-24

## 2024-07-24 RX ORDER — CHOLESTYRAMINE 4 G/9G
1 POWDER, FOR SUSPENSION ORAL 2 TIMES DAILY PRN
Qty: 60 EACH | Refills: 0 | Status: SHIPPED | OUTPATIENT
Start: 2024-07-24

## 2024-07-24 RX ADMIN — ESCITALOPRAM 20 MG: 20 TABLET, FILM COATED ORAL at 08:29

## 2024-07-24 RX ADMIN — Medication 1 CAPSULE: at 08:29

## 2024-07-24 RX ADMIN — PANTOPRAZOLE SODIUM 40 MG: 40 TABLET, DELAYED RELEASE ORAL at 06:31

## 2024-07-24 RX ADMIN — POTASSIUM CHLORIDE 40 MEQ: 750 TABLET, EXTENDED RELEASE ORAL at 08:36

## 2024-07-24 RX ADMIN — METRONIDAZOLE 500 MG: 500 TABLET, FILM COATED ORAL at 06:31

## 2024-07-24 RX ADMIN — METRONIDAZOLE 500 MG: 500 TABLET, FILM COATED ORAL at 13:30

## 2024-07-24 RX ADMIN — CARVEDILOL 25 MG: 25 TABLET, FILM COATED ORAL at 17:19

## 2024-07-24 RX ADMIN — TIMOLOL MALEATE: 5 SOLUTION/ DROPS OPHTHALMIC at 08:36

## 2024-07-24 RX ADMIN — POTASSIUM CHLORIDE 40 MEQ: 750 TABLET, EXTENDED RELEASE ORAL at 13:31

## 2024-07-24 RX ADMIN — Medication 10 ML: at 08:36

## 2024-07-24 RX ADMIN — SPIRONOLACTONE 25 MG: 25 TABLET, FILM COATED ORAL at 13:30

## 2024-07-24 RX ADMIN — ARIPIPRAZOLE 5 MG: 5 TABLET ORAL at 08:29

## 2024-07-24 RX ADMIN — LEVOFLOXACIN 750 MG: 750 TABLET, FILM COATED ORAL at 08:29

## 2024-07-24 RX ADMIN — CARVEDILOL 25 MG: 25 TABLET, FILM COATED ORAL at 08:29

## 2024-07-24 RX ADMIN — ASPIRIN 81 MG: 81 TABLET, COATED ORAL at 08:36

## 2024-07-24 RX ADMIN — CLOPIDOGREL BISULFATE 75 MG: 75 TABLET, FILM COATED ORAL at 08:29

## 2024-07-24 NOTE — PLAN OF CARE
Goal Outcome Evaluation:  Plan of Care Reviewed With: patient   A&Ox4, VSS, RA, up w/ SBA, soft c. Spine collar in place, cervical spine incision w/ staples, nutrition consult placed r/t decreased appetite, diarrhea only after eating on dayshift- none reported this shift, ACHS BG checks w/ sliding scale coverage- none needed, PRN percocet & robaxin given x1 this shift, wife at bedside throughout the night, no major issues this shift & pt resting comfortably at this time, plan of care ongoing, WCTM      Progress: no change

## 2024-07-24 NOTE — TELEPHONE ENCOUNTER
----- Message from Libby Haas sent at 7/24/2024 10:05 AM EDT -----  Regarding: post-op  Please schedule this patient a follow-up appointment in 4 weeks with repeat cervical x-rays (already ordered).  He had a C3-6 posterior cervical fusion with Dr. Miguel on 6/25/2024. Thanks!

## 2024-07-24 NOTE — DISCHARGE SUMMARY
"Date of Admission: 7/19/2024  Date of Discharge:  7/24/2024  Primary Care Physician: Gwyn Patten Sr., MD     Discharge Diagnosis:  Active Hospital Problems    Diagnosis  POA    **Sepsis [A41.9]  Yes    Status post cervical spinal fusion [Z98.1]  Not Applicable    Periumbilical abdominal pain [R10.33]  Unknown    Essential hypertension [I10]  Yes    Hyperlipidemia LDL goal <70 [E78.5]  Yes    Coronary artery disease involving native coronary artery of native heart without angina pectoris [I25.10]  Yes    Type 2 diabetes mellitus with microalbuminuria, with long-term current use of insulin [E11.29, R80.9, Z79.4]  Not Applicable      Resolved Hospital Problems   No resolved problems to display.       Presenting Problem/History of Present Illness:  Sepsis [A41.9]  Sepsis without acute organ dysfunction, due to unspecified organism [A41.9]     Hospital Course:  The patient is a 69 y.o. male with a history of HTN, HLD, Type 2 DM, and CAD with multiple prior stents on DAPT indefinitely who presented with fever. He had recent cervical spine surgery and had just gotten out of [facility] rehab for this. He was evaluated by infectious diseases. Workup revealed findings suggestive of focal colitis and possible acute cholecystitis. He was evaluated by general surgery and was thought to have had acute cholecystitis which is resolving and no surgical intervention was recommended. His diet was advanced and he tolerated well. He did not have evidence of infection in his cervical spine. Neurosurgery evaluated him and did not recommend any further testing or intervention from their standpoint. He is medically stable and will be discharged home. He will be prescribed the remainder of a 10 day course of levofloxacin and flagyl per infectious diseases recommendations.     Exam Today:  Blood pressure 142/82, pulse 77, temperature 98.1 °F (36.7 °C), temperature source Oral, resp. rate 16, height 195.6 cm (77\"), weight 111 kg (245 lb 2.4 " oz), SpO2 98%.  Vitals and nursing note reviewed.   Constitutional:       General: He is not in acute distress.     Appearance: He is not toxic-appearing or diaphoretic.   HENT:      Head: Normocephalic and atraumatic.      Nose: Nose normal.      Mouth/Throat:      Mouth: Mucous membranes are moist.      Pharynx: Oropharynx is clear.   Eyes:      Conjunctiva/sclera: Conjunctivae normal.      Pupils: Pupils are equal, round, and reactive to light.   Cardiovascular:      Rate and Rhythm: Normal rate and regular rhythm.      Pulses: Normal pulses.   Pulmonary:      Effort: Pulmonary effort is normal.      Breath sounds: Normal breath sounds.   Abdominal:      General: Bowel sounds are normal. There is no distension.      Palpations: Abdomen is soft.      Tenderness: There is no abdominal tenderness.   Musculoskeletal:         General: No swelling or tenderness.   Skin:     General: Skin is warm and dry.      Capillary Refill: Capillary refill takes less than 2 seconds.   Neurological:      General: No focal deficit present.      Mental Status: He is alert and oriented to person, place, and time.   Psychiatric:         Mood and Affect: Mood normal.         Behavior: Behavior normal.     Pertinent Results:  Ultrasound gallbladder     TECHNIQUE: Grayscale and color Doppler images of the gallbladder were  obtained.     HISTORY: Cholecystitis on CT     COMPARISON: CT abdomen pelvis 7/20/2024     FINDINGS: Pancreas is poorly visualized secondary to available acoustic  windows. Liver measures up to 16.5 cm in length. Normal echogenicity and  echotexture. Patent main portal vein with antegrade flow and normal  venous spectral waveform. Common bile duct measures 6 mm. No  intrahepatic bili duct dilation. Gallbladder is distended. Gallbladder  wall measuring up to 8 mm. Suspect layering sludge and/or stones in the  dependent gallbladder lumen. Reportedly negative ultrasonographic Colon  sign. Pericholecystic inflammatory  changes better seen on recent CT.  Right kidney measures 11 cm in length. Normal cortical echogenicity and  thickness. No hydronephrosis.        IMPRESSION:  Ultrasound findings remain concerning for acute  cholecystitis. Gallbladder wall is thickened up to 8 mm. Sonographic  Colon sign was reportedly negative. Recommend correlation with exam.    MRI OF THE CERVICAL SPINE WITH AND WITHOUT CONTRAST-7/20/24   CLINICAL HISTORY: Fever of unknown source. Recent spinal surgery.     TECHNIQUE: MRI of the cervical spine is obtained with sagittal pre- and  post gadolinium T1, proton density, and T2 weighted images.  Additionally, there are axial pre- and postgadolinium T1, gradient echo,  and T2 weighted images through the cervical spine.     COMPARISON: MRI of the cervical spine dated 01/23/2024 and 02/03/2023.     FINDINGS:     There is a small amount of fluid signal intensity noted within the C5-6  disc space which is stable in appearance when compared to the prior  examination dated 01/23/2024. Also, on the previous examination dated  02/03/2023, there was also a small amount of fluid signal intensity  within the disc space. I suspect that this is representative of  degenerative phenomena.     Interval laminectomies have been performed at the level of the C3, C4,  and C5 levels. There is hardware fusing the cervical spine posteriorly  from C3 down to C6. There is nonspecific fluid signal within the  laminectomy defects that measures up to approximately 4.3 x 1.1 cm in  greatest parasagittal dimensions and approximately 2.1 cm in greatest  transverse dimensions. This is presumably representative of a  postoperative seroma or pseudomeningocele. However, the possibility of  an infected fluid collection cannot be entirely excluded. Additional  similar more superficial fluid is seen at the level of the supraspinous  ligament extending from C4-5 down to C7-T1. There is edema appreciated  within the posterior paraspinal soft  tissues extending from C3 down to  T1 which is also nonspecific in nature and is probably simply  representative of postoperative change. However, again the possibility  of infection in this area cannot be entirely excluded.     At C2-3, there is moderate left foraminal narrowing secondary to  uncovertebral joint hypertrophy and facet arthrosis and this is stable  when compared to the prior exam.     At C3-4, uncovertebral joint hypertrophy results in mild foraminal  narrowing which is stable. No significant canal narrowing is seen at  this level. The previously identified canal stenotic change has been  relieved in the interval.     At C4-5, there is a disc bulge and a central disc protrusion. This  results in a mild degree of canal narrowing. The previously identified  canal stenosis is markedly improved in the interval since the  laminectomy procedure. There is mild-to-moderate right foraminal  narrowing secondary to uncovertebral joint hypertrophy and there is  moderate left foraminal narrowing secondary to uncovertebral joint  hypertrophy and facet arthrosis. A similar degree of foraminal stenosis  was appreciated on the prior exam.     At C5-6, there is a prominent improvement in the degree of canal  narrowing since the prior exam with the performance of the laminectomy  procedures. There is a severe degree of right foraminal narrowing  secondary to uncovertebral joint hypertrophy. No significant interval  change in the degree of foraminal narrowing is noted since the prior  exam.     At C6-7, mild right foraminal narrowing secondary to uncovertebral joint  hypertrophy and similar findings are noted on the prior exam.     At C7-T1, there is no significant degree of canal or foraminal  narrowing.     The cervical spinal cord has normal signal intensity. The visualized  contents of the cranial vault are unremarkable.     Within the visualized upper thoracic spine, there is a small central  disc protrusion at  T2-3 mildly indenting the ventral subarachnoid space.     IMPRESSION:     In the interval since the prior exam dated 01/23/2024, there has been  performance of laminectomy procedures from C3 down to C5. There is  hardware posteriorly fusing the cervical spine from C3 down to C6.  Nonspecific fluid signal is seen within the laminectomy defects  measuring up to 4.3 x 1.1 x 2.1 cm in greatest dimensions that is  presumably representative of a postoperative seroma or  pseudomeningocele. A small amount of similar nonspecific fluid is seen  at the level of the supraspinous ligament extending from C4-5 down to  C7-T1. However, the possibility of an infected fluid collection cannot  be entirely excluded at either of the sites. Edema is noted within the  posterior paraspinal soft tissues extending from C3 down to T1 which is  nonspecific and is probably simply representative of postoperative  change. However, the possibility of an infection in this area also  cannot be entirely excluded.     The canal narrowing at the C3-4, C4-5, and C5-6 levels is prominently  improved in the interval since the prior exam. Multilevel foraminal  narrowing remains stable.     There continues to be some fluid signal intensity within the C5-6 disc  space which is presumably degenerative in nature as similar findings  were seen on prior studies dated 01/23/2024 as well as 02/03/2023.    CT ABDOMEN AND PELVIS WITH IV CONTRAST-7/20/24   HISTORY: Sepsis and abdominal pain     TECHNIQUE: Radiation dose reduction techniques were utilized, including  automated exposure control and exposure modulation based on body size.   3 mm images were obtained through the abdomen and pelvis after the  administration of IV contrast.     COMPARISON: Unavailable        FINDINGS: Gallbladder is distended with circumferential wall thickening  and marked pericholecystic inflammatory changes (series 3/image 61).  Suspected layering near isodense stones versus sludge  within the  proximal gallbladder. Inflammatory changes extend to surround the  colonic hepatic flexure and duodenum. No biliary duct dilation. Small  volume pelvic free fluid.        Hepatosplenic calcified granulomas. Remaining solid organs and bowel  including the nondilated appendix are normal. Previously administered  contrast within the urinary collecting systems. No abdominal pelvic  adenopathy. No focal osseous abnormality.        IMPRESSION:  Findings suggesting acute cholecystitis.    Consults:   Consults       Date and Time Order Name Status Description    7/20/2024  1:51 PM Inpatient General Surgery Consult Completed     7/20/2024  8:48 AM Inpatient Neurosurgery Consult Completed     7/20/2024  8:48 AM Inpatient Infectious Diseases Consult Completed     6/29/2024  9:37 AM Inpatient Hospitalist Consult Completed     6/27/2024 12:51 PM Inpatient Urology Consult Completed              Discharge Disposition:  Home or Self Care    Discharge Medications:     Discharge Medications        New Medications        Instructions Start Date   cholestyramine 4 g packet  Commonly known as: QUESTRAN   1 packet, Oral, 2 Times Daily PRN      lactobacillus acidophilus capsule capsule   1 capsule, Oral, Daily   Start Date: July 25, 2024     levoFLOXacin 750 MG tablet  Commonly known as: LEVAQUIN   750 mg, Oral, Every 24 Hours   Start Date: July 25, 2024     metroNIDAZOLE 500 MG tablet  Commonly known as: FLAGYL   500 mg, Oral, Every 8 Hours Scheduled      ondansetron ODT 4 MG disintegrating tablet  Commonly known as: ZOFRAN-ODT   4 mg, Oral, Every 6 Hours PRN             Changes to Medications        Instructions Start Date   doxazosin 4 MG tablet  Commonly known as: CARDURA  What changed: when to take this   TAKE 1 TABLET BY MOUTH EVERY DAY AT NIGHT      methocarbamol 500 MG tablet  Commonly known as: Robaxin  What changed: when to take this   500 mg, Oral, As Needed      Toujeo SoloStar 300 UNIT/ML solution pen-injector  injection  Generic drug: Insulin Glargine (1 Unit Dial)  What changed: See the new instructions.   INJECT 65 UNITS UNDER THE SKIN DAILY             Continue These Medications        Instructions Start Date   acetaminophen 650 MG 8 hr tablet  Commonly known as: TYLENOL   650 mg, Oral, Every 4 Hours PRN      Adult Aspirin Regimen 81 MG EC tablet  Generic drug: aspirin   81 mg, Oral, Daily      ARIPiprazole 5 MG tablet  Commonly known as: ABILIFY   5 mg, Oral, Daily      bisacodyl 5 MG EC tablet  Commonly known as: DULCOLAX   5 mg, Oral, Daily PRN      carvedilol 25 MG tablet  Commonly known as: COREG   25 mg, Oral, 2 Times Daily With Meals      clonazePAM 0.5 MG tablet  Commonly known as: KlonoPIN   0.5 mg, Oral, Daily PRN      clopidogrel 75 MG tablet  Commonly known as: PLAVIX   75 mg, Oral, Daily      cyclobenzaprine 10 MG tablet  Commonly known as: FLEXERIL   10 mg, Oral, 3 Times Daily PRN      dorzolamide-timolol 2-0.5 % ophthalmic solution  Commonly known as: COSOPT   1 drop, Both Eyes, 2 Times Daily      escitalopram 20 MG tablet  Commonly known as: LEXAPRO   20 mg, Oral, Daily      esomeprazole 40 MG capsule  Commonly known as: nexIUM   40 mg, Oral, Every Morning Before Breakfast      Evolocumab solution auto-injector SureClick injection  Commonly known as: REPATHA   140 mg, Subcutaneous, Every 14 Days      gabapentin 300 MG capsule  Commonly known as: NEURONTIN   Take 1 capsule by mouth Every Night.      latanoprost 0.005 % ophthalmic solution  Commonly known as: XALATAN   1 drop, Both Eyes, Nightly      oxyCODONE-acetaminophen 7.5-325 MG per tablet  Commonly known as: PERCOCET   1 tablet, Oral, Every 4 Hours PRN      sennosides-docusate 8.6-50 MG per tablet  Commonly known as: PERICOLACE   2 tablets, Oral, 2 Times Daily PRN      spironolactone 25 MG tablet  Commonly known as: ALDACTONE   25 mg, Oral, Daily               Discharge Diet:   Diet Instructions       Diet: Diabetic Diets, Gastrointestinal Diets;  Consistent Carbohydrate; Regular (IDDSI 7); Thin (IDDSI 0); Fat-Restricted      Discharge Diet:  Diabetic Diets  Gastrointestinal Diets       Diabetic Diet: Consistent Carbohydrate    Texture: Regular (IDDSI 7)    Fluid Consistency: Thin (IDDSI 0)    Gastrointestinal Diet: Fat-Restricted            Activity at Discharge:   Activity Instructions       Activity as Tolerated              Follow-up Appointments:  Future Appointments   Date Time Provider Department Center   8/21/2024  3:30 PM Marshal Miguel MD [facility]   9/9/2024  4:00 PM Levi Patten DO [facility]  [facility]   9/11/2024  2:30 PM Gwyn Patten Sr., MD [facility] EASPT [facility]   9/16/2024  2:30 PM [facility] US 1 BH [facility] US E UCE   12/19/2024 12:00 PM Hattie Foster APRN [facility] CD LCGKR [facility]     Additional Instructions for the Follow-ups that You Need to Schedule       Discharge Follow-up with PCP   As directed       Currently Documented PCP:    Gwyn Patten Sr., MD    PCP Phone Number:    [phone]     Follow Up Details: 1 week                Test Results Pending at Discharge:  Pending Labs       Order Current Status    Blood Culture - Blood, Arm, Right Preliminary result    Blood Culture - Blood, Arm, Right Preliminary result             Matt Proctor MD  07/24/24  16:53 EDT    Time Spent on Discharge Activities: Greater than 30 minutes.

## 2024-07-24 NOTE — OUTREACH NOTE
Prep Survey      Flowsheet Row Responses   [facility] patient discharged from? [facility]   Is LACE score < 7 ? No   Eligibility [facility]   Date of Admission 07/19/24   Date of Discharge 07/24/24   Discharge Disposition Home or Self Care   Discharge diagnosis Sepsis, recent cervical spinal fusion   Does the patient have one of the following disease processes/diagnoses(primary or secondary)? Sepsis   Does the patient have Home health ordered? No   Is there a DME ordered? No   Prep survey completed? Yes            Michelle MONTERROSO - Registered Nurse

## 2024-07-24 NOTE — PROGRESS NOTES
[facility] CERVICAL POSTOP NOTE      CC: generalized weakness, fever      Subjective     Interval History: He is awake and alert.  WBC normal.  Receiving oral Flagyl and Levaquin for colitis/ acute cholecystitis.  Wife reports he is barely eating.      Objective     Vital signs in last 24 hours:  Temp:  [96.6 °F (35.9 °C)-98.4 °F (36.9 °C)] 97.3 °F (36.3 °C)  Heart Rate:  [66-76] 66  Resp:  [16] 16  BP: (150-168)/(87-93) 168/89    Intake/Output this shift:  No intake/output data recorded.      LABS:  Results from last 7 days   Lab Units 07/24/24  0507 07/23/24  0904 07/22/24  0600   WBC 10*3/mm3 8.30 10.16 13.93*   HEMOGLOBIN g/dL 11.5* 11.3* 11.5*   HEMATOCRIT % 36.3* 35.8* 34.6*   PLATELETS 10*3/mm3 211 206 204      Results from last 7 days   Lab Units 07/24/24  0507 07/23/24  0904 07/22/24  0600   SODIUM mmol/L 132* 133* 132*   POTASSIUM mmol/L 3.4* 3.5 3.3*   CHLORIDE mmol/L 99 102 102   CO2 mmol/L 21.9* 22.0 18.1*   BUN mg/dL 6* 7* 7*   CREATININE mg/dL 0.73* 0.74* 0.92   GLUCOSE mg/dL 103* 120* 100*   CALCIUM mg/dL 8.5* 8.6 8.1*        IMAGING STUDIES:  No new imaging    I personally viewed and interpreted the patient's chart.    Meds reviewed/changed: Yes  Aspirin 81 mg p.o. daily  Plavix 75 mg p.o. daily  Gabapentin 300 mg p.o. nightly  Levaquin 750 mg p.o. daily  Flagyl 500 mg p.o. every 8 hours  Robaxin 5 mg p.o. every 8 hours as needed x 1 /24 hours  Percocet 7.5 p.o. every 4 hours as needed x 1 /24 hours    Physical Exam:    General:   Awake, alert, oriented x3.  Wife at bedside  Neck:    Soft collar, posterior cervical incision is well-appearing with skin glue and dissolvable sutures in place.  No redness drainage or swelling  Motor:    Normal muscle strength, bulk and tone in upper and lower extremities.  No fasciculations, rigidity, spasticity, or abnormal movements.  Sensation:   Normal to light touch  Station and Gait:             Per PT note He completed bed mobility and STS with Rema. No  use of AD this date. He increased ambulation distance to 400ft without AD with S/SBA. Pt reports he feels back to mobility level when he was discharged from [facility]. Encouraged pt to complete HEP he was given at [place] from [facility]. PT encouraged pt to continue to ambulate with nsg staff. PT to s/o.   Extremities:   SCD in place    Assessment & Plan     ASSESSMENT:      Sepsis    Coronary artery disease involving native coronary artery of native heart without angina pectoris    Hyperlipidemia LDL goal <70    Type 2 diabetes mellitus with microalbuminuria, with long-term current use of insulin    Essential hypertension    Status post cervical spinal fusion    Periumbilical abdominal pain    69-year-old male s/p C3-6 laminectomy and posterior fusion on 6/25/2024 with Dr. Miguel.    Patient has completed rehab at [facility] and is pleased with his progress.  Shortly after discharge from [facility] he was readmitted to hospital for fever and urinary urgency.  Found to be septic with colitis/cholecystitis.  No plans for cholecystectomy at this time as surgery feels that he has clinically improved and will treat with 10-day course of Levaquin and Flagyl (expected end date 7/30). His posterior cervical incision is well-appearing without any signs of infection. Surgical incision is closed with dissolvable sutures and skin glue.  No need for suture removal.  Okay to begin gently removing skin glue after cleaning with chlorhexidine.  No further need for hard collar, patient continue to wear soft collar for 2 weeks.  Will follow-up with the patient in our office in 1 month with repeat x-rays.  No further recommendations at this time.  Please contact us for any questions or concerns.    PLAN:     -soft collar x 2 weeks  -can gently remove cervical skin glue after cleaning with chg  -f/u in office in 4 weeks with repeat XR  -Medical management per admitting service  -antibiotics per surgery and ID        I discussed the patient's findings  "and my recommendations with patient, family, nursing staff, and Dr. Miguel    During patient visit, I utilized appropriate personal protective equipment including mask and gloves.  Mask used was standard procedure mask. Appropriate PPE was worn during the entire visit.  Hand hygiene was completed before and after.      LOS: 4 days       Libby Haas, APRN  7/24/2024  09:44 EDT    \"Dictated utilizing Dragon dictation\".     "

## 2024-07-24 NOTE — PROGRESS NOTES
Chief Complaint:    Cholecystitis    Subjective:    The patient is feeling well with no abdominal pain.  He was able to eat well today with no nausea or vomiting.    Objective:    Temp:  [96.6 °F (35.9 °C)-98.4 °F (36.9 °C)] 97.5 °F (36.4 °C)  Heart Rate:  [66-78] 78  Resp:  [16] 16  BP: (139-168)/(81-93) 139/81    Physical Exam  Constitutional:       Appearance: He is not ill-appearing or toxic-appearing.   Abdominal:      Palpations: Abdomen is soft.      Tenderness: There is no abdominal tenderness.   Neurological:      Mental Status: He is alert.   Psychiatric:         Behavior: Behavior is cooperative.             Results:    WBC is 8.30.  H/H is 11.5/36.3.  BUN is 16 creatinine is 0.73.    Assessment/Plan:    The patient had evidence of cholecystitis but is completely asymptomatic.  He is tolerating diet.  There are no plans for surgical management.  He is ready for discharge to home from a surgical standpoint.                                 Luis Enrique Gaston Jr., M.D.

## 2024-07-24 NOTE — CONSULTS
Nutrition Services    Patient Name:  Isaias Monaco  YOB: 1955  MRN: [id number]  Admit Date:  7/19/2024    Assessment Date:  07/24/24    Summary: Nurse Admission Screen Consult    Pt is a 69 y.o. male with a history of cervical fusion, CAD, diabetes, GERD, HLD, HTN who presented with generalized weakness. Appetite is poor. Intake at meals 0-25%. Current diet is diabetic; GI full liquid. Plan to slowly advance diet per surgery. Pt noted to be having diarrhea after eating. Diarrhea likely d/t full liquid diet and/or antibiotics. Wt hx reflects 6.8% wt loss x 1 month (significant). Family present at bedside. Agreeable to Boost Plus TID to support po intake and provide additional nutrients.  Labs: Na 132, K 3.4, BUN 6, Cr 0.73     Plan/Recommend:  Addition of Boost Plus TID  Encourage good po intake  Will continue to monitor intake, labs, wt    RD to follow.    CLINICAL NUTRITION ASSESSMENT      Reason for Assessment Nurse Admission Screen     Diagnosis/Problem   Colitis/Acute Cholecystitis with Sepsis   T2DM  CAD  Recent cervical spinal fracture  BPH   Medical/Surgical History Past Medical History:   Diagnosis Date    Anemia     post hemorrhagic    Angioedema 02/21/2016    Secondary to ACE inhibitor    Anxiety     Arthritis     CAD (coronary artery disease)     Colon polyps     Diabetes mellitus, type 2     GERD (gastroesophageal reflux disease)     Glaucoma     Hematoma     history    High cholesterol     History of foreign travel 12/2017; 08/2018    [place], [place], [place], [place], [place] and [place]; [place]    Hyperlipidemia     Hypertension     Hypogonadism in male 09/28/2016    Male erectile disorder     Microalbuminuria     Osteoarthritis     knee    Spinal stenosis of lumbar region without neurogenic claudication 06/02/2021    Tubular adenoma of colon 11/01/2017    Vitamin D deficiency        Past Surgical History:   Procedure Laterality Date    CARDIAC CATHETERIZATION N/A  05/15/2006    Dr. Mini Camarillo    CARDIAC CATHETERIZATION N/A 03/10/2020    Procedure: Left Heart Cath;  Surgeon: Miguel Ángel Karimi MD;  Location: [facility] CATH INVASIVE LOCATION;  Service: Cardiology;  Laterality: N/A;    CARDIAC CATHETERIZATION N/A 03/10/2020    Procedure: Stent DAVONTE coronary;  Surgeon: Miguel Ángel Karimi MD;  Location:  [facility] CATH INVASIVE LOCATION;  Service: Cardiology;  Laterality: N/A;    CARDIAC CATHETERIZATION N/A 03/10/2020    Procedure: Coronary angiography;  Surgeon: Miguel Ángel Karimi MD;  Location:  [facility] CATH INVASIVE LOCATION;  Service: Cardiology;  Laterality: N/A;    CARDIAC CATHETERIZATION N/A 03/10/2020    Procedure: Left ventriculography;  Surgeon: Miguel Ángel Karimi MD;  Location: [facility] CATH INVASIVE LOCATION;  Service: Cardiology;  Laterality: N/A;    CARDIAC CATHETERIZATION N/A 05/20/2022    Procedure: Left Heart Cath;  Surgeon: Mackenzie Morales MD;  Location: [facility] CATH INVASIVE LOCATION;  Service: Cardiovascular;  Laterality: N/A;    CARDIAC CATHETERIZATION N/A 05/20/2022    Procedure: Coronary angiography;  Surgeon: Mackenzie Morales MD;  Location: [facility] CATH INVASIVE LOCATION;  Service: Cardiovascular;  Laterality: N/A;    CARDIAC CATHETERIZATION N/A 05/20/2022    Procedure: Percutaneous Coronary Intervention;  Surgeon: Mackenzie Morales MD;  Location: [facility] CATH INVASIVE LOCATION;  Service: Cardiovascular;  Laterality: N/A;    CARDIAC CATHETERIZATION N/A 05/24/2022    Procedure: Percutaneous Coronary Intervention;  Surgeon: Miguel Ángel Karimi MD;  Location: [facility] CATH INVASIVE LOCATION;  Service: Cardiovascular;  Laterality: N/A;  LAD and Cx    CARDIAC CATHETERIZATION N/A 05/24/2022    Procedure: Stent DAVONTE coronary;  Surgeon: Miguel Ángel Karimi MD;  Location: [facility] CATH INVASIVE LOCATION;  Service: Cardiovascular;  Laterality: N/A;    CARDIAC CATHETERIZATION N/A 05/24/2022    Procedure: Resting Full Cycle Ratio;  Surgeon: Miguel Ángel Karimi  MD DIANN;  Location: [facility] CATH INVASIVE LOCATION;  Service: Cardiovascular;  Laterality: N/A;    CARDIAC CATHETERIZATION N/A 03/19/2024    Procedure: Left Heart Cath;  Surgeon: Miguel Ángel Karimi MD;  Location: [facility] CATH INVASIVE LOCATION;  Service: Cardiovascular;  Laterality: N/A;    CARDIAC CATHETERIZATION N/A 03/19/2024    Procedure: Percutaneous Coronary Intervention;  Surgeon: Miguel Ángel Karimi MD;  Location: [facility] CATH INVASIVE LOCATION;  Service: Cardiovascular;  Laterality: N/A;    CARDIAC CATHETERIZATION N/A 03/19/2024    Procedure: Stent DAVONTE coronary;  Surgeon: Miguel Ángel Karimi MD;  Location: [facility] CATH INVASIVE LOCATION;  Service: Cardiovascular;  Laterality: N/A;    CERVICAL DISCECTOMY POSTERIOR FUSION WITH INSTRUMENTATION Bilateral 6/25/2024    Procedure: Cervical 3 to cervical 6 laminectomies and posterior spinal fusion - Bilateral;  Surgeon: Marshal Miguel MD;  Location: [facility] MAIN OR;  Service: Neurosurgery;  Laterality: Bilateral;    COLONOSCOPY N/A 02/22/2006    Bilobed polyp at 30 cm, hemorrhoids-Dr. Ilya Zhao    COLONOSCOPY N/A 02/28/2014    Normal ileum, one 6 mm polyp in the mid transverse colon-Dr. Ilya Zhao    COLONOSCOPY N/A 11/19/2008    Ela ileum, two 3 to 4 mm polyps, non-bleeding internal hemorrhoids, repeat in 5 years-Dr. Ilya Zhao    COLONOSCOPY N/A 10/31/2017    Procedure: COLONOSCOPY WITH POLYPECTOMY (COLD BIOPSY);  Surgeon: Ilya Zhao MD;  Location: [facility] ENDOSCOPY;  Service:     COLONOSCOPY N/A 05/21/2021    Procedure: Colonoscopy into cecum and terminal ileum with cold biopsy polypectomy;  Surgeon: Ilya Zhao MD;  Location: [facility] ENDOSCOPY;  Service: Gastroenterology;  Laterality: N/A;  Pre op: History of Polyps  Post op: Polyp    CORONARY ANGIOPLASTY WITH STENT PLACEMENT  2007, 2012, 2015    cardiac stents x3 occasions    ENDOSCOPY N/A 10/04/2017    Procedure: ESOPHAGOGASTRODUODENOSCOPY;  Surgeon: Boyd Guidry MD;  Location: [facility]  ENDOSCOPY;  Service:     ENDOSCOPY N/A 05/21/2021    Procedure: ESOPHAGOGASTRODUODENOSCOPY with biopsies;  Surgeon: Ilya Zhao MD;  Location: [facility] ENDOSCOPY;  Service: Gastroenterology;  Laterality: N/A;  Pre op: GERD  Post op: Irregular  Z-Line, Hiatal Hernia, Gastritis    ENDOSCOPY N/A 08/25/2023    Procedure: ESOPHAGOGASTRODUODENOSCOPY with biopsy;  Surgeon: Ilya Zhao MD;  [facility];  Service: Gastroenterology;  Laterality: N/A;  errosive gastritis    EPIDURAL BLOCK      INTERVENTIONAL RADIOLOGY PROCEDURE N/A 05/20/2022    Procedure: Intravascular Ultrasound;  Surgeon: Mackenzie Morales MD;  Location: [facility] CATH INVASIVE LOCATION;  Service: Cardiovascular;  Laterality: N/A;    KNEE INCISION AND DRAINAGE Right 06/20/2017    Procedure: RT. KNEE WASHOUT ;  Surgeon: Boyd Coyne MD;  Location: [facility] MAIN OR;  Service:     MEDIAL BRANCH BLOCK Bilateral 12/17/2021    Procedure: LUMBAR MEDIAL BRANCH BLOCK bilateral ~L4-S1 x2 (~2 weeks apart);  Surgeon: Christina Villaseñor MD;  Location: [facility] MAIN OR;  Service: Pain Management;  Laterality: Bilateral;    MEDIAL BRANCH BLOCK Bilateral 01/03/2022    Procedure: LUMBAR MEDIAL BRANCH BLOCK bilateral ~L4-S1 x2 (~2 weeks apart);  Surgeon: Christina Villaseñor MD;  Location: [facility] MAIN OR;  Service: Pain Management;  Laterality: Bilateral;    NY ARTHRP KNE CONDYLE&PLATU MEDIAL&LAT COMPARTMENTS Right 06/15/2017    Procedure: RT TOTAL KNEE ARTHROPLASTY;  Surgeon: Boyd Coyne MD;  Location: [facility] MAIN OR;  Service: Orthopedics    RADIOFREQUENCY ABLATION Bilateral 01/10/2022    Procedure: RADIOFREQUENCY ABLATION NERVES Bilateral L4-S1;  Surgeon: Christina Villaseñor MD;  Location: [facility] MAIN OR;  Service: Pain Management;  Laterality: Bilateral;    SHOULDER SURGERY Right 2016    rotator cuff repair    UPPER GASTROINTESTINAL ENDOSCOPY N/A 10/13/2015    Z-line irregular, normal stomach, normal duodenum-Dr. Ilya Zhao    UPPER GASTROINTESTINAL ENDOSCOPY  "N/A 02/28/2014    Z-line irregular, normal stomach, normal duodenum-Dr. Ilya Zhao    UPPER GASTROINTESTINAL ENDOSCOPY N/A 11/19/2008    Z-line irregular, chronic gastritis withotu hemorrhage, normal duodenum-Dr. Ilya Zhao    UPPER GASTROINTESTINAL ENDOSCOPY N/A 06/22/2006    LA Grade A reflux esophagitis, non-bleeding erythematous gastropathy, normal duodenum-Dr. Ilya Zhao        Anthropometrics        Current Height  Current Weight  BMI kg/m2 Height: 195.6 cm (77\")  Weight: 111 kg (245 lb 2.4 oz) (07/20/24 0555)  Body mass index is 29.07 kg/m².   Adjusted BMI (if applicable)    BMI Category Overweight (25 - 29.9)   Ideal Body Weight (IBW) 196 lb   Usual Body Weight (UBW) 260s   Weight Trend Loss, Amount/Timeframe: 6.8% x 1 month   Weight History Wt Readings from Last 30 Encounters:   07/20/24 0555 111 kg (245 lb 2.4 oz)   07/19/24 2156 113 kg (250 lb)   07/01/24 0527 120 kg (264 lb 1.8 oz)   06/25/24 2300 121 kg (265 lb 10.5 oz)   06/17/24 1048 119 kg (263 lb)   06/13/24 1454 118 kg (260 lb)   05/31/24 1000 121 kg (267 lb)   04/19/24 1045 118 kg (260 lb 3.2 oz)   04/01/24 1315 118 kg (260 lb)   04/01/24 1408 117 kg (258 lb)   03/22/24 1301 118 kg (260 lb)   03/19/24 0726 118 kg (260 lb)   03/12/24 1214 118 kg (260 lb 12.8 oz)   02/01/24 1118 121 kg (267 lb 3.2 oz)   01/01/24 1230 118 kg (260 lb)   10/23/23 1009 118 kg (260 lb)   09/20/23 1433 117 kg (258 lb 6.4 oz)   09/19/23 1343 113 kg (250 lb)   08/25/23 0955 114 kg (251 lb)   08/11/23 1440 119 kg (262 lb 4.8 oz)   06/15/23 1257 117 kg (258 lb 9.6 oz)   06/05/23 1306 119 kg (262 lb 12.8 oz)   04/24/23 0938 121 kg (266 lb 9.6 oz)   03/29/23 1421 115 kg (254 lb)   03/20/23 1535 114 kg (251 lb)   02/21/23 1029 115 kg (252 lb 12.8 oz)   11/10/22 1424 115 kg (253 lb)   10/27/22 1302 115 kg (253 lb)   08/31/22 0753 114 kg (250 lb 6.4 oz)   08/22/22 1541 114 kg (252 lb)   08/09/22 1251 113 kg (249 lb 9.6 oz)   07/25/22 1233 110 kg (241 lb 12.8 oz)      --  Labs "       Pertinent Labs    Results from last 7 days   Lab Units 07/24/24  0507 07/23/24  0904 07/22/24  0600 07/21/24  0819   SODIUM mmol/L 132* 133* 132* 134*   POTASSIUM mmol/L 3.4* 3.5 3.3* 3.7   CHLORIDE mmol/L 99 102 102 103   CO2 mmol/L 21.9* 22.0 18.1* 19.8*   BUN mg/dL 6* 7* 7* 7*   CREATININE mg/dL 0.73* 0.74* 0.92 1.10   CALCIUM mg/dL 8.5* 8.6 8.1* 8.2*   BILIRUBIN mg/dL  --  0.4 0.7 0.8   ALK PHOS U/L  --  83 84 80   ALT (SGPT) U/L  --  18 26 34   AST (SGOT) U/L  --  21 22 27   GLUCOSE mg/dL 103* 120* 100* 113*     Results from last 7 days   Lab Units 07/24/24  0507 07/23/24  0904 07/22/24  0600 07/21/24  0819   MAGNESIUM mg/dL  --  1.7 1.9 1.5*   PHOSPHORUS mg/dL  --  2.3* 2.0* 2.3*   HEMOGLOBIN g/dL 11.5* 11.3* 11.5* 11.3*   HEMATOCRIT % 36.3* 35.8* 34.6* 34.6*   WBC 10*3/mm3 8.30 10.16 13.93* 17.68*   ALBUMIN g/dL  --  2.9* 2.9* 2.9*     Results from last 7 days   Lab Units 07/24/24  0507 07/23/24  0904 07/22/24  0600 07/21/24  0819 07/19/24  2304   PLATELETS 10*3/mm3 211 206 204 202 280     COVID19   Date Value Ref Range Status   07/20/2024 Not Detected Not Detected - Ref. Range Final     Lab Results   Component Value Date    HGBA1C 6.20 (H) 07/21/2024          Medications           Scheduled Medications ARIPiprazole, 5 mg, Oral, Daily  aspirin, 81 mg, Oral, Daily  carvedilol, 25 mg, Oral, BID With Meals  clopidogrel, 75 mg, Oral, Daily  dorzolamide (TRUSOPT) 2 % 1 drop, timolol (TIMOPTIC) 0.5 % 1 drop for Cosopt 22.3-6.8 mg/mL, , Both Eyes, BID  escitalopram, 20 mg, Oral, Daily  gabapentin, 300 mg, Oral, Nightly  insulin lispro, 2-9 Units, Subcutaneous, 4x Daily AC & at Bedtime  lactobacillus acidophilus, 1 capsule, Oral, Daily  latanoprost, 1 drop, Both Eyes, Nightly  levoFLOXacin, 750 mg, Oral, Q24H  metroNIDAZOLE, 500 mg, Oral, Q8H  pantoprazole, 40 mg, Oral, Q AM  sodium chloride, 10 mL, Intravenous, Q12H  terazosin, 5 mg, Oral, Nightly       Infusions sodium chloride, 100 mL/hr, Last Rate: 100  mL/hr (07/23/24 1016)       PRN Medications   acetaminophen **OR** acetaminophen **OR** acetaminophen    senna-docusate sodium **AND** polyethylene glycol **AND** bisacodyl **AND** bisacodyl    Calcium Replacement - Follow Nurse / BPA Driven Protocol    cholestyramine    clonazePAM    dextrose    dextrose    glucagon (human recombinant)    Magnesium Standard Dose Replacement - Follow Nurse / BPA Driven Protocol    methocarbamol    ondansetron ODT **OR** ondansetron    oxyCODONE-acetaminophen    Phosphorus Replacement - Follow Nurse / BPA Driven Protocol    Potassium Replacement - Follow Nurse / BPA Driven Protocol    sodium chloride    sodium chloride     Physical Findings          General Findings alert, oriented, room air   Oral/Mouth Cavity WDL   Edema  no edema   Gastrointestinal diarrhea, non-distended , last bowel movement: 7/23   Skin  skin intact   Tubes/Drains/Lines none   NFPE Not indicated at this time   --  Current Nutrition Orders & Evaluation of Intake       Oral Nutrition     Food Allergies NKFA   Current PO Diet Diet: Liquid, Diabetic, Gastrointestinal; Full Liquid; Consistent Carbohydrate; Fat-Restricted; Fluid Consistency: Thin (IDDSI 0)   Supplement n/a   PO Evaluation     % PO Intake 0-25%    Factors Affecting Intake: decreased appetite     Estimated Requirements         Weight used  111 kg    Calories  2200 kcals (20 kcal/kg)    Protein  89 - 111 (0.8 gm/kg, 1.0 gm/kg)    Fluid   (1 mL/kcal)     PES STATEMENT / NUTRITION DIAGNOSIS      Nutrition Dx Problem  Problem: Inadequate Oral Intake  Etiology: Medical Diagnosis - Colitis/Acute Cholecystitis with Sepsis, s/p cervical spinal fusion and Factors Affecting Nutrition - decreased appetite    Signs/Symptoms: Report of Minimal PO Intake, Unintended Weight Change, and Report/Observation     NUTRITION INTERVENTION / PLAN OF CARE      Intervention Goal(s) Disease management/therapy, Tolerate PO , Increase intake, Advance diet, No significant weight  loss, Appropriate weight loss, and PO intake goal %: 75%         RD Intervention/Action Interview for preferences, Menu provided, Supplement provided, Supplement offered/declined, Encourage intake, and Continue to monitor   --      Prescription/Orders:       PO Diet       Supplements Boost Plus TID      Enteral Nutrition       Parenteral Nutrition    New Prescription Ordered? Yes   --      Monitor/Evaluation Per protocol   Discharge Plan/Needs Pending clinical course   --    RD to follow per protocol.      Electronically signed by:  Tiff Ramirez  07/24/24 08:22 EDT

## 2024-07-25 ENCOUNTER — TRANSITIONAL CARE MANAGEMENT TELEPHONE ENCOUNTER (OUTPATIENT)
Dept: CALL CENTER | Facility: HOSPITAL | Age: 69
End: 2024-07-25
Payer: MEDICARE

## 2024-07-25 LAB
BACTERIA SPEC AEROBE CULT: NORMAL
BACTERIA SPEC AEROBE CULT: NORMAL

## 2024-07-25 NOTE — OUTREACH NOTE
Call Center TCM Note      Flowsheet Row Responses   [facility] [place], operated by [facility] patient discharged from? [place]   Does the patient have one of the following disease processes/diagnoses(primary or secondary)? Sepsis   TCM attempt successful? Yes   Call start time 1313   Call end time 1317   Discharge diagnosis Sepsis, recent cervical spinal fusion   Person spoke with today (if not patient) and relationship Patient and spouse/Micaela   Meds reviewed with patient/caregiver? Yes   Is the patient having any side effects they believe may be caused by any medication additions or changes? No   Does the patient have all medications related to this admission filled (includes all antibiotics, inhalers, nebulizers,steroids,etc.) Yes   Is the patient taking all medications as directed (includes completed medication regime)? Yes   Comments Post Op appt. 8/21/24 @ 3:30pm.   Does the patient have an appointment with their PCP within 7-14 days of discharge? No   Nursing Interventions Patient declined scheduling/rescheduling appointment at this time   Has home health visited the patient within 72 hours of discharge? N/A   Psychosocial issues? No   Did the patient receive a copy of their discharge instructions? Yes   Nursing interventions Reviewed instructions with patient   What is the patient's perception of their health status since discharge? Improving   Nursing interventions Nurse provided patient education   Is the patient/caregiver able to teach back TIME? E xtremely Ill - severe pain, discomfort, shortness of breath, M ental Decline - confused, sleepy, difficult to arouse   Nursing interventions Nurse provided patient education   Is the patient/caregiver able to teach back signs and symptoms of worsening condition: Fever, Shortness of breath/rapid respiratory rate, Altered mental status(confusion/coma), Rapid heart rate (>90)   If the patient is a current smoker, are they able to teach back resources for cessation? Not a smoker   Is the  patient/caregiver able to teach back the hierarchy of who to call/visit for symptoms/problems? PCP, Specialist, Home health nurse, Urgent Care, ED, 911 Yes   TCM call completed? Yes   Call end time 1317   Would this patient benefit from a Referral to [facility] Social Work? No   Is the patient interested in additional calls from an ambulatory ? No            Isatu Gunn RN    7/25/2024, 13:19 EDT

## 2024-08-08 ENCOUNTER — OFFICE VISIT (OUTPATIENT)
Dept: FAMILY MEDICINE CLINIC | Facility: CLINIC | Age: 69
End: 2024-08-08
Payer: MEDICARE

## 2024-08-08 VITALS
BODY MASS INDEX: 29.16 KG/M2 | HEART RATE: 81 BPM | RESPIRATION RATE: 20 BRPM | HEIGHT: 77 IN | DIASTOLIC BLOOD PRESSURE: 72 MMHG | SYSTOLIC BLOOD PRESSURE: 110 MMHG | TEMPERATURE: 98 F | WEIGHT: 247 LBS | OXYGEN SATURATION: 98 %

## 2024-08-08 DIAGNOSIS — R10.33 PERIUMBILICAL ABDOMINAL PAIN: ICD-10-CM

## 2024-08-08 DIAGNOSIS — E11.9 CONTROLLED TYPE 2 DIABETES MELLITUS WITHOUT COMPLICATION, WITH LONG-TERM CURRENT USE OF INSULIN: ICD-10-CM

## 2024-08-08 DIAGNOSIS — M48.02 STENOSIS OF CERVICAL SPINE WITH MYELOPATHY: Primary | ICD-10-CM

## 2024-08-08 DIAGNOSIS — A41.9 SEPSIS, DUE TO UNSPECIFIED ORGANISM, UNSPECIFIED WHETHER ACUTE ORGAN DYSFUNCTION PRESENT: ICD-10-CM

## 2024-08-08 DIAGNOSIS — Z79.4 CONTROLLED TYPE 2 DIABETES MELLITUS WITHOUT COMPLICATION, WITH LONG-TERM CURRENT USE OF INSULIN: ICD-10-CM

## 2024-08-08 DIAGNOSIS — G99.2 STENOSIS OF CERVICAL SPINE WITH MYELOPATHY: Primary | ICD-10-CM

## 2024-08-08 PROCEDURE — 99495 TRANSJ CARE MGMT MOD F2F 14D: CPT | Performed by: FAMILY MEDICINE

## 2024-08-08 PROCEDURE — 3074F SYST BP LT 130 MM HG: CPT | Performed by: FAMILY MEDICINE

## 2024-08-08 PROCEDURE — 3078F DIAST BP <80 MM HG: CPT | Performed by: FAMILY MEDICINE

## 2024-08-08 PROCEDURE — 1126F AMNT PAIN NOTED NONE PRSNT: CPT | Performed by: FAMILY MEDICINE

## 2024-08-08 PROCEDURE — 1111F DSCHRG MED/CURRENT MED MERGE: CPT | Performed by: FAMILY MEDICINE

## 2024-08-08 PROCEDURE — 3044F HG A1C LEVEL LT 7.0%: CPT | Performed by: FAMILY MEDICINE

## 2024-08-08 RX ORDER — VALBENAZINE 60 MG/1
1 CAPSULE ORAL DAILY
COMMUNITY
Start: 2024-07-31

## 2024-08-08 NOTE — ASSESSMENT & PLAN NOTE
Hospital records reviewed  The symptoms have improved since hospitalization.  No further treatment needed at this time.

## 2024-08-08 NOTE — PROGRESS NOTES
Chief Complaint  Chief Complaint   Patient presents with    Hospital Follow Up Visit     Pt here for f/u of cervical infusion        Transitional Care Progress Note        Subjective    History of Present Illness        Isaias Monaco is a 69 y.o. male who presents for a transitional care management visit.    Within 48 business hours after discharge our office contacted him via telephone to coordinate his care and needs.      I reviewed and discussed the details of that call along with the discharge summary, hospital problems, inpatient lab results, inpatient diagnostic studies, and consultation reports with Isaias.     Current outpatient and discharge medications have been reconciled for the patient.  Reviewed by: Gwyn Patten Sr., MD          7/24/2024     6:52 PM   Date of TCM Phone Call   [facility]   Date of Admission 7/19/2024   Date of Discharge 7/24/2024   Discharge Disposition Home or Self Care     Risk for Readmission (LACE) Score: 11 (7/24/2024  6:00 AM)      Isaias Monaco presents to [facility] for   History of Present Illness  The patient is a 68-year-old male who presents for hospital follow-up. He is accompanied by an adult female.    On 06/25/2023, he underwent a posterior cervical fusion at [facility]. Post-procedure, he woke up slowly and was transferred to the ICU. He spent a week in the bathroom and then transferred to [facility] due to significant spinal cord compression. After two weeks, his condition improved. However, upon returning home, he developed a fever of 101.7. After 24 hours, he sought care at an urgent care center and was admitted to [facility] where he was diagnosed with colitis and acute gallbladder issues. He was hospitalized for 5 to 6 days and spent a month in rehab. He continues to experience weakness, occasional falls, and difficulty walking. He is currently undergoing physical therapy, albeit  "limited. His blood cultures were negative, but his diarrhea began the day after his hospital stay. His CT scans and ultrasounds revealed abnormalities in his gallbladder and colitis. Dr. Luis Enrique Gaston, a general surgeon, did not believe his diarrhea was related to his gallbladder. His lower abdominal pain was more pronounced, and he was diagnosed with colitis near his gallbladder. However, his gallbladder was not removed at that time. His appetite has returned to normal since his hospital discharge.    His blood pressure has been low, but during his stay at [facility], it was slightly elevated, leading to an increase in his medication. However, now that he is home, his blood pressure has been decreasing. His nephrologist, Dr. Homar Brenner, would like his blood pressure to be between 120 and 140. He experiences lightheadedness, a condition he has experienced in the past. This morning, he experienced lightheadedness while bending over to move a scale, but did not actually fall. He also fell into the tub while sitting on the toilet. His blood pressure has been fluctuating between 108/80 and 104 systolic. His cardiologist plans to reduce his Coreg dosage by half for a week.    He stopped taking Ozempic for 6 weeks but has since resumed it. He is currently taking 2 mg and Toujeo insulin, both doses were reduced from 60 units to 15 units. His metformin and Actos were discontinued. His blood sugar levels were good during his stay at [facility], but he was not eating much, resulting in a weight loss of 30 pounds. His endocrinologist is Dr. Patten. He sent a message to Dr. Brenner yesterday when his blood sugars were slightly elevated, especially after breakfast, who advised him to resume metformin.     History of Present Illness      Objective   Vital Signs:   Visit Vitals  /72   Pulse 81   Temp 98 °F (36.7 °C)   Resp 20   Ht 195.6 cm (77\")   Wt 112 kg (247 lb)   SpO2 98%   BMI 29.29 kg/m²             Physical Exam  Vitals " reviewed.   Constitutional:       Appearance: He is well-developed.   HENT:      Head: Normocephalic.      Right Ear: External ear normal.      Left Ear: External ear normal.      Nose: Nose normal.   Eyes:      Conjunctiva/sclera: Conjunctivae normal.   Cardiovascular:      Rate and Rhythm: Normal rate and regular rhythm.   Pulmonary:      Effort: Pulmonary effort is normal.      Breath sounds: Normal breath sounds.   Musculoskeletal:         General: Normal range of motion.      Cervical back: Normal range of motion and neck supple.   Skin:     General: Skin is warm and dry.      Capillary Refill: Capillary refill takes less than 2 seconds.   Neurological:      Mental Status: He is alert and oriented to person, place, and time.        Physical Exam          Result Review :           Results               Assessment and Plan      Diagnoses and all orders for this visit:    1. 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  He will continue to be managed by neurosurgery.      2. Sepsis, due to unspecified organism, unspecified whether acute organ dysfunction present  Assessment & Plan:  Hospital records reviewed  The symptoms have improved since hospitalization.  No further treatment needed at this time.      3. Periumbilical abdominal pain  Assessment & Plan:  Hospital records reviewed.  This is either due to gallbladder or colitis.  His symptoms have improved since hospitalization.         Assessment & Plan          Follow Up   No follow-ups on file.  Patient was given instructions and counseling regarding his condition or for health maintenance advice. Please see specific information pulled into the AVS if appropriate.

## 2024-08-13 ENCOUNTER — HOSPITAL ENCOUNTER (OUTPATIENT)
Dept: GENERAL RADIOLOGY | Facility: HOSPITAL | Age: 69
Discharge: HOME | End: 2024-08-13
Payer: MEDICARE

## 2024-08-13 ENCOUNTER — TELEPHONE (OUTPATIENT)
Dept: NEUROSURGERY | Facility: CLINIC | Age: 69
End: 2024-08-13
Payer: MEDICARE

## 2024-08-13 DIAGNOSIS — M48.02 STENOSIS OF CERVICAL SPINE WITH MYELOPATHY: ICD-10-CM

## 2024-08-13 DIAGNOSIS — G99.2 STENOSIS OF CERVICAL SPINE WITH MYELOPATHY: ICD-10-CM

## 2024-08-13 PROCEDURE — 72040 X-RAY EXAM NECK SPINE 2-3 VW: CPT

## 2024-08-13 RX ORDER — PIOGLITAZONEHYDROCHLORIDE 15 MG/1
15 TABLET ORAL DAILY
Qty: 90 TABLET | Refills: 2 | OUTPATIENT
Start: 2024-08-13

## 2024-08-13 NOTE — TELEPHONE ENCOUNTER
There is an existing order in from [facility] for XR Spine Cervical 2 or 3 View   Will that not work?

## 2024-08-13 NOTE — TELEPHONE ENCOUNTER
Patient needs cervical xrays to be done prior to next week's f/up with Dr. Miguel however I cannot see an order for them. Can someone enter order for cervical xray? They want to get them done today. Thank you!

## 2024-08-19 RX ORDER — CHOLESTYRAMINE 4 G/9G
1 POWDER, FOR SUSPENSION ORAL 2 TIMES DAILY PRN
Qty: 60 EACH | Refills: 0 | Status: SHIPPED | OUTPATIENT
Start: 2024-08-19

## 2024-08-19 RX ORDER — SACCHAROMYCES BOULARDII 250 MG
250 CAPSULE ORAL DAILY
Qty: 30 CAPSULE | Refills: 0 | Status: SHIPPED | OUTPATIENT
Start: 2024-08-19

## 2024-08-19 NOTE — TELEPHONE ENCOUNTER
Caller: [facility] [id number] [place], [place] - [address]  AT [facility] [phone] [place] [phone] FX    Relationship: Pharmacy    Best call back number:[phone]    Requested Prescriptions:   Requested Prescriptions     Pending Prescriptions Disp Refills    saccharomyces boulardii (Florastor) 250 MG capsule 30 capsule 0     Sig: Take 1 capsule by mouth Daily.    cholestyramine (QUESTRAN) 4 g packet 60 each 0     Sig: Mix 1 packet in 4-6 ounces of liquid and take by mouth 2 (Two) Times a Day As Needed (Diarrhea).    metFORMIN (GLUCOPHAGE) 500 MG tablet       Sig: Take 1 tablet by mouth 2 (Two) Times a Day With Meals.        Pharmacy where request should be sent: [facility] [id number] [place], [place] - [address] [facility] AT [facility] [phone] [place] [phone]      Last office visit with prescribing clinician: 8/8/2024   Last telemedicine visit with prescribing clinician: Visit date not found   Next office visit with prescribing clinician: 9/11/2024     Dannielle Shearer Rep   08/19/24 14:30 EDT

## 2024-08-20 NOTE — PROGRESS NOTES
Subjective   History of Present Illness: Isaias Monaco is a 69 y.o. male is here today for follow-up. He is s/p C3-6 laminectomy and posterior fusion done on 6/25/2024.  He appears to be recovering very well from his procedure.  His incision appears to be well-healed.  He reports improvement in his strength and sensation in his hands.  Improvement dexterity in his hands.  He is able to stand and walk without the need for assistance.  He is no longer wearing his rigid cervical collar    History of Present Illness    The following portions of the patient's history were reviewed and updated as appropriate: allergies, current medications, past family history, past medical history, past social history, past surgical history, and problem list.    Past Medical History:   Diagnosis Date    Anemia     post hemorrhagic    Angioedema 02/21/2016    Secondary to ACE inhibitor    Anxiety     Arthritis     CAD (coronary artery disease)     Colon polyps     Diabetes mellitus, type 2     GERD (gastroesophageal reflux disease)     Glaucoma     Hematoma     history    High cholesterol     History of foreign travel 12/2017; 08/2018    [place], [place], [place], [place], [place] and [place]; [place]    Hyperlipidemia     Hypertension     Hypogonadism in male 09/28/2016    Male erectile disorder     Microalbuminuria     Osteoarthritis     knee    Spinal stenosis of lumbar region without neurogenic claudication 06/02/2021    Tubular adenoma of colon 11/01/2017    Vitamin D deficiency         Past Surgical History:   Procedure Laterality Date    CARDIAC CATHETERIZATION N/A 05/15/2006    Dr. Mini Camarillo    CARDIAC CATHETERIZATION N/A 03/10/2020    Procedure: Left Heart Cath;  Surgeon: Miguel Ángel Karimi MD;  Location: [facility] INVASIVE LOCATION;  Service: Cardiology;  Laterality: N/A;    CARDIAC CATHETERIZATION N/A 03/10/2020    Procedure: Stent DAVONTE coronary;  Surgeon: Miguel Ángel Karimi MD;  Location: [facility]  CATH INVASIVE LOCATION;  Service: Cardiology;  Laterality: N/A;    CARDIAC CATHETERIZATION N/A 03/10/2020    Procedure: Coronary angiography;  Surgeon: Miguel Ángel Karimi MD;  Location: [facility] CATH INVASIVE LOCATION;  Service: Cardiology;  Laterality: N/A;    CARDIAC CATHETERIZATION N/A 03/10/2020    Procedure: Left ventriculography;  Surgeon: Miguel Ángel Karimi MD;  Location: [facility] CATH INVASIVE LOCATION;  Service: Cardiology;  Laterality: N/A;    CARDIAC CATHETERIZATION N/A 05/20/2022    Procedure: Left Heart Cath;  Surgeon: Mackenzie Morales MD;  Location: [facility] CATH INVASIVE LOCATION;  Service: Cardiovascular;  Laterality: N/A;    CARDIAC CATHETERIZATION N/A 05/20/2022    Procedure: Coronary angiography;  Surgeon: Mackenzie Morales MD;  Location: [facility] CATH INVASIVE LOCATION;  Service: Cardiovascular;  Laterality: N/A;    CARDIAC CATHETERIZATION N/A 05/20/2022    Procedure: Percutaneous Coronary Intervention;  Surgeon: Mackenzie Morales MD;  Location: [facility] CATH INVASIVE LOCATION;  Service: Cardiovascular;  Laterality: N/A;    CARDIAC CATHETERIZATION N/A 05/24/2022    Procedure: Percutaneous Coronary Intervention;  Surgeon: Miguel Ángel Karimi MD;  Location: [facility] CATH INVASIVE LOCATION;  Service: Cardiovascular;  Laterality: N/A;  LAD and Cx    CARDIAC CATHETERIZATION N/A 05/24/2022    Procedure: Stent DAVONTE coronary;  Surgeon: Miguel Ángel Karimi MD;  Location: [facility] CATH INVASIVE LOCATION;  Service: Cardiovascular;  Laterality: N/A;    CARDIAC CATHETERIZATION N/A 05/24/2022    Procedure: Resting Full Cycle Ratio;  Surgeon: Miguel Ángel Karimi MD;  Location: [facility] CATH INVASIVE LOCATION;  Service: Cardiovascular;  Laterality: N/A;    CARDIAC CATHETERIZATION N/A 03/19/2024    Procedure: Left Heart Cath;  Surgeon: Miguel Ángel Karimi MD;  Location: [facility] CATH INVASIVE LOCATION;  Service: Cardiovascular;  Laterality: N/A;    CARDIAC CATHETERIZATION N/A 03/19/2024    Procedure: Percutaneous  Coronary Intervention;  Surgeon: Miguel Ángel Karimi MD;  Location:  [facility] CATH INVASIVE LOCATION;  Service: Cardiovascular;  Laterality: N/A;    CARDIAC CATHETERIZATION N/A 03/19/2024    Procedure: Stent DAVONTE coronary;  Surgeon: Miguel Ángel Karimi MD;  [facility];  Service: Cardiovascular;  Laterality: N/A;    CERVICAL DISCECTOMY POSTERIOR FUSION WITH INSTRUMENTATION Bilateral 6/25/2024    Procedure: Cervical 3 to cervical 6 laminectomies and posterior spinal fusion - Bilateral;  Surgeon: Marshal Miguel MD;  Location: [facility] MAIN OR;  Service: Neurosurgery;  Laterality: Bilateral;    COLONOSCOPY N/A 02/22/2006    Bilobed polyp at 30 cm, hemorrhoids-Dr. Ilya Zhao    COLONOSCOPY N/A 02/28/2014    Normal ileum, one 6 mm polyp in the mid transverse colon-Dr. Ilya Zhao    COLONOSCOPY N/A 11/19/2008    Ela ileum, two 3 to 4 mm polyps, non-bleeding internal hemorrhoids, repeat in 5 years-Dr. Ilya Zhao    COLONOSCOPY N/A 10/31/2017    Procedure: COLONOSCOPY WITH POLYPECTOMY (COLD BIOPSY);  Surgeon: Ilya Zhao MD;  Location: [facility] ENDOSCOPY;  Service:     COLONOSCOPY N/A 05/21/2021    Procedure: Colonoscopy into cecum and terminal ileum with cold biopsy polypectomy;  Surgeon: Ilya Zhao MD;  Location: [facility] ENDOSCOPY;  Service: Gastroenterology;  Laterality: N/A;  Pre op: History of Polyps  Post op: Polyp    CORONARY ANGIOPLASTY WITH STENT PLACEMENT  2007, 2012, 2015    cardiac stents x3 occasions    ENDOSCOPY N/A 10/04/2017    Procedure: ESOPHAGOGASTRODUODENOSCOPY;  Surgeon: Boyd Guidry MD;  Location: [facility] ENDOSCOPY;  Service:     ENDOSCOPY N/A 05/21/2021    Procedure: ESOPHAGOGASTRODUODENOSCOPY with biopsies;  Surgeon: Ilya Zhao MD;  Location: [facility] ENDOSCOPY;  Service: Gastroenterology;  Laterality: N/A;  Pre op: GERD  Post op: Irregular  Z-Line, Hiatal Hernia, Gastritis    ENDOSCOPY N/A 08/25/2023    Procedure: ESOPHAGOGASTRODUODENOSCOPY with  biopsy;  Surgeon: Ilya Zhao MD;  Location: [facility] ENDOSCOPY;  Service: Gastroenterology;  Laterality: N/A;  errosive gastritis    EPIDURAL BLOCK      INTERVENTIONAL RADIOLOGY PROCEDURE N/A 05/20/2022    Procedure: Intravascular Ultrasound;  Surgeon: Mackenzie Morales MD;  [facility];  Service: Cardiovascular;  Laterality: N/A;    KNEE INCISION AND DRAINAGE Right 06/20/2017    Procedure: RT. KNEE WASHOUT ;  Surgeon: Boyd Coyne MD;  Location:  [facility] MAIN OR;  Service:     MEDIAL BRANCH BLOCK Bilateral 12/17/2021    Procedure: LUMBAR MEDIAL BRANCH BLOCK bilateral ~L4-S1 x2 (~2 weeks apart);  Surgeon: Christina Villaseñor MD;  Location: [facility];  Service: Pain Management;  Laterality: Bilateral;    MEDIAL BRANCH BLOCK Bilateral 01/03/2022    Procedure: LUMBAR MEDIAL BRANCH BLOCK bilateral ~L4-S1 x2 (~2 weeks apart);  Surgeon: Christina Villaseñor MD;  Location: [facility];  Service: Pain Management;  Laterality: Bilateral;    WV ARTHRP KNE CONDYLE&PLATU MEDIAL&LAT COMPARTMENTS Right 06/15/2017    Procedure: RT TOTAL KNEE ARTHROPLASTY;  Surgeon: Boyd Coyne MD;  [facility];  Service: Orthopedics    RADIOFREQUENCY ABLATION Bilateral 01/10/2022    Procedure: RADIOFREQUENCY ABLATION NERVES Bilateral L4-S1;  Surgeon: Christina Villaseñor MD;  Location: [facility];  Service: Pain Management;  Laterality: Bilateral;    SHOULDER SURGERY Right 2016    rotator cuff repair    UPPER GASTROINTESTINAL ENDOSCOPY N/A 10/13/2015    Z-line irregular, normal stomach, normal duodenum-Dr. Ilya Zhao    UPPER GASTROINTESTINAL ENDOSCOPY N/A 02/28/2014    Z-line irregular, normal stomach, normal duodenum-Dr. Ilya Zhao    UPPER GASTROINTESTINAL ENDOSCOPY N/A 11/19/2008    Z-line irregular, chronic gastritis withotu hemorrhage, normal duodenum-Dr. Ilya Zhao    UPPER GASTROINTESTINAL ENDOSCOPY N/A 06/22/2006    LA Grade A reflux esophagitis, non-bleeding erythematous gastropathy,  normal duodenum-Dr. Ilya Zhao          Current Outpatient Medications:     acetaminophen (TYLENOL) 650 MG 8 hr tablet, Take 1 tablet by mouth Every 4 (Four) Hours As Needed for Mild Pain., Disp: , Rfl:     ARIPiprazole (ABILIFY) 5 MG tablet, Take 1 tablet by mouth Daily., Disp: , Rfl:     aspirin 81 MG EC tablet, Take 1 tablet by mouth Daily., Disp: 30 tablet, Rfl: 6    bisacodyl (DULCOLAX) 5 MG EC tablet, Take 1 tablet by mouth Daily As Needed for Constipation (Use if polyethylene glycol is ineffective)., Disp: , Rfl:     carvedilol (COREG) 25 MG tablet, Take 1 tablet by mouth 2 (Two) Times a Day With Meals. 12.5, Disp: , Rfl:     cholestyramine (QUESTRAN) 4 g packet, Mix 1 packet in 4-6 ounces of liquid and take by mouth 2 (Two) Times a Day As Needed (Diarrhea)., Disp: 60 each, Rfl: 0    clonazePAM (KlonoPIN) 0.5 MG tablet, Take 1 tablet by mouth Daily As Needed for Anxiety., Disp: 30 tablet, Rfl: 2    clopidogrel (PLAVIX) 75 MG tablet, TAKE 1 TABLET BY MOUTH EVERY DAY, Disp: 90 tablet, Rfl: 2    dorzolamide-timolol (COSOPT) 2-0.5 % ophthalmic solution, Administer 1 drop to both eyes 2 (Two) Times a Day., Disp: , Rfl:     doxazosin (CARDURA) 4 MG tablet, TAKE 1 TABLET BY MOUTH EVERY DAY AT NIGHT (Patient taking differently: Take 1 tablet by mouth Every Night.), Disp: 90 tablet, Rfl: 4    escitalopram (LEXAPRO) 20 MG tablet, TAKE 1 TABLET BY MOUTH DAILY, Disp: 90 tablet, Rfl: 3    esomeprazole (nexIUM) 40 MG capsule, Take 1 capsule by mouth Every Morning Before Breakfast., Disp: 30 capsule, Rfl: 3    Evolocumab (REPATHA) solution auto-injector SureClick injection, Inject 1 mL under the skin into the appropriate area as directed Every 14 (Fourteen) Days., Disp: 2 mL, Rfl: 6    gabapentin (NEURONTIN) 300 MG capsule, Take 1 capsule by mouth Every Night., Disp: , Rfl:     Ingrezza 60 MG capsule, Take 1 capsule by mouth Daily., Disp: , Rfl:     Insulin Glargine, 1 Unit Dial, (Toujeo SoloStar) 300 UNIT/ML solution  pen-injector injection, INJECT 65 UNITS UNDER THE SKIN DAILY (Patient taking differently: Inject 15 Units under the skin into the appropriate area as directed Every Morning. 40 units), Disp: 18 mL, Rfl: 3    latanoprost (XALATAN) 0.005 % ophthalmic solution, Administer 1 drop to both eyes Every Night., Disp: , Rfl:     metFORMIN (GLUCOPHAGE) 500 MG tablet, TAKE 2 TABLETS BY MOUTH DAILY WITH BREAKFAST, Disp: 180 tablet, Rfl: 1    methocarbamol (Robaxin) 500 MG tablet, Take 1 tablet by mouth 3 (Three) Times a Day As Needed for Muscle Spasms (Take as needed for pain) for up to 30 days., Disp: , Rfl:     ondansetron ODT (ZOFRAN-ODT) 4 MG disintegrating tablet, Take 1 tablet by mouth Every 6 (Six) Hours As Needed for Nausea or Vomiting., Disp: 120 tablet, Rfl: 0    oxyCODONE-acetaminophen (PERCOCET) 7.5-325 MG per tablet, Take 1 tablet by mouth Every 6 (Six) Hours As Needed., Disp: , Rfl:     saccharomyces boulardii (Florastor) 250 MG capsule, Take 1 capsule by mouth Daily., Disp: 30 capsule, Rfl: 0    sennosides-docusate (PERICOLACE) 8.6-50 MG per tablet, Take 2 tablets by mouth 2 (Two) Times a Day As Needed for Constipation., Disp: , Rfl:     spironolactone (ALDACTONE) 25 MG tablet, Take 1 tablet by mouth Daily., Disp: , Rfl:     methocarbamol (ROBAXIN) 500 MG tablet, Take 1 tablet by mouth 3 (Three) Times a Day for 14 days., Disp: 42 tablet, Rfl: 0     Allergies   Allergen Reactions    Nsaids Other (See Comments)     Renal failure    Atorvastatin Myalgia    Hydralazine Myalgia    Norvasc [Amlodipine] Swelling    Zetia [Ezetimibe] Nausea And Vomiting    Crestor [Rosuvastatin] Other (See Comments)     Flu like s/s    Ace Inhibitors Angioedema    Lisinopril Angioedema    Testosterone Myalgia        Social History     Socioeconomic History    Marital status:      Spouse name: Micaela    Number of children: 1    Years of education: College   Tobacco Use    Smoking status: Never     Passive exposure: Never    Smokeless  "tobacco: Never    Tobacco comments:     CAFFEINE USE: 2 CUPS COFFEE DAILY   Vaping Use    Vaping status: Never Used   Substance and Sexual Activity    Alcohol use: Yes     Alcohol/week: 3.0 standard drinks of alcohol     Types: 1 Glasses of wine, 2 Shots of liquor per week    Drug use: Never    Sexual activity: Not Currently     Partners: Female     Birth control/protection: Post-menopausal        Family History   Problem Relation Age of Onset    Lupus Sister     Thyroid disease Sister     Heart disease Other     Hypertension Other     Arthritis Mother     Hyperlipidemia Mother     Hypertension Mother     Thyroid disease Mother     Lupus Mother     Vision loss Mother     Heart disease Father     Arthritis Father     Other Father         Vascular disease    Lupus Father     Depression Father     Alcohol abuse Father     Dementia Father     Hypertension Father     Heart disease Brother     Heart attack Brother 40    Thyroid disease Sister     Arthritis Brother     Malig Hyperthermia Neg Hx         Review of Systems    Objective     Vitals:    24 1534   BP: 118/70   Pulse: 85   Temp: 97.8 °F (36.6 °C)   SpO2: 99%   Weight: 108 kg (238 lb 1.6 oz)   Height: 195.6 cm (77\")     Body mass index is 28.23 kg/m².      Physical Exam  Neurologic Exam  Awake, alert, oriented x3  Face symmetric  Speech is fluent and clear  Motor exam  Bilateral deltoids 5/5, bilateral biceps 5/5, bilateral triceps 5/5, bilateral wrist extension 5/5 bilateral hand  5/5  Bilateral hip flexion 5/5, bilateral knee extension 5/5, bilateral DF/PF 5/5  No clonus  No Allyssa's reflex  Steady normal gait  Able to detect  light touch in all 4 extremities          Assessment & Plan     Medical Decision Makin-year-old male s/p C3-6 decompression and fusion on 2024  -He appears to be recovering very well from his surgery.  The follow-up MRI images show significant improvement with elimination of his cervical stenosis.  He already " appears to have some improvement in his gait, improvement in hand strength, sensation and coordination.  The follow-up x-ray and CT images demonstrate hardware and good position without evidence of failure.  -I have given him my cell phone number and asked him to call back or text with any questions or concerns.  I believe he will continue to improve over the next several months  Diagnoses and all orders for this visit:    1. S/P spinal fusion (Primary)    2. Stenosis of cervical spine with myelopathy    Other orders  -     methocarbamol (ROBAXIN) 500 MG tablet; Take 1 tablet by mouth 3 (Three) Times a Day for 14 days.  Dispense: 42 tablet; Refill: 0      Return if symptoms worsen or fail to improve.

## 2024-08-21 ENCOUNTER — OFFICE VISIT (OUTPATIENT)
Dept: NEUROSURGERY | Facility: CLINIC | Age: 69
End: 2024-08-21
Payer: MEDICARE

## 2024-08-21 VITALS
WEIGHT: 238.1 LBS | HEART RATE: 85 BPM | SYSTOLIC BLOOD PRESSURE: 118 MMHG | OXYGEN SATURATION: 99 % | BODY MASS INDEX: 28.11 KG/M2 | DIASTOLIC BLOOD PRESSURE: 70 MMHG | HEIGHT: 77 IN | TEMPERATURE: 97.8 F

## 2024-08-21 DIAGNOSIS — Z98.1 S/P SPINAL FUSION: Primary | ICD-10-CM

## 2024-08-21 DIAGNOSIS — M48.02 STENOSIS OF CERVICAL SPINE WITH MYELOPATHY: ICD-10-CM

## 2024-08-21 DIAGNOSIS — G99.2 STENOSIS OF CERVICAL SPINE WITH MYELOPATHY: ICD-10-CM

## 2024-08-21 PROCEDURE — 3078F DIAST BP <80 MM HG: CPT | Performed by: NEUROLOGICAL SURGERY

## 2024-08-21 PROCEDURE — 1160F RVW MEDS BY RX/DR IN RCRD: CPT | Performed by: NEUROLOGICAL SURGERY

## 2024-08-21 PROCEDURE — 99024 POSTOP FOLLOW-UP VISIT: CPT | Performed by: NEUROLOGICAL SURGERY

## 2024-08-21 PROCEDURE — 1159F MED LIST DOCD IN RCRD: CPT | Performed by: NEUROLOGICAL SURGERY

## 2024-08-21 PROCEDURE — 3074F SYST BP LT 130 MM HG: CPT | Performed by: NEUROLOGICAL SURGERY

## 2024-08-21 RX ORDER — OXYCODONE AND ACETAMINOPHEN 7.5; 325 MG/1; MG/1
1 TABLET ORAL EVERY 6 HOURS PRN
COMMUNITY

## 2024-08-21 RX ORDER — METHOCARBAMOL 500 MG/1
500 TABLET, FILM COATED ORAL 3 TIMES DAILY
Qty: 42 TABLET | Refills: 0 | Status: SHIPPED | OUTPATIENT
Start: 2024-08-21 | End: 2024-09-04

## 2024-08-22 DIAGNOSIS — Z79.4 CONTROLLED TYPE 2 DIABETES MELLITUS WITH COMPLICATION, WITH LONG-TERM CURRENT USE OF INSULIN: ICD-10-CM

## 2024-08-22 DIAGNOSIS — E11.8 CONTROLLED TYPE 2 DIABETES MELLITUS WITH COMPLICATION, WITH LONG-TERM CURRENT USE OF INSULIN: ICD-10-CM

## 2024-08-23 RX ORDER — INSULIN GLARGINE 300 U/ML
INJECTION, SOLUTION SUBCUTANEOUS
Qty: 21 ML | Refills: 0 | Status: SHIPPED | OUTPATIENT
Start: 2024-08-23

## 2024-08-23 NOTE — TELEPHONE ENCOUNTER
Rx Refill Note  Requested Prescriptions     Pending Prescriptions Disp Refills    Toujeo SoloStar 300 UNIT/ML solution pen-injector injection [Pharmacy Med Name: TOUJEO SOLOSTAR 300U/ML PEN 1.5ML] 21 mL 0     Sig: ADMINISTER 65 UNITS UNDER THE SKIN DAILY AS DIRECTED      Last office visit with prescribing clinician: 2/1/2024   Last telemedicine visit with prescribing clinician: Visit date not found   Next office visit with prescribing clinician: Visit date not found                         Would you like a call back once the refill request has been completed: [] Yes [] No    If the office needs to give you a call back, can they leave a voicemail: [] Yes [] No    Angelita Griffiths  08/23/24, 07:48 EDT

## 2024-08-29 RX ORDER — DOXAZOSIN 4 MG/1
4 TABLET ORAL
Qty: 90 TABLET | Refills: 4 | Status: SHIPPED | OUTPATIENT
Start: 2024-08-29

## 2024-08-30 ENCOUNTER — HOSPITAL ENCOUNTER (EMERGENCY)
Facility: HOSPITAL | Age: 69
Discharge: HOME | End: 2024-08-31
Attending: STUDENT IN AN ORGANIZED HEALTH CARE EDUCATION/TRAINING PROGRAM
Payer: MEDICARE

## 2024-08-30 DIAGNOSIS — R55 SYNCOPE AND COLLAPSE: Primary | ICD-10-CM

## 2024-08-30 PROCEDURE — 84484 ASSAY OF TROPONIN QUANT: CPT | Performed by: STUDENT IN AN ORGANIZED HEALTH CARE EDUCATION/TRAINING PROGRAM

## 2024-08-30 PROCEDURE — 99283 EMERGENCY DEPT VISIT LOW MDM: CPT

## 2024-08-30 PROCEDURE — 83690 ASSAY OF LIPASE: CPT | Performed by: STUDENT IN AN ORGANIZED HEALTH CARE EDUCATION/TRAINING PROGRAM

## 2024-08-30 PROCEDURE — 85025 COMPLETE CBC W/AUTO DIFF WBC: CPT | Performed by: STUDENT IN AN ORGANIZED HEALTH CARE EDUCATION/TRAINING PROGRAM

## 2024-08-30 PROCEDURE — 80053 COMPREHEN METABOLIC PANEL: CPT | Performed by: STUDENT IN AN ORGANIZED HEALTH CARE EDUCATION/TRAINING PROGRAM

## 2024-08-30 PROCEDURE — 93010 ELECTROCARDIOGRAM REPORT: CPT | Performed by: INTERNAL MEDICINE

## 2024-08-30 PROCEDURE — 93005 ELECTROCARDIOGRAM TRACING: CPT | Performed by: STUDENT IN AN ORGANIZED HEALTH CARE EDUCATION/TRAINING PROGRAM

## 2024-08-30 PROCEDURE — 36415 COLL VENOUS BLD VENIPUNCTURE: CPT

## 2024-08-31 VITALS
BODY MASS INDEX: 27.75 KG/M2 | SYSTOLIC BLOOD PRESSURE: 118 MMHG | HEIGHT: 77 IN | OXYGEN SATURATION: 95 % | TEMPERATURE: 96.3 F | HEART RATE: 73 BPM | RESPIRATION RATE: 14 BRPM | WEIGHT: 235 LBS | DIASTOLIC BLOOD PRESSURE: 78 MMHG

## 2024-08-31 LAB
ALBUMIN SERPL-MCNC: 2.8 G/DL (ref 3.5–5.2)
ALBUMIN SERPL-MCNC: 3.4 G/DL (ref 3.5–5.2)
ALBUMIN/GLOB SERPL: 1 G/DL
ALBUMIN/GLOB SERPL: 1 G/DL
ALP SERPL-CCNC: 57 U/L (ref 39–117)
ALP SERPL-CCNC: 71 U/L (ref 39–117)
ALT SERPL W P-5'-P-CCNC: 17 U/L (ref 1–41)
ALT SERPL W P-5'-P-CCNC: 22 U/L (ref 1–41)
ANION GAP SERPL CALCULATED.3IONS-SCNC: 10.6 MMOL/L (ref 5–15)
ANION GAP SERPL CALCULATED.3IONS-SCNC: 12.5 MMOL/L (ref 5–15)
AST SERPL-CCNC: 21 U/L (ref 1–40)
AST SERPL-CCNC: 25 U/L (ref 1–40)
BASOPHILS # BLD AUTO: 0.03 10*3/MM3 (ref 0–0.2)
BASOPHILS NFR BLD AUTO: 0.4 % (ref 0–1.5)
BILIRUB SERPL-MCNC: 0.2 MG/DL (ref 0–1.2)
BILIRUB SERPL-MCNC: <0.2 MG/DL (ref 0–1.2)
BUN SERPL-MCNC: 7 MG/DL (ref 8–23)
BUN SERPL-MCNC: 9 MG/DL (ref 8–23)
BUN/CREAT SERPL: 8.7 (ref 7–25)
BUN/CREAT SERPL: 8.8 (ref 7–25)
CALCIUM SPEC-SCNC: 6.7 MG/DL (ref 8.6–10.5)
CALCIUM SPEC-SCNC: 8.5 MG/DL (ref 8.6–10.5)
CHLORIDE SERPL-SCNC: 108 MMOL/L (ref 98–107)
CHLORIDE SERPL-SCNC: 115 MMOL/L (ref 98–107)
CO2 SERPL-SCNC: 15.5 MMOL/L (ref 22–29)
CO2 SERPL-SCNC: 19.4 MMOL/L (ref 22–29)
CREAT SERPL-MCNC: 0.8 MG/DL (ref 0.76–1.27)
CREAT SERPL-MCNC: 1.04 MG/DL (ref 0.76–1.27)
DEPRECATED RDW RBC AUTO: 47.7 FL (ref 37–54)
EGFRCR SERPLBLD CKD-EPI 2021: 77.7 ML/MIN/1.73
EGFRCR SERPLBLD CKD-EPI 2021: 95.8 ML/MIN/1.73
EOSINOPHIL # BLD AUTO: 0.13 10*3/MM3 (ref 0–0.4)
EOSINOPHIL NFR BLD AUTO: 1.8 % (ref 0.3–6.2)
ERYTHROCYTE [DISTWIDTH] IN BLOOD BY AUTOMATED COUNT: 15 % (ref 12.3–15.4)
GLOBULIN UR ELPH-MCNC: 2.7 GM/DL
GLOBULIN UR ELPH-MCNC: 3.3 GM/DL
GLUCOSE SERPL-MCNC: 89 MG/DL (ref 65–99)
GLUCOSE SERPL-MCNC: 90 MG/DL (ref 65–99)
HCT VFR BLD AUTO: 44.4 % (ref 37.5–51)
HGB BLD-MCNC: 14.4 G/DL (ref 13–17.7)
IMM GRANULOCYTES # BLD AUTO: 0.04 10*3/MM3 (ref 0–0.05)
IMM GRANULOCYTES NFR BLD AUTO: 0.5 % (ref 0–0.5)
LIPASE SERPL-CCNC: 64 U/L (ref 13–60)
LYMPHOCYTES # BLD AUTO: 1.73 10*3/MM3 (ref 0.7–3.1)
LYMPHOCYTES NFR BLD AUTO: 23.4 % (ref 19.6–45.3)
MCH RBC QN AUTO: 28.5 PG (ref 26.6–33)
MCHC RBC AUTO-ENTMCNC: 32.4 G/DL (ref 31.5–35.7)
MCV RBC AUTO: 87.7 FL (ref 79–97)
MONOCYTES # BLD AUTO: 0.48 10*3/MM3 (ref 0.1–0.9)
MONOCYTES NFR BLD AUTO: 6.5 % (ref 5–12)
NEUTROPHILS NFR BLD AUTO: 4.98 10*3/MM3 (ref 1.7–7)
NEUTROPHILS NFR BLD AUTO: 67.4 % (ref 42.7–76)
NRBC BLD AUTO-RTO: 0 /100 WBC (ref 0–0.2)
PLATELET # BLD AUTO: 192 10*3/MM3 (ref 140–450)
PMV BLD AUTO: 10.5 FL (ref 6–12)
POTASSIUM SERPL-SCNC: 2.8 MMOL/L (ref 3.5–5.2)
POTASSIUM SERPL-SCNC: 3.7 MMOL/L (ref 3.5–5.2)
PROT SERPL-MCNC: 5.5 G/DL (ref 6–8.5)
PROT SERPL-MCNC: 6.7 G/DL (ref 6–8.5)
QT INTERVAL: 392 MS
QTC INTERVAL: 455 MS
RBC # BLD AUTO: 5.06 10*6/MM3 (ref 4.14–5.8)
SODIUM SERPL-SCNC: 138 MMOL/L (ref 136–145)
SODIUM SERPL-SCNC: 143 MMOL/L (ref 136–145)
TROPONIN T SERPL HS-MCNC: 12 NG/L
WBC NRBC COR # BLD AUTO: 7.39 10*3/MM3 (ref 3.4–10.8)

## 2024-08-31 PROCEDURE — 80053 COMPREHEN METABOLIC PANEL: CPT | Performed by: STUDENT IN AN ORGANIZED HEALTH CARE EDUCATION/TRAINING PROGRAM

## 2024-08-31 RX ORDER — ONDANSETRON 4 MG/1
4 TABLET, ORALLY DISINTEGRATING ORAL 4 TIMES DAILY PRN
Qty: 12 TABLET | Refills: 0 | Status: SHIPPED | OUTPATIENT
Start: 2024-08-31

## 2024-08-31 NOTE — ED PROVIDER NOTES
EMERGENCY DEPARTMENT ENCOUNTER    Room Number:  23/23  PCP: Gwyn Patten Sr., MD  History obtained from: Patient, partner      HPI:  Chief Complaint: Syncope  A complete HPI/ROS/PMH/PSH/SH/FH are unobtainable due to: N/A  Context: Isaias Monaco is a 69 y.o. male who presents to the ED c/o syncopal episode.  Was finishing dinner at [employer] when suddenly he vomited.  Seemed out of it, was clammy around this time.  Patient does not remember any of these events.  Denies any chest pain or shortness of breath.  No nausea or vomiting currently.  No other recent illness, fever, chills.            PAST MEDICAL HISTORY  Active Ambulatory Problems     Diagnosis Date Noted   • Microalbuminuria 02/21/2016   • Vitamin D deficiency 02/21/2016   • Angioedema 02/21/2016   • Coronary artery disease involving native coronary artery of native heart without angina pectoris 02/21/2016   • Anxiety 02/21/2016   • ED (erectile dysfunction) 02/21/2016   • Hyperlipidemia LDL goal <70 02/21/2016   • Hypogonadism in male 09/28/2016   • S/P primary angioplasty with coronary stent 05/28/2013   • Osteoarthritis of knee 06/15/2017   • Hypertensive kidney disease with stage 3a chronic kidney disease 06/21/2017   • Anemia, posthemorrhagic, acute 06/22/2017   • Dyspnea on exertion 07/11/2017   • Gastro-esophageal reflux disease with esophagitis 08/28/2017   • Tubular adenoma of colon 11/01/2017   • Nocturia associated with benign prostatic hyperplasia 05/02/2018   • Iron deficiency anemia 10/30/2018   • Adverse effect of iron and its compounds, sequela 11/27/2018   • Facial twitching 12/04/2018   • Blepharospasm 06/25/2019   • Type 2 diabetes mellitus with microalbuminuria, with long-term current use of insulin 06/27/2013   • Panic disorder 10/16/2020   • Tic disorder, unspecified 10/16/2020   • Spinal stenosis of lumbar region without neurogenic claudication 06/02/2021   • Bilateral myopia 06/23/2021   • Combined forms of age-related  cataract, bilateral 06/23/2021   • Presbyopia 06/23/2021   • Primary open-angle glaucoma, bilateral, severe stage 06/23/2021   • Lumbar facet arthropathy 12/14/2021   • Spondylosis of lumbar region without myelopathy or radiculopathy 12/14/2021   • Calcific coronary arteriosclerosis 05/19/2022   • Essential hypertension 06/21/2017   • Degeneration of lumbar intervertebral disc 03/29/2023   • Dystonia 12/16/2022   • Lumbosacral spondylosis without myelopathy 03/29/2023   • Medicare annual wellness visit, subsequent 08/11/2023   • Coronary stent restenosis 04/01/2024   • Stenosis of cervical spine with myelopathy 05/31/2024   • Preop examination 05/31/2024   • Pneumonia 06/29/2024   • Stridor 06/29/2024   • Sepsis 07/20/2024   • Status post cervical spinal fusion 07/20/2024   • Periumbilical abdominal pain 07/20/2024     Resolved Ambulatory Problems     Diagnosis Date Noted   • Gastroesophageal reflux disease 02/21/2016   • Diabetes mellitus 02/21/2016   • Adiposity 02/21/2016   • Routine health maintenance 06/30/2016   • Uncontrolled type 2 diabetes mellitus 09/26/2016   • Low testosterone 09/26/2016   • MARC (acute kidney injury) 06/16/2017   • Postoperative visit 06/19/2017   • Hematoma 06/20/2017   • Febrile illness 06/23/2017   • Wound infection after surgery 06/23/2017   • Angina at rest 03/04/2020   • Unstable angina 03/19/2024     Past Medical History:   Diagnosis Date   • Anemia    • Arthritis    • CAD (coronary artery disease)    • Colon polyps    • Diabetes mellitus, type 2    • GERD (gastroesophageal reflux disease)    • Glaucoma    • High cholesterol    • History of foreign travel 12/2017; 08/2018   • Hyperlipidemia    • Hypertension    • Male erectile disorder    • Osteoarthritis          PAST SURGICAL HISTORY  Past Surgical History:   Procedure Laterality Date   • CARDIAC CATHETERIZATION N/A 05/15/2006    Dr. Mini Camarillo   • CARDIAC CATHETERIZATION N/A 03/10/2020    Procedure: Left Heart Cath;   Surgeon: Miguel Ángel Karimi MD;  Location: [facility] CATH INVASIVE LOCATION;  Service: Cardiology;  Laterality: N/A;   • CARDIAC CATHETERIZATION N/A 03/10/2020    Procedure: Stent DAVONTE coronary;  Surgeon: Miguel Ángel Karimi MD;  Location:  [facility] CATH INVASIVE LOCATION;  Service: Cardiology;  Laterality: N/A;   • CARDIAC CATHETERIZATION N/A 03/10/2020    Procedure: Coronary angiography;  Surgeon: Miguel Ángel Karimi MD;  Location: [facility] CATH INVASIVE LOCATION;  Service: Cardiology;  Laterality: N/A;   • CARDIAC CATHETERIZATION N/A 03/10/2020    Procedure: Left ventriculography;  Surgeon: Miguel Ángel Karimi MD;  Location: [facility] CATH INVASIVE LOCATION;  Service: Cardiology;  Laterality: N/A;   • CARDIAC CATHETERIZATION N/A 05/20/2022    Procedure: Left Heart Cath;  Surgeon: Mackenzie Morales MD;  Location: [facility] CATH INVASIVE LOCATION;  Service: Cardiovascular;  Laterality: N/A;   • CARDIAC CATHETERIZATION N/A 05/20/2022    Procedure: Coronary angiography;  Surgeon: Mackenzie Morales MD;  Location: [facility] CATH INVASIVE LOCATION;  Service: Cardiovascular;  Laterality: N/A;   • CARDIAC CATHETERIZATION N/A 05/20/2022    Procedure: Percutaneous Coronary Intervention;  Surgeon: Mackenzie Morales MD;  Location: [facility] CATH INVASIVE LOCATION;  Service: Cardiovascular;  Laterality: N/A;   • CARDIAC CATHETERIZATION N/A 05/24/2022    Procedure: Percutaneous Coronary Intervention;  Surgeon: Miguel Ángel Karimi MD;  Location: [facility] CATH INVASIVE LOCATION;  Service: Cardiovascular;  Laterality: N/A;  LAD and Cx   • CARDIAC CATHETERIZATION N/A 05/24/2022    Procedure: Stent DAVONTE coronary;  Surgeon: Miguel Ángel Karimi MD;  Location: [facility] CATH INVASIVE LOCATION;  Service: Cardiovascular;  Laterality: N/A;   • CARDIAC CATHETERIZATION N/A 05/24/2022    Procedure: Resting Full Cycle Ratio;  Surgeon: Miguel Ángel Karimi MD;  Location: [facility] CATH INVASIVE LOCATION;  Service: Cardiovascular;  Laterality: N/A;   • CARDIAC  CATHETERIZATION N/A 03/19/2024    Procedure: Left Heart Cath;  Surgeon: Miguel Ángel Karimi MD;  Location: [facility] CATH INVASIVE LOCATION;  Service: Cardiovascular;  Laterality: N/A;   • CARDIAC CATHETERIZATION N/A 03/19/2024    Procedure: Percutaneous Coronary Intervention;  Surgeon: Miguel Ángel Karimi MD;  Location:  [facility] CATH INVASIVE LOCATION;  Service: Cardiovascular;  Laterality: N/A;   • CARDIAC CATHETERIZATION N/A 03/19/2024    Procedure: Stent DAVONTE coronary;  Surgeon: Miguel Ángel Karimi MD;  [facility];  Service: Cardiovascular;  Laterality: N/A;   • CERVICAL DISCECTOMY POSTERIOR FUSION WITH INSTRUMENTATION Bilateral 6/25/2024    Procedure: Cervical 3 to cervical 6 laminectomies and posterior spinal fusion - Bilateral;  Surgeon: Marshal Miguel MD;  Location: [facility] MAIN OR;  Service: Neurosurgery;  Laterality: Bilateral;   • COLONOSCOPY N/A 02/22/2006    Bilobed polyp at 30 cm, hemorrhoids-Dr. Ilya Zhao   • COLONOSCOPY N/A 02/28/2014    Normal ileum, one 6 mm polyp in the mid transverse colon-Dr. Ilya Zhao   • COLONOSCOPY N/A 11/19/2008    Ela ileum, two 3 to 4 mm polyps, non-bleeding internal hemorrhoids, repeat in 5 years-Dr. Ilya Zhao   • COLONOSCOPY N/A 10/31/2017    Procedure: COLONOSCOPY WITH POLYPECTOMY (COLD BIOPSY);  Surgeon: Ilya Zhao MD;  Location: [facility] ENDOSCOPY;  Service:    • COLONOSCOPY N/A 05/21/2021    Procedure: Colonoscopy into cecum and terminal ileum with cold biopsy polypectomy;  Surgeon: Ilya Zhao MD;  Location: [facility] ENDOSCOPY;  Service: Gastroenterology;  Laterality: N/A;  Pre op: History of Polyps  Post op: Polyp   • CORONARY ANGIOPLASTY WITH STENT PLACEMENT  2007, 2012, 2015    cardiac stents x3 occasions   • ENDOSCOPY N/A 10/04/2017    Procedure: ESOPHAGOGASTRODUODENOSCOPY;  Surgeon: Boyd Guidry MD;  Location: [facility] ENDOSCOPY;  Service:    • ENDOSCOPY N/A 05/21/2021    Procedure: ESOPHAGOGASTRODUODENOSCOPY  with biopsies;  Surgeon: Ilya Zhao MD;  Location: [facility] ENDOSCOPY;  Service: Gastroenterology;  Laterality: N/A;  Pre op: GERD  Post op: Irregular  Z-Line, Hiatal Hernia, Gastritis   • ENDOSCOPY N/A 08/25/2023    Procedure: ESOPHAGOGASTRODUODENOSCOPY with biopsy;  Surgeon: Ilya Zhao MD;  Location: [facility] ENDOSCOPY;  Service: Gastroenterology;  Laterality: N/A;  errosive gastritis   • EPIDURAL BLOCK     • INTERVENTIONAL RADIOLOGY PROCEDURE N/A 05/20/2022    Procedure: Intravascular Ultrasound;  Surgeon: Mackenzie Morales MD;  Location: [facility] CATH INVASIVE LOCATION;  Service: Cardiovascular;  Laterality: N/A;   • KNEE INCISION AND DRAINAGE Right 06/20/2017    Procedure: RT. KNEE WASHOUT ;  Surgeon: Boyd Coyne MD;  Location: [facility] MAIN OR;  Service:    • MEDIAL BRANCH BLOCK Bilateral 12/17/2021    Procedure: LUMBAR MEDIAL BRANCH BLOCK bilateral ~L4-S1 x2 (~2 weeks apart);  Surgeon: Christina Villaseñor MD;  Location: [facility] MAIN OR;  Service: Pain Management;  Laterality: Bilateral;   • MEDIAL BRANCH BLOCK Bilateral 01/03/2022    Procedure: LUMBAR MEDIAL BRANCH BLOCK bilateral ~L4-S1 x2 (~2 weeks apart);  Surgeon: Christina Villaseñor MD;  Location: [facility] MAIN OR;  Service: Pain Management;  Laterality: Bilateral;   • SC ARTHRP KNE CONDYLE&PLATU MEDIAL&LAT COMPARTMENTS Right 06/15/2017    Procedure: RT TOTAL KNEE ARTHROPLASTY;  Surgeon: Boyd Coyne MD;  Location: [facility] MAIN OR;  Service: Orthopedics   • RADIOFREQUENCY ABLATION Bilateral 01/10/2022    Procedure: RADIOFREQUENCY ABLATION NERVES Bilateral L4-S1;  Surgeon: Christina Villaseñor MD;  Location: [facility] MAIN OR;  Service: Pain Management;  Laterality: Bilateral;   • SHOULDER SURGERY Right 2016    rotator cuff repair   • UPPER GASTROINTESTINAL ENDOSCOPY N/A 10/13/2015    Z-line irregular, normal stomach, normal duodenum-Dr. Ilya Zhao   • UPPER GASTROINTESTINAL ENDOSCOPY N/A 02/28/2014    Z-line irregular, normal stomach, normal duodenum-Dr. Caraballo  C[facility]   • UPPER GASTROINTESTINAL ENDOSCOPY N/A 11/19/2008    Z-line irregular, chronic gastritis withotu hemorrhage, normal duodenum-Dr. Ilya Zhao   • UPPER GASTROINTESTINAL ENDOSCOPY N/A 06/22/2006    LA Grade A reflux esophagitis, non-bleeding erythematous gastropathy, normal duodenum-Dr. Ilya Zhao         FAMILY HISTORY  Family History   Problem Relation Age of Onset   • Lupus Sister    • Thyroid disease Sister    • Heart disease Other    • Hypertension Other    • Arthritis Mother    • Hyperlipidemia Mother    • Hypertension Mother    • Thyroid disease Mother    • Lupus Mother    • Vision loss Mother    • Heart disease Father    • Arthritis Father    • Other Father         Vascular disease   • Lupus Father    • Depression Father    • Alcohol abuse Father    • Dementia Father    • Hypertension Father    • Heart disease Brother    • Heart attack Brother 40   • Thyroid disease Sister    • Arthritis Brother    • Malig Hyperthermia Neg Hx          SOCIAL HISTORY  Social History     Socioeconomic History   • Marital status:      Spouse name: Micaela   • Number of children: 1   • Years of education: College   Tobacco Use   • Smoking status: Never     Passive exposure: Never   • Smokeless tobacco: Never   • Tobacco comments:     CAFFEINE USE: 2 CUPS COFFEE DAILY   Vaping Use   • Vaping status: Never Used   Substance and Sexual Activity   • Alcohol use: Yes     Alcohol/week: 3.0 standard drinks of alcohol     Types: 1 Glasses of wine, 2 Shots of liquor per week   • Drug use: Never   • Sexual activity: Not Currently     Partners: Female     Birth control/protection: Post-menopausal         ALLERGIES  Nsaids, Atorvastatin, Hydralazine, Norvasc [amlodipine], Zetia [ezetimibe], Crestor [rosuvastatin], Ace inhibitors, Lisinopril, and Testosterone        REVIEW OF SYSTEMS    As per HPI      PHYSICAL EXAM  ED Triage Vitals [08/30/24 2301]   Temp Heart Rate Resp BP SpO2   96.3 °F (35.7 °C) 79 14 138/77 98 %      Temp src  Heart Rate Source Patient Position BP Location FiO2 (%)   Tympanic -- -- -- --       Physical Exam  Constitutional:       General: He is not in acute distress.  HENT:      Head: Normocephalic and atraumatic.   Cardiovascular:      Rate and Rhythm: Normal rate and regular rhythm.   Pulmonary:      Effort: No respiratory distress.   Abdominal:      General: There is no distension.      Tenderness: There is no abdominal tenderness.   Musculoskeletal:         General: No swelling or deformity.   Skin:     General: Skin is warm and dry.   Neurological:      General: No focal deficit present.      Mental Status: He is alert. Mental status is at baseline.           Vital signs and nursing notes reviewed.          LAB RESULTS  Recent Results (from the past 24 hour(s))   ECG 12 Lead Syncope    Collection Time: 08/30/24 11:30 PM   Result Value Ref Range    QT Interval 392 ms    QTC Interval 455 ms   Comprehensive Metabolic Panel    Collection Time: 08/30/24 11:48 PM    Specimen: Blood   Result Value Ref Range    Glucose 89 65 - 99 mg/dL    BUN 7 (L) 8 - 23 mg/dL    Creatinine 0.80 0.76 - 1.27 mg/dL    Sodium 143 136 - 145 mmol/L    Potassium 2.8 (L) 3.5 - 5.2 mmol/L    Chloride 115 (H) 98 - 107 mmol/L    CO2 15.5 (L) 22.0 - 29.0 mmol/L    Calcium 6.7 (L) 8.6 - 10.5 mg/dL    Total Protein 5.5 (L) 6.0 - 8.5 g/dL    Albumin 2.8 (L) 3.5 - 5.2 g/dL    ALT (SGPT) 17 1 - 41 U/L    AST (SGOT) 21 1 - 40 U/L    Alkaline Phosphatase 57 39 - 117 U/L    Total Bilirubin <0.2 0.0 - 1.2 mg/dL    Globulin 2.7 gm/dL    A/G Ratio 1.0 g/dL    BUN/Creatinine Ratio 8.8 7.0 - 25.0    Anion Gap 12.5 5.0 - 15.0 mmol/L    eGFR 95.8 >60.0 mL/min/1.73   Lipase    Collection Time: 08/30/24 11:48 PM    Specimen: Blood   Result Value Ref Range    Lipase 64 (H) 13 - 60 U/L   Single High Sensitivity Troponin T    Collection Time: 08/30/24 11:48 PM    Specimen: Blood   Result Value Ref Range    HS Troponin T 12 <22 ng/L   CBC Auto Differential    Collection  Time: 08/30/24 11:48 PM    Specimen: Blood   Result Value Ref Range    WBC 7.39 3.40 - 10.80 10*3/mm3    RBC 5.06 4.14 - 5.80 10*6/mm3    Hemoglobin 14.4 13.0 - 17.7 g/dL    Hematocrit 44.4 37.5 - 51.0 %    MCV 87.7 79.0 - 97.0 fL    MCH 28.5 26.6 - 33.0 pg    MCHC 32.4 31.5 - 35.7 g/dL    RDW 15.0 12.3 - 15.4 %    RDW-SD 47.7 37.0 - 54.0 fl    MPV 10.5 6.0 - 12.0 fL    Platelets 192 140 - 450 10*3/mm3    Neutrophil % 67.4 42.7 - 76.0 %    Lymphocyte % 23.4 19.6 - 45.3 %    Monocyte % 6.5 5.0 - 12.0 %    Eosinophil % 1.8 0.3 - 6.2 %    Basophil % 0.4 0.0 - 1.5 %    Immature Grans % 0.5 0.0 - 0.5 %    Neutrophils, Absolute 4.98 1.70 - 7.00 10*3/mm3    Lymphocytes, Absolute 1.73 0.70 - 3.10 10*3/mm3    Monocytes, Absolute 0.48 0.10 - 0.90 10*3/mm3    Eosinophils, Absolute 0.13 0.00 - 0.40 10*3/mm3    Basophils, Absolute 0.03 0.00 - 0.20 10*3/mm3    Immature Grans, Absolute 0.04 0.00 - 0.05 10*3/mm3    nRBC 0.0 0.0 - 0.2 /100 WBC   Comprehensive Metabolic Panel    Collection Time: 08/31/24 12:34 AM    Specimen: Blood   Result Value Ref Range    Glucose 90 65 - 99 mg/dL    BUN 9 8 - 23 mg/dL    Creatinine 1.04 0.76 - 1.27 mg/dL    Sodium 138 136 - 145 mmol/L    Potassium 3.7 3.5 - 5.2 mmol/L    Chloride 108 (H) 98 - 107 mmol/L    CO2 19.4 (L) 22.0 - 29.0 mmol/L    Calcium 8.5 (L) 8.6 - 10.5 mg/dL    Total Protein 6.7 6.0 - 8.5 g/dL    Albumin 3.4 (L) 3.5 - 5.2 g/dL    ALT (SGPT) 22 1 - 41 U/L    AST (SGOT) 25 1 - 40 U/L    Alkaline Phosphatase 71 39 - 117 U/L    Total Bilirubin 0.2 0.0 - 1.2 mg/dL    Globulin 3.3 gm/dL    A/G Ratio 1.0 g/dL    BUN/Creatinine Ratio 8.7 7.0 - 25.0    Anion Gap 10.6 5.0 - 15.0 mmol/L    eGFR 77.7 >60.0 mL/min/1.73       Ordered the above labs and reviewed the results.        RADIOLOGY  No Radiology Exams Resulted Within Past 24 Hours    Ordered the above noted radiological studies. Reviewed by me in PACS.            MEDICATIONS GIVEN IN ER  Medications - No data to  display            MEDICAL DECISION MAKING, PROGRESS, and CONSULTS    MDM: Patient presented emergency department status post syncopal episode, vomiting.  Otherwise well-appearing, vitals otherwise stable.  Labs otherwise reassuring.  Suspect vagal syncope given association with GI upset.  Suspect patient's memory loss is related to transient hypotension.  Blood pressure normal here in the ER.  Will discharge with supportive care and continued outpatient follow-up.  Given return precautions and discharged home.    All labs have been independently reviewed by me.  All radiology studies have been reviewed by me and I have also reviewed the radiology report.   EKG's independently viewed and interpreted by me.  Discussion below represents my analysis of pertinent findings related to patient's condition, differential diagnosis, treatment plan and final disposition.      Additional sources:  - Discussed/ obtained information from independent historians:      - External (non-ED) record review:     - Chronic or social conditions impacting care:     - Shared decision making:        Orders placed during this visit:  Orders Placed This Encounter   Procedures   • Comprehensive Metabolic Panel   • Lipase   • Single High Sensitivity Troponin T   • CBC Auto Differential   • Comprehensive Metabolic Panel   • ECG 12 Lead Syncope   • CBC & Differential         Additional orders considered but not ordered:  Considered CT PE however patient has no chest pain or shortness of breath.        Differential diagnosis includes but is not limited to:    Electrolyte abnormality, dehydration, vagal syncope, cardiac syncope, arrhythmia, acute coronary syndrome, pulmonary embolism, aortic dissection      Independent interpretation of labs, radiology studies, and discussions with consultants:  ED Course as of 08/31/24 2006   Fri Aug 30, 2024   2336 EKG interpreted myself:  2330, sinus rhythm at rate of 81, no acute ST segment changes or T wave  inversions. [FS]   Sat Aug 31, 2024   0016 Hemoglobin: 14.4 [FS]   0026 Lipase(!): 64 [FS]      ED Course User Index  [FS] Jagdeep Rene MD           DIAGNOSIS  Final diagnoses:   Syncope and collapse         DISPOSITION  Discharged home        Latest Documented Vital Signs:  As of 01:24 EDT  BP- 118/78 HR- 72 Temp- 96.3 °F (35.7 °C) (Tympanic) O2 sat- 94%              --    Please note that portions of this were completed with a voice recognition program.       Note Disclaimer: At [facility], we believe that sharing information builds trust and better relationships. You are receiving this note because you are receiving care at [facility] or recently visited. It is possible you will see health information before a provider has talked with you about it. This kind of information can be easy to misunderstand. To help you fully understand what it means for your health, we urge you to discuss this note with your provider.             Jagdeep Rene MD  08/31/24 0126       Jagdeep Rene MD  08/31/24 2006

## 2024-08-31 NOTE — ED NOTES
Pt arrived via EMS from home. EMS reports pt was eating dinner and had a loss of consciousness and began having vomiting. Pt does not recall the incident.

## 2024-09-03 DIAGNOSIS — E11.8 CONTROLLED TYPE 2 DIABETES MELLITUS WITH COMPLICATION, WITH LONG-TERM CURRENT USE OF INSULIN: Primary | ICD-10-CM

## 2024-09-03 DIAGNOSIS — Z79.4 CONTROLLED TYPE 2 DIABETES MELLITUS WITH COMPLICATION, WITH LONG-TERM CURRENT USE OF INSULIN: Primary | ICD-10-CM

## 2024-09-06 ENCOUNTER — LAB (OUTPATIENT)
Dept: LAB | Facility: HOSPITAL | Age: 69
End: 2024-09-06
Payer: MEDICARE

## 2024-09-06 PROCEDURE — 80061 LIPID PANEL: CPT | Performed by: INTERNAL MEDICINE

## 2024-09-06 PROCEDURE — 83036 HEMOGLOBIN GLYCOSYLATED A1C: CPT | Performed by: INTERNAL MEDICINE

## 2024-09-09 ENCOUNTER — OFFICE VISIT (OUTPATIENT)
Dept: ENDOCRINOLOGY | Age: 69
End: 2024-09-09
Payer: MEDICARE

## 2024-09-09 VITALS
WEIGHT: 239.4 LBS | OXYGEN SATURATION: 99 % | DIASTOLIC BLOOD PRESSURE: 84 MMHG | BODY MASS INDEX: 28.27 KG/M2 | HEIGHT: 77 IN | SYSTOLIC BLOOD PRESSURE: 126 MMHG | HEART RATE: 89 BPM

## 2024-09-09 DIAGNOSIS — E78.5 HYPERLIPIDEMIA LDL GOAL <70: Primary | ICD-10-CM

## 2024-09-09 DIAGNOSIS — Z79.4 CONTROLLED TYPE 2 DIABETES MELLITUS WITH COMPLICATION, WITH LONG-TERM CURRENT USE OF INSULIN: Primary | ICD-10-CM

## 2024-09-09 DIAGNOSIS — E78.5 HYPERLIPIDEMIA, UNSPECIFIED HYPERLIPIDEMIA TYPE: ICD-10-CM

## 2024-09-09 DIAGNOSIS — E11.8 CONTROLLED TYPE 2 DIABETES MELLITUS WITH COMPLICATION, WITH LONG-TERM CURRENT USE OF INSULIN: Primary | ICD-10-CM

## 2024-09-09 PROCEDURE — 1159F MED LIST DOCD IN RCRD: CPT | Performed by: INTERNAL MEDICINE

## 2024-09-09 PROCEDURE — 3074F SYST BP LT 130 MM HG: CPT | Performed by: INTERNAL MEDICINE

## 2024-09-09 PROCEDURE — 3044F HG A1C LEVEL LT 7.0%: CPT | Performed by: INTERNAL MEDICINE

## 2024-09-09 PROCEDURE — 95251 CONT GLUC MNTR ANALYSIS I&R: CPT | Performed by: INTERNAL MEDICINE

## 2024-09-09 PROCEDURE — 1160F RVW MEDS BY RX/DR IN RCRD: CPT | Performed by: INTERNAL MEDICINE

## 2024-09-09 PROCEDURE — 3079F DIAST BP 80-89 MM HG: CPT | Performed by: INTERNAL MEDICINE

## 2024-09-09 PROCEDURE — 99214 OFFICE O/P EST MOD 30 MIN: CPT | Performed by: INTERNAL MEDICINE

## 2024-09-09 RX ORDER — METHOCARBAMOL 500 MG/1
500 TABLET, FILM COATED ORAL 3 TIMES DAILY
COMMUNITY
Start: 2024-07-16 | End: 2025-07-16

## 2024-09-09 RX ORDER — SEMAGLUTIDE 2.68 MG/ML
2 INJECTION, SOLUTION SUBCUTANEOUS WEEKLY
COMMUNITY
Start: 2024-08-23

## 2024-09-10 NOTE — PROGRESS NOTES
Chief complaint/Reason for consult: T2DM    HPI:   - 69 year old male here for management of diabetes mellitus type 2  - Last seen in 10/2023  - Had surgery on his cervical spine since last visit  - Has had diabetes for over 10 years  - Complications include CAD, CKD  - No known complications to date  - Is currently taking Toujeo 40 units daily, Ozempic 2 mg weekly, metformin 1 g daily  - He was on Jardiance previously but states he had a skin/soft tissue infection due to it so it was stopped  - Denies hypoglycemia  - States BG is typically more elevated after eating over the last 3-6 months  - Is also on Crestor 40 mg daily    The following portions of the patient's history were reviewed and updated as appropriate: allergies, current medications, past family history, past medical history, past social history, past surgical history, and problem list.    Objective     Vitals:    09/09/24 1553   BP: 126/84   Pulse: 89   SpO2: 99%        Physical Exam  Vitals reviewed.   Constitutional:       Appearance: Normal appearance.   HENT:      Head: Normocephalic.   Eyes:      General: No scleral icterus.  Pulmonary:      Effort: Pulmonary effort is normal. No respiratory distress.   Neurological:      Mental Status: He is alert.      Gait: Gait normal.   Psychiatric:         Mood and Affect: Mood normal.         Behavior: Behavior normal.         Thought Content: Thought content normal.         Judgment: Judgment normal.     CGM interpretation  Dates reviewed:  8/21-9/3/24  Data:  Avg of 109, 98% time in range  Interpretation:  Very reasonable control      Assessment & Plan   T2DM, controlled  - Cont. Toujeo 40 units daily, Ozempic 2 mg weekly, metformin 1 g daily     2. Hyperlipidemia  - On Repatha 140 mg every 14 days     - Return to clinic in 6 months

## 2024-09-11 ENCOUNTER — OFFICE VISIT (OUTPATIENT)
Dept: FAMILY MEDICINE CLINIC | Facility: CLINIC | Age: 69
End: 2024-09-11
Payer: MEDICARE

## 2024-09-11 VITALS
HEART RATE: 72 BPM | RESPIRATION RATE: 18 BRPM | SYSTOLIC BLOOD PRESSURE: 110 MMHG | BODY MASS INDEX: 28.1 KG/M2 | HEIGHT: 77 IN | DIASTOLIC BLOOD PRESSURE: 70 MMHG | WEIGHT: 238 LBS | OXYGEN SATURATION: 98 %

## 2024-09-11 DIAGNOSIS — Z12.5 SCREENING FOR PROSTATE CANCER: ICD-10-CM

## 2024-09-11 DIAGNOSIS — Z00.00 MEDICARE ANNUAL WELLNESS VISIT, SUBSEQUENT: Primary | ICD-10-CM

## 2024-09-11 PROCEDURE — 3078F DIAST BP <80 MM HG: CPT | Performed by: FAMILY MEDICINE

## 2024-09-11 PROCEDURE — 1159F MED LIST DOCD IN RCRD: CPT | Performed by: FAMILY MEDICINE

## 2024-09-11 PROCEDURE — 3044F HG A1C LEVEL LT 7.0%: CPT | Performed by: FAMILY MEDICINE

## 2024-09-11 PROCEDURE — G0439 PPPS, SUBSEQ VISIT: HCPCS | Performed by: FAMILY MEDICINE

## 2024-09-11 PROCEDURE — 1126F AMNT PAIN NOTED NONE PRSNT: CPT | Performed by: FAMILY MEDICINE

## 2024-09-11 PROCEDURE — 3074F SYST BP LT 130 MM HG: CPT | Performed by: FAMILY MEDICINE

## 2024-09-11 PROCEDURE — 1160F RVW MEDS BY RX/DR IN RCRD: CPT | Performed by: FAMILY MEDICINE

## 2024-09-13 ENCOUNTER — LAB (OUTPATIENT)
Facility: HOSPITAL | Age: 69
End: 2024-09-13
Payer: MEDICARE

## 2024-09-13 DIAGNOSIS — E78.5 HYPERLIPIDEMIA LDL GOAL <70: ICD-10-CM

## 2024-09-13 LAB
CHOLEST SERPL-MCNC: 250 MG/DL (ref 0–200)
HDLC SERPL-MCNC: 53 MG/DL (ref 40–60)
LDLC SERPL CALC-MCNC: 162 MG/DL (ref 0–100)
LDLC/HDLC SERPL: 2.99 {RATIO}
PSA SERPL-MCNC: 0.22 NG/ML (ref 0–4)
TRIGL SERPL-MCNC: 192 MG/DL (ref 0–150)
VLDLC SERPL-MCNC: 35 MG/DL (ref 5–40)

## 2024-09-13 PROCEDURE — 80061 LIPID PANEL: CPT

## 2024-09-13 PROCEDURE — 36415 COLL VENOUS BLD VENIPUNCTURE: CPT

## 2024-09-13 PROCEDURE — G0103 PSA SCREENING: HCPCS | Performed by: FAMILY MEDICINE

## 2024-09-18 ENCOUNTER — PATIENT MESSAGE (OUTPATIENT)
Age: 69
End: 2024-09-18
Payer: MEDICARE

## 2024-09-19 NOTE — TELEPHONE ENCOUNTER
That is fine.  Can you please find out what he is actually taking currently.  Jenny can you get him in to be seen in the next month

## 2024-09-20 ENCOUNTER — TELEPHONE (OUTPATIENT)
Age: 69
End: 2024-09-20
Payer: MEDICARE

## 2024-09-20 NOTE — TELEPHONE ENCOUNTER
----- Message from Miguel Ángel Karimi sent at 9/20/2024 10:38 AM EDT -----  Leqvio is fine  ----- Message -----  From: Jenny Martinez CMA  Sent: 9/20/2024  10:36 AM EDT  To: Miguel Ángel Karimi MD    I can take care of that. You also mentioned in another note you wanted get him back in to be seen. Is that still the case or do you want to just order the Leqvio?    Jenny  ----- Message -----  From: Miguel Ángel Karimi MD  Sent: 9/20/2024   9:03 AM EDT  To: ALESSANDRO Fong,    I want him to move over to Leqvio.  I am not sure exactly how to do that with her order system.

## 2024-09-23 RX ORDER — HYDROCHLOROTHIAZIDE 25 MG/1
25 TABLET ORAL DAILY
Qty: 90 TABLET | Refills: 3 | OUTPATIENT
Start: 2024-09-23 | End: 2025-09-23

## 2024-09-30 ENCOUNTER — HOSPITAL ENCOUNTER (OUTPATIENT)
Facility: HOSPITAL | Age: 69
Discharge: HOME | End: 2024-09-30
Admitting: OTOLARYNGOLOGY
Payer: MEDICARE

## 2024-09-30 DIAGNOSIS — E04.1 THYROID NODULE: ICD-10-CM

## 2024-09-30 PROCEDURE — 76536 US EXAM OF HEAD AND NECK: CPT

## 2024-10-01 ENCOUNTER — OFFICE VISIT (OUTPATIENT)
Dept: ORTHOPEDIC SURGERY | Facility: CLINIC | Age: 69
End: 2024-10-01
Payer: MEDICARE

## 2024-10-01 VITALS — TEMPERATURE: 96.4 F | WEIGHT: 243 LBS | BODY MASS INDEX: 28.69 KG/M2 | HEIGHT: 77 IN

## 2024-10-01 DIAGNOSIS — M17.12 PRIMARY OSTEOARTHRITIS OF LEFT KNEE: Primary | ICD-10-CM

## 2024-10-01 RX ORDER — DORZOLAMIDE HYDROCHLORIDE AND TIMOLOL MALEATE 20; 5 MG/ML; MG/ML
1 SOLUTION/ DROPS OPHTHALMIC
COMMUNITY
Start: 2024-07-02

## 2024-10-01 RX ORDER — METHYLPREDNISOLONE ACETATE 80 MG/ML
80 INJECTION, SUSPENSION INTRA-ARTICULAR; INTRALESIONAL; INTRAMUSCULAR; SOFT TISSUE
Status: COMPLETED | OUTPATIENT
Start: 2024-10-01 | End: 2024-10-01

## 2024-10-01 RX ORDER — CHOLESTYRAMINE 4 G/9G
1 POWDER, FOR SUSPENSION ORAL
COMMUNITY
Start: 2024-07-24

## 2024-10-01 RX ORDER — EVOLOCUMAB 140 MG/ML
140 INJECTION, SOLUTION SUBCUTANEOUS
COMMUNITY
Start: 2024-06-13

## 2024-10-01 RX ADMIN — METHYLPREDNISOLONE ACETATE 80 MG: 80 INJECTION, SUSPENSION INTRA-ARTICULAR; INTRALESIONAL; INTRAMUSCULAR; SOFT TISSUE at 11:41

## 2024-10-01 NOTE — PROGRESS NOTES
Patient: Isaias Monaco  YOB: 1955 69 y.o. male  Medical Record Number: [id number]    Chief Complaints:   Chief Complaint   Patient presents with    Left Knee - Initial Evaluation, Pain       History of Present Illness:Isaias Monaco is a 69 y.o. male who presents as a new patient both myself as well as to the practice with complaints of worsening in left knee pain.  Patient has a history of right total knee replacement done by Dr. Coyne, right knee seems to be doing well start with left knee pain at least couple years ago, it has progressively gotten worse, the patient describes the pain as intermittent, worse with certain activities.  He has not able to take anti-inflammatories at all.    Allergies:   Allergies   Allergen Reactions    Nsaids Other (See Comments)     Renal failure    Atorvastatin Myalgia    Hydralazine Myalgia    Norvasc [Amlodipine] Swelling    Zetia [Ezetimibe] Nausea And Vomiting    Crestor [Rosuvastatin] Other (See Comments)     Flu like s/s    Ace Inhibitors Angioedema    Lisinopril Angioedema    Testosterone Myalgia       Medications:   Current Outpatient Medications   Medication Sig Dispense Refill    acetaminophen (TYLENOL) 650 MG 8 hr tablet Take 1 tablet by mouth Every 4 (Four) Hours As Needed for Mild Pain.      ARIPiprazole (ABILIFY) 5 MG tablet Take 1 tablet by mouth Daily.      aspirin 81 MG EC tablet Take 1 tablet by mouth Daily. 30 tablet 6    bisacodyl (DULCOLAX) 5 MG EC tablet Take 1 tablet by mouth Daily As Needed for Constipation (Use if polyethylene glycol is ineffective).      carvedilol (COREG) 25 MG tablet Take 0.5 tablets by mouth 2 (Two) Times a Day With Meals. 12.5      cholestyramine (QUESTRAN) 4 g packet Mix 1 packet in 4-6 ounces of liquid and take by mouth 2 (Two) Times a Day As Needed (Diarrhea). 60 each 0    cholestyramine (QUESTRAN) 4 g packet Take 1 packet by mouth.      clonazePAM (KlonoPIN) 0.5 MG tablet Take 1 tablet by mouth Daily As Needed  for Anxiety. 30 tablet 2    clopidogrel (PLAVIX) 75 MG tablet TAKE 1 TABLET BY MOUTH EVERY DAY 90 tablet 2    dorzolamide-timolol (COSOPT) 2-0.5 % ophthalmic solution Administer 1 drop to both eyes 2 (Two) Times a Day.      dorzolamide-timolol (Cosopt) 2-0.5 % ophthalmic solution 1 drop.      doxazosin (CARDURA) 4 MG tablet Take 1 tablet by mouth every night at bedtime. 90 tablet 4    escitalopram (LEXAPRO) 20 MG tablet TAKE 1 TABLET BY MOUTH DAILY 90 tablet 3    Evolocumab (Repatha SureClick) solution auto-injector SureClick injection Inject 140 mL under the skin into the appropriate area as directed.      gabapentin (NEURONTIN) 300 MG capsule Take 1 capsule by mouth Every Night.      Ingrezza 60 MG capsule Take 1 capsule by mouth Daily.      latanoprost (XALATAN) 0.005 % ophthalmic solution Administer 1 drop to both eyes Every Night.      metFORMIN (GLUCOPHAGE) 500 MG tablet TAKE 2 TABLETS BY MOUTH DAILY WITH BREAKFAST 180 tablet 1    methocarbamol (ROBAXIN) 500 MG tablet Take 1 tablet by mouth 3 (Three) Times a Day.      oxyCODONE-acetaminophen (PERCOCET) 7.5-325 MG per tablet Take 1 tablet by mouth Every 6 (Six) Hours As Needed.      Ozempic, 2 MG/DOSE, 8 MG/3ML solution pen-injector Inject 2 mg under the skin into the appropriate area as directed 1 (One) Time Per Week.      saccharomyces boulardii (Florastor) 250 MG capsule Take 1 capsule by mouth Daily. 30 capsule 0    sennosides-docusate (PERICOLACE) 8.6-50 MG per tablet Take 2 tablets by mouth 2 (Two) Times a Day As Needed for Constipation.      spironolactone (ALDACTONE) 25 MG tablet Take 1 tablet by mouth Daily.      Toujeo SoloStar 300 UNIT/ML solution pen-injector injection ADMINISTER 65 UNITS UNDER THE SKIN DAILY AS DIRECTED (Patient taking differently: Inject 40 Units under the skin into the appropriate area as directed Daily.) 21 mL 0    esomeprazole (nexIUM) 40 MG capsule Take 1 capsule by mouth Every Morning Before Breakfast. 30 capsule 3     "Evolocumab (REPATHA) solution auto-injector SureClick injection Inject 1 mL under the skin into the appropriate area as directed Every 14 (Fourteen) Days. 2 mL 6    ondansetron ODT (ZOFRAN-ODT) 4 MG disintegrating tablet Take 1 tablet by mouth Every 6 (Six) Hours As Needed for Nausea or Vomiting. 120 tablet 0    ondansetron ODT (ZOFRAN-ODT) 4 MG disintegrating tablet Take 1 tablet by mouth 4 (Four) Times a Day As Needed for Nausea or Vomiting. 12 tablet 0     No current facility-administered medications for this visit.         The following portions of the patient's history were reviewed and updated as appropriate: allergies, current medications, past family history, past medical history, past social history, past surgical history and problem list.    Review of Systems:   14 point review of systems was performed. All systems negative except pertinent positives/negatives listed in HPI above    Physical Exam:   Vitals:    10/01/24 1107   Temp: 96.4 °F (35.8 °C)   Weight: 110 kg (243 lb)   Height: 195.6 cm (77\")   PainSc:   3   PainLoc: Knee       General: A and O x 3, ASA, NAD   Skin clear no unusual lesions noted  Left knee patient has trace amount of effusion noted with 116 degrees flexion neutral in extension positive Germán negative Lockman calf soft and nontender       Radiology:  Xrays 3views (ap,lateral, sunrise) left knee were ordered and reviewed today secondary to increased pain show areas of near bone-on-bone end-stage osteoarthritis particularly involving the lateral compartment.  No comparative views available    Assessment/Plan: Osteoarthritis left knee    Patient and I discussed options, we will proceed with left knee cortisone injection, continue physical therapy, Tylenol as needed, and I will see the patient back in 3 months if needed    Large Joint Arthrocentesis: L knee  Date/Time: 10/1/2024 11:41 AM  Consent given by: patient  Site marked: site marked  Timeout: Immediately prior to procedure a " time out was called to verify the correct patient, procedure, equipment, support staff and site/side marked as required   Supporting Documentation  Indications: pain and joint swelling   Procedure Details  Location: knee - L knee  Preparation: Patient was prepped and draped in the usual sterile fashion  Needle size: 22 G  Approach: anterolateral  Medications administered: 80 mg methylPREDNISolone acetate 80 MG/ML; 2 mL lidocaine (cardiac)  Patient tolerance: patient tolerated the procedure well with no immediate complications           Kalyn Marx, APRN  10/1/2024

## 2024-10-08 NOTE — TELEPHONE ENCOUNTER
Faxed this late yesterday and sent form to scanning. I will follow up with twelve stone tomorrow when I am back at the main office.    Jenny

## 2024-10-09 DIAGNOSIS — K21.9 GASTROESOPHAGEAL REFLUX DISEASE WITHOUT ESOPHAGITIS: ICD-10-CM

## 2024-10-09 NOTE — TELEPHONE ENCOUNTER
Rx Refill Note  Requested Prescriptions     Pending Prescriptions Disp Refills    esomeprazole (nexIUM) 40 MG capsule [Pharmacy Med Name: ESOMEPRAZOLE MAG DR 40 MG CAP] 30 capsule 3     Sig: TAKE ONE CAPSULE BY MOUTH EVERY MORNING BEFORE BREAKFAST      Last office visit with prescribing clinician: 9/11/2024   Last telemedicine visit with prescribing clinician: Visit date not found   Next office visit with prescribing clinician: 3/12/2025                         Would you like a call back once the refill request has been completed: [] Yes [] No    If the office needs to give you a call back, can they leave a voicemail: [] Yes [] No    Jing Garcia MA  10/09/24, 14:46 EDT

## 2024-10-11 RX ORDER — ESOMEPRAZOLE MAGNESIUM 40 MG/1
40 CAPSULE, DELAYED RELEASE ORAL
Qty: 90 CAPSULE | Refills: 3 | Status: SHIPPED | OUTPATIENT
Start: 2024-10-11

## 2024-10-16 RX ORDER — SEMAGLUTIDE 2.68 MG/ML
INJECTION, SOLUTION SUBCUTANEOUS
Qty: 3 ML | Refills: 1 | Status: SHIPPED | OUTPATIENT
Start: 2024-10-16

## 2024-10-16 NOTE — TELEPHONE ENCOUNTER
Rx Refill Note  Requested Prescriptions     Pending Prescriptions Disp Refills    Semaglutide, 2 MG/DOSE, (Ozempic, 2 MG/DOSE,) 8 MG/3ML solution pen-injector [Pharmacy Med Name: OZEMPIC 2 MG/DOSE (8 MG/3 ML)] 3 mL 1     Sig: DIAL AND INJECT UNDER THE SKIN INTO THE APPROPRIATE AREA 2 MG WEEKLY AS DIRECTED      Last office visit with prescribing clinician: 9/9/2024   Last telemedicine visit with prescribing clinician: Visit date not found   Next office visit with prescribing clinician: 3/10/2025                         Would you like a call back once the refill request has been completed: [] Yes [] No    If the office needs to give you a call back, can they leave a voicemail: [] Yes [] No    Carrie Griffiths MA  10/16/24, 13:39 EDT

## 2024-10-21 ENCOUNTER — TELEPHONE (OUTPATIENT)
Dept: ENDOCRINOLOGY | Age: 69
End: 2024-10-21
Payer: MEDICARE

## 2024-10-21 NOTE — TELEPHONE ENCOUNTER
PATIENT IS TAKING OZEMPIC, METFORMIN AND TOUJEO. [place] IS ASKING IF HE NEEDS TO BE O ALL 3 OR IF HE NEEDS TO STOP TAKING SOME OF THEM

## 2024-11-23 DIAGNOSIS — F41.9 ANXIETY: ICD-10-CM

## 2024-11-23 DIAGNOSIS — F41.0 PANIC DISORDER: ICD-10-CM

## 2024-11-25 NOTE — TELEPHONE ENCOUNTER
Rx Refill Note  Requested Prescriptions     Pending Prescriptions Disp Refills    clonazePAM (KlonoPIN) 0.5 MG tablet [Pharmacy Med Name: clonazePAM 0.5 MG TABLET] 30 tablet      Sig: TAKE 1 TABLET BY MOUTH DAILY AS NEEDED FOR ANXIETY      Last office visit with prescribing clinician: 9/11/2024   Last telemedicine visit with prescribing clinician: Visit date not found   Next office visit with prescribing clinician: 3/12/2025                         Would you like a call back once the refill request has been completed: [] Yes [] No    If the office needs to give you a call back, can they leave a voicemail: [] Yes [] No    Jing Garcia MA  11/25/24, 09:10 EST

## 2024-11-27 RX ORDER — CLONAZEPAM 0.5 MG/1
0.5 TABLET ORAL DAILY PRN
Qty: 30 TABLET | Refills: 2 | Status: SHIPPED | OUTPATIENT
Start: 2024-11-27

## 2024-12-06 ENCOUNTER — TELEPHONE (OUTPATIENT)
Dept: ORTHOPEDIC SURGERY | Facility: CLINIC | Age: 69
End: 2024-12-06

## 2024-12-06 NOTE — TELEPHONE ENCOUNTER
Caller: Micaela Monaco    Relationship to patient: Emergency Contact    Best call back number: [phone]    Patient is needing: PATIENTS WIFE STATES THAT HE IS STILL HAVING LEFT KNEE PAIN AND SHE IS INTERESTED IN TRYING THE GEL INJECTION THEY SPOKE WITH [facility] ABOUT.   THE ADRIANE INJ LASTED ABOUT 3/4 WEEKS.   PLEASE ADVISE WIFE ON MYCHART.   HE IS CURRENTLY SCHEDULED FOR ANOTHER ADRIANE INJ BUT THEY WANT TO DO GEL IF THEY CAN INSTEAD.

## 2024-12-11 ENCOUNTER — APPOINTMENT (OUTPATIENT)
Dept: CT IMAGING | Facility: HOSPITAL | Age: 69
End: 2024-12-11
Payer: MEDICARE

## 2024-12-11 ENCOUNTER — APPOINTMENT (OUTPATIENT)
Dept: GENERAL RADIOLOGY | Facility: HOSPITAL | Age: 69
End: 2024-12-11
Payer: MEDICARE

## 2024-12-11 ENCOUNTER — HOSPITAL ENCOUNTER (OUTPATIENT)
Facility: HOSPITAL | Age: 69
Setting detail: OBSERVATION
Discharge: HOME | End: 2024-12-12
Attending: EMERGENCY MEDICINE | Admitting: EMERGENCY MEDICINE
Payer: MEDICARE

## 2024-12-11 DIAGNOSIS — I49.8 BIGEMINY: ICD-10-CM

## 2024-12-11 DIAGNOSIS — J18.9 COMMUNITY ACQUIRED PNEUMONIA OF RIGHT UPPER LOBE OF LUNG: Primary | ICD-10-CM

## 2024-12-11 DIAGNOSIS — D72.829 LEUKOCYTOSIS, UNSPECIFIED TYPE: ICD-10-CM

## 2024-12-11 LAB
ALBUMIN SERPL-MCNC: 3.7 G/DL (ref 3.5–5.2)
ALBUMIN/GLOB SERPL: 0.8 G/DL
ALP SERPL-CCNC: 112 U/L (ref 39–117)
ALT SERPL W P-5'-P-CCNC: 28 U/L (ref 1–41)
ANION GAP SERPL CALCULATED.3IONS-SCNC: 13.1 MMOL/L (ref 5–15)
AST SERPL-CCNC: 33 U/L (ref 1–40)
BASOPHILS # BLD AUTO: 0.06 10*3/MM3 (ref 0–0.2)
BASOPHILS NFR BLD AUTO: 0.4 % (ref 0–1.5)
BILIRUB SERPL-MCNC: 0.8 MG/DL (ref 0–1.2)
BUN SERPL-MCNC: 9 MG/DL (ref 8–23)
BUN/CREAT SERPL: 7.8 (ref 7–25)
CALCIUM SPEC-SCNC: 9 MG/DL (ref 8.6–10.5)
CHLORIDE SERPL-SCNC: 99 MMOL/L (ref 98–107)
CO2 SERPL-SCNC: 20.9 MMOL/L (ref 22–29)
CREAT SERPL-MCNC: 1.16 MG/DL (ref 0.76–1.27)
D-LACTATE SERPL-SCNC: 1.6 MMOL/L (ref 0.5–2)
DEPRECATED RDW RBC AUTO: 42.9 FL (ref 37–54)
EGFRCR SERPLBLD CKD-EPI 2021: 68.2 ML/MIN/1.73
EOSINOPHIL # BLD AUTO: 0.25 10*3/MM3 (ref 0–0.4)
EOSINOPHIL NFR BLD AUTO: 1.5 % (ref 0.3–6.2)
ERYTHROCYTE [DISTWIDTH] IN BLOOD BY AUTOMATED COUNT: 14.6 % (ref 12.3–15.4)
GEN 5 1HR TROPONIN T REFLEX: 16 NG/L
GLOBULIN UR ELPH-MCNC: 4.5 GM/DL
GLUCOSE SERPL-MCNC: 128 MG/DL (ref 65–99)
HCT VFR BLD AUTO: 44.5 % (ref 37.5–51)
HGB BLD-MCNC: 14.1 G/DL (ref 13–17.7)
HOLD SPECIMEN: NORMAL
HOLD SPECIMEN: NORMAL
IMM GRANULOCYTES # BLD AUTO: 0.08 10*3/MM3 (ref 0–0.05)
IMM GRANULOCYTES NFR BLD AUTO: 0.5 % (ref 0–0.5)
LYMPHOCYTES # BLD AUTO: 2.11 10*3/MM3 (ref 0.7–3.1)
LYMPHOCYTES NFR BLD AUTO: 12.7 % (ref 19.6–45.3)
MCH RBC QN AUTO: 26.1 PG (ref 26.6–33)
MCHC RBC AUTO-ENTMCNC: 31.7 G/DL (ref 31.5–35.7)
MCV RBC AUTO: 82.3 FL (ref 79–97)
MONOCYTES # BLD AUTO: 1.58 10*3/MM3 (ref 0.1–0.9)
MONOCYTES NFR BLD AUTO: 9.5 % (ref 5–12)
NEUTROPHILS NFR BLD AUTO: 12.58 10*3/MM3 (ref 1.7–7)
NEUTROPHILS NFR BLD AUTO: 75.4 % (ref 42.7–76)
NRBC BLD AUTO-RTO: 0 /100 WBC (ref 0–0.2)
PLATELET # BLD AUTO: 252 10*3/MM3 (ref 140–450)
PMV BLD AUTO: 10.4 FL (ref 6–12)
POTASSIUM SERPL-SCNC: 3.6 MMOL/L (ref 3.5–5.2)
PROCALCITONIN SERPL-MCNC: 0.09 NG/ML (ref 0–0.25)
PROT SERPL-MCNC: 8.2 G/DL (ref 6–8.5)
RBC # BLD AUTO: 5.41 10*6/MM3 (ref 4.14–5.8)
SODIUM SERPL-SCNC: 133 MMOL/L (ref 136–145)
TROPONIN T NUMERIC DELTA: 3 NG/L
TROPONIN T SERPL HS-MCNC: 13 NG/L
WBC NRBC COR # BLD AUTO: 16.66 10*3/MM3 (ref 3.4–10.8)
WHOLE BLOOD HOLD COAG: NORMAL
WHOLE BLOOD HOLD SPECIMEN: NORMAL

## 2024-12-11 PROCEDURE — 80053 COMPREHEN METABOLIC PANEL: CPT

## 2024-12-11 PROCEDURE — 71045 X-RAY EXAM CHEST 1 VIEW: CPT

## 2024-12-11 PROCEDURE — 96365 THER/PROPH/DIAG IV INF INIT: CPT

## 2024-12-11 PROCEDURE — 99285 EMERGENCY DEPT VISIT HI MDM: CPT

## 2024-12-11 PROCEDURE — 83605 ASSAY OF LACTIC ACID: CPT | Performed by: NURSE PRACTITIONER

## 2024-12-11 PROCEDURE — 84145 PROCALCITONIN (PCT): CPT | Performed by: NURSE PRACTITIONER

## 2024-12-11 PROCEDURE — 87040 BLOOD CULTURE FOR BACTERIA: CPT | Performed by: NURSE PRACTITIONER

## 2024-12-11 PROCEDURE — G0378 HOSPITAL OBSERVATION PER HR: HCPCS

## 2024-12-11 PROCEDURE — 36415 COLL VENOUS BLD VENIPUNCTURE: CPT

## 2024-12-11 PROCEDURE — 93005 ELECTROCARDIOGRAM TRACING: CPT

## 2024-12-11 PROCEDURE — 84484 ASSAY OF TROPONIN QUANT: CPT

## 2024-12-11 PROCEDURE — 93010 ELECTROCARDIOGRAM REPORT: CPT | Performed by: INTERNAL MEDICINE

## 2024-12-11 PROCEDURE — 71250 CT THORAX DX C-: CPT

## 2024-12-11 PROCEDURE — 25010000002 CEFTRIAXONE PER 250 MG: Performed by: NURSE PRACTITIONER

## 2024-12-11 PROCEDURE — 0202U NFCT DS 22 TRGT SARS-COV-2: CPT | Performed by: EMERGENCY MEDICINE

## 2024-12-11 PROCEDURE — 93005 ELECTROCARDIOGRAM TRACING: CPT | Performed by: EMERGENCY MEDICINE

## 2024-12-11 PROCEDURE — 85025 COMPLETE CBC W/AUTO DIFF WBC: CPT

## 2024-12-11 RX ORDER — SODIUM CHLORIDE 0.9 % (FLUSH) 0.9 %
10 SYRINGE (ML) INJECTION EVERY 12 HOURS SCHEDULED
Status: DISCONTINUED | OUTPATIENT
Start: 2024-12-11 | End: 2024-12-12 | Stop reason: HOSPADM

## 2024-12-11 RX ORDER — SODIUM CHLORIDE 9 MG/ML
75 INJECTION, SOLUTION INTRAVENOUS CONTINUOUS
Status: DISCONTINUED | OUTPATIENT
Start: 2024-12-11 | End: 2024-12-12 | Stop reason: HOSPADM

## 2024-12-11 RX ORDER — DEXTROSE MONOHYDRATE 25 G/50ML
25 INJECTION, SOLUTION INTRAVENOUS
Status: DISCONTINUED | OUTPATIENT
Start: 2024-12-11 | End: 2024-12-12 | Stop reason: HOSPADM

## 2024-12-11 RX ORDER — NICOTINE POLACRILEX 4 MG
15 LOZENGE BUCCAL
Status: DISCONTINUED | OUTPATIENT
Start: 2024-12-11 | End: 2024-12-12 | Stop reason: HOSPADM

## 2024-12-11 RX ORDER — ACETAMINOPHEN 325 MG/1
650 TABLET ORAL EVERY 4 HOURS PRN
Status: DISCONTINUED | OUTPATIENT
Start: 2024-12-11 | End: 2024-12-12 | Stop reason: HOSPADM

## 2024-12-11 RX ORDER — ACETAMINOPHEN 325 MG/1
650 TABLET ORAL ONCE
Status: DISCONTINUED | OUTPATIENT
Start: 2024-12-11 | End: 2024-12-12 | Stop reason: HOSPADM

## 2024-12-11 RX ORDER — ACETAMINOPHEN 650 MG/1
650 SUPPOSITORY RECTAL EVERY 4 HOURS PRN
Status: DISCONTINUED | OUTPATIENT
Start: 2024-12-11 | End: 2024-12-12 | Stop reason: HOSPADM

## 2024-12-11 RX ORDER — BISACODYL 10 MG
10 SUPPOSITORY, RECTAL RECTAL DAILY PRN
Status: DISCONTINUED | OUTPATIENT
Start: 2024-12-11 | End: 2024-12-12 | Stop reason: HOSPADM

## 2024-12-11 RX ORDER — ONDANSETRON 2 MG/ML
4 INJECTION INTRAMUSCULAR; INTRAVENOUS EVERY 6 HOURS PRN
Status: DISCONTINUED | OUTPATIENT
Start: 2024-12-11 | End: 2024-12-12 | Stop reason: HOSPADM

## 2024-12-11 RX ORDER — ONDANSETRON 4 MG/1
4 TABLET, ORALLY DISINTEGRATING ORAL EVERY 6 HOURS PRN
Status: DISCONTINUED | OUTPATIENT
Start: 2024-12-11 | End: 2024-12-12 | Stop reason: HOSPADM

## 2024-12-11 RX ORDER — SODIUM CHLORIDE 9 MG/ML
40 INJECTION, SOLUTION INTRAVENOUS AS NEEDED
Status: DISCONTINUED | OUTPATIENT
Start: 2024-12-11 | End: 2024-12-12 | Stop reason: HOSPADM

## 2024-12-11 RX ORDER — AMOXICILLIN 250 MG
2 CAPSULE ORAL 2 TIMES DAILY PRN
Status: DISCONTINUED | OUTPATIENT
Start: 2024-12-11 | End: 2024-12-12 | Stop reason: HOSPADM

## 2024-12-11 RX ORDER — POLYETHYLENE GLYCOL 3350 17 G/17G
17 POWDER, FOR SOLUTION ORAL DAILY PRN
Status: DISCONTINUED | OUTPATIENT
Start: 2024-12-11 | End: 2024-12-12 | Stop reason: HOSPADM

## 2024-12-11 RX ORDER — SODIUM CHLORIDE 0.9 % (FLUSH) 0.9 %
10 SYRINGE (ML) INJECTION AS NEEDED
Status: DISCONTINUED | OUTPATIENT
Start: 2024-12-11 | End: 2024-12-12 | Stop reason: HOSPADM

## 2024-12-11 RX ORDER — NITROGLYCERIN 0.4 MG/1
0.4 TABLET SUBLINGUAL
Status: DISCONTINUED | OUTPATIENT
Start: 2024-12-11 | End: 2024-12-12 | Stop reason: HOSPADM

## 2024-12-11 RX ORDER — INSULIN LISPRO 100 [IU]/ML
2-7 INJECTION, SOLUTION INTRAVENOUS; SUBCUTANEOUS
Status: DISCONTINUED | OUTPATIENT
Start: 2024-12-12 | End: 2024-12-12 | Stop reason: HOSPADM

## 2024-12-11 RX ORDER — AZITHROMYCIN 250 MG/1
500 TABLET, FILM COATED ORAL ONCE
Status: COMPLETED | OUTPATIENT
Start: 2024-12-11 | End: 2024-12-11

## 2024-12-11 RX ORDER — IBUPROFEN 600 MG/1
1 TABLET ORAL
Status: DISCONTINUED | OUTPATIENT
Start: 2024-12-11 | End: 2024-12-12 | Stop reason: HOSPADM

## 2024-12-11 RX ORDER — ASPIRIN 325 MG
325 TABLET ORAL ONCE
Status: DISCONTINUED | OUTPATIENT
Start: 2024-12-11 | End: 2024-12-11

## 2024-12-11 RX ORDER — BISACODYL 5 MG/1
5 TABLET, DELAYED RELEASE ORAL DAILY PRN
Status: DISCONTINUED | OUTPATIENT
Start: 2024-12-11 | End: 2024-12-12 | Stop reason: HOSPADM

## 2024-12-11 RX ORDER — ACETAMINOPHEN 160 MG/5ML
650 SOLUTION ORAL EVERY 4 HOURS PRN
Status: DISCONTINUED | OUTPATIENT
Start: 2024-12-11 | End: 2024-12-12 | Stop reason: HOSPADM

## 2024-12-11 RX ADMIN — CEFTRIAXONE 2000 MG: 2 INJECTION, POWDER, FOR SOLUTION INTRAMUSCULAR; INTRAVENOUS at 21:48

## 2024-12-11 RX ADMIN — AZITHROMYCIN DIHYDRATE 500 MG: 250 TABLET ORAL at 21:45

## 2024-12-12 ENCOUNTER — READMISSION MANAGEMENT (OUTPATIENT)
Dept: CALL CENTER | Facility: HOSPITAL | Age: 69
End: 2024-12-12
Payer: MEDICARE

## 2024-12-12 VITALS
HEART RATE: 90 BPM | HEIGHT: 77 IN | BODY MASS INDEX: 29.4 KG/M2 | WEIGHT: 249 LBS | TEMPERATURE: 98.2 F | SYSTOLIC BLOOD PRESSURE: 151 MMHG | OXYGEN SATURATION: 98 % | DIASTOLIC BLOOD PRESSURE: 87 MMHG | RESPIRATION RATE: 18 BRPM

## 2024-12-12 LAB
ANION GAP SERPL CALCULATED.3IONS-SCNC: 10.7 MMOL/L (ref 5–15)
B PARAPERT DNA SPEC QL NAA+PROBE: NOT DETECTED
B PERT DNA SPEC QL NAA+PROBE: NOT DETECTED
BUN SERPL-MCNC: 8 MG/DL (ref 8–23)
BUN/CREAT SERPL: 7 (ref 7–25)
C PNEUM DNA NPH QL NAA+NON-PROBE: NOT DETECTED
CALCIUM SPEC-SCNC: 8.5 MG/DL (ref 8.6–10.5)
CHLORIDE SERPL-SCNC: 104 MMOL/L (ref 98–107)
CO2 SERPL-SCNC: 21.3 MMOL/L (ref 22–29)
CREAT SERPL-MCNC: 1.15 MG/DL (ref 0.76–1.27)
DEPRECATED RDW RBC AUTO: 44.9 FL (ref 37–54)
EGFRCR SERPLBLD CKD-EPI 2021: 68.9 ML/MIN/1.73
ERYTHROCYTE [DISTWIDTH] IN BLOOD BY AUTOMATED COUNT: 14.8 % (ref 12.3–15.4)
FLUAV SUBTYP SPEC NAA+PROBE: NOT DETECTED
FLUBV RNA ISLT QL NAA+PROBE: NOT DETECTED
GLUCOSE BLDC GLUCOMTR-MCNC: 114 MG/DL (ref 70–130)
GLUCOSE SERPL-MCNC: 127 MG/DL (ref 65–99)
HADV DNA SPEC NAA+PROBE: NOT DETECTED
HCOV 229E RNA SPEC QL NAA+PROBE: NOT DETECTED
HCOV HKU1 RNA SPEC QL NAA+PROBE: NOT DETECTED
HCOV NL63 RNA SPEC QL NAA+PROBE: NOT DETECTED
HCOV OC43 RNA SPEC QL NAA+PROBE: NOT DETECTED
HCT VFR BLD AUTO: 39.1 % (ref 37.5–51)
HGB BLD-MCNC: 12.7 G/DL (ref 13–17.7)
HMPV RNA NPH QL NAA+NON-PROBE: NOT DETECTED
HPIV1 RNA ISLT QL NAA+PROBE: NOT DETECTED
HPIV2 RNA SPEC QL NAA+PROBE: NOT DETECTED
HPIV3 RNA NPH QL NAA+PROBE: NOT DETECTED
HPIV4 P GENE NPH QL NAA+PROBE: NOT DETECTED
M PNEUMO IGG SER IA-ACNC: NOT DETECTED
MCH RBC QN AUTO: 27.2 PG (ref 26.6–33)
MCHC RBC AUTO-ENTMCNC: 32.5 G/DL (ref 31.5–35.7)
MCV RBC AUTO: 83.7 FL (ref 79–97)
PLATELET # BLD AUTO: 212 10*3/MM3 (ref 140–450)
PMV BLD AUTO: 10.6 FL (ref 6–12)
POTASSIUM SERPL-SCNC: 3.4 MMOL/L (ref 3.5–5.2)
QT INTERVAL: 327 MS
QTC INTERVAL: 441 MS
RBC # BLD AUTO: 4.67 10*6/MM3 (ref 4.14–5.8)
RHINOVIRUS RNA SPEC NAA+PROBE: NOT DETECTED
RSV RNA NPH QL NAA+NON-PROBE: NOT DETECTED
SARS-COV-2 RNA NPH QL NAA+NON-PROBE: NOT DETECTED
SODIUM SERPL-SCNC: 136 MMOL/L (ref 136–145)
TROPONIN T SERPL HS-MCNC: 16 NG/L
WBC NRBC COR # BLD AUTO: 14.08 10*3/MM3 (ref 3.4–10.8)

## 2024-12-12 PROCEDURE — G0378 HOSPITAL OBSERVATION PER HR: HCPCS

## 2024-12-12 PROCEDURE — 82948 REAGENT STRIP/BLOOD GLUCOSE: CPT

## 2024-12-12 PROCEDURE — 85027 COMPLETE CBC AUTOMATED: CPT

## 2024-12-12 PROCEDURE — 25810000003 SODIUM CHLORIDE 0.9 % SOLUTION

## 2024-12-12 PROCEDURE — 63710000001 INSULIN GLARGINE PER 5 UNITS

## 2024-12-12 PROCEDURE — 80048 BASIC METABOLIC PNL TOTAL CA: CPT

## 2024-12-12 PROCEDURE — 84484 ASSAY OF TROPONIN QUANT: CPT

## 2024-12-12 RX ORDER — TERAZOSIN 5 MG/1
5 CAPSULE ORAL NIGHTLY
Status: DISCONTINUED | OUTPATIENT
Start: 2024-12-12 | End: 2024-12-12 | Stop reason: HOSPADM

## 2024-12-12 RX ORDER — AZITHROMYCIN 250 MG/1
TABLET, FILM COATED ORAL
Qty: 5 TABLET | Refills: 0 | Status: SHIPPED | OUTPATIENT
Start: 2024-12-12

## 2024-12-12 RX ORDER — POTASSIUM CHLORIDE 750 MG/1
40 TABLET, FILM COATED, EXTENDED RELEASE ORAL EVERY 4 HOURS
Status: DISCONTINUED | OUTPATIENT
Start: 2024-12-12 | End: 2024-12-12 | Stop reason: HOSPADM

## 2024-12-12 RX ORDER — CARVEDILOL 12.5 MG/1
12.5 TABLET ORAL 2 TIMES DAILY WITH MEALS
Status: DISCONTINUED | OUTPATIENT
Start: 2024-12-12 | End: 2024-12-12 | Stop reason: HOSPADM

## 2024-12-12 RX ORDER — ASPIRIN 81 MG/1
81 TABLET ORAL EVERY EVENING
Status: DISCONTINUED | OUTPATIENT
Start: 2024-12-12 | End: 2024-12-12 | Stop reason: HOSPADM

## 2024-12-12 RX ORDER — ARIPIPRAZOLE 5 MG/1
5 TABLET ORAL DAILY
Status: DISCONTINUED | OUTPATIENT
Start: 2024-12-12 | End: 2024-12-12 | Stop reason: HOSPADM

## 2024-12-12 RX ORDER — ESCITALOPRAM OXALATE 20 MG/1
20 TABLET ORAL DAILY
Status: DISCONTINUED | OUTPATIENT
Start: 2024-12-12 | End: 2024-12-12 | Stop reason: HOSPADM

## 2024-12-12 RX ORDER — CLOPIDOGREL BISULFATE 75 MG/1
75 TABLET ORAL DAILY
Status: DISCONTINUED | OUTPATIENT
Start: 2024-12-12 | End: 2024-12-12 | Stop reason: HOSPADM

## 2024-12-12 RX ORDER — PANTOPRAZOLE SODIUM 40 MG/1
40 TABLET, DELAYED RELEASE ORAL
Status: DISCONTINUED | OUTPATIENT
Start: 2024-12-12 | End: 2024-12-12 | Stop reason: HOSPADM

## 2024-12-12 RX ADMIN — CLOPIDOGREL BISULFATE 75 MG: 75 TABLET ORAL at 09:07

## 2024-12-12 RX ADMIN — ARIPIPRAZOLE 5 MG: 5 TABLET ORAL at 09:07

## 2024-12-12 RX ADMIN — INSULIN GLARGINE 32 UNITS: 100 INJECTION, SOLUTION SUBCUTANEOUS at 09:08

## 2024-12-12 RX ADMIN — Medication 10 ML: at 09:08

## 2024-12-12 RX ADMIN — PANTOPRAZOLE SODIUM 40 MG: 40 TABLET, DELAYED RELEASE ORAL at 09:07

## 2024-12-12 RX ADMIN — Medication 10 ML: at 00:48

## 2024-12-12 RX ADMIN — POTASSIUM CHLORIDE 40 MEQ: 750 TABLET, EXTENDED RELEASE ORAL at 09:07

## 2024-12-12 RX ADMIN — SODIUM CHLORIDE 75 ML/HR: 9 INJECTION, SOLUTION INTRAVENOUS at 00:46

## 2024-12-12 RX ADMIN — CARVEDILOL 12.5 MG: 12.5 TABLET, FILM COATED ORAL at 09:07

## 2024-12-12 NOTE — PROGRESS NOTES
Case Management Discharge Note      Final Note: DC home no DC needs         Selected Continued Care - Discharged on 12/12/2024 Admission date: 12/11/2024 - Discharge disposition: Home or Self Care      Destination    No services have been selected for the patient.                Durable Medical Equipment    No services have been selected for the patient.                Dialysis/Infusion    No services have been selected for the patient.                Home Medical Care    No services have been selected for the patient.                Therapy    No services have been selected for the patient.                Community Resources    No services have been selected for the patient.                Community & DME    No services have been selected for the patient.                         Final Discharge Disposition Code: 01 - home or self-care

## 2024-12-12 NOTE — ED PROVIDER NOTES
EMERGENCY DEPARTMENT MD ATTESTATION NOTE    Room Number:  103/1  PCP: Gwyn Patten Sr., MD  Independent Historians: Patient and Family    HPI:  A complete HPI/ROS/PMH/PSH/SH/FH are unobtainable due to: None    Chronic or social conditions impacting patient care (Social Determinants of Health): None      Context: Isaias Monaco is a 69 y.o. male with a medical history of colitis, coronary disease, sepsis who presents to the ED c/o acute palpitations, generalized weakness and cough.  Family reports that they went on a cruise last week.  They report that yesterday he was having some palpitations and feeling like his heart rate was irregular.  Today he was generally weak.  He has had a cough.  Family reports that he has poor reserve and frequently gets prolonged admissions to the hospital when he gets sick.        Review of prior external notes (non-ED) -and- Review of prior external test results outside of this encounter:  Laboratory evaluation 8/31/2024 shows normal CMP    Prescription drug monitoring program review:           PHYSICAL EXAM    I have reviewed the triage vital signs and nursing notes.    ED Triage Vitals   Temp Heart Rate Resp BP SpO2   12/11/24 1942 12/11/24 1942 12/11/24 1942 12/11/24 1942 12/11/24 1942   99.9 °F (37.7 °C) 107 18 150/91 96 %      Temp src Heart Rate Source Patient Position BP Location FiO2 (%)   12/11/24 1942 12/11/24 2155 12/11/24 1942 12/11/24 1942 --   Tympanic Monitor Sitting Right arm        Physical Exam  GENERAL: Awake, alert, no acute distress, chronically ill-appearing  SKIN: Warm, dry  HENT: Normocephalic, atraumatic  EYES: no scleral icterus  CV: regular rhythm, regular rate  RESPIRATORY: normal effort, lungs clear  ABDOMEN: soft, nontender, nondistended  MUSCULOSKELETAL: no deformity  NEURO: alert, moves all extremities, follows commands            MEDICATIONS GIVEN IN ER  Medications   sodium chloride 0.9 % flush 10 mL (has no administration in time range)    acetaminophen (TYLENOL) tablet 650 mg (650 mg Oral Not Given 12/12/24 0048)   sodium chloride 0.9 % flush 10 mL (10 mL Intravenous Given 12/12/24 0048)   sodium chloride 0.9 % flush 10 mL (has no administration in time range)   sodium chloride 0.9 % infusion 40 mL (has no administration in time range)   ondansetron ODT (ZOFRAN-ODT) disintegrating tablet 4 mg (has no administration in time range)     Or   ondansetron (ZOFRAN) injection 4 mg (has no administration in time range)   nitroglycerin (NITROSTAT) SL tablet 0.4 mg (has no administration in time range)   Potassium Replacement - Follow Nurse / BPA Driven Protocol (has no administration in time range)   Magnesium Standard Dose Replacement - Follow Nurse / BPA Driven Protocol (has no administration in time range)   Phosphorus Replacement - Follow Nurse / BPA Driven Protocol (has no administration in time range)   Calcium Replacement - Follow Nurse / BPA Driven Protocol (has no administration in time range)   acetaminophen (TYLENOL) tablet 650 mg (has no administration in time range)     Or   acetaminophen (TYLENOL) 160 MG/5ML oral solution 650 mg (has no administration in time range)     Or   acetaminophen (TYLENOL) suppository 650 mg (has no administration in time range)   sennosides-docusate (PERICOLACE) 8.6-50 MG per tablet 2 tablet (has no administration in time range)     And   polyethylene glycol (MIRALAX) packet 17 g (has no administration in time range)     And   bisacodyl (DULCOLAX) EC tablet 5 mg (has no administration in time range)     And   bisacodyl (DULCOLAX) suppository 10 mg (has no administration in time range)   azithromycin (ZITHROMAX) 500 mg in sodium chloride 0.9 % 250 mL IVPB-VTB (has no administration in time range)   cefTRIAXone (ROCEPHIN) 2,000 mg in sodium chloride 0.9 % 100 mL MBP (has no administration in time range)   sodium chloride 0.9 % infusion (75 mL/hr Intravenous New Bag 12/12/24 0046)   dextrose (GLUTOSE) oral gel 15 g (has  no administration in time range)   dextrose (D50W) (25 g/50 mL) IV injection 25 g (has no administration in time range)   glucagon (GLUCAGEN) injection 1 mg (has no administration in time range)   insulin lispro (HUMALOG/ADMELOG) injection 2-7 Units (has no administration in time range)   ARIPiprazole (ABILIFY) tablet 5 mg (has no administration in time range)   aspirin EC tablet 81 mg (has no administration in time range)   carvedilol (COREG) tablet 12.5 mg (has no administration in time range)   clopidogrel (PLAVIX) tablet 75 mg (has no administration in time range)   terazosin (HYTRIN) capsule 5 mg (5 mg Oral Not Given 12/12/24 0050)   escitalopram (LEXAPRO) tablet 20 mg (has no administration in time range)   pantoprazole (PROTONIX) EC tablet 40 mg (has no administration in time range)   insulin glargine (LANTUS, SEMGLEE) injection 32 Units (has no administration in time range)   cefTRIAXone (ROCEPHIN) 2,000 mg in sodium chloride 0.9 % 100 mL MBP (0 mg Intravenous Stopped 12/11/24 2231)   azithromycin (ZITHROMAX) tablet 500 mg (500 mg Oral Given 12/11/24 2145)         ORDERS PLACED DURING THIS VISIT:  Orders Placed This Encounter   Procedures    Blood Culture - Blood,    Blood Culture - Blood,    Respiratory Panel PCR w/COVID-19(SARS-CoV-2) ANGIE/AMARA/KAIDEN/PAD/COR/KEENAN In-House, NP Swab in UTM/VTM, 2 HR TAT - Swab, Nasopharynx    XR Chest 1 View    CT Chest Without Contrast Diagnostic    [place] Draw    Comprehensive Metabolic Panel    High Sensitivity Troponin T    CBC Auto Differential    High Sensitivity Troponin T 1Hr    Lactic Acid, Plasma    Procalcitonin    CBC (No Diff)    Basic Metabolic Panel    High Sensitivity Troponin T    Diet: Cardiac; Healthy Heart (2-3 Na+); Fluid Consistency: Thin (IDDSI 0)    Undress & Gown    Check Pulse Oximetry while ambulating    Intake & Output    Weigh Patient    Oral Care    Place Sequential Compression Device    Maintain Sequential Compression Device    Telemetry - Place  Orders & Notify Provider of Results When Patient Experiences Acute Chest Pain, Dysrhythmia or Respiratory Distress    May Be Off Telemetry for Tests    Vital Signs    Continuous Pulse Oximetry    Up With Assistance    Maintain IV Access    Telemetry - Place Orders & Notify Provider of Results When Patient Experiences Acute Chest Pain, Dysrhythmia or Respiratory Distress    May Be Off Telemetry for Tests    Code Status and Medical Interventions: CPR (Attempt to Resuscitate); Full Support    Oxygen Therapy- Nasal Cannula; Titrate 1-6 LPM Per SpO2; 90 - 95%    POC Glucose 4x Daily Before Meals & at Bedtime    ECG 12 Lead ED Triage Standing Order; Chest Pain    ECG 12 Lead ED Triage Standing Order; Chest Pain    Telemetry Scan    Telemetry Scan    Insert Peripheral IV    Insert Peripheral IV    Initiate ED Observation Status    CBC & Differential    Green Top (Gel)    Lavender Top    Gold Top - SST    Light Blue Top         PROCEDURES  Procedures            PROGRESS, DATA ANALYSIS, CONSULTS, AND MEDICAL DECISION MAKING  All labs have been independently interpreted by me.  All radiology studies have been reviewed by me. All EKG's have been independently viewed and interpreted by me.  Discussion below represents my analysis of pertinent findings related to patient's condition, differential diagnosis, treatment plan and final disposition.    Differential diagnosis includes but is not limited to viral syndrome, pneumonia, arrhythmia, A-fib, PE,.    Clinical Scores:                                     ED Course as of 12/12/24 0113   Wed Dec 11, 2024   2048 Per my independent interpretation of the chest x-ray, there is no obvious pneumothorax.     [JG]   2221 I updated the patient on current ED work up, including labs and imaging (if performed) with indication for admission. All questions and concerns addressed and ready for admit at this time.    [JG]   2225 XR Chest 1 View  My independent interpretation of the imaging study  is right upper lobe infiltrate [TR]   2230 EKG          EKG time: 1957  Rhythm/Rate: Sinus tachycardia, rate 109  P waves and TX: Normal P, normal TX  QRS, axis: Narrow QRS, left axis  ST and T waves: No acute    Independently Interpreted by me  Increased rate changed compared to prior 8/30/2024   [TR]   2312 Discussed with DAVID Cheng in OBS unit. She agrees to admission. No further recommendations.  [JG]      ED Course User Index  [JG] Lesly Lombardo APRN  [TR] Ted Lowry MD       MDM: Plan laboratory evaluation as well as chest x-ray.  We we will monitor his cardiac function on the cardiac monitor.  Will evaluate for arrhythmias.  Per family he was too weak to get up and around today.  He would likely require admission.      COMPLEXITY OF CARE  The patient requires admission.    Please note that portions of this document were completed with a voice recognition program.    Note Disclaimer: At [facility], we believe that sharing information builds trust and better relationships. You are receiving this note because you recently visited [facility]. It is possible you will see health information before a provider has talked with you about it. This kind of information can be easy to misunderstand. To help you fully understand what it means for your health, we urge you to discuss this note with your provider.         Ted Lowry MD  12/11/24 4455       Ted Lowry MD  12/12/24 5491

## 2024-12-12 NOTE — PROGRESS NOTES
Pt admitted to the observation unit from the emergency department with a community-acquired pneumonia.  Had been having some generalized weakness and cough.  Today feeling better, strength improving though still weak.  Shortness of breath is better.     On exam,   General: No acute distress, nontoxic  HEENT: EOMI  Pulm: Symmetric chest rise, nonlabored breathing, CTAB  CV: Regular rate and rhythm  GI: Nondistended  MSK: No deformity  Skin: Warm, dry  Neuro: Awake, alert, oriented x 4, moving all extremities, no focal deficits  Psych: Calm, cooperative    Vital signs and nursing notes reviewed.           Plan: Improving today, ambulatory without significant difficulty and comfortable for discharge by wife at bedside and patient, improving white blood cell count today down from 16.6 down to 14.08.  Will discharge on antibiotics with outpatient follow-up.  No hypoxia or increased work of breathing.  All questions or concerns addressed.         MD Attestation Note    SHARED VISIT: This visit was performed by BOTH a physician and an APC. The substantive portion of the medical decision making was performed by this attesting physician who made or approved the management plan and takes responsibility for patient management. All studies in the APC note (if performed) were independently interpreted by me.

## 2024-12-12 NOTE — H&P
[facility]   HISTORY AND PHYSICAL    Patient Name: Isaias Monaco  : 1955  MRN: [id number]  Primary Care Physician:  Gwyn Patten Sr., MD  Date of admission: 2024    Subjective   Subjective     Chief Complaint:   Chief Complaint   Patient presents with    Palpitations    Fatigue         HPI:    Isaias Monaco is a 69 y.o. male, with a past medical history including, but not limited to, coronary artery disease, anxiety, diabetes, GERD, hyperlipidemia, hypertension, and spinal stenosis, presented to the emergency department with a 2 to 3-day history of fatigue, cough, palpitations, and shortness of breath with exertion.  He denies any fever, chills, nausea, vomiting, diarrhea, sore throat, chest pain, or other complaints.  Patient's wife, who is at bedside, states that he has not been eating and drinking over the past 2 days and is worried that he is dehydrated.      Review of Systems   All systems were reviewed and negative except for: What was mentioned above in the HPI.    Personal History     Past Medical History:   Diagnosis Date    Anemia     post hemorrhagic    Angioedema 2016    Secondary to ACE inhibitor    Anxiety     Arthritis     CAD (coronary artery disease)     Colon polyps     Diabetes mellitus, type 2     GERD (gastroesophageal reflux disease)     Glaucoma     Hematoma     history    High cholesterol     History of foreign travel 2017; 2018    [place], [place], [place], [place], [place] and [place]; [place]    Hyperlipidemia     Hypertension     Hypogonadism in male 2016    Male erectile disorder     Microalbuminuria     Osteoarthritis     knee    Spinal stenosis of lumbar region without neurogenic claudication 2021    Tubular adenoma of colon 2017    Vitamin D deficiency        Past Surgical History:   Procedure Laterality Date    CARDIAC CATHETERIZATION N/A 05/15/2006    Dr. Mini Camarillo    CARDIAC CATHETERIZATION N/A 03/10/2020     Procedure: Left Heart Cath;  Surgeon: Miguel Ángel Karimi MD;  Location: [facility] CATH INVASIVE LOC[facility];  Service: Cardiology;  Laterality: N/A;    CARDIAC CATHETERIZATION N/A 03/10/2020    Procedure: Stent DAVONTE coronary;  Surgeon: Miguel Ángel Karimi MD;  Location: [facility] LOC[facility];  Service: Cardiology;  Laterality: N/A;    CARDIAC CATHETERIZATION N/A 03/10/2020    Procedure: Coronary angiography;  Surgeon: Miguel Ángel Karimi MD;  Location: [facility];  Service: Cardiology;  Laterality: N/A;    CARDIAC CATHETERIZATION N/A 03/10/2020    Procedure: Left ventriculography;  Surgeon: Miguel Ángel Karimi MD;  Location: [facility] LOC[facility];  Service: Cardiology;  Laterality: N/A;    CARDIAC CATHETERIZATION N/A 05/20/2022    Procedure: Left Heart Cath;  Surgeon: Mackenzie Morales MD;  Location: [facility] CATH INVASIVE LOCATION;  Service: Cardiovascular;  Laterality: N/A;    CARDIAC CATHETERIZATION N/A 05/20/2022    Procedure: Coronary angiography;  Surgeon: Mackenzie Morales MD;  Location: [facility] CATH INVASIVE LOCATION;  Service: Cardiovascular;  Laterality: N/A;    CARDIAC CATHETERIZATION N/A 05/20/2022    Procedure: Percutaneous Coronary Intervention;  Surgeon: Mackenzie Morales MD;  Location: [facility] CATH INVASIVE LOCATION;  Service: Cardiovascular;  Laterality: N/A;    CARDIAC CATHETERIZATION N/A 05/24/2022    Procedure: Percutaneous Coronary Intervention;  Surgeon: Miguel Ángel Karimi MD;  Location: [facility] CATH INVASIVE LOCATION;  Service: Cardiovascular;  Laterality: N/A;  LAD and Cx    CARDIAC CATHETERIZATION N/A 05/24/2022    Procedure: Stent DAVONTE coronary;  Surgeon: Miguel Ángel Karimi MD;  Location: [facility] LOC[facility];  Service: Cardiovascular;  Laterality: N/A;    CARDIAC CATHETERIZATION N/A 05/24/2022    Procedure: Resting Full Cycle Ratio;  Surgeon: Miguel Ángel Karimi MD;  Location: [facility] CATH INVASIVE LOCATION;  Service: Cardiovascular;   Laterality: N/A;    CARDIAC CATHETERIZATION N/A 03/19/2024    Procedure: Left Heart Cath;  Surgeon: Miguel Ángel Karimi MD;  Location: [facility] CATH INVASIVE LOCATION;  Service: Cardiovascular;  Laterality: N/A;    CARDIAC CATHETERIZATION N/A 03/19/2024    Procedure: Percutaneous Coronary Intervention;  Surgeon: Miguel Ángel Karimi MD;  [facility];  Service: Cardiovascular;  Laterality: N/A;    CARDIAC CATHETERIZATION N/A 03/19/2024    Procedure: Stent DAVONTE coronary;  Surgeon: Miguel Ángel Karimi MD;  [facility];  Service: Cardiovascular;  Laterality: N/A;    CERVICAL DISCECTOMY POSTERIOR FUSION WITH INSTRUMENTATION Bilateral 6/25/2024    Procedure: Cervical 3 to cervical 6 laminectomies and posterior spinal fusion - Bilateral;  Surgeon: Marshal Miguel MD;  Location: [facility] MAIN OR;  Service: Neurosurgery;  Laterality: Bilateral;    COLONOSCOPY N/A 02/22/2006    Bilobed polyp at 30 cm, hemorrhoids-Dr. Ilya Zhao    COLONOSCOPY N/A 02/28/2014    Normal ileum, one 6 mm polyp in the mid transverse colon-Dr. Ilya Zhao    COLONOSCOPY N/A 11/19/2008    Ela ileum, two 3 to 4 mm polyps, non-bleeding internal hemorrhoids, repeat in 5 years-Dr. Ilya Zhao    COLONOSCOPY N/A 10/31/2017    Procedure: COLONOSCOPY WITH POLYPECTOMY (COLD BIOPSY);  Surgeon: Ilya Zhao MD;  Location: [facility] ENDOSCOPY;  Service:     COLONOSCOPY N/A 05/21/2021    Procedure: Colonoscopy into cecum and terminal ileum with cold biopsy polypectomy;  Surgeon: Ilya Zhao MD;  Location: [facility] ENDOSCOPY;  Service: Gastroenterology;  Laterality: N/A;  Pre op: History of Polyps  Post op: Polyp    CORONARY ANGIOPLASTY WITH STENT PLACEMENT  2007, 2012, 2015    cardiac stents x3 occasions    ENDOSCOPY N/A 10/04/2017    Procedure: ESOPHAGOGASTRODUODENOSCOPY;  Surgeon: Boyd Guidry MD;  Location: [facility] ENDOSCOPY;  Service:     ENDOSCOPY N/A 05/21/2021    Procedure:  ESOPHAGOGASTRODUODENOSCOPY with biopsies;  Surgeon: Ilya Zhao MD;  [facility];  Service: Gastroenterology;  Laterality: N/A;  Pre op: GERD  Post op: Irregular  Z-Line, Hiatal Hernia, Gastritis    ENDOSCOPY N/A 08/25/2023    Procedure: ESOPHAGOGASTRODUODENOSCOPY with biopsy;  Surgeon: Ilya Zhao MD;  [facility];  Service: Gastroenterology;  Laterality: N/A;  errosive gastritis    EPIDURAL BLOCK      INTERVENTIONAL RADIOLOGY PROCEDURE N/A 05/20/2022    Procedure: Intravascular Ultrasound;  Surgeon: Mackenzie Morales MD;  Location: [facility] CATH INVASIVE LOCATION;  Service: Cardiovascular;  Laterality: N/A;    KNEE INCISION AND DRAINAGE Right 06/20/2017    Procedure: RT. KNEE WASHOUT ;  Surgeon: Boyd Coyne MD;  Location: [facility] MAIN OR;  Service:     MEDIAL BRANCH BLOCK Bilateral 12/17/2021    Procedure: LUMBAR MEDIAL BRANCH BLOCK bilateral ~L4-S1 x2 (~2 weeks apart);  Surgeon: Christina Villaseñor MD;  [facility] MA[facility];  Service: Pain Management;  Laterality: Bilateral;    MEDIAL BRANCH BLOCK Bilateral 01/03/2022    Procedure: LUMBAR MEDIAL BRANCH BLOCK bilateral ~L4-S1 x2 (~2 weeks apart);  Surgeon: Christina Villaseñor MD;  Location: [facility] MAIN [facility];  Service: Pain Management;  Laterality: Bilateral;    AK ARTHRP KNE CONDYLE&PLATU MEDIAL&LAT COMPARTMENTS Right 06/15/2017    Procedure: RT TOTAL KNEE ARTHROPLASTY;  Surgeon: Boyd Coyne MD;  Location: [facility] MAIN OR;  Service: Orthopedics    RADIOFREQUENCY ABLATION Bilateral 01/10/2022    Procedure: RADIOFREQUENCY ABLATION NERVES Bilateral L4-S1;  Surgeon: Christina Villaseñor MD;  Location: [facility] MAIN [facility];  Service: Pain Management;  Laterality: Bilateral;    SHOULDER SURGERY Right 2016    rotator cuff repair    UPPER GASTROINTESTINAL ENDOSCOPY N/A 10/13/2015    Z-line irregular, normal stomach, normal duodenum-Dr. Ilya Zhao    UPPER GASTROINTESTINAL ENDOSCOPY N/A 02/28/2014    Z-line irregular, normal stomach, normal  duodenum-Dr. Ilya Zhao    UPPER GASTROINTESTINAL ENDOSCOPY N/A 11/19/2008    Z-line irregular, chronic gastritis withotu hemorrhage, normal duodenum-Dr. Ilya Zhao    UPPER GASTROINTESTINAL ENDOSCOPY N/A 06/22/2006    LA Grade A reflux esophagitis, non-bleeding erythematous gastropathy, normal duodenum-Dr. Ilya Zhao       Family History: family history includes Alcohol abuse in his father; Arthritis in his brother, father, and mother; Dementia in his father; Depression in his father; Heart attack (age of onset: 40) in his brother; Heart disease in his brother, father, and another family member; Hyperlipidemia in his mother; Hypertension in his father, mother, and another family member; Lupus in his father, mother, and sister; Other in his father; Thyroid disease in his mother, sister, and sister; Vision loss in his mother. Otherwise pertinent FHx was reviewed and not pertinent to current issue.    Social History:  reports that he has never smoked. He has never been exposed to tobacco smoke. He has never used smokeless tobacco. He reports current alcohol use of about 3.0 standard drinks of alcohol per week. He reports that he does not use drugs.    Home Medications:  ARIPiprazole, Evolocumab, Insulin Glargine (1 Unit Dial), Semaglutide (2 MG/DOSE), Valbenazine Tosylate, acetaminophen, aspirin, bisacodyl, carvedilol, cholestyramine, clonazePAM, clopidogrel, dorzolamide-timolol, doxazosin, escitalopram, esomeprazole, gabapentin, latanoprost, metFORMIN, methocarbamol, ondansetron ODT, oxyCODONE-acetaminophen, saccharomyces boulardii, sennosides-docusate, and spironolactone    Allergies:  Allergies   Allergen Reactions    Nsaids Other (See Comments)     Renal failure    Atorvastatin Myalgia    Hydralazine Myalgia    Norvasc [Amlodipine] Swelling    Zetia [Ezetimibe] Nausea And Vomiting    Crestor [Rosuvastatin] Other (See Comments)     Flu like s/s    Ace Inhibitors Angioedema    Lisinopril Angioedema     Testosterone Myalgia       Objective   Objective     Vitals:   Temp:  [99.9 °F (37.7 °C)] 99.9 °F (37.7 °C)  Heart Rate:  [100-110] 101  Resp:  [18] 18  BP: (139-178)/(80-91) 139/80  Physical Exam   Constitutional: Awake, alert   Eyes: PERRLA   HENT: NCAT, mucous membranes dry  Neck: Supple   Respiratory: Clear to auscultation bilaterally, nonlabored respirations    Cardiovascular: regular rate, palpable pedal pulses bilaterally   Gastrointestinal: Positive bowel sounds, soft, nontender, nondistended   Musculoskeletal: No bilateral ankle edema   Psychiatric: Appropriate affect, cooperative   Neurologic: Oriented x 3, speech clear   Skin: No rashes       Result Review    Result Review:  I have personally reviewed the results from the time of this admission to 12/11/2024 23:20 EST and agree with these findings:  [x]  Laboratory list / accordion  []  Microbiology  [x]  Radiology  []  EKG/Telemetry   []  Cardiology/Vascular   []  Pathology  [x]  Old records  []  Other:    Lab work in the emergency department shows high-sensitivity troponin of 13, 16, delta 3, sodium 133, glucose 128, WBC 16.66.  All of the lab work is at baseline for patient.  Procalcitonin 0.09, lactate 1.6.  Blood cultures are pending at this time.  Respiratory viral panel PCR is also pending.  CT chest without contrast shows patchy infiltrate within the right upper lobe most in keeping with pneumonia.      Assessment & Plan   Assessment / Plan     Brief Patient Summary:  Isaias Monaco is a 69 y.o. male who was admitted to the observation unit for further evaluation and treatment of his CAP.    Active Hospital Problems:  Active Hospital Problems    Diagnosis     **CAP (community acquired pneumonia)      Plan:     Community acquired Pneumonia  -cardiac monitoring  -VS q 4 hours  -continuous pulse ox  -Zithromax 500 mg IV q 24 hours  -Rocephin 2 gm IV q 24 hours  -NS @ 75 ml/hr x 12 hours  -Repeat labs in am  -Blood cultures-pending  -Respiratory  viral panel PCR-negative  -CT chest without contrast shows patchy infiltrate within the right upper lobe, most in keeping with pneumonia.    Diabetes  -Accu-Cheks AC&HS  -Adult subcutaneous insulin management-Low dose  -Hemoglobin A1c     Hypertension  -Monitor blood pressure  -Continue home blood pressure agents      VTE Prophylaxis:  Mechanical VTE prophylaxis orders are present.        CODE STATUS:    Code Status (Patient has no pulse and is not breathing): CPR (Attempt to Resuscitate)  Medical Interventions (Patient has pulse or is breathing): Full Support    Admission Status:  I believe this patient meets observation status.    76 minutes have been spent by [facility] providers in the care of this patient while under observation status.      Appropriate PPE worn during patient encounter.  Hand hygeine performed before and after seeing the patient.      Electronically signed by MICHELLE Mcclelland, 12/11/24, 11:20 PM EST.

## 2024-12-12 NOTE — ED NOTES
Nursing report ED to floor  Isaias Monaco  69 y.o.  male    HPI :  HPI  Stated Reason for Visit: Pt arrives in  via PV from home with c/o irregular heartrate and fatigue.  Pt denies any hx of AFIB but states significant cardiac hx.  Pt spouse states symptoms began yesterday when the pt developed come cold symptoms.  History Obtained From: patient    Chief Complaint  Chief Complaint   Patient presents with    Palpitations    Fatigue       Admitting doctor:   Jaron Burton MD    Admitting diagnosis:   The primary encounter diagnosis was Community acquired pneumonia of right upper lobe of lung. Diagnoses of Leukocytosis, unspecified type and Bigeminy were also pertinent to this visit.    Code status:   Current Code Status       Date Active Code Status Order ID Comments User Context       Prior            Allergies:   Nsaids, Atorvastatin, Hydralazine, Norvasc [amlodipine], Zetia [ezetimibe], Crestor [rosuvastatin], Ace inhibitors, Lisinopril, and Testosterone    Isolation:   No active isolations    Intake and Output    Intake/Output Summary (Last 24 hours) at 12/11/2024 2316  Last data filed at 12/11/2024 2231  Gross per 24 hour   Intake 100 ml   Output --   Net 100 ml       Weight:       12/11/24  1942   Weight: 107 kg (235 lb)       Most recent vitals:   Vitals:    12/11/24 2051 12/11/24 2138 12/11/24 2155 12/11/24 2301   BP:  178/85  139/80   BP Location:       Patient Position:       Pulse: 100 104 110 101   Resp:       Temp:       TempSrc:       SpO2: 95% 98% 95% 97%   Weight:       Height:           Active LDAs/IV Access:   Lines, Drains & Airways       Active LDAs       Name Placement date Placement time Site Days    Peripheral IV 12/11/24 2108 Right Antecubital 12/11/24 2108  Antecubital  less than 1                    Labs (abnormal labs have a star):   Labs Reviewed   COMPREHENSIVE METABOLIC PANEL - Abnormal; Notable for the following components:       Result Value    Glucose 128 (*)     Sodium 133  (*)     CO2 20.9 (*)     All other components within normal limits    Narrative:     GFR Categories in Chronic Kidney Disease (CKD)      GFR Category          GFR (mL/min/1.73)    Interpretation  G1                     90 or greater         Normal or high (1)  G2                      60-89                Mild decrease (1)  G3a                   45-59                Mild to moderate decrease  G3b                   30-44                Moderate to severe decrease  G4                    15-29                Severe decrease  G5                    14 or less           Kidney failure          (1)In the absence of evidence of kidney disease, neither GFR category G1 or G2 fulfill the criteria for CKD.    eGFR calculation 2021 CKD-EPI creatinine equation, which does not include race as a factor   CBC WITH AUTO DIFFERENTIAL - Abnormal; Notable for the following components:    WBC 16.66 (*)     MCH 26.1 (*)     Lymphocyte % 12.7 (*)     Neutrophils, Absolute 12.58 (*)     Monocytes, Absolute 1.58 (*)     Immature Grans, Absolute 0.08 (*)     All other components within normal limits   HIGH SENSITIVITIY TROPONIN T 1HR - Abnormal; Notable for the following components:    Troponin T Delta 3 (*)     All other components within normal limits    Narrative:     High Sensitive Troponin T Reference Range:  <14.0 ng/L- Negative Female for AMI  <22.0 ng/L- Negative Male for AMI  >=14 - Abnormal Female indicating possible myocardial injury.  >=22 - Abnormal Male indicating possible myocardial injury.   Clinicians would have to utilize clinical acumen, EKG, Troponin, and serial changes to determine if it is an Acute Myocardial Infarction or myocardial injury due to an underlying chronic condition.        TROPONIN - Normal    Narrative:     High Sensitive Troponin T Reference Range:  <14.0 ng/L- Negative Female for AMI  <22.0 ng/L- Negative Male for AMI  >=14 - Abnormal Female indicating possible myocardial injury.  >=22 - Abnormal Male  "indicating possible myocardial injury.   Clinicians would have to utilize clinical acumen, EKG, Troponin, and serial changes to determine if it is an Acute Myocardial Infarction or myocardial injury due to an underlying chronic condition.        LACTIC ACID, PLASMA - Normal   PROCALCITONIN - Normal    Narrative:     As a Marker for Sepsis (Non-Neonates):    1. <0.5 ng/mL represents a low risk of severe sepsis and/or septic shock.  2. >2 ng/mL represents a high risk of severe sepsis and/or septic shock.    As a Marker for Lower Respiratory Tract Infections that require antibiotic therapy:    PCT on Admission    Antibiotic Therapy       6-12 Hrs later    >0.5                Strongly Recommended  >0.25 - <0.5        Recommended   0.1 - 0.25          Discouraged              Remeasure/reassess PCT  <0.1                Strongly Discouraged     Remeasure/reassess PCT    As 28 day mortality risk marker: \"Change in Procalcitonin Result\" (>80% or <=80%) if Day 0 (or Day 1) and Day 4 values are available. Refer to http://[email or web address]-pct-calculator.[email or web address]    Change in PCT <=80%  A decrease of PCT levels below or equal to 80% defines a positive change in PCT test result representing a higher risk for 28-day all-cause mortality of patients diagnosed with severe sepsis for septic shock.    Change in PCT >80%  A decrease of PCT levels of more than 80% defines a negative change in PCT result representing a lower risk for 28-day all-cause mortality of patients diagnosed with severe sepsis or septic shock.      BLOOD CULTURE   BLOOD CULTURE   RESPIRATORY PANEL PCR W/ COVID-19 (SARS-COV-2), NP SWAB IN [facility], 2 HR TAT   RAINBOW DRAW    Narrative:     The following orders were created for panel order [place] Draw.  Procedure                               Abnormality         Status                     ---------                               -----------         ------                     Green Top (Gel)[[id number]]                            "       Final result               Lavender Top[[id number]]                                     Final result               Gold Top - SST[[id number]]                                   Final result               Light Blue Top[[id number]]                                   Final result                 Please view results for these tests on the individual orders.   CBC AND DIFFERENTIAL    Narrative:     The following orders were created for panel order CBC & Differential.  Procedure                               Abnormality         Status                     ---------                               -----------         ------                     CBC Auto Differential[[id number]]        Abnormal            Final result                 Please view results for these tests on the individual orders.   GREEN TOP   LAVENDER TOP   GOLD TOP - SST   LIGHT BLUE TOP       EKG:   ECG 12 Lead ED Triage Standing Order; Chest Pain   Preliminary Result   HEART ACXC=037  bpm   RR Tnnedhlb=340  ms   VT Afepfifw=757  ms   P Horizontal Axis=3  deg   P Front Axis=44  deg   QRSD Ajxnkxrw=075  ms   QT Zsfrwira=257  ms   ICyB=882  ms   QRS Axis=-52  deg   T Wave Axis=79  deg   - ABNORMAL ECG -   Sinus tachycardia   Ventricular premature complex   Probable left atrial enlargement   LAD, consider left anterior fascicular block   Abnormal R-wave progression, early transition   Date and Time of Study:2024-12-11 19:57:07      Telemetry Scan   Final Result      Telemetry Scan   Final Result          Meds given in ED:   Medications   sodium chloride 0.9 % flush 10 mL (has no administration in time range)   acetaminophen (TYLENOL) tablet 650 mg (has no administration in time range)   cefTRIAXone (ROCEPHIN) 2,000 mg in sodium chloride 0.9 % 100 mL MBP (0 mg Intravenous Stopped 12/11/24 2231)   azithromycin (ZITHROMAX) tablet 500 mg (500 mg Oral Given 12/11/24 2145)       Imaging results:  CT Chest Without Contrast Diagnostic    Result Date: 12/11/2024  Patchy  infiltrate within the right upper lobe, most in keeping with pneumonia. There is also some minimal involvement in the superior right lower lobe. Short-term follow-up exam to document resolution is recommended.  Radiation dose reduction techniques were utilized, including automated exposure control and exposure modulation based on body size.   This report was finalized on 12/11/2024 10:17 PM by Dr. Rebecca Pradhan M.D on Workstation: [id number]      XR Chest 1 View    Result Date: 12/11/2024  As described.  This report was finalized on 12/11/2024 8:41 PM by Dr. Alek Soni M.D on Workstation: [id number]       Ambulatory status:   - as tolerated    Social issues:   Social History     Socioeconomic History    Marital status:      Spouse name: Micaela    Number of children: 1    Years of education: College   Tobacco Use    Smoking status: Never     Passive exposure: Never    Smokeless tobacco: Never    Tobacco comments:     CAFFEINE USE: 2 CUPS COFFEE DAILY   Vaping Use    Vaping status: Never Used   Substance and Sexual Activity    Alcohol use: Yes     Alcohol/week: 3.0 standard drinks of alcohol     Types: 1 Glasses of wine, 2 Shots of liquor per week    Drug use: Never    Sexual activity: Not Currently     Partners: Female     Birth control/protection: Post-menopausal       Peripheral Neurovascular  Peripheral Neurovascular (Adult)  Peripheral Neurovascular WDL: WDL, pulse assessment  Pulse Assessment: dorsalis pedis    Neuro Cognitive  Neuro Cognitive (Adult)  Cognitive/Neuro/Behavioral WDL: WDL  Additional Documentation: Jaspal Coma Scale (Group)  Jaspal Coma Scale  Best Eye Response: 4-->(E4) spontaneous  Best Motor Response: 6-->(M6) obeys commands  Best Verbal Response: 5-->(V5) oriented  Jaspal Coma Scale Score: 15    Learning  Learning Assessment  Learning Readiness and Ability: no barriers identified  Education Provided  Person Taught: patient    Respiratory  Respiratory  Airway WDL:  WDL  Respiratory WDL  Respiratory WDL: .WDL except, cough, all  Rhythm/Pattern, Respiratory: shortness of breath  Cough Frequency: frequent  Cough Type: dry    Abdominal Pain  Safety Interventions  Safety Precautions/Falls Reduction: family at bedside    Pain Assessments  Pain (Adult)  (0-10) Pain Rating: Rest: 0  (0-10) Pain Rating: Activity: 0    NIH Stroke Scale       Jimmy Reynolds RN  12/11/24 23:16 EST

## 2024-12-12 NOTE — ED PROVIDER NOTES
EMERGENCY DEPARTMENT ENCOUNTER    Room Number:  06/06  Date seen:  12/11/2024  PCP: Gwyn Patten Sr., MD  Historian/Independent historian: wife  Chronic or social conditions impacting care: age      HPI:  Chief Complaint: fatigue  A complete HPI/ROS/PMH/PSH/SH/FH are unobtainable due to: n/a  Context: Isaias Monaco is a 69 y.o. male who presents to the ED c/o a history of fatigue, palpitations, shortness of breath, nonproductive cough.  He denies any fever, chest pain, abdominal pain, nausea, vomiting, diarrhea, rash, sore throat, increased swelling in his legs.  No syncope or lightheadedness.  No other complaints or concerns. Wife at bedside is concerned about his kidney function.    External Medical record review:   9/11/2024:  Assessment and Plan Additional age appropriate preventative wellness advice topics were discussed during today's preventative wellness exam(some topics already addressed during AWV portion of the note above):    Physical Activity: Advised cardiovascular activity 150 minutes per week as tolerated. (example brisk walk for 30 minutes, 5 days a week).   Nutrition: Discussed nutrition plan with patient. Information shared in after visit summary. Goal is for a well balanced diet to enhance overall health.   Healthy Weight: Discussed current and goal BMI with patient. Steps to attain this goal discussed. Information shared in after visit summary.    PAST MEDICAL HISTORY  Active Ambulatory Problems     Diagnosis Date Noted    Microalbuminuria 02/21/2016    Vitamin D deficiency 02/21/2016    Angioedema 02/21/2016    Coronary artery disease involving native coronary artery of native heart without angina pectoris 02/21/2016    Anxiety 02/21/2016    ED (erectile dysfunction) 02/21/2016    Hyperlipidemia LDL goal <70 02/21/2016    Hypogonadism in male 09/28/2016    S/P primary angioplasty with coronary stent 05/28/2013    Osteoarthritis of knee 06/15/2017    Hypertensive kidney disease with stage  3a chronic kidney disease 06/21/2017    Anemia, posthemorrhagic, acute 06/22/2017    Dyspnea on exertion 07/11/2017    Gastro-esophageal reflux disease with esophagitis 08/28/2017    Tubular adenoma of colon 11/01/2017    Nocturia associated with benign prostatic hyperplasia 05/02/2018    Iron deficiency anemia 10/30/2018    Adverse effect of iron and its compounds, sequela 11/27/2018    Facial twitching 12/04/2018    Blepharospasm 06/25/2019    Type 2 diabetes mellitus with microalbuminuria, with long-term current use of insulin 06/27/2013    Panic disorder 10/16/2020    Tic disorder, unspecified 10/16/2020    Spinal stenosis of lumbar region without neurogenic claudication 06/02/2021    Bilateral myopia 06/23/2021    Combined forms of age-related cataract, bilateral 06/23/2021    Presbyopia 06/23/2021    Primary open-angle glaucoma, bilateral, severe stage 06/23/2021    Lumbar facet arthropathy 12/14/2021    Spondylosis of lumbar region without myelopathy or radiculopathy 12/14/2021    Calcific coronary arteriosclerosis 05/19/2022    Essential hypertension 06/21/2017    Degeneration of lumbar intervertebral disc 03/29/2023    Dystonia 12/16/2022    Lumbosacral spondylosis without myelopathy 03/29/2023    Medicare annual wellness visit, subsequent 08/11/2023    Coronary stent restenosis 04/01/2024    Stenosis of cervical spine with myelopathy 05/31/2024    Preop examination 05/31/2024    Pneumonia 06/29/2024    Stridor 06/29/2024    Sepsis 07/20/2024    Status post cervical spinal fusion 07/20/2024    Periumbilical abdominal pain 07/20/2024     Resolved Ambulatory Problems     Diagnosis Date Noted    Gastroesophageal reflux disease 02/21/2016    Diabetes mellitus 02/21/2016    Adiposity 02/21/2016    Routine health maintenance 06/30/2016    Uncontrolled type 2 diabetes mellitus 09/26/2016    Low testosterone 09/26/2016    MARC (acute kidney injury) 06/16/2017    Postoperative visit 06/19/2017    Hematoma 06/20/2017     Febrile illness 06/23/2017    Wound infection after surgery 06/23/2017    Angina at rest 03/04/2020    Unstable angina 03/19/2024     Past Medical History:   Diagnosis Date    Anemia     Arthritis     CAD (coronary artery disease)     Colon polyps     Diabetes mellitus, type 2     GERD (gastroesophageal reflux disease)     Glaucoma     High cholesterol     History of foreign travel 12/2017; 08/2018    Hyperlipidemia     Hypertension     Male erectile disorder     Osteoarthritis          PAST SURGICAL HISTORY  Past Surgical History:   Procedure Laterality Date    CARDIAC CATHETERIZATION N/A 05/15/2006    Dr. Mini Camarillo    CARDIAC CATHETERIZATION N/A 03/10/2020    Procedure: Left Heart Cath;  Surgeon: Miguel Ángel Karimi MD;  [facility] C[facility]AS[facility];  Service: Cardiology;  Laterality: N/A;    CARDIAC CATHETERIZATION N/A 03/10/2020    Procedure: Stent DAVONTE coronary;  Surgeon: Miguel Ángel Karimi MD;  [facility];  Service: Cardiology;  Laterality: N/A;    CARDIAC CATHETERIZATION N/A 03/10/2020    Procedure: Coronary angiography;  Surgeon: Miguel Ángel Karimi MD;  Location: [facility] C[facility] LOC[facility];  Service: Cardiology;  Laterality: N/A;    CARDIAC CATHETERIZATION N/A 03/10/2020    Procedure: Left ventriculography;  Surgeon: Miguel Ángel Karimi MD;  Location:  [facility] CATH INVASIVE LOCATION;  Service: Cardiology;  Laterality: N/A;    CARDIAC CATHETERIZATION N/A 05/20/2022    Procedure: Left Heart Cath;  Surgeon: Mackenzie Morales MD;  Location: [facility] C[facility]AS[facility] LOC[facility];  Service: Cardiovascular;  Laterality: N/A;    CARDIAC CATHETERIZATION N/A 05/20/2022    Procedure: Coronary angiography;  Surgeon: Mackenzie Morales MD;  Location:  [facility] CATH INVASIVE LOCATION;  Service: Cardiovascular;  Laterality: N/A;    CARDIAC CATHETERIZATION N/A 05/20/2022    Procedure: Percutaneous Coronary Intervention;  Surgeon: Mackenzie Morales MD;  Location: [facility] CATH  INVASIVE LOCATION;  Service: Cardiovascular;  Laterality: N/A;    CARDIAC CATHETERIZATION N/A 05/24/2022    Procedure: Percutaneous Coronary Intervention;  Surgeon: Miguel Ángel Karimi MD;  Location:  [facility] CATH INVASIVE LOCATION;  Service: Cardiovascular;  Laterality: N/A;  LAD and Cx    CARDIAC CATHETERIZATION N/A 05/24/2022    Procedure: Stent DAVONTE coronary;  Surgeon: Miguel Ángel Karimi MD;  Location:  [facility] CATH INVASIVE LOCATION;  Service: Cardiovascular;  Laterality: N/A;    CARDIAC CATHETERIZATION N/A 05/24/2022    Procedure: Resting Full Cycle Ratio;  Surgeon: Miguel Ángel Karimi MD;  Location:  [facility] CATH INVASIVE LOCATION;  Service: Cardiovascular;  Laterality: N/A;    CARDIAC CATHETERIZATION N/A 03/19/2024    Procedure: Left Heart Cath;  Surgeon: Miguel Ángel Karimi MD;  Location:  [facility] CATH INVASIVE LOCATION;  Service: Cardiovascular;  Laterality: N/A;    CARDIAC CATHETERIZATION N/A 03/19/2024    Procedure: Percutaneous Coronary Intervention;  Surgeon: Miguel Ángel Karimi MD;  Location:  [facility] CATH INVASIVE LOCATION;  Service: Cardiovascular;  Laterality: N/A;    CARDIAC CATHETERIZATION N/A 03/19/2024    Procedure: Stent DAVONTE coronary;  Surgeon: Miguel Ángel Karimi MD;  Location:  [facility] CATH INVASIVE LOCATION;  Service: Cardiovascular;  Laterality: N/A;    CERVICAL DISCECTOMY POSTERIOR FUSION WITH INSTRUMENTATION Bilateral 6/25/2024    Procedure: Cervical 3 to cervical 6 laminectomies and posterior spinal fusion - Bilateral;  Surgeon: Marshal Miguel MD;  Location: [facility] OR;  Service: Neurosurgery;  Laterality: Bilateral;    COLONOSCOPY N/A 02/22/2006    Bilobed polyp at 30 cm, hemorrhoids-Dr. Ilya Zhao    COLONOSCOPY N/A 02/28/2014    Normal ileum, one 6 mm polyp in the mid transverse colon-Dr. Ilya Zhao    COLONOSCOPY N/A 11/19/2008    Ela ileum, two 3 to 4 mm polyps, non-bleeding internal hemorrhoids, repeat in 5 years-Dr. Ilya Zhao    COLONOSCOPY N/A 10/31/2017     Procedure: COLONOSCOPY WITH POLYPECTOMY (COLD BIOPSY);  Surgeon: Ilya Zhao MD;  Location: [facility] ENDOSCOPY;  Service:     COLONOSCOPY N/A 05/21/2021    Procedure: Colonoscopy into cecum and terminal ileum with cold biopsy polypectomy;  Surgeon: Ilya Zhao MD;  Location: [facility] ENDOSCOPY;  Service: Gastroenterology;  Laterality: N/A;  Pre op: History of Polyps  Post op: Polyp    CORONARY ANGIOPLASTY WITH STENT PLACEMENT  2007, 2012, 2015    cardiac stents x3 occasions    ENDOSCOPY N/A 10/04/2017    Procedure: ESOPHAGOGASTRODUODENOSCOPY;  Surgeon: Boyd Guidry MD;  Location: [facility] ENDOSCOPY;  Service:     ENDOSCOPY N/A 05/21/2021    Procedure: ESOPHAGOGASTRODUODENOSCOPY with biopsies;  Surgeon: Ilya Zhao MD;  Location: [facility] ENDOSCOPY;  Service: Gastroenterology;  Laterality: N/A;  Pre op: GERD  Post op: Irregular  Z-Line, Hiatal Hernia, Gastritis    ENDOSCOPY N/A 08/25/2023    Procedure: ESOPHAGOGASTRODUODENOSCOPY with biopsy;  Surgeon: Ilya Zhao MD;  Location: [facility] ENDOSCOPY;  Service: Gastroenterology;  Laterality: N/A;  errosive gastritis    EPIDURAL BLOCK      INTERVENTIONAL RADIOLOGY PROCEDURE N/A 05/20/2022    Procedure: Intravascular Ultrasound;  Surgeon: Mackenzie Morales MD;  Location: [facility] CATH INVASIVE LOCATION;  Service: Cardiovascular;  Laterality: N/A;    KNEE INCISION AND DRAINAGE Right 06/20/2017    Procedure: RT. KNEE WASHOUT ;  Surgeon: Boyd Coyne MD;  Location: [facility] MAIN OR;  Service:     MEDIAL BRANCH BLOCK Bilateral 12/17/2021    Procedure: LUMBAR MEDIAL BRANCH BLOCK bilateral ~L4-S1 x2 (~2 weeks apart);  Surgeon: Christina Villaseñor MD;  Location: [facility] MAIN OR;  Service: Pain Management;  Laterality: Bilateral;    MEDIAL BRANCH BLOCK Bilateral 01/03/2022    Procedure: LUMBAR MEDIAL BRANCH BLOCK bilateral ~L4-S1 x2 (~2 weeks apart);  Surgeon: Christina Villaseñor MD;  Location: [facility] MA[facility];  Service: Pain Management;  Laterality: Bilateral;    KS ARTHRP KNE  CONDYLE&PLATU MEDIAL&LAT COMPARTMENTS Right 06/15/2017    Procedure: RT TOTAL KNEE ARTHROPLASTY;  Surgeon: Boyd Coyne MD;  Location: [facility] MAIN OR;  Service: Orthopedics    RADIOFREQUENCY ABLATION Bilateral 01/10/2022    Procedure: RADIOFREQUENCY ABLATION NERVES Bilateral L4-S1;  Surgeon: Christina Villaseñor MD;  Location: [facility] MA[facility];  Service: Pain Management;  Laterality: Bilateral;    SHOULDER SURGERY Right 2016    rotator cuff repair    UPPER GASTROINTESTINAL ENDOSCOPY N/A 10/13/2015    Z-line irregular, normal stomach, normal duodenum-Dr. Ilya Zhao    UPPER GASTROINTESTINAL ENDOSCOPY N/A 02/28/2014    Z-line irregular, normal stomach, normal duodenum-Dr. Ilya Zhao    UPPER GASTROINTESTINAL ENDOSCOPY N/A 11/19/2008    Z-line irregular, chronic gastritis withotu hemorrhage, normal duodenum-Dr. Ilya Zhao    UPPER GASTROINTESTINAL ENDOSCOPY N/A 06/22/2006    LA Grade A reflux esophagitis, non-bleeding erythematous gastropathy, normal duodenum-Dr. Ilya Zhao         FAMILY HISTORY  Family History   Problem Relation Age of Onset    Lupus Sister     Thyroid disease Sister     Heart disease Other     Hypertension Other     Arthritis Mother     Hyperlipidemia Mother     Hypertension Mother     Thyroid disease Mother     Lupus Mother     Vision loss Mother     Heart disease Father     Arthritis Father     Other Father         Vascular disease    Lupus Father     Depression Father     Alcohol abuse Father     Dementia Father     Hypertension Father     Heart disease Brother     Heart attack Brother 40    Thyroid disease Sister     Arthritis Brother     Malig Hyperthermia Neg Hx          SOCIAL HISTORY  Social History     Socioeconomic History    Marital status:      Spouse name: Micaela    Number of children: 1    Years of education: College   Tobacco Use    Smoking status: Never     Passive exposure: Never    Smokeless tobacco: Never    Tobacco comments:     CAFFEINE USE: 2 CUPS COFFEE DAILY   Vaping  Use    Vaping status: Never Used   Substance and Sexual Activity    Alcohol use: Yes     Alcohol/week: 3.0 standard drinks of alcohol     Types: 1 Glasses of wine, 2 Shots of liquor per week    Drug use: Never    Sexual activity: Not Currently     Partners: Female     Birth control/protection: Post-menopausal         ALLERGIES  Nsaids, Atorvastatin, Hydralazine, Norvasc [amlodipine], Zetia [ezetimibe], Crestor [rosuvastatin], Ace inhibitors, Lisinopril, and Testosterone        REVIEW OF SYSTEMS  Per HPI, otherwise negative.       PHYSICAL EXAM  ED Triage Vitals [12/11/24 1942]   Temp Heart Rate Resp BP SpO2   99.9 °F (37.7 °C) 107 18 150/91 96 %      Temp src Heart Rate Source Patient Position BP Location FiO2 (%)   Tympanic -- Sitting Right arm --       Physical Exam  Vitals and nursing note reviewed.   Constitutional:       Appearance: He is well-developed. He is ill-appearing and diaphoretic.   HENT:      Head: Normocephalic and atraumatic.      Mouth/Throat:      Mouth: Mucous membranes are moist.   Eyes:      Conjunctiva/sclera: Conjunctivae normal.   Cardiovascular:      Rate and Rhythm: Regular rhythm. Tachycardia present.      Pulses: Normal pulses.      Heart sounds: Normal heart sounds.   Pulmonary:      Effort: Pulmonary effort is normal.      Breath sounds: Wheezing (mild) present. No rhonchi.   Abdominal:      General: Bowel sounds are normal.      Palpations: Abdomen is soft.      Tenderness: There is no abdominal tenderness. There is no guarding.   Musculoskeletal:      Right lower leg: No edema.      Left lower leg: No edema.   Skin:     General: Skin is warm.      Capillary Refill: Capillary refill takes less than 2 seconds.   Neurological:      Mental Status: He is alert and oriented to person, place, and time. Mental status is at baseline.   Psychiatric:         Mood and Affect: Mood normal.               LAB RESULTS  Recent Results (from the past 24 hours)   ECG 12 Lead ED Triage Standing Order;  Chest Pain    Collection Time: 12/11/24  7:57 PM   Result Value Ref Range    QT Interval 327 ms    QTC Interval 441 ms   Comprehensive Metabolic Panel    Collection Time: 12/11/24  8:00 PM    Specimen: Blood   Result Value Ref Range    Glucose 128 (H) 65 - 99 mg/dL    BUN 9 8 - 23 mg/dL    Creatinine 1.16 0.76 - 1.27 mg/dL    Sodium 133 (L) 136 - 145 mmol/L    Potassium 3.6 3.5 - 5.2 mmol/L    Chloride 99 98 - 107 mmol/L    CO2 20.9 (L) 22.0 - 29.0 mmol/L    Calcium 9.0 8.6 - 10.5 mg/dL    Total Protein 8.2 6.0 - 8.5 g/dL    Albumin 3.7 3.5 - 5.2 g/dL    ALT (SGPT) 28 1 - 41 U/L    AST (SGOT) 33 1 - 40 U/L    Alkaline Phosphatase 112 39 - 117 U/L    Total Bilirubin 0.8 0.0 - 1.2 mg/dL    Globulin 4.5 gm/dL    A/G Ratio 0.8 g/dL    BUN/Creatinine Ratio 7.8 7.0 - 25.0    Anion Gap 13.1 5.0 - 15.0 mmol/L    eGFR 68.2 >60.0 mL/min/1.73   High Sensitivity Troponin T    Collection Time: 12/11/24  8:00 PM    Specimen: Blood   Result Value Ref Range    HS Troponin T 13 <22 ng/L   Green Top (Gel)    Collection Time: 12/11/24  8:00 PM   Result Value Ref Range    Extra Tube Hold for add-ons.    Lavender Top    Collection Time: 12/11/24  8:00 PM   Result Value Ref Range    Extra Tube hold for add-on    Gold Top - SST    Collection Time: 12/11/24  8:00 PM   Result Value Ref Range    Extra Tube Hold for add-ons.    Light Blue Top    Collection Time: 12/11/24  8:00 PM   Result Value Ref Range    Extra Tube Hold for add-ons.    CBC Auto Differential    Collection Time: 12/11/24  8:00 PM    Specimen: Blood   Result Value Ref Range    WBC 16.66 (H) 3.40 - 10.80 10*3/mm3    RBC 5.41 4.14 - 5.80 10*6/mm3    Hemoglobin 14.1 13.0 - 17.7 g/dL    Hematocrit 44.5 37.5 - 51.0 %    MCV 82.3 79.0 - 97.0 fL    MCH 26.1 (L) 26.6 - 33.0 pg    MCHC 31.7 31.5 - 35.7 g/dL    RDW 14.6 12.3 - 15.4 %    RDW-SD 42.9 37.0 - 54.0 fl    MPV 10.4 6.0 - 12.0 fL    Platelets 252 140 - 450 10*3/mm3    Neutrophil % 75.4 42.7 - 76.0 %    Lymphocyte % 12.7 (L)  19.6 - 45.3 %    Monocyte % 9.5 5.0 - 12.0 %    Eosinophil % 1.5 0.3 - 6.2 %    Basophil % 0.4 0.0 - 1.5 %    Immature Grans % 0.5 0.0 - 0.5 %    Neutrophils, Absolute 12.58 (H) 1.70 - 7.00 10*3/mm3    Lymphocytes, Absolute 2.11 0.70 - 3.10 10*3/mm3    Monocytes, Absolute 1.58 (H) 0.10 - 0.90 10*3/mm3    Eosinophils, Absolute 0.25 0.00 - 0.40 10*3/mm3    Basophils, Absolute 0.06 0.00 - 0.20 10*3/mm3    Immature Grans, Absolute 0.08 (H) 0.00 - 0.05 10*3/mm3    nRBC 0.0 0.0 - 0.2 /100 WBC   Procalcitonin    Collection Time: 12/11/24  8:00 PM    Specimen: Blood   Result Value Ref Range    Procalcitonin 0.09 0.00 - 0.25 ng/mL   High Sensitivity Troponin T 1Hr    Collection Time: 12/11/24  9:08 PM    Specimen: Blood   Result Value Ref Range    HS Troponin T 16 <22 ng/L    Troponin T Delta 3 (C) Abnormal if >/=3 ng/L   Lactic Acid, Plasma    Collection Time: 12/11/24  9:08 PM    Specimen: Blood   Result Value Ref Range    Lactate 1.6 0.5 - 2.0 mmol/L       Ordered the above labs and reviewed the results.        RADIOLOGY  CT Chest Without Contrast Diagnostic    Result Date: 12/11/2024  CT OF THE CHEST WITHOUT CONTRAST  HISTORY: Right upper lobe infiltrate  COMPARISON: April 17, 2020  TECHNIQUE: Axial CT imaging was obtained through the thorax. No IV contrast was administered.  FINDINGS: Patchy infiltrate is noted within the right upper lobe, most in keeping with pneumonia. There is some additional minimal infiltrate noted within the superior right upper lobe. Left lung is clear. Left lobe of the thyroid gland is enlarged. This appears stable. Trachea and esophagus are unremarkable. Mediastinal lymph nodes do not appear pathologically enlarged. There are extensive coronary artery calcifications. Thoracic aorta is normal in caliber. Images through the upper abdomen do not demonstrate any acute abnormalities. There are old right-sided rib fractures.      Patchy infiltrate within the right upper lobe, most in keeping with  pneumonia. There is also some minimal involvement in the superior right lower lobe. Short-term follow-up exam to document resolution is recommended.  Radiation dose reduction techniques were utilized, including automated exposure control and exposure modulation based on body size.   This report was finalized on 12/11/2024 10:17 PM by Dr. Rebecca Pradhan M.D on Workstation: [id number]      XR Chest 1 View    Result Date: 12/11/2024  XR CHEST 1 VW-  HISTORY: Male who is 69 years-old, chest pain  TECHNIQUE: Frontal view of the chest  COMPARISON: 7/20/2024  FINDINGS: Heart, mediastinum and pulmonary vasculature are unremarkable. Infiltrate at the right upper lung suggest pneumonia, 4-week follow-up x-ray is recommended to characterize resolution and to exclude possibility of an underlying lesion; if further imaging evaluation is indicated, CT could be obtained. No pleural effusion, or pneumothorax. No acute osseous process.      As described.  This report was finalized on 12/11/2024 8:41 PM by Dr. Alek Soni M.D on Workstation: [id number]       Ordered the above noted radiological studies. Reviewed by me in PACS.        MEDICATIONS GIVEN IN ER  Medications   sodium chloride 0.9 % flush 10 mL (has no administration in time range)   acetaminophen (TYLENOL) tablet 650 mg (has no administration in time range)   cefTRIAXone (ROCEPHIN) 2,000 mg in sodium chloride 0.9 % 100 mL MBP (0 mg Intravenous Stopped 12/11/24 2231)   azithromycin (ZITHROMAX) tablet 500 mg (500 mg Oral Given 12/11/24 2145)           MEDICAL DECISION MAKING, PROGRESS, and CONSULTS    All labs have been independently reviewed by me.  All radiology studies have been reviewed by me and I have also reviewed the radiology report.   EKG's independently viewed and interpreted by me.  Discussion below represents my analysis of pertinent findings related to patient's condition, differential diagnosis, treatment plan and final disposition.    69-year-old  male who was brought in by his wife for fatigue and shortness of breath noted to have community-acquired pneumonia.  Patient was mildly tachycardic and did have episode of bigeminy.  He had a low-grade fever elevated white count.  Hemodynamically stable with normal lactic acid.  Antibiotics started we will be evaluated overnight. No hypoxia.           Shared decision making/consideration for admission:  admit      Orders placed during this visit:  Orders Placed This Encounter   Procedures    Blood Culture - Blood,    Blood Culture - Blood,    Respiratory Panel PCR w/COVID-19(SARS-CoV-2) [facility] In-House, NP Swab in UTM/VTM, 2 HR TAT - Swab, Nasopharynx    XR Chest 1 View    CT Chest Without Contrast Diagnostic    [place] Draw    Comprehensive Metabolic Panel    High Sensitivity Troponin T    CBC Auto Differential    High Sensitivity Troponin T 1Hr    Lactic Acid, Plasma    Procalcitonin    NPO Diet NPO Type: Strict NPO    Undress & Gown    Continuous Pulse Oximetry    Check Pulse Oximetry while ambulating    Oxygen Therapy- Nasal Cannula; Titrate 1-6 LPM Per SpO2; 90 - 95%    ECG 12 Lead ED Triage Standing Order; Chest Pain    ECG 12 Lead ED Triage Standing Order; Chest Pain    Telemetry Scan    Telemetry Scan    Insert Peripheral IV    Initiate ED Observation Status    CBC & Differential    Green Top (Gel)    Lavender Top    Gold Top - SST    Light Blue Top         Differential diagnosis include, but not limited to: Pneumonia, PE, electrolyte abnormality, CHF exacerbation      Additional orders considered but not ordered: zosyn     Independent interpretation of labs, radiology studies, and discussions with consultants:    Discussed with Dr. Lowry, who agrees with plan.     ED Course as of 12/11/24 2313   Wed Dec 11, 2024   2048 Per my independent interpretation of the chest x-ray, there is no obvious pneumothorax.     [JG]   2221 I updated the patient on current ED work up, including labs and  imaging (if performed) with indication for admission. All questions and concerns addressed and ready for admit at this time.    [JG]   2229 XR Chest 1 View  My independent interpretation of the imaging study is right upper lobe infiltrate [TR]   2230 EKG          EKG time: 1957  Rhythm/Rate: Sinus tachycardia, rate 109  P waves and WV: Normal P, normal WV  QRS, axis: Narrow QRS, left axis  ST and T waves: No acute    Independently Interpreted by me  Increased rate changed compared to prior 8/30/2024   [TR]   2312 Discussed with DAVID Cheng in OBS unit. She agrees to admission. No further recommendations.  [JG]      ED Course User Index  [JG] Lesly Lombardo APRN  [TR] Ted Lowry MD             DIAGNOSIS  Final diagnoses:   Community acquired pneumonia of right upper lobe of lung   Leukocytosis, unspecified type   Bigeminy         DISPOSITION  admit        Latest Documented Vital Signs:  As of 23:13 EST  BP- 139/80 HR- 101 Temp- 99.9 °F (37.7 °C) (Tympanic) O2 sat- 97%              --    Please note that portions of this were completed with a voice recognition program.       Note Disclaimer: At [facility], we believe that sharing information builds trust and better relationships. You are receiving this note because you are receiving care at [facility] or recently visited. It is possible you will see health information before a provider has talked with you about it. This kind of information can be easy to misunderstand. To help you fully understand what it means for your health, we urge you to discuss this note with your provider.             Lesly Lombardo APRN  12/11/24 [id number]

## 2024-12-12 NOTE — OUTREACH NOTE
Prep Survey      Flowsheet Row Responses   [facility] patient [facility]? [facility]   Is LACE score < 7 ? No   Eligibility [facility]   Date of Admission 12/11/24   Date of Discharge 12/12/24   Discharge Disposition Home or Self Care   Discharge diagnosis community acquired pneumonia   Does the patient have one of the following disease processes/diagnoses(primary or secondary)? Pneumonia   Does the patient have Home health ordered? No   Is there a DME ordered? No   Prep survey completed? Yes            ANA LUISA SON - Registered Nurse

## 2024-12-12 NOTE — PLAN OF CARE
Goal Outcome Evaluation:      Pt. Admitted to observation from the ED with generalized weakness and a cough. Pt. Given rocephin and Zithromax in the ER. Resp panel negative.

## 2024-12-12 NOTE — DISCHARGE SUMMARY
ED OBSERVATION PROGRESS/DISCHARGE SUMMARY    Date of Admission: 12/11/2024   LOS: 0 days   PCP: Gwyn Patten Sr., MD    Final Diagnosis Pneumonia      Subjective     Hospital Outcome: Isaias Monaco is a 69 y.o. male, with a past medical history including, but not limited to, coronary artery disease, anxiety, diabetes, GERD, hyperlipidemia, hypertension, and spinal stenosis, presented to the emergency department with a 2 to 3-day history of fatigue, cough, palpitations, and shortness of breath with exertion.  He denies any fever, chills, nausea, vomiting, diarrhea, sore throat, chest pain, or other complaints.  Patient's wife, who is at bedside, states that he has not been eating and drinking over the past 2 days and is worried that he is dehydrated.     12/12/2024: Patient feels better through the evening and denies any new or worsening symptoms. Patient was able to ambulate without hypoxia. Patient feels safe for discharging home. All labs and imaging findings discussed with patient and family at bedside and patient is agreeable for discharge home at this time.     ROS:  General: no fevers, chills  Respiratory: no cough, dyspnea  Cardiovascular: no chest pain, palpitations  Abdomen: No abdominal pain, nausea, vomiting, or diarrhea  Neurologic: No focal weakness    Objective   Physical Exam:  I have reviewed the vital signs.  Temp:  [99 °F (37.2 °C)-99.9 °F (37.7 °C)] 99 °F (37.2 °C)  Heart Rate:  [] 96  Resp:  [18] 18  BP: (139-178)/(80-91) 142/89  General Appearance:    Alert, cooperative, no distress  Head:    Normocephalic, atraumatic, normal hearing   Eyes:    Sclerae anicteric, EOMI  Neck:   Supple, nontender  Lungs: Clear to auscultation bilaterally, respirations unlabored on RA  Heart: Regular rate and rhythm, S1 and S2 normal, no murmur  Abdomen:  Soft, nontender, bowel sounds active, nondistended  Extremities: No clubbing, cyanosis, or edema to lower extremities  Pulses:  2+ and symmetric in  distal lower extremities  Skin: No rashes   Neurologic: Oriented x3, Normal strength to extremities    Results Review:    I have reviewed the labs, radiology results and diagnostic studies.    Results from last 7 days   Lab Units 12/12/24 0449   WBC 10*3/mm3 14.08*   HEMOGLOBIN g/dL 12.7*   HEMATOCRIT % 39.1   PLATELETS 10*3/mm3 212     Results from last 7 days   Lab Units 12/12/24  0449 12/11/24 2000   SODIUM mmol/L 136 133*   POTASSIUM mmol/L 3.4* 3.6   CHLORIDE mmol/L 104 99   CO2 mmol/L 21.3* 20.9*   BUN mg/dL 8 9   CREATININE mg/dL 1.15 1.16   CALCIUM mg/dL 8.5* 9.0   BILIRUBIN mg/dL  --  0.8   ALK PHOS U/L  --  112   ALT (SGPT) U/L  --  28   AST (SGOT) U/L  --  33   GLUCOSE mg/dL 127* 128*     Imaging Results (Last 24 Hours)       Procedure Component Value Units Date/Time    CT Chest Without Contrast Diagnostic [[id number]] Collected: 12/11/24 2213     Updated: 12/11/24 2220    Narrative:      CT OF THE CHEST WITHOUT CONTRAST     HISTORY: Right upper lobe infiltrate     COMPARISON: April 17, 2020     TECHNIQUE: Axial CT imaging was obtained through the thorax. No IV  contrast was administered.     FINDINGS:  Patchy infiltrate is noted within the right upper lobe, most in keeping  with pneumonia. There is some additional minimal infiltrate noted within  the superior right upper lobe. Left lung is clear. Left lobe of the  thyroid gland is enlarged. This appears stable. Trachea and esophagus  are unremarkable. Mediastinal lymph nodes do not appear pathologically  enlarged. There are extensive coronary artery calcifications. Thoracic  aorta is normal in caliber. Images through the upper abdomen do not  demonstrate any acute abnormalities. There are old right-sided rib  fractures.       Impression:      Patchy infiltrate within the right upper lobe, most in keeping with  pneumonia. There is also some minimal involvement in the superior right  lower lobe. Short-term follow-up exam to document resolution  is  recommended.     Radiation dose reduction techniques were utilized, including automated  exposure control and exposure modulation based on body size.        This report was finalized on 12/11/2024 10:17 PM by Dr. Rebecca Pradhan M.D on Workstation: [id number]       XR Chest 1 View [[id number]] Collected: 12/11/24 2040     Updated: 12/11/24 2044    Narrative:      XR CHEST 1 VW-     HISTORY: Male who is 69 years-old, chest pain     TECHNIQUE: Frontal view of the chest     COMPARISON: 7/20/2024     FINDINGS: Heart, mediastinum and pulmonary vasculature are unremarkable.  Infiltrate at the right upper lung suggest pneumonia, 4-week follow-up  x-ray is recommended to characterize resolution and to exclude  possibility of an underlying lesion; if further imaging evaluation is  indicated, CT could be obtained. No pleural effusion, or pneumothorax.  No acute osseous process.       Impression:      As described.     This report was finalized on 12/11/2024 8:41 PM by Dr. Alek Soni M.D on Workstation: [id number]               I have reviewed the medications.     Discharge Medications        ASK your doctor about these medications        Instructions Start Date   acetaminophen 650 MG 8 hr tablet  Commonly known as: TYLENOL   650 mg, Oral, Every 4 Hours PRN      Adult Aspirin Regimen 81 MG EC tablet  Generic drug: aspirin   81 mg, Oral, Daily      ARIPiprazole 5 MG tablet  Commonly known as: ABILIFY   5 mg, Daily      bisacodyl 5 MG EC tablet  Commonly known as: DULCOLAX   5 mg, Oral, Daily PRN      carvedilol 25 MG tablet  Commonly known as: COREG   12.5 mg, 2 Times Daily With Meals      cholestyramine 4 g packet  Commonly known as: QUESTRAN   1 packet, Oral      cholestyramine 4 g packet  Commonly known as: QUESTRAN   Mix 1 packet in 4-6 ounces of liquid and take by mouth 2 (Two) Times a Day As Needed (Diarrhea).      clonazePAM 0.5 MG tablet  Commonly known as: KlonoPIN   0.5 mg, Oral, Daily PRN       clopidogrel 75 MG tablet  Commonly known as: PLAVIX   75 mg, Oral, Daily      dorzolamide-timolol 2-0.5 % ophthalmic solution  Commonly known as: COSOPT   1 drop, Both Eyes, 2 Times Daily      Cosopt 2-0.5 % ophthalmic solution  Generic drug: dorzolamide-timolol   1 drop      doxazosin 4 MG tablet  Commonly known as: CARDURA   4 mg, Oral, Every Night at Bedtime      escitalopram 20 MG tablet  Commonly known as: LEXAPRO   20 mg, Oral, Daily      esomeprazole 40 MG capsule  Commonly known as: nexIUM   40 mg, Oral      Evolocumab solution auto-injector SureClick injection  Commonly known as: REPATHA   140 mg, Subcutaneous, Every 14 Days      Repatha SureClick solution auto-injector SureClick injection  Generic drug: Evolocumab   140 mL, Subcutaneous      gabapentin 300 MG capsule  Commonly known as: NEURONTIN   Take 1 capsule by mouth Every Night.      Ingrezza 60 MG capsule  Generic drug: Valbenazine Tosylate   1 capsule, Daily      latanoprost 0.005 % ophthalmic solution  Commonly known as: XALATAN   1 drop, Both Eyes, Nightly      metFORMIN 500 MG tablet  Commonly known as: GLUCOPHAGE   1,000 mg, Oral, Daily With Breakfast      methocarbamol 500 MG tablet  Commonly known as: ROBAXIN   500 mg, Oral, 3 Times Daily      ondansetron ODT 4 MG disintegrating tablet  Commonly known as: ZOFRAN-ODT   4 mg, Oral, Every 6 Hours PRN      ondansetron ODT 4 MG disintegrating tablet  Commonly known as: ZOFRAN-ODT   4 mg, Oral, 4 Times Daily PRN      oxyCODONE-acetaminophen 7.5-325 MG per tablet  Commonly known as: PERCOCET   1 tablet, Every 6 Hours PRN      Ozempic (2 MG/DOSE) 8 MG/3ML solution pen-injector  Generic drug: Semaglutide (2 MG/DOSE)   DIAL AND INJECT UNDER THE SKIN INTO THE APPROPRIATE AREA 2 MG WEEKLY AS DIRECTED      saccharomyces boulardii 250 MG capsule  Commonly known as: Florastor   250 mg, Oral, Daily      sennosides-docusate 8.6-50 MG per tablet  Commonly known as: PERICOLACE   2 tablets, Oral, 2 Times Daily  PRN      spironolactone 25 MG tablet  Commonly known as: ALDACTONE   25 mg, Oral, Daily      Toujeo SoloStar 300 UNIT/ML solution pen-injector injection  Generic drug: Insulin Glargine (1 Unit Dial)   ADMINISTER 65 UNITS UNDER THE SKIN DAILY AS DIRECTED              ---------------------------------------------------------------------------------------------  Assessment & Plan   Assessment/Problem List    CAP (community acquired pneumonia)      Plan:    Community acquired Pneumonia  -Blood cultures-pending  -WBC trending down   -Respiratory viral panel PCR-negative  -CT chest without contrast shows patchy infiltrate within the right upper lobe, most in keeping with pneumonia.  - Will discharge patient on Augmentin and Azithromycin for 5 days     Diabetes  -Resume home regimen     Hypertension  -Continue home regimen    Disposition: Discharge to home    Follow-up after Discharge: PCP in 1-2 weeks    This note will serve as a discharge summary    Klaudia Suazo PA-C 12/12/24 07:39 EST    I have worn appropriate PPE during this patient encounter, sanitized my hands both with entering and exiting patient's room.      39 minutes has been spent by [facility] providers in the care of this patient while under observation status

## 2024-12-13 ENCOUNTER — TRANSITIONAL CARE MANAGEMENT TELEPHONE ENCOUNTER (OUTPATIENT)
Dept: CALL CENTER | Facility: HOSPITAL | Age: 69
End: 2024-12-13
Payer: MEDICARE

## 2024-12-13 NOTE — OUTREACH NOTE
Call Center TCM Note      Flowsheet Row Responses   [facility] patient discharged from? [place]   Does the patient have one of the following disease processes/diagnoses(primary or secondary)? Pneumonia   TCM attempt successful? Yes  [vr for tammy Hinojosa]   Call start time 0841   Unsuccessful attempts Attempt 1   Call end time 0843   Discharge diagnosis community acquired pneumonia   Person spoke with today (if not patient) and relationship tammy Hinojosa   Meds reviewed with patient/caregiver? Yes   Is the patient having any side effects they believe may be caused by any medication additions or changes? No   Does the patient have all medications ordered at discharge? Yes   Is the patient taking all medications as directed (includes completed medication regime)? Yes   Does the patient have an appointment with their PCP within 7-14 days of discharge? No appointments available   Nursing Interventions Routed TCM call to PCP office   Has home health visited the patient within 72 hours of discharge? N/A   Psychosocial issues? No   Did the patient receive a copy of their discharge instructions? Yes   Nursing interventions Reviewed instructions with patient   What is the patient's perception of their health status since discharge? Improving  [Wife reports pt is improving, only mild cough and mild SOA.  Aware to monitor for worsening s/s and seek care prn.  No questions or concerns today.]   Nursing Interventions Nurse provided patient education   Is the patient/caregiver able to teach back the hierarchy of who to call/visit for symptoms/problems? PCP, Specialist, Home health nurse, Urgent Care, ED, 911 Yes   Is the patient/caregiver able to teach back signs and symptoms of worsening condition: Fever/chills, Shortness of breath, Chest pain  [reviewed]   Is the patient/caregiver able to teach back importance of completing antibiotic course of treatment? Yes   TCM call completed? Yes   Call end time [phone]            Micaela  AYAN Argueta    12/13/2024, 08:44 EST

## 2024-12-16 LAB
BACTERIA SPEC AEROBE CULT: NORMAL
BACTERIA SPEC AEROBE CULT: NORMAL

## 2024-12-17 RX ORDER — SEMAGLUTIDE 2.68 MG/ML
INJECTION, SOLUTION SUBCUTANEOUS
Qty: 3 ML | Refills: 2 | Status: SHIPPED | OUTPATIENT
Start: 2024-12-17

## 2024-12-17 NOTE — TELEPHONE ENCOUNTER
Rx Refill Note  Requested Prescriptions     Pending Prescriptions Disp Refills    Ozempic, 2 MG/DOSE, 8 MG/3ML solution pen-injector [Pharmacy Med Name: OZEMPIC 2 MG/DOSE (8 MG/3 ML)] 3 mL 1     Sig: DIAL AND INJECT UNDER THE SKIN 2 MG WEEKLY      Last office visit with prescribing clinician: 9/9/2024   Last telemedicine visit with prescribing clinician: Visit date not found   Next office visit with prescribing clinician: 3/10/2025                         Would you like a call back once the refill request has been completed: [] Yes [] No    If the office needs to give you a call back, can they leave a voicemail: [] Yes [] No    Angelita Griffiths  12/17/24, 07:42 EST

## 2024-12-26 ENCOUNTER — OFFICE VISIT (OUTPATIENT)
Dept: CARDIOLOGY | Facility: CLINIC | Age: 69
End: 2024-12-26
Payer: MEDICARE

## 2024-12-26 VITALS
DIASTOLIC BLOOD PRESSURE: 100 MMHG | BODY MASS INDEX: 29.45 KG/M2 | HEART RATE: 82 BPM | SYSTOLIC BLOOD PRESSURE: 140 MMHG | OXYGEN SATURATION: 96 % | HEIGHT: 77 IN | WEIGHT: 249.4 LBS

## 2024-12-26 DIAGNOSIS — E78.5 HYPERLIPIDEMIA LDL GOAL <70: ICD-10-CM

## 2024-12-26 DIAGNOSIS — R07.0 THROAT PAIN: ICD-10-CM

## 2024-12-26 DIAGNOSIS — R06.09 DYSPNEA ON EXERTION: ICD-10-CM

## 2024-12-26 DIAGNOSIS — R94.31 ABNORMAL EKG: ICD-10-CM

## 2024-12-26 DIAGNOSIS — R53.83 OTHER FATIGUE: ICD-10-CM

## 2024-12-26 DIAGNOSIS — I49.3 PVCS (PREMATURE VENTRICULAR CONTRACTIONS): ICD-10-CM

## 2024-12-26 DIAGNOSIS — I25.10 CAD, MULTIPLE VESSEL: Primary | ICD-10-CM

## 2024-12-26 DIAGNOSIS — I10 ESSENTIAL HYPERTENSION: ICD-10-CM

## 2024-12-26 PROBLEM — A41.9 SEPSIS: Status: RESOLVED | Noted: 2024-07-20 | Resolved: 2024-12-26

## 2024-12-26 PROBLEM — Z01.818 PREOP EXAMINATION: Status: RESOLVED | Noted: 2024-05-31 | Resolved: 2024-12-26

## 2024-12-26 PROBLEM — J18.9 PNEUMONIA: Status: RESOLVED | Noted: 2024-06-29 | Resolved: 2024-12-26

## 2024-12-26 PROBLEM — R10.33 PERIUMBILICAL ABDOMINAL PAIN: Status: RESOLVED | Noted: 2024-07-20 | Resolved: 2024-12-26

## 2024-12-26 PROCEDURE — 99214 OFFICE O/P EST MOD 30 MIN: CPT | Performed by: NURSE PRACTITIONER

## 2024-12-26 PROCEDURE — 3080F DIAST BP >= 90 MM HG: CPT | Performed by: NURSE PRACTITIONER

## 2024-12-26 PROCEDURE — 3077F SYST BP >= 140 MM HG: CPT | Performed by: NURSE PRACTITIONER

## 2024-12-26 PROCEDURE — 1159F MED LIST DOCD IN RCRD: CPT | Performed by: NURSE PRACTITIONER

## 2024-12-26 PROCEDURE — 93000 ELECTROCARDIOGRAM COMPLETE: CPT | Performed by: NURSE PRACTITIONER

## 2024-12-26 PROCEDURE — 1160F RVW MEDS BY RX/DR IN RCRD: CPT | Performed by: NURSE PRACTITIONER

## 2024-12-26 RX ORDER — PEN NEEDLE, DIABETIC 32GX 5/32"
NEEDLE, DISPOSABLE MISCELLANEOUS
COMMUNITY
Start: 2024-12-17

## 2024-12-26 RX ORDER — ISOSORBIDE MONONITRATE 30 MG/1
30 TABLET, EXTENDED RELEASE ORAL DAILY
Qty: 30 TABLET | Refills: 0 | Status: SHIPPED | OUTPATIENT
Start: 2024-12-26

## 2024-12-26 RX ORDER — CARVEDILOL 12.5 MG/1
12.5 TABLET ORAL 2 TIMES DAILY WITH MEALS
Qty: 180 TABLET | Refills: 0 | Status: SHIPPED | OUTPATIENT
Start: 2024-12-26

## 2024-12-26 RX ORDER — INCLISIRAN 284 MG/1.5ML
284 INJECTION, SOLUTION SUBCUTANEOUS
COMMUNITY

## 2024-12-26 NOTE — H&P (VIEW-ONLY)
Date of Office Visit: 2024  Encounter Provider: MICHELLE Alcantara  Place of Service: [facility] CARDIOLOGY  Patient Name: Isaias Monaco  :1955  Primary Cardiologist: Dr. Ron Karimi    Chief Complaint   Patient presents with    Coronary Artery Disease    Follow-up   :     HPI: Isaias Monaco is a 69 y.o. male who presents today for cardiac follow-up visit.  He is a new patient to me and I have reviewed his medical records.    He has known hypertension, hyperlipidemia, type 2 diabetes mellitus, and chronic kidney disease.     He has known coronary artery disease.  He has a history of prior PCI to the RCA for .  In May 2022, he underwent left main bifurcation stenting with DK crush technique.  In 2024, he underwent angioplasty and shockwave balloon lithotripsy of the distal left main to the proximal LAD and PCI of the distal left main to proximal circumflex followed by kissing balloon angioplasty.  He has preserved LVEF.  Lifelong DAPT therapy recommended.    On 2024, he was hospitalized for community-acquired pneumonia.  His troponin level and EKG were normal.  His wife said on telemetry he was having frequent asymptomatic PVCs during the hospitalization.    He presents today for follow-up visit and his wife is accompanying him.  He reports shortness of breath, a vague feeling in his throat, and fatigue.  Both he and his wife state this was his anginal symptoms in the past.  His wife says that he has had the symptoms and his heart is been okay.  She mentioned that it could be related to GERD, indigestion, or anxiety.  He denies chest pain, PND, orthopnea, edema, palpitations, dizziness, or syncope.      Past Medical History:   Diagnosis Date    Abnormal EKG 2024    Anemia     post hemorrhagic    Angioedema 2016    Secondary to ACE inhibitor    Anxiety     Arthritis     CAD (coronary artery disease)     Colon polyps     Diabetes  mellitus, type 2     GERD (gastroesophageal reflux disease)     Glaucoma     Hematoma     history    High cholesterol     History of foreign travel 12/2017; 08/2018    [place], [place], [place], [place], [place] and [place]; [place]    Hyperlipidemia     Hypertension     Hypogonadism in male 09/28/2016    Male erectile disorder     Microalbuminuria     Osteoarthritis     knee    PVCs (premature ventricular contractions) 12/26/2024    Spinal stenosis of lumbar region without neurogenic claudication 06/02/2021    Tubular adenoma of colon 11/01/2017    Vitamin D deficiency        Past Surgical History:   Procedure Laterality Date    CARDIAC CATHETERIZATION N/A 05/15/2006    Dr. Mini Camarillo    CARDIAC CATHETERIZATION N/A 03/10/2020    Procedure: Left Heart Cath;  Surgeon: Miguel Ángel Karimi MD;  Location:  [facility];  Service: Cardiology;  Laterality: N/A;    CARDIAC CATHETERIZATION N/A 03/10/2020    Procedure: Stent DAVONTE coronary;  Surgeon: Miguel Ángel Karimi MD;  Location:  [facility];  Service: Cardiology;  Laterality: N/A;    CARDIAC CATHETERIZATION N/A 03/10/2020    Procedure: Coronary angiography;  Surgeon: Miguel Ángel Karimi MD;  Location:  [facility];  Service: Cardiology;  Laterality: N/A;    CARDIAC CATHETERIZATION N/A 03/10/2020    Procedure: Left ventriculography;  Surgeon: Miguel Ángel Karimi MD;  Location:  [facility];  Service: Cardiology;  Laterality: N/A;    CARDIAC CATHETERIZATION N/A 05/20/2022    Procedure: Left Heart Cath;  Surgeon: Mackenzie Morales MD;  Location:  [facility];  Service: Cardiovascular;  Laterality: N/A;    CARDIAC CATHETERIZATION N/A 05/20/2022    Procedure: Coronary angiography;  Surgeon: Mackenzie Morales MD;  Location:  [facility];  Service: Cardiovascular;  Laterality: N/A;    CARDIAC CATHETERIZATION N/A 05/20/2022    Procedure: Percutaneous  Coronary Intervention;  Surgeon: Mackenzie Morales MD;  Location:  [facility];  Service: Cardiovascular;  Laterality: N/A;    CARDIAC CATHETERIZATION N/A 05/24/2022    Procedure: Percutaneous Coronary Intervention;  Surgeon: Miguel Ángel Karimi MD;  Location:  [facility];  Service: Cardiovascular;  Laterality: N/A;  LAD and Cx    CARDIAC CATHETERIZATION N/A 05/24/2022    Procedure: Stent DAVONTE coronary;  Surgeon: Miguel Ángel Karimi MD;  Location:  [facility];  Service: Cardiovascular;  Laterality: N/A;    CARDIAC CATHETERIZATION N/A 05/24/2022    Procedure: Resting Full Cycle Ratio;  Surgeon: Miguel Ángel Karimi MD;  Location:  [facility];  Service: Cardiovascular;  Laterality: N/A;    CARDIAC CATHETERIZATION N/A 03/19/2024    Procedure: Left Heart Cath;  Surgeon: Miguel Ángel Karimi MD;  Location:  [facility];  Service: Cardiovascular;  Laterality: N/A;    CARDIAC CATHETERIZATION N/A 03/19/2024    Procedure: Percutaneous Coronary Intervention;  Surgeon: Miguel Ángel Karimi MD;  Location:  [facility];  Service: Cardiovascular;  Laterality: N/A;    CARDIAC CATHETERIZATION N/A 03/19/2024    Procedure: Stent DAVONTE coronary;  Surgeon: Miguel Ángel Karimi MD;  Location:  [facility];  Service: Cardiovascular;  Laterality: N/A;    CERVICAL DISCECTOMY POSTERIOR FUSION WITH INSTRUMENTATION Bilateral 6/25/2024    Procedure: Cervical 3 to cervical 6 laminectomies and posterior spinal fusion - Bilateral;  Surgeon: Marshal Miguel MD;  Location: [facility] MAIN OR;  Service: Neurosurgery;  Laterality: Bilateral;    COLONOSCOPY N/A 02/22/2006    Bilobed polyp at 30 cm, hemorrhoids-Dr. Ilya Zhao    COLONOSCOPY N/A 02/28/2014    Normal ileum, one 6 mm polyp in the mid transverse colon-Dr. Ilya Zhao    COLONOSCOPY N/A 11/19/2008    Ela ileum, two 3 to 4 mm polyps, non-bleeding internal  hemorrhoids, repeat in 5 years-Dr. Ilya Zhao    COLONOSCOPY N/A 10/31/2017    Procedure: COLONOSCOPY WITH POLYPECTOMY (COLD BIOPSY);  Surgeon: Ilya Zhao MD;  Location: [facility] ENDOSCOPY;  Service:     COLONOSCOPY N/A 05/21/2021    Procedure: Colonoscopy into cecum and terminal ileum with cold biopsy polypectomy;  Surgeon: Ilya Zhao MD;  Location: [facility] ENDOSCOPY;  Service: Gastroenterology;  Laterality: N/A;  Pre op: History of Polyps  Post op: Polyp    CORONARY ANGIOPLASTY WITH STENT PLACEMENT  2007, 2012, 2015    cardiac stents x3 occasions    ENDOSCOPY N/A 10/04/2017    Procedure: ESOPHAGOGASTRODUODENOSCOPY;  Surgeon: Boyd Guidry MD;  Location: [facility] ENDOSCOPY;  Service:     ENDOSCOPY N/A 05/21/2021    Procedure: ESOPHAGOGASTRODUODENOSCOPY with biopsies;  Surgeon: Ilya Zhao MD;  Location: [facility] ENDOSCOPY;  Service: Gastroenterology;  Laterality: N/A;  Pre op: GERD  Post op: Irregular  Z-Line, Hiatal Hernia, Gastritis    ENDOSCOPY N/A 08/25/2023    Procedure: ESOPHAGOGASTRODUODENOSCOPY with biopsy;  Surgeon: Ilya Zhao MD;  Location: [facility] ENDOSCOPY;  Service: Gastroenterology;  Laterality: N/A;  errosive gastritis    EPIDURAL BLOCK      INTERVENTIONAL RADIOLOGY PROCEDURE N/A 05/20/2022    Procedure: Intravascular Ultrasound;  Surgeon: Mackenzie Morales MD;  Location: [facility] CATH INVASIVE LOCATION;  Service: Cardiovascular;  Laterality: N/A;    KNEE INCISION AND DRAINAGE Right 06/20/2017    Procedure: RT. KNEE WASHOUT ;  Surgeon: Boyd Coyne MD;  Location: [facility] MAIN OR;  Service:     MEDIAL BRANCH BLOCK Bilateral 12/17/2021    Procedure: LUMBAR MEDIAL BRANCH BLOCK bilateral ~L4-S1 x2 (~2 weeks apart);  Surgeon: Christina Villaseñor MD;  [place] [facility];  Service: Pain Management;  Laterality: Bilateral;    MEDIAL BRANCH BLOCK Bilateral 01/03/2022    Procedure: LUMBAR MEDIAL BRANCH BLOCK bilateral ~L4-S1 x2 (~2 weeks apart);  Surgeon: Christina Villaseñor MD;  [place] [facility]  [place];  Service: Pain Management;  Laterality: Bilateral;    IN ARTHRP KNE CONDYLE&PLATU MEDIAL&LAT COMPARTMENTS Right 06/15/2017    Procedure: RT TOTAL KNEE ARTHROPLASTY;  Surgeon: Boyd Coyne MD;  Location: [facility] MAIN OR;  Service: Orthopedics    RADIOFREQUENCY ABLATION Bilateral 01/10/2022    Procedure: RADIOFREQUENCY ABLATION NERVES Bilateral L4-S1;  Surgeon: Christina Villaseñor MD;  [place] [facility] [place];  Service: Pain Management;  Laterality: Bilateral;    SHOULDER SURGERY Right 2016    rotator cuff repair    UPPER GASTROINTESTINAL ENDOSCOPY N/A 10/13/2015    Z-line irregular, normal stomach, normal duodenum-Dr. Ilya Zhao    UPPER GASTROINTESTINAL ENDOSCOPY N/A 02/28/2014    Z-line irregular, normal stomach, normal duodenum-Dr. Ilya Zhao    UPPER GASTROINTESTINAL ENDOSCOPY N/A 11/19/2008    Z-line irregular, chronic gastritis withotu hemorrhage, normal duodenum-Dr. Ilya Zhao    UPPER GASTROINTESTINAL ENDOSCOPY N/A 06/22/2006    LA Grade A reflux esophagitis, non-bleeding erythematous gastropathy, normal duodenum-Dr. Ilya Zhao       Social History     Socioeconomic History    Marital status:      Spouse name: Micaela    Number of children: 1    Years of education: College   Tobacco Use    Smoking status: Never     Passive exposure: Never    Smokeless tobacco: Never    Tobacco comments:     CAFFEINE USE: 2 CUPS COFFEE DAILY   Vaping Use    Vaping status: Never Used   Substance and Sexual Activity    Alcohol use: Yes     Alcohol/week: 3.0 standard drinks of alcohol     Types: 1 Glasses of wine, 2 Shots of liquor per week    Drug use: Never    Sexual activity: Not Currently     Partners: Female     Birth control/protection: Post-menopausal       Family History   Problem Relation Age of Onset    Lupus Sister     Thyroid disease Sister     Heart disease Other     Hypertension Other     Arthritis Mother     Hyperlipidemia Mother     Hypertension Mother     Thyroid disease Mother     Lupus Mother      Vision loss Mother     Heart disease Father     Arthritis Father     Other Father         Vascular disease    Lupus Father     Depression Father     Alcohol abuse Father     Dementia Father     Hypertension Father     Heart disease Brother     Heart attack Brother 40    Thyroid disease Sister     Arthritis Brother     Malig Hyperthermia Neg Hx        The following portion of the patient's history were reviewed and updated as appropriate: past medical history, past surgical history, past social history, past family history, allergies, current medications, and problem list.    Review of Systems   Constitutional: Positive for malaise/fatigue and weight loss.   Cardiovascular:  Positive for dyspnea on exertion.   Respiratory:  Positive for shortness of breath.    Musculoskeletal:  Positive for joint pain.   Neurological: Negative.        Allergies   Allergen Reactions    Nsaids Other (See Comments)     Renal failure    Atorvastatin Myalgia    Hydralazine Myalgia    Norvasc [Amlodipine] Swelling    Zetia [Ezetimibe] Nausea And Vomiting    Crestor [Rosuvastatin] Other (See Comments)     Flu like s/s    Ace Inhibitors Angioedema    Lisinopril Angioedema    Testosterone Myalgia         Current Outpatient Medications:     acetaminophen (TYLENOL) 650 MG 8 hr tablet, Take 1 tablet by mouth Every 4 (Four) Hours As Needed for Mild Pain., Disp: , Rfl:     ARIPiprazole (ABILIFY) 5 MG tablet, Take 1 tablet by mouth Daily., Disp: , Rfl:     aspirin 81 MG EC tablet, Take 1 tablet by mouth Daily. (Patient taking differently: Take 1 tablet by mouth Every Evening.), Disp: 30 tablet, Rfl: 6    BD Pen Needle Marissa 2nd Gen 32G X 4 MM misc, , Disp: , Rfl:     carvedilol (COREG) 12.5 MG tablet, Take 1 tablet by mouth 2 (Two) Times a Day With Meals., Disp: 180 tablet, Rfl: 0    clonazePAM (KlonoPIN) 0.5 MG tablet, TAKE 1 TABLET BY MOUTH DAILY AS NEEDED FOR ANXIETY, Disp: 30 tablet, Rfl: 2    clopidogrel (PLAVIX) 75 MG tablet, TAKE 1 TABLET  "BY MOUTH EVERY DAY, Disp: 90 tablet, Rfl: 2    dorzolamide-timolol (COSOPT) 2-0.5 % ophthalmic solution, Administer 1 drop to both eyes 2 (Two) Times a Day., Disp: , Rfl:     dorzolamide-timolol (Cosopt) 2-0.5 % ophthalmic solution, 1 drop., Disp: , Rfl:     doxazosin (CARDURA) 4 MG tablet, Take 1 tablet by mouth every night at bedtime. (Patient taking differently: Take 0.5 tablets by mouth Every Night.), Disp: 90 tablet, Rfl: 4    esomeprazole (nexIUM) 40 MG capsule, TAKE ONE CAPSULE BY MOUTH EVERY MORNING BEFORE BREAKFAST, Disp: 90 capsule, Rfl: 3    Inclisiran Sodium (Leqvio) 284 MG/1.5ML solution prefilled syringe, Inject 1.5 mL under the skin into the appropriate area as directed., Disp: , Rfl:     Ingrezza 60 MG capsule, Take 1 capsule by mouth Daily., Disp: , Rfl:     latanoprost (XALATAN) 0.005 % ophthalmic solution, Administer 1 drop to both eyes Every Night., Disp: , Rfl:     metFORMIN (GLUCOPHAGE) 500 MG tablet, TAKE 2 TABLETS BY MOUTH DAILY WITH BREAKFAST, Disp: 180 tablet, Rfl: 1    methocarbamol (ROBAXIN) 500 MG tablet, Take 1 tablet by mouth As Needed., Disp: , Rfl:     oxyCODONE-acetaminophen (PERCOCET) 7.5-325 MG per tablet, Take 1 tablet by mouth Every 6 (Six) Hours As Needed., Disp: , Rfl:     Semaglutide, 2 MG/DOSE, (Ozempic, 2 MG/DOSE,) 8 MG/3ML solution pen-injector, DIAL AND INJECT UNDER THE SKIN 2 MG WEEKLY, Disp: 3 mL, Rfl: 2    Toujeo SoloStar 300 UNIT/ML solution pen-injector injection, ADMINISTER 65 UNITS UNDER THE SKIN DAILY AS DIRECTED (Patient taking differently: Inject 40 Units under the skin into the appropriate area as directed Daily.), Disp: 21 mL, Rfl: 0          Objective:     Vitals:    12/26/24 1325   BP: 140/100   BP Location: Left arm   Patient Position: Standing   Cuff Size: Adult   Pulse: 82   SpO2: 96%   Weight: 113 kg (249 lb 6.4 oz)   Height: 195.6 cm (77.01\")     Body mass index is 29.57 kg/m².    PHYSICAL EXAM:    Vitals Reviewed.   General Appearance: No acute " distress, well developed and well nourished.   HENT: No hearing loss noted.    Respiratory: No signs of respiratory distress. Respiration rhythm and depth normal.  Clear to auscultation.   Cardiovascular:  Jugular Venous Pressure: Normal  Heart Rate and Rhythm: Normal, Heart Sounds: Normal S1 and S2. No S3 or S4 noted.  Murmurs: No murmurs noted. No rubs, thrills, or gallops.   Lower Extremities: No edema noted.  Musculoskeletal: Normal movement of extremities.  Skin: General appearance normal.    Psychiatric: Patient alert.  Slow to respond to questions.      ECG 12 Lead    Date/Time: 12/26/2024 1:31 PM  Performed by: Janny López APRN    Authorized by: Janny López APRN  Comparison: compared with previous ECG from 12/11/2024  Comparison to previous ECG: Sinus tachycardia, single PVC  Rhythm: sinus rhythm  Rate: normal  BPM: 82  Conduction: conduction normal  ST Depression: II, III, aVF, V3, V4, V5 and V6  QRS axis: normal    Clinical impression: abnormal EKG            Assessment:       Diagnosis Plan   1. CAD, multiple vessel  Case Request Cath Lab: Coronary angiography, Left heart cath, Left ventriculography    CBC & Differential    Comprehensive Metabolic Panel    Lipid Panel      2. Abnormal EKG  Case Request Cath Lab: Coronary angiography, Left heart cath, Left ventriculography    CBC & Differential    Comprehensive Metabolic Panel    Lipid Panel      3. Other fatigue        4. Throat pain        5. Dyspnea on exertion        6. Essential hypertension        7. Hyperlipidemia LDL goal <70  CBC & Differential    Comprehensive Metabolic Panel    Lipid Panel      8. PVCs (premature ventricular contractions)               Plan:       Mr. Monaco presents today for 6-month cardiac follow-up visit.  He was recently hospitalized for pneumonia.  His wife said during the hospitalization he was noted to have frequent PVCs on the telemetry monitor.  Patient was asymptomatic.    Patient's wife mentioned that  he has been experiencing dyspnea with exertion, fatigue, and a vague throat pressure.  His wife says this is his anginal symptoms in the past, but said it could also be due to other things.  He denies active symptoms at this time.  His EKG is abnormal in comparison the one that was just done in December.  This shows ST depression and T wave abnormalities and the inferior anterior leads.    I discussed the plan of care, symptoms, and abnormal EKG with Dr. Ron Karimi via phone.  Since the patient is symptom-free today and has had symptoms intermittently for 2 months, he is recommend the patient undergo heart catheterization.  I discussed with the patient and his wife and they would like to proceed with Dr. Karimi doing the catheterization only.  Continue DAPT therapy and we will start him on isosorbide 30 mg 1 tablet daily.  Possible headache side effect discussed with patient.    His wife requested a lipid panel to be drawn with his precath labs because he was recently started on Leqvio.    In the event that he has any symptoms that change in intensity, frequency, or severity, I have asked him to present to the ED immediately.  He verbalized understanding.    As always, it has been a pleasure to participate in your patient's care. Thank you.         Sincerely,         MICHELLE Bernard  [facility] Cardiology      Dictated utilizing Dragon Dictation  I spent 30 minutes reviewing her medical records/testing/previous office notes/labs, face-to-face interaction with patient, physical examination, formulating the plan of care, and discussion of plan of care with patient.

## 2024-12-26 NOTE — PROGRESS NOTES
Date of Office Visit: 2024  Encounter Provider: MICHELLE Alcantara  Place of Service: [facility] CARDIOLOGY  Patient Name: Isaias Monaco  :1955  Primary Cardiologist: Dr. Ron Karimi    Chief Complaint   Patient presents with    Coronary Artery Disease    Follow-up   :     HPI: Isaias Monaco is a 69 y.o. male who presents today for cardiac follow-up visit.  He is a new patient to me and I have reviewed his medical records.    He has known hypertension, hyperlipidemia, type 2 diabetes mellitus, and chronic kidney disease.     He has known coronary artery disease.  He has a history of prior PCI to the RCA for .  In May 2022, he underwent left main bifurcation stenting with DK crush technique.  In 2024, he underwent angioplasty and shockwave balloon lithotripsy of the distal left main to the proximal LAD and PCI of the distal left main to proximal circumflex followed by kissing balloon angioplasty.  He has preserved LVEF.  Lifelong DAPT therapy recommended.    On 2024, he was hospitalized for community-acquired pneumonia.  His troponin level and EKG were normal.  His wife said on telemetry he was having frequent asymptomatic PVCs during the hospitalization.    He presents today for follow-up visit and his wife is accompanying him.  He reports shortness of breath, a vague feeling in his throat, and fatigue.  Both he and his wife state this was his anginal symptoms in the past.  His wife says that he has had the symptoms and his heart is been okay.  She mentioned that it could be related to GERD, indigestion, or anxiety.  He denies chest pain, PND, orthopnea, edema, palpitations, dizziness, or syncope.      Past Medical History:   Diagnosis Date    Abnormal EKG 2024    Anemia     post hemorrhagic    Angioedema 2016    Secondary to ACE inhibitor    Anxiety     Arthritis     CAD (coronary artery disease)     Colon polyps     Diabetes  mellitus, type 2     GERD (gastroesophageal reflux disease)     Glaucoma     Hematoma     history    High cholesterol     History of foreign travel 12/2017; 08/2018    [place], [place], [place], [place], [place] and [place]; [place]    Hyperlipidemia     Hypertension     Hypogonadism in male 09/28/2016    Male erectile disorder     Microalbuminuria     Osteoarthritis     knee    PVCs (premature ventricular contractions) 12/26/2024    Spinal stenosis of lumbar region without neurogenic claudication 06/02/2021    Tubular adenoma of colon 11/01/2017    Vitamin D deficiency        Past Surgical History:   Procedure Laterality Date    CARDIAC CATHETERIZATION N/A 05/15/2006    Dr. Mini Camarillo    CARDIAC CATHETERIZATION N/A 03/10/2020    Procedure: Left Heart Cath;  Surgeon: Miguel Ángel Karimi MD;  Location:  [facility];  Service: Cardiology;  Laterality: N/A;    CARDIAC CATHETERIZATION N/A 03/10/2020    Procedure: Stent DAVONTE coronary;  Surgeon: Miguel Ángel Karimi MD;  Location:  [facility];  Service: Cardiology;  Laterality: N/A;    CARDIAC CATHETERIZATION N/A 03/10/2020    Procedure: Coronary angiography;  Surgeon: Miguel Ángel Karimi MD;  Location:  [facility];  Service: Cardiology;  Laterality: N/A;    CARDIAC CATHETERIZATION N/A 03/10/2020    Procedure: Left ventriculography;  Surgeon: Miguel Ángel Karimi MD;  Location:  [facility];  Service: Cardiology;  Laterality: N/A;    CARDIAC CATHETERIZATION N/A 05/20/2022    Procedure: Left Heart Cath;  Surgeon: Mackenzie Morales MD;  Location:  [facility];  Service: Cardiovascular;  Laterality: N/A;    CARDIAC CATHETERIZATION N/A 05/20/2022    Procedure: Coronary angiography;  Surgeon: Mackenzie Morales MD;  Location:  [facility];  Service: Cardiovascular;  Laterality: N/A;    CARDIAC CATHETERIZATION N/A 05/20/2022    Procedure: Percutaneous  Coronary Intervention;  Surgeon: Mackenzie Morales MD;  Location:  [facility];  Service: Cardiovascular;  Laterality: N/A;    CARDIAC CATHETERIZATION N/A 05/24/2022    Procedure: Percutaneous Coronary Intervention;  Surgeon: Miguel Ángel Karimi MD;  Location:  [facility];  Service: Cardiovascular;  Laterality: N/A;  LAD and Cx    CARDIAC CATHETERIZATION N/A 05/24/2022    Procedure: Stent DAVONTE coronary;  Surgeon: Miguel Ángel Karimi MD;  Location:  [facility];  Service: Cardiovascular;  Laterality: N/A;    CARDIAC CATHETERIZATION N/A 05/24/2022    Procedure: Resting Full Cycle Ratio;  Surgeon: Miguel Ángel Karimi MD;  Location:  [facility];  Service: Cardiovascular;  Laterality: N/A;    CARDIAC CATHETERIZATION N/A 03/19/2024    Procedure: Left Heart Cath;  Surgeon: Miguel Ángel Karimi MD;  Location:  [facility];  Service: Cardiovascular;  Laterality: N/A;    CARDIAC CATHETERIZATION N/A 03/19/2024    Procedure: Percutaneous Coronary Intervention;  Surgeon: Miguel Ángel Karimi MD;  Location:  [facility];  Service: Cardiovascular;  Laterality: N/A;    CARDIAC CATHETERIZATION N/A 03/19/2024    Procedure: Stent DAVONTE coronary;  Surgeon: Miguel Ángel Karimi MD;  Location:  [facility];  Service: Cardiovascular;  Laterality: N/A;    CERVICAL DISCECTOMY POSTERIOR FUSION WITH INSTRUMENTATION Bilateral 6/25/2024    Procedure: Cervical 3 to cervical 6 laminectomies and posterior spinal fusion - Bilateral;  Surgeon: Marshal Miguel MD;  Location: [facility] MAIN OR;  Service: Neurosurgery;  Laterality: Bilateral;    COLONOSCOPY N/A 02/22/2006    Bilobed polyp at 30 cm, hemorrhoids-Dr. Ilya Zhao    COLONOSCOPY N/A 02/28/2014    Normal ileum, one 6 mm polyp in the mid transverse colon-Dr. Ilya Zhao    COLONOSCOPY N/A 11/19/2008    Ela ileum, two 3 to 4 mm polyps, non-bleeding internal  hemorrhoids, repeat in 5 years-Dr. Ilya Zhao    COLONOSCOPY N/A 10/31/2017    Procedure: COLONOSCOPY WITH POLYPECTOMY (COLD BIOPSY);  Surgeon: Ilya Zhao MD;  Location: [facility] ENDOSCOPY;  Service:     COLONOSCOPY N/A 05/21/2021    Procedure: Colonoscopy into cecum and terminal ileum with cold biopsy polypectomy;  Surgeon: Ilya Zhao MD;  Location: [facility] ENDOSCOPY;  Service: Gastroenterology;  Laterality: N/A;  Pre op: History of Polyps  Post op: Polyp    CORONARY ANGIOPLASTY WITH STENT PLACEMENT  2007, 2012, 2015    cardiac stents x3 occasions    ENDOSCOPY N/A 10/04/2017    Procedure: ESOPHAGOGASTRODUODENOSCOPY;  Surgeon: Boyd Guidry MD;  Location: [facility] ENDOSCOPY;  Service:     ENDOSCOPY N/A 05/21/2021    Procedure: ESOPHAGOGASTRODUODENOSCOPY with biopsies;  Surgeon: Ilya Zhao MD;  Location: [facility] ENDOSCOPY;  Service: Gastroenterology;  Laterality: N/A;  Pre op: GERD  Post op: Irregular  Z-Line, Hiatal Hernia, Gastritis    ENDOSCOPY N/A 08/25/2023    Procedure: ESOPHAGOGASTRODUODENOSCOPY with biopsy;  Surgeon: Ilya Zhao MD;  Location: [facility] ENDOSCOPY;  Service: Gastroenterology;  Laterality: N/A;  errosive gastritis    EPIDURAL BLOCK      INTERVENTIONAL RADIOLOGY PROCEDURE N/A 05/20/2022    Procedure: Intravascular Ultrasound;  Surgeon: Mackenzie Morales MD;  Location: [facility] CATH INVASIVE LOCATION;  Service: Cardiovascular;  Laterality: N/A;    KNEE INCISION AND DRAINAGE Right 06/20/2017    Procedure: RT. KNEE WASHOUT ;  Surgeon: Boyd Coyne MD;  Location: [facility] MAIN OR;  Service:     MEDIAL BRANCH BLOCK Bilateral 12/17/2021    Procedure: LUMBAR MEDIAL BRANCH BLOCK bilateral ~L4-S1 x2 (~2 weeks apart);  Surgeon: Christina Villaseñor MD;  [place] [facility];  Service: Pain Management;  Laterality: Bilateral;    MEDIAL BRANCH BLOCK Bilateral 01/03/2022    Procedure: LUMBAR MEDIAL BRANCH BLOCK bilateral ~L4-S1 x2 (~2 weeks apart);  Surgeon: Christina Villaseñor MD;  [place] [facility]  [place];  Service: Pain Management;  Laterality: Bilateral;    PA ARTHRP KNE CONDYLE&PLATU MEDIAL&LAT COMPARTMENTS Right 06/15/2017    Procedure: RT TOTAL KNEE ARTHROPLASTY;  Surgeon: Boyd Coyne MD;  Location: [facility] MAIN OR;  Service: Orthopedics    RADIOFREQUENCY ABLATION Bilateral 01/10/2022    Procedure: RADIOFREQUENCY ABLATION NERVES Bilateral L4-S1;  Surgeon: Christina Villaseñor MD;  [place] [facility] [place];  Service: Pain Management;  Laterality: Bilateral;    SHOULDER SURGERY Right 2016    rotator cuff repair    UPPER GASTROINTESTINAL ENDOSCOPY N/A 10/13/2015    Z-line irregular, normal stomach, normal duodenum-Dr. Ilya Zhao    UPPER GASTROINTESTINAL ENDOSCOPY N/A 02/28/2014    Z-line irregular, normal stomach, normal duodenum-Dr. Ilya Zhao    UPPER GASTROINTESTINAL ENDOSCOPY N/A 11/19/2008    Z-line irregular, chronic gastritis withotu hemorrhage, normal duodenum-Dr. Ilya Zhao    UPPER GASTROINTESTINAL ENDOSCOPY N/A 06/22/2006    LA Grade A reflux esophagitis, non-bleeding erythematous gastropathy, normal duodenum-Dr. Ilya Zhao       Social History     Socioeconomic History    Marital status:      Spouse name: Micaela    Number of children: 1    Years of education: College   Tobacco Use    Smoking status: Never     Passive exposure: Never    Smokeless tobacco: Never    Tobacco comments:     CAFFEINE USE: 2 CUPS COFFEE DAILY   Vaping Use    Vaping status: Never Used   Substance and Sexual Activity    Alcohol use: Yes     Alcohol/week: 3.0 standard drinks of alcohol     Types: 1 Glasses of wine, 2 Shots of liquor per week    Drug use: Never    Sexual activity: Not Currently     Partners: Female     Birth control/protection: Post-menopausal       Family History   Problem Relation Age of Onset    Lupus Sister     Thyroid disease Sister     Heart disease Other     Hypertension Other     Arthritis Mother     Hyperlipidemia Mother     Hypertension Mother     Thyroid disease Mother     Lupus Mother      Vision loss Mother     Heart disease Father     Arthritis Father     Other Father         Vascular disease    Lupus Father     Depression Father     Alcohol abuse Father     Dementia Father     Hypertension Father     Heart disease Brother     Heart attack Brother 40    Thyroid disease Sister     Arthritis Brother     Malig Hyperthermia Neg Hx        The following portion of the patient's history were reviewed and updated as appropriate: past medical history, past surgical history, past social history, past family history, allergies, current medications, and problem list.    Review of Systems   Constitutional: Positive for malaise/fatigue and weight loss.   Cardiovascular:  Positive for dyspnea on exertion.   Respiratory:  Positive for shortness of breath.    Musculoskeletal:  Positive for joint pain.   Neurological: Negative.        Allergies   Allergen Reactions    Nsaids Other (See Comments)     Renal failure    Atorvastatin Myalgia    Hydralazine Myalgia    Norvasc [Amlodipine] Swelling    Zetia [Ezetimibe] Nausea And Vomiting    Crestor [Rosuvastatin] Other (See Comments)     Flu like s/s    Ace Inhibitors Angioedema    Lisinopril Angioedema    Testosterone Myalgia         Current Outpatient Medications:     acetaminophen (TYLENOL) 650 MG 8 hr tablet, Take 1 tablet by mouth Every 4 (Four) Hours As Needed for Mild Pain., Disp: , Rfl:     ARIPiprazole (ABILIFY) 5 MG tablet, Take 1 tablet by mouth Daily., Disp: , Rfl:     aspirin 81 MG EC tablet, Take 1 tablet by mouth Daily. (Patient taking differently: Take 1 tablet by mouth Every Evening.), Disp: 30 tablet, Rfl: 6    BD Pen Needle Marissa 2nd Gen 32G X 4 MM misc, , Disp: , Rfl:     carvedilol (COREG) 12.5 MG tablet, Take 1 tablet by mouth 2 (Two) Times a Day With Meals., Disp: 180 tablet, Rfl: 0    clonazePAM (KlonoPIN) 0.5 MG tablet, TAKE 1 TABLET BY MOUTH DAILY AS NEEDED FOR ANXIETY, Disp: 30 tablet, Rfl: 2    clopidogrel (PLAVIX) 75 MG tablet, TAKE 1 TABLET  "BY MOUTH EVERY DAY, Disp: 90 tablet, Rfl: 2    dorzolamide-timolol (COSOPT) 2-0.5 % ophthalmic solution, Administer 1 drop to both eyes 2 (Two) Times a Day., Disp: , Rfl:     dorzolamide-timolol (Cosopt) 2-0.5 % ophthalmic solution, 1 drop., Disp: , Rfl:     doxazosin (CARDURA) 4 MG tablet, Take 1 tablet by mouth every night at bedtime. (Patient taking differently: Take 0.5 tablets by mouth Every Night.), Disp: 90 tablet, Rfl: 4    esomeprazole (nexIUM) 40 MG capsule, TAKE ONE CAPSULE BY MOUTH EVERY MORNING BEFORE BREAKFAST, Disp: 90 capsule, Rfl: 3    Inclisiran Sodium (Leqvio) 284 MG/1.5ML solution prefilled syringe, Inject 1.5 mL under the skin into the appropriate area as directed., Disp: , Rfl:     Ingrezza 60 MG capsule, Take 1 capsule by mouth Daily., Disp: , Rfl:     latanoprost (XALATAN) 0.005 % ophthalmic solution, Administer 1 drop to both eyes Every Night., Disp: , Rfl:     metFORMIN (GLUCOPHAGE) 500 MG tablet, TAKE 2 TABLETS BY MOUTH DAILY WITH BREAKFAST, Disp: 180 tablet, Rfl: 1    methocarbamol (ROBAXIN) 500 MG tablet, Take 1 tablet by mouth As Needed., Disp: , Rfl:     oxyCODONE-acetaminophen (PERCOCET) 7.5-325 MG per tablet, Take 1 tablet by mouth Every 6 (Six) Hours As Needed., Disp: , Rfl:     Semaglutide, 2 MG/DOSE, (Ozempic, 2 MG/DOSE,) 8 MG/3ML solution pen-injector, DIAL AND INJECT UNDER THE SKIN 2 MG WEEKLY, Disp: 3 mL, Rfl: 2    Toujeo SoloStar 300 UNIT/ML solution pen-injector injection, ADMINISTER 65 UNITS UNDER THE SKIN DAILY AS DIRECTED (Patient taking differently: Inject 40 Units under the skin into the appropriate area as directed Daily.), Disp: 21 mL, Rfl: 0          Objective:     Vitals:    12/26/24 1325   BP: 140/100   BP Location: Left arm   Patient Position: Standing   Cuff Size: Adult   Pulse: 82   SpO2: 96%   Weight: 113 kg (249 lb 6.4 oz)   Height: 195.6 cm (77.01\")     Body mass index is 29.57 kg/m².    PHYSICAL EXAM:    Vitals Reviewed.   General Appearance: No acute " distress, well developed and well nourished.   HENT: No hearing loss noted.    Respiratory: No signs of respiratory distress. Respiration rhythm and depth normal.  Clear to auscultation.   Cardiovascular:  Jugular Venous Pressure: Normal  Heart Rate and Rhythm: Normal, Heart Sounds: Normal S1 and S2. No S3 or S4 noted.  Murmurs: No murmurs noted. No rubs, thrills, or gallops.   Lower Extremities: No edema noted.  Musculoskeletal: Normal movement of extremities.  Skin: General appearance normal.    Psychiatric: Patient alert.  Slow to respond to questions.      ECG 12 Lead    Date/Time: 12/26/2024 1:31 PM  Performed by: Janny López APRN    Authorized by: Janny López APRN  Comparison: compared with previous ECG from 12/11/2024  Comparison to previous ECG: Sinus tachycardia, single PVC  Rhythm: sinus rhythm  Rate: normal  BPM: 82  Conduction: conduction normal  ST Depression: II, III, aVF, V3, V4, V5 and V6  QRS axis: normal    Clinical impression: abnormal EKG            Assessment:       Diagnosis Plan   1. CAD, multiple vessel  Case Request Cath Lab: Coronary angiography, Left heart cath, Left ventriculography    CBC & Differential    Comprehensive Metabolic Panel    Lipid Panel      2. Abnormal EKG  Case Request Cath Lab: Coronary angiography, Left heart cath, Left ventriculography    CBC & Differential    Comprehensive Metabolic Panel    Lipid Panel      3. Other fatigue        4. Throat pain        5. Dyspnea on exertion        6. Essential hypertension        7. Hyperlipidemia LDL goal <70  CBC & Differential    Comprehensive Metabolic Panel    Lipid Panel      8. PVCs (premature ventricular contractions)               Plan:       Mr. Monaco presents today for 6-month cardiac follow-up visit.  He was recently hospitalized for pneumonia.  His wife said during the hospitalization he was noted to have frequent PVCs on the telemetry monitor.  Patient was asymptomatic.    Patient's wife mentioned that  he has been experiencing dyspnea with exertion, fatigue, and a vague throat pressure.  His wife says this is his anginal symptoms in the past, but said it could also be due to other things.  He denies active symptoms at this time.  His EKG is abnormal in comparison the one that was just done in December.  This shows ST depression and T wave abnormalities and the inferior anterior leads.    I discussed the plan of care, symptoms, and abnormal EKG with Dr. Ron Karimi via phone.  Since the patient is symptom-free today and has had symptoms intermittently for 2 months, he is recommend the patient undergo heart catheterization.  I discussed with the patient and his wife and they would like to proceed with Dr. Karimi doing the catheterization only.  Continue DAPT therapy and we will start him on isosorbide 30 mg 1 tablet daily.  Possible headache side effect discussed with patient.    His wife requested a lipid panel to be drawn with his precath labs because he was recently started on Leqvio.    In the event that he has any symptoms that change in intensity, frequency, or severity, I have asked him to present to the ED immediately.  He verbalized understanding.    As always, it has been a pleasure to participate in your patient's care. Thank you.         Sincerely,         MICHELLE Bernard  [facility] Cardiology      Dictated utilizing Dragon Dictation  I spent 30 minutes reviewing her medical records/testing/previous office notes/labs, face-to-face interaction with patient, physical examination, formulating the plan of care, and discussion of plan of care with patient.

## 2024-12-27 ENCOUNTER — LAB (OUTPATIENT)
Dept: LAB | Facility: HOSPITAL | Age: 69
End: 2024-12-27
Payer: MEDICARE

## 2024-12-27 DIAGNOSIS — R94.31 ABNORMAL EKG: ICD-10-CM

## 2024-12-27 DIAGNOSIS — I25.10 CAD, MULTIPLE VESSEL: ICD-10-CM

## 2024-12-27 DIAGNOSIS — E78.5 HYPERLIPIDEMIA LDL GOAL <70: ICD-10-CM

## 2024-12-27 LAB
ALBUMIN SERPL-MCNC: 3.8 G/DL (ref 3.5–5.2)
ALBUMIN/GLOB SERPL: 1.1 G/DL
ALP SERPL-CCNC: 126 U/L (ref 39–117)
ALT SERPL W P-5'-P-CCNC: 36 U/L (ref 1–41)
ANION GAP SERPL CALCULATED.3IONS-SCNC: 8.2 MMOL/L (ref 5–15)
AST SERPL-CCNC: 44 U/L (ref 1–40)
BASOPHILS # BLD AUTO: 0.08 10*3/MM3 (ref 0–0.2)
BASOPHILS NFR BLD AUTO: 0.8 % (ref 0–1.5)
BILIRUB SERPL-MCNC: 0.3 MG/DL (ref 0–1.2)
BUN SERPL-MCNC: 9 MG/DL (ref 8–23)
BUN/CREAT SERPL: 8 (ref 7–25)
CALCIUM SPEC-SCNC: 9.4 MG/DL (ref 8.6–10.5)
CHLORIDE SERPL-SCNC: 103 MMOL/L (ref 98–107)
CHOLEST SERPL-MCNC: 224 MG/DL (ref 0–200)
CO2 SERPL-SCNC: 27.8 MMOL/L (ref 22–29)
CREAT SERPL-MCNC: 1.12 MG/DL (ref 0.76–1.27)
DEPRECATED RDW RBC AUTO: 46.6 FL (ref 37–54)
EGFRCR SERPLBLD CKD-EPI 2021: 71.1 ML/MIN/1.73
EOSINOPHIL # BLD AUTO: 0.27 10*3/MM3 (ref 0–0.4)
EOSINOPHIL NFR BLD AUTO: 2.8 % (ref 0.3–6.2)
ERYTHROCYTE [DISTWIDTH] IN BLOOD BY AUTOMATED COUNT: 15.4 % (ref 12.3–15.4)
GLOBULIN UR ELPH-MCNC: 3.6 GM/DL
GLUCOSE SERPL-MCNC: 108 MG/DL (ref 65–99)
HCT VFR BLD AUTO: 42.8 % (ref 37.5–51)
HDLC SERPL-MCNC: 47 MG/DL (ref 40–60)
HGB BLD-MCNC: 13.3 G/DL (ref 13–17.7)
IMM GRANULOCYTES # BLD AUTO: 0.03 10*3/MM3 (ref 0–0.05)
IMM GRANULOCYTES NFR BLD AUTO: 0.3 % (ref 0–0.5)
LDLC SERPL CALC-MCNC: 146 MG/DL (ref 0–100)
LDLC/HDLC SERPL: 3.05 {RATIO}
LYMPHOCYTES # BLD AUTO: 2.24 10*3/MM3 (ref 0.7–3.1)
LYMPHOCYTES NFR BLD AUTO: 23.3 % (ref 19.6–45.3)
MCH RBC QN AUTO: 25.9 PG (ref 26.6–33)
MCHC RBC AUTO-ENTMCNC: 31.1 G/DL (ref 31.5–35.7)
MCV RBC AUTO: 83.4 FL (ref 79–97)
MONOCYTES # BLD AUTO: 0.7 10*3/MM3 (ref 0.1–0.9)
MONOCYTES NFR BLD AUTO: 7.3 % (ref 5–12)
NEUTROPHILS NFR BLD AUTO: 6.29 10*3/MM3 (ref 1.7–7)
NEUTROPHILS NFR BLD AUTO: 65.5 % (ref 42.7–76)
NRBC BLD AUTO-RTO: 0 /100 WBC (ref 0–0.2)
PLATELET # BLD AUTO: 308 10*3/MM3 (ref 140–450)
PMV BLD AUTO: 9.7 FL (ref 6–12)
POTASSIUM SERPL-SCNC: 4.2 MMOL/L (ref 3.5–5.2)
PROT SERPL-MCNC: 7.4 G/DL (ref 6–8.5)
RBC # BLD AUTO: 5.13 10*6/MM3 (ref 4.14–5.8)
SODIUM SERPL-SCNC: 139 MMOL/L (ref 136–145)
TRIGL SERPL-MCNC: 169 MG/DL (ref 0–150)
VLDLC SERPL-MCNC: 31 MG/DL (ref 5–40)
WBC NRBC COR # BLD AUTO: 9.61 10*3/MM3 (ref 3.4–10.8)

## 2024-12-27 PROCEDURE — 80053 COMPREHEN METABOLIC PANEL: CPT

## 2024-12-27 PROCEDURE — 85025 COMPLETE CBC W/AUTO DIFF WBC: CPT

## 2024-12-27 PROCEDURE — 36415 COLL VENOUS BLD VENIPUNCTURE: CPT

## 2024-12-27 PROCEDURE — 80061 LIPID PANEL: CPT

## 2024-12-30 NOTE — PROGRESS NOTES
Triage RN-please call patient.  Cholesterol level is quite high and unchanged over the past 3 months.  I would have patient discuss with Dr. Karimi and/or his PCP.  CMP looks good.  AST and alkaline phosphatase both elevated-defer to PCP.  Blood sugar mildly elevated at 108.  Major blood counts look good for heart cath.    PUJAI Dr. Patten.  Thank you

## 2025-01-03 ENCOUNTER — HOSPITAL ENCOUNTER (OUTPATIENT)
Facility: HOSPITAL | Age: 70
Setting detail: OBSERVATION
Discharge: HOME | End: 2025-01-04
Attending: INTERNAL MEDICINE | Admitting: INTERNAL MEDICINE
Payer: MEDICARE

## 2025-01-03 DIAGNOSIS — Z95.5 S/P DRUG ELUTING CORONARY STENT PLACEMENT: Primary | ICD-10-CM

## 2025-01-03 DIAGNOSIS — I25.10 CAD, MULTIPLE VESSEL: ICD-10-CM

## 2025-01-03 DIAGNOSIS — R94.31 ABNORMAL EKG: ICD-10-CM

## 2025-01-03 PROBLEM — I20.0 UNSTABLE ANGINA: Status: ACTIVE | Noted: 2025-01-03

## 2025-01-03 LAB
GLUCOSE BLDC GLUCOMTR-MCNC: 101 MG/DL (ref 70–130)
GLUCOSE BLDC GLUCOMTR-MCNC: 102 MG/DL (ref 70–130)
GLUCOSE BLDC GLUCOMTR-MCNC: 105 MG/DL (ref 70–130)
QT INTERVAL: 403 MS
QTC INTERVAL: 454 MS

## 2025-01-03 PROCEDURE — 93454 CORONARY ARTERY ANGIO S&I: CPT | Performed by: INTERNAL MEDICINE

## 2025-01-03 PROCEDURE — 92978 ENDOLUMINL IVUS OCT C 1ST: CPT | Performed by: INTERNAL MEDICINE

## 2025-01-03 PROCEDURE — 25010000002 HEPARIN (PORCINE) PER 1000 UNITS: Performed by: INTERNAL MEDICINE

## 2025-01-03 PROCEDURE — C1769 GUIDE WIRE: HCPCS | Performed by: INTERNAL MEDICINE

## 2025-01-03 PROCEDURE — 25810000003 SODIUM CHLORIDE 0.9 % SOLUTION: Performed by: INTERNAL MEDICINE

## 2025-01-03 PROCEDURE — 92928 PRQ TCAT PLMT NTRAC ST 1 LES: CPT | Performed by: INTERNAL MEDICINE

## 2025-01-03 PROCEDURE — 93571 IV DOP VEL&/PRESS C FLO 1ST: CPT | Performed by: INTERNAL MEDICINE

## 2025-01-03 PROCEDURE — C1725 CATH, TRANSLUMIN NON-LASER: HCPCS | Performed by: INTERNAL MEDICINE

## 2025-01-03 PROCEDURE — 93010 ELECTROCARDIOGRAM REPORT: CPT | Performed by: INTERNAL MEDICINE

## 2025-01-03 PROCEDURE — 25010000002 LIDOCAINE 2% SOLUTION: Performed by: INTERNAL MEDICINE

## 2025-01-03 PROCEDURE — C1894 INTRO/SHEATH, NON-LASER: HCPCS | Performed by: INTERNAL MEDICINE

## 2025-01-03 PROCEDURE — C9600 PERC DRUG-EL COR STENT SING: HCPCS | Performed by: INTERNAL MEDICINE

## 2025-01-03 PROCEDURE — C1887 CATHETER, GUIDING: HCPCS | Performed by: INTERNAL MEDICINE

## 2025-01-03 PROCEDURE — 25010000002 MIDAZOLAM PER 1 MG: Performed by: INTERNAL MEDICINE

## 2025-01-03 PROCEDURE — 25010000002 FENTANYL CITRATE (PF) 50 MCG/ML SOLUTION: Performed by: INTERNAL MEDICINE

## 2025-01-03 PROCEDURE — C1874 STENT, COATED/COV W/DEL SYS: HCPCS | Performed by: INTERNAL MEDICINE

## 2025-01-03 PROCEDURE — 85347 COAGULATION TIME ACTIVATED: CPT

## 2025-01-03 PROCEDURE — G0378 HOSPITAL OBSERVATION PER HR: HCPCS

## 2025-01-03 PROCEDURE — 82948 REAGENT STRIP/BLOOD GLUCOSE: CPT

## 2025-01-03 PROCEDURE — 93799 UNLISTED CV SVC/PROCEDURE: CPT | Performed by: INTERNAL MEDICINE

## 2025-01-03 PROCEDURE — C1753 CATH, INTRAVAS ULTRASOUND: HCPCS | Performed by: INTERNAL MEDICINE

## 2025-01-03 PROCEDURE — 93005 ELECTROCARDIOGRAM TRACING: CPT | Performed by: INTERNAL MEDICINE

## 2025-01-03 PROCEDURE — 25510000001 IOPAMIDOL PER 1 ML: Performed by: INTERNAL MEDICINE

## 2025-01-03 DEVICE — XIENCE SKYPOINT™ EVEROLIMUS ELUTING CORONARY STENT SYSTEM 3.00 MM X 48 MM / RAPID-EXCHANGE
Type: IMPLANTABLE DEVICE | Site: CORONARY | Status: FUNCTIONAL
Brand: XIENCE SKYPOINT™

## 2025-01-03 RX ORDER — FENTANYL CITRATE 50 UG/ML
INJECTION, SOLUTION INTRAMUSCULAR; INTRAVENOUS
Status: DISCONTINUED | OUTPATIENT
Start: 2025-01-03 | End: 2025-01-03 | Stop reason: HOSPADM

## 2025-01-03 RX ORDER — IOPAMIDOL 755 MG/ML
INJECTION, SOLUTION INTRAVASCULAR
Status: DISCONTINUED | OUTPATIENT
Start: 2025-01-03 | End: 2025-01-03 | Stop reason: HOSPADM

## 2025-01-03 RX ORDER — PANTOPRAZOLE SODIUM 40 MG/1
40 TABLET, DELAYED RELEASE ORAL
Status: DISCONTINUED | OUTPATIENT
Start: 2025-01-04 | End: 2025-01-04 | Stop reason: HOSPADM

## 2025-01-03 RX ORDER — ONDANSETRON 4 MG/1
4 TABLET, ORALLY DISINTEGRATING ORAL EVERY 6 HOURS PRN
Status: DISCONTINUED | OUTPATIENT
Start: 2025-01-03 | End: 2025-01-04 | Stop reason: HOSPADM

## 2025-01-03 RX ORDER — HYDROCODONE BITARTRATE AND ACETAMINOPHEN 5; 325 MG/1; MG/1
1 TABLET ORAL EVERY 4 HOURS PRN
Status: DISCONTINUED | OUTPATIENT
Start: 2025-01-03 | End: 2025-01-04 | Stop reason: HOSPADM

## 2025-01-03 RX ORDER — OXYCODONE AND ACETAMINOPHEN 7.5; 325 MG/1; MG/1
1 TABLET ORAL EVERY 6 HOURS PRN
Status: DISCONTINUED | OUTPATIENT
Start: 2025-01-03 | End: 2025-01-04 | Stop reason: HOSPADM

## 2025-01-03 RX ORDER — LIDOCAINE HYDROCHLORIDE 20 MG/ML
INJECTION, SOLUTION INFILTRATION; PERINEURAL
Status: DISCONTINUED | OUTPATIENT
Start: 2025-01-03 | End: 2025-01-03 | Stop reason: HOSPADM

## 2025-01-03 RX ORDER — HEPARIN SODIUM 1000 [USP'U]/ML
INJECTION, SOLUTION INTRAVENOUS; SUBCUTANEOUS
Status: DISCONTINUED | OUTPATIENT
Start: 2025-01-03 | End: 2025-01-03 | Stop reason: HOSPADM

## 2025-01-03 RX ORDER — CARVEDILOL 12.5 MG/1
12.5 TABLET ORAL 2 TIMES DAILY WITH MEALS
Status: DISCONTINUED | OUTPATIENT
Start: 2025-01-03 | End: 2025-01-04 | Stop reason: HOSPADM

## 2025-01-03 RX ORDER — ACETAMINOPHEN 325 MG/1
650 TABLET ORAL EVERY 4 HOURS PRN
Status: DISCONTINUED | OUTPATIENT
Start: 2025-01-03 | End: 2025-01-04 | Stop reason: HOSPADM

## 2025-01-03 RX ORDER — ARIPIPRAZOLE 5 MG/1
5 TABLET ORAL DAILY
Status: DISCONTINUED | OUTPATIENT
Start: 2025-01-04 | End: 2025-01-04 | Stop reason: HOSPADM

## 2025-01-03 RX ORDER — ONDANSETRON 2 MG/ML
4 INJECTION INTRAMUSCULAR; INTRAVENOUS EVERY 6 HOURS PRN
Status: DISCONTINUED | OUTPATIENT
Start: 2025-01-03 | End: 2025-01-04 | Stop reason: HOSPADM

## 2025-01-03 RX ORDER — SODIUM CHLORIDE 0.9 % (FLUSH) 0.9 %
10 SYRINGE (ML) INJECTION AS NEEDED
Status: DISCONTINUED | OUTPATIENT
Start: 2025-01-03 | End: 2025-01-04 | Stop reason: HOSPADM

## 2025-01-03 RX ORDER — ESCITALOPRAM OXALATE 20 MG/1
20 TABLET ORAL DAILY
COMMUNITY

## 2025-01-03 RX ORDER — SODIUM CHLORIDE 9 MG/ML
75 INJECTION, SOLUTION INTRAVENOUS CONTINUOUS
Status: ACTIVE | OUTPATIENT
Start: 2025-01-03 | End: 2025-01-03

## 2025-01-03 RX ORDER — VERAPAMIL HYDROCHLORIDE 2.5 MG/ML
INJECTION, SOLUTION INTRAVENOUS
Status: DISCONTINUED | OUTPATIENT
Start: 2025-01-03 | End: 2025-01-03 | Stop reason: HOSPADM

## 2025-01-03 RX ORDER — DORZOLAMIDE HYDROCHLORIDE AND TIMOLOL MALEATE 20; 5 MG/ML; MG/ML
1 SOLUTION/ DROPS OPHTHALMIC 2 TIMES DAILY
Status: DISCONTINUED | OUTPATIENT
Start: 2025-01-03 | End: 2025-01-04 | Stop reason: HOSPADM

## 2025-01-03 RX ORDER — ISOSORBIDE MONONITRATE 30 MG/1
30 TABLET, EXTENDED RELEASE ORAL DAILY
Status: DISCONTINUED | OUTPATIENT
Start: 2025-01-04 | End: 2025-01-04 | Stop reason: HOSPADM

## 2025-01-03 RX ORDER — ESCITALOPRAM OXALATE 20 MG/1
20 TABLET ORAL DAILY
Status: DISCONTINUED | OUTPATIENT
Start: 2025-01-04 | End: 2025-01-04 | Stop reason: HOSPADM

## 2025-01-03 RX ORDER — SODIUM CHLORIDE 9 MG/ML
50 INJECTION, SOLUTION INTRAVENOUS CONTINUOUS
Status: ACTIVE | OUTPATIENT
Start: 2025-01-03 | End: 2025-01-03

## 2025-01-03 RX ORDER — CLOPIDOGREL BISULFATE 75 MG/1
TABLET ORAL
Status: DISCONTINUED | OUTPATIENT
Start: 2025-01-03 | End: 2025-01-03 | Stop reason: HOSPADM

## 2025-01-03 RX ORDER — SILDENAFIL 100 MG/1
100 TABLET, FILM COATED ORAL DAILY PRN
COMMUNITY
End: 2025-01-09

## 2025-01-03 RX ORDER — ASPIRIN 81 MG/1
81 TABLET ORAL DAILY
Status: DISCONTINUED | OUTPATIENT
Start: 2025-01-03 | End: 2025-01-04 | Stop reason: HOSPADM

## 2025-01-03 RX ORDER — CLONAZEPAM 0.5 MG/1
0.5 TABLET ORAL DAILY PRN
Status: DISCONTINUED | OUTPATIENT
Start: 2025-01-03 | End: 2025-01-04 | Stop reason: HOSPADM

## 2025-01-03 RX ORDER — MIDAZOLAM HYDROCHLORIDE 1 MG/ML
INJECTION, SOLUTION INTRAMUSCULAR; INTRAVENOUS
Status: DISCONTINUED | OUTPATIENT
Start: 2025-01-03 | End: 2025-01-03 | Stop reason: HOSPADM

## 2025-01-03 RX ORDER — LATANOPROST 50 UG/ML
1 SOLUTION/ DROPS OPHTHALMIC NIGHTLY
Status: DISCONTINUED | OUTPATIENT
Start: 2025-01-03 | End: 2025-01-04 | Stop reason: HOSPADM

## 2025-01-03 RX ORDER — CLOPIDOGREL BISULFATE 75 MG/1
75 TABLET ORAL DAILY
Status: DISCONTINUED | OUTPATIENT
Start: 2025-01-04 | End: 2025-01-04 | Stop reason: HOSPADM

## 2025-01-03 RX ORDER — SODIUM CHLORIDE 0.9 % (FLUSH) 0.9 %
10 SYRINGE (ML) INJECTION EVERY 12 HOURS SCHEDULED
Status: DISCONTINUED | OUTPATIENT
Start: 2025-01-03 | End: 2025-01-04 | Stop reason: HOSPADM

## 2025-01-03 RX ORDER — TERAZOSIN 5 MG/1
5 CAPSULE ORAL NIGHTLY
Status: DISCONTINUED | OUTPATIENT
Start: 2025-01-03 | End: 2025-01-04 | Stop reason: HOSPADM

## 2025-01-03 RX ORDER — NITROGLYCERIN 0.4 MG/1
0.4 TABLET SUBLINGUAL
Status: DISCONTINUED | OUTPATIENT
Start: 2025-01-03 | End: 2025-01-04 | Stop reason: HOSPADM

## 2025-01-03 RX ADMIN — CARVEDILOL 12.5 MG: 12.5 TABLET, FILM COATED ORAL at 18:30

## 2025-01-03 RX ADMIN — SODIUM CHLORIDE 50 ML/HR: 9 INJECTION, SOLUTION INTRAVENOUS at 15:22

## 2025-01-03 RX ADMIN — DORZOLAMIDE HYDROCHLORIDE AND TIMOLOL MALEATE 1 DROP: 20; 5 SOLUTION/ DROPS OPHTHALMIC at 21:05

## 2025-01-03 RX ADMIN — ASPIRIN 81 MG: 81 TABLET, COATED ORAL at 15:22

## 2025-01-03 RX ADMIN — SODIUM CHLORIDE 75 ML/HR: 9 INJECTION, SOLUTION INTRAVENOUS at 11:12

## 2025-01-03 RX ADMIN — TERAZOSIN HYDROCHLORIDE 5 MG: 5 CAPSULE ORAL at 21:05

## 2025-01-03 RX ADMIN — LATANOPROST 1 DROP: 50 SOLUTION OPHTHALMIC at 21:05

## 2025-01-03 NOTE — Clinical Note
First balloon inflation max pressure = 22 rozina. First balloon inflation duration = 8 seconds. Second inflation of balloon - Max pressure = 22 rozina. 2nd Inflation of balloon - Duration = 8 seconds. 2nd inflation was done at 14:09 EST. Third inflation of balloon - Max pressure = 22 rozina. 3rd Inflation of balloon - Duration = 8 seconds. 3rd inflation was done at 14:09 EST.

## 2025-01-03 NOTE — PLAN OF CARE
Goal Outcome Evaluation:               Patient s/p heart cath with PCI. VSS, SR, BP 130s-140s, on room air. No c/o pain. TR band in place. Possible discharge tomorrow. Care ongoing.

## 2025-01-03 NOTE — Clinical Note
Hemostasis started on the right radial artery. R-Band was used in achieving hemostasis. Radial compression device applied to vessel. Hemostasis achieved successfully. Closure device additional comment: TR Band, 14cc air

## 2025-01-03 NOTE — Clinical Note
First balloon inflation max pressure = 12 rozina. First balloon inflation duration = 5 seconds. Second inflation of balloon - Max pressure = 14 rozina. 2nd Inflation of balloon - Duration = 7 seconds. 2nd inflation was done at 14:00 EST. Third inflation of balloon - Max pressure = 14 rozina. 3rd Inflation of balloon - Duration = 7 seconds. 3rd inflation was done at 14:00 EST. Fourth inflation of balloon - Max pressure = 14 rozina. 4th Inflation of  balloon - Duration = 7 seconds. 4th inflation was done at 14:00 EST.

## 2025-01-03 NOTE — CONSULTS
Met with patient to discuss the benefits of cardiac rehab. Provided phase II information along with the contact information for cardiac rehab here at [facility]. Will call call patient after discharge to schedule.

## 2025-01-03 NOTE — Clinical Note
First balloon inflation max pressure = 14 rozina. First balloon inflation duration = 6 seconds. Second inflation of balloon - Max pressure = 16 rozina. 2nd Inflation of balloon - Duration = 8 seconds. 2nd inflation was done at 14:06 EST. Third inflation of balloon - Max pressure = 18 rozina. 3rd Inflation of balloon - Duration = 8 seconds. 3rd inflation was done at 14:06 EST. Fourth inflation of balloon - Max pressure = 20 rozina. 4th Inflation of  balloon - Duration = 10 seconds. 4th inflation was done at 14:07 EST.

## 2025-01-04 ENCOUNTER — READMISSION MANAGEMENT (OUTPATIENT)
Dept: CALL CENTER | Facility: HOSPITAL | Age: 70
End: 2025-01-04
Payer: MEDICARE

## 2025-01-04 VITALS
BODY MASS INDEX: 27.75 KG/M2 | DIASTOLIC BLOOD PRESSURE: 83 MMHG | HEART RATE: 74 BPM | WEIGHT: 235 LBS | HEIGHT: 77 IN | RESPIRATION RATE: 16 BRPM | SYSTOLIC BLOOD PRESSURE: 141 MMHG | OXYGEN SATURATION: 99 % | TEMPERATURE: 97.6 F

## 2025-01-04 LAB
ACT BLD: 239 SECONDS (ref 82–152)
ACT BLD: 343 SECONDS (ref 82–152)
ANION GAP SERPL CALCULATED.3IONS-SCNC: 10 MMOL/L (ref 5–15)
BUN SERPL-MCNC: 9 MG/DL (ref 8–23)
BUN/CREAT SERPL: 9.3 (ref 7–25)
CALCIUM SPEC-SCNC: 8.5 MG/DL (ref 8.6–10.5)
CHLORIDE SERPL-SCNC: 102 MMOL/L (ref 98–107)
CO2 SERPL-SCNC: 24 MMOL/L (ref 22–29)
CREAT SERPL-MCNC: 0.97 MG/DL (ref 0.76–1.27)
DEPRECATED RDW RBC AUTO: 41.9 FL (ref 37–54)
EGFRCR SERPLBLD CKD-EPI 2021: 84.5 ML/MIN/1.73
ERYTHROCYTE [DISTWIDTH] IN BLOOD BY AUTOMATED COUNT: 14.4 % (ref 12.3–15.4)
GLUCOSE BLDC GLUCOMTR-MCNC: 112 MG/DL (ref 70–130)
GLUCOSE SERPL-MCNC: 101 MG/DL (ref 65–99)
HCT VFR BLD AUTO: 39.2 % (ref 37.5–51)
HGB BLD-MCNC: 12.7 G/DL (ref 13–17.7)
MCH RBC QN AUTO: 26.2 PG (ref 26.6–33)
MCHC RBC AUTO-ENTMCNC: 32.4 G/DL (ref 31.5–35.7)
MCV RBC AUTO: 80.8 FL (ref 79–97)
PLATELET # BLD AUTO: 249 10*3/MM3 (ref 140–450)
PMV BLD AUTO: 10.6 FL (ref 6–12)
POTASSIUM SERPL-SCNC: 3.7 MMOL/L (ref 3.5–5.2)
QT INTERVAL: 388 MS
QTC INTERVAL: 428 MS
RBC # BLD AUTO: 4.85 10*6/MM3 (ref 4.14–5.8)
SODIUM SERPL-SCNC: 136 MMOL/L (ref 136–145)
WBC NRBC COR # BLD AUTO: 8.62 10*3/MM3 (ref 3.4–10.8)

## 2025-01-04 PROCEDURE — 82948 REAGENT STRIP/BLOOD GLUCOSE: CPT

## 2025-01-04 PROCEDURE — 93005 ELECTROCARDIOGRAM TRACING: CPT | Performed by: INTERNAL MEDICINE

## 2025-01-04 PROCEDURE — 93010 ELECTROCARDIOGRAM REPORT: CPT | Performed by: INTERNAL MEDICINE

## 2025-01-04 PROCEDURE — G0378 HOSPITAL OBSERVATION PER HR: HCPCS

## 2025-01-04 PROCEDURE — 99239 HOSP IP/OBS DSCHRG MGMT >30: CPT | Performed by: INTERNAL MEDICINE

## 2025-01-04 PROCEDURE — 80048 BASIC METABOLIC PNL TOTAL CA: CPT | Performed by: INTERNAL MEDICINE

## 2025-01-04 PROCEDURE — 85027 COMPLETE CBC AUTOMATED: CPT | Performed by: INTERNAL MEDICINE

## 2025-01-04 RX ORDER — ROSUVASTATIN CALCIUM 10 MG/1
10 TABLET, COATED ORAL DAILY
Qty: 30 TABLET | Refills: 6 | Status: SHIPPED | OUTPATIENT
Start: 2025-01-04 | End: 2025-01-09 | Stop reason: SDUPTHER

## 2025-01-04 RX ORDER — DOXAZOSIN 4 MG/1
2 TABLET ORAL NIGHTLY
Start: 2025-01-04

## 2025-01-04 RX ADMIN — Medication 10 ML: at 09:52

## 2025-01-04 RX ADMIN — CLOPIDOGREL BISULFATE 75 MG: 75 TABLET ORAL at 09:51

## 2025-01-04 RX ADMIN — CARVEDILOL 12.5 MG: 12.5 TABLET, FILM COATED ORAL at 09:51

## 2025-01-04 RX ADMIN — ISOSORBIDE MONONITRATE 30 MG: 30 TABLET, EXTENDED RELEASE ORAL at 09:51

## 2025-01-04 RX ADMIN — PANTOPRAZOLE SODIUM 40 MG: 40 TABLET, DELAYED RELEASE ORAL at 05:50

## 2025-01-04 RX ADMIN — ESCITALOPRAM OXALATE 20 MG: 20 TABLET ORAL at 09:51

## 2025-01-04 RX ADMIN — ARIPIPRAZOLE 5 MG: 5 TABLET ORAL at 09:51

## 2025-01-04 RX ADMIN — ASPIRIN 81 MG: 81 TABLET, COATED ORAL at 09:51

## 2025-01-04 NOTE — OUTREACH NOTE
Prep Survey      Flowsheet Row Responses   [facility] [place] patient discharged from? [place]   Is LACE score < 7 ? No   Eligibility [facility]   Date of Admission 01/03/25   Date of Discharge 01/04/25   Discharge Disposition Home or Self Care   Discharge diagnosis Unstable angina   Does the patient have one of the following disease processes/diagnoses(primary or secondary)? Other   Does the patient have Home health ordered? No   Is there a DME ordered? No   Prep survey completed? Yes            BETTY AYON - Registered Nurse

## 2025-01-04 NOTE — PLAN OF CARE
Goal Outcome Evaluation:  Plan of Care Reviewed With: patient        Progress: improving  Outcome Evaluation: VSS; A&Ox4. Pt is s/p heart cath with stent placement; right radial approach. Pt remains room air; sat 99. Pt w/o c/o of pain during the night. Pt SBP 120s-150s. Pt expresses no additional needs at this time. Plan of care is ongoing.

## 2025-01-04 NOTE — DISCHARGE SUMMARY
Date of Discharge:  1/4/2025  Date of Admit: 1/3/2025    Discharge Diagnosis:  1.  Unstable angina  2.  Coronary artery disease with prior left main bifurcation PCI and PCI to the RCA  3.  Essential hypertension  4.  Type 2 diabetes mellitus  5.  Hyperlipidemia    Hospital Course:   69-year-old male who is a [employer], who has a medical history of essential hypertension, hyperlipidemia, type 2 diabetes mellitus, coronary artery disease with prior RCA  PCI and repeat bifurcation stenting of the left main, who presented with worsening dyspnea on exertion and discomfort in his throat.  He was taken for coronary angiography and noted to have moderate disease in the mid LAD with a normal RFR of the left main and LAD at 0.91.  He had a 90% proximal RCA stenosis and underwent PCI of the ostial to mid RCA with a 3.25 x 48 mm Xience SkyPoint drug-eluting stent.  He has had issues previously with high doses of Crestor therapy but is agreeable to start a low-dose of Crestor along with his Leqvio therapy.  He is appropriate for discharge home today    Procedures Performed  Procedure(s):  Coronary angiography  Left heart cath  Left ventriculography  Resting Full Cycle Ratio  Percutaneous Coronary Intervention  Stent DAVONTE coronary  Intravascular Ultrasound     Conclusions:   1. Left main: Discrete 40% in-stent restenosis distal side  2. LAD: Previously placed stent covering ostial to proximal segment with no restenosis.  Diffuse 50% mid vessel stenosis  3. LCX: Discrete 30 to 40% ostial in-stent restenosis  4. RCA: Discrete 90% proximal stenosis.    5.  RFR of the left main and LAD 0.91.  Not hemodynamically significant.  6.  PCI of the ostial to mid RCA with a 3.25 x 48 mm Xience baldomero point drug-eluting stent, postdilated with a 3.5 mm and 3.25 mm NC trek balloon         Consults       No orders found for last 30 day(s).                Discharge Physical Exam:  Temp:  [97.5 °F (36.4 °C)-98 °F (36.7 °C)] 97.6 °F (36.4  "°C)  Heart Rate:  [72-86] 74  Resp:  [11-16] 16  BP: (120-153)/() 141/83    Intake/Output Summary (Last 24 hours) at 1/4/2025 1331  Last data filed at 1/3/2025 1735  Gross per 24 hour   Intake 360 ml   Output 500 ml   Net -140 ml     Flowsheet Rows      Flowsheet Row First Filed Value   Admission Height 195.6 cm (77\") Documented at 01/03/2025 1110   Admission Weight 107 kg (235 lb) Documented at 01/03/2025 1110            General Appearance:    Alert, cooperative, in no acute distress   Head:    Normocephalic, without obvious abnormality, atraumatic       Neck/Lymph   No adenopathy, supple, no thyromegaly, no carotid bruit, no    JVD   Lungs:     Clear to auscultation bilaterally, no wheezes, rales, or     rhonchi    Cardiac:    Normal rate, regular rhythm, no murmur, no rub, no gallop   Chest Wall:    No abnormalities observed   GI:     Normal bowel sounds, soft, nontender, nondistended,            no rebound tenderness   Extremities:   No cyanosis, clubbing, or edema   Circulatory/Peripheral Vascular :   Pulses palpable and equal bilaterally   Integumentary:   No bleeding or rash. Normal temperature                  Discharge Medications     Discharge Medications        New Medications        Instructions Start Date   rosuvastatin 10 MG tablet  Commonly known as: CRESTOR   10 mg, Oral, Daily             Changes to Medications        Instructions Start Date   Adult Aspirin Regimen 81 MG EC tablet  Generic drug: aspirin  What changed: when to take this   81 mg, Oral, Daily      metFORMIN 500 MG tablet  Commonly known as: GLUCOPHAGE  What changed: These instructions start on January 6, 2025. If you are unsure what to do until then, ask your doctor or other care provider.   1,000 mg, Oral, Daily With Breakfast   Start Date: January 6, 2025     Toujeo SoloStar 300 UNIT/ML solution pen-injector injection  Generic drug: Insulin Glargine (1 Unit Dial)  What changed: See the new instructions.   ADMINISTER 65 UNITS " UNDER THE SKIN DAILY AS DIRECTED             Continue These Medications        Instructions Start Date   acetaminophen 650 MG 8 hr tablet  Commonly known as: TYLENOL   650 mg, Every 4 Hours PRN      ARIPiprazole 5 MG tablet  Commonly known as: ABILIFY   5 mg, Daily      BD Pen Needle Marissa 2nd Gen 32G X 4 MM misc  Generic drug: Insulin Pen Needle       carvedilol 12.5 MG tablet  Commonly known as: COREG   12.5 mg, Oral, 2 Times Daily With Meals      clonazePAM 0.5 MG tablet  Commonly known as: KlonoPIN   0.5 mg, Oral, Daily PRN      clopidogrel 75 MG tablet  Commonly known as: PLAVIX   75 mg, Oral, Daily      dorzolamide-timolol 2-0.5 % ophthalmic solution  Commonly known as: COSOPT   1 drop, Both Eyes, 2 Times Daily      doxazosin 4 MG tablet  Commonly known as: CARDURA   2 mg, Oral, Nightly      escitalopram 20 MG tablet  Commonly known as: LEXAPRO   20 mg, Daily      esomeprazole 40 MG capsule  Commonly known as: nexIUM   40 mg, Oral      Ingrezza 60 MG capsule  Generic drug: Valbenazine Tosylate   1 capsule, Daily      isosorbide mononitrate 30 MG 24 hr tablet  Commonly known as: IMDUR   30 mg, Oral, Daily      latanoprost 0.005 % ophthalmic solution  Commonly known as: XALATAN   1 drop, Both Eyes, Nightly      Leqvio 284 MG/1.5ML solution prefilled syringe  Generic drug: Inclisiran Sodium   284 mg      methocarbamol 500 MG tablet  Commonly known as: ROBAXIN   500 mg, As Needed      oxyCODONE-acetaminophen 7.5-325 MG per tablet  Commonly known as: PERCOCET   1 tablet, Every 6 Hours PRN      Ozempic (2 MG/DOSE) 8 MG/3ML solution pen-injector  Generic drug: Semaglutide (2 MG/DOSE)   DIAL AND INJECT UNDER THE SKIN 2 MG WEEKLY      sildenafil 100 MG tablet  Commonly known as: VIAGRA   100 mg, Oral, Daily PRN               Discharge Diet: Cardiac, carbohydrate controlled    Activity at Discharge: No lifting greater than 10 pounds right arm 1 week    Discharge disposition: home    Condition on Discharge:  stable    Follow-up Appointments  Future Appointments   Date Time Provider Department Center   1/14/2025  2:50 PM Kalyn Marx APRN [facility]   3/10/2025  4:00 PM Levi Patten DO [facility]   3/12/2025 10:30 AM Gwyn Patten Sr., MD [facility]     Additional Instructions for the Follow-ups that You Need to Schedule       Ambulatory Referral to Cardiac Rehab   As directed      Ambulatory Referral to Cardiac Rehab   As directed              Test Results Pending at Discharge       Miguel Ángel Karimi MD  01/04/25  13:30 EST    Greater than 30 min spent in reviewing records, discussion and examination of the patient and discussion with other members of the patient's medical team.     Dictated utilizing Dragon dictation

## 2025-01-05 ENCOUNTER — TELEPHONE (OUTPATIENT)
Dept: CARDIOLOGY | Facility: CLINIC | Age: 70
End: 2025-01-05
Payer: MEDICARE

## 2025-01-05 NOTE — TELEPHONE ENCOUNTER
Patient had heart cath 1/3.  He needs a 1 week hospital follow-up visit with me please.  Thank you

## 2025-01-06 ENCOUNTER — TRANSITIONAL CARE MANAGEMENT TELEPHONE ENCOUNTER (OUTPATIENT)
Dept: CALL CENTER | Facility: HOSPITAL | Age: 70
End: 2025-01-06
Payer: MEDICARE

## 2025-01-06 NOTE — OUTREACH NOTE
Call Center TCM Note      Flowsheet Row Responses   [facility] patient discharged from? [facility]   Does the patient have one of the following disease processes/diagnoses(primary or secondary)? Other   TCM attempt successful? Yes   Call start time 0921   Call end time 0923   Discharge diagnosis Unstable angina   Person spoke with today (if not patient) and relationship Spouse   Meds reviewed with patient/caregiver? Yes   Is the patient having any side effects they believe may be caused by any medication additions or changes? No   Does the patient have all medications ordered at discharge? Yes   Is the patient taking all medications as directed (includes completed medication regime)? Yes   Does the patient have an appointment with their PCP within 7-14 days of discharge? No   Nursing Interventions Patient declined scheduling/rescheduling appointment at this time   Has home health visited the patient within 72 hours of discharge? N/A   Psychosocial issues? No   Comments Pt monitors BS daily and reports have been good   Did the patient receive a copy of their discharge instructions? Yes   Nursing interventions Reviewed instructions with patient   What is the patient's perception of their health status since discharge? Improving   Is the patient/caregiver able to teach back the hierarchy of who to call/visit for symptoms/problems? PCP, Specialist, Home health nurse, Urgent Care, ED, 911 Yes   TCM call completed? Yes   Call end time 0923   Would this patient benefit from a Referral to Amb Social Work? No   Is the patient interested in additional calls from an ambulatory ? No            Kaycee Brower RN    1/6/2025, 09:24 EST

## 2025-01-07 NOTE — PROGRESS NOTES
Called [facility] and spoke with his spouse, Micaela (ok per verbal INGE).  Scheduled 1 week hospital f/u with Janny.  Micaela stated Dr. Karimi told them patient needed a 1 month f/u with him as well.  Chart review shows the following in AVS from 1/4/25:    Follow up with Miguel Ángel Karimi in 1 week  office will call with appointment APRN 1 week and MD in 4 weeks    Scheduled patient for 1 month with Dr. Karimi.    Please let me know if there is anything else you would like me to do for this patient.    Thank you,  Elizabeth DURAN RN  Triage Nurse [facility]  01/07/25   11:10 EST

## 2025-01-09 ENCOUNTER — OFFICE VISIT (OUTPATIENT)
Dept: CARDIOLOGY | Facility: CLINIC | Age: 70
End: 2025-01-09
Payer: MEDICARE

## 2025-01-09 ENCOUNTER — TRANSCRIBE ORDERS (OUTPATIENT)
Dept: LAB | Facility: HOSPITAL | Age: 70
End: 2025-01-09
Payer: MEDICARE

## 2025-01-09 ENCOUNTER — LAB (OUTPATIENT)
Dept: LAB | Facility: HOSPITAL | Age: 70
End: 2025-01-09
Payer: MEDICARE

## 2025-01-09 VITALS
BODY MASS INDEX: 29.16 KG/M2 | HEART RATE: 86 BPM | SYSTOLIC BLOOD PRESSURE: 122 MMHG | WEIGHT: 247 LBS | HEIGHT: 77 IN | OXYGEN SATURATION: 97 % | DIASTOLIC BLOOD PRESSURE: 80 MMHG

## 2025-01-09 DIAGNOSIS — I25.10 CAD, MULTIPLE VESSEL: Primary | ICD-10-CM

## 2025-01-09 DIAGNOSIS — E78.2 MIXED HYPERLIPIDEMIA: ICD-10-CM

## 2025-01-09 DIAGNOSIS — I10 ESSENTIAL HYPERTENSION, MALIGNANT: ICD-10-CM

## 2025-01-09 DIAGNOSIS — I10 ESSENTIAL HYPERTENSION: ICD-10-CM

## 2025-01-09 DIAGNOSIS — N18.31 CHRONIC KIDNEY DISEASE (CKD) STAGE G3A/A1, MODERATELY DECREASED GLOMERULAR FILTRATION RATE (GFR) BETWEEN 45-59 ML/MIN/1.73 SQUARE METER AND ALBUMINURIA CREATININE RATIO LESS THAN 30 MG/G (CMS/H*: Primary | ICD-10-CM

## 2025-01-09 DIAGNOSIS — E11.9 DIABETES MELLITUS WITHOUT COMPLICATION: ICD-10-CM

## 2025-01-09 DIAGNOSIS — R94.31 ABNORMAL EKG: ICD-10-CM

## 2025-01-09 DIAGNOSIS — N18.31 CHRONIC KIDNEY DISEASE (CKD) STAGE G3A/A1, MODERATELY DECREASED GLOMERULAR FILTRATION RATE (GFR) BETWEEN 45-59 ML/MIN/1.73 SQUARE METER AND ALBUMINURIA CREATININE RATIO LESS THAN 30 MG/G (CMS/H*: ICD-10-CM

## 2025-01-09 PROBLEM — R06.09 DYSPNEA ON EXERTION: Status: RESOLVED | Noted: 2017-07-11 | Resolved: 2025-01-09

## 2025-01-09 PROBLEM — J18.9 CAP (COMMUNITY ACQUIRED PNEUMONIA): Status: RESOLVED | Noted: 2024-12-11 | Resolved: 2025-01-09

## 2025-01-09 PROBLEM — I20.0 UNSTABLE ANGINA: Status: RESOLVED | Noted: 2025-01-03 | Resolved: 2025-01-09

## 2025-01-09 PROBLEM — T82.855A CORONARY STENT RESTENOSIS: Status: RESOLVED | Noted: 2024-04-01 | Resolved: 2025-01-09

## 2025-01-09 LAB
ALBUMIN SERPL-MCNC: 3.6 G/DL (ref 3.5–5.2)
ALBUMIN/GLOB SERPL: 0.9 G/DL
ALP SERPL-CCNC: 126 U/L (ref 39–117)
ALT SERPL W P-5'-P-CCNC: 24 U/L (ref 1–41)
ANION GAP SERPL CALCULATED.3IONS-SCNC: 13.1 MMOL/L (ref 5–15)
AST SERPL-CCNC: 39 U/L (ref 1–40)
BACTERIA UR QL AUTO: NORMAL /HPF
BILIRUB SERPL-MCNC: 0.4 MG/DL (ref 0–1.2)
BILIRUB UR QL STRIP: NEGATIVE
BUN SERPL-MCNC: 10 MG/DL (ref 8–23)
BUN/CREAT SERPL: 6.8 (ref 7–25)
CALCIUM SPEC-SCNC: 9.2 MG/DL (ref 8.6–10.5)
CHLORIDE SERPL-SCNC: 99 MMOL/L (ref 98–107)
CLARITY UR: CLEAR
CO2 SERPL-SCNC: 24.9 MMOL/L (ref 22–29)
COLOR UR: ABNORMAL
CREAT SERPL-MCNC: 1.46 MG/DL (ref 0.76–1.27)
CREAT UR-MCNC: 368.7 MG/DL
EGFRCR SERPLBLD CKD-EPI 2021: 51.7 ML/MIN/1.73
GLOBULIN UR ELPH-MCNC: 4.2 GM/DL
GLUCOSE SERPL-MCNC: 120 MG/DL (ref 65–99)
GLUCOSE UR STRIP-MCNC: NEGATIVE MG/DL
HGB UR QL STRIP.AUTO: NEGATIVE
HYALINE CASTS UR QL AUTO: NORMAL /LPF
KETONES UR QL STRIP: ABNORMAL
LEUKOCYTE ESTERASE UR QL STRIP.AUTO: NEGATIVE
NITRITE UR QL STRIP: NEGATIVE
PH UR STRIP.AUTO: 5.5 [PH] (ref 5–8)
PHOSPHATE SERPL-MCNC: 3.3 MG/DL (ref 2.5–4.5)
POTASSIUM SERPL-SCNC: 4.3 MMOL/L (ref 3.5–5.2)
PROT ?TM UR-MCNC: 269.3 MG/DL
PROT SERPL-MCNC: 7.8 G/DL (ref 6–8.5)
PROT UR QL STRIP: ABNORMAL
PROT/CREAT UR: 730.4 MG/G CREA (ref 0–200)
RBC # UR STRIP: NORMAL /HPF
REF LAB TEST METHOD: NORMAL
SODIUM SERPL-SCNC: 137 MMOL/L (ref 136–145)
SP GR UR STRIP: >1.03 (ref 1–1.03)
SQUAMOUS #/AREA URNS HPF: NORMAL /HPF
URATE SERPL-MCNC: 4.9 MG/DL (ref 3.4–7)
UROBILINOGEN UR QL STRIP: ABNORMAL
WBC # UR STRIP: NORMAL /HPF

## 2025-01-09 PROCEDURE — 81001 URINALYSIS AUTO W/SCOPE: CPT

## 2025-01-09 PROCEDURE — 84100 ASSAY OF PHOSPHORUS: CPT

## 2025-01-09 PROCEDURE — 84550 ASSAY OF BLOOD/URIC ACID: CPT

## 2025-01-09 PROCEDURE — 84156 ASSAY OF PROTEIN URINE: CPT

## 2025-01-09 PROCEDURE — 36415 COLL VENOUS BLD VENIPUNCTURE: CPT

## 2025-01-09 PROCEDURE — 80053 COMPREHEN METABOLIC PANEL: CPT

## 2025-01-09 PROCEDURE — 82570 ASSAY OF URINE CREATININE: CPT

## 2025-01-09 RX ORDER — ISOSORBIDE MONONITRATE 30 MG/1
30 TABLET, EXTENDED RELEASE ORAL DAILY
Qty: 90 TABLET | Refills: 2 | Status: SHIPPED | OUTPATIENT
Start: 2025-01-09

## 2025-01-09 RX ORDER — ASPIRIN 81 MG/1
81 TABLET ORAL DAILY
Qty: 30 TABLET | Refills: 6 | Status: SHIPPED | OUTPATIENT
Start: 2025-01-09

## 2025-01-09 RX ORDER — ROSUVASTATIN CALCIUM 10 MG/1
10 TABLET, COATED ORAL DAILY
Qty: 90 TABLET | Refills: 2 | Status: SHIPPED | OUTPATIENT
Start: 2025-01-09

## 2025-01-09 NOTE — PROGRESS NOTES
Date of Office Visit: 2025  Encounter Provider: MICHELLE Alcantara  Place of Service: [facility] CARDIOLOGY  Patient Name: Isaias Monaco  :1955  Primary Cardiologist: Dr. Ron Karimi    Chief Complaint   Patient presents with    Coronary Artery Disease    Hospital Follow Up Visit   :     HPI: Isaias Monaco is a 69 y.o. male who presents today for follow-up after recent PCI.  I reviewed his medical records.    He has known hypertension, hyperlipidemia, type 2 diabetes mellitus, and chronic kidney disease.      He has known coronary artery disease.  He has a history of prior PCI to the RCA for .  In May 2022, he underwent left main bifurcation stenting with DK crush technique.  In 2024, he underwent angioplasty and shockwave balloon lithotripsy of the distal left main to the proximal LAD and PCI of the distal left main to proximal circumflex followed by kissing balloon angioplasty.  He has preserved LVEF.  Lifelong DAPT therapy recommended.     On 2024, he was hospitalized for community-acquired pneumonia.  His troponin level and EKG were normal.  His wife said on telemetry he was having frequent asymptomatic PVCs during the hospitalization.    He followed up with me posthospitalization.  He reported shortness of breath, vague feeling in his throat, and fatigue which was his anginal symptoms in the past.  I discussed with Dr. Karimi and he recommended proceeding with cardiac cath.    On 1/3/2025, cardiac cath showed the following: Distal left main 40% in-stent restenosis, proximal LAD stent patent, mid LAD diffuse 50% stenosis, patent stent in the distal to apical LAD, patent stent in the mid left circumflex, ostium of the circumflex has a discrete 30 to 40% stenosis, and proximal RCA 90% stenosis.  He underwent PCI/drug-eluting stent placement to the mid RCA (3.0 x 40 mm Xience baldomero point).  Medical treatment recommended for the nonobstructive  CAD.    He presents today with his wife accompanying him.  His shortness of breath has resolved, fatigued improved, and this vague sensation in his throat has improved, but not resolved completely.  His wife feels that he has done very well being on the isosorbide.  He denies chest pain, palpitations, edema, dizziness, syncope, or bleeding.      Past Medical History:   Diagnosis Date    Abnormal EKG 12/26/2024    Anemia     post hemorrhagic    Angioedema 02/21/2016    Secondary to ACE inhibitor    Anxiety     Arthritis     CAD (coronary artery disease)     CAP (community acquired pneumonia) 12/11/2024    Colon polyps     Diabetes mellitus, type 2     GERD (gastroesophageal reflux disease)     Glaucoma     Hematoma     history    High cholesterol     History of foreign travel 12/2017; 08/2018    [place], [place], [place], [place], [place] and [place]; [place]    Hyperlipidemia     Hypertension     Hypogonadism in male 09/28/2016    Male erectile disorder     Microalbuminuria     Osteoarthritis     knee    Pneumonia 12/2024    PVCs (premature ventricular contractions) 12/26/2024    Spinal stenosis of lumbar region without neurogenic claudication 06/02/2021    Tubular adenoma of colon 11/01/2017    Vitamin D deficiency        Past Surgical History:   Procedure Laterality Date    CARDIAC CATHETERIZATION N/A 05/15/2006    Dr. Mini Camarillo    CARDIAC CATHETERIZATION N/A 03/10/2020    Procedure: Left Heart Cath;  Surgeon: Miguel Ángel Karimi MD;  Location: [facility] CATH INVASIVE LOCATION;  Service: Cardiology;  Laterality: N/A;    CARDIAC CATHETERIZATION N/A 03/10/2020    Procedure: Stent DAVONTE coronary;  Surgeon: Miguel Ángel Karimi MD;  Location: [facility] CATH INVASIVE LOCATION;  Service: Cardiology;  Laterality: N/A;    CARDIAC CATHETERIZATION N/A 03/10/2020    Procedure: Coronary angiography;  Surgeon: Miguel Ángel Karimi MD;  Location: [facility] CATH INVASIVE LOCATION;  Service: Cardiology;   Laterality: N/A;    CARDIAC CATHETERIZATION N/A 03/10/2020    Procedure: Left ventriculography;  Surgeon: Miguel Ángel Karimi MD;  Location[facility] CATH INVASIVE LOC[facility];  Service: Cardiology;  Laterality: N/A;    CARDIAC CATHETERIZATION N/A 05/20/2022    Procedure: Left Heart Cath;  Surgeon: Mackenzie Morales MD;  [facility] C[facility]VAS[facility] LOC[facility];  Service: Cardiovascular;  Laterality: N/A;    CARDIAC CATHETERIZATION N/A 05/20/2022    Procedure: Coronary angiography;  Surgeon: Mackenzie Morales MD;  Location:  [facility] CATH INVASIVE LOCATION;  Service: Cardiovascular;  Laterality: N/A;    CARDIAC CATHETERIZATION N/A 05/20/2022    Procedure: Percutaneous Coronary Intervention;  Surgeon: Mackenzie Morales MD;  Location[facility] CATH [facility]VASIVE LOC[facility];  Service: Cardiovascular;  Laterality: N/A;    CARDIAC CATHETERIZATION N/A 05/24/2022    Procedure: Percutaneous Coronary Intervention;  Surgeon: Miguel Ángel Karimi MD;  Location: [facility] CATH INVASIVE LOC[facility];  Service: Cardiovascular;  Laterality: N/A;  LAD and Cx    CARDIAC CATHETERIZATION N/A 05/24/2022    Procedure: Stent DAVONTE coronary;  Surgeon: Miguel Ángel Karimi MD;  [facility] CATH INVASIVE LOC[facility];  Service: Cardiovascular;  Laterality: N/A;    CARDIAC CATHETERIZATION N/A 05/24/2022    Procedure: Resting Full Cycle Ratio;  Surgeon: Miguel Ángel Karimi MD;  Location: [facility] CATH INVASIVE LOC[facility];  Service: Cardiovascular;  Laterality: N/A;    CARDIAC CATHETERIZATION N/A 03/19/2024    Procedure: Left Heart Cath;  Surgeon: Miguel Ángel Karimi MD;  Location: [facility] CATH INVASIVE LOC[facility];  Service: Cardiovascular;  Laterality: N/A;    CARDIAC CATHETERIZATION N/A 03/19/2024    Procedure: Percutaneous Coronary Intervention;  Surgeon: Miguel Ángel Karimi MD;  Location[facility] CATH INVASIVE LOC[facility];  Service: Cardiovascular;  Laterality: N/A;    CARDIAC CATHETERIZATION N/A 03/19/2024    Procedure: Stent DAVONTE coronary;  Surgeon: Trev  Miguel Ángel TIDWELL MD;  Location:  [facility] CATH INVASIVE LOCATION;  Service: Cardiovascular;  Laterality: N/A;    CARDIAC CATHETERIZATION N/A 1/3/2025    Procedure: Coronary angiography;  Surgeon: Miguel Ángel Karimi MD;  Location:  [facility] CATH INVASIVE LOCATION;  Service: Cardiovascular;  Laterality: N/A;    CARDIAC CATHETERIZATION N/A 1/3/2025    Procedure: Left heart cath;  Surgeon: Miguel Ángel Karimi MD;  Location:  [facility] CATH INVASIVE LOCATION;  Service: Cardiovascular;  Laterality: N/A;    CARDIAC CATHETERIZATION N/A 1/3/2025    Procedure: Left ventriculography;  Surgeon: Miguel Ángel Karimi MD;  Location:  [facility] CATH INVASIVE LOCATION;  Service: Cardiovascular;  Laterality: N/A;    CARDIAC CATHETERIZATION N/A 1/3/2025    Procedure: Resting Full Cycle Ratio;  Surgeon: Miguel Ángel Karimi MD;  [facility];  Service: Cardiovascular;  Laterality: N/A;    CARDIAC CATHETERIZATION N/A 1/3/2025    Procedure: Percutaneous Coronary Intervention;  Surgeon: Miguel Ángel Karimi MD;  [facility];  Service: Cardiovascular;  Laterality: N/A;    CARDIAC CATHETERIZATION N/A 1/3/2025    Procedure: Stent DAVONTE coronary;  Surgeon: Miguel Ángel Karimi MD;  [facility];  Service: Cardiovascular;  Laterality: N/A;    CERVICAL DISCECTOMY POSTERIOR FUSION WITH INSTRUMENTATION Bilateral 6/25/2024    Procedure: Cervical 3 to cervical 6 laminectomies and posterior spinal fusion - Bilateral;  Surgeon: Marshal Miguel MD;  Location: [facility] OR;  Service: Neurosurgery;  Laterality: Bilateral;    COLONOSCOPY N/A 02/22/2006    Bilobed polyp at 30 cm, hemorrhoids-Dr. Ilya Zhao    COLONOSCOPY N/A 02/28/2014    Normal ileum, one 6 mm polyp in the mid transverse colon-Dr. Ilya Zhao    COLONOSCOPY N/A 11/19/2008    Ela ileum, two 3 to 4 mm polyps, non-bleeding internal hemorrhoids, repeat in 5 years-Dr. Ilya Zhao    COLONOSCOPY N/A  10/31/2017    Procedure: COLONOSCOPY WITH POLYPECTOMY (COLD BIOPSY);  Surgeon: Ilya Zhao MD;  Location: [facility] ENDOSCOPY;  Service:     COLONOSCOPY N/A 05/21/2021    Procedure: Colonoscopy into cecum and terminal ileum with cold biopsy polypectomy;  Surgeon: Ilya Zhao MD;  Location: [facility] ENDOSCOPY;  Service: Gastroenterology;  Laterality: N/A;  Pre op: History of Polyps  Post op: Polyp    CORONARY ANGIOPLASTY WITH STENT PLACEMENT  2007, 2012, 2015    cardiac stents x3 occasions    ENDOSCOPY N/A 10/04/2017    Procedure: ESOPHAGOGASTRODUODENOSCOPY;  Surgeon: Boyd Guidry MD;  Location: [facility] ENDOSCOPY;  Service:     ENDOSCOPY N/A 05/21/2021    Procedure: ESOPHAGOGASTRODUODENOSCOPY with biopsies;  Surgeon: Ilya Zhao MD;  Location: [facility] ENDOSCOPY;  Service: Gastroenterology;  Laterality: N/A;  Pre op: GERD  Post op: Irregular  Z-Line, Hiatal Hernia, Gastritis    ENDOSCOPY N/A 08/25/2023    Procedure: ESOPHAGOGASTRODUODENOSCOPY with biopsy;  Surgeon: Ilya Zhao MD;  Location: [facility] ENDOSCOPY;  Service: Gastroenterology;  Laterality: N/A;  errosive gastritis    EPIDURAL BLOCK      INTERVENTIONAL RADIOLOGY PROCEDURE N/A 05/20/2022    Procedure: Intravascular Ultrasound;  Surgeon: Mackenzie Morales MD;  Location: [facility] CATH INVASIVE LOCATION;  Service: Cardiovascular;  Laterality: N/A;    INTRAVASCULAR ULTRASOUND N/A 1/3/2025    Procedure: Intravascular Ultrasound;  Surgeon: Miguel Ángel Karimi MD;  Location: [facility] CATH INVASIVE LOCATION;  Service: Cardiovascular;  Laterality: N/A;    KNEE INCISION AND DRAINAGE Right 06/20/2017    Procedure: RT. KNEE WASHOUT ;  Surgeon: Boyd Coyne MD;  Location: [facility] MAIN OR;  Service:     MEDIAL BRANCH BLOCK Bilateral 12/17/2021    Procedure: LUMBAR MEDIAL BRANCH BLOCK bilateral ~L4-S1 x2 (~2 weeks apart);  Surgeon: Christina Villaseñor MD;  [place] [facility] [place];  Service: Pain Management;  Laterality: Bilateral;    MEDIAL BRANCH BLOCK Bilateral  01/03/2022    Procedure: LUMBAR MEDIAL BRANCH BLOCK bilateral ~L4-S1 x2 (~2 weeks apart);  Surgeon: Christina Villaseñor MD;  Location: [facility] OR;  Service: Pain Management;  Laterality: Bilateral;    AK ARTHRP KNE CONDYLE&PLATU MEDIAL&LAT COMPARTMENTS Right 06/15/2017    Procedure: RT TOTAL KNEE ARTHROPLASTY;  Surgeon: Boyd Coyne MD;  Location: [facility] MAIN OR;  Service: Orthopedics    RADIOFREQUENCY ABLATION Bilateral 01/10/2022    Procedure: RADIOFREQUENCY ABLATION NERVES Bilateral L4-S1;  Surgeon: Christina Villaseñor MD;  Location: [facility] MAIN OR;  Service: Pain Management;  Laterality: Bilateral;    SHOULDER SURGERY Right 2016    rotator cuff repair    UPPER GASTROINTESTINAL ENDOSCOPY N/A 10/13/2015    Z-line irregular, normal stomach, normal duodenum-Dr. Ilya Zhao    UPPER GASTROINTESTINAL ENDOSCOPY N/A 02/28/2014    Z-line irregular, normal stomach, normal duodenum-Dr. Ilya Zhao    UPPER GASTROINTESTINAL ENDOSCOPY N/A 11/19/2008    Z-line irregular, chronic gastritis withotu hemorrhage, normal duodenum-Dr. Ilya Zhao    UPPER GASTROINTESTINAL ENDOSCOPY N/A 06/22/2006    LA Grade A reflux esophagitis, non-bleeding erythematous gastropathy, normal duodenum-Dr. Ilya Zhao       Social History     Socioeconomic History    Marital status:      Spouse name: Micaela    Number of children: 1    Years of education: College   Tobacco Use    Smoking status: Never     Passive exposure: Never    Smokeless tobacco: Never    Tobacco comments:     CAFFEINE USE: 2 CUPS COFFEE DAILY   Vaping Use    Vaping status: Never Used   Substance and Sexual Activity    Alcohol use: Yes     Alcohol/week: 3.0 standard drinks of alcohol     Types: 1 Glasses of wine, 2 Shots of liquor per week    Drug use: Never    Sexual activity: Not Currently     Partners: Female     Birth control/protection: Post-menopausal       Family History   Problem Relation Age of Onset    Lupus Sister     Thyroid disease Sister     Heart disease Other      Hypertension Other     Arthritis Mother     Hyperlipidemia Mother     Hypertension Mother     Thyroid disease Mother     Lupus Mother     Vision loss Mother     Heart disease Father     Arthritis Father     Other Father         Vascular disease    Lupus Father     Depression Father     Alcohol abuse Father     Dementia Father     Hypertension Father     Heart disease Brother     Heart attack Brother 40    Thyroid disease Sister     Arthritis Brother     Malig Hyperthermia Neg Hx        The following portion of the patient's history were reviewed and updated as appropriate: past medical history, past surgical history, past social history, past family history, allergies, current medications, and problem list.    Review of Systems   Constitutional: Positive for malaise/fatigue.   Cardiovascular: Negative.         Throat sensation   Respiratory: Negative.     Hematologic/Lymphatic: Negative.    Neurological: Negative.        Allergies   Allergen Reactions    Nsaids Other (See Comments)     Renal failure    Atorvastatin Myalgia    Hydralazine Myalgia    Norvasc [Amlodipine] Swelling    Zetia [Ezetimibe] Nausea And Vomiting    Crestor [Rosuvastatin] Other (See Comments)     Flu like s/s    Ace Inhibitors Angioedema    Lisinopril Angioedema    Testosterone Myalgia         Current Outpatient Medications:     acetaminophen (TYLENOL) 650 MG 8 hr tablet, Take 1 tablet by mouth Every 4 (Four) Hours As Needed for Mild Pain., Disp: , Rfl:     ARIPiprazole (ABILIFY) 5 MG tablet, Take 1 tablet by mouth Daily., Disp: , Rfl:     aspirin 81 MG EC tablet, Take 1 tablet by mouth Daily., Disp: 30 tablet, Rfl: 6    BD Pen Needle Marissa 2nd Gen 32G X 4 MM misc, , Disp: , Rfl:     carvedilol (COREG) 12.5 MG tablet, Take 1 tablet by mouth 2 (Two) Times a Day With Meals., Disp: 180 tablet, Rfl: 0    clonazePAM (KlonoPIN) 0.5 MG tablet, TAKE 1 TABLET BY MOUTH DAILY AS NEEDED FOR ANXIETY, Disp: 30 tablet, Rfl: 2    clopidogrel (PLAVIX) 75 MG  "tablet, TAKE 1 TABLET BY MOUTH EVERY DAY, Disp: 90 tablet, Rfl: 2    dorzolamide-timolol (COSOPT) 2-0.5 % ophthalmic solution, Administer 1 drop to both eyes 2 (Two) Times a Day., Disp: , Rfl:     doxazosin (CARDURA) 4 MG tablet, Take 0.5 tablets by mouth Every Night., Disp: , Rfl:     escitalopram (LEXAPRO) 20 MG tablet, Take 1 tablet by mouth Daily., Disp: , Rfl:     esomeprazole (nexIUM) 40 MG capsule, TAKE ONE CAPSULE BY MOUTH EVERY MORNING BEFORE BREAKFAST, Disp: 90 capsule, Rfl: 3    Inclisiran Sodium (Leqvio) 284 MG/1.5ML solution prefilled syringe, Inject 1.5 mL under the skin into the appropriate area as directed., Disp: , Rfl:     Ingrezza 60 MG capsule, Take 1 capsule by mouth Daily., Disp: , Rfl:     isosorbide mononitrate (IMDUR) 30 MG 24 hr tablet, Take 1 tablet by mouth Daily., Disp: 90 tablet, Rfl: 2    latanoprost (XALATAN) 0.005 % ophthalmic solution, Administer 1 drop to both eyes Every Night., Disp: , Rfl:     metFORMIN (GLUCOPHAGE) 500 MG tablet, Take 2 tablets by mouth Daily With Breakfast., Disp: , Rfl:     methocarbamol (ROBAXIN) 500 MG tablet, Take 1 tablet by mouth As Needed., Disp: , Rfl:     oxyCODONE-acetaminophen (PERCOCET) 7.5-325 MG per tablet, Take 1 tablet by mouth Every 6 (Six) Hours As Needed., Disp: , Rfl:     rosuvastatin (CRESTOR) 10 MG tablet, Take 1 tablet by mouth Daily., Disp: 90 tablet, Rfl: 2    Semaglutide, 2 MG/DOSE, (Ozempic, 2 MG/DOSE,) 8 MG/3ML solution pen-injector, DIAL AND INJECT UNDER THE SKIN 2 MG WEEKLY, Disp: 3 mL, Rfl: 2         Objective:     Vitals:    01/09/25 0944   BP: 122/80   BP Location: Right arm   Patient Position: Sitting   Cuff Size: Adult   Pulse: 86   SpO2: 97%   Weight: 112 kg (247 lb)   Height: 195.6 cm (77.01\")     Body mass index is 29.28 kg/m².    PHYSICAL EXAM:    Vitals Reviewed.   General Appearance: No acute distress, well developed and well nourished.   Eyes: Glasses.   HENT: No hearing loss noted.    Respiratory: No signs of " respiratory distress. Respiration rhythm and depth normal.  Clear to auscultation.   Cardiovascular:  Jugular Venous Pressure: Normal  Heart Rate and Rhythm: Normal, Heart Sounds: Normal S1 and S2. No S3 or S4 noted.  Murmurs: No murmurs noted. No rubs, thrills, or gallops.   Lower Extremities: No edema noted.  Right wrist clean dry and intact.  Musculoskeletal: Normal movement of extremities.  Skin: General appearance normal.    Psychiatric: Patient alert and oriented to person, place, and time. Speech and behavior appropriate. Normal mood and affect.       ECG 12 Lead    Date/Time: 1/9/2025 9:42 AM  Performed by: Janny López APRN    Authorized by: Janny López APRN  Comparison: compared with previous ECG from 1/4/2025  Similar to previous ECG  Rhythm: sinus rhythm  Rate: normal  BPM: 86  Conduction: conduction normal  ST Depression: II, III, aVF, V3, V4, V5 and V6  T inversion: V3, V4, V5, V6, II, III and aVF  QRS axis: normal  Other findings: left ventricular hypertrophy    Clinical impression: abnormal EKG            Assessment:       Diagnosis Plan   1. CAD, multiple vessel        2. Abnormal EKG        3. Essential hypertension        4. Mixed hyperlipidemia               Plan:       1/2.  Coronary Artery Disease: Status post stent to the left main in May 2022.  Status post angioplasty and balloon angioplasty to the left main in March 2024.      He was recently experiencing dyspnea, fatigue, and throat sensation which were his anginal symptoms in the past.  I started him on isosorbide.  Recent heart cath showed nonobstructive CAD, patent stents, and he underwent drug-eluting stent placement to the mid RCA (3.0 x 40 mm Xience baldomero point).    His fatigue is improved and shortness of breath resolved.  He still has that throat sensation at times.  I offered increasing his isosorbide to 60 mg daily, but the patient wants to leave it at 30 mg at this time.  I told him they could do a trial of increasing to  60 mg daily at home and call me with an update.'s EKG remains abnormal with ST depression and T wave abnormalities.  I will defer to Dr. Karimi if he wants to check an echocardiogram.  Continue aspirin, carvedilol, clopidogrel, Leqvio and rosuvastatin.    3.  Hypertension: Blood pressure excellent today.    4.  Hyperlipidemia.  On Leqvio.  He is and statin intolerant in the past.  Dr. Karimi recently started him on rosuvastatin 10 mg daily and we will see how he does.    5.  I have highly encouraged him to enroll in cardiac rehab.  He will call with any cardiac concerns.  Keep scheduled appointment with Dr. Karimi in February.    As always, it has been a pleasure to participate in your patient's care. Thank you.         Sincerely,         MICHELLE Bernard  [facility] Cardiology      Dictated utilizing Dragon Dictation  I spent 32 minutes reviewing her medical records/testing/previous office notes/labs, face-to-face interaction with patient, physical examination, formulating the plan of care, and discussion of plan of care with patient.

## 2025-01-13 ENCOUNTER — PRIOR AUTHORIZATION (OUTPATIENT)
Dept: ENDOCRINOLOGY | Age: 70
End: 2025-01-13
Payer: MEDICARE

## 2025-01-13 NOTE — TELEPHONE ENCOUNTER
A PA for Ozempic (2 MG/DOSE) 8MG/3ML pen-injectors has been started.    (Key: [id number])  Rx #: [id number]

## 2025-01-14 ENCOUNTER — OFFICE VISIT (OUTPATIENT)
Dept: ORTHOPEDIC SURGERY | Facility: CLINIC | Age: 70
End: 2025-01-14
Payer: MEDICARE

## 2025-01-14 VITALS — HEIGHT: 77 IN | TEMPERATURE: 95.7 F | WEIGHT: 247.7 LBS | BODY MASS INDEX: 29.25 KG/M2

## 2025-01-14 DIAGNOSIS — R52 PAIN: Primary | ICD-10-CM

## 2025-01-14 DIAGNOSIS — M17.12 PRIMARY OSTEOARTHRITIS OF LEFT KNEE: ICD-10-CM

## 2025-01-14 NOTE — TELEPHONE ENCOUNTER
Request Reference Number: [id number]. OZEMPIC INJ 8MG/3ML is approved through 12/31/2025.     Your patient may now fill this prescription and it will be covered..     Authorization Expiration Date: December 31, 2025.

## 2025-01-14 NOTE — PROGRESS NOTES
1/14/2025    Isaias Monaco is here today for worsening knee pain. Pt has undergone injection of the knee in the past with good resolution of symptoms. Pt is requesting a repeat injection.     KNEE Injection Procedure Note:    Large Joint Arthrocentesis: L knee  Date/Time: 1/14/2025 4:26 PM  Consent given by: patient  Site marked: site marked  Timeout: Immediately prior to procedure a time out was called to verify the correct patient, procedure, equipment, support staff and site/side marked as required   Supporting Documentation  Indications: pain and joint swelling   Procedure Details  Location: knee - L knee  Preparation: Patient was prepped and draped in the usual sterile fashion  Needle size: 22 G  Approach: anterolateral  Medications administered: 2 mL lidocaine (cardiac); 88 mg Hyaluronan 88 MG/4ML  Patient tolerance: patient tolerated the procedure well with no immediate complications      At the conclusion of the injection I discussed the importance of continued quad strengthening exercises on a daily basis. I will see the patient back if the symptoms should fail to improve or worsen.    Kalyn Marx, APRN  1/14/2025

## 2025-01-15 ENCOUNTER — READMISSION MANAGEMENT (OUTPATIENT)
Dept: CALL CENTER | Facility: HOSPITAL | Age: 70
End: 2025-01-15
Payer: MEDICARE

## 2025-01-15 NOTE — OUTREACH NOTE
Medical Week 2 Survey      Flowsheet Row Responses   [facility] patient discharged from? [facility]   Does the patient have one of the following disease processes/diagnoses(primary or secondary)? Other   Week 2 attempt successful? Yes   Call start time 1537   Discharge diagnosis Unstable angina   Call end time 1538   Person spoke with today (if not patient) and relationship Patient   Meds reviewed with patient/caregiver? Yes   Does the patient have all medications ordered at discharge? Yes   Prescription comments No concerns or questions.   Is the patient taking all medications as directed (includes completed medication regime)? Yes   Does the patient have a primary care provider?  Yes   Comments regarding PCP 2/12/2025 10:00 AM  HOSPITAL FOLLOW UP 20 min [facility] CARDIOLOGY Miguel Ángel Karimi MD   Has home health visited the patient within 72 hours of discharge? N/A   Psychosocial issues? No   Did the patient receive a copy of their discharge instructions? Yes   Nursing interventions Reviewed instructions with patient   What is the patient's perception of their health status since discharge? Improving   Is the patient/caregiver able to teach back signs and symptoms related to disease process for when to call PCP? Yes   Is the patient/caregiver able to teach back signs and symptoms related to disease process for when to call 911? Yes   Is the patient/caregiver able to teach back the hierarchy of who to call/visit for symptoms/problems? PCP, Specialist, Home health nurse, Urgent Care, ED, 911 Yes   Week 2 Call Completed? Yes   Graduated Yes   Wrap up additional comments Patient reports doing well. No concerns or questions ntoed.   Call end time 1538            Elba CARDENAS - Registered Nurse

## 2025-02-05 ENCOUNTER — LAB (OUTPATIENT)
Facility: HOSPITAL | Age: 70
End: 2025-02-05
Payer: MEDICARE

## 2025-02-05 ENCOUNTER — TRANSCRIBE ORDERS (OUTPATIENT)
Dept: ADMINISTRATIVE | Facility: HOSPITAL | Age: 70
End: 2025-02-05
Payer: MEDICARE

## 2025-02-05 DIAGNOSIS — E11.9 DIABETES MELLITUS WITHOUT COMPLICATION: ICD-10-CM

## 2025-02-05 DIAGNOSIS — N18.2 CHRONIC KIDNEY DISEASE, STAGE II (MILD): Primary | ICD-10-CM

## 2025-02-05 DIAGNOSIS — R80.9 PROTEINURIA, UNSPECIFIED TYPE: ICD-10-CM

## 2025-02-05 DIAGNOSIS — I10 ESSENTIAL HYPERTENSION, MALIGNANT: ICD-10-CM

## 2025-02-05 DIAGNOSIS — N18.2 CHRONIC KIDNEY DISEASE, STAGE II (MILD): ICD-10-CM

## 2025-02-05 LAB
ALBUMIN SERPL-MCNC: 4.2 G/DL (ref 3.5–5.2)
ALBUMIN/GLOB SERPL: 1.1 G/DL
ALP SERPL-CCNC: 123 U/L (ref 39–117)
ALT SERPL W P-5'-P-CCNC: 47 U/L (ref 1–41)
ANION GAP SERPL CALCULATED.3IONS-SCNC: 10 MMOL/L (ref 5–15)
AST SERPL-CCNC: 57 U/L (ref 1–40)
BACTERIA UR QL AUTO: NORMAL /HPF
BILIRUB SERPL-MCNC: 0.5 MG/DL (ref 0–1.2)
BILIRUB UR QL STRIP: NEGATIVE
BUN SERPL-MCNC: 10 MG/DL (ref 8–23)
BUN/CREAT SERPL: 8.7 (ref 7–25)
CALCIUM SPEC-SCNC: 9.5 MG/DL (ref 8.6–10.5)
CHLORIDE SERPL-SCNC: 101 MMOL/L (ref 98–107)
CLARITY UR: CLEAR
CO2 SERPL-SCNC: 26 MMOL/L (ref 22–29)
COLOR UR: ABNORMAL
CREAT SERPL-MCNC: 1.15 MG/DL (ref 0.76–1.27)
CREAT UR-MCNC: 311.6 MG/DL
EGFRCR SERPLBLD CKD-EPI 2021: 68.9 ML/MIN/1.73
GLOBULIN UR ELPH-MCNC: 3.9 GM/DL
GLUCOSE SERPL-MCNC: 103 MG/DL (ref 65–99)
GLUCOSE UR STRIP-MCNC: NEGATIVE MG/DL
HGB UR QL STRIP.AUTO: NEGATIVE
HYALINE CASTS UR QL AUTO: NORMAL /LPF
KETONES UR QL STRIP: ABNORMAL
LEUKOCYTE ESTERASE UR QL STRIP.AUTO: NEGATIVE
NITRITE UR QL STRIP: NEGATIVE
PH UR STRIP.AUTO: 6 [PH] (ref 5–8)
POTASSIUM SERPL-SCNC: 4.2 MMOL/L (ref 3.5–5.2)
PROT ?TM UR-MCNC: 356.2 MG/DL
PROT SERPL-MCNC: 8.1 G/DL (ref 6–8.5)
PROT UR QL STRIP: ABNORMAL
PROT/CREAT UR: 1143.1 MG/G CREA (ref 0–200)
RBC # UR STRIP: NORMAL /HPF
REF LAB TEST METHOD: NORMAL
SODIUM SERPL-SCNC: 137 MMOL/L (ref 136–145)
SP GR UR STRIP: >1.03 (ref 1–1.03)
SQUAMOUS #/AREA URNS HPF: NORMAL /HPF
UROBILINOGEN UR QL STRIP: ABNORMAL
WBC # UR STRIP: NORMAL /HPF

## 2025-02-05 PROCEDURE — 80053 COMPREHEN METABOLIC PANEL: CPT

## 2025-02-05 PROCEDURE — 81001 URINALYSIS AUTO W/SCOPE: CPT

## 2025-02-05 PROCEDURE — 36415 COLL VENOUS BLD VENIPUNCTURE: CPT

## 2025-02-05 PROCEDURE — 82570 ASSAY OF URINE CREATININE: CPT

## 2025-02-05 PROCEDURE — 84156 ASSAY OF PROTEIN URINE: CPT

## 2025-02-10 ENCOUNTER — TREATMENT (OUTPATIENT)
Dept: PHYSICAL THERAPY | Facility: CLINIC | Age: 70
End: 2025-02-10
Payer: MEDICARE

## 2025-02-10 DIAGNOSIS — Z98.1 HISTORY OF FUSION OF CERVICAL SPINE: ICD-10-CM

## 2025-02-10 DIAGNOSIS — R26.2 DIFFICULTY WALKING: ICD-10-CM

## 2025-02-10 DIAGNOSIS — Z98.890 HISTORY OF REPAIR OF RIGHT ROTATOR CUFF: ICD-10-CM

## 2025-02-10 DIAGNOSIS — G95.9 CERVICAL MYELOPATHY: ICD-10-CM

## 2025-02-10 DIAGNOSIS — M54.50 LUMBAR PAIN: Primary | ICD-10-CM

## 2025-02-10 DIAGNOSIS — Z96.651 HISTORY OF TOTAL KNEE ARTHROPLASTY, RIGHT: ICD-10-CM

## 2025-02-10 PROCEDURE — 97162 PT EVAL MOD COMPLEX 30 MIN: CPT | Performed by: PHYSICAL THERAPIST

## 2025-02-10 PROCEDURE — G0283 ELEC STIM OTHER THAN WOUND: HCPCS | Performed by: PHYSICAL THERAPIST

## 2025-02-10 NOTE — PROGRESS NOTES
[facility]  [facility]  [address] [place] [address]    Initial Evaluation and Plan of Care      Patient: Isaias Monaco   : 1955  Diagnosis/ICD-10 Code:  Lumbar pain [M54.50]  Referring practitioner: Gwyn Patten Sr., MD  Date of Initial Visit: 2/10/2025  Today's Date: 2/10/2025  Patient seen for 1 session         Visit Diagnoses:    ICD-10-CM ICD-9-CM   1. Lumbar pain  M54.50 724.2   2. History of fusion of cervical spine  Z98.1 V45.4   3. Cervical myelopathy  G95.9 721.1   4. Difficulty walking  R26.2 719.7   5. History of repair of right rotator cuff  Z98.890 V15.29   6. History of total knee arthroplasty, right  Z96.651 V43.65         Subjective Questionnaire: NDI:48%  Oswestry: 62%      Subjective Evaluation    History of Present Illness  Mechanism of injury: 69 year old with chronic back pain the past 7 years. 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 Imaging found cervical myelopathy so underwent fusion 2024. Now with better fine motor coordination after 2 weeks of rehab and 6 months of out patient therapy that just finished in December. Spouse noting that gross motor skills have declined a bit in the past 6 weeks. More pain with standing and spouse/RN has had to provide him with more hydrocodone. Can't take NSAIDs due to kidney disease. Might start on Tramadol soon.   Has a TENS unit at home but hasn't tried this yet.   Will also consider more pain management and even pain pumps or pain stimulator if helpful. Has not tried BRAD in the past 3 years.   PMH cardiac stents and has to stop blood thinners for BRAD so has to weigh pros and cons of this.   Hx R RCR and R TKA. Also recent injection to L knee due to OA. No plans for TKA at this time.   Pt offered aquatic PT in the past but refused. Might be more open to this pending relief with land based PT. Spouse encouraging aquatic ex.       Patient Occupation: retired RN Quality of  life: fair    Pain  Current pain ratin  At best pain ratin  At worst pain rating: 10  Location: centered low back  Quality: sharp, knife-like and discomfort  Relieving factors: change in position, rest and medications  Aggravating factors: movement, ambulation, prolonged positioning, sleeping and standing  Progression: worsening    Social Support  Lives in: multiple-level home  Lives with: spouse    Diagnostic Tests  X-ray: abnormal  MRI studies: abnormal    Treatments  Previous treatment: medication  Patient Goals  Patient goals for therapy: decreased pain, increased motion, increased strength and independence with ADLs/IADLs  Patient goal: improve dressing skills.           Objective          Static Posture     Head  Forward.    Shoulders  Elevated.    Lumbar Spine   Flattened.     Active Range of Motion   Cervical/Thoracic Spine   Cervical    Flexion: 35 degrees   Extension: 0 degrees   Left lateral flexion: 5 degrees   Right lateral flexion: 5 degrees   Left rotation: 25 degrees   Right rotation: 25 degrees     Lumbar   Flexion: 60 degrees   Extension: 0 degrees   Left lateral flexion: 10 degrees   Right lateral flexion: 10 degrees     Additional Active Range of Motion Details  ROM observed vs. Measured using goniometer or inclinometer    Ambulation   Weight-Bearing Status   Assistive device used: none    Ambulation: Stairs   Ascend stairs: independent  Pattern: reciprocal  Railings: one rail  Descend stairs: independent  Pattern: reciprocal  Railings: one rail    Observational Gait   Decreased walking speed and stride length.   Left foot contact pattern: foot flat  Right foot contact pattern: foot flat  Left arm swing: decreased  Right arm swing: decreased  Base of support: increased    Quality of Movement During Gait   Trunk  Forward lean.     Additional Quality of Movement During Gait Details  General rigidity starting with limited neck ROM/rotation           Assessment & Plan        Assessment  Impairments: abnormal or restricted ROM, impaired physical strength, lacks appropriate home exercise program and pain with function   Functional limitations: carrying objects, lifting, walking, uncomfortable because of pain, moving in bed and standing   Assessment details: Pt with evolving condition due to both lumbar pain and significant restriction in neck ROM following ACSF done back in June of 2024.   Now with increasing low back pain and spouse states he has been losing some of his balance and LE mobility for dressing.   No falls. Not involved in much of a home ex program at this time.   Pt would benefit from aquatic therapy due to diffuse nature of muscle tension and immobility. Will encourage this at future sessions.   Also plan to extend mobility eval to shoulders, hips, and knees next session. Also assess balance and fall risk at a future session. Today time was limited due to effort to help with acute pain control using heat and TENS. Pt stated some relief with this session and will start using his TENS unit at home with help of his spouse.   Skilled PT needed to provide pt with targeted exercise and modality interventions.   Prognosis: fair  Prognosis details: Pt eager to work with new pain management specialist and trying Tramadol may help with his muscle tension and pain.     Goals  Plan Goals: STGs 4 weeks:  1. Improve NDI score from 48 to < 40%  2. Improve TOBIN score from 62 to < 55%  3. Pt to be 75% indep in variety of HEP  4. Pt to try aquatic therapy to assess potential benefit  5. Pt to state 10-20% decrease in pain from TENS and meds per MD plan  6. Assess balance via WILSON or Tinetti and provide pt with balance ex  LTGs 12 weeks:  1. NDI score < 35%  2. Neck Rotation to 35 degrees or greater  3. TOBIN score < 40%  4. Lumbar extension to 10 degrees and pain free  5. Pt indep in land/balance and aquatic ex  6. Pt with plan for ongoing mobility activities 3 days/week.      Plan  Therapy options: will be seen for skilled therapy services  Planned modality interventions: TENS  Planned therapy interventions: manual therapy, neuromuscular re-education, soft tissue mobilization, strengthening, stretching, therapeutic activities, home exercise program, functional ROM exercises and gait training  Frequency: 2x week  Duration in weeks: 12  Treatment plan discussed with: patient and family        History # of Personal Factors and/or Comorbidities: HIGH (3+)  Examination of Body System(s): # of elements: MODERATE (3)  Clinical Presentation: EVOLVING  Clinical Decision Making: HIGH      Timed:         Manual Therapy:         mins  80795;     Therapeutic Exercise:         mins  90567;     Neuromuscular Meño:        mins  42065;    Therapeutic Activity:          mins  61256;     Gait Training:           mins  78181;     Ultrasound:          mins  58293;    Ionto                                   mins   84134  Self Care                            mins   37442  Canalith Repos         mins 63469  Aquatic Exercise                 mins 39191    Un-Timed:  Electrical Stimulation:    20     mins  75081 ( );  Dry Needling          mins self-pay  Traction          mins 98028    Low Eval          Mins  85636  Mod Eval     25     Mins  87973  High Eval                            Mins  18379        Timed Treatment:   0   mins   Total Treatment:     45   mins          PT: Zorre Zeno Kimura, PT     [place] License Number: [id number]  [place] License Number:  [id number]  Electronically signed by Zorre Zeno Kimura, PT, 02/10/25, 3:13 PM EST    Certification Period: 2/10/2025 thru 5/10/2025  I certify that the therapy services are furnished while this patient is under my care.  The services outlined above are required by this patient, and will be reviewed every 90 days.         Physician Signature:__________________________________________________    PHYSICIAN: Gwyn Patten Sr., MD  NPI: [id number]                                       DATE:      Please sign and return via fax to [facility]  [address] [place] [address]. [phone]  Thank you, [facility].

## 2025-02-12 ENCOUNTER — OFFICE VISIT (OUTPATIENT)
Dept: FAMILY MEDICINE CLINIC | Facility: CLINIC | Age: 70
End: 2025-02-12
Payer: MEDICARE

## 2025-02-12 VITALS
DIASTOLIC BLOOD PRESSURE: 62 MMHG | SYSTOLIC BLOOD PRESSURE: 120 MMHG | HEIGHT: 77 IN | WEIGHT: 246 LBS | HEART RATE: 81 BPM | OXYGEN SATURATION: 98 % | BODY MASS INDEX: 29.05 KG/M2

## 2025-02-12 DIAGNOSIS — M47.817 LUMBOSACRAL SPONDYLOSIS WITHOUT MYELOPATHY: Primary | ICD-10-CM

## 2025-02-12 DIAGNOSIS — R35.1 NOCTURIA: ICD-10-CM

## 2025-02-12 DIAGNOSIS — M48.02 STENOSIS OF CERVICAL SPINE WITH MYELOPATHY: ICD-10-CM

## 2025-02-12 DIAGNOSIS — G99.2 STENOSIS OF CERVICAL SPINE WITH MYELOPATHY: ICD-10-CM

## 2025-02-12 DIAGNOSIS — Z51.81 ENCOUNTER FOR THERAPEUTIC DRUG LEVEL MONITORING: ICD-10-CM

## 2025-02-12 RX ORDER — TRAMADOL HYDROCHLORIDE 50 MG/1
50 TABLET ORAL EVERY 6 HOURS PRN
Qty: 60 TABLET | Refills: 0 | Status: SHIPPED | OUTPATIENT
Start: 2025-02-12

## 2025-02-12 RX ORDER — METHOCARBAMOL 500 MG/1
500 TABLET, FILM COATED ORAL NIGHTLY
Qty: 30 TABLET | Refills: 11 | Status: SHIPPED | OUTPATIENT
Start: 2025-02-12 | End: 2026-02-12

## 2025-02-12 RX ORDER — TAMSULOSIN HYDROCHLORIDE 0.4 MG/1
1 CAPSULE ORAL NIGHTLY
Qty: 90 CAPSULE | Refills: 3 | Status: SHIPPED | OUTPATIENT
Start: 2025-02-12

## 2025-02-12 NOTE — PROGRESS NOTES
Chief Complaint  Chief Complaint   Patient presents with    Joint Pain     Pt here to discuss joint pains, asking for pain medication        Subjective    History of Present Illness        Isaias Monaco presents to [facility] for   History of Present Illness  Problem with back and neck pain.  He has complaints of arthritis.  The patient presents for evaluation of back pain, neck pain, left knee pain, and frequent urination.    He has been experiencing severe back pain for several years, which has significantly limited his mobility, preventing him from walking more than a block. He has previously sought treatment from [facility], where he underwent various procedures including epidurals, nerve ablation, and other injections, none of which provided relief. He recently attended physical therapy on Monday, where hot packs and a TENS unit were applied to his lower back, providing some relief. He was given Robaxin 500 mg post-therapy, which seemed to alleviate morning stiffness. He is currently taking hydrocodone 7.5 mg every morning to manage stiffness and soreness upon waking, and occasionally takes Percocet when experiencing severe pain. His nephrologist has advised against the use of NSAIDs due to potential kidney damage. He has been off narcotics for a few months but has recently resumed hydrocodone 7.5 mg every morning. He has expressed a desire to avoid further surgery. He has been advised to take tramadol daily, with additional doses as needed in the afternoon or evening. He finds relief from his symptoms when at home, particularly when using a recliner with a heating pad, and does not require medication later in the day unless he is traveling. He has no history of addiction.    He also reports neck pain, which is exacerbated by dining out or traveling, and lower back pain that worsens with standing or walking. He underwent extensive neck surgery last summer,  "which necessitated a month-long hospital stay. Despite the successful outcome of the surgery, he is reluctant to undergo similar procedures for his lower back, as Dr. Garnica has indicated that the results may not be guaranteed.    He has recently developed left knee pain, despite receiving injections. His knee pain is generally manageable, but can become severe in certain situations, such as when seated in a restaurant or attending a game. He has received cortisone and gel injections from an orthopedic specialist, with plans for quarterly alternating treatments. He had right knee replacement several years ago.    He also reports frequent urination at night, often waking up to use the bathroom. He occasionally experiences difficulty initiating urination, but this symptom varies from night to night. He has not experienced lightheadedness upon standing since his surgery.    FAMILY HISTORY  His father and brother have arthritis. His mother had lupus.    MEDICATIONS  Current: hydrocodone, Percocet, Robaxin  Past: cortisone  Joint Pain     History of Present Illness      Objective   Vital Signs:   Visit Vitals  /62   Pulse 81   Ht 195.6 cm (77\")   Wt 112 kg (246 lb)   SpO2 98%   BMI 29.17 kg/m²                Physical Exam  Vitals reviewed.   Constitutional:       Appearance: He is well-developed.   HENT:      Head: Normocephalic.      Right Ear: External ear normal.      Left Ear: External ear normal.      Nose: Nose normal.   Eyes:      Conjunctiva/sclera: Conjunctivae normal.   Cardiovascular:      Rate and Rhythm: Normal rate and regular rhythm.   Pulmonary:      Effort: Pulmonary effort is normal.      Breath sounds: Normal breath sounds.   Musculoskeletal:         General: Normal range of motion.      Cervical back: Normal range of motion and neck supple.   Skin:     General: Skin is warm and dry.      Capillary Refill: Capillary refill takes less than 2 seconds.   Neurological:      Mental Status: He is " alert and oriented to person, place, and time.        Physical Exam           Result Review :  Results                            Assessment and Plan      Diagnoses and all orders for this visit:    1. Lumbosacral spondylosis without myelopathy (Primary)  Assessment & Plan:  He has been experiencing severe back pain for several years, which has significantly limited his mobility, preventing him from walking more than a block. He has previously sought treatment from [facility], where he underwent various procedures including epidurals, nerve ablation, and other injections, none of which provided relief. He recently attended physical therapy on Monday, where hot packs and a TENS unit were applied to his lower back, providing some relief. He was given Robaxin 500 mg post-therapy, which seemed to alleviate morning stiffness. He is currently taking hydrocodone 7.5 mg every morning to manage stiffness and soreness upon waking, and occasionally takes Percocet when experiencing severe pain. His nephrologist has advised against the use of NSAIDs due to potential kidney damage. He has been off narcotics for a few months but has recently resumed hydrocodone 7.5 mg every morning. He has expressed a desire to avoid further surgery. He has been advised to take tramadol daily, with additional doses as needed in the afternoon or evening. He finds relief from his symptoms when at home, particularly when using a recliner with a heating pad, and does not require medication later in the day unless he is traveling. He has no history of addiction. A referral to Dr. Pak will be initiated for further evaluation and management of his back pain. A prescription for Robaxin will be provided to alleviate morning stiffness. He will sign a contract for the use of tramadol.    Orders:  -     traMADol (ULTRAM) 50 MG tablet; Take 1 tablet by mouth Every 6 (Six) Hours As Needed for Moderate Pain.  Dispense: 60 tablet; Refill:  0  -     methocarbamol (ROBAXIN) 500 MG tablet; Take 1 tablet by mouth Every Night.  Dispense: 30 tablet; Refill: 11    2. Stenosis of cervical spine with myelopathy  Assessment & Plan:  He reports neck pain, which is exacerbated by dining out or traveling, and lower back pain that worsens with standing or walking. He underwent extensive neck surgery last summer, which necessitated a month-long hospital stay. Despite the successful outcome of the surgery, he is reluctant to undergo similar procedures for his lower back, as Dr. Garnica has indicated that the results may not be guaranteed. A referral to Dr. Pak will be initiated for further evaluation and management of his neck pain.    Orders:  -     traMADol (ULTRAM) 50 MG tablet; Take 1 tablet by mouth Every 6 (Six) Hours As Needed for Moderate Pain.  Dispense: 60 tablet; Refill: 0  -     methocarbamol (ROBAXIN) 500 MG tablet; Take 1 tablet by mouth Every Night.  Dispense: 30 tablet; Refill: 11    3. Nocturia  -     tamsulosin (FLOMAX) 0.4 MG capsule 24 hr capsule; Take 1 capsule by mouth Every Night.  Dispense: 90 capsule; Refill: 3    4. Encounter for therapeutic drug level monitoring  -     ToxAssure Flex 15, Ur -    Other orders  -     Opiate Class, MS, Ur RFX -  -     Oxycodone Class, MS, Ur RFX -       Assessment & Plan             Follow Up   No follow-ups on file.  Patient was given instructions and counseling regarding his condition or for health maintenance advice. Please see specific information pulled into the AVS if appropriate.     Patient or patient representative verbalized consent for the use of Ambient Listening during the visit with  Gwyn Patten Sr, MD for chart documentation. 3/1/2025  23:50 EST

## 2025-02-17 LAB
1OH-MIDAZOLAM UR QL SCN: NOT DETECTED NG/MG CREAT
6MAM UR QL SCN: NEGATIVE NG/ML
7AMINOCLONAZEPAM/CREAT UR: NOT DETECTED NG/MG CREAT
A-OH ALPRAZ/CREAT UR: NOT DETECTED NG/MG CREAT
A-OH-TRIAZOLAM/CREAT UR CFM: NOT DETECTED NG/MG CREAT
ALPRAZ/CREAT UR CFM: NOT DETECTED NG/MG CREAT
AMPHETAMINES UR QL SCN: NEGATIVE NG/ML
BARBITURATES UR QL SCN: NEGATIVE NG/ML
BENZODIAZ SCN METH UR: NEGATIVE
BUPRENORPHINE UR QL SCN: NEGATIVE
BUPRENORPHINE/CREAT UR: NOT DETECTED NG/MG CREAT
CANNABINOIDS UR QL SCN: NEGATIVE NG/ML
CLONAZEPAM/CREAT UR CFM: NOT DETECTED NG/MG CREAT
COCAINE+BZE UR QL SCN: NEGATIVE NG/ML
CODEINE UR CFM-MCNC: NOT DETECTED NG/MG CREAT
CREAT UR-MCNC: 195 MG/DL
DESALKYLFLURAZ/CREAT UR: NOT DETECTED NG/MG CREAT
DHC/CREAT UR: 56 NG/MG CREAT
DIAZEPAM/CREAT UR: NOT DETECTED NG/MG CREAT
ETHANOL UR QL SCN: NEGATIVE G/DL
FENTANYL CTO UR SCN-MCNC: NEGATIVE NG/ML
FENTANYL/CREAT UR: NOT DETECTED NG/MG CREAT
FLUNITRAZEPAM UR QL SCN: NOT DETECTED NG/MG CREAT
HYDROCODONE UR CFM-MCNC: 429 NG/MG CREAT
HYDROMORPHONE UR CFM-MCNC: NOT DETECTED NG/MG CREAT
LORAZEPAM/CREAT UR: NOT DETECTED NG/MG CREAT
METHADONE UR QL SCN: NEGATIVE NG/ML
METHADONE+METAB UR QL SCN: NEGATIVE NG/ML
MIDAZOLAM/CREAT UR CFM: NOT DETECTED NG/MG CREAT
MORPHINE UR CFM-MCNC: NOT DETECTED NG/MG CREAT
NORBUPRENORPHINE/CREAT UR: NOT DETECTED NG/MG CREAT
NORCODEINE/CREAT UR CFM: NOT DETECTED NG/MG CREAT
NORDIAZEPAM/CREAT UR: NOT DETECTED NG/MG CREAT
NORFENTANYL/CREAT UR: NOT DETECTED NG/MG CREAT
NORFLUNITRAZEPAM UR-MCNC: NOT DETECTED NG/MG CREAT
NORHYDROCODONE UR CFM-MCNC: 337 NG/MG CREAT
NORMORPHINE UR-MCNC: NOT DETECTED NG/MG CREAT
NOROXYCODONE UR CFM-MCNC: 544 NG/MG CREAT
NOROXYMORPHONE/CREAT UR CFM: NOT DETECTED NG/MG CREAT
OPIATES UR QL CFM: NORMAL
OPIATES UR SCN-MCNC: NORMAL NG/ML
OXAZEPAM/CREAT UR: NOT DETECTED NG/MG CREAT
OXYCODONE CTO UR SCN-MCNC: NORMAL NG/ML
OXYCODONE UR CFM-MCNC: 142 NG/MG CREAT
OXYCODONE UR QL CFM: NORMAL
OXYMORPHONE UR CFM-MCNC: 55 NG/MG CREAT
PCP UR QL SCN: NEGATIVE NG/ML
PRESCRIBED MEDICATIONS: NORMAL
TAPENTADOL CTO UR SCN-MCNC: NEGATIVE NG/ML
TEMAZEPAM/CREAT UR: NOT DETECTED NG/MG CREAT
TRAMADOL UR QL SCN: NEGATIVE NG/ML

## 2025-02-24 ENCOUNTER — OFFICE VISIT (OUTPATIENT)
Age: 70
End: 2025-02-24
Payer: MEDICARE

## 2025-02-24 ENCOUNTER — TREATMENT (OUTPATIENT)
Dept: PHYSICAL THERAPY | Facility: CLINIC | Age: 70
End: 2025-02-24
Payer: MEDICARE

## 2025-02-24 ENCOUNTER — TELEPHONE (OUTPATIENT)
Dept: NEUROSURGERY | Facility: CLINIC | Age: 70
End: 2025-02-24
Payer: MEDICARE

## 2025-02-24 VITALS
DIASTOLIC BLOOD PRESSURE: 68 MMHG | HEIGHT: 77 IN | SYSTOLIC BLOOD PRESSURE: 98 MMHG | WEIGHT: 241 LBS | BODY MASS INDEX: 28.46 KG/M2 | HEART RATE: 97 BPM

## 2025-02-24 DIAGNOSIS — Z98.890 HISTORY OF REPAIR OF RIGHT ROTATOR CUFF: ICD-10-CM

## 2025-02-24 DIAGNOSIS — I25.10 CAD, MULTIPLE VESSEL: Primary | ICD-10-CM

## 2025-02-24 DIAGNOSIS — I10 ESSENTIAL HYPERTENSION: ICD-10-CM

## 2025-02-24 DIAGNOSIS — R26.2 DIFFICULTY WALKING: ICD-10-CM

## 2025-02-24 DIAGNOSIS — G95.9 CERVICAL MYELOPATHY: ICD-10-CM

## 2025-02-24 DIAGNOSIS — M54.50 LUMBAR PAIN: Primary | ICD-10-CM

## 2025-02-24 DIAGNOSIS — E78.5 HYPERLIPIDEMIA LDL GOAL <70: ICD-10-CM

## 2025-02-24 DIAGNOSIS — Z98.1 HISTORY OF FUSION OF CERVICAL SPINE: ICD-10-CM

## 2025-02-24 DIAGNOSIS — Z96.651 HISTORY OF TOTAL KNEE ARTHROPLASTY, RIGHT: ICD-10-CM

## 2025-02-24 DIAGNOSIS — E78.2 MIXED HYPERLIPIDEMIA: ICD-10-CM

## 2025-02-24 PROCEDURE — 97535 SELF CARE MNGMENT TRAINING: CPT | Performed by: PHYSICAL THERAPIST

## 2025-02-24 PROCEDURE — 97110 THERAPEUTIC EXERCISES: CPT | Performed by: PHYSICAL THERAPIST

## 2025-02-24 PROCEDURE — G0238 OTH RESP PROC, INDIV: HCPCS | Performed by: PHYSICAL THERAPIST

## 2025-02-24 RX ORDER — ISOSORBIDE MONONITRATE 60 MG/1
60 TABLET, EXTENDED RELEASE ORAL DAILY
Qty: 90 TABLET | Refills: 3 | Status: SHIPPED | OUTPATIENT
Start: 2025-02-24

## 2025-02-24 RX ORDER — DOXAZOSIN 2 MG/1
2 TABLET ORAL NIGHTLY
Qty: 90 TABLET | Refills: 3
Start: 2025-02-24

## 2025-02-24 NOTE — TELEPHONE ENCOUNTER
Patient called again about her recommendation to a pain clinic.  She sent a my chart message on 02- and it has not been answered.  Please advise

## 2025-02-24 NOTE — PROGRESS NOTES
Physical Therapy Daily Treatment Note    [facility]  [address] [place] [address]      Patient: Isaias Monaco   : 1955  Referring practitioner: Gwyn Patten Sr., MD  Date of Initial Visit: Type: THERAPY  Noted: 2/10/2025  Today's Date: 2025  Patient seen for 2 sessions       Visit Diagnoses:    ICD-10-CM ICD-9-CM   1. Lumbar pain  M54.50 724.2   2. History of fusion of cervical spine  Z98.1 V45.4   3. Cervical myelopathy  G95.9 721.1   4. Difficulty walking  R26.2 719.7   5. History of repair of right rotator cuff  Z98.890 V15.29   6. History of total knee arthroplasty, right  Z96.651 V43.65       Subjective Evaluation    History of Present Illness    Subjective comment: doing some better. 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 Back some better. Brought TENS in and needs help with set up. Interested in water therapy.       Objective   See Exercise, Manual, and Modality Logs for complete treatment.       Assessment & Plan       Assessment  Assessment details: Pt did well today and he is able to control TENS unit with spouse applying electrodes.  Benefited from pec stretching and nu step and especially using walking dowel rods as sticks. They might get some real walking sticks as they see the benefit for auditory, tactile, posture, and balance input.     P: see pt in the pool in 2 days to apply benefits of warm water for generalized hypomobility.           Timed:         Manual Therapy:         mins  46235;     Therapeutic Exercise:    20     mins  39363;     Neuromuscular Meño:       mins  42521;    Therapeutic Activity:          mins  27552;     Gait Training:           mins  74754;     Ultrasound:          mins  32595;    Ionto                                   mins   02312  Self Care                        10    mins   01136  [facility]         mins 45178  Work hardening   __   min  60534/28179      Un-Timed:  Electrical Stimulation:    20     mins  87096 ( );  Dry Needling          mins self-pay  Traction          mins 62929      Timed Treatment:   30   mins   Total Treatment:     50   mins    Zorre Zeno Kimura, PT  [place] License: [id number]    [place] License:  [id number]

## 2025-02-24 NOTE — PROGRESS NOTES
Isaias Monaco  1955  Date of Office Visit: 02/24/25  Encounter Provider: Miguel Ángel Karimi MD  Place of Service: [facility] CARDIOLOGY      CHIEF COMPLAINT:  Coronary disease with history of left main bifurcation stenting  PCI of the ostial to mid RCA  Hyperlipidemia    HISTORY OF PRESENT ILLNESS:  Very pleasant 69-year-old male with a medical history of diabetes mellitus, hyperlipidemia, coronary artery disease with prior PCI to an RCA , PCI to 99% ostial left circumflex coronary artery stenosis in 2020, and left main bifurcation stenting in May 2022 with DK crush technique.  Echocardiogram prior to this was normal.  He came back 3/19/2024 and underwent an elective cardiac catheterization secondary to angina.  He had an ostial 99% circumflex in-stent restenosis and 60% ostial LAD in-stent restenosis at that time.  He also had a calcified 50% distal in-stent restenosis of the left main.  He underwent angioplasty and shockwave balloon lithotripsy of the distal left main to proximal LAD and PCI of the distal left main to proximal circumflex followed by kissing balloon angioplasty.      He presented with worsening dyspnea on exertion and discomfort in his throat in late December 2024 and was evaluated by Janny López. He was taken for coronary angiography and noted to have moderate disease in the mid LAD with a normal RFR of the left main and LAD at 0.91. He had a 90% proximal RCA stenosis and underwent PCI of the ostial to mid RCA with a 3.25 x 48 mm Xience SkyPoint drug-eluting stent. He has had issues previously with high doses of Crestor therapy but is agreeable to start a low-dose of Crestor along with his Leqvio therapy.  Subsequently was discharged home and has been doing well since that time    He denies any myalgias and seems to be tolerating his current regimen.  His blood pressure has been running lower as of late and he has not been splitting his Cardura  therapy.  We have discussed moving down to 2 mg daily on that.    Review of Systems   Constitutional: Negative for fever and malaise/fatigue.   HENT:  Negative for nosebleeds and sore throat.    Eyes:  Negative for blurred vision and double vision.   Cardiovascular:  Negative for chest pain, claudication, palpitations and syncope.   Respiratory:  Negative for cough, shortness of breath and snoring.    Endocrine: Negative for cold intolerance, heat intolerance and polydipsia.   Skin:  Negative for itching, poor wound healing and rash.   Musculoskeletal:  Negative for joint pain, joint swelling, muscle weakness and myalgias.   Gastrointestinal:  Negative for abdominal pain, melena, nausea and vomiting.   Neurological:  Negative for light-headedness, loss of balance, seizures, vertigo and weakness.   Psychiatric/Behavioral:  Negative for altered mental status and depression.        Past Medical History:   Diagnosis Date    Abnormal EKG 12/26/2024    Anemia     post hemorrhagic    Angioedema 02/21/2016    Secondary to ACE inhibitor    Anxiety     Arthritis     CAD (coronary artery disease)     CAP (community acquired pneumonia) 12/11/2024    Colon polyps     Diabetes mellitus, type 2     GERD (gastroesophageal reflux disease)     Glaucoma     Hematoma     history    High cholesterol     History of foreign travel 12/2017; 08/2018    [place], [place], [place], [place], [place] and [place]; [place]    Hyperlipidemia     Hypertension     Hypogonadism in male 09/28/2016    Male erectile disorder     Microalbuminuria     Osteoarthritis     knee    Pneumonia 12/2024    PVCs (premature ventricular contractions) 12/26/2024    Spinal stenosis of lumbar region without neurogenic claudication 06/02/2021    Tubular adenoma of colon 11/01/2017    Vitamin D deficiency        The following portions of the patient's history were reviewed and updated as appropriate: Social history , Family history, and Surgical history      Current Outpatient Medications on File Prior to Visit   Medication Sig Dispense Refill    acetaminophen (TYLENOL) 650 MG 8 hr tablet Take 1 tablet by mouth Every 4 (Four) Hours As Needed for Mild Pain.      ARIPiprazole (ABILIFY) 5 MG tablet Take 1 tablet by mouth Daily.      aspirin 81 MG EC tablet Take 1 tablet by mouth Daily. 30 tablet 6    BD Pen Needle Marissa 2nd Gen 32G X 4 MM misc       carvedilol (COREG) 12.5 MG tablet Take 1 tablet by mouth 2 (Two) Times a Day With Meals. 180 tablet 0    clonazePAM (KlonoPIN) 0.5 MG tablet TAKE 1 TABLET BY MOUTH DAILY AS NEEDED FOR ANXIETY 30 tablet 2    clopidogrel (PLAVIX) 75 MG tablet TAKE 1 TABLET BY MOUTH EVERY DAY 90 tablet 2    dorzolamide-timolol (COSOPT) 2-0.5 % ophthalmic solution Administer 1 drop to both eyes 2 (Two) Times a Day.      doxazosin (CARDURA) 4 MG tablet Take 0.5 tablets by mouth Every Night. (Patient taking differently: Take 1 tablet by mouth Every Night.)      escitalopram (LEXAPRO) 20 MG tablet Take 1 tablet by mouth Daily.      esomeprazole (nexIUM) 40 MG capsule TAKE ONE CAPSULE BY MOUTH EVERY MORNING BEFORE BREAKFAST 90 capsule 3    Inclisiran Sodium (Leqvio) 284 MG/1.5ML solution prefilled syringe Inject 1.5 mL under the skin into the appropriate area as directed.      Ingrezza 60 MG capsule Take 1 capsule by mouth Daily.      isosorbide mononitrate (IMDUR) 30 MG 24 hr tablet Take 1 tablet by mouth Daily. (Patient taking differently: Take 2 tablets by mouth Daily.) 90 tablet 2    latanoprost (XALATAN) 0.005 % ophthalmic solution Administer 1 drop to both eyes Every Night.      metFORMIN (GLUCOPHAGE) 500 MG tablet Take 2 tablets by mouth Daily With Breakfast.      methocarbamol (ROBAXIN) 500 MG tablet Take 1 tablet by mouth Every Night. 30 tablet 11    oxyCODONE-acetaminophen (PERCOCET) 7.5-325 MG per tablet Take 1 tablet by mouth Every 6 (Six) Hours As Needed.      rosuvastatin (CRESTOR) 10 MG tablet Take 1 tablet by mouth Daily. 90 tablet 2  "   Semaglutide, 2 MG/DOSE, (Ozempic, 2 MG/DOSE,) 8 MG/3ML solution pen-injector DIAL AND INJECT UNDER THE SKIN 2 MG WEEKLY 3 mL 2    tamsulosin (FLOMAX) 0.4 MG capsule 24 hr capsule Take 1 capsule by mouth Every Night. 90 capsule 3    traMADol (ULTRAM) 50 MG tablet Take 1 tablet by mouth Every 6 (Six) Hours As Needed for Moderate Pain. 60 tablet 0     No current facility-administered medications on file prior to visit.       Allergies   Allergen Reactions    Nsaids Other (See Comments)     Renal failure    Amlodipine Swelling and Other (See Comments)    Atorvastatin Myalgia    Hydralazine Myalgia    Zetia [Ezetimibe] Nausea And Vomiting    Crestor [Rosuvastatin] Other (See Comments)     Flu like s/s    Ace Inhibitors Angioedema    Evolocumab Nausea And Vomiting and Palpitations    Lisinopril Angioedema    Testosterone Myalgia       Vitals:    02/24/25 1038   BP: 98/68   Pulse: 97   Weight: 109 kg (241 lb)   Height: 195.6 cm (77\")       Body mass index is 28.58 kg/m².   Constitutional:       Appearance: Well-developed.   Eyes:      General: No scleral icterus.     Conjunctiva/sclera: Conjunctivae normal.   HENT:      Head: Normocephalic and atraumatic.   Neck:      Thyroid: No thyromegaly.      Vascular: Normal carotid pulses. No carotid bruit, hepatojugular reflux or JVD.      Trachea: No tracheal deviation.   Pulmonary:      Effort: No respiratory distress.      Breath sounds: Normal breath sounds. No decreased breath sounds. No wheezing. No rhonchi. No rales.   Chest:      Chest wall: Not tender to palpatation.   Cardiovascular:      Normal rate. Regular rhythm.      No gallop.    Pulses:     Carotid: 2+ bilaterally.     Radial: 2+ bilaterally.     Femoral: 2+ bilaterally.     Dorsalis pedis: 2+ bilaterally.     Posterior tibial: 2+ bilaterally.  Edema:     Peripheral edema absent.   Abdominal:      General: Bowel sounds are normal. There is no distension.      Palpations: Abdomen is soft.      Tenderness: There " is no abdominal tenderness.   Musculoskeletal:         General: No deformity.      Cervical back: Normal range of motion and neck supple. Skin:     Findings: No erythema or rash.   Neurological:      Mental Status: Alert and oriented to person, place, and time.      Sensory: No sensory deficit.   Psychiatric:         Behavior: Behavior normal.         Lab Results   Component Value Date    WBC 8.62 01/04/2025    HGB 12.7 (L) 01/04/2025    HCT 39.2 01/04/2025    MCV 80.8 01/04/2025     01/04/2025       Lab Results   Component Value Date    GLUCOSE 103 (H) 02/05/2025    BUN 10 02/05/2025    CREATININE 1.15 02/05/2025    EGFR 68.9 02/05/2025    BCR 8.7 02/05/2025    K 4.2 02/05/2025    CO2 26.0 02/05/2025    CALCIUM 9.5 02/05/2025    ALBUMIN 4.2 02/05/2025    BILITOT 0.5 02/05/2025    AST 57 (H) 02/05/2025    ALT 47 (H) 02/05/2025       Lab Results   Component Value Date    GLUCOSE 103 (H) 02/05/2025    CALCIUM 9.5 02/05/2025     02/05/2025    K 4.2 02/05/2025    CO2 26.0 02/05/2025     02/05/2025    BUN 10 02/05/2025    CREATININE 1.15 02/05/2025    EGFR 68.9 02/05/2025    BCR 8.7 02/05/2025    ANIONGAP 10.0 02/05/2025       Lab Results   Component Value Date    CHOL 224 (H) 12/27/2024    CHLPL 144 08/08/2023    TRIG 169 (H) 12/27/2024    HDL 47 12/27/2024     (H) 12/27/2024         ECG 12 Lead    Date/Time: 2/24/2025 11:01 AM  Performed by: Miguel Ángel Karimi MD    Authorized by: Miguel Ángel Karimi MD  Comparison: compared with previous ECG from 1/9/2025  Comparison to previous ECG: ST depression and T wave abnormalities improved  Rhythm: sinus rhythm  Ectopy: unifocal PVCs  Rate: normal  QRS axis: left  Other findings: non-specific ST-T wave changes    Clinical impression: abnormal EKG           Results for orders placed during the hospital encounter of 04/01/24    Adult Transthoracic Echo Complete W/ Cont if Necessary Per Protocol    Interpretation Summary    Left ventricular  systolic function is low normal. Left ventricular ejection fraction appears to be 51 - 55%.    Left ventricular diastolic function was normal.    3/19/24  Conclusions:   1. Left main: Discrete calcified 50% distal in-stent restenosis.  2. LAD: Discrete calcified 60% ostial in-stent restenosis.  Diffuse 50% mid vessel stenosis  3. LCX: Discrete 99% ostial in-stent restenosis  4. RCA: Discrete 50 to 60% proximal stenosis  5.  Angioplasty and shockwave balloon lithotripsy of the distal left main to proximal LAD with a 4.0 x 12 mm shockwave balloon  6.  Cutting Balloon angioplasty and PCI of the distal left main to proximal circumflex with a 3.25 x 15 mm Xience baldomero point drug-eluting stent.   7.  Kissing balloon angioplasty of the left main bifurcation with a 4.0 x 15 mm compliant and 3.5 x 15 mm noncompliant Takeru balloon     1/3/25  Conclusions:   1. Left main: Discrete 40% in-stent restenosis distal side  2. LAD: Previously placed stent covering ostial to proximal segment with no restenosis.  Diffuse 50% mid vessel stenosis  3. LCX: Discrete 30 to 40% ostial in-stent restenosis  4. RCA: Discrete 90% proximal stenosis.    5.  RFR of the left main and LAD 0.91.  Not hemodynamically significant.  6.  PCI of the ostial to mid RCA with a 3.25 x 48 mm Xience baldomero point drug-eluting stent, postdilated with a 3.5 mm and 3.25 mm NC trek balloon5.  Chronic kidney disease: Follows with Dr. Brenner with nephrology.    DISCUSSION/SUMMARY  69-year-old male with a medical history of coronary artery disease with prior PCI to the RCA, PCI of the left main bifurcation in 2022 and repeat PCI in 2024 as documented above, essential hypertension, hyperlipidemia, diabetes mellitus type 2, chronic kidney disease, and cervical spine disease with upcoming surgery who presents to me in follow-up.  He recently complained of symptoms chest discomfort and some dyspnea and subsequently was taken for repeat coronary angiography in January 2025.  He  was noted to have a 90% proximal RCA stenosis and underwent PCI of that vessel with a great result.  He overall feels well.  He is tolerating the Leqvio and low-dose Crestor therapy.  His blood pressure is running a little lower than I would like.    1.  Coronary artery disease: History of prior PCI to RCA( 2018 and ostial circumflex 2020).  Status post PCI to left circumflex and ostial LAD bifurcation 5/24/2022 and March 2024.  PCI of the RCA January 2025.  -Recommend continuing aspirin and Plavix lifelong.    2.  Hypertension: He has been taking 4 mg of Cardura and I have recommended going down to 2 mg on that which is what he was on previously.  His blood pressure is running a little low.  Continue carvedilol therapy at current dose.    3.  Hyperlipidemia goal LDL < 70: Leqvio has been started along with low-dose Crestor therapy.  He will need a repeat lipid panel in 3 months.    4.  Diabetes type 2: HbA1c improved  On oral medication and Ozempic. Followed by endocrinology.

## 2025-02-25 NOTE — TELEPHONE ENCOUNTER
Message sent through [employer] patient advise requests. Please advise.       Good afternoon Dr. Miguel, this is Thony Hinojosa’s ! Unfortunately, he continues to have severe lower back pain, as well as pain from the cervical fusion. A friend has recommended Dr. Trav Pak. We are going to talk to him about having a pain pump inserted into the lumbar area and maybe injections in the neck.   What do you think about this?  If you agree, could you please send a referral as well as scan results to his fax number?  [phone]  We truly appreciate your help,  Eagle Monaco   February 20, 2025

## 2025-02-26 ENCOUNTER — TREATMENT (OUTPATIENT)
Dept: PHYSICAL THERAPY | Facility: CLINIC | Age: 70
End: 2025-02-26
Payer: MEDICARE

## 2025-02-26 DIAGNOSIS — Z96.651 HISTORY OF TOTAL KNEE ARTHROPLASTY, RIGHT: ICD-10-CM

## 2025-02-26 DIAGNOSIS — Z98.890 HISTORY OF REPAIR OF RIGHT ROTATOR CUFF: ICD-10-CM

## 2025-02-26 DIAGNOSIS — G95.9 CERVICAL MYELOPATHY: ICD-10-CM

## 2025-02-26 DIAGNOSIS — Z98.1 HISTORY OF FUSION OF CERVICAL SPINE: ICD-10-CM

## 2025-02-26 DIAGNOSIS — M54.50 LUMBAR PAIN: Primary | ICD-10-CM

## 2025-02-26 DIAGNOSIS — R26.2 DIFFICULTY WALKING: ICD-10-CM

## 2025-02-26 NOTE — PROGRESS NOTES
Physical Therapy Daily Treatment Note    [facility]  [address] [place] [address]      Patient: Isaias Monaco   : 1955  Referring practitioner: Gwyn Patten Sr., MD  Date of Initial Visit: Type: THERAPY  Noted: 2/10/2025  Today's Date: 2025  Patient seen for 3 sessions       Visit Diagnoses:    ICD-10-CM ICD-9-CM   1. Lumbar pain  M54.50 724.2   2. History of fusion of cervical spine  Z98.1 V45.4   3. Cervical myelopathy  G95.9 721.1   4. Difficulty walking  R26.2 719.7   5. History of repair of right rotator cuff  Z98.890 V15.29   6. History of total knee arthroplasty, right  Z96.651 V43.65       Subjective Evaluation    History of Present Illness    Subjective comment: sore for a good 24 hours in his chest and upper back after last session. Better today. Understands how the chest press likely got his pecs very sore from stretching in relation to his kyphotic posture. Eager to work in the water today.       Objective   See Exercise, Manual, and Modality Logs for complete treatment.   Aquatic ex:  Seated biking and flutter kicks. 2m  Seated arm sweeps 1m.  Walking shallow to deep to warm up. Later done as timed test 50 then 45 seconds.   Deep water push downs with LN. 15x  Deep water relaxation LN under chest then behind back. 1m each.  LN hip sweeps and circles. 10 each LE  LN hamstring stretch LAQ 10 each.  Leg press with ring 20 each LE  Small ball and med ball sweeps and push downs 10-15.  Ball catch and throw. Large purple 2 hands = neck extension and some strain.  Smaller ball that fits his hand easier to catch and throw with less neck strain.       Assessment & Plan       Assessment  Assessment details: Pt did well today and stopped at 30 min to prevent over ex strain.   Instructed to ice as needed at home.  Can also work on pec ex with red tband at home.     P: see on land one session and pool the next. Assess which form of ex results in less pain and strain. Spouse  interested more in quality of life without pain, vs. Large strength gains. Water ex using equipment did challenge pt to reach to his feet and use full ROM of his shoulders so it provides good functional ROM          Timed:         Manual Therapy:         mins  10414;     Therapeutic Exercise:         mins  86672;     Neuromuscular Meño:        mins  76904;    Therapeutic Activity:          mins  77407;     Gait Training:           mins  11498;     Ultrasound:          mins  08731;    Ionto                                   mins   17134  Self Care                            mins   23723  Canalith Repos         mins 67582  Work hardening   __   min 73781/16959  Aquatic ex  30 min  74890      Un-Timed:  Electrical Stimulation:         mins  78333 ( );  Dry Needling          mins self-pay  Traction          mins 52710      Timed Treatment:   30   mins   Total Treatment:     30   mins    Zorre Zeno Kimura, PT  [place] License: [id number]    [place] License:  [id number]

## 2025-02-26 NOTE — TELEPHONE ENCOUNTER
Responded back to patient through [employer]. Referral had been put in for pain management Dr. Trav Pak's office.

## 2025-02-27 ENCOUNTER — TELEPHONE (OUTPATIENT)
Dept: FAMILY MEDICINE CLINIC | Facility: CLINIC | Age: 70
End: 2025-02-27
Payer: MEDICARE

## 2025-02-27 DIAGNOSIS — D50.9 IRON DEFICIENCY ANEMIA, UNSPECIFIED IRON DEFICIENCY ANEMIA TYPE: Primary | ICD-10-CM

## 2025-02-28 ENCOUNTER — PATIENT MESSAGE (OUTPATIENT)
Age: 70
End: 2025-02-28
Payer: MEDICARE

## 2025-02-28 DIAGNOSIS — M54.50 CHRONIC MIDLINE LOW BACK PAIN WITHOUT SCIATICA: Primary | ICD-10-CM

## 2025-02-28 DIAGNOSIS — G89.29 CHRONIC MIDLINE LOW BACK PAIN WITHOUT SCIATICA: Primary | ICD-10-CM

## 2025-02-28 NOTE — TELEPHONE ENCOUNTER
Caller: Micaela Monaco    Relationship to patient: Emergency Contact    Best call back number:   Telephone Information:   Mobile [phone]       Patient is needing: REQUESTING A CALLBACK WHEN ORDERED, PATIENT WIFE IS OFF OF WORK TODAY, AND WOULD LIKE TO TAKE HIM TO HAVE THIS DRAWN.

## 2025-03-02 NOTE — ASSESSMENT & PLAN NOTE
He reports neck pain, which is exacerbated by dining out or traveling, and lower back pain that worsens with standing or walking. He underwent extensive neck surgery last summer, which necessitated a month-long hospital stay. Despite the successful outcome of the surgery, he is reluctant to undergo similar procedures for his lower back, as Dr. Garnica has indicated that the results may not be guaranteed. A referral to Dr. Pak will be initiated for further evaluation and management of his neck pain.

## 2025-03-02 NOTE — ASSESSMENT & PLAN NOTE
He has been experiencing severe back pain for several years, which has significantly limited his mobility, preventing him from walking more than a block. He has previously sought treatment from [facility], where he underwent various procedures including epidurals, nerve ablation, and other injections, none of which provided relief. He recently attended physical therapy on Monday, where hot packs and a TENS unit were applied to his lower back, providing some relief. He was given Robaxin 500 mg post-therapy, which seemed to alleviate morning stiffness. He is currently taking hydrocodone 7.5 mg every morning to manage stiffness and soreness upon waking, and occasionally takes Percocet when experiencing severe pain. His nephrologist has advised against the use of NSAIDs due to potential kidney damage. He has been off narcotics for a few months but has recently resumed hydrocodone 7.5 mg every morning. He has expressed a desire to avoid further surgery. He has been advised to take tramadol daily, with additional doses as needed in the afternoon or evening. He finds relief from his symptoms when at home, particularly when using a recliner with a heating pad, and does not require medication later in the day unless he is traveling. He has no history of addiction. A referral to Dr. Pak will be initiated for further evaluation and management of his back pain. A prescription for Robaxin will be provided to alleviate morning stiffness. He will sign a contract for the use of tramadol.

## 2025-03-04 ENCOUNTER — TELEPHONE (OUTPATIENT)
Dept: PHYSICAL THERAPY | Facility: CLINIC | Age: 70
End: 2025-03-04
Payer: MEDICARE

## 2025-03-04 NOTE — TELEPHONE ENCOUNTER
Caller: Micaela Monaco    Relationship: Emergency Contact         What was the call regarding: WANTS TO DO THE POOL TOMORROW

## 2025-03-05 ENCOUNTER — LAB (OUTPATIENT)
Facility: HOSPITAL | Age: 70
End: 2025-03-05
Payer: MEDICARE

## 2025-03-05 ENCOUNTER — TREATMENT (OUTPATIENT)
Dept: PHYSICAL THERAPY | Facility: CLINIC | Age: 70
End: 2025-03-05
Payer: MEDICARE

## 2025-03-05 DIAGNOSIS — Z98.1 HISTORY OF FUSION OF CERVICAL SPINE: ICD-10-CM

## 2025-03-05 DIAGNOSIS — R26.2 DIFFICULTY WALKING: ICD-10-CM

## 2025-03-05 DIAGNOSIS — E11.65 TYPE 2 DIABETES MELLITUS WITH HYPERGLYCEMIA, WITH LONG-TERM CURRENT USE OF INSULIN: ICD-10-CM

## 2025-03-05 DIAGNOSIS — E11.65 TYPE 2 DIABETES MELLITUS WITH HYPERGLYCEMIA, WITH LONG-TERM CURRENT USE OF INSULIN: Primary | ICD-10-CM

## 2025-03-05 DIAGNOSIS — G95.9 CERVICAL MYELOPATHY: ICD-10-CM

## 2025-03-05 DIAGNOSIS — Z79.4 TYPE 2 DIABETES MELLITUS WITH HYPERGLYCEMIA, WITH LONG-TERM CURRENT USE OF INSULIN: ICD-10-CM

## 2025-03-05 DIAGNOSIS — Z98.890 HISTORY OF REPAIR OF RIGHT ROTATOR CUFF: ICD-10-CM

## 2025-03-05 DIAGNOSIS — M54.50 LUMBAR PAIN: Primary | ICD-10-CM

## 2025-03-05 DIAGNOSIS — Z79.4 TYPE 2 DIABETES MELLITUS WITH HYPERGLYCEMIA, WITH LONG-TERM CURRENT USE OF INSULIN: Primary | ICD-10-CM

## 2025-03-05 DIAGNOSIS — Z96.651 HISTORY OF TOTAL KNEE ARTHROPLASTY, RIGHT: ICD-10-CM

## 2025-03-05 LAB
BASOPHILS # BLD AUTO: 0.07 10*3/MM3 (ref 0–0.2)
BASOPHILS NFR BLD AUTO: 0.7 % (ref 0–1.5)
DEPRECATED RDW RBC AUTO: 47.5 FL (ref 37–54)
EOSINOPHIL # BLD AUTO: 0.2 10*3/MM3 (ref 0–0.4)
EOSINOPHIL NFR BLD AUTO: 2 % (ref 0.3–6.2)
ERYTHROCYTE [DISTWIDTH] IN BLOOD BY AUTOMATED COUNT: 15.8 % (ref 12.3–15.4)
HBA1C MFR BLD: 6.2 % (ref 4.8–5.6)
HCT VFR BLD AUTO: 46.8 % (ref 37.5–51)
HGB BLD-MCNC: 14.4 G/DL (ref 13–17.7)
IMM GRANULOCYTES # BLD AUTO: 0.03 10*3/MM3 (ref 0–0.05)
IMM GRANULOCYTES NFR BLD AUTO: 0.3 % (ref 0–0.5)
IRON 24H UR-MRATE: 46 MCG/DL (ref 59–158)
IRON SATN MFR SERPL: 8 % (ref 20–50)
LYMPHOCYTES # BLD AUTO: 2.51 10*3/MM3 (ref 0.7–3.1)
LYMPHOCYTES NFR BLD AUTO: 25 % (ref 19.6–45.3)
MCH RBC QN AUTO: 25.4 PG (ref 26.6–33)
MCHC RBC AUTO-ENTMCNC: 30.8 G/DL (ref 31.5–35.7)
MCV RBC AUTO: 82.4 FL (ref 79–97)
MONOCYTES # BLD AUTO: 0.94 10*3/MM3 (ref 0.1–0.9)
MONOCYTES NFR BLD AUTO: 9.4 % (ref 5–12)
NEUTROPHILS NFR BLD AUTO: 6.27 10*3/MM3 (ref 1.7–7)
NEUTROPHILS NFR BLD AUTO: 62.6 % (ref 42.7–76)
NRBC BLD AUTO-RTO: 0 /100 WBC (ref 0–0.2)
PLATELET # BLD AUTO: 259 10*3/MM3 (ref 140–450)
PMV BLD AUTO: 11.7 FL (ref 6–12)
RBC # BLD AUTO: 5.68 10*6/MM3 (ref 4.14–5.8)
TIBC SERPL-MCNC: 542 MCG/DL (ref 298–536)
TRANSFERRIN SERPL-MCNC: 364 MG/DL (ref 200–360)
WBC NRBC COR # BLD AUTO: 10.02 10*3/MM3 (ref 3.4–10.8)

## 2025-03-05 PROCEDURE — 85025 COMPLETE CBC W/AUTO DIFF WBC: CPT | Performed by: FAMILY MEDICINE

## 2025-03-05 PROCEDURE — 83036 HEMOGLOBIN GLYCOSYLATED A1C: CPT

## 2025-03-05 PROCEDURE — 83540 ASSAY OF IRON: CPT | Performed by: FAMILY MEDICINE

## 2025-03-05 PROCEDURE — 84466 ASSAY OF TRANSFERRIN: CPT | Performed by: FAMILY MEDICINE

## 2025-03-06 NOTE — PROGRESS NOTES
Physical Therapy Daily Treatment Note    [facility]  [address] [place] [address]      Patient: Isaias Monaco   : 1955  Referring practitioner: Gwyn Patten Sr., MD  Date of Initial Visit: Type: THERAPY  Noted: 2/10/2025  Today's Date: 3/5/2025  Patient seen for 4 sessions       Visit Diagnoses:    ICD-10-CM ICD-9-CM   1. Lumbar pain  M54.50 724.2   2. History of fusion of cervical spine  Z98.1 V45.4   3. Cervical myelopathy  G95.9 721.1   4. Difficulty walking  R26.2 719.7   5. History of total knee arthroplasty, right  Z96.651 V43.65   6. History of repair of right rotator cuff  Z98.890 V15.29       Subjective Evaluation    History of Present Illness    Subjective comment: Did well after last pool session. Not as sore as with land based ex.       Objective   See Exercise, Manual, and Modality Logs for complete treatment.   AQUATIC EX. WARM WATER  Walking for warm up. 3m  Arm sweeps vs. Water then using aqua logix bells 3m  Side to side stepping. 20x  Back lunges 20x  Marching in place then arm abduction with dbells. 20, 10, 8, 6, 4 reps.   Shoulder shrugs and relaxing.   Side stepping full length of pool.  Backward walking full length of pool.  Moving for small pool balls and tossing to basket 5m.   Push downs using bar bell 20x  SLS 30 s  Tandem stand 30s 2x  LE stomp using blue ring 20x.   Horizontal abd add with logix bells. 20x  Squats at wall 10x  Push ups at wall 10x.  30 minutes total ex with minimal rest      Assessment/Plan    Pt did well today. Mixing UE with LE ex and occasional decompression rest breaks and balance ex.   Trying not to aggravate neck with excessive UE ex.     P: see on land later this week. Assess post ex discomfort for ongoing ex planning. Will address posture and balance on land Friday.       Timed:         Manual Therapy:         mins  63321;     Therapeutic Exercise:         mins  63207;     Neuromuscular Meño:        mins  60328;    Therapeutic  Activity:          mins  27627;     Gait Training:           mins  94659;     Ultrasound:          mins  48632;    Ionto                                   mins   10555  Self Care                            mins   17987  Canalith Repos         mins 95722  Work hardening   __   min 84660/02236  Aquatic Ex   30 min   27426      Un-Timed:  Electrical Stimulation:         mins  25911 ( );  Dry Needling          mins self-pay  Traction          mins 64143      Timed Treatment:   30   mins   Total Treatment:     30   mins    Zorre Zeno Kimura, PT  [place] License: [id number]    [place] License:  [id number]

## 2025-03-07 ENCOUNTER — TREATMENT (OUTPATIENT)
Dept: PHYSICAL THERAPY | Facility: CLINIC | Age: 70
End: 2025-03-07
Payer: MEDICARE

## 2025-03-07 ENCOUNTER — TELEPHONE (OUTPATIENT)
Dept: ONCOLOGY | Facility: CLINIC | Age: 70
End: 2025-03-07

## 2025-03-07 DIAGNOSIS — Z96.651 HISTORY OF TOTAL KNEE ARTHROPLASTY, RIGHT: ICD-10-CM

## 2025-03-07 DIAGNOSIS — R26.2 DIFFICULTY WALKING: ICD-10-CM

## 2025-03-07 DIAGNOSIS — G95.9 CERVICAL MYELOPATHY: ICD-10-CM

## 2025-03-07 DIAGNOSIS — M54.50 LUMBAR PAIN: Primary | ICD-10-CM

## 2025-03-07 DIAGNOSIS — Z98.1 HISTORY OF FUSION OF CERVICAL SPINE: ICD-10-CM

## 2025-03-07 DIAGNOSIS — Z98.890 HISTORY OF REPAIR OF RIGHT ROTATOR CUFF: ICD-10-CM

## 2025-03-07 RX ORDER — DOXAZOSIN 2 MG/1
2 TABLET ORAL NIGHTLY
Qty: 90 TABLET | Refills: 3 | Status: SHIPPED | OUTPATIENT
Start: 2025-03-07 | End: 2025-03-07 | Stop reason: SDUPTHER

## 2025-03-07 NOTE — PROGRESS NOTES
"Physical Therapy Daily Treatment Note    [facility]  [address] [place] [address]      Patient: Isaias Monaco   : 1955  Referring practitioner: Gwyn Patten Sr., MD  Date of Initial Visit: Type: THERAPY  Noted: 2/10/2025  Today's Date: 3/7/2025  Patient seen for 5 sessions       Visit Diagnoses:    ICD-10-CM ICD-9-CM   1. Lumbar pain  M54.50 724.2   2. Cervical myelopathy  G95.9 721.1   3. History of fusion of cervical spine  Z98.1 V45.4   4. Difficulty walking  R26.2 719.7   5. History of total knee arthroplasty, right  Z96.651 V43.65   6. History of repair of right rotator cuff  Z98.890 V15.29       Subjective Evaluation    History of Present Illness    Subjective comment: little sore after the pool session the other day but a \"good\" sore. Going to basketball game tomorrow and lots of walking and then up steep steps to his seats. hopes he does okay. spouse feels that walking sticks would be good for those times when he has to walk a lot.       Objective   See Exercise, Manual, and Modality Logs for complete treatment.       Assessment & Plan       Assessment  Assessment details: Good job today and spouse feels walking sticks help posture and even take some strain off his back.   Hamstrings very tight and spouse will assist but due to cervical myelopathy and cord compression discussed connection sciatic to neck so don't be too aggressive. About 45-50 degrees from full extension.     P: cont mix of land and water ex          Timed:         Manual Therapy:         mins  77455;     Therapeutic Exercise:    30     mins  00740;     Neuromuscular Meño:        mins  47525;    Therapeutic Activity:          mins  73029;     Gait Training:      15     mins  47225;     Ultrasound:          mins  51776;    Ionto                                   mins   49233  Self Care                            mins   88500  Canalith Repos         mins 66365  Work hardening   __   min " 98058/49179      Un-Timed:  Electrical Stimulation:         mins  43376 ( );  Dry Needling          mins self-pay  Traction          mins 73636      Timed Treatment:   45   mins   Total Treatment:     45   mins    Zorre Zeno Kimura, PT  [place] License: [id number]    [place] License:  [id number]

## 2025-03-07 NOTE — TELEPHONE ENCOUNTER
Caller: Micaela Monaco    Relationship: Emergency Contact    Best call back number: [phone]     What is the best time to reach you: ASAP    Who are you requesting to speak with (clinical staff, provider,  specific staff member): SCHEDULING       What was the call regarding: THERE IS A REFERRAL TO DR KOHLER, BUT NOTE WAS ENTERED IN REFERRAL ON 3/5 BY GERMAN THAT DR SALDIVAR WAS REVIEWING.  PT'S WIFE SAYS THE PT SAW DR KOHLER IN 2018, NOTE WAS PUT IN REFERRAL YESTERDAY ABOUT THIS BUT PT'S WIFE IS CALLING BACK TO CHECK AND GET SCHEDULED AS SOON AS POSSIBLE.  SHE SAYS THE PT WILL BE GOING OUT OF TOWN END OF MARCH AND WAS HOPING TO GET STARTED WITH TREATMENT BEFORE THEN.

## 2025-03-10 ENCOUNTER — TREATMENT (OUTPATIENT)
Dept: PHYSICAL THERAPY | Facility: CLINIC | Age: 70
End: 2025-03-10
Payer: MEDICARE

## 2025-03-10 ENCOUNTER — OFFICE VISIT (OUTPATIENT)
Dept: ENDOCRINOLOGY | Age: 70
End: 2025-03-10
Payer: MEDICARE

## 2025-03-10 VITALS
DIASTOLIC BLOOD PRESSURE: 82 MMHG | SYSTOLIC BLOOD PRESSURE: 124 MMHG | HEIGHT: 77 IN | BODY MASS INDEX: 28.64 KG/M2 | HEART RATE: 84 BPM | WEIGHT: 242.6 LBS | OXYGEN SATURATION: 96 %

## 2025-03-10 DIAGNOSIS — E78.5 HYPERLIPIDEMIA, UNSPECIFIED HYPERLIPIDEMIA TYPE: ICD-10-CM

## 2025-03-10 DIAGNOSIS — R26.2 DIFFICULTY WALKING: ICD-10-CM

## 2025-03-10 DIAGNOSIS — M54.50 LUMBAR PAIN: Primary | ICD-10-CM

## 2025-03-10 DIAGNOSIS — Z79.4 TYPE 2 DIABETES MELLITUS WITH HYPERGLYCEMIA, WITH LONG-TERM CURRENT USE OF INSULIN: Primary | ICD-10-CM

## 2025-03-10 DIAGNOSIS — E11.65 TYPE 2 DIABETES MELLITUS WITH HYPERGLYCEMIA, WITH LONG-TERM CURRENT USE OF INSULIN: Primary | ICD-10-CM

## 2025-03-10 DIAGNOSIS — Z96.651 HISTORY OF TOTAL KNEE ARTHROPLASTY, RIGHT: ICD-10-CM

## 2025-03-10 DIAGNOSIS — Z98.890 HISTORY OF REPAIR OF RIGHT ROTATOR CUFF: ICD-10-CM

## 2025-03-10 DIAGNOSIS — Z98.1 HISTORY OF FUSION OF CERVICAL SPINE: ICD-10-CM

## 2025-03-10 DIAGNOSIS — G95.9 CERVICAL MYELOPATHY: ICD-10-CM

## 2025-03-10 PROCEDURE — 97110 THERAPEUTIC EXERCISES: CPT | Performed by: PHYSICAL THERAPIST

## 2025-03-10 PROCEDURE — 97530 THERAPEUTIC ACTIVITIES: CPT | Performed by: PHYSICAL THERAPIST

## 2025-03-10 RX ORDER — SEMAGLUTIDE 2.68 MG/ML
INJECTION, SOLUTION SUBCUTANEOUS
Qty: 3 ML | Refills: 2 | Status: SHIPPED | OUTPATIENT
Start: 2025-03-10

## 2025-03-10 NOTE — TELEPHONE ENCOUNTER
Rx Refill Note  Requested Prescriptions     Pending Prescriptions Disp Refills    Ozempic, 2 MG/DOSE, 8 MG/3ML solution pen-injector [Pharmacy Med Name: OZEMPIC 2 MG/DOSE (8 MG/3 ML)] 3 mL 2     Sig: DIAL AND INJECT UNDER THE SKIN 2 MG WEEKLY      Last office visit with prescribing clinician: 9/9/2024   Last telemedicine visit with prescribing clinician: Visit date not found   Next office visit with prescribing clinician: 3/10/2025                         Would you like a call back once the refill request has been completed: [] Yes [] No    If the office needs to give you a call back, can they leave a voicemail: [] Yes [] No    Carrie Griffiths MA  03/10/25, 07:58 EDT

## 2025-03-10 NOTE — PROGRESS NOTES
Physical Therapy Daily Treatment Note    [facility]  [address] [place] [address]      Patient: Isaias Monaco   : 1955  Referring practitioner: Gwyn Patten Sr., MD  Date of Initial Visit: Type: THERAPY  Noted: 2/10/2025  Today's Date: 3/10/2025  Patient seen for 6 sessions       Visit Diagnoses:    ICD-10-CM ICD-9-CM   1. Lumbar pain  M54.50 724.2   2. Cervical myelopathy  G95.9 721.1   3. History of fusion of cervical spine  Z98.1 V45.4   4. Difficulty walking  R26.2 719.7   5. History of total knee arthroplasty, right  Z96.651 V43.65   6. History of repair of right rotator cuff  Z98.890 V15.29       Subjective Evaluation    History of Present Illness    Subjective comment: pt did well going to recent basketball game and pretty steady on steep steps. c/o general fagitue recently so had CBC and iron done due to prior hx of blood loss and anemia in 2018 after TKA infection and revison. Found that he dose have very low levels and Iron saturation rate only 8 vs. a 20-50% normal range. Will see hematologist later this week.       Objective   See Exercise, Manual, and Modality Logs for complete treatment.       Assessment & Plan       Assessment  Assessment details: Pt doing well so far mixing land and aquatic ex. 6th session today and still with significant forward head posturing after his cervical fusion. Using walking sticks from PT to assess benefit and this is going well but he hasn't purchased his own sticks yet.   Focus on general mobility more than balance right now due to general rigidity.   Pt with muscular rigidity but no signs of cognitive issues indicating possible parkinsons.   Will see a hematologist later this week for possible iron infusions which he has had in the past. Lighter workouts planned until iron levels come up.     P: assess pain, TOBIN, NDI for reassess next visit.         Goals  Plan Goals: STGs 4 weeks:  1. Improve NDI score from 48 to < 40%  2. Improve TOBIN  score from 62 to < 55%  3. Pt to be 75% indep in variety of HEP. MET  4. Pt to try aquatic therapy to assess potential benefit. MET and enjoys this.   5. Pt to state 10-20% decrease in pain from TENS and meds per MD plan  6. Assess balance via WILSON or Tinetti and provide pt with balance ex. ONGOING as focus has been more on strength vs. Balance right now.   LTGs 12 weeks:  1. NDI score < 35%  2. Neck Rotation to 35 degrees or greater  3. TOBIN score < 40%  4. Lumbar extension to 10 degrees and pain free  5. Pt indep in land/balance and aquatic ex. Progressing.   6. Pt with plan for ongoing mobility activities 3 days/week.          Timed:         Manual Therapy:         mins  75430;     Therapeutic Exercise:    30     mins  80281;     Neuromuscular Meño:        mins  31613;    Therapeutic Activity:     15     mins  36633;     Gait Training:           mins  33152;     Ultrasound:          mins  33461;    Ionto                                   mins   29149  Self Care                            mins   45763  Canalith Repos         mins 26513  Work hardening   __   min 68307/23810      Un-Timed:  Electrical Stimulation:         mins  52745 ( );  Dry Needling          mins self-pay  Traction          mins 39067      Timed Treatment:   45   mins   Total Treatment:     45   mins    Zorre Zeno Kimura, PT  [place] License: [id number]    [place] License:  [id number]

## 2025-03-10 NOTE — PROGRESS NOTES
Chief complaint/Reason for consult:  T2DM    HPI:   - 69 year old male here for management of diabetes mellitus type 2  - Last seen in 9/2024  - States he was diagnosed with anemia since last visit  - Has had diabetes for over 10 years  - Complications include CAD, CKD  - No known complications to date  - Is currently taking Toujeo 40 units daily, Ozempic 2 mg weekly, metformin 1 g daily  - He was on Jardiance previously but states he had a skin/soft tissue infection due to it so it was stopped  - Denies hypoglycemia  - States BG is typically more elevated after eating over the last 3-6 months  - Is also on Crestor 40 mg daily and also Leqvio    The following portions of the patient's history were reviewed and updated as appropriate: allergies, current medications, past family history, past medical history, past social history, past surgical history, and problem list.      Objective     Vitals:    03/10/25 1552   BP: 124/82   Pulse: 84   SpO2: 96%        Physical Exam  Vitals reviewed.   Constitutional:       Appearance: Normal appearance.   HENT:      Head: Normocephalic and atraumatic.   Eyes:      General: No scleral icterus.  Pulmonary:      Effort: Pulmonary effort is normal. No respiratory distress.   Neurological:      Mental Status: He is alert.      Gait: Gait normal.   Psychiatric:         Mood and Affect: Mood normal.         Behavior: Behavior normal.         Thought Content: Thought content normal.         Judgment: Judgment normal.     CGM interpretation  Dates reviewed:  2/9-2/22/25  Data:  Avg of 104, 1% high, 98% time in range, 1% low  Interpretation:  Reasonable glycemic control    Assessment & Plan   T2DM, controlled, complicated by CAD, CKD  - Cont. Toujeo 40 units daily, Ozempic 2 mg weekly, metformin 1 g daily     2. Hyperlipidemia  - On Leqvio and Crestor 10 mg daily    - Return to clinic in 6 months

## 2025-03-11 RX ORDER — CARVEDILOL 12.5 MG/1
12.5 TABLET ORAL 2 TIMES DAILY WITH MEALS
Qty: 180 TABLET | Refills: 1 | Status: SHIPPED | OUTPATIENT
Start: 2025-03-11

## 2025-03-12 ENCOUNTER — TELEPHONE (OUTPATIENT)
Dept: PHYSICAL THERAPY | Facility: CLINIC | Age: 70
End: 2025-03-12

## 2025-03-12 ENCOUNTER — OFFICE VISIT (OUTPATIENT)
Dept: FAMILY MEDICINE CLINIC | Facility: CLINIC | Age: 70
End: 2025-03-12
Payer: MEDICARE

## 2025-03-12 VITALS
DIASTOLIC BLOOD PRESSURE: 60 MMHG | HEIGHT: 77 IN | SYSTOLIC BLOOD PRESSURE: 110 MMHG | RESPIRATION RATE: 17 BRPM | OXYGEN SATURATION: 97 % | WEIGHT: 248 LBS | BODY MASS INDEX: 29.28 KG/M2 | HEART RATE: 94 BPM

## 2025-03-12 DIAGNOSIS — Z79.4 TYPE 2 DIABETES MELLITUS WITH MICROALBUMINURIA, WITH LONG-TERM CURRENT USE OF INSULIN: Primary | ICD-10-CM

## 2025-03-12 DIAGNOSIS — R80.9 TYPE 2 DIABETES MELLITUS WITH MICROALBUMINURIA, WITH LONG-TERM CURRENT USE OF INSULIN: Primary | ICD-10-CM

## 2025-03-12 DIAGNOSIS — E11.29 TYPE 2 DIABETES MELLITUS WITH MICROALBUMINURIA, WITH LONG-TERM CURRENT USE OF INSULIN: Primary | ICD-10-CM

## 2025-03-12 NOTE — TELEPHONE ENCOUNTER
"  Caller: Isaias Monaco \"Thony\"    Relationship: Self      What was the call regarding: NOT FEELING WELL  "

## 2025-03-12 NOTE — PROGRESS NOTES
"Chief Complaint  Chief Complaint   Patient presents with    Diabetes     6 mon f/u       Subjective    History of Present Illness        Isaias Monaco presents to [facility] for   Diabetes  He presents for his follow-up diabetic visit. He has type 2 diabetes mellitus. His disease course has been stable. Pertinent negatives for hypoglycemia include no dizziness, headaches, mood changes, pallor or speech difficulty. Associated symptoms include fatigue. Pertinent negatives for diabetes include no polydipsia, no polyphagia, no polyuria and no visual change. Pertinent negatives for hypoglycemia complications include no nocturnal hypoglycemia. Symptoms are stable. Pertinent negatives for diabetic complications include no nephropathy. Risk factors for coronary artery disease include diabetes mellitus, hypertension, dyslipidemia, male sex and obesity. His weight is stable. There is no change in his home blood glucose trend.   Fatigue  Symptoms are chronic.   Onset was 1 to 5 years.   Symptoms have been unchanged since onset.   Symptoms include fatigue.    Pertinent negative symptoms include no joint pain, no congestion, no headaches, no nausea, no sore throat, no dysuria and no visual change.      History of Present Illness      Objective   Vital Signs:   Visit Vitals  /60   Pulse 94   Resp 17   Ht 195.6 cm (77.01\")   Wt 112 kg (248 lb)   SpO2 97%   BMI 29.40 kg/m²                Physical Exam  Vitals reviewed.   Constitutional:       Appearance: He is well-developed.   HENT:      Head: Normocephalic.      Right Ear: External ear normal.      Left Ear: External ear normal.      Nose: Nose normal.   Eyes:      Conjunctiva/sclera: Conjunctivae normal.   Cardiovascular:      Rate and Rhythm: Normal rate and regular rhythm.   Pulmonary:      Effort: Pulmonary effort is normal.      Breath sounds: Normal breath sounds.   Musculoskeletal:         General: Normal range of motion.      " Cervical back: Normal range of motion and neck supple.   Skin:     General: Skin is warm and dry.      Capillary Refill: Capillary refill takes less than 2 seconds.   Neurological:      Mental Status: He is alert and oriented to person, place, and time.        Physical Exam           Result Review :  Results                            Assessment and Plan      Diagnoses and all orders for this visit:    1. Type 2 diabetes mellitus with microalbuminuria, with long-term current use of insulin (Primary)  Assessment & Plan:  Diabetes is stable.   Medication changes per orders.  Reminded to bring in blood sugar diary at next visit.  Recommended an ADA diet.  Regular aerobic exercise.  Discussed ways to avoid symptomatic hypoglycemia.  Discussed sick day management.  Discussed foot care.  Diabetes will be reassessed in 6 months         Assessment & Plan             Follow Up   No follow-ups on file.  Patient was given instructions and counseling regarding his condition or for health maintenance advice. Please see specific information pulled into the AVS if appropriate.

## 2025-03-13 ENCOUNTER — LAB (OUTPATIENT)
Dept: OTHER | Facility: HOSPITAL | Age: 70
End: 2025-03-13
Payer: MEDICARE

## 2025-03-13 ENCOUNTER — CONSULT (OUTPATIENT)
Dept: ONCOLOGY | Facility: CLINIC | Age: 70
End: 2025-03-13
Payer: MEDICARE

## 2025-03-13 VITALS
SYSTOLIC BLOOD PRESSURE: 114 MMHG | BODY MASS INDEX: 28.74 KG/M2 | DIASTOLIC BLOOD PRESSURE: 79 MMHG | HEIGHT: 77 IN | OXYGEN SATURATION: 95 % | WEIGHT: 243.4 LBS | HEART RATE: 86 BPM

## 2025-03-13 DIAGNOSIS — D50.9 IRON DEFICIENCY ANEMIA, UNSPECIFIED IRON DEFICIENCY ANEMIA TYPE: Primary | ICD-10-CM

## 2025-03-13 LAB
BASOPHILS # BLD AUTO: 0.08 10*3/MM3 (ref 0–0.2)
BASOPHILS NFR BLD AUTO: 0.9 % (ref 0–1.5)
DEPRECATED RDW RBC AUTO: 48.7 FL (ref 37–54)
EOSINOPHIL # BLD AUTO: 0.2 10*3/MM3 (ref 0–0.4)
EOSINOPHIL NFR BLD AUTO: 2.2 % (ref 0.3–6.2)
ERYTHROCYTE [DISTWIDTH] IN BLOOD BY AUTOMATED COUNT: 16.8 % (ref 12.3–15.4)
FERRITIN SERPL-MCNC: 15.1 NG/ML (ref 30–400)
HCT VFR BLD AUTO: 45.6 % (ref 37.5–51)
HGB BLD-MCNC: 14.2 G/DL (ref 13–17.7)
HOLD SPECIMEN: NORMAL
IMM GRANULOCYTES # BLD AUTO: 0.03 10*3/MM3 (ref 0–0.05)
IMM GRANULOCYTES NFR BLD AUTO: 0.3 % (ref 0–0.5)
LYMPHOCYTES # BLD AUTO: 2.64 10*3/MM3 (ref 0.7–3.1)
LYMPHOCYTES NFR BLD AUTO: 28.9 % (ref 19.6–45.3)
MCH RBC QN AUTO: 25 PG (ref 26.6–33)
MCHC RBC AUTO-ENTMCNC: 31.1 G/DL (ref 31.5–35.7)
MCV RBC AUTO: 80.3 FL (ref 79–97)
MONOCYTES # BLD AUTO: 0.78 10*3/MM3 (ref 0.1–0.9)
MONOCYTES NFR BLD AUTO: 8.6 % (ref 5–12)
NEUTROPHILS NFR BLD AUTO: 5.39 10*3/MM3 (ref 1.7–7)
NEUTROPHILS NFR BLD AUTO: 59.1 % (ref 42.7–76)
NRBC BLD AUTO-RTO: 0 /100 WBC (ref 0–0.2)
PLATELET # BLD AUTO: 259 10*3/MM3 (ref 140–450)
PMV BLD AUTO: 10.5 FL (ref 6–12)
RBC # BLD AUTO: 5.68 10*6/MM3 (ref 4.14–5.8)
WBC NRBC COR # BLD AUTO: 9.12 10*3/MM3 (ref 3.4–10.8)
WHOLE BLOOD HOLD SPECIMEN: NORMAL

## 2025-03-13 PROCEDURE — 36415 COLL VENOUS BLD VENIPUNCTURE: CPT

## 2025-03-13 PROCEDURE — 85025 COMPLETE CBC W/AUTO DIFF WBC: CPT | Performed by: INTERNAL MEDICINE

## 2025-03-13 PROCEDURE — 82728 ASSAY OF FERRITIN: CPT | Performed by: INTERNAL MEDICINE

## 2025-03-13 RX ORDER — INSULIN GLARGINE 300 U/ML
INJECTION, SOLUTION SUBCUTANEOUS EVERY 24 HOURS
COMMUNITY

## 2025-03-13 RX ORDER — TRAMADOL HYDROCHLORIDE 50 MG/1
50 TABLET ORAL DAILY
COMMUNITY

## 2025-03-13 NOTE — PROGRESS NOTES
.     REASON FOR CONSULTATION:   Iron deficiency anemia  Provide an opinion on any further workup or treatment                             REQUESTING PHYSICIAN: Gwyn Patten Sr., MD  RECORDS OBTAINED:  Records of the patient's history including those obtained from the referring provider were reviewed and summarized in detail.    HISTORY OF PRESENT ILLNESS:  The patient is a 69 y.o. year old male  who is here for follow-up with the above-mentioned history.    He saw me February 2019 but did not return for follow-up.    Reviewed records including endocrinology note for diabetes  Further details below    Has been quite tired lately so he requested iron labs which show iron deficiency    Past Medical History:   Diagnosis Date    Abnormal EKG 12/26/2024    Allergic     Lisinopril, Norvasc    Anemia     post hemorrhagic    Angioedema 02/21/2016    Secondary to ACE inhibitor    Anxiety     Arthritis     CAD (coronary artery disease)     CAP (community acquired pneumonia) 12/11/2024    Chronic kidney disease     Colon polyps     Diabetes mellitus, type 2     GERD (gastroesophageal reflux disease)     Glaucoma     Hematoma     history    High cholesterol     History of foreign travel 12/2017; 08/2018    [place], [place], [place], [place], [place] and [place]; [place]    Hyperlipidemia     Hypertension     Hypogonadism in male 09/28/2016    Injury of back     Low back pain     Male erectile disorder     Microalbuminuria     Obesity     Osteoarthritis     knee    Pneumonia 12/2024    PVCs (premature ventricular contractions) 12/26/2024    Spinal stenosis of lumbar region without neurogenic claudication 06/02/2021    Tubular adenoma of colon 11/01/2017    Vitamin D deficiency      Past Surgical History:   Procedure Laterality Date    CARDIAC CATHETERIZATION N/A 05/15/2006    Dr. Mini Camarillo    CARDIAC CATHETERIZATION N/A 03/10/2020    Procedure: Left Heart Cath;  Surgeon: Miguel Ángel Karimi MD;   [facility];  Service: Cardiology;  Laterality: N/A;    CARDIAC CATHETERIZATION N/A 03/10/2020    Procedure: Stent DAVONTE coronary;  Surgeon: Miguel Ángel Karimi MD;  [facility];  Service: Cardiology;  Laterality: N/A;    CARDIAC CATHETERIZATION N/A 03/10/2020    Procedure: Coronary angiography;  Surgeon: Miguel Ángel Karimi MD;  [facility];  Service: Cardiology;  Laterality: N/A;    CARDIAC CATHETERIZATION N/A 03/10/2020    Procedure: Left ventriculography;  Surgeon: Miguel Ángel Karimi MD;  [facility];  Service: Cardiology;  Laterality: N/A;    CARDIAC CATHETERIZATION N/A 05/20/2022    Procedure: Left Heart Cath;  Surgeon: Mackenzie Morales MD;  Location: [facility] CATH INVASIVE LOCATION;  Service: Cardiovascular;  Laterality: N/A;    CARDIAC CATHETERIZATION N/A 05/20/2022    Procedure: Coronary angiography;  Surgeon: Mackenzie Morales MD;  Location: [facility] CATH INVASIVE LOCATION;  Service: Cardiovascular;  Laterality: N/A;    CARDIAC CATHETERIZATION N/A 05/20/2022    Procedure: Percutaneous Coronary Intervention;  Surgeon: Mackenize Morales MD;  Location: [facility] CATH INVASIVE LOCATION;  Service: Cardiovascular;  Laterality: N/A;    CARDIAC CATHETERIZATION N/A 05/24/2022    Procedure: Percutaneous Coronary Intervention;  Surgeon: Miguel Ángel Karimi MD;  Location: [facility] CATH INVASIVE LOCATION;  Service: Cardiovascular;  Laterality: N/A;  LAD and Cx    CARDIAC CATHETERIZATION N/A 05/24/2022    Procedure: Stent DAVONTE coronary;  Surgeon: Miguel Ángel Karimi MD;  Location: [facility] CATH INVASIVE LOCATION;  Service: Cardiovascular;  Laterality: N/A;    CARDIAC CATHETERIZATION N/A 05/24/2022    Procedure: Resting Full Cycle Ratio;  Surgeon: Miguel Ángel Karimi MD;  Location: [facility] CATH INVASIVE LOCATION;  Service: Cardiovascular;  Laterality: N/A;    CARDIAC CATHETERIZATION N/A 03/19/2024    Procedure:  Left Heart Cath;  Surgeon: Miguel Ángel Karimi MD;  Location:  [facility] CATH INVASIVE LOCATION;  Service: Cardiovascular;  Laterality: N/A;    CARDIAC CATHETERIZATION N/A 03/19/2024    Procedure: Percutaneous Coronary Intervention;  Surgeon: Miguel Ángel Karimi MD;  Location:  [facility] CATH INVASIVE LOCATION;  Service: Cardiovascular;  Laterality: N/A;    CARDIAC CATHETERIZATION N/A 03/19/2024    Procedure: Stent DAVONTE coronary;  Surgeon: Miguel Ángel Karimi MD;  Location:  [facility] CATH INVASIVE LOCATION;  Service: Cardiovascular;  Laterality: N/A;    CARDIAC CATHETERIZATION N/A 01/03/2025    Procedure: Coronary angiography;  Surgeon: Miguel Ángel Karimi MD;  Location:  [facility] CATH INVASIVE LOCATION;  Service: Cardiovascular;  Laterality: N/A;    CARDIAC CATHETERIZATION N/A 01/03/2025    Procedure: Left heart cath;  Surgeon: Miguel Ángel Karimi MD;  [facility];  Service: Cardiovascular;  Laterality: N/A;    CARDIAC CATHETERIZATION N/A 01/03/2025    Procedure: Left ventriculography;  Surgeon: Miguel Ángel Karimi MD;  [facility];  Service: Cardiovascular;  Laterality: N/A;    CARDIAC CATHETERIZATION N/A 01/03/2025    Procedure: Resting Full Cycle Ratio;  Surgeon: Miguel Ángel Karimi MD;  [facility];  Service: Cardiovascular;  Laterality: N/A;    CARDIAC CATHETERIZATION N/A 01/03/2025    Procedure: Percutaneous Coronary Intervention;  Surgeon: Miguel Ángel Karimi MD;  Location: [facility] CATH INVASIVE LOCATION;  Service: Cardiovascular;  Laterality: N/A;    CARDIAC CATHETERIZATION N/A 01/03/2025    Procedure: Stent DAVONTE coronary;  Surgeon: Miguel Ángel Karimi MD;  Location: [facility] CATH INVASIVE LOCATION;  Service: Cardiovascular;  Laterality: N/A;    CERVICAL DISCECTOMY POSTERIOR FUSION WITH INSTRUMENTATION Bilateral 06/25/2024    Procedure: Cervical 3 to cervical 6 laminectomies and posterior spinal fusion - Bilateral;   Surgeon: Marshal Miguel MD;  Location: [facility] MAIN OR;  Service: Neurosurgery;  Laterality: Bilateral;    COLONOSCOPY N/A 02/22/2006    Bilobed polyp at 30 cm, hemorrhoids-Dr. Ilya Zhao    COLONOSCOPY N/A 02/28/2014    Normal ileum, one 6 mm polyp in the mid transverse colon-Dr. Ilya Zhao    COLONOSCOPY N/A 11/19/2008    Ela ileum, two 3 to 4 mm polyps, non-bleeding internal hemorrhoids, repeat in 5 years-Dr. Ilya Zhao    COLONOSCOPY N/A 10/31/2017    Procedure: COLONOSCOPY WITH POLYPECTOMY (COLD BIOPSY);  Surgeon: Ilya Zhao MD;  [facility];  Service:     COLONOSCOPY N/A 05/21/2021    Procedure: Colonoscopy into cecum and terminal ileum with cold biopsy polypectomy;  Surgeon: Ilya Zhao MD;  [facility];  Service: Gastroenterology;  Laterality: N/A;  Pre op: History of Polyps  Post op: Polyp    CORONARY ANGIOPLASTY WITH STENT PLACEMENT  2007, 2012, 2015    cardiac stents x3 occasions    CORONARY STENT PLACEMENT      ENDOSCOPY N/A 10/04/2017    Procedure: ESOPHAGOGASTRODUODENOSCOPY;  Surgeon: Boyd Guidry MD;  [facility];  Service:     ENDOSCOPY N/A 05/21/2021    Procedure: ESOPHAGOGASTRODUODENOSCOPY with biopsies;  Surgeon: Ilya Zhao MD;  [facility];  Service: Gastroenterology;  Laterality: N/A;  Pre op: GERD  Post op: Irregular  Z-Line, Hiatal Hernia, Gastritis    ENDOSCOPY N/A 08/25/2023    Procedure: ESOPHAGOGASTRODUODENOSCOPY with biopsy;  Surgeon: Ilya Zhao MD;  [facility];  Service: Gastroenterology;  Laterality: N/A;  errosive gastritis    EPIDURAL BLOCK      INTERVENTIONAL RADIOLOGY PROCEDURE N/A 05/20/2022    Procedure: Intravascular Ultrasound;  Surgeon: Mackenzie Morales MD;  Location: [facility] CATH INVASIVE LOCATION;  Service: Cardiovascular;  Laterality: N/A;    INTRAVASCULAR ULTRASOUND N/A 01/03/2025    Procedure: Intravascular Ultrasound;  Surgeon: Miguel Ángel Karimi MD;  Location:   [facility] CATH INVASIVE LOCATION;  Service: Cardiovascular;  Laterality: N/A;    JOINT REPLACEMENT      KNEE INCISION AND DRAINAGE Right 06/20/2017    Procedure: RT. KNEE WASHOUT ;  Surgeon: Boyd Coyne MD;  Location:  [facility] MAIN OR;  Service:     MEDIAL BRANCH BLOCK Bilateral 12/17/2021    Procedure: LUMBAR MEDIAL BRANCH BLOCK bilateral ~L4-S1 x2 (~2 weeks apart);  Surgeon: Christina Villaseñor MD;  Location: [facility] MAIN [facility];  Service: Pain Management;  Laterality: Bilateral;    MEDIAL BRANCH BLOCK Bilateral 01/03/2022    Procedure: LUMBAR MEDIAL BRANCH BLOCK bilateral ~L4-S1 x2 (~2 weeks apart);  Surgeon: Christina Villaseñor MD;  Location: [facility];  Service: Pain Management;  Laterality: Bilateral;    TN ARTHRP KNE CONDYLE&PLATU MEDIAL&LAT COMPARTMENTS Right 06/15/2017    Procedure: RT TOTAL KNEE ARTHROPLASTY;  Surgeon: Boyd Coyne MD;  Location: [facility] MAIN OR;  Service: Orthopedics    RADIOFREQUENCY ABLATION Bilateral 01/10/2022    Procedure: RADIOFREQUENCY ABLATION NERVES Bilateral L4-S1;  Surgeon: Christina Villaseñor MD;  Location: [facility];  Service: Pain Management;  Laterality: Bilateral;    SHOULDER SURGERY Right 2016    rotator cuff repair    UPPER GASTROINTESTINAL ENDOSCOPY N/A 10/13/2015    Z-line irregular, normal stomach, normal duodenum-Dr. Ilya Zhao    UPPER GASTROINTESTINAL ENDOSCOPY N/A 02/28/2014    Z-line irregular, normal stomach, normal duodenum-Dr. Ilya Zhao    UPPER GASTROINTESTINAL ENDOSCOPY N/A 11/19/2008    Z-line irregular, chronic gastritis withotu hemorrhage, normal duodenum-Dr. Ilya Zhao    UPPER GASTROINTESTINAL ENDOSCOPY N/A 06/22/2006    LA Grade A reflux esophagitis, non-bleeding erythematous gastropathy, normal duodenum-Dr. Ilya Zhao       MEDICATIONS    Current Outpatient Medications:     acetaminophen (TYLENOL) 650 MG 8 hr tablet, Take 1 tablet by mouth Every 4 (Four) Hours As Needed for Mild Pain., Disp: , Rfl:     ARIPiprazole (ABILIFY) 5 MG tablet, Take 1  tablet by mouth Daily., Disp: , Rfl:     aspirin 81 MG EC tablet, Take 1 tablet by mouth Daily., Disp: 30 tablet, Rfl: 6    BD Pen Needle Marissa 2nd Gen 32G X 4 MM misc, , Disp: , Rfl:     carvedilol (COREG) 12.5 MG tablet, TAKE 1 TABLET BY MOUTH TWICE A DAY WITH A MEAL, Disp: 180 tablet, Rfl: 1    clonazePAM (KlonoPIN) 0.5 MG tablet, TAKE 1 TABLET BY MOUTH DAILY AS NEEDED FOR ANXIETY, Disp: 30 tablet, Rfl: 2    clopidogrel (PLAVIX) 75 MG tablet, TAKE 1 TABLET BY MOUTH EVERY DAY, Disp: 90 tablet, Rfl: 2    dorzolamide-timolol (COSOPT) 2-0.5 % ophthalmic solution, Administer 1 drop to both eyes 2 (Two) Times a Day., Disp: , Rfl:     doxazosin (CARDURA) 2 MG tablet, Take 1 tablet by mouth Every Night., Disp: 90 tablet, Rfl: 3    escitalopram (LEXAPRO) 20 MG tablet, Take 1 tablet by mouth Daily., Disp: , Rfl:     esomeprazole (nexIUM) 40 MG capsule, TAKE ONE CAPSULE BY MOUTH EVERY MORNING BEFORE BREAKFAST, Disp: 90 capsule, Rfl: 3    Inclisiran Sodium (Leqvio) 284 MG/1.5ML solution prefilled syringe, Inject 1.5 mL under the skin into the appropriate area as directed., Disp: , Rfl:     Ingrezza 60 MG capsule, Take 1 capsule by mouth Daily., Disp: , Rfl:     Insulin Glargine, 1 Unit Dial, (Toujeo SoloStar) 300 UNIT/ML solution pen-injector injection, Inject  under the skin into the appropriate area as directed Daily., Disp: , Rfl:     isosorbide mononitrate (IMDUR) 60 MG 24 hr tablet, Take 1 tablet by mouth Daily., Disp: 90 tablet, Rfl: 3    latanoprost (XALATAN) 0.005 % ophthalmic solution, Administer 1 drop to both eyes Every Night., Disp: , Rfl:     metFORMIN (GLUCOPHAGE) 500 MG tablet, Take 2 tablets by mouth Daily With Breakfast., Disp: , Rfl:     methocarbamol (ROBAXIN) 500 MG tablet, Take 1 tablet by mouth Every Night., Disp: 30 tablet, Rfl: 11    Ozempic, 2 MG/DOSE, 8 MG/3ML solution pen-injector, DIAL AND INJECT UNDER THE SKIN 2 MG WEEKLY, Disp: 3 mL, Rfl: 2    rosuvastatin (CRESTOR) 10 MG tablet, Take 1 tablet by  mouth Daily., Disp: 90 tablet, Rfl: 2    tamsulosin (FLOMAX) 0.4 MG capsule 24 hr capsule, Take 1 capsule by mouth Every Night., Disp: 90 capsule, Rfl: 3    traMADol (ULTRAM) 50 MG tablet, Take 1 tablet by mouth Daily., Disp: , Rfl:     ALLERGIES:     Allergies   Allergen Reactions    Nsaids Other (See Comments)     Renal failure    Amlodipine Swelling and Other (See Comments)    Atorvastatin Myalgia    Hydralazine Myalgia    Zetia [Ezetimibe] Nausea And Vomiting    Crestor [Rosuvastatin] Other (See Comments)     Flu like s/s    Ace Inhibitors Angioedema    Evolocumab Nausea And Vomiting and Palpitations    Lisinopril Angioedema    Testosterone Myalgia       SOCIAL HISTORY:       Social History     Socioeconomic History    Marital status:      Spouse name: Micaela    Number of children: 1    Years of education: College   Tobacco Use    Smoking status: Never     Passive exposure: Never    Smokeless tobacco: Never    Tobacco comments:     CAFFEINE USE: 2 CUPS COFFEE DAILY   Vaping Use    Vaping status: Never Used   Substance and Sexual Activity    Alcohol use: Yes     Alcohol/week: 3.0 standard drinks of alcohol     Types: 1 Glasses of wine, 2 Shots of liquor per week    Drug use: Never    Sexual activity: Not Currently     Partners: Female     Birth control/protection: Post-menopausal         FAMILY HISTORY:  Family History   Problem Relation Age of Onset    Lupus Sister     Thyroid disease Sister     Heart disease Other     Hypertension Other     Arthritis Mother     Hyperlipidemia Mother     Hypertension Mother     Thyroid disease Mother     Lupus Mother     Vision loss Mother     Heart disease Father     Arthritis Father     Lupus Father     Depression Father     Alcohol abuse Father     Dementia Father     Hypertension Father     Heart disease Brother     Heart attack Brother 40    Thyroid disease Sister     Arthritis Brother     Malig Hyperthermia Neg Hx        REVIEW OF SYSTEMS:  Review of Systems  "  Constitutional:  Negative for activity change.   HENT:  Negative for nosebleeds and trouble swallowing.    Respiratory:  Negative for shortness of breath and wheezing.    Cardiovascular:  Negative for chest pain and palpitations.   Gastrointestinal:  Negative for constipation, diarrhea and nausea.   Genitourinary:  Negative for dysuria and hematuria.   Musculoskeletal:  Negative for arthralgias and myalgias.   Neurological:  Negative for seizures and syncope.   Hematological:  Negative for adenopathy. Does not bruise/bleed easily.   Psychiatric/Behavioral:  Negative for confusion.               Vitals:    03/13/25 1618   BP: 114/79   Pulse: 86   SpO2: 95%   Weight: 110 kg (243 lb 6.4 oz)   Height: 194.6 cm (76.61\")   PainSc: 0-No pain         2/19/2019     1:47 PM   Current Status   ECOG score 0        Data saved with a previous flowsheet row definition      PHYSICAL EXAM:        CONSTITUTIONAL:  Vital signs reviewed.  No distress, looks comfortable.  EYES:  Conjunctiva and lids unremarkable.  PERRLA  EARS,NOSE,MOUTH,THROAT:  Ears and nose appear unremarkable.  Lips, teeth, gums appear unremarkable.  RESPIRATORY:  Normal respiratory effort.  Lungs clear to auscultation bilaterally.  CARDIOVASCULAR:  Normal S1, S2.  No murmurs rubs or gallops.  No significant lower extremity edema.  GASTROINTESTINAL: Abdomen appears unremarkable.  Nontender.  No hepatomegaly.  No splenomegaly.  LYMPHATIC:  No cervical, supraclavicular, axillary lymphadenopathy.  SKIN:  Warm.  No rashes.  PSYCHIATRIC:  Normal judgment and insight.  Normal mood and affect.        RECENT LABS:        WBC   Date Value Ref Range Status   03/13/2025 9.12 3.40 - 10.80 10*3/mm3 Final   03/05/2025 10.02 3.40 - 10.80 10*3/mm3 Final   01/04/2025 8.62 3.40 - 10.80 10*3/mm3 Final   12/27/2024 9.61 3.40 - 10.80 10*3/mm3 Final   12/12/2024 14.08 (H) 3.40 - 10.80 10*3/mm3 Final   12/11/2024 16.66 (H) 3.40 - 10.80 10*3/mm3 Final   08/30/2024 7.39 3.40 - 10.80 " 10*3/mm3 Final   07/24/2024 8.30 3.40 - 10.80 10*3/mm3 Final   07/23/2024 10.16 3.40 - 10.80 10*3/mm3 Final   07/22/2024 13.93 (H) 3.40 - 10.80 10*3/mm3 Final   07/21/2024 17.68 (H) 3.40 - 10.80 10*3/mm3 Final   07/19/2024 16.08 (H) 3.40 - 10.80 10*3/mm3 Final   07/01/2024 10.50 3.40 - 10.80 10*3/mm3 Final   06/30/2024 11.69 (H) 3.40 - 10.80 10*3/mm3 Final   06/28/2024 12.20 (H) 3.40 - 10.80 10*3/mm3 Final   06/26/2024 11.42 (H) 3.40 - 10.80 10*3/mm3 Final   06/17/2024 6.31 3.40 - 10.80 10*3/mm3 Final   03/20/2024 9.49 3.40 - 10.80 10*3/mm3 Final   03/12/2024 10.17 3.40 - 10.80 10*3/mm3 Final   08/08/2023 7.86 3.40 - 10.80 10*3/mm3 Final   07/21/2022 8.87 3.40 - 10.80 10*3/mm3 Final   05/25/2022 8.62 3.40 - 10.80 10*3/mm3 Final   05/24/2022 8.33 3.40 - 10.80 10*3/mm3 Final   05/23/2022 8.67 3.40 - 10.80 10*3/mm3 Final   05/22/2022 8.90 3.40 - 10.80 10*3/mm3 Final   05/21/2022 8.03 3.40 - 10.80 10*3/mm3 Final   05/19/2022 7.71 3.40 - 10.80 10*3/mm3 Final   04/25/2022 8.2 3.4 - 10.8 x10E3/uL Final   10/04/2021 10.70 3.40 - 10.80 10*3/mm3 Final   03/11/2020 9.85 3.40 - 10.80 10*3/mm3 Final   03/04/2020 9.84 3.40 - 10.80 10*3/mm3 Final   02/12/2019 8.84 3.40 - 10.80 10*3/mm3 Final   10/30/2018 9.35 4.50 - 10.70 10*3/mm3 Final   10/02/2018 8.24 4.50 - 10.70 10*3/mm3 Final   09/12/2017 8.85 4.50 - 10.70 10*3/mm3 Final   07/24/2017 8.92 4.50 - 10.70 10*3/mm3 Final   07/17/2017 7.76 4.50 - 10.70 10*3/mm3 Final   07/09/2017 8.63 4.50 - 10.70 10*3/mm3 Final   07/09/2017 10.74 (H) 4.50 - 10.70 10*3/mm3 Final   07/03/2017 9.38 4.50 - 10.70 10*3/mm3 Final   06/26/2017 11.95 (H) 4.50 - 10.70 10*3/mm3 Final   06/24/2017 11.99 (H) 4.50 - 10.70 10*3/mm3 Final   06/23/2017 13.57 (H) 4.50 - 10.70 10*3/mm3 Final   06/22/2017 12.38 (H) 4.50 - 10.70 10*3/mm3 Final   06/20/2017 13.08 (H) 4.50 - 10.70 10*3/mm3 Final   06/19/2017 13.06 (H) 4.50 - 10.70 10*3/mm3 Final   06/17/2017 14.99 (H) 4.50 - 10.70 10*3/mm3 Final   06/09/2017 8.50 4.50  - 10.70 10*3/mm3 Final   09/13/2016 8.83 4.50 - 10.70 10*3/mm3 Final   11/04/2015 10.07 4.50 - 10.70 K/Cumm Final   10/27/2015 10.75 (H) 4.50 - 10.70 K/Cumm Final   08/25/2015 7.71 4.50 - 10.70 K/Cumm Final   04/24/2015 8.08 4.50 - 10.70 K/Cumm Final      Hemoglobin   Date Value Ref Range Status   03/13/2025 14.2 13.0 - 17.7 g/dL Final   03/05/2025 14.4 13.0 - 17.7 g/dL Final   01/04/2025 12.7 (L) 13.0 - 17.7 g/dL Final   12/27/2024 13.3 13.0 - 17.7 g/dL Final   12/12/2024 12.7 (L) 13.0 - 17.7 g/dL Final   12/11/2024 14.1 13.0 - 17.7 g/dL Final   08/30/2024 14.4 13.0 - 17.7 g/dL Final   07/24/2024 11.5 (L) 13.0 - 17.7 g/dL Final   07/23/2024 11.3 (L) 13.0 - 17.7 g/dL Final   07/22/2024 11.5 (L) 13.0 - 17.7 g/dL Final   07/21/2024 11.3 (L) 13.0 - 17.7 g/dL Final   07/19/2024 13.4 13.0 - 17.7 g/dL Final   07/01/2024 11.6 (L) 13.0 - 17.7 g/dL Final   06/30/2024 12.7 (L) 13.0 - 17.7 g/dL Final   06/28/2024 13.0 13.0 - 17.7 g/dL Final   06/26/2024 15.0 13.0 - 17.7 g/dL Final   06/17/2024 14.4 13.0 - 17.7 g/dL Final   03/20/2024 13.9 13.0 - 17.7 g/dL Final   03/12/2024 15.7 13.0 - 17.7 g/dL Final   08/08/2023 17.0 13.0 - 17.7 g/dL Final   07/21/2022 16.6 13.0 - 17.7 g/dL Final   05/25/2022 14.0 13.0 - 17.7 g/dL Final   05/24/2022 14.2 13.0 - 17.7 g/dL Final   05/23/2022 13.8 13.0 - 17.7 g/dL Final   05/22/2022 14.0 13.0 - 17.7 g/dL Final   05/21/2022 14.4 13.0 - 17.7 g/dL Final   05/19/2022 16.5 13.0 - 17.7 g/dL Final   04/25/2022 16.6 13.0 - 17.7 g/dL Final   10/04/2021 16.7 13.0 - 17.7 g/dL Final   12/01/2020 16.6 13.0 - 17.7 g/dL Final   06/02/2020 16.6 13.0 - 17.7 g/dL Final   03/11/2020 15.4 13.0 - 17.7 g/dL Final   03/04/2020 16.4 13.0 - 17.7 g/dL Final   12/02/2019 17.1 13.0 - 17.7 g/dL Final   02/12/2019 16.6 13.0 - 17.7 g/dL Final   01/08/2019 16.0 13.7 - 17.6 g/dL Final   10/30/2018 14.8 13.7 - 17.6 g/dL Final   10/02/2018 14.4 13.7 - 17.6 g/dL Final   07/13/2018 12.6 (L) 13.7 - 17.6 g/dL Final   01/08/2018 12.3  (L) 13.7 - 17.6 g/dL Final   09/12/2017 12.2 (L) 13.7 - 17.6 g/dL Final   07/24/2017 11.8 (L) 13.7 - 17.6 g/dL Final   07/17/2017 11.3 (L) 13.7 - 17.6 g/dL Final   07/09/2017 10.8 (L) 13.7 - 17.6 g/dL Final   07/09/2017 11.4 (L) 13.7 - 17.6 g/dL Final   07/03/2017 10.3 (L) 13.7 - 17.6 g/dL Final   06/26/2017 10.3 (L) 13.7 - 17.6 g/dL Final   06/24/2017 8.4 (L) 13.7 - 17.6 g/dL Final   06/23/2017 9.3 (L) 13.7 - 17.6 g/dL Final   06/22/2017 9.4 (L) 13.7 - 17.6 g/dL Final   06/21/2017 9.9 (L) 13.7 - 17.6 g/dL Final   06/20/2017 10.7 (L) 13.7 - 17.6 g/dL Final   06/19/2017 10.9 (L) 13.7 - 17.6 g/dL Final   06/17/2017 10.6 (L) 13.7 - 17.6 g/dL Final   06/16/2017 11.5 (L) 13.7 - 17.6 g/dL Final   06/09/2017 14.4 13.7 - 17.6 g/dL Final   02/09/2017 15.6 13.7 - 17.6 g/dL Final   09/26/2016 13.7 12.6 - 17.7 g/dL Final   09/13/2016 14.0 13.7 - 17.6 g/dL Final   02/25/2016 13.7 13.7 - 17.6 g/dL Final   11/04/2015 12.7 (L) 13.7 - 17.6 g/dL Final   10/27/2015 14.9 13.7 - 17.6 g/dL Final   08/25/2015 13.8 13.7 - 17.6 g/dL Final   04/24/2015 14.6 13.7 - 17.6 g/dL Final      Platelets   Date Value Ref Range Status   03/13/2025 259 140 - 450 10*3/mm3 Final   03/05/2025 259 140 - 450 10*3/mm3 Final   01/04/2025 249 140 - 450 10*3/mm3 Final   12/27/2024 308 140 - 450 10*3/mm3 Final   12/12/2024 212 140 - 450 10*3/mm3 Final   12/11/2024 252 140 - 450 10*3/mm3 Final   08/30/2024 192 140 - 450 10*3/mm3 Final   07/24/2024 211 140 - 450 10*3/mm3 Final   07/23/2024 206 140 - 450 10*3/mm3 Final   07/22/2024 204 140 - 450 10*3/mm3 Final   07/21/2024 202 140 - 450 10*3/mm3 Final   07/19/2024 280 140 - 450 10*3/mm3 Final   07/01/2024 197 140 - 450 10*3/mm3 Final   06/30/2024 192 140 - 450 10*3/mm3 Final   06/28/2024 172 140 - 450 10*3/mm3 Final   06/26/2024 179 140 - 450 10*3/mm3 Final   06/17/2024 177 140 - 450 10*3/mm3 Final   03/20/2024 162 140 - 450 10*3/mm3 Final   03/12/2024 202 140 - 450 10*3/mm3 Final   08/08/2023 203 140 - 450 10*3/mm3  Final   07/21/2022 193 140 - 450 10*3/mm3 Final   05/25/2022 165 140 - 450 10*3/mm3 Final   05/24/2022 135 (L) 140 - 450 10*3/mm3 Final   05/23/2022 137 (L) 140 - 450 10*3/mm3 Final   05/22/2022 142 140 - 450 10*3/mm3 Final   05/21/2022 146 140 - 450 10*3/mm3 Final   05/19/2022 196 140 - 450 10*3/mm3 Final   04/25/2022 193 150 - 450 x10E3/uL Final   10/04/2021 207 140 - 450 10*3/mm3 Final   03/11/2020 172 140 - 450 10*3/mm3 Final   03/04/2020 181 140 - 450 10*3/mm3 Final   02/12/2019 163 140 - 450 10*3/mm3 Final   10/30/2018 176 140 - 500 10*3/mm3 Final   10/02/2018 229 140 - 500 10*3/mm3 Final   09/12/2017 287 140 - 500 10*3/mm3 Final   07/24/2017 277 140 - 500 10*3/mm3 Final   07/17/2017 300 140 - 500 10*3/mm3 Final   07/09/2017 465 140 - 500 10*3/mm3 Final   07/09/2017 498 140 - 500 10*3/mm3 Final   07/03/2017 540 (H) 140 - 500 10*3/mm3 Final   06/26/2017 455 140 - 500 10*3/mm3 Final   06/24/2017 314 140 - 500 10*3/mm3 Final   06/23/2017 308 140 - 500 10*3/mm3 Final   06/22/2017 259 140 - 500 10*3/mm3 Final   06/20/2017 226 140 - 500 10*3/mm3 Final   06/19/2017 198 140 - 500 10*3/mm3 Final   06/17/2017 157 140 - 500 10*3/mm3 Final   06/09/2017 195 140 - 500 10*3/mm3 Final   09/13/2016 222 140 - 500 10*3/mm3 Final   11/04/2015 211 140 - 500 K/Cumm Final   10/27/2015 213 140 - 500 K/Cumm Final   08/25/2015 193 140 - 500 K/Cumm Final   04/24/2015 204 140 - 500 K/Cumm Final       Assessment & Plan   Iron deficiency anemia, unspecified iron deficiency anemia type  - Ferritin        Isaias Monaco     *Iron deficiency anemia.  Hb previously normal.  Anemia developed with knee replacement surgery June 2017 and subsequent postoperative bleed.  Does not tolerate oral iron due to nausea.  2 doses Feraheme resulted in normal iron labs, improved energy, and resolution of ice cravings.  3/5/2025: 8% saturation.  Hb 14.2.  3/13/25 (he returned to our office.  Last visit here was 2019): Ferritin 15.1.  Full replacement of IV  iron.  Depending on insurance coverage, 2 doses Injectafer.  If insurance will not cover Injectafer then 2 doses Feraheme.  If insurance will not cover Feraheme, then Venofer 200 mg x 5 doses (Venofer can be as often as daily).  If insurance will not approve Venofer then INFeD at the calculated dose.     *Microcytosis.  Suspect this is due to iron deficiency.  Resolved with correction of iron deficiency.  MCV down to 80.3 on 3/13/2025     *Source of iron deficiency.  Colonoscopy through Dr. Juju Zhao 10/31/17.  1, 5 mm polyp removed.  He recommended repeat colonoscopy 5 years later.  EGD unremarkable by Dr. Guidry 10/4/17.  Dr. Bebeto Angel reported negative Hemoccult.  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  Therefore, I suspect the iron deficiency is due to blood loss from the knee surgery and postoperative bleed and it has taken him a long time to recover.  I told him if iron deficiency recurs or if he would like further evaluation at this time, he should see Dr. Juju Zhao to get an opinion if the small bowel needs to be evaluated for source of bleeding.  3/13/2025: Referred back to Dr. Latanya Zhao because of recurrence of iron deficiency.  He has not had a colonoscopy since 10/31/2017 when the recommendation was to have one 5 years later.  He has not had his small bowel evaluated.  EGD last done, 8/25/2023     *Leukocytosis.  Resolved.  This was likely related to the knee surgery summer 2017.  Remains resolved.  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     Plan  MD 6 months.  Iron labs 1 week prior  Full replacement of IV iron.  Depending on insurance coverage, 2 doses Injectafer.  If insurance will not cover Injectafer then 2 doses Feraheme.  If insurance will not cover Feraheme, then Venofer 200 mg x 5 doses (Venofer can be as often as daily).  If insurance will not approve Venofer then INFeD at the calculated dose.   Referral to Dr. Latanya Zhao for workup of iron  deficiency

## 2025-03-14 ENCOUNTER — INFUSION (OUTPATIENT)
Dept: ONCOLOGY | Facility: HOSPITAL | Age: 70
End: 2025-03-14
Payer: MEDICARE

## 2025-03-14 VITALS
BODY MASS INDEX: 28.74 KG/M2 | SYSTOLIC BLOOD PRESSURE: 98 MMHG | DIASTOLIC BLOOD PRESSURE: 61 MMHG | TEMPERATURE: 97.5 F | HEART RATE: 84 BPM | OXYGEN SATURATION: 97 % | HEIGHT: 77 IN | RESPIRATION RATE: 15 BRPM | WEIGHT: 243.4 LBS

## 2025-03-14 DIAGNOSIS — D62 ANEMIA, POSTHEMORRHAGIC, ACUTE: Primary | ICD-10-CM

## 2025-03-14 DIAGNOSIS — T45.4X5S ADVERSE EFFECT OF IRON AND ITS COMPOUNDS, SEQUELA: ICD-10-CM

## 2025-03-14 DIAGNOSIS — K90.9 MALABSORPTION OF IRON: ICD-10-CM

## 2025-03-14 DIAGNOSIS — D50.9 IRON DEFICIENCY ANEMIA, UNSPECIFIED IRON DEFICIENCY ANEMIA TYPE: ICD-10-CM

## 2025-03-14 PROCEDURE — 25010000002 FERRIC CARBOXYMALTOSE 750 MG/15ML SOLUTION 15 ML VIAL: Performed by: INTERNAL MEDICINE

## 2025-03-14 PROCEDURE — 96374 THER/PROPH/DIAG INJ IV PUSH: CPT

## 2025-03-14 PROCEDURE — 63710000001 PROCHLORPERAZINE MALEATE PER 5 MG: Performed by: INTERNAL MEDICINE

## 2025-03-14 RX ORDER — PROCHLORPERAZINE MALEATE 5 MG/1
10 TABLET ORAL ONCE
Status: COMPLETED | OUTPATIENT
Start: 2025-03-14 | End: 2025-03-14

## 2025-03-14 RX ADMIN — FERRIC CARBOXYMALTOSE INJECTION 750 MG: 50 INJECTION, SOLUTION INTRAVENOUS at 15:16

## 2025-03-14 RX ADMIN — PROCHLORPERAZINE MALEATE 10 MG: 5 TABLET ORAL at 15:04

## 2025-03-19 ENCOUNTER — TELEPHONE (OUTPATIENT)
Dept: NEUROSURGERY | Facility: CLINIC | Age: 70
End: 2025-03-19

## 2025-03-19 NOTE — TELEPHONE ENCOUNTER
The [facility] received a fax that requires your attention. The document has been indexed to the patient’s chart for your review.      Reason for sending: RECEIVED PROG NOTE 03/12/25    Documents Description: PROG NOTE_[facility]_03/12/25    Name of Sender: [facility]-DONNY MANN MD    Date Indexed: 03/19/25    Notes (if needed):

## 2025-03-21 ENCOUNTER — INFUSION (OUTPATIENT)
Dept: ONCOLOGY | Facility: HOSPITAL | Age: 70
End: 2025-03-21
Payer: MEDICARE

## 2025-03-21 VITALS
WEIGHT: 242.2 LBS | OXYGEN SATURATION: 96 % | SYSTOLIC BLOOD PRESSURE: 145 MMHG | TEMPERATURE: 97.2 F | HEIGHT: 77 IN | RESPIRATION RATE: 15 BRPM | DIASTOLIC BLOOD PRESSURE: 83 MMHG | HEART RATE: 87 BPM | BODY MASS INDEX: 28.6 KG/M2

## 2025-03-21 DIAGNOSIS — T45.4X5S ADVERSE EFFECT OF IRON AND ITS COMPOUNDS, SEQUELA: ICD-10-CM

## 2025-03-21 DIAGNOSIS — D50.9 IRON DEFICIENCY ANEMIA, UNSPECIFIED IRON DEFICIENCY ANEMIA TYPE: ICD-10-CM

## 2025-03-21 DIAGNOSIS — D62 ANEMIA, POSTHEMORRHAGIC, ACUTE: ICD-10-CM

## 2025-03-21 DIAGNOSIS — K90.9 MALABSORPTION OF IRON: Primary | ICD-10-CM

## 2025-03-21 PROCEDURE — 96374 THER/PROPH/DIAG INJ IV PUSH: CPT

## 2025-03-21 PROCEDURE — 25010000002 FERRIC CARBOXYMALTOSE 750 MG/15ML SOLUTION 15 ML VIAL: Performed by: INTERNAL MEDICINE

## 2025-03-21 PROCEDURE — 63710000001 PROCHLORPERAZINE MALEATE PER 5 MG: Performed by: INTERNAL MEDICINE

## 2025-03-21 RX ORDER — PROCHLORPERAZINE MALEATE 5 MG/1
10 TABLET ORAL ONCE
Status: COMPLETED | OUTPATIENT
Start: 2025-03-21 | End: 2025-03-21

## 2025-03-21 RX ADMIN — PROCHLORPERAZINE MALEATE 10 MG: 5 TABLET ORAL at 13:14

## 2025-03-21 RX ADMIN — SODIUM CHLORIDE 750 MG: 9 INJECTION, SOLUTION INTRAVENOUS at 13:20

## 2025-03-22 DIAGNOSIS — M47.816 LUMBAR FACET ARTHROPATHY: ICD-10-CM

## 2025-03-22 DIAGNOSIS — M51.369 DEGENERATION OF INTERVERTEBRAL DISC OF LUMBAR REGION, UNSPECIFIED WHETHER PAIN PRESENT: ICD-10-CM

## 2025-03-22 DIAGNOSIS — M48.061 SPINAL STENOSIS OF LUMBAR REGION WITHOUT NEUROGENIC CLAUDICATION: Primary | ICD-10-CM

## 2025-03-22 RX ORDER — DULOXETIN HYDROCHLORIDE 20 MG/1
20 CAPSULE, DELAYED RELEASE ORAL DAILY
Qty: 90 CAPSULE | Refills: 3 | Status: SHIPPED | OUTPATIENT
Start: 2025-03-22

## 2025-03-26 DIAGNOSIS — G99.2 STENOSIS OF CERVICAL SPINE WITH MYELOPATHY: ICD-10-CM

## 2025-03-26 DIAGNOSIS — M48.02 STENOSIS OF CERVICAL SPINE WITH MYELOPATHY: ICD-10-CM

## 2025-03-26 DIAGNOSIS — M47.817 LUMBOSACRAL SPONDYLOSIS WITHOUT MYELOPATHY: ICD-10-CM

## 2025-03-26 NOTE — TELEPHONE ENCOUNTER
Rx Refill Note  Requested Prescriptions     Pending Prescriptions Disp Refills    methocarbamol (ROBAXIN) 500 MG tablet 30 tablet 11     Sig: Take 1 tablet by mouth Every Night.    traMADol (ULTRAM) 50 MG tablet       Sig: Take 1 tablet by mouth Daily.      Last office visit with prescribing clinician: 3/12/2025   Last telemedicine visit with prescribing clinician: Visit date not found   Next office visit with prescribing clinician: 9/29/2025                         Would you like a call back once the refill request has been completed: [] Yes [] No    If the office needs to give you a call back, can they leave a voicemail: [] Yes [] No    Dannielle Zelaya Rep  03/26/25, 13:58 EDT

## 2025-03-27 ENCOUNTER — OFFICE (OUTPATIENT)
Dept: URBAN - METROPOLITAN AREA CLINIC 76 | Facility: CLINIC | Age: 70
End: 2025-03-27
Payer: COMMERCIAL

## 2025-03-27 VITALS
HEART RATE: 80 BPM | WEIGHT: 237 LBS | DIASTOLIC BLOOD PRESSURE: 78 MMHG | SYSTOLIC BLOOD PRESSURE: 116 MMHG | HEIGHT: 77 IN | OXYGEN SATURATION: 97 %

## 2025-03-27 DIAGNOSIS — K21.9 GASTRO-ESOPHAGEAL REFLUX DISEASE WITHOUT ESOPHAGITIS: ICD-10-CM

## 2025-03-27 DIAGNOSIS — D50.9 IRON DEFICIENCY ANEMIA, UNSPECIFIED: ICD-10-CM

## 2025-03-27 PROCEDURE — 99213 OFFICE O/P EST LOW 20 MIN: CPT

## 2025-03-27 RX ORDER — TRAMADOL HYDROCHLORIDE 50 MG/1
50 TABLET ORAL DAILY
Qty: 30 TABLET | Refills: 0 | Status: SHIPPED | OUTPATIENT
Start: 2025-03-27

## 2025-03-27 RX ORDER — METHOCARBAMOL 500 MG/1
500 TABLET, FILM COATED ORAL NIGHTLY
Qty: 30 TABLET | Refills: 11 | Status: SHIPPED | OUTPATIENT
Start: 2025-03-27 | End: 2026-03-27

## 2025-03-28 ENCOUNTER — LAB (OUTPATIENT)
Facility: HOSPITAL | Age: 70
End: 2025-03-28
Payer: MEDICARE

## 2025-03-28 ENCOUNTER — TRANSCRIBE ORDERS (OUTPATIENT)
Dept: ADMINISTRATIVE | Facility: HOSPITAL | Age: 70
End: 2025-03-28
Payer: MEDICARE

## 2025-03-28 ENCOUNTER — TREATMENT (OUTPATIENT)
Dept: PHYSICAL THERAPY | Facility: CLINIC | Age: 70
End: 2025-03-28
Payer: MEDICARE

## 2025-03-28 DIAGNOSIS — I10 ESSENTIAL HYPERTENSION, MALIGNANT: ICD-10-CM

## 2025-03-28 DIAGNOSIS — D50.9 IRON DEFICIENCY ANEMIA, UNSPECIFIED IRON DEFICIENCY ANEMIA TYPE: Primary | ICD-10-CM

## 2025-03-28 DIAGNOSIS — Z98.890 HISTORY OF REPAIR OF RIGHT ROTATOR CUFF: ICD-10-CM

## 2025-03-28 DIAGNOSIS — D50.9 IRON DEFICIENCY ANEMIA, UNSPECIFIED IRON DEFICIENCY ANEMIA TYPE: ICD-10-CM

## 2025-03-28 DIAGNOSIS — M54.50 LUMBAR PAIN: Primary | ICD-10-CM

## 2025-03-28 DIAGNOSIS — R26.2 DIFFICULTY WALKING: ICD-10-CM

## 2025-03-28 DIAGNOSIS — E11.9 DIABETES MELLITUS WITHOUT COMPLICATION: ICD-10-CM

## 2025-03-28 DIAGNOSIS — Z96.651 HISTORY OF TOTAL KNEE ARTHROPLASTY, RIGHT: ICD-10-CM

## 2025-03-28 DIAGNOSIS — G95.9 CERVICAL MYELOPATHY: ICD-10-CM

## 2025-03-28 DIAGNOSIS — Z98.1 HISTORY OF FUSION OF CERVICAL SPINE: ICD-10-CM

## 2025-03-28 DIAGNOSIS — N18.31 CHRONIC KIDNEY DISEASE (CKD) STAGE G3A/A1, MODERATELY DECREASED GLOMERULAR FILTRATION RATE (GFR) BETWEEN 45-59 ML/MIN/1.73 SQUARE METER AND ALBUMINURIA CREATININE RATIO LESS THAN 30 MG/G (CMS/H*: Primary | ICD-10-CM

## 2025-03-28 DIAGNOSIS — N18.31 CHRONIC KIDNEY DISEASE (CKD) STAGE G3A/A1, MODERATELY DECREASED GLOMERULAR FILTRATION RATE (GFR) BETWEEN 45-59 ML/MIN/1.73 SQUARE METER AND ALBUMINURIA CREATININE RATIO LESS THAN 30 MG/G (CMS/H*: ICD-10-CM

## 2025-03-28 LAB
ALBUMIN SERPL-MCNC: 4.2 G/DL (ref 3.5–5.2)
ALBUMIN/GLOB SERPL: 1.2 G/DL
ALP SERPL-CCNC: 154 U/L (ref 39–117)
ALT SERPL W P-5'-P-CCNC: 46 U/L (ref 1–41)
ANION GAP SERPL CALCULATED.3IONS-SCNC: 10.4 MMOL/L (ref 5–15)
AST SERPL-CCNC: 39 U/L (ref 1–40)
BACTERIA UR QL AUTO: NORMAL /HPF
BASOPHILS # BLD AUTO: 0.04 10*3/MM3 (ref 0–0.2)
BASOPHILS NFR BLD AUTO: 0.3 % (ref 0–1.5)
BILIRUB SERPL-MCNC: 0.4 MG/DL (ref 0–1.2)
BILIRUB UR QL STRIP: NEGATIVE
BUN SERPL-MCNC: 20 MG/DL (ref 8–23)
BUN/CREAT SERPL: 17.9 (ref 7–25)
CALCIUM SPEC-SCNC: 9.1 MG/DL (ref 8.6–10.5)
CHLORIDE SERPL-SCNC: 102 MMOL/L (ref 98–107)
CLARITY UR: CLEAR
CO2 SERPL-SCNC: 20.6 MMOL/L (ref 22–29)
COLOR UR: YELLOW
CREAT SERPL-MCNC: 1.12 MG/DL (ref 0.76–1.27)
CREAT UR-MCNC: 185.7 MG/DL
DEPRECATED RDW RBC AUTO: 52.5 FL (ref 37–54)
EGFRCR SERPLBLD CKD-EPI 2021: 70.7 ML/MIN/1.73
EOSINOPHIL # BLD AUTO: 0.1 10*3/MM3 (ref 0–0.4)
EOSINOPHIL NFR BLD AUTO: 0.8 % (ref 0.3–6.2)
ERYTHROCYTE [DISTWIDTH] IN BLOOD BY AUTOMATED COUNT: 19.1 % (ref 12.3–15.4)
FERRITIN SERPL-MCNC: 1108 NG/ML (ref 30–400)
GLOBULIN UR ELPH-MCNC: 3.6 GM/DL
GLUCOSE SERPL-MCNC: 150 MG/DL (ref 65–99)
GLUCOSE UR STRIP-MCNC: NEGATIVE MG/DL
HCT VFR BLD AUTO: 46.3 % (ref 37.5–51)
HGB BLD-MCNC: 15 G/DL (ref 13–17.7)
HGB UR QL STRIP.AUTO: NEGATIVE
HYALINE CASTS UR QL AUTO: NORMAL /LPF
IMM GRANULOCYTES # BLD AUTO: 0.12 10*3/MM3 (ref 0–0.05)
IMM GRANULOCYTES NFR BLD AUTO: 0.9 % (ref 0–0.5)
IRON 24H UR-MRATE: 105 MCG/DL (ref 59–158)
IRON SATN MFR SERPL: 22 % (ref 20–50)
KETONES UR QL STRIP: ABNORMAL
LEUKOCYTE ESTERASE UR QL STRIP.AUTO: NEGATIVE
LYMPHOCYTES # BLD AUTO: 2.07 10*3/MM3 (ref 0.7–3.1)
LYMPHOCYTES NFR BLD AUTO: 15.7 % (ref 19.6–45.3)
MCH RBC QN AUTO: 26.2 PG (ref 26.6–33)
MCHC RBC AUTO-ENTMCNC: 32.4 G/DL (ref 31.5–35.7)
MCV RBC AUTO: 80.9 FL (ref 79–97)
MONOCYTES # BLD AUTO: 1.16 10*3/MM3 (ref 0.1–0.9)
MONOCYTES NFR BLD AUTO: 8.8 % (ref 5–12)
NEUTROPHILS NFR BLD AUTO: 73.5 % (ref 42.7–76)
NEUTROPHILS NFR BLD AUTO: 9.71 10*3/MM3 (ref 1.7–7)
NITRITE UR QL STRIP: NEGATIVE
NRBC BLD AUTO-RTO: 0 /100 WBC (ref 0–0.2)
PH UR STRIP.AUTO: 5.5 [PH] (ref 5–8)
PHOSPHATE SERPL-MCNC: 1.5 MG/DL (ref 2.5–4.5)
PLATELET # BLD AUTO: 234 10*3/MM3 (ref 140–450)
PMV BLD AUTO: 11 FL (ref 6–12)
POTASSIUM SERPL-SCNC: 4.1 MMOL/L (ref 3.5–5.2)
PROT ?TM UR-MCNC: 244 MG/DL
PROT SERPL-MCNC: 7.8 G/DL (ref 6–8.5)
PROT UR QL STRIP: ABNORMAL
PROT/CREAT UR: 1313.9 MG/G CREA (ref 0–200)
RBC # BLD AUTO: 5.72 10*6/MM3 (ref 4.14–5.8)
RBC # UR STRIP: NORMAL /HPF
REF LAB TEST METHOD: NORMAL
SODIUM SERPL-SCNC: 133 MMOL/L (ref 136–145)
SP GR UR STRIP: >1.03 (ref 1–1.03)
SQUAMOUS #/AREA URNS HPF: NORMAL /HPF
TIBC SERPL-MCNC: 477 MCG/DL (ref 298–536)
TRANSFERRIN SERPL-MCNC: 320 MG/DL (ref 200–360)
URATE SERPL-MCNC: 4.9 MG/DL (ref 3.4–7)
UROBILINOGEN UR QL STRIP: ABNORMAL
WBC # UR STRIP: NORMAL /HPF
WBC NRBC COR # BLD AUTO: 13.2 10*3/MM3 (ref 3.4–10.8)

## 2025-03-28 PROCEDURE — 85025 COMPLETE CBC W/AUTO DIFF WBC: CPT

## 2025-03-28 PROCEDURE — 80053 COMPREHEN METABOLIC PANEL: CPT

## 2025-03-28 PROCEDURE — 84550 ASSAY OF BLOOD/URIC ACID: CPT

## 2025-03-28 PROCEDURE — 84100 ASSAY OF PHOSPHORUS: CPT

## 2025-03-28 PROCEDURE — 84156 ASSAY OF PROTEIN URINE: CPT

## 2025-03-28 PROCEDURE — 82570 ASSAY OF URINE CREATININE: CPT

## 2025-03-28 PROCEDURE — 82728 ASSAY OF FERRITIN: CPT

## 2025-03-28 PROCEDURE — 36415 COLL VENOUS BLD VENIPUNCTURE: CPT

## 2025-03-28 PROCEDURE — 84466 ASSAY OF TRANSFERRIN: CPT

## 2025-03-28 PROCEDURE — 83540 ASSAY OF IRON: CPT

## 2025-03-28 PROCEDURE — 81001 URINALYSIS AUTO W/SCOPE: CPT

## 2025-03-28 NOTE — PROGRESS NOTES
Physical Therapy Daily Treatment Note/30 day re-assess    [facility]  [address] [place] [address]      Patient: Isaias Monaco   : 1955  Referring practitioner: Gwyn Patten Sr., MD  Date of Initial Visit: Type: THERAPY  Noted: 2/10/2025  Today's Date: 3/28/2025  Patient seen for 7 sessions       Visit Diagnoses:    ICD-10-CM ICD-9-CM   1. Lumbar pain  M54.50 724.2   2. Cervical myelopathy  G95.9 721.1   3. History of fusion of cervical spine  Z98.1 V45.4   4. Difficulty walking  R26.2 719.7   5. History of total knee arthroplasty, right  Z96.651 V43.65   6. History of repair of right rotator cuff  Z98.890 V15.29       Subjective Evaluation    History of Present Illness    Subjective comment: pt had to take some time off for iron infusion the past couple weeks. Feeling better now and headed to [place] for a nice vacation next week. Also started with trigger point injections (11) in his back and neck via pain management which has really helped. This was on 3-19-25 and also started Tramadol plus Tylenol in the morning. Also started on Tumeric/noah/pepper for blood quality. Also started on Magnesium at night.       Objective   See Exercise, Manual, and Modality Logs for complete treatment.   Access Code: [email or web address]  URL: [email or web address]  Date: 2025  Prepared by: Zorre Kimura    Exercises  - Single Leg Stance  - 1 x daily - 7 x weekly - 2 sets - 7sec hold  - Tandem Stance  - 1 x daily - 7 x weekly - 2 sets - 30s hold  - Standing Forward Step Taps with Counter Support  - 1 x daily - 7 x weekly - 2 sets - 10 reps  - Standing Shoulder Flexion Wall Walk  - 1 x daily - 7 x weekly - 3 reps - 15 hold  - Shoulder Scaption AAROM with Dowel  - 1 x daily - 7 x weekly - 3 reps - 15 hold    Assessment/Plan    Goals  Plan Goals: STGs 4 weeks:  1. Improve NDI score from 48 to < 40%. MET. 32%  2. Improve TOBIN score from 62 to < 55%. MET 50%  3. Pt to be 75% indep in variety of HEP.  MET  4. Pt to try aquatic therapy to assess potential benefit. MET and likes this form of ex  5. Pt to state 10-20% decrease in pain from TENS and meds per MD plan. MET 50% better.   6. Assess balance via WILSON. Scored 80%. Goal. 90%.   LTGs 12 weeks:  1. NDI score < 35%. MET 32%  2. Neck Rotation to 35 degrees or greater. Partially MET. Rotation R 35 while L just 30 degrees.   3. TOBIN score < 40%. Not MET. 50%  4. Lumbar extension to 10 degrees and pain free. Partially MET. 5 degrees.   5. Pt indep in land/balance and aquatic ex. Ongoing  6. Pt with plan for ongoing mobility activities 3 days/week    Pt seen for his 7th session today. Have worked in the pool and on land and today focus was on balance assessment with WILSON test. Pt will now start HEP while standing in a corner to prevent falls.   Spouse present and understands program and precautions.   Pt doing great with new pain management MD offering meds and holistic options too.     P: see in 2-3 weeks after [place] vacation. Goals being met and pt can benefit from ongoing intervention.     Timed:         Manual Therapy:         mins  13834;     Therapeutic Exercise:    15     mins  92147;     Neuromuscular Meño:    30    mins  98179;    Therapeutic Activity:          mins  70836;     Gait Training:           mins  16626;     Ultrasound:          mins  23069;    Ionto                                   mins   89352  Self Care                            mins   42257  Canalith Repos         mins 24216  Work hardening   __   min 38230/76843      Un-Timed:  Electrical Stimulation:         mins  45669 ( );  Dry Needling          mins self-pay  Traction          mins 88406      Timed Treatment:   45   mins   Total Treatment:     45   mins    Zorre Zeno Kimura, PT  [place] License: [id number]    [place] License:  [id number]

## 2025-04-01 NOTE — ASSESSMENT & PLAN NOTE
Diabetes is stable.   Medication changes per orders.  Reminded to bring in blood sugar diary at next visit.  Recommended an ADA diet.  Regular aerobic exercise.  Discussed ways to avoid symptomatic hypoglycemia.  Discussed sick day management.  Discussed foot care.  Diabetes will be reassessed in 6 months

## 2025-04-14 ENCOUNTER — TREATMENT (OUTPATIENT)
Dept: PHYSICAL THERAPY | Facility: CLINIC | Age: 70
End: 2025-04-14
Payer: MEDICARE

## 2025-04-14 DIAGNOSIS — R26.2 DIFFICULTY WALKING: ICD-10-CM

## 2025-04-14 DIAGNOSIS — Z98.890 HISTORY OF REPAIR OF RIGHT ROTATOR CUFF: ICD-10-CM

## 2025-04-14 DIAGNOSIS — Z96.651 HISTORY OF TOTAL KNEE ARTHROPLASTY, RIGHT: ICD-10-CM

## 2025-04-14 DIAGNOSIS — G95.9 CERVICAL MYELOPATHY: ICD-10-CM

## 2025-04-14 DIAGNOSIS — M54.50 LUMBAR PAIN: Primary | ICD-10-CM

## 2025-04-14 DIAGNOSIS — Z98.1 HISTORY OF FUSION OF CERVICAL SPINE: ICD-10-CM

## 2025-04-14 PROCEDURE — 97110 THERAPEUTIC EXERCISES: CPT | Performed by: PHYSICAL THERAPIST

## 2025-04-14 NOTE — PROGRESS NOTES
Physical Therapy Daily Treatment Note    [facility]  [address] [place] [address]      Patient: Isaias Monaco   : 1955  Referring practitioner: Gwyn Patten Sr., MD  Date of Initial Visit: Type: THERAPY  Noted: 2/10/2025  Today's Date: 2025  Patient seen for 8 sessions       Visit Diagnoses:    ICD-10-CM ICD-9-CM   1. Lumbar pain  M54.50 724.2   2. Cervical myelopathy  G95.9 721.1   3. History of fusion of cervical spine  Z98.1 V45.4   4. Difficulty walking  R26.2 719.7   5. History of total knee arthroplasty, right  Z96.651 V43.65   6. History of repair of right rotator cuff  Z98.890 V15.29       Subjective Evaluation    History of Present Illness    Subjective comment: Good vacation. Iron infusions helped. More fatigue and will talk with his Dr. about the Robaxin at night or the 2 tramadols he is taking every morning.       Objective   See Exercise, Manual, and Modality Logs for complete treatment.       Assessment & Plan       Assessment  Assessment details: Pt did well with his routine today. Focus on keeping shoulder flexion loose and hamstrings from venkat to not facilitate poor gait with knee flexion and forward head.   Pt's spouse concerned about his lethargy and pt actually to start on Cymbalta today too, in addition to 2 tramadols on waking.   Spouse will track his response to new meds and discuss with MD any excessive lethargy.     P: in the water next week. Ortho later this week for injection to R knee. No complaints of knee pain with ex today.           Timed:         Manual Therapy:         mins  18062;     Therapeutic Exercise:    45     mins  33500;     Neuromuscular Meño:        mins  89820;    Therapeutic Activity:          mins  57526;     Gait Training:           mins  94215;     Ultrasound:          mins  75213;    Ionto                                   mins   95255  Self Care                            mins   78700  Canalith Repos         mins  76319  Work hardening   __   min 62639/54201      Un-Timed:  Electrical Stimulation:         mins  33123 ( );  Dry Needling          mins self-pay  Traction          mins 66518      Timed Treatment:   45   mins   Total Treatment:     45   mins    Zorre Zeno Kimura, PT  [place] License: [id number]    [place] License:  [id number]

## 2025-04-15 RX ORDER — CLOPIDOGREL BISULFATE 75 MG/1
75 TABLET ORAL DAILY
Qty: 90 TABLET | Refills: 2 | Status: SHIPPED | OUTPATIENT
Start: 2025-04-15 | End: 2025-04-16

## 2025-04-16 ENCOUNTER — PATIENT MESSAGE (OUTPATIENT)
Dept: FAMILY MEDICINE CLINIC | Facility: CLINIC | Age: 70
End: 2025-04-16
Payer: MEDICARE

## 2025-04-16 DIAGNOSIS — R53.83 MALAISE AND FATIGUE: Primary | ICD-10-CM

## 2025-04-16 DIAGNOSIS — R53.81 MALAISE AND FATIGUE: Primary | ICD-10-CM

## 2025-04-16 DIAGNOSIS — E55.9 VITAMIN D DEFICIENCY: ICD-10-CM

## 2025-04-16 RX ORDER — CLOPIDOGREL BISULFATE 75 MG/1
75 TABLET ORAL DAILY
Qty: 90 TABLET | Refills: 2 | Status: SHIPPED | OUTPATIENT
Start: 2025-04-16

## 2025-04-18 ENCOUNTER — OFFICE VISIT (OUTPATIENT)
Dept: ORTHOPEDIC SURGERY | Facility: CLINIC | Age: 70
End: 2025-04-18
Payer: MEDICARE

## 2025-04-18 VITALS — BODY MASS INDEX: 27.71 KG/M2 | HEIGHT: 77 IN | WEIGHT: 234.7 LBS | TEMPERATURE: 97.8 F

## 2025-04-18 DIAGNOSIS — M17.12 PRIMARY OSTEOARTHRITIS OF LEFT KNEE: Primary | ICD-10-CM

## 2025-04-18 RX ORDER — ISOSORBIDE MONONITRATE 30 MG/1
60 TABLET, EXTENDED RELEASE ORAL DAILY
COMMUNITY
Start: 2024-12-26

## 2025-04-18 RX ORDER — METHYLPREDNISOLONE ACETATE 80 MG/ML
80 INJECTION, SUSPENSION INTRA-ARTICULAR; INTRALESIONAL; INTRAMUSCULAR; SOFT TISSUE
Status: COMPLETED | OUTPATIENT
Start: 2025-04-18 | End: 2025-04-18

## 2025-04-18 RX ORDER — ROSUVASTATIN CALCIUM 10 MG/1
1 TABLET, COATED ORAL DAILY
COMMUNITY

## 2025-04-18 RX ORDER — OXYCODONE AND ACETAMINOPHEN 7.5; 325 MG/1; MG/1
TABLET ORAL EVERY 6 HOURS SCHEDULED
COMMUNITY

## 2025-04-18 RX ORDER — METHOCARBAMOL 500 MG/1
TABLET, FILM COATED ORAL EVERY 6 HOURS SCHEDULED
COMMUNITY

## 2025-04-18 RX ADMIN — METHYLPREDNISOLONE ACETATE 80 MG: 80 INJECTION, SUSPENSION INTRA-ARTICULAR; INTRALESIONAL; INTRAMUSCULAR; SOFT TISSUE at 13:59

## 2025-04-21 ENCOUNTER — TREATMENT (OUTPATIENT)
Dept: PHYSICAL THERAPY | Facility: CLINIC | Age: 70
End: 2025-04-21
Payer: MEDICARE

## 2025-04-21 DIAGNOSIS — M54.50 LUMBAR PAIN: Primary | ICD-10-CM

## 2025-04-21 DIAGNOSIS — G95.9 CERVICAL MYELOPATHY: ICD-10-CM

## 2025-04-21 DIAGNOSIS — Z98.890 HISTORY OF REPAIR OF RIGHT ROTATOR CUFF: ICD-10-CM

## 2025-04-21 DIAGNOSIS — Z98.1 HISTORY OF FUSION OF CERVICAL SPINE: ICD-10-CM

## 2025-04-21 DIAGNOSIS — Z96.651 HISTORY OF TOTAL KNEE ARTHROPLASTY, RIGHT: ICD-10-CM

## 2025-04-21 DIAGNOSIS — R26.2 DIFFICULTY WALKING: ICD-10-CM

## 2025-04-21 PROCEDURE — 97113 AQUATIC THERAPY/EXERCISES: CPT | Performed by: PHYSICAL THERAPIST

## 2025-04-21 NOTE — PROGRESS NOTES
Physical Therapy Daily Treatment Note    [facility]  [address] [place] [address]      Patient: Isaias Monaco   : 1955  Referring practitioner: Gwyn Patten Sr., MD  Date of Initial Visit: Type: THERAPY  Noted: 2/10/2025  Today's Date: 2025  Patient seen for 9 sessions       Visit Diagnoses:    ICD-10-CM ICD-9-CM   1. Lumbar pain  M54.50 724.2   2. Cervical myelopathy  G95.9 721.1   3. History of fusion of cervical spine  Z98.1 V45.4   4. Difficulty walking  R26.2 719.7   5. History of total knee arthroplasty, right  Z96.651 V43.65   6. History of repair of right rotator cuff  Z98.890 V15.29       Subjective Evaluation    History of Present Illness    Subjective comment: doing pretty well. will have another lumbar epidural next week so will cancel PT on Wednesday. Hopes to use their pool at the [facility] this summer so eager to learn more ex today       Objective   See Exercise, Manual, and Modality Logs for complete treatment.   Aquatic Ex: cool water today thus long laps  Derrick 4 chest high water  Walking forward, easy stride 1 length  Backward walking 1 length  Side stepping 1 length  Marching in place 20x  Long strides fwd with dbells, 1 length  Jogging 1 length  Fwd walks with blue AQ logix bells, 1 length  Fwd walk with black AQ logix bells, 1 length  Arm sweeps crossovers 20x black logix bells  Arm alternating strides 20x black logix bells    Stomach high water   Fwd walking 3 lengths working on reciprocal arm pattern        Assessment/Plan    Pt did well today and would benefit from black AQ logix bells and spouse plans to order  Pt with significant forward head which is challenging to fix.  Gait tends to be flexed at trunk with arms behind him vs. Normal reciprocal pattern. Did better by the 3rd lap but more work needed here.    P: see on land in a couple days and use walking sticks for gait pattern.       Timed:         Manual Therapy:         mins  65860;     Therapeutic  Exercise:         mins  34289;     Neuromuscular Meño:        mins  06232;    Therapeutic Activity:          mins  71203;     Gait Training:           mins  30201;     Ultrasound:          mins  71592;    Ionto                                   mins   47044  Self Care                            mins   99525  Canalith Repos         mins 49014  Work hardening   __   min 31071/26792  Aquatic Ex  30   min   43471      Un-Timed:  Electrical Stimulation:         mins  23331 ( );  Dry Needling          mins self-pay  Traction          mins 72880      Timed Treatment:   30   mins   Total Treatment:     30   mins    Zorre Zeno Kimura, PT  [place] License: [id number]    [place] License:  [id number]

## 2025-04-22 RX ORDER — INSULIN GLARGINE 300 U/ML
40 INJECTION, SOLUTION SUBCUTANEOUS DAILY
Qty: 12 ML | Refills: 1 | Status: SHIPPED | OUTPATIENT
Start: 2025-04-22

## 2025-04-22 NOTE — TELEPHONE ENCOUNTER
Rx Refill Note  Requested Prescriptions     Pending Prescriptions Disp Refills    Insulin Glargine, 1 Unit Dial, (Toujeo SoloStar) 300 UNIT/ML solution pen-injector injection [Pharmacy Med Name: TOUJEO SOLOSTAR 300 UNIT/ML] 18 mL 0     Sig: INJECT 65 UNITS UNDER THE SKIN DAILY      Last office visit with prescribing clinician: 3/10/2025   Last telemedicine visit with prescribing clinician: Visit date not found   Next office visit with prescribing clinician: 9/22/2025                         Would you like a call back once the refill request has been completed: [] Yes [] No    If the office needs to give you a call back, can they leave a voicemail: [] Yes [] No    Angelita Griffiths  04/22/25, 15:53 EDT

## 2025-04-23 ENCOUNTER — TREATMENT (OUTPATIENT)
Dept: PHYSICAL THERAPY | Facility: CLINIC | Age: 70
End: 2025-04-23
Payer: MEDICARE

## 2025-04-23 DIAGNOSIS — G95.9 CERVICAL MYELOPATHY: ICD-10-CM

## 2025-04-23 DIAGNOSIS — R26.2 DIFFICULTY WALKING: ICD-10-CM

## 2025-04-23 DIAGNOSIS — Z96.651 HISTORY OF TOTAL KNEE ARTHROPLASTY, RIGHT: ICD-10-CM

## 2025-04-23 DIAGNOSIS — M54.50 LUMBAR PAIN: Primary | ICD-10-CM

## 2025-04-23 DIAGNOSIS — Z98.1 HISTORY OF FUSION OF CERVICAL SPINE: ICD-10-CM

## 2025-04-23 DIAGNOSIS — Z98.890 HISTORY OF REPAIR OF RIGHT ROTATOR CUFF: ICD-10-CM

## 2025-04-23 NOTE — PROGRESS NOTES
Physical Therapy Daily Treatment Note/Discharge Summary    [facility]  [address] [place] [address]      Patient: Isaias Monaco   : 1955  Referring practitioner: Gwyn Patten Sr., MD  Date of Initial Visit: Type: THERAPY  Noted: 2/10/2025  Today's Date: 2025  Patient seen for 10 sessions       Visit Diagnoses:    ICD-10-CM ICD-9-CM   1. Lumbar pain  M54.50 724.2   2. Cervical myelopathy  G95.9 721.1   3. History of fusion of cervical spine  Z98.1 V45.4   4. Difficulty walking  R26.2 719.7   5. History of total knee arthroplasty, right  Z96.651 V43.65   6. History of repair of right rotator cuff  Z98.890 V15.29       Subjective Evaluation    History of Present Illness    Subjective comment: spouse and pt both expressing desire to dc today feeling like they have made quite a bit of progress since starting PT back on 2-10-25 when he came in with severe back pain and overall rigidity.       Objective   See Exercise, Manual, and Modality Logs for complete treatment.       Assessment & Plan       Assessment  Assessment details: Pt did well the past 10 visits experiencing both land and water interventions. Pt should do well working in his [employer] pool starting in January. Hopefully will also do his land based HEP with focus on shoulder ROM  and hamstring stretching.   Pt with strong forward lean to posture with shoulders in extension as counter balance. Is able to use walking sticks but prefers not to right now.   Pt and spouse feel like their new pain management  Has offered a lot of good interventions with injections, meds, and OTC natural products.   R foot LE still a bit delayed with coordination. Spouse states this is related to his cervical stenosis/myelopathy.         Goals  Plan Goals: STGs 4 weeks:  1. Improve NDI score from 48 to < 40%. MET. 32%  2. Improve TOBIN score from 62 to < 55%. MET 50%  3. Pt to be 75% indep in variety of HEP. MET  4. Pt to try aquatic therapy to assess  potential benefit. MET and likes this form of ex  5. Pt to state 10-20% decrease in pain from TENS and meds per MD plan. MET 50% better.   6. Assess balance via WILSON. Scored 80%. Goal. 90%. MET 91%  LTGs 12 weeks:  1. NDI score < 35%. MET 32%  2. Neck Rotation to 35 degrees or greater.  MET 40 degrees.   3. TOBIN score < 40%. Not MET. 46%  4. Lumbar extension to 10 degrees and pain free. Partially MET. 5 degrees.   5. Pt indep in land/balance and aquatic ex. MET  6. Pt with plan for ongoing mobility activities 3 days/week. MET will start in his pool in 4 weeks      Timed:         Manual Therapy:         mins  55976;     Therapeutic Exercise:    30     mins  46980;     Neuromuscular Meño:    10    mins  70236;    Therapeutic Activity:     15     mins  72600;     Gait Training:           mins  80591;     Ultrasound:          mins  35641;    Ionto                                   mins   59497  Self Care                            mins   26029  Canalith Repos         mins 08095  Work hardening   __   min 33523/06801      Un-Timed:  Electrical Stimulation:         mins  80757 ( );  Dry Needling          mins self-pay  Traction          mins 16616      Timed Treatment:   55   mins   Total Treatment:     55   mins    Zorre Zeno Kimura, PT  [place] License: [id number]    [place] License:  [id number]

## 2025-04-24 ENCOUNTER — TRANSCRIBE ORDERS (OUTPATIENT)
Dept: ADMINISTRATIVE | Facility: HOSPITAL | Age: 70
End: 2025-04-24
Payer: MEDICARE

## 2025-04-24 DIAGNOSIS — M51.86 OTHER INTERVERTEBRAL DISC DISORDERS, LUMBAR REGION: Primary | ICD-10-CM

## 2025-04-28 ENCOUNTER — TELEPHONE (OUTPATIENT)
Age: 70
End: 2025-04-28
Payer: MEDICARE

## 2025-04-28 NOTE — TELEPHONE ENCOUNTER
We received the fax below wanting to know if Isaias is clear to have a Small Bowel Enteroscopy and hold his Plavix for 5 day prior to his procedure.    We saw him last on 2/24/25. His last cath w/PCI was on 1/3/25. He is on Plavix 75mg and Aspirin 81mg.    He was referred to GI by Hematology for severe anemia. The scanned office note is under Media on 3/27/25.    Please advise if he is clear, how lila he can hold his Plavix for, and if he need to wait how long and what directions then.    Thanks,  Jenny

## 2025-04-29 ENCOUNTER — PATIENT MESSAGE (OUTPATIENT)
Age: 70
End: 2025-04-29
Payer: MEDICARE

## 2025-04-30 RX ORDER — CARVEDILOL 12.5 MG/1
12.5 TABLET ORAL 2 TIMES DAILY WITH MEALS
Qty: 180 TABLET | Refills: 3 | Status: SHIPPED | OUTPATIENT
Start: 2025-04-30

## 2025-04-30 RX ORDER — ISOSORBIDE MONONITRATE 60 MG/1
60 TABLET, EXTENDED RELEASE ORAL DAILY
Qty: 90 TABLET | Refills: 3 | Status: SHIPPED | OUTPATIENT
Start: 2025-04-30

## 2025-05-01 ENCOUNTER — LAB (OUTPATIENT)
Dept: LAB | Facility: HOSPITAL | Age: 70
End: 2025-05-01
Payer: MEDICARE

## 2025-05-01 ENCOUNTER — HOSPITAL ENCOUNTER (OUTPATIENT)
Dept: MRI IMAGING | Facility: HOSPITAL | Age: 70
Discharge: HOME | End: 2025-05-01
Payer: MEDICARE

## 2025-05-01 DIAGNOSIS — M51.86 OTHER INTERVERTEBRAL DISC DISORDERS, LUMBAR REGION: ICD-10-CM

## 2025-05-01 DIAGNOSIS — E78.5 HYPERLIPIDEMIA, UNSPECIFIED HYPERLIPIDEMIA TYPE: ICD-10-CM

## 2025-05-01 DIAGNOSIS — Z79.4 TYPE 2 DIABETES MELLITUS WITH HYPERGLYCEMIA, WITH LONG-TERM CURRENT USE OF INSULIN: ICD-10-CM

## 2025-05-01 DIAGNOSIS — E11.65 TYPE 2 DIABETES MELLITUS WITH HYPERGLYCEMIA, WITH LONG-TERM CURRENT USE OF INSULIN: ICD-10-CM

## 2025-05-01 LAB
25(OH)D3 SERPL-MCNC: 34.2 NG/ML (ref 30–100)
ALBUMIN SERPL-MCNC: 4.2 G/DL (ref 3.5–5.2)
ALBUMIN/GLOB SERPL: 1.2 G/DL
ALP SERPL-CCNC: 129 U/L (ref 39–117)
ALT SERPL W P-5'-P-CCNC: 47 U/L (ref 1–41)
ANION GAP SERPL CALCULATED.3IONS-SCNC: 10.1 MMOL/L (ref 5–15)
AST SERPL-CCNC: 40 U/L (ref 1–40)
BILIRUB SERPL-MCNC: 0.4 MG/DL (ref 0–1.2)
BUN SERPL-MCNC: 16 MG/DL (ref 8–23)
BUN/CREAT SERPL: 13.1 (ref 7–25)
CALCIUM SPEC-SCNC: 9.6 MG/DL (ref 8.6–10.5)
CHLORIDE SERPL-SCNC: 100 MMOL/L (ref 98–107)
CO2 SERPL-SCNC: 25.9 MMOL/L (ref 22–29)
CREAT SERPL-MCNC: 1.22 MG/DL (ref 0.76–1.27)
EGFRCR SERPLBLD CKD-EPI 2021: 63.8 ML/MIN/1.73
GLOBULIN UR ELPH-MCNC: 3.5 GM/DL
GLUCOSE SERPL-MCNC: 114 MG/DL (ref 65–99)
HBA1C MFR BLD: 6 % (ref 4.8–5.6)
POTASSIUM SERPL-SCNC: 4.5 MMOL/L (ref 3.5–5.2)
PROT SERPL-MCNC: 7.7 G/DL (ref 6–8.5)
SODIUM SERPL-SCNC: 136 MMOL/L (ref 136–145)
VIT B12 BLD-MCNC: 474 PG/ML (ref 211–946)

## 2025-05-01 PROCEDURE — 72148 MRI LUMBAR SPINE W/O DYE: CPT

## 2025-05-01 PROCEDURE — 36415 COLL VENOUS BLD VENIPUNCTURE: CPT

## 2025-05-01 PROCEDURE — 80053 COMPREHEN METABOLIC PANEL: CPT

## 2025-05-01 PROCEDURE — 83036 HEMOGLOBIN GLYCOSYLATED A1C: CPT

## 2025-05-01 PROCEDURE — 82607 VITAMIN B-12: CPT | Performed by: FAMILY MEDICINE

## 2025-05-01 PROCEDURE — 82306 VITAMIN D 25 HYDROXY: CPT | Performed by: FAMILY MEDICINE

## 2025-05-23 ENCOUNTER — TELEPHONE (OUTPATIENT)
Age: 70
End: 2025-05-23
Payer: MEDICARE

## 2025-05-23 DIAGNOSIS — E78.5 HYPERLIPIDEMIA LDL GOAL <70: Primary | ICD-10-CM

## 2025-05-23 NOTE — TELEPHONE ENCOUNTER
Outpt lab is calling because patient is there to get lipid panel but there are no orders.  This is from our office note 2/24/25:      Can you place order for lipid panel?    Carlyn Steve [facility] Triage  05/23/25 12:32 EDT

## 2025-05-23 NOTE — TELEPHONE ENCOUNTER
I called and let pt and spouse know about lipid panel orders.    She had a follow up question that she was going to send you a [employer] message about but I told her I would pass it along.      Patient continues to experience some low BPs and intermittent dizziness.  He saw nephro provider about a month ago and stopped his cardura completely. (you had decreased this from 4 mg to 2 mg due to similar symptoms at pt last appt in February).    He continues with BP mostly 100s-110/60.  Some SBPs can get into the 80s sometimes.  HR is running in the 80s.    He is on coreg 12.5 mg BID and imdur 60 mg daily.    Spouse is asking if they can decrease the coreg dose to see if that helps?  I asked how long he has been on the flomax and she said a couple of months PCP put him on it because he voids a lot at night.  I let them know sometimes that can cause symptoms like this too just to be aware.    Thanks for putting in the lipid panel orders!    Carlyn Avelar RN  [place] [facility] Triage  05/23/25 14:25 EDT    I will remove cardura from list since nephro took him off.

## 2025-05-27 ENCOUNTER — LAB (OUTPATIENT)
Facility: HOSPITAL | Age: 70
End: 2025-05-27
Payer: MEDICARE

## 2025-05-27 PROCEDURE — 80061 LIPID PANEL: CPT | Performed by: INTERNAL MEDICINE

## 2025-05-28 ENCOUNTER — HOSPITAL ENCOUNTER (EMERGENCY)
Facility: HOSPITAL | Age: 70
Discharge: HOME | End: 2025-05-28
Attending: STUDENT IN AN ORGANIZED HEALTH CARE EDUCATION/TRAINING PROGRAM | Admitting: STUDENT IN AN ORGANIZED HEALTH CARE EDUCATION/TRAINING PROGRAM
Payer: MEDICARE

## 2025-05-28 VITALS
SYSTOLIC BLOOD PRESSURE: 132 MMHG | OXYGEN SATURATION: 95 % | BODY MASS INDEX: 25.98 KG/M2 | DIASTOLIC BLOOD PRESSURE: 94 MMHG | TEMPERATURE: 97.2 F | RESPIRATION RATE: 16 BRPM | WEIGHT: 220 LBS | HEART RATE: 93 BPM | HEIGHT: 77 IN

## 2025-05-28 DIAGNOSIS — R35.0 URINARY FREQUENCY: Primary | ICD-10-CM

## 2025-05-28 DIAGNOSIS — R79.89 ELEVATED LFTS: ICD-10-CM

## 2025-05-28 LAB
ALBUMIN SERPL-MCNC: 4 G/DL (ref 3.5–5.2)
ALBUMIN/GLOB SERPL: 1.3 G/DL
ALP SERPL-CCNC: 119 U/L (ref 39–117)
ALT SERPL W P-5'-P-CCNC: 55 U/L (ref 1–41)
ANION GAP SERPL CALCULATED.3IONS-SCNC: 9.6 MMOL/L (ref 5–15)
AST SERPL-CCNC: 47 U/L (ref 1–40)
BACTERIA UR QL AUTO: NORMAL /HPF
BASOPHILS # BLD AUTO: 0 10*3/MM3 (ref 0–0.2)
BASOPHILS NFR BLD AUTO: 0 % (ref 0–1.5)
BILIRUB SERPL-MCNC: 0.4 MG/DL (ref 0–1.2)
BILIRUB UR QL STRIP: NEGATIVE
BUN SERPL-MCNC: 8.7 MG/DL (ref 8–23)
BUN/CREAT SERPL: 8.5 (ref 7–25)
CALCIUM SPEC-SCNC: 9.6 MG/DL (ref 8.6–10.5)
CHLORIDE SERPL-SCNC: 100 MMOL/L (ref 98–107)
CLARITY UR: CLEAR
CO2 SERPL-SCNC: 23.4 MMOL/L (ref 22–29)
COLOR UR: YELLOW
CREAT SERPL-MCNC: 1.02 MG/DL (ref 0.76–1.27)
DEPRECATED RDW RBC AUTO: 65.7 FL (ref 37–54)
EGFRCR SERPLBLD CKD-EPI 2021: 79.1 ML/MIN/1.73
EOSINOPHIL # BLD AUTO: 0.14 10*3/MM3 (ref 0–0.4)
EOSINOPHIL NFR BLD AUTO: 1.8 % (ref 0.3–6.2)
ERYTHROCYTE [DISTWIDTH] IN BLOOD BY AUTOMATED COUNT: 20.6 % (ref 12.3–15.4)
GLOBULIN UR ELPH-MCNC: 3.2 GM/DL
GLUCOSE SERPL-MCNC: 187 MG/DL (ref 65–99)
GLUCOSE UR STRIP-MCNC: NEGATIVE MG/DL
HCT VFR BLD AUTO: 50 % (ref 37.5–51)
HGB BLD-MCNC: 16.3 G/DL (ref 13–17.7)
HGB UR QL STRIP.AUTO: NEGATIVE
HOLD SPECIMEN: NORMAL
HYALINE CASTS UR QL AUTO: NORMAL /LPF
IMM GRANULOCYTES # BLD AUTO: 0.05 10*3/MM3 (ref 0–0.05)
IMM GRANULOCYTES NFR BLD AUTO: 0.6 % (ref 0–0.5)
KETONES UR QL STRIP: ABNORMAL
LEUKOCYTE ESTERASE UR QL STRIP.AUTO: NEGATIVE
LYMPHOCYTES # BLD AUTO: 1.92 10*3/MM3 (ref 0.7–3.1)
LYMPHOCYTES NFR BLD AUTO: 24.5 % (ref 19.6–45.3)
MCH RBC QN AUTO: 28.6 PG (ref 26.6–33)
MCHC RBC AUTO-ENTMCNC: 32.6 G/DL (ref 31.5–35.7)
MCV RBC AUTO: 87.7 FL (ref 79–97)
MONOCYTES # BLD AUTO: 0.4 10*3/MM3 (ref 0.1–0.9)
MONOCYTES NFR BLD AUTO: 5.1 % (ref 5–12)
NEUTROPHILS NFR BLD AUTO: 5.34 10*3/MM3 (ref 1.7–7)
NEUTROPHILS NFR BLD AUTO: 68 % (ref 42.7–76)
NITRITE UR QL STRIP: NEGATIVE
PH UR STRIP.AUTO: 6 [PH] (ref 5–8)
PLATELET # BLD AUTO: 198 10*3/MM3 (ref 140–450)
PMV BLD AUTO: 9.8 FL (ref 6–12)
POTASSIUM SERPL-SCNC: 4 MMOL/L (ref 3.5–5.2)
PROT SERPL-MCNC: 7.2 G/DL (ref 6–8.5)
PROT UR QL STRIP: ABNORMAL
RBC # BLD AUTO: 5.7 10*6/MM3 (ref 4.14–5.8)
RBC # UR STRIP: NORMAL /HPF
REF LAB TEST METHOD: NORMAL
SODIUM SERPL-SCNC: 133 MMOL/L (ref 136–145)
SP GR UR STRIP: 1.02 (ref 1–1.03)
SQUAMOUS #/AREA URNS HPF: NORMAL /HPF
UROBILINOGEN UR QL STRIP: ABNORMAL
WBC # UR STRIP: NORMAL /HPF
WBC NRBC COR # BLD AUTO: 7.85 10*3/MM3 (ref 3.4–10.8)

## 2025-05-28 PROCEDURE — 51798 US URINE CAPACITY MEASURE: CPT

## 2025-05-28 PROCEDURE — 81001 URINALYSIS AUTO W/SCOPE: CPT | Performed by: PHYSICIAN ASSISTANT

## 2025-05-28 PROCEDURE — 99283 EMERGENCY DEPT VISIT LOW MDM: CPT

## 2025-05-28 PROCEDURE — 85025 COMPLETE CBC W/AUTO DIFF WBC: CPT | Performed by: PHYSICIAN ASSISTANT

## 2025-05-28 PROCEDURE — 80053 COMPREHEN METABOLIC PANEL: CPT | Performed by: PHYSICIAN ASSISTANT

## 2025-05-28 NOTE — FSED PROVIDER NOTE
Subjective   History of Present Illness    Patient, history of diabetes, chronic kidney disease, coronary artery disease, is complaining of suprapubic abdominal pressure, urinary frequency, and decreased appetite which started about 1 week ago.  Spouse expressed concern for patient's unintentional weight loss.  She reports that he has lost about 20 pounds the past 2 months and a total of 60 pounds in the past 2 years.  Denies any nausea, vomiting, fever, flank pain.    Review of Systems   Constitutional:  Negative for activity change and appetite change.   Eyes:  Negative for pain.   Respiratory:  Negative for shortness of breath.    Gastrointestinal:  Negative for nausea and vomiting.   Genitourinary:  Positive for dysuria and urgency.   Musculoskeletal:  Negative for arthralgias.   Skin:  Negative for color change.   Neurological:  Negative for dizziness.   All other systems reviewed and are negative.      Past Medical History:   Diagnosis Date    Abnormal EKG 12/26/2024    Allergic     Lisinopril, Norvasc    Anemia     post hemorrhagic    Angioedema 02/21/2016    Secondary to ACE inhibitor    Anxiety     Arthritis     CAD (coronary artery disease)     CAP (community acquired pneumonia) 12/11/2024    Chronic kidney disease     Colon polyps     Diabetes mellitus, type 2     GERD (gastroesophageal reflux disease)     Glaucoma     Hematoma     history    High cholesterol     History of foreign travel 12/2017; 08/2018    [place], [place], [place], [place], [place] and [place]; [place]    Hyperlipidemia     Hypertension     Hypogonadism in male 09/28/2016    Injury of back     Low back pain     Male erectile disorder     Microalbuminuria     Obesity     Osteoarthritis     knee    Pneumonia 12/2024    PVCs (premature ventricular contractions) 12/26/2024    Spinal stenosis of lumbar region without neurogenic claudication 06/02/2021    Tubular adenoma of colon 11/01/2017    Vitamin D deficiency        Allergies    Allergen Reactions    Nsaids Other (See Comments)     Renal failure    Amlodipine Swelling and Other (See Comments)    Atorvastatin Myalgia    Hydralazine Myalgia    Zetia [Ezetimibe] Nausea And Vomiting    Crestor [Rosuvastatin] Other (See Comments)     Flu like s/s    Ace Inhibitors Angioedema    Evolocumab Nausea And Vomiting and Palpitations    Lisinopril Angioedema    Testosterone Myalgia       Past Surgical History:   Procedure Laterality Date    CARDIAC CATHETERIZATION N/A 05/15/2006    Dr. Mini Camarillo    CARDIAC CATHETERIZATION N/A 03/10/2020    Procedure: Left Heart Cath;  Surgeon: Miguel Ángel Karimi MD;  [facility];  Service: Cardiology;  Laterality: N/A;    CARDIAC CATHETERIZATION N/A 03/10/2020    Procedure: Stent DAVONTE coronary;  Surgeon: Miguel Ángel Karimi MD;  [facility];  Service: Cardiology;  Laterality: N/A;    CARDIAC CATHETERIZATION N/A 03/10/2020    Procedure: Coronary angiography;  Surgeon: Miguel Ángel Karimi MD;  [facility];  Service: Cardiology;  Laterality: N/A;    CARDIAC CATHETERIZATION N/A 03/10/2020    Procedure: Left ventriculography;  Surgeon: Miguel Ángel Karimi MD;  [facility];  Service: Cardiology;  Laterality: N/A;    CARDIAC CATHETERIZATION N/A 05/20/2022    Procedure: Left Heart Cath;  Surgeon: Mackenzie Morales MD;  [facility];  Service: Cardiovascular;  Laterality: N/A;    CARDIAC CATHETERIZATION N/A 05/20/2022    Procedure: Coronary angiography;  Surgeon: Mackenzie Morales MD;  [facility];  Service: Cardiovascular;  Laterality: N/A;    CARDIAC CATHETERIZATION N/A 05/20/2022    Procedure: Percutaneous Coronary Intervention;  Surgeon: Mackenzie Morales MD;  [facility];  Service: Cardiovascular;  Laterality: N/A;    CARDIAC CATHETERIZATION N/A 05/24/2022     Procedure: Percutaneous Coronary Intervention;  Surgeon: Miguel Ángel Karimi MD;  Location:  [facility] CATH INVASIVE LOCATION;  Service: Cardiovascular;  Laterality: N/A;  LAD and Cx    CARDIAC CATHETERIZATION N/A 05/24/2022    Procedure: Stent DAVONTE coronary;  Surgeon: Miguel Ángel Karimi MD;  Location:  [facility] CATH INVASIVE LOCATION;  Service: Cardiovascular;  Laterality: N/A;    CARDIAC CATHETERIZATION N/A 05/24/2022    Procedure: Resting Full Cycle Ratio;  Surgeon: Miguel Ángel Karimi MD;  Location:  [facility] CATH INVASIVE LOCATION;  Service: Cardiovascular;  Laterality: N/A;    CARDIAC CATHETERIZATION N/A 03/19/2024    Procedure: Left Heart Cath;  Surgeon: Miguel Ángel Karimi MD;  [facility];  Service: Cardiovascular;  Laterality: N/A;    CARDIAC CATHETERIZATION N/A 03/19/2024    Procedure: Percutaneous Coronary Intervention;  Surgeon: Miguel Ángel Karimi MD;  [facility];  Service: Cardiovascular;  Laterality: N/A;    CARDIAC CATHETERIZATION N/A 03/19/2024    Procedure: Stent DAVONTE coronary;  Surgeon: Miguel Ángel Karimi MD;  [facility];  Service: Cardiovascular;  Laterality: N/A;    CARDIAC CATHETERIZATION N/A 01/03/2025    Procedure: Coronary angiography;  Surgeon: Miguel Ángel Karimi MD;  [facility];  Service: Cardiovascular;  Laterality: N/A;    CARDIAC CATHETERIZATION N/A 01/03/2025    Procedure: Left heart cath;  Surgeon: Miguel Ángel Karimi MD;  [facility];  Service: Cardiovascular;  Laterality: N/A;    CARDIAC CATHETERIZATION N/A 01/03/2025    Procedure: Left ventriculography;  Surgeon: Miguel Ángel Karimi MD;  [facility];  Service: Cardiovascular;  Laterality: N/A;    CARDIAC CATHETERIZATION N/A 01/03/2025    Procedure: Resting Full Cycle Ratio;  Surgeon: Miguel Ángel Karimi MD;  [facility]  [facility];  Service: Cardiovascular;  Laterality: N/A;    CARDIAC CATHETERIZATION N/A 01/03/2025    Procedure: Percutaneous Coronary Intervention;  Surgeon: Miguel Ángel Karimi MD;  Location:  [facility] CATH INVASIVE LOCATION;  Service: Cardiovascular;  Laterality: N/A;    CARDIAC CATHETERIZATION N/A 01/03/2025    Procedure: Stent DAVONTE coronary;  Surgeon: Miguel Ángel Karimi MD;  Location:  [facility] CATH INVASIVE LOCATION;  Service: Cardiovascular;  Laterality: N/A;    CERVICAL DISCECTOMY POSTERIOR FUSION WITH INSTRUMENTATION Bilateral 06/25/2024    Procedure: Cervical 3 to cervical 6 laminectomies and posterior spinal fusion - Bilateral;  Surgeon: Marshal Miguel MD;  [facility];  Service: Neurosurgery;  Laterality: Bilateral;    COLONOSCOPY N/A 02/22/2006    Bilobed polyp at 30 cm, hemorrhoids-Dr. Ilya Zhao    COLONOSCOPY N/A 02/28/2014    Normal ileum, one 6 mm polyp in the mid transverse colon-Dr. Ilya Zhao    COLONOSCOPY N/A 11/19/2008    Ela ileum, two 3 to 4 mm polyps, non-bleeding internal hemorrhoids, repeat in 5 years-Dr. Ilya Zhao    COLONOSCOPY N/A 10/31/2017    Procedure: COLONOSCOPY WITH POLYPECTOMY (COLD BIOPSY);  Surgeon: Ilya Zhao MD;  Location: [facility] ENDOSCOPY;  Service:     COLONOSCOPY N/A 05/21/2021    Procedure: Colonoscopy into cecum and terminal ileum with cold biopsy polypectomy;  Surgeon: Ilya Zhao MD;  Location: [facility] ENDOSCOPY;  Service: Gastroenterology;  Laterality: N/A;  Pre op: History of Polyps  Post op: Polyp    CORONARY ANGIOPLASTY WITH STENT PLACEMENT  2007, 2012, 2015    cardiac stents x3 occasions    CORONARY STENT PLACEMENT      ENDOSCOPY N/A 10/04/2017    Procedure: ESOPHAGOGASTRODUODENOSCOPY;  Surgeon: Boyd Guidry MD;  Location: [facility] ENDOSCOPY;  Service:     ENDOSCOPY N/A 05/21/2021    Procedure: ESOPHAGOGASTRODUODENOSCOPY with biopsies;  Surgeon: Ilya Zhao MD;  Location: [facility] ENDOSCOPY;  Service: Gastroenterology;   Laterality: N/A;  Pre op: GERD  Post op: Irregular  Z-Line, Hiatal Hernia, Gastritis    ENDOSCOPY N/A 08/25/2023    Procedure: ESOPHAGOGASTRODUODENOSCOPY with biopsy;  Surgeon: Ilya Zhao MD;  Location: [facility] ENDOSCOPY;  Service: Gastroenterology;  Laterality: N/A;  errosive gastritis    EPIDURAL BLOCK      INTERVENTIONAL RADIOLOGY PROCEDURE N/A 05/20/2022    Procedure: Intravascular Ultrasound;  Surgeon: Mackenzie Morales MD;  Location: [facility] CATH INVASIVE LOCATION;  Service: Cardiovascular;  Laterality: N/A;    INTRAVASCULAR ULTRASOUND N/A 01/03/2025    Procedure: Intravascular Ultrasound;  Surgeon: Miguel Ángel Karimi MD;  Location: [facility] CATH INVASIVE LOCATION;  Service: Cardiovascular;  Laterality: N/A;    JOINT REPLACEMENT      KNEE INCISION AND DRAINAGE Right 06/20/2017    Procedure: RT. KNEE WASHOUT ;  Surgeon: Boyd Coyne MD;  Location: [facility] MAIN OR;  Service:     MEDIAL BRANCH BLOCK Bilateral 12/17/2021    Procedure: LUMBAR MEDIAL BRANCH BLOCK bilateral ~L4-S1 x2 (~2 weeks apart);  Surgeon: Christina Villaseñor MD;  Location: [facility] MAIN OR;  Service: Pain Management;  Laterality: Bilateral;    MEDIAL BRANCH BLOCK Bilateral 01/03/2022    Procedure: LUMBAR MEDIAL BRANCH BLOCK bilateral ~L4-S1 x2 (~2 weeks apart);  Surgeon: Christina Villaseñor MD;  [facility];  Service: Pain Management;  Laterality: Bilateral;    NC ARTHRP KNE CONDYLE&PLATU MEDIAL&LAT COMPARTMENTS Right 06/15/2017    Procedure: RT TOTAL KNEE ARTHROPLASTY;  Surgeon: Boyd Coyne MD;  Location: [facility] MAIN OR;  Service: Orthopedics    RADIOFREQUENCY ABLATION Bilateral 01/10/2022    Procedure: RADIOFREQUENCY ABLATION NERVES Bilateral L4-S1;  Surgeon: Christina Villaseñor MD;  Location: [facility] MAIN OR;  Service: Pain Management;  Laterality: Bilateral;    SHOULDER SURGERY Right 2016    rotator cuff repair    UPPER GASTROINTESTINAL ENDOSCOPY N/A 10/13/2015    Z-line irregular, normal stomach, normal duodenum-Dr. Ilya Zhao     UPPER GASTROINTESTINAL ENDOSCOPY N/A 02/28/2014    Z-line irregular, normal stomach, normal duodenum-Dr. Ilya Zhao    UPPER GASTROINTESTINAL ENDOSCOPY N/A 11/19/2008    Z-line irregular, chronic gastritis withotu hemorrhage, normal duodenum-Dr. Ilya Zhao    UPPER GASTROINTESTINAL ENDOSCOPY N/A 06/22/2006    LA Grade A reflux esophagitis, non-bleeding erythematous gastropathy, normal duodenum-Dr. Ilya Zhao       Family History   Problem Relation Age of Onset    Lupus Sister     Thyroid disease Sister     Heart disease Other     Hypertension Other     Arthritis Mother     Hyperlipidemia Mother     Hypertension Mother     Thyroid disease Mother     Lupus Mother     Vision loss Mother     Heart disease Father     Arthritis Father     Lupus Father     Depression Father     Alcohol abuse Father     Dementia Father     Hypertension Father     Heart disease Brother     Heart attack Brother 40    Thyroid disease Sister     Arthritis Brother     Malig Hyperthermia Neg Hx        Social History     Socioeconomic History    Marital status:      Spouse name: Micaela    Number of children: 1    Years of education: College   Tobacco Use    Smoking status: Never     Passive exposure: Never    Smokeless tobacco: Never    Tobacco comments:     CAFFEINE USE: 2 CUPS COFFEE DAILY   Vaping Use    Vaping status: Never Used   Substance and Sexual Activity    Alcohol use: Yes     Alcohol/week: 3.0 standard drinks of alcohol     Types: 1 Glasses of wine, 2 Shots of liquor per week    Drug use: Never    Sexual activity: Not Currently     Partners: Female     Birth control/protection: Post-menopausal           Objective   Physical Exam  Vitals and nursing note reviewed.   Constitutional:       Appearance: Normal appearance. He is normal weight.   HENT:      Head: Normocephalic and atraumatic.      Nose: Nose normal.      Mouth/Throat:      Mouth: Mucous membranes are moist.   Eyes:      Pupils: Pupils are equal, round, and reactive to  light.   Cardiovascular:      Rate and Rhythm: Normal rate and regular rhythm.      Pulses: Normal pulses.      Heart sounds: Normal heart sounds.   Pulmonary:      Effort: Pulmonary effort is normal.      Breath sounds: Normal breath sounds.   Musculoskeletal:         General: Normal range of motion.      Cervical back: Normal range of motion.      Right lower leg: No edema.      Left lower leg: No edema.   Skin:     General: Skin is warm.   Neurological:      General: No focal deficit present.      Mental Status: He is alert.   Psychiatric:         Behavior: Behavior is cooperative.         Procedures           ED Course  ED Course as of 05/28/25 1242   Wed May 28, 2025   1229 No urine noted in the bladder scan after urinating [SS]   1241 Differential diagnosis include prostatitis, bladder obstruction syndrome, urinary tract infection, kidney stone [SS]   1241 I informed patient and spouse regarding the results.  Patient and spouse are aware of the chronically elevated LFTs.  I will refer patient to [facility] for urinary frequency.  Patient and spouse agree with the plan of care and all questions addressed at this time. [SS]      ED Course User Index  [SS] Qing Pino PA-C                                           Medical Decision Making  Problems Addressed:  Elevated LFTs: complicated acute illness or injury  Urinary frequency: complicated acute illness or injury    Amount and/or Complexity of Data Reviewed  Labs: ordered.        Final diagnoses:   Urinary frequency   Elevated LFTs       ED Disposition  ED Disposition       ED Disposition   Discharge    Condition   Stable    Comment   --               Pineda Sotelo MD  [address] [place] [address] [place] [id number] [phone]    Call            Medication List      No changes were made to your prescriptions during this visit.

## 2025-05-28 NOTE — DISCHARGE INSTRUCTIONS
I advise that you call the urology office today to schedule close follow-up appointment with urology regarding the urinary frequency.  Follow-up with primary care next week to recheck your symptoms.

## 2025-06-12 ENCOUNTER — OFFICE VISIT (OUTPATIENT)
Dept: FAMILY MEDICINE CLINIC | Facility: CLINIC | Age: 70
End: 2025-06-12
Payer: MEDICARE

## 2025-06-12 VITALS
OXYGEN SATURATION: 98 % | HEART RATE: 98 BPM | DIASTOLIC BLOOD PRESSURE: 90 MMHG | BODY MASS INDEX: 26.15 KG/M2 | WEIGHT: 221.5 LBS | SYSTOLIC BLOOD PRESSURE: 140 MMHG | RESPIRATION RATE: 20 BRPM | HEIGHT: 77 IN

## 2025-06-12 DIAGNOSIS — R35.1 NOCTURIA ASSOCIATED WITH BENIGN PROSTATIC HYPERPLASIA: ICD-10-CM

## 2025-06-12 DIAGNOSIS — N40.1 NOCTURIA ASSOCIATED WITH BENIGN PROSTATIC HYPERPLASIA: ICD-10-CM

## 2025-06-12 DIAGNOSIS — R10.2 PELVIC PAIN: Primary | ICD-10-CM

## 2025-06-12 DIAGNOSIS — R06.02 SHORTNESS OF BREATH: ICD-10-CM

## 2025-06-12 RX ORDER — FINASTERIDE 5 MG/1
5 TABLET, FILM COATED ORAL DAILY
Qty: 30 TABLET | Refills: 12 | Status: SHIPPED | OUTPATIENT
Start: 2025-06-12

## 2025-06-12 NOTE — PROGRESS NOTES
Chief Complaint  Chief Complaint   Patient presents with    Urinary Frequency     Pt c/o urinary frequency getting worse over the last month      Pelvic Pain     Pt c/o pelvic pain only when lying flat x 1 month        Subjective    History of Present Illness        Isaias Monaco presents to [facility] for   History of Present Illness  The patient presents for evaluation of urinary frequency, pelvic pain, and shortness of breath.    He has been experiencing urinary frequency for the past month, which has been particularly disruptive at night, necessitating bathroom visits every 30 minutes to an hour. This symptom is less bothersome during the day when he is upright or when he sleeps in a recliner. However, when lying flat in bed, the frequency increases, leading to frequent awakenings to use the bathroom. He also reports a weak urinary stream. A urinalysis conducted 2 to 3 weeks ago was unremarkable, and a bladder scan revealed complete emptying of the bladder. He is currently on Flomax, which was temporarily discontinued due to perceived worsening of symptoms but was resumed after no improvement was noted. He occasionally experiences lightheadedness upon standing from a seated position, although his blood pressure remains within normal limits. He is not allergic to contrast dye.    He describes experiencing sharp pelvic pain, rating it as an 8 on a scale of 1 to 10, which he localizes to the bladder area. This pain is only present when he is lying down and is absent during waking hours. The pain is severe enough to disrupt his sleep. He sought emergency care at [facility] ER a few days prior to his trip due to concerns of a urinary tract infection or urinary retention, but all tests returned normal results. He has a scheduled appointment with a urologist on 07/18/2025. He reports no bowel irregularities.    He experienced significant shortness of breath during a recent  Kidney Infection: Care Instructions  Your Care Instructions    A kidney infection (pyelonephritis) is a type of urinary tract infection, or UTI. Most UTIs are bladder infections. Kidney infections tend to make people much sicker than bladder infections do. A kidney infection is also more serious because it can cause lasting damage if it is not treated quickly. Follow-up care is a key part of your treatment and safety. Be sure to make and go to all appointments, and call your doctor if you are having problems. It's also a good idea to know your test results and keep a list of the medicines you take. How can you care for yourself at home? · Take your antibiotics as directed. Do not stop taking them just because you feel better. You need to take the full course of antibiotics. · Drink plenty of water, enough so that your urine is light yellow or clear like water. This may help wash out bacteria that are causing the infection. If you have kidney, heart, or liver disease and have to limit fluids, talk with your doctor before you increase the amount of fluids you drink. · Urinate often. Try to empty your bladder each time. · To relieve pain, take a hot shower or lay a heating pad (set on low) over your lower belly. Never go to sleep with a heating pad in place. Put a thin cloth between the heating pad and your skin. To help prevent kidney infections  · Drink plenty of water each day. This helps you urinate often, which clears bacteria from your system. If you have kidney, heart, or liver disease and have to limit fluids, talk with your doctor before you increase the amount of fluids you drink. · Urinate when you have the urge. Do not hold your urine for a long time. Urinate before you go to sleep. · If you have symptoms of a bladder infection, such as burning when you urinate or having to urinate often, call your doctor so you can treat the problem before it gets worse.  If you do not treat a bladder "trip to [place] and [place], where they were at an altitude of approximately 7000 to 8000 feet. This symptom was also present during a previous visit to [place], [place], five years ago, but it was more pronounced this time. He reports that his breathing difficulties have improved since returning from the trip.    He has been on Ozempic for his diabetes, prescribed by his endocrinologist. His blood sugars and A1c are well-controlled.     History of Present Illness      Objective   Vital Signs:   Visit Vitals  /90   Pulse 98   Resp 20   Ht 195.6 cm (77\")   Wt 100 kg (221 lb 8 oz)   SpO2 98%   BMI 26.27 kg/m²                Physical Exam  Vitals reviewed.   Constitutional:       Appearance: He is well-developed.   HENT:      Head: Normocephalic.      Right Ear: External ear normal.      Left Ear: External ear normal.      Nose: Nose normal.   Eyes:      Conjunctiva/sclera: Conjunctivae normal.   Cardiovascular:      Rate and Rhythm: Normal rate and regular rhythm.   Pulmonary:      Effort: Pulmonary effort is normal.      Breath sounds: Normal breath sounds.   Abdominal:      General: Abdomen is flat. Bowel sounds are normal.      Palpations: Abdomen is soft.      Tenderness: There is abdominal tenderness.   Musculoskeletal:         General: Normal range of motion.      Cervical back: Normal range of motion and neck supple.   Skin:     General: Skin is warm and dry.      Capillary Refill: Capillary refill takes less than 2 seconds.   Neurological:      Mental Status: He is alert and oriented to person, place, and time.        Physical Exam  Respiratory: Clear to auscultation, no wheezing, rales or rhonchi           Result Review :  Results  Labs   - Urinalysis: Clean   - PSA levels: Normal    Imaging   - Bladder scan: Bladder was completely empty                          Assessment and Plan      Diagnoses and all orders for this visit:    1. Pelvic pain (Primary)  Assessment & Plan:  - Pain described as sharp, " infection quickly, it can spread to the kidney. · Men should keep the tip of the penis clean. If you are a woman, keep these ideas in mind:  · Urinate right after you have sex. · Change sanitary pads often. Avoid douches, feminine hygiene sprays, and other feminine hygiene products that have deodorants. · After going to the bathroom, wipe from front to back. When should you call for help? Call your doctor now or seek immediate medical care if:  ? · You have symptoms that a kidney infection is getting worse. These may include:  ¨ Pain or burning when you urinate. ¨ A frequent need to urinate without being able to pass much urine. ¨ Pain in the flank, which is just below the rib cage and above the waist on either side of the back. ¨ Blood in the urine. ¨ A fever. ? · You are vomiting or nauseated. ? Watch closely for changes in your health, and be sure to contact your doctor if:  ? · You do not get better as expected. Where can you learn more? Go to http://andrew-michelle.info/. Enter V864 in the search box to learn more about \"Kidney Infection: Care Instructions. \"  Current as of: May 12, 2017  Content Version: 11.4  © 2427-3771 [employer]. Care instructions adapted under license by [employer] (which disclaims liability or warranty for this information). If you have questions about a medical condition or this instruction, always ask your healthcare professional. [place] [address] any warranty or liability for your use of this information. rated 8/10, occurring when lying flat in bed.  - No pain when sleeping in a recliner.  - Discussed the need for further investigation due to the severity and positional nature of pain.  - CT scan ordered to investigate the cause of symptoms.    Orders:  -     CT Abdomen Pelvis With Contrast; Future  -     Ambulatory Referral to Urology    2. Nocturia associated with benign prostatic hyperplasia  Assessment & Plan:  - Symptoms include nocturnal urinary frequency, weak stream, and positional exacerbation when lying flat.  - PSA levels are normal; bladder scan showed complete emptying; urinalysis was clean.  - Discussed the likelihood of benign prostatic hyperplasia; positional nature of symptoms noted.  - Discontinue Flomax and start finasteride; CT scan ordered; attempt to expedite urology appointment.    Orders:  -     finasteride (PROSCAR) 5 MG tablet; Take 1 tablet by mouth Daily.  Dispense: 30 tablet; Refill: 12  -     Ambulatory Referral to Urology    3. Shortness of breath  Assessment & Plan:  - Symptoms worsened during high altitude exposure in [place] and [place].  - Improvement noted since returning from the trip.  - Discussed that the shortness of breath was likely due to high altitude and should continue to improve.         Assessment & Plan             Follow Up   No follow-ups on file.  Patient was given instructions and counseling regarding his condition or for health maintenance advice. Please see specific information pulled into the AVS if appropriate.     Patient or patient representative verbalized consent for the use of Ambient Listening during the visit with  Gwyn Patten Sr, MD for chart documentation. 6/28/2025  16:22 EDT

## 2025-06-13 DIAGNOSIS — G99.2 STENOSIS OF CERVICAL SPINE WITH MYELOPATHY: ICD-10-CM

## 2025-06-13 DIAGNOSIS — M48.02 STENOSIS OF CERVICAL SPINE WITH MYELOPATHY: ICD-10-CM

## 2025-06-13 DIAGNOSIS — F41.0 PANIC DISORDER: ICD-10-CM

## 2025-06-13 DIAGNOSIS — F41.9 ANXIETY: ICD-10-CM

## 2025-06-13 DIAGNOSIS — M47.817 LUMBOSACRAL SPONDYLOSIS WITHOUT MYELOPATHY: ICD-10-CM

## 2025-06-13 RX ORDER — CLONAZEPAM 0.5 MG/1
0.5 TABLET ORAL DAILY PRN
Qty: 30 TABLET | Refills: 2 | Status: SHIPPED | OUTPATIENT
Start: 2025-06-13

## 2025-06-13 RX ORDER — TRAMADOL HYDROCHLORIDE 50 MG/1
50 TABLET ORAL DAILY
Qty: 30 TABLET | Refills: 0 | Status: SHIPPED | OUTPATIENT
Start: 2025-06-13

## 2025-06-13 NOTE — TELEPHONE ENCOUNTER
Rx Refill Note  Requested Prescriptions     Pending Prescriptions Disp Refills    clonazePAM (KlonoPIN) 0.5 MG tablet [Pharmacy Med Name: clonazePAM 0.5 MG TABLET] 30 tablet      Sig: TAKE 1 TABLET BY MOUTH DAILY AS NEEDED FOR ANXIETY    traMADol (ULTRAM) 50 MG tablet [Pharmacy Med Name: TRAMADOL HCL 50MG TABLET] 30 tablet      Sig: TAKE 1 TABLET BY MOUTH DAILY      Last office visit with prescribing clinician: 6/12/2025   Last telemedicine visit with prescribing clinician: Visit date not found   Next office visit with prescribing clinician: 9/29/2025                         Would you like a call back once the refill request has been completed: [] Yes [] No    If the office needs to give you a call back, can they leave a voicemail: [] Yes [] No    Jing Garcia MA  06/13/25, 13:46 EDT

## 2025-06-23 ENCOUNTER — HOSPITAL ENCOUNTER (OUTPATIENT)
Dept: CT IMAGING | Facility: HOSPITAL | Age: 70
Discharge: HOME | End: 2025-06-23
Admitting: FAMILY MEDICINE
Payer: MEDICARE

## 2025-06-23 DIAGNOSIS — R10.2 PELVIC PAIN: ICD-10-CM

## 2025-06-23 PROCEDURE — 74177 CT ABD & PELVIS W/CONTRAST: CPT

## 2025-06-23 PROCEDURE — 25510000001 IOPAMIDOL PER 1 ML: Performed by: FAMILY MEDICINE

## 2025-06-23 RX ORDER — IOPAMIDOL 755 MG/ML
100 INJECTION, SOLUTION INTRAVASCULAR
Status: COMPLETED | OUTPATIENT
Start: 2025-06-23 | End: 2025-06-23

## 2025-06-23 RX ADMIN — IOPAMIDOL 100 ML: 755 INJECTION, SOLUTION INTRAVENOUS at 12:12

## 2025-06-24 RX ORDER — SEMAGLUTIDE 2.68 MG/ML
INJECTION, SOLUTION SUBCUTANEOUS
Qty: 3 ML | Refills: 3 | Status: SHIPPED | OUTPATIENT
Start: 2025-06-24

## 2025-06-24 RX ORDER — PEN NEEDLE, DIABETIC 32GX 5/32"
NEEDLE, DISPOSABLE MISCELLANEOUS
Qty: 100 EACH | Refills: 3 | Status: SHIPPED | OUTPATIENT
Start: 2025-06-24

## 2025-06-24 NOTE — TELEPHONE ENCOUNTER
Rx Refill Note  Requested Prescriptions     Pending Prescriptions Disp Refills    Ozempic, 2 MG/DOSE, 8 MG/3ML solution pen-injector [Pharmacy Med Name: OZEMPIC 2 MG/DOSE (8 MG/3 ML)] 3 mL 2     Sig: DIAL AND INJECT UNDER THE SKIN 2 MG WEEKLY      Last office visit with prescribing clinician: 3/10/2025   Last telemedicine visit with prescribing clinician: Visit date not found   Next office visit with prescribing clinician: 9/22/2025                         Would you like a call back once the refill request has been completed: [] Yes [] No    If the office needs to give you a call back, can they leave a voicemail: [] Yes [] No  Carrie Griffiths MA  6/24/2025  07:39 EDT

## 2025-06-24 NOTE — TELEPHONE ENCOUNTER
Rx Refill Note  Requested Prescriptions     Pending Prescriptions Disp Refills    BD Pen Needle Hardik 2nd Gen 32G X 4 MM misc [Pharmacy Med Name: BD HARDIK 2 GEN PEN NDL 32G 4MM] 100 each      Sig: INJECT ONE INTO THE SKIN IN THE APPROPRIATE AREA AS DIRECTED THREE TIMES A DAY      Last office visit with prescribing clinician: 6/12/2025   Last telemedicine visit with prescribing clinician: Visit date not found   Next office visit with prescribing clinician: 9/29/2025                         Would you like a call back once the refill request has been completed: [] Yes [] No    If the office needs to give you a call back, can they leave a voicemail: [] Yes [] No    Jing Garcia MA  06/24/25, 10:40 EDT

## 2025-06-27 RX ORDER — ESCITALOPRAM OXALATE 20 MG/1
20 TABLET ORAL DAILY
Qty: 90 TABLET | Refills: 1 | Status: SHIPPED | OUTPATIENT
Start: 2025-06-27

## 2025-06-28 PROBLEM — R06.02 SHORTNESS OF BREATH: Status: ACTIVE | Noted: 2025-06-28

## 2025-06-28 PROBLEM — R10.2 PELVIC PAIN: Status: ACTIVE | Noted: 2025-06-28

## 2025-06-28 NOTE — ASSESSMENT & PLAN NOTE
- Symptoms worsened during high altitude exposure in [place] and [place].  - Improvement noted since returning from the trip.  - Discussed that the shortness of breath was likely due to high altitude and should continue to improve.

## 2025-06-28 NOTE — ASSESSMENT & PLAN NOTE
- Pain described as sharp, rated 8/10, occurring when lying flat in bed.  - No pain when sleeping in a recliner.  - Discussed the need for further investigation due to the severity and positional nature of pain.  - CT scan ordered to investigate the cause of symptoms.

## 2025-06-28 NOTE — ASSESSMENT & PLAN NOTE
- Symptoms include nocturnal urinary frequency, weak stream, and positional exacerbation when lying flat.  - PSA levels are normal; bladder scan showed complete emptying; urinalysis was clean.  - Discussed the likelihood of benign prostatic hyperplasia; positional nature of symptoms noted.  - Discontinue Flomax and start finasteride; CT scan ordered; attempt to expedite urology appointment.

## 2025-07-02 ENCOUNTER — PATIENT MESSAGE (OUTPATIENT)
Age: 70
End: 2025-07-02
Payer: MEDICARE

## 2025-07-02 ENCOUNTER — PATIENT MESSAGE (OUTPATIENT)
Dept: FAMILY MEDICINE CLINIC | Facility: CLINIC | Age: 70
End: 2025-07-02
Payer: MEDICARE

## 2025-07-02 DIAGNOSIS — M47.817 LUMBOSACRAL SPONDYLOSIS WITHOUT MYELOPATHY: ICD-10-CM

## 2025-07-02 DIAGNOSIS — M48.02 STENOSIS OF CERVICAL SPINE WITH MYELOPATHY: ICD-10-CM

## 2025-07-02 DIAGNOSIS — G99.2 STENOSIS OF CERVICAL SPINE WITH MYELOPATHY: ICD-10-CM

## 2025-07-03 RX ORDER — TRAMADOL HYDROCHLORIDE 50 MG/1
100 TABLET ORAL DAILY
Qty: 60 TABLET | Refills: 0 | Status: SHIPPED | OUTPATIENT
Start: 2025-07-03

## 2025-07-08 RX ORDER — CARVEDILOL 6.25 MG/1
6.25 TABLET ORAL 2 TIMES DAILY WITH MEALS
Qty: 180 TABLET | Refills: 3 | Status: SHIPPED | OUTPATIENT
Start: 2025-07-08

## 2025-07-16 ENCOUNTER — TELEPHONE (OUTPATIENT)
Dept: ONCOLOGY | Facility: CLINIC | Age: 70
End: 2025-07-16
Payer: MEDICARE

## 2025-07-16 NOTE — TELEPHONE ENCOUNTER
Caller: Micaela Monaco    Relationship to patient: Emergency Contact    Best call back number: [phone]    Chief complaint: RESCHEDULE    Type of visit: VITALS AND FOLLOW UP    Requested date: 9-18 EARLIER TIME OR DAY BEFORE      If rescheduling, when is the original appointment: 9-18     Additional notes:PLEASE ADVISE

## 2025-07-22 ENCOUNTER — ANESTHESIA (OUTPATIENT)
Dept: GASTROENTEROLOGY | Facility: HOSPITAL | Age: 70
End: 2025-07-22
Payer: MEDICARE

## 2025-07-22 ENCOUNTER — HOSPITAL ENCOUNTER (OUTPATIENT)
Facility: HOSPITAL | Age: 70
Setting detail: HOSPITAL OUTPATIENT SURGERY
Discharge: HOME | End: 2025-07-22
Attending: INTERNAL MEDICINE | Admitting: INTERNAL MEDICINE
Payer: MEDICARE

## 2025-07-22 ENCOUNTER — ANESTHESIA EVENT (OUTPATIENT)
Dept: GASTROENTEROLOGY | Facility: HOSPITAL | Age: 70
End: 2025-07-22
Payer: MEDICARE

## 2025-07-22 ENCOUNTER — ON CAMPUS - OUTPATIENT (OUTPATIENT)
Dept: URBAN - METROPOLITAN AREA HOSPITAL 114 | Facility: HOSPITAL | Age: 70
End: 2025-07-22
Payer: MEDICARE

## 2025-07-22 VITALS
OXYGEN SATURATION: 100 % | WEIGHT: 218 LBS | RESPIRATION RATE: 20 BRPM | HEIGHT: 77 IN | DIASTOLIC BLOOD PRESSURE: 89 MMHG | SYSTOLIC BLOOD PRESSURE: 122 MMHG | HEART RATE: 90 BPM | BODY MASS INDEX: 25.74 KG/M2

## 2025-07-22 DIAGNOSIS — D50.9 IRON DEFICIENCY ANEMIA, UNSPECIFIED: ICD-10-CM

## 2025-07-22 DIAGNOSIS — K31.811 ANGIODYSPLASIA OF STOMACH AND DUODENUM WITH BLEEDING: ICD-10-CM

## 2025-07-22 DIAGNOSIS — K22.89 OTHER SPECIFIED DISEASE OF ESOPHAGUS: ICD-10-CM

## 2025-07-22 DIAGNOSIS — D64.9 ANEMIA: ICD-10-CM

## 2025-07-22 DIAGNOSIS — K29.50 UNSPECIFIED CHRONIC GASTRITIS WITHOUT BLEEDING: ICD-10-CM

## 2025-07-22 LAB — GLUCOSE BLDC GLUCOMTR-MCNC: 122 MG/DL (ref 70–130)

## 2025-07-22 PROCEDURE — 82948 REAGENT STRIP/BLOOD GLUCOSE: CPT

## 2025-07-22 PROCEDURE — 44366 SMALL BOWEL ENDOSCOPY: CPT | Mod: 59 | Performed by: INTERNAL MEDICINE

## 2025-07-22 PROCEDURE — 25010000002 PROPOFOL 10 MG/ML EMULSION: Performed by: NURSE ANESTHETIST, CERTIFIED REGISTERED

## 2025-07-22 PROCEDURE — 44361 SMALL BOWEL ENDOSCOPY/BIOPSY: CPT | Performed by: INTERNAL MEDICINE

## 2025-07-22 PROCEDURE — 25810000003 LACTATED RINGERS PER 1000 ML: Performed by: INTERNAL MEDICINE

## 2025-07-22 PROCEDURE — 25010000002 LIDOCAINE 2% SOLUTION: Performed by: NURSE ANESTHETIST, CERTIFIED REGISTERED

## 2025-07-22 PROCEDURE — 88305 TISSUE EXAM BY PATHOLOGIST: CPT | Performed by: INTERNAL MEDICINE

## 2025-07-22 PROCEDURE — 25010000002 GLYCOPYRROLATE 0.2 MG/ML SOLUTION: Performed by: NURSE ANESTHETIST, CERTIFIED REGISTERED

## 2025-07-22 RX ORDER — SODIUM CHLORIDE, SODIUM LACTATE, POTASSIUM CHLORIDE, CALCIUM CHLORIDE 600; 310; 30; 20 MG/100ML; MG/100ML; MG/100ML; MG/100ML
30 INJECTION, SOLUTION INTRAVENOUS CONTINUOUS PRN
Status: DISCONTINUED | OUTPATIENT
Start: 2025-07-22 | End: 2025-07-22 | Stop reason: HOSPADM

## 2025-07-22 RX ORDER — LIDOCAINE HYDROCHLORIDE 20 MG/ML
INJECTION, SOLUTION INFILTRATION; PERINEURAL AS NEEDED
Status: DISCONTINUED | OUTPATIENT
Start: 2025-07-22 | End: 2025-07-22 | Stop reason: SURG

## 2025-07-22 RX ORDER — PROPOFOL 10 MG/ML
VIAL (ML) INTRAVENOUS AS NEEDED
Status: DISCONTINUED | OUTPATIENT
Start: 2025-07-22 | End: 2025-07-22 | Stop reason: SURG

## 2025-07-22 RX ORDER — GLYCOPYRROLATE 0.2 MG/ML
INJECTION INTRAMUSCULAR; INTRAVENOUS AS NEEDED
Status: DISCONTINUED | OUTPATIENT
Start: 2025-07-22 | End: 2025-07-22 | Stop reason: SURG

## 2025-07-22 RX ADMIN — GLYCOPYRROLATE 0.2 MG: 0.2 INJECTION INTRAMUSCULAR; INTRAVENOUS at 10:49

## 2025-07-22 RX ADMIN — PROPOFOL 70 MG: 10 INJECTION, EMULSION INTRAVENOUS at 10:55

## 2025-07-22 RX ADMIN — PROPOFOL 140 MCG/KG/MIN: 10 INJECTION, EMULSION INTRAVENOUS at 10:55

## 2025-07-22 RX ADMIN — PROPOFOL 20 MG: 10 INJECTION, EMULSION INTRAVENOUS at 10:57

## 2025-07-22 RX ADMIN — SODIUM CHLORIDE, POTASSIUM CHLORIDE, SODIUM LACTATE AND CALCIUM CHLORIDE 30 ML/HR: 600; 310; 30; 20 INJECTION, SOLUTION INTRAVENOUS at 10:23

## 2025-07-22 RX ADMIN — LIDOCAINE HYDROCHLORIDE 100 MG: 20 INJECTION, SOLUTION INFILTRATION; PERINEURAL at 10:55

## 2025-07-22 NOTE — H&P
[facility]   HISTORY AND PHYSICAL    Patient Name: Isaias Mnoaco  : 1955  MRN: [id number]  Primary Care Physician:  Gwyn Ptaten Sr., MD  Date of admission: 2025    Subjective   Subjective     Chief Complaint: iron deficiency    History of Present Illness  No black or bloody stools   Review of Systems   All other systems reviewed and are negative.       Personal History     Past Medical History:   Diagnosis Date    Abnormal EKG 2024    Allergic     Lisinopril, Norvasc    Anemia     post hemorrhagic    Angioedema 2016    Secondary to ACE inhibitor    Anxiety     Arthritis     CAD (coronary artery disease)     CAP (community acquired pneumonia) 2024    Chronic kidney disease     Colon polyps     Diabetes mellitus, type 2     GERD (gastroesophageal reflux disease)     Glaucoma     Hematoma     history    High cholesterol     History of foreign travel 2017; 2018    [place], [place], [place], [place], [place] and [place]; [place]    Hyperlipidemia     Hypertension     Hypogonadism in male 2016    Injury of back     Low back pain     Male erectile disorder     Microalbuminuria     Obesity     Osteoarthritis     knee    Pneumonia 2024    PVCs (premature ventricular contractions) 2024    Spinal stenosis of lumbar region without neurogenic claudication 2021    Tubular adenoma of colon 2017    Vitamin D deficiency        Past Surgical History:   Procedure Laterality Date    CARDIAC CATHETERIZATION N/A 05/15/2006    Dr. Mini Camarillo    CARDIAC CATHETERIZATION N/A 03/10/2020    Procedure: Left Heart Cath;  Surgeon: Miguel Ángel Karimi MD;  Location: [facility] CATH INVASIVE LOCATION;  Service: Cardiology;  Laterality: N/A;    CARDIAC CATHETERIZATION N/A 03/10/2020    Procedure: Stent DAVONTE coronary;  Surgeon: Miguel Ángel Karimi MD;  Location: [facility] CATH INVASIVE LOCATION;  Service: Cardiology;  Laterality: N/A;    CARDIAC CATHETERIZATION  N/A 03/10/2020    Procedure: Coronary angiography;  Surgeon: Miguel Ángel Karimi MD;  Location:  [facility] CATH INVASIVE LOCATION;  Service: Cardiology;  Laterality: N/A;    CARDIAC CATHETERIZATION N/A 03/10/2020    Procedure: Left ventriculography;  Surgeon: Miguel Ángel Karimi MD;  Location:  [facility] CATH INVASIVE LOCATION;  Service: Cardiology;  Laterality: N/A;    CARDIAC CATHETERIZATION N/A 05/20/2022    Procedure: Left Heart Cath;  Surgeon: Mackenzie Morales MD;  Location:  [facility] CATH INVASIVE LOCATION;  Service: Cardiovascular;  Laterality: N/A;    CARDIAC CATHETERIZATION N/A 05/20/2022    Procedure: Coronary angiography;  Surgeon: Mackenzie Morlaes MD;  Location:  [facility] CATH INVASIVE LOCATION;  Service: Cardiovascular;  Laterality: N/A;    CARDIAC CATHETERIZATION N/A 05/20/2022    Procedure: Percutaneous Coronary Intervention;  Surgeon: Mackenzie Morales MD;  [facility]AS[facility];  Service: Cardiovascular;  Laterality: N/A;    CARDIAC CATHETERIZATION N/A 05/24/2022    Procedure: Percutaneous Coronary Intervention;  Surgeon: Miguel Ángel Karimi MD;  Location:  [facility] CATH INVASIVE LOCATION;  Service: Cardiovascular;  Laterality: N/A;  LAD and Cx    CARDIAC CATHETERIZATION N/A 05/24/2022    Procedure: Stent DAVONTE coronary;  Surgeon: Miguel Ángel Karimi MD;  [facility];  Service: Cardiovascular;  Laterality: N/A;    CARDIAC CATHETERIZATION N/A 05/24/2022    Procedure: Resting Full Cycle Ratio;  Surgeon: Miguel Ángel Karimi MD;  [facility];  Service: Cardiovascular;  Laterality: N/A;    CARDIAC CATHETERIZATION N/A 03/19/2024    Procedure: Left Heart Cath;  Surgeon: Miguel Ángel Karimi MD;  [facility];  Service: Cardiovascular;  Laterality: N/A;    CARDIAC CATHETERIZATION N/A 03/19/2024    Procedure: Percutaneous Coronary Intervention;  Surgeon: Miguel Ángel Karimi MD;  [facility] C[facility]VASIVE  [facility];  Service: Cardiovascular;  Laterality: N/A;    CARDIAC CATHETERIZATION N/A 03/19/2024    Procedure: Stent DAVONTE coronary;  Surgeon: Miguel Ángel Karimi MD;  Location:  [facility] CATH INVASIVE LOCATION;  Service: Cardiovascular;  Laterality: N/A;    CARDIAC CATHETERIZATION N/A 01/03/2025    Procedure: Coronary angiography;  Surgeon: Miguel Ángel Karimi MD;  Location:  [facility] CATH INVASIVE LOCATION;  Service: Cardiovascular;  Laterality: N/A;    CARDIAC CATHETERIZATION N/A 01/03/2025    Procedure: Left heart cath;  Surgeon: Miguel Ángel Karimi MD;  Location:  [facility] CATH INVASIVE LOCATION;  Service: Cardiovascular;  Laterality: N/A;    CARDIAC CATHETERIZATION N/A 01/03/2025    Procedure: Left ventriculography;  Surgeon: Miguel Ángel Karimi MD;  Location:  [facility] CATH INVASIVE LOCATION;  Service: Cardiovascular;  Laterality: N/A;    CARDIAC CATHETERIZATION N/A 01/03/2025    Procedure: Resting Full Cycle Ratio;  Surgeon: Miguel Ángel Karimi MD;  Location:  [facility] CATH INVASIVE LOCATION;  Service: Cardiovascular;  Laterality: N/A;    CARDIAC CATHETERIZATION N/A 01/03/2025    Procedure: Percutaneous Coronary Intervention;  Surgeon: Miguel Ángel Karimi MD;  Location:  [facility] CATH INVASIVE LOCATION;  Service: Cardiovascular;  Laterality: N/A;    CARDIAC CATHETERIZATION N/A 01/03/2025    Procedure: Stent DAVONTE coronary;  Surgeon: Miguel Ángel Karimi MD;  [facility];  Service: Cardiovascular;  Laterality: N/A;    CERVICAL DISCECTOMY POSTERIOR FUSION WITH INSTRUMENTATION Bilateral 06/25/2024    Procedure: Cervical 3 to cervical 6 laminectomies and posterior spinal fusion - Bilateral;  Surgeon: Marshal Miguel MD;  Location: [facility] MAIN OR;  Service: Neurosurgery;  Laterality: Bilateral;    COLONOSCOPY N/A 02/22/2006    Bilobed polyp at 30 cm, hemorrhoids-Dr. Ilya Zhao    COLONOSCOPY N/A 02/28/2014    Normal ileum, one 6 mm polyp in the mid transverse colon-Dr. Ilya Zhao     COLONOSCOPY N/A 11/19/2008    Ela ileum, two 3 to 4 mm polyps, non-bleeding internal hemorrhoids, repeat in 5 years-Dr. Ilya Zhao    COLONOSCOPY N/A 10/31/2017    Procedure: COLONOSCOPY WITH POLYPECTOMY (COLD BIOPSY);  Surgeon: Ilya Zhao MD;  Location: [facility] ENDOSCOPY;  Service:     COLONOSCOPY N/A 05/21/2021    Procedure: Colonoscopy into cecum and terminal ileum with cold biopsy polypectomy;  Surgeon: Ilya Zhao MD;  Location: [facility] ENDOSCOPY;  Service: Gastroenterology;  Laterality: N/A;  Pre op: History of Polyps  Post op: Polyp    CORONARY ANGIOPLASTY WITH STENT PLACEMENT  2007, 2012, 2015    cardiac stents x3 occasions    CORONARY STENT PLACEMENT      ENDOSCOPY N/A 10/04/2017    Procedure: ESOPHAGOGASTRODUODENOSCOPY;  Surgeon: Boyd Guidry MD;  Location: [facility] ENDOSCOPY;  Service:     ENDOSCOPY N/A 05/21/2021    Procedure: ESOPHAGOGASTRODUODENOSCOPY with biopsies;  Surgeon: Ilya Zhao MD;  Location: [facility] ENDOSCOPY;  Service: Gastroenterology;  Laterality: N/A;  Pre op: GERD  Post op: Irregular  Z-Line, Hiatal Hernia, Gastritis    ENDOSCOPY N/A 08/25/2023    Procedure: ESOPHAGOGASTRODUODENOSCOPY with biopsy;  Surgeon: Ilya Zhao MD;  Location: [facility] ENDOSCOPY;  Service: Gastroenterology;  Laterality: N/A;  errosive gastritis    EPIDURAL BLOCK      INTERVENTIONAL RADIOLOGY PROCEDURE N/A 05/20/2022    Procedure: Intravascular Ultrasound;  Surgeon: Mackenzie Morales MD;  Location: [facility] CATH INVASIVE LOCATION;  Service: Cardiovascular;  Laterality: N/A;    INTRAVASCULAR ULTRASOUND N/A 01/03/2025    Procedure: Intravascular Ultrasound;  Surgeon: Miguel Ángel Karimi MD;  Location: [facility] CATH INVASIVE LOCATION;  Service: Cardiovascular;  Laterality: N/A;    JOINT REPLACEMENT      KNEE INCISION AND DRAINAGE Right 06/20/2017    Procedure: RT. KNEE WASHOUT ;  Surgeon: Boyd Coyne MD;  Location: [facility] MAIN OR;  Service:     MEDIAL BRANCH BLOCK Bilateral 12/17/2021     Procedure: LUMBAR MEDIAL BRANCH BLOCK bilateral ~L4-S1 x2 (~2 weeks apart);  Surgeon: Christina Villaseñor MD;  Location: [facility] MAIN OR;  Service: Pain Management;  Laterality: Bilateral;    MEDIAL BRANCH BLOCK Bilateral 01/03/2022    Procedure: LUMBAR MEDIAL BRANCH BLOCK bilateral ~L4-S1 x2 (~2 weeks apart);  Surgeon: Christina Villaseñor MD;  Location: [facility]IN [facility];  Service: Pain Management;  Laterality: Bilateral;    OH ARTHRP KNE CONDYLE&PLATU MEDIAL&LAT COMPARTMENTS Right 06/15/2017    Procedure: RT TOTAL KNEE ARTHROPLASTY;  Surgeon: Boyd Coyne MD;  Location: [facility] MAIN OR;  Service: Orthopedics    RADIOFREQUENCY ABLATION Bilateral 01/10/2022    Procedure: RADIOFREQUENCY ABLATION NERVES Bilateral L4-S1;  Surgeon: Christina Villaseñor MD;  Location: [facility]IN [facility];  Service: Pain Management;  Laterality: Bilateral;    SHOULDER SURGERY Right 2016    rotator cuff repair    UPPER GASTROINTESTINAL ENDOSCOPY N/A 10/13/2015    Z-line irregular, normal stomach, normal duodenum-Dr. Ilya Zhao    UPPER GASTROINTESTINAL ENDOSCOPY N/A 02/28/2014    Z-line irregular, normal stomach, normal duodenum-Dr. Ilya Zhao    UPPER GASTROINTESTINAL ENDOSCOPY N/A 11/19/2008    Z-line irregular, chronic gastritis withotu hemorrhage, normal duodenum-Dr. Ilya Zhao    UPPER GASTROINTESTINAL ENDOSCOPY N/A 06/22/2006    LA Grade A reflux esophagitis, non-bleeding erythematous gastropathy, normal duodenum-Dr. Ilya Zhao       Family History: family history includes Alcohol abuse in his father; Arthritis in his brother, father, and mother; Dementia in his father; Depression in his father; Heart attack (age of onset: 40) in his brother; Heart disease in his brother, father, and another family member; Hyperlipidemia in his mother; Hypertension in his father, mother, and another family member; Lupus in his father, mother, and sister; Thyroid disease in his mother, sister, and sister; Vision loss in his mother. Otherwise pertinent FHx was  reviewed and not pertinent to current issue.    Social History:  reports that he has never smoked. He has never been exposed to tobacco smoke. He has never used smokeless tobacco. He reports current alcohol use of about 3.0 standard drinks of alcohol per week. He reports that he does not use drugs.    Home Medications:  ARIPiprazole, DULoxetine, Inclisiran Sodium, Insulin Glargine (1 Unit Dial), Insulin Pen Needle, Semaglutide (2 MG/DOSE), Valbenazine Tosylate, Vibegron, acetaminophen, aspirin, carvedilol, clonazePAM, clopidogrel, dorzolamide-timolol, escitalopram, esomeprazole, finasteride, isosorbide mononitrate, latanoprost, metFORMIN, methocarbamol, oxyCODONE-acetaminophen, rosuvastatin, tamsulosin, and traMADol    Allergies:  Allergies   Allergen Reactions    Nsaids Other (See Comments)     Renal failure    Amlodipine Swelling and Other (See Comments)    Atorvastatin Myalgia    Hydralazine Myalgia    Zetia [Ezetimibe] Nausea And Vomiting    Crestor [Rosuvastatin] Other (See Comments)     Flu like s/s    Ace Inhibitors Angioedema    Evolocumab Nausea And Vomiting and Palpitations    Lisinopril Angioedema    Testosterone Myalgia       Objective    Objective     Vitals:   Heart Rate:  [87] 87  Resp:  [16] 16  BP: (148)/(91) 148/91    Physical Exam  HENT:      Right Ear: External ear normal.      Left Ear: External ear normal.      Mouth/Throat:      Pharynx: Oropharynx is clear.   Eyes:      Conjunctiva/sclera: Conjunctivae normal.   Cardiovascular:      Pulses: Normal pulses.   Pulmonary:      Effort: Pulmonary effort is normal.   Abdominal:      General: Abdomen is flat.   Skin:     General: Skin is warm and dry.   Neurological:      General: No focal deficit present.      Mental Status: He is alert.   Psychiatric:         Mood and Affect: Mood normal.         Result Review    Result Review:  I have personally reviewed the results from the time of this admission to 7/22/2025 10:52 EDT and agree with these  findings:  []  Laboratory list / accordion  []  Microbiology  []  Radiology  []  EKG/Telemetry   []  Cardiology/Vascular   []  Pathology  []  Old records  []  Other:  Most notable findings include:       Assessment & Plan   Assessment / Plan     Brief Patient Summary:  Isaias Monaco is a 70 y.o. male who   Iron deficiency  Active Hospital Problems:  There are no active hospital problems to display for this patient.    Plan: Upper tract  small bowel enteroscopy, risks, alternatives and benefits discussed with patient and patient is agreeable to proceed      VTE Prophylaxis:  No VTE prophylaxis order currently exists.        CODE STATUS:       Admission Status:  I believe this patient meets outpatient  status.    Ilya Zhao MD

## 2025-07-22 NOTE — ANESTHESIA POSTPROCEDURE EVALUATION
"Patient: Isaias Monaco    Procedure Summary       Date: 07/22/25 Room / Location:  [facility]    Anesthesia Start: 1048 Anesthesia Stop: 1119    Procedure: ENTEROSCOPY SMALL BOWEL with cautery (Esophagus) Diagnosis:     Surgeons: Ilya Zhao MD Provider: Miguel Ángel Andersen MD    Anesthesia Type: MAC ASA Status: 3            Anesthesia Type: MAC    Vitals  Vitals Value Taken Time   /86 07/22/25 11:52   Temp     Pulse 84 07/22/25 11:53   Resp 20 07/22/25 11:42   SpO2 97 % 07/22/25 11:53   Vitals shown include unfiled device data.        Post Anesthesia Care and Evaluation    Patient location during evaluation: bedside  Patient participation: complete - patient participated  Level of consciousness: sleepy but conscious  Pain score: 0  Pain management: adequate    Airway patency: patent  Anesthetic complications: No anesthetic complications    Cardiovascular status: acceptable  Respiratory status: acceptable  Hydration status: acceptable    Comments: /89   Pulse 90   Resp 20   Ht 195.6 cm (77\")   Wt 98.9 kg (218 lb)   SpO2 100%   BMI 25.85 kg/m²       "

## 2025-07-22 NOTE — ANESTHESIA PREPROCEDURE EVALUATION
Anesthesia Evaluation     Patient summary reviewed   NPO Solid Status: > 8 hours             Airway   Mallampati: II  Dental      Pulmonary    (+) pneumonia ,shortness of breath  Cardiovascular     (+) hypertension, CAD, hyperlipidemia      Neuro/Psych  (+) psychiatric history  GI/Hepatic/Renal/Endo    (+) obesity, GERD, renal disease-, diabetes mellitus    Musculoskeletal     Abdominal    Substance History      OB/GYN          Other   arthritis,                 Anesthesia Plan    ASA 3     MAC       Anesthetic plan, risks, benefits, and alternatives have been provided, discussed and informed consent has been obtained with: patient.    CODE STATUS:

## 2025-07-24 ENCOUNTER — CLINICAL SUPPORT (OUTPATIENT)
Dept: ORTHOPEDIC SURGERY | Facility: CLINIC | Age: 70
End: 2025-07-24
Payer: MEDICARE

## 2025-07-24 VITALS — TEMPERATURE: 97.8 F | WEIGHT: 222.3 LBS | BODY MASS INDEX: 26.25 KG/M2 | HEIGHT: 77 IN

## 2025-07-24 DIAGNOSIS — M17.12 PRIMARY OSTEOARTHRITIS OF LEFT KNEE: ICD-10-CM

## 2025-07-24 NOTE — PROGRESS NOTES
7/24/2025    Isaias Monaco is here today for worsening knee pain. Pt has undergone injection of the knee in the past with good resolution of symptoms. Pt is requesting a repeat injection.     KNEE Injection Procedure Note:    Large Joint Arthrocentesis: L knee  Date/Time: 7/24/2025 10:42 AM  Consent given by: patient  Site marked: site marked  Timeout: Immediately prior to procedure a time out was called to verify the correct patient, procedure, equipment, support staff and site/side marked as required   Supporting Documentation  Indications: pain and joint swelling   Procedure Details  Location: knee - L knee  Preparation: Patient was prepped and draped in the usual sterile fashion  Needle size: 22 G  Approach: anterolateral  Medications administered: 2 mL lidocaine (cardiac); 88 mg Hyaluronan 88 MG/4ML  Patient tolerance: patient tolerated the procedure well with no immediate complications      At the conclusion of the injection I discussed the importance of continued quad strengthening exercises on a daily basis. I will see the patient back if the symptoms should fail to improve or worsen.    Kalyn Marx, APRN  7/24/2025

## 2025-07-30 ENCOUNTER — OFFICE VISIT (OUTPATIENT)
Dept: FAMILY MEDICINE CLINIC | Facility: CLINIC | Age: 70
End: 2025-07-30
Payer: MEDICARE

## 2025-07-30 VITALS
DIASTOLIC BLOOD PRESSURE: 86 MMHG | HEART RATE: 90 BPM | WEIGHT: 221.8 LBS | TEMPERATURE: 98.2 F | SYSTOLIC BLOOD PRESSURE: 122 MMHG | BODY MASS INDEX: 26.19 KG/M2 | HEIGHT: 77 IN | OXYGEN SATURATION: 99 %

## 2025-07-30 DIAGNOSIS — R35.0 FREQUENCY OF URINATION: Primary | ICD-10-CM

## 2025-07-30 DIAGNOSIS — R63.4 UNINTENTIONAL WEIGHT LOSS: ICD-10-CM

## 2025-07-30 DIAGNOSIS — N30.01 ACUTE CYSTITIS WITH HEMATURIA: ICD-10-CM

## 2025-07-30 DIAGNOSIS — R10.2 PELVIC PAIN: ICD-10-CM

## 2025-07-30 DIAGNOSIS — R82.90 BAD ODOR OF URINE: ICD-10-CM

## 2025-07-30 LAB
BILIRUB BLD-MCNC: NEGATIVE MG/DL
CLARITY, POC: CLEAR
COLOR UR: ABNORMAL
EXPIRATION DATE: ABNORMAL
GLUCOSE UR STRIP-MCNC: NEGATIVE MG/DL
KETONES UR QL: ABNORMAL
LEUKOCYTE EST, POC: ABNORMAL
Lab: ABNORMAL
NITRITE UR-MCNC: POSITIVE MG/ML
PH UR: 6.5 [PH] (ref 5–8)
PROT UR STRIP-MCNC: ABNORMAL MG/DL
RBC # UR STRIP: ABNORMAL /UL
SP GR UR: 1.02 (ref 1–1.03)
UROBILINOGEN UR QL: ABNORMAL

## 2025-07-30 RX ORDER — SULFAMETHOXAZOLE AND TRIMETHOPRIM 800; 160 MG/1; MG/1
1 TABLET ORAL 2 TIMES DAILY
Qty: 14 TABLET | Refills: 0 | Status: SHIPPED | OUTPATIENT
Start: 2025-07-30 | End: 2025-08-06

## 2025-07-30 NOTE — PROGRESS NOTES
[facility]  Primary Care   Visit Note    Chief Complaint  Chief Complaint   Patient presents with    Urinary Frequency     Seeing Dr Jernigan. Had a cysto  and prostate ultrasound, both came back normal    urine odor     Urine smells like stool    Abdominal Pain     Right lower quadrant. Going on for 6 weeks. Dr Patten knows about it. Pain is positional, when laying down at night it will start and when he gets up to sit in his recliner the pain will resolve    urinary burning       Subjective    History of Present Illness        Isaias Monaco presents to [facility] for   History of Present Illness   History of Present Illness  The patient presents for evaluation of lower abdominal pain and foul urine odor.     He reports a foul odor in his urine and severe pain on the right lower side of his body, which intensifies when he lies down. The pain, described as cramping, began approximately 6 weeks ago during [place], causing him to return home early due to discomfort at night. The pain only presents in the RLQ and only after he has been laying down for a couple of hours. He eventually feels the need to void his bladder and then symptoms will improve after he does. He has undergone abd/pelvic CT with contrast and saw GI. No explanation for his pain symptoms could be discovered. He then saw urology last week. They completed a cystoscopy and told him the results looked normal. He also underwent a trans-rectal ultrasound and was told his prostate looked normal as well. His RLQ pain has worsened and the abnormal urine odor developed after the scope.      He reports no nausea, vomiting, upper abdominal pain, or chest pain. He has lost 70 pounds over the past 2 years due to a decreased appetite and is currently on Ozempic. He is considering reducing the dosage of Ozempic, which has been beneficial for his blood sugar levels, and plans to discuss this with his  "endocrinologist in 09/2025.     He has a history of elevated liver enzymes, which were attributed to medication use. He is currently on Crestor 10 mg and also receives injections every 3 months for cholesterol management. He reports no side effects from these medications.    He has a history of back problems and underwent major neck surgery last summer. He sees a pain doctor for his back pain and receives trigger point injections every couple of months. He was prescribed Cymbalta by his pain doctor for chronic bone pain, which has been effective.    Occupations: Retired nurse  Alcohol: Hardly any consumption    PAST SURGICAL HISTORY:  - Major neck surgery last summer  No current facility-administered medications for this visit.     Review of Systems   Constitutional:  Positive for fatigue and unexpected weight change.   Respiratory: Negative.     Cardiovascular: Negative.    Gastrointestinal:  Positive for abdominal pain.   Genitourinary:  Positive for dysuria and frequency.   Musculoskeletal:  Positive for back pain and neck pain.   Skin: Negative.    Neurological: Negative.        Objective   Vital Signs:   Visit Vitals  /86 (BP Location: Left arm, Patient Position: Sitting, Cuff Size: Adult)   Pulse 90   Temp 98.2 °F (36.8 °C) (Temporal)   Ht 195.6 cm (77\")   Wt 101 kg (221 lb 12.8 oz)   SpO2 99%   BMI 26.30 kg/m²                Physical Exam  Vitals and nursing note reviewed.   Constitutional:       Appearance: Normal appearance.   Cardiovascular:      Rate and Rhythm: Normal rate and regular rhythm.      Heart sounds: Normal heart sounds.   Pulmonary:      Effort: Pulmonary effort is normal.      Breath sounds: Normal breath sounds.   Abdominal:      General: Bowel sounds are increased. There is no distension.      Palpations: Abdomen is soft. There is no mass.      Tenderness: There is no abdominal tenderness. There is no right CVA tenderness, left CVA tenderness, guarding or rebound.      Hernia: No " hernia is present.   Skin:     General: Skin is warm and dry.   Neurological:      Mental Status: He is alert and oriented to person, place, and time.   Psychiatric:         Behavior: Behavior normal.        Physical Exam  Respiratory: Clear to auscultation, no wheezing, rales or rhonchi  Cardiovascular: Regular rate and rhythm, no murmurs, rubs, or gallops  Gastrointestinal: Soft, no tenderness, no distention, no masses         Result Review :  Results  Labs   - PSA: Low   - ALT: 05/28/2025, 55 (U/L)   - AST: 05/28/2025, 47 (U/L)    Imaging   - CT scan: Normal   - Rectal ultrasound of the prostate: Normal prostate    Diagnostic Testing   - Cystoscopy: Normal                          Assessment and Plan      Diagnoses and all orders for this visit:    1. Frequency of urination (Primary)  -     POCT urinalysis dipstick, automated  -     Urine Culture - Urine, Urine, Clean Catch  -     Comprehensive metabolic panel  -     CBC w AUTO Differential    2. Bad odor of urine  -     POCT urinalysis dipstick, automated  -     Urine Culture - Urine, Urine, Clean Catch    3. Acute cystitis with hematuria  -     sulfamethoxazole-trimethoprim (Bactrim DS) 800-160 MG per tablet; Take 1 tablet by mouth 2 (Two) Times a Day for 7 days.  Dispense: 14 tablet; Refill: 0    4. Pelvic pain    5. Unintentional weight loss  -     TSH       Assessment & Plan  1. Urinary Tract Infection.  - POCT urinalysis in clinic today positive for blood and nitrites.  - A urine culture will be sent for analysis.   - Bactrim (sulfamethoxazole/trimethoprim) 800 mg/160 mg twice daily for 7 days has been prescribed.  - A complete blood count (CBC) and comprehensive metabolic panel (CMP) will be conducted today. If the urine culture results necessitate a change in treatment, adjustments will be made accordingly.    2. RLQ/suprapubic pain  - Pain not present at this time and not reproducible   - CT abd/pelvis gave no explanation   - Patient asking if MRI would  be helpful, but I explained that MRIs of the abdomen are typically only used after a recent surgery or to evaluate the liver or to further evaluate masses seen on CT - which his CT was unremarkable.   - Could consider non-contrasted CT if symptoms persist or worsen as the previous CT was contrast only a renal stone could have been hidden by the contrast           Follow Up   Return if symptoms worsen or fail to improve.  Patient was given instructions and counseling regarding his condition or for health maintenance advice. Please see specific information pulled into the AVS if appropriate.     Patient or patient representative verbalized consent for the use of Ambient Listening during the visit with  Yue Woods PA-C for chart documentation. 7/30/2025  12:34 EDT    Yue Woods PA-C  [address]  [address]  [phone]

## 2025-07-31 LAB
ALBUMIN SERPL-MCNC: 4.3 G/DL (ref 3.5–5.2)
ALBUMIN/GLOB SERPL: 1.5 G/DL
ALP SERPL-CCNC: 93 U/L (ref 39–117)
ALT SERPL-CCNC: 30 U/L (ref 1–41)
AST SERPL-CCNC: 23 U/L (ref 1–40)
BASOPHILS # BLD AUTO: 0.05 10*3/MM3 (ref 0–0.2)
BASOPHILS NFR BLD AUTO: 0.4 % (ref 0–1.5)
BILIRUB SERPL-MCNC: 0.6 MG/DL (ref 0–1.2)
BUN SERPL-MCNC: 11 MG/DL (ref 8–23)
BUN/CREAT SERPL: 10.4 (ref 7–25)
CALCIUM SERPL-MCNC: 9.8 MG/DL (ref 8.6–10.5)
CHLORIDE SERPL-SCNC: 97 MMOL/L (ref 98–107)
CO2 SERPL-SCNC: 23.9 MMOL/L (ref 22–29)
CREAT SERPL-MCNC: 1.06 MG/DL (ref 0.76–1.27)
EGFRCR SERPLBLD CKD-EPI 2021: 75.5 ML/MIN/1.73
EOSINOPHIL # BLD AUTO: 0.18 10*3/MM3 (ref 0–0.4)
EOSINOPHIL NFR BLD AUTO: 1.5 % (ref 0.3–6.2)
ERYTHROCYTE [DISTWIDTH] IN BLOOD BY AUTOMATED COUNT: 15.1 % (ref 12.3–15.4)
GLOBULIN SER CALC-MCNC: 2.9 GM/DL
GLUCOSE SERPL-MCNC: 103 MG/DL (ref 65–99)
HCT VFR BLD AUTO: 47.8 % (ref 37.5–51)
HGB BLD-MCNC: 15.9 G/DL (ref 13–17.7)
IMM GRANULOCYTES # BLD AUTO: 0.15 10*3/MM3 (ref 0–0.05)
IMM GRANULOCYTES NFR BLD AUTO: 1.3 % (ref 0–0.5)
LYMPHOCYTES # BLD AUTO: 2.62 10*3/MM3 (ref 0.7–3.1)
LYMPHOCYTES NFR BLD AUTO: 21.9 % (ref 19.6–45.3)
MCH RBC QN AUTO: 31.2 PG (ref 26.6–33)
MCHC RBC AUTO-ENTMCNC: 33.3 G/DL (ref 31.5–35.7)
MCV RBC AUTO: 93.7 FL (ref 79–97)
MONOCYTES # BLD AUTO: 0.9 10*3/MM3 (ref 0.1–0.9)
MONOCYTES NFR BLD AUTO: 7.5 % (ref 5–12)
NEUTROPHILS # BLD AUTO: 8.08 10*3/MM3 (ref 1.7–7)
NEUTROPHILS NFR BLD AUTO: 67.4 % (ref 42.7–76)
NRBC BLD AUTO-RTO: 0 /100 WBC (ref 0–0.2)
PLATELET # BLD AUTO: 165 10*3/MM3 (ref 140–450)
POTASSIUM SERPL-SCNC: 4.4 MMOL/L (ref 3.5–5.2)
PROT SERPL-MCNC: 7.2 G/DL (ref 6–8.5)
RBC # BLD AUTO: 5.1 10*6/MM3 (ref 4.14–5.8)
SODIUM SERPL-SCNC: 134 MMOL/L (ref 136–145)
TSH SERPL DL<=0.005 MIU/L-ACNC: 0.99 UIU/ML (ref 0.27–4.2)
WBC # BLD AUTO: 11.98 10*3/MM3 (ref 3.4–10.8)

## 2025-08-05 LAB
BACTERIA UR CULT: ABNORMAL
BACTERIA UR CULT: ABNORMAL
OTHER ANTIBIOTIC SUSC ISLT: ABNORMAL

## 2025-08-08 ENCOUNTER — TELEPHONE (OUTPATIENT)
Dept: CARDIOLOGY | Age: 70
End: 2025-08-08
Payer: MEDICARE

## 2025-08-11 ENCOUNTER — OFFICE VISIT (OUTPATIENT)
Age: 70
End: 2025-08-11
Payer: MEDICARE

## 2025-08-11 ENCOUNTER — HOSPITAL ENCOUNTER (OUTPATIENT)
Dept: CARDIOLOGY | Facility: HOSPITAL | Age: 70
Discharge: HOME | End: 2025-08-11
Admitting: NURSE PRACTITIONER
Payer: MEDICARE

## 2025-08-11 VITALS
WEIGHT: 210 LBS | HEART RATE: 94 BPM | BODY MASS INDEX: 24.79 KG/M2 | HEIGHT: 77 IN | DIASTOLIC BLOOD PRESSURE: 70 MMHG | OXYGEN SATURATION: 98 % | SYSTOLIC BLOOD PRESSURE: 132 MMHG

## 2025-08-11 DIAGNOSIS — I42.0 CARDIOMYOPATHY, DILATED: ICD-10-CM

## 2025-08-11 DIAGNOSIS — I25.10 CORONARY ARTERY DISEASE INVOLVING NATIVE CORONARY ARTERY OF NATIVE HEART WITHOUT ANGINA PECTORIS: ICD-10-CM

## 2025-08-11 DIAGNOSIS — R29.90 NEUROLOGICAL DYSFUNCTION: ICD-10-CM

## 2025-08-11 DIAGNOSIS — R00.2 PALPITATIONS: ICD-10-CM

## 2025-08-11 DIAGNOSIS — R06.00 DYSPNEA, UNSPECIFIED TYPE: Primary | ICD-10-CM

## 2025-08-11 DIAGNOSIS — R06.00 DYSPNEA, UNSPECIFIED TYPE: ICD-10-CM

## 2025-08-11 LAB
AORTIC ARCH: 3.5 CM
AORTIC DIMENSIONLESS INDEX: 0.5 (DI)
ASCENDING AORTA: 2.6 CM
AV MEAN PRESS GRAD SYS DOP V1V2: 3.4 MMHG
AV VMAX SYS DOP: 124.1 CM/SEC
BH CV ECHO LEFT VENTRICLE GLOBAL LONGITUDINAL STRAIN: -16.1 %
BH CV ECHO MEAS - ACS: 1.58 CM
BH CV ECHO MEAS - AO MAX PG: 6.2 MMHG
BH CV ECHO MEAS - AO ROOT DIAM: 2.8 CM
BH CV ECHO MEAS - AO V2 VTI: 16.8 CM
BH CV ECHO MEAS - AVA(I,D): 1.54 CM2
BH CV ECHO MEAS - EDV(CUBED): 166.4 ML
BH CV ECHO MEAS - EDV(MOD-SP2): 90 ML
BH CV ECHO MEAS - EDV(MOD-SP4): 81 ML
BH CV ECHO MEAS - EF(MOD-SP2): 30 %
BH CV ECHO MEAS - EF(MOD-SP4): 25.9 %
BH CV ECHO MEAS - ESV(CUBED): 120.4 ML
BH CV ECHO MEAS - ESV(MOD-SP2): 63 ML
BH CV ECHO MEAS - ESV(MOD-SP4): 60 ML
BH CV ECHO MEAS - FS: 10.2 %
BH CV ECHO MEAS - IVS/LVPW: 1 CM
BH CV ECHO MEAS - IVSD: 1.1 CM
BH CV ECHO MEAS - LAT PEAK E' VEL: 12 CM/SEC
BH CV ECHO MEAS - LV DIASTOLIC VOL/BSA (35-75): 35.5 CM2
BH CV ECHO MEAS - LV MASS(C)D: 242 GRAMS
BH CV ECHO MEAS - LV MAX PG: 1.72 MMHG
BH CV ECHO MEAS - LV MEAN PG: 0.79 MMHG
BH CV ECHO MEAS - LV SYSTOLIC VOL/BSA (12-30): 26.3 CM2
BH CV ECHO MEAS - LV V1 MAX: 65.6 CM/SEC
BH CV ECHO MEAS - LV V1 VTI: 8.4 CM
BH CV ECHO MEAS - LVIDD: 5.5 CM
BH CV ECHO MEAS - LVIDS: 4.9 CM
BH CV ECHO MEAS - LVOT AREA: 3.1 CM2
BH CV ECHO MEAS - LVOT DIAM: 1.98 CM
BH CV ECHO MEAS - LVPWD: 1.1 CM
BH CV ECHO MEAS - MED PEAK E' VEL: 2.5 CM/SEC
BH CV ECHO MEAS - MV A DUR: 0.09 SEC
BH CV ECHO MEAS - MV A MAX VEL: 55.3 CM/SEC
BH CV ECHO MEAS - MV DEC SLOPE: 909.9 CM/SEC2
BH CV ECHO MEAS - MV DEC TIME: 0.12 SEC
BH CV ECHO MEAS - MV E MAX VEL: 85.3 CM/SEC
BH CV ECHO MEAS - MV E/A: 1.54
BH CV ECHO MEAS - MV MAX PG: 3.2 MMHG
BH CV ECHO MEAS - MV MEAN PG: 1.7 MMHG
BH CV ECHO MEAS - MV P1/2T: 29.3 MSEC
BH CV ECHO MEAS - MV V2 VTI: 12.5 CM
BH CV ECHO MEAS - MVA(P1/2T): 7.5 CM2
BH CV ECHO MEAS - MVA(VTI): 2.08 CM2
BH CV ECHO MEAS - PA ACC TIME: 0.06 SEC
BH CV ECHO MEAS - PA V2 MAX: 88.2 CM/SEC
BH CV ECHO MEAS - PULM A REVS DUR: 0.13 SEC
BH CV ECHO MEAS - PULM A REVS VEL: 47.1 CM/SEC
BH CV ECHO MEAS - PULM DIAS VEL: 25.7 CM/SEC
BH CV ECHO MEAS - PULM S/D: 2.6
BH CV ECHO MEAS - PULM SYS VEL: 66.6 CM/SEC
BH CV ECHO MEAS - QP/QS: 0.9
BH CV ECHO MEAS - RAP SYSTOLE: 8 MMHG
BH CV ECHO MEAS - RV MAX PG: 0.95 MMHG
BH CV ECHO MEAS - RV V1 MAX: 48.8 CM/SEC
BH CV ECHO MEAS - RV V1 VTI: 7.4 CM
BH CV ECHO MEAS - RVOT DIAM: 2 CM
BH CV ECHO MEAS - SUP REN AO DIAM: 1.9 CM
BH CV ECHO MEAS - SV(LVOT): 26 ML
BH CV ECHO MEAS - SV(MOD-SP2): 27 ML
BH CV ECHO MEAS - SV(MOD-SP4): 21 ML
BH CV ECHO MEAS - SV(RVOT): 23.2 ML
BH CV ECHO MEAS - SVI(LVOT): 11.4 ML/M2
BH CV ECHO MEAS - SVI(MOD-SP2): 11.8 ML/M2
BH CV ECHO MEAS - SVI(MOD-SP4): 9.2 ML/M2
BH CV ECHO MEAS - TAPSE (>1.6): 1.34 CM
BH CV ECHO MEASUREMENTS AVERAGE E/E' RATIO: 11.77
BH CV XLRA - RV BASE: 2.6 CM
BH CV XLRA - RV LENGTH: 7.5 CM
BH CV XLRA - RV MID: 2.05 CM
BH CV XLRA - TDI S': 5.7 CM/SEC
LEFT ATRIUM VOLUME INDEX: 6.2 ML/M2
LV EF BIPLANE MOD: 27 %
SINUS: 2.6 CM
STJ: 2.35 CM

## 2025-08-11 PROCEDURE — 3075F SYST BP GE 130 - 139MM HG: CPT | Performed by: NURSE PRACTITIONER

## 2025-08-11 PROCEDURE — 3078F DIAST BP <80 MM HG: CPT | Performed by: NURSE PRACTITIONER

## 2025-08-11 PROCEDURE — 1160F RVW MEDS BY RX/DR IN RCRD: CPT | Performed by: NURSE PRACTITIONER

## 2025-08-11 PROCEDURE — 93306 TTE W/DOPPLER COMPLETE: CPT

## 2025-08-11 PROCEDURE — 93306 TTE W/DOPPLER COMPLETE: CPT | Performed by: INTERNAL MEDICINE

## 2025-08-11 PROCEDURE — 93356 MYOCRD STRAIN IMG SPCKL TRCK: CPT

## 2025-08-11 PROCEDURE — 25510000001 PERFLUTREN 6.52 MG/ML SUSPENSION 2 ML VIAL: Performed by: NURSE PRACTITIONER

## 2025-08-11 PROCEDURE — 1159F MED LIST DOCD IN RCRD: CPT | Performed by: NURSE PRACTITIONER

## 2025-08-11 PROCEDURE — 93000 ELECTROCARDIOGRAM COMPLETE: CPT | Performed by: NURSE PRACTITIONER

## 2025-08-11 PROCEDURE — 93356 MYOCRD STRAIN IMG SPCKL TRCK: CPT | Performed by: INTERNAL MEDICINE

## 2025-08-11 RX ORDER — METOPROLOL SUCCINATE 25 MG/1
25 TABLET, EXTENDED RELEASE ORAL DAILY
Qty: 30 TABLET | Refills: 11 | Status: SHIPPED | OUTPATIENT
Start: 2025-08-11

## 2025-08-11 RX ORDER — SPIRONOLACTONE 25 MG/1
12.5 TABLET ORAL DAILY
Qty: 15 TABLET | Refills: 11 | Status: SHIPPED | OUTPATIENT
Start: 2025-08-11

## 2025-08-11 RX ADMIN — PERFLUTREN 3 ML: 6.52 INJECTION, SUSPENSION INTRAVENOUS at 13:19

## 2025-08-13 ENCOUNTER — TELEPHONE (OUTPATIENT)
Dept: CARDIOLOGY | Age: 70
End: 2025-08-13
Payer: MEDICARE

## 2025-08-13 DIAGNOSIS — I42.0 DILATED CARDIOMYOPATHY: Primary | ICD-10-CM

## 2025-08-13 DIAGNOSIS — I25.10 CORONARY ARTERY DISEASE INVOLVING NATIVE CORONARY ARTERY OF NATIVE HEART WITHOUT ANGINA PECTORIS: Primary | ICD-10-CM

## 2025-08-19 ENCOUNTER — HOSPITAL ENCOUNTER (OUTPATIENT)
Facility: HOSPITAL | Age: 70
Setting detail: HOSPITAL OUTPATIENT SURGERY
Discharge: HOME | End: 2025-08-19
Attending: INTERNAL MEDICINE | Admitting: INTERNAL MEDICINE
Payer: MEDICARE

## 2025-08-19 VITALS
DIASTOLIC BLOOD PRESSURE: 83 MMHG | HEART RATE: 85 BPM | BODY MASS INDEX: 24.44 KG/M2 | SYSTOLIC BLOOD PRESSURE: 117 MMHG | TEMPERATURE: 96.6 F | WEIGHT: 207 LBS | OXYGEN SATURATION: 99 % | HEIGHT: 77 IN | RESPIRATION RATE: 18 BRPM

## 2025-08-19 DIAGNOSIS — I42.0 DILATED CARDIOMYOPATHY: ICD-10-CM

## 2025-08-19 LAB — GLUCOSE BLDC GLUCOMTR-MCNC: 188 MG/DL (ref 65–99)

## 2025-08-19 PROCEDURE — 25510000001 IOPAMIDOL PER 1 ML: Performed by: INTERNAL MEDICINE

## 2025-08-19 PROCEDURE — 93454 CORONARY ARTERY ANGIO S&I: CPT | Performed by: INTERNAL MEDICINE

## 2025-08-19 PROCEDURE — C1769 GUIDE WIRE: HCPCS | Performed by: INTERNAL MEDICINE

## 2025-08-19 PROCEDURE — 25010000002 HEPARIN (PORCINE) PER 1000 UNITS: Performed by: INTERNAL MEDICINE

## 2025-08-19 PROCEDURE — 82948 REAGENT STRIP/BLOOD GLUCOSE: CPT | Performed by: INTERNAL MEDICINE

## 2025-08-19 PROCEDURE — 25010000002 FENTANYL CITRATE (PF) 50 MCG/ML SOLUTION: Performed by: INTERNAL MEDICINE

## 2025-08-19 PROCEDURE — 25010000002 LIDOCAINE 2% SOLUTION: Performed by: INTERNAL MEDICINE

## 2025-08-19 PROCEDURE — 25010000002 MIDAZOLAM PER 1 MG: Performed by: INTERNAL MEDICINE

## 2025-08-19 PROCEDURE — C1894 INTRO/SHEATH, NON-LASER: HCPCS | Performed by: INTERNAL MEDICINE

## 2025-08-19 RX ORDER — ONDANSETRON 4 MG/1
4 TABLET, ORALLY DISINTEGRATING ORAL EVERY 6 HOURS PRN
Status: DISCONTINUED | OUTPATIENT
Start: 2025-08-19 | End: 2025-08-19 | Stop reason: HOSPADM

## 2025-08-19 RX ORDER — ACETAMINOPHEN 325 MG/1
650 TABLET ORAL EVERY 4 HOURS PRN
Status: DISCONTINUED | OUTPATIENT
Start: 2025-08-19 | End: 2025-08-19 | Stop reason: HOSPADM

## 2025-08-19 RX ORDER — VERAPAMIL HYDROCHLORIDE 2.5 MG/ML
INJECTION INTRAVENOUS
Status: DISCONTINUED | OUTPATIENT
Start: 2025-08-19 | End: 2025-08-19 | Stop reason: HOSPADM

## 2025-08-19 RX ORDER — IOPAMIDOL 755 MG/ML
INJECTION, SOLUTION INTRAVASCULAR
Status: DISCONTINUED | OUTPATIENT
Start: 2025-08-19 | End: 2025-08-19 | Stop reason: HOSPADM

## 2025-08-19 RX ORDER — HYDROCODONE BITARTRATE AND ACETAMINOPHEN 5; 325 MG/1; MG/1
1 TABLET ORAL EVERY 6 HOURS PRN
COMMUNITY

## 2025-08-19 RX ORDER — SODIUM CHLORIDE 9 MG/ML
40 INJECTION, SOLUTION INTRAVENOUS AS NEEDED
Status: DISCONTINUED | OUTPATIENT
Start: 2025-08-19 | End: 2025-08-19 | Stop reason: HOSPADM

## 2025-08-19 RX ORDER — NITROGLYCERIN 0.4 MG/1
0.4 TABLET SUBLINGUAL
Status: DISCONTINUED | OUTPATIENT
Start: 2025-08-19 | End: 2025-08-19 | Stop reason: HOSPADM

## 2025-08-19 RX ORDER — ARIPIPRAZOLE 2 MG/1
2 TABLET ORAL DAILY
COMMUNITY

## 2025-08-19 RX ORDER — SODIUM CHLORIDE 9 MG/ML
75 INJECTION, SOLUTION INTRAVENOUS CONTINUOUS
Status: DISCONTINUED | OUTPATIENT
Start: 2025-08-19 | End: 2025-08-19 | Stop reason: HOSPADM

## 2025-08-19 RX ORDER — SODIUM CHLORIDE 0.9 % (FLUSH) 0.9 %
10 SYRINGE (ML) INJECTION EVERY 12 HOURS SCHEDULED
Status: DISCONTINUED | OUTPATIENT
Start: 2025-08-19 | End: 2025-08-19 | Stop reason: HOSPADM

## 2025-08-19 RX ORDER — MIDAZOLAM HYDROCHLORIDE 1 MG/ML
INJECTION, SOLUTION INTRAMUSCULAR; INTRAVENOUS
Status: DISCONTINUED | OUTPATIENT
Start: 2025-08-19 | End: 2025-08-19 | Stop reason: HOSPADM

## 2025-08-19 RX ORDER — ONDANSETRON 2 MG/ML
4 INJECTION INTRAMUSCULAR; INTRAVENOUS EVERY 6 HOURS PRN
Status: DISCONTINUED | OUTPATIENT
Start: 2025-08-19 | End: 2025-08-19 | Stop reason: HOSPADM

## 2025-08-19 RX ORDER — LIDOCAINE HYDROCHLORIDE 20 MG/ML
INJECTION, SOLUTION INFILTRATION; PERINEURAL
Status: DISCONTINUED | OUTPATIENT
Start: 2025-08-19 | End: 2025-08-19 | Stop reason: HOSPADM

## 2025-08-19 RX ORDER — SODIUM CHLORIDE 9 MG/ML
50 INJECTION, SOLUTION INTRAVENOUS CONTINUOUS
Status: DISCONTINUED | OUTPATIENT
Start: 2025-08-19 | End: 2025-08-19 | Stop reason: HOSPADM

## 2025-08-19 RX ORDER — HEPARIN SODIUM 1000 [USP'U]/ML
INJECTION, SOLUTION INTRAVENOUS; SUBCUTANEOUS
Status: DISCONTINUED | OUTPATIENT
Start: 2025-08-19 | End: 2025-08-19 | Stop reason: HOSPADM

## 2025-08-19 RX ORDER — SODIUM CHLORIDE 0.9 % (FLUSH) 0.9 %
10 SYRINGE (ML) INJECTION AS NEEDED
Status: DISCONTINUED | OUTPATIENT
Start: 2025-08-19 | End: 2025-08-19 | Stop reason: HOSPADM

## 2025-08-19 RX ORDER — FENTANYL CITRATE 50 UG/ML
INJECTION, SOLUTION INTRAMUSCULAR; INTRAVENOUS
Status: DISCONTINUED | OUTPATIENT
Start: 2025-08-19 | End: 2025-08-19 | Stop reason: HOSPADM

## 2025-08-20 ENCOUNTER — LAB (OUTPATIENT)
Dept: LAB | Facility: HOSPITAL | Age: 70
End: 2025-08-20
Payer: MEDICARE

## 2025-08-20 ENCOUNTER — TELEPHONE (OUTPATIENT)
Age: 70
End: 2025-08-20
Payer: MEDICARE

## 2025-08-20 PROCEDURE — 85027 COMPLETE CBC AUTOMATED: CPT | Performed by: NURSE PRACTITIONER

## 2025-08-20 PROCEDURE — 80048 BASIC METABOLIC PNL TOTAL CA: CPT | Performed by: NURSE PRACTITIONER

## 2025-08-26 ENCOUNTER — HOSPITAL ENCOUNTER (OUTPATIENT)
Dept: CARDIOLOGY | Facility: HOSPITAL | Age: 70
Discharge: HOME | End: 2025-08-26
Admitting: NURSE PRACTITIONER
Payer: MEDICARE

## 2025-08-26 VITALS
SYSTOLIC BLOOD PRESSURE: 131 MMHG | HEIGHT: 77 IN | OXYGEN SATURATION: 97 % | BODY MASS INDEX: 25.08 KG/M2 | HEART RATE: 88 BPM | DIASTOLIC BLOOD PRESSURE: 87 MMHG | WEIGHT: 212.4 LBS

## 2025-08-26 DIAGNOSIS — I42.0 DILATED CARDIOMYOPATHY: Primary | ICD-10-CM

## 2025-08-26 DIAGNOSIS — I50.22 CHRONIC HFREF (HEART FAILURE WITH REDUCED EJECTION FRACTION): ICD-10-CM

## 2025-08-26 LAB
ANION GAP SERPL CALCULATED.3IONS-SCNC: 14.3 MMOL/L (ref 5–15)
BUN SERPL-MCNC: 14 MG/DL (ref 8–23)
BUN/CREAT SERPL: 14 (ref 7–25)
CALCIUM SPEC-SCNC: 9.4 MG/DL (ref 8.6–10.5)
CHLORIDE SERPL-SCNC: 103 MMOL/L (ref 98–107)
CO2 SERPL-SCNC: 19.7 MMOL/L (ref 22–29)
CREAT SERPL-MCNC: 1 MG/DL (ref 0.76–1.27)
EGFRCR SERPLBLD CKD-EPI 2021: 81 ML/MIN/1.73
GLUCOSE SERPL-MCNC: 103 MG/DL (ref 65–99)
POTASSIUM SERPL-SCNC: 4.5 MMOL/L (ref 3.5–5.2)
SODIUM SERPL-SCNC: 137 MMOL/L (ref 136–145)

## 2025-08-26 PROCEDURE — 94618 PULMONARY STRESS TESTING: CPT

## 2025-08-26 PROCEDURE — 80048 BASIC METABOLIC PNL TOTAL CA: CPT | Performed by: NURSE PRACTITIONER

## 2025-08-26 PROCEDURE — 94726 PLETHYSMOGRAPHY LUNG VOLUMES: CPT | Performed by: NURSE PRACTITIONER

## 2025-08-26 RX ORDER — METOPROLOL SUCCINATE 50 MG/1
50 TABLET, EXTENDED RELEASE ORAL DAILY
Qty: 90 TABLET | Refills: 3 | Status: SHIPPED | OUTPATIENT
Start: 2025-08-26

## 2025-08-26 RX ORDER — DAPAGLIFLOZIN 10 MG/1
10 TABLET, FILM COATED ORAL DAILY
Qty: 90 TABLET | Refills: 3 | Status: SHIPPED | OUTPATIENT
Start: 2025-08-26

## 2025-08-27 ENCOUNTER — RESULTS FOLLOW-UP (OUTPATIENT)
Dept: CARDIOLOGY | Facility: HOSPITAL | Age: 70
End: 2025-08-27
Payer: MEDICARE

## 2025-08-27 RX ORDER — SPIRONOLACTONE 25 MG/1
25 TABLET ORAL DAILY
Qty: 90 TABLET | Refills: 3 | Status: SHIPPED | OUTPATIENT
Start: 2025-08-27

## (undated) DEVICE — BLCK/BITE BLOX W/DENTL/RIM W/STRAP 54F

## (undated) DEVICE — DRP SLUSH WARMR MACH CIR 44X44IN

## (undated) DEVICE — XIENCE SKYPOINT™ EVEROLIMUS ELUTING CORONARY STENT SYSTEM 3.00 MM X 38 MM / RAPID-EXCHANGE
Type: IMPLANTABLE DEVICE | Status: NON-FUNCTIONAL
Brand: XIENCE SKYPOINT™

## (undated) DEVICE — GC 7F 078 XB 3.5: Brand: VISTA BRITE TIP

## (undated) DEVICE — BALN PTCA TAKERURX DIL 3X20MM

## (undated) DEVICE — TUBING, SUCTION, 1/4" X 10', STRAIGHT: Brand: MEDLINE

## (undated) DEVICE — CATH DIAG IMPULSE FR5 5F 100CM

## (undated) DEVICE — ANTIBACTERIAL UNDYED BRAIDED (POLYGLACTIN 910), SYNTHETIC ABSORBABLE SUTURE: Brand: COATED VICRYL

## (undated) DEVICE — CANN O2 ETCO2 FITS ALL CONN CO2 SMPL A/ 7IN DISP LF

## (undated) DEVICE — TOOL MR8-14CY65DX MR8 14CM CYL DX 6.5MM: Brand: MIDAS REX MR8

## (undated) DEVICE — KT MANIFLD CARDIAC

## (undated) DEVICE — NC TREK CORONARY DILATATION CATHETER 3.5 MM X 12 MM / RAPID-EXCHANGE: Brand: NC TREK

## (undated) DEVICE — THE TORRENT IRRIGATION SCOPE CONNECTOR IS USED WITH THE TORRENT IRRIGATION TUBING TO PROVIDE IRRIGATION FLUIDS SUCH AS STERILE WATER DURING GASTROINTESTINAL ENDOSCOPIC PROCEDURES WHEN USED IN CONJUNCTION WITH AN IRRIGATION PUMP (OR ELECTROSURGICAL UNIT).: Brand: TORRENT

## (undated) DEVICE — DISPOSABLE BIPOLAR CABLE 12FT. (3.6M): Brand: KIRWAN

## (undated) DEVICE — KT DRN EVAC WND PVC 15F3/16IN 400CC

## (undated) DEVICE — ANGIOSCULPT EVO RX PTCA SCORING BALLOON CATHETER WITH HYDROPHILIC COATING, 3.0 MM X 10 MM: Brand: ANGIOSCULPT EVO

## (undated) DEVICE — SENSR O2 OXIMAX FNGR A/ 18IN NONSTR

## (undated) DEVICE — PAD,ABDOMINAL,8"X10",ST,LF: Brand: MEDLINE

## (undated) DEVICE — TREK CORONARY DILATATION CATHETER 3.0 MM X 15 MM / RAPID-EXCHANGE: Brand: TREK

## (undated) DEVICE — CANN NASL CO2 TRULINK W/O2 A/

## (undated) DEVICE — 6F .070 AL 1 100CM: Brand: CORDIS

## (undated) DEVICE — PREMIUM WET SKIN PREP TRAY: Brand: MEDLINE INDUSTRIES, INC.

## (undated) DEVICE — PK NEURO SPINE 40

## (undated) DEVICE — TREK CORONARY DILATATION CATHETER 3.0 MM X 12 MM / RAPID-EXCHANGE: Brand: TREK

## (undated) DEVICE — NC TREK NEO™ CORONARY DILATATION CATHETER 3.50 MM X 8 MM / RAPID-EXCHANGE: Brand: NC TREK NEO™

## (undated) DEVICE — EPIDURAL TRAY: Brand: MEDLINE INDUSTRIES, INC.

## (undated) DEVICE — DEV INDEFLATOR P/N 580289

## (undated) DEVICE — PK CATH CARD 40

## (undated) DEVICE — Device

## (undated) DEVICE — GW PRESSUREWIRE AERIS W/ AGILE TP 175CM

## (undated) DEVICE — SOL NACL 0.9PCT 1000ML

## (undated) DEVICE — 6F .070 XB 4 100CM: Brand: VISTA BRITE TIP

## (undated) DEVICE — LN SMPL CO2 SHTRM SD STREAM W/M LUER

## (undated) DEVICE — LABEL SHEET CUSTOM 2X2 YELLOW: Brand: MEDLINE INDUSTRIES, INC.

## (undated) DEVICE — HI-TORQUE BALANCE MIDDLEWEIGHT GUIDE WIRE .014 STRAIGHT TIP 3.0 CM X 190 CM: Brand: HI-TORQUE BALANCE MIDDLEWEIGHT

## (undated) DEVICE — 6F .070 XB 3.5 100CM: Brand: VISTA BRITE TIP

## (undated) DEVICE — DUAL CUT SAGITTAL BLADE

## (undated) DEVICE — KT DRN EVAC WND PVC PCH WTROC RND 10F400

## (undated) DEVICE — DRILL BIT NAVG3606010 NAVIGATED: Brand: NAVIGATED INFINITY™ OCCIPITOCERVICAL UPPER THORACIC SYSTEM

## (undated) DEVICE — CATH DIAG IMPULSE FL3.5 5F 100CM

## (undated) DEVICE — CULT AER/ANAEROB FASTIDIOUS BACT

## (undated) DEVICE — GUIDE CATHETER: Brand: MACH1™

## (undated) DEVICE — GLV SURG TRIUMPH PF LTX 7.5 STRL

## (undated) DEVICE — GLV SURG SENSICARE PI LF PF 7.5 GRN STRL

## (undated) DEVICE — GLV SURG BIOGEL LTX PF 7 1/2

## (undated) DEVICE — APPL DURAPREP IODOPHOR APL 26ML

## (undated) DEVICE — SINGLE-USE BIOPSY FORCEPS: Brand: RADIAL JAW 4

## (undated) DEVICE — UNDERCAST PADDING: Brand: DEROYAL

## (undated) DEVICE — BALN PTCA TAKERU RX 2.5X12MM

## (undated) DEVICE — INTRO SHEATH ART/FEM ENGAGE .035 6F12CM

## (undated) DEVICE — RUNTHROUGH NS EXTRA FLOPPY PTCA GUIDEWIRE: Brand: RUNTHROUGH

## (undated) DEVICE — XIENCE SKYPOINT™ EVEROLIMUS ELUTING CORONARY STENT SYSTEM 3.25 MM X 15 MM / RAPID-EXCHANGE
Type: IMPLANTABLE DEVICE | Status: NON-FUNCTIONAL
Brand: XIENCE SKYPOINT™

## (undated) DEVICE — GC 7F 078 XBLAD 4: Brand: VISTA BRITE TIP

## (undated) DEVICE — MINI TREK CORONARY DILATATION CATHETER 2.0 MM X 15 MM / RAPID-EXCHANGE: Brand: MINI TREK

## (undated) DEVICE — TUBING, SUCTION, 1/4" X 20', STRAIGHT: Brand: MEDLINE INDUSTRIES, INC.

## (undated) DEVICE — 6F .070 JR4 ECO PK: Brand: VISTA BRITE TIP

## (undated) DEVICE — DGW .035 FC J3MM 260CM TEF: Brand: EMERALD

## (undated) DEVICE — CATH DIAG IMPULSE FR4 5F 100CM

## (undated) DEVICE — COVER,C-ARM,41X74: Brand: MEDLINE

## (undated) DEVICE — TR BAND RADIAL ARTERY COMPRESSION DEVICE: Brand: TR BAND

## (undated) DEVICE — Device: Brand: ANGIOSCULPT® PTCA SCORING BALLOON CATHETER

## (undated) DEVICE — GLIDESHEATH SLENDER STAINLESS STEEL KIT: Brand: GLIDESHEATH SLENDER

## (undated) DEVICE — KT ORCA ORCAPOD DISP STRL

## (undated) DEVICE — 6F .070 XB 3 100CM: Brand: VISTA BRITE TIP

## (undated) DEVICE — GLV SURG TRIUMPH CLASSIC PF LTX 8 STRL

## (undated) DEVICE — NC TREK CORONARY DILATATION CATHETER 3.0 MM X 25 MM / RAPID-EXCHANGE: Brand: NC TREK

## (undated) DEVICE — ADAPT CLN BIOGUARD AIR/H2O DISP

## (undated) DEVICE — NC TREK CORONARY DILATATION CATHETER 3.25 MM X 20 MM / RAPID-EXCHANGE: Brand: NC TREK

## (undated) DEVICE — CATH VENT MIV RADL PIG ST TIP 5F 110CM

## (undated) DEVICE — TOWEL,OR,DSP,ST,BLUE,STD,4/PK,20PK/CS: Brand: MEDLINE

## (undated) DEVICE — TBG 02 CRUSH RESIST LF CLR 7FT

## (undated) DEVICE — BNDG ELAS ELITE V/CLOSE 6IN 5YD LF STRL

## (undated) DEVICE — PK ORTHO MAJ 40

## (undated) DEVICE — STPLR SKIN VISISTAT WD 35CT

## (undated) DEVICE — FRCP BX RADJAW4 NDL 2.8 240CM LG OG BX40

## (undated) DEVICE — LOU PACE DEFIB: Brand: MEDLINE INDUSTRIES, INC.

## (undated) DEVICE — CORONARY IMAGING CATHETER: Brand: OPTICROSS™ 6 HD

## (undated) DEVICE — ANGIO-SEAL VIP VASCULAR CLOSURE DEVICE: Brand: ANGIO-SEAL

## (undated) DEVICE — PK ATS CUST W CARDIOTOMY RESEVOIR

## (undated) DEVICE — BALN PTCA TAKERU RX NC 4X15MM

## (undated) DEVICE — CATH DIAG IMPULSE PIG 5F 100CM

## (undated) DEVICE — Device: Brand: DEFENDO AIR/WATER/SUCTION AND BIOPSY VALVE

## (undated) DEVICE — DEV INDEFLATOR

## (undated) DEVICE — FIAPC® PROBE W/ FILTER 2200 A OD 2.3MM/6.9FR; L 2.2M/7.2FT: Brand: ERBE

## (undated) DEVICE — GW EMR FIX EXCHG J STD .035 3MM 260CM

## (undated) DEVICE — NC TREK NEO™ CORONARY DILATATION CATHETER 3.25 MM X 15 MM / RAPID-EXCHANGE: Brand: NC TREK NEO™

## (undated) DEVICE — DEFENDO AIR WATER SUCTION AND BIOPSY VALVE KIT FOR  OLYMPUS: Brand: DEFENDO AIR/WATER/SUCTION AND BIOPSY VALVE

## (undated) DEVICE — ERBE NESSY®PLATE 170 SPLIT; 168CM²; CABLE 3M: Brand: ERBE

## (undated) DEVICE — INTRO SHEATH ART/FEM ENGAGE .035 7F12CM

## (undated) DEVICE — PK KN TOTL 40

## (undated) DEVICE — MAYFIELD® DISPOSABLE ADULT SKULL PIN (PLASTIC BASE): Brand: MAYFIELD®

## (undated) DEVICE — BANDAGE,GAUZE,BULKEE II,4.5"X4.1YD,STRL: Brand: MEDLINE

## (undated) DEVICE — BITEBLOCK OMNI BLOC

## (undated) DEVICE — BALN PTCA TAKERU RX NC 3.5X15MM

## (undated) DEVICE — XIENCE SKYPOINT™ EVEROLIMUS ELUTING CORONARY STENT SYSTEM 3.50 MM X 23 MM / RAPID-EXCHANGE
Type: IMPLANTABLE DEVICE | Status: NON-FUNCTIONAL
Brand: XIENCE SKYPOINT™
Removed: 2022-05-20

## (undated) DEVICE — MAT FLR ABSORBENT LG 4FT 10 2.5FT

## (undated) DEVICE — NDL SPINE 22G 5IN BLK

## (undated) DEVICE — SPNG GZ WOVN 4X4IN 12PLY 10/BX STRL

## (undated) DEVICE — T4 ZIPPER TOGA, (L/XL)

## (undated) DEVICE — TREK CORONARY DILATATION CATHETER 3.50 MM X 12 MM / RAPID-EXCHANGE: Brand: TREK

## (undated) DEVICE — ADHS SKIN SURG TISS VISC PREMIERPRO EXOFIN HI/VISC FAST/DRY

## (undated) DEVICE — 6F .070 XB 3.5 ECO PK: Brand: VISTA BRITE TIP

## (undated) DEVICE — DRSNG WND GZ CURAD OIL EMULSION 3X8IN LF STRL 1PK

## (undated) DEVICE — BALN PTCA TAKERU RX NC 4X12MM

## (undated) DEVICE — MSK PROC CURAPLEX O2 2/ADAPT 7FT

## (undated) DEVICE — HANDPIECE SET WITH COAXIAL HIGH FLOW TIP AND SUCTION TUBE: Brand: INTERPULSE

## (undated) DEVICE — NC TREK CORONARY DILATATION CATHETER 4.0 MM X 15 MM / RAPID-EXCHANGE: Brand: NC TREK

## (undated) DEVICE — NC TREK NEO™ CORONARY DILATATION CATHETER 3.50 MM X 20 MM / RAPID-EXCHANGE: Brand: NC TREK NEO™

## (undated) DEVICE — NC TREK CORONARY DILATATION CATHETER 3.5 MM X 15 MM / RAPID-EXCHANGE: Brand: NC TREK

## (undated) DEVICE — XIENCE SIERRA™ EVEROLIMUS ELUTING CORONARY STENT SYSTEM 3.25 MM X 33 MM / RAPID-EXCHANGE
Type: IMPLANTABLE DEVICE | Status: NON-FUNCTIONAL
Brand: XIENCE SIERRA™

## (undated) DEVICE — PATIENT RETURN ELECTRODE, SINGLE-USE, CONTACT QUALITY MONITORING, ADULT, WITH 9FT CORD, FOR PATIENTS WEIGING OVER 33LBS. (15KG): Brand: MEGADYNE

## (undated) DEVICE — CATH INTRAVASC/LITHO C2PLUS 4X12MM

## (undated) DEVICE — GLV SURG BIOGEL LTX PF 8

## (undated) DEVICE — GLV SURG SENSICARE PI PF LF 8.5 GRN STRL

## (undated) DEVICE — 6F .070 JL4 100CM: Brand: CORDIS

## (undated) DEVICE — GLV SURG TRIUMPH PF LTX 7 STRL

## (undated) DEVICE — BALN PTCA TAKERU RX NC 4X8MM

## (undated) DEVICE — XIENCE SKYPOINT™ EVEROLIMUS ELUTING CORONARY STENT SYSTEM 3.25 MM X 23 MM / RAPID-EXCHANGE
Type: IMPLANTABLE DEVICE | Status: NON-FUNCTIONAL
Brand: XIENCE SKYPOINT™

## (undated) DEVICE — BALN PTCA TAKERU RX 2X12MM

## (undated) DEVICE — GLV SURG TRIUMPH CLASSIC PF LTX 8.5 STRL

## (undated) DEVICE — CONN TBG Y 5 IN 1 LF STRL

## (undated) DEVICE — ENCORE® LATEX ORTHO SIZE 8.5, STERILE LATEX POWDER-FREE SURGICAL GLOVE: Brand: ENCORE

## (undated) DEVICE — ELECTRD BLD EZ CLN MOD XLNG 2.75IN

## (undated) DEVICE — DISPOSABLE TOURNIQUET CUFF SINGLE BLADDER, SINGLE PORT AND QUICK CONNECT CONNECTOR: Brand: COLOR CUFF

## (undated) DEVICE — GAUZE,SPONGE,FLUFF,6"X6.75",STRL,10/TRAY: Brand: MEDLINE

## (undated) DEVICE — ANTIBACTERIAL UNDYED BRAIDED (POLYGLACTIN 910), SYNTHETIC ABSORBABLE SURGICAL SUTURE: Brand: COATED VICRYL

## (undated) DEVICE — HI-TORQUE WHISPER ES GUIDE WIRE .014 STRAIGHTTIP 3.0 CM X 190 CM: Brand: HI-TORQUE WHISPER

## (undated) DEVICE — BND PRESS RADL COMFRT 14IN STRL

## (undated) DEVICE — PAD GRND E/S NT200IX RF/GEN W/CABL DISP

## (undated) DEVICE — NDL SPINE 22G 31/2IN BLK

## (undated) DEVICE — 6F .070 AL.75 100CM: Brand: CORDIS

## (undated) DEVICE — GW PRESSUREWIRE X WIRELESS FFR 175CM

## (undated) DEVICE — NDL SPINE 20G 3 1/2 YEL STRL 1P/U

## (undated) DEVICE — SYR LUERLOK 20CC

## (undated) DEVICE — EMERALD ARTHROSCOPY SHEET: Brand: CONVERTORS

## (undated) DEVICE — SOL ISO/ALC 70PCT 4OZ

## (undated) DEVICE — DRSNG ADAPTIC 3X8